# Patient Record
Sex: MALE | Race: BLACK OR AFRICAN AMERICAN | Employment: UNEMPLOYED | URBAN - METROPOLITAN AREA
[De-identification: names, ages, dates, MRNs, and addresses within clinical notes are randomized per-mention and may not be internally consistent; named-entity substitution may affect disease eponyms.]

---

## 2018-01-01 ENCOUNTER — APPOINTMENT (OUTPATIENT)
Dept: VASCULAR SURGERY | Age: 63
DRG: 004 | End: 2018-01-01
Attending: INTERNAL MEDICINE
Payer: MEDICARE

## 2018-01-01 ENCOUNTER — APPOINTMENT (OUTPATIENT)
Dept: GENERAL RADIOLOGY | Age: 63
DRG: 004 | End: 2018-01-01
Attending: INTERNAL MEDICINE
Payer: MEDICARE

## 2018-01-01 ENCOUNTER — APPOINTMENT (OUTPATIENT)
Dept: NON INVASIVE DIAGNOSTICS | Age: 63
DRG: 004 | End: 2018-01-01
Attending: INTERNAL MEDICINE
Payer: MEDICARE

## 2018-01-01 ENCOUNTER — APPOINTMENT (OUTPATIENT)
Dept: MRI IMAGING | Age: 63
DRG: 004 | End: 2018-01-01
Attending: NURSE PRACTITIONER
Payer: MEDICARE

## 2018-01-01 ENCOUNTER — APPOINTMENT (OUTPATIENT)
Dept: ULTRASOUND IMAGING | Age: 63
DRG: 004 | End: 2018-01-01
Attending: INTERNAL MEDICINE
Payer: MEDICARE

## 2018-01-01 ENCOUNTER — HOSPICE ADMISSION (OUTPATIENT)
Dept: HOSPICE | Facility: HOSPICE | Age: 63
End: 2018-01-01

## 2018-01-01 ENCOUNTER — APPOINTMENT (OUTPATIENT)
Dept: GENERAL RADIOLOGY | Age: 63
DRG: 004 | End: 2018-01-01
Attending: NURSE PRACTITIONER
Payer: MEDICARE

## 2018-01-01 ENCOUNTER — APPOINTMENT (OUTPATIENT)
Dept: GENERAL RADIOLOGY | Age: 63
DRG: 064 | End: 2018-01-01
Attending: EMERGENCY MEDICINE
Payer: MEDICARE

## 2018-01-01 ENCOUNTER — TELEPHONE (OUTPATIENT)
Dept: SURGERY | Age: 63
End: 2018-01-01

## 2018-01-01 ENCOUNTER — APPOINTMENT (OUTPATIENT)
Dept: VASCULAR SURGERY | Age: 63
DRG: 064 | End: 2018-01-01
Attending: NURSE PRACTITIONER
Payer: MEDICARE

## 2018-01-01 ENCOUNTER — PATIENT OUTREACH (OUTPATIENT)
Dept: CASE MANAGEMENT | Age: 63
End: 2018-01-01

## 2018-01-01 ENCOUNTER — APPOINTMENT (OUTPATIENT)
Dept: NON INVASIVE DIAGNOSTICS | Age: 63
DRG: 064 | End: 2018-01-01
Attending: NURSE PRACTITIONER
Payer: MEDICARE

## 2018-01-01 ENCOUNTER — APPOINTMENT (OUTPATIENT)
Dept: CT IMAGING | Age: 63
DRG: 064 | End: 2018-01-01
Attending: EMERGENCY MEDICINE
Payer: MEDICARE

## 2018-01-01 ENCOUNTER — APPOINTMENT (OUTPATIENT)
Dept: MRI IMAGING | Age: 63
DRG: 064 | End: 2018-01-01
Attending: EMERGENCY MEDICINE
Payer: MEDICARE

## 2018-01-01 ENCOUNTER — APPOINTMENT (OUTPATIENT)
Dept: CT IMAGING | Age: 63
DRG: 004 | End: 2018-01-01
Attending: INTERNAL MEDICINE
Payer: MEDICARE

## 2018-01-01 ENCOUNTER — APPOINTMENT (OUTPATIENT)
Dept: GENERAL RADIOLOGY | Age: 63
DRG: 004 | End: 2018-01-01
Attending: SURGERY
Payer: MEDICARE

## 2018-01-01 ENCOUNTER — HOSPITAL ENCOUNTER (INPATIENT)
Age: 63
LOS: 53 days | DRG: 004 | End: 2018-11-19
Attending: EMERGENCY MEDICINE | Admitting: INTERNAL MEDICINE
Payer: MEDICARE

## 2018-01-01 ENCOUNTER — APPOINTMENT (OUTPATIENT)
Dept: VASCULAR SURGERY | Age: 63
DRG: 004 | End: 2018-01-01
Attending: PODIATRIST
Payer: MEDICARE

## 2018-01-01 ENCOUNTER — APPOINTMENT (OUTPATIENT)
Dept: CARDIAC CATH/INVASIVE PROCEDURES | Age: 63
DRG: 004 | End: 2018-01-01
Attending: INTERNAL MEDICINE
Payer: MEDICARE

## 2018-01-01 ENCOUNTER — APPOINTMENT (OUTPATIENT)
Dept: GENERAL RADIOLOGY | Age: 63
DRG: 004 | End: 2018-01-01
Attending: EMERGENCY MEDICINE
Payer: MEDICARE

## 2018-01-01 ENCOUNTER — ANESTHESIA (OUTPATIENT)
Dept: SURGERY | Age: 63
DRG: 004 | End: 2018-01-01
Payer: MEDICARE

## 2018-01-01 ENCOUNTER — HOSPITAL ENCOUNTER (INPATIENT)
Age: 63
LOS: 4 days | Discharge: NURSING, REHAB OR LONG-TERM CARE | DRG: 064 | End: 2018-09-14
Attending: EMERGENCY MEDICINE | Admitting: FAMILY MEDICINE
Payer: MEDICARE

## 2018-01-01 ENCOUNTER — APPOINTMENT (OUTPATIENT)
Dept: GENERAL RADIOLOGY | Age: 63
DRG: 064 | End: 2018-01-01
Attending: HOSPITALIST
Payer: MEDICARE

## 2018-01-01 ENCOUNTER — APPOINTMENT (OUTPATIENT)
Dept: CT IMAGING | Age: 63
DRG: 004 | End: 2018-01-01
Attending: EMERGENCY MEDICINE
Payer: MEDICARE

## 2018-01-01 ENCOUNTER — ANESTHESIA EVENT (OUTPATIENT)
Dept: SURGERY | Age: 63
DRG: 004 | End: 2018-01-01
Payer: MEDICARE

## 2018-01-01 VITALS
TEMPERATURE: 98.1 F | HEIGHT: 67 IN | RESPIRATION RATE: 17 BRPM | SYSTOLIC BLOOD PRESSURE: 174 MMHG | BODY MASS INDEX: 35.78 KG/M2 | HEART RATE: 71 BPM | WEIGHT: 227.96 LBS | DIASTOLIC BLOOD PRESSURE: 87 MMHG | OXYGEN SATURATION: 93 %

## 2018-01-01 VITALS
OXYGEN SATURATION: 79 % | RESPIRATION RATE: 10 BRPM | BODY MASS INDEX: 29.05 KG/M2 | TEMPERATURE: 100.9 F | HEART RATE: 116 BPM | DIASTOLIC BLOOD PRESSURE: 77 MMHG | HEIGHT: 67 IN | WEIGHT: 185.09 LBS | SYSTOLIC BLOOD PRESSURE: 136 MMHG

## 2018-01-01 DIAGNOSIS — K80.70 CHOLELITHIASIS WITH CHOLEDOCHOLITHIASIS: ICD-10-CM

## 2018-01-01 DIAGNOSIS — Z86.73 HISTORY OF CVA (CEREBROVASCULAR ACCIDENT): ICD-10-CM

## 2018-01-01 DIAGNOSIS — R53.1 RIGHT SIDED WEAKNESS: ICD-10-CM

## 2018-01-01 DIAGNOSIS — R29.810 FACIAL WEAKNESS: ICD-10-CM

## 2018-01-01 DIAGNOSIS — N17.9 ACUTE RENAL FAILURE, UNSPECIFIED ACUTE RENAL FAILURE TYPE (HCC): Primary | ICD-10-CM

## 2018-01-01 DIAGNOSIS — I63.9 ACUTE CVA (CEREBROVASCULAR ACCIDENT) (HCC): Primary | ICD-10-CM

## 2018-01-01 DIAGNOSIS — N39.0 ACUTE UTI: ICD-10-CM

## 2018-01-01 DIAGNOSIS — I10 HYPERTENSION, UNSPECIFIED TYPE: ICD-10-CM

## 2018-01-01 DIAGNOSIS — N18.6 ESRD (END STAGE RENAL DISEASE) (HCC): ICD-10-CM

## 2018-01-01 DIAGNOSIS — R73.9 HYPERGLYCEMIA: ICD-10-CM

## 2018-01-01 DIAGNOSIS — M62.82 NON-TRAUMATIC RHABDOMYOLYSIS: ICD-10-CM

## 2018-01-01 LAB
1,3 BETA GLUCAN SER-MCNC: NORMAL PG/ML
ABO + RH BLD: NORMAL
ALBUMIN SERPL-MCNC: 1.2 G/DL (ref 3.4–5)
ALBUMIN SERPL-MCNC: 1.2 G/DL (ref 3.4–5)
ALBUMIN SERPL-MCNC: 1.3 G/DL (ref 3.4–5)
ALBUMIN SERPL-MCNC: 1.3 G/DL (ref 3.4–5)
ALBUMIN SERPL-MCNC: 1.4 G/DL (ref 3.4–5)
ALBUMIN SERPL-MCNC: 1.5 G/DL (ref 3.4–5)
ALBUMIN SERPL-MCNC: 1.6 G/DL (ref 3.4–5)
ALBUMIN SERPL-MCNC: 1.7 G/DL (ref 3.4–5)
ALBUMIN SERPL-MCNC: 1.8 G/DL (ref 3.4–5)
ALBUMIN SERPL-MCNC: 1.9 G/DL (ref 3.4–5)
ALBUMIN SERPL-MCNC: 2 G/DL (ref 3.4–5)
ALBUMIN SERPL-MCNC: 2 G/DL (ref 3.4–5)
ALBUMIN SERPL-MCNC: 2.8 G/DL (ref 3.4–5)
ALBUMIN/GLOB SERPL: 0.2 {RATIO} (ref 0.8–1.7)
ALBUMIN/GLOB SERPL: 0.3 {RATIO} (ref 0.8–1.7)
ALBUMIN/GLOB SERPL: 0.4 {RATIO} (ref 0.8–1.7)
ALBUMIN/GLOB SERPL: 0.4 {RATIO} (ref 0.8–1.7)
ALBUMIN/GLOB SERPL: 0.7 {RATIO} (ref 0.8–1.7)
ALP SERPL-CCNC: 108 U/L (ref 45–117)
ALP SERPL-CCNC: 231 U/L (ref 45–117)
ALP SERPL-CCNC: 237 U/L (ref 45–117)
ALP SERPL-CCNC: 239 U/L (ref 45–117)
ALP SERPL-CCNC: 244 U/L (ref 45–117)
ALP SERPL-CCNC: 266 U/L (ref 45–117)
ALP SERPL-CCNC: 268 U/L (ref 45–117)
ALP SERPL-CCNC: 270 U/L (ref 45–117)
ALP SERPL-CCNC: 272 U/L (ref 45–117)
ALP SERPL-CCNC: 276 U/L (ref 45–117)
ALP SERPL-CCNC: 277 U/L (ref 45–117)
ALP SERPL-CCNC: 279 U/L (ref 45–117)
ALP SERPL-CCNC: 290 U/L (ref 45–117)
ALP SERPL-CCNC: 302 U/L (ref 45–117)
ALP SERPL-CCNC: 303 U/L (ref 45–117)
ALP SERPL-CCNC: 308 U/L (ref 45–117)
ALP SERPL-CCNC: 318 U/L (ref 45–117)
ALP SERPL-CCNC: 318 U/L (ref 45–117)
ALP SERPL-CCNC: 324 U/L (ref 45–117)
ALP SERPL-CCNC: 339 U/L (ref 45–117)
ALP SERPL-CCNC: 340 U/L (ref 45–117)
ALP SERPL-CCNC: 345 U/L (ref 45–117)
ALP SERPL-CCNC: 347 U/L (ref 45–117)
ALP SERPL-CCNC: 353 U/L (ref 45–117)
ALP SERPL-CCNC: 368 U/L (ref 45–117)
ALP SERPL-CCNC: 375 U/L (ref 45–117)
ALP SERPL-CCNC: 381 U/L (ref 45–117)
ALP SERPL-CCNC: 388 U/L (ref 45–117)
ALP SERPL-CCNC: 394 U/L (ref 45–117)
ALP SERPL-CCNC: 398 U/L (ref 45–117)
ALP SERPL-CCNC: 401 U/L (ref 45–117)
ALP SERPL-CCNC: 407 U/L (ref 45–117)
ALT SERPL-CCNC: 123 U/L (ref 16–61)
ALT SERPL-CCNC: 176 U/L (ref 16–61)
ALT SERPL-CCNC: 24 U/L (ref 16–61)
ALT SERPL-CCNC: 292 U/L (ref 16–61)
ALT SERPL-CCNC: 41 U/L (ref 16–61)
ALT SERPL-CCNC: 43 U/L (ref 16–61)
ALT SERPL-CCNC: 44 U/L (ref 16–61)
ALT SERPL-CCNC: 46 U/L (ref 16–61)
ALT SERPL-CCNC: 47 U/L (ref 16–61)
ALT SERPL-CCNC: 49 U/L (ref 16–61)
ALT SERPL-CCNC: 50 U/L (ref 16–61)
ALT SERPL-CCNC: 52 U/L (ref 16–61)
ALT SERPL-CCNC: 54 U/L (ref 16–61)
ALT SERPL-CCNC: 55 U/L (ref 16–61)
ALT SERPL-CCNC: 56 U/L (ref 16–61)
ALT SERPL-CCNC: 56 U/L (ref 16–61)
ALT SERPL-CCNC: 57 U/L (ref 16–61)
ALT SERPL-CCNC: 58 U/L (ref 16–61)
ALT SERPL-CCNC: 59 U/L (ref 16–61)
ALT SERPL-CCNC: 62 U/L (ref 16–61)
ALT SERPL-CCNC: 63 U/L (ref 16–61)
ALT SERPL-CCNC: 67 U/L (ref 16–61)
ALT SERPL-CCNC: 70 U/L (ref 16–61)
ALT SERPL-CCNC: 72 U/L (ref 16–61)
ALT SERPL-CCNC: 74 U/L (ref 16–61)
ALT SERPL-CCNC: 74 U/L (ref 16–61)
ALT SERPL-CCNC: 75 U/L (ref 16–61)
ALT SERPL-CCNC: 76 U/L (ref 16–61)
ALT SERPL-CCNC: 77 U/L (ref 16–61)
ALT SERPL-CCNC: 84 U/L (ref 16–61)
ALT SERPL-CCNC: 89 U/L (ref 16–61)
ALT SERPL-CCNC: 98 U/L (ref 16–61)
AMPHET UR QL SCN: NEGATIVE
ANION GAP SERPL CALC-SCNC: 10 MMOL/L (ref 3–18)
ANION GAP SERPL CALC-SCNC: 11 MMOL/L (ref 3–18)
ANION GAP SERPL CALC-SCNC: 12 MMOL/L (ref 3–18)
ANION GAP SERPL CALC-SCNC: 13 MMOL/L (ref 3–18)
ANION GAP SERPL CALC-SCNC: 14 MMOL/L (ref 3–18)
ANION GAP SERPL CALC-SCNC: 15 MMOL/L (ref 3–18)
ANION GAP SERPL CALC-SCNC: 18 MMOL/L (ref 3–18)
ANION GAP SERPL CALC-SCNC: 19 MMOL/L (ref 3–18)
ANION GAP SERPL CALC-SCNC: 19 MMOL/L (ref 3–18)
ANION GAP SERPL CALC-SCNC: 21 MMOL/L (ref 3–18)
ANION GAP SERPL CALC-SCNC: 23 MMOL/L (ref 3–18)
ANION GAP SERPL CALC-SCNC: 25 MMOL/L (ref 3–18)
ANION GAP SERPL CALC-SCNC: 26 MMOL/L (ref 3–18)
ANION GAP SERPL CALC-SCNC: 7 MMOL/L (ref 3–18)
ANION GAP SERPL CALC-SCNC: 7 MMOL/L (ref 3–18)
ANION GAP SERPL CALC-SCNC: 8 MMOL/L (ref 3–18)
ANION GAP SERPL CALC-SCNC: 9 MMOL/L (ref 3–18)
APPEARANCE UR: ABNORMAL
APPEARANCE UR: ABNORMAL
APPEARANCE UR: CLEAR
APPEARANCE UR: CLEAR
APTT PPP: 22.4 SEC (ref 23–36.4)
APTT PPP: 33.2 SEC (ref 23–36.4)
APTT PPP: 37.1 SEC (ref 23–36.4)
APTT PPP: 43.7 SEC (ref 23–36.4)
APTT PPP: 59.3 SEC (ref 23–36.4)
APTT PPP: 60.4 SEC (ref 23–36.4)
APTT PPP: 84.3 SEC (ref 23–36.4)
APTT PPP: >180 SEC (ref 23–36.4)
ARTERIAL PATENCY WRIST A: ABNORMAL
ARTERIAL PATENCY WRIST A: NORMAL
ARTERIAL PATENCY WRIST A: YES
ASPIRIN TEST, ASPIRN: 479 ARU (ref 620–672)
AST SERPL-CCNC: 102 U/L (ref 15–37)
AST SERPL-CCNC: 104 U/L (ref 15–37)
AST SERPL-CCNC: 109 U/L (ref 15–37)
AST SERPL-CCNC: 110 U/L (ref 15–37)
AST SERPL-CCNC: 112 U/L (ref 15–37)
AST SERPL-CCNC: 114 U/L (ref 15–37)
AST SERPL-CCNC: 115 U/L (ref 15–37)
AST SERPL-CCNC: 116 U/L (ref 15–37)
AST SERPL-CCNC: 116 U/L (ref 15–37)
AST SERPL-CCNC: 119 U/L (ref 15–37)
AST SERPL-CCNC: 127 U/L (ref 15–37)
AST SERPL-CCNC: 134 U/L (ref 15–37)
AST SERPL-CCNC: 138 U/L (ref 15–37)
AST SERPL-CCNC: 148 U/L (ref 15–37)
AST SERPL-CCNC: 152 U/L (ref 15–37)
AST SERPL-CCNC: 198 U/L (ref 15–37)
AST SERPL-CCNC: 358 U/L (ref 15–37)
AST SERPL-CCNC: 52 U/L (ref 15–37)
AST SERPL-CCNC: 75 U/L (ref 15–37)
AST SERPL-CCNC: 76 U/L (ref 15–37)
AST SERPL-CCNC: 77 U/L (ref 15–37)
AST SERPL-CCNC: 80 U/L (ref 15–37)
AST SERPL-CCNC: 842 U/L (ref 15–37)
AST SERPL-CCNC: 85 U/L (ref 15–37)
AST SERPL-CCNC: 88 U/L (ref 15–37)
AST SERPL-CCNC: 91 U/L (ref 15–37)
AST SERPL-CCNC: 92 U/L (ref 15–37)
AST SERPL-CCNC: 94 U/L (ref 15–37)
AST SERPL-CCNC: 96 U/L (ref 15–37)
AST SERPL-CCNC: 97 U/L (ref 15–37)
ATRIAL RATE: 77 BPM
ATRIAL RATE: 88 BPM
ATRIAL RATE: 90 BPM
BACTERIA SPEC CULT: ABNORMAL
BACTERIA SPEC CULT: NORMAL
BACTERIA URNS QL MICRO: ABNORMAL /HPF
BACTERIA URNS QL MICRO: ABNORMAL /HPF
BACTERIA URNS QL MICRO: NEGATIVE /HPF
BACTERIA URNS QL MICRO: NEGATIVE /HPF
BARBITURATES UR QL SCN: NEGATIVE
BASE DEFICIT BLD-SCNC: 1 MMOL/L
BASE DEFICIT BLD-SCNC: 2 MMOL/L
BASE DEFICIT BLD-SCNC: 4 MMOL/L
BASE DEFICIT BLD-SCNC: 9 MMOL/L
BASE EXCESS BLD CALC-SCNC: 1 MMOL/L
BASE EXCESS BLD CALC-SCNC: 2 MMOL/L
BASE EXCESS BLD CALC-SCNC: 3 MMOL/L
BASE EXCESS BLD CALC-SCNC: 3 MMOL/L
BASE EXCESS BLD CALC-SCNC: 4 MMOL/L
BASE EXCESS BLD CALC-SCNC: 4 MMOL/L
BASE EXCESS BLD CALC-SCNC: 5 MMOL/L
BASE EXCESS BLD CALC-SCNC: 6 MMOL/L
BASE EXCESS BLD CALC-SCNC: 8 MMOL/L
BASE EXCESS BLD CALC-SCNC: 8 MMOL/L
BASE EXCESS BLDV CALC-SCNC: 8 MMOL/L
BASOPHILS # BLD: 0 K/UL (ref 0–0.1)
BASOPHILS NFR BLD: 0 % (ref 0–2)
BASOPHILS NFR BLD: 0 % (ref 0–3)
BDY SITE: ABNORMAL
BDY SITE: NORMAL
BENZODIAZ UR QL: NEGATIVE
BILIRUB DIRECT SERPL-MCNC: 0.3 MG/DL (ref 0–0.2)
BILIRUB DIRECT SERPL-MCNC: 0.3 MG/DL (ref 0–0.2)
BILIRUB DIRECT SERPL-MCNC: 0.4 MG/DL (ref 0–0.2)
BILIRUB DIRECT SERPL-MCNC: 0.5 MG/DL (ref 0–0.2)
BILIRUB DIRECT SERPL-MCNC: 0.5 MG/DL (ref 0–0.2)
BILIRUB DIRECT SERPL-MCNC: 0.6 MG/DL (ref 0–0.2)
BILIRUB DIRECT SERPL-MCNC: 0.7 MG/DL (ref 0–0.2)
BILIRUB DIRECT SERPL-MCNC: 0.8 MG/DL (ref 0–0.2)
BILIRUB DIRECT SERPL-MCNC: 1.7 MG/DL (ref 0–0.2)
BILIRUB DIRECT SERPL-MCNC: 2.1 MG/DL (ref 0–0.2)
BILIRUB DIRECT SERPL-MCNC: 3 MG/DL (ref 0–0.2)
BILIRUB DIRECT SERPL-MCNC: 3.6 MG/DL (ref 0–0.2)
BILIRUB DIRECT SERPL-MCNC: 3.6 MG/DL (ref 0–0.2)
BILIRUB SERPL-MCNC: 0.5 MG/DL (ref 0.2–1)
BILIRUB SERPL-MCNC: 0.6 MG/DL (ref 0.2–1)
BILIRUB SERPL-MCNC: 0.7 MG/DL (ref 0.2–1)
BILIRUB SERPL-MCNC: 0.8 MG/DL (ref 0.2–1)
BILIRUB SERPL-MCNC: 0.8 MG/DL (ref 0.2–1)
BILIRUB SERPL-MCNC: 1.1 MG/DL (ref 0.2–1)
BILIRUB SERPL-MCNC: 1.1 MG/DL (ref 0.2–1)
BILIRUB SERPL-MCNC: 2.3 MG/DL (ref 0.2–1)
BILIRUB SERPL-MCNC: 2.8 MG/DL (ref 0.2–1)
BILIRUB SERPL-MCNC: 3.5 MG/DL (ref 0.2–1)
BILIRUB SERPL-MCNC: 3.9 MG/DL (ref 0.2–1)
BILIRUB SERPL-MCNC: 4 MG/DL (ref 0.2–1)
BILIRUB SERPL-MCNC: 4.1 MG/DL (ref 0.2–1)
BILIRUB SERPL-MCNC: 5 MG/DL (ref 0.2–1)
BILIRUB SERPL-MCNC: 5.8 MG/DL (ref 0.2–1)
BILIRUB UR QL: ABNORMAL
BILIRUB UR QL: NEGATIVE
BLD PROD TYP BPU: NORMAL
BLOOD GROUP ANTIBODIES SERPL: NORMAL
BNP SERPL-MCNC: 2476 PG/ML (ref 0–900)
BNP SERPL-MCNC: 433 PG/ML (ref 0–900)
BODY TEMPERATURE: 100.4
BODY TEMPERATURE: 36.7
BODY TEMPERATURE: 36.7
BODY TEMPERATURE: 37.2
BODY TEMPERATURE: 37.3
BODY TEMPERATURE: 37.3
BODY TEMPERATURE: 37.5
BODY TEMPERATURE: 37.6
BODY TEMPERATURE: 37.8
BODY TEMPERATURE: 97.7
BODY TEMPERATURE: 98
BODY TEMPERATURE: 99
BODY TEMPERATURE: 99
BODY TEMPERATURE: 99.5
BPU ID: NORMAL
BUN SERPL-MCNC: 10 MG/DL (ref 7–18)
BUN SERPL-MCNC: 105 MG/DL (ref 7–18)
BUN SERPL-MCNC: 127 MG/DL (ref 7–18)
BUN SERPL-MCNC: 132 MG/DL (ref 7–18)
BUN SERPL-MCNC: 137 MG/DL (ref 7–18)
BUN SERPL-MCNC: 138 MG/DL (ref 7–18)
BUN SERPL-MCNC: 143 MG/DL (ref 7–18)
BUN SERPL-MCNC: 150 MG/DL (ref 7–18)
BUN SERPL-MCNC: 23 MG/DL (ref 7–18)
BUN SERPL-MCNC: 23 MG/DL (ref 7–18)
BUN SERPL-MCNC: 24 MG/DL (ref 7–18)
BUN SERPL-MCNC: 27 MG/DL (ref 7–18)
BUN SERPL-MCNC: 30 MG/DL (ref 7–18)
BUN SERPL-MCNC: 31 MG/DL (ref 7–18)
BUN SERPL-MCNC: 32 MG/DL (ref 7–18)
BUN SERPL-MCNC: 32 MG/DL (ref 7–18)
BUN SERPL-MCNC: 34 MG/DL (ref 7–18)
BUN SERPL-MCNC: 36 MG/DL (ref 7–18)
BUN SERPL-MCNC: 38 MG/DL (ref 7–18)
BUN SERPL-MCNC: 39 MG/DL (ref 7–18)
BUN SERPL-MCNC: 39 MG/DL (ref 7–18)
BUN SERPL-MCNC: 40 MG/DL (ref 7–18)
BUN SERPL-MCNC: 42 MG/DL (ref 7–18)
BUN SERPL-MCNC: 43 MG/DL (ref 7–18)
BUN SERPL-MCNC: 44 MG/DL (ref 7–18)
BUN SERPL-MCNC: 46 MG/DL (ref 7–18)
BUN SERPL-MCNC: 48 MG/DL (ref 7–18)
BUN SERPL-MCNC: 52 MG/DL (ref 7–18)
BUN SERPL-MCNC: 53 MG/DL (ref 7–18)
BUN SERPL-MCNC: 56 MG/DL (ref 7–18)
BUN SERPL-MCNC: 57 MG/DL (ref 7–18)
BUN SERPL-MCNC: 57 MG/DL (ref 7–18)
BUN SERPL-MCNC: 58 MG/DL (ref 7–18)
BUN SERPL-MCNC: 60 MG/DL (ref 7–18)
BUN SERPL-MCNC: 66 MG/DL (ref 7–18)
BUN SERPL-MCNC: 67 MG/DL (ref 7–18)
BUN SERPL-MCNC: 68 MG/DL (ref 7–18)
BUN SERPL-MCNC: 71 MG/DL (ref 7–18)
BUN SERPL-MCNC: 71 MG/DL (ref 7–18)
BUN SERPL-MCNC: 72 MG/DL (ref 7–18)
BUN SERPL-MCNC: 75 MG/DL (ref 7–18)
BUN SERPL-MCNC: 8 MG/DL (ref 7–18)
BUN SERPL-MCNC: 87 MG/DL (ref 7–18)
BUN SERPL-MCNC: 9 MG/DL (ref 7–18)
BUN SERPL-MCNC: 95 MG/DL (ref 7–18)
BUN SERPL-MCNC: 96 MG/DL (ref 7–18)
BUN/CREAT SERPL: 10 (ref 12–20)
BUN/CREAT SERPL: 10 (ref 12–20)
BUN/CREAT SERPL: 11 (ref 12–20)
BUN/CREAT SERPL: 12 (ref 12–20)
BUN/CREAT SERPL: 13 (ref 12–20)
BUN/CREAT SERPL: 14 (ref 12–20)
BUN/CREAT SERPL: 15 (ref 12–20)
BUN/CREAT SERPL: 17 (ref 12–20)
BUN/CREAT SERPL: 18 (ref 12–20)
BUN/CREAT SERPL: 20 (ref 12–20)
BUN/CREAT SERPL: 21 (ref 12–20)
BUN/CREAT SERPL: 7 (ref 12–20)
BUN/CREAT SERPL: 8 (ref 12–20)
BUN/CREAT SERPL: 9 (ref 12–20)
CA-I SERPL-SCNC: 0.96 MMOL/L (ref 1.12–1.32)
CA-I SERPL-SCNC: 0.98 MMOL/L (ref 1.12–1.32)
CA-I SERPL-SCNC: 0.99 MMOL/L (ref 1.12–1.32)
CA-I SERPL-SCNC: 1.01 MMOL/L (ref 1.12–1.32)
CA-I SERPL-SCNC: 1.02 MMOL/L (ref 1.12–1.32)
CA-I SERPL-SCNC: 1.14 MMOL/L (ref 1.12–1.32)
CALCIUM SERPL-MCNC: 6.6 MG/DL (ref 8.5–10.1)
CALCIUM SERPL-MCNC: 7 MG/DL (ref 8.5–10.1)
CALCIUM SERPL-MCNC: 7 MG/DL (ref 8.5–10.1)
CALCIUM SERPL-MCNC: 7.1 MG/DL (ref 8.5–10.1)
CALCIUM SERPL-MCNC: 7.1 MG/DL (ref 8.5–10.1)
CALCIUM SERPL-MCNC: 7.2 MG/DL (ref 8.5–10.1)
CALCIUM SERPL-MCNC: 7.3 MG/DL (ref 8.5–10.1)
CALCIUM SERPL-MCNC: 7.4 MG/DL (ref 8.5–10.1)
CALCIUM SERPL-MCNC: 7.5 MG/DL (ref 8.5–10.1)
CALCIUM SERPL-MCNC: 7.6 MG/DL (ref 8.5–10.1)
CALCIUM SERPL-MCNC: 7.7 MG/DL (ref 8.5–10.1)
CALCIUM SERPL-MCNC: 7.8 MG/DL (ref 8.5–10.1)
CALCIUM SERPL-MCNC: 7.9 MG/DL (ref 8.5–10.1)
CALCIUM SERPL-MCNC: 8 MG/DL (ref 8.5–10.1)
CALCIUM SERPL-MCNC: 8 MG/DL (ref 8.5–10.1)
CALCIUM SERPL-MCNC: 8.2 MG/DL (ref 8.5–10.1)
CALCIUM SERPL-MCNC: 8.3 MG/DL (ref 8.5–10.1)
CALCIUM SERPL-MCNC: 8.3 MG/DL (ref 8.5–10.1)
CALCIUM SERPL-MCNC: 8.4 MG/DL (ref 8.5–10.1)
CALCIUM SERPL-MCNC: 8.5 MG/DL (ref 8.5–10.1)
CALCIUM SERPL-MCNC: 8.5 MG/DL (ref 8.5–10.1)
CALCULATED P AXIS, ECG09: 17 DEGREES
CALCULATED P AXIS, ECG09: 19 DEGREES
CALCULATED P AXIS, ECG09: 33 DEGREES
CALCULATED R AXIS, ECG10: -52 DEGREES
CALCULATED R AXIS, ECG10: -58 DEGREES
CALCULATED R AXIS, ECG10: -58 DEGREES
CALCULATED T AXIS, ECG11: 100 DEGREES
CALCULATED T AXIS, ECG11: 5 DEGREES
CALCULATED T AXIS, ECG11: 88 DEGREES
CALLED TO:,BCALL1: NORMAL
CANNABINOIDS UR QL SCN: NEGATIVE
CHLORIDE SERPL-SCNC: 100 MMOL/L (ref 100–108)
CHLORIDE SERPL-SCNC: 101 MMOL/L (ref 100–108)
CHLORIDE SERPL-SCNC: 102 MMOL/L (ref 100–108)
CHLORIDE SERPL-SCNC: 103 MMOL/L (ref 100–108)
CHLORIDE SERPL-SCNC: 104 MMOL/L (ref 100–108)
CHLORIDE SERPL-SCNC: 105 MMOL/L (ref 100–108)
CHLORIDE SERPL-SCNC: 105 MMOL/L (ref 100–108)
CHLORIDE SERPL-SCNC: 95 MMOL/L (ref 100–108)
CHLORIDE SERPL-SCNC: 98 MMOL/L (ref 100–108)
CHLORIDE SERPL-SCNC: 98 MMOL/L (ref 100–108)
CHLORIDE SERPL-SCNC: 99 MMOL/L (ref 100–108)
CHOLEST SERPL-MCNC: 349 MG/DL
CK MB CFR SERPL CALC: 0.5 % (ref 0–4)
CK MB CFR SERPL CALC: 2.7 % (ref 0–4)
CK MB CFR SERPL CALC: 3.3 % (ref 0–4)
CK MB SERPL-MCNC: 24.7 NG/ML (ref 5–25)
CK MB SERPL-MCNC: 5.3 NG/ML (ref 5–25)
CK MB SERPL-MCNC: 8 NG/ML (ref 5–25)
CK SERPL-CCNC: 195 U/L (ref 39–308)
CK SERPL-CCNC: 1963 U/L (ref 39–308)
CK SERPL-CCNC: 220 U/L (ref 39–308)
CK SERPL-CCNC: 242 U/L (ref 39–308)
CK SERPL-CCNC: 3973 U/L (ref 39–308)
CK SERPL-CCNC: 445 U/L (ref 39–308)
CK SERPL-CCNC: 5388 U/L (ref 39–308)
CK SERPL-CCNC: 760 U/L (ref 39–308)
CO2 SERPL-SCNC: 15 MMOL/L (ref 21–32)
CO2 SERPL-SCNC: 17 MMOL/L (ref 21–32)
CO2 SERPL-SCNC: 17 MMOL/L (ref 21–32)
CO2 SERPL-SCNC: 18 MMOL/L (ref 21–32)
CO2 SERPL-SCNC: 20 MMOL/L (ref 21–32)
CO2 SERPL-SCNC: 21 MMOL/L (ref 21–32)
CO2 SERPL-SCNC: 21 MMOL/L (ref 21–32)
CO2 SERPL-SCNC: 22 MMOL/L (ref 21–32)
CO2 SERPL-SCNC: 23 MMOL/L (ref 21–32)
CO2 SERPL-SCNC: 24 MMOL/L (ref 21–32)
CO2 SERPL-SCNC: 25 MMOL/L (ref 21–32)
CO2 SERPL-SCNC: 26 MMOL/L (ref 21–32)
CO2 SERPL-SCNC: 27 MMOL/L (ref 21–32)
CO2 SERPL-SCNC: 28 MMOL/L (ref 21–32)
CO2 SERPL-SCNC: 29 MMOL/L (ref 21–32)
CO2 SERPL-SCNC: 30 MMOL/L (ref 21–32)
CO2 SERPL-SCNC: 30 MMOL/L (ref 21–32)
CO2 SERPL-SCNC: 31 MMOL/L (ref 21–32)
COCAINE UR QL SCN: NEGATIVE
COLOR UR: ABNORMAL
COLOR UR: YELLOW
CREAT SERPL-MCNC: 0.78 MG/DL (ref 0.6–1.3)
CREAT SERPL-MCNC: 1.26 MG/DL (ref 0.6–1.3)
CREAT SERPL-MCNC: 1.27 MG/DL (ref 0.6–1.3)
CREAT SERPL-MCNC: 10.1 MG/DL (ref 0.6–1.3)
CREAT SERPL-MCNC: 10.3 MG/DL (ref 0.6–1.3)
CREAT SERPL-MCNC: 11.1 MG/DL (ref 0.6–1.3)
CREAT SERPL-MCNC: 11.6 MG/DL (ref 0.6–1.3)
CREAT SERPL-MCNC: 2.53 MG/DL (ref 0.6–1.3)
CREAT SERPL-MCNC: 2.57 MG/DL (ref 0.6–1.3)
CREAT SERPL-MCNC: 3.02 MG/DL (ref 0.6–1.3)
CREAT SERPL-MCNC: 3.14 MG/DL (ref 0.6–1.3)
CREAT SERPL-MCNC: 3.29 MG/DL (ref 0.6–1.3)
CREAT SERPL-MCNC: 3.32 MG/DL (ref 0.6–1.3)
CREAT SERPL-MCNC: 3.32 MG/DL (ref 0.6–1.3)
CREAT SERPL-MCNC: 3.34 MG/DL (ref 0.6–1.3)
CREAT SERPL-MCNC: 3.52 MG/DL (ref 0.6–1.3)
CREAT SERPL-MCNC: 3.62 MG/DL (ref 0.6–1.3)
CREAT SERPL-MCNC: 3.69 MG/DL (ref 0.6–1.3)
CREAT SERPL-MCNC: 3.89 MG/DL (ref 0.6–1.3)
CREAT SERPL-MCNC: 4 MG/DL (ref 0.6–1.3)
CREAT SERPL-MCNC: 4.02 MG/DL (ref 0.6–1.3)
CREAT SERPL-MCNC: 4.08 MG/DL (ref 0.6–1.3)
CREAT SERPL-MCNC: 4.16 MG/DL (ref 0.6–1.3)
CREAT SERPL-MCNC: 4.16 MG/DL (ref 0.6–1.3)
CREAT SERPL-MCNC: 4.23 MG/DL (ref 0.6–1.3)
CREAT SERPL-MCNC: 4.68 MG/DL (ref 0.6–1.3)
CREAT SERPL-MCNC: 4.77 MG/DL (ref 0.6–1.3)
CREAT SERPL-MCNC: 4.95 MG/DL (ref 0.6–1.3)
CREAT SERPL-MCNC: 4.98 MG/DL (ref 0.6–1.3)
CREAT SERPL-MCNC: 5.01 MG/DL (ref 0.6–1.3)
CREAT SERPL-MCNC: 5.17 MG/DL (ref 0.6–1.3)
CREAT SERPL-MCNC: 5.21 MG/DL (ref 0.6–1.3)
CREAT SERPL-MCNC: 5.25 MG/DL (ref 0.6–1.3)
CREAT SERPL-MCNC: 5.28 MG/DL (ref 0.6–1.3)
CREAT SERPL-MCNC: 5.35 MG/DL (ref 0.6–1.3)
CREAT SERPL-MCNC: 5.46 MG/DL (ref 0.6–1.3)
CREAT SERPL-MCNC: 5.54 MG/DL (ref 0.6–1.3)
CREAT SERPL-MCNC: 5.56 MG/DL (ref 0.6–1.3)
CREAT SERPL-MCNC: 5.63 MG/DL (ref 0.6–1.3)
CREAT SERPL-MCNC: 5.67 MG/DL (ref 0.6–1.3)
CREAT SERPL-MCNC: 5.92 MG/DL (ref 0.6–1.3)
CREAT SERPL-MCNC: 5.96 MG/DL (ref 0.6–1.3)
CREAT SERPL-MCNC: 6.24 MG/DL (ref 0.6–1.3)
CREAT SERPL-MCNC: 6.27 MG/DL (ref 0.6–1.3)
CREAT SERPL-MCNC: 6.31 MG/DL (ref 0.6–1.3)
CREAT SERPL-MCNC: 6.32 MG/DL (ref 0.6–1.3)
CREAT SERPL-MCNC: 6.63 MG/DL (ref 0.6–1.3)
CREAT SERPL-MCNC: 7.07 MG/DL (ref 0.6–1.3)
CREAT SERPL-MCNC: 7.71 MG/DL (ref 0.6–1.3)
CREAT SERPL-MCNC: 8.29 MG/DL (ref 0.6–1.3)
CROSSMATCH RESULT,%XM: NORMAL
CRP SERPL HS-MCNC: 52.75 MG/L (ref 0–3)
D DIMER PPP FEU-MCNC: 11.08 UG/ML(FEU)
D DIMER PPP FEU-MCNC: 4.75 UG/ML(FEU)
D DIMER PPP FEU-MCNC: 6.69 UG/ML(FEU)
DATE LAST DOSE: ABNORMAL
DIAGNOSIS, 93000: NORMAL
DIFFERENTIAL METHOD BLD: ABNORMAL
ECHO AO ROOT DIAM: 3.13 CM
ECHO AV MEAN GRADIENT: 9 MMHG
ECHO AV PEAK GRADIENT: 0 MMHG
ECHO AV PEAK VELOCITY: 0 CM/S
ECHO AV REGURGITANT PHT: 559.7 CM
ECHO AV VTI: 34.36 CM
ECHO LA VOL 2C: 100.4 ML (ref 18–58)
ECHO LA VOL 4C: 87.85 ML (ref 18–58)
ECHO LA VOL BP: 102 ML (ref 18–58)
ECHO LA VOL/BSA BIPLANE: 49.71 ML/M2
ECHO LA VOLUME INDEX A2C: 48.93 ML/M2
ECHO LA VOLUME INDEX A4C: 42.82 ML/M2
ECHO LV E' LATERAL VELOCITY: 0.6 CM/S
ECHO LV E' SEPTAL VELOCITY: 0.7 CM/S
ECHO LV EDV A2C: 222.7 ML
ECHO LV EDV A4C: 192.1 ML
ECHO LV EDV BP: 215.5 ML (ref 67–155)
ECHO LV EDV INDEX A4C: 93.6 ML/M2
ECHO LV EDV INDEX BP: 105 ML/M2
ECHO LV EDV NDEX A2C: 108.5 ML/M2
ECHO LV EJECTION FRACTION A2C: 45 %
ECHO LV EJECTION FRACTION A4C: 45 %
ECHO LV EJECTION FRACTION BIPLANE: 46.1 % (ref 55–100)
ECHO LV ESV A2C: 121.9 ML
ECHO LV ESV A4C: 105.8 ML
ECHO LV ESV BP: 116.2 ML (ref 22–58)
ECHO LV ESV INDEX A2C: 59.4 ML/M2
ECHO LV ESV INDEX A4C: 51.6 ML/M2
ECHO LV ESV INDEX BP: 56.6 ML/M2
ECHO LV INTERNAL DIMENSION DIASTOLIC: 4.91 CM (ref 4.2–5.9)
ECHO LV INTERNAL DIMENSION SYSTOLIC: 3.42 CM
ECHO LV IVSD: 1.2 CM (ref 0.6–1)
ECHO LV MASS 2D: 284.6 G (ref 88–224)
ECHO LV MASS INDEX 2D: 138.7 G/M2
ECHO LV POSTERIOR WALL DIASTOLIC: 1.29 CM (ref 0.6–1)
ECHO LVOT DIAM: 1.96 CM
ECHO MV A VELOCITY: 127.51 CM/S
ECHO MV AREA PHT: 6.7 CM2
ECHO MV E DECELERATION TIME (DT): 113.6 MS
ECHO MV E VELOCITY: 0.78 CM/S
ECHO MV E/A RATIO: 0.01
ECHO MV E/E' LATERAL: 1.3
ECHO MV E/E' RATIO (AVERAGED): 1.21
ECHO MV E/E' SEPTAL: 1.11
ECHO MV PRESSURE HALF TIME (PHT): 32.9 MS
ECHO RV TAPSE: 2.6 CM (ref 1.5–2)
EOSINOPHIL # BLD: 0 K/UL (ref 0–0.4)
EOSINOPHIL # BLD: 0.2 K/UL (ref 0–0.4)
EOSINOPHIL # BLD: 0.3 K/UL (ref 0–0.4)
EOSINOPHIL # BLD: 0.4 K/UL (ref 0–0.4)
EOSINOPHIL # BLD: 0.4 K/UL (ref 0–0.4)
EOSINOPHIL # BLD: 0.5 K/UL (ref 0–0.4)
EOSINOPHIL NFR BLD: 0 % (ref 0–5)
EOSINOPHIL NFR BLD: 1 % (ref 0–5)
EOSINOPHIL NFR BLD: 2 % (ref 0–5)
EOSINOPHIL NFR BLD: 3 % (ref 0–5)
EPITH CASTS URNS QL MICRO: ABNORMAL /LPF (ref 0–5)
EPITH CASTS URNS QL MICRO: NORMAL /LPF (ref 0–5)
ERYTHROCYTE [DISTWIDTH] IN BLOOD BY AUTOMATED COUNT: 12.9 % (ref 11.6–14.5)
ERYTHROCYTE [DISTWIDTH] IN BLOOD BY AUTOMATED COUNT: 13.2 % (ref 11.6–14.5)
ERYTHROCYTE [DISTWIDTH] IN BLOOD BY AUTOMATED COUNT: 13.3 % (ref 11.6–14.5)
ERYTHROCYTE [DISTWIDTH] IN BLOOD BY AUTOMATED COUNT: 13.9 % (ref 11.6–14.5)
ERYTHROCYTE [DISTWIDTH] IN BLOOD BY AUTOMATED COUNT: 14.2 % (ref 11.6–14.5)
ERYTHROCYTE [DISTWIDTH] IN BLOOD BY AUTOMATED COUNT: 14.4 % (ref 11.6–14.5)
ERYTHROCYTE [DISTWIDTH] IN BLOOD BY AUTOMATED COUNT: 14.5 % (ref 11.6–14.5)
ERYTHROCYTE [DISTWIDTH] IN BLOOD BY AUTOMATED COUNT: 14.5 % (ref 11.6–14.5)
ERYTHROCYTE [DISTWIDTH] IN BLOOD BY AUTOMATED COUNT: 14.7 % (ref 11.6–14.5)
ERYTHROCYTE [DISTWIDTH] IN BLOOD BY AUTOMATED COUNT: 14.8 % (ref 11.6–14.5)
ERYTHROCYTE [DISTWIDTH] IN BLOOD BY AUTOMATED COUNT: 14.8 % (ref 11.6–14.5)
ERYTHROCYTE [DISTWIDTH] IN BLOOD BY AUTOMATED COUNT: 14.9 % (ref 11.6–14.5)
ERYTHROCYTE [DISTWIDTH] IN BLOOD BY AUTOMATED COUNT: 15 % (ref 11.6–14.5)
ERYTHROCYTE [DISTWIDTH] IN BLOOD BY AUTOMATED COUNT: 15.1 % (ref 11.6–14.5)
ERYTHROCYTE [DISTWIDTH] IN BLOOD BY AUTOMATED COUNT: 15.3 % (ref 11.6–14.5)
ERYTHROCYTE [DISTWIDTH] IN BLOOD BY AUTOMATED COUNT: 15.4 % (ref 11.6–14.5)
ERYTHROCYTE [DISTWIDTH] IN BLOOD BY AUTOMATED COUNT: 15.5 % (ref 11.6–14.5)
ERYTHROCYTE [DISTWIDTH] IN BLOOD BY AUTOMATED COUNT: 15.5 % (ref 11.6–14.5)
ERYTHROCYTE [DISTWIDTH] IN BLOOD BY AUTOMATED COUNT: 15.6 % (ref 11.6–14.5)
ERYTHROCYTE [DISTWIDTH] IN BLOOD BY AUTOMATED COUNT: 15.7 % (ref 11.6–14.5)
ERYTHROCYTE [DISTWIDTH] IN BLOOD BY AUTOMATED COUNT: 15.8 % (ref 11.6–14.5)
ERYTHROCYTE [DISTWIDTH] IN BLOOD BY AUTOMATED COUNT: 15.8 % (ref 11.6–14.5)
ERYTHROCYTE [DISTWIDTH] IN BLOOD BY AUTOMATED COUNT: 15.9 % (ref 11.6–14.5)
ERYTHROCYTE [DISTWIDTH] IN BLOOD BY AUTOMATED COUNT: 15.9 % (ref 11.6–14.5)
ERYTHROCYTE [DISTWIDTH] IN BLOOD BY AUTOMATED COUNT: 16 % (ref 11.6–14.5)
ERYTHROCYTE [DISTWIDTH] IN BLOOD BY AUTOMATED COUNT: 16.2 % (ref 11.6–14.5)
ERYTHROCYTE [DISTWIDTH] IN BLOOD BY AUTOMATED COUNT: 16.2 % (ref 11.6–14.5)
ERYTHROCYTE [DISTWIDTH] IN BLOOD BY AUTOMATED COUNT: 16.3 % (ref 11.6–14.5)
ERYTHROCYTE [DISTWIDTH] IN BLOOD BY AUTOMATED COUNT: 16.4 % (ref 11.6–14.5)
ERYTHROCYTE [DISTWIDTH] IN BLOOD BY AUTOMATED COUNT: 16.5 % (ref 11.6–14.5)
ERYTHROCYTE [DISTWIDTH] IN BLOOD BY AUTOMATED COUNT: 16.6 % (ref 11.6–14.5)
ERYTHROCYTE [DISTWIDTH] IN BLOOD BY AUTOMATED COUNT: 16.7 % (ref 11.6–14.5)
ERYTHROCYTE [DISTWIDTH] IN BLOOD BY AUTOMATED COUNT: 16.8 % (ref 11.6–14.5)
ERYTHROCYTE [DISTWIDTH] IN BLOOD BY AUTOMATED COUNT: 16.9 % (ref 11.6–14.5)
ERYTHROCYTE [DISTWIDTH] IN BLOOD BY AUTOMATED COUNT: 16.9 % (ref 11.6–14.5)
ERYTHROCYTE [SEDIMENTATION RATE] IN BLOOD: 2 MM/HR (ref 0–20)
EST. AVERAGE GLUCOSE BLD GHB EST-MCNC: 232 MG/DL
EST. AVERAGE GLUCOSE BLD GHB EST-MCNC: 235 MG/DL
EST. AVERAGE GLUCOSE BLD GHB EST-MCNC: 240 MG/DL
FIBRINOGEN PPP-MCNC: 1116 MG/DL (ref 210–451)
FIBRINOGEN PPP-MCNC: 1190 MG/DL (ref 210–451)
FIBRINOGEN PPP-MCNC: 646 MG/DL (ref 210–451)
FIBRINOGEN PPP-MCNC: >1200 MG/DL (ref 210–451)
GAS FLOW.O2 O2 DELIVERY SYS: ABNORMAL L/MIN
GAS FLOW.O2 O2 DELIVERY SYS: NORMAL L/MIN
GAS FLOW.O2 SETTING OXYMISER: 10 BPM
GAS FLOW.O2 SETTING OXYMISER: 12 BPM
GAS FLOW.O2 SETTING OXYMISER: 16 BPM
GAS FLOW.O2 SETTING OXYMISER: 20 BPM
GAS FLOW.O2 SETTING OXYMISER: 3 L/M
GAS FLOW.O2 SETTING OXYMISER: 50 L/M
GLOBULIN SER CALC-MCNC: 4 G/DL (ref 2–4)
GLOBULIN SER CALC-MCNC: 4.5 G/DL (ref 2–4)
GLOBULIN SER CALC-MCNC: 5 G/DL (ref 2–4)
GLOBULIN SER CALC-MCNC: 5 G/DL (ref 2–4)
GLOBULIN SER CALC-MCNC: 5.1 G/DL (ref 2–4)
GLOBULIN SER CALC-MCNC: 5.2 G/DL (ref 2–4)
GLOBULIN SER CALC-MCNC: 5.3 G/DL (ref 2–4)
GLOBULIN SER CALC-MCNC: 5.4 G/DL (ref 2–4)
GLOBULIN SER CALC-MCNC: 5.5 G/DL (ref 2–4)
GLOBULIN SER CALC-MCNC: 5.6 G/DL (ref 2–4)
GLOBULIN SER CALC-MCNC: 5.7 G/DL (ref 2–4)
GLOBULIN SER CALC-MCNC: 5.7 G/DL (ref 2–4)
GLOBULIN SER CALC-MCNC: 5.8 G/DL (ref 2–4)
GLOBULIN SER CALC-MCNC: 5.9 G/DL (ref 2–4)
GLOBULIN SER CALC-MCNC: 5.9 G/DL (ref 2–4)
GLOBULIN SER CALC-MCNC: 6.7 G/DL (ref 2–4)
GLOBULIN SER CALC-MCNC: 6.9 G/DL (ref 2–4)
GLOBULIN SER CALC-MCNC: 7 G/DL (ref 2–4)
GLUCOSE BLD STRIP.AUTO-MCNC: 102 MG/DL (ref 70–110)
GLUCOSE BLD STRIP.AUTO-MCNC: 104 MG/DL (ref 70–110)
GLUCOSE BLD STRIP.AUTO-MCNC: 109 MG/DL (ref 70–110)
GLUCOSE BLD STRIP.AUTO-MCNC: 109 MG/DL (ref 70–110)
GLUCOSE BLD STRIP.AUTO-MCNC: 112 MG/DL (ref 70–110)
GLUCOSE BLD STRIP.AUTO-MCNC: 113 MG/DL (ref 70–110)
GLUCOSE BLD STRIP.AUTO-MCNC: 116 MG/DL (ref 70–110)
GLUCOSE BLD STRIP.AUTO-MCNC: 116 MG/DL (ref 70–110)
GLUCOSE BLD STRIP.AUTO-MCNC: 118 MG/DL (ref 70–110)
GLUCOSE BLD STRIP.AUTO-MCNC: 119 MG/DL (ref 70–110)
GLUCOSE BLD STRIP.AUTO-MCNC: 120 MG/DL (ref 70–110)
GLUCOSE BLD STRIP.AUTO-MCNC: 120 MG/DL (ref 70–110)
GLUCOSE BLD STRIP.AUTO-MCNC: 121 MG/DL (ref 70–110)
GLUCOSE BLD STRIP.AUTO-MCNC: 121 MG/DL (ref 70–110)
GLUCOSE BLD STRIP.AUTO-MCNC: 122 MG/DL (ref 70–110)
GLUCOSE BLD STRIP.AUTO-MCNC: 122 MG/DL (ref 70–110)
GLUCOSE BLD STRIP.AUTO-MCNC: 123 MG/DL (ref 70–110)
GLUCOSE BLD STRIP.AUTO-MCNC: 123 MG/DL (ref 70–110)
GLUCOSE BLD STRIP.AUTO-MCNC: 124 MG/DL (ref 70–110)
GLUCOSE BLD STRIP.AUTO-MCNC: 124 MG/DL (ref 70–110)
GLUCOSE BLD STRIP.AUTO-MCNC: 127 MG/DL (ref 70–110)
GLUCOSE BLD STRIP.AUTO-MCNC: 127 MG/DL (ref 70–110)
GLUCOSE BLD STRIP.AUTO-MCNC: 128 MG/DL (ref 70–110)
GLUCOSE BLD STRIP.AUTO-MCNC: 129 MG/DL (ref 70–110)
GLUCOSE BLD STRIP.AUTO-MCNC: 130 MG/DL (ref 70–110)
GLUCOSE BLD STRIP.AUTO-MCNC: 130 MG/DL (ref 70–110)
GLUCOSE BLD STRIP.AUTO-MCNC: 132 MG/DL (ref 70–110)
GLUCOSE BLD STRIP.AUTO-MCNC: 133 MG/DL (ref 70–110)
GLUCOSE BLD STRIP.AUTO-MCNC: 134 MG/DL (ref 70–110)
GLUCOSE BLD STRIP.AUTO-MCNC: 134 MG/DL (ref 70–110)
GLUCOSE BLD STRIP.AUTO-MCNC: 135 MG/DL (ref 70–110)
GLUCOSE BLD STRIP.AUTO-MCNC: 135 MG/DL (ref 70–110)
GLUCOSE BLD STRIP.AUTO-MCNC: 136 MG/DL (ref 70–110)
GLUCOSE BLD STRIP.AUTO-MCNC: 136 MG/DL (ref 70–110)
GLUCOSE BLD STRIP.AUTO-MCNC: 137 MG/DL (ref 70–110)
GLUCOSE BLD STRIP.AUTO-MCNC: 138 MG/DL (ref 70–110)
GLUCOSE BLD STRIP.AUTO-MCNC: 138 MG/DL (ref 70–110)
GLUCOSE BLD STRIP.AUTO-MCNC: 140 MG/DL (ref 70–110)
GLUCOSE BLD STRIP.AUTO-MCNC: 142 MG/DL (ref 70–110)
GLUCOSE BLD STRIP.AUTO-MCNC: 144 MG/DL (ref 70–110)
GLUCOSE BLD STRIP.AUTO-MCNC: 144 MG/DL (ref 70–110)
GLUCOSE BLD STRIP.AUTO-MCNC: 145 MG/DL (ref 70–110)
GLUCOSE BLD STRIP.AUTO-MCNC: 146 MG/DL (ref 70–110)
GLUCOSE BLD STRIP.AUTO-MCNC: 146 MG/DL (ref 70–110)
GLUCOSE BLD STRIP.AUTO-MCNC: 147 MG/DL (ref 70–110)
GLUCOSE BLD STRIP.AUTO-MCNC: 149 MG/DL (ref 70–110)
GLUCOSE BLD STRIP.AUTO-MCNC: 150 MG/DL (ref 70–110)
GLUCOSE BLD STRIP.AUTO-MCNC: 151 MG/DL (ref 70–110)
GLUCOSE BLD STRIP.AUTO-MCNC: 151 MG/DL (ref 70–110)
GLUCOSE BLD STRIP.AUTO-MCNC: 152 MG/DL (ref 70–110)
GLUCOSE BLD STRIP.AUTO-MCNC: 152 MG/DL (ref 70–110)
GLUCOSE BLD STRIP.AUTO-MCNC: 153 MG/DL (ref 70–110)
GLUCOSE BLD STRIP.AUTO-MCNC: 154 MG/DL (ref 70–110)
GLUCOSE BLD STRIP.AUTO-MCNC: 158 MG/DL (ref 70–110)
GLUCOSE BLD STRIP.AUTO-MCNC: 159 MG/DL (ref 70–110)
GLUCOSE BLD STRIP.AUTO-MCNC: 159 MG/DL (ref 70–110)
GLUCOSE BLD STRIP.AUTO-MCNC: 160 MG/DL (ref 70–110)
GLUCOSE BLD STRIP.AUTO-MCNC: 161 MG/DL (ref 70–110)
GLUCOSE BLD STRIP.AUTO-MCNC: 162 MG/DL (ref 70–110)
GLUCOSE BLD STRIP.AUTO-MCNC: 165 MG/DL (ref 70–110)
GLUCOSE BLD STRIP.AUTO-MCNC: 166 MG/DL (ref 70–110)
GLUCOSE BLD STRIP.AUTO-MCNC: 167 MG/DL (ref 70–110)
GLUCOSE BLD STRIP.AUTO-MCNC: 168 MG/DL (ref 70–110)
GLUCOSE BLD STRIP.AUTO-MCNC: 169 MG/DL (ref 70–110)
GLUCOSE BLD STRIP.AUTO-MCNC: 169 MG/DL (ref 70–110)
GLUCOSE BLD STRIP.AUTO-MCNC: 170 MG/DL (ref 70–110)
GLUCOSE BLD STRIP.AUTO-MCNC: 170 MG/DL (ref 70–110)
GLUCOSE BLD STRIP.AUTO-MCNC: 171 MG/DL (ref 70–110)
GLUCOSE BLD STRIP.AUTO-MCNC: 172 MG/DL (ref 70–110)
GLUCOSE BLD STRIP.AUTO-MCNC: 172 MG/DL (ref 70–110)
GLUCOSE BLD STRIP.AUTO-MCNC: 173 MG/DL (ref 70–110)
GLUCOSE BLD STRIP.AUTO-MCNC: 174 MG/DL (ref 70–110)
GLUCOSE BLD STRIP.AUTO-MCNC: 175 MG/DL (ref 70–110)
GLUCOSE BLD STRIP.AUTO-MCNC: 176 MG/DL (ref 70–110)
GLUCOSE BLD STRIP.AUTO-MCNC: 176 MG/DL (ref 70–110)
GLUCOSE BLD STRIP.AUTO-MCNC: 177 MG/DL (ref 70–110)
GLUCOSE BLD STRIP.AUTO-MCNC: 178 MG/DL (ref 70–110)
GLUCOSE BLD STRIP.AUTO-MCNC: 179 MG/DL (ref 70–110)
GLUCOSE BLD STRIP.AUTO-MCNC: 179 MG/DL (ref 70–110)
GLUCOSE BLD STRIP.AUTO-MCNC: 180 MG/DL (ref 70–110)
GLUCOSE BLD STRIP.AUTO-MCNC: 181 MG/DL (ref 70–110)
GLUCOSE BLD STRIP.AUTO-MCNC: 183 MG/DL (ref 70–110)
GLUCOSE BLD STRIP.AUTO-MCNC: 184 MG/DL (ref 70–110)
GLUCOSE BLD STRIP.AUTO-MCNC: 185 MG/DL (ref 70–110)
GLUCOSE BLD STRIP.AUTO-MCNC: 185 MG/DL (ref 70–110)
GLUCOSE BLD STRIP.AUTO-MCNC: 186 MG/DL (ref 70–110)
GLUCOSE BLD STRIP.AUTO-MCNC: 188 MG/DL (ref 70–110)
GLUCOSE BLD STRIP.AUTO-MCNC: 190 MG/DL (ref 70–110)
GLUCOSE BLD STRIP.AUTO-MCNC: 192 MG/DL (ref 70–110)
GLUCOSE BLD STRIP.AUTO-MCNC: 193 MG/DL (ref 70–110)
GLUCOSE BLD STRIP.AUTO-MCNC: 195 MG/DL (ref 70–110)
GLUCOSE BLD STRIP.AUTO-MCNC: 196 MG/DL (ref 70–110)
GLUCOSE BLD STRIP.AUTO-MCNC: 196 MG/DL (ref 70–110)
GLUCOSE BLD STRIP.AUTO-MCNC: 197 MG/DL (ref 70–110)
GLUCOSE BLD STRIP.AUTO-MCNC: 198 MG/DL (ref 70–110)
GLUCOSE BLD STRIP.AUTO-MCNC: 199 MG/DL (ref 70–110)
GLUCOSE BLD STRIP.AUTO-MCNC: 201 MG/DL (ref 70–110)
GLUCOSE BLD STRIP.AUTO-MCNC: 203 MG/DL (ref 70–110)
GLUCOSE BLD STRIP.AUTO-MCNC: 203 MG/DL (ref 70–110)
GLUCOSE BLD STRIP.AUTO-MCNC: 205 MG/DL (ref 70–110)
GLUCOSE BLD STRIP.AUTO-MCNC: 206 MG/DL (ref 70–110)
GLUCOSE BLD STRIP.AUTO-MCNC: 209 MG/DL (ref 70–110)
GLUCOSE BLD STRIP.AUTO-MCNC: 212 MG/DL (ref 70–110)
GLUCOSE BLD STRIP.AUTO-MCNC: 212 MG/DL (ref 70–110)
GLUCOSE BLD STRIP.AUTO-MCNC: 213 MG/DL (ref 70–110)
GLUCOSE BLD STRIP.AUTO-MCNC: 215 MG/DL (ref 70–110)
GLUCOSE BLD STRIP.AUTO-MCNC: 219 MG/DL (ref 70–110)
GLUCOSE BLD STRIP.AUTO-MCNC: 220 MG/DL (ref 70–110)
GLUCOSE BLD STRIP.AUTO-MCNC: 222 MG/DL (ref 70–110)
GLUCOSE BLD STRIP.AUTO-MCNC: 222 MG/DL (ref 70–110)
GLUCOSE BLD STRIP.AUTO-MCNC: 223 MG/DL (ref 70–110)
GLUCOSE BLD STRIP.AUTO-MCNC: 226 MG/DL (ref 70–110)
GLUCOSE BLD STRIP.AUTO-MCNC: 228 MG/DL (ref 70–110)
GLUCOSE BLD STRIP.AUTO-MCNC: 230 MG/DL (ref 70–110)
GLUCOSE BLD STRIP.AUTO-MCNC: 231 MG/DL (ref 70–110)
GLUCOSE BLD STRIP.AUTO-MCNC: 231 MG/DL (ref 70–110)
GLUCOSE BLD STRIP.AUTO-MCNC: 234 MG/DL (ref 70–110)
GLUCOSE BLD STRIP.AUTO-MCNC: 234 MG/DL (ref 70–110)
GLUCOSE BLD STRIP.AUTO-MCNC: 237 MG/DL (ref 70–110)
GLUCOSE BLD STRIP.AUTO-MCNC: 240 MG/DL (ref 70–110)
GLUCOSE BLD STRIP.AUTO-MCNC: 241 MG/DL (ref 70–110)
GLUCOSE BLD STRIP.AUTO-MCNC: 245 MG/DL (ref 70–110)
GLUCOSE BLD STRIP.AUTO-MCNC: 247 MG/DL (ref 70–110)
GLUCOSE BLD STRIP.AUTO-MCNC: 250 MG/DL (ref 70–110)
GLUCOSE BLD STRIP.AUTO-MCNC: 254 MG/DL (ref 70–110)
GLUCOSE BLD STRIP.AUTO-MCNC: 254 MG/DL (ref 70–110)
GLUCOSE BLD STRIP.AUTO-MCNC: 257 MG/DL (ref 70–110)
GLUCOSE BLD STRIP.AUTO-MCNC: 262 MG/DL (ref 70–110)
GLUCOSE BLD STRIP.AUTO-MCNC: 266 MG/DL (ref 70–110)
GLUCOSE BLD STRIP.AUTO-MCNC: 273 MG/DL (ref 70–110)
GLUCOSE BLD STRIP.AUTO-MCNC: 283 MG/DL (ref 70–110)
GLUCOSE BLD STRIP.AUTO-MCNC: 291 MG/DL (ref 70–110)
GLUCOSE BLD STRIP.AUTO-MCNC: 293 MG/DL (ref 70–110)
GLUCOSE BLD STRIP.AUTO-MCNC: 306 MG/DL (ref 70–110)
GLUCOSE BLD STRIP.AUTO-MCNC: 314 MG/DL (ref 70–110)
GLUCOSE BLD STRIP.AUTO-MCNC: 316 MG/DL (ref 70–110)
GLUCOSE BLD STRIP.AUTO-MCNC: 360 MG/DL (ref 70–110)
GLUCOSE BLD STRIP.AUTO-MCNC: 68 MG/DL (ref 70–110)
GLUCOSE BLD STRIP.AUTO-MCNC: 72 MG/DL (ref 70–110)
GLUCOSE BLD STRIP.AUTO-MCNC: 79 MG/DL (ref 70–110)
GLUCOSE BLD STRIP.AUTO-MCNC: 84 MG/DL (ref 70–110)
GLUCOSE BLD STRIP.AUTO-MCNC: 86 MG/DL (ref 70–110)
GLUCOSE BLD STRIP.AUTO-MCNC: 88 MG/DL (ref 70–110)
GLUCOSE BLD STRIP.AUTO-MCNC: 88 MG/DL (ref 70–110)
GLUCOSE BLD STRIP.AUTO-MCNC: 89 MG/DL (ref 70–110)
GLUCOSE BLD STRIP.AUTO-MCNC: 91 MG/DL (ref 70–110)
GLUCOSE BLD STRIP.AUTO-MCNC: 99 MG/DL (ref 70–110)
GLUCOSE SERPL-MCNC: 101 MG/DL (ref 74–99)
GLUCOSE SERPL-MCNC: 101 MG/DL (ref 74–99)
GLUCOSE SERPL-MCNC: 104 MG/DL (ref 74–99)
GLUCOSE SERPL-MCNC: 105 MG/DL (ref 74–99)
GLUCOSE SERPL-MCNC: 106 MG/DL (ref 74–99)
GLUCOSE SERPL-MCNC: 109 MG/DL (ref 74–99)
GLUCOSE SERPL-MCNC: 115 MG/DL (ref 74–99)
GLUCOSE SERPL-MCNC: 116 MG/DL (ref 74–99)
GLUCOSE SERPL-MCNC: 120 MG/DL (ref 74–99)
GLUCOSE SERPL-MCNC: 125 MG/DL (ref 74–99)
GLUCOSE SERPL-MCNC: 127 MG/DL (ref 74–99)
GLUCOSE SERPL-MCNC: 128 MG/DL (ref 74–99)
GLUCOSE SERPL-MCNC: 128 MG/DL (ref 74–99)
GLUCOSE SERPL-MCNC: 130 MG/DL (ref 74–99)
GLUCOSE SERPL-MCNC: 135 MG/DL (ref 74–99)
GLUCOSE SERPL-MCNC: 136 MG/DL (ref 74–99)
GLUCOSE SERPL-MCNC: 136 MG/DL (ref 74–99)
GLUCOSE SERPL-MCNC: 138 MG/DL (ref 74–99)
GLUCOSE SERPL-MCNC: 143 MG/DL (ref 74–99)
GLUCOSE SERPL-MCNC: 144 MG/DL (ref 74–99)
GLUCOSE SERPL-MCNC: 145 MG/DL (ref 74–99)
GLUCOSE SERPL-MCNC: 149 MG/DL (ref 74–99)
GLUCOSE SERPL-MCNC: 150 MG/DL (ref 74–99)
GLUCOSE SERPL-MCNC: 152 MG/DL (ref 74–99)
GLUCOSE SERPL-MCNC: 153 MG/DL (ref 74–99)
GLUCOSE SERPL-MCNC: 154 MG/DL (ref 74–99)
GLUCOSE SERPL-MCNC: 162 MG/DL (ref 74–99)
GLUCOSE SERPL-MCNC: 162 MG/DL (ref 74–99)
GLUCOSE SERPL-MCNC: 165 MG/DL (ref 74–99)
GLUCOSE SERPL-MCNC: 167 MG/DL (ref 74–99)
GLUCOSE SERPL-MCNC: 168 MG/DL (ref 74–99)
GLUCOSE SERPL-MCNC: 170 MG/DL (ref 74–99)
GLUCOSE SERPL-MCNC: 170 MG/DL (ref 74–99)
GLUCOSE SERPL-MCNC: 176 MG/DL (ref 74–99)
GLUCOSE SERPL-MCNC: 177 MG/DL (ref 74–99)
GLUCOSE SERPL-MCNC: 182 MG/DL (ref 74–99)
GLUCOSE SERPL-MCNC: 186 MG/DL (ref 74–99)
GLUCOSE SERPL-MCNC: 190 MG/DL (ref 74–99)
GLUCOSE SERPL-MCNC: 191 MG/DL (ref 74–99)
GLUCOSE SERPL-MCNC: 226 MG/DL (ref 74–99)
GLUCOSE SERPL-MCNC: 233 MG/DL (ref 74–99)
GLUCOSE SERPL-MCNC: 325 MG/DL (ref 74–99)
GLUCOSE SERPL-MCNC: 81 MG/DL (ref 74–99)
GLUCOSE SERPL-MCNC: 84 MG/DL (ref 74–99)
GLUCOSE SERPL-MCNC: 88 MG/DL (ref 74–99)
GLUCOSE SERPL-MCNC: 91 MG/DL (ref 74–99)
GLUCOSE UR STRIP.AUTO-MCNC: 100 MG/DL
GLUCOSE UR STRIP.AUTO-MCNC: 100 MG/DL
GLUCOSE UR STRIP.AUTO-MCNC: >1000 MG/DL
GLUCOSE UR STRIP.AUTO-MCNC: >1000 MG/DL
GRAM STN SPEC: ABNORMAL
GRAM STN SPEC: NORMAL
HBA1C MFR BLD: 10 % (ref 4.2–5.6)
HBA1C MFR BLD: 9.7 % (ref 4.2–5.6)
HBA1C MFR BLD: 9.8 % (ref 4.2–5.6)
HBV SURFACE AB SER QL IA: NEGATIVE
HBV SURFACE AB SERPL IA-ACNC: <3.1 MIU/ML
HBV SURFACE AG SER QL: <0.1 INDEX
HBV SURFACE AG SER QL: <0.1 INDEX
HBV SURFACE AG SER QL: NEGATIVE
HBV SURFACE AG SER QL: NEGATIVE
HCO3 BLD-SCNC: 18.2 MMOL/L (ref 22–26)
HCO3 BLD-SCNC: 21.2 MMOL/L (ref 22–26)
HCO3 BLD-SCNC: 21.5 MMOL/L (ref 22–26)
HCO3 BLD-SCNC: 22.4 MMOL/L (ref 22–26)
HCO3 BLD-SCNC: 22.5 MMOL/L (ref 22–26)
HCO3 BLD-SCNC: 25.5 MMOL/L (ref 22–26)
HCO3 BLD-SCNC: 25.5 MMOL/L (ref 22–26)
HCO3 BLD-SCNC: 26 MMOL/L (ref 22–26)
HCO3 BLD-SCNC: 26.4 MMOL/L (ref 22–26)
HCO3 BLD-SCNC: 26.5 MMOL/L (ref 22–26)
HCO3 BLD-SCNC: 28 MMOL/L (ref 22–26)
HCO3 BLD-SCNC: 28.2 MMOL/L (ref 22–26)
HCO3 BLD-SCNC: 28.5 MMOL/L (ref 22–26)
HCO3 BLD-SCNC: 28.6 MMOL/L (ref 22–26)
HCO3 BLD-SCNC: 28.9 MMOL/L (ref 22–26)
HCO3 BLD-SCNC: 28.9 MMOL/L (ref 22–26)
HCO3 BLD-SCNC: 29.3 MMOL/L (ref 22–26)
HCO3 BLD-SCNC: 29.9 MMOL/L (ref 22–26)
HCO3 BLD-SCNC: 31.1 MMOL/L (ref 22–26)
HCO3 BLD-SCNC: 31.6 MMOL/L (ref 22–26)
HCO3 BLDV-SCNC: 31.7 MMOL/L (ref 23–28)
HCT VFR BLD AUTO: 17.4 % (ref 36–48)
HCT VFR BLD AUTO: 20.4 % (ref 36–48)
HCT VFR BLD AUTO: 20.8 % (ref 36–48)
HCT VFR BLD AUTO: 20.9 % (ref 36–48)
HCT VFR BLD AUTO: 21 % (ref 36–48)
HCT VFR BLD AUTO: 21.2 % (ref 36–48)
HCT VFR BLD AUTO: 21.2 % (ref 36–48)
HCT VFR BLD AUTO: 21.4 % (ref 36–48)
HCT VFR BLD AUTO: 21.6 % (ref 36–48)
HCT VFR BLD AUTO: 22 % (ref 36–48)
HCT VFR BLD AUTO: 22.6 % (ref 36–48)
HCT VFR BLD AUTO: 22.7 % (ref 36–48)
HCT VFR BLD AUTO: 22.8 % (ref 36–48)
HCT VFR BLD AUTO: 23 % (ref 36–48)
HCT VFR BLD AUTO: 23.2 % (ref 36–48)
HCT VFR BLD AUTO: 23.4 % (ref 36–48)
HCT VFR BLD AUTO: 23.4 % (ref 36–48)
HCT VFR BLD AUTO: 23.5 % (ref 36–48)
HCT VFR BLD AUTO: 23.5 % (ref 36–48)
HCT VFR BLD AUTO: 23.6 % (ref 36–48)
HCT VFR BLD AUTO: 23.7 % (ref 36–48)
HCT VFR BLD AUTO: 23.7 % (ref 36–48)
HCT VFR BLD AUTO: 23.8 % (ref 36–48)
HCT VFR BLD AUTO: 23.9 % (ref 36–48)
HCT VFR BLD AUTO: 24 % (ref 36–48)
HCT VFR BLD AUTO: 24 % (ref 36–48)
HCT VFR BLD AUTO: 24.1 % (ref 36–48)
HCT VFR BLD AUTO: 24.4 % (ref 36–48)
HCT VFR BLD AUTO: 24.6 % (ref 36–48)
HCT VFR BLD AUTO: 24.7 % (ref 36–48)
HCT VFR BLD AUTO: 24.7 % (ref 36–48)
HCT VFR BLD AUTO: 24.9 % (ref 36–48)
HCT VFR BLD AUTO: 24.9 % (ref 36–48)
HCT VFR BLD AUTO: 25 % (ref 36–48)
HCT VFR BLD AUTO: 25.6 % (ref 36–48)
HCT VFR BLD AUTO: 25.8 % (ref 36–48)
HCT VFR BLD AUTO: 26 % (ref 36–48)
HCT VFR BLD AUTO: 26 % (ref 36–48)
HCT VFR BLD AUTO: 26.4 % (ref 36–48)
HCT VFR BLD AUTO: 26.8 % (ref 36–48)
HCT VFR BLD AUTO: 27.5 % (ref 36–48)
HCT VFR BLD AUTO: 28 % (ref 36–48)
HCT VFR BLD AUTO: 28.2 % (ref 36–48)
HCT VFR BLD AUTO: 32.1 % (ref 36–48)
HCT VFR BLD AUTO: 33 % (ref 36–48)
HCT VFR BLD AUTO: 34.5 % (ref 36–48)
HCT VFR BLD AUTO: 39 % (ref 36–48)
HCT VFR BLD AUTO: 45 % (ref 36–48)
HCT VFR BLD AUTO: 45.2 % (ref 36–48)
HCT VFR BLD AUTO: 48.3 % (ref 36–48)
HCT VFR BLD AUTO: 48.7 % (ref 36–48)
HDLC SERPL-MCNC: 59 MG/DL (ref 40–60)
HDLC SERPL: 5.9 {RATIO} (ref 0–5)
HDSCOM,HDSCOM: NORMAL
HEMOCCULT STL QL: POSITIVE
HEP BS AB COMMENT,HBSAC: ABNORMAL
HGB BLD-MCNC: 11 G/DL (ref 13–16)
HGB BLD-MCNC: 11.3 G/DL (ref 13–16)
HGB BLD-MCNC: 12 G/DL (ref 13–16)
HGB BLD-MCNC: 13.2 G/DL (ref 13–16)
HGB BLD-MCNC: 15.2 G/DL (ref 13–16)
HGB BLD-MCNC: 15.4 G/DL (ref 13–16)
HGB BLD-MCNC: 16.3 G/DL (ref 13–16)
HGB BLD-MCNC: 17.4 G/DL (ref 13–16)
HGB BLD-MCNC: 6.1 G/DL (ref 13–16)
HGB BLD-MCNC: 6.6 G/DL (ref 13–16)
HGB BLD-MCNC: 6.7 G/DL (ref 13–16)
HGB BLD-MCNC: 6.8 G/DL (ref 13–16)
HGB BLD-MCNC: 7.1 G/DL (ref 13–16)
HGB BLD-MCNC: 7.2 G/DL (ref 13–16)
HGB BLD-MCNC: 7.2 G/DL (ref 13–16)
HGB BLD-MCNC: 7.3 G/DL (ref 13–16)
HGB BLD-MCNC: 7.4 G/DL (ref 13–16)
HGB BLD-MCNC: 7.5 G/DL (ref 13–16)
HGB BLD-MCNC: 7.6 G/DL (ref 13–16)
HGB BLD-MCNC: 7.7 G/DL (ref 13–16)
HGB BLD-MCNC: 7.8 G/DL (ref 13–16)
HGB BLD-MCNC: 7.9 G/DL (ref 13–16)
HGB BLD-MCNC: 8 G/DL (ref 13–16)
HGB BLD-MCNC: 8.2 G/DL (ref 13–16)
HGB BLD-MCNC: 8.3 G/DL (ref 13–16)
HGB BLD-MCNC: 8.5 G/DL (ref 13–16)
HGB BLD-MCNC: 8.6 G/DL (ref 13–16)
HGB BLD-MCNC: 8.7 G/DL (ref 13–16)
HGB BLD-MCNC: 9.6 G/DL (ref 13–16)
HGB UR QL STRIP: ABNORMAL
HSV1 DNA SPEC QL NAA+PROBE: NEGATIVE
HSV2 DNA SPEC QL NAA+PROBE: NEGATIVE
INR PPP: 0.9 (ref 0.8–1.2)
INR PPP: 1.1 (ref 0.8–1.2)
INR PPP: 1.1 (ref 0.8–1.2)
INR PPP: 1.2 (ref 0.8–1.2)
INR PPP: 1.2 (ref 0.8–1.2)
INR PPP: 1.3 (ref 0.8–1.2)
INR PPP: 1.4 (ref 0.8–1.2)
INR PPP: 1.4 (ref 0.8–1.2)
INR PPP: 1.6 (ref 0.8–1.2)
INR PPP: 1.7 (ref 0.8–1.2)
INR PPP: >9.2 (ref 0.8–1.2)
INSPIRATION.DURATION SETTING TIME VENT: 0.9 SEC
INSPIRATION.DURATION SETTING TIME VENT: 1 SEC
KETONES UR QL STRIP.AUTO: >80 MG/DL
KETONES UR QL STRIP.AUTO: NEGATIVE MG/DL
LACTATE BLD-SCNC: 0.7 MMOL/L (ref 0.4–2)
LACTATE SERPL-SCNC: 1 MMOL/L (ref 0.4–2)
LDLC SERPL CALC-MCNC: 252.4 MG/DL (ref 0–100)
LEFT CCA DIST DIAS: 0 CM/S
LEFT CCA DIST SYS: 68.88 CM/S
LEFT CCA MID DIAS: 9.39 CM/S
LEFT CCA MID SYS: 122.06 CM/S
LEFT CCA PROX DIAS: 8.45 CM/S
LEFT CCA PROX SYS: 156.8 CM/S
LEFT ECA DIAS: 0 CM/S
LEFT ECA SYS: 100.73 CM/S
LEFT ICA DIST DIAS: 37.43 CM/S
LEFT ICA DIST SYS: 122.63 CM/S
LEFT ICA MID DIAS: 27.05 CM/S
LEFT ICA MID SYS: 105.27 CM/S
LEFT ICA PROX DIAS: 20.83 CM/S
LEFT ICA PROX SYS: 88.82 CM/S
LEFT ICA/CCA SYS: 0.78
LEFT SUBCLAVIAN DIAS: 0 CM/S
LEFT SUBCLAVIAN SYS: 81.62 CM/S
LEFT VERTEBRAL DIAS: 13.94 CM/S
LEFT VERTEBRAL SYS: 69.68 CM/S
LEUKOCYTE ESTERASE UR QL STRIP.AUTO: ABNORMAL
LEUKOCYTE ESTERASE UR QL STRIP.AUTO: ABNORMAL
LEUKOCYTE ESTERASE UR QL STRIP.AUTO: NEGATIVE
LEUKOCYTE ESTERASE UR QL STRIP.AUTO: NEGATIVE
LIPASE SERPL-CCNC: 1020 U/L (ref 73–393)
LIPASE SERPL-CCNC: 1025 U/L (ref 73–393)
LIPASE SERPL-CCNC: 1044 U/L (ref 73–393)
LIPASE SERPL-CCNC: 1157 U/L (ref 73–393)
LIPASE SERPL-CCNC: 1181 U/L (ref 73–393)
LIPASE SERPL-CCNC: 1207 U/L (ref 73–393)
LIPASE SERPL-CCNC: 1285 U/L (ref 73–393)
LIPASE SERPL-CCNC: 1307 U/L (ref 73–393)
LIPASE SERPL-CCNC: 1320 U/L (ref 73–393)
LIPASE SERPL-CCNC: 1339 U/L (ref 73–393)
LIPASE SERPL-CCNC: 1586 U/L (ref 73–393)
LIPASE SERPL-CCNC: 1675 U/L (ref 73–393)
LIPASE SERPL-CCNC: 1723 U/L (ref 73–393)
LIPASE SERPL-CCNC: 1761 U/L (ref 73–393)
LIPASE SERPL-CCNC: 1785 U/L (ref 73–393)
LIPASE SERPL-CCNC: 1803 U/L (ref 73–393)
LIPASE SERPL-CCNC: 1937 U/L (ref 73–393)
LIPASE SERPL-CCNC: 2169 U/L (ref 73–393)
LIPASE SERPL-CCNC: 2214 U/L (ref 73–393)
LIPASE SERPL-CCNC: 3008 U/L (ref 73–393)
LIPASE SERPL-CCNC: 3191 U/L (ref 73–393)
LIPASE SERPL-CCNC: 3437 U/L (ref 73–393)
LIPASE SERPL-CCNC: 3671 U/L (ref 73–393)
LIPASE SERPL-CCNC: 3693 U/L (ref 73–393)
LIPASE SERPL-CCNC: 4857 U/L (ref 73–393)
LIPASE SERPL-CCNC: 4938 U/L (ref 73–393)
LIPASE SERPL-CCNC: 5503 U/L (ref 73–393)
LIPID PROFILE,FLP: ABNORMAL
LYMPHOCYTES # BLD: 1.5 K/UL (ref 0.9–3.6)
LYMPHOCYTES # BLD: 1.6 K/UL (ref 0.8–3.5)
LYMPHOCYTES # BLD: 1.6 K/UL (ref 0.8–3.5)
LYMPHOCYTES # BLD: 1.6 K/UL (ref 0.9–3.6)
LYMPHOCYTES # BLD: 1.6 K/UL (ref 0.9–3.6)
LYMPHOCYTES # BLD: 2 K/UL (ref 0.8–3.5)
LYMPHOCYTES NFR BLD: 10 % (ref 20–51)
LYMPHOCYTES NFR BLD: 10 % (ref 21–52)
LYMPHOCYTES NFR BLD: 7 % (ref 20–51)
LYMPHOCYTES NFR BLD: 7 % (ref 21–52)
LYMPHOCYTES NFR BLD: 8 % (ref 20–51)
LYMPHOCYTES NFR BLD: 8 % (ref 21–52)
MAGNESIUM SERPL-MCNC: 2.1 MG/DL (ref 1.6–2.6)
MAGNESIUM SERPL-MCNC: 2.3 MG/DL (ref 1.6–2.6)
MAGNESIUM SERPL-MCNC: 2.4 MG/DL (ref 1.6–2.6)
MAGNESIUM SERPL-MCNC: 2.5 MG/DL (ref 1.6–2.6)
MAGNESIUM SERPL-MCNC: 3.8 MG/DL (ref 1.6–2.6)
MCH RBC QN AUTO: 27.1 PG (ref 24–34)
MCH RBC QN AUTO: 27.4 PG (ref 24–34)
MCH RBC QN AUTO: 27.5 PG (ref 24–34)
MCH RBC QN AUTO: 27.5 PG (ref 24–34)
MCH RBC QN AUTO: 27.7 PG (ref 24–34)
MCH RBC QN AUTO: 27.9 PG (ref 24–34)
MCH RBC QN AUTO: 28.1 PG (ref 24–34)
MCH RBC QN AUTO: 28.1 PG (ref 24–34)
MCH RBC QN AUTO: 28.2 PG (ref 24–34)
MCH RBC QN AUTO: 28.3 PG (ref 24–34)
MCH RBC QN AUTO: 28.4 PG (ref 24–34)
MCH RBC QN AUTO: 28.6 PG (ref 24–34)
MCH RBC QN AUTO: 28.7 PG (ref 24–34)
MCH RBC QN AUTO: 28.8 PG (ref 24–34)
MCH RBC QN AUTO: 28.9 PG (ref 24–34)
MCH RBC QN AUTO: 28.9 PG (ref 24–34)
MCH RBC QN AUTO: 29 PG (ref 24–34)
MCH RBC QN AUTO: 29 PG (ref 24–34)
MCH RBC QN AUTO: 29.1 PG (ref 24–34)
MCH RBC QN AUTO: 29.2 PG (ref 24–34)
MCH RBC QN AUTO: 29.3 PG (ref 24–34)
MCH RBC QN AUTO: 29.4 PG (ref 24–34)
MCH RBC QN AUTO: 29.5 PG (ref 24–34)
MCH RBC QN AUTO: 29.5 PG (ref 24–34)
MCH RBC QN AUTO: 29.7 PG (ref 24–34)
MCH RBC QN AUTO: 29.7 PG (ref 24–34)
MCH RBC QN AUTO: 29.8 PG (ref 24–34)
MCH RBC QN AUTO: 29.8 PG (ref 24–34)
MCH RBC QN AUTO: 29.9 PG (ref 24–34)
MCH RBC QN AUTO: 30 PG (ref 24–34)
MCH RBC QN AUTO: 30 PG (ref 24–34)
MCH RBC QN AUTO: 30.1 PG (ref 24–34)
MCHC RBC AUTO-ENTMCNC: 29.9 G/DL (ref 31–37)
MCHC RBC AUTO-ENTMCNC: 30.2 G/DL (ref 31–37)
MCHC RBC AUTO-ENTMCNC: 30.7 G/DL (ref 31–37)
MCHC RBC AUTO-ENTMCNC: 30.8 G/DL (ref 31–37)
MCHC RBC AUTO-ENTMCNC: 30.9 G/DL (ref 31–37)
MCHC RBC AUTO-ENTMCNC: 30.9 G/DL (ref 31–37)
MCHC RBC AUTO-ENTMCNC: 31 G/DL (ref 31–37)
MCHC RBC AUTO-ENTMCNC: 31 G/DL (ref 31–37)
MCHC RBC AUTO-ENTMCNC: 31.1 G/DL (ref 31–37)
MCHC RBC AUTO-ENTMCNC: 31.2 G/DL (ref 31–37)
MCHC RBC AUTO-ENTMCNC: 31.3 G/DL (ref 31–37)
MCHC RBC AUTO-ENTMCNC: 31.4 G/DL (ref 31–37)
MCHC RBC AUTO-ENTMCNC: 31.5 G/DL (ref 31–37)
MCHC RBC AUTO-ENTMCNC: 31.6 G/DL (ref 31–37)
MCHC RBC AUTO-ENTMCNC: 31.7 G/DL (ref 31–37)
MCHC RBC AUTO-ENTMCNC: 31.8 G/DL (ref 31–37)
MCHC RBC AUTO-ENTMCNC: 31.9 G/DL (ref 31–37)
MCHC RBC AUTO-ENTMCNC: 31.9 G/DL (ref 31–37)
MCHC RBC AUTO-ENTMCNC: 32.1 G/DL (ref 31–37)
MCHC RBC AUTO-ENTMCNC: 32.1 G/DL (ref 31–37)
MCHC RBC AUTO-ENTMCNC: 32.4 G/DL (ref 31–37)
MCHC RBC AUTO-ENTMCNC: 32.5 G/DL (ref 31–37)
MCHC RBC AUTO-ENTMCNC: 32.6 G/DL (ref 31–37)
MCHC RBC AUTO-ENTMCNC: 33.2 G/DL (ref 31–37)
MCHC RBC AUTO-ENTMCNC: 33.6 G/DL (ref 31–37)
MCHC RBC AUTO-ENTMCNC: 33.7 G/DL (ref 31–37)
MCHC RBC AUTO-ENTMCNC: 33.8 G/DL (ref 31–37)
MCHC RBC AUTO-ENTMCNC: 34.1 G/DL (ref 31–37)
MCHC RBC AUTO-ENTMCNC: 34.2 G/DL (ref 31–37)
MCHC RBC AUTO-ENTMCNC: 34.3 G/DL (ref 31–37)
MCHC RBC AUTO-ENTMCNC: 34.3 G/DL (ref 31–37)
MCHC RBC AUTO-ENTMCNC: 34.4 G/DL (ref 31–37)
MCHC RBC AUTO-ENTMCNC: 34.9 G/DL (ref 31–37)
MCHC RBC AUTO-ENTMCNC: 35.1 G/DL (ref 31–37)
MCHC RBC AUTO-ENTMCNC: 35.2 G/DL (ref 31–37)
MCHC RBC AUTO-ENTMCNC: 35.6 G/DL (ref 31–37)
MCHC RBC AUTO-ENTMCNC: 35.7 G/DL (ref 31–37)
MCV RBC AUTO: 82 FL (ref 74–97)
MCV RBC AUTO: 82.2 FL (ref 74–97)
MCV RBC AUTO: 82.5 FL (ref 74–97)
MCV RBC AUTO: 83.3 FL (ref 74–97)
MCV RBC AUTO: 83.4 FL (ref 74–97)
MCV RBC AUTO: 84.3 FL (ref 74–97)
MCV RBC AUTO: 84.3 FL (ref 74–97)
MCV RBC AUTO: 85.1 FL (ref 74–97)
MCV RBC AUTO: 85.3 FL (ref 74–97)
MCV RBC AUTO: 85.6 FL (ref 74–97)
MCV RBC AUTO: 85.9 FL (ref 74–97)
MCV RBC AUTO: 86.3 FL (ref 74–97)
MCV RBC AUTO: 86.5 FL (ref 74–97)
MCV RBC AUTO: 87.2 FL (ref 74–97)
MCV RBC AUTO: 87.5 FL (ref 74–97)
MCV RBC AUTO: 87.6 FL (ref 74–97)
MCV RBC AUTO: 87.9 FL (ref 74–97)
MCV RBC AUTO: 87.9 FL (ref 74–97)
MCV RBC AUTO: 88.1 FL (ref 74–97)
MCV RBC AUTO: 88.3 FL (ref 74–97)
MCV RBC AUTO: 88.4 FL (ref 74–97)
MCV RBC AUTO: 88.5 FL (ref 74–97)
MCV RBC AUTO: 88.9 FL (ref 74–97)
MCV RBC AUTO: 88.9 FL (ref 74–97)
MCV RBC AUTO: 89 FL (ref 74–97)
MCV RBC AUTO: 89 FL (ref 74–97)
MCV RBC AUTO: 89.4 FL (ref 74–97)
MCV RBC AUTO: 89.8 FL (ref 74–97)
MCV RBC AUTO: 89.9 FL (ref 74–97)
MCV RBC AUTO: 89.9 FL (ref 74–97)
MCV RBC AUTO: 90 FL (ref 74–97)
MCV RBC AUTO: 90.3 FL (ref 74–97)
MCV RBC AUTO: 90.3 FL (ref 74–97)
MCV RBC AUTO: 90.5 FL (ref 74–97)
MCV RBC AUTO: 90.7 FL (ref 74–97)
MCV RBC AUTO: 90.8 FL (ref 74–97)
MCV RBC AUTO: 91 FL (ref 74–97)
MCV RBC AUTO: 91.1 FL (ref 74–97)
MCV RBC AUTO: 91.2 FL (ref 74–97)
MCV RBC AUTO: 91.3 FL (ref 74–97)
MCV RBC AUTO: 91.4 FL (ref 74–97)
MCV RBC AUTO: 91.5 FL (ref 74–97)
MCV RBC AUTO: 91.8 FL (ref 74–97)
MCV RBC AUTO: 91.9 FL (ref 74–97)
MCV RBC AUTO: 91.9 FL (ref 74–97)
MCV RBC AUTO: 92 FL (ref 74–97)
MCV RBC AUTO: 92.1 FL (ref 74–97)
MCV RBC AUTO: 92.2 FL (ref 74–97)
MCV RBC AUTO: 94.6 FL (ref 74–97)
METHADONE UR QL: NEGATIVE
MONOCYTES # BLD: 1 K/UL (ref 0–1)
MONOCYTES # BLD: 1.1 K/UL (ref 0.05–1.2)
MONOCYTES # BLD: 1.2 K/UL (ref 0.05–1.2)
MONOCYTES # BLD: 1.3 K/UL (ref 0.05–1.2)
MONOCYTES # BLD: 1.8 K/UL (ref 0–1)
MONOCYTES # BLD: 1.8 K/UL (ref 0–1)
MONOCYTES NFR BLD: 5 % (ref 2–9)
MONOCYTES NFR BLD: 6 % (ref 3–10)
MONOCYTES NFR BLD: 7 % (ref 3–10)
MONOCYTES NFR BLD: 7 % (ref 3–10)
MONOCYTES NFR BLD: 8 % (ref 2–9)
MONOCYTES NFR BLD: 9 % (ref 2–9)
MYELOCYTES NFR BLD MANUAL: 1 %
MYELOCYTES NFR BLD MANUAL: 2 %
NEUTS BAND NFR BLD MANUAL: 2 % (ref 0–5)
NEUTS BAND NFR BLD MANUAL: 3 % (ref 0–5)
NEUTS SEG # BLD: 12.9 K/UL (ref 1.8–8)
NEUTS SEG # BLD: 15.1 K/UL (ref 1.8–8)
NEUTS SEG # BLD: 16.3 K/UL (ref 1.8–8)
NEUTS SEG # BLD: 16.9 K/UL (ref 1.8–8)
NEUTS SEG # BLD: 17.2 K/UL (ref 1.8–8)
NEUTS SEG # BLD: 18.1 K/UL (ref 1.8–8)
NEUTS SEG NFR BLD: 77 % (ref 42–75)
NEUTS SEG NFR BLD: 80 % (ref 40–73)
NEUTS SEG NFR BLD: 80 % (ref 42–75)
NEUTS SEG NFR BLD: 84 % (ref 40–73)
NEUTS SEG NFR BLD: 84 % (ref 40–73)
NEUTS SEG NFR BLD: 85 % (ref 42–75)
NITRITE UR QL STRIP.AUTO: NEGATIVE
NITRITE UR QL STRIP.AUTO: POSITIVE
O2/TOTAL GAS SETTING VFR VENT: 0.3 %
O2/TOTAL GAS SETTING VFR VENT: 0.3 %
O2/TOTAL GAS SETTING VFR VENT: 0.32 %
O2/TOTAL GAS SETTING VFR VENT: 0.4 %
O2/TOTAL GAS SETTING VFR VENT: 0.45 %
O2/TOTAL GAS SETTING VFR VENT: 0.5 %
O2/TOTAL GAS SETTING VFR VENT: 0.5 %
O2/TOTAL GAS SETTING VFR VENT: 100 %
O2/TOTAL GAS SETTING VFR VENT: 100 %
O2/TOTAL GAS SETTING VFR VENT: 30 %
O2/TOTAL GAS SETTING VFR VENT: 45 %
O2/TOTAL GAS SETTING VFR VENT: 50 %
O2/TOTAL GAS SETTING VFR VENT: 60 %
O2/TOTAL GAS SETTING VFR VENT: 60 %
O2/TOTAL GAS SETTING VFR VENT: 70 %
O2/TOTAL GAS SETTING VFR VENT: 70 %
O2/TOTAL GAS SETTING VFR VENT: 80 %
OPIATES UR QL: NEGATIVE
P-R INTERVAL, ECG05: 168 MS
P-R INTERVAL, ECG05: 170 MS
P-R INTERVAL, ECG05: 182 MS
PCO2 BLD: 35 MMHG (ref 35–45)
PCO2 BLD: 36.3 MMHG (ref 35–45)
PCO2 BLD: 36.8 MMHG (ref 35–45)
PCO2 BLD: 37.7 MMHG (ref 35–45)
PCO2 BLD: 37.8 MMHG (ref 35–45)
PCO2 BLD: 38.1 MMHG (ref 35–45)
PCO2 BLD: 38.2 MMHG (ref 35–45)
PCO2 BLD: 38.3 MMHG (ref 35–45)
PCO2 BLD: 38.5 MMHG (ref 35–45)
PCO2 BLD: 38.5 MMHG (ref 35–45)
PCO2 BLD: 38.8 MMHG (ref 35–45)
PCO2 BLD: 39.3 MMHG (ref 35–45)
PCO2 BLD: 39.7 MMHG (ref 35–45)
PCO2 BLD: 39.9 MMHG (ref 35–45)
PCO2 BLD: 40.3 MMHG (ref 35–45)
PCO2 BLD: 41 MMHG (ref 35–45)
PCO2 BLD: 41.3 MMHG (ref 35–45)
PCO2 BLD: 41.6 MMHG (ref 35–45)
PCO2 BLD: 45.3 MMHG (ref 35–45)
PCO2 BLD: 49.7 MMHG (ref 35–45)
PCO2 BLDV: 46.2 MMHG (ref 41–51)
PCP UR QL: NEGATIVE
PEEP RESPIRATORY: 5 CMH2O
PEEP RESPIRATORY: 8 CMH2O
PH BLD: 7.27 [PH] (ref 7.35–7.45)
PH BLD: 7.3 [PH] (ref 7.35–7.45)
PH BLD: 7.32 [PH] (ref 7.35–7.45)
PH BLD: 7.36 [PH] (ref 7.35–7.45)
PH BLD: 7.38 [PH] (ref 7.35–7.45)
PH BLD: 7.42 [PH] (ref 7.35–7.45)
PH BLD: 7.43 [PH] (ref 7.35–7.45)
PH BLD: 7.45 [PH] (ref 7.35–7.45)
PH BLD: 7.46 [PH] (ref 7.35–7.45)
PH BLD: 7.46 [PH] (ref 7.35–7.45)
PH BLD: 7.47 [PH] (ref 7.35–7.45)
PH BLD: 7.47 [PH] (ref 7.35–7.45)
PH BLD: 7.48 [PH] (ref 7.35–7.45)
PH BLD: 7.48 [PH] (ref 7.35–7.45)
PH BLD: 7.49 [PH] (ref 7.35–7.45)
PH BLD: 7.49 [PH] (ref 7.35–7.45)
PH BLD: 7.5 [PH] (ref 7.35–7.45)
PH BLD: 7.5 [PH] (ref 7.35–7.45)
PH BLDV: 7.45 [PH] (ref 7.32–7.42)
PH UR STRIP: 5.5 [PH] (ref 5–8)
PH UR STRIP: 5.5 [PH] (ref 5–8)
PH UR STRIP: 6 [PH] (ref 5–8)
PH UR STRIP: 9 [PH] (ref 5–8)
PHOSPHATE SERPL-MCNC: 10.7 MG/DL (ref 2.5–4.9)
PHOSPHATE SERPL-MCNC: 3.5 MG/DL (ref 2.5–4.9)
PHOSPHATE SERPL-MCNC: 3.7 MG/DL (ref 2.5–4.9)
PHOSPHATE SERPL-MCNC: 3.7 MG/DL (ref 2.5–4.9)
PHOSPHATE SERPL-MCNC: 3.8 MG/DL (ref 2.5–4.9)
PHOSPHATE SERPL-MCNC: 4.1 MG/DL (ref 2.5–4.9)
PHOSPHATE SERPL-MCNC: 4.2 MG/DL (ref 2.5–4.9)
PHOSPHATE SERPL-MCNC: 4.3 MG/DL (ref 2.5–4.9)
PHOSPHATE SERPL-MCNC: 4.3 MG/DL (ref 2.5–4.9)
PHOSPHATE SERPL-MCNC: 4.4 MG/DL (ref 2.5–4.9)
PHOSPHATE SERPL-MCNC: 4.5 MG/DL (ref 2.5–4.9)
PHOSPHATE SERPL-MCNC: 5.2 MG/DL (ref 2.5–4.9)
PHOSPHATE SERPL-MCNC: 5.2 MG/DL (ref 2.5–4.9)
PHOSPHATE SERPL-MCNC: 5.3 MG/DL (ref 2.5–4.9)
PHOSPHATE SERPL-MCNC: 5.4 MG/DL (ref 2.5–4.9)
PHOSPHATE SERPL-MCNC: 5.7 MG/DL (ref 2.5–4.9)
PHOSPHATE SERPL-MCNC: 6.2 MG/DL (ref 2.5–4.9)
PHOSPHATE SERPL-MCNC: 6.5 MG/DL (ref 2.5–4.9)
PHOSPHATE SERPL-MCNC: 6.5 MG/DL (ref 2.5–4.9)
PHOSPHATE SERPL-MCNC: 6.7 MG/DL (ref 2.5–4.9)
PHOSPHATE SERPL-MCNC: 7.2 MG/DL (ref 2.5–4.9)
PHOSPHATE SERPL-MCNC: 7.3 MG/DL (ref 2.5–4.9)
PHOSPHATE SERPL-MCNC: 7.3 MG/DL (ref 2.5–4.9)
PHOSPHATE SERPL-MCNC: 7.5 MG/DL (ref 2.5–4.9)
PHOSPHATE SERPL-MCNC: 7.6 MG/DL (ref 2.5–4.9)
PHOSPHATE SERPL-MCNC: 7.6 MG/DL (ref 2.5–4.9)
PHOSPHATE SERPL-MCNC: 7.8 MG/DL (ref 2.5–4.9)
PHOSPHATE SERPL-MCNC: 8.5 MG/DL (ref 2.5–4.9)
PHOSPHATE SERPL-MCNC: 8.5 MG/DL (ref 2.5–4.9)
PHOSPHATE SERPL-MCNC: 8.7 MG/DL (ref 2.5–4.9)
PIP ISTAT,IPIP: 15
PIP ISTAT,IPIP: 23
PIP ISTAT,IPIP: 25
PIP ISTAT,IPIP: 26
PIP ISTAT,IPIP: 27
PIP ISTAT,IPIP: 29
PISA AR MAX VEL: 319.81 CM/S
PLATELET # BLD AUTO: 167 K/UL (ref 135–420)
PLATELET # BLD AUTO: 170 K/UL (ref 135–420)
PLATELET # BLD AUTO: 185 K/UL (ref 135–420)
PLATELET # BLD AUTO: 188 K/UL (ref 135–420)
PLATELET # BLD AUTO: 195 K/UL (ref 135–420)
PLATELET # BLD AUTO: 196 K/UL (ref 135–420)
PLATELET # BLD AUTO: 201 K/UL (ref 135–420)
PLATELET # BLD AUTO: 224 K/UL (ref 135–420)
PLATELET # BLD AUTO: 229 K/UL (ref 135–420)
PLATELET # BLD AUTO: 233 K/UL (ref 135–420)
PLATELET # BLD AUTO: 236 K/UL (ref 135–420)
PLATELET # BLD AUTO: 253 K/UL (ref 135–420)
PLATELET # BLD AUTO: 260 K/UL (ref 135–420)
PLATELET # BLD AUTO: 292 K/UL (ref 135–420)
PLATELET # BLD AUTO: 295 K/UL (ref 135–420)
PLATELET # BLD AUTO: 311 K/UL (ref 135–420)
PLATELET # BLD AUTO: 315 K/UL (ref 135–420)
PLATELET # BLD AUTO: 324 K/UL (ref 135–420)
PLATELET # BLD AUTO: 327 K/UL (ref 135–420)
PLATELET # BLD AUTO: 349 K/UL (ref 135–420)
PLATELET # BLD AUTO: 349 K/UL (ref 135–420)
PLATELET # BLD AUTO: 358 K/UL (ref 135–420)
PLATELET # BLD AUTO: 381 K/UL (ref 135–420)
PLATELET # BLD AUTO: 384 K/UL (ref 135–420)
PLATELET # BLD AUTO: 385 K/UL (ref 135–420)
PLATELET # BLD AUTO: 385 K/UL (ref 135–420)
PLATELET # BLD AUTO: 389 K/UL (ref 135–420)
PLATELET # BLD AUTO: 394 K/UL (ref 135–420)
PLATELET # BLD AUTO: 395 K/UL (ref 135–420)
PLATELET # BLD AUTO: 406 K/UL (ref 135–420)
PLATELET # BLD AUTO: 409 K/UL (ref 135–420)
PLATELET # BLD AUTO: 410 K/UL (ref 135–420)
PLATELET # BLD AUTO: 417 K/UL (ref 135–420)
PLATELET # BLD AUTO: 422 K/UL (ref 135–420)
PLATELET # BLD AUTO: 451 K/UL (ref 135–420)
PLATELET # BLD AUTO: 457 K/UL (ref 135–420)
PLATELET # BLD AUTO: 472 K/UL (ref 135–420)
PLATELET # BLD AUTO: 474 K/UL (ref 135–420)
PLATELET # BLD AUTO: 483 K/UL (ref 135–420)
PLATELET # BLD AUTO: 490 K/UL (ref 135–420)
PLATELET # BLD AUTO: 513 K/UL (ref 135–420)
PLATELET # BLD AUTO: 517 K/UL (ref 135–420)
PLATELET # BLD AUTO: 520 K/UL (ref 135–420)
PLATELET # BLD AUTO: 529 K/UL (ref 135–420)
PLATELET # BLD AUTO: 549 K/UL (ref 135–420)
PLATELET # BLD AUTO: 558 K/UL (ref 135–420)
PLATELET # BLD AUTO: 564 K/UL (ref 135–420)
PLATELET # BLD AUTO: 569 K/UL (ref 135–420)
PLATELET # BLD AUTO: 571 K/UL (ref 135–420)
PLATELET # BLD AUTO: 581 K/UL (ref 135–420)
PLATELET # BLD AUTO: 600 K/UL (ref 135–420)
PLATELET # BLD AUTO: 604 K/UL (ref 135–420)
PLATELET # BLD AUTO: 614 K/UL (ref 135–420)
PLATELET COMMENTS,PCOM: ABNORMAL
PLATELET COMMENTS,PCOM: ADEQUATE
PMV BLD AUTO: 10.1 FL (ref 9.2–11.8)
PMV BLD AUTO: 8.4 FL (ref 9.2–11.8)
PMV BLD AUTO: 8.7 FL (ref 9.2–11.8)
PMV BLD AUTO: 8.8 FL (ref 9.2–11.8)
PMV BLD AUTO: 8.9 FL (ref 9.2–11.8)
PMV BLD AUTO: 9 FL (ref 9.2–11.8)
PMV BLD AUTO: 9.1 FL (ref 9.2–11.8)
PMV BLD AUTO: 9.2 FL (ref 9.2–11.8)
PMV BLD AUTO: 9.2 FL (ref 9.2–11.8)
PMV BLD AUTO: 9.3 FL (ref 9.2–11.8)
PMV BLD AUTO: 9.4 FL (ref 9.2–11.8)
PMV BLD AUTO: 9.4 FL (ref 9.2–11.8)
PMV BLD AUTO: 9.6 FL (ref 9.2–11.8)
PO2 BLD: 106 MMHG (ref 80–100)
PO2 BLD: 106 MMHG (ref 80–100)
PO2 BLD: 131 MMHG (ref 80–100)
PO2 BLD: 142 MMHG (ref 80–100)
PO2 BLD: 148 MMHG (ref 80–100)
PO2 BLD: 176 MMHG (ref 80–100)
PO2 BLD: 329 MMHG (ref 80–100)
PO2 BLD: 62 MMHG (ref 80–100)
PO2 BLD: 65 MMHG (ref 80–100)
PO2 BLD: 70 MMHG (ref 80–100)
PO2 BLD: 70 MMHG (ref 80–100)
PO2 BLD: 74 MMHG (ref 80–100)
PO2 BLD: 77 MMHG (ref 80–100)
PO2 BLD: 78 MMHG (ref 80–100)
PO2 BLD: 79 MMHG (ref 80–100)
PO2 BLD: 82 MMHG (ref 80–100)
PO2 BLD: 83 MMHG (ref 80–100)
PO2 BLD: 84 MMHG (ref 80–100)
PO2 BLD: 86 MMHG (ref 80–100)
PO2 BLD: 88 MMHG (ref 80–100)
PO2 BLDV: 37 MMHG (ref 25–40)
POTASSIUM SERPL-SCNC: 3.4 MMOL/L (ref 3.5–5.5)
POTASSIUM SERPL-SCNC: 3.6 MMOL/L (ref 3.5–5.5)
POTASSIUM SERPL-SCNC: 3.6 MMOL/L (ref 3.5–5.5)
POTASSIUM SERPL-SCNC: 3.7 MMOL/L (ref 3.5–5.5)
POTASSIUM SERPL-SCNC: 3.7 MMOL/L (ref 3.5–5.5)
POTASSIUM SERPL-SCNC: 3.8 MMOL/L (ref 3.5–5.5)
POTASSIUM SERPL-SCNC: 3.8 MMOL/L (ref 3.5–5.5)
POTASSIUM SERPL-SCNC: 3.9 MMOL/L (ref 3.5–5.5)
POTASSIUM SERPL-SCNC: 4 MMOL/L (ref 3.5–5.5)
POTASSIUM SERPL-SCNC: 4.2 MMOL/L (ref 3.5–5.5)
POTASSIUM SERPL-SCNC: 4.2 MMOL/L (ref 3.5–5.5)
POTASSIUM SERPL-SCNC: 4.3 MMOL/L (ref 3.5–5.5)
POTASSIUM SERPL-SCNC: 4.4 MMOL/L (ref 3.5–5.5)
POTASSIUM SERPL-SCNC: 4.5 MMOL/L (ref 3.5–5.5)
POTASSIUM SERPL-SCNC: 4.6 MMOL/L (ref 3.5–5.5)
POTASSIUM SERPL-SCNC: 4.7 MMOL/L (ref 3.5–5.5)
POTASSIUM SERPL-SCNC: 4.8 MMOL/L (ref 3.5–5.5)
POTASSIUM SERPL-SCNC: 4.8 MMOL/L (ref 3.5–5.5)
POTASSIUM SERPL-SCNC: 4.9 MMOL/L (ref 3.5–5.5)
POTASSIUM SERPL-SCNC: 5 MMOL/L (ref 3.5–5.5)
POTASSIUM SERPL-SCNC: 5.1 MMOL/L (ref 3.5–5.5)
POTASSIUM SERPL-SCNC: 5.3 MMOL/L (ref 3.5–5.5)
POTASSIUM SERPL-SCNC: 5.3 MMOL/L (ref 3.5–5.5)
POTASSIUM SERPL-SCNC: 5.5 MMOL/L (ref 3.5–5.5)
POTASSIUM SERPL-SCNC: 5.6 MMOL/L (ref 3.5–5.5)
POTASSIUM SERPL-SCNC: 5.9 MMOL/L (ref 3.5–5.5)
POTASSIUM SERPL-SCNC: 6.3 MMOL/L (ref 3.5–5.5)
POTASSIUM SERPL-SCNC: 6.5 MMOL/L (ref 3.5–5.5)
PREALB SERPL-MCNC: 10.5 MG/DL (ref 20–40)
PREALB SERPL-MCNC: 17.6 MG/DL (ref 20–40)
PREALB SERPL-MCNC: 17.9 MG/DL (ref 20–40)
PREALB SERPL-MCNC: 19.4 MG/DL (ref 20–40)
PRESSURE SUPPORT SETTING VENT: 10 CMH2O
PRESSURE SUPPORT SETTING VENT: 12 CMH2O
PRESSURE SUPPORT SETTING VENT: 15 CMH2O
PROT SERPL-MCNC: 5.7 G/DL (ref 6.4–8.2)
PROT SERPL-MCNC: 6.5 G/DL (ref 6.4–8.2)
PROT SERPL-MCNC: 6.7 G/DL (ref 6.4–8.2)
PROT SERPL-MCNC: 6.8 G/DL (ref 6.4–8.2)
PROT SERPL-MCNC: 6.9 G/DL (ref 6.4–8.2)
PROT SERPL-MCNC: 7 G/DL (ref 6.4–8.2)
PROT SERPL-MCNC: 7.1 G/DL (ref 6.4–8.2)
PROT SERPL-MCNC: 7.2 G/DL (ref 6.4–8.2)
PROT SERPL-MCNC: 7.3 G/DL (ref 6.4–8.2)
PROT SERPL-MCNC: 7.3 G/DL (ref 6.4–8.2)
PROT SERPL-MCNC: 7.4 G/DL (ref 6.4–8.2)
PROT SERPL-MCNC: 7.5 G/DL (ref 6.4–8.2)
PROT SERPL-MCNC: 7.6 G/DL (ref 6.4–8.2)
PROT SERPL-MCNC: 8.1 G/DL (ref 6.4–8.2)
PROT SERPL-MCNC: 8.2 G/DL (ref 6.4–8.2)
PROT SERPL-MCNC: 8.6 G/DL (ref 6.4–8.2)
PROT UR STRIP-MCNC: 300 MG/DL
PROT UR STRIP-MCNC: 300 MG/DL
PROT UR STRIP-MCNC: >1000 MG/DL
PROT UR STRIP-MCNC: >300 MG/DL
PROTHROMBIN TIME: 12.3 SEC (ref 11.5–15.2)
PROTHROMBIN TIME: 14 SEC (ref 11.5–15.2)
PROTHROMBIN TIME: 14.3 SEC (ref 11.5–15.2)
PROTHROMBIN TIME: 15.1 SEC (ref 11.5–15.2)
PROTHROMBIN TIME: 15.3 SEC (ref 11.5–15.2)
PROTHROMBIN TIME: 15.6 SEC (ref 11.5–15.2)
PROTHROMBIN TIME: 16.6 SEC (ref 11.5–15.2)
PROTHROMBIN TIME: 17.3 SEC (ref 11.5–15.2)
PROTHROMBIN TIME: 19.3 SEC (ref 11.5–15.2)
PROTHROMBIN TIME: 19.4 SEC (ref 11.5–15.2)
PROTHROMBIN TIME: >75 SEC (ref 11.5–15.2)
Q-T INTERVAL, ECG07: 386 MS
Q-T INTERVAL, ECG07: 394 MS
Q-T INTERVAL, ECG07: 434 MS
QRS DURATION, ECG06: 86 MS
QRS DURATION, ECG06: 92 MS
QRS DURATION, ECG06: 98 MS
QTC CALCULATION (BEZET), ECG08: 472 MS
QTC CALCULATION (BEZET), ECG08: 476 MS
QTC CALCULATION (BEZET), ECG08: 491 MS
RBC # BLD AUTO: 2.11 M/UL (ref 4.7–5.5)
RBC # BLD AUTO: 2.27 M/UL (ref 4.7–5.5)
RBC # BLD AUTO: 2.36 M/UL (ref 4.7–5.5)
RBC # BLD AUTO: 2.44 M/UL (ref 4.7–5.5)
RBC # BLD AUTO: 2.45 M/UL (ref 4.7–5.5)
RBC # BLD AUTO: 2.48 M/UL (ref 4.7–5.5)
RBC # BLD AUTO: 2.49 M/UL (ref 4.7–5.5)
RBC # BLD AUTO: 2.51 M/UL (ref 4.7–5.5)
RBC # BLD AUTO: 2.51 M/UL (ref 4.7–5.5)
RBC # BLD AUTO: 2.52 M/UL (ref 4.7–5.5)
RBC # BLD AUTO: 2.53 M/UL (ref 4.7–5.5)
RBC # BLD AUTO: 2.55 M/UL (ref 4.7–5.5)
RBC # BLD AUTO: 2.56 M/UL (ref 4.7–5.5)
RBC # BLD AUTO: 2.56 M/UL (ref 4.7–5.5)
RBC # BLD AUTO: 2.57 M/UL (ref 4.7–5.5)
RBC # BLD AUTO: 2.57 M/UL (ref 4.7–5.5)
RBC # BLD AUTO: 2.58 M/UL (ref 4.7–5.5)
RBC # BLD AUTO: 2.58 M/UL (ref 4.7–5.5)
RBC # BLD AUTO: 2.59 M/UL (ref 4.7–5.5)
RBC # BLD AUTO: 2.6 M/UL (ref 4.7–5.5)
RBC # BLD AUTO: 2.6 M/UL (ref 4.7–5.5)
RBC # BLD AUTO: 2.61 M/UL (ref 4.7–5.5)
RBC # BLD AUTO: 2.62 M/UL (ref 4.7–5.5)
RBC # BLD AUTO: 2.63 M/UL (ref 4.7–5.5)
RBC # BLD AUTO: 2.65 M/UL (ref 4.7–5.5)
RBC # BLD AUTO: 2.65 M/UL (ref 4.7–5.5)
RBC # BLD AUTO: 2.67 M/UL (ref 4.7–5.5)
RBC # BLD AUTO: 2.69 M/UL (ref 4.7–5.5)
RBC # BLD AUTO: 2.69 M/UL (ref 4.7–5.5)
RBC # BLD AUTO: 2.71 M/UL (ref 4.7–5.5)
RBC # BLD AUTO: 2.75 M/UL (ref 4.7–5.5)
RBC # BLD AUTO: 2.77 M/UL (ref 4.7–5.5)
RBC # BLD AUTO: 2.77 M/UL (ref 4.7–5.5)
RBC # BLD AUTO: 2.78 M/UL (ref 4.7–5.5)
RBC # BLD AUTO: 2.8 M/UL (ref 4.7–5.5)
RBC # BLD AUTO: 2.81 M/UL (ref 4.7–5.5)
RBC # BLD AUTO: 2.82 M/UL (ref 4.7–5.5)
RBC # BLD AUTO: 2.89 M/UL (ref 4.7–5.5)
RBC # BLD AUTO: 2.9 M/UL (ref 4.7–5.5)
RBC # BLD AUTO: 2.97 M/UL (ref 4.7–5.5)
RBC # BLD AUTO: 3.05 M/UL (ref 4.7–5.5)
RBC # BLD AUTO: 3.11 M/UL (ref 4.7–5.5)
RBC # BLD AUTO: 3.21 M/UL (ref 4.7–5.5)
RBC # BLD AUTO: 3.27 M/UL (ref 4.7–5.5)
RBC # BLD AUTO: 3.85 M/UL (ref 4.7–5.5)
RBC # BLD AUTO: 3.88 M/UL (ref 4.7–5.5)
RBC # BLD AUTO: 4.57 M/UL (ref 4.7–5.5)
RBC # BLD AUTO: 5.08 M/UL (ref 4.7–5.5)
RBC # BLD AUTO: 5.16 M/UL (ref 4.7–5.5)
RBC # BLD AUTO: 5.43 M/UL (ref 4.7–5.5)
RBC # BLD AUTO: 5.78 M/UL (ref 4.7–5.5)
RBC #/AREA URNS HPF: ABNORMAL /HPF (ref 0–5)
RBC #/AREA URNS HPF: ABNORMAL /HPF (ref 0–5)
RBC #/AREA URNS HPF: NEGATIVE /HPF (ref 0–5)
RBC #/AREA URNS HPF: NORMAL /HPF (ref 0–5)
RBC MORPH BLD: ABNORMAL
REPORTED DOSE,DOSE: ABNORMAL UNITS
REPORTED DOSE/TIME,TMG: 2100
RIGHT CCA DIST DIAS: 8.92 CM/S
RIGHT CCA DIST SYS: 81.22 CM/S
RIGHT CCA MID DIAS: 0 CM/S
RIGHT CCA MID SYS: 91.55 CM/S
RIGHT CCA PROX DIAS: 0 CM/S
RIGHT CCA PROX SYS: 130.98 CM/S
RIGHT ECA DIAS: 13.7 CM/S
RIGHT ECA SYS: 97.79 CM/S
RIGHT ICA DIST DIAS: 26.78 CM/S
RIGHT ICA DIST SYS: 87.2 CM/S
RIGHT ICA MID DIAS: 19.31 CM/S
RIGHT ICA MID SYS: 79.73 CM/S
RIGHT ICA PROX DIAS: 22.42 CM/S
RIGHT ICA PROX SYS: 86.58 CM/S
RIGHT ICA/CCA SYS: 0.67
RIGHT SUBCLAVIAN DIAS: 0 CM/S
RIGHT SUBCLAVIAN SYS: 84.41 CM/S
RIGHT VERTEBRAL DIAS: 11.52 CM/S
RIGHT VERTEBRAL SYS: 54.5 CM/S
SAO2 % BLD: 100 % (ref 92–97)
SAO2 % BLD: 100 % (ref 92–97)
SAO2 % BLD: 92 % (ref 92–97)
SAO2 % BLD: 92 % (ref 92–97)
SAO2 % BLD: 93 % (ref 92–97)
SAO2 % BLD: 94 % (ref 92–97)
SAO2 % BLD: 95 % (ref 92–97)
SAO2 % BLD: 95 % (ref 92–97)
SAO2 % BLD: 96 % (ref 92–97)
SAO2 % BLD: 97 % (ref 92–97)
SAO2 % BLD: 98 % (ref 92–97)
SAO2 % BLD: 98 % (ref 92–97)
SAO2 % BLD: 99 % (ref 92–97)
SAO2 % BLDV: 71 % (ref 65–88)
SERVICE CMNT-IMP: ABNORMAL
SERVICE CMNT-IMP: NORMAL
SODIUM SERPL-SCNC: 136 MMOL/L (ref 136–145)
SODIUM SERPL-SCNC: 137 MMOL/L (ref 136–145)
SODIUM SERPL-SCNC: 138 MMOL/L (ref 136–145)
SODIUM SERPL-SCNC: 139 MMOL/L (ref 136–145)
SODIUM SERPL-SCNC: 140 MMOL/L (ref 136–145)
SODIUM SERPL-SCNC: 141 MMOL/L (ref 136–145)
SODIUM SERPL-SCNC: 142 MMOL/L (ref 136–145)
SODIUM SERPL-SCNC: 143 MMOL/L (ref 136–145)
SODIUM SERPL-SCNC: 145 MMOL/L (ref 136–145)
SP GR UR REFRACTOMETRY: 1.01 (ref 1–1.03)
SP GR UR REFRACTOMETRY: 1.02 (ref 1–1.03)
SP GR UR REFRACTOMETRY: 1.03 (ref 1–1.03)
SP GR UR REFRACTOMETRY: >1.03 (ref 1–1.03)
SPECIMEN EXP DATE BLD: NORMAL
SPECIMEN SOURCE: ABNORMAL
SPECIMEN SOURCE: NORMAL
SPECIMEN TYPE: ABNORMAL
SPECIMEN TYPE: NORMAL
STATUS OF UNIT,%ST: NORMAL
T3FREE SERPL-MCNC: 2.7 PG/ML (ref 2.18–3.98)
T4 FREE SERPL-MCNC: 0.9 NG/DL (ref 0.7–1.5)
THROMBIN TIME: 18.3 SECS (ref 13.8–18.2)
TOTAL RESP. RATE, ITRR: 13
TOTAL RESP. RATE, ITRR: 15
TOTAL RESP. RATE, ITRR: 16
TOTAL RESP. RATE, ITRR: 17
TOTAL RESP. RATE, ITRR: 18
TOTAL RESP. RATE, ITRR: 19
TOTAL RESP. RATE, ITRR: 19
TOTAL RESP. RATE, ITRR: 20
TOTAL RESP. RATE, ITRR: 20
TOTAL RESP. RATE, ITRR: 21
TOTAL RESP. RATE, ITRR: 22
TOTAL RESP. RATE, ITRR: 25
TOTAL RESP. RATE, ITRR: 26
TOTAL RESP. RATE, ITRR: 29
TOTAL RESP. RATE, ITRR: 31
TRIGL SERPL-MCNC: 184 MG/DL (ref ?–150)
TRIGL SERPL-MCNC: 188 MG/DL (ref ?–150)
TROPONIN I SERPL-MCNC: 0.09 NG/ML (ref 0–0.04)
TROPONIN I SERPL-MCNC: 0.17 NG/ML (ref 0–0.04)
TROPONIN I SERPL-MCNC: 0.18 NG/ML (ref 0–0.04)
TROPONIN I SERPL-MCNC: 0.25 NG/ML (ref 0–0.04)
TSH SERPL DL<=0.05 MIU/L-ACNC: 1.1 UIU/ML (ref 0.36–3.74)
UNIT DIVISION, %UDIV: 0
UROBILINOGEN UR QL STRIP.AUTO: 0.2 EU/DL (ref 0.2–1)
UROBILINOGEN UR QL STRIP.AUTO: 1 EU/DL (ref 0.2–1)
UROBILINOGEN UR QL STRIP.AUTO: 1 EU/DL (ref 0.2–1)
UROBILINOGEN UR QL STRIP.AUTO: >8 EU/DL (ref 0.2–1)
VANCOMYCIN SERPL-MCNC: 17.8 UG/ML (ref 5–40)
VANCOMYCIN SERPL-MCNC: 20 UG/ML (ref 5–40)
VANCOMYCIN SERPL-MCNC: 25.2 UG/ML (ref 5–40)
VANCOMYCIN SERPL-MCNC: 27.2 UG/ML (ref 5–40)
VANCOMYCIN SERPL-MCNC: 27.9 UG/ML (ref 5–40)
VANCOMYCIN SERPL-MCNC: 30.8 UG/ML (ref 5–40)
VANCOMYCIN SERPL-MCNC: 34.1 UG/ML (ref 5–40)
VANCOMYCIN SERPL-MCNC: 35.1 UG/ML (ref 5–40)
VANCOMYCIN SERPL-MCNC: 6 UG/ML (ref 5–40)
VANCOMYCIN TROUGH SERPL-MCNC: 26 UG/ML (ref 10–20)
VENTILATION MODE VENT: ABNORMAL
VENTILATION MODE VENT: NORMAL
VENTRICULAR RATE, ECG03: 77 BPM
VENTRICULAR RATE, ECG03: 88 BPM
VENTRICULAR RATE, ECG03: 90 BPM
VLDLC SERPL CALC-MCNC: 37.6 MG/DL
VOLUME CONTROL IVLC: YES
VOLUME CONTROL PLUS IVLCP: YES
VT SETTING VENT: 500 ML
VT SETTING VENT: 512 ML
VZV DNA SPEC QL NAA+PROBE: POSITIVE
WBC # BLD AUTO: 10.2 K/UL (ref 4.6–13.2)
WBC # BLD AUTO: 10.4 K/UL (ref 4.6–13.2)
WBC # BLD AUTO: 10.6 K/UL (ref 4.6–13.2)
WBC # BLD AUTO: 10.8 K/UL (ref 4.6–13.2)
WBC # BLD AUTO: 11.4 K/UL (ref 4.6–13.2)
WBC # BLD AUTO: 11.5 K/UL (ref 4.6–13.2)
WBC # BLD AUTO: 11.7 K/UL (ref 4.6–13.2)
WBC # BLD AUTO: 11.7 K/UL (ref 4.6–13.2)
WBC # BLD AUTO: 12.1 K/UL (ref 4.6–13.2)
WBC # BLD AUTO: 12.2 K/UL (ref 4.6–13.2)
WBC # BLD AUTO: 12.4 K/UL (ref 4.6–13.2)
WBC # BLD AUTO: 12.9 K/UL (ref 4.6–13.2)
WBC # BLD AUTO: 13 K/UL (ref 4.6–13.2)
WBC # BLD AUTO: 13 K/UL (ref 4.6–13.2)
WBC # BLD AUTO: 13.1 K/UL (ref 4.6–13.2)
WBC # BLD AUTO: 13.5 K/UL (ref 4.6–13.2)
WBC # BLD AUTO: 13.9 K/UL (ref 4.6–13.2)
WBC # BLD AUTO: 14 K/UL (ref 4.6–13.2)
WBC # BLD AUTO: 14.2 K/UL (ref 4.6–13.2)
WBC # BLD AUTO: 14.7 K/UL (ref 4.6–13.2)
WBC # BLD AUTO: 15.6 K/UL (ref 4.6–13.2)
WBC # BLD AUTO: 16 K/UL (ref 4.6–13.2)
WBC # BLD AUTO: 16.2 K/UL (ref 4.6–13.2)
WBC # BLD AUTO: 16.6 K/UL (ref 4.6–13.2)
WBC # BLD AUTO: 17.4 K/UL (ref 4.6–13.2)
WBC # BLD AUTO: 18.3 K/UL (ref 4.6–13.2)
WBC # BLD AUTO: 18.5 K/UL (ref 4.6–13.2)
WBC # BLD AUTO: 18.7 K/UL (ref 4.6–13.2)
WBC # BLD AUTO: 19.3 K/UL (ref 4.6–13.2)
WBC # BLD AUTO: 19.4 K/UL (ref 4.6–13.2)
WBC # BLD AUTO: 19.6 K/UL (ref 4.6–13.2)
WBC # BLD AUTO: 19.6 K/UL (ref 4.6–13.2)
WBC # BLD AUTO: 19.9 K/UL (ref 4.6–13.2)
WBC # BLD AUTO: 20.2 K/UL (ref 4.6–13.2)
WBC # BLD AUTO: 20.9 K/UL (ref 4.6–13.2)
WBC # BLD AUTO: 21.2 K/UL (ref 4.6–13.2)
WBC # BLD AUTO: 21.5 K/UL (ref 4.6–13.2)
WBC # BLD AUTO: 21.9 K/UL (ref 4.6–13.2)
WBC # BLD AUTO: 22.6 K/UL (ref 4.6–13.2)
WBC # BLD AUTO: 22.6 K/UL (ref 4.6–13.2)
WBC # BLD AUTO: 23 K/UL (ref 4.6–13.2)
WBC # BLD AUTO: 24.8 K/UL (ref 4.6–13.2)
WBC # BLD AUTO: 24.9 K/UL (ref 4.6–13.2)
WBC # BLD AUTO: 25.5 K/UL (ref 4.6–13.2)
WBC # BLD AUTO: 25.7 K/UL (ref 4.6–13.2)
WBC # BLD AUTO: 27 K/UL (ref 4.6–13.2)
WBC # BLD AUTO: 28.4 K/UL (ref 4.6–13.2)
WBC # BLD AUTO: 8 K/UL (ref 4.6–13.2)
WBC # BLD AUTO: 8.5 K/UL (ref 4.6–13.2)
WBC # BLD AUTO: 8.6 K/UL (ref 4.6–13.2)
WBC # BLD AUTO: 9.2 K/UL (ref 4.6–13.2)
WBC # BLD AUTO: 9.2 K/UL (ref 4.6–13.2)
WBC # BLD AUTO: 9.5 K/UL (ref 4.6–13.2)
WBC URNS QL MICRO: ABNORMAL /HPF (ref 0–4)
WBC URNS QL MICRO: NORMAL /HPF (ref 0–4)

## 2018-01-01 PROCEDURE — 94003 VENT MGMT INPAT SUBQ DAY: CPT

## 2018-01-01 PROCEDURE — 74011636637 HC RX REV CODE- 636/637: Performed by: HOSPITALIST

## 2018-01-01 PROCEDURE — 80069 RENAL FUNCTION PANEL: CPT | Performed by: FAMILY MEDICINE

## 2018-01-01 PROCEDURE — 74011250637 HC RX REV CODE- 250/637: Performed by: HOSPITALIST

## 2018-01-01 PROCEDURE — 74011000258 HC RX REV CODE- 258: Performed by: INTERNAL MEDICINE

## 2018-01-01 PROCEDURE — 84134 ASSAY OF PREALBUMIN: CPT | Performed by: INTERNAL MEDICINE

## 2018-01-01 PROCEDURE — 74011250636 HC RX REV CODE- 250/636: Performed by: INTERNAL MEDICINE

## 2018-01-01 PROCEDURE — 77030010545: Performed by: SURGERY

## 2018-01-01 PROCEDURE — 36592 COLLECT BLOOD FROM PICC: CPT

## 2018-01-01 PROCEDURE — 82962 GLUCOSE BLOOD TEST: CPT

## 2018-01-01 PROCEDURE — 74011000258 HC RX REV CODE- 258: Performed by: HOSPITALIST

## 2018-01-01 PROCEDURE — 36600 WITHDRAWAL OF ARTERIAL BLOOD: CPT

## 2018-01-01 PROCEDURE — 80069 RENAL FUNCTION PANEL: CPT | Performed by: INTERNAL MEDICINE

## 2018-01-01 PROCEDURE — 65610000006 HC RM INTENSIVE CARE

## 2018-01-01 PROCEDURE — 77030011943

## 2018-01-01 PROCEDURE — 74011000250 HC RX REV CODE- 250: Performed by: INTERNAL MEDICINE

## 2018-01-01 PROCEDURE — 77030018798 HC PMP KT ENTRL FED COVD -A

## 2018-01-01 PROCEDURE — 77030037878 HC DRSG MEPILEX >48IN BORD MOLN -B

## 2018-01-01 PROCEDURE — C9113 INJ PANTOPRAZOLE SODIUM, VIA: HCPCS | Performed by: INTERNAL MEDICINE

## 2018-01-01 PROCEDURE — 80202 ASSAY OF VANCOMYCIN: CPT | Performed by: FAMILY MEDICINE

## 2018-01-01 PROCEDURE — 74011250636 HC RX REV CODE- 250/636: Performed by: NURSE PRACTITIONER

## 2018-01-01 PROCEDURE — 93005 ELECTROCARDIOGRAM TRACING: CPT

## 2018-01-01 PROCEDURE — 87040 BLOOD CULTURE FOR BACTERIA: CPT | Performed by: INTERNAL MEDICINE

## 2018-01-01 PROCEDURE — 92526 ORAL FUNCTION THERAPY: CPT

## 2018-01-01 PROCEDURE — P9016 RBC LEUKOCYTES REDUCED: HCPCS | Performed by: NURSE PRACTITIONER

## 2018-01-01 PROCEDURE — 82803 BLOOD GASES ANY COMBINATION: CPT

## 2018-01-01 PROCEDURE — 74011636637 HC RX REV CODE- 636/637: Performed by: FAMILY MEDICINE

## 2018-01-01 PROCEDURE — 80048 BASIC METABOLIC PNL TOTAL CA: CPT | Performed by: INTERNAL MEDICINE

## 2018-01-01 PROCEDURE — 74011000258 HC RX REV CODE- 258: Performed by: FAMILY MEDICINE

## 2018-01-01 PROCEDURE — 94640 AIRWAY INHALATION TREATMENT: CPT

## 2018-01-01 PROCEDURE — 77030002916 HC SUT ETHLN J&J -A: Performed by: SURGERY

## 2018-01-01 PROCEDURE — 87040 BLOOD CULTURE FOR BACTERIA: CPT

## 2018-01-01 PROCEDURE — 85027 COMPLETE CBC AUTOMATED: CPT | Performed by: HOSPITALIST

## 2018-01-01 PROCEDURE — 87340 HEPATITIS B SURFACE AG IA: CPT | Performed by: INTERNAL MEDICINE

## 2018-01-01 PROCEDURE — 71250 CT THORAX DX C-: CPT

## 2018-01-01 PROCEDURE — 74011250637 HC RX REV CODE- 250/637: Performed by: NURSE PRACTITIONER

## 2018-01-01 PROCEDURE — 85027 COMPLETE CBC AUTOMATED: CPT | Performed by: FAMILY MEDICINE

## 2018-01-01 PROCEDURE — 77030020186 HC BOOT HL PROTCT SAGE -B

## 2018-01-01 PROCEDURE — 90935 HEMODIALYSIS ONE EVALUATION: CPT

## 2018-01-01 PROCEDURE — 71045 X-RAY EXAM CHEST 1 VIEW: CPT

## 2018-01-01 PROCEDURE — 77030018846 HC SOL IRR STRL H20 ICUM -A

## 2018-01-01 PROCEDURE — 74011250637 HC RX REV CODE- 250/637: Performed by: INTERNAL MEDICINE

## 2018-01-01 PROCEDURE — 74011636637 HC RX REV CODE- 636/637: Performed by: INTERNAL MEDICINE

## 2018-01-01 PROCEDURE — 36430 TRANSFUSION BLD/BLD COMPNT: CPT

## 2018-01-01 PROCEDURE — 83735 ASSAY OF MAGNESIUM: CPT | Performed by: FAMILY MEDICINE

## 2018-01-01 PROCEDURE — 83690 ASSAY OF LIPASE: CPT | Performed by: INTERNAL MEDICINE

## 2018-01-01 PROCEDURE — 77030010547 HC BG URIN W/UMETER -A

## 2018-01-01 PROCEDURE — 76010000161 HC OR TIME 1 TO 1.5 HR INTENSV-TIER 1: Performed by: SURGERY

## 2018-01-01 PROCEDURE — 36415 COLL VENOUS BLD VENIPUNCTURE: CPT | Performed by: SURGERY

## 2018-01-01 PROCEDURE — 36415 COLL VENOUS BLD VENIPUNCTURE: CPT

## 2018-01-01 PROCEDURE — 74011250636 HC RX REV CODE- 250/636: Performed by: HOSPITALIST

## 2018-01-01 PROCEDURE — 85025 COMPLETE CBC W/AUTO DIFF WBC: CPT | Performed by: INTERNAL MEDICINE

## 2018-01-01 PROCEDURE — 87086 URINE CULTURE/COLONY COUNT: CPT | Performed by: INTERNAL MEDICINE

## 2018-01-01 PROCEDURE — 80076 HEPATIC FUNCTION PANEL: CPT | Performed by: SURGERY

## 2018-01-01 PROCEDURE — 80076 HEPATIC FUNCTION PANEL: CPT | Performed by: INTERNAL MEDICINE

## 2018-01-01 PROCEDURE — 0DJ63ZZ INSPECTION OF STOMACH, PERCUTANEOUS APPROACH: ICD-10-PCS | Performed by: RADIOLOGY

## 2018-01-01 PROCEDURE — 76700 US EXAM ABDOM COMPLETE: CPT

## 2018-01-01 PROCEDURE — 36415 COLL VENOUS BLD VENIPUNCTURE: CPT | Performed by: NURSE PRACTITIONER

## 2018-01-01 PROCEDURE — 70551 MRI BRAIN STEM W/O DYE: CPT

## 2018-01-01 PROCEDURE — 80076 HEPATIC FUNCTION PANEL: CPT | Performed by: HOSPITALIST

## 2018-01-01 PROCEDURE — 74011000250 HC RX REV CODE- 250: Performed by: EMERGENCY MEDICINE

## 2018-01-01 PROCEDURE — 65270000029 HC RM PRIVATE

## 2018-01-01 PROCEDURE — 85576 BLOOD PLATELET AGGREGATION: CPT | Performed by: EMERGENCY MEDICINE

## 2018-01-01 PROCEDURE — 36558 INSERT TUNNELED CV CATH: CPT | Performed by: SURGERY

## 2018-01-01 PROCEDURE — 36415 COLL VENOUS BLD VENIPUNCTURE: CPT | Performed by: INTERNAL MEDICINE

## 2018-01-01 PROCEDURE — 84134 ASSAY OF PREALBUMIN: CPT | Performed by: FAMILY MEDICINE

## 2018-01-01 PROCEDURE — 30233K1 TRANSFUSION OF NONAUTOLOGOUS FROZEN PLASMA INTO PERIPHERAL VEIN, PERCUTANEOUS APPROACH: ICD-10-PCS | Performed by: FAMILY MEDICINE

## 2018-01-01 PROCEDURE — 83690 ASSAY OF LIPASE: CPT | Performed by: EMERGENCY MEDICINE

## 2018-01-01 PROCEDURE — 81001 URINALYSIS AUTO W/SCOPE: CPT | Performed by: EMERGENCY MEDICINE

## 2018-01-01 PROCEDURE — 86900 BLOOD TYPING SEROLOGIC ABO: CPT | Performed by: NURSE PRACTITIONER

## 2018-01-01 PROCEDURE — 87070 CULTURE OTHR SPECIMN AEROBIC: CPT | Performed by: INTERNAL MEDICINE

## 2018-01-01 PROCEDURE — P9059 PLASMA, FRZ BETWEEN 8-24HOUR: HCPCS | Performed by: INTERNAL MEDICINE

## 2018-01-01 PROCEDURE — 85730 THROMBOPLASTIN TIME PARTIAL: CPT | Performed by: NURSE PRACTITIONER

## 2018-01-01 PROCEDURE — 31720 CLEARANCE OF AIRWAYS: CPT

## 2018-01-01 PROCEDURE — 80202 ASSAY OF VANCOMYCIN: CPT | Performed by: HOSPITALIST

## 2018-01-01 PROCEDURE — 77030037870 HC GLD SHT PREVALON SAGE -B

## 2018-01-01 PROCEDURE — 83690 ASSAY OF LIPASE: CPT | Performed by: FAMILY MEDICINE

## 2018-01-01 PROCEDURE — 84132 ASSAY OF SERUM POTASSIUM: CPT | Performed by: INTERNAL MEDICINE

## 2018-01-01 PROCEDURE — 83880 ASSAY OF NATRIURETIC PEPTIDE: CPT | Performed by: FAMILY MEDICINE

## 2018-01-01 PROCEDURE — 74011250636 HC RX REV CODE- 250/636: Performed by: FAMILY MEDICINE

## 2018-01-01 PROCEDURE — 74011250636 HC RX REV CODE- 250/636

## 2018-01-01 PROCEDURE — 77010033678 HC OXYGEN DAILY

## 2018-01-01 PROCEDURE — 80053 COMPREHEN METABOLIC PANEL: CPT | Performed by: INTERNAL MEDICINE

## 2018-01-01 PROCEDURE — 83605 ASSAY OF LACTIC ACID: CPT

## 2018-01-01 PROCEDURE — 77030012699 HC VLV SUC CNTRL OCOA -A: Performed by: INTERNAL MEDICINE

## 2018-01-01 PROCEDURE — 74176 CT ABD & PELVIS W/O CONTRAST: CPT

## 2018-01-01 PROCEDURE — 51798 US URINE CAPACITY MEASURE: CPT

## 2018-01-01 PROCEDURE — 77030031139 HC SUT VCRL2 J&J -A: Performed by: SURGERY

## 2018-01-01 PROCEDURE — 31500 INSERT EMERGENCY AIRWAY: CPT

## 2018-01-01 PROCEDURE — 85027 COMPLETE CBC AUTOMATED: CPT | Performed by: SURGERY

## 2018-01-01 PROCEDURE — 85027 COMPLETE CBC AUTOMATED: CPT | Performed by: NURSE PRACTITIONER

## 2018-01-01 PROCEDURE — 02HV33Z INSERTION OF INFUSION DEVICE INTO SUPERIOR VENA CAVA, PERCUTANEOUS APPROACH: ICD-10-PCS | Performed by: SURGERY

## 2018-01-01 PROCEDURE — 77030035694 HC MSK BIPAP FLL FAC PERFMAX PHIL -B

## 2018-01-01 PROCEDURE — 87186 SC STD MICRODIL/AGAR DIL: CPT | Performed by: INTERNAL MEDICINE

## 2018-01-01 PROCEDURE — 87798 DETECT AGENT NOS DNA AMP: CPT | Performed by: INTERNAL MEDICINE

## 2018-01-01 PROCEDURE — 76040000007: Performed by: INTERNAL MEDICINE

## 2018-01-01 PROCEDURE — 83036 HEMOGLOBIN GLYCOSYLATED A1C: CPT | Performed by: FAMILY MEDICINE

## 2018-01-01 PROCEDURE — 77030002933 HC SUT MCRYL J&J -A: Performed by: SURGERY

## 2018-01-01 PROCEDURE — 85027 COMPLETE CBC AUTOMATED: CPT | Performed by: INTERNAL MEDICINE

## 2018-01-01 PROCEDURE — 81001 URINALYSIS AUTO W/SCOPE: CPT | Performed by: HOSPITALIST

## 2018-01-01 PROCEDURE — 74011250636 HC RX REV CODE- 250/636: Performed by: ANESTHESIOLOGY

## 2018-01-01 PROCEDURE — 31502 CHANGE OF WINDPIPE AIRWAY: CPT

## 2018-01-01 PROCEDURE — 5A1955Z RESPIRATORY VENTILATION, GREATER THAN 96 CONSECUTIVE HOURS: ICD-10-PCS | Performed by: INTERNAL MEDICINE

## 2018-01-01 PROCEDURE — 80076 HEPATIC FUNCTION PANEL: CPT

## 2018-01-01 PROCEDURE — 77030018712 HC DEV BLLN INFL BSC -B: Performed by: INTERNAL MEDICINE

## 2018-01-01 PROCEDURE — 80202 ASSAY OF VANCOMYCIN: CPT | Performed by: INTERNAL MEDICINE

## 2018-01-01 PROCEDURE — 85652 RBC SED RATE AUTOMATED: CPT | Performed by: NURSE PRACTITIONER

## 2018-01-01 PROCEDURE — 74011250636 HC RX REV CODE- 250/636: Performed by: EMERGENCY MEDICINE

## 2018-01-01 PROCEDURE — 80048 BASIC METABOLIC PNL TOTAL CA: CPT | Performed by: NURSE PRACTITIONER

## 2018-01-01 PROCEDURE — 36415 COLL VENOUS BLD VENIPUNCTURE: CPT | Performed by: FAMILY MEDICINE

## 2018-01-01 PROCEDURE — 95819 EEG AWAKE AND ASLEEP: CPT | Performed by: PSYCHIATRY & NEUROLOGY

## 2018-01-01 PROCEDURE — 85610 PROTHROMBIN TIME: CPT | Performed by: INTERNAL MEDICINE

## 2018-01-01 PROCEDURE — 87077 CULTURE AEROBIC IDENTIFY: CPT | Performed by: INTERNAL MEDICINE

## 2018-01-01 PROCEDURE — 85379 FIBRIN DEGRADATION QUANT: CPT

## 2018-01-01 PROCEDURE — 36591 DRAW BLOOD OFF VENOUS DEVICE: CPT

## 2018-01-01 PROCEDURE — 77030026125 HC TU FEED JEJU TTP BSC -C

## 2018-01-01 PROCEDURE — 87186 SC STD MICRODIL/AGAR DIL: CPT | Performed by: EMERGENCY MEDICINE

## 2018-01-01 PROCEDURE — 82330 ASSAY OF CALCIUM: CPT | Performed by: INTERNAL MEDICINE

## 2018-01-01 PROCEDURE — 89220 SPUTUM SPECIMEN COLLECTION: CPT

## 2018-01-01 PROCEDURE — 77030020291 HC FLEXSEAL FMS BMS -C

## 2018-01-01 PROCEDURE — 77030018719 HC DRSG PTCH ANTIMIC J&J -A

## 2018-01-01 PROCEDURE — 77030013140 HC MSK NEB VYRM -A: Performed by: INTERNAL MEDICINE

## 2018-01-01 PROCEDURE — 74011000250 HC RX REV CODE- 250: Performed by: NURSE PRACTITIONER

## 2018-01-01 PROCEDURE — 77030032490 HC SLV COMPR SCD KNE COVD -B

## 2018-01-01 PROCEDURE — 86900 BLOOD TYPING SEROLOGIC ABO: CPT | Performed by: EMERGENCY MEDICINE

## 2018-01-01 PROCEDURE — 0DJ08ZZ INSPECTION OF UPPER INTESTINAL TRACT, VIA NATURAL OR ARTIFICIAL OPENING ENDOSCOPIC: ICD-10-PCS | Performed by: INTERNAL MEDICINE

## 2018-01-01 PROCEDURE — 85025 COMPLETE CBC W/AUTO DIFF WBC: CPT | Performed by: EMERGENCY MEDICINE

## 2018-01-01 PROCEDURE — 82550 ASSAY OF CK (CPK): CPT | Performed by: EMERGENCY MEDICINE

## 2018-01-01 PROCEDURE — 87077 CULTURE AEROBIC IDENTIFY: CPT | Performed by: EMERGENCY MEDICINE

## 2018-01-01 PROCEDURE — 84100 ASSAY OF PHOSPHORUS: CPT | Performed by: INTERNAL MEDICINE

## 2018-01-01 PROCEDURE — 77030018836 HC SOL IRR NACL ICUM -A

## 2018-01-01 PROCEDURE — 97162 PT EVAL MOD COMPLEX 30 MIN: CPT

## 2018-01-01 PROCEDURE — 81001 URINALYSIS AUTO W/SCOPE: CPT | Performed by: INTERNAL MEDICINE

## 2018-01-01 PROCEDURE — C1751 CATH, INF, PER/CENT/MIDLINE: HCPCS

## 2018-01-01 PROCEDURE — 74018 RADEX ABDOMEN 1 VIEW: CPT

## 2018-01-01 PROCEDURE — 85610 PROTHROMBIN TIME: CPT | Performed by: EMERGENCY MEDICINE

## 2018-01-01 PROCEDURE — 80053 COMPREHEN METABOLIC PANEL: CPT | Performed by: EMERGENCY MEDICINE

## 2018-01-01 PROCEDURE — 74011636320 HC RX REV CODE- 636/320: Performed by: RADIOLOGY

## 2018-01-01 PROCEDURE — 77030011256 HC DRSG MEPILEX <16IN NO BORD MOLN -A

## 2018-01-01 PROCEDURE — 0BC58ZZ EXTIRPATION OF MATTER FROM RIGHT MIDDLE LOBE BRONCHUS, VIA NATURAL OR ARTIFICIAL OPENING ENDOSCOPIC: ICD-10-PCS | Performed by: INTERNAL MEDICINE

## 2018-01-01 PROCEDURE — 0BJ08ZZ INSPECTION OF TRACHEOBRONCHIAL TREE, VIA NATURAL OR ARTIFICIAL OPENING ENDOSCOPIC: ICD-10-PCS | Performed by: INTERNAL MEDICINE

## 2018-01-01 PROCEDURE — 70450 CT HEAD/BRAIN W/O DYE: CPT

## 2018-01-01 PROCEDURE — C9113 INJ PANTOPRAZOLE SODIUM, VIA: HCPCS | Performed by: NURSE PRACTITIONER

## 2018-01-01 PROCEDURE — 87040 BLOOD CULTURE FOR BACTERIA: CPT | Performed by: EMERGENCY MEDICINE

## 2018-01-01 PROCEDURE — 97535 SELF CARE MNGMENT TRAINING: CPT

## 2018-01-01 PROCEDURE — 77030005122 HC CATH GASTMY PEG BSC -B: Performed by: SURGERY

## 2018-01-01 PROCEDURE — 77030018836 HC SOL IRR NACL ICUM -A: Performed by: INTERNAL MEDICINE

## 2018-01-01 PROCEDURE — 82550 ASSAY OF CK (CPK): CPT | Performed by: FAMILY MEDICINE

## 2018-01-01 PROCEDURE — 99285 EMERGENCY DEPT VISIT HI MDM: CPT

## 2018-01-01 PROCEDURE — 86920 COMPATIBILITY TEST SPIN: CPT | Performed by: NURSE PRACTITIONER

## 2018-01-01 PROCEDURE — 0B110F4 BYPASS TRACHEA TO CUTANEOUS WITH TRACHEOSTOMY DEVICE, OPEN APPROACH: ICD-10-PCS | Performed by: SURGERY

## 2018-01-01 PROCEDURE — 70490 CT SOFT TISSUE NECK W/O DYE: CPT

## 2018-01-01 PROCEDURE — 77030034850

## 2018-01-01 PROCEDURE — 86920 COMPATIBILITY TEST SPIN: CPT | Performed by: INTERNAL MEDICINE

## 2018-01-01 PROCEDURE — 97110 THERAPEUTIC EXERCISES: CPT

## 2018-01-01 PROCEDURE — 92610 EVALUATE SWALLOWING FUNCTION: CPT

## 2018-01-01 PROCEDURE — 0BH17EZ INSERTION OF ENDOTRACHEAL AIRWAY INTO TRACHEA, VIA NATURAL OR ARTIFICIAL OPENING: ICD-10-PCS | Performed by: INTERNAL MEDICINE

## 2018-01-01 PROCEDURE — 76937 US GUIDE VASCULAR ACCESS: CPT | Performed by: SURGERY

## 2018-01-01 PROCEDURE — 87040 BLOOD CULTURE FOR BACTERIA: CPT | Performed by: HOSPITALIST

## 2018-01-01 PROCEDURE — 74011250636 HC RX REV CODE- 250/636: Performed by: SURGERY

## 2018-01-01 PROCEDURE — 36415 COLL VENOUS BLD VENIPUNCTURE: CPT | Performed by: HOSPITALIST

## 2018-01-01 PROCEDURE — P9047 ALBUMIN (HUMAN), 25%, 50ML: HCPCS | Performed by: INTERNAL MEDICINE

## 2018-01-01 PROCEDURE — 77030008771 HC TU NG SALEM SUMP -A

## 2018-01-01 PROCEDURE — 97165 OT EVAL LOW COMPLEX 30 MIN: CPT

## 2018-01-01 PROCEDURE — 93880 EXTRACRANIAL BILAT STUDY: CPT

## 2018-01-01 PROCEDURE — 85027 COMPLETE CBC AUTOMATED: CPT | Performed by: EMERGENCY MEDICINE

## 2018-01-01 PROCEDURE — 77010033711 HC HIGH FLOW OXYGEN

## 2018-01-01 PROCEDURE — 83690 ASSAY OF LIPASE: CPT | Performed by: SURGERY

## 2018-01-01 PROCEDURE — 85670 THROMBIN TIME PLASMA: CPT | Performed by: EMERGENCY MEDICINE

## 2018-01-01 PROCEDURE — 97164 PT RE-EVAL EST PLAN CARE: CPT

## 2018-01-01 PROCEDURE — 74011636637 HC RX REV CODE- 636/637: Performed by: SURGERY

## 2018-01-01 PROCEDURE — 77030002996 HC SUT SLK J&J -A: Performed by: SURGERY

## 2018-01-01 PROCEDURE — 77030008680 HC TU ET BRONCH COOK -C: Performed by: INTERNAL MEDICINE

## 2018-01-01 PROCEDURE — 92950 HEART/LUNG RESUSCITATION CPR: CPT

## 2018-01-01 PROCEDURE — 97530 THERAPEUTIC ACTIVITIES: CPT

## 2018-01-01 PROCEDURE — 80053 COMPREHEN METABOLIC PANEL: CPT | Performed by: NURSE PRACTITIONER

## 2018-01-01 PROCEDURE — 85018 HEMOGLOBIN: CPT | Performed by: HOSPITALIST

## 2018-01-01 PROCEDURE — 85610 PROTHROMBIN TIME: CPT

## 2018-01-01 PROCEDURE — 85730 THROMBOPLASTIN TIME PARTIAL: CPT | Performed by: INTERNAL MEDICINE

## 2018-01-01 PROCEDURE — 85730 THROMBOPLASTIN TIME PARTIAL: CPT | Performed by: HOSPITALIST

## 2018-01-01 PROCEDURE — 83735 ASSAY OF MAGNESIUM: CPT | Performed by: INTERNAL MEDICINE

## 2018-01-01 PROCEDURE — 93970 EXTREMITY STUDY: CPT

## 2018-01-01 PROCEDURE — 82550 ASSAY OF CK (CPK): CPT | Performed by: INTERNAL MEDICINE

## 2018-01-01 PROCEDURE — 74011250637 HC RX REV CODE- 250/637: Performed by: FAMILY MEDICINE

## 2018-01-01 PROCEDURE — 96365 THER/PROPH/DIAG IV INF INIT: CPT

## 2018-01-01 PROCEDURE — 83036 HEMOGLOBIN GLYCOSYLATED A1C: CPT | Performed by: HOSPITALIST

## 2018-01-01 PROCEDURE — 87449 NOS EACH ORGANISM AG IA: CPT | Performed by: INTERNAL MEDICINE

## 2018-01-01 PROCEDURE — 77030032490 HC SLV COMPR SCD KNE COVD -B: Performed by: SURGERY

## 2018-01-01 PROCEDURE — 80202 ASSAY OF VANCOMYCIN: CPT | Performed by: SURGERY

## 2018-01-01 PROCEDURE — 76060000033 HC ANESTHESIA 1 TO 1.5 HR: Performed by: SURGERY

## 2018-01-01 PROCEDURE — 51702 INSERT TEMP BLADDER CATH: CPT

## 2018-01-01 PROCEDURE — 85384 FIBRINOGEN ACTIVITY: CPT | Performed by: EMERGENCY MEDICINE

## 2018-01-01 PROCEDURE — P9016 RBC LEUKOCYTES REDUCED: HCPCS | Performed by: INTERNAL MEDICINE

## 2018-01-01 PROCEDURE — 93922 UPR/L XTREMITY ART 2 LEVELS: CPT

## 2018-01-01 PROCEDURE — 87040 BLOOD CULTURE FOR BACTERIA: CPT | Performed by: FAMILY MEDICINE

## 2018-01-01 PROCEDURE — 84443 ASSAY THYROID STIM HORMONE: CPT | Performed by: NURSE PRACTITIONER

## 2018-01-01 PROCEDURE — 77030018719 HC DRSG PTCH ANTIMIC J&J -A: Performed by: SURGERY

## 2018-01-01 PROCEDURE — 77030004565 HC CATH ANGI DX TMPO CARD -B

## 2018-01-01 PROCEDURE — 85610 PROTHROMBIN TIME: CPT | Performed by: HOSPITALIST

## 2018-01-01 PROCEDURE — 87106 FUNGI IDENTIFICATION YEAST: CPT | Performed by: INTERNAL MEDICINE

## 2018-01-01 PROCEDURE — 5A1D70Z PERFORMANCE OF URINARY FILTRATION, INTERMITTENT, LESS THAN 6 HOURS PER DAY: ICD-10-PCS | Performed by: FAMILY MEDICINE

## 2018-01-01 PROCEDURE — 49446 CHANGE G-TUBE TO G-J PERC: CPT

## 2018-01-01 PROCEDURE — 80048 BASIC METABOLIC PNL TOTAL CA: CPT

## 2018-01-01 PROCEDURE — 77030021352 HC CBL LD SYS DISP COVD -B

## 2018-01-01 PROCEDURE — 0B21XFZ CHANGE TRACHEOSTOMY DEVICE IN TRACHEA, EXTERNAL APPROACH: ICD-10-PCS | Performed by: INTERNAL MEDICINE

## 2018-01-01 PROCEDURE — 80053 COMPREHEN METABOLIC PANEL: CPT | Performed by: FAMILY MEDICINE

## 2018-01-01 PROCEDURE — 86900 BLOOD TYPING SEROLOGIC ABO: CPT | Performed by: INTERNAL MEDICINE

## 2018-01-01 PROCEDURE — 77030033269 HC SLV COMPR SCD KNE2 CARD -B

## 2018-01-01 PROCEDURE — 74011000258 HC RX REV CODE- 258: Performed by: EMERGENCY MEDICINE

## 2018-01-01 PROCEDURE — 74011000250 HC RX REV CODE- 250: Performed by: SURGERY

## 2018-01-01 PROCEDURE — 85384 FIBRINOGEN ACTIVITY: CPT | Performed by: INTERNAL MEDICINE

## 2018-01-01 PROCEDURE — 65660000001 HC RM ICU INTERMED STEPDOWN

## 2018-01-01 PROCEDURE — 36569 INSJ PICC 5 YR+ W/O IMAGING: CPT | Performed by: INTERNAL MEDICINE

## 2018-01-01 PROCEDURE — 85025 COMPLETE CBC W/AUTO DIFF WBC: CPT | Performed by: HOSPITALIST

## 2018-01-01 PROCEDURE — 80061 LIPID PANEL: CPT | Performed by: NURSE PRACTITIONER

## 2018-01-01 PROCEDURE — 30233N1 TRANSFUSION OF NONAUTOLOGOUS RED BLOOD CELLS INTO PERIPHERAL VEIN, PERCUTANEOUS APPROACH: ICD-10-PCS | Performed by: FAMILY MEDICINE

## 2018-01-01 PROCEDURE — 77030007289 HC DEV ROTH NET RETRV STRC -B: Performed by: INTERNAL MEDICINE

## 2018-01-01 PROCEDURE — 84481 FREE ASSAY (FT-3): CPT | Performed by: NURSE PRACTITIONER

## 2018-01-01 PROCEDURE — 80307 DRUG TEST PRSMV CHEM ANLYZR: CPT | Performed by: EMERGENCY MEDICINE

## 2018-01-01 PROCEDURE — 83880 ASSAY OF NATRIURETIC PEPTIDE: CPT | Performed by: INTERNAL MEDICINE

## 2018-01-01 PROCEDURE — 77030018836 HC SOL IRR NACL ICUM -A: Performed by: SURGERY

## 2018-01-01 PROCEDURE — 85379 FIBRIN DEGRADATION QUANT: CPT | Performed by: HOSPITALIST

## 2018-01-01 PROCEDURE — 85610 PROTHROMBIN TIME: CPT | Performed by: NURSE PRACTITIONER

## 2018-01-01 PROCEDURE — 85610 PROTHROMBIN TIME: CPT | Performed by: FAMILY MEDICINE

## 2018-01-01 PROCEDURE — 80053 COMPREHEN METABOLIC PANEL: CPT | Performed by: SURGERY

## 2018-01-01 PROCEDURE — 85379 FIBRIN DEGRADATION QUANT: CPT | Performed by: INTERNAL MEDICINE

## 2018-01-01 PROCEDURE — 94762 N-INVAS EAR/PLS OXIMTRY CONT: CPT

## 2018-01-01 PROCEDURE — 77030003028 HC SUT VCRL J&J -A: Performed by: SURGERY

## 2018-01-01 PROCEDURE — C1887 CATHETER, GUIDING: HCPCS

## 2018-01-01 PROCEDURE — 86900 BLOOD TYPING SEROLOGIC ABO: CPT | Performed by: HOSPITALIST

## 2018-01-01 PROCEDURE — P9016 RBC LEUKOCYTES REDUCED: HCPCS | Performed by: HOSPITALIST

## 2018-01-01 PROCEDURE — 76040000019: Performed by: INTERNAL MEDICINE

## 2018-01-01 PROCEDURE — 77030021392 HC DEV RETRV POLYP BSC -B: Performed by: INTERNAL MEDICINE

## 2018-01-01 PROCEDURE — 85730 THROMBOPLASTIN TIME PARTIAL: CPT

## 2018-01-01 PROCEDURE — 77030018786 HC NDL GD F/USND BARD -B

## 2018-01-01 PROCEDURE — 0DH63UZ INSERTION OF FEEDING DEVICE INTO STOMACH, PERCUTANEOUS APPROACH: ICD-10-PCS | Performed by: SURGERY

## 2018-01-01 PROCEDURE — 74177 CT ABD & PELVIS W/CONTRAST: CPT

## 2018-01-01 PROCEDURE — 85384 FIBRINOGEN ACTIVITY: CPT | Performed by: HOSPITALIST

## 2018-01-01 PROCEDURE — 86920 COMPATIBILITY TEST SPIN: CPT | Performed by: HOSPITALIST

## 2018-01-01 PROCEDURE — 95822 EEG COMA OR SLEEP ONLY: CPT

## 2018-01-01 PROCEDURE — 76705 ECHO EXAM OF ABDOMEN: CPT

## 2018-01-01 PROCEDURE — 84478 ASSAY OF TRIGLYCERIDES: CPT | Performed by: INTERNAL MEDICINE

## 2018-01-01 PROCEDURE — 80048 BASIC METABOLIC PNL TOTAL CA: CPT | Performed by: FAMILY MEDICINE

## 2018-01-01 PROCEDURE — 97112 NEUROMUSCULAR REEDUCATION: CPT

## 2018-01-01 PROCEDURE — 93306 TTE W/DOPPLER COMPLETE: CPT

## 2018-01-01 PROCEDURE — 94002 VENT MGMT INPAT INIT DAY: CPT

## 2018-01-01 PROCEDURE — 87493 C DIFF AMPLIFIED PROBE: CPT | Performed by: INTERNAL MEDICINE

## 2018-01-01 PROCEDURE — 82272 OCCULT BLD FECES 1-3 TESTS: CPT | Performed by: NURSE PRACTITIONER

## 2018-01-01 PROCEDURE — 5A1945Z RESPIRATORY VENTILATION, 24-96 CONSECUTIVE HOURS: ICD-10-PCS | Performed by: INTERNAL MEDICINE

## 2018-01-01 PROCEDURE — 70544 MR ANGIOGRAPHY HEAD W/O DYE: CPT

## 2018-01-01 PROCEDURE — 87086 URINE CULTURE/COLONY COUNT: CPT | Performed by: HOSPITALIST

## 2018-01-01 PROCEDURE — 74011636320 HC RX REV CODE- 636/320: Performed by: HOSPITALIST

## 2018-01-01 PROCEDURE — 74011000250 HC RX REV CODE- 250

## 2018-01-01 PROCEDURE — 77030008808 HC TU TRACH UNCUF SIMS -B: Performed by: SURGERY

## 2018-01-01 PROCEDURE — 96374 THER/PROPH/DIAG INJ IV PUSH: CPT

## 2018-01-01 PROCEDURE — 77001 FLUOROGUIDE FOR VEIN DEVICE: CPT | Performed by: SURGERY

## 2018-01-01 PROCEDURE — 84439 ASSAY OF FREE THYROXINE: CPT | Performed by: NURSE PRACTITIONER

## 2018-01-01 PROCEDURE — 86141 C-REACTIVE PROTEIN HS: CPT | Performed by: NURSE PRACTITIONER

## 2018-01-01 PROCEDURE — 87529 HSV DNA AMP PROBE: CPT | Performed by: INTERNAL MEDICINE

## 2018-01-01 PROCEDURE — 0JH63XZ INSERTION OF TUNNELED VASCULAR ACCESS DEVICE INTO CHEST SUBCUTANEOUS TISSUE AND FASCIA, PERCUTANEOUS APPROACH: ICD-10-PCS | Performed by: SURGERY

## 2018-01-01 PROCEDURE — 86706 HEP B SURFACE ANTIBODY: CPT | Performed by: INTERNAL MEDICINE

## 2018-01-01 PROCEDURE — 85730 THROMBOPLASTIN TIME PARTIAL: CPT | Performed by: EMERGENCY MEDICINE

## 2018-01-01 PROCEDURE — 83036 HEMOGLOBIN GLYCOSYLATED A1C: CPT | Performed by: NURSE PRACTITIONER

## 2018-01-01 PROCEDURE — 74011000258 HC RX REV CODE- 258: Performed by: NURSE PRACTITIONER

## 2018-01-01 PROCEDURE — 74011250637 HC RX REV CODE- 250/637: Performed by: EMERGENCY MEDICINE

## 2018-01-01 PROCEDURE — 87086 URINE CULTURE/COLONY COUNT: CPT | Performed by: EMERGENCY MEDICINE

## 2018-01-01 PROCEDURE — 85384 FIBRINOGEN ACTIVITY: CPT

## 2018-01-01 PROCEDURE — 76937 US GUIDE VASCULAR ACCESS: CPT | Performed by: INTERNAL MEDICINE

## 2018-01-01 PROCEDURE — C1750 CATH, HEMODIALYSIS,LONG-TERM: HCPCS | Performed by: SURGERY

## 2018-01-01 PROCEDURE — 80069 RENAL FUNCTION PANEL: CPT | Performed by: SURGERY

## 2018-01-01 DEVICE — IMPLANTABLE DEVICE
Type: IMPLANTABLE DEVICE | Site: NECK | Status: FUNCTIONAL
Brand: PORTEX

## 2018-01-01 RX ORDER — SODIUM CHLORIDE 0.9 % (FLUSH) 0.9 %
10-40 SYRINGE (ML) INJECTION EVERY 8 HOURS
Status: DISCONTINUED | OUTPATIENT
Start: 2018-01-01 | End: 2018-01-01

## 2018-01-01 RX ORDER — DOPAMINE HYDROCHLORIDE 160 MG/100ML
0-20 INJECTION, SOLUTION INTRAVENOUS
Status: DISCONTINUED | OUTPATIENT
Start: 2018-01-01 | End: 2018-01-01

## 2018-01-01 RX ORDER — BACITRACIN 500 UNIT/G
1 PACKET (EA) TOPICAL AS NEEDED
Status: DISCONTINUED | OUTPATIENT
Start: 2018-01-01 | End: 2018-01-01

## 2018-01-01 RX ORDER — ALBUTEROL SULFATE 0.83 MG/ML
2.5 SOLUTION RESPIRATORY (INHALATION)
Status: DISCONTINUED | OUTPATIENT
Start: 2018-01-01 | End: 2018-01-01

## 2018-01-01 RX ORDER — LIDOCAINE HYDROCHLORIDE 20 MG/ML
JELLY TOPICAL ONCE
Status: DISCONTINUED | OUTPATIENT
Start: 2018-01-01 | End: 2018-01-01

## 2018-01-01 RX ORDER — HYDROCODONE BITARTRATE AND ACETAMINOPHEN 5; 325 MG/1; MG/1
1 TABLET ORAL ONCE
Status: COMPLETED | OUTPATIENT
Start: 2018-01-01 | End: 2018-01-01

## 2018-01-01 RX ORDER — ONDANSETRON 2 MG/ML
INJECTION INTRAMUSCULAR; INTRAVENOUS AS NEEDED
Status: DISCONTINUED | OUTPATIENT
Start: 2018-01-01 | End: 2018-01-01 | Stop reason: HOSPADM

## 2018-01-01 RX ORDER — SODIUM CHLORIDE 9 MG/ML
150 INJECTION, SOLUTION INTRAVENOUS CONTINUOUS
Status: DISPENSED | OUTPATIENT
Start: 2018-01-01 | End: 2018-01-01

## 2018-01-01 RX ORDER — INSULIN GLARGINE 100 [IU]/ML
14 INJECTION, SOLUTION SUBCUTANEOUS DAILY
Status: DISCONTINUED | OUTPATIENT
Start: 2018-01-01 | End: 2018-01-01

## 2018-01-01 RX ORDER — INSULIN GLARGINE 100 [IU]/ML
7 INJECTION, SOLUTION SUBCUTANEOUS ONCE
Status: COMPLETED | OUTPATIENT
Start: 2018-01-01 | End: 2018-01-01

## 2018-01-01 RX ORDER — HEPARIN SODIUM 1000 [USP'U]/ML
INJECTION, SOLUTION INTRAVENOUS; SUBCUTANEOUS AS NEEDED
Status: DISCONTINUED | OUTPATIENT
Start: 2018-01-01 | End: 2018-01-01 | Stop reason: HOSPADM

## 2018-01-01 RX ORDER — PROPOFOL 10 MG/ML
INJECTION, EMULSION INTRAVENOUS AS NEEDED
Status: DISCONTINUED | OUTPATIENT
Start: 2018-01-01 | End: 2018-01-01 | Stop reason: HOSPADM

## 2018-01-01 RX ORDER — TRAMADOL HYDROCHLORIDE 50 MG/1
50 TABLET ORAL
Status: DISCONTINUED | OUTPATIENT
Start: 2018-01-01 | End: 2018-01-01

## 2018-01-01 RX ORDER — INSULIN GLARGINE 100 [IU]/ML
11 INJECTION, SOLUTION SUBCUTANEOUS DAILY
Status: DISCONTINUED | OUTPATIENT
Start: 2018-01-01 | End: 2018-01-01

## 2018-01-01 RX ORDER — INSULIN GLARGINE 100 [IU]/ML
5 INJECTION, SOLUTION SUBCUTANEOUS ONCE
Status: COMPLETED | OUTPATIENT
Start: 2018-01-01 | End: 2018-01-01

## 2018-01-01 RX ORDER — ENOXAPARIN SODIUM 100 MG/ML
40 INJECTION SUBCUTANEOUS EVERY 24 HOURS
Status: DISCONTINUED | OUTPATIENT
Start: 2018-01-01 | End: 2018-01-01 | Stop reason: HOSPADM

## 2018-01-01 RX ORDER — INSULIN GLARGINE 100 [IU]/ML
30 INJECTION, SOLUTION SUBCUTANEOUS DAILY
Status: DISCONTINUED | OUTPATIENT
Start: 2018-01-01 | End: 2018-01-01

## 2018-01-01 RX ORDER — SODIUM CHLORIDE 9 MG/ML
50 INJECTION, SOLUTION INTRAVENOUS
Status: DISCONTINUED | OUTPATIENT
Start: 2018-01-01 | End: 2018-01-01

## 2018-01-01 RX ORDER — ASPIRIN 325 MG
325 TABLET ORAL DAILY
Status: DISCONTINUED | OUTPATIENT
Start: 2018-01-01 | End: 2018-01-01 | Stop reason: HOSPADM

## 2018-01-01 RX ORDER — PHENYLEPHRINE HCL IN 0.9% NACL 30MG/250ML
PLASTIC BAG, INJECTION (ML) INTRAVENOUS
Status: COMPLETED
Start: 2018-01-01 | End: 2018-01-01

## 2018-01-01 RX ORDER — SODIUM CHLORIDE 9 MG/ML
150 INJECTION, SOLUTION INTRAVENOUS CONTINUOUS
Status: DISCONTINUED | OUTPATIENT
Start: 2018-01-01 | End: 2018-01-01

## 2018-01-01 RX ORDER — INSULIN GLARGINE 100 [IU]/ML
22 INJECTION, SOLUTION SUBCUTANEOUS DAILY
Status: DISCONTINUED | OUTPATIENT
Start: 2018-01-01 | End: 2018-01-01

## 2018-01-01 RX ORDER — DEXTROSE MONOHYDRATE AND SODIUM CHLORIDE 5; .45 G/100ML; G/100ML
75 INJECTION, SOLUTION INTRAVENOUS CONTINUOUS
Status: DISCONTINUED | OUTPATIENT
Start: 2018-01-01 | End: 2018-01-01

## 2018-01-01 RX ORDER — SODIUM POLYSTYRENE SULFONATE 15 G/60ML
15 SUSPENSION ORAL; RECTAL
Status: COMPLETED | OUTPATIENT
Start: 2018-01-01 | End: 2018-01-01

## 2018-01-01 RX ORDER — VANCOMYCIN/0.9 % SOD CHLORIDE 1.5G/250ML
1500 PLASTIC BAG, INJECTION (ML) INTRAVENOUS ONCE
Status: COMPLETED | OUTPATIENT
Start: 2018-01-01 | End: 2018-01-01

## 2018-01-01 RX ORDER — HEPARIN SODIUM 5000 [USP'U]/ML
5000 INJECTION, SOLUTION INTRAVENOUS; SUBCUTANEOUS EVERY 8 HOURS
Status: DISCONTINUED | OUTPATIENT
Start: 2018-01-01 | End: 2018-01-01

## 2018-01-01 RX ORDER — HYDROCODONE BITARTRATE AND ACETAMINOPHEN 5; 325 MG/1; MG/1
1 TABLET ORAL
Status: DISCONTINUED | OUTPATIENT
Start: 2018-01-01 | End: 2018-01-01 | Stop reason: HOSPADM

## 2018-01-01 RX ORDER — INSULIN GLARGINE 100 [IU]/ML
0.25 INJECTION, SOLUTION SUBCUTANEOUS DAILY
Status: DISCONTINUED | OUTPATIENT
Start: 2018-01-01 | End: 2018-01-01

## 2018-01-01 RX ORDER — INSULIN GLARGINE 100 [IU]/ML
26 INJECTION, SOLUTION SUBCUTANEOUS DAILY
Status: DISCONTINUED | OUTPATIENT
Start: 2018-01-01 | End: 2018-01-01

## 2018-01-01 RX ORDER — SODIUM CHLORIDE 9 MG/ML
999 INJECTION, SOLUTION INTRAVENOUS ONCE
Status: COMPLETED | OUTPATIENT
Start: 2018-01-01 | End: 2018-01-01

## 2018-01-01 RX ORDER — ALBUTEROL SULFATE 0.83 MG/ML
SOLUTION RESPIRATORY (INHALATION)
Status: COMPLETED
Start: 2018-01-01 | End: 2018-01-01

## 2018-01-01 RX ORDER — LORAZEPAM 2 MG/ML
1 INJECTION INTRAMUSCULAR
Status: DISCONTINUED | OUTPATIENT
Start: 2018-01-01 | End: 2018-01-01 | Stop reason: HOSPADM

## 2018-01-01 RX ORDER — DEXTROSE MONOHYDRATE 25 G/50ML
25-50 INJECTION, SOLUTION INTRAVENOUS AS NEEDED
Status: DISCONTINUED | OUTPATIENT
Start: 2018-01-01 | End: 2018-01-01

## 2018-01-01 RX ORDER — HEPARIN 100 UNIT/ML
100 SYRINGE INTRAVENOUS AS NEEDED
Status: DISCONTINUED | OUTPATIENT
Start: 2018-01-01 | End: 2018-01-01

## 2018-01-01 RX ORDER — INSULIN LISPRO 100 [IU]/ML
3 INJECTION, SOLUTION INTRAVENOUS; SUBCUTANEOUS
Status: DISCONTINUED | OUTPATIENT
Start: 2018-01-01 | End: 2018-01-01

## 2018-01-01 RX ORDER — INSULIN GLARGINE 100 [IU]/ML
17 INJECTION, SOLUTION SUBCUTANEOUS DAILY
Status: DISCONTINUED | OUTPATIENT
Start: 2018-01-01 | End: 2018-01-01

## 2018-01-01 RX ORDER — INSULIN GLARGINE 100 [IU]/ML
20 INJECTION, SOLUTION SUBCUTANEOUS DAILY
Status: DISCONTINUED | OUTPATIENT
Start: 2018-01-01 | End: 2018-01-01

## 2018-01-01 RX ORDER — LORAZEPAM 2 MG/ML
1 CONCENTRATE ORAL
Status: DISCONTINUED | OUTPATIENT
Start: 2018-01-01 | End: 2018-01-01 | Stop reason: HOSPADM

## 2018-01-01 RX ORDER — INSULIN LISPRO 100 [IU]/ML
INJECTION, SOLUTION INTRAVENOUS; SUBCUTANEOUS EVERY 6 HOURS
Status: DISCONTINUED | OUTPATIENT
Start: 2018-01-01 | End: 2018-01-01

## 2018-01-01 RX ORDER — INSULIN GLARGINE 100 [IU]/ML
25 INJECTION, SOLUTION SUBCUTANEOUS DAILY
Status: DISCONTINUED | OUTPATIENT
Start: 2018-01-01 | End: 2018-01-01

## 2018-01-01 RX ORDER — SODIUM CHLORIDE 9 MG/ML
INJECTION, SOLUTION INTRAVENOUS
Status: DISCONTINUED | OUTPATIENT
Start: 2018-01-01 | End: 2018-01-01 | Stop reason: HOSPADM

## 2018-01-01 RX ORDER — LIDOCAINE HYDROCHLORIDE 10 MG/ML
INJECTION INFILTRATION; PERINEURAL
Status: DISPENSED
Start: 2018-01-01 | End: 2018-01-01

## 2018-01-01 RX ORDER — CLOPIDOGREL BISULFATE 75 MG/1
300 TABLET ORAL
Status: COMPLETED | OUTPATIENT
Start: 2018-01-01 | End: 2018-01-01

## 2018-01-01 RX ORDER — MIDAZOLAM HYDROCHLORIDE 1 MG/ML
INJECTION, SOLUTION INTRAMUSCULAR; INTRAVENOUS AS NEEDED
Status: DISCONTINUED | OUTPATIENT
Start: 2018-01-01 | End: 2018-01-01 | Stop reason: HOSPADM

## 2018-01-01 RX ORDER — FENTANYL CITRATE 50 UG/ML
INJECTION, SOLUTION INTRAMUSCULAR; INTRAVENOUS AS NEEDED
Status: DISCONTINUED | OUTPATIENT
Start: 2018-01-01 | End: 2018-01-01 | Stop reason: HOSPADM

## 2018-01-01 RX ORDER — ASPIRIN 325 MG
325 TABLET ORAL DAILY
Status: DISCONTINUED | OUTPATIENT
Start: 2018-01-01 | End: 2018-01-01

## 2018-01-01 RX ORDER — HEPARIN SODIUM 5000 [USP'U]/ML
5000 INJECTION, SOLUTION INTRAVENOUS; SUBCUTANEOUS EVERY 12 HOURS
Status: DISCONTINUED | OUTPATIENT
Start: 2018-01-01 | End: 2018-01-01 | Stop reason: DRUGHIGH

## 2018-01-01 RX ORDER — MIDAZOLAM HYDROCHLORIDE 1 MG/ML
INJECTION, SOLUTION INTRAMUSCULAR; INTRAVENOUS
Status: DISPENSED
Start: 2018-01-01 | End: 2018-01-01

## 2018-01-01 RX ORDER — VANCOMYCIN 2 GRAM/500 ML IN 0.9 % SODIUM CHLORIDE INTRAVENOUS
2000 ONCE
Status: COMPLETED | OUTPATIENT
Start: 2018-01-01 | End: 2018-01-01

## 2018-01-01 RX ORDER — DOPAMINE HYDROCHLORIDE 160 MG/100ML
0-15 INJECTION, SOLUTION INTRAVENOUS
Status: DISCONTINUED | OUTPATIENT
Start: 2018-01-01 | End: 2018-01-01

## 2018-01-01 RX ORDER — LIDOCAINE HYDROCHLORIDE 20 MG/ML
JELLY TOPICAL ONCE
Status: COMPLETED | OUTPATIENT
Start: 2018-01-01 | End: 2018-01-01

## 2018-01-01 RX ORDER — DOPAMINE HYDROCHLORIDE 160 MG/100ML
INJECTION, SOLUTION INTRAVENOUS
Status: COMPLETED
Start: 2018-01-01 | End: 2018-01-01

## 2018-01-01 RX ORDER — HEPARIN SODIUM 1000 [USP'U]/ML
1000 INJECTION, SOLUTION INTRAVENOUS; SUBCUTANEOUS
Status: DISCONTINUED | OUTPATIENT
Start: 2018-01-01 | End: 2018-01-01

## 2018-01-01 RX ORDER — SODIUM BICARBONATE 650 MG/1
650 TABLET ORAL 2 TIMES DAILY
Status: DISCONTINUED | OUTPATIENT
Start: 2018-01-01 | End: 2018-01-01

## 2018-01-01 RX ORDER — PHENYLEPHRINE HCL IN 0.9% NACL 30MG/250ML
10-300 PLASTIC BAG, INJECTION (ML) INTRAVENOUS
Status: DISCONTINUED | OUTPATIENT
Start: 2018-01-01 | End: 2018-01-01

## 2018-01-01 RX ORDER — SODIUM CHLORIDE 9 MG/ML
100 INJECTION, SOLUTION INTRAVENOUS CONTINUOUS
Status: DISCONTINUED | OUTPATIENT
Start: 2018-01-01 | End: 2018-01-01

## 2018-01-01 RX ORDER — ALBUTEROL SULFATE 0.83 MG/ML
2.5 SOLUTION RESPIRATORY (INHALATION) 2 TIMES DAILY
Status: DISCONTINUED | OUTPATIENT
Start: 2018-01-01 | End: 2018-01-01

## 2018-01-01 RX ORDER — SODIUM CHLORIDE 9 MG/ML
250 INJECTION, SOLUTION INTRAVENOUS AS NEEDED
Status: DISCONTINUED | OUTPATIENT
Start: 2018-01-01 | End: 2018-01-01

## 2018-01-01 RX ORDER — ASPIRIN 325 MG
325 TABLET ORAL DAILY
Qty: 30 TAB | Refills: 0 | Status: SHIPPED | OUTPATIENT
Start: 2018-01-01

## 2018-01-01 RX ORDER — ACETYLCYSTEINE 100 MG/ML
4 SOLUTION ORAL; RESPIRATORY (INHALATION)
Status: DISCONTINUED | OUTPATIENT
Start: 2018-01-01 | End: 2018-01-01

## 2018-01-01 RX ORDER — INSULIN GLARGINE 100 [IU]/ML
18 INJECTION, SOLUTION SUBCUTANEOUS DAILY
Status: DISCONTINUED | OUTPATIENT
Start: 2018-01-01 | End: 2018-01-01

## 2018-01-01 RX ORDER — INSULIN GLARGINE 100 [IU]/ML
6 INJECTION, SOLUTION SUBCUTANEOUS ONCE
Status: COMPLETED | OUTPATIENT
Start: 2018-01-01 | End: 2018-01-01

## 2018-01-01 RX ORDER — ACETAMINOPHEN 650 MG/1
650 SUPPOSITORY RECTAL
Status: DISCONTINUED | OUTPATIENT
Start: 2018-01-01 | End: 2018-01-01 | Stop reason: HOSPADM

## 2018-01-01 RX ORDER — DEXTROSE 50 % IN WATER (D50W) INTRAVENOUS SYRINGE
Status: DISPENSED
Start: 2018-01-01 | End: 2018-01-01

## 2018-01-01 RX ORDER — INSULIN LISPRO 100 [IU]/ML
4 INJECTION, SOLUTION INTRAVENOUS; SUBCUTANEOUS
Status: DISCONTINUED | OUTPATIENT
Start: 2018-01-01 | End: 2018-01-01 | Stop reason: HOSPADM

## 2018-01-01 RX ORDER — METOPROLOL TARTRATE 50 MG/1
50 TABLET ORAL EVERY 12 HOURS
Status: DISCONTINUED | OUTPATIENT
Start: 2018-01-01 | End: 2018-01-01

## 2018-01-01 RX ORDER — INSULIN GLARGINE 100 [IU]/ML
36 INJECTION, SOLUTION SUBCUTANEOUS DAILY
Status: DISCONTINUED | OUTPATIENT
Start: 2018-01-01 | End: 2018-01-01 | Stop reason: HOSPADM

## 2018-01-01 RX ORDER — LABETALOL HYDROCHLORIDE 5 MG/ML
INJECTION, SOLUTION INTRAVENOUS AS NEEDED
Status: DISCONTINUED | OUTPATIENT
Start: 2018-01-01 | End: 2018-01-01 | Stop reason: HOSPADM

## 2018-01-01 RX ORDER — LOSARTAN POTASSIUM 50 MG/1
50 TABLET ORAL DAILY
Qty: 30 TAB | Refills: 0 | Status: SHIPPED | OUTPATIENT
Start: 2018-01-01

## 2018-01-01 RX ORDER — ALBUMIN HUMAN 250 G/1000ML
25 SOLUTION INTRAVENOUS EVERY 6 HOURS
Status: COMPLETED | OUTPATIENT
Start: 2018-01-01 | End: 2018-01-01

## 2018-01-01 RX ORDER — METOPROLOL TARTRATE 5 MG/5ML
INJECTION INTRAVENOUS
Status: DISPENSED
Start: 2018-01-01 | End: 2018-01-01

## 2018-01-01 RX ORDER — AMOXICILLIN 250 MG
2 CAPSULE ORAL
Status: DISCONTINUED | OUTPATIENT
Start: 2018-01-01 | End: 2018-01-01 | Stop reason: HOSPADM

## 2018-01-01 RX ORDER — INSULIN LISPRO 100 [IU]/ML
INJECTION, SOLUTION INTRAVENOUS; SUBCUTANEOUS
Status: DISCONTINUED | OUTPATIENT
Start: 2018-01-01 | End: 2018-01-01

## 2018-01-01 RX ORDER — FOLIC ACID 1 MG/1
1 TABLET ORAL DAILY
Status: DISCONTINUED | OUTPATIENT
Start: 2018-01-01 | End: 2018-01-01 | Stop reason: HOSPADM

## 2018-01-01 RX ORDER — SODIUM CHLORIDE 0.9 % (FLUSH) 0.9 %
5-10 SYRINGE (ML) INJECTION AS NEEDED
Status: DISCONTINUED | OUTPATIENT
Start: 2018-01-01 | End: 2018-01-01

## 2018-01-01 RX ORDER — DEXTROSE MONOHYDRATE 25 G/50ML
25-50 INJECTION, SOLUTION INTRAVENOUS AS NEEDED
Status: DISCONTINUED | OUTPATIENT
Start: 2018-01-01 | End: 2018-01-01 | Stop reason: HOSPADM

## 2018-01-01 RX ORDER — ASPIRIN 325 MG
325 TABLET ORAL
Status: COMPLETED | OUTPATIENT
Start: 2018-01-01 | End: 2018-01-01

## 2018-01-01 RX ORDER — SODIUM CHLORIDE 9 MG/ML
1000 INJECTION, SOLUTION INTRAVENOUS ONCE
Status: COMPLETED | OUTPATIENT
Start: 2018-01-01 | End: 2018-01-01

## 2018-01-01 RX ORDER — POTASSIUM CHLORIDE 7.45 MG/ML
10 INJECTION INTRAVENOUS ONCE
Status: COMPLETED | OUTPATIENT
Start: 2018-01-01 | End: 2018-01-01

## 2018-01-01 RX ORDER — ASPIRIN 325 MG/1
100 TABLET, FILM COATED ORAL DAILY
Status: DISCONTINUED | OUTPATIENT
Start: 2018-01-01 | End: 2018-01-01 | Stop reason: HOSPADM

## 2018-01-01 RX ORDER — MAGNESIUM SULFATE HEPTAHYDRATE 40 MG/ML
INJECTION, SOLUTION INTRAVENOUS
Status: COMPLETED
Start: 2018-01-01 | End: 2018-01-01

## 2018-01-01 RX ORDER — ACETAMINOPHEN 325 MG/1
650 TABLET ORAL
Status: DISCONTINUED | OUTPATIENT
Start: 2018-01-01 | End: 2018-01-01

## 2018-01-01 RX ORDER — MAGNESIUM SULFATE 100 %
4 CRYSTALS MISCELLANEOUS AS NEEDED
Status: DISCONTINUED | OUTPATIENT
Start: 2018-01-01 | End: 2018-01-01 | Stop reason: HOSPADM

## 2018-01-01 RX ORDER — ALBUTEROL SULFATE 0.83 MG/ML
2.5 SOLUTION RESPIRATORY (INHALATION)
Status: DISCONTINUED | OUTPATIENT
Start: 2018-01-01 | End: 2018-01-01 | Stop reason: HOSPADM

## 2018-01-01 RX ORDER — ACETAMINOPHEN 325 MG/1
650 TABLET ORAL
Status: DISCONTINUED | OUTPATIENT
Start: 2018-01-01 | End: 2018-01-01 | Stop reason: HOSPADM

## 2018-01-01 RX ORDER — SCOLOPAMINE TRANSDERMAL SYSTEM 1 MG/1
1 PATCH, EXTENDED RELEASE TRANSDERMAL
Status: DISCONTINUED | OUTPATIENT
Start: 2018-01-01 | End: 2018-01-01 | Stop reason: HOSPADM

## 2018-01-01 RX ORDER — SODIUM CHLORIDE 0.9 % (FLUSH) 0.9 %
10-30 SYRINGE (ML) INJECTION AS NEEDED
Status: DISCONTINUED | OUTPATIENT
Start: 2018-01-01 | End: 2018-01-01

## 2018-01-01 RX ORDER — MIDAZOLAM HYDROCHLORIDE 1 MG/ML
2 INJECTION, SOLUTION INTRAMUSCULAR; INTRAVENOUS ONCE
Status: COMPLETED | OUTPATIENT
Start: 2018-01-01 | End: 2018-01-01

## 2018-01-01 RX ORDER — ATORVASTATIN CALCIUM 40 MG/1
80 TABLET, FILM COATED ORAL
Status: DISCONTINUED | OUTPATIENT
Start: 2018-01-01 | End: 2018-01-01 | Stop reason: HOSPADM

## 2018-01-01 RX ORDER — INSULIN GLARGINE 100 [IU]/ML
15 INJECTION, SOLUTION SUBCUTANEOUS DAILY
Status: DISCONTINUED | OUTPATIENT
Start: 2018-01-01 | End: 2018-01-01

## 2018-01-01 RX ORDER — VANCOMYCIN 2 GRAM/500 ML IN 0.9 % SODIUM CHLORIDE INTRAVENOUS
2000 ONCE
Status: DISCONTINUED | OUTPATIENT
Start: 2018-01-01 | End: 2018-01-01

## 2018-01-01 RX ORDER — PHENYLEPHRINE HCL IN 0.9% NACL 30MG/250ML
PLASTIC BAG, INJECTION (ML) INTRAVENOUS
Status: DISPENSED
Start: 2018-01-01 | End: 2018-01-01

## 2018-01-01 RX ORDER — LIDOCAINE HYDROCHLORIDE 10 MG/ML
INJECTION INFILTRATION; PERINEURAL AS NEEDED
Status: DISCONTINUED | OUTPATIENT
Start: 2018-01-01 | End: 2018-01-01 | Stop reason: HOSPADM

## 2018-01-01 RX ORDER — CIPROFLOXACIN 2 MG/ML
200 INJECTION, SOLUTION INTRAVENOUS EVERY 24 HOURS
Status: DISCONTINUED | OUTPATIENT
Start: 2018-01-01 | End: 2018-01-01

## 2018-01-01 RX ORDER — MIDAZOLAM HYDROCHLORIDE 1 MG/ML
1-10 INJECTION, SOLUTION INTRAMUSCULAR; INTRAVENOUS
Status: DISCONTINUED | OUTPATIENT
Start: 2018-01-01 | End: 2018-01-01

## 2018-01-01 RX ORDER — SODIUM CHLORIDE 0.9 % (FLUSH) 0.9 %
10 SYRINGE (ML) INJECTION AS NEEDED
Status: DISCONTINUED | OUTPATIENT
Start: 2018-01-01 | End: 2018-01-01

## 2018-01-01 RX ORDER — SODIUM CHLORIDE 0.9 % (FLUSH) 0.9 %
20 SYRINGE (ML) INJECTION EVERY 24 HOURS
Status: DISCONTINUED | OUTPATIENT
Start: 2018-01-01 | End: 2018-01-01

## 2018-01-01 RX ORDER — LIDOCAINE HYDROCHLORIDE 10 MG/ML
1-20 INJECTION INFILTRATION; PERINEURAL AS NEEDED
Status: CANCELLED | OUTPATIENT
Start: 2018-01-01

## 2018-01-01 RX ORDER — SODIUM CHLORIDE 0.9 % (FLUSH) 0.9 %
10 SYRINGE (ML) INJECTION EVERY 24 HOURS
Status: DISCONTINUED | OUTPATIENT
Start: 2018-01-01 | End: 2018-01-01

## 2018-01-01 RX ORDER — ONDANSETRON 2 MG/ML
2 INJECTION INTRAMUSCULAR; INTRAVENOUS
Status: DISCONTINUED | OUTPATIENT
Start: 2018-01-01 | End: 2018-01-01 | Stop reason: HOSPADM

## 2018-01-01 RX ORDER — INSULIN LISPRO 100 [IU]/ML
INJECTION, SOLUTION INTRAVENOUS; SUBCUTANEOUS
Status: DISCONTINUED | OUTPATIENT
Start: 2018-01-01 | End: 2018-01-01 | Stop reason: HOSPADM

## 2018-01-01 RX ORDER — SODIUM CHLORIDE 9 MG/ML
75 INJECTION, SOLUTION INTRAVENOUS CONTINUOUS
Status: DISCONTINUED | OUTPATIENT
Start: 2018-01-01 | End: 2018-01-01 | Stop reason: HOSPADM

## 2018-01-01 RX ORDER — ACETYLCYSTEINE 100 MG/ML
4 SOLUTION ORAL; RESPIRATORY (INHALATION) ONCE
Status: COMPLETED | OUTPATIENT
Start: 2018-01-01 | End: 2018-01-01

## 2018-01-01 RX ORDER — DOPAMINE HYDROCHLORIDE 160 MG/100ML
0-10 INJECTION, SOLUTION INTRAVENOUS
Status: DISCONTINUED | OUTPATIENT
Start: 2018-01-01 | End: 2018-01-01

## 2018-01-01 RX ORDER — AMOXICILLIN 250 MG
1 CAPSULE ORAL
Status: DISCONTINUED | OUTPATIENT
Start: 2018-01-01 | End: 2018-01-01

## 2018-01-01 RX ORDER — LIDOCAINE HYDROCHLORIDE 20 MG/ML
2.5 INJECTION, SOLUTION EPIDURAL; INFILTRATION; INTRACAUDAL; PERINEURAL ONCE
Status: COMPLETED | OUTPATIENT
Start: 2018-01-01 | End: 2018-01-01

## 2018-01-01 RX ORDER — MORPHINE SULFATE 2 MG/ML
2 INJECTION, SOLUTION INTRAMUSCULAR; INTRAVENOUS
Status: DISCONTINUED | OUTPATIENT
Start: 2018-01-01 | End: 2018-01-01 | Stop reason: HOSPADM

## 2018-01-01 RX ORDER — SODIUM CHLORIDE 9 MG/ML
999 INJECTION, SOLUTION INTRAVENOUS ONCE
Status: DISPENSED | OUTPATIENT
Start: 2018-01-01 | End: 2018-01-01

## 2018-01-01 RX ORDER — MIDAZOLAM HYDROCHLORIDE 1 MG/ML
INJECTION, SOLUTION INTRAMUSCULAR; INTRAVENOUS
Status: COMPLETED
Start: 2018-01-01 | End: 2018-01-01

## 2018-01-01 RX ORDER — INSULIN GLARGINE 100 [IU]/ML
23 INJECTION, SOLUTION SUBCUTANEOUS DAILY
Status: DISCONTINUED | OUTPATIENT
Start: 2018-01-01 | End: 2018-01-01

## 2018-01-01 RX ORDER — MIDAZOLAM HYDROCHLORIDE 1 MG/ML
4 INJECTION, SOLUTION INTRAMUSCULAR; INTRAVENOUS ONCE
Status: ACTIVE | OUTPATIENT
Start: 2018-01-01 | End: 2018-01-01

## 2018-01-01 RX ORDER — MAGNESIUM SULFATE 100 %
4 CRYSTALS MISCELLANEOUS AS NEEDED
Status: DISCONTINUED | OUTPATIENT
Start: 2018-01-01 | End: 2018-01-01

## 2018-01-01 RX ORDER — AMLODIPINE BESYLATE 5 MG/1
5 TABLET ORAL DAILY
Status: DISCONTINUED | OUTPATIENT
Start: 2018-01-01 | End: 2018-01-01

## 2018-01-01 RX ORDER — INSULIN GLARGINE 100 [IU]/ML
4 INJECTION, SOLUTION SUBCUTANEOUS ONCE
Status: COMPLETED | OUTPATIENT
Start: 2018-01-01 | End: 2018-01-01

## 2018-01-01 RX ORDER — INSULIN GLARGINE 100 [IU]/ML
INJECTION, SOLUTION SUBCUTANEOUS
Qty: 1 VIAL | Refills: 0 | Status: SHIPPED | OUTPATIENT
Start: 2018-01-01

## 2018-01-01 RX ORDER — INSULIN GLARGINE 100 [IU]/ML
27 INJECTION, SOLUTION SUBCUTANEOUS DAILY
Status: DISCONTINUED | OUTPATIENT
Start: 2018-01-01 | End: 2018-01-01

## 2018-01-01 RX ORDER — MORPHINE SULFATE 4 MG/ML
10 INJECTION INTRAVENOUS
Status: DISCONTINUED | OUTPATIENT
Start: 2018-01-01 | End: 2018-01-01 | Stop reason: HOSPADM

## 2018-01-01 RX ORDER — ONDANSETRON 2 MG/ML
4 INJECTION INTRAMUSCULAR; INTRAVENOUS
Status: DISCONTINUED | OUTPATIENT
Start: 2018-01-01 | End: 2018-01-01 | Stop reason: HOSPADM

## 2018-01-01 RX ORDER — ACETAMINOPHEN 325 MG/1
650 TABLET ORAL
Status: DISCONTINUED | OUTPATIENT
Start: 2018-01-01 | End: 2018-01-01 | Stop reason: SDUPTHER

## 2018-01-01 RX ORDER — ACETYLCYSTEINE 100 MG/ML
4 SOLUTION ORAL; RESPIRATORY (INHALATION) 2 TIMES DAILY
Status: DISCONTINUED | OUTPATIENT
Start: 2018-01-01 | End: 2018-01-01

## 2018-01-01 RX ORDER — LABETALOL HCL 20 MG/4 ML
20 SYRINGE (ML) INTRAVENOUS
Status: DISCONTINUED | OUTPATIENT
Start: 2018-01-01 | End: 2018-01-01

## 2018-01-01 RX ORDER — INSULIN GLARGINE 100 [IU]/ML
6 INJECTION, SOLUTION SUBCUTANEOUS DAILY
Status: DISCONTINUED | OUTPATIENT
Start: 2018-01-01 | End: 2018-01-01

## 2018-01-01 RX ORDER — LIDOCAINE HYDROCHLORIDE 20 MG/ML
INJECTION, SOLUTION EPIDURAL; INFILTRATION; INTRACAUDAL; PERINEURAL AS NEEDED
Status: DISCONTINUED | OUTPATIENT
Start: 2018-01-01 | End: 2018-01-01 | Stop reason: HOSPADM

## 2018-01-01 RX ORDER — MIDAZOLAM HYDROCHLORIDE 1 MG/ML
4 INJECTION, SOLUTION INTRAMUSCULAR; INTRAVENOUS ONCE
Status: COMPLETED | OUTPATIENT
Start: 2018-01-01 | End: 2018-01-01

## 2018-01-01 RX ORDER — INSULIN LISPRO 100 [IU]/ML
INJECTION, SOLUTION INTRAVENOUS; SUBCUTANEOUS
Qty: 1 VIAL | Refills: 0 | Status: SHIPPED | OUTPATIENT
Start: 2018-01-01

## 2018-01-01 RX ORDER — FLUCONAZOLE 2 MG/ML
200 INJECTION, SOLUTION INTRAVENOUS EVERY 24 HOURS
Status: DISCONTINUED | OUTPATIENT
Start: 2018-01-01 | End: 2018-01-01

## 2018-01-01 RX ORDER — LABETALOL HCL 20 MG/4 ML
20 SYRINGE (ML) INTRAVENOUS
Status: DISCONTINUED | OUTPATIENT
Start: 2018-01-01 | End: 2018-01-01 | Stop reason: HOSPADM

## 2018-01-01 RX ORDER — INSULIN GLARGINE 100 [IU]/ML
10 INJECTION, SOLUTION SUBCUTANEOUS
Status: DISCONTINUED | OUTPATIENT
Start: 2018-01-01 | End: 2018-01-01

## 2018-01-01 RX ORDER — LANOLIN ALCOHOL/MO/W.PET/CERES
3 CREAM (GRAM) TOPICAL
Status: DISCONTINUED | OUTPATIENT
Start: 2018-01-01 | End: 2018-01-01

## 2018-01-01 RX ORDER — MAGNESIUM SULFATE HEPTAHYDRATE 40 MG/ML
2 INJECTION, SOLUTION INTRAVENOUS ONCE
Status: COMPLETED | OUTPATIENT
Start: 2018-01-01 | End: 2018-01-01

## 2018-01-01 RX ORDER — ATORVASTATIN CALCIUM 80 MG/1
80 TABLET, FILM COATED ORAL
Qty: 30 TAB | Refills: 0 | Status: SHIPPED | OUTPATIENT
Start: 2018-01-01

## 2018-01-01 RX ORDER — HYDRALAZINE HYDROCHLORIDE 20 MG/ML
20 INJECTION INTRAMUSCULAR; INTRAVENOUS
Status: DISCONTINUED | OUTPATIENT
Start: 2018-01-01 | End: 2018-01-01

## 2018-01-01 RX ORDER — SODIUM CHLORIDE 9 MG/ML
2000 INJECTION, SOLUTION INTRAVENOUS ONCE
Status: COMPLETED | OUTPATIENT
Start: 2018-01-01 | End: 2018-01-01

## 2018-01-01 RX ORDER — SODIUM CHLORIDE 9 MG/ML
60 INJECTION, SOLUTION INTRAVENOUS CONTINUOUS
Status: DISCONTINUED | OUTPATIENT
Start: 2018-01-01 | End: 2018-01-01

## 2018-01-01 RX ORDER — CIPROFLOXACIN 2 MG/ML
200 INJECTION, SOLUTION INTRAVENOUS EVERY 12 HOURS
Status: DISCONTINUED | OUTPATIENT
Start: 2018-01-01 | End: 2018-01-01 | Stop reason: DRUGHIGH

## 2018-01-01 RX ORDER — MIDAZOLAM HYDROCHLORIDE 1 MG/ML
INJECTION, SOLUTION INTRAMUSCULAR; INTRAVENOUS AS NEEDED
Status: DISCONTINUED | OUTPATIENT
Start: 2018-01-01 | End: 2018-01-01

## 2018-01-01 RX ORDER — PROMETHAZINE HYDROCHLORIDE 25 MG/ML
12.5 INJECTION, SOLUTION INTRAMUSCULAR; INTRAVENOUS
Status: COMPLETED | OUTPATIENT
Start: 2018-01-01 | End: 2018-01-01

## 2018-01-01 RX ADMIN — ONDANSETRON 4 MG: 2 INJECTION INTRAMUSCULAR; INTRAVENOUS at 09:33

## 2018-01-01 RX ADMIN — Medication 10 ML: at 21:53

## 2018-01-01 RX ADMIN — SODIUM CHLORIDE 40 MG: 9 INJECTION INTRAMUSCULAR; INTRAVENOUS; SUBCUTANEOUS at 08:27

## 2018-01-01 RX ADMIN — HEPARIN SODIUM 5000 UNITS: 5000 INJECTION INTRAVENOUS; SUBCUTANEOUS at 13:07

## 2018-01-01 RX ADMIN — ALBUTEROL SULFATE 2.5 MG: 2.5 SOLUTION RESPIRATORY (INHALATION) at 10:53

## 2018-01-01 RX ADMIN — MULTIPLE VITAMIN LIQUID 30 ML: LIQUID at 09:17

## 2018-01-01 RX ADMIN — INSULIN GLARGINE 20 UNITS: 100 INJECTION, SOLUTION SUBCUTANEOUS at 09:04

## 2018-01-01 RX ADMIN — Medication 10 ML: at 05:04

## 2018-01-01 RX ADMIN — HYPROMELLOSE 2910 2 DROP: 5 SOLUTION OPHTHALMIC at 09:50

## 2018-01-01 RX ADMIN — Medication 10 ML: at 16:42

## 2018-01-01 RX ADMIN — ENOXAPARIN SODIUM 40 MG: 100 INJECTION SUBCUTANEOUS at 12:21

## 2018-01-01 RX ADMIN — FLUCONAZOLE 200 MG: 2 INJECTION, SOLUTION INTRAVENOUS at 15:34

## 2018-01-01 RX ADMIN — Medication 20 ML: at 17:20

## 2018-01-01 RX ADMIN — Medication 20 ML: at 18:26

## 2018-01-01 RX ADMIN — DEXTROSE MONOHYDRATE AND SODIUM CHLORIDE 75 ML/HR: 5; .45 INJECTION, SOLUTION INTRAVENOUS at 10:18

## 2018-01-01 RX ADMIN — AMLODIPINE BESYLATE 5 MG: 5 TABLET ORAL at 09:24

## 2018-01-01 RX ADMIN — Medication 10 ML: at 06:10

## 2018-01-01 RX ADMIN — Medication 20 ML: at 15:25

## 2018-01-01 RX ADMIN — INSULIN LISPRO 4 UNITS: 100 INJECTION, SOLUTION INTRAVENOUS; SUBCUTANEOUS at 08:45

## 2018-01-01 RX ADMIN — INSULIN LISPRO 3 UNITS: 100 INJECTION, SOLUTION INTRAVENOUS; SUBCUTANEOUS at 23:54

## 2018-01-01 RX ADMIN — SODIUM CHLORIDE 40 MG: 9 INJECTION INTRAMUSCULAR; INTRAVENOUS; SUBCUTANEOUS at 09:51

## 2018-01-01 RX ADMIN — INSULIN LISPRO 6 UNITS: 100 INJECTION, SOLUTION INTRAVENOUS; SUBCUTANEOUS at 12:13

## 2018-01-01 RX ADMIN — Medication 10 ML: at 17:20

## 2018-01-01 RX ADMIN — HYPROMELLOSE 2910 2 DROP: 5 SOLUTION OPHTHALMIC at 09:00

## 2018-01-01 RX ADMIN — ACETAMINOPHEN 325 MG: 650 SOLUTION ORAL at 11:16

## 2018-01-01 RX ADMIN — INSULIN LISPRO 3 UNITS: 100 INJECTION, SOLUTION INTRAVENOUS; SUBCUTANEOUS at 13:47

## 2018-01-01 RX ADMIN — Medication 10 ML: at 05:08

## 2018-01-01 RX ADMIN — ACETAMINOPHEN 650 MG: 325 TABLET, FILM COATED ORAL at 22:48

## 2018-01-01 RX ADMIN — MIDAZOLAM HYDROCHLORIDE 1 MG: 1 INJECTION, SOLUTION INTRAMUSCULAR; INTRAVENOUS at 08:33

## 2018-01-01 RX ADMIN — ALBUTEROL SULFATE 2.5 MG: 2.5 SOLUTION RESPIRATORY (INHALATION) at 07:11

## 2018-01-01 RX ADMIN — HYPROMELLOSE 2910 2 DROP: 5 SOLUTION OPHTHALMIC at 12:24

## 2018-01-01 RX ADMIN — Medication 10 ML: at 15:56

## 2018-01-01 RX ADMIN — SODIUM CHLORIDE 40 MG: 9 INJECTION INTRAMUSCULAR; INTRAVENOUS; SUBCUTANEOUS at 10:27

## 2018-01-01 RX ADMIN — ALBUTEROL SULFATE 2.5 MG: 2.5 SOLUTION RESPIRATORY (INHALATION) at 13:22

## 2018-01-01 RX ADMIN — SODIUM CHLORIDE 40 MG: 9 INJECTION INTRAMUSCULAR; INTRAVENOUS; SUBCUTANEOUS at 08:45

## 2018-01-01 RX ADMIN — HYPROMELLOSE 2910 2 DROP: 5 SOLUTION OPHTHALMIC at 10:16

## 2018-01-01 RX ADMIN — HYPROMELLOSE 2910 2 DROP: 5 SOLUTION OPHTHALMIC at 10:07

## 2018-01-01 RX ADMIN — MEROPENEM 500 MG: 500 INJECTION, POWDER, FOR SOLUTION INTRAVENOUS at 00:39

## 2018-01-01 RX ADMIN — HYPROMELLOSE 2910 2 DROP: 5 SOLUTION OPHTHALMIC at 18:09

## 2018-01-01 RX ADMIN — ALBUTEROL SULFATE 2.5 MG: 2.5 SOLUTION RESPIRATORY (INHALATION) at 21:10

## 2018-01-01 RX ADMIN — MAGNESIUM SULFATE IN WATER 2 G: 40 INJECTION, SOLUTION INTRAVENOUS at 11:00

## 2018-01-01 RX ADMIN — ACETAMINOPHEN 325 MG: 650 SOLUTION ORAL at 09:52

## 2018-01-01 RX ADMIN — INSULIN LISPRO 3 UNITS: 100 INJECTION, SOLUTION INTRAVENOUS; SUBCUTANEOUS at 23:42

## 2018-01-01 RX ADMIN — PROPOFOL 30 MG: 10 INJECTION, EMULSION INTRAVENOUS at 08:49

## 2018-01-01 RX ADMIN — ACETAMINOPHEN 325 MG: 650 SOLUTION ORAL at 09:28

## 2018-01-01 RX ADMIN — ACETAMINOPHEN 325 MG: 650 SOLUTION ORAL at 20:46

## 2018-01-01 RX ADMIN — INSULIN GLARGINE 23 UNITS: 100 INJECTION, SOLUTION SUBCUTANEOUS at 08:58

## 2018-01-01 RX ADMIN — SODIUM CHLORIDE 40 MG: 9 INJECTION INTRAMUSCULAR; INTRAVENOUS; SUBCUTANEOUS at 08:36

## 2018-01-01 RX ADMIN — ALBUTEROL SULFATE 2.5 MG: 2.5 SOLUTION RESPIRATORY (INHALATION) at 19:50

## 2018-01-01 RX ADMIN — ASPIRIN 325 MG ORAL TABLET 325 MG: 325 PILL ORAL at 09:51

## 2018-01-01 RX ADMIN — HEPARIN SODIUM 5000 UNITS: 5000 INJECTION INTRAVENOUS; SUBCUTANEOUS at 13:43

## 2018-01-01 RX ADMIN — MULTIPLE VITAMIN LIQUID 30 ML: LIQUID at 08:07

## 2018-01-01 RX ADMIN — FLUCONAZOLE 200 MG: 2 INJECTION, SOLUTION INTRAVENOUS at 14:31

## 2018-01-01 RX ADMIN — INSULIN LISPRO 6 UNITS: 100 INJECTION, SOLUTION INTRAVENOUS; SUBCUTANEOUS at 05:20

## 2018-01-01 RX ADMIN — HYPROMELLOSE 2910 2 DROP: 5 SOLUTION OPHTHALMIC at 10:27

## 2018-01-01 RX ADMIN — HYDRALAZINE HYDROCHLORIDE 20 MG: 20 INJECTION INTRAMUSCULAR; INTRAVENOUS at 06:55

## 2018-01-01 RX ADMIN — HYPROMELLOSE 2910 2 DROP: 5 SOLUTION OPHTHALMIC at 11:23

## 2018-01-01 RX ADMIN — CEFEPIME HYDROCHLORIDE 0.5 G: 1 INJECTION, POWDER, FOR SOLUTION INTRAMUSCULAR; INTRAVENOUS at 00:12

## 2018-01-01 RX ADMIN — INSULIN LISPRO 2 UNITS: 100 INJECTION, SOLUTION INTRAVENOUS; SUBCUTANEOUS at 00:30

## 2018-01-01 RX ADMIN — MIDAZOLAM HYDROCHLORIDE 4 MG: 1 INJECTION, SOLUTION INTRAMUSCULAR; INTRAVENOUS at 11:44

## 2018-01-01 RX ADMIN — SODIUM CHLORIDE 40 MG: 9 INJECTION INTRAMUSCULAR; INTRAVENOUS; SUBCUTANEOUS at 09:33

## 2018-01-01 RX ADMIN — HEPARIN SODIUM 5000 UNITS: 5000 INJECTION INTRAVENOUS; SUBCUTANEOUS at 05:37

## 2018-01-01 RX ADMIN — HYDRALAZINE HYDROCHLORIDE 20 MG: 20 INJECTION INTRAMUSCULAR; INTRAVENOUS at 02:09

## 2018-01-01 RX ADMIN — HYDRALAZINE HYDROCHLORIDE 20 MG: 20 INJECTION INTRAMUSCULAR; INTRAVENOUS at 02:30

## 2018-01-01 RX ADMIN — ALBUMIN (HUMAN) 25 G: 0.25 INJECTION, SOLUTION INTRAVENOUS at 01:39

## 2018-01-01 RX ADMIN — INSULIN LISPRO 3 UNITS: 100 INJECTION, SOLUTION INTRAVENOUS; SUBCUTANEOUS at 17:33

## 2018-01-01 RX ADMIN — Medication 10 ML: at 14:17

## 2018-01-01 RX ADMIN — ALBUTEROL SULFATE 2.5 MG: 2.5 SOLUTION RESPIRATORY (INHALATION) at 08:59

## 2018-01-01 RX ADMIN — ALBUTEROL SULFATE 2.5 MG: 2.5 SOLUTION RESPIRATORY (INHALATION) at 19:34

## 2018-01-01 RX ADMIN — CALCIUM GLUCONATE 4 G: 94 INJECTION, SOLUTION INTRAVENOUS at 20:40

## 2018-01-01 RX ADMIN — INSULIN LISPRO 3 UNITS: 100 INJECTION, SOLUTION INTRAVENOUS; SUBCUTANEOUS at 17:30

## 2018-01-01 RX ADMIN — ALBUTEROL SULFATE 2.5 MG: 2.5 SOLUTION RESPIRATORY (INHALATION) at 19:44

## 2018-01-01 RX ADMIN — SODIUM CHLORIDE 1000 MG: 900 INJECTION, SOLUTION INTRAVENOUS at 18:02

## 2018-01-01 RX ADMIN — INSULIN LISPRO 3 UNITS: 100 INJECTION, SOLUTION INTRAVENOUS; SUBCUTANEOUS at 18:00

## 2018-01-01 RX ADMIN — SODIUM CHLORIDE 40 MG: 9 INJECTION INTRAMUSCULAR; INTRAVENOUS; SUBCUTANEOUS at 10:28

## 2018-01-01 RX ADMIN — ALBUTEROL SULFATE 2.5 MG: 2.5 SOLUTION RESPIRATORY (INHALATION) at 20:13

## 2018-01-01 RX ADMIN — FENTANYL CITRATE 25 MCG: 50 INJECTION, SOLUTION INTRAMUSCULAR; INTRAVENOUS at 08:34

## 2018-01-01 RX ADMIN — ALBUTEROL SULFATE 2.5 MG: 2.5 SOLUTION RESPIRATORY (INHALATION) at 13:36

## 2018-01-01 RX ADMIN — ALBUTEROL SULFATE 2.5 MG: 2.5 SOLUTION RESPIRATORY (INHALATION) at 20:00

## 2018-01-01 RX ADMIN — INSULIN LISPRO 6 UNITS: 100 INJECTION, SOLUTION INTRAVENOUS; SUBCUTANEOUS at 23:17

## 2018-01-01 RX ADMIN — PIPERACILLIN SODIUM,TAZOBACTAM SODIUM 3.38 G: 3; .375 INJECTION, POWDER, FOR SOLUTION INTRAVENOUS at 09:38

## 2018-01-01 RX ADMIN — ALBUTEROL SULFATE 2.5 MG: 2.5 SOLUTION RESPIRATORY (INHALATION) at 13:32

## 2018-01-01 RX ADMIN — Medication 10 ML: at 21:42

## 2018-01-01 RX ADMIN — HYDRALAZINE HYDROCHLORIDE 20 MG: 20 INJECTION INTRAMUSCULAR; INTRAVENOUS at 21:19

## 2018-01-01 RX ADMIN — Medication 10 ML: at 22:49

## 2018-01-01 RX ADMIN — SODIUM CHLORIDE 75 ML/HR: 900 INJECTION, SOLUTION INTRAVENOUS at 06:50

## 2018-01-01 RX ADMIN — HYDRALAZINE HYDROCHLORIDE 20 MG: 20 INJECTION INTRAMUSCULAR; INTRAVENOUS at 18:58

## 2018-01-01 RX ADMIN — INSULIN LISPRO 6 UNITS: 100 INJECTION, SOLUTION INTRAVENOUS; SUBCUTANEOUS at 17:53

## 2018-01-01 RX ADMIN — INSULIN LISPRO 3 UNITS: 100 INJECTION, SOLUTION INTRAVENOUS; SUBCUTANEOUS at 18:29

## 2018-01-01 RX ADMIN — INSULIN GLARGINE 15 UNITS: 100 INJECTION, SOLUTION SUBCUTANEOUS at 17:19

## 2018-01-01 RX ADMIN — INSULIN LISPRO 15 UNITS: 100 INJECTION, SOLUTION INTRAVENOUS; SUBCUTANEOUS at 17:30

## 2018-01-01 RX ADMIN — PIPERACILLIN SODIUM,TAZOBACTAM SODIUM 4.5 G: 4; .5 INJECTION, POWDER, FOR SOLUTION INTRAVENOUS at 11:48

## 2018-01-01 RX ADMIN — ALBUTEROL SULFATE 2.5 MG: 2.5 SOLUTION RESPIRATORY (INHALATION) at 20:15

## 2018-01-01 RX ADMIN — FOLIC ACID: 5 INJECTION, SOLUTION INTRAMUSCULAR; INTRAVENOUS; SUBCUTANEOUS at 16:50

## 2018-01-01 RX ADMIN — AMLODIPINE BESYLATE 5 MG: 5 TABLET ORAL at 08:52

## 2018-01-01 RX ADMIN — MULTIPLE VITAMIN LIQUID 30 ML: LIQUID at 08:58

## 2018-01-01 RX ADMIN — ALBUTEROL SULFATE 2.5 MG: 2.5 SOLUTION RESPIRATORY (INHALATION) at 12:03

## 2018-01-01 RX ADMIN — INSULIN GLARGINE 30 UNITS: 100 INJECTION, SOLUTION SUBCUTANEOUS at 08:15

## 2018-01-01 RX ADMIN — Medication 10 ML: at 17:24

## 2018-01-01 RX ADMIN — ENOXAPARIN SODIUM 40 MG: 100 INJECTION SUBCUTANEOUS at 12:20

## 2018-01-01 RX ADMIN — SODIUM CHLORIDE 150 ML/HR: 900 INJECTION, SOLUTION INTRAVENOUS at 01:13

## 2018-01-01 RX ADMIN — MIDAZOLAM HYDROCHLORIDE 2 MG: 1 INJECTION, SOLUTION INTRAMUSCULAR; INTRAVENOUS at 16:07

## 2018-01-01 RX ADMIN — HEPARIN SODIUM 5000 UNITS: 5000 INJECTION, SOLUTION INTRAVENOUS; SUBCUTANEOUS at 06:22

## 2018-01-01 RX ADMIN — ALBUTEROL SULFATE 2.5 MG: 2.5 SOLUTION RESPIRATORY (INHALATION) at 01:30

## 2018-01-01 RX ADMIN — FOLIC ACID 1 MG: 1 TABLET ORAL at 09:38

## 2018-01-01 RX ADMIN — HYDRALAZINE HYDROCHLORIDE 20 MG: 20 INJECTION INTRAMUSCULAR; INTRAVENOUS at 10:06

## 2018-01-01 RX ADMIN — SODIUM CHLORIDE 40 MG: 9 INJECTION INTRAMUSCULAR; INTRAVENOUS; SUBCUTANEOUS at 22:30

## 2018-01-01 RX ADMIN — INSULIN GLARGINE 6 UNITS: 100 INJECTION, SOLUTION SUBCUTANEOUS at 08:52

## 2018-01-01 RX ADMIN — Medication 10 ML: at 17:52

## 2018-01-01 RX ADMIN — ALBUTEROL SULFATE 2.5 MG: 2.5 SOLUTION RESPIRATORY (INHALATION) at 02:02

## 2018-01-01 RX ADMIN — ALBUTEROL SULFATE 2.5 MG: 2.5 SOLUTION RESPIRATORY (INHALATION) at 07:37

## 2018-01-01 RX ADMIN — Medication 10 ML: at 18:54

## 2018-01-01 RX ADMIN — INSULIN LISPRO 3 UNITS: 100 INJECTION, SOLUTION INTRAVENOUS; SUBCUTANEOUS at 12:22

## 2018-01-01 RX ADMIN — INSULIN LISPRO 3 UNITS: 100 INJECTION, SOLUTION INTRAVENOUS; SUBCUTANEOUS at 01:43

## 2018-01-01 RX ADMIN — ALBUTEROL SULFATE 2.5 MG: 2.5 SOLUTION RESPIRATORY (INHALATION) at 20:50

## 2018-01-01 RX ADMIN — ALBUTEROL SULFATE 2.5 MG: 2.5 SOLUTION RESPIRATORY (INHALATION) at 08:41

## 2018-01-01 RX ADMIN — PIPERACILLIN SODIUM,TAZOBACTAM SODIUM 4.5 G: 4; .5 INJECTION, POWDER, FOR SOLUTION INTRAVENOUS at 09:18

## 2018-01-01 RX ADMIN — CEFEPIME HYDROCHLORIDE 0.5 G: 1 INJECTION, POWDER, FOR SOLUTION INTRAMUSCULAR; INTRAVENOUS at 23:45

## 2018-01-01 RX ADMIN — ACETYLCYSTEINE 400 MG: 100 SOLUTION ORAL; RESPIRATORY (INHALATION) at 08:35

## 2018-01-01 RX ADMIN — INSULIN LISPRO 3 UNITS: 100 INJECTION, SOLUTION INTRAVENOUS; SUBCUTANEOUS at 12:27

## 2018-01-01 RX ADMIN — DOPAMINE HYDROCHLORIDE 5 MCG/KG/MIN: 160 INJECTION, SOLUTION INTRAVENOUS at 10:37

## 2018-01-01 RX ADMIN — SODIUM CHLORIDE 1000 MG: 900 INJECTION, SOLUTION INTRAVENOUS at 00:22

## 2018-01-01 RX ADMIN — Medication 30 ML: at 04:30

## 2018-01-01 RX ADMIN — HEPARIN SODIUM 5000 UNITS: 5000 INJECTION INTRAVENOUS; SUBCUTANEOUS at 12:26

## 2018-01-01 RX ADMIN — HEPARIN SODIUM 5000 UNITS: 5000 INJECTION INTRAVENOUS; SUBCUTANEOUS at 05:21

## 2018-01-01 RX ADMIN — Medication 10 ML: at 14:00

## 2018-01-01 RX ADMIN — HYPROMELLOSE 2910 2 DROP: 5 SOLUTION OPHTHALMIC at 18:30

## 2018-01-01 RX ADMIN — INSULIN LISPRO 3 UNITS: 100 INJECTION, SOLUTION INTRAVENOUS; SUBCUTANEOUS at 12:52

## 2018-01-01 RX ADMIN — ALBUTEROL SULFATE 2.5 MG: 2.5 SOLUTION RESPIRATORY (INHALATION) at 19:26

## 2018-01-01 RX ADMIN — HEPARIN SODIUM 5000 UNITS: 5000 INJECTION INTRAVENOUS; SUBCUTANEOUS at 21:30

## 2018-01-01 RX ADMIN — INSULIN GLARGINE 6 UNITS: 100 INJECTION, SOLUTION SUBCUTANEOUS at 08:42

## 2018-01-01 RX ADMIN — SODIUM CHLORIDE 40 MG: 9 INJECTION INTRAMUSCULAR; INTRAVENOUS; SUBCUTANEOUS at 09:03

## 2018-01-01 RX ADMIN — ASPIRIN 325 MG ORAL TABLET 325 MG: 325 PILL ORAL at 09:39

## 2018-01-01 RX ADMIN — MULTIPLE VITAMIN LIQUID 30 ML: LIQUID at 08:17

## 2018-01-01 RX ADMIN — CEFEPIME HYDROCHLORIDE 0.5 G: 1 INJECTION, POWDER, FOR SOLUTION INTRAMUSCULAR; INTRAVENOUS at 22:05

## 2018-01-01 RX ADMIN — HYPROMELLOSE 2910 2 DROP: 5 SOLUTION OPHTHALMIC at 19:20

## 2018-01-01 RX ADMIN — ALBUTEROL SULFATE 2.5 MG: 2.5 SOLUTION RESPIRATORY (INHALATION) at 02:50

## 2018-01-01 RX ADMIN — HEPARIN SODIUM 5000 UNITS: 5000 INJECTION INTRAVENOUS; SUBCUTANEOUS at 22:40

## 2018-01-01 RX ADMIN — PROMETHAZINE HYDROCHLORIDE 12.5 MG: 25 INJECTION INTRAMUSCULAR; INTRAVENOUS at 18:29

## 2018-01-01 RX ADMIN — CALCIUM GLUCONATE 2 G: 94 INJECTION, SOLUTION INTRAVENOUS at 11:26

## 2018-01-01 RX ADMIN — HYPROMELLOSE 2910 2 DROP: 5 SOLUTION OPHTHALMIC at 10:46

## 2018-01-01 RX ADMIN — SODIUM CHLORIDE 40 MG: 9 INJECTION INTRAMUSCULAR; INTRAVENOUS; SUBCUTANEOUS at 09:20

## 2018-01-01 RX ADMIN — INSULIN LISPRO 6 UNITS: 100 INJECTION, SOLUTION INTRAVENOUS; SUBCUTANEOUS at 00:20

## 2018-01-01 RX ADMIN — HEPARIN SODIUM 5000 UNITS: 5000 INJECTION INTRAVENOUS; SUBCUTANEOUS at 06:36

## 2018-01-01 RX ADMIN — ALBUTEROL SULFATE 2.5 MG: 2.5 SOLUTION RESPIRATORY (INHALATION) at 19:56

## 2018-01-01 RX ADMIN — DESMOPRESSIN ACETATE 29 MCG: 4 INJECTION INTRAVENOUS at 10:36

## 2018-01-01 RX ADMIN — MAGNESIUM SULFATE HEPTAHYDRATE 2 G: 40 INJECTION, SOLUTION INTRAVENOUS at 11:00

## 2018-01-01 RX ADMIN — MEROPENEM 500 MG: 500 INJECTION, POWDER, FOR SOLUTION INTRAVENOUS at 21:46

## 2018-01-01 RX ADMIN — Medication 10 ML: at 21:43

## 2018-01-01 RX ADMIN — AMLODIPINE BESYLATE 5 MG: 5 TABLET ORAL at 08:47

## 2018-01-01 RX ADMIN — ASPIRIN 325 MG ORAL TABLET 325 MG: 325 PILL ORAL at 08:44

## 2018-01-01 RX ADMIN — Medication 100 MCG/MIN: at 11:00

## 2018-01-01 RX ADMIN — HEPARIN SODIUM 5000 UNITS: 5000 INJECTION INTRAVENOUS; SUBCUTANEOUS at 21:53

## 2018-01-01 RX ADMIN — HEPARIN SODIUM 5000 UNITS: 5000 INJECTION INTRAVENOUS; SUBCUTANEOUS at 14:00

## 2018-01-01 RX ADMIN — INSULIN LISPRO 3 UNITS: 100 INJECTION, SOLUTION INTRAVENOUS; SUBCUTANEOUS at 12:41

## 2018-01-01 RX ADMIN — HEPARIN SODIUM 5000 UNITS: 5000 INJECTION INTRAVENOUS; SUBCUTANEOUS at 05:16

## 2018-01-01 RX ADMIN — HEPARIN SODIUM 5000 UNITS: 5000 INJECTION INTRAVENOUS; SUBCUTANEOUS at 22:03

## 2018-01-01 RX ADMIN — ALBUTEROL SULFATE 2.5 MG: 2.5 SOLUTION RESPIRATORY (INHALATION) at 09:03

## 2018-01-01 RX ADMIN — HYPROMELLOSE 2910 2 DROP: 5 SOLUTION OPHTHALMIC at 08:59

## 2018-01-01 RX ADMIN — HYDRALAZINE HYDROCHLORIDE 20 MG: 20 INJECTION INTRAMUSCULAR; INTRAVENOUS at 17:00

## 2018-01-01 RX ADMIN — INSULIN LISPRO 6 UNITS: 100 INJECTION, SOLUTION INTRAVENOUS; SUBCUTANEOUS at 00:02

## 2018-01-01 RX ADMIN — Medication 10 ML: at 16:33

## 2018-01-01 RX ADMIN — FOLIC ACID 1 MG: 1 TABLET ORAL at 09:39

## 2018-01-01 RX ADMIN — Medication 10 ML: at 17:50

## 2018-01-01 RX ADMIN — FLUCONAZOLE 200 MG: 2 INJECTION, SOLUTION INTRAVENOUS at 16:30

## 2018-01-01 RX ADMIN — HYPROMELLOSE 2910 2 DROP: 5 SOLUTION OPHTHALMIC at 08:28

## 2018-01-01 RX ADMIN — ALBUTEROL SULFATE 2.5 MG: 2.5 SOLUTION RESPIRATORY (INHALATION) at 09:32

## 2018-01-01 RX ADMIN — DEXTROSE MONOHYDRATE 12.5 G: 25 INJECTION, SOLUTION INTRAVENOUS at 12:06

## 2018-01-01 RX ADMIN — INSULIN GLARGINE 11 UNITS: 100 INJECTION, SOLUTION SUBCUTANEOUS at 08:11

## 2018-01-01 RX ADMIN — INSULIN LISPRO 3 UNITS: 100 INJECTION, SOLUTION INTRAVENOUS; SUBCUTANEOUS at 17:26

## 2018-01-01 RX ADMIN — PIPERACILLIN SODIUM,TAZOBACTAM SODIUM 4.5 G: 4; .5 INJECTION, POWDER, FOR SOLUTION INTRAVENOUS at 22:06

## 2018-01-01 RX ADMIN — HYPROMELLOSE 2910 2 DROP: 5 SOLUTION OPHTHALMIC at 09:18

## 2018-01-01 RX ADMIN — INSULIN LISPRO 9 UNITS: 100 INJECTION, SOLUTION INTRAVENOUS; SUBCUTANEOUS at 12:24

## 2018-01-01 RX ADMIN — INSULIN LISPRO 9 UNITS: 100 INJECTION, SOLUTION INTRAVENOUS; SUBCUTANEOUS at 13:06

## 2018-01-01 RX ADMIN — SODIUM CHLORIDE 40 MG: 9 INJECTION INTRAMUSCULAR; INTRAVENOUS; SUBCUTANEOUS at 08:18

## 2018-01-01 RX ADMIN — ACETYLCYSTEINE 400 MG: 100 SOLUTION ORAL; RESPIRATORY (INHALATION) at 20:09

## 2018-01-01 RX ADMIN — MULTIPLE VITAMIN LIQUID 30 ML: LIQUID at 08:00

## 2018-01-01 RX ADMIN — SODIUM CHLORIDE 40 MG: 9 INJECTION INTRAMUSCULAR; INTRAVENOUS; SUBCUTANEOUS at 09:12

## 2018-01-01 RX ADMIN — ASPIRIN 325 MG ORAL TABLET 325 MG: 325 PILL ORAL at 09:33

## 2018-01-01 RX ADMIN — ACETYLCYSTEINE 400 MG: 100 SOLUTION ORAL; RESPIRATORY (INHALATION) at 20:21

## 2018-01-01 RX ADMIN — ERYTHROPOIETIN 40000 UNITS: 40000 INJECTION, SOLUTION INTRAVENOUS; SUBCUTANEOUS at 16:21

## 2018-01-01 RX ADMIN — INSULIN GLARGINE 6 UNITS: 100 INJECTION, SOLUTION SUBCUTANEOUS at 09:38

## 2018-01-01 RX ADMIN — PIPERACILLIN SODIUM,TAZOBACTAM SODIUM 4.5 G: 4; .5 INJECTION, POWDER, FOR SOLUTION INTRAVENOUS at 21:27

## 2018-01-01 RX ADMIN — ALBUTEROL SULFATE 2.5 MG: 2.5 SOLUTION RESPIRATORY (INHALATION) at 13:51

## 2018-01-01 RX ADMIN — POTASSIUM CHLORIDE 10 MEQ: 7.46 INJECTION, SOLUTION INTRAVENOUS at 08:53

## 2018-01-01 RX ADMIN — ALBUTEROL SULFATE 2.5 MG: 2.5 SOLUTION RESPIRATORY (INHALATION) at 20:12

## 2018-01-01 RX ADMIN — Medication 10 ML: at 15:25

## 2018-01-01 RX ADMIN — ALBUTEROL SULFATE 2.5 MG: 2.5 SOLUTION RESPIRATORY (INHALATION) at 20:45

## 2018-01-01 RX ADMIN — SENNOSIDES AND DOCUSATE SODIUM 2 TABLET: 8.6; 5 TABLET ORAL at 22:19

## 2018-01-01 RX ADMIN — ALBUTEROL SULFATE 2.5 MG: 2.5 SOLUTION RESPIRATORY (INHALATION) at 07:12

## 2018-01-01 RX ADMIN — ALBUTEROL SULFATE 2.5 MG: 2.5 SOLUTION RESPIRATORY (INHALATION) at 21:15

## 2018-01-01 RX ADMIN — ALBUTEROL SULFATE 2.5 MG: 2.5 SOLUTION RESPIRATORY (INHALATION) at 07:32

## 2018-01-01 RX ADMIN — Medication 10 ML: at 23:27

## 2018-01-01 RX ADMIN — MULTIPLE VITAMIN LIQUID 30 ML: LIQUID at 08:32

## 2018-01-01 RX ADMIN — MIDAZOLAM 2 MG: 1 INJECTION INTRAMUSCULAR; INTRAVENOUS at 15:00

## 2018-01-01 RX ADMIN — Medication 250 MCG/MIN: at 13:43

## 2018-01-01 RX ADMIN — HYDRALAZINE HYDROCHLORIDE 20 MG: 20 INJECTION INTRAMUSCULAR; INTRAVENOUS at 17:59

## 2018-01-01 RX ADMIN — INSULIN LISPRO 3 UNITS: 100 INJECTION, SOLUTION INTRAVENOUS; SUBCUTANEOUS at 01:46

## 2018-01-01 RX ADMIN — INSULIN LISPRO 6 UNITS: 100 INJECTION, SOLUTION INTRAVENOUS; SUBCUTANEOUS at 17:50

## 2018-01-01 RX ADMIN — Medication 10 ML: at 20:00

## 2018-01-01 RX ADMIN — SODIUM CHLORIDE 750 MG: 900 INJECTION, SOLUTION INTRAVENOUS at 21:53

## 2018-01-01 RX ADMIN — Medication 30 ML: at 21:16

## 2018-01-01 RX ADMIN — INSULIN LISPRO 3 UNITS: 100 INJECTION, SOLUTION INTRAVENOUS; SUBCUTANEOUS at 19:08

## 2018-01-01 RX ADMIN — ALBUTEROL SULFATE 2.5 MG: 2.5 SOLUTION RESPIRATORY (INHALATION) at 19:58

## 2018-01-01 RX ADMIN — ALBUTEROL SULFATE 2.5 MG: 2.5 SOLUTION RESPIRATORY (INHALATION) at 14:00

## 2018-01-01 RX ADMIN — HYDRALAZINE HYDROCHLORIDE 20 MG: 20 INJECTION INTRAMUSCULAR; INTRAVENOUS at 23:45

## 2018-01-01 RX ADMIN — SODIUM CHLORIDE 40 MG: 9 INJECTION INTRAMUSCULAR; INTRAVENOUS; SUBCUTANEOUS at 10:39

## 2018-01-01 RX ADMIN — INSULIN LISPRO 6 UNITS: 100 INJECTION, SOLUTION INTRAVENOUS; SUBCUTANEOUS at 09:33

## 2018-01-01 RX ADMIN — CEFEPIME HYDROCHLORIDE 1 G: 1 INJECTION, POWDER, FOR SOLUTION INTRAMUSCULAR; INTRAVENOUS at 15:25

## 2018-01-01 RX ADMIN — ALBUTEROL SULFATE 2.5 MG: 2.5 SOLUTION RESPIRATORY (INHALATION) at 13:17

## 2018-01-01 RX ADMIN — VANCOMYCIN HYDROCHLORIDE 2000 MG: 10 INJECTION, POWDER, LYOPHILIZED, FOR SOLUTION INTRAVENOUS at 21:00

## 2018-01-01 RX ADMIN — INSULIN LISPRO 3 UNITS: 100 INJECTION, SOLUTION INTRAVENOUS; SUBCUTANEOUS at 00:48

## 2018-01-01 RX ADMIN — SODIUM CHLORIDE 40 MG: 9 INJECTION INTRAMUSCULAR; INTRAVENOUS; SUBCUTANEOUS at 08:48

## 2018-01-01 RX ADMIN — CALCIUM GLUCONATE 4 G: 94 INJECTION, SOLUTION INTRAVENOUS at 10:15

## 2018-01-01 RX ADMIN — DEXTROSE MONOHYDRATE 25 G: 25 INJECTION, SOLUTION INTRAVENOUS at 19:06

## 2018-01-01 RX ADMIN — INSULIN GLARGINE 17 UNITS: 100 INJECTION, SOLUTION SUBCUTANEOUS at 08:59

## 2018-01-01 RX ADMIN — INSULIN GLARGINE 20 UNITS: 100 INJECTION, SOLUTION SUBCUTANEOUS at 09:03

## 2018-01-01 RX ADMIN — ALBUTEROL SULFATE 2.5 MG: 2.5 SOLUTION RESPIRATORY (INHALATION) at 09:55

## 2018-01-01 RX ADMIN — ALBUTEROL SULFATE 2.5 MG: 2.5 SOLUTION RESPIRATORY (INHALATION) at 08:32

## 2018-01-01 RX ADMIN — Medication 150 MCG/MIN: at 21:15

## 2018-01-01 RX ADMIN — ALBUTEROL SULFATE 2.5 MG: 2.5 SOLUTION RESPIRATORY (INHALATION) at 07:47

## 2018-01-01 RX ADMIN — ATORVASTATIN CALCIUM 80 MG: 40 TABLET, FILM COATED ORAL at 17:00

## 2018-01-01 RX ADMIN — HYPROMELLOSE 2910 2 DROP: 5 SOLUTION OPHTHALMIC at 11:06

## 2018-01-01 RX ADMIN — HEPARIN SODIUM 5000 UNITS: 5000 INJECTION INTRAVENOUS; SUBCUTANEOUS at 05:54

## 2018-01-01 RX ADMIN — INSULIN LISPRO 3 UNITS: 100 INJECTION, SOLUTION INTRAVENOUS; SUBCUTANEOUS at 17:41

## 2018-01-01 RX ADMIN — PIPERACILLIN SODIUM,TAZOBACTAM SODIUM 4.5 G: 4; .5 INJECTION, POWDER, FOR SOLUTION INTRAVENOUS at 09:07

## 2018-01-01 RX ADMIN — HEPARIN SODIUM 5000 UNITS: 5000 INJECTION INTRAVENOUS; SUBCUTANEOUS at 05:13

## 2018-01-01 RX ADMIN — ACETAMINOPHEN 325 MG: 650 SOLUTION ORAL at 02:10

## 2018-01-01 RX ADMIN — HYDRALAZINE HYDROCHLORIDE 20 MG: 20 INJECTION INTRAMUSCULAR; INTRAVENOUS at 17:32

## 2018-01-01 RX ADMIN — SODIUM CHLORIDE 750 MG: 900 INJECTION, SOLUTION INTRAVENOUS at 21:46

## 2018-01-01 RX ADMIN — ALBUTEROL SULFATE 2.5 MG: 2.5 SOLUTION RESPIRATORY (INHALATION) at 14:23

## 2018-01-01 RX ADMIN — SODIUM CHLORIDE 2000 ML: 900 INJECTION, SOLUTION INTRAVENOUS at 12:58

## 2018-01-01 RX ADMIN — Medication 100 MG: at 08:45

## 2018-01-01 RX ADMIN — Medication 20 ML: at 17:24

## 2018-01-01 RX ADMIN — PIPERACILLIN SODIUM,TAZOBACTAM SODIUM 4.5 G: 4; .5 INJECTION, POWDER, FOR SOLUTION INTRAVENOUS at 21:38

## 2018-01-01 RX ADMIN — Medication 200 MCG/MIN: at 16:31

## 2018-01-01 RX ADMIN — IOPAMIDOL 40 ML: 612 INJECTION, SOLUTION INTRAVENOUS at 14:24

## 2018-01-01 RX ADMIN — HEPARIN SODIUM 5000 UNITS: 5000 INJECTION INTRAVENOUS; SUBCUTANEOUS at 13:37

## 2018-01-01 RX ADMIN — ACYCLOVIR SODIUM 330.5 MG: 500 INJECTION, POWDER, LYOPHILIZED, FOR SOLUTION INTRAVENOUS at 13:47

## 2018-01-01 RX ADMIN — ALBUTEROL SULFATE 2.5 MG: 2.5 SOLUTION RESPIRATORY (INHALATION) at 13:19

## 2018-01-01 RX ADMIN — Medication 100 MG: at 09:39

## 2018-01-01 RX ADMIN — SODIUM CHLORIDE 60 ML/HR: 900 INJECTION, SOLUTION INTRAVENOUS at 11:02

## 2018-01-01 RX ADMIN — Medication 150 MCG/MIN: at 19:03

## 2018-01-01 RX ADMIN — INSULIN GLARGINE 25 UNITS: 100 INJECTION, SOLUTION SUBCUTANEOUS at 11:47

## 2018-01-01 RX ADMIN — ATORVASTATIN CALCIUM 80 MG: 40 TABLET, FILM COATED ORAL at 23:18

## 2018-01-01 RX ADMIN — PIPERACILLIN SODIUM,TAZOBACTAM SODIUM 4.5 G: 4; .5 INJECTION, POWDER, FOR SOLUTION INTRAVENOUS at 09:00

## 2018-01-01 RX ADMIN — INSULIN LISPRO 3 UNITS: 100 INJECTION, SOLUTION INTRAVENOUS; SUBCUTANEOUS at 17:05

## 2018-01-01 RX ADMIN — INSULIN LISPRO 6 UNITS: 100 INJECTION, SOLUTION INTRAVENOUS; SUBCUTANEOUS at 12:51

## 2018-01-01 RX ADMIN — SODIUM CHLORIDE 40 MG: 9 INJECTION INTRAMUSCULAR; INTRAVENOUS; SUBCUTANEOUS at 11:47

## 2018-01-01 RX ADMIN — ACETAMINOPHEN 325 MG: 650 SOLUTION ORAL at 23:56

## 2018-01-01 RX ADMIN — INSULIN GLARGINE 6 UNITS: 100 INJECTION, SOLUTION SUBCUTANEOUS at 12:53

## 2018-01-01 RX ADMIN — ALBUTEROL SULFATE 2.5 MG: 2.5 SOLUTION RESPIRATORY (INHALATION) at 09:43

## 2018-01-01 RX ADMIN — ALBUTEROL SULFATE 2.5 MG: 2.5 SOLUTION RESPIRATORY (INHALATION) at 20:34

## 2018-01-01 RX ADMIN — ALBUTEROL SULFATE 2.5 MG: 2.5 SOLUTION RESPIRATORY (INHALATION) at 13:25

## 2018-01-01 RX ADMIN — ASPIRIN 325 MG ORAL TABLET 325 MG: 325 PILL ORAL at 15:07

## 2018-01-01 RX ADMIN — INSULIN LISPRO 3 UNITS: 100 INJECTION, SOLUTION INTRAVENOUS; SUBCUTANEOUS at 18:35

## 2018-01-01 RX ADMIN — SODIUM CHLORIDE 40 MG: 9 INJECTION INTRAMUSCULAR; INTRAVENOUS; SUBCUTANEOUS at 09:17

## 2018-01-01 RX ADMIN — HEPARIN SODIUM 5000 UNITS: 5000 INJECTION INTRAVENOUS; SUBCUTANEOUS at 22:32

## 2018-01-01 RX ADMIN — SODIUM CHLORIDE 40 MG: 9 INJECTION INTRAMUSCULAR; INTRAVENOUS; SUBCUTANEOUS at 21:26

## 2018-01-01 RX ADMIN — SODIUM CHLORIDE 40 MG: 9 INJECTION INTRAMUSCULAR; INTRAVENOUS; SUBCUTANEOUS at 08:12

## 2018-01-01 RX ADMIN — HEPARIN SODIUM 5000 UNITS: 5000 INJECTION INTRAVENOUS; SUBCUTANEOUS at 05:40

## 2018-01-01 RX ADMIN — INSULIN GLARGINE 6 UNITS: 100 INJECTION, SOLUTION SUBCUTANEOUS at 09:21

## 2018-01-01 RX ADMIN — INSULIN LISPRO 2 UNITS: 100 INJECTION, SOLUTION INTRAVENOUS; SUBCUTANEOUS at 05:53

## 2018-01-01 RX ADMIN — ALBUTEROL SULFATE 2.5 MG: 2.5 SOLUTION RESPIRATORY (INHALATION) at 20:40

## 2018-01-01 RX ADMIN — SODIUM BICARBONATE 650 MG TABLET 650 MG: at 10:49

## 2018-01-01 RX ADMIN — HYDRALAZINE HYDROCHLORIDE 20 MG: 20 INJECTION INTRAMUSCULAR; INTRAVENOUS at 19:38

## 2018-01-01 RX ADMIN — SODIUM CHLORIDE 40 MG: 9 INJECTION INTRAMUSCULAR; INTRAVENOUS; SUBCUTANEOUS at 10:05

## 2018-01-01 RX ADMIN — INSULIN LISPRO 3 UNITS: 100 INJECTION, SOLUTION INTRAVENOUS; SUBCUTANEOUS at 12:21

## 2018-01-01 RX ADMIN — INSULIN LISPRO 3 UNITS: 100 INJECTION, SOLUTION INTRAVENOUS; SUBCUTANEOUS at 23:32

## 2018-01-01 RX ADMIN — AMLODIPINE BESYLATE 5 MG: 5 TABLET ORAL at 08:18

## 2018-01-01 RX ADMIN — MULTIPLE VITAMIN LIQUID 30 ML: LIQUID at 11:23

## 2018-01-01 RX ADMIN — HYPROMELLOSE 2910 2 DROP: 5 SOLUTION OPHTHALMIC at 18:46

## 2018-01-01 RX ADMIN — CLOPIDOGREL BISULFATE 300 MG: 75 TABLET ORAL at 15:07

## 2018-01-01 RX ADMIN — ALBUTEROL SULFATE 2.5 MG: 2.5 SOLUTION RESPIRATORY (INHALATION) at 20:02

## 2018-01-01 RX ADMIN — SODIUM CHLORIDE 40 MG: 9 INJECTION INTRAMUSCULAR; INTRAVENOUS; SUBCUTANEOUS at 10:26

## 2018-01-01 RX ADMIN — ALBUTEROL SULFATE 2.5 MG: 2.5 SOLUTION RESPIRATORY (INHALATION) at 19:55

## 2018-01-01 RX ADMIN — Medication 10 ML: at 16:00

## 2018-01-01 RX ADMIN — HYPROMELLOSE 2910 2 DROP: 5 SOLUTION OPHTHALMIC at 17:19

## 2018-01-01 RX ADMIN — SODIUM CHLORIDE 40 MG: 9 INJECTION INTRAMUSCULAR; INTRAVENOUS; SUBCUTANEOUS at 21:35

## 2018-01-01 RX ADMIN — Medication 10 ML: at 05:56

## 2018-01-01 RX ADMIN — HYPROMELLOSE 2910 2 DROP: 5 SOLUTION OPHTHALMIC at 09:09

## 2018-01-01 RX ADMIN — HYPROMELLOSE 2910 2 DROP: 5 SOLUTION OPHTHALMIC at 17:39

## 2018-01-01 RX ADMIN — AMLODIPINE BESYLATE 5 MG: 5 TABLET ORAL at 08:27

## 2018-01-01 RX ADMIN — MULTIPLE VITAMIN LIQUID 30 ML: LIQUID at 08:22

## 2018-01-01 RX ADMIN — HEPARIN SODIUM 5000 UNITS: 5000 INJECTION INTRAVENOUS; SUBCUTANEOUS at 12:37

## 2018-01-01 RX ADMIN — ALBUTEROL SULFATE 2.5 MG: 2.5 SOLUTION RESPIRATORY (INHALATION) at 07:54

## 2018-01-01 RX ADMIN — ATORVASTATIN CALCIUM 80 MG: 40 TABLET, FILM COATED ORAL at 21:44

## 2018-01-01 RX ADMIN — INSULIN LISPRO 3 UNITS: 100 INJECTION, SOLUTION INTRAVENOUS; SUBCUTANEOUS at 18:14

## 2018-01-01 RX ADMIN — INSULIN GLARGINE 36 UNITS: 100 INJECTION, SOLUTION SUBCUTANEOUS at 08:45

## 2018-01-01 RX ADMIN — HEPARIN SODIUM 5000 UNITS: 5000 INJECTION INTRAVENOUS; SUBCUTANEOUS at 12:23

## 2018-01-01 RX ADMIN — HEPARIN SODIUM 1000 UNITS: 1000 INJECTION, SOLUTION INTRAVENOUS; SUBCUTANEOUS at 13:32

## 2018-01-01 RX ADMIN — HEPARIN SODIUM 5000 UNITS: 5000 INJECTION INTRAVENOUS; SUBCUTANEOUS at 21:36

## 2018-01-01 RX ADMIN — FLUCONAZOLE 200 MG: 2 INJECTION, SOLUTION INTRAVENOUS at 14:42

## 2018-01-01 RX ADMIN — HEPARIN SODIUM 5000 UNITS: 5000 INJECTION INTRAVENOUS; SUBCUTANEOUS at 05:04

## 2018-01-01 RX ADMIN — CEFEPIME HYDROCHLORIDE 0.5 G: 1 INJECTION, POWDER, FOR SOLUTION INTRAMUSCULAR; INTRAVENOUS at 01:29

## 2018-01-01 RX ADMIN — Medication 10 ML: at 13:10

## 2018-01-01 RX ADMIN — ALBUTEROL SULFATE 2.5 MG: 2.5 SOLUTION RESPIRATORY (INHALATION) at 02:09

## 2018-01-01 RX ADMIN — SODIUM CHLORIDE 40 MG: 9 INJECTION INTRAMUSCULAR; INTRAVENOUS; SUBCUTANEOUS at 21:58

## 2018-01-01 RX ADMIN — ALBUTEROL SULFATE 2.5 MG: 2.5 SOLUTION RESPIRATORY (INHALATION) at 13:15

## 2018-01-01 RX ADMIN — ALBUTEROL SULFATE 2.5 MG: 2.5 SOLUTION RESPIRATORY (INHALATION) at 07:46

## 2018-01-01 RX ADMIN — FENTANYL CITRATE 25 MCG: 50 INJECTION, SOLUTION INTRAMUSCULAR; INTRAVENOUS at 09:09

## 2018-01-01 RX ADMIN — Medication 30 ML: at 05:56

## 2018-01-01 RX ADMIN — ACETYLCYSTEINE 400 MG: 100 SOLUTION ORAL; RESPIRATORY (INHALATION) at 21:06

## 2018-01-01 RX ADMIN — HYPROMELLOSE 2910 2 DROP: 5 SOLUTION OPHTHALMIC at 09:22

## 2018-01-01 RX ADMIN — Medication 10 ML: at 18:26

## 2018-01-01 RX ADMIN — SODIUM CHLORIDE 40 MG: 9 INJECTION INTRAMUSCULAR; INTRAVENOUS; SUBCUTANEOUS at 09:45

## 2018-01-01 RX ADMIN — ACETYLCYSTEINE 400 MG: 100 SOLUTION ORAL; RESPIRATORY (INHALATION) at 14:00

## 2018-01-01 RX ADMIN — INSULIN GLARGINE 6 UNITS: 100 INJECTION, SOLUTION SUBCUTANEOUS at 09:28

## 2018-01-01 RX ADMIN — INSULIN LISPRO 3 UNITS: 100 INJECTION, SOLUTION INTRAVENOUS; SUBCUTANEOUS at 06:03

## 2018-01-01 RX ADMIN — Medication 10 ML: at 05:44

## 2018-01-01 RX ADMIN — ACETAMINOPHEN 325 MG: 650 SOLUTION ORAL at 15:49

## 2018-01-01 RX ADMIN — INSULIN LISPRO 3 UNITS: 100 INJECTION, SOLUTION INTRAVENOUS; SUBCUTANEOUS at 13:37

## 2018-01-01 RX ADMIN — ALBUTEROL SULFATE 2.5 MG: 2.5 SOLUTION RESPIRATORY (INHALATION) at 08:05

## 2018-01-01 RX ADMIN — HEPARIN SODIUM 5000 UNITS: 5000 INJECTION, SOLUTION INTRAVENOUS; SUBCUTANEOUS at 00:27

## 2018-01-01 RX ADMIN — INSULIN LISPRO 3 UNITS: 100 INJECTION, SOLUTION INTRAVENOUS; SUBCUTANEOUS at 01:49

## 2018-01-01 RX ADMIN — HEPARIN SODIUM 5000 UNITS: 5000 INJECTION INTRAVENOUS; SUBCUTANEOUS at 05:03

## 2018-01-01 RX ADMIN — ALBUTEROL SULFATE 2.5 MG: 2.5 SOLUTION RESPIRATORY (INHALATION) at 07:56

## 2018-01-01 RX ADMIN — Medication 10 ML: at 22:12

## 2018-01-01 RX ADMIN — ERYTHROPOIETIN 40000 UNITS: 40000 INJECTION, SOLUTION INTRAVENOUS; SUBCUTANEOUS at 12:27

## 2018-01-01 RX ADMIN — PIPERACILLIN SODIUM,TAZOBACTAM SODIUM 3.38 G: 3; .375 INJECTION, POWDER, FOR SOLUTION INTRAVENOUS at 12:23

## 2018-01-01 RX ADMIN — INSULIN LISPRO 3 UNITS: 100 INJECTION, SOLUTION INTRAVENOUS; SUBCUTANEOUS at 00:06

## 2018-01-01 RX ADMIN — Medication 20 ML: at 16:00

## 2018-01-01 RX ADMIN — AMLODIPINE BESYLATE 5 MG: 5 TABLET ORAL at 09:20

## 2018-01-01 RX ADMIN — MULTIPLE VITAMIN LIQUID 30 ML: LIQUID at 08:46

## 2018-01-01 RX ADMIN — HYPROMELLOSE 2910 2 DROP: 5 SOLUTION OPHTHALMIC at 11:10

## 2018-01-01 RX ADMIN — MEROPENEM 500 MG: 500 INJECTION, POWDER, FOR SOLUTION INTRAVENOUS at 20:47

## 2018-01-01 RX ADMIN — ALBUTEROL SULFATE 2.5 MG: 2.5 SOLUTION RESPIRATORY (INHALATION) at 14:39

## 2018-01-01 RX ADMIN — ACETAMINOPHEN 325 MG: 650 SOLUTION ORAL at 08:19

## 2018-01-01 RX ADMIN — INSULIN LISPRO 0.03 UNITS: 100 INJECTION, SOLUTION INTRAVENOUS; SUBCUTANEOUS at 19:26

## 2018-01-01 RX ADMIN — FLUCONAZOLE 200 MG: 2 INJECTION, SOLUTION INTRAVENOUS at 20:13

## 2018-01-01 RX ADMIN — HYDRALAZINE HYDROCHLORIDE 20 MG: 20 INJECTION INTRAMUSCULAR; INTRAVENOUS at 05:03

## 2018-01-01 RX ADMIN — MIDAZOLAM HYDROCHLORIDE 1 MG: 1 INJECTION, SOLUTION INTRAMUSCULAR; INTRAVENOUS at 09:20

## 2018-01-01 RX ADMIN — INSULIN GLARGINE 36 UNITS: 100 INJECTION, SOLUTION SUBCUTANEOUS at 09:42

## 2018-01-01 RX ADMIN — SODIUM CHLORIDE 75 ML/HR: 900 INJECTION, SOLUTION INTRAVENOUS at 14:36

## 2018-01-01 RX ADMIN — PIPERACILLIN SODIUM,TAZOBACTAM SODIUM 3.38 G: 3; .375 INJECTION, POWDER, FOR SOLUTION INTRAVENOUS at 09:36

## 2018-01-01 RX ADMIN — ALBUTEROL SULFATE 2.5 MG: 2.5 SOLUTION RESPIRATORY (INHALATION) at 14:32

## 2018-01-01 RX ADMIN — CIPROFLOXACIN 200 MG: 2 INJECTION, SOLUTION INTRAVENOUS at 01:32

## 2018-01-01 RX ADMIN — ALBUTEROL SULFATE 2.5 MG: 2.5 SOLUTION RESPIRATORY (INHALATION) at 19:52

## 2018-01-01 RX ADMIN — Medication 10 ML: at 05:50

## 2018-01-01 RX ADMIN — PIPERACILLIN SODIUM,TAZOBACTAM SODIUM 2.25 G: 2; .25 INJECTION, POWDER, FOR SOLUTION INTRAVENOUS at 01:49

## 2018-01-01 RX ADMIN — ALBUTEROL SULFATE 2.5 MG: 2.5 SOLUTION RESPIRATORY (INHALATION) at 01:44

## 2018-01-01 RX ADMIN — HYDRALAZINE HYDROCHLORIDE 20 MG: 20 INJECTION INTRAMUSCULAR; INTRAVENOUS at 23:18

## 2018-01-01 RX ADMIN — INSULIN GLARGINE 25 UNITS: 100 INJECTION, SOLUTION SUBCUTANEOUS at 09:38

## 2018-01-01 RX ADMIN — ACETAMINOPHEN 325 MG: 650 SOLUTION ORAL at 08:30

## 2018-01-01 RX ADMIN — INSULIN LISPRO 4 UNITS: 100 INJECTION, SOLUTION INTRAVENOUS; SUBCUTANEOUS at 09:41

## 2018-01-01 RX ADMIN — SODIUM CHLORIDE 40 MG: 9 INJECTION INTRAMUSCULAR; INTRAVENOUS; SUBCUTANEOUS at 11:23

## 2018-01-01 RX ADMIN — MEROPENEM 500 MG: 500 INJECTION, POWDER, FOR SOLUTION INTRAVENOUS at 09:32

## 2018-01-01 RX ADMIN — ALBUTEROL SULFATE 2.5 MG: 2.5 SOLUTION RESPIRATORY (INHALATION) at 21:01

## 2018-01-01 RX ADMIN — HYPROMELLOSE 2910 2 DROP: 5 SOLUTION OPHTHALMIC at 18:04

## 2018-01-01 RX ADMIN — HEPARIN SODIUM 5000 UNITS: 5000 INJECTION INTRAVENOUS; SUBCUTANEOUS at 12:22

## 2018-01-01 RX ADMIN — INSULIN LISPRO 3 UNITS: 100 INJECTION, SOLUTION INTRAVENOUS; SUBCUTANEOUS at 05:45

## 2018-01-01 RX ADMIN — HYPROMELLOSE 2910 2 DROP: 5 SOLUTION OPHTHALMIC at 18:54

## 2018-01-01 RX ADMIN — INSULIN GLARGINE 5 UNITS: 100 INJECTION, SOLUTION SUBCUTANEOUS at 11:25

## 2018-01-01 RX ADMIN — HEPARIN SODIUM 5000 UNITS: 5000 INJECTION INTRAVENOUS; SUBCUTANEOUS at 06:02

## 2018-01-01 RX ADMIN — Medication 10 ML: at 21:00

## 2018-01-01 RX ADMIN — INSULIN LISPRO 3 UNITS: 100 INJECTION, SOLUTION INTRAVENOUS; SUBCUTANEOUS at 01:39

## 2018-01-01 RX ADMIN — HEPARIN SODIUM 5000 UNITS: 5000 INJECTION INTRAVENOUS; SUBCUTANEOUS at 12:17

## 2018-01-01 RX ADMIN — MEROPENEM 500 MG: 500 INJECTION, POWDER, FOR SOLUTION INTRAVENOUS at 09:05

## 2018-01-01 RX ADMIN — CALCIUM GLUCONATE 4 G: 94 INJECTION, SOLUTION INTRAVENOUS at 11:09

## 2018-01-01 RX ADMIN — INSULIN LISPRO 9 UNITS: 100 INJECTION, SOLUTION INTRAVENOUS; SUBCUTANEOUS at 12:26

## 2018-01-01 RX ADMIN — HYDRALAZINE HYDROCHLORIDE 20 MG: 20 INJECTION INTRAMUSCULAR; INTRAVENOUS at 05:46

## 2018-01-01 RX ADMIN — HYPROMELLOSE 2910 2 DROP: 5 SOLUTION OPHTHALMIC at 17:06

## 2018-01-01 RX ADMIN — SODIUM CHLORIDE 1000 MG: 900 INJECTION, SOLUTION INTRAVENOUS at 21:31

## 2018-01-01 RX ADMIN — ALBUTEROL SULFATE 2.5 MG: 2.5 SOLUTION RESPIRATORY (INHALATION) at 07:27

## 2018-01-01 RX ADMIN — ALBUTEROL SULFATE 2.5 MG: 2.5 SOLUTION RESPIRATORY (INHALATION) at 02:00

## 2018-01-01 RX ADMIN — HYPROMELLOSE 2910 2 DROP: 5 SOLUTION OPHTHALMIC at 18:18

## 2018-01-01 RX ADMIN — Medication 10 ML: at 21:16

## 2018-01-01 RX ADMIN — Medication 20 ML: at 17:51

## 2018-01-01 RX ADMIN — INSULIN GLARGINE 5 UNITS: 100 INJECTION, SOLUTION SUBCUTANEOUS at 14:31

## 2018-01-01 RX ADMIN — Medication 130 MCG/MIN: at 23:52

## 2018-01-01 RX ADMIN — SODIUM CHLORIDE 40 MG: 9 INJECTION INTRAMUSCULAR; INTRAVENOUS; SUBCUTANEOUS at 08:28

## 2018-01-01 RX ADMIN — MEROPENEM 500 MG: 500 INJECTION, POWDER, FOR SOLUTION INTRAVENOUS at 03:58

## 2018-01-01 RX ADMIN — SODIUM CHLORIDE: 9 INJECTION, SOLUTION INTRAVENOUS at 08:31

## 2018-01-01 RX ADMIN — ALBUTEROL SULFATE 2.5 MG: 2.5 SOLUTION RESPIRATORY (INHALATION) at 08:06

## 2018-01-01 RX ADMIN — INSULIN LISPRO 3 UNITS: 100 INJECTION, SOLUTION INTRAVENOUS; SUBCUTANEOUS at 17:09

## 2018-01-01 RX ADMIN — INSULIN LISPRO 3 UNITS: 100 INJECTION, SOLUTION INTRAVENOUS; SUBCUTANEOUS at 21:53

## 2018-01-01 RX ADMIN — SODIUM CHLORIDE 40 MG: 9 INJECTION INTRAMUSCULAR; INTRAVENOUS; SUBCUTANEOUS at 08:30

## 2018-01-01 RX ADMIN — Medication 10 ML: at 22:36

## 2018-01-01 RX ADMIN — INSULIN GLARGINE 20 UNITS: 100 INJECTION, SOLUTION SUBCUTANEOUS at 09:45

## 2018-01-01 RX ADMIN — INSULIN LISPRO 3 UNITS: 100 INJECTION, SOLUTION INTRAVENOUS; SUBCUTANEOUS at 09:37

## 2018-01-01 RX ADMIN — INSULIN LISPRO 4 UNITS: 100 INJECTION, SOLUTION INTRAVENOUS; SUBCUTANEOUS at 12:20

## 2018-01-01 RX ADMIN — INSULIN LISPRO 3 UNITS: 100 INJECTION, SOLUTION INTRAVENOUS; SUBCUTANEOUS at 06:31

## 2018-01-01 RX ADMIN — ALBUTEROL SULFATE 2.5 MG: 2.5 SOLUTION RESPIRATORY (INHALATION) at 08:07

## 2018-01-01 RX ADMIN — ALBUTEROL SULFATE 2.5 MG: 2.5 SOLUTION RESPIRATORY (INHALATION) at 01:24

## 2018-01-01 RX ADMIN — SODIUM CHLORIDE 40 MG: 9 INJECTION INTRAMUSCULAR; INTRAVENOUS; SUBCUTANEOUS at 08:42

## 2018-01-01 RX ADMIN — AMLODIPINE BESYLATE 5 MG: 5 TABLET ORAL at 11:23

## 2018-01-01 RX ADMIN — Medication 20 ML: at 16:33

## 2018-01-01 RX ADMIN — SODIUM CHLORIDE 40 MG: 9 INJECTION INTRAMUSCULAR; INTRAVENOUS; SUBCUTANEOUS at 21:19

## 2018-01-01 RX ADMIN — HEPARIN SODIUM 5000 UNITS: 5000 INJECTION INTRAVENOUS; SUBCUTANEOUS at 07:28

## 2018-01-01 RX ADMIN — SODIUM CHLORIDE 40 MG: 9 INJECTION INTRAMUSCULAR; INTRAVENOUS; SUBCUTANEOUS at 08:56

## 2018-01-01 RX ADMIN — HEPARIN SODIUM 5000 UNITS: 5000 INJECTION INTRAVENOUS; SUBCUTANEOUS at 12:58

## 2018-01-01 RX ADMIN — LIDOCAINE HYDROCHLORIDE 20 MG: 20 INJECTION, SOLUTION EPIDURAL; INFILTRATION; INTRACAUDAL; PERINEURAL at 08:49

## 2018-01-01 RX ADMIN — ALBUTEROL SULFATE 2.5 MG: 2.5 SOLUTION RESPIRATORY (INHALATION) at 13:39

## 2018-01-01 RX ADMIN — MEROPENEM 500 MG: 500 INJECTION, POWDER, FOR SOLUTION INTRAVENOUS at 12:14

## 2018-01-01 RX ADMIN — METOPROLOL TARTRATE 50 MG: 50 TABLET ORAL at 09:23

## 2018-01-01 RX ADMIN — ACETYLCYSTEINE 400 MG: 100 INHALANT RESPIRATORY (INHALATION) at 12:03

## 2018-01-01 RX ADMIN — INSULIN LISPRO 2 UNITS: 100 INJECTION, SOLUTION INTRAVENOUS; SUBCUTANEOUS at 05:23

## 2018-01-01 RX ADMIN — SODIUM CHLORIDE 150 ML/HR: 900 INJECTION, SOLUTION INTRAVENOUS at 03:48

## 2018-01-01 RX ADMIN — INSULIN LISPRO 3 UNITS: 100 INJECTION, SOLUTION INTRAVENOUS; SUBCUTANEOUS at 05:55

## 2018-01-01 RX ADMIN — HEPARIN SODIUM 5000 UNITS: 5000 INJECTION INTRAVENOUS; SUBCUTANEOUS at 20:49

## 2018-01-01 RX ADMIN — MORPHINE SULFATE 2 MG: 2 INJECTION, SOLUTION INTRAMUSCULAR; INTRAVENOUS at 12:50

## 2018-01-01 RX ADMIN — HEPARIN SODIUM 5000 UNITS: 5000 INJECTION INTRAVENOUS; SUBCUTANEOUS at 21:42

## 2018-01-01 RX ADMIN — INSULIN GLARGINE 4 UNITS: 100 INJECTION, SOLUTION SUBCUTANEOUS at 11:00

## 2018-01-01 RX ADMIN — HYPROMELLOSE 2910 2 DROP: 5 SOLUTION OPHTHALMIC at 09:03

## 2018-01-01 RX ADMIN — ONDANSETRON 4 MG: 2 INJECTION INTRAMUSCULAR; INTRAVENOUS at 21:15

## 2018-01-01 RX ADMIN — SODIUM CHLORIDE 40 MG: 9 INJECTION INTRAMUSCULAR; INTRAVENOUS; SUBCUTANEOUS at 09:11

## 2018-01-01 RX ADMIN — SODIUM CHLORIDE 40 MG: 9 INJECTION INTRAMUSCULAR; INTRAVENOUS; SUBCUTANEOUS at 08:07

## 2018-01-01 RX ADMIN — Medication 10 ML: at 23:18

## 2018-01-01 RX ADMIN — HYPROMELLOSE 2910 2 DROP: 5 SOLUTION OPHTHALMIC at 10:29

## 2018-01-01 RX ADMIN — INSULIN LISPRO 6 UNITS: 100 INJECTION, SOLUTION INTRAVENOUS; SUBCUTANEOUS at 00:49

## 2018-01-01 RX ADMIN — INSULIN LISPRO 3 UNITS: 100 INJECTION, SOLUTION INTRAVENOUS; SUBCUTANEOUS at 13:33

## 2018-01-01 RX ADMIN — INSULIN GLARGINE 20 UNITS: 100 INJECTION, SOLUTION SUBCUTANEOUS at 08:36

## 2018-01-01 RX ADMIN — Medication 10 ML: at 18:31

## 2018-01-01 RX ADMIN — HYDRALAZINE HYDROCHLORIDE 20 MG: 20 INJECTION INTRAMUSCULAR; INTRAVENOUS at 21:09

## 2018-01-01 RX ADMIN — ALBUTEROL SULFATE 2.5 MG: 2.5 SOLUTION RESPIRATORY (INHALATION) at 20:33

## 2018-01-01 RX ADMIN — SODIUM CHLORIDE 750 MG: 900 INJECTION, SOLUTION INTRAVENOUS at 21:58

## 2018-01-01 RX ADMIN — HYPROMELLOSE 2910 2 DROP: 5 SOLUTION OPHTHALMIC at 08:20

## 2018-01-01 RX ADMIN — SODIUM CHLORIDE 750 MG: 900 INJECTION, SOLUTION INTRAVENOUS at 02:23

## 2018-01-01 RX ADMIN — ALBUMIN (HUMAN) 25 G: 0.25 INJECTION, SOLUTION INTRAVENOUS at 18:44

## 2018-01-01 RX ADMIN — INSULIN GLARGINE 18 UNITS: 100 INJECTION, SOLUTION SUBCUTANEOUS at 10:47

## 2018-01-01 RX ADMIN — INSULIN LISPRO 4 UNITS: 100 INJECTION, SOLUTION INTRAVENOUS; SUBCUTANEOUS at 06:13

## 2018-01-01 RX ADMIN — HYPROMELLOSE 2910 2 DROP: 5 SOLUTION OPHTHALMIC at 11:02

## 2018-01-01 RX ADMIN — INSULIN LISPRO 3 UNITS: 100 INJECTION, SOLUTION INTRAVENOUS; SUBCUTANEOUS at 18:17

## 2018-01-01 RX ADMIN — MEROPENEM 500 MG: 500 INJECTION, POWDER, FOR SOLUTION INTRAVENOUS at 22:15

## 2018-01-01 RX ADMIN — Medication 10 ML: at 05:16

## 2018-01-01 RX ADMIN — SODIUM CHLORIDE 150 ML/HR: 900 INJECTION, SOLUTION INTRAVENOUS at 17:00

## 2018-01-01 RX ADMIN — INSULIN LISPRO 3 UNITS: 100 INJECTION, SOLUTION INTRAVENOUS; SUBCUTANEOUS at 12:17

## 2018-01-01 RX ADMIN — INSULIN GLARGINE 10 UNITS: 100 INJECTION, SOLUTION SUBCUTANEOUS at 06:19

## 2018-01-01 RX ADMIN — INSULIN LISPRO 3 UNITS: 100 INJECTION, SOLUTION INTRAVENOUS; SUBCUTANEOUS at 06:00

## 2018-01-01 RX ADMIN — ENOXAPARIN SODIUM 40 MG: 100 INJECTION SUBCUTANEOUS at 12:11

## 2018-01-01 RX ADMIN — SENNOSIDES AND DOCUSATE SODIUM 2 TABLET: 8.6; 5 TABLET ORAL at 21:44

## 2018-01-01 RX ADMIN — INSULIN GLARGINE 27 UNITS: 100 INJECTION, SOLUTION SUBCUTANEOUS at 09:00

## 2018-01-01 RX ADMIN — INSULIN LISPRO 3 UNITS: 100 INJECTION, SOLUTION INTRAVENOUS; SUBCUTANEOUS at 18:53

## 2018-01-01 RX ADMIN — INSULIN LISPRO 2 UNITS: 100 INJECTION, SOLUTION INTRAVENOUS; SUBCUTANEOUS at 06:19

## 2018-01-01 RX ADMIN — Medication 10 ML: at 05:31

## 2018-01-01 RX ADMIN — ALBUTEROL SULFATE 2.5 MG: 2.5 SOLUTION RESPIRATORY (INHALATION) at 07:38

## 2018-01-01 RX ADMIN — INSULIN LISPRO 9 UNITS: 100 INJECTION, SOLUTION INTRAVENOUS; SUBCUTANEOUS at 12:16

## 2018-01-01 RX ADMIN — INSULIN LISPRO 6 UNITS: 100 INJECTION, SOLUTION INTRAVENOUS; SUBCUTANEOUS at 00:05

## 2018-01-01 RX ADMIN — ALBUTEROL SULFATE 2.5 MG: 2.5 SOLUTION RESPIRATORY (INHALATION) at 08:22

## 2018-01-01 RX ADMIN — DOPAMINE HYDROCHLORIDE IN DEXTROSE 5 MCG/KG/MIN: 1.6 INJECTION, SOLUTION INTRAVENOUS at 11:32

## 2018-01-01 RX ADMIN — SODIUM CHLORIDE 1000 MG: 900 INJECTION, SOLUTION INTRAVENOUS at 18:34

## 2018-01-01 RX ADMIN — Medication 10 ML: at 21:46

## 2018-01-01 RX ADMIN — HEPARIN SODIUM 5000 UNITS: 5000 INJECTION INTRAVENOUS; SUBCUTANEOUS at 22:24

## 2018-01-01 RX ADMIN — SODIUM CHLORIDE 40 MG: 9 INJECTION INTRAMUSCULAR; INTRAVENOUS; SUBCUTANEOUS at 09:08

## 2018-01-01 RX ADMIN — ALBUTEROL SULFATE 2.5 MG: 2.5 SOLUTION RESPIRATORY (INHALATION) at 08:35

## 2018-01-01 RX ADMIN — INSULIN GLARGINE 23 UNITS: 100 INJECTION, SOLUTION SUBCUTANEOUS at 10:29

## 2018-01-01 RX ADMIN — HYPROMELLOSE 2910 2 DROP: 5 SOLUTION OPHTHALMIC at 20:00

## 2018-01-01 RX ADMIN — Medication 10 ML: at 13:52

## 2018-01-01 RX ADMIN — INSULIN LISPRO 6 UNITS: 100 INJECTION, SOLUTION INTRAVENOUS; SUBCUTANEOUS at 11:46

## 2018-01-01 RX ADMIN — Medication 10 ML: at 22:25

## 2018-01-01 RX ADMIN — INSULIN LISPRO 6 UNITS: 100 INJECTION, SOLUTION INTRAVENOUS; SUBCUTANEOUS at 06:17

## 2018-01-01 RX ADMIN — ALBUTEROL SULFATE 2.5 MG: 2.5 SOLUTION RESPIRATORY (INHALATION) at 01:36

## 2018-01-01 RX ADMIN — MULTIPLE VITAMIN LIQUID 30 ML: LIQUID at 08:28

## 2018-01-01 RX ADMIN — INSULIN GLARGINE 25 UNITS: 100 INJECTION, SOLUTION SUBCUTANEOUS at 08:31

## 2018-01-01 RX ADMIN — HEPARIN SODIUM 5000 UNITS: 5000 INJECTION INTRAVENOUS; SUBCUTANEOUS at 06:09

## 2018-01-01 RX ADMIN — PIPERACILLIN SODIUM,TAZOBACTAM SODIUM 4.5 G: 4; .5 INJECTION, POWDER, FOR SOLUTION INTRAVENOUS at 22:31

## 2018-01-01 RX ADMIN — Medication 10 ML: at 14:25

## 2018-01-01 RX ADMIN — HEPARIN SODIUM 5000 UNITS: 5000 INJECTION INTRAVENOUS; SUBCUTANEOUS at 21:37

## 2018-01-01 RX ADMIN — Medication 10 ML: at 13:37

## 2018-01-01 RX ADMIN — INSULIN LISPRO 3 UNITS: 100 INJECTION, SOLUTION INTRAVENOUS; SUBCUTANEOUS at 07:02

## 2018-01-01 RX ADMIN — AMLODIPINE BESYLATE 5 MG: 5 TABLET ORAL at 08:29

## 2018-01-01 RX ADMIN — INSULIN LISPRO 6 UNITS: 100 INJECTION, SOLUTION INTRAVENOUS; SUBCUTANEOUS at 09:22

## 2018-01-01 RX ADMIN — INSULIN GLARGINE 10 UNITS: 100 INJECTION, SOLUTION SUBCUTANEOUS at 00:28

## 2018-01-01 RX ADMIN — HEPARIN SODIUM 5000 UNITS: 5000 INJECTION INTRAVENOUS; SUBCUTANEOUS at 22:12

## 2018-01-01 RX ADMIN — ALBUTEROL SULFATE 2.5 MG: 2.5 SOLUTION RESPIRATORY (INHALATION) at 01:34

## 2018-01-01 RX ADMIN — Medication 10 ML: at 06:04

## 2018-01-01 RX ADMIN — SODIUM CHLORIDE 40 MG: 9 INJECTION INTRAMUSCULAR; INTRAVENOUS; SUBCUTANEOUS at 10:30

## 2018-01-01 RX ADMIN — Medication 10 ML: at 16:20

## 2018-01-01 RX ADMIN — INSULIN GLARGINE 6 UNITS: 100 INJECTION, SOLUTION SUBCUTANEOUS at 21:01

## 2018-01-01 RX ADMIN — SODIUM CHLORIDE 1000 ML: 900 INJECTION, SOLUTION INTRAVENOUS at 01:23

## 2018-01-01 RX ADMIN — INSULIN LISPRO 3 UNITS: 100 INJECTION, SOLUTION INTRAVENOUS; SUBCUTANEOUS at 05:42

## 2018-01-01 RX ADMIN — MIDAZOLAM HYDROCHLORIDE 2 MG: 1 INJECTION, SOLUTION INTRAMUSCULAR; INTRAVENOUS at 17:00

## 2018-01-01 RX ADMIN — INSULIN GLARGINE 30 UNITS: 100 INJECTION, SOLUTION SUBCUTANEOUS at 08:28

## 2018-01-01 RX ADMIN — DOPAMINE HYDROCHLORIDE IN DEXTROSE 4 MCG/KG/MIN: 1.6 INJECTION, SOLUTION INTRAVENOUS at 04:15

## 2018-01-01 RX ADMIN — HYPROMELLOSE 2910 2 DROP: 5 SOLUTION OPHTHALMIC at 08:57

## 2018-01-01 RX ADMIN — INSULIN GLARGINE 20 UNITS: 100 INJECTION, SOLUTION SUBCUTANEOUS at 09:31

## 2018-01-01 RX ADMIN — MULTIPLE VITAMIN LIQUID 30 ML: LIQUID at 08:14

## 2018-01-01 RX ADMIN — SODIUM CHLORIDE 1000 ML: 900 INJECTION, SOLUTION INTRAVENOUS at 12:09

## 2018-01-01 RX ADMIN — HYPROMELLOSE 2910 2 DROP: 5 SOLUTION OPHTHALMIC at 08:30

## 2018-01-01 RX ADMIN — INSULIN GLARGINE 25 UNITS: 100 INJECTION, SOLUTION SUBCUTANEOUS at 09:00

## 2018-01-01 RX ADMIN — MEROPENEM 500 MG: 500 INJECTION, POWDER, FOR SOLUTION INTRAVENOUS at 22:32

## 2018-01-01 RX ADMIN — HEPARIN SODIUM 5000 UNITS: 5000 INJECTION INTRAVENOUS; SUBCUTANEOUS at 12:28

## 2018-01-01 RX ADMIN — SODIUM CHLORIDE 40 MG: 9 INJECTION INTRAMUSCULAR; INTRAVENOUS; SUBCUTANEOUS at 08:41

## 2018-01-01 RX ADMIN — ALBUTEROL SULFATE 2.5 MG: 2.5 SOLUTION RESPIRATORY (INHALATION) at 19:33

## 2018-01-01 RX ADMIN — INSULIN LISPRO 6 UNITS: 100 INJECTION, SOLUTION INTRAVENOUS; SUBCUTANEOUS at 12:36

## 2018-01-01 RX ADMIN — ALBUTEROL SULFATE 2.5 MG: 2.5 SOLUTION RESPIRATORY (INHALATION) at 19:45

## 2018-01-01 RX ADMIN — ACETYLCYSTEINE 400 MG: 100 SOLUTION ORAL; RESPIRATORY (INHALATION) at 08:58

## 2018-01-01 RX ADMIN — ALBUTEROL SULFATE 2.5 MG: 2.5 SOLUTION RESPIRATORY (INHALATION) at 01:28

## 2018-01-01 RX ADMIN — INSULIN LISPRO 6 UNITS: 100 INJECTION, SOLUTION INTRAVENOUS; SUBCUTANEOUS at 06:55

## 2018-01-01 RX ADMIN — CEFEPIME HYDROCHLORIDE 0.5 G: 1 INJECTION, POWDER, FOR SOLUTION INTRAMUSCULAR; INTRAVENOUS at 00:00

## 2018-01-01 RX ADMIN — INSULIN GLARGINE 30 UNITS: 100 INJECTION, SOLUTION SUBCUTANEOUS at 09:00

## 2018-01-01 RX ADMIN — HYPROMELLOSE 2910 2 DROP: 5 SOLUTION OPHTHALMIC at 17:36

## 2018-01-01 RX ADMIN — ALBUTEROL SULFATE 2.5 MG: 2.5 SOLUTION RESPIRATORY (INHALATION) at 20:19

## 2018-01-01 RX ADMIN — ACETYLCYSTEINE 400 MG: 100 SOLUTION ORAL; RESPIRATORY (INHALATION) at 13:22

## 2018-01-01 RX ADMIN — HEPARIN SODIUM 5000 UNITS: 5000 INJECTION INTRAVENOUS; SUBCUTANEOUS at 05:55

## 2018-01-01 RX ADMIN — ACETAMINOPHEN 325 MG: 650 SOLUTION ORAL at 15:40

## 2018-01-01 RX ADMIN — ASPIRIN 325 MG ORAL TABLET 325 MG: 325 PILL ORAL at 09:38

## 2018-01-01 RX ADMIN — ALBUTEROL SULFATE 2.5 MG: 2.5 SOLUTION RESPIRATORY (INHALATION) at 19:27

## 2018-01-01 RX ADMIN — ACYCLOVIR SODIUM 330.5 MG: 500 INJECTION, POWDER, LYOPHILIZED, FOR SOLUTION INTRAVENOUS at 12:35

## 2018-01-01 RX ADMIN — INSULIN LISPRO 3 UNITS: 100 INJECTION, SOLUTION INTRAVENOUS; SUBCUTANEOUS at 18:43

## 2018-01-01 RX ADMIN — ACETAMINOPHEN 325 MG: 650 SOLUTION ORAL at 04:56

## 2018-01-01 RX ADMIN — ACYCLOVIR SODIUM 330.5 MG: 500 INJECTION, POWDER, LYOPHILIZED, FOR SOLUTION INTRAVENOUS at 13:30

## 2018-01-01 RX ADMIN — INSULIN GLARGINE 30 UNITS: 100 INJECTION, SOLUTION SUBCUTANEOUS at 08:46

## 2018-01-01 RX ADMIN — MEROPENEM 500 MG: 500 INJECTION, POWDER, FOR SOLUTION INTRAVENOUS at 10:40

## 2018-01-01 RX ADMIN — INSULIN GLARGINE 15 UNITS: 100 INJECTION, SOLUTION SUBCUTANEOUS at 08:23

## 2018-01-01 RX ADMIN — Medication 10 ML: at 01:33

## 2018-01-01 RX ADMIN — HYPROMELLOSE 2910 2 DROP: 5 SOLUTION OPHTHALMIC at 17:34

## 2018-01-01 RX ADMIN — CEFEPIME HYDROCHLORIDE 0.5 G: 1 INJECTION, POWDER, FOR SOLUTION INTRAMUSCULAR; INTRAVENOUS at 00:30

## 2018-01-01 RX ADMIN — HYPROMELLOSE 2910 2 DROP: 5 SOLUTION OPHTHALMIC at 17:51

## 2018-01-01 RX ADMIN — MORPHINE SULFATE 2 MG: 2 INJECTION, SOLUTION INTRAMUSCULAR; INTRAVENOUS at 16:05

## 2018-01-01 RX ADMIN — Medication 100 UNITS: at 12:58

## 2018-01-01 RX ADMIN — Medication 20 ML: at 18:31

## 2018-01-01 RX ADMIN — INSULIN GLARGINE 18 UNITS: 100 INJECTION, SOLUTION SUBCUTANEOUS at 10:26

## 2018-01-01 RX ADMIN — MEROPENEM 500 MG: 500 INJECTION, POWDER, FOR SOLUTION INTRAVENOUS at 21:21

## 2018-01-01 RX ADMIN — INSULIN GLARGINE 30 UNITS: 100 INJECTION, SOLUTION SUBCUTANEOUS at 08:17

## 2018-01-01 RX ADMIN — HEPARIN SODIUM 5000 UNITS: 5000 INJECTION INTRAVENOUS; SUBCUTANEOUS at 04:56

## 2018-01-01 RX ADMIN — HEPARIN SODIUM 5000 UNITS: 5000 INJECTION INTRAVENOUS; SUBCUTANEOUS at 20:46

## 2018-01-01 RX ADMIN — MEROPENEM 500 MG: 500 INJECTION, POWDER, FOR SOLUTION INTRAVENOUS at 21:57

## 2018-01-01 RX ADMIN — INSULIN GLARGINE 5 UNITS: 100 INJECTION, SOLUTION SUBCUTANEOUS at 16:05

## 2018-01-01 RX ADMIN — ALBUTEROL SULFATE 2.5 MG: 2.5 SOLUTION RESPIRATORY (INHALATION) at 08:45

## 2018-01-01 RX ADMIN — PIPERACILLIN SODIUM,TAZOBACTAM SODIUM 4.5 G: 4; .5 INJECTION, POWDER, FOR SOLUTION INTRAVENOUS at 09:52

## 2018-01-01 RX ADMIN — Medication 10 ML: at 22:00

## 2018-01-01 RX ADMIN — HEPARIN SODIUM 5000 UNITS: 5000 INJECTION INTRAVENOUS; SUBCUTANEOUS at 05:30

## 2018-01-01 RX ADMIN — Medication 10 ML: at 16:43

## 2018-01-01 RX ADMIN — SODIUM CHLORIDE 40 MG: 9 INJECTION INTRAMUSCULAR; INTRAVENOUS; SUBCUTANEOUS at 21:56

## 2018-01-01 RX ADMIN — HYPROMELLOSE 2910 2 DROP: 5 SOLUTION OPHTHALMIC at 17:22

## 2018-01-01 RX ADMIN — INSULIN LISPRO 3 UNITS: 100 INJECTION, SOLUTION INTRAVENOUS; SUBCUTANEOUS at 11:42

## 2018-01-01 RX ADMIN — FLUCONAZOLE 200 MG: 2 INJECTION, SOLUTION INTRAVENOUS at 16:14

## 2018-01-01 RX ADMIN — HYPROMELLOSE 2910 2 DROP: 5 SOLUTION OPHTHALMIC at 12:54

## 2018-01-01 RX ADMIN — Medication 10 ML: at 21:18

## 2018-01-01 RX ADMIN — INSULIN LISPRO 6 UNITS: 100 INJECTION, SOLUTION INTRAVENOUS; SUBCUTANEOUS at 18:40

## 2018-01-01 RX ADMIN — HEPARIN SODIUM 5000 UNITS: 5000 INJECTION INTRAVENOUS; SUBCUTANEOUS at 05:06

## 2018-01-01 RX ADMIN — INSULIN LISPRO 9 UNITS: 100 INJECTION, SOLUTION INTRAVENOUS; SUBCUTANEOUS at 12:20

## 2018-01-01 RX ADMIN — SODIUM CHLORIDE 40 MG: 9 INJECTION INTRAMUSCULAR; INTRAVENOUS; SUBCUTANEOUS at 08:32

## 2018-01-01 RX ADMIN — PIPERACILLIN SODIUM,TAZOBACTAM SODIUM 3.38 G: 3; .375 INJECTION, POWDER, FOR SOLUTION INTRAVENOUS at 01:01

## 2018-01-01 RX ADMIN — INSULIN LISPRO 6 UNITS: 100 INJECTION, SOLUTION INTRAVENOUS; SUBCUTANEOUS at 00:24

## 2018-01-01 RX ADMIN — ALBUTEROL SULFATE 2.5 MG: 2.5 SOLUTION RESPIRATORY (INHALATION) at 20:05

## 2018-01-01 RX ADMIN — INSULIN LISPRO 6 UNITS: 100 INJECTION, SOLUTION INTRAVENOUS; SUBCUTANEOUS at 00:59

## 2018-01-01 RX ADMIN — HYPROMELLOSE 2910 2 DROP: 5 SOLUTION OPHTHALMIC at 08:00

## 2018-01-01 RX ADMIN — ALBUTEROL SULFATE 2.5 MG: 2.5 SOLUTION RESPIRATORY (INHALATION) at 08:36

## 2018-01-01 RX ADMIN — DOXYCYCLINE 100 MG: 100 INJECTION, POWDER, LYOPHILIZED, FOR SOLUTION INTRAVENOUS at 01:54

## 2018-01-01 RX ADMIN — ALBUTEROL SULFATE 2.5 MG: 2.5 SOLUTION RESPIRATORY (INHALATION) at 15:11

## 2018-01-01 RX ADMIN — ACYCLOVIR SODIUM 330.5 MG: 500 INJECTION, POWDER, LYOPHILIZED, FOR SOLUTION INTRAVENOUS at 13:08

## 2018-01-01 RX ADMIN — HEPARIN SODIUM 5000 UNITS: 5000 INJECTION INTRAVENOUS; SUBCUTANEOUS at 22:49

## 2018-01-01 RX ADMIN — SODIUM CHLORIDE 40 MG: 9 INJECTION INTRAMUSCULAR; INTRAVENOUS; SUBCUTANEOUS at 08:46

## 2018-01-01 RX ADMIN — ALBUTEROL SULFATE 2.5 MG: 2.5 SOLUTION RESPIRATORY (INHALATION) at 21:06

## 2018-01-01 RX ADMIN — IOPAMIDOL 91 ML: 612 INJECTION, SOLUTION INTRAVENOUS at 15:32

## 2018-01-01 RX ADMIN — MEROPENEM 500 MG: 500 INJECTION, POWDER, FOR SOLUTION INTRAVENOUS at 10:28

## 2018-01-01 RX ADMIN — ALBUTEROL SULFATE 2.5 MG: 2.5 SOLUTION RESPIRATORY (INHALATION) at 09:15

## 2018-01-01 RX ADMIN — INSULIN GLARGINE 30 UNITS: 100 INJECTION, SOLUTION SUBCUTANEOUS at 09:25

## 2018-01-01 RX ADMIN — SODIUM CHLORIDE 40 MG: 9 INJECTION INTRAMUSCULAR; INTRAVENOUS; SUBCUTANEOUS at 09:23

## 2018-01-01 RX ADMIN — INSULIN LISPRO 3 UNITS: 100 INJECTION, SOLUTION INTRAVENOUS; SUBCUTANEOUS at 12:16

## 2018-01-01 RX ADMIN — ALBUTEROL SULFATE 2.5 MG: 2.5 SOLUTION RESPIRATORY (INHALATION) at 20:59

## 2018-01-01 RX ADMIN — VANCOMYCIN HYDROCHLORIDE 2000 MG: 10 INJECTION, POWDER, LYOPHILIZED, FOR SOLUTION INTRAVENOUS at 12:47

## 2018-01-01 RX ADMIN — PIPERACILLIN SODIUM,TAZOBACTAM SODIUM 4.5 G: 4; .5 INJECTION, POWDER, FOR SOLUTION INTRAVENOUS at 22:30

## 2018-01-01 RX ADMIN — ALBUTEROL SULFATE 2.5 MG: 2.5 SOLUTION RESPIRATORY (INHALATION) at 13:07

## 2018-01-01 RX ADMIN — MULTIPLE VITAMIN LIQUID 30 ML: LIQUID at 10:27

## 2018-01-01 RX ADMIN — ALBUTEROL SULFATE 2.5 MG: 2.5 SOLUTION RESPIRATORY (INHALATION) at 20:22

## 2018-01-01 RX ADMIN — INSULIN LISPRO 6 UNITS: 100 INJECTION, SOLUTION INTRAVENOUS; SUBCUTANEOUS at 23:46

## 2018-01-01 RX ADMIN — FOLIC ACID 1 MG: 1 TABLET ORAL at 08:44

## 2018-01-01 RX ADMIN — HEPARIN SODIUM 5000 UNITS: 5000 INJECTION INTRAVENOUS; SUBCUTANEOUS at 18:34

## 2018-01-01 RX ADMIN — ALBUTEROL SULFATE 2.5 MG: 2.5 SOLUTION RESPIRATORY (INHALATION) at 08:55

## 2018-01-01 RX ADMIN — HYPROMELLOSE 2910 2 DROP: 5 SOLUTION OPHTHALMIC at 10:30

## 2018-01-01 RX ADMIN — HYPROMELLOSE 2910 2 DROP: 5 SOLUTION OPHTHALMIC at 17:52

## 2018-01-01 RX ADMIN — INSULIN GLARGINE 15 UNITS: 100 INJECTION, SOLUTION SUBCUTANEOUS at 08:05

## 2018-01-01 RX ADMIN — INSULIN LISPRO 3 UNITS: 100 INJECTION, SOLUTION INTRAVENOUS; SUBCUTANEOUS at 00:53

## 2018-01-01 RX ADMIN — HYPROMELLOSE 2910 2 DROP: 5 SOLUTION OPHTHALMIC at 19:04

## 2018-01-01 RX ADMIN — ACETYLCYSTEINE 400 MG: 100 SOLUTION ORAL; RESPIRATORY (INHALATION) at 08:32

## 2018-01-01 RX ADMIN — INSULIN LISPRO 3 UNITS: 100 INJECTION, SOLUTION INTRAVENOUS; SUBCUTANEOUS at 12:45

## 2018-01-01 RX ADMIN — PIPERACILLIN SODIUM,TAZOBACTAM SODIUM 4.5 G: 4; .5 INJECTION, POWDER, FOR SOLUTION INTRAVENOUS at 22:46

## 2018-01-01 RX ADMIN — CEFEPIME HYDROCHLORIDE 0.5 G: 1 INJECTION, POWDER, FOR SOLUTION INTRAMUSCULAR; INTRAVENOUS at 21:55

## 2018-01-01 RX ADMIN — INSULIN GLARGINE 30 UNITS: 100 INJECTION, SOLUTION SUBCUTANEOUS at 08:20

## 2018-01-01 RX ADMIN — LABETALOL 20 MG/4 ML (5 MG/ML) INTRAVENOUS SYRINGE 20 MG: at 02:36

## 2018-01-01 RX ADMIN — HEPARIN SODIUM 5000 UNITS: 5000 INJECTION INTRAVENOUS; SUBCUTANEOUS at 22:02

## 2018-01-01 RX ADMIN — HYPROMELLOSE 2910 2 DROP: 5 SOLUTION OPHTHALMIC at 17:30

## 2018-01-01 RX ADMIN — DOPAMINE HYDROCHLORIDE IN DEXTROSE 5 MCG/KG/MIN: 1.6 INJECTION, SOLUTION INTRAVENOUS at 22:08

## 2018-01-01 RX ADMIN — HEPARIN SODIUM 5000 UNITS: 5000 INJECTION INTRAVENOUS; SUBCUTANEOUS at 17:26

## 2018-01-01 RX ADMIN — Medication 10 ML: at 06:17

## 2018-01-01 RX ADMIN — ACETYLCYSTEINE 400 MG: 100 SOLUTION ORAL; RESPIRATORY (INHALATION) at 20:40

## 2018-01-01 RX ADMIN — Medication 20 ML: at 16:21

## 2018-01-01 RX ADMIN — ERYTHROPOIETIN 40000 UNITS: 40000 INJECTION, SOLUTION INTRAVENOUS; SUBCUTANEOUS at 14:06

## 2018-01-01 RX ADMIN — MULTIPLE VITAMIN LIQUID 30 ML: LIQUID at 09:20

## 2018-01-01 RX ADMIN — DOPAMINE HYDROCHLORIDE IN DEXTROSE 2.5 MCG/KG/MIN: 1.6 INJECTION, SOLUTION INTRAVENOUS at 04:48

## 2018-01-01 RX ADMIN — ALBUTEROL SULFATE 2.5 MG: 2.5 SOLUTION RESPIRATORY (INHALATION) at 13:57

## 2018-01-01 RX ADMIN — ALBUTEROL SULFATE 2.5 MG: 2.5 SOLUTION RESPIRATORY (INHALATION) at 20:20

## 2018-01-01 RX ADMIN — INSULIN LISPRO 3 UNITS: 100 INJECTION, SOLUTION INTRAVENOUS; SUBCUTANEOUS at 11:20

## 2018-01-01 RX ADMIN — MULTIPLE VITAMIN LIQUID 30 ML: LIQUID at 09:23

## 2018-01-01 RX ADMIN — ACETAMINOPHEN 325 MG: 650 SOLUTION ORAL at 18:01

## 2018-01-01 RX ADMIN — HEPARIN SODIUM 5000 UNITS: 5000 INJECTION INTRAVENOUS; SUBCUTANEOUS at 21:09

## 2018-01-01 RX ADMIN — HYPROMELLOSE 2910 2 DROP: 5 SOLUTION OPHTHALMIC at 17:18

## 2018-01-01 RX ADMIN — ALBUTEROL SULFATE 2.5 MG: 2.5 SOLUTION RESPIRATORY (INHALATION) at 07:36

## 2018-01-01 RX ADMIN — ALBUTEROL SULFATE 2.5 MG: 2.5 SOLUTION RESPIRATORY (INHALATION) at 07:52

## 2018-01-01 RX ADMIN — ALBUTEROL SULFATE 2.5 MG: 2.5 SOLUTION RESPIRATORY (INHALATION) at 08:39

## 2018-01-01 RX ADMIN — HEPARIN SODIUM 5000 UNITS: 5000 INJECTION INTRAVENOUS; SUBCUTANEOUS at 14:24

## 2018-01-01 RX ADMIN — INSULIN LISPRO 6 UNITS: 100 INJECTION, SOLUTION INTRAVENOUS; SUBCUTANEOUS at 17:46

## 2018-01-01 RX ADMIN — INSULIN LISPRO 3 UNITS: 100 INJECTION, SOLUTION INTRAVENOUS; SUBCUTANEOUS at 19:07

## 2018-01-01 RX ADMIN — HEPARIN SODIUM 5000 UNITS: 5000 INJECTION INTRAVENOUS; SUBCUTANEOUS at 13:10

## 2018-01-01 RX ADMIN — Medication 10 ML: at 07:30

## 2018-01-01 RX ADMIN — HEPARIN SODIUM 5000 UNITS: 5000 INJECTION INTRAVENOUS; SUBCUTANEOUS at 04:36

## 2018-01-01 RX ADMIN — ALBUTEROL SULFATE 2.5 MG: 2.5 SOLUTION RESPIRATORY (INHALATION) at 07:22

## 2018-01-01 RX ADMIN — Medication 10 ML: at 05:00

## 2018-01-01 RX ADMIN — ALBUTEROL SULFATE 2.5 MG: 2.5 SOLUTION RESPIRATORY (INHALATION) at 13:44

## 2018-01-01 RX ADMIN — Medication 10 ML: at 22:03

## 2018-01-01 RX ADMIN — CALCIUM GLUCONATE 2 G: 94 INJECTION, SOLUTION INTRAVENOUS at 15:25

## 2018-01-01 RX ADMIN — HEPARIN SODIUM 5000 UNITS: 5000 INJECTION INTRAVENOUS; SUBCUTANEOUS at 12:41

## 2018-01-01 RX ADMIN — INSULIN LISPRO 3 UNITS: 100 INJECTION, SOLUTION INTRAVENOUS; SUBCUTANEOUS at 18:19

## 2018-01-01 RX ADMIN — ACYCLOVIR SODIUM 330.5 MG: 500 INJECTION, POWDER, LYOPHILIZED, FOR SOLUTION INTRAVENOUS at 14:20

## 2018-01-01 RX ADMIN — INSULIN GLARGINE 22 UNITS: 100 INJECTION, SOLUTION SUBCUTANEOUS at 09:17

## 2018-01-01 RX ADMIN — SODIUM CHLORIDE 40 MG: 9 INJECTION INTRAMUSCULAR; INTRAVENOUS; SUBCUTANEOUS at 09:19

## 2018-01-01 RX ADMIN — HYDRALAZINE HYDROCHLORIDE 20 MG: 20 INJECTION INTRAMUSCULAR; INTRAVENOUS at 06:04

## 2018-01-01 RX ADMIN — HEPARIN SODIUM 5000 UNITS: 5000 INJECTION INTRAVENOUS; SUBCUTANEOUS at 06:00

## 2018-01-01 RX ADMIN — MEROPENEM 500 MG: 500 INJECTION, POWDER, FOR SOLUTION INTRAVENOUS at 21:56

## 2018-01-01 RX ADMIN — INSULIN LISPRO 12 UNITS: 100 INJECTION, SOLUTION INTRAVENOUS; SUBCUTANEOUS at 12:21

## 2018-01-01 RX ADMIN — HEPARIN SODIUM 5000 UNITS: 5000 INJECTION INTRAVENOUS; SUBCUTANEOUS at 21:00

## 2018-01-01 RX ADMIN — INSULIN LISPRO 6 UNITS: 100 INJECTION, SOLUTION INTRAVENOUS; SUBCUTANEOUS at 18:03

## 2018-01-01 RX ADMIN — SODIUM CHLORIDE 40 MG: 9 INJECTION INTRAMUSCULAR; INTRAVENOUS; SUBCUTANEOUS at 08:23

## 2018-01-01 RX ADMIN — INSULIN LISPRO 4 UNITS: 100 INJECTION, SOLUTION INTRAVENOUS; SUBCUTANEOUS at 17:50

## 2018-01-01 RX ADMIN — ACYCLOVIR SODIUM 330.5 MG: 500 INJECTION, POWDER, LYOPHILIZED, FOR SOLUTION INTRAVENOUS at 15:19

## 2018-01-01 RX ADMIN — INSULIN LISPRO 6 UNITS: 100 INJECTION, SOLUTION INTRAVENOUS; SUBCUTANEOUS at 13:15

## 2018-01-01 RX ADMIN — INSULIN LISPRO 6 UNITS: 100 INJECTION, SOLUTION INTRAVENOUS; SUBCUTANEOUS at 13:07

## 2018-01-01 RX ADMIN — SODIUM CHLORIDE 40 MG: 9 INJECTION INTRAMUSCULAR; INTRAVENOUS; SUBCUTANEOUS at 09:00

## 2018-01-01 RX ADMIN — HEPARIN SODIUM 5000 UNITS: 5000 INJECTION INTRAVENOUS; SUBCUTANEOUS at 05:43

## 2018-01-01 RX ADMIN — MEROPENEM 500 MG: 500 INJECTION, POWDER, FOR SOLUTION INTRAVENOUS at 09:45

## 2018-01-01 RX ADMIN — HYPROMELLOSE 2910 2 DROP: 5 SOLUTION OPHTHALMIC at 08:21

## 2018-01-01 RX ADMIN — ALBUTEROL SULFATE: 2.5 SOLUTION RESPIRATORY (INHALATION) at 12:00

## 2018-01-01 RX ADMIN — INSULIN LISPRO 3 UNITS: 100 INJECTION, SOLUTION INTRAVENOUS; SUBCUTANEOUS at 17:28

## 2018-01-01 RX ADMIN — HYPROMELLOSE 2910 2 DROP: 5 SOLUTION OPHTHALMIC at 12:11

## 2018-01-01 RX ADMIN — LIDOCAINE HYDROCHLORIDE: 20 JELLY TOPICAL at 01:08

## 2018-01-01 RX ADMIN — INSULIN LISPRO 9 UNITS: 100 INJECTION, SOLUTION INTRAVENOUS; SUBCUTANEOUS at 21:44

## 2018-01-01 RX ADMIN — Medication 10 ML: at 06:41

## 2018-01-01 RX ADMIN — INSULIN GLARGINE 18 UNITS: 100 INJECTION, SOLUTION SUBCUTANEOUS at 09:08

## 2018-01-01 RX ADMIN — HEPARIN SODIUM 5000 UNITS: 5000 INJECTION INTRAVENOUS; SUBCUTANEOUS at 06:44

## 2018-01-01 RX ADMIN — Medication 10 ML: at 17:21

## 2018-01-01 RX ADMIN — HEPARIN SODIUM 5000 UNITS: 5000 INJECTION INTRAVENOUS; SUBCUTANEOUS at 13:55

## 2018-01-01 RX ADMIN — VANCOMYCIN HYDROCHLORIDE 1500 MG: 10 INJECTION, POWDER, LYOPHILIZED, FOR SOLUTION INTRAVENOUS at 02:00

## 2018-01-01 RX ADMIN — ALBUMIN (HUMAN) 25 G: 0.25 INJECTION, SOLUTION INTRAVENOUS at 05:44

## 2018-01-01 RX ADMIN — ACETAMINOPHEN 650 MG: 325 TABLET, FILM COATED ORAL at 21:55

## 2018-01-01 RX ADMIN — INSULIN LISPRO 6 UNITS: 100 INJECTION, SOLUTION INTRAVENOUS; SUBCUTANEOUS at 11:59

## 2018-01-01 RX ADMIN — ONDANSETRON 4 MG: 2 INJECTION INTRAMUSCULAR; INTRAVENOUS at 00:19

## 2018-01-01 RX ADMIN — ALBUTEROL SULFATE 2.5 MG: 2.5 SOLUTION RESPIRATORY (INHALATION) at 01:05

## 2018-01-01 RX ADMIN — INSULIN GLARGINE 23 UNITS: 100 INJECTION, SOLUTION SUBCUTANEOUS at 10:28

## 2018-01-01 RX ADMIN — HYPROMELLOSE 2910 2 DROP: 5 SOLUTION OPHTHALMIC at 18:00

## 2018-01-01 RX ADMIN — ACYCLOVIR SODIUM 330.5 MG: 500 INJECTION, POWDER, LYOPHILIZED, FOR SOLUTION INTRAVENOUS at 14:24

## 2018-01-01 RX ADMIN — Medication 20 ML: at 17:50

## 2018-01-01 RX ADMIN — INSULIN GLARGINE 27 UNITS: 100 INJECTION, SOLUTION SUBCUTANEOUS at 08:29

## 2018-01-01 RX ADMIN — HYPROMELLOSE 2910 2 DROP: 5 SOLUTION OPHTHALMIC at 17:45

## 2018-01-01 RX ADMIN — INSULIN LISPRO 3 UNITS: 100 INJECTION, SOLUTION INTRAVENOUS; SUBCUTANEOUS at 06:30

## 2018-01-01 RX ADMIN — VANCOMYCIN HYDROCHLORIDE 2000 MG: 10 INJECTION, POWDER, LYOPHILIZED, FOR SOLUTION INTRAVENOUS at 13:00

## 2018-01-01 RX ADMIN — Medication 20 ML: at 15:53

## 2018-01-01 RX ADMIN — ALBUTEROL SULFATE 2.5 MG: 2.5 SOLUTION RESPIRATORY (INHALATION) at 19:48

## 2018-01-01 RX ADMIN — Medication 20 ML: at 15:59

## 2018-01-01 RX ADMIN — HYPROMELLOSE 2910 2 DROP: 5 SOLUTION OPHTHALMIC at 21:30

## 2018-01-01 RX ADMIN — INSULIN LISPRO 3 UNITS: 100 INJECTION, SOLUTION INTRAVENOUS; SUBCUTANEOUS at 06:43

## 2018-01-01 RX ADMIN — INSULIN GLARGINE 18 UNITS: 100 INJECTION, SOLUTION SUBCUTANEOUS at 09:16

## 2018-01-01 RX ADMIN — MEROPENEM 500 MG: 500 INJECTION, POWDER, FOR SOLUTION INTRAVENOUS at 16:04

## 2018-01-01 RX ADMIN — Medication 10 ML: at 04:36

## 2018-01-01 RX ADMIN — CIPROFLOXACIN 200 MG: 2 INJECTION, SOLUTION INTRAVENOUS at 23:30

## 2018-01-01 RX ADMIN — SODIUM CHLORIDE: 450 INJECTION, SOLUTION INTRAVENOUS at 23:43

## 2018-01-01 RX ADMIN — Medication 80 MCG/MIN: at 02:20

## 2018-01-01 RX ADMIN — INSULIN GLARGINE 27 UNITS: 100 INJECTION, SOLUTION SUBCUTANEOUS at 08:33

## 2018-01-01 RX ADMIN — ALBUTEROL SULFATE 2.5 MG: 2.5 SOLUTION RESPIRATORY (INHALATION) at 07:08

## 2018-01-01 RX ADMIN — ACETYLCYSTEINE 400 MG: 100 SOLUTION ORAL; RESPIRATORY (INHALATION) at 20:00

## 2018-01-01 RX ADMIN — HEPARIN SODIUM 5000 UNITS: 5000 INJECTION INTRAVENOUS; SUBCUTANEOUS at 20:16

## 2018-01-01 RX ADMIN — INSULIN GLARGINE 6 UNITS: 100 INJECTION, SOLUTION SUBCUTANEOUS at 08:40

## 2018-01-01 RX ADMIN — PIPERACILLIN SODIUM,TAZOBACTAM SODIUM 4.5 G: 4; .5 INJECTION, POWDER, FOR SOLUTION INTRAVENOUS at 10:56

## 2018-01-01 RX ADMIN — MULTIPLE VITAMIN LIQUID 30 ML: LIQUID at 08:27

## 2018-01-01 RX ADMIN — MULTIPLE VITAMIN LIQUID 30 ML: LIQUID at 09:08

## 2018-01-01 RX ADMIN — ALBUTEROL SULFATE 2.5 MG: 2.5 SOLUTION RESPIRATORY (INHALATION) at 07:41

## 2018-01-01 RX ADMIN — METOPROLOL TARTRATE 50 MG: 50 TABLET ORAL at 22:46

## 2018-01-01 RX ADMIN — ALBUTEROL SULFATE 2.5 MG: 2.5 SOLUTION RESPIRATORY (INHALATION) at 08:34

## 2018-01-01 RX ADMIN — ACYCLOVIR SODIUM 330.5 MG: 500 INJECTION, POWDER, LYOPHILIZED, FOR SOLUTION INTRAVENOUS at 15:51

## 2018-01-01 RX ADMIN — INSULIN LISPRO 3 UNITS: 100 INJECTION, SOLUTION INTRAVENOUS; SUBCUTANEOUS at 06:18

## 2018-01-01 RX ADMIN — PIPERACILLIN SODIUM,TAZOBACTAM SODIUM 4.5 G: 4; .5 INJECTION, POWDER, FOR SOLUTION INTRAVENOUS at 21:31

## 2018-01-01 RX ADMIN — INSULIN LISPRO 3 UNITS: 100 INJECTION, SOLUTION INTRAVENOUS; SUBCUTANEOUS at 17:37

## 2018-01-01 RX ADMIN — INSULIN LISPRO 6 UNITS: 100 INJECTION, SOLUTION INTRAVENOUS; SUBCUTANEOUS at 00:30

## 2018-01-01 RX ADMIN — INSULIN LISPRO 4 UNITS: 100 INJECTION, SOLUTION INTRAVENOUS; SUBCUTANEOUS at 17:05

## 2018-01-01 RX ADMIN — ALBUTEROL SULFATE 2.5 MG: 2.5 SOLUTION RESPIRATORY (INHALATION) at 01:17

## 2018-01-01 RX ADMIN — HEPARIN SODIUM 5000 UNITS: 5000 INJECTION INTRAVENOUS; SUBCUTANEOUS at 22:14

## 2018-01-01 RX ADMIN — FLUCONAZOLE 200 MG: 2 INJECTION, SOLUTION INTRAVENOUS at 14:52

## 2018-01-01 RX ADMIN — ONDANSETRON 4 MG: 2 INJECTION INTRAMUSCULAR; INTRAVENOUS at 05:39

## 2018-01-01 RX ADMIN — HEPARIN SODIUM 5000 UNITS: 5000 INJECTION INTRAVENOUS; SUBCUTANEOUS at 12:51

## 2018-01-01 RX ADMIN — FLUCONAZOLE 200 MG: 2 INJECTION, SOLUTION INTRAVENOUS at 16:04

## 2018-01-01 RX ADMIN — MULTIPLE VITAMIN LIQUID 30 ML: LIQUID at 08:19

## 2018-01-01 RX ADMIN — IOHEXOL 50 ML: 240 INJECTION, SOLUTION INTRATHECAL; INTRAVASCULAR; INTRAVENOUS; ORAL at 12:55

## 2018-01-01 RX ADMIN — ERYTHROPOIETIN 40000 UNITS: 40000 INJECTION, SOLUTION INTRAVENOUS; SUBCUTANEOUS at 16:20

## 2018-01-01 RX ADMIN — PIPERACILLIN SODIUM,TAZOBACTAM SODIUM 3.38 G: 3; .375 INJECTION, POWDER, FOR SOLUTION INTRAVENOUS at 17:11

## 2018-01-01 RX ADMIN — SODIUM CHLORIDE 750 MG: 900 INJECTION, SOLUTION INTRAVENOUS at 21:35

## 2018-01-01 RX ADMIN — HEPARIN SODIUM 5000 UNITS: 5000 INJECTION INTRAVENOUS; SUBCUTANEOUS at 05:08

## 2018-01-01 RX ADMIN — ALBUTEROL SULFATE 2.5 MG: 2.5 SOLUTION RESPIRATORY (INHALATION) at 13:02

## 2018-01-01 RX ADMIN — PIPERACILLIN SODIUM,TAZOBACTAM SODIUM 4.5 G: 4; .5 INJECTION, POWDER, FOR SOLUTION INTRAVENOUS at 09:31

## 2018-01-01 RX ADMIN — HYPROMELLOSE 2910 2 DROP: 5 SOLUTION OPHTHALMIC at 18:20

## 2018-01-01 RX ADMIN — ACYCLOVIR SODIUM 330.5 MG: 500 INJECTION, POWDER, LYOPHILIZED, FOR SOLUTION INTRAVENOUS at 16:20

## 2018-01-01 RX ADMIN — ALBUTEROL SULFATE 2.5 MG: 2.5 SOLUTION RESPIRATORY (INHALATION) at 20:24

## 2018-01-01 RX ADMIN — HYPROMELLOSE 2910 2 DROP: 5 SOLUTION OPHTHALMIC at 21:45

## 2018-01-01 RX ADMIN — INSULIN LISPRO 6 UNITS: 100 INJECTION, SOLUTION INTRAVENOUS; SUBCUTANEOUS at 00:06

## 2018-01-01 RX ADMIN — ALBUTEROL SULFATE 2.5 MG: 2.5 SOLUTION RESPIRATORY (INHALATION) at 20:09

## 2018-01-01 RX ADMIN — FLUCONAZOLE 200 MG: 2 INJECTION, SOLUTION INTRAVENOUS at 16:05

## 2018-01-01 RX ADMIN — CEFEPIME HYDROCHLORIDE 0.5 G: 1 INJECTION, POWDER, FOR SOLUTION INTRAMUSCULAR; INTRAVENOUS at 23:42

## 2018-01-01 RX ADMIN — INSULIN LISPRO 4 UNITS: 100 INJECTION, SOLUTION INTRAVENOUS; SUBCUTANEOUS at 23:51

## 2018-01-01 RX ADMIN — Medication 100 MG: at 09:38

## 2018-01-01 RX ADMIN — MEROPENEM 500 MG: 500 INJECTION, POWDER, FOR SOLUTION INTRAVENOUS at 09:17

## 2018-01-01 RX ADMIN — ALBUTEROL SULFATE 2.5 MG: 2.5 SOLUTION RESPIRATORY (INHALATION) at 08:02

## 2018-01-01 RX ADMIN — MEROPENEM 500 MG: 500 INJECTION, POWDER, FOR SOLUTION INTRAVENOUS at 03:05

## 2018-01-01 RX ADMIN — CEFEPIME HYDROCHLORIDE 0.5 G: 1 INJECTION, POWDER, FOR SOLUTION INTRAMUSCULAR; INTRAVENOUS at 23:25

## 2018-01-01 RX ADMIN — ALBUTEROL SULFATE 2.5 MG: 2.5 SOLUTION RESPIRATORY (INHALATION) at 13:16

## 2018-01-01 RX ADMIN — HYPROMELLOSE 2910 2 DROP: 5 SOLUTION OPHTHALMIC at 11:47

## 2018-01-01 RX ADMIN — INSULIN LISPRO 9 UNITS: 100 INJECTION, SOLUTION INTRAVENOUS; SUBCUTANEOUS at 23:19

## 2018-01-01 RX ADMIN — INSULIN LISPRO 9 UNITS: 100 INJECTION, SOLUTION INTRAVENOUS; SUBCUTANEOUS at 22:19

## 2018-01-01 RX ADMIN — SODIUM CHLORIDE 40 MG: 9 INJECTION INTRAMUSCULAR; INTRAVENOUS; SUBCUTANEOUS at 08:19

## 2018-01-01 RX ADMIN — SODIUM CHLORIDE 150 ML/HR: 900 INJECTION, SOLUTION INTRAVENOUS at 07:35

## 2018-01-01 RX ADMIN — SODIUM CHLORIDE 40 MG: 9 INJECTION INTRAMUSCULAR; INTRAVENOUS; SUBCUTANEOUS at 08:15

## 2018-01-01 RX ADMIN — INSULIN GLARGINE 25 UNITS: 100 INJECTION, SOLUTION SUBCUTANEOUS at 12:21

## 2018-01-01 RX ADMIN — ALBUTEROL SULFATE 2.5 MG: 2.5 SOLUTION RESPIRATORY (INHALATION) at 20:39

## 2018-01-01 RX ADMIN — Medication 10 ML: at 14:10

## 2018-01-01 RX ADMIN — SODIUM CHLORIDE 40 MG: 9 INJECTION INTRAMUSCULAR; INTRAVENOUS; SUBCUTANEOUS at 08:00

## 2018-01-01 RX ADMIN — HYDROCODONE BITARTRATE AND ACETAMINOPHEN 1 TABLET: 5; 325 TABLET ORAL at 23:25

## 2018-01-01 RX ADMIN — CEFEPIME HYDROCHLORIDE 1 G: 1 INJECTION, POWDER, FOR SOLUTION INTRAMUSCULAR; INTRAVENOUS at 16:15

## 2018-01-01 RX ADMIN — FENTANYL CITRATE 25 MCG: 50 INJECTION, SOLUTION INTRAMUSCULAR; INTRAVENOUS at 08:49

## 2018-01-01 RX ADMIN — ACETAMINOPHEN 325 MG: 650 SOLUTION ORAL at 01:22

## 2018-01-01 RX ADMIN — MEROPENEM 500 MG: 500 INJECTION, POWDER, FOR SOLUTION INTRAVENOUS at 15:50

## 2018-01-01 RX ADMIN — SODIUM CHLORIDE 40 MG: 9 INJECTION INTRAMUSCULAR; INTRAVENOUS; SUBCUTANEOUS at 08:59

## 2018-01-01 RX ADMIN — INSULIN GLARGINE 25 UNITS: 100 INJECTION, SOLUTION SUBCUTANEOUS at 09:30

## 2018-01-01 RX ADMIN — LIDOCAINE HYDROCHLORIDE 40 MG: 20 INJECTION, SOLUTION EPIDURAL; INFILTRATION; INTRACAUDAL; PERINEURAL at 11:47

## 2018-01-01 RX ADMIN — SODIUM CHLORIDE 1000 MG: 900 INJECTION, SOLUTION INTRAVENOUS at 22:47

## 2018-01-01 RX ADMIN — MEROPENEM 500 MG: 500 INJECTION, POWDER, FOR SOLUTION INTRAVENOUS at 02:23

## 2018-01-01 RX ADMIN — FLUCONAZOLE 200 MG: 2 INJECTION, SOLUTION INTRAVENOUS at 17:06

## 2018-01-01 RX ADMIN — ACETAMINOPHEN 650 MG: 325 TABLET, FILM COATED ORAL at 23:19

## 2018-01-01 RX ADMIN — SODIUM CHLORIDE: 450 INJECTION, SOLUTION INTRAVENOUS at 16:24

## 2018-01-01 RX ADMIN — INSULIN LISPRO 3 UNITS: 100 INJECTION, SOLUTION INTRAVENOUS; SUBCUTANEOUS at 06:24

## 2018-01-01 RX ADMIN — ALBUTEROL SULFATE 2.5 MG: 2.5 SOLUTION RESPIRATORY (INHALATION) at 20:54

## 2018-01-01 RX ADMIN — LABETALOL 20 MG/4 ML (5 MG/ML) INTRAVENOUS SYRINGE 20 MG: at 22:48

## 2018-01-01 RX ADMIN — Medication 10 ML: at 05:14

## 2018-01-01 RX ADMIN — LABETALOL 20 MG/4 ML (5 MG/ML) INTRAVENOUS SYRINGE 20 MG: at 06:44

## 2018-01-01 RX ADMIN — CEFEPIME HYDROCHLORIDE 0.5 G: 1 INJECTION, POWDER, FOR SOLUTION INTRAMUSCULAR; INTRAVENOUS at 00:32

## 2018-01-01 RX ADMIN — HYPROMELLOSE 2910 2 DROP: 5 SOLUTION OPHTHALMIC at 09:07

## 2018-01-01 RX ADMIN — HYPROMELLOSE 2910 2 DROP: 5 SOLUTION OPHTHALMIC at 08:14

## 2018-01-01 RX ADMIN — PIPERACILLIN SODIUM,TAZOBACTAM SODIUM 4.5 G: 4; .5 INJECTION, POWDER, FOR SOLUTION INTRAVENOUS at 09:37

## 2018-01-01 RX ADMIN — ACETAMINOPHEN 325 MG: 650 SOLUTION ORAL at 12:18

## 2018-01-01 RX ADMIN — HYPROMELLOSE 2910 2 DROP: 5 SOLUTION OPHTHALMIC at 19:08

## 2018-01-01 RX ADMIN — INSULIN GLARGINE 17 UNITS: 100 INJECTION, SOLUTION SUBCUTANEOUS at 08:00

## 2018-01-01 RX ADMIN — CEFEPIME HYDROCHLORIDE 0.5 G: 1 INJECTION, POWDER, FOR SOLUTION INTRAMUSCULAR; INTRAVENOUS at 23:00

## 2018-01-01 RX ADMIN — SENNOSIDES AND DOCUSATE SODIUM 2 TABLET: 8.6; 5 TABLET ORAL at 23:18

## 2018-01-01 RX ADMIN — LABETALOL 20 MG/4 ML (5 MG/ML) INTRAVENOUS SYRINGE 20 MG: at 17:57

## 2018-01-01 RX ADMIN — Medication 10 ML: at 21:35

## 2018-01-01 RX ADMIN — INSULIN LISPRO 2 UNITS: 100 INJECTION, SOLUTION INTRAVENOUS; SUBCUTANEOUS at 18:36

## 2018-01-01 RX ADMIN — AMLODIPINE BESYLATE 5 MG: 5 TABLET ORAL at 08:15

## 2018-01-01 RX ADMIN — HYPROMELLOSE 2910 2 DROP: 5 SOLUTION OPHTHALMIC at 18:03

## 2018-01-01 RX ADMIN — SODIUM CHLORIDE 1000 ML: 900 INJECTION, SOLUTION INTRAVENOUS at 15:08

## 2018-01-01 RX ADMIN — SODIUM CHLORIDE 40 MG: 9 INJECTION INTRAMUSCULAR; INTRAVENOUS; SUBCUTANEOUS at 09:37

## 2018-01-01 RX ADMIN — SODIUM CHLORIDE 40 MG: 9 INJECTION INTRAMUSCULAR; INTRAVENOUS; SUBCUTANEOUS at 21:45

## 2018-01-01 RX ADMIN — SODIUM CHLORIDE 40 MG: 9 INJECTION INTRAMUSCULAR; INTRAVENOUS; SUBCUTANEOUS at 08:58

## 2018-01-01 RX ADMIN — INSULIN GLARGINE 4 UNITS: 100 INJECTION, SOLUTION SUBCUTANEOUS at 10:25

## 2018-01-01 RX ADMIN — HEPARIN SODIUM 5000 UNITS: 5000 INJECTION INTRAVENOUS; SUBCUTANEOUS at 21:46

## 2018-01-01 RX ADMIN — INSULIN GLARGINE 14 UNITS: 100 INJECTION, SOLUTION SUBCUTANEOUS at 08:46

## 2018-01-01 RX ADMIN — INSULIN LISPRO 3 UNITS: 100 INJECTION, SOLUTION INTRAVENOUS; SUBCUTANEOUS at 07:03

## 2018-01-01 RX ADMIN — ALBUTEROL SULFATE 2.5 MG: 2.5 SOLUTION RESPIRATORY (INHALATION) at 20:46

## 2018-01-01 RX ADMIN — INSULIN LISPRO 4 UNITS: 100 INJECTION, SOLUTION INTRAVENOUS; SUBCUTANEOUS at 13:05

## 2018-01-01 RX ADMIN — ACETYLCYSTEINE 400 MG: 100 SOLUTION ORAL; RESPIRATORY (INHALATION) at 08:45

## 2018-01-01 RX ADMIN — HEPARIN SODIUM 5000 UNITS: 5000 INJECTION INTRAVENOUS; SUBCUTANEOUS at 04:53

## 2018-01-01 RX ADMIN — HYPROMELLOSE 2910 2 DROP: 5 SOLUTION OPHTHALMIC at 09:01

## 2018-01-01 RX ADMIN — SODIUM CHLORIDE 1000 MG: 900 INJECTION, SOLUTION INTRAVENOUS at 06:23

## 2018-01-01 RX ADMIN — ALBUTEROL SULFATE 2.5 MG: 2.5 SOLUTION RESPIRATORY (INHALATION) at 09:08

## 2018-01-01 RX ADMIN — ALBUTEROL SULFATE 2.5 MG: 2.5 SOLUTION RESPIRATORY (INHALATION) at 08:16

## 2018-01-01 RX ADMIN — ALBUTEROL SULFATE 2.5 MG: 2.5 SOLUTION RESPIRATORY (INHALATION) at 02:32

## 2018-01-01 RX ADMIN — Medication 10 ML: at 06:29

## 2018-01-01 RX ADMIN — ALBUTEROL SULFATE 2.5 MG: 2.5 SOLUTION RESPIRATORY (INHALATION) at 07:35

## 2018-01-01 RX ADMIN — ACETYLCYSTEINE 400 MG: 100 SOLUTION ORAL; RESPIRATORY (INHALATION) at 07:36

## 2018-01-01 RX ADMIN — FENTANYL CITRATE 25 MCG: 50 INJECTION, SOLUTION INTRAMUSCULAR; INTRAVENOUS at 09:19

## 2018-01-01 RX ADMIN — ALBUTEROL SULFATE 2.5 MG: 2.5 SOLUTION RESPIRATORY (INHALATION) at 01:15

## 2018-01-01 RX ADMIN — INSULIN LISPRO 3 UNITS: 100 INJECTION, SOLUTION INTRAVENOUS; SUBCUTANEOUS at 05:43

## 2018-01-01 RX ADMIN — MULTIPLE VITAMIN LIQUID 30 ML: LIQUID at 10:30

## 2018-01-01 RX ADMIN — INSULIN LISPRO 3 UNITS: 100 INJECTION, SOLUTION INTRAVENOUS; SUBCUTANEOUS at 18:08

## 2018-01-01 RX ADMIN — Medication 20 ML: at 18:55

## 2018-01-01 RX ADMIN — ALBUTEROL SULFATE 2.5 MG: 2.5 SOLUTION RESPIRATORY (INHALATION) at 01:14

## 2018-01-01 RX ADMIN — PHENYLEPHRINE HYDROCHLORIDE 25 MCG/MIN: 10 INJECTION INTRAVENOUS at 13:55

## 2018-01-01 RX ADMIN — ACETAMINOPHEN 325 MG: 650 SOLUTION ORAL at 21:30

## 2018-01-01 RX ADMIN — Medication 10 ML: at 15:59

## 2018-01-01 RX ADMIN — PIPERACILLIN SODIUM,TAZOBACTAM SODIUM 3.38 G: 3; .375 INJECTION, POWDER, FOR SOLUTION INTRAVENOUS at 16:39

## 2018-01-01 RX ADMIN — HEPARIN SODIUM 5000 UNITS: 5000 INJECTION INTRAVENOUS; SUBCUTANEOUS at 13:47

## 2018-01-01 RX ADMIN — INSULIN LISPRO 3 UNITS: 100 INJECTION, SOLUTION INTRAVENOUS; SUBCUTANEOUS at 00:46

## 2018-01-01 RX ADMIN — ACETAMINOPHEN 325 MG: 650 SOLUTION ORAL at 05:02

## 2018-01-01 RX ADMIN — INSULIN LISPRO 3 UNITS: 100 INJECTION, SOLUTION INTRAVENOUS; SUBCUTANEOUS at 05:12

## 2018-01-01 RX ADMIN — Medication 10 ML: at 15:53

## 2018-01-01 RX ADMIN — Medication 20 ML: at 16:43

## 2018-01-01 RX ADMIN — HYPROMELLOSE 2910 2 DROP: 5 SOLUTION OPHTHALMIC at 08:32

## 2018-01-01 RX ADMIN — ALBUTEROL SULFATE 2.5 MG: 2.5 SOLUTION RESPIRATORY (INHALATION) at 19:16

## 2018-01-01 RX ADMIN — ALBUTEROL SULFATE 2.5 MG: 2.5 SOLUTION RESPIRATORY (INHALATION) at 20:30

## 2018-01-01 RX ADMIN — HEPARIN SODIUM 5000 UNITS: 5000 INJECTION INTRAVENOUS; SUBCUTANEOUS at 12:18

## 2018-01-01 RX ADMIN — AMLODIPINE BESYLATE 5 MG: 5 TABLET ORAL at 13:55

## 2018-01-01 RX ADMIN — SODIUM CHLORIDE 40 MG: 9 INJECTION INTRAMUSCULAR; INTRAVENOUS; SUBCUTANEOUS at 20:36

## 2018-01-01 RX ADMIN — ALBUTEROL SULFATE 2.5 MG: 2.5 SOLUTION RESPIRATORY (INHALATION) at 20:43

## 2018-01-01 RX ADMIN — SODIUM CHLORIDE 500 ML/HR: 900 INJECTION, SOLUTION INTRAVENOUS at 21:49

## 2018-01-01 RX ADMIN — ALBUTEROL SULFATE 2.5 MG: 2.5 SOLUTION RESPIRATORY (INHALATION) at 07:17

## 2018-01-01 RX ADMIN — INSULIN LISPRO 6 UNITS: 100 INJECTION, SOLUTION INTRAVENOUS; SUBCUTANEOUS at 13:09

## 2018-01-01 RX ADMIN — Medication 10 ML: at 05:36

## 2018-01-01 RX ADMIN — HEPARIN SODIUM 5000 UNITS: 5000 INJECTION, SOLUTION INTRAVENOUS; SUBCUTANEOUS at 13:44

## 2018-01-01 RX ADMIN — ALBUTEROL SULFATE 2.5 MG: 2.5 SOLUTION RESPIRATORY (INHALATION) at 01:53

## 2018-01-01 RX ADMIN — ACETAMINOPHEN 325 MG: 650 SOLUTION ORAL at 09:39

## 2018-01-01 RX ADMIN — HYPROMELLOSE 2910 2 DROP: 5 SOLUTION OPHTHALMIC at 18:11

## 2018-01-01 RX ADMIN — INSULIN GLARGINE 7 UNITS: 100 INJECTION, SOLUTION SUBCUTANEOUS at 12:21

## 2018-01-01 RX ADMIN — INSULIN LISPRO 6 UNITS: 100 INJECTION, SOLUTION INTRAVENOUS; SUBCUTANEOUS at 00:47

## 2018-01-01 RX ADMIN — HEPARIN SODIUM 5000 UNITS: 5000 INJECTION INTRAVENOUS; SUBCUTANEOUS at 06:17

## 2018-01-01 RX ADMIN — INSULIN LISPRO 3 UNITS: 100 INJECTION, SOLUTION INTRAVENOUS; SUBCUTANEOUS at 06:28

## 2018-01-01 RX ADMIN — FLUCONAZOLE 200 MG: 2 INJECTION, SOLUTION INTRAVENOUS at 14:55

## 2018-01-01 RX ADMIN — ALBUTEROL SULFATE 2.5 MG: 2.5 SOLUTION RESPIRATORY (INHALATION) at 01:31

## 2018-01-01 RX ADMIN — HEPARIN SODIUM 5000 UNITS: 5000 INJECTION INTRAVENOUS; SUBCUTANEOUS at 13:42

## 2018-01-01 RX ADMIN — DOPAMINE HYDROCHLORIDE IN DEXTROSE 6 MCG/KG/MIN: 1.6 INJECTION, SOLUTION INTRAVENOUS at 22:15

## 2018-01-01 RX ADMIN — Medication 20 ML: at 16:42

## 2018-01-01 RX ADMIN — INSULIN LISPRO 3 UNITS: 100 INJECTION, SOLUTION INTRAVENOUS; SUBCUTANEOUS at 19:02

## 2018-01-01 RX ADMIN — HEPARIN SODIUM 5000 UNITS: 5000 INJECTION INTRAVENOUS; SUBCUTANEOUS at 12:13

## 2018-01-01 RX ADMIN — SODIUM POLYSTYRENE SULFONATE 15 G: 15 SUSPENSION ORAL; RECTAL at 05:09

## 2018-01-01 RX ADMIN — ACYCLOVIR SODIUM 330.5 MG: 500 INJECTION, POWDER, LYOPHILIZED, FOR SOLUTION INTRAVENOUS at 13:10

## 2018-01-01 RX ADMIN — HYPROMELLOSE 2910 2 DROP: 5 SOLUTION OPHTHALMIC at 17:42

## 2018-01-01 RX ADMIN — ATORVASTATIN CALCIUM 80 MG: 40 TABLET, FILM COATED ORAL at 21:54

## 2018-01-01 RX ADMIN — HEPARIN SODIUM 5000 UNITS: 5000 INJECTION INTRAVENOUS; SUBCUTANEOUS at 14:09

## 2018-01-01 RX ADMIN — ALBUTEROL SULFATE 2.5 MG: 2.5 SOLUTION RESPIRATORY (INHALATION) at 08:48

## 2018-01-01 RX ADMIN — ALBUTEROL SULFATE 2.5 MG: 2.5 SOLUTION RESPIRATORY (INHALATION) at 08:54

## 2018-01-01 RX ADMIN — HEPARIN SODIUM 5000 UNITS: 5000 INJECTION INTRAVENOUS; SUBCUTANEOUS at 14:15

## 2018-01-01 RX ADMIN — PIPERACILLIN SODIUM,TAZOBACTAM SODIUM 3.38 G: 3; .375 INJECTION, POWDER, FOR SOLUTION INTRAVENOUS at 01:53

## 2018-01-01 RX ADMIN — HYPROMELLOSE 2910 2 DROP: 5 SOLUTION OPHTHALMIC at 21:43

## 2018-09-10 PROBLEM — E86.0 DEHYDRATION: Status: ACTIVE | Noted: 2018-01-01

## 2018-09-10 PROBLEM — I63.9 STROKE (HCC): Status: ACTIVE | Noted: 2018-01-01

## 2018-09-10 PROBLEM — I63.9 STROKE (CEREBRUM) (HCC): Status: ACTIVE | Noted: 2018-01-01

## 2018-09-10 PROBLEM — M62.82 RHABDOMYOLYSIS: Status: ACTIVE | Noted: 2018-01-01

## 2018-09-10 PROBLEM — I10 HYPERTENSION: Chronic | Status: ACTIVE | Noted: 2018-01-01

## 2018-09-10 PROBLEM — E11.9 DIABETES MELLITUS TYPE 2, CONTROLLED (HCC): Chronic | Status: ACTIVE | Noted: 2018-01-01

## 2018-09-10 PROBLEM — Z91.14 NON COMPLIANCE W MEDICATION REGIMEN: Status: ACTIVE | Noted: 2018-01-01

## 2018-09-10 NOTE — PROGRESS NOTES
Problem: Falls - Risk of 
Goal: *Absence of Falls Document Redgie Curet Fall Risk and appropriate interventions in the flowsheet. Outcome: Progressing Towards Goal 
Fall Risk Interventions: 
  
 
  
 
  
 
Elimination Interventions: Urinal in reach, Patient to call for help with toileting needs, Call light in reach Problem: Pressure Injury - Risk of 
Goal: *Prevention of pressure injury Document Mitul Scale and appropriate interventions in the flowsheet. Pressure Injury Interventions: 
Sensory Interventions: Assess changes in LOC, Minimize linen layers, Pressure redistribution bed/mattress (bed type) Moisture Interventions: Absorbent underpads Activity Interventions: Pressure redistribution bed/mattress(bed type), Increase time out of bed Mobility Interventions: HOB 30 degrees or less, Pressure redistribution bed/mattress (bed type), Turn and reposition approx. every two hours(pillow and wedges) Nutrition Interventions: Document food/fluid/supplement intake

## 2018-09-10 NOTE — PROGRESS NOTES
[phone] [id number]- Patient arrived to unit from ED accompanied by RN and ED Tech. Patient alert and oriented to person, situation, and time, disoriented to place (forgets name of hospital). Right facial droop noted along with RUE and RLE flaccid. Decreased sensation to right side. Patient turned in bed, noted to have three small open area (stage 2) to buttocks, patient reports sitting in same chair since Saturday. Patient left in bed with call bell in reach, encouraged to call for assistance, voiced understanding. 1640- Per Dr Catalino Lam, put patient on sliding scale and check sugar ACHS. Informed patient has passed dysphagia screen, orders received for Diabetic Orders received for NS at 150 ml/hr. RBV. 1758- PRN Labetolol given for BP above goal. WIll continue to monitor. 1930-Bedside and Verbal shift change report given to Alfredo Mobley RN  (oncoming nurse) by Ritu Eddy (offgoing nurse). Report included the following information SBAR, Kardex, Intake/Output, MAR and Recent Results.

## 2018-09-10 NOTE — H&P
GENERAL GENERIC H&P/CONSULT Franklin Cat is a 57 y.o. male with No significant past medical history presents to the ED for complaints of intermittent right and left sided weakness onset about 2 days ago noting his LLE just gave out as he was trying to get up from his couch and then noted the next day he could not move his RUE. He denies any trauma. In ED Code S was called and patient was found to have HTN along with Rhabdo and RUE and RLE weakness. No sob, cp, fever, chills, n/v, or any other acute complaints at this time. He will be admitted for stroke workup along with rhabdo. No past medical history on file. No past surgical history on file. Prior to Admission medications Not on File No Known Allergies Social History Substance Use Topics  Smoking status: Not on file  Smokeless tobacco: Not on file  Alcohol use Not on file No family history on file. Review of Systems Constitutional: Negative for unexpected weight change. HENT: Negative. Eyes: Negative. Respiratory: Negative. Cardiovascular: Negative. Gastrointestinal: Negative. Genitourinary: Negative. Musculoskeletal: Negative. Neurological: Positive for weakness. Psychiatric/Behavioral: Negative.    
 
 
Objective: 
 
  
  
Patient Vitals for the past 8 hrs: 
 BP Temp Pulse Resp SpO2 Height Weight  
09/10/18 1400 (!) 197/109 - 85 20 92 % - -  
09/10/18 1350 (!) 192/147 - 86 19 96 % - -  
09/10/18 1345 (!) 217/119 - 87 20 91 % - -  
09/10/18 1340 (!) 222/138 - 86 21 92 % - -  
09/10/18 1335 (!) 220/126 - 86 19 92 % - -  
09/10/18 1330 (!) 214/106 - 85 22 92 % - -  
09/10/18 1325 (!) 222/163 - 86 21 92 % - -  
09/10/18 1320 (!) 201/95 - 86 20 94 % - -  
09/10/18 1315 (!) 196/170 - 84 20 93 % - -  
09/10/18 1310 (!) 190/111 - 85 21 92 % - -  
09/10/18 1305 (!) 194/99 - 87 21 92 % - -  
09/10/18 1300 (!) 210/102 - 88 20 95 % - -  
09/10/18 1245 (!) 197/102 - 90 19 94 % - -  
 09/10/18 1240 (!) 191/144 - 90 19 95 % - -  
09/10/18 1235 (!) 200/96 - 93 17 92 % - -  
09/10/18 1230 (!) 193/100 - 90 18 93 % - -  
09/10/18 1225 - - - - - 5' 7\" (1.702 m) 98.4 kg (217 lb)  
09/10/18 1224 - 98.5 °F (36.9 °C) - - - - -  
09/10/18 1215 (!) 192/104 - 89 20 95 % - -  
09/10/18 1211 (!) 192/106 - - - - - -  
09/10/18 1210 (!) 190/102 - 90 20 94 % - -  
09/10/18 1205 (!) 192/106 - 92 21 96 % - -  
09/10/18 1204 - - 92 17 96 % - -  
09/10/18 1200 (!) 187/98 - 90 20 95 % - -  
09/10/18 1148 - - 92 25 94 % - - Physical Exam  
Constitutional: No distress. HENT:  
Head: Normocephalic. Eyes: Pupils are equal, round, and reactive to light. Neck: Normal range of motion. No JVD present. Cardiovascular: Normal rate and regular rhythm. No murmur heard. Pulmonary/Chest: Effort normal and breath sounds normal.  
Abdominal: Soft. Bowel sounds are normal.  
Musculoskeletal:  
0/5 RU and RL ext. Neurological: He is alert. Left sided facial droop noted Skin: He is not diaphoretic. Labs:   
Recent Results (from the past 24 hour(s)) CBC W/O DIFF Collection Time: 09/10/18 12:31 PM  
Result Value Ref Range WBC 12.9 4.6 - 13.2 K/uL  
 RBC 5.78 (H) 4.70 - 5.50 M/uL  
 HGB 17.4 (H) 13.0 - 16.0 g/dL HCT 48.7 (H) 36.0 - 48.0 % MCV 84.3 74.0 - 97.0 FL  
 MCH 30.1 24.0 - 34.0 PG  
 MCHC 35.7 31.0 - 37.0 g/dL  
 RDW 13.2 11.6 - 14.5 % PLATELET 755 179 - 893 K/uL MPV 9.6 9.2 - 31.5 FL  
METABOLIC PANEL, COMPREHENSIVE Collection Time: 09/10/18 12:31 PM  
Result Value Ref Range Sodium 139 136 - 145 mmol/L Potassium 3.6 3.5 - 5.5 mmol/L Chloride 101 100 - 108 mmol/L  
 CO2 28 21 - 32 mmol/L Anion gap 10 3.0 - 18 mmol/L Glucose 325 (H) 74 - 99 mg/dL BUN 9 7.0 - 18 MG/DL Creatinine 1.26 0.6 - 1.3 MG/DL  
 BUN/Creatinine ratio 7 (L) 12 - 20 GFR est AA >60 >60 ml/min/1.73m2 GFR est non-AA 58 (L) >60 ml/min/1.73m2  Calcium 8.5 8.5 - 10.1 MG/DL  
 Bilirubin, total 0.8 0.2 - 1.0 MG/DL  
 ALT (SGPT) 24 16 - 61 U/L  
 AST (SGOT) 88 (H) 15 - 37 U/L Alk. phosphatase 108 45 - 117 U/L Protein, total 6.8 6.4 - 8.2 g/dL Albumin 2.8 (L) 3.4 - 5.0 g/dL Globulin 4.0 2.0 - 4.0 g/dL A-G Ratio 0.7 (L) 0.8 - 1.7 PROTHROMBIN TIME + INR Collection Time: 09/10/18 12:31 PM  
Result Value Ref Range Prothrombin time 12.3 11.5 - 15.2 sec INR 0.9 0.8 - 1.2 THROMBIN TIME Collection Time: 09/10/18 12:31 PM  
Result Value Ref Range Thrombin time 18.3 (H) 13.8 - 18.2 SECS FIBRINOGEN Collection Time: 09/10/18 12:31 PM  
Result Value Ref Range Fibrinogen 646 (H) 210 - 451 mg/dL TYPE & SCREEN Collection Time: 09/10/18 12:31 PM  
Result Value Ref Range Crossmatch Expiration 09/13/2018 ABO/Rh(D) A POSITIVE Antibody screen NEG   
ASPIRIN TEST Collection Time: 09/10/18 12:31 PM  
Result Value Ref Range Aspirin test 479 (L) 620 - 672 ARU  
CARDIAC PANEL,(CK, CKMB & TROPONIN) Collection Time: 09/10/18 12:31 PM  
Result Value Ref Range CK 5388 (H) 39 - 308 U/L  
 CK - MB 24.7 (H) <3.6 ng/ml CK-MB Index 0.5 0.0 - 4.0 % Troponin-I, Qt. 0.09 (H) 0.0 - 0.045 NG/ML  
EKG, 12 LEAD, INITIAL Collection Time: 09/10/18 12:31 PM  
Result Value Ref Range Ventricular Rate 88 BPM  
 Atrial Rate 88 BPM  
 P-R Interval 168 ms QRS Duration 98 ms Q-T Interval 394 ms QTC Calculation (Bezet) 476 ms Calculated P Axis 17 degrees Calculated R Axis -58 degrees Calculated T Axis 100 degrees Diagnosis Normal sinus rhythm Left anterior fascicular block Voltage criteria for left ventricular hypertrophy Cannot rule out Septal infarct , age undetermined T wave abnormality, consider lateral ischemia Abnormal ECG No previous ECGs available GLUCOSE, POC Collection Time: 09/10/18 12:42 PM  
Result Value Ref Range Glucose (POC) 306 (H) 70 - 110 mg/dL URINALYSIS W/ RFLX MICROSCOPIC  
 Collection Time: 09/10/18 12:50 PM  
Result Value Ref Range Color YELLOW Appearance CLEAR Specific gravity >1.030 (H) 1.005 - 1.030  
 pH (UA) 5.5 5.0 - 8.0 Protein >1000 (A) NEG mg/dL Glucose >1000 (A) NEG mg/dL Ketone NEGATIVE  NEG mg/dL Bilirubin NEGATIVE  NEG Blood LARGE (A) NEG Urobilinogen 1.0 0.2 - 1.0 EU/dL Nitrites NEGATIVE  NEG Leukocyte Esterase NEGATIVE  NEG    
DRUG SCREEN, URINE Collection Time: 09/10/18 12:50 PM  
Result Value Ref Range BENZODIAZEPINES NEGATIVE  NEG    
 BARBITURATES NEGATIVE  NEG    
 THC (TH-CANNABINOL) NEGATIVE  NEG    
 OPIATES NEGATIVE  NEG    
 PCP(PHENCYCLIDINE) NEGATIVE  NEG    
 COCAINE NEGATIVE  NEG    
 AMPHETAMINES NEGATIVE  NEG METHADONE NEGATIVE  NEG HDSCOM (NOTE) Assessment: 
Principal Problem: 
  Stroke ([facility] [address] (9/10/2018) Active Problems: 
  Stroke (cerebrum) ([facility] [address] (9/10/2018) Rhabdomyolysis (9/10/2018) Plan: 
 
Stroke? 
-CT head noted 
-Neuro checks 
-could be HTN as etiology? Could have been uncontrolled 
-permissive HTN 
-asa, statin, plavix 
-Neuro consult 
-MRI/MRA head -ECHO 
-Carotid PVL Rhabdo -IVF 
-CK 5k 
-monitor 
-unclear etiology no trauma HTN urgency 
-monitor 
-allow for permissive HTN though however could be contributing factor DVT prophaylxis 
-scd/teds 
-heaprin sq Signed: 
Nadia Aponte, NP 9/10/2018

## 2018-09-10 NOTE — Clinical Note
Status[de-identified] Inpatient [101] Type of Bed: Remote Telemetry [29] Inpatient Hospitalization Certified Necessary for the Following Reasons: 3. Patient receiving treatment that can only be provided in an inpatient setting (further clarification in H&P documentation) Admitting Diagnosis: [facility] [[id number]] Admitting Physician: Myles Farias Attending Physician: Myles Farias Estimated Length of Stay: 2 Midnights Discharge Plan[de-identified] Home with Office Follow-up

## 2018-09-10 NOTE — ED PROVIDER NOTES
EMERGENCY DEPARTMENT HISTORY AND PHYSICAL EXAM 
 
12:05 PM 
 
 
Date: 9/10/2018 Patient Name: Queen Homer History of Presenting Illness Chief Complaint Patient presents with  Extremity Weakness History Provided By: Patient Chief Complaint: Weakness Duration: 2 Days Timing:  Intermittent Location: LLE, RUE Quality: N/A as complaint is not pain Severity: Severe Modifying Factors: none reported Associated Symptoms: denies any other associated signs or symptoms Additional History (Context): Queen Homer is a 57 y.o. male with No significant past medical history who presents with intermittent, severe R and L sided weakness starting 2 days ago. Pt states he was trying to get himself up off the couch and could not; while trying, his LLE \"just gave out\". Pt states he could not move his RUE the following day. Pt denies any extremity pain. No hx prior. Denies any other complaints at this time. PCP: None Past History Past Medical History: No past medical history on file. Past Surgical History: No past surgical history on file. Family History: No family history on file. Social History: 
Social History Substance Use Topics  Smoking status: Not on file  Smokeless tobacco: Not on file  Alcohol use Not on file Allergies: 
No Known Allergies Review of Systems Review of Systems Constitutional: Negative for activity change, fatigue and fever. HENT: Negative for congestion and rhinorrhea. Eyes: Negative for visual disturbance. Respiratory: Negative for shortness of breath. Cardiovascular: Negative for chest pain and palpitations. Gastrointestinal: Negative for abdominal pain, diarrhea, nausea and vomiting. Genitourinary: Negative for dysuria and hematuria. Musculoskeletal: Negative for arthralgias, back pain and myalgias. Skin: Negative for rash. Neurological: Positive for weakness.  Negative for dizziness, light-headedness and numbness. Psychiatric/Behavioral: Negative for agitation. All other systems reviewed and are negative. Physical Exam  
 
Visit Vitals  BP (!) 197/109  Pulse 85  Temp 98.5 °F (36.9 °C)  Resp 20  
 Ht 5' 7\" (1.702 m)  Wt 98.4 kg (217 lb)  SpO2 92%  BMI 33.99 kg/m2 Physical Exam  
Constitutional: He appears well-developed and well-nourished. HENT:  
Head: Normocephalic and atraumatic. Eyes: Conjunctivae are normal. Pupils are equal, round, and reactive to light. Neck: Normal range of motion. Neck supple. Cardiovascular: Normal rate and regular rhythm. Pulmonary/Chest: Effort normal and breath sounds normal.  
Abdominal: Soft. Bowel sounds are normal.  
Musculoskeletal: Normal range of motion. Lymphadenopathy:  
  He has no cervical adenopathy. Neurological: He is alert. No sensory deficit. RUE RLE 0/5 strength L facial droop appreciated Tongue deviation slightly to R Decreased strength in L cheek Skin: Skin is warm. Psychiatric: He has a normal mood and affect. Diagnostic Study Results Labs - Recent Results (from the past 12 hour(s)) CBC W/O DIFF Collection Time: 09/10/18 12:31 PM  
Result Value Ref Range WBC 12.9 4.6 - 13.2 K/uL  
 RBC 5.78 (H) 4.70 - 5.50 M/uL  
 HGB 17.4 (H) 13.0 - 16.0 g/dL HCT 48.7 (H) 36.0 - 48.0 % MCV 84.3 74.0 - 97.0 FL  
 MCH 30.1 24.0 - 34.0 PG  
 MCHC 35.7 31.0 - 37.0 g/dL  
 RDW 13.2 11.6 - 14.5 % PLATELET 834 333 - 415 K/uL MPV 9.6 9.2 - 69.2 FL  
METABOLIC PANEL, COMPREHENSIVE Collection Time: 09/10/18 12:31 PM  
Result Value Ref Range Sodium 139 136 - 145 mmol/L Potassium 3.6 3.5 - 5.5 mmol/L Chloride 101 100 - 108 mmol/L  
 CO2 28 21 - 32 mmol/L Anion gap 10 3.0 - 18 mmol/L Glucose 325 (H) 74 - 99 mg/dL BUN 9 7.0 - 18 MG/DL Creatinine 1.26 0.6 - 1.3 MG/DL  
 BUN/Creatinine ratio 7 (L) 12 - 20 GFR est AA >60 >60 ml/min/1.73m2 GFR est non-AA 58 (L) >60 ml/min/1.73m2 Calcium 8.5 8.5 - 10.1 MG/DL Bilirubin, total 0.8 0.2 - 1.0 MG/DL  
 ALT (SGPT) 24 16 - 61 U/L  
 AST (SGOT) 88 (H) 15 - 37 U/L Alk. phosphatase 108 45 - 117 U/L Protein, total 6.8 6.4 - 8.2 g/dL Albumin 2.8 (L) 3.4 - 5.0 g/dL Globulin 4.0 2.0 - 4.0 g/dL A-G Ratio 0.7 (L) 0.8 - 1.7 PROTHROMBIN TIME + INR Collection Time: 09/10/18 12:31 PM  
Result Value Ref Range Prothrombin time 12.3 11.5 - 15.2 sec INR 0.9 0.8 - 1.2 THROMBIN TIME Collection Time: 09/10/18 12:31 PM  
Result Value Ref Range Thrombin time 18.3 (H) 13.8 - 18.2 SECS FIBRINOGEN Collection Time: 09/10/18 12:31 PM  
Result Value Ref Range Fibrinogen 646 (H) 210 - 451 mg/dL TYPE & SCREEN Collection Time: 09/10/18 12:31 PM  
Result Value Ref Range Crossmatch Expiration 09/13/2018 ABO/Rh(D) A POSITIVE Antibody screen NEG   
CARDIAC PANEL,(CK, CKMB & TROPONIN) Collection Time: 09/10/18 12:31 PM  
Result Value Ref Range CK 5388 (H) 39 - 308 U/L  
 CK - MB 24.7 (H) <3.6 ng/ml CK-MB Index 0.5 0.0 - 4.0 % Troponin-I, Qt. 0.09 (H) 0.0 - 0.045 NG/ML  
EKG, 12 LEAD, INITIAL Collection Time: 09/10/18 12:31 PM  
Result Value Ref Range Ventricular Rate 88 BPM  
 Atrial Rate 88 BPM  
 P-R Interval 168 ms QRS Duration 98 ms Q-T Interval 394 ms QTC Calculation (Bezet) 476 ms Calculated P Axis 17 degrees Calculated R Axis -58 degrees Calculated T Axis 100 degrees Diagnosis Normal sinus rhythm Left anterior fascicular block Voltage criteria for left ventricular hypertrophy Cannot rule out Septal infarct , age undetermined T wave abnormality, consider lateral ischemia Abnormal ECG No previous ECGs available GLUCOSE, POC Collection Time: 09/10/18 12:42 PM  
Result Value Ref Range Glucose (POC) 306 (H) 70 - 110 mg/dL URINALYSIS W/ RFLX MICROSCOPIC  Collection Time: 09/10/18 12:50 PM  
 Result Value Ref Range Color YELLOW Appearance CLEAR Specific gravity >1.030 (H) 1.005 - 1.030  
 pH (UA) 5.5 5.0 - 8.0 Protein >1000 (A) NEG mg/dL Glucose >1000 (A) NEG mg/dL Ketone NEGATIVE  NEG mg/dL Bilirubin NEGATIVE  NEG Blood LARGE (A) NEG Urobilinogen 1.0 0.2 - 1.0 EU/dL Nitrites NEGATIVE  NEG Leukocyte Esterase NEGATIVE  NEG    
DRUG SCREEN, URINE Collection Time: 09/10/18 12:50 PM  
Result Value Ref Range BENZODIAZEPINES NEGATIVE  NEG    
 BARBITURATES NEGATIVE  NEG    
 THC (TH-CANNABINOL) NEGATIVE  NEG    
 OPIATES NEGATIVE  NEG    
 PCP(PHENCYCLIDINE) NEGATIVE  NEG    
 COCAINE NEGATIVE  NEG    
 AMPHETAMINES NEGATIVE  NEG METHADONE NEGATIVE  NEG HDSCOM (NOTE) Radiologic Studies -  
CT HEAD WO CONT Final Result XR CHEST PORT Final Result MRI BRAIN WO CONT    (Results Pending) MRA BRAIN WO CONT    (Results Pending) Ct Head Wo Cont Result Date: 9/10/2018 EXAM: CT head INDICATION: Right arm weakness since Saturday COMPARISON: None TECHNIQUE: Axial CT imaging of the head was performed without intravenous contrast.  Sagittal and coronal reconstructions were created from the axial data. One or more dose reduction techniques were used on this CT: automated exposure control, adjustment of the mAs and/or kVp according to patient's size, and iterative reconstruction techniques. The specific techniques utilized on this CT exam have been documented in the patient's electronic medical record. _______________ FINDINGS: BRAIN AND POSTERIOR FOSSA: The sulci, folia, ventricles and basal cisterns are within normal limits for the patient?s age. No intracranial hemorrhage, mass effect, or midline shift.   Focal low density is present anterior limb of the right internal capsule extending into the anterior aspect of the right corona radiata, essentially matching CSF density within the right lateral ventricle, area measuring up to about 2.6 cm. Small rounded low densities are present in the ventral left thalamus, and dorsal right thalamus, also essentially matching CSF density within the lateral ventricles. Moderate areas of slightly diminished patchy attenuation is present in the bilateral frontal and parietal periventricular white matter. Gray-white differentiation is preserved. Streak artifact is present in the posterior fossa, as is typical from the dens temporal bones. Questionable focal low density in the central to left central medulla. EXTRA-AXIAL SPACES AND MENINGES: No extra-axial fluid collections. CALVARIUM: Intact. SINUSES: Rounded mucosal opacity is present in the sphenoid sinus to the left of midline. OTHER: Mild calcific atherosclerosis is present at the carotid siphons bilaterally. _______________ IMPRESSION: 1. Chronic focal infarct right deep white matter as described. 2. Small bilateral frontal low densities in the thalami, lacunar infarcts are also probably chronic. 3. Streak artifact across the brainstem, questionable medullary infarct, if present, difficult to date due to streak artifact. 4. No cortical infarct appreciated. No hemorrhage or mass effect. 5. Sphenoid sinus mucous retention cyst. 
 
Xr Chest [facility] Result Date: 9/10/2018 Chest, single view Indication: Suspected stroke. Comparison: None Findings:  Portable upright AP view of the chest was obtained. The lungs are moderately underexpanded. There is no focal pneumonic consolidation, pneumothorax, or pleural effusion. Enlargement of the cardiac silhouette is noted, likely in part related to AP technique and low lung volumes. Tortuosity of the thoracic aorta noted. No acute osseous abnormality. Impression: Underexpanded lungs without superimposed acute radiographic cardiopulmonary abnormality. Medical Decision Making I am the first provider for this patient. I reviewed the vital signs, available nursing notes, past medical history, past surgical history, family history and social history. Vital Signs-Reviewed the patient's vital signs. Pulse Oximetry Analysis -  96% on room air (Interpretation) wnl 
 
Cardiac Monitor: 
Rate: 92 EKG: Interpreted by the EP. Time Interpreted: 2151 Rate: 88 Rhythm: Normal Sinus Rhythm Interpretation: LAD, nonspecific STT abnormalities, no obvious sign of acute infarct Records Reviewed: Nursing Notes (Time of Review: 12:05 PM) ED Course: Progress Notes, Reevaluation, and Consults: 
 
12:27 Consult:  Discussed care with Dr. Fletcher Morse (Teleneurology). Standard discussion; including history of patients chief complaint, available diagnostic results, and treatment course. Will evaluate the pt.  
 
12:40 Discussed with Dr. Fletcher Morse, recommends MRI MRA without, ASA, plavix if CT negative. Keep /110.  
13:56 Labs reviewed with elevated Hgb, no UTI although hematuria present. Pt's electrolytes with no abnormality, pt's CPK is 5300, concerning for rhabdomyolysis, also pt's troponin is 0.09: this could be secondary to pt's rhabdo. Will start hydration in the ED. 14:01 CT with old R deep white matter infarct other infarcts related to HTN, MRI is pending and ASA and plavix will be given. 14:21 Consult:  Discussed care with Dr. Jesús Moralez (Hospitalist). Standard discussion; including history of patients chief complaint, available diagnostic results, and treatment course. Accepts for admission. Provider Notes (Medical Decision Making): Pt with L facial and RUE and LE weakness concern for acute CVA hypertensive emergency with uncontrolled BP. Discussed with teleneurology, if not hemorrhagic to leave BP untreated due to concern for ischemic stroke. Code S not called as sx were greater than 24hrs ago, possibly 72hrs.   
 
 
Critical Care Time: 
 The services I provided to this patient were to treat and/or prevent clinically significant deterioration that could result in the failure of one or more body systems and/or organ systems due to uncontrolled BP and concern for stroke. Services included the following: 
-reviewing nursing notes and old charts 
-vital sign assessments 
-direct patient care 
-medication orders and management 
-interpreting and reviewing diagnostic studies/labs 
-re-evaluations 
-documentation time Aggregate critical care time was 31 minutes, which includes only time during which I was engaged in work directly related to the patient's care as described above, whether I was at bedside or elsewhere in the Emergency Department. It did not include time spent performing other reported procedures or the services of residents, students, nurses, or advance practice providers. Víctor Leong MD - 12:47 For Hospitalized Patients: 
 
1. Hospitalization Decision Time: The decision to hospitalize the patient was made by Dr. Huitron Oh at [phone] on 9/10/2018 2. Aspirin: Aspirin was given Diagnosis Clinical Impression: 1. Acute CVA (cerebrovascular accident) ([address] 2. Right sided weakness 3. Facial weakness 4. Hypertension, unspecified type 5. Non-traumatic rhabdomyolysis 6. Hyperglycemia Disposition: Admit Patient's Medications No medications on file  
 
_______________________________ Attestations: 
Scribe Attestation Beulah Gonsalves acting as a scribe for and in the presence of Víctor Leong MD     
September 10, 2018 at 2:27 PM 
    
Provider Attestation:     
I personally performed the services described in the documentation, reviewed the documentation, as recorded by the scribe in my presence, and it accurately and completely records my words and actions. September 10, 2018 at 2:27 PM - Víctor Leong MD   
_______________________________

## 2018-09-10 NOTE — ED NOTES
TRANSFER - ED to INPATIENT REPORT: 
 
SBAR report made available to receiving floor on this patient being transferred to [facility] ([id number])  for routine progression of care Admitting diagnosis Stroke (cerebrum) ([facility] [address] Stroke [facility]) Information from the following report(s) SBAR was made available to receiving floor. Lines:  
Peripheral IV 09/10/18 Right Antecubital (Active) Site Assessment Clean, dry, & intact 9/10/2018 12:36 PM  
Phlebitis Assessment 0 9/10/2018 12:36 PM  
Infiltration Assessment 0 9/10/2018 12:36 PM  
Dressing Status Clean, dry, & intact 9/10/2018 12:36 PM  
  
 
Medication list unable to confirm Opportunity for questions and clarification was provided. Patient is oriented to time, place, person and situation Last [facility] [id number] Patient is  continent and non-ambulatory Valuables transported with patient Patient transported with: 
 Monitor Registered Nurse Tech

## 2018-09-11 PROBLEM — J18.9 PNEUMONIA: Status: ACTIVE | Noted: 2018-01-01

## 2018-09-11 NOTE — ROUTINE PROCESS
Bedside and Verbal shift change report given to Patric Walker RN (oncoming nurse) by Joel Wagner RN, BSN (offgoing nurse). Report given with SBAR, Kardex, Intake/Output, MAR and Recent Results.

## 2018-09-11 NOTE — ROUTINE PROCESS
Glycemic Control Plan of Care Recommendation(s): continue DM education while hospitalized. Outpatient education pending discharge disposition. Patient will need meter and supplies Assessment: admitted 9/10/18 for CVA with right side flaccid. Alert and oriented, lives with his elderly mother and states they eat most meals out at [facility].  Per patient, he has had DM for \"many years\". Stopped taking medication 3-4 years ago? And unsure of what he was taking for his DM - \"it was some pill\". Does not have a PCP, does not check his blood glucose levels at home - says he does recall prior DM education many years ago at [facility].  Patient frustrated with all the interruptions this morning while trying to eat his breakfast - PT and lab also in to see patient. Provided printed materials, reviewed A1C and will follow up 9/12 for further DM education. Most recent blood glucose values:  
   
 
Current A1C:  9.8 for average blood glucose of 235 mg/dl Current hospital diabetes medications:  lantus 25 units daily,  Lispro, very insulin resistant, four times daily Total daily dose insulin requirement previous day:  15 units lispro Home diabetes medications:  none Goals:  Blood glucose will be within target range of  mg/dL by 9/18/18 Education:  _X__  Refer to Diabetes Education Record - will continue to follow  
           ___  Education not indicated at this time Scott Lopez MPH RN 
Pager [phone]

## 2018-09-11 NOTE — PROGRESS NOTES
Progress Note Patient: Jessica Alegria               Sex: male          DOA: 9/10/2018 YOB: 1955      Age:  57 y.o.        LOS:  LOS: 1 day Subjective / Interval Hx Dyan Khan is a 57 y.o. male  who presents with right side weakness onset 2 days PTA in ER. He had CT head was negative for acute findings. Tele neurology saw patient and recommended admission for stroke and MRI brain ordered. Patient was also noted to have Rhabdomyolysis and started on aggressive IVF. 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 No evidence of associated hemorrhage or mass effect. Patient also had fever Tmax 101.2 last night. CXR wet read done shows right lung opacity Objective:  
  
Visit Vitals  /69 (BP 1 Location: Left arm, BP Patient Position: At rest)  Pulse 80  Temp 97.6 °F (36.4 °C)  Resp 18  Ht 5' 7\" (1.702 m)  Wt 98.4 kg (217 lb)  SpO2 96%  BMI 33.99 kg/m2 Physical Exam  
Constitutional: He is oriented to person, place, and time. He appears well-developed and well-nourished. HENT:  
Head: Normocephalic and atraumatic. Mouth/Throat: No oropharyngeal exudate. Eyes: Conjunctivae and EOM are normal. Pupils are equal, round, and reactive to light. No scleral icterus. Neck: Neck supple. Cardiovascular: Normal rate, regular rhythm and normal heart sounds. No murmur heard. Pulmonary/Chest: Effort normal and breath sounds normal. No respiratory distress. He has no wheezes. He has no rales. Abdominal: Soft. Bowel sounds are normal.  
Musculoskeletal: He exhibits no edema. Neurological: He is alert and oriented to person, place, and time. Right side hemiparesis Skin: Skin is warm. No rash noted. No erythema. No pallor. Psychiatric: He has a normal mood and affect. Intake and Output: 
Current Shift:    
Last three shifts:  09/09 1901 - 09/11 0700 In: 6557 [I.V.:1795] Out: 625 [Urine:625] Recent Results (from the past 48 hour(s)) CBC W/O DIFF Collection Time: 09/10/18 12:31 PM  
Result Value Ref Range WBC 12.9 4.6 - 13.2 K/uL  
 RBC 5.78 (H) 4.70 - 5.50 M/uL  
 HGB 17.4 (H) 13.0 - 16.0 g/dL HCT 48.7 (H) 36.0 - 48.0 % MCV 84.3 74.0 - 97.0 FL  
 MCH 30.1 24.0 - 34.0 PG  
 MCHC 35.7 31.0 - 37.0 g/dL  
 RDW 13.2 11.6 - 14.5 % PLATELET 820 114 - 325 K/uL MPV 9.6 9.2 - 21.6 FL  
METABOLIC PANEL, COMPREHENSIVE Collection Time: 09/10/18 12:31 PM  
Result Value Ref Range Sodium 139 136 - 145 mmol/L Potassium 3.6 3.5 - 5.5 mmol/L Chloride 101 100 - 108 mmol/L  
 CO2 28 21 - 32 mmol/L Anion gap 10 3.0 - 18 mmol/L Glucose 325 (H) 74 - 99 mg/dL BUN 9 7.0 - 18 MG/DL Creatinine 1.26 0.6 - 1.3 MG/DL  
 BUN/Creatinine ratio 7 (L) 12 - 20 GFR est AA >60 >60 ml/min/1.73m2 GFR est non-AA 58 (L) >60 ml/min/1.73m2 Calcium 8.5 8.5 - 10.1 MG/DL Bilirubin, total 0.8 0.2 - 1.0 MG/DL  
 ALT (SGPT) 24 16 - 61 U/L  
 AST (SGOT) 88 (H) 15 - 37 U/L Alk. phosphatase 108 45 - 117 U/L Protein, total 6.8 6.4 - 8.2 g/dL Albumin 2.8 (L) 3.4 - 5.0 g/dL Globulin 4.0 2.0 - 4.0 g/dL A-G Ratio 0.7 (L) 0.8 - 1.7 PROTHROMBIN TIME + INR Collection Time: 09/10/18 12:31 PM  
Result Value Ref Range Prothrombin time 12.3 11.5 - 15.2 sec INR 0.9 0.8 - 1.2 THROMBIN TIME Collection Time: 09/10/18 12:31 PM  
Result Value Ref Range Thrombin time 18.3 (H) 13.8 - 18.2 SECS FIBRINOGEN Collection Time: 09/10/18 12:31 PM  
Result Value Ref Range Fibrinogen 646 (H) 210 - 451 mg/dL TYPE & SCREEN Collection Time: 09/10/18 12:31 PM  
Result Value Ref Range Crossmatch Expiration 09/13/2018 ABO/Rh(D) A POSITIVE Antibody screen NEG   
ASPIRIN TEST Collection Time: 09/10/18 12:31 PM  
Result Value Ref Range Aspirin test 479 (L) 620 - 672 ARU  
CARDIAC PANEL,(CK, CKMB & TROPONIN) Collection Time: 09/10/18 12:31 PM  
Result Value Ref Range CK 5388 (H) 39 - 308 U/L  
 CK - MB 24.7 (H) <3.6 ng/ml CK-MB Index 0.5 0.0 - 4.0 % Troponin-I, Qt. 0.09 (H) 0.0 - 0.045 NG/ML  
LIPID PANEL Collection Time: 09/10/18 12:31 PM  
Result Value Ref Range LIPID PROFILE Cholesterol, total 349 (H) <200 MG/DL Triglyceride 188 (H) <150 MG/DL  
 HDL Cholesterol 59 40 - 60 MG/DL  
 LDL, calculated 252.4 (H) 0 - 100 MG/DL VLDL, calculated 37.6 MG/DL  
 CHOL/HDL Ratio 5.9 (H) 0 - 5.0 HEMOGLOBIN A1C WITH EAG Collection Time: 09/10/18 12:31 PM  
Result Value Ref Range Hemoglobin A1c 9.8 (H) 4.2 - 5.6 % Est. average glucose 235 mg/dL SED RATE (ESR) Collection Time: 09/10/18 12:31 PM  
Result Value Ref Range Sed rate, automated 2 0 - 20 mm/hr CRP, HIGH SENSITIVITY Collection Time: 09/10/18 12:31 PM  
Result Value Ref Range C-Reactive Protein, Cardiac 52.75 (H) 0.00 - 3.00 mg/L  
TSH 3RD GENERATION Collection Time: 09/10/18 12:31 PM  
Result Value Ref Range TSH 1.10 0.36 - 3.74 uIU/mL T3, FREE Collection Time: 09/10/18 12:31 PM  
Result Value Ref Range Triiodothyronine (T3), free 2.7 2.18 - 3.98 PG/ML  
T4, FREE Collection Time: 09/10/18 12:31 PM  
Result Value Ref Range T4, Free 0.9 0.7 - 1.5 NG/DL  
EKG, 12 LEAD, INITIAL Collection Time: 09/10/18 12:31 PM  
Result Value Ref Range Ventricular Rate 88 BPM  
 Atrial Rate 88 BPM  
 P-R Interval 168 ms QRS Duration 98 ms Q-T Interval 394 ms QTC Calculation (Bezet) 476 ms Calculated P Axis 17 degrees Calculated R Axis -58 degrees Calculated T Axis 100 degrees Diagnosis Normal sinus rhythm Left anterior fascicular block Voltage criteria for left ventricular hypertrophy T wave abnormality, consider lateral ischemia Nonspecific ST and T wave abnormality Abnormal ECG No previous ECGs available Baseline artifact Confirmed by Wilma Guzmán ([id number]) on 9/10/2018 4:07:57 PM 
  
GLUCOSE, POC Collection Time: 09/10/18 12:42 PM  
Result Value Ref Range Glucose (POC) 306 (H) 70 - 110 mg/dL URINALYSIS W/ RFLX MICROSCOPIC Collection Time: 09/10/18 12:50 PM  
Result Value Ref Range Color YELLOW Appearance CLEAR Specific gravity >1.030 (H) 1.005 - 1.030  
 pH (UA) 5.5 5.0 - 8.0 Protein >1000 (A) NEG mg/dL Glucose >1000 (A) NEG mg/dL Ketone NEGATIVE  NEG mg/dL Bilirubin NEGATIVE  NEG Blood LARGE (A) NEG Urobilinogen 1.0 0.2 - 1.0 EU/dL Nitrites NEGATIVE  NEG Leukocyte Esterase NEGATIVE  NEG    
DRUG SCREEN, URINE Collection Time: 09/10/18 12:50 PM  
Result Value Ref Range BENZODIAZEPINES NEGATIVE  NEG    
 BARBITURATES NEGATIVE  NEG    
 THC (TH-CANNABINOL) NEGATIVE  NEG    
 OPIATES NEGATIVE  NEG    
 PCP(PHENCYCLIDINE) NEGATIVE  NEG    
 COCAINE NEGATIVE  NEG    
 AMPHETAMINES NEGATIVE  NEG METHADONE NEGATIVE  NEG HDSCOM (NOTE) URINE MICROSCOPIC ONLY Collection Time: 09/10/18 12:50 PM  
Result Value Ref Range WBC 0 to 2 0 - 4 /hpf  
 RBC 3 to 6 0 - 5 /hpf Epithelial cells FEW 0 - 5 /lpf Bacteria NEGATIVE  NEG /hpf  
EKG, 12 LEAD, SUBSEQUENT Collection Time: 09/10/18  3:07 PM  
Result Value Ref Range Ventricular Rate 90 BPM  
 Atrial Rate 90 BPM  
 P-R Interval 170 ms QRS Duration 92 ms Q-T Interval 386 ms QTC Calculation (Bezet) 472 ms Calculated P Axis 33 degrees Calculated R Axis -58 degrees Calculated T Axis 88 degrees Diagnosis Normal sinus rhythm Left anterior fascicular block Left ventricular hypertrophy Prolonged QT Abnormal ECG When compared with ECG of 10-SEP-2018 12:31, 
Minimal criteria for Septal infarct are no longer present Confirmed by Wilma Guzmán ([id number]) on 9/10/2018 4:12:29 PM 
  
GLUCOSE, POC Collection Time: 09/10/18  3:14 PM  
Result Value Ref Range Glucose (POC) 293 (H) 70 - 110 mg/dL GLUCOSE, POC Collection Time: 09/10/18  5:14 PM  
Result Value Ref Range Glucose (POC) 250 (H) 70 - 110 mg/dL GLUCOSE, POC Collection Time: 09/10/18  9:40 PM  
Result Value Ref Range Glucose (POC) 291 (H) 70 - 110 mg/dL CULTURE, BLOOD Collection Time: 09/11/18 12:25 AM  
Result Value Ref Range Special Requests: NO SPECIAL REQUESTS Culture result: NO GROWTH AFTER 3 HOURS METABOLIC PANEL, BASIC Collection Time: 09/11/18 12:30 AM  
Result Value Ref Range Sodium 141 136 - 145 mmol/L Potassium 3.6 3.5 - 5.5 mmol/L Chloride 104 100 - 108 mmol/L  
 CO2 29 21 - 32 mmol/L Anion gap 8 3.0 - 18 mmol/L Glucose 233 (H) 74 - 99 mg/dL BUN 10 7.0 - 18 MG/DL Creatinine 1.27 0.6 - 1.3 MG/DL  
 BUN/Creatinine ratio 8 (L) 12 - 20 GFR est AA >60 >60 ml/min/1.73m2 GFR est non-AA 57 (L) >60 ml/min/1.73m2 Calcium 7.9 (L) 8.5 - 10.1 MG/DL URINALYSIS W/MICROSCOPIC Collection Time: 09/11/18 12:30 AM  
Result Value Ref Range Color DARK YELLOW Appearance CLEAR Specific gravity 1.026 1.005 - 1.030    
 pH (UA) 5.5 5.0 - 8.0 Protein 300 (A) NEG mg/dL Glucose >1000 (A) NEG mg/dL Ketone NEGATIVE  NEG mg/dL Bilirubin NEGATIVE  NEG Blood LARGE (A) NEG Urobilinogen 1.0 0.2 - 1.0 EU/dL Nitrites NEGATIVE  NEG Leukocyte Esterase NEGATIVE  NEG    
 WBC 11 to 20 0 - 4 /hpf  
 RBC NEGATIVE  0 - 5 /hpf Epithelial cells FEW 0 - 5 /lpf Bacteria NEGATIVE  NEG /hpf CULTURE, BLOOD Collection Time: 09/11/18 12:30 AM  
Result Value Ref Range Special Requests: NO SPECIAL REQUESTS Culture result: NO GROWTH AFTER 3 HOURS    
GLUCOSE, POC Collection Time: 09/11/18  8:36 AM  
Result Value Ref Range Glucose (POC) 231 (H) 70 - 110 mg/dL Lab/Data Reviewed: All lab results for the last 24 hours reviewed. XRays were reviewed in past 24 hours Medications Reviewed Assessment/Plan Principal Problem: 
  [facility] ([facility] (9/10/2018) Active Problems: 
  Rhabdomyolysis (9/10/2018) Dehydration (9/10/2018) Hypertension (9/10/2018) Diabetes mellitus type 2, controlled ([facility] (9/10/2018) Non compliance w medication regimen (9/10/2018) Pneumonia (9/11/2018) Plan Stroke - MRI brain shows acute infarct left medullary pyramid  
- continue aspirin , Lipitor - permissible HTN per stroke protocol  
- ECHO pending - MRA brain shows Mild multifocal irregularity and narrowing involving both the anterior and 
posterior circulation without definite high-grade stenosis. Moderate 
irregularity and narrowing suggested in the proximal to mid basilar artery. 
- NPO pending speech eval 
- PT/OT /SPEECH eval  
- Neurology consulted f/u with recommendations Rhabdomyolysis -  Aggressive IVF 
- Trend CK HTN  
- Permissible HTN with parameters DM  
- Uncontrolled - A1c 9.8 
- SSI Polycythemia  
- recheck CBC  
- No labs done today  
- recheck cbc AM  
- continue IVF Pneumonia - possible 2/2 aspiration  
- NPO  
- Speech eval  
- tylenol as needed for fever 
- follow blood cx ordered - Zosyn DVT prophylaxis - Lovenox Full code Lamin Patel MD 
September 11, 2018

## 2018-09-11 NOTE — PROGRESS NOTES
Problem: Falls - Risk of 
Goal: *Absence of Falls Document Clide Failing Fall Risk and appropriate interventions in the flowsheet. Outcome: Progressing Towards Goal 
Fall Risk Interventions: 
  
 
  
 
Medication Interventions: Evaluate medications/consider consulting pharmacy Elimination Interventions: Call light in reach, Patient to call for help with toileting needs, Toileting schedule/hourly rounds, Urinal in reach Problem: Pressure Injury - Risk of 
Goal: *Prevention of pressure injury Document Mitul Scale and appropriate interventions in the flowsheet. Outcome: Progressing Towards Goal 
Pressure Injury Interventions: 
Sensory Interventions: Pressure redistribution bed/mattress (bed type), Assess changes in LOC Moisture Interventions: Absorbent underpads, Check for incontinence Q2 hours and as needed, Maintain skin hydration (lotion/cream) Activity Interventions: Pressure redistribution bed/mattress(bed type) Mobility Interventions: Pressure redistribution bed/mattress (bed type), HOB 30 degrees or less, PT/OT evaluation Nutrition Interventions: Document food/fluid/supplement intake Friction and Shear Interventions: Apply protective barrier, creams and emollients, Foam dressings/transparent film/skin sealants, HOB 30 degrees or less, Transfer aides:transfer board/Lala lift/ceiling lift

## 2018-09-11 NOTE — PROGRESS NOTES
Patient received in bed awake. Patient A&Ox4, denies pain and discomfort. Has neuro checks in place q4hrs; see [facility] for results. No distress noted. Frequently use items within reach. Bed locked in low position and call bell w/in reach. 2282- Patient awake. Dr. Haro Prom to bedside said that Patient to have swallowing re evaluation. Breakfast tray removed (didnot eat). This nurse called SLP spoke with Meghann Pillai said will address. 1010- Dr. Haro Prom asked why am CBC was not drawn called Lab spoke with Soraida (Phlebotomist) said \"no CBC ordered. \" Made aware noted in Computer CBC w/o diff said that it did not show ojn her end. This nurse asked Diane Sherman when she arrive to floor will show her the CBC order; voiced understanding. 200- Dr. Haro Prom was called to inform of CBC; awaiting call back. 1058- Dr. Haro Prom return call made aware of CBC as written above T.O. received for CBC w/o diff daily in am, include now and okay for patient to go for test without nurse and without telemetry (RBV).

## 2018-09-11 NOTE — PROGRESS NOTES
Problem: Falls - Risk of 
Goal: *Absence of Falls Document Fernando Goode Fall Risk and appropriate interventions in the flowsheet. Outcome: Progressing Towards Goal 
Fall Risk Interventions: 
  
 
  
 
Medication Interventions: Evaluate medications/consider consulting pharmacy Elimination Interventions: Call light in reach, Patient to call for help with toileting needs, Toileting schedule/hourly rounds, Urinal in reach Problem: Pressure Injury - Risk of 
Goal: *Prevention of pressure injury Document Mitul Scale and appropriate interventions in the flowsheet. Outcome: Progressing Towards Goal 
Pressure Injury Interventions: 
Sensory Interventions: Pressure redistribution bed/mattress (bed type), Assess changes in LOC Moisture Interventions: Absorbent underpads, Check for incontinence Q2 hours and as needed, Maintain skin hydration (lotion/cream) Activity Interventions: Pressure redistribution bed/mattress(bed type) Mobility Interventions: Pressure redistribution bed/mattress (bed type), HOB 30 degrees or less, PT/OT evaluation Nutrition Interventions: Document food/fluid/supplement intake Friction and Shear Interventions: Apply protective barrier, creams and emollients, Foam dressings/transparent film/skin sealants, HOB 30 degrees or less, Transfer aides:transfer board/Lala lift/ceiling lift

## 2018-09-11 NOTE — CONSULTS
Neurology Consultation Patient: Elvis Alvarado MRN: [id number]  SSN: [id number] YOB: 1955  Age: 57 y.o. Sex: male Subjective:  
  
56 y/o male who came to the ER yesterday complaining that he could not stand up from a sofa due to left leg weakness on 9/9/18, then had weakness of the right arm on 9/10/18. BP was up to 222/138 mmHg in the ER. CT of the head showed a chronic infarction in the right cerebral white matter, small bilateral hypodensities in the thalami (probably chronic lacunes), and streak artifact across the medulla, so a brain stem stroke could not be judged. MRI of the brain was then done, and showed an acute left medullary infarction with no hemorrhage or mass effect. Chronic right central pontine and bilateral basal ganglia infarctions were noted, with hemosiderin consistent with an old hemorrhage noted in the right basal ganglia. A punctate focus of hemosiderin consistent with a chronic micro-hemorrhage was noted in the left temporal lobe, as well. Changes of moderate to marked (by my review) chronic microvascular disease were noted otherwise. MRA of the COW: multi focal irregularity throughout anterior and posterior circulation without high grade stenosis. Moderate narrowing proximal to mid basilar artery. Carotid PVLs done: report pending. Echo pending. EKG; sinus rhythm. Pertinent labs:  Hgb 17.4, ESR 2, UA c/w infection. Creat 1.3, ALT 88, chol 349, trigs 188, HDL 59, . CK 5388 - 3973. . HgbA1C 9.8%. Troponin 0.09. UDS negative. TFTs normal.  Urine and blood cultures pending. CRP 52.75. Current meds:  aspirin 325 mg every day, Lipitor, Lovenox for DVT prophylaxis, folate, thiamine, insulin, Zosyn, Carla Colace. The patient was found to have open wounds on the sacrum and buttocks. He spiked a temp overnight last night.   Patient also had episodes of 2nd degree heart block on telemetry monitoring. He also was found to have rhabdomyolysis and pneumonia. He admitted to a history of diabetes but had stopped all efforts at controlling the disease some years ago. I obtained the patient's history from a scanned H&P in the [employer] system from a cataract extraction done in 7/18. It contained the PMHx noted below. The patient complains of right sided weakness at present. PAST MEDICAL HISTORY: 
Diabetes HTN Hyperlipidemia Sarcoidosis Gout GERD 
S/p cataract resection No past medical history on file. No family history on file. Social History Substance Use Topics  Smoking status: Not on file  Smokeless tobacco: Not on file  Alcohol use Not on file Patient denies smoking and alcohol use Current Facility-Administered Medications Medication Dose Route Frequency Provider Last Rate Last Dose  labetalol (NORMODYNE;TRANDATE) 20 mg/4 mL (5 mg/mL) injection 20 mg  20 mg IntraVENous Q4H PRN Anthony Solorzano MD      
 enoxaparin (LOVENOX) injection 40 mg  40 mg SubCUTAneous Q24H Anthony Solorzano MD   40 mg at 09/11/18 1221  
 insulin glargine (LANTUS) injection 25 Units  0.25 Units/kg SubCUTAneous DAILY Zoila Darling MD   25 Units at 09/11/18 1221  piperacillin-tazobactam (ZOSYN) 3.375 g in 0.9% sodium chloride (MBP/ADV) 100 mL MBP  #EXTENDED 4-HOUR INFUSION##  3.375 g IntraVENous Q8H Anthony Solorzano MD      
 ondansetron (ZOFRAN) injection 2 mg  2 mg IntraVENous Q6H PRN Melissa Stinson NP      
 aspirin (ASPIRIN) tablet 325 mg  325 mg Oral DAILY Melissa Stinson NP   325 mg at 09/11/18 0933  
 atorvastatin (LIPITOR) tablet 80 mg  80 mg Oral QHS Melissa Stinson NP   80 mg at 09/10/18 1700  acetaminophen (TYLENOL) tablet 650 mg  650 mg Oral Q4H PRN Melissa Stinson NP   650 mg at 09/10/18 2248  acetaminophen (TYLENOL) suppository 650 mg  650 mg Rectal Q4H PRN Melissa Stinson NP      
  senna-docusate (PERICOLACE) 8.6-50 mg per tablet 2 Tab  2 Tab Oral QHS Leanor Sick, NP   2 Tab at 09/10/18 2219  
 albuterol (PROVENTIL VENTOLIN) nebulizer solution 2.5 mg  2.5 mg Nebulization Q4H PRN Chan Sick, NP      
 folic acid (FOLVITE) 1 mg, thiamine (B-1) 100 mg in 0.9% sodium chloride 50 mL ivpb   IntraVENous ACD Leanor Sick, NP      
 0.9% sodium chloride infusion  150 mL/hr IntraVENous CONTINUOUS Marc Blandon MD   Stopped at 09/11/18 1502  
 insulin lispro (HUMALOG) injection   SubCUTAneous AC&HS Marc Blandon MD   3 Units at 09/11/18 1222  
 glucose chewable tablet 16 g  4 Tab Oral PRN Anthony Luong MD      
 glucagon (GLUCAGEN) injection 1 mg  1 mg IntraMUSCular PRN Anthony Luong MD      
 dextrose (D50) infusion 12.5-25 g  25-50 mL IntraVENous PRN Marc Blandon MD      
  
 
No Known Allergies Review of Systems: 
Pertinent items are noted in the History of Present Illness. Objective:  
 
Vitals:  
 09/11/18 0926 09/11/18 1248 09/11/18 1400 09/11/18 1509 BP: 182/69 171/67 182/76 (!) 179/98 Pulse: 80  86 Resp: 18  18 Temp: 97.6 °F (36.4 °C)  98 °F (36.7 °C) SpO2: 96%  96% Weight:    98.4 kg (217 lb) Height:    5' 7\" (1.702 m) Physical Exam: 
 
 
General appearance was that of an obese male in no acute distress with normal mental status. Speech mildly dysarthric. HEENT:  Normocephalic, atraumatic. Neck:  Supple. Extremities:  No cyanosis, clubbing. Pedal edema. 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 Cranial nerves II-XII were intact. Subtle right nasolabial fold flattening but smile symmetric. Shrug down on the right. Motor exam:  No movement right arm or leg to command. Moved left arm/leg normally. Tone normal.  No tremor  but fairly marked asterixis left hand. Reflexes:  0/4 throughout. Plantar reflexes non reactive. Cerebellar:  Finger to nose testing with left hand without obvious ataxia. Sensory:  Perception of light touch was intact all 4 extremities. Gait: Not tested. Assessment:  
 
Hospital Problems  Never Reviewed Codes Class Noted POA Pneumonia ICD-10-CM: J18.9 ICD-9-CM: 772  9/11/2018 No  
   
 * (Principal)Stroke (HCC) ICD-10-CM: I63.9 ICD-9-CM: 434.91  9/10/2018 Unknown Rhabdomyolysis ICD-10-CM: U98.93 ICD-9-CM: 728.88  9/10/2018 Unknown Dehydration ICD-10-CM: E86.0 ICD-9-CM: 276.51  9/10/2018 Yes Hypertension (Chronic) ICD-10-CM: I10 
ICD-9-CM: 401.9  9/10/2018 Yes Diabetes [facility] 2, [facility] (Chronic) ICD-10-CM: E11.9 ICD-9-CM: 250.00  9/10/2018 Yes Non compliance w medication regimen ICD-10-CM: Z91.14 
ICD-9-CM: V15.81  9/10/2018 Yes Plan: 1. Acute medullary stroke, causing marked right sided weakness, dysarthria, likely some dysphagia. Patient alsy may have ataxia when he is gotten up to walk. Probable cause may be the basilar stenosis or small vessel disease due to HTN, diabetes, hyperlipidemia, all completely uncontrolled. Agree with aspirin, etc.  Speech tx, PT/OT following. I asked the patient why he stopped taking care of himself; he said that he just got sick of it. Question he may have some depression that caused lack of self care. Allow BP to run high in acute stroke period per stroke protocol. Patient undergoing diabetes education. Lipitor started. Agree with use of aspirin for stroke prevention long term. Awaiting echo and carotid PVL results. Continue telemetry monitoring. 2.  Asterixis - due to metabolic disturbances. 3.  H/o sarcoidosis per old H&P. Will leave any further evaluation to primary team. 
 
I appreciate the consultation. Signed By: Karina French MD   
 September 11, 2018

## 2018-09-11 NOTE — ROUTINE PROCESS
Diabetes Patient/Family Education Record Factors That  May Influence Patients Ability  to Learn or  Comply with Recommendations 
 []   Language barrier    []   Cultural needs   [x]   Motivation  
 []   Cognitive limitation    [x]   Physical - s/p CVA   [x]   Education  
 []   Physiological factors   []   Hearing/vision/speaking impairment   []   Spiritism beliefs []   Financial factors   []  Other:   []  No factors identified at this time. Person Instructed: 
 [x]   Patient   []   Family   []  Other Preference for Learning: 
 [x]   Verbal   [x]   Written   []  Demonstration Level of Comprehension & Competence:   
[]  Good                                      [x] Fair                                     []  Poor                             [x]  Needs Reinforcement  
[]  Teachback completed Education Component:  See DM note  
[]  Medication management, including how to administer insulin (if appropriate) and potential medication interactions [x]  Nutritional management   
[]  Exercise  
[]  Signs, symptoms, and treatment of hyperglycemia and hypoglycemia  
[] Prevention, recognition and treatment of hyperglycemia and hypoglycemia [x]  Importance of blood glucose monitoring and how to obtain a blood glucose meter   
[]  Instruction on use of the blood glucose meter [x]  Discuss the importance of HbA1C monitoring   
[]  Sick day guidelines  
[]  Proper use and disposal of lancets, needles, syringes or insulin pens (if appropriate) [x]  Potential long-term complications (retinopathy, kidney disease, neuropathy, foot care)  
[] Information about whom to contact in case of emergency or for more information   
[x]  Goal:  Patient/family will demonstrate understanding of Diabetes Self Management Skills by: (date) __9/18/18_____ Plan for post-discharge education or self-management support:  Disposition to be determined [x] Outpatient class schedule provided            [] Patient Declined 
  [] Scheduled for outpatient classes (date) _______ Sandra So RN 
pgr [phone]

## 2018-09-11 NOTE — PROGRESS NOTES
1930: Assumed pt care. Received pt resting in bed eating dinner, pt is alert and oriented x 4. Denies any pain at this time. Dual NIH done with KAUSHAL Tinsley. Pt has no signs of distress. Bed on lowest position, wheels locked, call bell within reach. Dual skin assessment done with KAUSHAL Tinsley. Pt has open wounds on the sacrum and right buttock and blister on right buttock. 2230: received a call from [phone] [facility], she notified this nurse that pt had an episode of second degree heart block. Rechecked vital signs, pt has fever 101.2 F, and elevated /95. 
 
2248: tylenol PO given for fever and labetalol 20 mg IV given for high blood pressure, called Donia Aspen tele tech to ask how pt is doing on the tele monitor she stated pt is back to sinus rhythm. 2320: rechecked pt's vital signs /72, temp. [address]: rechecked temp. 99.6 F. 
 
2335: paged Dr. Christelle Oakley. 2340: received a call back from Dr. Christelle Oakley, notified him that pt had an episode of second degree heart block, now hw is back to sinus rhythm, also pt had a fever temp. 101.2 tylenol was given now temp. Is down to 99.6. Dr. Christelle Oakley ordered blood cultures x 2, urinalysis, urine culture, and portable chest x-ray. Verbal read back provided. 9-11-18 
 
0630: bathed patient. 3572: Bedside and Verbal shift change report given to Alex Cordon (oncoming nurse) by Dat Beltran 
 (offgoing nurse). Report included the following information SBAR, Kardex, Intake/Output, MAR and Recent Results.

## 2018-09-11 NOTE — PROGRESS NOTES
Reason for Admission:   cva 
                
RRAT Score:       4 Plan for utilizing home health:    no   
                 
Likelihood of Readmission:  mod Transition of Care Plan:     Spoke with pt. Lives with 81y/o mother. He states he is independent with adls and amb. No dme. No pcp, referral to . Disabled, gets disability. Agrees to snf, will need insurance auth. snf list given, copy to chart. First choice [facility] choice consulate [facility]. plan snf, pending auth. Patient has designated ____________mother____________ to participate in his/her discharge plan and to receive any needed information. Name: Cassie Terrell Address: 
Phone number: [phone]

## 2018-09-11 NOTE — PROGRESS NOTES
Problem: Mobility Impaired (Adult and Pediatric) Goal: *Acute Goals and Plan of Care (Insert Text) Physical Therapy Goals Initiated 9/11/2018 and to be accomplished within 7 days. 1.  Patient will complete all bed mobility with moderate assistance in order to prepare for EOB/OOB activity. 2.  Patient will tolerate sitting EOB x 10 minutes with modified independence in order to prepare for OOB activity. 3.  Patient will perform sit <> stand with moderate assistance  in order to prepare for OOB/gait activity. 4.  Patient will perform bed to chair transfers with moderate assistance  in order to promote mobility and encourage seated activity to progress towards their prior level of function. Outcome: Progressing Towards Goal 
PHYSICAL THERAPY: Initial Assessment INPATIENT: Medicare: Hospital Day: 2 Patient: Lisbet Pastrana (31 y.o. male)    Date: 9/11/2018 Primary Diagnosis: Stroke (cerebrum) ([facility] [address] Stroke [facility])  
 ,    
Precautions: Fall, Skin PLOF: Independent ASSESSMENT : 
Patient supine in bed, agreeable to participation with PT. Patient reports that he lives in a 1 story house with 6 ORIANA. R LE and UE appear flaccid. Sensation if R extremities intact to light touch. MAX x 1 for supine <> sit. Demo's fair seated balance with L UE for support. L LE strength 4/5. Sit <> stand not attempted d/t safety concerns. Patient returned to bed and left supine with all need within reach. No c/o pain. Encouraged to attend to R extremities for safety and to prevent injury. Recommend d/c to rehab facility to maximize safety with mobility. Patient presents with deficits in: 
Bed Mobility, Transfers, Gait, Strength, Balance and Stairs Patient will benefit from skilled intervention to address the above impairments. Patients rehabilitation potential is considered to be Fair Factors which may influence rehabilitation potential include:  
[]         None noted []         Mental ability/status [x]         Medical condition 
[]         Home/family situation and support systems 
[]         Safety awareness 
[]         Pain tolerance/management 
[]         Other: PLAN : 
Recommendations and Planned Interventions: 
[x]           Bed Mobility Training             [x]    Neuromuscular Re-Education 
[x]           Transfer Training                   []    Orthotic/Prosthetic Training 
[x]           Gait Training                          []    Modalities [x]           Therapeutic Exercises          []    Edema Management/Control 
[x]           Therapeutic Activities            [x]    Patient and Family Training/Education 
[]           Other (comment): EDUCATION:  
Education:  Patient was educated on the following topics: purpose of PT, PT POC, safety with mobility, strategy for bed mobility. Verbalizes understanding. Barriers to Learning/Limitations: None Compensate with: visual, verbal, tactile, kinesthetic cues/model Recommendations for the next treatment session: Transfers Frequency/Duration: Patient will be followed by physical therapy 1-2 times per day/4-7 days per week to address goals. Discharge Recommendations: Rehab Further Equipment Recommendations for Discharge: TBD Factors which may impact discharge planning: N/A  
 
SUBJECTIVE:  
Patient stated It's been like this since Saturday.  OBJECTIVE DATA SUMMARY:  
No past medical history on file. No past surgical history on file.  
 
 
Eval Complexity: History: MEDIUM  Complexity : 1-2 comorbidities / personal factors will impact the outcome/ POC Exam:MEDIUM Complexity : 3 Standardized tests and measures addressing body structure, function, activity limitation and / or participation in recreation  Presentation: MEDIUM Complexity : Evolving with changing characteristics  Clinical Decision Making:Medium Complexity [facility] Sitting Balance Scale 2+/5 Overall Complexity:MEDIUM 
 
 G CODES:Mobility [id number] Current  CK= 40-59%   Goal  CK= 40-59%. The severity rating is based on the Other [address] Sitting Balance Scale 2+/5 [address] Sitting Balance Scale 2+/5 
0: Pt performs 25% or less of sitting activity (Max assist) CN, 100% impaired. 1: Pt supports self with upper extremities but requires therapist assistance. Pt performs 25-50% of effort (Mod assist) CM, 80% to <100% impaired. 1+: Pt supports self with upper extremities but requires therapist assistance. Pt performs >50% effort. (Min assist). CL, 60% to <80% impaired. 2: Pt supports self independently with both upper extremities. CL, 60% to <80% impaired. 2+: Pt support self independently with 1 upper extremity. CK, 40% to <60% impaired. 3: Pt sits without upper extremity support for up to 30 seconds. CK, 40% to <60% impaired. 3+: Pt sits without upper extremity support for 30 seconds or greater. CJ, 20% to <40% impaired. 4: Pt moves and returns trunkal midpoint 1-2 inches in one plane. CJ, 20% to <40% impaired. 4+: Pt moves and returns trunkal midpoint 1-2 inches in multiple planes. CI, 1% to <20% impaired. 5: Pt moves and returns trunkal midpoint in all planes greater than 2 inches. CH, 0% impaired. Prior Level of Function/Home Situation:  
Home Situation Home Environment: Private residence # Steps to Enter: 6 Rails to Enter: No 
One/Two Story Residence: Two story, live on 1st floor # of Interior Steps: 13 Living Alone: No 
Support Systems: Parent, Family member(s) Patient Expects to be Discharged to[de-identified] Private residence Current DME Used/Available at Home: None Critical Behavior: 
Neurologic State: Alert Orientation Level: Oriented X4 Cognition: Follows commands Safety/Judgement: Fall prevention Psychosocial 
Patient Behaviors: Calm; Cooperative Purposeful Interaction: Yes 
  
Manual Muscle Testing (LE) 
       R     L Hip Flexion:   Unable 4/5 Knee EXT:   Unable 4/5 
 Knee FLEX:   Unable 4/5 Ankle DF: Unable 4/5 
_________________________________________________ Tone : flaccid R UE and LE extremities, normal on L Sensation: intact to light touch on R Range Of Motion: [facility] Functional Mobility: 
 
 
Functional Status Indep (I) Mod I Super-vision Min A Mod A Max A Total A Assist x2 Verbal cues Additional time Not tested Comments Rolling []  []  [] []    []    []  []  [] [] [] [] Supine to sit []  []  [] []  []  [x]  []  [x] [] [] [] Sit to supine []  []  [] []  []  [x]  []  [] [] [] [] Sit to stand []  []  [] []  []  []  []  [] [] [] [x] Stand to sit []  []  [] []  []  []  []  [] [] [] [x] Bed to chair transfers []  []  [] []  []  []  []  [] [] [] [] Balance Good August Loth Poor Unable Not tested Comments Sitting static []  [x]  []  []  [] Sitting dynamic []  [x]  []  []  []   
Standing static []  []  []  [x]  [] Standing dynamic []  []  []  []  []   
 
 
Pain: None Vital Signs Temp: 97.6 °F (36.4 °C) Pulse (Heart Rate): 80    
BP: 182/69 Resp Rate: 18    
O2 Sat (%): 96 % Activity Tolerance:  
Fair Please refer to the flowsheet for vital signs taken during this treatment. After treatment:  
[]         Patient left in no apparent distress sitting up in chair 
[x]         Patient left in no apparent distress in bed 
[x]         Call bell left within reach 
[]         Nursing notified 
[]         Caregiver present 
[]         Bed alarm activated COMMUNICATION/EDUCATION:  
[x]         Fall prevention education was provided and the patient/caregiver indicated understanding. [x]         Patient/family have participated as able in goal setting and plan of care. [x]         Patient/family agree to work toward stated goals and plan of care. []         Patient understands intent and goals of therapy, but is neutral about his/her participation. []         Patient is unable to participate in goal setting and plan of care. Thank you for this referral. 
Haley Bernal Time Calculation: 20 mins

## 2018-09-11 NOTE — DIABETES MGMT
NUTRITIONAL ASSESSMENT GLYCEMIC CONTROL/ PLAN OF CARE Jaime Hall           57 y.o.           9/10/2018 1. Acute CVA (cerebrovascular accident) ([address] 2. Right sided weakness 3. Facial weakness 4. Hypertension, unspecified type 5. Non-traumatic rhabdomyolysis 6. Hyperglycemia INTERVENTIONS/PLAN:  
1. Adjust diet to 1800 calories (consistent CHO, mechanical soft, chopped meats, honey thick liquids) 2. Suggest 25 units Lantus/day. 3.  On-going diabetes, 2 gram Na and lipid education. 4.  Monitor glycemic control, labs, weights and po intake. ASSESSMENT:  
Nutritional Status:  Pt is 147% ideal wt; pt appears well nourished at this time. Po intake is adequate per meal time observation. Nutrition Diagnoses:  
Difficulty swallowing due to dysphagia in setting of CVA as evidenced by texture alterations including mechanical soft diet, chopped meats, honey thick liquids. Altered nutrition related labs due to diabetes/hyperlipidemia as evidenced by A1C of 9.8% and LDL of 252.4 mg/dL, triglyceride of 188 mg/dL. Obesity due to excess energy intake as evidenced by BMI of 34.0 kg/m2. Increased nutrient needs due to wound healing as evidenced by pt with three small open areas (stage 2) to buttocks per nsg. Diabetes Management: DIabetes education started today by glycemic control RN. Pt with overall elevated BG readings and suggest adding basal insulin. Recent blood glucose:    
9/11/18:  231, 314 (received first dose of basal insulin at 1221) 9/10/18:  293, 306, 250, 291 Within target range (non-ICU: <140; ICU<180): [] Yes   [x]  No 
Current Insulin regimen:  
Corrective lispro normal insulin sensitivity ACHS Home medication/insulin regimen: none HbA1c:  9.8% - ave BG has been ~ 234 mg/dL over past 3 months. Adequate glycemic control PTA:  [] Yes  [x] No 
  
 
SUBJECTIVE/OBJECTIVE: Information obtained from: chart review, pt 
 Pt admitted with CVA. Nursing noted pt with three small open areas (stage 2) to buttocks. Pt had reported (9/10/18) sitting in the same chair since Saturday. Pt reports his appetite is good, his weight was stable PTA, no food allergies and he denies problems with chewing or swallowing PTA. Diet: CHO consistent mechanical soft, chopped meats, honey thick liquids Patient Vitals for the past 100 hrs: 
 % Diet Eaten 09/11/18 1303 95 % 09/11/18 1030 95 % Medications: [x]                Reviewed Pertinent:  Lipitor 80 mg/d, Wireless Safety IVF:  NS at 150 ml/hr Most Recent POC Glucose:  
Recent Labs  
   09/11/18 
 0030  09/10/18 
 1231 GLU  233*  325* Labs:  
Lab Results Component Value Date/Time Hemoglobin A1c 9.7 (H) 09/11/2018 10:20 AM  
 
Lab Results Component Value Date/Time Hemoglobin A1c 9.7 (H) 09/11/2018 10:20 AM  
 Hemoglobin A1c 9.8 (H) 09/10/2018 12:31 PM  
 Hemoglobin A1c 6.2 (H) 06/18/2010 10:43 AM  
 
Lab Results Component Value Date/Time Sodium 141 09/11/2018 12:30 AM  
 Potassium 3.6 09/11/2018 12:30 AM  
 Chloride 104 09/11/2018 12:30 AM  
 CO2 29 09/11/2018 12:30 AM  
 Anion gap 8 09/11/2018 12:30 AM  
 Glucose 233 (H) 09/11/2018 12:30 AM  
 BUN 10 09/11/2018 12:30 AM  
 Creatinine 1.27 09/11/2018 12:30 AM  
 Calcium 7.9 (L) 09/11/2018 12:30 AM  
 Albumin 2.8 (L) 09/10/2018 12:31 PM  
 
Lab Results Component Value Date/Time Cholesterol, total 349 (H) 09/10/2018 12:31 PM  
 HDL Cholesterol 59 09/10/2018 12:31 PM  
 LDL, calculated 252.4 (H) 09/10/2018 12:31 PM  
 VLDL, calculated 37.6 09/10/2018 12:31 PM  
 Triglyceride 188 (H) 09/10/2018 12:31 PM  
 CHOL/HDL Ratio 5.9 (H) 09/10/2018 12:31 PM  
 
Anthropometrics: IBW : 67.1 kg (148 lb),  , BMI (calculated): 34 Wt Readings from Last 1 Encounters:  
09/10/18 98.4 kg (217 lb) Ht Readings from Last 1 Encounters:  
09/10/18 5' 7\" (1.702 m) Estimated Nutrition Needs:  2078 Kcals/day, Protein (g): 81 g Fluid (ml): 2100 ml Based on:   [x]          Actual BW    []          ABW   []            Adjusted BW   
    
Nutrition Interventions: On-going nutrition/diabetes education Goal:  
Blood glucose will be within target range of  mg/dL by 9/13/18. Pt will consume > 75% meals with adequate intake to promote wound healing bu 9/16/18. Pt will verbalize understanding of healthy meal planning for diabetes, HTN and hyperlipidemia by 9/21/18. Nutrition Monitoring and Evaluation   
 
[x]     Monitor po intake on meal rounds 
[x]     Continue inpatient monitoring and intervention 
[]     Other: 
 
 
Nutrition Risk:  []   High     []  Moderate    [x]  Minimal/Uncompromised Aidan Colbert RD, CDE Office:  [phone] Long [place] Pager:  [phone]

## 2018-09-11 NOTE — WOUND CARE
Wound/Ostomy Nurse Progress Note Patient: Jocelyn Castillo :1955 MRN: [id number] Situation: Consult for right buttock wound. Background: Patient was admitted to [address] [place] [address] for right sided weakness. MRI showed acute infarct. A pressure injury was noted on the right buttocks. Assessment: Patient was sitting up in bed awake but drowsy, when wound care arrived for consult. He is able to answer questions appropriately and is cooperative with wound assessment. His nurse, Isa, was in the room to assist. The patient was turned on his right side. A stage two pressure injury was noted his right buttocks along the gluteal cleft. There are two open areas and a fluid filled blister. The open wounds are pink and shallow. A foam dressing was applied to the wounds. A head-to-toe skin assessment was done. Not other areas of concern were noted. Recommendation: The patient should be assisted to turn every two hours and pressure injury prevention protocols should be followed. Monitor skin q shift for further breakdown.

## 2018-09-11 NOTE — PROGRESS NOTES
conducted an initial consultation and Spiritual Assessment for [facility]	Yony Schafer, who is a 57 y.o.,male. Patients Primary Language is: [place]. According to the patients EMR Synagogue Affiliation is: Other. The reason the Patient came to the hospital is:  
Patient Active Problem List  
 Diagnosis Date Noted  Pneumonia 09/11/2018  Stroke (cerebrum) ([facility] [address] 09/10/2018  Stroke ([facility] [address] 09/10/2018  Rhabdomyolysis 09/10/2018  Dehydration 09/10/2018  Hypertension 09/10/2018  Diabetes mellitus type 2, controlled ([facility] [address] 09/10/2018  Non compliance w medication regimen 09/10/2018 The  provided the following Interventions: 
Initiated a relationship of care and support with patient in room 2409 today. Listened empathically as patient talked about his being here and how was feeling now. Patient is on stroke protocol . Provided information about Spiritual Care Services. Offered prayer and assurance of continued prayers on patients behalf. The following outcomes were achieved: 
Patient shared limited information about his medical narrative. Gayathri Means Patient processed feeling about current hospitalization. Patient expressed gratitude for pastoral care visit. Assessment: 
Patient does not have any Congregation/cultural needs that will affect patients preferences in health care. There are no further spiritual or Congregation issues which require Spiritual Care Services interventions at this time. Plan: 
Chaplains will continue to follow and will provide pastoral care on an as needed/requested basis Gayathri Burris 3 Board Certified [address] [place] [address] Spiritual Care  
[phone]

## 2018-09-11 NOTE — PROGRESS NOTES
Problem: Self Care Deficits Care Plan (Adult) Goal: *Acute Goals and Plan of Care (Insert Text) Occupational Therapy Goals Initiated 9/11/2018 within 7 day(s). 1.  Patient will perform grooming with minimal assistance/contact guard assist sitting EOB involving RUE for gross assistance. 2.  Patient will perform upper body dressing with minimal assistance/contact guard assist involving RUE for gross assistance . 6. Patient will participate in upper extremity therapeutic exercise/activities with minimal assistance/contact guard assist for 15 minutes. 7.  Patient will utilize energy conservation techniques during functional activities with verbal, visual and tactile cues. Occupational Therapy EVALUATION Patient: Andriy Paz (54 y.o. male) Date: 9/11/2018 Primary Diagnosis: Stroke (cerebrum) ([facility] [address] Stroke [facility]) Precautions:   Fall, Skin PLOF: Pt reports to be independent with ADLs and transfers at home PTA. ASSESSMENT : 
Based on the objective data described below, the patient presents with no tone, AROM and coordination at RUE, impaired sitting balance and overall decreased participation with ADLs and transfers following above medical diagnosis. Pt participated with SROM UE therapeutic exercises and bed mobility requiring max assist. Pt was educated on paying more attention to RUE and to practice on attending to [place] when resting to prevent neglect/loss of function at [place]. Pt verbalized understanding. Patient will benefit from skilled intervention to address the above impairments. Patients rehabilitation potential is considered to be Good Factors which may influence rehabilitation potential include:  
[]             None noted []             Mental ability/status [x]             Medical condition []             Home/family situation and support systems []             Safety awareness []             Pain tolerance/management 
[]             Other: Recommendations for nursing: 
Written on communication board:  
Verbally communicated to: PLAN : 
Recommendations and Planned Interventions: 
[x]               Self Care Training                  [x]        Therapeutic Activities [x]               Functional Mobility Training    []        Cognitive Retraining 
[x]               Therapeutic Exercises           []        Endurance Activities [x]               Balance Training                   [x]        Neuromuscular Re-Education []               Visual/Perceptual Training     [x]   Home Safety Training 
[x]               Patient Education                 [x]        Family Training/Education []               Other (comment): Frequency/Duration: Patient will be followed by occupational therapy 1-2 times per day/4-7 days per week to address goals. Discharge Recommendations: Dominik Sweeney Further Equipment Recommendations for Discharge: TBD SUBJECTIVE:  
Patient stated I was doing well before I came to hospital. OBJECTIVE DATA SUMMARY:  
No past medical history on file. No past surgical history on file. Barriers to Learning/Limitations: None Compensate with: visual, verbal, tactile, kinesthetic cues/model GCODES:  Self Care  Current  CN= 100%  Goal  CK= 40-59%. The severity rating is based on the Other participation with ADLs and transfers. Eval Complexity: History: LOW Complexity : Brief history review ; Examination: HIGH Complexity : 5 or more performance deficits relating to physical, cognitive , or psychosocial skils that result in activity limitations and / or participation restrictions; Decision Making:HIGH Complexity : Patient presents with comorbidities that affect occupational performance. Signifigant modification of tasks or assistance (eg, physical or verbal) with assessment (s) is necessary to enable patient to complete evaluation Prior Level of Function/Home Situation:  
Home Situation Home Environment: Private residence # Steps to Enter: 5 Rails to Enter: No 
One/Two Story Residence: Two story, live on 1st floor # of Interior Steps: 13 Living Alone: No 
Support Systems: Family member(s) Patient Expects to be Discharged to[de-identified] Private residence Current DME Used/Available at Home: None Tub or Shower Type: Shower [x]  Right hand dominant   []  Left hand dominant Cognitive/Behavioral Status: 
Neurologic State: Alert Orientation Level: Oriented X4 Cognition: Appropriate for age attention/concentration; Follows commands Safety/Judgement: Fall prevention Skin: intact Edema: none Vision/Perceptual:   
Tracking: Requires cues, head turns, or add eye shifts to track (right side of body) Coordination: 
Coordination: Generally decreased, functional (for LUE) Fine Motor Skills-Upper: Left Intact; Right Impaired Gross Motor Skills-Upper: Left Intact; Right Impaired Balance: 
Sitting: Impaired Sitting - Static: Fair (occasional) (-) Sitting - Dynamic: Poor (constant support) Standing:  (unable to assess) Strength: 
Strength: Generally decreased, functional (for LUE) Tone & Sensation: 
Tone: Abnormal (decreased tone at RUE) Sensation: Intact Range of Motion: 
AROM: Generally decreased, functional (LUE) PROM: Within functional limits (for RUE) Functional Mobility and Transfers for ADLs: 
Bed Mobility: 
Supine to Sit: Maximum assistance Sit to Supine: Maximum assistance Transfers: 
Sit to Stand:  (unable to assess) ADL Assessment: 
Feeding: Setup Oral Facial Hygiene/Grooming: Minimum assistance Upper Body Dressing: Maximum assistance Lower Body Dressing: Total assistance Toileting: Total assistance ADL Intervention: 
Cognitive Retraining Safety/Judgement: Fall prevention Therapeutic Exercise: SROM exercises 2x10 in all planes. Minimum rest breaks required. Occasional verbal cues provided to maintain midline in sitting and maintain safe  at hands while doing exercises. Pain: 
Pre treatment pain level:   
Post treatment pain level:  
Pain Scale 1: Numeric (0 - 10) Pain Intensity 1: 0 Activity Tolerance:  
Fair Please refer to the flowsheet for vital signs taken during this treatment. After treatment:  
[] Patient left in no apparent distress sitting up in chair 
[x] Patient left in no apparent distress in bed 
[x] Call bell left within reach 
[] Nursing notified 
[] Caregiver present 
[] Bed alarm activated COMMUNICATION/EDUCATION:  
[x] Home safety education was provided and the patient/caregiver indicated understanding. [x] Patient/family have participated as able in goal setting and plan of care. [x] Patient/family agree to work toward stated goals and plan of care. [] Patient understands intent and goals of therapy, but is neutral about his/her participation. [] Patient is unable to participate in goal setting and plan of care. Thank you for this referral. 
Amber Robbins OTR/L Time Calculation: 20 mins

## 2018-09-11 NOTE — PROGRESS NOTES
Problem: Dysphagia (Adult) Goal: *Acute Goals and Plan of Care (Insert Text) Recommendations: 
Diet: mech-soft (chopped)/honey Meds: one at a time Aspiration Precautions Oral Care TID Other: MBS next am 
 
Goals:  Patient will: 1. Tolerate PO trials with 0 s/s overt distress in 4/5 trials 2. Utilize compensatory swallow strategies/maneuvers (decrease bite/sip, size/rate, alt. liq/sol) with min cues in 4/5 trials 3. Perform oral-motor/laryngeal exercises to increase oropharyngeal swallow function with min cues 4. Complete an objective swallow study (i.e., MBSS) to assess swallow integrity, r/o aspiration, and determine of safest LRD, min A Outcome: Progressing Towards Goal 
 
Speech LAnguage Pathology bedside swallow  
evaluation & TREATMENT Patient: Darwin Pablo (47 y.o. male) Date: 9/11/2018 Primary Diagnosis: Stroke (cerebrum) ([facility] [address] Stroke [facility]) Precautions: aspiration  Fall, Skin PLOF: lives with family ASSESSMENT : 
Based on the objective data described below, the patient presents with mild-mod OP dysphagia in the setting of stroke. Pt alert and Ox3; Oral-motor exam revealed pt edentulous; however, all other structures grossly intact for mastication and deglutition. Accepted thin liquids with immediate teary eyes. Decreased eye tearing with nectar; resolved with honey-thick liquids. Labored oral bolus prep and transit with cracker; With increased time, 100% oral bolus clearance appreciated. At this time, pt safest for mech-soft (chopped meats)/honey-thick liquid diet. May benefit from [facility] next am to assess swallow integrity and rule-out aspiration. D/w RNGhazala. Skilled therapy initiated with cues to decrease sip/bite size to decrease aspiration risk; fair response.  Educated pt on aspiration precautions and importance of compensatory swallow techniques to decrease aspiration risk (decrease rate of intake & sip/bite size, upright @HOB for all po intake and ~30 minutes after po); verbalized comprehension. Patient will benefit from skilled intervention to address the above impairments. Patients rehabilitation potential is considered to be Fair Factors which may influence rehabilitation potential include:  
[x]            None noted []            Mental ability/status []            Medical condition []            Home/family situation and support systems 
[]            Safety awareness 
[]            Pain tolerance/management []            Other: PLAN : 
Recommendations and Planned Interventions: 
mech-soft (chopped meats)/honey-thick liquid diet Frequency/Duration: Patient will be followed by speech-language pathology 1-2 times per day/4-7 days per week to address goals. Discharge Recommendations: Dominik Sweeney SUBJECTIVE:  
Patient stated Jean Carlos Gutierrezad. OBJECTIVE:  
No past medical history on file. No past surgical history on file. Prior Level of Function/Home Situation: lives with family Home Situation Home Environment: Private residence # Steps to Enter: 5 Rails to Enter: No 
One/Two Story Residence: Two story, live on 1st floor # of Interior Steps: 13 Living Alone: No 
Support Systems: Family member(s) Patient Expects to be Discharged to[de-identified] Private residence Current DME Used/Available at Home: None Tub or Shower Type: Shower Diet prior to admission: regular/thin Current Diet:  mech-soft (chopped meats)/honey-thick liquid diet Cognitive and Communication Status: 
Neurologic State: Alert Orientation Level: Oriented X4 Cognition: Appropriate for age attention/concentration, Follows commands Perception: Cues to attend to right side of body Perseveration: No perseveration noted Safety/Judgement: Fall prevention Oral Assessment: 
Oral Assessment Labial: No impairment Dentition: Edentulous Oral Hygiene: fair Lingual: Decreased rate;Decreased strength Velum: No impairment Mandible: No impairment P.O. Trials: Patient Position: HOB 60 Vocal quality prior to P.O.: Low volume Consistency Presented: Thin liquid; Nectar thick liquid;Honey thick liquid;Pudding; Solid How Presented: Self-fed/presented;SLP-fed/presented;Straw;Successive swallows Bolus Acceptance: No impairment Bolus Formation/Control: Impaired Type of Impairment: Delayed;Mastication Propulsion: Delayed (# of seconds); Discoordination Oral Residue: Less than 10% of bolus; Lingual 
Initiation of Swallow: Delayed (# of seconds) Laryngeal Elevation: Decreased Aspiration Signs/Symptoms: Change vocal quality; Watery eyes;Weak cough;Delayed cough/throat clear Pharyngeal Phase Characteristics: Altered vocal quality; Audible swallow; Poor endurance; Suspected pharyngeal residue Effective Modifications: Small sips and bites Cues for Modifications: Minimal-moderate Oral Phase Severity: Mild Pharyngeal Phase Severity : Moderate GCODESwallowing:  Swallow Current Status CL= 60-79%  Swallow Goal Status CJ= 20-39% The severity rating is based on the following outcomes: GÓMEZ Noms Swallow Level 3 Clinical Judgement PAIN: 
Start of Eval/Tx: 0 End of Eval/Tx: 0 After treatment:  
[]            Patient left in no apparent distress sitting up in chair 
[x]            Patient left in no apparent distress in bed 
[x]            Call bell left within reach [x]            Nursing notified []            Family present 
[]            Caregiver present 
[]            Bed alarm activated COMMUNICATION/EDUCATION:  
[x]            Aspiration precautions; swallow safety; compensatory techniques. [x]            Patient/family have participated as able in goal setting and plan of care. [x]            Patient/family agree to work toward stated goals and plan of care. []            Patient understands intent and goals of therapy; neutral about participation.  
[]            Patient unable to participate in goal setting/plan of care; educ ongoing with interdisciplinary staff 
[]         Posted safety precautions in patient's room. Thank you for this referral. 
BLAIRE Jenkins Time Calculation: 25 mins Evaluation Time: 15 minutes Treatment Time: 10 minutes

## 2018-09-12 NOTE — PROGRESS NOTES
No accepting snf bed yet . Will need auth when have one. Has been posted out to facilities of his choice. called [facility], they will call me back.

## 2018-09-12 NOTE — PROGRESS NOTES
Problem: Impaired Skin Integrity/Pressure Injury Treatment Goal: *Prevention of pressure injury Document Mitul Scale and appropriate interventions in the flowsheet. Outcome: Progressing Towards Goal 
Pressure Injury Interventions: 
Sensory Interventions: Assess changes in LOC, Minimize linen layers, Pressure redistribution bed/mattress (bed type) Moisture Interventions: Absorbent underpads, Apply protective barrier, creams and emollients, Maintain skin hydration (lotion/cream), Offer toileting Q_hr Activity Interventions: Pressure redistribution bed/mattress(bed type), PT/OT evaluation Mobility Interventions: Pressure redistribution bed/mattress (bed type) Nutrition Interventions: Document food/fluid/supplement intake Friction and Shear Interventions: Apply protective barrier, creams and emollients, Minimize layers

## 2018-09-12 NOTE — PROGRESS NOTES
Problem: Falls - Risk of 
Goal: *Absence of Falls Document Geoffrey Lopez Fall Risk and appropriate interventions in the flowsheet. Outcome: Progressing Towards Goal 
Fall Risk Interventions: 
  
 
  
 
Medication Interventions: Evaluate medications/consider consulting pharmacy Elimination Interventions: Call light in reach, Patient to call for help with toileting needs, Toileting schedule/hourly rounds, Urinal in reach Problem: Pressure Injury - Risk of 
Goal: *Prevention of pressure injury Document Mitul Scale and appropriate interventions in the flowsheet. Outcome: Progressing Towards Goal 
Pressure Injury Interventions: 
Sensory Interventions: Assess changes in LOC, Minimize linen layers, Pressure redistribution bed/mattress (bed type) Moisture Interventions: Absorbent underpads, Apply protective barrier, creams and emollients, Maintain skin hydration (lotion/cream), Offer toileting Q_hr Activity Interventions: Pressure redistribution bed/mattress(bed type), PT/OT evaluation Mobility Interventions: Pressure redistribution bed/mattress (bed type) Nutrition Interventions: Document food/fluid/supplement intake Friction and Shear Interventions: Apply protective barrier, creams and emollients, Minimize layers Problem: Impaired Skin Integrity/Pressure Injury Treatment Goal: *Prevention of pressure injury Document Mitul Scale and appropriate interventions in the flowsheet. Outcome: Progressing Towards Goal 
Pressure Injury Interventions: 
Sensory Interventions: Assess changes in LOC, Minimize linen layers, Pressure redistribution bed/mattress (bed type) Moisture Interventions: Absorbent underpads, Apply protective barrier, creams and emollients, Maintain skin hydration (lotion/cream), Offer toileting Q_hr Activity Interventions: Pressure redistribution bed/mattress(bed type), PT/OT evaluation Mobility Interventions: Pressure redistribution bed/mattress (bed type) Nutrition Interventions: Document food/fluid/supplement intake Friction and Shear Interventions: Apply protective barrier, creams and emollients, Minimize layers

## 2018-09-12 NOTE — ROUTINE PROCESS
Bedside and Verbal shift change report given to Guanako Minaya, RN (oncoming nurse) by Barbee Barthel RN, BSN (offgoing nurse). Report given with SBAR, Kardex, Intake/Output, MAR and Recent Results.

## 2018-09-12 NOTE — PROGRESS NOTES
Progress Note Patient: Jf Tan               Sex: male          DOA: 9/10/2018 YOB: 1955      Age:  57 y.o.        LOS:  LOS: 2 days Subjective / Interval Hx [place] is a 57 y.o. male  who presents with right side weakness onset 2 days PTA in ER. He had CT head was negative for acute findings. Tele neurology saw patient and recommended admission for stroke and MRI brain ordered. Patient was also noted to have Rhabdomyolysis and started on aggressive IVF. 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 No evidence of associated hemorrhage or mass effect. Patient also had fever Tmax 101.2 last night. CXR wet read done shows right lung opacity 9/12: no new complaints. Afebrile. Zosyn for pneumonia. Speech eval recommend mechanical soft , honey thick liquid diet . PT /OT following recommending rehab ECHO done shows EF 55% Moderate (grade 2) left ventricular diastolic dysfunction. Objective:  
  
Visit Vitals  /79 (BP 1 Location: Left arm, BP Patient Position: At rest)  Pulse 85  Temp 98.3 °F (36.8 °C)  Resp 14  
 Ht 5' 7\" (1.702 m)  Wt 98.4 kg (217 lb)  SpO2 96%  BMI 33.99 kg/m2 Physical Exam  
Constitutional: He is oriented to person, place, and time. He appears well-developed and well-nourished. HENT:  
Head: Normocephalic and atraumatic. Mouth/Throat: No oropharyngeal exudate. Eyes: Conjunctivae and EOM are normal. Pupils are equal, round, and reactive to light. No scleral icterus. Neck: Neck supple. Cardiovascular: Normal rate, regular rhythm and normal heart sounds. No murmur heard. Pulmonary/Chest: Effort normal and breath sounds normal. No respiratory distress. He has no wheezes. He has no rales. Abdominal: Soft. Bowel sounds are normal.  
Musculoskeletal: He exhibits no edema. Neurological: He is alert and oriented to person, place, and time. Right side hemiparesis Skin: Skin is warm. No rash noted. No erythema. No pallor. Psychiatric: He has a normal mood and affect. Intake and Output: 
Current Shift:  09/12 0701 - 09/12 1900 In: 480 [P.O.:480] Out: - Last three shifts:  09/10 1901 - 09/12 0700 In: 7162 [P.O.:520; I.V.:3000] Out: 1615 [CXRDF:1121] Recent Results (from the past 48 hour(s)) CBC W/O DIFF Collection Time: 09/10/18 12:31 PM  
Result Value Ref Range WBC 12.9 4.6 - 13.2 K/uL  
 RBC 5.78 (H) 4.70 - 5.50 M/uL  
 HGB 17.4 (H) 13.0 - 16.0 g/dL HCT 48.7 (H) 36.0 - 48.0 % MCV 84.3 74.0 - 97.0 FL  
 MCH 30.1 24.0 - 34.0 PG  
 MCHC 35.7 31.0 - 37.0 g/dL  
 RDW 13.2 11.6 - 14.5 % PLATELET 394 251 - 270 K/uL MPV 9.6 9.2 - 26.5 FL  
METABOLIC PANEL, COMPREHENSIVE Collection Time: 09/10/18 12:31 PM  
Result Value Ref Range Sodium 139 136 - 145 mmol/L Potassium 3.6 3.5 - 5.5 mmol/L Chloride 101 100 - 108 mmol/L  
 CO2 28 21 - 32 mmol/L Anion gap 10 3.0 - 18 mmol/L Glucose 325 (H) 74 - 99 mg/dL BUN 9 7.0 - 18 MG/DL Creatinine 1.26 0.6 - 1.3 MG/DL  
 BUN/Creatinine ratio 7 (L) 12 - 20 GFR est AA >60 >60 ml/min/1.73m2 GFR est non-AA 58 (L) >60 ml/min/1.73m2 Calcium 8.5 8.5 - 10.1 MG/DL Bilirubin, total 0.8 0.2 - 1.0 MG/DL  
 ALT (SGPT) 24 16 - 61 U/L  
 AST (SGOT) 88 (H) 15 - 37 U/L Alk. phosphatase 108 45 - 117 U/L Protein, total 6.8 6.4 - 8.2 g/dL Albumin 2.8 (L) 3.4 - 5.0 g/dL Globulin 4.0 2.0 - 4.0 g/dL A-G Ratio 0.7 (L) 0.8 - 1.7 PROTHROMBIN TIME + INR Collection Time: 09/10/18 12:31 PM  
Result Value Ref Range Prothrombin time 12.3 11.5 - 15.2 sec INR 0.9 0.8 - 1.2 THROMBIN TIME Collection Time: 09/10/18 12:31 PM  
Result Value Ref Range Thrombin time 18.3 (H) 13.8 - 18.2 SECS FIBRINOGEN Collection Time: 09/10/18 12:31 PM  
Result Value Ref Range Fibrinogen 646 (H) 210 - 451 mg/dL TYPE & SCREEN Collection Time: 09/10/18 12:31 PM  
Result Value Ref Range Crossmatch Expiration 09/13/2018 ABO/Rh(D) A POSITIVE Antibody screen NEG   
ASPIRIN TEST Collection Time: 09/10/18 12:31 PM  
Result Value Ref Range Aspirin test 479 (L) 620 - 672 ARU  
CARDIAC PANEL,(CK, CKMB & TROPONIN) Collection Time: 09/10/18 12:31 PM  
Result Value Ref Range CK 5388 (H) 39 - 308 U/L  
 CK - MB 24.7 (H) <3.6 ng/ml CK-MB Index 0.5 0.0 - 4.0 % Troponin-I, Qt. 0.09 (H) 0.0 - 0.045 NG/ML  
LIPID PANEL Collection Time: 09/10/18 12:31 PM  
Result Value Ref Range LIPID PROFILE Cholesterol, total 349 (H) <200 MG/DL Triglyceride 188 (H) <150 MG/DL  
 HDL Cholesterol 59 40 - 60 MG/DL  
 LDL, calculated 252.4 (H) 0 - 100 MG/DL VLDL, calculated 37.6 MG/DL  
 CHOL/HDL Ratio 5.9 (H) 0 - 5.0 HEMOGLOBIN A1C WITH EAG Collection Time: 09/10/18 12:31 PM  
Result Value Ref Range Hemoglobin A1c 9.8 (H) 4.2 - 5.6 % Est. average glucose 235 mg/dL SED RATE (ESR) Collection Time: 09/10/18 12:31 PM  
Result Value Ref Range Sed rate, automated 2 0 - 20 mm/hr CRP, HIGH SENSITIVITY Collection Time: 09/10/18 12:31 PM  
Result Value Ref Range C-Reactive Protein, Cardiac 52.75 (H) 0.00 - 3.00 mg/L  
TSH 3RD GENERATION Collection Time: 09/10/18 12:31 PM  
Result Value Ref Range TSH 1.10 0.36 - 3.74 uIU/mL T3, FREE Collection Time: 09/10/18 12:31 PM  
Result Value Ref Range Triiodothyronine (T3), free 2.7 2.18 - 3.98 PG/ML  
T4, FREE Collection Time: 09/10/18 12:31 PM  
Result Value Ref Range T4, Free 0.9 0.7 - 1.5 NG/DL  
EKG, 12 LEAD, INITIAL Collection Time: 09/10/18 12:31 PM  
Result Value Ref Range Ventricular Rate 88 BPM  
 Atrial Rate 88 BPM  
 P-R Interval 168 ms QRS Duration 98 ms Q-T Interval 394 ms QTC Calculation (Bezet) 476 ms Calculated P Axis 17 degrees Calculated R Axis -58 degrees Calculated T Axis 100 degrees Diagnosis Normal sinus rhythm Left anterior fascicular block Voltage criteria for left ventricular hypertrophy T wave abnormality, consider lateral ischemia Nonspecific ST and T wave abnormality Abnormal ECG No previous ECGs available Baseline artifact Confirmed by Christopher Kraft ([id number]) on 9/10/2018 4:07:57 PM 
  
GLUCOSE, POC Collection Time: 09/10/18 12:42 PM  
Result Value Ref Range Glucose (POC) 306 (H) 70 - 110 mg/dL URINALYSIS W/ RFLX MICROSCOPIC Collection Time: 09/10/18 12:50 PM  
Result Value Ref Range Color YELLOW Appearance CLEAR Specific gravity >1.030 (H) 1.005 - 1.030  
 pH (UA) 5.5 5.0 - 8.0 Protein >1000 (A) NEG mg/dL Glucose >1000 (A) NEG mg/dL Ketone NEGATIVE  NEG mg/dL Bilirubin NEGATIVE  NEG Blood LARGE (A) NEG Urobilinogen 1.0 0.2 - 1.0 EU/dL Nitrites NEGATIVE  NEG Leukocyte Esterase NEGATIVE  NEG    
DRUG SCREEN, URINE Collection Time: 09/10/18 12:50 PM  
Result Value Ref Range BENZODIAZEPINES NEGATIVE  NEG    
 BARBITURATES NEGATIVE  NEG    
 THC (TH-CANNABINOL) NEGATIVE  NEG    
 OPIATES NEGATIVE  NEG    
 PCP(PHENCYCLIDINE) NEGATIVE  NEG    
 COCAINE NEGATIVE  NEG    
 AMPHETAMINES NEGATIVE  NEG METHADONE NEGATIVE  NEG HDSCOM (NOTE) URINE MICROSCOPIC ONLY Collection Time: 09/10/18 12:50 PM  
Result Value Ref Range WBC 0 to 2 0 - 4 /hpf  
 RBC 3 to 6 0 - 5 /hpf Epithelial cells FEW 0 - 5 /lpf Bacteria NEGATIVE  NEG /hpf  
EKG, 12 LEAD, SUBSEQUENT Collection Time: 09/10/18  3:07 PM  
Result Value Ref Range Ventricular Rate 90 BPM  
 Atrial Rate 90 BPM  
 P-R Interval 170 ms QRS Duration 92 ms Q-T Interval 386 ms QTC Calculation (Bezet) 472 ms Calculated P Axis 33 degrees Calculated R Axis -58 degrees Calculated T Axis 88 degrees Diagnosis Normal sinus rhythm Left anterior fascicular block Left ventricular hypertrophy Prolonged QT Abnormal ECG When compared with ECG of 10-SEP-2018 12:31, 
Minimal criteria for Septal infarct are no longer present Confirmed by Bear James ([id number]) on 9/10/2018 4:12:29 PM 
  
GLUCOSE, POC Collection Time: 09/10/18  3:14 PM  
Result Value Ref Range Glucose (POC) 293 (H) 70 - 110 mg/dL GLUCOSE, POC Collection Time: 09/10/18  5:14 PM  
Result Value Ref Range Glucose (POC) 250 (H) 70 - 110 mg/dL GLUCOSE, POC Collection Time: 09/10/18  9:40 PM  
Result Value Ref Range Glucose (POC) 291 (H) 70 - 110 mg/dL CULTURE, BLOOD Collection Time: 09/11/18 12:25 AM  
Result Value Ref Range Special Requests: NO SPECIAL REQUESTS Culture result: NO GROWTH 1 DAY METABOLIC PANEL, BASIC Collection Time: 09/11/18 12:30 AM  
Result Value Ref Range Sodium 141 136 - 145 mmol/L Potassium 3.6 3.5 - 5.5 mmol/L Chloride 104 100 - 108 mmol/L  
 CO2 29 21 - 32 mmol/L Anion gap 8 3.0 - 18 mmol/L Glucose 233 (H) 74 - 99 mg/dL BUN 10 7.0 - 18 MG/DL Creatinine 1.27 0.6 - 1.3 MG/DL  
 BUN/Creatinine ratio 8 (L) 12 - 20 GFR est AA >60 >60 ml/min/1.73m2 GFR est non-AA 57 (L) >60 ml/min/1.73m2 Calcium 7.9 (L) 8.5 - 10.1 MG/DL URINALYSIS W/MICROSCOPIC Collection Time: 09/11/18 12:30 AM  
Result Value Ref Range Color DARK YELLOW Appearance CLEAR Specific gravity 1.026 1.005 - 1.030    
 pH (UA) 5.5 5.0 - 8.0 Protein 300 (A) NEG mg/dL Glucose >1000 (A) NEG mg/dL Ketone NEGATIVE  NEG mg/dL Bilirubin NEGATIVE  NEG Blood LARGE (A) NEG Urobilinogen 1.0 0.2 - 1.0 EU/dL Nitrites NEGATIVE  NEG Leukocyte Esterase NEGATIVE  NEG    
 WBC 11 to 20 0 - 4 /hpf  
 RBC NEGATIVE  0 - 5 /hpf Epithelial cells FEW 0 - 5 /lpf Bacteria NEGATIVE  NEG /hpf CULTURE, BLOOD Collection Time: 09/11/18 12:30 AM  
Result Value Ref Range Special Requests: NO SPECIAL REQUESTS Culture result: NO GROWTH 1 DAY    
GLUCOSE, POC Collection Time: 09/11/18  8:36 AM  
Result Value Ref Range Glucose (POC) 231 (H) 70 - 110 mg/dL CK Collection Time: 09/11/18 10:20 AM  
Result Value Ref Range CK 3973 (H) 39 - 308 U/L  
NT-PRO BNP Collection Time: 09/11/18 10:20 AM  
Result Value Ref Range NT pro- 0 - 900 PG/ML  
HEMOGLOBIN A1C WITH EAG Collection Time: 09/11/18 10:20 AM  
Result Value Ref Range Hemoglobin A1c 9.7 (H) 4.2 - 5.6 % Est. average glucose 232 mg/dL CBC W/O DIFF Collection Time: 09/11/18 10:20 AM  
Result Value Ref Range WBC 13.9 (H) 4.6 - 13.2 K/uL  
 RBC 5.43 4.70 - 5.50 M/uL  
 HGB 16.3 (H) 13.0 - 16.0 g/dL HCT 48.3 (H) 36.0 - 48.0 % MCV 89.0 74.0 - 97.0 FL  
 MCH 30.0 24.0 - 34.0 PG  
 MCHC 33.7 31.0 - 37.0 g/dL  
 RDW 13.9 11.6 - 14.5 % PLATELET 392 223 - 854 K/uL MPV 10.1 9.2 - 11.8 FL  
GLUCOSE, POC Collection Time: 09/11/18 11:23 AM  
Result Value Ref Range Glucose (POC) 314 (H) 70 - 110 mg/dL DUPLEX CAROTID BILATERAL Collection Time: 09/11/18  2:00 PM  
Result Value Ref Range Left CCA dist sys 68.88 cm/s Left CCA dist rodriguez 0.00 cm/s LEFT COMMON CAROTID ARTERY MID S 122.06 cm/s LEFT COMMON CAROTID ARTERY MID D 9.39 cm/s Left CCA prox sys 156.80 cm/s Left CCA prox rodriguez 8.45 cm/s Left ECA sys 100.73 cm/s LEFT EXTERNAL CAROTID ARTERY D 0.00 cm/s Left ICA dist sys 122.63 cm/s Left ICA dist rodriguez 37.43 cm/s Left ICA mid sys 105.27 cm/s Left ICA mid rodriguez 27.05 cm/s Left ICA prox sys 88.82 cm/s Left ICA prox rodriguez 20.83 cm/s Left subclavian sys 81.62 cm/s LEFT SUBCLAVIAN ARTERY D 0.00 cm/s Left vertebral sys 69.68 cm/s LEFT VERTEBRAL ARTERY D 13.94 cm/s Left ICA/CCA sys 0.78 Right cca dist sys 81.22 cm/s Right CCA dist rodriguez 8.92 cm/s RIGHT COMMON CAROTID ARTERY MID S 91.55 cm/s RIGHT COMMON CAROTID ARTERY MID D 0.00 cm/s Right CCA prox sys 130.98 cm/s Right CCA prox rodriguez 0.00 cm/s Right eca sys 97.79 cm/s RIGHT EXTERNAL CAROTID ARTERY D 13.70 cm/s Right ICA dist sys 87.20 cm/s Right ICA dist rodriguez 26.78 cm/s Right ICA mid sys 79.73 cm/s Right ICA mid rodriguez 19.31 cm/s Right ICA prox sys 86.58 cm/s Right ICA prox rodriguez 22.42 cm/s Right subclavian sys 84.41 cm/s RIGHT SUBCLAVIAN ARTERY D 0.00 cm/s Right vertebral sys 54.50 cm/s RIGHT VERTEBRAL ARTERY D 11.52 cm/s Right ICA/CCA sys 0.67 GLUCOSE, POC Collection Time: 09/11/18  5:29 PM  
Result Value Ref Range Glucose (POC) 222 (H) 70 - 110 mg/dL GLUCOSE, POC Collection Time: 09/11/18  9:22 PM  
Result Value Ref Range Glucose (POC) 254 (H) 70 - 110 mg/dL CK Collection Time: 09/12/18  3:00 AM  
Result Value Ref Range CK 1963 (H) 39 - 308 U/L  
CBC W/O DIFF Collection Time: 09/12/18  3:00 AM  
Result Value Ref Range WBC 12.1 4.6 - 13.2 K/uL  
 RBC 5.08 4.70 - 5.50 M/uL  
 HGB 15.2 13.0 - 16.0 g/dL HCT 45.2 36.0 - 48.0 % MCV 89.0 74.0 - 97.0 FL  
 MCH 29.9 24.0 - 34.0 PG  
 MCHC 33.6 31.0 - 37.0 g/dL  
 RDW 13.3 11.6 - 14.5 % PLATELET 184 522 - 103 K/uL MPV 10.1 9.2 - 11.8 FL  
GLUCOSE, POC Collection Time: 09/12/18  7:47 AM  
Result Value Ref Range Glucose (POC) 179 (H) 70 - 110 mg/dL Lab/Data Reviewed: All lab results for the last 24 hours reviewed. XRays were reviewed in past 24 hours Medications Reviewed Assessment/Plan Principal Problem: 
  Stroke ([facility] [address] (9/10/2018) Active Problems: 
  Rhabdomyolysis (9/10/2018) Dehydration (9/10/2018) Hypertension (9/10/2018) Diabetes mellitus type 2, controlled ([facility] [address] (9/10/2018) Non compliance w medication regimen (9/10/2018) Pneumonia (9/11/2018) Plan Stroke - MRI brain shows acute infarct left medullary pyramid  
- continue aspirin , Lipitor - permissible HTN per stroke protocol - ECHO EF55% Moderate (grade 2) left ventricular diastolic dysfunction. 
- MRA brain shows Mild multifocal irregularity and narrowing involving both the anterior and 
posterior circulation without definite high-grade stenosis. Moderate 
irregularity and narrowing suggested in the proximal to mid basilar artery. 
- NPO pending speech eval 
- PT/OT /SPEECH eval  
- Neurology consulted f/u with recommendations Rhabdomyolysis - CK trending down 1963 
- CK 5388 on adm -  Aggressive IVF 
-  Continue to monitor CK HTN  
- Permissible HTN with parameters DM  
- Uncontrolled - A1c 9.8 
- SSI  
- Lantus Polycythemia  
- resolved  
- likely 2/2 hypovolemia  
- No labs done today  
- recheck cbc AM  
- continue IVF Pneumonia - possible 2/2 aspiration  
- NPO  
- Speech eval noted mech soft honey liquid diet - tylenol as needed for fever 
- follow blood cx ordered - Zosyn DVT prophylaxis - Lovenox Full code Disposition :  working on placement - rehab Constantin Arriaga MD 
September 12, 2018

## 2018-09-12 NOTE — DIABETES MGMT
NUTRITIONAL AND GLYCEMIC CONTROL FOLLOW UP/ PLAN OF CARE Yahaira Zhong           57 y.o.           9/10/2018 1. Acute CVA (cerebrovascular accident) ([address] 2. Right sided weakness 3. Facial weakness 4. Hypertension, unspecified type 5. Non-traumatic rhabdomyolysis 6. Hyperglycemia INTERVENTIONS/PLAN:  
1. Suggest 30 units Lantus/day and adding 3 units scheduled meal time lispro TID. 2.  On-going diabetes, 2 gram Na and lipid education. 3.  Monitor glycemic control, labs, weights and po intake. ASSESSMENT:  
Nutritional Status:  Pt is 147% ideal wt; pt appears well nourished at this time. Po intake is adequate per meal time observation and I/O's. Pt drowsy at this time therefore will attempt education in the AM 
 
Nutrition Diagnoses:  
Difficulty swallowing due to dysphagia in setting of CVA as evidenced by texture alterations including mechanical soft diet, chopped meats, honey thick liquids. Altered nutrition related labs due to diabetes/hyperlipidemia as evidenced by A1C of 9.8% and LDL of 252.4 mg/dL, triglyceride of 188 mg/dL. Obesity due to excess energy intake as evidenced by BMI of 34.0 kg/m2. Increased nutrient needs due to wound healing as evidenced by pt with three small open areas (stage 2) to buttocks per nsg. Diabetes Management: Pt has been receiving on-going diabetes education by glycemic control RN. Pt with overall elevated BG readings;  suggest increasing basal insulin and adding scheduled meal time lispro. Recent blood glucose:    
9/12/18:  179, 316 
9/11/18:  231, 314, 222, 254 -received 49 units insulin (25 units Lantus and 24 units corrective lispro) 9/10/18:  293, 306, 250, 291 Within target range (non-ICU: <140; ICU<180): [] Yes   [x]  No 
Current Insulin regimen:  
Corrective lispro normal insulin sensitivity ACHS Home medication/insulin regimen: none HbA1c:  9.8% - ave BG has been ~ 234 mg/dL over past 3 months. Adequate glycemic control PTA:  [] Yes  [x] No 
  
 
SUBJECTIVE/OBJECTIVE: Information obtained from: chart review, pt Pt admitted with CVA. Nursing noted pt with three small open areas (stage 2) to buttocks. Pt had reported (9/10/18) sitting in the same chair since Saturday. 9/11/18:  Pt reports his appetite is good, his weight was stable PTA, no food allergies and he denies problems with chewing or swallowing PTA. 
9/12/18:  Pt asleep at time of visit. Spoke briefly with pt, asking him to review written materials on low Na, heart healthy, diabetic diet. Diet: CHO consistent mechanical soft, 1800 calories, chopped meats, honey thick liquids Patient Vitals for the past 100 hrs: 
 % Diet Eaten 09/12/18 1433 100 % 09/12/18 0934 100 % 09/11/18 1303 95 % 09/11/18 1030 95 % Medications: [x]                Reviewed Pertinent:  Lipitor 80 mg/d, Folvite, Thiamine Most Recent POC Glucose:  
Recent Labs  
   09/11/18 
 0030  09/10/18 
 1231 GLU  233*  325* Labs:  
Lab Results Component Value Date/Time Hemoglobin A1c 9.7 (H) 09/11/2018 10:20 AM  
 
Lab Results Component Value Date/Time Hemoglobin A1c 9.7 (H) 09/11/2018 10:20 AM  
 Hemoglobin A1c 9.8 (H) 09/10/2018 12:31 PM  
 Hemoglobin A1c 6.2 (H) 06/18/2010 10:43 AM  
 
Lab Results Component Value Date/Time Sodium 141 09/11/2018 12:30 AM  
 Potassium 3.6 09/11/2018 12:30 AM  
 Chloride 104 09/11/2018 12:30 AM  
 CO2 29 09/11/2018 12:30 AM  
 Anion gap 8 09/11/2018 12:30 AM  
 Glucose 233 (H) 09/11/2018 12:30 AM  
 BUN 10 09/11/2018 12:30 AM  
 Creatinine 1.27 09/11/2018 12:30 AM  
 Calcium 7.9 (L) 09/11/2018 12:30 AM  
 Albumin 2.8 (L) 09/10/2018 12:31 PM  
 
Lab Results Component Value Date/Time  Cholesterol, total 349 (H) 09/10/2018 12:31 PM  
 HDL Cholesterol 59 09/10/2018 12:31 PM  
 LDL, calculated 252.4 (H) 09/10/2018 12:31 PM  
 VLDL, calculated 37.6 09/10/2018 12:31 PM  
 Triglyceride 188 (H) 09/10/2018 12:31 PM  
 CHOL/HDL Ratio 5.9 (H) 09/10/2018 12:31 PM  
 
Anthropometrics: IBW : 67.1 kg (148 lb),  , BMI (calculated): 34 Wt Readings from Last 1 Encounters:  
09/11/18 98.4 kg (217 lb) Ht Readings from Last 1 Encounters:  
09/11/18 5' 7\" (1.702 m) Estimated Nutrition Needs:  2078 Kcals/day, Protein (g): 81 g Fluid (ml): 2100 ml Based on:   [x]          Actual BW    []          ABW   []            Adjusted BW   
    
Nutrition Interventions: On-going nutrition/diabetes education Goal:  
Blood glucose will be within target range of  mg/dL by 9/13/18. Pt will consume > 75% meals with adequate intake to promote wound healing bu 9/16/18. Met 9/12/18 Pt will verbalize understanding of healthy meal planning for diabetes, HTN and hyperlipidemia by 9/21/18. Nutrition Monitoring and Evaluation   
 
[x]     Monitor po intake on meal rounds 
[x]     Continue inpatient monitoring and intervention 
[]     Other: 
 
 
Nutrition Risk:  []   High     []  Moderate    [x]  Minimal/Uncompromised Rohan Abraham RD, CDE Office:  [phone] [place] Pager:  [phone]

## 2018-09-12 NOTE — ROUTINE PROCESS
Glycemic Control Plan of Care Follow up today for continued diabetes education. Patient waiting for SNF placement - likely on Monday. Reviewed DM diet - patient states he knows what he is supposed to eat but has not been following his prescribed diet. Reviewed long term complications of DM, meal planning and plate method. Discussed importance of medication compliance, blood glucose monitoring, following up with a PCP and adhering to DM meal plan - Lantus increased and mealtime lispro added today. will continue to follow Lv Ramos MPH RN 
Pager [phone]

## 2018-09-12 NOTE — PROGRESS NOTES
Problem: Self Care Deficits Care Plan (Adult) Goal: *Acute Goals and Plan of Care (Insert Text) Occupational Therapy Goals Initiated 9/11/2018 within 7 day(s). 1.  Patient will perform grooming with minimal assistance/contact guard assist sitting EOB involving RUE for gross assistance. 2.  Patient will perform upper body dressing with minimal assistance/contact guard assist involving RUE for gross assistance . 6. Patient will participate in upper extremity therapeutic exercise/activities with minimal assistance/contact guard assist for 15 minutes. 7.  Patient will utilize energy conservation techniques during functional activities with verbal, visual and tactile cues. Occupational Therapy TREATMENT Patient: Angel Pond (42 y.o. male) Date: 9/12/2018 Diagnosis: Stroke (cerebrum) ([facility] [address] Stroke [facility]) Stroke ([facility] [address] Precautions: Fall, Skin Chart, occupational therapy assessment, plan of care, and goals were reviewed. PLOF: Pt states that he was independent with ADLs and transfers PTA. ASSESSMENT: 
Pt presented supine in bed with HOB slightly raised agreeable to skilled OT services this date. PROM/SROM RUE: digit flexion/extension, wrist flexion/extension, radial/ulnar deviation, elbow flexion/extension, shoulder flexion/extension, and shoulder add-/abduction. Educated pt on performing SROM to facilitate joint/muscle movement. RUE presented with slight edema, elevated with pillow. Pt was left comfortable in bed and call bell within reach. EDUCATION Educated pt on importance of performing SROM to prevent muscle neglect and improve function. Progression toward goals: 
[]          Improving appropriately and progressing toward goals [x]          Improving slowly and progressing toward goals 
[]          Not making progress toward goals and plan of care will be adjusted PLAN: 
Patient continues to benefit from skilled intervention to address the above impairments. Continue treatment per established plan of care. Discharge Recommendations:  Dominik Sweeney Further Equipment Recommendations for Discharge:  TBD at next level of care SUBJECTIVE:  
Patient stated I'm thirtsty.  OBJECTIVE DATA SUMMARY: 
  
G CODES:  Self Care  Current  CN= 100%. The severity rating is based on the Other functional assessment Cognitive/Behavioral Status: 
Neurologic State: Alert Orientation Level: Oriented X4 Cognition: Follows commands Safety/Judgement: Fall prevention Functional Mobility and Transfers for ADLs: 
    
 
Neuro Re-Education: 
  
PROM/SROM RUE: digit flexion/extension, wrist flexion/extension, radial/ulnar deviation, elbow flexion/extension, shoulder flexion/extension, and shoulder add-/abduction. Educated pt on performing SROM to facilitate joint/muscle movement. Pain:8/10 B feet Pre Treatment: 
Post Treatment: 
Pain Scale 1: Numeric (0 - 10) Pain Intensity 1: 0 Activity Tolerance:   
Fair- 
Please refer to the flowsheet for vital signs taken during this treatment. After treatment:  
[]  Patient left in no apparent distress sitting up in chair 
[x]  Patient left in no apparent distress in bed 
[x]  Call bell left within reach 
[]  Nursing notified 
[]  Caregiver present 
[]  Bed alarm activated LONG Cordero Time Calculation: 20 mins

## 2018-09-12 NOTE — PROGRESS NOTES
[facility] can accept pt,this wk pend auth if ready. if pt not ready for dc and can wait till next wk will go to [facility], his first choice. We will need to get auth either way.

## 2018-09-12 NOTE — PROGRESS NOTES
1915: Assumed pt care. Received pt resting in bed. Pt is alert and oriented x 4. Denies any pain at this time. Dual NIH done with KAUSHAL Harmon. No signs of distress. Bed on lowest position, wheels locked, call bell within reach. 9-12-18 
 
0530: bathed patient. [id number]: Bedside and Verbal shift change report given to Moira Valladares (oncoming nurse) by Alan Dalton 
 (offgoing nurse). Report included the following information SBAR, Kardex, Intake/Output, MAR and Recent Results.

## 2018-09-12 NOTE — PROGRESS NOTES
Progress Note Patient: Jaime Hall MRN: [id number]  SSN: [id number] YOB: 1955  Age: 57 y.o. Sex: male Admit Date: 9/10/2018 LOS: 2 days Subjective:  
 
Carotid PVLs:  <50% narrowing both ICAs. No comment re: VA flow. Echo: EF 55%. Mild LVH. No source of emboli noted. Tele: presumably no afib noted. Labs: CK 1963. CRP 52.75. ESR 2. Vitals: SBP 170s - 180s. The patient reports that he has not recovered any movement on the right side, but reports that he is swallowing okay. Objective:  
 
Vitals:  
 09/12/18 0153 09/12/18 0547 09/12/18 0957 09/12/18 1432 BP: (!) 175/91 171/82 160/79 162/81 Pulse: 80 64 85 75 Resp: 18 16 14 15 Temp: 98.8 °F (37.1 °C) 98.6 °F (37 °C) 98.3 °F (36.8 °C) 98.1 °F (36.7 °C) SpO2: 96% 95% 96% 97% Weight:      
Height:      
  
 
Physical Exam:  
Alert, in NAD but appears depressed. Speech dysarthric but understandable. No obvious facial droop; patient refused to smile. EOMI. Left ptosis noted. Hearing intact. Right arm and leg 0/5. Light touch intact right arm and leg. Lab/Data Review: 
Recent Results (from the past 48 hour(s)) GLUCOSE, POC Collection Time: 09/10/18  5:14 PM  
Result Value Ref Range Glucose (POC) 250 (H) 70 - 110 mg/dL GLUCOSE, POC Collection Time: 09/10/18  9:40 PM  
Result Value Ref Range Glucose (POC) 291 (H) 70 - 110 mg/dL CULTURE, BLOOD Collection Time: 09/11/18 12:25 AM  
Result Value Ref Range Special Requests: NO SPECIAL REQUESTS Culture result: NO GROWTH 1 DAY METABOLIC PANEL, BASIC Collection Time: 09/11/18 12:30 AM  
Result Value Ref Range Sodium 141 136 - 145 mmol/L Potassium 3.6 3.5 - 5.5 mmol/L Chloride 104 100 - 108 mmol/L  
 CO2 29 21 - 32 mmol/L Anion gap 8 3.0 - 18 mmol/L Glucose 233 (H) 74 - 99 mg/dL BUN 10 7.0 - 18 MG/DL  Creatinine 1.27 0.6 - 1.3 MG/DL  
 BUN/Creatinine ratio 8 (L) 12 - 20    
 GFR est AA >60 >60 ml/min/1.73m2 GFR est non-AA 57 (L) >60 ml/min/1.73m2 Calcium 7.9 (L) 8.5 - 10.1 MG/DL URINALYSIS W/MICROSCOPIC Collection Time: 09/11/18 12:30 AM  
Result Value Ref Range Color DARK YELLOW Appearance CLEAR Specific gravity 1.026 1.005 - 1.030    
 pH (UA) 5.5 5.0 - 8.0 Protein 300 (A) NEG mg/dL Glucose >1000 (A) NEG mg/dL Ketone NEGATIVE  NEG mg/dL Bilirubin NEGATIVE  NEG Blood LARGE (A) NEG Urobilinogen 1.0 0.2 - 1.0 EU/dL Nitrites NEGATIVE  NEG Leukocyte Esterase NEGATIVE  NEG    
 WBC 11 to 20 0 - 4 /hpf  
 RBC NEGATIVE  0 - 5 /hpf Epithelial cells FEW 0 - 5 /lpf Bacteria NEGATIVE  NEG /hpf CULTURE, URINE Collection Time: 09/11/18 12:30 AM  
Result Value Ref Range Special Requests: NO SPECIAL REQUESTS Culture result:     
  70660 COLONIES/mL MULTIPLE MICROORGANISMS PRESENT, POSSIBLE CONTAMINATION. PLEASE REPEAT CULTURE IF CLINICALLY INDICATED. CULTURE, BLOOD Collection Time: 09/11/18 12:30 AM  
Result Value Ref Range Special Requests: NO SPECIAL REQUESTS Culture result: NO GROWTH 1 DAY    
GLUCOSE, POC Collection Time: 09/11/18  8:36 AM  
Result Value Ref Range Glucose (POC) 231 (H) 70 - 110 mg/dL CK Collection Time: 09/11/18 10:20 AM  
Result Value Ref Range CK 3973 (H) 39 - 308 U/L  
NT-PRO BNP Collection Time: 09/11/18 10:20 AM  
Result Value Ref Range NT pro- 0 - 900 PG/ML  
HEMOGLOBIN A1C WITH EAG Collection Time: 09/11/18 10:20 AM  
Result Value Ref Range Hemoglobin A1c 9.7 (H) 4.2 - 5.6 % Est. average glucose 232 mg/dL CBC W/O DIFF Collection Time: 09/11/18 10:20 AM  
Result Value Ref Range WBC 13.9 (H) 4.6 - 13.2 K/uL  
 RBC 5.43 4.70 - 5.50 M/uL  
 HGB 16.3 (H) 13.0 - 16.0 g/dL HCT 48.3 (H) 36.0 - 48.0 % MCV 89.0 74.0 - 97.0 FL  
 MCH 30.0 24.0 - 34.0 PG  
 MCHC 33.7 31.0 - 37.0 g/dL  
 RDW 13.9 11.6 - 14.5 % PLATELET 818 968 - 586 K/uL MPV 10.1 9.2 - 11.8 FL  
GLUCOSE, POC Collection Time: 09/11/18 11:23 AM  
Result Value Ref Range Glucose (POC) 314 (H) 70 - 110 mg/dL DUPLEX CAROTID BILATERAL Collection Time: 09/11/18  2:00 PM  
Result Value Ref Range Left CCA dist sys 68.88 cm/s Left CCA dist rodriguez 0.00 cm/s LEFT COMMON CAROTID ARTERY MID S 122.06 cm/s LEFT COMMON CAROTID ARTERY MID D 9.39 cm/s Left CCA prox sys 156.80 cm/s Left CCA prox rodriguez 8.45 cm/s Left ECA sys 100.73 cm/s LEFT EXTERNAL CAROTID ARTERY D 0.00 cm/s Left ICA dist sys 122.63 cm/s Left ICA dist rodriguez 37.43 cm/s Left ICA mid sys 105.27 cm/s Left ICA mid rodriguez 27.05 cm/s Left ICA prox sys 88.82 cm/s Left ICA prox rodriguez 20.83 cm/s Left subclavian sys 81.62 cm/s LEFT SUBCLAVIAN ARTERY D 0.00 cm/s Left vertebral sys 69.68 cm/s LEFT VERTEBRAL ARTERY D 13.94 cm/s Left ICA/CCA sys 0.78 Right cca dist sys 81.22 cm/s Right CCA dist rodriguez 8.92 cm/s RIGHT COMMON CAROTID ARTERY MID S 91.55 cm/s RIGHT COMMON CAROTID ARTERY MID D 0.00 cm/s Right CCA prox sys 130.98 cm/s Right CCA prox rodriguez 0.00 cm/s Right eca sys 97.79 cm/s RIGHT EXTERNAL CAROTID ARTERY D 13.70 cm/s Right ICA dist sys 87.20 cm/s Right ICA dist rodriguez 26.78 cm/s Right ICA mid sys 79.73 cm/s Right ICA mid rodriguez 19.31 cm/s Right ICA prox sys 86.58 cm/s Right ICA prox rodriguez 22.42 cm/s Right subclavian sys 84.41 cm/s RIGHT SUBCLAVIAN ARTERY D 0.00 cm/s Right vertebral sys 54.50 cm/s RIGHT VERTEBRAL ARTERY D 11.52 cm/s Right ICA/CCA sys 0.67 GLUCOSE, POC Collection Time: 09/11/18  5:29 PM  
Result Value Ref Range Glucose (POC) 222 (H) 70 - 110 mg/dL GLUCOSE, POC Collection Time: 09/11/18  9:22 PM  
Result Value Ref Range Glucose (POC) 254 (H) 70 - 110 mg/dL CK Collection Time: 09/12/18  3:00 AM  
Result Value Ref Range  CK 1963 (H) 39 - 308 U/L  
CBC W/O DIFF  
 Collection Time: 09/12/18  3:00 AM  
Result Value Ref Range WBC 12.1 4.6 - 13.2 K/uL  
 RBC 5.08 4.70 - 5.50 M/uL  
 HGB 15.2 13.0 - 16.0 g/dL HCT 45.2 36.0 - 48.0 % MCV 89.0 74.0 - 97.0 FL  
 MCH 29.9 24.0 - 34.0 PG  
 MCHC 33.6 31.0 - 37.0 g/dL  
 RDW 13.3 11.6 - 14.5 % PLATELET 532 351 - 356 K/uL MPV 10.1 9.2 - 11.8 FL  
GLUCOSE, POC Collection Time: 09/12/18  7:47 AM  
Result Value Ref Range Glucose (POC) 179 (H) 70 - 110 mg/dL GLUCOSE, POC Collection Time: 09/12/18 11:25 AM  
Result Value Ref Range Glucose (POC) 316 (H) 70 - 110 mg/dL Assessment:  
 
Principal Problem: 
  Stroke ([facility] (9/10/2018) Active Problems: 
  Rhabdomyolysis (9/10/2018) Dehydration (9/10/2018) Hypertension (9/10/2018) Diabetes mellitus type 2, controlled ([facility] (9/10/2018) Non compliance w medication regimen (9/10/2018) Pneumonia (9/11/2018) Plan: 1. Left medullary infarction, causing right hemiparesis, dysarthria, dysphagia. On aspirin and Lipitor. PT/OT following. 2.  Pneumonia. On Zosyn. Rehab. No new recs. Will sign off. Please call if any questions. Signed By: Jose Davison MD   
 September 12, 2018

## 2018-09-12 NOTE — PROGRESS NOTES
Problem: Falls - Risk of 
Goal: *Absence of Falls Document Tiera Frausto Fall Risk and appropriate interventions in the flowsheet. Outcome: Progressing Towards Goal 
Fall Risk Interventions: 
  
 
  
 
Medication Interventions: Evaluate medications/consider consulting pharmacy Elimination Interventions: Patient to call for help with toileting needs Problem: Pressure Injury - Risk of 
Goal: *Prevention of pressure injury Document Mitul Scale and appropriate interventions in the flowsheet. Outcome: Progressing Towards Goal 
Pressure Injury Interventions: 
Sensory Interventions: Assess changes in LOC, Pressure redistribution bed/mattress (bed type) Moisture Interventions: Absorbent underpads, Maintain skin hydration (lotion/cream) Activity Interventions: Pressure redistribution bed/mattress(bed type), PT/OT evaluation Mobility Interventions: HOB 30 degrees or less, Pressure redistribution bed/mattress (bed type), PT/OT evaluation Nutrition Interventions: Document food/fluid/supplement intake Friction and Shear Interventions: Foam dressings/transparent film/skin sealants, HOB 30 degrees or less, Lift sheet, Minimize layers Problem: Impaired Skin Integrity/Pressure Injury Treatment Goal: *Prevention of pressure injury Document Mitul Scale and appropriate interventions in the flowsheet. Outcome: Progressing Towards Goal 
Pressure Injury Interventions: 
Sensory Interventions: Assess changes in LOC, Pressure redistribution bed/mattress (bed type) Moisture Interventions: Absorbent underpads, Maintain skin hydration (lotion/cream) Activity Interventions: Pressure redistribution bed/mattress(bed type), PT/OT evaluation Mobility Interventions: HOB 30 degrees or less, Pressure redistribution bed/mattress (bed type), PT/OT evaluation Nutrition Interventions: Document food/fluid/supplement intake Friction and Shear Interventions: Foam dressings/transparent film/skin sealants, HOB 30 degrees or less, Lift sheet, Minimize layers

## 2018-09-12 NOTE — PROGRESS NOTES
1453: Attempted PT session, patient refusing to participate stating his L LE is aching. L LE dangling off edge of bed, assisted L LE back into bed and positioned for comfort. 1527: Second attempt, patient continuing to refuse. Assisted LEs into a comfortable position. Patient reports that he would like to be see in the mornings for treatment. Will f/u with patient. Mami Running PT, DPT Office extension: [phone] Pager #: [phone]

## 2018-09-13 NOTE — PROGRESS NOTES
Problem: Mobility Impaired (Adult and Pediatric) Goal: *Acute Goals and Plan of Care (Insert Text) Physical Therapy Goals Initiated 9/11/2018 and to be accomplished within 7 days. 1.  Patient will complete all bed mobility with moderate assistance in order to prepare for EOB/OOB activity. 2.  Patient will tolerate sitting EOB x 10 minutes with modified independence in order to prepare for OOB activity. 3.  Patient will perform sit <> stand with moderate assistance  in order to prepare for OOB/gait activity. 4.  Patient will perform bed to chair transfers with moderate assistance  in order to promote mobility and encourage seated activity to progress towards their prior level of function. Outcome: Progressing Towards Goal 
 
PHYSICAL THERAPY: Daily TREATMENT Note INPATIENT: Medicare: Hospital Day: 4 Patient: Marquise Kearns (63 y.o. male)    Date: 9/13/2018 Primary Diagnosis: Stroke (cerebrum) ([facility] Stroke [facility])  
 ,  ,  
Precautions: Fall, Skin Chart, physical therapy assessment, plan of care and goals were reviewed. PLOF: Independent ASSESSMENT: 
Patient supine in bed, agreeable to participation with PT. MAX x 1 for supine <> sit. Patient able to sit EOB with fair seated balance with verbal cuing for hand placement and to bring trunk forward to prevent posterior LOB. Instructed to attend to L UE with mobility. Attempt to stand x 2 with manual assistance for proper positioning with R LE. Patient unable to clear buttocks. Returned to bed and left supine with all needs within reach. Patient continues to have report of L LE pain, reports it's improved since yesterday. Progression toward goals: 
      Improving slowly and progressing toward goals PLAN: 
Patient continues to benefit from skilled intervention to address the above impairments. Continue treatment per established plan of care.  
 
EDUCATION:  
Education:  Patient was educated on the following topics: strategy for transfers, and safety with mobility. Verbalizes understanding. Barriers to Learning/Limitations: None Compensate with: visual, verbal, tactile, kinesthetic cues/model Discharge Recommendations:  Rehab Further Equipment Recommendations for Discharge:  TBD Factors which may impact discharge planning: N/A  
 
SUBJECTIVE:  
Patient stated Yall wore me out.  OBJECTIVE DATA SUMMARY:  
Critical Behavior: 
Neurologic State: Alert Orientation Level: Oriented X4 Cognition: Follows commands Safety/Judgement: Fall prevention G CODE:Mobility [id number] Current  CK= 40-59%   Goal  CK= 40-59%. The severity rating is based on the Other [facility] Sitting Balance Scale 2+/5 [facility] Sitting Balance Scale 2+/5 
0: Pt performs 25% or less of sitting activity (Max assist) CN, 100% impaired. 1: Pt supports self with upper extremities but requires therapist assistance. Pt performs 25-50% of effort (Mod assist) CM, 80% to <100% impaired. 1+: Pt supports self with upper extremities but requires therapist assistance. Pt performs >50% effort. (Min assist). CL, 60% to <80% impaired. 2: Pt supports self independently with both upper extremities. CL, 60% to <80% impaired. 2+: Pt support self independently with 1 upper extremity. CK, 40% to <60% impaired. 3: Pt sits without upper extremity support for up to 30 seconds. CK, 40% to <60% impaired. 3+: Pt sits without upper extremity support for 30 seconds or greater. CJ, 20% to <40% impaired. 4: Pt moves and returns trunkal midpoint 1-2 inches in one plane. CJ, 20% to <40% impaired. 4+: Pt moves and returns trunkal midpoint 1-2 inches in multiple planes. CI, 1% to <20% impaired. 5: Pt moves and returns trunkal midpoint in all planes greater than 2 inches. CH, 0% impaired. Functional Mobility: 
 
 
Functional Status Indep (I) Mod I Super-vision Min A Mod A Max A Total A Assist x2 Verbal cues Additional time Not tested Comments Rolling []  []  [] []    []    []  []  [] [] [] [] Supine to sit []  []  [] []  []  [x]  []  [] [] [] [] Sit to supine []  []  [] []  []  [x]  []  [] [] [] [] Sit to stand []  []  [] []  []  [x]  []  [x] [] [] [x] Unable to clear buttocks Stand to sit []  []  [] []  []  []  []  [] [] [] [x] Unable Bed to chair transfers []  []  [] []  []  []  []  [] [] [] [] Balance Good Mary Brown Poor Unable Not tested Comments Sitting static []  [x]  []  []  [] Sitting dynamic []  [x]  []  []  []   
Standing static []  []  []  [x]  [] Standing dynamic []  []  []  [x]  [] Vital Signs Temp: 98.3 °F (36.8 °C) Pulse (Heart Rate): 77 BP: 168/84 Resp Rate: 16    
O2 Sat (%): 99 % Pain: L LE pan Activity Tolerance:  
Fair After treatment:  
Patient left in no apparent distress in bed Call bell left within reach [place] Time Calculation: 24 mins

## 2018-09-13 NOTE — PROGRESS NOTES
pts nephew here wants to give contact numbers for us to call. Pt gave permission for us to contact them and speak with them nephew is Alba Cowan [phone] niece yLnda Lockett . pts mother staying with another family member, she will be  At 0 [phone] she should be called first per nephew.

## 2018-09-13 NOTE — PROGRESS NOTES
Problem: Self Care Deficits Care Plan (Adult) Goal: *Acute Goals and Plan of Care (Insert Text) Occupational Therapy Goals Initiated 9/11/2018 within 7 day(s). 1.  Patient will perform grooming with minimal assistance/contact guard assist sitting EOB involving RUE for gross assistance. 2.  Patient will perform upper body dressing with minimal assistance/contact guard assist involving RUE for gross assistance . 6. Patient will participate in upper extremity therapeutic exercise/activities with minimal assistance/contact guard assist for 15 minutes. 7.  Patient will utilize energy conservation techniques during functional activities with verbal, visual and tactile cues. Occupational Therapy TREATMENT Patient: Kiko De La Rosa (16 y.o. male) Date: 9/13/2018 Diagnosis: Stroke (cerebrum) ([facility] Stroke [facility]) Stroke ([facility] Precautions: Fall, Skin Chart, occupational therapy assessment, plan of care, and goals were reviewed. PLOF: Pt states he was independent with ADLs and transfers PTA. ASSESSMENT: 
Pt presented supine in bed with HOB elevated agreeable to skilled OT services this date. Pt participated in grooming tasks during treatment session. Pt performed oral hygiene task and educated on using RUE as gross stabilizer to incorporate RUE into daily tasks for NMRE. Pt return demonstrated proper understanding of technique. Pt was then provided wash cloth to wash face. Pt educated on using RUE for gross motor assistance. Pt will need reinforcement of technique. Pt was left comfortable in bed and call bell within reach. EDUCATION Pt educated on using RUE during self care tasks for gross motor assistance or as gross stabilizer. Progression toward goals: 
[]          Improving appropriately and progressing toward goals [x]          Improving slowly and progressing toward goals 
[]          Not making progress toward goals and plan of care will be adjusted PLAN: 
 Patient continues to benefit from skilled intervention to address the above impairments. Continue treatment per established plan of care. Discharge Recommendations:  Dominik Sweeney Further Equipment Recommendations for Discharge:  TBD at next level of care SUBJECTIVE:  
Patient stated My pain level is a lot lower today.  OBJECTIVE DATA SUMMARY: 
  
G CODES:  Self Care  Current  CN= 100%. The severity rating is based on the Other functional assessment Cognitive/Behavioral Status: 
Neurologic State: Alert Orientation Level: Oriented X4 Cognition: Follows commands Safety/Judgement: Fall prevention Functional Mobility and Transfers for ADLs: 
   
ADL Intervention: 
  
 
Grooming Washing Face: Maximum assistance;Training to use affected extremity as a gross motor assistance Brushing Teeth: Minimum assistance;Training to use affected extremity as a gross stabilizer Neuro Re-Education: 
  
  
Education on utilizing RUE as gross stabilizer Pain:pt stated no pain level Pre Treatment: 
Post Treatment: 
  
 
Activity Tolerance:   
Fair- 
Please refer to the flowsheet for vital signs taken during this treatment. After treatment:  
[]  Patient left in no apparent distress sitting up in chair 
[x]  Patient left in no apparent distress in bed 
[x]  Call bell left within reach 
[]  Nursing notified 
[]  Caregiver present 
[]  Bed alarm activated [place] Time Calculation: 23 mins

## 2018-09-13 NOTE — PROGRESS NOTES
Problem: Falls - Risk of 
Goal: *Absence of Falls Document Ara Bernard Fall Risk and appropriate interventions in the flowsheet. Outcome: Progressing Towards Goal 
Fall Risk Interventions: 
  
 
  
 
Medication Interventions: Evaluate medications/consider consulting pharmacy Elimination Interventions: Call light in reach History of Falls Interventions: Door open when patient unattended Problem: Pressure Injury - Risk of 
Goal: *Prevention of pressure injury Document Mitul Scale and appropriate interventions in the flowsheet. Outcome: Progressing Towards Goal 
Pressure Injury Interventions: 
Sensory Interventions: Assess changes in LOC Moisture Interventions: Absorbent underpads Activity Interventions: Pressure redistribution bed/mattress(bed type) Mobility Interventions: Pressure redistribution bed/mattress (bed type) Nutrition Interventions: Document food/fluid/supplement intake Friction and Shear Interventions: Apply protective barrier, creams and emollients Problem: Impaired Skin Integrity/Pressure Injury Treatment Goal: *Prevention of pressure injury Document Mitul Scale and appropriate interventions in the flowsheet. Outcome: Progressing Towards Goal 
Pressure Injury Interventions: 
Sensory Interventions: Assess changes in LOC Moisture Interventions: Absorbent underpads Activity Interventions: Pressure redistribution bed/mattress(bed type) Mobility Interventions: Pressure redistribution bed/mattress (bed type) Nutrition Interventions: Document food/fluid/supplement intake Friction and Shear Interventions: Apply protective barrier, creams and emollients

## 2018-09-13 NOTE — DIABETES MGMT
Diabetes Patient/Family Education RecordFactors That  May Influence Patients Ability  to Learn or  Comply with Recommendations []   Language barrier    []   Cultural needs   [x]   Motivation  
 []   Cognitive limitation    [x]   Physical - right side is flacid   []   Education  
 []   Physiological factors   []   Hearing/vision/speaking impairment   []   Zoroastrian beliefs []   Financial factors   []  Other:   []  No factors identified at this time. Person Instructed: [x]   Patient   []   Family   []  Other Preference for Learning: 
 [x]   Verbal   [x]   Written   []  Demonstration Level of Comprehension & Competence:   
[x]  Good                                      [x] Fair                                     []  Poor                             [x]  Needs Reinforcement  
[x]  Teachback completed Education Component:  
[x]  Medication management, including how to administer insulin (if appropriate) and potential medication interactions [x]  Nutritional management - Diet history reveals pt eats one large meal/day, usually at either the [facility], the [employer]" or [employer] cafeteria. He usually does not eat breakfast.  He sometimes has 1/2 sub in the evening if he is hungry. He usually drinks orange juice (\"large glasses\" with meals), regular soda or sweetened ice tea  throughout the day. \"I won't drink those diet sodas\". Reviewed appropriate foods when dining out, portion sizes and suitable beverages that are unsweetened. []  Exercise  
[]  Signs, symptoms, and treatment of hyperglycemia and hypoglycemia  
[] Prevention, recognition and treatment of hyperglycemia and hypoglycemia  
[]  Importance of blood glucose monitoring and how to obtain a blood glucose meter   
[]  Instruction on use of the blood glucose meter  
[]  Discuss the importance of HbA1C monitoring   
[]  Sick day guidelines []  Proper use and disposal of lancets, needles, syringes or insulin pens (if appropriate) []  Potential long-term complications (retinopathy, kidney disease, neuropathy, foot care) [x] Information about whom to contact in case of emergency or for more information   
[x]  Goal:  Patient/family will demonstrate understanding of Diabetes Self Management Skills by: (date) 9/20/18_______ Plan for post-discharge education or self-management support: 
  [x] Outpatient class schedule provided            [] Patient Declined 
  [] Scheduled for outpatient classes (date) _______ Georgina Bell RD, CDE Office:  [phone] [place] Pager:  [phone]

## 2018-09-13 NOTE — DIABETES MGMT
NUTRITION AND GLYCEMIC CONTROL FOLLOW UP/ PLAN OF CARE Kiko De La Rosa           57 y.o.           9/10/2018 1. Acute CVA (cerebrovascular accident) ([address] 2. Right sided weakness 3. Facial weakness 4. Hypertension, unspecified type 5. Non-traumatic rhabdomyolysis 6. Hyperglycemia INTERVENTIONS/PLAN:  
1.  1800 calories (consistent CHO, mechanical soft, chopped meats, honey thick liquids). Will request honey thick water to be sent instead of honey thick fruit juices and sweetened ice tea. 2.  Suggest increasing Lantus to  36 units Lantus/day and increasing scheduled meal time lispro to 4 units TID before meals. 3.  On-going diabetes, 2 gram Na and lipid education. See Diabetes Education Records for details. 4.  Monitor glycemic control, labs, weights and po intake. ASSESSMENT:  
Nutritional Status:  Pt is 147% ideal wt; pt appears well nourished at this time. Po intake is adequate per meal time observation. Pt with flaccid R UE, is using left hand to eat and appears to be managing reasonably well. Nutrition Diagnoses:  
Difficulty swallowing due to dysphagia in setting of CVA as evidenced by texture alterations including mechanical soft diet, chopped meats, honey thick liquids. Altered nutrition related labs due to diabetes/hyperlipidemia as evidenced by A1C of 9.8% and LDL of 252.4 mg/dL, triglyceride of 188 mg/dL. Obesity due to excess energy intake as evidenced by BMI of 34.0 kg/m2. Increased nutrient needs due to wound healing as evidenced by pt with three small open areas (stage 2) to buttocks per nsg. Undesirable food and beverage choices due to lack of motivation as evidenced by diet history revealing pt eats one large meal/day and consumes 5 or > servings of sweetened beverages/day. Diabetes Management: DIabetes education started  by glycemic control RN. Diet history reveals pt eats one large meal/day, usually at either the [employer], [employer]" or [employer]. He usually does not eat breakfast.  He sometimes has 1/2 sub in the evening if he is hungry. He usually drinks orange juice (\"large glasses\" with meals), regular soda or sweetened ice tea  throughout the day. \"I won't drink those diet sodas\". Pt with overall elevated BG readings and suggest small increases in basal insulin and scheduled meal time lispro doses. BG elevations seen before lunch, dinner and HS may be related to needing more scheduled meal time lispro as well as the extra CHO in honey thick beverages. Recent blood glucose:    
9/13/18:  122, 241 
9/12/18:  179, 316, 360, 283 - received TDD insulin =  77 units (32 units Lantus, 9 units scheduled meal time lispro, 36 units corrective lispro) Within target range (non-ICU: <140; ICU<180): [] Yes   [x]  No 
Current Insulin regimen:  
Lantus 32 units/day Scheduled meal time lispro 3 units TID before meals Corrective lispro, very insulin resistant dosing, ACHS Home medication/insulin regimen: none HbA1c:  9.8% - ave BG has been ~ 234 mg/dL over past 3 months. Adequate glycemic control PTA:  [] Yes  [x] No 
  
 
SUBJECTIVE/OBJECTIVE: Information obtained from: chart review, pt Pt admitted with CVA. Nursing noted pt with three small open areas (stage 2) to buttocks. Pt had reported (9/10/18) sitting in the same chair since Saturday. 9/11/18:  Pt reports his appetite is good, his weight was stable PTA, no food allergies and he denies problems with chewing or swallowing PTA. 
9/13/18:  Pt stating diet history. He reports his appetite is still good. Diet:  1800 calorie CHO consistent mechanical soft, chopped meats, honey thick liquids Patient Vitals for the past 100 hrs: 
 % Diet Eaten 09/13/18 0926 100 % 09/12/18 1433 100 % 09/12/18 0934 100 % 09/11/18 1303 95 % 09/11/18 1030 95 % Medications: [x]                Reviewed Pertinent:  Lipitor 80 mg/d, [employer] 360 IVF:  NS at 75 ml/hr Most Recent POC Glucose:  
Recent Labs  
   09/11/18 
 0030 GLU  233* Labs:  
Lab Results Component Value Date/Time Hemoglobin A1c 9.7 (H) 09/11/2018 10:20 AM  
 
Lab Results Component Value Date/Time Hemoglobin A1c 9.7 (H) 09/11/2018 10:20 AM  
 Hemoglobin A1c 9.8 (H) 09/10/2018 12:31 PM  
 Hemoglobin A1c 6.2 (H) 06/18/2010 10:43 AM  
 
Lab Results Component Value Date/Time Sodium 141 09/11/2018 12:30 AM  
 Potassium 3.6 09/11/2018 12:30 AM  
 Chloride 104 09/11/2018 12:30 AM  
 CO2 29 09/11/2018 12:30 AM  
 Anion gap 8 09/11/2018 12:30 AM  
 Glucose 233 (H) 09/11/2018 12:30 AM  
 BUN 10 09/11/2018 12:30 AM  
 Creatinine 1.27 09/11/2018 12:30 AM  
 Calcium 7.9 (L) 09/11/2018 12:30 AM  
 Albumin 2.8 (L) 09/10/2018 12:31 PM  
 
Lab Results Component Value Date/Time Cholesterol, total 349 (H) 09/10/2018 12:31 PM  
 HDL Cholesterol 59 09/10/2018 12:31 PM  
 LDL, calculated 252.4 (H) 09/10/2018 12:31 PM  
 VLDL, calculated 37.6 09/10/2018 12:31 PM  
 Triglyceride 188 (H) 09/10/2018 12:31 PM  
 CHOL/HDL Ratio 5.9 (H) 09/10/2018 12:31 PM  
 
Anthropometrics: IBW : 67.1 kg (148 lb),  , BMI (calculated): 35.7 Wt Readings from Last 1 Encounters:  
09/13/18 103.4 kg (227 lb 15.3 oz) Ht Readings from Last 1 Encounters:  
09/11/18 5' 7\" (1.702 m) Estimated Nutrition Needs:  2078 Kcals/day, Protein (g): 81 g Fluid (ml): 2100 ml Based on:   [x]          Actual BW    []          ABW   []            Adjusted BW   
    
Nutrition Interventions: On-going nutrition/diabetes education Goal:  
Blood glucose will be within target range of  mg/dL by 9/13/18. Not met - on-going 9/13/18 Pt will consume > 75% meals with adequate intake to promote wound healing by 9/16/18. Met 9/13/18 Pt will verbalize understanding of healthy meal planning for diabetes, HTN and hyperlipidemia by 9/21/18. On-going 9/13/18. Nutrition Monitoring and Evaluation [x]     Monitor po intake on meal rounds 
[x]     Continue inpatient monitoring and intervention 
[]     Other: 
 
 
Nutrition Risk:  []   High     []  Moderate    [x]  Minimal/Uncompromised Alejandro Lee RD, CDE Office:  [phone] [place] Pager:  [phone]

## 2018-09-13 NOTE — PROGRESS NOTES
[facility] in bed, alert and oriented x 4. Not in any form of distress. Denies pain. Frequent use items and call bell within reach. Verbalized understanding to call for assistance. Bed locked in lowest position. Dual NIHSS completed. 0730 - Bedside and Verbal shift change report given to Michael chery RN (oncoming nurse) by Ronnell Rodriguez RN (offgoing nurse). Report included the following information SBAR, Kardex, Intake/Output, MAR and Recent Results. Dual NIHSS completed.

## 2018-09-13 NOTE — PROGRESS NOTES
Progress Note Patient: Cheryl Acosta               Sex: male          DOA: 9/10/2018 YOB: 1955      Age:  57 y.o.        LOS:  LOS: 3 days Subjective / Interval Hx Joanne Anna is a 57 y.o. male  who presents with right side weakness onset 2 days PTA in ER. He had CT head was negative for acute findings. Tele neurology saw patient and recommended admission for stroke and MRI brain ordered. Patient was also noted to have Rhabdomyolysis and started on aggressive IVF. 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 No evidence of associated hemorrhage or mass effect. Patient also had fever Tmax 101.2 last night. CXR wet read done shows right lung opacity 9/12: no new complaints. Afebrile. Zosyn for pneumonia. Speech eval recommend mechanical soft , honey thick liquid diet . PT /OT following recommending rehab ECHO done shows EF 55% Moderate (grade 2) left ventricular diastolic dysfunction. 9/13: No new complaint Objective:  
  
Visit Vitals  /81 (BP 1 Location: Left arm, BP Patient Position: At rest)  Pulse 85  Temp 98.4 °F (36.9 °C)  Resp 15  Ht 5' 7\" (1.702 m)  Wt 103.4 kg (227 lb 15.3 oz)  SpO2 98%  BMI 35.7 kg/m2 Physical Exam  
Constitutional: He is oriented to person, place, and time. He appears well-developed and well-nourished. HENT:  
Head: Normocephalic and atraumatic. Mouth/Throat: No oropharyngeal exudate. Eyes: Conjunctivae and EOM are normal. Pupils are equal, round, and reactive to light. No scleral icterus. Neck: Neck supple. Cardiovascular: Normal rate, regular rhythm and normal heart sounds. No murmur heard. Pulmonary/Chest: Effort normal and breath sounds normal. No respiratory distress. He has no wheezes. He has no rales. Abdominal: Soft. Bowel sounds are normal.  
Musculoskeletal: He exhibits no edema. Neurological: He is alert and oriented to person, place, and time. Right side hemiparesis Skin: Skin is warm. No rash noted. No erythema. No pallor. Psychiatric: He has a normal mood and affect. Intake and Output: 
Current Shift:  09/13 0701 - 09/13 1900 In: 360 [P.O.:360] Out: 200 [Urine:200] Last three shifts:  09/11 1901 - 09/13 0700 In: 840 [P.O.:840] Out: 2200 [Urine:2200] Recent Results (from the past 48 hour(s)) GLUCOSE, POC Collection Time: 09/11/18 11:23 AM  
Result Value Ref Range Glucose (POC) 314 (H) 70 - 110 mg/dL DUPLEX CAROTID BILATERAL Collection Time: 09/11/18  2:00 PM  
Result Value Ref Range Left CCA dist sys 68.88 cm/s Left CCA dist rodriguez 0.00 cm/s LEFT COMMON CAROTID ARTERY MID S 122.06 cm/s LEFT COMMON CAROTID ARTERY MID D 9.39 cm/s Left CCA prox sys 156.80 cm/s Left CCA prox rodriguez 8.45 cm/s Left ECA sys 100.73 cm/s LEFT EXTERNAL CAROTID ARTERY D 0.00 cm/s Left ICA dist sys 122.63 cm/s Left ICA dist rodriguez 37.43 cm/s Left ICA mid sys 105.27 cm/s Left ICA mid rodriguez 27.05 cm/s Left ICA prox sys 88.82 cm/s Left ICA prox rodriguez 20.83 cm/s Left subclavian sys 81.62 cm/s LEFT SUBCLAVIAN ARTERY D 0.00 cm/s Left vertebral sys 69.68 cm/s LEFT VERTEBRAL ARTERY D 13.94 cm/s Left ICA/CCA sys 0.78 Right cca dist sys 81.22 cm/s Right CCA dist rodriguez 8.92 cm/s RIGHT COMMON CAROTID ARTERY MID S 91.55 cm/s RIGHT COMMON CAROTID ARTERY MID D 0.00 cm/s Right CCA prox sys 130.98 cm/s Right CCA prox rodriguez 0.00 cm/s Right eca sys 97.79 cm/s RIGHT EXTERNAL CAROTID ARTERY D 13.70 cm/s Right ICA dist sys 87.20 cm/s Right ICA dist rodriguez 26.78 cm/s Right ICA mid sys 79.73 cm/s Right ICA mid rodriguez 19.31 cm/s Right ICA prox sys 86.58 cm/s Right ICA prox rodriguez 22.42 cm/s Right subclavian sys 84.41 cm/s RIGHT SUBCLAVIAN ARTERY D 0.00 cm/s Right vertebral sys 54.50 cm/s RIGHT VERTEBRAL ARTERY D 11.52 cm/s Right ICA/CCA sys 0.67 GLUCOSE, POC Collection Time: 09/11/18  5:29 PM  
Result Value Ref Range Glucose (POC) 222 (H) 70 - 110 mg/dL GLUCOSE, POC Collection Time: 09/11/18  9:22 PM  
Result Value Ref Range Glucose (POC) 254 (H) 70 - 110 mg/dL CK Collection Time: 09/12/18  3:00 AM  
Result Value Ref Range CK 1963 (H) 39 - 308 U/L  
CBC W/O DIFF Collection Time: 09/12/18  3:00 AM  
Result Value Ref Range WBC 12.1 4.6 - 13.2 K/uL  
 RBC 5.08 4.70 - 5.50 M/uL  
 HGB 15.2 13.0 - 16.0 g/dL HCT 45.2 36.0 - 48.0 % MCV 89.0 74.0 - 97.0 FL  
 MCH 29.9 24.0 - 34.0 PG  
 MCHC 33.6 31.0 - 37.0 g/dL  
 RDW 13.3 11.6 - 14.5 % PLATELET 729 953 - 427 K/uL MPV 10.1 9.2 - 11.8 FL  
GLUCOSE, POC Collection Time: 09/12/18  7:47 AM  
Result Value Ref Range Glucose (POC) 179 (H) 70 - 110 mg/dL GLUCOSE, POC Collection Time: 09/12/18 11:25 AM  
Result Value Ref Range Glucose (POC) 316 (H) 70 - 110 mg/dL GLUCOSE, POC Collection Time: 09/12/18  4:42 PM  
Result Value Ref Range Glucose (POC) 360 (H) 70 - 110 mg/dL GLUCOSE, POC Collection Time: 09/12/18 10:36 PM  
Result Value Ref Range Glucose (POC) 283 (H) 70 - 110 mg/dL CK Collection Time: 09/13/18  5:14 AM  
Result Value Ref Range  (H) 39 - 308 U/L  
CBC W/O DIFF Collection Time: 09/13/18  5:14 AM  
Result Value Ref Range WBC 10.2 4.6 - 13.2 K/uL  
 RBC 5.16 4.70 - 5.50 M/uL  
 HGB 15.4 13.0 - 16.0 g/dL HCT 45.0 36.0 - 48.0 % MCV 87.2 74.0 - 97.0 FL  
 MCH 29.8 24.0 - 34.0 PG  
 MCHC 34.2 31.0 - 37.0 g/dL  
 RDW 12.9 11.6 - 14.5 % PLATELET 864 477 - 191 K/uL MPV 10.1 9.2 - 11.8 FL  
GLUCOSE, POC Collection Time: 09/13/18  7:35 AM  
Result Value Ref Range Glucose (POC) 122 (H) 70 - 110 mg/dL Lab/Data Reviewed: All lab results for the last 24 hours reviewed. XRays were reviewed in past 24 hours Medications Reviewed Assessment/Plan Principal Problem: 
  Stroke ([address] (9/10/2018) Active Problems: 
  Rhabdomyolysis (9/10/2018) Dehydration (9/10/2018) Hypertension (9/10/2018) Diabetes mellitus type 2, controlled ([address] (9/10/2018) Non compliance w medication regimen (9/10/2018) Pneumonia (9/11/2018) Plan Stroke - MRI brain shows acute infarct left medullary pyramid  
- continue aspirin , Lipitor - permissible HTN per stroke protocol  
- ECHO EF55% Moderate (grade 2) left ventricular diastolic dysfunction. 
- MRA brain shows Mild multifocal irregularity and narrowing involving both the anterior and 
posterior circulation without definite high-grade stenosis. Moderate 
irregularity and narrowing suggested in the proximal to mid basilar artery. 
- NPO pending speech eval 
- PT/OT /SPEECH eval  
- Neurology consulted f/u with recommendations Rhabdomyolysis - Improving  
-   
- CK trending down 1963 
- CK 5388 on adm 
-   IVF 
-  Continue to monitor CK HTN  
- Permissible HTN with parameters DM  
- Uncontrolled - A1c 9.8 
- SSI  
- Lantus Polycythemia  
- resolved  
- likely 2/2 hypovolemia  
- No labs done today  
- recheck cbc AM  
- continue IVF Pneumonia - possible 2/2 aspiration  
- NPO  
- Speech eval noted mech soft honey liquid diet - tylenol as needed for fever 
- follow blood cx ordered - Zosyn DVT prophylaxis - Lovenox Full code Disposition :  working on placement - rehab Ct Aldana MD 
September 13, 2018

## 2018-09-13 NOTE — PROGRESS NOTES
Transportation at Discharge: 9/14/2018 Expected DC Transport Company/Representative: Wilfredo Portillo / [employer] Phone number: [phone] Reference number:  None Estimated pick-up time: 9/14/18 11:00A Destination: [employer] Method of Transport: Mamta Brar / Moisés Seo Insurance Info: [facility] H[facility] Authorization:  No auth required Made DC transport arrangements at the request of Outcomes Manager Sol Agarwal. Patt Berry, Care- ext [phone] Cancelled transport per Sol Agarwal

## 2018-09-13 NOTE — ROUTINE PROCESS
Bedside and Verbal shift change report given to Ludmila RN (oncoming nurse) by Jose Angel Streeter RN 
 (offgoing nurse). Report included the following information SBAR, Kardex, MAR and Recent Results.

## 2018-09-13 NOTE — PROGRESS NOTES
Received call from [facility] spoke with harley in admit. He did not know that they will not have pharmacy working, so they will not be able to accept pt until Monday. Called [facility], pts first choice, they still will have bed on Monday available for pt. Brysonalli Johan will start auth on sun for Monday for pt to go to [facility].

## 2018-09-14 PROBLEM — E11.9 DM (DIABETES MELLITUS) (HCC): Status: ACTIVE | Noted: 2018-01-01

## 2018-09-14 NOTE — PROGRESS NOTES
Received awake,alert,in no acute distress. Incontinent of small amt of brown,loose stool. Carla care done & creamy moisture barrier applied to excoriated perianal area. Prevalon boot maintained bilaterally. 9/14/18 0630 Uneventful shift. Heels check done,skin in tact.

## 2018-09-14 NOTE — PROGRESS NOTES
Problem: Pressure Injury - Risk of 
Goal: *Prevention of pressure injury Document Mitul Scale and appropriate interventions in the flowsheet. Outcome: Progressing Towards Goal 
Pressure Injury Interventions: 
Sensory Interventions: Assess changes in LOC Moisture Interventions: Apply protective barrier, creams and emollients, Moisture barrier Activity Interventions: Pressure redistribution bed/mattress(bed type) Mobility Interventions: Pressure redistribution bed/mattress (bed type) Nutrition Interventions: Document food/fluid/supplement intake Friction and Shear Interventions: Apply protective barrier, creams and emollients, Foam dressings/transparent film/skin sealants, HOB 30 degrees or less

## 2018-09-14 NOTE — PROGRESS NOTES
Received call from kari at [facility], they can accept pt today. Paged dr Klever Herrera. Will start insurance auth.

## 2018-09-14 NOTE — DISCHARGE INSTRUCTIONS
DISCHARGE SUMMARY from Nurse    PATIENT INSTRUCTIONS:    After general anesthesia or intravenous sedation, for 24 hours or while taking prescription Narcotics:  · Limit your activities  · Do not drive and operate hazardous machinery  · Do not make important personal or business decisions  · Do  not drink alcoholic beverages  · If you have not urinated within 8 hours after discharge, please contact your surgeon on call. Report the following to your surgeon:  · Excessive pain, swelling, redness or odor of or around the surgical area  · Temperature over 100.5  · Nausea and vomiting lasting longer than 4 hours or if unable to take medications  · Any signs of decreased circulation or nerve impairment to extremity: change in color, persistent  numbness, tingling, coldness or increase pain  · Any questions    What to do at Home:  Recommended activity: Activity as tolerated,     If you experience any of the following symptoms increase weakness, facial weakness  please follow up with PCP    *  Please give a list of your current medications to your Primary Care Provider. *  Please update this list whenever your medications are discontinued, doses are      changed, or new medications (including over-the-counter products) are added. *  Please carry medication information at all times in case of emergency situations. These are general instructions for a healthy lifestyle:    No smoking/ No tobacco products/ Avoid exposure to second hand smoke  Surgeon General's Warning:  Quitting smoking now greatly reduces serious risk to your health.     Obesity, smoking, and sedentary lifestyle greatly increases your risk for illness    A healthy diet, regular physical exercise & weight monitoring are important for maintaining a healthy lifestyle    You may be retaining fluid if you have a history of heart failure or if you experience any of the following symptoms:  Weight gain of 3 pounds or more overnight or 5 pounds in a week, increased swelling in our hands or feet or shortness of breath while lying flat in bed. Please call your doctor as soon as you notice any of these symptoms; do not wait until your next office visit. Recognize signs and symptoms of STROKE:    F-face looks uneven    A-arms unable to move or move unevenly    S-speech slurred or non-existent    T-time-call 911 as soon as signs and symptoms begin-DO NOT go       Back to bed or wait to see if you get better-TIME IS BRAIN. Warning Signs of HEART ATTACK     Call 911 if you have these symptoms:   Chest discomfort. Most heart attacks involve discomfort in the center of the chest that lasts more than a few minutes, or that goes away and comes back. It can feel like uncomfortable pressure, squeezing, fullness, or pain.  Discomfort in other areas of the upper body. Symptoms can include pain or discomfort in one or both arms, the back, neck, jaw, or stomach.  Shortness of breath with or without chest discomfort.  Other signs may include breaking out in a cold sweat, nausea, or lightheadedness. Don't wait more than five minutes to call 911 - MINUTES MATTER! Fast action can save your life. Calling 911 is almost always the fastest way to get lifesaving treatment. Emergency Medical Services staff can begin treatment when they arrive -- up to an hour sooner than if someone gets to the hospital by car. The discharge information has been reviewed with the patient. The patient verbalized understanding. Discharge medications reviewed with the patient and appropriate educational materials and side effects teaching were provided.     Patient armband removed and shredded'  ___________________________________________________________________________________________________________________________________

## 2018-09-14 NOTE — DISCHARGE SUMMARY
DISCHARGE SUMMARY PATIENT ID: Jocelyn Castillo MRN: [id number] YOB: 1955   AGE: 57 y.o. DATE OF ADMISSION: 9/10/2018 11:46 AM   
DATE OF DISCHARGE: 9/14/2018 PRIMARY CARE PROVIDER: Rosy Briggs MD  
 
Procedure Performed: 
none DISCHARGING PHYSICIAN: Kiesha Saha MD   
Call hospitalist office [phone]. Discharge Diagnosis:  
Patient Active Problem List  
 Diagnosis Date Noted  DM (diabetes mellitus) ([facility] 09/14/2018  Pneumonia 09/11/2018  Stroke (cerebrum) ([facility] 09/10/2018  Stroke ([facility] 09/10/2018  Rhabdomyolysis 09/10/2018  Dehydration 09/10/2018  Hypertension 09/10/2018  Diabetes mellitus type 2, controlled ([facility] 09/10/2018  Non compliance w medication regimen 09/10/2018 CONSULTATIONS: IP CONSULT TO TELE-NEUROLOGY 
IP CONSULT TO NEUROLOGY 
IP CONSULT TO NEUROLOGY 
IP CONSULT TO HOSPITALIST History of Present Ilness: 
Stephany Branham a 57 y. o. male with No significant past medical history presents to the ED for complaints of intermittent right and left sided weakness onset about 2 days ago noting his LLE just gave out as he was trying to get up from his couch and then noted the next day he could not move his RUE. He denies any trauma. In ED Code S was called and patient was found to have HTN along with Rhabdo and RUE and RLE weakness. No sob, cp, fever, chills, n/v, or any other acute complaints at this time. He will be admitted for stroke workup along with rhabdo. Hospital Course:  
Jocelyn Castillo is a 57 y.o. male  who presents with right side weakness onset 2 days PTA in ER. He had CT head was negative for acute findings. Tele neurology saw patient and recommended admission for stroke and MRI brain ordered. Patient was also noted to have Rhabdomyolysis and started on aggressive IVF. 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 No evidence of associated hemorrhage or mass effect. Patient also had fever Tmax 101.2 last night. CXR wet read done shows right lung opacity He was treated with Zosyn for pneumonia , blood cx shows no growth after 3 days. Speech eval recommend mechanical soft , honey thick liquid diet . PT /OT following recommending rehab ECHO done shows EF 55% Moderate (grade 2) left ventricular diastolic dysfunction. Neurology signed off  And recommended patient to continue aspirin, Lipitor . PT/OT following and recommend rehab. His CK has trended down . Will discharge on Losartan 50 mg every day for BP management and will titrate on discharge with PCP. At the time of discharge patient was stable. Physical Exam on Discharge: 
Visit Vitals  /87 (BP 1 Location: Left arm, BP Patient Position: At rest)  Pulse 71  Temp 98 °F (36.7 °C)  Resp 16  
 Ht 5' 7\" (1.702 m)  Wt 103.4 kg (227 lb 15.3 oz)  SpO2 92%  BMI 35.7 kg/m2 General:  Alert, cooperative, no distress, appears stated age. Lungs:   Clear to auscultation bilaterally. Chest wall:  No tenderness or deformity. Heart:  Regular rate and rhythm, S1, S2 normal, no murmur, click, rub or gallop. Abdomen:   Soft, non-tender. Bowel sounds normal. No masses,  No organomegaly. Extremities: Extremities normal, atraumatic, no cyanosis or edema. Pulses: 2+ and symmetric all extremities. Skin: Skin color, texture, turgor normal. No rashes or lesions Neurologic: CNII-XII intact. Pertinent Results:   
Recent Labs  
   09/14/18 
 0830 BUN  8  
NA  140 CO2  30 Recent Labs  
   09/13/18 
 9929 WBC  10.2 RBC  5.16  
HCT  45.0 MCV  87.2 MCH  29.8 MCHC  34.2  
RDW  12.9 All Micro Results Procedure Component Value Units Date/Time CULTURE, BLOOD [[id number]] Collected:  09/11/18 0025 Order Status:  Completed Specimen:  Blood from Blood Updated:  09/14/18 0720 Special Requests: NO SPECIAL REQUESTS Culture result: NO GROWTH 3 DAYS     
 CULTURE, BLOOD [[id number]] Collected:  09/11/18 0030 Order Status:  Completed Specimen:  Blood from Blood Updated:  09/14/18 0720 Special Requests: NO SPECIAL REQUESTS Culture result: NO GROWTH 3 DAYS     
 CULTURE, URINE [[id number]] Collected:  09/11/18 0030 Order Status:  Completed Specimen:  Urine from Clean catch Updated:  09/12/18 1121 Special Requests: NO SPECIAL REQUESTS Culture result:      
  10920 COLONIES/mL MULTIPLE MICROORGANISMS PRESENT, POSSIBLE CONTAMINATION. PLEASE REPEAT CULTURE IF CLINICALLY INDICATED. CT Results  (Last 48 hours) None EXAM: MRI BRAIN W/O CONTRAST 
  
INDICATION: Right arm weakness 
  
COMPARISON: No prior study 
  
TECHNIQUE: Multiplanar multi sequence MR imaging of the brain was performed 
utilizing axial T2, FLAIR, diffusion, T2 gradient echo, coronal T2, and 
multiplanar T1 pre contrast imaging . Additional dedicated susceptibility 
weighted imaging was performed including MIP and filtered phase reconstruction 
images. No gadolinium was administered due to specific clinician request.  
Imaging performed on wide bore Discovery [id number] GEM suite 3T magnet at [address] 
________________________ 
  
FINDINGS:  
  
Motion artifact is present which limits evaluation. 
  
There is a small discrete area of abnormal restricted diffusion involving the 
left ventral medulla along the medullary pyramid, diffusion images 11 and 12. Corresponding T2/FLAIR hyperintensity is seen. No adjacent mass effect is 
present. No convincing associated hemorrhage is seen. No additional areas of 
acute infarct are present. 
  
Linear discrete chronic infarct is present in the right central mid to upper 
lizabeth. Chronic infarct is present anteriorly in the left thalamus extending into 
the genu of the left internal capsule.  Small posterior right thalamic lacunar 
infarct is also seen. There is a more elongated area of encephalomalacia 
involving the anterior lateral right basal ganglia involving putamen extending 
into the caudate nucleus. This area is associated with peripheral susceptibility 
artifact representing hemosiderin staining from prior hemorrhage. 
  
The ventricles and sulci are mildly enlarged consistent with diffuse volume 
loss. 
  
Mild amount of T2/FLAIR hyperintense white matter lesions are seen within the 
periventricular and subcortical white matter , which are nonspecific, but likely 
represent chronic small vessel changes. 
  
There is no evidence of mass effect, midline shift, or herniation. Additional 
discrete focus of susceptibility artifact is present in the left temporal lobe 
along the subinsular region without mass effect or edema. The major intracranial 
vascular flow voids are grossly normal.  
  
The bilateral lenses are absent, likely due to prior cataract surgery. T1 
precontrast hyperintensity and T2 hypointensity is present left sphenoid sinus 
which is nonspecific and may represent proteinaceous/inspissated mucus. No 
air-fluid levels are present. Mastoid air cells are unremarkable. 
  
________________________ 
  
IMPRESSION IMPRESSION: 
  
Motion degraded study. 
  
1. 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 No evidence of 
associated hemorrhage or mass effect. 
  
2. Chronic right central lizabeth and bilateral basal ganglia infarcts. Hemosiderin 
staining from prior chronic parenchymal hemorrhage involving superolateral right 
basal ganglia. 
  
3. Additional punctate focus susceptibility artifact the left temporal 
subinsular region,  nonspecific but likely representing hemosiderin staining 
from chronic microhemorrhage, often hypertensive in etiology.  
  
4. Mild nonspecific white matter disease likely representing chronic small 
vessel changes.  
 
 
 
Study Result   
  EXAM: MRA HEAD 
  
 INDICATION: Right arm weakness 
  
TECHNIQUE:  MR angiography of the head was performed utilizing 3-D time of 
flight axial acquisitions with multiplanar reconstructions. 
  
_______________ 
  
FINDINGS:   
  
Motion artifact is present which limits evaluation. 
  
There is no evidence of aneurysm. 
  
Mild irregularity and narrowing is seen in the bilateral carotid siphons without 
high grade stenosis. Mild irregularity is present along the carotid terminus 
bilaterally.  
  
Mild multifocal narrowing is seen involving the anterior cerebral arteries 
bilaterally. No definite high-grade MARCY stenosis is seen.  
  
Mild multifocal irregularity is suggested in the bilateral proximal MCA 
including the bifurcations and distal branches.   
  
The vertebral arteries are relatively equal in size. PICA origins are 
unremarkable. No high-grade vertebral artery stenosis is seen. Moderate 
narrowing is present in the proximal limited basilar artery. Additional slight 
irregularity is seen distally. No significant PCA stenosis is identified given 
the motion artifact.    
  
_______________ 
  
IMPRESSION IMPRESSION: 
  
Motion degraded study. 
  
Mild multifocal irregularity and narrowing involving both the anterior and 
posterior circulation without definite high-grade stenosis. Moderate 
irregularity and narrowing suggested in the proximal to mid basilar artery. EXAM: CT head 
  INDICATION: Right arm weakness since Saturday 
  
COMPARISON: None 
  
TECHNIQUE: Axial CT imaging of the head was performed without intravenous 
contrast.  Sagittal and coronal reconstructions were created from the axial 
data. One or more dose reduction techniques were used on this CT: automated 
exposure control, adjustment of the mAs and/or kVp according to patient's size, 
and iterative reconstruction techniques.  The specific techniques utilized on 
this CT exam have been documented in the patient's electronic medical record. 
  
 _______________ 
  
FINDINGS: 
  
BRAIN AND POSTERIOR FOSSA: The sulci, folia, ventricles and basal cisterns are 
within normal limits for the patient?s age. No intracranial hemorrhage, mass 
effect, or midline shift. Focal low density is present anterior limb of the 
right internal capsule extending into the anterior aspect of the right corona 
radiata, essentially matching CSF density within the right lateral ventricle, 
area measuring up to about 2.6 cm. Small rounded low densities are present in 
the ventral left thalamus, and dorsal right thalamus, also essentially matching CSF density within the lateral ventricles. Moderate areas of slightly diminished 
patchy attenuation is present in the bilateral frontal and parietal 
periventricular white matter. Gray-white differentiation is preserved. Streak 
artifact is present in the posterior fossa, as is typical from the dens temporal 
bones. Questionable focal low density in the central to left central medulla. 
  
EXTRA-AXIAL SPACES AND MENINGES: No extra-axial fluid collections. 
  
CALVARIUM: Intact. 
  
SINUSES: Rounded mucosal opacity is present in the sphenoid sinus to the left of 
midline. 
  
OTHER: Mild calcific atherosclerosis is present at the carotid siphons 
bilaterally. 
  
_______________ 
  
IMPRESSION IMPRESSION: 
  
  
1. Chronic focal infarct right deep white matter as described. 2. Small bilateral frontal low densities in the thalami, lacunar infarcts are 
also probably chronic. 3. Streak artifact across the brainstem, questionable medullary infarct, if 
present, difficult to date due to streak artifact. 4. No cortical infarct appreciated. No hemorrhage or mass effect. 5. Sphenoid sinus mucous retention cyst. 
 
 
 
 
Interpretation Summary     
  · Estimated left ventricular ejection fraction is 51 - 55%. Visually measured ejection fraction. Left ventricular mild concentric hypertrophy. Moderate (grade 2) left ventricular diastolic dysfunction. · Mild aortic valve sclerosis. · Mitral valve non-specific thickening. Trace mitral valve regurgitation. · Pulmonary arterial systolic pressure is 23 mmHg. There is no evidence of pulmonary hypertension. · Mildly elevated central venous pressure (5-10 mmHg); IVC diameter is less than 21 mm and collapses less than 50% with respiration. 
    
   
  Myocardial Findings     
  Left Ventricle Normal cavity size and systolic function (ejection fraction normal). Mild concentric hypertrophy observed. The estimated ejection fraction is 51 - 55%. Visually measured ejection fraction. There is moderate (grade 2) left ventricular diastolic dysfunction.    
  Left Atrium Normal cavity size.    
  Right Ventricle Normal cavity size and global systolic function.    
  Right Atrium Normal size.    
  Aortic Valve Aortic valve not well visualized. No stenosis and no regurgitation. Mild aortic valve sclerosis.    
  Mitral Valve No stenosis. Mitral valve non-specific thickening. Trace regurgitation.    
  Tricuspid Valve Normal valve structure and no stenosis. Trace tricuspid valve regurgitation. Pulmonary arterial systolic pressure is 23 mmHg. There is no evidence of pulmonary hypertension.    
  Pulmonic Valve Not well visualized, normal Doppler findings    
  Aorta Normal aortic root.    
  IVC/Hepatic Veins Normal structure. Mildly elevated central venous pressure (5-10 mmHg); IVC diameter is less than 21 mm and collapses less than 50% with respiration.    
  Pericardium No evidence of pericardial effusion.    
   
  TTE procedure Findings     
  TTE Procedure Information Image quality: adequate. The view(s) performed were parasternal, apical and subcostal. Color flow Doppler was performed and pulse wave and/or continuous wave Doppler was performed. Saline contrast was given to evaluate for intracardiac shunt. No shunt seen. DISCHARGE MEDICATIONS:  
Current Discharge Medication List  
  
START taking these medications Details  
losartan (COZAAR) 50 mg tablet Take 1 Tab by mouth daily. Qty: 30 Tab, Refills: 0  
  
aspirin (ASPIRIN) 325 mg tablet Take 1 Tab by mouth daily. Qty: 30 Tab, Refills: 0  
  
atorvastatin (LIPITOR) 80 mg tablet Take 1 Tab by mouth nightly. Qty: 30 Tab, Refills: 0  
  
insulin glargine (LANTUS) 100 unit/mL injection 10 units qhs  Indications: type 2 diabetes mellitus Qty: 1 Vial, Refills: 0  
  
insulin lispro (HUMALOG) 100 unit/mL injection 4 units with meals Qty: 1 Vial, Refills: 0 Condition at discharge:  Afrebrile Ambulating with assistance Eating, Drinking, Voiding Stable FOLLOW UP APPOINTMENTS:   
Follow-up Information Follow up With Details Comments Contact Info Hakeem Manzo MD On 9/28/2018 11am [id number] [place] [id number] [phone] [address] [place] [address] [id number] [phone] Disposition:  Rehab Total discharge preparation time was > 30  minutes.

## 2018-09-14 NOTE — PROGRESS NOTES
Care Management Interventions PCP Verified by CM: Yes 
Palliative Care Criteria Met (RRAT>21 & CHF Dx)?: No 
Mode of Transport at Discharge: Other (see comment) Transition of Care Consult (CM Consult): Discharge Planning Discharge Durable Medical Equipment: No 
Physical Therapy Consult: Yes Occupational Therapy Consult: Yes Speech Therapy Consult: Yes Current Support Network: Relative's Home Confirm Follow Up Transport: Other (see comment) Plan discussed with Pt/Family/Caregiver: Yes Discharge Location Discharge Placement: Skilled nursing facility [facility])

## 2018-09-14 NOTE — PROGRESS NOTES
Progress Note Patient: Darwin Pablo               Sex: male          DOA: 9/10/2018 YOB: 1955      Age:  57 y.o.        LOS:  LOS: 4 days Subjective / Interval Leslie Thomas is a 57 y.o. male  who presents with right side weakness onset 2 days PTA in ER. He had CT head was negative for acute findings. Tele neurology saw patient and recommended admission for stroke and MRI brain ordered. Patient was also noted to have Rhabdomyolysis and started on aggressive IVF. 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 No evidence of associated hemorrhage or mass effect. Patient also had fever Tmax 101.2 last night. CXR wet read done shows right lung opacity 9/12: no new complaints. Afebrile. Zosyn for pneumonia. Speech eval recommend mechanical soft , honey thick liquid diet . PT /OT following recommending rehab ECHO done shows EF 55% Moderate (grade 2) left ventricular diastolic dysfunction. 9/13: No new complaint 9/14: Patient awaiting placement . Per  earliest possibility is Monday 9/17. Neurology signed off . Patient to continue aspirin, Lipitor . PT/OT following Objective:  
  
Visit Vitals  BP (!) 170/95 (BP 1 Location: Left arm, BP Patient Position: At rest)  Pulse 65  Temp 97.9 °F (36.6 °C)  Resp 17  Ht 5' 7\" (1.702 m)  Wt 103.4 kg (227 lb 15.3 oz)  SpO2 94%  BMI 35.7 kg/m2 Physical Exam  
Constitutional: He is oriented to person, place, and time. He appears well-developed and well-nourished. HENT:  
Head: Normocephalic and atraumatic. Mouth/Throat: No oropharyngeal exudate. Eyes: Conjunctivae and EOM are normal. Pupils are equal, round, and reactive to light. No scleral icterus. Neck: Neck supple. Cardiovascular: Normal rate, regular rhythm and normal heart sounds. No murmur heard. Pulmonary/Chest: Effort normal and breath sounds normal. No respiratory distress. He has no wheezes. He has no rales. Abdominal: Soft. Bowel sounds are normal.  
Musculoskeletal: He exhibits no edema. Neurological: He is alert and oriented to person, place, and time. Right side hemiparesis Skin: Skin is warm. No rash noted. No erythema. No pallor. Psychiatric: He has a normal mood and affect. Intake and Output: 
Current Shift:  09/14 0701 - 09/14 1900 In: 120 [P.O.:120] Out: - Last three shifts:  09/12 1901 - 09/14 0700 In: 2391.3 [P.O.:1200; I.V.:1191.3] Out: 2375 [Urine:2375] Recent Results (from the past 48 hour(s)) GLUCOSE, POC Collection Time: 09/12/18 11:25 AM  
Result Value Ref Range Glucose (POC) 316 (H) 70 - 110 mg/dL GLUCOSE, POC Collection Time: 09/12/18  4:42 PM  
Result Value Ref Range Glucose (POC) 360 (H) 70 - 110 mg/dL GLUCOSE, POC Collection Time: 09/12/18 10:36 PM  
Result Value Ref Range Glucose (POC) 283 (H) 70 - 110 mg/dL CK Collection Time: 09/13/18  5:14 AM  
Result Value Ref Range  (H) 39 - 308 U/L  
CBC W/O DIFF Collection Time: 09/13/18  5:14 AM  
Result Value Ref Range WBC 10.2 4.6 - 13.2 K/uL  
 RBC 5.16 4.70 - 5.50 M/uL  
 HGB 15.4 13.0 - 16.0 g/dL HCT 45.0 36.0 - 48.0 % MCV 87.2 74.0 - 97.0 FL  
 MCH 29.8 24.0 - 34.0 PG  
 MCHC 34.2 31.0 - 37.0 g/dL  
 RDW 12.9 11.6 - 14.5 % PLATELET 190 201 - 352 K/uL MPV 10.1 9.2 - 11.8 FL  
GLUCOSE, POC Collection Time: 09/13/18  7:35 AM  
Result Value Ref Range Glucose (POC) 122 (H) 70 - 110 mg/dL GLUCOSE, POC Collection Time: 09/13/18 11:18 AM  
Result Value Ref Range Glucose (POC) 241 (H) 70 - 110 mg/dL GLUCOSE, POC Collection Time: 09/13/18  4:32 PM  
Result Value Ref Range Glucose (POC) 245 (H) 70 - 110 mg/dL GLUCOSE, POC Collection Time: 09/13/18  9:30 PM  
Result Value Ref Range Glucose (POC) 196 (H) 70 - 110 mg/dL GLUCOSE, POC  
 Collection Time: 09/14/18  7:58 AM  
Result Value Ref Range Glucose (POC) 133 (H) 70 - 110 mg/dL METABOLIC PANEL, BASIC Collection Time: 09/14/18  8:30 AM  
Result Value Ref Range Sodium 140 136 - 145 mmol/L Potassium 3.9 3.5 - 5.5 mmol/L Chloride 103 100 - 108 mmol/L  
 CO2 30 21 - 32 mmol/L Anion gap 7 3.0 - 18 mmol/L Glucose 138 (H) 74 - 99 mg/dL BUN 8 7.0 - 18 MG/DL Creatinine 0.78 0.6 - 1.3 MG/DL  
 BUN/Creatinine ratio 10 (L) 12 - 20 GFR est AA >60 >60 ml/min/1.73m2 GFR est non-AA >60 >60 ml/min/1.73m2 Calcium 8.0 (L) 8.5 - 10.1 MG/DL  
CK Collection Time: 09/14/18  8:30 AM  
Result Value Ref Range  (H) 39 - 308 U/L Lab/Data Reviewed: All lab results for the last 24 hours reviewed. XRays were reviewed in past 24 hours Medications Reviewed Assessment/Plan Principal Problem: 
  Stroke ([facility] (9/10/2018) Active Problems: 
  Rhabdomyolysis (9/10/2018) Dehydration (9/10/2018) Hypertension (9/10/2018) Diabetes mellitus type 2, controlled ([facility] (9/10/2018) Non compliance w medication regimen (9/10/2018) Pneumonia (9/11/2018) Plan Stroke - MRI brain shows acute infarct left medullary pyramid  
- continue aspirin , Lipitor - permissible HTN per stroke protocol  
- ECHO EF55% Moderate (grade 2) left ventricular diastolic dysfunction. 
- MRA brain shows Mild multifocal irregularity and narrowing involving both the anterior and 
posterior circulation without definite high-grade stenosis. Moderate 
irregularity and narrowing suggested in the proximal to mid basilar artery. 
- NPO pending speech eval 
- PT/OT /SPEECH eval  
- Neurology consulted f/u with recommendations Rhabdomyolysis - resolved -  
- CK trending down 1963 
- CK 5388 on adm HTN  
- Permissible HTN with parameters DM  
- Uncontrolled - A1c 9.8 
- SSI  
- Lantus Polycythemia  
- resolved - likely 2/2 hypovolemia  
- No labs done today  
- recheck cbc AM  
- continue IVF Pneumonia - possible 2/2 aspiration - Speech eval noted mech soft honey liquid diet - tylenol as needed for fever 
- follow blood cx no growth 3 days - Zosyn d/c 'd  
 
DVT prophylaxis - Lovenox Full code Disposition : [facility] 9/17 per  Roger Louise MD 
September 14, 2018

## 2018-09-14 NOTE — PROGRESS NOTES
Assumed care of patient, Patient in bed resting. No c/o pain at this time. Sbar report received from [facility].  
 
[id number]: Hospital to [address] 57 y.o.   male [address]   Room: 2409/01    [facility] 2E GEN SURG  Unit Phone# :  [phone] [facility] [address] Dept: [phone] Loc: [phone] SITUATION Admitted:  9/10/2018         Attending Provider:  Zoila Darling* Consultations:  IP CONSULT TO TELE-NEUROLOGY 
IP CONSULT TO NEUROLOGY 
IP CONSULT TO NEUROLOGY 
IP CONSULT TO HOSPITALIST 
 
PCP:  Rosamaria Arora MD   [phone] Treatment Team: Attending Provider: Zoila Darling MD; Consulting Provider: Arianna Song MD; Consulting Provider: Zoila Darling MD; Utilization Review: Robe Connelly RN Admitting Dx:  Stroke (cerebrum) ([facility] [address] Stroke [facility]) Principal Problem: Stroke [facility]) * No surgery found * of  
  
BY: * Surgery not found *             ON: * No surgery found * Code Status: Full Code Advance Directives:  
Advance Care Planning 9/10/2018 Patient's Healthcare Decision Maker is: Legal Next of Kin Primary Decision Maker Name Mateo Stephens Primary Decision Maker Phone Number [phone] Primary Decision Maker Relationship to Patient Parent Confirm Advance Directive None Patient Would Like to Complete Advance Directive No  
 (Send w/patient) Not Received Isolation:  There are currently no Active Isolations       MDRO: No current active infections Pain Medications given:  None Special Equipment needed: no  Type of equipment: 
  
  BACKGROUND Allergies: 
No Known Allergies No past medical history on file. No past surgical history on file. No prescriptions prior to admission. Hard scripts included in transfer packet yes Vaccinations: There is no immunization history on file for this patient. Readmission Risks:    Known Risks: None The Charlson CoMorbitiy Index tool is an evidenced based tool that has more automatic generated information. The tool looks at many different items such as the age of the patient, how many times they were admitted in the last calendar year, current length of stay in the hospital and their diagnosis. All of these items are pulled automatically from information documented in the chart from various places and will generate a score that predicts whether a patient is at low (less than 13), medium (13-20) or high (21 or greater) risk of being readmitted. ASSESSMENT Temp: 98 °F (36.7 °C) (09/14/18 1400) Pulse (Heart Rate): 71 (09/14/18 1400) Resp Rate: 16 (09/14/18 1400)           BP: 172/87 (09/14/18 1400) O2 Sat (%): 92 % (09/14/18 1400) Weight: 103.4 kg (227 lb 15.3 oz)    Height: 5' 7\" (170.2 cm) (09/11/18 1509) If above not within 1 hour of discharge: 
 
BP:_____  P:____  R:____ T:_____ O2 Sat: ___%  O2: ______ Active Orders Diet DIET DIABETIC CONSISTENT CARB Mechanical Soft; 2 HONEY Orientation: oriented to time, place, person and situation Active Behaviors: Agitation and None Active Lines/Drains:  (Peg Tube / Mcbride / CL or S/L?): no 
 
Urinary Status: Voiding     Last BM: Last Bowel Movement Date: 09/13/18 Skin Integrity: Wound (add Wound LDA) Wound Buttocks Right;Proximal-DRESSING STATUS: Clean, dry, and intact Wound Buttocks Medial-DRESSING STATUS: Clean, dry, and intact Wound Buttocks Right;Distal-DRESSING STATUS: Clean, dry, and intact Wound Buttocks Right;Proximal-DRESSING TYPE: Foam (mepilex) Wound Buttocks Medial-DRESSING TYPE: Foam (mepilex) Wound Buttocks Right;Distal-DRESSING TYPE: Foam (mepilex) Mobility: Very limited Weight Bearing Status: NWB (Non Weight Bearing) Lab Results Component Value Date/Time Glucose 138 (H) 09/14/2018 08:30 AM  
 Hemoglobin A1c 9.7 (H) 09/11/2018 10:20 AM  
 INR 0.9 09/10/2018 12:31 PM  
 HGB 15.4 09/13/2018 05:14 AM  
 HGB 15.2 09/12/2018 03:00 AM  
  
  RECOMMENDATION See After Visit Summary (AVS) for: · Discharge instructions · After [facility] · Special equipment needed (entered pre-discharge by Care Management) · Medication Recon · iliation · Follow up Appointment(s) Report given/sent by: Chance Mccoy RN Verbal report given to: John Jiménez   FAXED to:  Given to transport Estimated discharge time:  9/14/2018 at  1700.

## 2018-09-14 NOTE — PROGRESS NOTES
Problem: Impaired Skin Integrity/Pressure Injury Treatment Goal: *Prevention of pressure injury Document Mitul Scale and appropriate interventions in the flowsheet. Outcome: Progressing Towards Goal 
Pressure Injury Interventions: 
Sensory Interventions: Assess changes in LOC Moisture Interventions: Absorbent underpads Activity Interventions: Pressure redistribution bed/mattress(bed type) Mobility Interventions: Pressure redistribution bed/mattress (bed type) Nutrition Interventions: Document food/fluid/supplement intake Friction and Shear Interventions: Apply protective barrier, creams and emollients, Foam dressings/transparent film/skin sealants

## 2018-09-14 NOTE — PROGRESS NOTES
Problem: Falls - Risk of 
Goal: *Absence of Falls Document Jignesh Escobedo Fall Risk and appropriate interventions in the flowsheet. Outcome: Progressing Towards Goal 
Fall Risk Interventions: 
  
 
  
 
Medication Interventions: Patient to call before getting OOB Elimination Interventions: Call light in reach, Urinal in reach History of Falls Interventions: Door open when patient unattended Problem: Pressure Injury - Risk of 
Goal: *Prevention of pressure injury Document Mitul Scale and appropriate interventions in the flowsheet. Outcome: Progressing Towards Goal 
Pressure Injury Interventions: 
Sensory Interventions: Assess changes in LOC Moisture Interventions: Apply protective barrier, creams and emollients, Moisture barrier Activity Interventions: Pressure redistribution bed/mattress(bed type) Mobility Interventions: Pressure redistribution bed/mattress (bed type) Nutrition Interventions: Document food/fluid/supplement intake Friction and Shear Interventions: Apply protective barrier, creams and emollients, Foam dressings/transparent film/skin sealants, HOB 30 degrees or less Problem: Impaired Skin Integrity/Pressure Injury Treatment Goal: *Prevention of pressure injury Document Mitul Scale and appropriate interventions in the flowsheet. Outcome: Progressing Towards Goal 
Pressure Injury Interventions: 
Sensory Interventions: Assess changes in LOC Moisture Interventions: Apply protective barrier, creams and emollients, Moisture barrier Activity Interventions: Pressure redistribution bed/mattress(bed type) Mobility Interventions: Pressure redistribution bed/mattress (bed type) Nutrition Interventions: Document food/fluid/supplement intake Friction and Shear Interventions: Apply protective barrier, creams and emollients, Foam dressings/transparent film/skin sealants, HOB 30 degrees or less

## 2018-09-14 NOTE — PROGRESS NOTES
Problem: Self Care Deficits Care Plan (Adult) Goal: *Acute Goals and Plan of Care (Insert Text) Occupational Therapy Goals Initiated 9/11/2018 within 7 day(s). 1.  Patient will perform grooming with minimal assistance/contact guard assist sitting EOB involving RUE for gross assistance. 2.  Patient will perform upper body dressing with minimal assistance/contact guard assist involving RUE for gross assistance . 6. Patient will participate in upper extremity therapeutic exercise/activities with minimal assistance/contact guard assist for 15 minutes. 7.  Patient will utilize energy conservation techniques during functional activities with verbal, visual and tactile cues. Occupational Therapy TREATMENT Patient: Andriy Paz (23 y.o. male) Date: 9/14/2018 Diagnosis: Stroke (cerebrum) ([facility] [address] Stroke [facility]) Stroke ([facility] [address] Precautions: Fall, Skin Chart, occupational therapy assessment, plan of care, and goals were reviewed. PLOF: Pt states he was independent with ADLs and transfers PTA. ASSESSMENT: 
Pt presented in chair positioning in bed. Pt agreeable to skilled OT services this date. Educated pt on tiffany-technique for UB dressing. Pt verbally returned proper sequencing of task. Pt performed x2 trials of technique, requiring Mod A with flaccid RUE and verbal cuing. Pt will need reinforcement of technique. Pt was left comfortable in bed and call bell within reach. EDUCATION Educate pt on UB dressing technique Progression toward goals: 
[]          Improving appropriately and progressing toward goals [x]          Improving slowly and progressing toward goals 
[]          Not making progress toward goals and plan of care will be adjusted PLAN: 
Patient continues to benefit from skilled intervention to address the above impairments. Continue treatment per established plan of care. Discharge Recommendations:  Dominik Sweeney Further Equipment Recommendations for Discharge:  TBD at next level of care SUBJECTIVE:  
Patient stated I wish I could move this arm.  OBJECTIVE DATA SUMMARY: 
  
G CODES:  Self Care  Current  CN= 100%. The severity rating is based on the Other functional assessment Cognitive/Behavioral Status: 
Neurologic State: Alert Orientation Level: Oriented X4 Cognition: Follows commands Safety/Judgement: Fall prevention Functional Mobility and Transfers for ADLs: 
   
ADL Intervention: 
 
Upper Body Dressing Assistance Shirt simulation with hospital gown: Moderate assistance; Compensatory technique training Cues: Physical assistance;Verbal cues provided Cognitive Retraining Safety/Judgement: Fall prevention Pain:pt stated no pain level Pre Treatment: 
Post Treatment: 
Pain Scale 1: Numeric (0 - 10) Pain Intensity 1: 0 Activity Tolerance:   
Fair- 
Please refer to the flowsheet for vital signs taken during this treatment. After treatment:  
[]  Patient left in no apparent distress sitting up in chair 
[x]  Patient left in no apparent distress in bed 
[x]  Call bell left within reach 
[]  Nursing notified 
[]  Caregiver present 
[]  Bed alarm activated [place] Time Calculation: 17 mins

## 2018-09-14 NOTE — PROGRESS NOTES
Called by Camelia Crenshaw at [facility]. Pt approved for SNF at [facility] [employer] reference # [id number]. Approved from 9/14/18 thru 9/16/18. OK to fax updates on 9/17/18. RUG Level :   Therapy minutes per week SNF should send clinical updates to Maria Isabel Rasmussen fax [phone]; phone # [phone] Called Jennifer at [facility] and gave SNF auth info above and aware pt admitting today. Spoke to Sheree Rodriguez on [place] who will contact MD for DC summary. Has Jennifer's contact number [phone] to advise of pick-up time once known. Nurse will need to call report to number on envelope. Unit will need to contact [facility] with pick-up time as has been placed on \"will call\". Pick-up time 1730. Advised unit. Unit will contact [facility] at [facility] with time. Jonatan Padgett RN  Care-manager Outcomes Manager 
[phone] Malnutrition/moderate

## 2018-09-14 NOTE — ROUTINE PROCESS
Bedside and Verbal shift change report given to [address] [place] [address] (oncoming nurse) by anam reynolds rn (offgoing nurse). Report given with SBAR, Kardex, Intake/Output and Recent Results.

## 2018-09-14 NOTE — PROGRESS NOTES
Assumed care of pt from Ludmila RN pt awake in bed A&O x 4 , no distress noted and denies pain. Frequently used items and call bell within reach. Patient verbalized understanding to use call bell for any needs or assistance. Bed locked in lowest position. Dual NIH performed. 1320 Bedside and Verbal shift change report given to DENISE Salas (oncoming nurse) by Marbin Clifton RN (offgoing nurse). Report included the following information SBAR, Kardex, Intake/Output, MAR and Recent Results. Dual NIH performed. 1422 Pt complained of leg pain. Orders received for Norco 5 mg. q4h PRN for pain.  RBV

## 2018-09-14 NOTE — PROGRESS NOTES
Problem: Falls - Risk of 
Goal: *Absence of Falls Document Kingsley Cali Fall Risk and appropriate interventions in the flowsheet. Outcome: Progressing Towards Goal 
Fall Risk Interventions: 
  
 
  
 
Medication Interventions: Patient to call before getting OOB Elimination Interventions: Call light in reach History of Falls Interventions: Door open when patient unattended Problem: Pressure Injury - Risk of 
Goal: *Prevention of pressure injury Document Mitul Scale and appropriate interventions in the flowsheet. Outcome: Progressing Towards Goal 
Pressure Injury Interventions: 
Sensory Interventions: Assess changes in LOC Moisture Interventions: Absorbent underpads Activity Interventions: Pressure redistribution bed/mattress(bed type) Mobility Interventions: Pressure redistribution bed/mattress (bed type) Nutrition Interventions: Document food/fluid/supplement intake Friction and Shear Interventions: Apply protective barrier, creams and emollients, Foam dressings/transparent film/skin sealants

## 2018-09-14 NOTE — PROGRESS NOTES
Pt will go to [facility] on Monday pend insurance authaMliha Wagner will be started on Sunday.

## 2018-09-14 NOTE — PROGRESS NOTES
Problem: Falls - Risk of 
Goal: *Absence of Falls Document Easter Getting Fall Risk and appropriate interventions in the flowsheet. Outcome: Progressing Towards Goal 
Fall Risk Interventions: 
  
 
  
 
Medication Interventions: Patient to call before getting OOB Elimination Interventions: Call light in reach, Urinal in reach History of Falls Interventions: Door open when patient unattended

## 2018-09-14 NOTE — PROGRESS NOTES
auth request made in availity for snf at [facility]. Info fax'd by kooaba. Paged dr Sindy Figueroa. notified pt of possible dc to snf today if auth obtained. Transport set with [facility]. Placed on will call. Pcs , envelope in nurses station. uai placed in envelope. Notified patient. and his mother and niece. Notified dr Sindy Figueroa. Nurse needs to call [facility] when ready, call pts mother  to let her know he is going and call kari at [facility] to let her know he is coming. Dr Sindy Figueroa will do dc and dc summary when auth obtained. Dc summary and any scripts need to be placed in envelope.

## 2018-09-14 NOTE — PROGRESS NOTES
Problem: Dysphagia (Adult) Goal: *Acute Goals and Plan of Care (Insert Text) Recommendations: 
Diet: mech-soft (chopped)/honey Meds: one at a time Aspiration Precautions Oral Care TID Goals:  Patient will: 1. Tolerate PO trials with 0 s/s overt distress in 4/5 trials 2. Utilize compensatory swallow strategies/maneuvers (decrease bite/sip, size/rate, alt. liq/sol) with min cues in 4/5 trials 3. Perform oral-motor/laryngeal exercises to increase oropharyngeal swallow function with min cues 4. Complete an objective swallow study (i.e., MBSS) to assess swallow integrity, r/o aspiration, and determine of safest LRD, min A - pt overtly aspirating thin Outcome: Not Progressing Towards Goal 
Speech language pathology dysphagia treatment Patient: Dariana Saucedo (1 y.o. male) Date: 9/14/2018 Diagnosis: Stroke (cerebrum) ([facility] [address] Stroke [facility]) Stroke ([facility] [address] Precautions: aspiration Fall, Skin ASSESSMENT: 
Follow up this am with solid/thin liquid therapeutic snack. Pt with immediate and delayed weak poorly productive coughs with thin liquids. Coughing resolves with honey-thick liquids + straw. At this time, pt continues safest for honey-thick liquids. D/w RN, Rafael Garcia and informed him that there was regular/thin water at b/s. SLP further ensured diet is correctly documented on white board. SLP will continue to follow as indicated. Progression toward goals: 
[]         Improving appropriately and progressing toward goals [x]         Improving slowly and progressing toward goals 
[]         Not making progress toward goals and plan of care will be adjusted PLAN: 
Recommendations and Planned Interventions: 
Patient continues to benefit from skilled intervention to address the above impairments. Continue treatment per established plan of care. Discharge Recommendations:  Dominik Sweeney SUBJECTIVE:  
Patient stated the fan is blowing on me. OBJECTIVE:  
 Cognitive and Communication Status: 
Neurologic State: Alert Orientation Level: Oriented X4 Cognition: Follows commands Perception: Appears intact Perseveration: No perseveration noted Safety/Judgement: Fall prevention Dysphagia Treatment: 
 see above PAIN: 
Start of Tx: 0 End of Tx: 0 After treatment:  
[]              Patient left in no apparent distress sitting up in chair 
[x]              Patient left in no apparent distress in bed 
[x]              Call bell left within reach [x]              Nursing notified 
[]              Family present 
[]              Caregiver present 
[]              Bed alarm activated COMMUNICATION/EDUCATION:  
[x] Aspiration precautions; swallow safety; compensatory techniques []        Patient unable to participate in education; education ongoing with staff 
[]  Posted safety precautions in patient's room. [] Oral-motor/laryngeal strengthening exercises BLAIRE Last Time Calculation: 13 mins

## 2018-09-14 NOTE — PROGRESS NOTES
Problem: Impaired Skin Integrity/Pressure Injury Treatment Goal: *Prevention of pressure injury Document Mitul Scale and appropriate interventions in the flowsheet. Outcome: Progressing Towards Goal 
Pressure Injury Interventions: 
Sensory Interventions: Assess changes in LOC Moisture Interventions: Apply protective barrier, creams and emollients, Moisture barrier Activity Interventions: Pressure redistribution bed/mattress(bed type) Mobility Interventions: Pressure redistribution bed/mattress (bed type) Nutrition Interventions: Document food/fluid/supplement intake Friction and Shear Interventions: Apply protective barrier, creams and emollients, Foam dressings/transparent film/skin sealants, HOB 30 degrees or less

## 2018-09-19 NOTE — PROGRESS NOTES
Community Care Team Documentation for Patient in [facility]     Patient discharged from [facility] 9/10/2018 - 9/14/2018 to [facility], on 9/14/2018. Hospital Discharge diagnosis:  CVA/PNA. SNF Attending Provider:      Anticipated discharge date from SNF:  KIMBERLI      PCP : Zackary Buckley MD    Nurse Navigator: KIMBERLI    [facility] team rounds completed, updates provided by facility. 2 large sacral wounds  PT/OT/SP. Dispo: Return to home with family. Lives with mom who has dementia. No dc date. Low Risk            6       Total Score        6 Charlson Comorbidity Score (Age + Comorbid Conditions)        Criteria that do not apply:    Has Seen PCP in Last 6 Months (Yes=3, No=0)    . Living with Significant Other. Assisted Living. LTAC. SNF. or   Rehab    Patient Length of Stay (>5 days = 3)    IP Visits Last 12 Months (1-3=4, 4=9, >4=11)    Pt.  Coverage (Medicare=5 , Medicaid, or Self-Pay=4)      Active Ambulatory Problems     Diagnosis Date Noted    Stroke (cerebrum) ([address] 09/10/2018    Stroke ([address] 09/10/2018    Rhabdomyolysis 09/10/2018    Dehydration 09/10/2018    Hypertension 09/10/2018    Diabetes mellitus type 2, controlled ([address] 09/10/2018    Non compliance w medication regimen 09/10/2018    Pneumonia 09/11/2018    DM (diabetes mellitus) ([address] 09/14/2018     Resolved Ambulatory Problems     Diagnosis Date Noted    No Resolved Ambulatory Problems     No Additional Past Medical History

## 2018-09-26 NOTE — Clinical Note
TRANSFER - OUT REPORT:  
 
Verbal report given to: KAUSHAL Gibson. Report consisted of patient's Situation, Background, Assessment and  
Recommendations(SBAR). Opportunity for questions and clarification was provided. Patient transported with a Cardiac Cath Tech / Patient Care Tech, Monitor and Oxygen. Patient transported to: 2700.

## 2018-09-27 PROBLEM — N17.9 ACUTE-ON-CHRONIC KIDNEY INJURY (HCC): Status: ACTIVE | Noted: 2018-01-01

## 2018-09-27 PROBLEM — N17.9 ACUTE KIDNEY INJURY (HCC): Status: ACTIVE | Noted: 2018-01-01

## 2018-09-27 PROBLEM — R77.8 ELEVATED TROPONIN: Status: ACTIVE | Noted: 2018-01-01

## 2018-09-27 PROBLEM — R74.01 TRANSAMINITIS: Status: ACTIVE | Noted: 2018-01-01

## 2018-09-27 PROBLEM — Z86.73 HISTORY OF STROKE: Status: ACTIVE | Noted: 2018-01-01

## 2018-09-27 PROBLEM — N18.9 ACUTE-ON-CHRONIC KIDNEY INJURY (HCC): Status: ACTIVE | Noted: 2018-01-01

## 2018-09-27 PROBLEM — N30.00 ACUTE CYSTITIS: Status: ACTIVE | Noted: 2018-01-01

## 2018-09-27 NOTE — PROGRESS NOTES
Pharmacy Dosing Services: Vancomycin Indication: Sepsis Day of therapy: 0 Other Antimicrobials (Include dose, start day & day of therapy): 
Zosyn 2.25g IV Q12H starting  Loading dose (date given): 2500mg on  Current Maintenance dose: New start Goal Vancomycin Level: 15-20 
(Trough 15-20 for most infections, 20 for meningitis/osteomyelitis, pre-HD level ~25) Vancomycin Level (if drawn): New start Significant Cultures:  
 - blood - pending  - urine - pending Renal function stable? (unstable defined as SCr increase of 0.5 mg/dL or > 50% increase from baseline, whichever is greater) (Y/N): N baseline SCr ~0.8, currently 10.10 CAPD, Hemodialysis or Renal Replacement Therapy (Y/N): N Recent Labs  
   18 
 2306 CREA  10.10* BUN  132* WBC  20.2* Temp (24hrs), Av.8 °F (36.6 °C), Min:97.7 °F (36.5 °C), Max:97.8 °F (36.6 °C) Creatinine Clearance (Creatinine Clearance (ml/min)): 8.2 mL/min Regimen assessment: New start, JULIANNA - will dose per levels Maintenance dose: Dosing per levels Next scheduled level:  @ 0400 Pharmacy will follow daily and adjust medications as appropriate for renal function and/or serum levels. Thank you, Rasheed Thomas, PHARMD

## 2018-09-27 NOTE — ED PROVIDER NOTES
HPI Comments: Orion Figueroa is a 57 y.o. Male who was admitted recently for cva was sent over from NH because of abnl labs, elevated bun/cr. Pt unable to give any information or know why he here. Chronic weakness on right side from cva. No other information given from [facility] The history is provided by the patient, the EMS personnel and medical records. The history is limited by the condition of the patient. Past Medical History:  
Diagnosis Date  CHF (congestive heart failure) ([facility]  Diabetes ([facility]  Stroke ([facility] History reviewed. No pertinent surgical history. History reviewed. No pertinent family history. Social History Social History  Marital status:  Spouse name: N/A  
 Number of children: N/A  
 Years of education: N/A Occupational History  Not on file. Social History Main Topics  Smoking status: Never Smoker  Smokeless tobacco: Never Used  Alcohol use No  
 Drug use: No  
 Sexual activity: Not on file Other Topics Concern  Not on file Social History Narrative  No narrative on file ALLERGIES: Review of patient's allergies indicates no known allergies. Review of Systems Unable to perform ROS: Mental status change Vitals:  
 09/27/18 0215 09/27/18 0230 09/27/18 0245 09/27/18 0300 BP: 163/72 164/75 162/73 162/74 Pulse: 80 81 81 80 Resp: 18 19 18 20 Temp:      
SpO2: 92% 92% 93% 91% Weight:      
      
 
Physical Exam  
Constitutional:  Non-toxic appearance. He does not appear ill. He appears distressed. HENT:  
Head: Normocephalic and atraumatic. Right Ear: External ear normal.  
Left Ear: External ear normal.  
Nose: Nose normal.  
Mouth/Throat: Oropharynx is clear and moist. No oropharyngeal exudate. Eyes: Conjunctivae are normal.  
Neck: Normal range of motion. Cardiovascular: Normal rate, regular rhythm, normal heart sounds and intact distal pulses. Pulmonary/Chest: Effort normal and breath sounds normal. No respiratory distress. Abdominal: Soft. There is no tenderness. Musculoskeletal: Normal range of motion. He exhibits edema. Left picc lue with no swelling, erythema. Neurological: He is alert. Right sided weakness chronic. Unable to move right side Skin: Skin is warm and dry. He is not diaphoretic. Psychiatric: His behavior is normal.  
Nursing note and vitals reviewed. [facility] 
 
 
ED Course Procedures Vitals: 
Patient Vitals for the past 12 hrs: 
 Temp Pulse Resp BP SpO2  
09/27/18 0300 - 80 20 162/74 91 %  
09/27/18 0245 - 81 18 162/73 93 % 09/27/18 0230 - 81 19 164/75 92 %  
09/27/18 0215 - 80 18 163/72 92 %  
09/27/18 0200 - 80 18 159/73 91 %  
09/27/18 0115 - 81 20 173/77 92 %  
09/27/18 0100 - 79 17 (!) 190/91 93 % 09/26/18 2315 - 81 18 177/87 95 % 09/26/18 2300 - 81 18 192/70 94 % 09/26/18 2252 97.8 °F (36.6 °C) 82 11 170/86 96 %  
09/26/18 2252 - - - - (!) 87 %  
09/26/18 2245 - - - 170/86 - Medications ordered:  
Medications  
0.9% sodium chloride infusion (150 mL/hr IntraVENous Continued On Admission 9/27/18 0354) piperacillin-tazobactam (ZOSYN) 2.25 g in 0.9% sodium chloride (MBP/ADV) 50 mL MBP (0 g IntraVENous IV Completed 9/27/18 0219) vancomycin (VANCOCIN) 1500 mg in  ml infusion (1,500 mg IntraVENous Continued On Admission 9/27/18 0354) Followed by  
vancomycin (VANCOCIN) 1,000 mg in 0.9% sodium chloride (MBP/ADV) 250 mL adv (not administered) VANCOMYCIN INFORMATION NOTE (not administered) aspirin (ASPIRIN) tablet 325 mg (not administered)  
insulin glargine (LANTUS) injection 10 Units (not administered)  
senna-docusate (PERICOLACE) 8.6-50 mg per tablet 1 Tab (not administered)  
melatonin tablet 3 mg (not administered)  
traMADol (ULTRAM) tablet 50 mg (not administered)  
ondansetron (ZOFRAN) injection 4 mg (not administered) heparin (porcine) injection 5,000 Units (not administered)  
insulin lispro (HUMALOG) injection (not administered) glucose chewable tablet 16 g (not administered) glucagon (GLUCAGEN) injection 1 mg (not administered) dextrose (D50) infusion 12.5-25 g (not administered)  
sodium bicarbonate tablet 650 mg (not administered)  
sodium chloride 0.9 % bolus infusion 1,000 mL (0 mL IntraVENous IV Completed 9/27/18 0310) lidocaine (URO-JET) 2 % jelly ( Urethral Given 9/27/18 0108) Lab findings: 
Recent Results (from the past 12 hour(s)) CBC WITH AUTOMATED DIFF Collection Time: 09/26/18 11:06 PM  
Result Value Ref Range WBC 20.2 (H) 4.6 - 13.2 K/uL  
 RBC 4.57 (L) 4.70 - 5.50 M/uL  
 HGB 13.2 13.0 - 16.0 g/dL HCT 39.0 36.0 - 48.0 % MCV 85.3 74.0 - 97.0 FL  
 MCH 28.9 24.0 - 34.0 PG  
 MCHC 33.8 31.0 - 37.0 g/dL  
 RDW 14.4 11.6 - 14.5 % PLATELET 628 681 - 360 K/uL MPV 9.4 9.2 - 11.8 FL  
 NEUTROPHILS 85 (H) 42 - 75 % LYMPHOCYTES 8 (L) 20 - 51 % MONOCYTES 5 2 - 9 % EOSINOPHILS 2 0 - 5 % BASOPHILS 0 0 - 3 %  
 ABS. NEUTROPHILS 17.2 (H) 1.8 - 8.0 K/UL  
 ABS. LYMPHOCYTES 1.6 0.8 - 3.5 K/UL  
 ABS. MONOCYTES 1.0 0 - 1.0 K/UL  
 ABS. EOSINOPHILS 0.4 0.0 - 0.4 K/UL  
 ABS. BASOPHILS 0.0 0.0 - 0.1 K/UL  
 RBC COMMENTS NORMOCYTIC, NORMOCHROMIC    
 DF MANUAL METABOLIC PANEL, COMPREHENSIVE Collection Time: 09/26/18 11:06 PM  
Result Value Ref Range Sodium 138 136 - 145 mmol/L Potassium 4.8 3.5 - 5.5 mmol/L Chloride 95 (L) 100 - 108 mmol/L  
 CO2 20 (L) 21 - 32 mmol/L Anion gap 23 (H) 3.0 - 18 mmol/L Glucose 162 (H) 74 - 99 mg/dL  (H) 7.0 - 18 MG/DL Creatinine 10.10 (H) 0.6 - 1.3 MG/DL  
 BUN/Creatinine ratio 13 12 - 20 GFR est AA 6 (L) >60 ml/min/1.73m2 GFR est non-AA 5 (L) >60 ml/min/1.73m2 Calcium 8.4 (L) 8.5 - 10.1 MG/DL  Bilirubin, total 4.0 (H) 0.2 - 1.0 MG/DL  
 ALT (SGPT) 62 (H) 16 - 61 U/L  
 AST (SGOT) 110 (H) 15 - 37 U/L  
 Alk. phosphatase 401 (H) 45 - 117 U/L Protein, total 8.6 (H) 6.4 - 8.2 g/dL Albumin 1.6 (L) 3.4 - 5.0 g/dL Globulin 7.0 (H) 2.0 - 4.0 g/dL A-G Ratio 0.2 (L) 0.8 - 1.7 LIPASE Collection Time: 09/26/18 11:06 PM  
Result Value Ref Range Lipase 2214 (H) 73 - 393 U/L  
PROTHROMBIN TIME + INR Collection Time: 09/26/18 11:06 PM  
Result Value Ref Range Prothrombin time 19.3 (H) 11.5 - 15.2 sec INR 1.6 (H) 0.8 - 1.2 PTT Collection Time: 09/26/18 11:06 PM  
Result Value Ref Range aPTT 43.7 (H) 23.0 - 36.4 SEC CARDIAC PANEL,(CK, CKMB & TROPONIN) Collection Time: 09/27/18 12:05 AM  
Result Value Ref Range  39 - 308 U/L  
 CK - MB 5.3 (H) <3.6 ng/ml CK-MB Index 2.7 0.0 - 4.0 % Troponin-I, Qt. 0.18 (H) 0.0 - 0.045 NG/ML  
POC LACTIC ACID Collection Time: 09/27/18 12:11 AM  
Result Value Ref Range Lactic Acid (POC) 0.7 0.4 - 2.0 mmol/L  
URINALYSIS W/ RFLX MICROSCOPIC Collection Time: 09/27/18 12:29 AM  
Result Value Ref Range Color RED Appearance CLOUDY Specific gravity 1.010 1.003 - 1.030    
 pH (UA) 9.0 (H) 5.0 - 8.0 Protein >300 (A) NEG mg/dL Glucose 100 (A) NEG mg/dL Ketone >80 (A) NEG mg/dL Bilirubin LARGE (A) NEG Blood LARGE (A) NEG Urobilinogen >8.0 (H) 0.2 - 1.0 EU/dL Nitrites POSITIVE (A) NEG Leukocyte Esterase LARGE (A) NEG URINE MICROSCOPIC ONLY Collection Time: 09/27/18 12:29 AM  
Result Value Ref Range WBC TOO NUMEROUS TO COUNT 0 - 4 /hpf  
 RBC TOO NUMEROUS TO COUNT 0 - 5 /hpf Epithelial cells 3+ 0 - 5 /lpf Bacteria 4+ (A) NEG /hpf  
EKG, 12 LEAD, INITIAL Collection Time: 09/27/18  1:05 AM  
Result Value Ref Range Ventricular Rate 77 BPM  
 Atrial Rate 77 BPM  
 P-R Interval 182 ms QRS Duration 86 ms  
 Q-T Interval 434 ms QTC Calculation (Bezet) 491 ms Calculated P Axis 19 degrees Calculated R Axis -52 degrees Calculated T Axis 5 degrees Diagnosis Sinus rhythm with fusion complexes Left anterior fascicular block Voltage criteria for left ventricular hypertrophy Prolonged QT Abnormal ECG When compared with ECG of 10-SEP-2018 15:07, 
fusion complexes are now present Non-specific change in ST segment in Lateral leads T wave inversion no longer evident in Lateral leads EKG interpretation by ED Physician: 
Sinus with lafb. Ns st changes Rate 77, pr 182,, qtc 491 
tw inversions no longer present Cardiac monitor: nl rate, reg rhythm, no ectopy Pulse ox: 91% ra X-Ray, CT or other radiology findings or impressions: 
XR CHEST PORT    (Results Pending) CT ABD PELV WO CONT    (Results Pending) [address]    (Results Pending) Findings: - Bilateral perinephric stranding, nonspecific. Renal parenchyma is poorly evaluated in absence of IV contrast. Mild dilatation of the proximal ureters bilaterally, no hydronephrosis, no obstructing stones. - Distended gallbladder with cholelithiasis, biliary sludge, and choledocholithiasis. No pericholecystic fluid or fat stranding. - No bowel obstruction. Mild diverticulosis. Normal appendix. - Small hiatal hernia with circumferential thickening of the distal esophagus/proximal stomach with prominent gastrohepatic nodes, correlate for history of gastritis, consider endoscopic evaluation. - Right lower lung linear atelectasis/scarring and ill defined opacities which may represent atelectasis vs developing infiltrate. Progress notes, Consult notes or additional Procedure notes:  
Covered with broad spectrum abx. Nl lactate. No sig abd ttp. arf likely due to prerenal dehydration. Making urine after fluids D/w dr Radha Acosta who will admit D/w dr Doe Delaney on call for gi who will consult D/w dr bernal on call for nephrology who will consult ED Critical Care Note System at risk for life threatening failure: cardiac, gi, renal, pulm Associated problems: infection, hypertension, renal failure, biliary obstruction Critical Care services provided: abx management, fluid resuscitation, consult, documentation, bedside management Excluded procedures (time not included in critical care): ecg interp Total Critical Care Time (in minutes) 68 Reevaluation of patient:  
stable Disposition: 
Diagnosis: 1. Acute renal failure, unspecified acute renal failure type ([facility] 2. Acute UTI 3. Cholelithiasis with choledocholithiasis 4. History of CVA (cerebrovascular accident) Disposition: admit Follow-up Information None Current Discharge Medication List  
  
CONTINUE these medications which have NOT CHANGED Details  
aspirin (ASPIRIN) 325 mg tablet Take 1 Tab by mouth daily. Qty: 30 Tab, Refills: 0  
  
atorvastatin (LIPITOR) 80 mg tablet Take 1 Tab by mouth nightly. Qty: 30 Tab, Refills: 0  
  
insulin glargine (LANTUS) 100 unit/mL injection 10 units qhs  Indications: type 2 diabetes mellitus Qty: 1 Vial, Refills: 0  
  
insulin lispro (HUMALOG) 100 unit/mL injection 4 units with meals Qty: 1 Vial, Refills: 0  
  
losartan (COZAAR) 50 mg tablet Take 1 Tab by mouth daily. Qty: 30 Tab, Refills: 0

## 2018-09-27 NOTE — PROGRESS NOTES
Problem: Discharge Planning Goal: *Discharge to safe environment Outcome: Progressing Towards Goal 
Plan is to discharge to a safe environment

## 2018-09-27 NOTE — PROGRESS NOTES
0400: Pt received. Alert and oriented to self and place, poor historian. Admission docs unable to be completed d/t pt being a poor historian. Pt was admitted with a em placed from the ED, with 30cc of bloody urine. Pt also had loose black tarry stool. No complaints of pain at this time. 0500: pt has hx of CHF. According to Dr. Genevieve Garner note, pt is to have NS @ 100cc/hr. Order is for 150cc/hr. Paged for clarification. Pt also a large amount of loose black tarry stool, asked if he wants any samples for any test. 
 
0510: On call hospitalist stated to keep continuous fluid at 150cc/hr for now. Also stated that stool sample will not be needed. If pt has another loose black tarry stool again, notify MD. 
 
0530: Pt's is NPO. Humalog ACHS changed to Q6, per protocol 7357: Bedside and Verbal shift change report given to Benjy Maxwell RN (oncoming nurse) by Gretchen Garcia RN (offgoing nurse). Report included the following information SBAR, Kardex, Intake/Output, MAR and Recent Results.

## 2018-09-27 NOTE — PROGRESS NOTES
Patient received in bed awake. Patient alert and oriented x2 to self and place, denies  pain and discomfort. Patient resting quietly. Frequent use items within reach. Bed locked in low position. Call bell within reach and patient verbalized understanding of use for assistance and needs. Dual skin assessment completed with Anahi EASLEY. See skin assessment screen. Wound inner right buttocks, wound consult ordered. Unable to complete patient's admission documents due to patient being a poor historian. 1410:  Stephanie Bowie NP aware of patient having some blood oozing from around the em and that the patient has only had 25cc to 30cc of urine output from the em. Per Stephanie Bowie NP said to remove the em and do a bladder scan on the [facility]44:  Called Dr. Rosales Seek to inform him that the patient had some bloody emesis on his bedding. Patient's family members stated that the blood came from the patient's mouth. Dr. Rosales Seek informed that there was also blood coming from the patient's em. Dr. Rosales Seek ordered H&H. Informed Dr. Connie Reynolds that the em will be removed. Also informed Dr. Connie Reynolds that the patient's BP was 201/117 and I will be administering 20 mg of hydralazine.

## 2018-09-27 NOTE — ED NOTES
TRANSFER - ED to INPATIENT REPORT: 
 
SBAR report made available to receiving floor on this patient being transferred to [facility] (2100)  for routine progression of care Admitting diagnosis Acute kidney injury ([address] Information from the following report(s) SBAR and MAR was made available to receiving floor. Lines:  
Peripheral IV 09/27/18 Left Wrist (Active) Site Assessment Clean, dry, & intact 9/27/2018 12:12 AM  
Phlebitis Assessment 0 9/27/2018 12:12 AM  
Infiltration Assessment 0 9/27/2018 12:12 AM  
Dressing Status Clean, dry, & intact 9/27/2018 12:12 AM  
   
Peripheral IV 09/27/18 Right Other(comment) (Active) Site Assessment Clean, dry, & intact 9/27/2018  1:53 AM  
Phlebitis Assessment 0 9/27/2018  1:53 AM  
Infiltration Assessment 0 9/27/2018  1:53 AM  
Dressing Status Clean, dry, & intact 9/27/2018  1:53 AM  
Dressing Type Transparent 9/27/2018  1:53 AM  
Hub Color/Line Status Yellow 9/27/2018  1:53 AM  
  
 
Medication list unable to confirm Opportunity for questions and clarification was provided. Patient is oriented to time, place, person and situation. Patient needs to complete second dose of vancomycin Patient is  A/Ox4 and non-ambulatory Valuables transported with patient Patient transported with: 
 Monitor Registered Nurse

## 2018-09-27 NOTE — CONSULTS
[address] MR#: [id number] : 1955 ACCOUNT #: [de-identified] DATE OF SERVICE: 2018 ATTENDING PHYSICIAN:  Dr. Emanuel Bolton. CONSULTING PHYSICIAN:  Dr. Scott Sims. REASON FOR CONSULTATION:  Elevated liver enzymes. HISTORY OF PRESENT ILLNESS:  The patient is a 75-year-old -American male who was recently admitted to [facility] from 09/10 to  with a new onset CVA. Has history of diabetes mellitus and hypertension, was transferred to the nursing home, but brought back today because of abnormal labs and was found to be in acute renal failure with a creatinine of 10 and BUN of 137. Patient is a poor historian, but he is eating lunch at this time. Denies abdominal pain, nausea, vomiting, constipation, diarrhea, blood in the stool. Denies any history of fever or chills. Workup so far reveals that the patient has pyelonephritis and is currently being treated with antibiotics. Nephrology is following the patient because of acute renal failure. We are consulted because of elevated liver enzymes. The patient saw Dr. Urban Elias in the past, in , for evaluation of elevated liver enzymes with hepatitis C antibody being positive; however, hepatitis C PCR was negative, and other chronic hepatitis workup was negative. He did have an ultrasound-guided liver biopsy in , which showed severe steatosis. He has not come back to the office since then. He had a colonoscopy in  by Dr. Minerva Clark, which was normal. 
 
REVIEW OF SYSTEMS:  As above, all other systems are negative. PAST MEDICAL HISTORY:  Significant for congestive heart failure, diabetes mellitus, hypertension, CVA. ALLERGIES:  THERE ARE NO KNOWN DRUG ALLERGIES. PAST SURGICAL HISTORY:  Liver biopsy in , colonoscopy in .  
 
MEDICATIONS:  At the nursing home include aspirin 325 mg p.o. every day, Lipitor 80 mg p.o. every day, insulin injection, and losartan 50 mg p.o. every day. SOCIAL HISTORY:  He is single, lives with his mother. Denies smoking or drinking alcohol. PHYSICAL EXAMINATION: 
GENERAL:  The patient is obese, appears to be in no acute distress at this time. VITAL SIGNS:  Temperature is 97.4, pulse is 75, blood pressure is 165/81. HEENT:  Reveals, no pallor, no icterus. No lymphadenopathy. LUNGS:  Clear to auscultation bilaterally. HEART:  Revealed S1, S2 heard normally. Regular rate and rhythm. ABDOMEN:  Obese, soft, nontender, nondistended. Bowel sounds are present. No hepatosplenomegaly. EXTREMITIES:  Revealed mild pitting edema bilateral lower extremities. He has SCDs in bilateral lower extremities. NEUROLOGIC:  He seems alert, oriented at this time with no focal deficits at this time. LABORATORY DATA:  WBC is 20.2, hemoglobin is 13.2, MCV is 85.3, platelets are 560. UA was significant for protein more than 300, glucose of 100, blood, large amounts of bilirubin, large amount of nitrites positive, leukocyte esterase large amount, WBC too numerous to count, RBC too numerous to count, bacteria is 4+. Sodium 141, potassium 5.3, chloride is 98, bicarbonate is 17, BUN is 137, creatinine 10.3, calcium is 7.2, total bilirubin 3.9, direct is 3.6, ALT is 63, AST is 115, alkaline phosphatase is 407, lipase is 2214, creatinine kinase is 220. Troponin 0.17. CT of the abdomen, without contrast, shows perinephric and upper periureteral stranding bilaterally with no obstructing stone or mass. This could be secondary to pyelonephritis, mildly distended gallbladder containing stones and sludge possibly extending into the cystic duct. Small hiatal hernia with thickening of the distal esophageal wall, nearby multiple paraesophageal gastrohepatic and retrocrural lymph nodes.   This could be secondary to esophagitis or gastroesophageal reflux disease infiltrating lesion is less likely. Upper GI endoscopy will be helpful if clinically indicated. Emphysematous changes in lung bases with signs of chronic bronchitis and atelectasis and scarring, nonspecific scattered abdominal lymph nodes, which may be reactive. IMPRESSION: 
1. Elevated liver enzymes in the setting of gallstones and sludge, possibly a stone up to the cystic duct. No evidence of CBD dilation. No evidence of acute cholangitis at this time, suspect this is secondary to acute cholecystitis. Patient did have a history of mildly elevated LFTs in the past secondary to fatty liver disease, based on liver biopsy in 2014, has HCV antibody positive, but PCR is negative in the past.  
2. Elevated Lipase : Could se secondary to renal failure. Cannot r/o biliary pancreatitis completely but doubt it. 3.  Acute kidney injury, possibly acute tubular necrosis, Nephrology is following. 4.  Urosepsis with possible pyelonephritis, currently on antibiotics. 5.  History of congestive heart failure. 6.  History of cerebrovascular accident. 7.  History of diabetes mellitus. RECOMMENDATIONS: 
1. Continue current antibiotics. 2.  Not a candidate for MRCP MRI at this time. We could potentially check MRCP, but we will hold off at this time. 3.  Monitor LFTs. 4.  Consult surgery for cholelithiasis and possible cholecystitis. 5.  We will check ultrasound of the liver. 6.  We will continue to follow the patient along with you. Any further recommendations will be added to the patient's chart. MD FRANCIS Rowell / [place] 
D: 09/27/2018 14:13    
T: 09/27/2018 15:55 JOB #: [id number]

## 2018-09-27 NOTE — WOUND CARE
Wound/Ostomy Nurse Progress Note Patient: Ravinder Neftali :1955 MRN: [id number] Situation: Wound consult for right buttocks Background: Patient was seen by wound care on his last admission on 9/10/18. He was found to have a pressure injury to his buttocks. He was discharged on 9/10/18 to a SNF. He was admitted to [facility] again today, 18. Assessment: Patient was lying quietly in bed in a darkened room when wound care arrived. He was awake and able to answer questions with a \"yes\" or \"no\" shake of the head. He was accommodating to being turned but cannot turn himself. Patient was turned so that the buttock wound could be inspected. An absorbent pad had been placed between his legs. The pad was filled with loose, black, tarry stool. There was fresh blood on his gown and sheets. The patient was cleaned and the wound was inspected. The injury is unstageable and measures 8.4 x 3.0 cm. The base is covered with yellow slough. A Mepilex bordered foam was applied. A head-to-toe skin assessment was done by the wound care team. Another DTI was noted on his right lateral ankle. Bordered foam was placed and the foot was off-loaded with a Prevalon boot. The source of the fresh bleeding appeared to be coming from the patient's penis. A large amount of drying blood was found on the groing and abdomen. The patient was once again cleaned and given a fresh gown. Recommendation: Keep buttocks clean and free of stool. Use barrier ointment on the wound if bordered foam frequently becomes soiled. Turn patient every 2 hours per protocol. Monitor area every shift for further breakdown. Keep both feet off-loaded with Prevalon boots.

## 2018-09-27 NOTE — PROGRESS NOTES
Received a call by the patient mother, Gustavo Schmitz, she is requesting the patient be transferred to [facility]. The patients nurse , Micheal Mercado called and stated the patient says he does not want to transfer. I explained to the patients mother that in order to transfer she would need the patient consent, at this time I understood the patient did not want to transfer. I when on to explained that if the patient did consent, the family would need to find an admitting physician and that physician would need to make a request for a bed at [facility]. She accepted this explanation. It is unclear if the family will attempt to persuade the patient in this direction. Case management following.   Daniel Rapp RN

## 2018-09-27 NOTE — ED TRIAGE NOTES
Pt arrived by medical transport from [facility] for abnormal lab values. Nurse stated morning labs had elevated BUN and creatinine

## 2018-09-27 NOTE — H&P
Medicine History and Physical 
Patient: Jing Seat   Age:  57 y.o. CODE Status:  Full Assessment/Plan: In summary, 62M PMHx HTN, HLD, IDDM, CVA, HFpEF presented to ED from SNF with abnormal labs. Admit with JULIANNA, UTI, and abnormal LFT. Aneesh Daily Admitted 9/10/18 - 9/14/18 for acute CVA and rhabdo Principal Problem: 
  Acute-on-chronic kidney injury ([facility] [address] (9/27/2018): B/L Cr 0.8. Currently, BUN/Cr 132/10.10. 
- Hold ARB 
- Renal dose medications - Avoid nephrotoxic agents - Follow BMP 
- Indwelling em for accurate I/O despite UTI 
- Renal ultrasound - Check Phos 
- Na Bicarb 650mg BID 
- Have requested ED call Nephrology for consult Active Problems: 
  Acute cystitis (9/27/2018):  AF. WBC 20.2, left shift, no bandemia. LA 0.7. U/A pyuria, LE, nitrite. - Urine cx pending - Blood cx pending - In ED, given Vanco and Zosyn and Doxy - ABX: Vanco and Zosyn pending further cx data Gallstone pancreatitis / ? Choledocholithiasis / Transaminitis (9/27/2018):  AST//62, , Tbili 4.0. Lipase 2214. DDx: gallstone pancreatitis, choledocholithiasis. - CT abd/pelvis with bilat perinephric stranding, distended gallbladder, stones per ED report; formal report pending - ABX: as above - Check PT/INR 
- Check CK 
- Hold Lipitor 
- Have requested ED call for GI consult 
- NPO Elevated troponin (9/27/2018): Trop 0.18. 
- Trend trop Essential hypertension (9/10/2018): On losartan 80mg Qday. - Hold losartan given JULIANNA Diabetes mellitus type 2, controlled ([facility] (9/10/2018): - HgbA1c 9.7% on 9/11/18 
- Home regimen: lantus 10U QHS and Lispro 4U TID QAC 
- Glucose check QAC/HS 
- Continue home insulin: as above - Correctional scale insulin lispro, low dose - Hypoglycemia protocol Mixed HLD:  Lipitor 80mg QHS. - Hold lipitor given LFTs History of stroke (9/27/2018): Admission 9/10/18 - 9/14/18 for CVA. On ASA 325mg Qday, Lipitor 80mg QHS. - Cont ASA - Hold Lipitor and ARB as above Chronic diastolic CHF: ECHO 4/20/28 with LVEF 01%, grade 2 diastolic dysfunction, mild AS, trace TR. Fluids: IVF NS 150cc/hr E-lytes: BMP with no elyte abnormalities Nutrition: NPO; has been cleared by SLP for honey thick liquid diet once able to take PO Prophylaxis: DVT: Hep subq TID  GI: None Bowel: Senna-s BID prn constipation DISPO: 
Patient to be admitted  at this time for reasons addressed above, continued hospitalization for ongoing assessment and treatment indicated Anticipated Date of Discharge: TBD Anticipated Disposition (home, SNF) : TBD Chief Complaint:  
Chief Complaint Patient presents with  Abnormal Lab Results HPI:  
 
Scott Dong is a 57y.o. year old male who presents from [facility] with abnormal labs, elevated BUN/Cr. Found to have JULIANNA, UTI, and markedly elevated LFT on presentation. Patient poor historian States \"I had heart failure. \"  Does answer ROS questions as below, but not sure reliable. Does reliably and consistently answer yes/no Review of Systems: 
History obtained from chart review and the patient General ROS: negative Respiratory ROS: no cough, shortness of breath, or wheezing Cardiovascular ROS: no chest pain or dyspnea on exertion Gastrointestinal ROS: no abdominal pain, change in bowel habits, or black or bloody stools Genito-Urinary ROS: no dysuria, trouble voiding, or hematuria Musculoskeletal ROS: negative Dermatological ROS: negative Past Medical History: 
Past Medical History:  
Diagnosis Date  CHF (congestive heart failure) ([facility] [address]  Diabetes ([facility]  Stroke ([facility] Past Surgical History: 
History reviewed. No pertinent surgical history. Family History: 
History reviewed. No pertinent family history. Social History: 
Social History Social History  Marital status:    Spouse name: N/A  
 Number of children: N/A  
 Years of education: N/A  
 
 Social History Main Topics  Smoking status: Never Smoker  Smokeless tobacco: Never Used  Alcohol use No  
 Drug use: No  
 Sexual activity: Not Asked Other Topics Concern  None Social History Narrative  None Home Medications: 
Prior to Admission medications Medication Sig Start Date End Date Taking? Authorizing Provider  
aspirin (ASPIRIN) 325 mg tablet Take 1 Tab by mouth daily. 9/15/18   Yuki Haney MD  
atorvastatin (LIPITOR) 80 mg tablet Take 1 Tab by mouth nightly. 9/14/18   Yuki Haney MD  
insulin glargine (LANTUS) 100 unit/mL injection 10 units qhs  Indications: type 2 diabetes mellitus 9/15/18   Yuki Haney MD  
insulin lispro (HUMALOG) 100 unit/mL injection 4 units with meals 9/14/18   Yuki Haney MD  
losartan (COZAAR) 50 mg tablet Take 1 Tab by mouth daily. 9/14/18   Yuki Haeny MD  
 
 
Allergies: 
No Known Allergies Physical Exam:  
 
Visit Vitals  /74  Pulse 80  Temp 97.8 °F (36.6 °C)  Resp 20  Wt 96.6 kg (213 lb)  SpO2 91%  BMI 33.36 kg/m2 Physical Exam: 
General appearance: alert, fatigued, cooperative, no distress, appears stated age Head: Normocephalic, without obvious abnormality, atraumatic Eyes: positive findings: sclera icterus Lungs: clear to auscultation bilaterally Heart: systolic murmur at left lower sternal border, S1 and S2 present, regular Abdomen: hypoactive bowel sounds, firmness RUQ with hepatomegaly and dullness to percussion, diffuse tenderness to palpation, no rebound/guarding : Indwelling em catheter in place Extremities: extremities normal, atraumatic, no cyanosis or edema Pulses: 2+ and symmetric Skin: Skin color, texture, turgor normal. No rashes or lesions; difficult to appreciate jaundice given skin tone Neuro: unable to move RUE/RLE and with slurred speech following recent CVA, no asterixis noted on left Intake and Output: 
Current Shift:    
Last three shifts:    
 
Lab/Data Reviewed: All lab results for the last 24 hours reviewed. Chest X-Ray is obtained; CXR reviewed independently -Unable to access EKG: tracing reviewed  independently -NSR, 80/muin, LAD 2/2 LAFB, good R-wave progression / transition, no ST-T wave changes of ischemia. Maria Del Carmen Brown DO September 27, 2018

## 2018-09-27 NOTE — PROGRESS NOTES
Early morning admit for JULIANNA, UTI, and abnormal LFT. Nephrology consulted for elevated Cr. Marginal urine output. GI consulted for LUQ abdominal pain. Leukocytosis. Urine culture pending. Blood cultures NGTD. Will await further recommendations. Will add protonix-dark stool noted per RN, em with bloody urine (will attempt to flush/remove em). Only produced 30 cc urine -bladder scan; flush em to ensure no clot. Will await nephrology recommendations. Wound care for sacral wound

## 2018-09-27 NOTE — PROGRESS NOTES
Reason for Readmission:   Acute Kidney Injury RRAT Score and Risk Level:    19 Level of Readmission:  Level 1 Care Conference scheduled:    
    
Resources/supports as identified by patient/family:   [facility] Challenges facing patient (as identified by patient/family and CM): Finances/Medication cost?    na  
Transportation     Family vs bls Support system or lack thereof? Jamie Reeves , mother Living arrangements? Lives with mother, currently in rehab at [facility] Self-care/ADLs/Cognition? AAO x#  
    
Current Advanced Directive/Advance Care Plan:  none Plan for utilizing home health: Following rehab Likelihood of additional readmission:   Mod/yellow Transition of Care Plan:    Based on readmission, the patient's previous Plan of Care 
 has been evaluated and/or modified. The current Transition of Care Plan is:  Leidy nguyen to continue rehab Interviewed patient, he agrees to share his discharge information with his mother, Jamie Reeves, [phone]. Demographic information verified. He has been in Rehab at [facility] but lives permanently with his mother. Prior to rehab he was independent.  has arranged for him to see  Dr Brooklyn Yun for his primary care needs. He has [employer] and will need authorization to return to [facility] if he elects to return to SNF to complete his rehab. Disabled, gets disability. SNF list given, copy to chart. [facility] signed by patient. He states he would like to return to [facility] for rehab. Care Management Interventions PCP Verified by CM: Yes 
Palliative Care Criteria Met (RRAT>21 & CHF Dx)?: No 
Mode of Transport at Discharge: S Transition of Care Consult (CM Consult): SNF Partner SNF: Yes MyChart Signup: No 
Discharge Durable Medical Equipment: No 
Health Maintenance Reviewed:  Yes 
 Physical Therapy Consult: No 
Occupational Therapy Consult: No 
Speech Therapy Consult: No 
Current Support Network: Relative's Home Plan discussed with Pt/Family/Caregiver: Yes Freedom of Choice Offered: Yes [employer] Information Provided?: No 
Discharge Location Discharge Placement: Skilled nursing facility

## 2018-09-27 NOTE — CONSULTS
Consult Note Assessment:  
· Oliguric JULIANNA. Etio- volume depletion/sepsis in a setting of acute pyelonephritis/acute cholecystitis. May have progressed to ischemic atn. ARB contributed to julianna. May have preexisting diabetic kidney disease given presence of significant proteinuria on dipstick. · HAGMA due to julianna. · HTN. · Acute pyelonephritis. · Abnormal lft's/acute cholecystitis? · Recent cva. Recommendations: · Agree with ivf, will use isotonic combination of NCl and Nabicarb. · Agree with abx, monitor bc. · Add metoprolol, continue to hold arb. · Quantify proteinuria. · Avoid NSAID's, IV dye. · Avoid Gadolinium due to its association with nephrogenic systemic fibrosis in a patients with severe ARF and ESRD. · Avoid fleets enemas due to concern for acute phosphate nephropathy. · Please dose all medications for approximate creatinine clearance <15. · Will monitor closely, may need acute dialysis. Thank you. Consult requested by: Swati Winston MD 
 
ADMIT DATE: 9/26/2018  CONSULT DATE: September 27, 2018 Late entry, patient was seen at 9:15 am.  
 
 
 
Admission diagnosis: Acute-on-chronic kidney injury ([facility] [address] Reason for Nephrology Consultation: JULIANNA>  
 
HPI: Jocelynn Whitehead is a 57 y.o. male [address] who was admitted to [facility] 9/10-9/14 with new cva. She was also diagnosed with dm and htn at the time. Patient was d/heron to snf. She was brought back due to abnormal labs- severe julianna. Patient can't provide significant details but states she was feeling fair without specific c/o, although she appears to be bedridden and with slow speech. In the ED she was found to be hypertensive but o/w stable. Patient was admitted, started on ivf. She is being treated with abx for acute pyelonephritis. There is also concern for acute cholecystitis given jaundice and abnormal lft's.  Renal function was normal at the time of recent admission. Scr is up to 10 today. UO is marginal.  
  
Past Medical History:  
Diagnosis Date  CHF (congestive heart failure) ([facility] [address]  Diabetes ([facility] [address]  Stroke ([facility] History reviewed. No pertinent surgical history. Social History Social History  Marital status:  Spouse name: N/A  
 Number of children: N/A  
 Years of education: N/A Occupational History  Not on file. Social History Main Topics  Smoking status: Never Smoker  Smokeless tobacco: Never Used  Alcohol use No  
 Drug use: No  
 Sexual activity: Not on file Other Topics Concern  Not on file Social History Narrative  No narrative on file History reviewed. No pertinent family history. No Known Allergies Home Medications:  
 
Prescriptions Prior to Admission Medication Sig  
 aspirin (ASPIRIN) 325 mg tablet Take 1 Tab by mouth daily.  atorvastatin (LIPITOR) 80 mg tablet Take 1 Tab by mouth nightly.  insulin glargine (LANTUS) 100 unit/mL injection 10 units qhs  Indications: type 2 diabetes mellitus  insulin lispro (HUMALOG) 100 unit/mL injection 4 units with meals  losartan (COZAAR) 50 mg tablet Take 1 Tab by mouth daily. Current Inpatient Medications:  
 
Current Facility-Administered Medications Medication Dose Route Frequency  0.9% sodium chloride infusion  100 mL/hr IntraVENous CONTINUOUS  
 VANCOMYCIN INFORMATION NOTE   Other Rx Dosing/Monitoring  aspirin (ASPIRIN) tablet 325 mg  325 mg Oral DAILY  insulin glargine (LANTUS) injection 10 Units  10 Units SubCUTAneous QHS  senna-docusate (PERICOLACE) 8.6-50 mg per tablet 1 Tab  1 Tab Oral BID PRN  
 melatonin tablet 3 mg  3 mg Oral QHS PRN  
 traMADol (ULTRAM) tablet 50 mg  50 mg Oral Q6H PRN  
 ondansetron (ZOFRAN) injection 4 mg  4 mg IntraVENous Q4H PRN  
 heparin (porcine) injection 5,000 Units  5,000 Units SubCUTAneous Q8H  
  glucose chewable tablet 16 g  4 Tab Oral PRN  
 glucagon (GLUCAGEN) injection 1 mg  1 mg IntraMUSCular PRN  
 dextrose (D50) infusion 12.5-25 g  25-50 mL IntraVENous PRN  
 sodium bicarbonate tablet 650 mg  650 mg Oral BID  insulin lispro (HUMALOG) injection   SubCUTAneous Q6H  
 [START ON 9/28/2018] VANCOMYCIN INFORMATION NOTE   Other ONCE  hydrALAZINE (APRESOLINE) 20 mg/mL injection 20 mg  20 mg IntraVENous Q6H PRN  piperacillin-tazobactam (ZOSYN) 4.5 g in 0.9% sodium chloride (MBP/ADV) 100 mL MBP  4.5 g IntraVENous Q12H Review of Systems: No fever or chills. No sore throat. No cough or hemoptysis. No shortness of breath or chest pain. No orthopnea or paroxysmal nocturnal dyspnea. Fair  appetite. No nausea, vomiting, abdominal pain, melena or hematochezia. No constipation or diarrhea. No dysuria, no gross hematuria of voiding difficulties. No ankle swelling, no joint paints. No muscle aches. No skin changes. No dizziness or lightheadedness. No headaches. Physical Assessment:  
 
Vitals:  
 09/27/18 0300 09/27/18 0415 09/27/18 3817 09/27/18 0945 BP: 162/74 169/82 172/83 165/81 Pulse: 80 77 75 75 Resp: 20 20 18 14 Temp:  97.8 °F (36.6 °C) 97.7 °F (36.5 °C) 97.4 °F (36.3 °C) SpO2: 91% 92% 94% 93% Weight:      
 
Last 3 Recorded Weights in this Encounter  
 09/26/18 2244 Weight: 96.6 kg (213 lb) Admission weight: Weight: 96.6 kg (213 lb) (09/26/18 2244) Intake/Output Summary (Last 24 hours) at 09/27/18 1232 Last data filed at 09/27/18 5914 Gross per 24 hour Intake                0 ml Output               30 ml Net              -30 ml Patient is in no apparent distress. HEENT: Head is normocephalic and atraumatic. Pupils are round, equal, reactive to light. Sclerae are slightly icteric. Oral mucosa is dry. Neck: no cervical lymphadenopathy or thyromegaly. Lungs: good air entry, clear to auscultation bilaterally.  Trachea at the midline. Cardiovascular system: S1, S2, regular rate and rhythm. No murmurs, gallops or rubs. No jvd. Carotid upstroke 2 + bilaterally. Abdomen: soft, minimally tender in the ruq, no g/r. Slightly distended. Positive bowel sounds. No hepatosplenomegaly. No abdominal bruits. Mcbride in place. Extremities: no clubbing, cyanosis or edema. Strong dorsalis pedis pulses. Brisk capillary refill on the toes bilaterally. Integumentary: skin is grossly intact. Neurologic: Alert, oriented time two. No gross motor or sensory deficits. Data Review: 
 
Labs: Results:  
   
Chemistry Recent Labs  
   09/27/18 
 1123  09/26/18 
 2306 GLU  149*  162* NA  141  138  
K  5.3  4.8  
CL  98*  95* CO2  17*  20* BUN  137*  132* CREA  10.30*  10.10* CA  7.2*  8.4* AGAP  26*  23* BUCR  13  13 AP  407*  401* TP  6.9  8.6* ALB  1.5*  1.6*  
GLOB  5.4*  7.0* AGRAT  0.3*  0.2* PHOS  10.7*   --   
  
  
CBC w/Diff Recent Labs  
   09/26/18 
 2306 WBC  20.2*  
RBC  4.57* HGB  13.2 HCT  39.0 PLT  409 GRANS  85* LYMPH  8*  
EOS  2 Iron/Ferritin No results for input(s): IRON in the last 72 hours. No lab exists for component: TIBCCALC  
PTH/VIT D No results for input(s): PTH in the last 72 hours. No lab exists for component: VITD Gabbi Lara M.D [facility] Office [phone] Pager [phone] September 27, 2018

## 2018-09-27 NOTE — PROGRESS NOTES
Problem: Falls - Risk of 
Goal: *Absence of Falls Document Orvel Buffy Fall Risk and appropriate interventions in the flowsheet. Outcome: Progressing Towards Goal 
Fall Risk Interventions: 
Mobility Interventions: Patient to call before getting OOB

## 2018-09-28 PROBLEM — R79.89 ELEVATED LFTS: Status: ACTIVE | Noted: 2018-01-01

## 2018-09-28 NOTE — DIABETES MGMT
NUTRITIONAL ASSESSMENT GLYCEMIC CONTROL/ PLAN OF CARE Orion Figueroa           57 y.o.           9/26/2018 1. Acute renal failure, unspecified acute renal failure type ([address] 2. Acute UTI 3. Cholelithiasis with choledocholithiasis 4. History of CVA (cerebrovascular accident) INTERVENTIONS/PLAN:  
1. Suggest discontinuing Lantus for now but continue corrective lispro (normal insulin sensitivity ACHS) 2.  Will add consistent CHO,  2000 calorie, mechanical soft, chopped meats,  honey thick liquids to current renal, 1.5 gram Na diet. SLP assessment/recommendations pending. 3.  Monitor po intake, labs, wound status and weights. ASSESSMENT:  
Nutritional Status:  Pt is 143% ideal wt;  Pt appears well nourished and was eating well at prior admission a couple of weeks ago, now with poor po intake, lethargy and coughing with thin liquids per RN. Recommend adjusting texture of diet to at least mechanical soft, honey thick liquids. SLP assessment pending. Noted pt to have dialysis today. Pt with sacral wound. Documented weights indicate pt with 14 # weight loss in past 2 weeks (6% weight loss). Nutrition Diagnoses: Altered nutrition related labs due to diabetes/JULIANNA/hyperphosphatemia as evidenced by last A1C of 9.7% and GFR of 6 and phos of 10.7 mg/dL. History of difficulty swallowing due to dysphagia in setting of prior CVA as evidenced by pt being on mechanical soft, chopped meats and honey thick liquid texture at NH and at prior admission at [facility] (9/13/18). Increased nutrient needs due to wound healing as evidenced by pt with large sacral wound. Unintentional weight loss due to decreased energy intake in past 2 weeks as evidenced by 14 lb weight loss (6% weight loss). Obesity due to excess energy intake as evidenced by BMI (calculated): 33.4 kg/m2. Diabetes Management: Pt received diabetes education for home at previous admission 9/10/18. Pt with lower BG readings this morning with RN administering D5 to prevent further lowering. Recent blood glucose:   
9/28/18: 72 (received 25 g D5), 162 
9/27/18:  151, 124, 120 - received 10 units  Lantus and 2 units corrective lispro Within target range (non-ICU: <140; ICU<180): [x] Yes   []  No 
 
Current Insulin regimen:  
Lantus 10 units/day Corrective lispro, normal insulin sensitivity ACHS Home medication/insulin regimen: new from recent admission (pt was not taking any DM medications before 9/10/18): Lantus 10 units/day Lispro 4 units TID before meals HbA1c:  9.7% - ave BG had been ~ 231 mg/dL over prior 3 months Adequate glycemic control PTA:  [] Yes  [x] No 
  
 
SUBJECTIVE/OBJECTIVE: Information obtained from: chart review, RN;  Chart review indicates pt was receiving a consistent CHO, mechanical soft diet with honey thick liquids Anni Balder) Pt admitted with JULIANNA (ischemic atn in a setting of acute pyelonephritis/acute cholecystitis with sepsis), recent CVA with right side weakness and dysphagia:  PMHx includes T2DM, HTN. 9-28-18:  Unable to obtain information from pt (although pt known from recent admission 9/10/18). Pt currently not answering questions but sometimes nods his head. RN.CNA report pt having difficulty taking po (coughing, pocketing) Diet: renal, mechanical soft, 1.5 gram Na - no po taken at lunch today. Patient Vitals for the past 100 hrs: 
 % Diet Eaten  
09/27/18 1344 5 % Medications: [x]                Reviewed Most Recent POC Glucose:  
Recent Labs  
   09/28/18 
 0440  09/27/18 
 0629  09/26/18 
 2306 GLU  84  149*  162* Labs:  
Lab Results Component Value Date/Time Hemoglobin A1c 9.7 (H) 09/11/2018 10:20 AM  
 
Lab Results Component Value Date/Time  Sodium 142 09/28/2018 04:40 AM  
 Potassium 5.1 09/28/2018 04:40 AM  
 Chloride 98 (L) 09/28/2018 04:40 AM  
 CO2 18 (L) 09/28/2018 04:40 AM  
 Anion gap 26 (H) 09/28/2018 04:40 AM  
 Glucose 84 09/28/2018 04:40 AM  
  (H) 09/28/2018 04:40 AM  
 Creatinine 11.10 (H) 09/28/2018 04:40 AM  
 Calcium 7.8 (L) 09/28/2018 04:40 AM  
 Phosphorus 10.7 (H) 09/27/2018 06:29 AM  
 Albumin 1.3 (L) 09/28/2018 04:40 AM  
 
 
Anthropometrics: IBW : 67.6 kg (149 lb), % IBW (Calculated): 142.95 %, BMI (calculated): 33.4 Wt Readings from Last 1 Encounters:  
09/26/18 96.6 kg (213 lb) Ht Readings from Last 1 Encounters:  
09/26/18 5' 7\" (1.702 m) Last Weight Metrics: 
Weight Loss Metrics 9/26/2018 9/13/2018 Today's Wt 213 lb 227 lb 15.3 oz  
BMI 33.36 kg/m2 35.7 kg/m2 Estimated Nutrition Needs:  2057 Kcals/day, Protein (g): 68 g Fluid (ml): 1500 ml Based on:   [x]          Actual BW    []          ABW   []            Adjusted BW   
    
Nutrition Interventions: 
Honey thick liquids Consistent CHO diet (renal 1.5 gram Na) Goal:  
Blood glucose will be within target range of  mg/dL by 10/1/18. Pt will consume > 75% meals by 10/2/18. Nutrition Monitoring and Evaluation   
 
[x]     Monitor po intake on meal rounds 
[x]     Continue inpatient monitoring and intervention 
[]     Other: 
 
 
Nutrition Risk:  [x]   High     []  Moderate    []  Minimal/Uncompromised Mana Rolon RD, CDE Office:  [phone] [place] Pager:  [phone]

## 2018-09-28 NOTE — PROGRESS NOTES
Patient is in rehab at [facility] and is requesting a return to this facility to complete his rehab. Transferred to ICU. Dialysis catheter placed.   Susana Ambrocio RN

## 2018-09-28 NOTE — PROGRESS NOTES
1430-Dialysis nurse attempted to bed contacted 1452-Dialysis nurse Jocelyn Tavarez contacted and informed of pt status

## 2018-09-28 NOTE — PROGRESS NOTES
Problem: Dysphagia (Adult) Goal: *Acute Goals and Plan of Care (Insert Text) Recommendations: 
Diet: NPO Meds: IV / NG Aspiration Precautions Oral Care TID Goals:  Patient will: 1. Tolerate PO trials with 0 s/s overt distress in 4/5 trials 2. Utilize compensatory swallow strategies/maneuvers (decrease bite/sip, size/rate, alt. liq/sol) with min cues in 4/5 trials 3. Complete an objective swallow study (i.e., MBSS) to assess swallow integrity, r/o aspiration, and determine of safest LRD, min A Outcome: Progressing Towards Goal 
Speech LAnguage Pathology bedside swallow evaluation Patient: Noel Walker (89 y.o. male) Date: 9/28/2018 Primary Diagnosis: Acute kidney injury ([facility] [address] Precautions: Aspiration PLOF: SNF 
 
ASSESSMENT : 
Based on the objective data described below, the patient presents with severe oropharyngeal dysphagia in the setting of CHF and increased SOB. PMHx of CVA. Pt alert but nonverbal at present; able to indicate yes/no by nodding/shaking head. Pt indicated honey thickened liquid / mech soft diet at baseline. Pt transferred to ICU immediately prior to evaluation due to increased SOB; baseline O2 saturation of 90. Oral-motor exam unable to be completed due to decreased pt arousal, however, limited ROM of all oral structures was observed. Pt accepted trial of HT liquid with subsequent weak cough, wet vocal quality, and decrease in O2 saturation. Further exhibited min A-P transit and swallow timing/reflex; anterior leakage of ~30% of bolus observed. At this time, pt is not safe for oral intake. Possible alt nutrition source d/w RN, Ben Multani. Patient will benefit from skilled intervention to address the above impairments. Patients rehabilitation potential is considered to be Fair Factors which may influence rehabilitation potential include:  
[]            None noted []            Mental ability/status [x]            Medical condition []            Home/family situation and support systems 
[]            Safety awareness 
[]            Pain tolerance/management []            Other: PLAN : 
Recommendations and Planned Interventions: NPO Frequency/Duration: Patient will be followed by speech-language pathology 1-2 times per day/4-7 days per week to address goals. Discharge Recommendations: Dominik Sweeney SUBJECTIVE:  
Patient nodded an affirmative to HT / mech soft at baseline. OBJECTIVE:  
 
Past Medical History:  
Diagnosis Date  CHF (congestive heart failure) ([facility] [address]  Diabetes ([facility] [address]  Stroke ([facility] History reviewed. No pertinent surgical history. Prior Level of Function/Home Situation: SNF Diet prior to admission: Honey thickened liquids / mech soft solids Current Diet:  NPO Cognitive and Communication Status: 
Neurologic State: Alert Orientation Level: Unable to verbalize Cognition: Unable to assess (comment) (Pt nonverbal due to SOB) Safety/Judgement: Decreased awareness of environment Oral Assessment: 
Oral Assessment Labial: Decreased seal 
Dentition: Other (comment) (unable to assess) Oral Hygiene: fair Lingual: Decreased rate; Incoordinated;Decreased strength Velum: No impairment Mandible: No impairment P.O. Trials: 
Patient Position: [id number] Vocal quality prior to P.O.: Wet Consistency Presented: Honey thick liquid How Presented: SLP-fed/presented Bolus Acceptance: No impairment Propulsion: Absent Oral Residue: Greater than 50% of bolus Initiation of Swallow: Absent Laryngeal Elevation: Absent Aspiration Signs/Symptoms: Change vocal quality; Decrease in O2 saturations;Weak cough; Watery eyes Pharyngeal Phase Characteristics: Altered vocal quality; Suspected pharyngeal residue Effective Modifications: None Cues for Modifications: None Oral Phase Severity: Severe Pharyngeal Phase Severity : Severe GCODESwallowing:  Swallow Current Status CN= 100%  Swallow Goal Status CK= 40-59% The severity rating is based on the following outcomes: GÓMEZ Noms Swallow Level 1 Clinical Judgement PAIN: 
Start of Eval: 6 RN team present End of Eval: 6  RN team present After treatment:  
[]            Patient left in no apparent distress sitting up in chair 
[x]            Patient left in no apparent distress in bed 
[x]            Call bell left within reach [x]            Nursing notified []            Family present 
[]            Caregiver present 
[]            Bed alarm activated COMMUNICATION/EDUCATION:  
[x]            Aspiration precautions; swallow safety; compensatory techniques. [x]            Patient/family have participated as able in goal setting and plan of care. [x]            Patient/family agree to work toward stated goals and plan of care. []            Patient understands intent and goals of therapy; neutral about participation. []            Patient unable to participate in goal setting/plan of care; educ ongoing with interdisciplinary staff 
[]         Posted safety precautions in patient's room. Thank you for this referral. 
Jarocho Garcia M.S. CF-SLP Time Calculation: 15 mins

## 2018-09-28 NOTE — PROGRESS NOTES
1945: Bedside verbal shift report received from Michael chery [facility]. Pt is awake in bed, no signs of distress, instructed to press call light if assistance is needed, call light within reach. Family at bedside. During report, pt's family stated that patient had a mechanical soft diet and was on nectar thickened liquid. -pt also had his em removed at 1844 by Michael chery [facility]. Pt is due to void by 0044. Pt states that he does not have an urge to urinate right now, denies pain. 2120: Pt states that he is feeling nauseous. 4mg zofran PRN administered. 0000: pt had another loose black tarry stool (this is the 3rd time since admission). From lab results, pt's occult blood (stool) is positive. Abdomen is tender, hard, and distended upon assessment. Pt denies pain. Pt has scheduled heparin, will ask on call hospitalist if he wants this held. Also, em was removed by Michael chery RN around 1800. Pt is due to void anytime soon. However pt states that he does not have an urge to urinate. Bladder scanned the patient and pt retaining 20ml. Will notify on call hospitalist.  
 
Pt hasnt been eating well throughout the day, didn't eat dinner at all. Tried to feed pt but pt does not have an appetite. Pt's POC glucose is 86 which is within range. Will ask if pt still needs lantus for this evening. 0020: On call hospitalist states ok to give heparin as pt's H&H looks stable for now. He was unable to access chest xray to see if PICC is ok to use. No order to use his picc line. lantus is also ok to be given. On call hospitalist put in orders to bladder scan pt q3-4 hrs. 
 
0400: reassessed pt. Pt is short of breath. Increased oxygen from 2L to 3L. Pt's right hand is also becoming edematous. [place] crackles when reassessing his lungs. Bladder scanned pt again, shows 25mL. Pt's right arm is elevated on a pillow. Stopped continuous fluid for now until on call hospitalist is reached. 3048: on call hospitalist stated to decrease pt's continuous fluid to 75mL/hr. Report new H&H labs this morning. 0510: went to pt's room to do incontinence care. Pt was found coughing up blood with blood clots. Called to notify MD.  
 
0253: On call hospitalist ordered to DC heparin from pt's MAR 
 
530: Pt is nauseous, zofran 4mg PRN administered. 3629: Paged on call hospitalist to report pt's H&H this morning. HGB: 11.3 and HCT: 33, Cardiac monitor is also . Asked if he would like it renewed, he states it's fine to let it  since he has been running sinus rhythm since admission. No new orders received. 8185: Bedside and Verbal shift change report given to KAUSHAL Loco (oncoming nurse) by Sahra Blils RN (offgoing nurse). Report included the following information SBAR, Kardex, Intake/Output, MAR and Recent Results.

## 2018-09-28 NOTE — PROGRESS NOTES
Patient awake in bed alert to self. Denies pain. Pt seemed lethargic and having difficulty breathing. Pt was bladder scanned and only showed 55 ml. Pt also had bloody discharge coming from penis. Frequently used items within reach. Bed locked in lowest position. Call bell within reach and patient verbalized understanding to use call bell for any needs or assistance. 1632 NP informed on pt condition. 1150 Pt having hard time keeping down fluids and pills and started. choking. Pt BG was dropping. D 50 given. 1309 Shift report given to Roland Marino RN.

## 2018-09-28 NOTE — PROGRESS NOTES
Problem: Falls - Risk of 
Goal: *Absence of Falls Document Bijuhayde Ahumada Fall Risk and appropriate interventions in the flowsheet. Outcome: Progressing Towards Goal 
Fall Risk Interventions: 
Mobility Interventions: Communicate number of staff needed for ambulation/transfer Mentation Interventions: Adequate sleep, hydration, pain control, Door open when patient unattended Medication Interventions: Patient to call before getting OOB Elimination Interventions: Call light in reach Problem: Pressure Injury - Risk of 
Goal: *Prevention of pressure injury Document Mitul Scale and appropriate interventions in the flowsheet. Outcome: Progressing Towards Goal 
Pressure Injury Interventions: 
Sensory Interventions: Assess changes in LOC Moisture Interventions: Absorbent underpads Activity Interventions: Pressure redistribution bed/mattress(bed type) Mobility Interventions: Float heels, Pressure redistribution bed/mattress (bed type) Nutrition Interventions: Document food/fluid/supplement intake

## 2018-09-28 NOTE — PROGRESS NOTES
PROGRESS NOTE PATIENT:  Kaila Mack MRN: [id number] [facility]/[address], [address] 
         9/28/2018, 3:29 PM 
   
I 
Mr. Héctor Sneed is a 57 y.o. male who is being seen for  Elevated liver enzymes. SUBJECTIVE: 
Pt has no complaints. Transferred to ICU today. Nurse at bedside reports that he has been having loose stools with traces of blood in it. Failed swallow eval today. Kept NPO. OBJECTIVE: 
Patient Vitals for the past 24 hrs: 
 Temp Pulse Resp BP SpO2  
09/28/18 1427 97.5 °F (36.4 °C) 96 19 134/64 (!) 88 %  
09/28/18 1156 - 88 - (!) 175/93 -  
09/28/18 1000 97.7 °F (36.5 °C) 83 16 (!) 181/92 96 %  
09/28/18 0516 97.7 °F (36.5 °C) 84 21 165/75 91 %  
09/28/18 0430 - - - - 92 %  
09/28/18 0222 97.5 °F (36.4 °C) 82 18 162/77 90 % 09/27/18 2241 97.6 °F (36.4 °C) 86 19 169/84 92 %  
09/27/18 1945 97.7 °F (36.5 °C) 82 16 164/89 93 % 09/27/18 1805 - - - (!) 201/117 -  
09/27/18 1800 97.5 °F (36.4 °C) 80 16 (!) 186/104 94 % 09/27/18 1612 97.6 °F (36.4 °C) 75 15 180/85 92 % Intake/Output Summary (Last 24 hours) at 09/28/18 1529 Last data filed at 09/27/18 1945 Gross per 24 hour Intake                0 ml Output               30 ml Net              -30 ml Gen: Mild distress HEENT: Mild icterus Abd  : Soft, non tender, BS +, No masses felt. Labs: Results:  
Chemistry Recent Labs  
   09/28/18 
 0440  09/27/18 
 2365  09/26/18 
 2306 GLU  84  149*  162* NA  142  141  138  
K  5.1  5.3  4.8  
CL  98*  98*  95* CO2  18*  17*  20* BUN  143*  137*  132* CREA  11.10*  10.30*  10.10* CA  7.8*  7.2*  8.4* AGAP  26*  26*  23* BUCR  13  13  13 AP  381*  407*  401* TP  8.2  6.9  8.6* ALB  1.3*  1.5*  1.6*  
GLOB  6.9*  5.4*  7.0* AGRAT  0.2*  0.3*  0.2* Estimated Creatinine Clearance: 7.4 mL/min (based on Cr of 11.1). CBC w/Diff Recent Labs  
   09/28/18 
 0440  09/27/18 
 1955  09/26/18 
 2306 WBC  20.9*   --   20.2*  
 RBC  3.88*   --   4.57* HGB  11.3*  12.0*  13.2 HCT  33.0*  34.5*  39.0 PLT  406   --   409 GRANS   --    --   85* LYMPH   --    --   8*  
EOS   --    --   2 Cardiac Enzymes Recent Labs  
   09/27/18 
 0629  09/27/18 
 0005 CPK  220  195 CKND1   --   2.7 Coagulation Recent Labs  
   09/28/18 
 0440  09/26/18 
 2306 PTP  16.6*  19.3* INR  1.4*  1.6* APTT   --   43.7* Hepatitis Panel Lab Results Component Value Date/Time Hepatitis B surface Ag <0.10 09/28/2018 04:40 AM  
  
Amylase Lipase Liver Enzymes Recent Labs  
   09/28/18 0440 09/27/18 
 0629 09/26/18 
 2306  
TP  8.2  6.9  8.6* ALB  1.3*  1.5*  1.6* TBILI  5.0*  3.9*  4.0* AP  381*  407*  401* SGOT  102*  115*  110* ALT  54  63*  62* Thyroid Studies No results for input(s): T4, T3U, TSH, TSHEXT in the last 72 hours. No lab exists for component: T3RU Pathology pathology No Known Allergies Current Facility-Administered Medications Medication Dose Route Frequency  metoprolol tartrate (LOPRESSOR) tablet 50 mg  50 mg Oral Q12H  
 0.9% sodium chloride infusion  50 mL/hr IntraVENous DIALYSIS PRN  
 heparin (porcine) 1,000 unit/mL injection 1,000 Units  1,000 Units InterCATHeter DIALYSIS PRN  
 VANCOMYCIN INFORMATION NOTE   Other Rx Dosing/Monitoring  aspirin (ASPIRIN) tablet 325 mg  325 mg Oral DAILY  senna-docusate (PERICOLACE) 8.6-50 mg per tablet 1 Tab  1 Tab Oral BID PRN  
 melatonin tablet 3 mg  3 mg Oral QHS PRN  
 traMADol (ULTRAM) tablet 50 mg  50 mg Oral Q6H PRN  
 ondansetron (ZOFRAN) injection 4 mg  4 mg IntraVENous Q4H PRN  
 glucose chewable tablet 16 g  4 Tab Oral PRN  
 glucagon (GLUCAGEN) injection 1 mg  1 mg IntraMUSCular PRN  
 dextrose (D50) infusion 12.5-25 g  25-50 mL IntraVENous PRN  
 hydrALAZINE (APRESOLINE) 20 mg/mL injection 20 mg  20 mg IntraVENous Q6H PRN  piperacillin-tazobactam (ZOSYN) 4.5 g in 0.9% sodium chloride 100 mL MBP EXTENDED 4 HOUR INFUSION   4.5 g IntraVENous Q12H  pantoprazole (PROTONIX) 40 mg in sodium chloride 0.9% 10 mL injection  40 mg IntraVENous DAILY  insulin lispro (HUMALOG) injection   SubCUTAneous AC&HS  
 
 
ASSESSMENT: 
1. Elevated liver enzymes in the setting of gallstones and sludge, possibly a stone up to the cystic duct. No evidence of CBD dilation. No evidence of acute cholangitis at this time, suspect this is secondary to acute cholecystitis. Patient did have a history of mildly elevated LFTs in the past secondary to fatty liver disease, based on liver biopsy in 2014, has HCV antibody positive, but PCR is negative in the past. U/S showed distended Gb with thickened wall, no intra or extrahepatic biliary dilation seen. Hb is 5 today, unusual with cholecystitis alone but it may be worse due to sepsis . Surgery consulted. Not a candidate for surgery at this time. 2.  Acute kidney injury, possibly acute tubular necrosis. Going to be dialized today 3. Urosepsis with possible pyelonephritis, currently on antibiotics 4. Elevated lipase. Cannot rule out pancreatitis entirely but suspect this is secondary to acute renal failure. 5.Oropharyngeal dysphagia . Failed swallow eval today. NPO 6. Recent CVA 7. Diarrhea with some blood in stool. R/O C. Diff. RECOMMENDATIONS:  
1. Cont antibiotics 2. Monitor LFT's, lipase 3. Will consider cholecystostomy tube placement if his LFT's get worse 4. Cont NPO for now . Consider NGT with feeding in 1-2 days Kevin Vallecillo MD

## 2018-09-28 NOTE — PROGRESS NOTES
Problem: Falls - Risk of 
Goal: *Absence of Falls Document Argenis Primes Fall Risk and appropriate interventions in the flowsheet. Outcome: Progressing Towards Goal 
Fall Risk Interventions: 
Mobility Interventions: Communicate number of staff needed for ambulation/transfer Mentation Interventions: Adequate sleep, hydration, pain control Medication Interventions: Evaluate medications/consider consulting pharmacy Elimination Interventions: Call light in reach Problem: Pressure Injury - Risk of 
Goal: *Prevention of pressure injury Document Mitul Scale and appropriate interventions in the flowsheet. Outcome: Progressing Towards Goal 
Pressure Injury Interventions: 
Sensory Interventions: Assess changes in LOC Moisture Interventions: Absorbent underpads Activity Interventions: Pressure redistribution bed/mattress(bed type) Mobility Interventions: Pressure redistribution bed/mattress (bed type) Nutrition Interventions: Document food/fluid/supplement intake

## 2018-09-28 NOTE — PROGRESS NOTES
[facility] Inpatient Daily Progress Note Patient: Kaila Mack MRN: [id number]  CSN: [id number] YOB: 1955  Age: 57 y.o. Sex: male DOA: 9/26/2018 LOS:  LOS: 1 day Chief Complaint:   
 
Interval History:   
Mary Ellen Lara is a 58 yo male w/ PMHX significant for HTN, HLD, IDDM, CVA, HFpEF, presented to ED from SNF with abnormal labs. Admit with JULIANNA, UTI, and abn LFT. Was recently admitted 9/10-9/14 for acute CVA and rhabdo. Neurology consulted for elevated BUN/Cr/marginal urine output. GI consulted for abd pain and elevated LFTs, lipase. General surgery consulted per GI recommendation for cholelithiasis and possible cholecystitis. Leukocytosis. Afebrile. Blood cultures NGTD. Urine culture still pending. UTI. On vanc, zosyn. 9/28/18: Leukocytosis persists. Afebrile. SOB and increased WOB-increasing O2 requirements. Nephrology to place dialysis orders and access. Fluids d/c'd for concerns w/ fluid volume overload. CXR, abg. CXR showed increased opacities and pulmonary edema-dialysis-in fluid volume overload (h/o CHF-last ECHO 9/11/18 EF51-55%). Will transfer to stepdown when bed available. Will await other specialty recommendations. Per RN, bloody penile discharge (most likely 2/2 traumatic em insertion-monitor). Occult blood stool test sent-positive, started on protonix yesterday. Subjective:  
  
SOB, WOB increased Objective:  
  
Visit Vitals  BP (!) 181/92 (BP 1 Location: Right arm, BP Patient Position: At rest)  Pulse 83  Temp 97.7 °F (36.5 °C)  Resp 16  Wt 96.6 kg (213 lb)  SpO2 96%  BMI 33.36 kg/m2 Physical Exam: 
General appearance: alert, not verbalizing at time of examination (able to shake head yes/no to questions) Lungs: crackles b/l Heart: regular rate and rhythm, S1, S2  
 Abdomen: soft, tender LUQ, non distended. Normoactive bowel sounds. Extremities: edema upper/lower ext Skin: Skin color, texture, turgor normal.  
Neurologic: not able to move R side (post CVA) w/ right sided residual, not verbalizing much at time of examination 2/2 SOB, increased WOB. Intake and Output: 
Current Shift:    
Last three shifts:  09/26 1901 - 09/28 0700 In: 200 [P.O.:200] Out: 90 [Urine:90] Recent Results (from the past 24 hour(s)) GLUCOSE, POC Collection Time: 09/27/18 11:38 AM  
Result Value Ref Range Glucose (POC) 124 (H) 70 - 110 mg/dL OCCULT BLOOD, STOOL Collection Time: 09/27/18  1:40 PM  
Result Value Ref Range Occult blood, stool POSITIVE (A) NEG    
GLUCOSE, POC Collection Time: 09/27/18  4:37 PM  
Result Value Ref Range Glucose (POC) 120 (H) 70 - 110 mg/dL HGB & HCT Collection Time: 09/27/18  7:55 PM  
Result Value Ref Range HGB 12.0 (L) 13.0 - 16.0 g/dL HCT 34.5 (L) 36.0 - 48.0 % GLUCOSE, POC Collection Time: 09/27/18  9:54 PM  
Result Value Ref Range Glucose (POC) 86 70 - 110 mg/dL PROTHROMBIN TIME + INR Collection Time: 09/28/18  4:40 AM  
Result Value Ref Range Prothrombin time 16.6 (H) 11.5 - 15.2 sec INR 1.4 (H) 0.8 - 1.2 Dayna Surya Collection Time: 09/28/18  4:40 AM  
Result Value Ref Range Vancomycin, random 6.0 5.0 - 40.0 UG/ML  
CBC W/O DIFF Collection Time: 09/28/18  4:40 AM  
Result Value Ref Range WBC 20.9 (H) 4.6 - 13.2 K/uL  
 RBC 3.88 (L) 4.70 - 5.50 M/uL  
 HGB 11.3 (L) 13.0 - 16.0 g/dL HCT 33.0 (L) 36.0 - 48.0 % MCV 85.1 74.0 - 97.0 FL  
 MCH 29.1 24.0 - 34.0 PG  
 MCHC 34.2 31.0 - 37.0 g/dL  
 RDW 14.8 (H) 11.6 - 14.5 % PLATELET 044 615 - 331 K/uL MPV 8.7 (L) 9.2 - 27.8 FL  
METABOLIC PANEL, COMPREHENSIVE Collection Time: 09/28/18  4:40 AM  
Result Value Ref Range Sodium 142 136 - 145 mmol/L Potassium 5.1 3.5 - 5.5 mmol/L  Chloride 98 (L) 100 - 108 mmol/L  
 CO2 18 (L) 21 - 32 mmol/L Anion gap 26 (H) 3.0 - 18 mmol/L Glucose 84 74 - 99 mg/dL  (H) 7.0 - 18 MG/DL Creatinine 11.10 (H) 0.6 - 1.3 MG/DL  
 BUN/Creatinine ratio 13 12 - 20 GFR est AA 6 (L) >60 ml/min/1.73m2 GFR est non-AA 5 (L) >60 ml/min/1.73m2 Calcium 7.8 (L) 8.5 - 10.1 MG/DL Bilirubin, total 5.0 (H) 0.2 - 1.0 MG/DL  
 ALT (SGPT) 54 16 - 61 U/L  
 AST (SGOT) 102 (H) 15 - 37 U/L Alk. phosphatase 381 (H) 45 - 117 U/L Protein, total 8.2 6.4 - 8.2 g/dL Albumin 1.3 (L) 3.4 - 5.0 g/dL Globulin 6.9 (H) 2.0 - 4.0 g/dL A-G Ratio 0.2 (L) 0.8 - 1.7 LIPASE Collection Time: 09/28/18  4:40 AM  
Result Value Ref Range Lipase 3008 (H) 73 - 393 U/L  
GLUCOSE, POC Collection Time: 09/28/18  8:08 AM  
Result Value Ref Range Glucose (POC) 84 70 - 110 mg/dL Lab Results Component Value Date/Time Glucose 84 09/28/2018 04:40 AM  
 Glucose 149 (H) 09/27/2018 06:29 AM  
 Glucose 162 (H) 09/26/2018 11:06 PM  
 Glucose 138 (H) 09/14/2018 08:30 AM  
 Glucose 233 (H) 09/11/2018 12:30 AM  
 
EXAM: ULTRASOUND COMPLETE ABDOMEN  
  
INDICATION: Hepatitis 
  
COMPARISON: No prior ultrasound, CT abdomen pelvis 9/27/2018 
  
TECHNIQUE: Multiple sonographic images of the abdomen were obtained with 
attention to the right upper quadrant. Grayscale, color flow Doppler imaging, 
and velocity spectral waveform analysis of the portal vein was performed (duplex 
imaging). 
  
___________________ 
  
FINDINGS: 
  
Hepatomegaly is present with estimated sagittal dimension of the right hepatic 
lobe measuring 20.6 cm. Increased hepatic echotexture is seen diffusely. There 
is a focal left hepatic lobe heterogeneously hypoechoic lesion measuring 2.1 x 
1.6 x 1.3 cm in size. This finding correlates with a small nonspecific 
hypodensity on recent CT. No definite additional hepatic lesions are 
identified. There are suggestion of punctate shadowing echogenic foci peripherally in the right hepatic lobe suggesting calcifications, perhaps from 
granulomatous disease. 
  
Main portal vein diameter is 13 mm. Color flow Doppler and velocity spectral 
waveform analysis of the portal vein shows normal hepatopedal blood flow without 
evidence of portal vein thrombosis. There is no evidence of intrahepatic or 
extrahepatic biliary dilatation. The common bile duct measures approximately #4 
mm in diameter. The gallbladder is distended and contains considerable 
heterogeneously echogenic material, some of which does show posterior acoustic 
shadowing, presumably representing combination of biliary sludge and 
cholelithiasis. No abnormal color Doppler blood flow is identified within the 
gallbladder. The gallbladder wall is mildly thickened approximately 4 mm. There 
is suggestion of mild pericholecystic free fluid is well. A sonographic Liu's sign is not able to be adequately assessed according to the sonographer 
with patient unable to remove or take a breath. 
   
The head of the pancreas is unremarkable. Survey images of the kidneys 
demonstrate no evidence of hydronephrosis. The right kidney measures 
approximately 12.5 cm in length. The left kidney measures approximately 11.4 cm 
in length. Survey images of the spleen are unremarkable. The spleen measures 
approximately 10.0 cm in length. Survey images of the upper abdominal aorta and 
IVC are unremarkable. 
  
___________________ 
  
IMPRESSION IMPRESSION: 
  
1. Distended gallbladder containing prominent amount of echogenic material 
presumably combination of biliary sludge and cholelithiasis. Mild abnormal 
gallbladder wall thickening and pericholecystic free fluid is present. Unable to 
adequately assess for potential sonographic Liu's sign. These findings are 
indeterminate and could represent acute or chronic cholecystitis in the 
appropriate setting.  No abnormal extrahepatic or intrahepatic biliary dilatation 
is seen. Clinical and laboratory correlation is recommended. If there is clinical suspicion for acute cholecystitis, consider nuclear 
medicine hepatobiliary scan; given gallbladder distention amount of material, 
potential collateral visualization on the hepatobiliary scan likely will be 
delayed. 
  
2. Hepatomegaly. Increased hepatic echotexture, nonspecific and potentially 
correlating with history of hepatocellular dysfunction and hepatitis. 
  
3. Nonspecific, indeterminate left hepatic lobe lesion, not a simple cyst. This 
lesion is also indeterminate on recent noncontrast CT. Consider follow-up 
dedicated liver CT or MRI depending on patient inability to remain still for 
imaging. 
  
4. Borderline enlarged main portal vein may represent portal venous 
hypertension. Normal hepatopetal directional flow present. No splenomegaly.  
   
 
  
 
Assessment/Plan:  
 
Patient Active Problem List  
Diagnosis Code  Stroke (cerebrum) (HCC) I63.9  Stroke ([facility] I63.9  Rhabdomyolysis M62.82  
 Dehydration E86.0  
 Essential hypertension I10  
 Diabetes mellitus type 2, controlled ([facility] E11.9  Non compliance w medication regimen Z91.14  
 Pneumonia J18.9  DM (diabetes mellitus) ([facility] E11.9  History of stroke Z80.78  
 Acute-on-chronic kidney injury ([facility] N17.9, N18.9  Acute cystitis N30.00  Elevated troponin R74.8  Transaminitis R74.0  Acute kidney injury ([facility] N17.9 A/P: 
 
Acute on chronic kidney injury -b/l Cr 0.8 (BUN/Cr 132/10.10 on admission) -nephrology consulted 
-em placed (minimal urinal output), bladder scan no retention  
-dialysis access and orders today 
-avoid nephrotoxic agents 
-renal dose meds 
-hold ARB 
-trend BMP 
-Renal US completed 
-fluids d/c'd currently 
-await nephrology recommendations Acute cystitis 
-afebrile 
-leukocytosis persists 
-U/A pyuria, LE, nitrite 
-urine cx still pending 
-blood culture NGTD -on vanc, zosyn (will await urine culture results for further abx) Elevated liver enzymes (setting of gallstones and sludge, possibly a stone up to cystic duct per GI notes, no evidence of CBD or acute cholangitis, suspect 2/2 acute cholecystitis) -General surgery consulted 
-GI consulted 
-per GI notes not candidate for MRCP MRI at this time  
-monitor LFTs 
-CT abd/pelvis completed -IVAB 
-CK ok currently (monitor)  
-hold lipitor Cholecystitis/cholelithiasis 
-General surgery consulted -GI following  
-await further recommendations Elevated troponin 
-2/2 ischemic demand?  
-monitor Essential HTN 
-hold losartan currently DM II 
-A1c 9.7 9/11/18 
-accuchecks 
-SSI 
-lantus 10 U  
-BS ok Mixed HLD 
-hold lipitor Hx of stroke 
-monitor neuro status 
-asa 
-hold statin Chronic diastolic CHF, mild AS, trace TR 
-will d/c fluids currently as fluid volume overload  
-last EF showed 51-55%, mild AS, trace TR  
 
DVT px: 
-heparin subq GI-none Bowel-senna-s BID prn constipation HUMPHREY Whitehead, NP-C [facility] ERRAX:[phone] Office:  [phone]

## 2018-09-28 NOTE — PROGRESS NOTES
RENAL PROGRESS NOTE Chey Mckeon Assessment/Plan: · JULIANNA (ischemic atn in a setting of acute pyelonephritis/acute cholecystitis with sepsis). UO is minimal, scr is up to 11. Initiation of acute dialysis is indicated. Patient is not able to make decisions, I called his sister who referred me to the niece Ms Charleen Ramos who is a decision making person. Risks and benefits of acute dialysis were discussed and she consented. Will ask vascular for temporary dialysis catheter. First 4 hour dialysis later today. Agree with stopping ivf given concern for volume overload. · HAGMA due to julianna. Dialysis will be addressing. · Severe hyperphosphatemia. Dialysis will be addressing. · HTN. Will add metoprolol po. · Acute pyelonephritis/sepsis. On abx. · Abnormal lft's/acute cholecystitis? On abx. GI on the case. · Recent cva with debilitation/maulutrition. Subjective:Patient complaints off: When asked how he is doing patient made a \"so so\" gesture. He denies cp/sob but overall when asked questions he simply doesn't answer. Knows his name and age but o/w couldn't participate in conversation. Patient Active Problem List  
Diagnosis Code  Stroke (cerebrum) ([facility]) I63.9  Stroke ([facility] I63.9  Rhabdomyolysis M62.82  
 Dehydration E86.0  
 Essential hypertension I10  
 Diabetes mellitus type 2, controlled ([address] E11.9  Non compliance w medication regimen Z91.14  
 Pneumonia J18.9  DM (diabetes mellitus) ([facility] [facility] E11.9  History of stroke Z80.78  
 Acute-on-chronic kidney injury ([address] N17.9, N18.9  Acute cystitis N30.00  Elevated troponin R74.8  Transaminitis R74.0  Acute kidney injury ([address] N17.9 Current Facility-Administered Medications Medication Dose Route Frequency Provider Last Rate Last Dose  VANCOMYCIN INFORMATION NOTE   Other Rx Dosing/Monitoring Urvashi Bills MD      
 aspirin (ASPIRIN) tablet 325 mg  325 mg Oral DAILY Kalyan Nettles, DO   325 mg at 09/28/18 2303  insulin glargine (LANTUS) injection 10 Units  10 Units SubCUTAneous QHS Velinda Formosa, DO   10 Units at 09/28/18 0028  senna-docusate (PERICOLACE) 8.6-50 mg per tablet 1 Tab  1 Tab Oral BID PRN Velinda Formosa, DO      
 melatonin tablet 3 mg  3 mg Oral QHS PRN Velinda Formosa, DO      
 traMADol (ULTRAM) tablet 50 mg  50 mg Oral Q6H PRN Velinda Formosa, DO      
 ondansetron (ZOFRAN) injection 4 mg  4 mg IntraVENous Q4H PRN Velinda Formosa, DO   4 mg at 09/28/18 0539  
 glucose chewable tablet 16 g  4 Tab Oral PRN Velinda Formosa, DO      
 glucagon (GLUCAGEN) injection 1 mg  1 mg IntraMUSCular PRN Velinda Formosa, DO      
 dextrose (D50) infusion 12.5-25 g  25-50 mL IntraVENous PRN Velinda Formosa, DO      
 hydrALAZINE (APRESOLINE) 20 mg/mL injection 20 mg  20 mg IntraVENous Q6H PRN Jamison Tillman NP   20 mg at 09/27/18 1858  piperacillin-tazobactam (ZOSYN) 4.5 g in 0.9% sodium chloride 100 mL MBP EXTENDED 4 HOUR INFUSION   4.5 g IntraVENous Q12H Isela Nelson MD 25 mL/hr at 09/28/18 0952 4.5 g at 09/28/18 6964  sodium bicarbonate (8.4%) 75 mEq in 0.45% sodium chloride 1,000 mL infusion   IntraVENous CONTINUOUS Velinda Formosa, DO 75 mL/hr at 09/28/18 0536    
 pantoprazole (PROTONIX) 40 mg in sodium chloride 0.9% 10 mL injection  40 mg IntraVENous DAILY Jamison Tillman NP   40 mg at 09/28/18 0778  insulin lispro (HUMALOG) injection   SubCUTAneous AC&HS Isela Nelson MD   Stopped at 09/27/18 2200 Objective Vitals:  
 09/28/18 0222 09/28/18 0430 09/28/18 0516 09/28/18 1000 BP: 162/77  165/75 (!) 181/92 Pulse: 82  84 83 Resp: 18  21 16 Temp: 97.5 °F (36.4 °C)  97.7 °F (36.5 °C) 97.7 °F (36.5 °C) SpO2: 90% 92% 91% 96% Weight: Intake/Output Summary (Last 24 hours) at 09/28/18 1002 Last data filed at 09/27/18 1945 Gross per 24 hour Intake              200 ml Output               30 ml Net              170 ml Admission weight: Weight: 96.6 kg (213 lb) (09/26/18 2244) Last Weight Metrics: 
Weight Loss Metrics 9/26/2018 9/13/2018 Today's Wt 213 lb 227 lb 15.3 oz  
BMI 33.36 kg/m2 35.7 kg/m2 Physical Assessment:  
 
General: NAD, alert. Neck: No jvd. LUNGS: CTA b.  
CVS EXM: S1, S2  RRR. Abdomen: soft, grimaces on deep palpation of ruq, no g/r. Pos bs. Lower Extremities:  trace edema. Lab CBC w/Diff Recent Labs  
   09/28/18 0440 09/27/18 1955 09/26/18 2306 WBC  20.9*   --   20.2*  
RBC  3.88*   --   4.57* HGB  11.3*  12.0*  13.2 HCT  33.0*  34.5*  39.0 PLT  406   --   409 GRANS   --    --   85* LYMPH   --    --   8*  
EOS   --    --   2 Chemistry Recent Labs  
   09/28/18 0440 09/27/18 5685 09/26/18 2306 GLU  84  149*  162* NA  142  141  138  
K  5.1  5.3  4.8  
CL  98*  98*  95* CO2  18*  17*  20* BUN  143*  137*  132* CREA  11.10*  10.30*  10.10* CA  7.8*  7.2*  8.4* AGAP  26*  26*  23* BUCR  13  13  13 AP  381*  407*  401* TP  8.2  6.9  8.6* ALB  1.3*  1.5*  1.6*  
GLOB  6.9*  5.4*  7.0* AGRAT  0.2*  0.3*  0.2* PHOS   --   10.7*   -- No results found for: IRON, FE, TIBC, IBCT, PSAT, FERR Lab Results Component Value Date/Time Calcium 7.8 (L) 09/28/2018 04:40 AM  
 Phosphorus 10.7 (H) 09/27/2018 06:29 AM  
  
 
Huong Cruz M.D. [facility] Phone [phone] Pager [phone]

## 2018-09-28 NOTE — PROGRESS NOTES
. 
Acute HEmodialysis flow sheet Patient information Barbara Vogt MRN: [id number]      YOB:[id number]  [place]# OZIG#[id number] Hospital: [place] [address] DX: fluid overload  
       [x]1st Time Acute  []Stat[x] Routine []Urgent []Chronic Unit  
       []Acute Room []Bedside  []ICU/CCU []ER Isolation Precautions: [x]Dialysis[] Airborne []Contact []Droplet []Reverse Special Considerations:    [] Blood Consent Verified  [x]N/A Allergies: 
[x] NKA  [] Reviewed No Known Allergies Code Status []Full Code [] DNR  []Other Diet: npo Diabetic: []Yes []No 
  
 Hemodialysis Orders Physician: Herminio Jaeger Dialyzer Revaclear 300 Duration 4 BFR: 300 DFR:600 Dialysate: K 2 CA 2.5 Na 140 Bicarb 35 Dry Weight: UF Goal: 1000 ml Heparin:  []Bolus   Units    []Hourly  Units      [x]None BP Tx:  []NS  200ml/Bolus    []Other     [x]N/A  
    Labs: Pre:  Post: [x]N/A Additional Orders: [x]N/A [x]Signed Treatment Consent   [x] Verified   [] Obtained Primary Nurse Report: First initial/ Last Name/Title Pre Dialysis: Antoinette Cancel    Time: 5808 Access CATHETER ACCESS:  [] N/A  [x] RIGHT  [] LEFT  [x] IJ  [] SUBCL [] FEM [] First use X-ray  [] Tunnel     [x] Non-Tunneled [x] No S/S infection  [] Redness [x] Drainage  [] Cultured [] Swelling 
                       [] Pain  
                       [x] Medical Aseptic [] Prep Dressing Changed  
                       [] Clotted [] Patent []      Flows: [x] Good [] Poor [] Reversed If Access Problem Dr. Jennifer Schaeffer: [] Yes [x] No    Date:    [] N/A  
     GRAFT/FISTULA ACCESS:  [x] N/A  [] RIGHT  [] LEFT  [] UE  [] LE   
                       [] AVG  [] AVF [] BUTTONHOLE  [] +BRUIT/THRILL 
     [] MEDICAL ASEPTIC PREP [] No S/S infection  [] Redness [] Drainage  [] Cultured [] Swelling [] Pain Needle Gauge                              Length: If Access Problem Dr. Mary Sinha: [] Yes [] No    Date:     [] N/A General Assessment LUNGS:  SaO2% 94 [] Clear [x] Coarse [] Crackles [] Wheezing  
            [] Diminished Location: [] RLL [] LLL [] RUL [] JOSSIE   
     COUGH:  [] Productive [] Dry [x] N/A  RESPIRATIONS: [] Easy [x] Labored THERAPY: [] RA   [x] NC  10   L/min    Mask: [] NRB [] Venti  O2%    
             [] Ventilator [] Intubated [] Trach [] BiPap [] CPap CARDIAC: [x] Regular [] Irregular [] Pericardial Rub [] JVD [] Monitored Rhythm: EDEMA: [] None [x] Generalized [] Facial [] Pedal [] UE [] LE 
           [] Pitting [] 1 [] 2 [] 3 [] 4    [] Right [] Left [] Bilateral  
    SKIN:    [x] Warm [] Hot [] Cold  [] Dry [] Pale [] Diaphoretic  
           [] Flushed [] Jaundiced [] Cyanotic [] Rash [] Weeping LOC:    [] Alert  Oriented to: [] Person [] Place [] Time  
          [] Confused [x] Lethargic [] Medically sedated [] Non-responsive GI/ABDOMEN: [] Flat [x] Distended [x] Soft [] Firm [] Obese [] Diarrhea 
 [] Bowel Sounds     []Nausea [] Vomiting PAIN: [x] 0 [] 1 [] 2 [] 3 [] 4 [] 5 [] 6 [] 7 [] 8 [] 9 [] 10 Scale 1-10 Action/Follow Up MOBILITY: [] Amb [] Amb/Assist [x] Bed  [] Wheelchair CUrrent LABS HBsAg ONLY: Date Drawn 9/28/18 [x] Negative [] Positive  [] Unknown. HBsAb: Date Drawn  [] Susceptible <10 [] Immune ?10 [] Unknown Date of Current Labs:  
    
Lab Results Component Value Date/Time  Sodium 140 09/28/2018 04:10 PM  
 Potassium 5.6 (H) 09/28/2018 04:10 PM  
 Chloride 99 (L) 09/28/2018 04:10 PM  
 CO2 15 (L) 09/28/2018 04:10 PM  
  (H) 09/28/2018 04:10 PM  
 Creatinine 11.60 (H) 09/28/2018 04:10 PM  
 Calcium 7.5 (L) 09/28/2018 04:10 PM  
 Phosphorus 10.7 (H) 09/27/2018 06:29 AM  
 HCT 33.0 (L) 09/28/2018 04:40 AM  
 HGB 11.3 (L) 09/28/2018 04:40 AM  
 PLATELET 649 77/81/3435 04:40 AM  
 INR 1.4 (H) 09/28/2018 04:40 AM  
  
Education Person Educated: [x] Patient [] Family [] Other Knowledge base: [x] None [] Minimal [] Substantial   
     Barriers to learning  [x] None Preferred method of learning: [] Written [x] Oral [] Visual [] Hands on Topic: [] Access Care [] S&S of infection [] Fluid Management [] K+ 
               [x] Procedural    [] Albumin [] Medications [] Tx Options [] Transplant 
                [] Diet [] Other Teaching Tools: [x] Explain [] Demonstration [] Handout [] Teachback Ro/hemodiaylsis machine safety checks- before each treatment Machine Serial # 11 
 [] # 1 [id number] [] # 2 [id number] [] # 3 [id number] [] # 4 [id number] [] # 5 [id number] [] # [id number] [] [phone] Alarm Test: [x] Pass Time 8258       RO Serial # 11 
[] [id number] (Large dual station RO) [] # 1  [id number]  (Portable # 1) [] # 2  [id number]  (Portable # 2) [] # 3  [id number]  (Portable # 3) [] # 4  [id number]  (Portable # 4) [] # 5  [id number]  (Portable # 5) Lot #s: Dialyzer [id number] exp. Tubing [id number] exp. Saline -jt exp. [x] RO/Machine Log Complete    [x] Extracorporeal circuit Tested for integrity Dialysate: pH 7.4 Temp. 36  Conductivity: Meter 14 HD Machine 14  
chlorine testing- before each treatment and every 4 hours Total Chlorine: [x] Less than 0.1 ppm Time: 1745 2nd Check Time: 1945 (If greater than 0.1 ppm from Primary then every 30 minutes from Secondary) treatment Iniation-with dialysis precautions [x] All Connections Secured   [x] Saline Line Double Clamped    
[x] Venous Parameters Set    [x] Arterial Parameters Set    [x] Prime Given 250 ml            [x] Air Foam Detector Engaged Zohaib Nurse Signature/Title_Philip P Long__ Pre-Treatment Time 1745 B/P 154/66 HR 81 Temp. 97.3 Resp Rate 26 Pre Wt UF Calculations: Wt to lose:1000 ml(+) Oral:  ml(+) IV Meds/Fluids/Blood prods  ml(+) Prime/Rinse 500 ml 
(=)Total UF Goal 1500 mL Scale Type:[] Bed scale [] Sling Scale [] Wheel Chair Scale [x]  Not Ordered [x] Unable to obtain pt on stretcher Tx Initiation Note: right IJ catheter accessed and treatment started 1830: patient O2 saturation down to 78% patient put onto high flow device [x] Time Out/Safety Check  Time: 8333 [facility] Signature/Title_Saleem Zuñiga INTRADIALYTIC MONITORING 
(SEE ATTACHED FLOWSHEET) POST TREATMENT Time 2045 B/P 151/91 HR  92 Temp 97.6 Resp. Rate  25 Post wt Time Medication Dose Volume Route Initials [facility] Signatures Title Initials Time 52 [facility] RN [address] [facility] Signature/Title:_Saleem Zuñiga_ Dialyzer cleared: [] Good [x] Fair [] Poor    Blood Processed 41 Liters Net UF Removed 523 mL Post Tx Access: AVF/AVG: Bleeding Stop                   Art. min Shree min []+bruit/thrill Catheter: Locking Solution 1.4     Art. 1.4 ml Shree  ml Post Assessment:  Skin: []Warm []Dry []Diaphoretic []Flushed [] Pale []Cyanotic Lungs: []Clear [x]Coarse []Crackles []Wheezing Cardiac: [x]Regular []Irregular  []Monitored rhyth Edema: []None [x]General []Facial []Pedal  []UE []LE []RIGHT []LEFT    []Bilateral  
   Pain: [x]0 []1[]2 []3 []4 []5 []6 []7 []8 []9 []10  
POST Tx Note: treatment ended early due to patient change in mentation, also developed large hemotoma catheter insersion site MD notified and okayed stopping of treatment Primary Nurse Report: First initial/Last name/Title Post Dialysis:_C McVay_         Time:_2100_ Abbreviations: AVG-arterial venous graft, AVF-arterial venous fistula, IJ-Internal Jugular, Subcl-Subclavian, Fem-Femoral, Tx-treatment, AP/HR-apical heart rate, DFR-dialysate flow rate, BFR-blood flow rate, AP-arterial pressure, -venous pressure, UF-ultrafiltrate, TMP-transmembrane pressure, Shree-Venous, Art-Arterial, RO-Reverse Osmosis

## 2018-09-28 NOTE — PROGRESS NOTES
Problem: Falls - Risk of 
Goal: *Absence of Falls Document Judy Barclay Fall Risk and appropriate interventions in the flowsheet. Outcome: Progressing Towards Goal 
Fall Risk Interventions: 
Mobility Interventions: Communicate number of staff needed for ambulation/transfer Mentation Interventions: Adequate sleep, hydration, pain control Medication Interventions: Patient to call before getting OOB Elimination Interventions: Call light in reach Problem: Pressure Injury - Risk of 
Goal: *Prevention of pressure injury Document Mitul Scale and appropriate interventions in the flowsheet. Outcome: Progressing Towards Goal 
Pressure Injury Interventions: 
Sensory Interventions: Assess changes in LOC Moisture Interventions: Absorbent underpads Activity Interventions: Pressure redistribution bed/mattress(bed type) Mobility Interventions: Pressure redistribution bed/mattress (bed type) Nutrition Interventions: Document food/fluid/supplement intake

## 2018-09-28 NOTE — ROUTINE PROCESS
Bedside and Verbal shift change report given to Anahi EASLEY (oncoming nurse) by Keri Mendes RN 
 (offgoing nurse). Report included the following information SBAR, Kardex, MAR and Recent Results.

## 2018-09-28 NOTE — PROGRESS NOTES
conducted an initial consultation and Spiritual Assessment for [facility]	Yony Schafer, who is a 57 y.o.,male. Patients Primary Language is: [place]. According to the patients EMR Lutheran Affiliation is: [facility].  
 
The reason the Patient came to the hospital is:  
Patient Active Problem List  
 Diagnosis Date Noted  Elevated LFTs 09/28/2018  History of stroke 09/27/2018  Acute-on-chronic kidney injury ([address] 09/27/2018  Acute cystitis 09/27/2018  Elevated troponin 09/27/2018  Transaminitis 09/27/2018  Acute kidney injury ([address] 09/27/2018  DM (diabetes mellitus) ([address] 09/14/2018  Pneumonia 09/11/2018  Stroke (cerebrum) ([facility] 09/10/2018  Stroke ([facility] 09/10/2018  Rhabdomyolysis 09/10/2018  Dehydration 09/10/2018  Essential hypertension 09/10/2018  Diabetes mellitus type 2, controlled ([facility] 09/10/2018  Non compliance w medication regimen 09/10/2018 The  provided the following Interventions: 
 attempted multiple times to Initiate a relationship of care and support with patient in room 2115. Found patient once again not able to communicate today due to his condition and his resting peacefully. No family was ever seen at the many attempted visits. Provided information about Spiritual Care Services. Offered prayer and assurance of continued prayers on patients behalf. Assessment: 
Patient does not have any Baptism/cultural needs that will affect patients preferences in health care. There are no further spiritual or Baptism issues which require Spiritual Care Services interventions at this time. Plan: 
Chaplains will continue to follow and will provide pastoral care on an as needed/requested basis Mily Burris 3 Board Certified [employer] Spiritual Care  
[phone]

## 2018-09-28 NOTE — PROGRESS NOTES
Speech Pathology Could not progress with ST evaluation because patient :  
 
[ ] was unresponsive. [X] deferred due: Procedure in progress (dialysis catheter placement) [ ] was off the unit. [ ] on hold. [] NPO for procedure Speech Therapist will re-attempt eval same day.   
 
Brittany Arredondo M.S. CF-SLP

## 2018-09-28 NOTE — PROGRESS NOTES
1415 - TRANSFER - IN REPORT: 
 
Verbal report received from Claudy(name) on [facility]	[place]  being received from 2 W(unit) for change in patient condition(SOB) Report consisted of patients Situation, Background, Assessment and  
Recommendations(SBAR). Information from the following report(s) SBAR, Kardex, Procedure Summary, Intake/Output, MAR, Recent Results and Cardiac Rhythm NSR/Sinus Tach was reviewed with the receiving nurse. Opportunity for questions and clarification was provided. Assessment completed upon patients arrival to unit and care assumed. 1500 - Resting quietly in bed on 6L NC and humidifier, VSS, some shortness of breath noticed, alert and oriented to self and situation, interactive with visitor at bedside 1700 - Zohaib Bone Section present at bedside preparing to dialyze pt, VSS on 10L NC, O2 sat 87-90, will continue to monitor [address] - Dr. Kathy Stanley paged regarding pt IJ oozing, dressing changed, new orders received, pt placed on Optiflow, O2 sat in 90s 1910 - Bedside and Verbal shift change report given to Lisa EASLEY (oncoming nurse) by Karrin Hashimoto (offgoing nurse). Report included the following information SBAR, Kardex, Intake/Output, MAR, Recent Results and Cardiac Rhythm NSR.

## 2018-09-28 NOTE — PROGRESS NOTES
Called to bedside for patient with desaturation event. Respiratory rate 26-28, respirations shallow. Breath sounds bilaterally rales/rhonchi. Patient is currently receiving dialysis. Patient was on [place] HFNC @ 10lpm. Flow increased to 15LPM but SPO2 did not rise above 87%. Patient changed to OPTIFLOW HFNC. Initial settings 40LPM, 100%. Tolerated well. SPO2 94%. RN aware.

## 2018-09-28 NOTE — PROCEDURES
[facility], [place] Risa Gomez MD 
 
Date of Procedure: 9/28/2018 Hospital: [facility] Surgeon(s): Risa Gomez MD 
Assistant(s): NonePre-operative Diagnosis: ESRD Post-operative Diagnosis: ESRD Procedure(s) Performed:  Non Tunneled dialysis catheter placement with ultrasound guidance Anesthesia:  Local 5 mL lidocaine 1% without epi Findings:  Catheter flushed and frida well at conclusion of the procedure. Complications: None Estimated Blood Loss:  Minimal 
Tubes and Drains:  None Specimens: * Cannot find log * Procedure in Detail: After informed consent was obtained the patient was identified and the procedure verified. The right chest and neck was prepped with chlorhexidine and draped in a sterile manner. Local anesthesia was used with lidocaine 1% to infiltrate soft tissue. The right jugular vein was punctured under ultrasound guidance with an 18g needle. An 0.035in wire was then placed into the superior vena cava. Adequate placement of wire in IJ was observed using ultrasound. The puncture site at the neck was enlarged with a small incision. The 12 Vietnamese dilator was inserted over the wire to dilate tract. The 15 Vietnamese dialysis catheter was advanced until 1 cm outside of skin. The catheter aspirated blood easily, was flushed with saline, and each port locked with 2mL of 1000 units/mL Heparin. The catheter was sutured to the skin with4-0 and nylon. Biopatch was applied to the catheter exit site. The surgical sites were covered with gauze and Op-Site. The patient tolerated the procedure well without complications.   
 
 
Risa Gomez MD

## 2018-09-29 PROBLEM — I46.9 CARDIAC ARREST WITH VENTRICULAR FIBRILLATION (HCC): Status: RESOLVED | Noted: 2018-01-01 | Resolved: 2018-01-01

## 2018-09-29 PROBLEM — I49.01 CARDIAC ARREST WITH VENTRICULAR FIBRILLATION (HCC): Status: RESOLVED | Noted: 2018-01-01 | Resolved: 2018-01-01

## 2018-09-29 PROBLEM — I49.01 CARDIAC ARREST WITH VENTRICULAR FIBRILLATION (HCC): Status: ACTIVE | Noted: 2018-01-01

## 2018-09-29 PROBLEM — I46.9 CARDIAC ARREST WITH VENTRICULAR FIBRILLATION (HCC): Status: ACTIVE | Noted: 2018-01-01

## 2018-09-29 PROBLEM — K85.90 ACUTE PANCREATITIS: Status: ACTIVE | Noted: 2018-01-01

## 2018-09-29 PROBLEM — R79.1 ABNORMAL BLOOD COAGULATION PROFILE: Status: ACTIVE | Noted: 2018-01-01

## 2018-09-29 NOTE — PROGRESS NOTES
Problem: Ventilator Management Goal: *Adequate oxygenation and ventilation VENTILATOR Care plan 
 
Problem: Ventilator Management Goal: *Adequate oxygenation/ ventilation/ and extubation Patient: 
   
 
Chey Mckeon     57 y.o.   male     9/29/2018  1:48 PM 
Patient Active Problem List  
Diagnosis Code  Stroke (cerebrum) ([facility]) I63.9  Stroke ([facility] I63.9  Rhabdomyolysis M62.82  
 Dehydration E86.0  
 Essential hypertension I10  
 Diabetes mellitus type 2, controlled ([facility] E11.9  Non compliance w medication regimen Z91.14  
 Pneumonia J18.9  DM (diabetes mellitus) ([facility] E11.9  History of stroke Z80.78  
 Acute-on-chronic kidney injury ([facility] N17.9, N18.9  Acute cystitis N30.00  Elevated troponin R74.8  Transaminitis R74.0  Acute kidney injury ([facility] N17.9  Elevated LFTs R79.89  Cardiac arrest with ventricular fibrillation ([facility]) I46.9, I49.01  
 Abnormal blood coagulation profile R79.1 Acute kidney injury ([facility] Reason patient intubated:  Acute respiratory failure secondary to code blue. Ventilator day: 1 Ventilator settings: AC VC+, Rate=20, Hsz2=931%, Peep=5;  Fio2 weaned & peep changed to 8 ETT Size/Placement:  7.5 ETT 24 lip ABG: 
Date:9/29/2018 Lab Results Component Value Date/Time PHI 7.300 (L) 09/29/2018 11:40 AM  
 PCO2I 45.3 (H) 09/29/2018 11:40 AM  
 PO2I 329 (H) 09/29/2018 11:40 AM  
 HCO3I 22.4 09/29/2018 11:40 AM  
 FIO2I 100 09/29/2018 11:40 AM  
 
 
Chest X-ray: 
Date:9/29/2018 [place] [facility] [place] on 09/26/18 XR CHEST PORT Narrative EXAM: Portable semierect chest, one view INDICATION: Dialysis catheter placement COMPARISON: Earlier the same day 
 
_______________ FINDINGS: 
 
Interval right jugular catheter placement with its tip projecting at the 
cavoatrial junction. No pneumothorax. Cardiac silhouette is mildly prominent. Thoracic aorta is tortuous. Pulmonary vascular congestion remains, similar to the prior examination. No 
pleural effusion or pneumothorax. Mild atelectatic streaks are again identified 
in the right midlung. 
 
_______________ Impression IMPRESSION: 
 
Right jugular line tip projects at the cavoatrial junction. No pneumothorax. Persistent CHF/fluid overload and right perihilar atelectasis. Lab Test: 
Date:9/29/2018 WBC:  
Lab Results Component Value Date/Time WBC 28.4 (H) 09/29/2018 11:10 AM  
HGB: Lab Results Component Value Date/Time HGB 9.6 (L) 09/29/2018 11:10 AM  
 PLTS: Lab Results Component Value Date/Time PLATELET 376 76/96/5604 11:10 AM  
 
 
SaO2%/flow:  
SpO2 Readings from Last 1 Encounters:  
09/29/18 93% Vital Signs:   Patient Vitals for the past 8 hrs: 
 Temp Pulse Resp BP SpO2  
09/29/18 1330 (!) 35.9 °F (2.2 °C) 77 21 96/53 93 % 09/29/18 1318 - 77 21 - 92 %  
09/29/18 1300 (!) 35.8 °F (2.1 °C) 76 17 97/51 92 %  
09/29/18 1230 (!) 36 °F (2.2 °C) 78 20 97/52 95 % 09/29/18 1200 (!) 36.5 °F (2.5 °C) 89 23 (!) 77/51 (!) 87 %  
09/29/18 1130 (!) 36.6 °F (2.6 °C) (!) 105 21 93/55 99 % 09/29/18 1114 - 93 20 - 96 %  
09/29/18 1106 - (!) 18 18 - 95 % 09/29/18 1100 - (!) 102 19 132/58 -  
09/29/18 1056 - 93 - - -  
09/29/18 1030 98 °F (36.7 °C) (!) 48 10 (!) 76/42 (!) 89 % 09/29/18 1015 - 90 16 105/57 (!) 89 % 09/29/18 1000 - (!) 103 21 106/59 (!) 85 %  
09/29/18 0945 - (!) 103 28 122/57 (!) 87 %  
09/29/18 0930 - 100 (!) 31 119/63 92 %  
09/29/18 0915 - 100 (!) 32 114/60 91 %  
09/29/18 0903 - - - - 90 % 09/29/18 0900 - 98 29 116/55 92 %  
09/29/18 0845 - 93 26 113/54 90 % 09/29/18 0840 98 °F (36.7 °C) 94 27 112/54 91 %  
09/29/18 0830 98 °F (36.7 °C) 94 (!) 31 112/54 92 %  
09/29/18 0700 - 92 27 106/53 97 % 09/29/18 0630 - 91 27 104/55 97 % 09/29/18 0600 - 94 28 105/55 94 % Wean Screen Pass (Yes or No): n 
Wean Screen Reason for Failure:  High Fio2 requirements & cardiac instability Duration of Weaning Trial: 
Additional Comments: PLAN OF CARE:  Monitor pt on vent. Wean/titrate pt as needed to maintain pt comfort & sats. Provide suction as needed & nebulizers. GOAL:  Respiratory stability & extubation. OUTCOME:  Pt remains on mechanical ventilation.

## 2018-09-29 NOTE — H&P (VIEW-ONLY)
General Surgery Consult Justen Logan  Admit date: 2018 MRN: [id number]     : 1955     Age: 57 y.o. Attending Physician: Sharath Villalta MD, FACS Subjective:  
 
Nilson Calabrese is a 57 y.o. male who is presenting with a complicated medical history. He is currently in the ICU but he was recently admitted with a new onset CVA and was transferred to the nursing home, but brought back today because of abnormal labs and was found to be in acute renal failure with a creatinine of 10 and BUN of 137. Denies abdominal pain, nausea, vomiting, constipation, diarrhea, blood in the stool. Denies any history of fever or chills. Workup so far reveals that the patient has pyelonephritis and is currently being treated with antibiotics. Nephrology is following the patient because of acute renal failure. I spoke to Dr. Ger Lowry from GI and they are following along. I was consulted to see if there is a need for cholecystectomy. Patient Active Problem List  
 Diagnosis Date Noted  Elevated LFTs 2018  History of stroke 2018  Acute-on-chronic kidney injury ([address] 2018  Acute cystitis 2018  Elevated troponin 2018  Transaminitis 2018  Acute kidney injury ([address] 2018  DM (diabetes mellitus) ([address] 2018  Pneumonia 2018  Stroke (cerebrum) ([address] 09/10/2018  Stroke ([address] 09/10/2018  Rhabdomyolysis 09/10/2018  Dehydration 09/10/2018  Essential hypertension 09/10/2018  Diabetes mellitus type 2, controlled ([address] 09/10/2018  Non compliance w medication regimen 09/10/2018 Past Medical History:  
Diagnosis Date  CHF (congestive heart failure) ([address]  Diabetes ([address]  Stroke ([address] History reviewed. No pertinent surgical history. Social History Substance Use Topics  Smoking status: Never Smoker  Smokeless tobacco: Never Used  Alcohol use No  
  
History Smoking Status  Never Smoker Smokeless Tobacco  
 Never Used History reviewed. No pertinent family history. Current Facility-Administered Medications Medication Dose Route Frequency  metoprolol tartrate (LOPRESSOR) tablet 50 mg  50 mg Oral Q12H  
 0.9% sodium chloride infusion  50 mL/hr IntraVENous DIALYSIS PRN  
 heparin (porcine) 1,000 unit/mL injection 1,000 Units  1,000 Units InterCATHeter DIALYSIS PRN  
 VANCOMYCIN INFORMATION NOTE   Other ONCE  
 insulin lispro (HUMALOG) injection   SubCUTAneous Q6H  
 VANCOMYCIN INFORMATION NOTE   Other Rx Dosing/Monitoring  aspirin (ASPIRIN) tablet 325 mg  325 mg Oral DAILY  senna-docusate (PERICOLACE) 8.6-50 mg per tablet 1 Tab  1 Tab Oral BID PRN  
 melatonin tablet 3 mg  3 mg Oral QHS PRN  
 traMADol (ULTRAM) tablet 50 mg  50 mg Oral Q6H PRN  
 ondansetron (ZOFRAN) injection 4 mg  4 mg IntraVENous Q4H PRN  
 glucose chewable tablet 16 g  4 Tab Oral PRN  
 glucagon (GLUCAGEN) injection 1 mg  1 mg IntraMUSCular PRN  
 dextrose (D50) infusion 12.5-25 g  25-50 mL IntraVENous PRN  
 hydrALAZINE (APRESOLINE) 20 mg/mL injection 20 mg  20 mg IntraVENous Q6H PRN  piperacillin-tazobactam (ZOSYN) 4.5 g in 0.9% sodium chloride 100 mL MBP EXTENDED 4 HOUR INFUSION   4.5 g IntraVENous Q12H  pantoprazole (PROTONIX) 40 mg in sodium chloride 0.9% 10 mL injection  40 mg IntraVENous DAILY No Known Allergies Review of Systems: 
Pertinent items are noted in the History of Present Illness. Objective:  
 
Visit Vitals  /55  Pulse 94  Temp 98 °F (36.7 °C)  Resp 28  Ht 5' 7\" (1.702 m)  Wt 96.6 kg (213 lb)  SpO2 94%  BMI 33.36 kg/m2 Physical Exam:   
 
General:  alert, oriented times 3 and cooperative Eyes:  Jaundice Throat & Neck: no erythema or exudates noted and neck supple and symmetrical; no palpable masses Abdomen:   rounded, soft, diffuse abdominal tenderness with no gaurdaing, distended, no masses or organomegaly. No abdominal wall hernias. Imaging and Lab Review: CBC:  
Lab Results Component Value Date/Time WBC 21.5 (H) 09/28/2018 07:30 PM  
 RBC 3.85 (L) 09/28/2018 07:30 PM  
 HGB 11.0 (L) 09/28/2018 07:30 PM  
 HCT 32.1 (L) 09/28/2018 07:30 PM  
 PLATELET 610 78/12/9210 07:30 PM  
 
BMP:  
Lab Results Component Value Date/Time Glucose 116 (H) 09/28/2018 04:10 PM  
 Sodium 140 09/28/2018 04:10 PM  
 Potassium 5.6 (H) 09/28/2018 04:10 PM  
 Chloride 99 (L) 09/28/2018 04:10 PM  
 CO2 15 (L) 09/28/2018 04:10 PM  
  (H) 09/28/2018 04:10 PM  
 Creatinine 11.60 (H) 09/28/2018 04:10 PM  
 Calcium 7.5 (L) 09/28/2018 04:10 PM  
 
CMP: 
Lab Results Component Value Date/Time Glucose 116 (H) 09/28/2018 04:10 PM  
 Sodium 140 09/28/2018 04:10 PM  
 Potassium 5.6 (H) 09/28/2018 04:10 PM  
 Chloride 99 (L) 09/28/2018 04:10 PM  
 CO2 15 (L) 09/28/2018 04:10 PM  
  (H) 09/28/2018 04:10 PM  
 Creatinine 11.60 (H) 09/28/2018 04:10 PM  
 Calcium 7.5 (L) 09/28/2018 04:10 PM  
 Anion gap 26 (H) 09/28/2018 04:10 PM  
 BUN/Creatinine ratio 13 09/28/2018 04:10 PM  
 Alk. phosphatase 394 (H) 09/28/2018 04:10 PM  
 Protein, total 8.1 09/28/2018 04:10 PM  
 Albumin 1.4 (L) 09/28/2018 04:10 PM  
 Globulin 6.7 (H) 09/28/2018 04:10 PM  
 A-G Ratio 0.2 (L) 09/28/2018 04:10 PM  
 
 
Recent Results (from the past 24 hour(s)) GLUCOSE, POC Collection Time: 09/28/18  8:08 AM  
Result Value Ref Range Glucose (POC) 84 70 - 110 mg/dL POC G3 Collection Time: 09/28/18 10:19 AM  
Result Value Ref Range Device: NASAL CANNULA Flow rate (POC) 3 L/M  
 FIO2 (POC) 0.32 % pH (POC) 7.271 (L) 7.35 - 7.45    
 pCO2 (POC) 39.3 35.0 - 45.0 MMHG  
 pO2 (POC) 79 (L) 80 - 100 MMHG  
 HCO3 (POC) 18.2 (L) 22 - 26 MMOL/L  
 sO2 (POC) 94 92 - 97 % Base deficit (POC) 9 mmol/L Allens test (POC) YES Total resp. rate 18  Site RIGHT RADIAL    
 Patient temp. 97.7 Specimen type (POC) ARTERIAL Performed by Norma PHELAN, POC Collection Time: 09/28/18 11:45 AM  
Result Value Ref Range Glucose (POC) 72 70 - 110 mg/dL GLUCOSE, POC Collection Time: 09/28/18 12:10 PM  
Result Value Ref Range Glucose (POC) 169 (H) 70 - 110 mg/dL GLUCOSE, POC Collection Time: 09/28/18  3:58 PM  
Result Value Ref Range Glucose (POC) 118 (H) 70 - 110 mg/dL METABOLIC PANEL, COMPREHENSIVE Collection Time: 09/28/18  4:10 PM  
Result Value Ref Range Sodium 140 136 - 145 mmol/L Potassium 5.6 (H) 3.5 - 5.5 mmol/L Chloride 99 (L) 100 - 108 mmol/L  
 CO2 15 (L) 21 - 32 mmol/L Anion gap 26 (H) 3.0 - 18 mmol/L Glucose 116 (H) 74 - 99 mg/dL  (H) 7.0 - 18 MG/DL Creatinine 11.60 (H) 0.6 - 1.3 MG/DL  
 BUN/Creatinine ratio 13 12 - 20 GFR est AA 5 (L) >60 ml/min/1.73m2 GFR est non-AA 4 (L) >60 ml/min/1.73m2 Calcium 7.5 (L) 8.5 - 10.1 MG/DL Bilirubin, total 5.8 (H) 0.2 - 1.0 MG/DL  
 ALT (SGPT) 55 16 - 61 U/L  
 AST (SGOT) 116 (H) 15 - 37 U/L Alk. phosphatase 394 (H) 45 - 117 U/L Protein, total 8.1 6.4 - 8.2 g/dL Albumin 1.4 (L) 3.4 - 5.0 g/dL Globulin 6.7 (H) 2.0 - 4.0 g/dL A-G Ratio 0.2 (L) 0.8 - 1.7 PROTHROMBIN TIME + INR Collection Time: 09/28/18  7:30 PM  
Result Value Ref Range Prothrombin time 15.6 (H) 11.5 - 15.2 sec INR 1.3 (H) 0.8 - 1.2    
CBC W/O DIFF Collection Time: 09/28/18  7:30 PM  
Result Value Ref Range WBC 21.5 (H) 4.6 - 13.2 K/uL  
 RBC 3.85 (L) 4.70 - 5.50 M/uL  
 HGB 11.0 (L) 13.0 - 16.0 g/dL HCT 32.1 (L) 36.0 - 48.0 % MCV 83.4 74.0 - 97.0 FL  
 MCH 28.6 24.0 - 34.0 PG  
 MCHC 34.3 31.0 - 37.0 g/dL  
 RDW 14.8 (H) 11.6 - 14.5 % PLATELET 112 625 - 638 K/uL MPV 8.4 (L) 9.2 - 11.8 FL  
 
 
images and reports reviewed Assessment:  
Iliana Ford is a 57 y.o. male is presenting with abdominal pain and a picture of liver and renal failure. I do not believe the patient has acute cholecystitis. It should not cause the bilirubin to be that much elevated. Also the patient is very high risk for surgery, so I suggest to get a HIDA scan and if positive for cholecystitis, to proceed with IR cholecystostomy. If the patient cannot tolerate the HIDA scan and the suspicion for cholecystitis still exist, than consider doing IR cholecystostomy tube. Plan: No need for cholecystectomy. He is also very high risk for surgery. Consider the IR cholecystostomy tube if still high suspicion Management of his renal and hepatic failure. Will follow. Please call me if you have any questions (cell phone: [phone]) Signed By: Mega Ward MD   
 September 29, 2018

## 2018-09-29 NOTE — ROUTINE PROCESS
[id number] [facility] [address] Delvin Grace stated okay to get cta and duplex studies tomorrow and to turn as little as possible, if patient gets into distress to turn with left side down and right side up. Dr Delvin Grace states okay to use ogt . 1930 Bedside shift change report given to Amanuel (oncoming nurse) by Cristina Moss RN (offgoing nurse). Report included the following information SBAR, Kardex, Recent Results and Cardiac Rhythm nsr. Niece at bedside, patient on enteric isolation. Dual check on ayden and dopamine drips. 2000 assessment completed. Oral and mery-care completed. dialysis cath dressing saturated changed. 2125 dr Eriberto Cruz notified of blood pressure dropped sbp 86 when decreasing ayden , orders to given normal saline 2200 dialysis dressing saturated  Changed. 0000 reassessment completed. Oral and mery-care completed. dialysis cath dressing saturated , changed. Naldo Castellanos 2440 spoke with Reddy Durant   With [employer] and asked to call at time of death , not medically suitable for donation. 0300 dialysis line saturated  Dressing changed. 0400 REASSESSMENT COMPLETED. Oral and mery-care completed. 0530 incontinent bloody stool cleansed and bed bath provided. cvl satures and dripping onto bed. Cleansed and bed linens changed after cvl dressing changed per protocol. 0730 Bedside shift change report given to Jesus falcon (oncoming nurse) by miky falcon (offgoing nurse). Report included the following information SBAR, Kardex and Cardiac Rhythm nsr. Side rails up x 3, call light within reach. Hob elevated 30 degrees. Dual drip check off completed.

## 2018-09-29 NOTE — PROGRESS NOTES
Patient in bed on back with head elevated - BBS equal and diminished - ETt secure at 25 at the lips and moved to the right side of the mouth - patient suctioned and LEONCIO tube cleared - MVB at [facility] - vent alarms on and functional - ABG obtained and results given to Benjy Irwin

## 2018-09-29 NOTE — PROGRESS NOTES
Patient had a change in mentation MD notified and treatment stopped as per Dr Monetta Gottron also patient develop a large hemotoma at insertion site, site redressed

## 2018-09-29 NOTE — PROGRESS NOTES
Recd pt on HFNC:  Ffa3=252%, 45 lpm flow - pt sats varying from 88-92% - pt has begun dialysis - 2nd attempt Called to bedside -ABG requested MD requested to place pt on bipap due to desat & increased WOB -  
Pt placed on bipap briefly per MD verbal order & then rec'd verbal order that pt will be intubated Results for Analisa Marie (MRN [id number]) as of 9/29/2018 13:46 Ref. Range 9/29/2018 10:26  
pH (POC) Latest Ref Range: 7.35 - 7.45   7.317 (L)  
pCO2 (POC) Latest Ref Range: 35.0 - 45.0 MMHG 49.7 (H)  
pO2 (POC) Latest Ref Range: 80 - 100 MMHG 70 (L) HCO3 (POC) Latest Ref Range: 22 - 26 MMOL/L 25.5  
sO2 (POC) Latest Ref Range: 92 - 97 % 92 Base deficit (POC) Latest Units: mmol/L 1  
FIO2 (POC) Latest Units: % 100 Patient temp. Latest Units:   98.0 Specimen type (POC) Latest Units:   ARTERIAL Flow rate (POC) Latest Units: L/M 50 Site Latest Units:   RIGHT RADIAL Device: Latest Units:   High Flow Nasal C... Total resp. rate Latest Units:   16 Allens test (POC) Latest Units:   N/A Code Blue -- Pt inubated & ROSC achieved in approx 5 min ETT 24 lip 7.5 ETT- Verbal order per MD to place on AC VC+, Rate=20, Mc=761, Kds5=569%, Peep=5 Verbal order for sputum sample Verbal order for albuterol & confirmed Q6 requested Post intubation ABG: Verbal order to change peep=8, wean Fio2, Results for Analisa Marie (MRN [id number]) as of 9/29/2018 13:46 Ref. Range 9/29/2018 11:40  
pH (POC) Latest Ref Range: 7.35 - 7.45   7.300 (L)  
pCO2 (POC) Latest Ref Range: 35.0 - 45.0 MMHG 45.3 (H)  
pO2 (POC) Latest Ref Range: 80 - 100 MMHG 329 (H) HCO3 (POC) Latest Ref Range: 22 - 26 MMOL/L 22.4  
sO2 (POC) Latest Ref Range: 92 - 97 % 100 (H) Base deficit (POC) Latest Units: mmol/L 4  
FIO2 (POC) Latest Units: % 100 Patient temp. Latest Units:   36.7 Specimen type (POC) Latest Units:   ARTERIAL  
 
 
1745--Transport pt to CT - no complications

## 2018-09-29 NOTE — PROCEDURES
[facility] SpecialistsPulmonary, Critical Care, and Sleep Medicine Name: Shannan Azevedo MRN: [id number] : 1955 Hospital: [address] [place] [address] Date: 2018 Intubation Note Procedure: emergent intubation Indication: respiratory insufficiency Anesthesia: NONE Paralysis: NONE Description of Procedure: After assessing the airway, the patient underwent preoxygenation with 100% FiO2 for 5 min. The Sellick maneuver was performed throughout the entire sequence. GlideScope was used to visualize the epiglottis and vocal cords. After identification of the vocal cords, a 7.5 ET tube was placed into the trachea under direct visualization. The tube was seen passing through the vocal chords without difficulty. The ETT tube was secured at 24 cm (at the lower jaw). CO2 colorimetry was employed immediately to verify positioning of the ET tube within the airway with appropriate color change, indicating detection of CO2. Water vapor was seen within the ET tube, and auscultation of the abdomen revealed no bubbling sounds. Auscultation and inspection of the chest after intubation showed symmetric chest excursion and symmetric air entry bilaterally. Chest X-ray is pending. The patient has been placed on a mechanical ventilator. There were no complications. Dyan Cullen MD 
Board Certified by the [facility] 11:29 AM

## 2018-09-29 NOTE — PROGRESS NOTES
Problem: Pressure Injury - Risk of 
Goal: *Prevention of pressure injury Document Mitul Scale and appropriate interventions in the flowsheet. Outcome: Progressing Towards Goal 
Pressure Injury Interventions: 
Sensory Interventions: Assess changes in LOC Moisture Interventions: Check for incontinence Q2 hours and as needed Activity Interventions: Pressure redistribution bed/mattress(bed type) Mobility Interventions: Pressure redistribution bed/mattress (bed type) Nutrition Interventions: Document food/fluid/supplement intake Friction and Shear Interventions: Apply protective barrier, creams and emollients

## 2018-09-29 NOTE — ROUTINE PROCESS
. 
Acute HEmodialysis flow sheet Patient information Barbara Vogt MRN: [id number]      UFE:[id number]  [place] UFIS#[id number] Hospital: [place] [address] DX: resp fail []1st Time Acute  []Stat[x] Routine []Urgent []Chronic Unit  
       []Acute Room []Bedside  []ICU/CCU []ER Isolation Precautions: [x]Dialysis[] Airborne []Contact []Droplet []Reverse Special Considerations:    [] Blood Consent Verified  [x]N/A Allergies: 
[] NKA  [] Reviewed No Known Allergies Code Status [x]Full Code [] DNR  []Other Diet: npo Diabetic: []Yes []No 
  
 Hemodialysis Orders Physician: Zeeshan Crabtree Dialyzer Revaclear 300 Duration 4 BFR: 300 DFR:600 Dialysate: K 2 CA 2.5 Na 140 Bicarb 35 Dry Weight: UF Goal: 1000 ml Heparin:  []Bolus   Units    []Hourly  Units      [x]None BP Tx:  []NS  200ml/Bolus    []Other     []N/A  
    Labs: Pre:  Post: [x]N/A Additional Orders: [x]N/A [x]Signed Treatment Consent   [x] Verified   [] Obtained Primary Nurse Report: First initial/ Last Name/Title Pre Dialysis:W Tracie Artis**    Time: 8300 Access CATHETER ACCESS:  [] N/A  [x] RIGHT  [] LEFT  [] IJ  [] SUBCL [] FEM [] First use X-ray  [] Tunnel     [x] Non-Tunneled  
                       [] No S/S infection  [] Redness [x] Drainage  [] Cultured [] Swelling 
                       [] Pain  
                       [] Medical Aseptic [x] Prep Dressing Changed  
                       [] Clotted [x] Patent []      Flows: [] Good [] Poor [] Reversed If Access Problem Dr. Jacy Chew: [] Yes [] No    Date:    [x] N/A  
     GRAFT/FISTULA ACCESS:  [x] N/A  [] RIGHT  [] LEFT  [] UE  [] LE   
                       [] AVG  [] AVF [] BUTTONHOLE  [] +BRUIT/THRILL 
     [] MEDICAL ASEPTIC PREP [] No S/S infection  [] Redness [] Drainage  [] Cultured [] Swelling [] Pain Needle Gauge                              Length: If Access Problem Dr. Mary Sinha: [] Yes [] No    Date:     [] N/A General Assessment LUNGS:  SaO2% 91 [] Clear [x] Coarse [] Crackles [] Wheezing  
            [] Diminished Location: [] RLL [] LLL [] RUL [] JOSSIE   
     COUGH:  [] Productive [] Dry [x] N/A  RESPIRATIONS: [] Easy [x] Labored THERAPY: [] RA   [x] NC 10   L/min    Mask: [] NRB [] Venti  O2%    
             [] Ventilator [] Intubated [] Trach [] BiPap [] CPap CARDIAC: [x] Regular [] Irregular [] Pericardial Rub [] JVD [x] Monitored Rhythm: EDEMA: [] None [x] Generalized [] Facial [] Pedal [] UE [] LE 
           [] Pitting [] 1 [] 2 [] 3 [] 4    [] Right [] Left [] Bilateral  
    SKIN:    [x] Warm [] Hot [] Cold  [] Dry [] Pale [] Diaphoretic  
           [] Flushed [] Jaundiced [] Cyanotic [] Rash [] Weeping LOC:    [] Alert  Oriented to: [] Person [] Place [] Time  
          [] Confused [x] Lethargic [] Medically sedated [] Non-responsive GI/ABDOMEN: [] Flat [x] Distended [] Soft [] Firm [] Obese [] Diarrhea 
 [] Bowel Sounds     []Nausea [] Vomiting PAIN: [x] 0 [] 1 [] 2 [] 3 [] 4 [] 5 [] 6 [] 7 [] 8 [] 9 [] 10 Scale 1-10 Action/Follow Up MOBILITY: [] Amb [] Amb/Assist [x] Bed  [] Wheelchair CUrrent LABS HBsAg ONLY: Date Drawn 9/28/18 [x] Negative [] Positive  [] Unknown. HBsAb: Date Drawn  [] Susceptible <10 [] Immune ? 10 [x] Unknown Date of Current Labs:  
    
Lab Results Component Value Date/Time  Sodium 140 09/28/2018 04:10 PM  
 Potassium 5.6 (H) 09/28/2018 04:10 PM  
 Chloride 99 (L) 09/28/2018 04:10 PM  
 CO2 15 (L) 09/28/2018 04:10 PM  
  (H) 09/28/2018 04:10 PM  
 Creatinine 11.60 (H) 09/28/2018 04:10 PM  
 Calcium 7.5 (L) 09/28/2018 04:10 PM  
 Phosphorus 10.7 (H) 09/27/2018 06:29 AM  
 HCT 32.1 (L) 09/28/2018 07:30 PM  
 HGB 11.0 (L) 09/28/2018 07:30 PM  
 PLATELET 927 75/11/5535 07:30 PM  
 INR 1.3 (H) 09/28/2018 07:30 PM  
  
Education Person Educated: [x] Patient [] Family [] Other Knowledge base: [x] None [] Minimal [] Substantial   
     Barriers to learning patient non verbal  [] None Preferred method of learning: [] Written [] Oral [] Visual [] Hands on Topic: [] Access Care [] S&S of infection [] Fluid Management [] K+ 
               [] Procedural    [] Albumin [] Medications [] Tx Options [] Transplant 
                [] Diet [] Other Teaching Tools: [] Explain [] Demonstration [] Handout [] Teachback Ro/hemodiaylsis machine safety checks- before each treatment Machine Serial #11 [] # 1 [id number] [] # 2 [id number] [] # 3 [id number] [] # 4 [id number] [] # 5 [id number] [] # [phone] [] # [phone] Alarm Test: [x] Pass Time        RO Serial # 11  
[] [id number] (Large dual station RO) [] # 1  [id number]  (Portable # 1) [] # 2  [id number]  (Portable # 2) [] # 3  [id number]  (Portable # 3) [] # 4  [id number]  (Portable # 4) [] # 5  [id number]  (Portable # 5) Lot #s: Dialyzer [id number] exp. Tubing 22a68-2 exp. Saline -jt exp. [x] RO/Machine Log Complete    [x] Extracorporeal circuit Tested for integrity Dialysate: pH 7.4 Temp. 36  Conductivity: Meter 14 HD Machine 14  
chlorine testing- before each treatment and every 4 hours Total Chlorine: [x] Less than 0.1 ppm Time: 830 2nd Check Time: 2941 (If greater than 0.1 ppm from Primary then every 30 minutes from Secondary) treatment Iniation-with dialysis precautions [x] All Connections Secured   [x] Saline Line Double Clamped    
[x] Venous Parameters Set    [x] Arterial Parameters Set    [x] Prime Given 250 ml            [x] Air Foam Detector Engaged Zohaib Nurse Signature/Title_Philip P Long__ Pre-Treatment Time 830 B/P 115/51 HR 94 Temp. 98 Resp Rate 30 Pre Wt UF Calculations: Wt to lose:1000 ml(+) Oral:  ml(+) IV Meds/Fluids/Blood prods  ml(+) Prime/Rinse 500 ml 
(=)Total UF Goal 1500 mL Scale Type:[] Bed scale [] Sling Scale [] Wheel Chair Scale [x]  Not Ordered [] Unable to obtain pt on stretcher Tx Initiation Note: right IJ catheter accessed and treatment started 1030:00  Patient was in respiratory distress, discontinued treatment so patient could be intubated. 1045 Code blue called for patient during attempt to intubate. [x] Time Out/Safety Check  Time: 658 [facility] Signature/Title_Philip P Long INTRADIALYTIC MONITORING 
(SEE ATTACHED FLOWSHEET) POST TREATMENT Time 1045 B/P 76/42 HR 48 Temp 98 Resp. Rate 25 Post wt Time Medication Dose Volume Route Initials [facility] Signatures Title Initials Time 52 [facility] RN PL  1045 [facility] Signature/Title:_Philip P Long_ Dialyzer cleared: [] Good [x] Fair [] Poor    Blood Processed  Liters Net UF Removed 750 mL Post Tx Access: AVF/AVG: Bleeding Stop                   Art. min Shree min []+bruit/thrill Catheter: Locking Solution heparin     Art. 1.4 ml Shree 1.4 ml Post Assessment:  Skin: [x]Warm []Dry []Diaphoretic []Flushed [] Pale []Cyanotic Lungs: []Clear [x]Coarse []Crackles []Wheezing Cardiac: [x]Regular []Irregular  []Monitored rhyth Edema: []None [x]General []Facial []Pedal  []UE []LE []RIGHT []LEFT    []Bilateral  
   Pain: [x]0 []1[]2 []3 []4 []5 []6 []7 []8 []9 []10  
POST Tx Note: Discontinued treatment so patient could be intubated, after discontinued code blue was called during intubation of patient Primary Nurse Report: First initial/Last name/Title Post Dialysis:_SATNAM Loomis_         Time:_1030_ Abbreviations: AVG-arterial venous graft, AVF-arterial venous fistula, IJ-Internal Jugular, 
 Subcl-Subclavian, Fem-Femoral, Tx-treatment, AP/HR-apical heart rate, DFR-dialysate flow rate, BFR-blood flow rate, AP-arterial pressure, -venous pressure, UF-ultrafiltrate, TMP-transmembrane pressure, Shree-Venous, Art-Arterial, RO-Reverse Osmosis

## 2018-09-29 NOTE — PROGRESS NOTES
Nursing assessment complete. Patient alert. Delayed command following 
2005 right neck hematoma continues to grow. Site is soft and  And nontender. 2007 vascular surgery Dr Mary Amador notified. No new orders. Continue to monitor site. Lab results called to Dr Georgette Anguiano 2130 central line dressing saturated. Dressing changed. 0100 central line dressing saturated. Left PIV pulled out. Bed saturated. Dressings applied. 0413 central line dressing and left wrist piv site dressings saturated. Patient cleaned and dressings reapplied. 4270 patient cvl and left wrist site continue to saturate dressings. Dr Becca Lima notified. Orders received. Bedside shift change report given to Rogers (oncoming nurse) by Deneen Booth RN 
 (offgoing nurse). Report included the following information SBAR, MAR and Recent Results.

## 2018-09-29 NOTE — CONSULTS
General Surgery Consult Gabe Mendoza  Admit date: 2018 MRN: [id number]     : 1955     Age: 57 y.o. Attending Physician: Angelita Perales MD, FACS Subjective:  
 
Pablo Arvizu is a 57 y.o. male who is presenting with a complicated medical history. He is currently in the ICU but he was recently admitted with a new onset CVA and was transferred to the nursing home, but brought back today because of abnormal labs and was found to be in acute renal failure with a creatinine of 10 and BUN of 137. Denies abdominal pain, nausea, vomiting, constipation, diarrhea, blood in the stool. Denies any history of fever or chills. Workup so far reveals that the patient has pyelonephritis and is currently being treated with antibiotics. Nephrology is following the patient because of acute renal failure. I spoke to Dr. Joseluis Canela from GI and they are following along. I was consulted to see if there is a need for cholecystectomy. Patient Active Problem List  
 Diagnosis Date Noted  Elevated LFTs 2018  History of stroke 2018  Acute-on-chronic kidney injury ([address] 2018  Acute cystitis 2018  Elevated troponin 2018  Transaminitis 2018  Acute kidney injury ([address] 2018  DM (diabetes mellitus) ([address] 2018  Pneumonia 2018  Stroke (cerebrum) ([address] 09/10/2018  Stroke ([address] 09/10/2018  Rhabdomyolysis 09/10/2018  Dehydration 09/10/2018  Essential hypertension 09/10/2018  Diabetes mellitus type 2, controlled ([address] 09/10/2018  Non compliance w medication regimen 09/10/2018 Past Medical History:  
Diagnosis Date  CHF (congestive heart failure) ([address]  Diabetes ([address]  Stroke ([address] History reviewed. No pertinent surgical history. Social History Substance Use Topics  Smoking status: Never Smoker  Smokeless tobacco: Never Used  Alcohol use No  
  
History Smoking Status  Never Smoker Smokeless Tobacco  
 Never Used History reviewed. No pertinent family history. Current Facility-Administered Medications Medication Dose Route Frequency  metoprolol tartrate (LOPRESSOR) tablet 50 mg  50 mg Oral Q12H  
 0.9% sodium chloride infusion  50 mL/hr IntraVENous DIALYSIS PRN  
 heparin (porcine) 1,000 unit/mL injection 1,000 Units  1,000 Units InterCATHeter DIALYSIS PRN  
 VANCOMYCIN INFORMATION NOTE   Other ONCE  
 insulin lispro (HUMALOG) injection   SubCUTAneous Q6H  
 VANCOMYCIN INFORMATION NOTE   Other Rx Dosing/Monitoring  aspirin (ASPIRIN) tablet 325 mg  325 mg Oral DAILY  senna-docusate (PERICOLACE) 8.6-50 mg per tablet 1 Tab  1 Tab Oral BID PRN  
 melatonin tablet 3 mg  3 mg Oral QHS PRN  
 traMADol (ULTRAM) tablet 50 mg  50 mg Oral Q6H PRN  
 ondansetron (ZOFRAN) injection 4 mg  4 mg IntraVENous Q4H PRN  
 glucose chewable tablet 16 g  4 Tab Oral PRN  
 glucagon (GLUCAGEN) injection 1 mg  1 mg IntraMUSCular PRN  
 dextrose (D50) infusion 12.5-25 g  25-50 mL IntraVENous PRN  
 hydrALAZINE (APRESOLINE) 20 mg/mL injection 20 mg  20 mg IntraVENous Q6H PRN  piperacillin-tazobactam (ZOSYN) 4.5 g in 0.9% sodium chloride 100 mL MBP EXTENDED 4 HOUR INFUSION   4.5 g IntraVENous Q12H  pantoprazole (PROTONIX) 40 mg in sodium chloride 0.9% 10 mL injection  40 mg IntraVENous DAILY No Known Allergies Review of Systems: 
Pertinent items are noted in the History of Present Illness. Objective:  
 
Visit Vitals  /55  Pulse 94  Temp 98 °F (36.7 °C)  Resp 28  Ht 5' 7\" (1.702 m)  Wt 96.6 kg (213 lb)  SpO2 94%  BMI 33.36 kg/m2 Physical Exam:   
 
General:  alert, oriented times 3 and cooperative Eyes:  Jaundice Throat & Neck: no erythema or exudates noted and neck supple and symmetrical; no palpable masses Abdomen:   rounded, soft, diffuse abdominal tenderness with no gaurdaing, distended, no masses or organomegaly. No abdominal wall hernias. Imaging and Lab Review: CBC:  
Lab Results Component Value Date/Time WBC 21.5 (H) 09/28/2018 07:30 PM  
 RBC 3.85 (L) 09/28/2018 07:30 PM  
 HGB 11.0 (L) 09/28/2018 07:30 PM  
 HCT 32.1 (L) 09/28/2018 07:30 PM  
 PLATELET 784 32/06/2312 07:30 PM  
 
BMP:  
Lab Results Component Value Date/Time Glucose 116 (H) 09/28/2018 04:10 PM  
 Sodium 140 09/28/2018 04:10 PM  
 Potassium 5.6 (H) 09/28/2018 04:10 PM  
 Chloride 99 (L) 09/28/2018 04:10 PM  
 CO2 15 (L) 09/28/2018 04:10 PM  
  (H) 09/28/2018 04:10 PM  
 Creatinine 11.60 (H) 09/28/2018 04:10 PM  
 Calcium 7.5 (L) 09/28/2018 04:10 PM  
 
CMP: 
Lab Results Component Value Date/Time Glucose 116 (H) 09/28/2018 04:10 PM  
 Sodium 140 09/28/2018 04:10 PM  
 Potassium 5.6 (H) 09/28/2018 04:10 PM  
 Chloride 99 (L) 09/28/2018 04:10 PM  
 CO2 15 (L) 09/28/2018 04:10 PM  
  (H) 09/28/2018 04:10 PM  
 Creatinine 11.60 (H) 09/28/2018 04:10 PM  
 Calcium 7.5 (L) 09/28/2018 04:10 PM  
 Anion gap 26 (H) 09/28/2018 04:10 PM  
 BUN/Creatinine ratio 13 09/28/2018 04:10 PM  
 Alk. phosphatase 394 (H) 09/28/2018 04:10 PM  
 Protein, total 8.1 09/28/2018 04:10 PM  
 Albumin 1.4 (L) 09/28/2018 04:10 PM  
 Globulin 6.7 (H) 09/28/2018 04:10 PM  
 A-G Ratio 0.2 (L) 09/28/2018 04:10 PM  
 
 
Recent Results (from the past 24 hour(s)) GLUCOSE, POC Collection Time: 09/28/18  8:08 AM  
Result Value Ref Range Glucose (POC) 84 70 - 110 mg/dL POC G3 Collection Time: 09/28/18 10:19 AM  
Result Value Ref Range Device: NASAL CANNULA Flow rate (POC) 3 L/M  
 FIO2 (POC) 0.32 % pH (POC) 7.271 (L) 7.35 - 7.45    
 pCO2 (POC) 39.3 35.0 - 45.0 MMHG  
 pO2 (POC) 79 (L) 80 - 100 MMHG  
 HCO3 (POC) 18.2 (L) 22 - 26 MMOL/L  
 sO2 (POC) 94 92 - 97 % Base deficit (POC) 9 mmol/L Allens test (POC) YES Total resp. rate 18  Site RIGHT RADIAL    
 Patient temp. 97.7 Specimen type (POC) ARTERIAL Performed by Kavitha Van GLUCOSE, POC Collection Time: 09/28/18 11:45 AM  
Result Value Ref Range Glucose (POC) 72 70 - 110 mg/dL GLUCOSE, POC Collection Time: 09/28/18 12:10 PM  
Result Value Ref Range Glucose (POC) 169 (H) 70 - 110 mg/dL GLUCOSE, POC Collection Time: 09/28/18  3:58 PM  
Result Value Ref Range Glucose (POC) 118 (H) 70 - 110 mg/dL METABOLIC PANEL, COMPREHENSIVE Collection Time: 09/28/18  4:10 PM  
Result Value Ref Range Sodium 140 136 - 145 mmol/L Potassium 5.6 (H) 3.5 - 5.5 mmol/L Chloride 99 (L) 100 - 108 mmol/L  
 CO2 15 (L) 21 - 32 mmol/L Anion gap 26 (H) 3.0 - 18 mmol/L Glucose 116 (H) 74 - 99 mg/dL  (H) 7.0 - 18 MG/DL Creatinine 11.60 (H) 0.6 - 1.3 MG/DL  
 BUN/Creatinine ratio 13 12 - 20 GFR est AA 5 (L) >60 ml/min/1.73m2 GFR est non-AA 4 (L) >60 ml/min/1.73m2 Calcium 7.5 (L) 8.5 - 10.1 MG/DL Bilirubin, total 5.8 (H) 0.2 - 1.0 MG/DL  
 ALT (SGPT) 55 16 - 61 U/L  
 AST (SGOT) 116 (H) 15 - 37 U/L Alk. phosphatase 394 (H) 45 - 117 U/L Protein, total 8.1 6.4 - 8.2 g/dL Albumin 1.4 (L) 3.4 - 5.0 g/dL Globulin 6.7 (H) 2.0 - 4.0 g/dL A-G Ratio 0.2 (L) 0.8 - 1.7 PROTHROMBIN TIME + INR Collection Time: 09/28/18  7:30 PM  
Result Value Ref Range Prothrombin time 15.6 (H) 11.5 - 15.2 sec INR 1.3 (H) 0.8 - 1.2    
CBC W/O DIFF Collection Time: 09/28/18  7:30 PM  
Result Value Ref Range WBC 21.5 (H) 4.6 - 13.2 K/uL  
 RBC 3.85 (L) 4.70 - 5.50 M/uL  
 HGB 11.0 (L) 13.0 - 16.0 g/dL HCT 32.1 (L) 36.0 - 48.0 % MCV 83.4 74.0 - 97.0 FL  
 MCH 28.6 24.0 - 34.0 PG  
 MCHC 34.3 31.0 - 37.0 g/dL  
 RDW 14.8 (H) 11.6 - 14.5 % PLATELET 944 255 - 399 K/uL MPV 8.4 (L) 9.2 - 11.8 FL  
 
 
images and reports reviewed Assessment:  
Annette Pham is a 57 y.o. male is presenting with abdominal pain and a picture of liver and renal failure. I do not believe the patient has acute cholecystitis. It should not cause the bilirubin to be that much elevated. Also the patient is very high risk for surgery, so I suggest to get a HIDA scan and if positive for cholecystitis, to proceed with IR cholecystostomy. If the patient cannot tolerate the HIDA scan and the suspicion for cholecystitis still exist, than consider doing IR cholecystostomy tube. Plan: No need for cholecystectomy. He is also very high risk for surgery. Consider the IR cholecystostomy tube if still high suspicion Management of his renal and hepatic failure. Will follow. Please call me if you have any questions (cell phone: [phone]) Signed By: Reece Stanley MD   
 September 29, 2018

## 2018-09-29 NOTE — PROGRESS NOTES
Gastrointestinal Progress Note Patient Name: Orion Figueroa Today's Date: 9/29/2018 Admit Date: 9/26/2018 Subjective:  
 
Orion Figueroa is a 57 y.o. Male with multiple medical problems including recent CVA. He has was brought back to hospital due to acute renal failure. He was noted to have abnormal liver enzymes on admission. He has history of abnormal liver enzymes in the past.  He had negative serologies for chronic liver disease. Liver biopsy in the past noted Steatohepatitis. He also had elevated lipase on admission. He had ct scan with no pancreatitis but noted distended gall bladder and fatty liver. He had ultrasound that noted gall stones with distended gall bladder and wall thickening. Normal CBD. Surgery consulted and recommend not undergoing cholecystectomy at this time. No new problems overnight. No nausea or vomiting. No reports of abdominal pain. He has been having diarrhea and C. Diff ordered. Last colonoscopy was 2011 that was negative. No other complaints. Current Facility-Administered Medications Medication Dose Route Frequency  DOPamine (INTROPIN) 400 mg in dextrose 5% 250 mL infusion  0-10 mcg/kg/min IntraVENous TITRATE  PHENYLephrine (FOUZIA-SYNEPHRINE) 30 mg/250 mL (120 mcg/mL) infusion  magnesium sulfate 2 g/50 ml 2 gram/50 mL (4 %) IVPB premix pgbk  magnesium sulfate 2 g/50 ml IVPB (premix or compounded)  2 g IntraVENous ONCE  
 metoprolol tartrate (LOPRESSOR) tablet 50 mg  50 mg Oral Q12H  
 0.9% sodium chloride infusion  50 mL/hr IntraVENous DIALYSIS PRN  
 heparin (porcine) 1,000 unit/mL injection 1,000 Units  1,000 Units InterCATHeter DIALYSIS PRN  
 insulin lispro (HUMALOG) injection   SubCUTAneous Q6H  
 VANCOMYCIN INFORMATION NOTE   Other Rx Dosing/Monitoring  aspirin (ASPIRIN) tablet 325 mg  325 mg Oral DAILY  senna-docusate (PERICOLACE) 8.6-50 mg per tablet 1 Tab  1 Tab Oral BID PRN  
  melatonin tablet 3 mg  3 mg Oral QHS PRN  
 traMADol (ULTRAM) tablet 50 mg  50 mg Oral Q6H PRN  
 ondansetron (ZOFRAN) injection 4 mg  4 mg IntraVENous Q4H PRN  
 glucose chewable tablet 16 g  4 Tab Oral PRN  
 glucagon (GLUCAGEN) injection 1 mg  1 mg IntraMUSCular PRN  
 dextrose (D50) infusion 12.5-25 g  25-50 mL IntraVENous PRN  
 hydrALAZINE (APRESOLINE) 20 mg/mL injection 20 mg  20 mg IntraVENous Q6H PRN  piperacillin-tazobactam (ZOSYN) 4.5 g in 0.9% sodium chloride 100 mL MBP EXTENDED 4 HOUR INFUSION   4.5 g IntraVENous Q12H  pantoprazole (PROTONIX) 40 mg in sodium chloride 0.9% 10 mL injection  40 mg IntraVENous DAILY Objective:  
 
Physical Exam: 
 
Visit Vitals  /63  Pulse 93  Temp 98 °F (36.7 °C) (Oral)  Resp 20  
 Ht 5' 7\" (1.702 m)  Wt 96.6 kg (213 lb)  SpO2 96%  BMI 33.36 kg/m2 General appearance: alert, cooperative, but not responsive. Abdomen: soft, non-tender. Bowel sounds normal. No masses,  no organomegaly, no rebound or guarding, no ascites. Data Review: 
 
Labs: Results:  
   
Chemistry Recent Labs  
   09/29/18 
 0500  09/28/18 
 1610  09/28/18 
 0440  09/27/18 
 5464 GLU  101*  116*  84  149* NA  145  140  142  141  
K  4.9  5.6*  5.1  5.3 CL  103  99*  98*  98* CO2  17*  15*  18*  17* BUN  95*  150*  143*  137* CREA  8.29*  11.60*  11.10*  10.30* CA  7.1*  7.5*  7.8*  7.2* AGAP  25*  26*  26*  26* BUCR  11*  13  13  13 AP   --   394*  381*  407* TP   --   8.1  8.2  6.9 ALB  1.3*  1.4*  1.3*  1.5*  
GLOB   --   6.7*  6.9*  5.4* AGRAT   --   0.2*  0.2*  0.3* CBC w/Diff Recent Labs  
   09/28/18 
 1930 09/28/18 0440 09/27/18 1955 09/26/18 
 2306 WBC  21.5*  20.9*   --   20.2*  
RBC  3.85*  3.88*   --   4.57* HGB  11.0*  11.3*  12.0*  13.2 HCT  32.1*  33.0*  34.5*  39.0 PLT  385  406   --   409 GRANS   --    --    --   85* LYMPH   --    --    --   8*  
EOS   --    --    --   2  
  
 Coagulation Recent Labs  
   09/29/18 
 1005  09/29/18 
 0829 PTP  17.3*  >75.0* INR  1.4*  >9.2* APTT  60.4*  >180.0* Liver Enzymes Recent Labs  
   09/29/18 
 0500  09/28/18 
 1610 TP   --   8.1 ALB  1.3*  1.4* AP   --   394* SGOT   --   116* ALT   --   55 Assessment: 1. Abnormal liver enzymes: He has abnormal liver enzymes in mixed pattern. He had work up for abnormal liver enzymes in the past that noted steatohepatitis on liver biopsy and other serologies negative. CT and Ultrasound noted cholelthiasis, thickened gall bladder wall and stones. This is most likely cholecystitis in setting of underlying steatohepatitis. No LFTS today. Will continue to follow LFTS. If rising will consider cholecystostomy tube placement 2. Abnormal lipase: elevated lipase but normal appearing pancreas on CT scan. Most likely false elevation due to renal failure. 3. Diarrhea: Reports of diarrhea. C. Diff ordered. 4. Nutrition:  Failed swallow evaluation,  Would consider NG tube placement and NG tube feeds when clinically stable. Recommendation: 1. Follow LFTS. 2. C. Diff ordered, will await those results. 3. Continue supportive care. 4. Will consider cholecystostomy tube placement if LFTs begin to rise. Tatiana Eddy MD 
September 29, 2018

## 2018-09-29 NOTE — PROGRESS NOTES
responded to a [employer] for [employer]	Yony Schafer, who is a 57 y.o.,male. Patients Primary Language is: [place]. According to the patients EMR Bahai Affiliation is: Kasi Kinsey.  
 
The reason the Patient came to the hospital is:  
Patient Active Problem List  
 Diagnosis Date Noted  Cardiac arrest with ventricular fibrillation ([facility] [address] 09/29/2018  Abnormal blood coagulation profile 09/29/2018  Elevated LFTs 09/28/2018  History of stroke 09/27/2018  Acute-on-chronic kidney injury ([address] 09/27/2018  Acute cystitis 09/27/2018  Elevated troponin 09/27/2018  Transaminitis 09/27/2018  Acute kidney injury ([address] 09/27/2018  DM (diabetes mellitus) ([address] 09/14/2018  Pneumonia 09/11/2018  Stroke (cerebrum) ([address] 09/10/2018  Stroke ([address] 09/10/2018  Rhabdomyolysis 09/10/2018  Dehydration 09/10/2018  Essential hypertension 09/10/2018  Diabetes mellitus type 2, controlled ([address] 09/10/2018  Non compliance w medication regimen 09/10/2018 The  provided the following Interventions to the Family: No family present The following outcomes were achieved: N/A Assessment: N/A Plan: 
Chaplains will continue to follow and will provide pastoral care on an as needed/requested basis. Linn Jonas M.Div. , [id number] [address] [facility]: [phone]/[facility]: [phone]

## 2018-09-29 NOTE — PROGRESS NOTES
RENAL PROGRESS NOTE Sandee Lopez Assessment/Plan: · JULIANNA (ischemic atn in a setting of acute pyelonephritis/acute cholecystitis with sepsis). Anuric at this time. Started on dialysis yesterday, 2 nd treatment today with plans to dialyze for 4 hours, pull 2 liters if bp tolerates. Volume overloaded at this time. No heparin with dialysis. Will reassess for need for dialysis tomorrow. · Hypokalemia. Dialysis against low K bath. · HAGMA due to julianna. Dialysis will be addressing. · Severe hyperphosphatemia. Dialysis will be addressing. · HTN. BP is rather low today, hold metoprolol. · Acute pyelonephritis/sepsis. On abx. · Abnormal lft's/acute cholecystitis? On abx. GI on the case. · Bleeding from the site of rt ij temporary dialysis catheter. Will give ddavp. · Recent cva with debilitation/maulutrition. Subjective: 
Seen on dialysis. Patient complaints off: Eyes are open but doesn't respond to questions or follows commands. On high flow O2. Dialyzed yesterday for 2.5 hours only due to discomfort at the site of rt ij dialysis catheter. Anuric. Patient Active Problem List  
Diagnosis Code  Stroke (cerebrum) ([facility]) I63.9  Stroke ([facility] I63.9  Rhabdomyolysis M62.82  
 Dehydration E86.0  
 Essential hypertension I10  
 Diabetes mellitus type 2, controlled ([address] E11.9  Non compliance w medication regimen Z91.14  
 Pneumonia J18.9  DM (diabetes mellitus) ([facility] E11.9  History of stroke Z80.78  
 Acute-on-chronic kidney injury ([address] N17.9, N18.9  Acute cystitis N30.00  Elevated troponin R74.8  Transaminitis R74.0  Acute kidney injury ([address] N17.9  Elevated LFTs R79.89 Current Facility-Administered Medications Medication Dose Route Frequency Provider Last Rate Last Dose  metoprolol tartrate (LOPRESSOR) tablet 50 mg  50 mg Oral Q12H Rosemary Rondon MD   Stopped at 09/28/18 1156  
 0.9% sodium chloride infusion  50 mL/hr IntraVENous DIALYSIS PRN Rosemary Rondon MD      
 heparin (porcine) 1,000 unit/mL injection 1,000 Units  1,000 Units InterCATHeter DIALYSIS PRN Rosemary Rondon MD   1,000 Units at 09/28/18 1332  VANCOMYCIN INFORMATION NOTE   Other ONCE Kvng Perez MD      
 insulin lispro (HUMALOG) injection   SubCUTAneous Q6H Genie Herrera MD      
 VANCOMYCIN INFORMATION NOTE   Other Rx Dosing/Monitoring Vee Gallegos MD      
 aspirin (ASPIRIN) tablet 325 mg  325 mg Oral DAILY [facility] Health[facility]ain, DO   325 mg at 09/28/18 6765  senna-docusate (PERICOLACE) 8.6-50 mg per tablet 1 Tab  1 Tab Oral BID PRN Tova Vargasvers, DO      
 melatonin tablet 3 mg  3 mg Oral QHS PRN Tova Vargasvers, DO      
 traMADol (ULTRAM) tablet 50 mg  50 mg Oral Q6H PRN Tova Vargasvers, DO      
 ondansetron [facility]) injection 4 mg  4 mg IntraVENous Q4H PRN Tova Seymour, DO   4 mg at 09/28/18 0539  
 glucose chewable tablet 16 g  4 Tab Oral PRN Tova Vargasvers, DO      
 glucagon (GLUCAGEN) injection 1 mg  1 mg IntraMUSCular PRN Tova Vargasvers, DO      
 dextrose (D50) infusion 12.5-25 g  25-50 mL IntraVENous PRN [place] McQuain, DO   12.5 g at 09/28/18 1206  hydrALAZINE (APRESOLINE) 20 mg/mL injection 20 mg  20 mg IntraVENous Q6H PRN Chi Pyo, NP   20 mg at 09/28/18 1006  piperacillin-tazobactam (ZOSYN) 4.5 g in 0.9% sodium chloride 100 mL MBP EXTENDED 4 HOUR INFUSION   4.5 g IntraVENous Q12H Kvng Perez MD 25 mL/hr at 09/28/18 2131 4.5 g at 09/28/18 2131  pantoprazole (PROTONIX) 40 mg in sodium chloride 0.9% 10 mL injection  40 mg IntraVENous DAILY Chi Pyo, NP   40 mg at 09/28/18 9928 Objective Vitals:  
 09/29/18 0840 09/29/18 0845 09/29/18 0900 09/29/18 1654 BP: 112/54 113/54 116/55 Pulse: 94 93 98 Resp: 27 26 29 Temp: 98 °F (36.7 °C) TempSrc: Oral     
SpO2: 91% 90% 92% 90% Weight:      
Height:      
 
 
 
Intake/Output Summary (Last 24 hours) at 09/29/18 0915 Last data filed at 09/28/18 2131 Gross per 24 hour Intake              500 ml Output              523 ml Net              -23 ml Admission weight: Weight: 96.6 kg (213 lb) (09/26/18 2244) Last Weight Metrics: 
Weight Loss Metrics 9/26/2018 9/13/2018 Today's Wt 213 lb 227 lb 15.3 oz  
BMI 33.36 kg/m2 35.7 kg/m2 Physical Assessment:  
 
General: no resp distress. Neck: No jvd. Rt ij temporary dialysis catheter in place, dressings are partially soaked with blood. LUNGS: diminished air entry at the bases. No crackles. CVS EXM: S1, S2  RRR. Abdomen: soft, grimaces on deep palpation of ruq, no g/r. Pos bs. Lower Extremities:  trace edema. Lab CBC w/Diff Recent Labs  
   09/28/18 
 1930  09/28/18 
 0440  09/27/18 1955 09/26/18 
 2306 WBC  21.5*  20.9*   --   20.2*  
RBC  3.85*  3.88*   --   4.57* HGB  11.0*  11.3*  12.0*  13.2 HCT  32.1*  33.0*  34.5*  39.0 PLT  385  406   --   409 GRANS   --    --    --   85* LYMPH   --    --    --   8*  
EOS   --    --    --   2 Chemistry Recent Labs  
   09/28/18 
 1610  09/28/18 
 0440  09/27/18 
 8575 GLU  116*  84  149* NA  140  142  141  
K  5.6*  5.1  5.3 CL  99*  98*  98* CO2  15*  18*  17* BUN  150*  143*  137* CREA  11.60*  11.10*  10.30* CA  7.5*  7.8*  7.2* AGAP  26*  26*  26* BUCR  13  13  13 AP  394*  381*  407* TP  8.1  8.2  6.9 ALB  1.4*  1.3*  1.5*  
GLOB  6.7*  6.9*  5.4* AGRAT  0.2*  0.2*  0.3* PHOS   --    --   10.7* No results found for: IRON, FE, TIBC, IBCT, PSAT, FERR Lab Results Component Value Date/Time Calcium 7.5 (L) 09/28/2018 04:10 PM  
 Phosphorus 10.7 (H) 09/27/2018 06:29 AM  
  
 
Serina Vaughn M.D. [facility] Phone [phone] Pager [phone]

## 2018-09-29 NOTE — CONSULTS
[facility] Pulmonary, Critical Care, and Sleep Medicine Name: Reynaldo Aguilar MRN: [id number] : 1955 Hospital: [address] [place] [address] Date of Service: 2018 Critical Care Consult      Consult requesting physician: Dr. Dustin Coombs Reason for Consult: hypoxia HISTORY OF PRESENT ILLNESS: 
 
This 57 y.o.  male is seen in consultation at the request of Dr. Dustin Coombs for recommendations on further evaluation and management of acute hypoxia. Review of the chart, discussion with Dr. Prince Blair, and bedside RN Gianna Bucio) reveals that the patient recently was hospitalized (9/10/2018 - 2018) for stroke earlier this month with neurologic residua (R hemiparesis). Patient discharged from [facility] 9/10/2018 - 2018 to [facility]) on 2018, only to return on 2018 with acute renal failure/abnormal lab values. The patient is seen while on hemodialysis in the ICU. He is on OptiFlow, and his SpO2 91%. He is obtunded and unable to provide clinical history. ABG showed pH 7.3. BiPAP was tried in hopes that NIPPV would improve the patient's oxygenation status. Notably, breath sounds were severely diminished despite BiPAP. During the course of examining the patient, the patient was noted to become comatose and bradycardic. Patient was noted to be sitting in a pool of melanotic stool. Blood pressure fell and SpO2 became undetectable. Dopamine gtt started without significant improvement. Order was given for phenylephrine gtt, but the patient rapidly deteriorated, and CPR had to be initiated prior to starting phenylephrine. CPR initiated at [phone] for VT/VF on monitor. 1 amp epinephrine administered (ordered for hypotension and bradycardia prior to initiation of CPR); chest compression started; 2 grams magnesium sulfate administered. Patient was intubated. ROSC @ 1056.  
  
The patient is critically ill and cannot provide additional history due to Unconsciousness. Review of Systems: 
Review of systems not obtained due to patient factors. No Known Allergies PAST MEDICAL/SOCIAL/FAMILY HISTORIES Past Medical History:  
Diagnosis Date  CHF (congestive heart failure) ([facility]  Diabetes ([facility]  Stroke ([facility] History reviewed. No pertinent surgical history. Social History Substance Use Topics  Smoking status: Never Smoker  Smokeless tobacco: Never Used  Alcohol use No  
  
History reviewed. No pertinent family history. Prior to Admission medications Medication Sig Start Date End Date Taking? Authorizing Provider  
aspirin (ASPIRIN) 325 mg tablet Take 1 Tab by mouth daily. 9/15/18   Rik Sage MD  
atorvastatin (LIPITOR) 80 mg tablet Take 1 Tab by mouth nightly. 9/14/18   Rik Sage MD  
insulin glargine (LANTUS) 100 unit/mL injection 10 units qhs  Indications: type 2 diabetes mellitus 9/15/18   Rik Sage MD  
insulin lispro (HUMALOG) 100 unit/mL injection 4 units with meals 9/14/18   Rik Sage MD  
losartan (COZAAR) 50 mg tablet Take 1 Tab by mouth daily. 9/14/18   Rik Sage MD  
 
Current Facility-Administered Medications Medication Dose Route Frequency  desmopressin (DDAVP) 29 mcg in 0.9% sodium chloride 50 mL IVPB  0.3 mcg/kg IntraVENous ONCE  
 metoprolol tartrate (LOPRESSOR) tablet 50 mg  50 mg Oral Q12H  VANCOMYCIN INFORMATION NOTE   Other ONCE  
 insulin lispro (HUMALOG) injection   SubCUTAneous Q6H  
 VANCOMYCIN INFORMATION NOTE   Other Rx Dosing/Monitoring  aspirin (ASPIRIN) tablet 325 mg  325 mg Oral DAILY  piperacillin-tazobactam (ZOSYN) 4.5 g in 0.9% sodium chloride 100 mL MBP EXTENDED 4 HOUR INFUSION   4.5 g IntraVENous Q12H  pantoprazole (PROTONIX) 40 mg in sodium chloride 0.9% 10 mL injection  40 mg IntraVENous DAILY OBJECTIVE:  
 
Current Facility-Administered Medications Medication Dose Route Frequency  desmopressin (DDAVP) 29 mcg in 0.9% sodium chloride 50 mL IVPB  0.3 mcg/kg IntraVENous ONCE  
 metoprolol tartrate (LOPRESSOR) tablet 50 mg  50 mg Oral Q12H  
 0.9% sodium chloride infusion  50 mL/hr IntraVENous DIALYSIS PRN  
 heparin (porcine) 1,000 unit/mL injection 1,000 Units  1,000 Units InterCATHeter DIALYSIS PRN  
 VANCOMYCIN INFORMATION NOTE   Other ONCE  
 insulin lispro (HUMALOG) injection   SubCUTAneous Q6H  
 VANCOMYCIN INFORMATION NOTE   Other Rx Dosing/Monitoring  aspirin (ASPIRIN) tablet 325 mg  325 mg Oral DAILY  senna-docusate (PERICOLACE) 8.6-50 mg per tablet 1 Tab  1 Tab Oral BID PRN  
 melatonin tablet 3 mg  3 mg Oral QHS PRN  
 traMADol (ULTRAM) tablet 50 mg  50 mg Oral Q6H PRN  
 ondansetron (ZOFRAN) injection 4 mg  4 mg IntraVENous Q4H PRN  
 glucose chewable tablet 16 g  4 Tab Oral PRN  
 glucagon (GLUCAGEN) injection 1 mg  1 mg IntraMUSCular PRN  
 dextrose (D50) infusion 12.5-25 g  25-50 mL IntraVENous PRN  
 hydrALAZINE (APRESOLINE) 20 mg/mL injection 20 mg  20 mg IntraVENous Q6H PRN  piperacillin-tazobactam (ZOSYN) 4.5 g in 0.9% sodium chloride 100 mL MBP EXTENDED 4 HOUR INFUSION   4.5 g IntraVENous Q12H  pantoprazole (PROTONIX) 40 mg in sodium chloride 0.9% 10 mL injection  40 mg IntraVENous DAILY Intake/Output:  
Last shift:        
 
Last 3 shifts: 09/27 1901 - 09/29 0700 In: 500 [I.V.:500] Out: 553 [Urine:30] Intake/Output Summary (Last 24 hours) at 09/29/18 1021 Last data filed at 09/28/18 2131 Gross per 24 hour Intake              500 ml Output              523 ml Net              -23 ml Last 3 Recorded Weights in this Encounter  
 09/26/18 2244 Weight: 96.6 kg (213 lb) Physical Exam: 
Vital Signs:   
Visit Vitals  /63  Pulse 100  Temp 98 °F (36.7 °C) (Oral)  Resp (!) 31  
 Ht 5' 7\" (1.702 m)  Wt 96.6 kg (213 lb)  SpO2 92%  BMI 33.36 kg/m2 O2 Device: Hi flow nasal cannula O2 Flow Rate (L/min): 45 l/min Temp (24hrs), Av.7 °F (36.5 °C), Min:97.5 °F (36.4 °C), Max:98 °F (36.7 °C) General: obtunded Head: atraumatic, normocephalic Eye: PERRLA, EOM intact, no scleral icterus, no pallor, no cyanosis Nose: Nares are patent. No exudate. Throat: No oral thrush. No exudate. Mucous membranes are moist. 
Neck: R IJ temporary HD catheter in situ. R neck hematoma. no thyromegaly, no JVD, no lymphadenopathy. Trachea midline Lung: Symmetric in development and expansion. Diminshed air entry in the R chest. L sided crackles with distant sounds. Heart: Bradycardic. Regular S1, S2 without murmur, rub or gallop. Abdomen: soft, nontender, nondistended. Normoactive bowel sounds. No rebound. No guarding. Extremities: no pedal edema, no cyanosis, no clubbing, 2+ peripheral pulses in DP Lymphatic: no cervical/axillary/inguinal lymphadenopathy Neurologic: Facial symmetry. Spontaneous respirations are intact. Cough and gag intact. Babinski negative. DTR [email or web address], [email or web address], [email or web address], [email or web address]. No cerebellar signs. Gait was not assessed. Skin: No rash or lesion. Data:  
 
Recent Results (from the past 24 hour(s)) GLUCOSE, POC Collection Time: 18 11:45 AM  
Result Value Ref Range Glucose (POC) 72 70 - 110 mg/dL GLUCOSE, POC Collection Time: 18 12:10 PM  
Result Value Ref Range Glucose (POC) 169 (H) 70 - 110 mg/dL GLUCOSE, POC Collection Time: 18  3:58 PM  
Result Value Ref Range Glucose (POC) 118 (H) 70 - 110 mg/dL METABOLIC PANEL, COMPREHENSIVE Collection Time: 18  4:10 PM  
Result Value Ref Range Sodium 140 136 - 145 mmol/L Potassium 5.6 (H) 3.5 - 5.5 mmol/L Chloride 99 (L) 100 - 108 mmol/L  
 CO2 15 (L) 21 - 32 mmol/L Anion gap 26 (H) 3.0 - 18 mmol/L Glucose 116 (H) 74 - 99 mg/dL  (H) 7.0 - 18 MG/DL  Creatinine 11.60 (H) 0.6 - 1.3 MG/DL  
 BUN/Creatinine ratio 13 12 - 20    
 GFR est AA 5 (L) >60 ml/min/1.73m2 GFR est non-AA 4 (L) >60 ml/min/1.73m2 Calcium 7.5 (L) 8.5 - 10.1 MG/DL Bilirubin, total 5.8 (H) 0.2 - 1.0 MG/DL  
 ALT (SGPT) 55 16 - 61 U/L  
 AST (SGOT) 116 (H) 15 - 37 U/L Alk. phosphatase 394 (H) 45 - 117 U/L Protein, total 8.1 6.4 - 8.2 g/dL Albumin 1.4 (L) 3.4 - 5.0 g/dL Globulin 6.7 (H) 2.0 - 4.0 g/dL A-G Ratio 0.2 (L) 0.8 - 1.7 PROTHROMBIN TIME + INR Collection Time: 09/28/18  7:30 PM  
Result Value Ref Range Prothrombin time 15.6 (H) 11.5 - 15.2 sec INR 1.3 (H) 0.8 - 1.2    
CBC W/O DIFF Collection Time: 09/28/18  7:30 PM  
Result Value Ref Range WBC 21.5 (H) 4.6 - 13.2 K/uL  
 RBC 3.85 (L) 4.70 - 5.50 M/uL  
 HGB 11.0 (L) 13.0 - 16.0 g/dL HCT 32.1 (L) 36.0 - 48.0 % MCV 83.4 74.0 - 97.0 FL  
 MCH 28.6 24.0 - 34.0 PG  
 MCHC 34.3 31.0 - 37.0 g/dL  
 RDW 14.8 (H) 11.6 - 14.5 % PLATELET 871 359 - 606 K/uL MPV 8.4 (L) 9.2 - 11.8 FL  
RENAL FUNCTION PANEL Collection Time: 09/29/18  5:00 AM  
Result Value Ref Range Sodium 145 136 - 145 mmol/L Potassium 4.9 3.5 - 5.5 mmol/L Chloride 103 100 - 108 mmol/L  
 CO2 17 (L) 21 - 32 mmol/L Anion gap 25 (H) 3.0 - 18 mmol/L Glucose 101 (H) 74 - 99 mg/dL BUN 95 (H) 7.0 - 18 MG/DL Creatinine 8.29 (H) 0.6 - 1.3 MG/DL  
 BUN/Creatinine ratio 11 (L) 12 - 20 GFR est AA 8 (L) >60 ml/min/1.73m2 GFR est non-AA 7 (L) >60 ml/min/1.73m2 Calcium 7.1 (L) 8.5 - 10.1 MG/DL Phosphorus 8.7 (H) 2.5 - 4.9 MG/DL Albumin 1.3 (L) 3.4 - 5.0 g/dL LIPASE Collection Time: 09/29/18  5:00 AM  
Result Value Ref Range Lipase 3437 (H) 73 - 393 U/L  
GLUCOSE, POC Collection Time: 09/29/18  7:25 AM  
Result Value Ref Range Glucose (POC) 112 (H) 70 - 110 mg/dL PTT Collection Time: 09/29/18  8:29 AM  
Result Value Ref Range aPTT >180.0 (HH) 23.0 - 36.4 SEC PROTHROMBIN TIME + INR Collection Time: 09/29/18  8:29 AM  
Result Value Ref Range Prothrombin time >75.0 (H) 11.5 - 15.2 sec INR >9.2 (HH) 0.8 - 1.2 FIBRINOGEN Collection Time: 09/29/18  8:29 AM  
Result Value Ref Range Fibrinogen >1200 (H) 210 - 451 mg/dL D DIMER Collection Time: 09/29/18  8:29 AM  
Result Value Ref Range D DIMER 11.08 (H) <0.46 ug/ml(FEU) Lab Results Component Value Date/Time Color RED 09/27/2018 12:29 AM  
 Appearance CLOUDY 09/27/2018 12:29 AM  
 Specific gravity 1.010 09/27/2018 12:29 AM  
 pH (UA) 9.0 (H) 09/27/2018 12:29 AM  
 Protein >300 (A) 09/27/2018 12:29 AM  
 Glucose 100 (A) 09/27/2018 12:29 AM  
 Ketone >80 (A) 09/27/2018 12:29 AM  
 Bilirubin LARGE (A) 09/27/2018 12:29 AM  
 Urobilinogen >8.0 (H) 09/27/2018 12:29 AM  
 Nitrites POSITIVE (A) 09/27/2018 12:29 AM  
 Leukocyte Esterase LARGE (A) 09/27/2018 12:29 AM  
 Epithelial cells 3+ 09/27/2018 12:29 AM  
 Bacteria 4+ (A) 09/27/2018 12:29 AM  
 WBC TOO NUMEROUS TO COUNT 09/27/2018 12:29 AM  
 RBC TOO NUMEROUS TO COUNT 09/27/2018 12:29 AM  
 
Telemetry: sinus bradycardia Imaging: 
[x] I have personally reviewed the patients radiographs 
[] Radiographs reviewed with radiologist 
[] No CXR study available for review today 
[] No change from prior study, tubes and lines are in adequate position 
[] Improved   []Worsening Xr Chest Keysha Gale Result Date: 9/28/2018 IMPRESSION: Right jugular line tip projects at the cavoatrial junction. No pneumothorax. Persistent CHF/fluid overload and right perihilar atelectasis. ASSESSMENT:  
Principal Problem: 
  Cardiac arrest with ventricular fibrillation ([facility] (9/29/2018) Overview: ROSC before defibrillation could be attempted. Active Problems: 
  Acute-on-chronic kidney injury ([facility] (9/27/2018) Abnormal blood coagulation profile (9/29/2018) Diabetes mellitus type 2, controlled ([facility] (9/10/2018) Elevated LFTs (9/28/2018) History of stroke (9/27/2018) Overview: With residual R hemiparesis Essential hypertension (9/10/2018) Acute cystitis (9/27/2018) Elevated troponin (9/27/2018) Transaminitis (9/27/2018) Acute kidney injury ([facility] (9/27/2018) CODE STATUS: FULL CODE PLAN/RECOMMENDATIONS:  
· Admit to ICU · Repeat coagulation studies, CBC, BMP/renal profile · Continue Zosyn/vancomycin · HD was interrrupted for CPR. Will obtain CT head/neck/chest (including CTA of the chest to rule out PE). Will request HD after CT imaging. · Ventilator bundle. ABG 30 min after intubation and then daily. CXR daily. Vent settings will be adjusted based on ABG results. No sedation for now to allow for frequent neurologic assessment. · Place OGT · Nutrition:  Nepro 
· PT/OT eval and treat · GI Prophylaxis with Protonix · DVT Prophylaxis with SCDs · Further recommendations will be based on the patient's response to recommended treatment and results of the investigation ordered. The patient is: [x] acutely ill Risk of deterioration: [] moderate [x] critically ill  [x] high My assessment, plan of care, findings, medications, side effects, etc were discussed with: 
[x] Nurse [] PT/OT [x] Respiratory therapy []    
[] Family [x] Patient: answered all questions to satisfaction  
[] Pharmacist [] [x] Case & management strategies discussed today on multidisciplinary rounds 
 
[x] Total critical care time spent: 150 minutes, reviewing the case, medical record, data, notes, EMR, patient examination, documentation, coordinating care with nurse/consultants, exclusive of procedures (with complex decision making performed and > 50% time spent in face to face evaluation). Adrianne Dozier MD 
Board Certified by the [facility], Kristin Nur 9/29/2018

## 2018-09-29 NOTE — PROGRESS NOTES
Internal Medicine Progress Note Patient's Name: Fina Shaver Admit Date: 9/26/2018 Length of Stay: 2 Assessment/Plan Active Hospital Problems Diagnosis Date Noted  Cardiac arrest with ventricular fibrillation ([facility] [address] 09/29/2018 ROSC before defibrillation could be attempted.  Abnormal blood coagulation profile 09/29/2018  Elevated LFTs 09/28/2018  History of stroke 09/27/2018 With residual R hemiparesis  Acute-on-chronic kidney injury ([facility] [address] 09/27/2018  Acute cystitis 09/27/2018  Elevated troponin 09/27/2018  Transaminitis 09/27/2018  Acute kidney injury ([facility] [address] 09/27/2018  Essential hypertension 09/10/2018  Diabetes mellitus type 2, controlled ([facility] 09/10/2018  
 
- Vent mgmt per ICU 
- CDiff ordered per GI 
- Consider cholecystostomy tube if LFTs rise 
- HD mgmt per nephro, unable to UF much thus far due to complications - Cont broad spec IVAB 
- Trend CBC 
- F/u cult - Stop ASA due to hematuria - May need urology if doesn't stop 
- Cont acceptable home medications for chronic conditions  
- DVT protocol I have personally reviewed all pertinent labs and films that have officially resulted over the last 24 hours. I have personally checked for all pending labs that are awaiting final results. Subjective Pt s/e @ bedside Required emergent intubation due to resp compromise during dialysis On dopamine/phenyephrine Hematuria Unable to assess ROS Objective Visit Vitals  /60  Pulse 90  Temp (!) 36.2 °F (2.3 °C)  Resp 20  
 Ht 5' 7\" (1.702 m)  Wt 96.6 kg (213 lb)  SpO2 94%  BMI 33.36 kg/m2 Physical Exam: 
General Appearance: Intubated and sedated HENT: normocephalic/atraumatic, + ETT Neck: No JVD, hematoma R side where TDC is 
Lungs: B/L crackles, vent-assisted BS 
CV: RRR, no m/r/g Abdomen: soft, non-tender, normal bowel sounds Extremities: no cyanosis, no peripheral edema Neuro: Sedated, unresponsive Skin: Normal color, intact Intake and Output: 
Current Shift:  09/29 0701 - 09/29 1900 In: -  
Out: 300 Last three shifts:  09/27 1901 - 09/29 0700 In: 500 [I.V.:500] Out: 553 [Urine:30] Lab/Data Reviewed: 
CMP:  
Lab Results Component Value Date/Time  09/29/2018 11:10 AM  
 K 4.4 09/29/2018 11:10 AM  
  09/29/2018 11:10 AM  
 CO2 20 (L) 09/29/2018 11:10 AM  
 AGAP 21 (H) 09/29/2018 11:10 AM  
 GLU 88 09/29/2018 11:10 AM  
 BUN 56 (H) 09/29/2018 11:10 AM  
 CREA 5.28 (H) 09/29/2018 11:10 AM  
 GFRAA 13 (L) 09/29/2018 11:10 AM  
 GFRNA 11 (L) 09/29/2018 11:10 AM  
 CA 7.9 (L) 09/29/2018 11:10 AM  
 PHOS 8.7 (H) 09/29/2018 05:00 AM  
 ALB 1.3 (L) 09/29/2018 05:00 AM  
 
CBC:  
Lab Results Component Value Date/Time WBC 28.4 (H) 09/29/2018 11:10 AM  
 HGB 9.6 (L) 09/29/2018 11:10 AM  
 HCT 28.0 (L) 09/29/2018 11:10 AM  
  09/29/2018 11:10 AM  
 
 
Imaging Reviewed: 
[facility] Result Date: 9/29/2018 EXAM: One view chest x-ray CLINICAL INDICATION/HISTORY: Endotracheal tube placement COMPARISON: 9/28/2018 TECHNIQUE: Single AP view of the chest was obtained. _______________ FINDINGS: HEART, VESSELS, MEDIASTINUM: Heart is enlarged, but stable. There is a mild degree of vascular congestion. Persistent airspace opacities in the right mid and left lower lung. There is atherosclerosis of the thoracic aorta. LUNGS, PLEURAL SPACES: The lungs are clear. No effusion or pneumothorax. BONY THORAX, SOFT TISSUES: Endotracheal tube tip is 2.9 cm above the billy. Right internal jugular catheter in stable position. MEDICAL SUPPORT DEVICES: None. _______________ IMPRESSION: 1. Endotracheal tube tip in satisfactory position. 2. Persistent findings of fluid overload with either edema, atelectasis, or infiltrates at the right mid and left lower lung. Medications Reviewed: 
Current Facility-Administered Medications Medication Dose Route Frequency  DOPamine (INTROPIN) 400 mg in dextrose 5% 250 mL infusion  0-10 mcg/kg/min IntraVENous TITRATE  PHENYLephrine (PF)(FOUZIA-SYNEPHRINE) 30 mg in 0.9% sodium chloride 250 mL infusion   mcg/min IntraVENous TITRATE  albuterol (PROVENTIL VENTOLIN) nebulizer solution 2.5 mg  2.5 mg Nebulization Q6H RT  
 metoprolol tartrate (LOPRESSOR) tablet 50 mg  50 mg Oral Q12H  
 0.9% sodium chloride infusion  50 mL/hr IntraVENous DIALYSIS PRN  
 heparin (porcine) 1,000 unit/mL injection 1,000 Units  1,000 Units InterCATHeter DIALYSIS PRN  
 insulin lispro (HUMALOG) injection   SubCUTAneous Q6H  
 VANCOMYCIN INFORMATION NOTE   Other Rx Dosing/Monitoring  aspirin (ASPIRIN) tablet 325 mg  325 mg Oral DAILY  senna-docusate (PERICOLACE) 8.6-50 mg per tablet 1 Tab  1 Tab Oral BID PRN  
 melatonin tablet 3 mg  3 mg Oral QHS PRN  
 traMADol (ULTRAM) tablet 50 mg  50 mg Oral Q6H PRN  
 ondansetron (ZOFRAN) injection 4 mg  4 mg IntraVENous Q4H PRN  
 glucose chewable tablet 16 g  4 Tab Oral PRN  
 glucagon (GLUCAGEN) injection 1 mg  1 mg IntraMUSCular PRN  
 dextrose (D50) infusion 12.5-25 g  25-50 mL IntraVENous PRN  
 hydrALAZINE (APRESOLINE) 20 mg/mL injection 20 mg  20 mg IntraVENous Q6H PRN  piperacillin-tazobactam (ZOSYN) 4.5 g in 0.9% sodium chloride 100 mL MBP EXTENDED 4 HOUR INFUSION   4.5 g IntraVENous Q12H  pantoprazole (PROTONIX) 40 mg in sodium chloride 0.9% 10 mL injection  40 mg IntraVENous DAILY Pablito Gandhi DO Internal Medicine, Hospitalist 
Pager: [phone] [facility]

## 2018-09-29 NOTE — PROGRESS NOTES
Problem: Falls - Risk of 
Goal: *Absence of Falls Document Aaron Rob Fall Risk and appropriate interventions in the flowsheet. Outcome: Progressing Towards Goal 
Fall Risk Interventions: 
Mobility Interventions: Bed/chair exit alarm Mentation Interventions: Evaluate medications/consider consulting pharmacy Medication Interventions: Evaluate medications/consider consulting pharmacy, Patient to call before getting OOB Elimination Interventions: Patient to call for help with toileting needs

## 2018-09-29 NOTE — PROGRESS NOTES
Pharmacy Dosing Services: Vancomycin Indication: Sepsis Day of therapy: 2 Other Antimicrobials (Include dose, start day & day of therapy): 
Zosyn 4.5 gm IV E.I. q12h (started ) Loading dose (date given): 2500mg on  Current Maintenance dose: Dosing per level with intermittent HD Goal Vancomycin Level: 15-20 
(Trough 15-20 for most infections, 20 for meningitis/osteomyelitis, pre-HD level ~25) Vancomycin Level (if drawn):  
 - : 27.2 Significant Cultures:  
 - blood - pending  - urine - pending Renal function stable? (unstable defined as SCr increase of 0.5 mg/dL or > 50% increase from baseline, whichever is greater) (Y/N): N baseline SCr ~0.8, currently 10.10 CAPD, Hemodialysis or Renal Replacement Therapy (Y/N): Y Recent Labs  
   18 1110  18 
 0500  18 
 1930  18 
 1610  18 
 0440 CREA  5.28*  8.29*   --   11.60*  11.10* BUN  56*  95*   --   150*  143* WBC  28.4*   --   21.5*   --   20.9* Temp (24hrs), Av °F (19.4 °C), Min:35.8 °F (2.1 °C), Max:98 °F (36.7 °C) Creatinine Clearance (Creatinine Clearance (ml/min)): 15.7 mL/min Regimen assessment: random level 27.2 OK for now pending next HD Maintenance dose: Dosing per levels with intermittent HD Next scheduled level: Random on  at 0400 Pharmacy will follow daily and adjust medications as appropriate for renal function and/or serum levels.  
 
Thank you, 
Roe Garcia, PHD

## 2018-09-29 NOTE — PROGRESS NOTES
Physical Exam  
Skin:  
 
  
  Primary Nurse Yakelin Acosta RN and Hernesto eSna RN performed a dual skin assessment on this patient .

## 2018-09-29 NOTE — ROUTINE PROCESS
Bedside and Verbal shift change report given to [address] [place] [address] (oncoming nurse) by Giuseppe Garcia RN (offgoing nurse). Report included the following information SBAR, Kardex, Intake/Output, MAR, Recent Results, Med Rec Status and Cardiac Rhythm SR/ST.

## 2018-09-30 PROBLEM — R65.21 SEPTIC SHOCK (HCC): Status: ACTIVE | Noted: 2018-01-01

## 2018-09-30 PROBLEM — A41.9 SEPTIC SHOCK (HCC): Status: ACTIVE | Noted: 2018-01-01

## 2018-09-30 PROBLEM — I67.89 ISCHEMIC ENCEPHALOPATHY: Status: ACTIVE | Noted: 2018-01-01

## 2018-09-30 NOTE — PROGRESS NOTES
0800-Received pt resting in bed with eyes closed, vss on optiflow, pt nods and follows simple commands at times, will continue to monitor 
0900-Dialysis nurse at bedside to start dialysis 1000-Pt needing increasing amounts of oxygen, pt becoming less responsive to stimulation, physician aware 1030-Pt to be placed on BIPAP per physician and blood gas to be drawn 1051-Code blue (see code blue navigator) 1056-ROSC and pt intubated, pressors started, pt unresponsive, pupils 2mm and fixed, no withdrawal to stimuli 
1400-Continue to titrate pressors for MAP goal, family updated via phone on pt status 1600-Family at bedside, pt vss on pressors on ventilator 1737-Pt taken to CT via bed with RT and 2 RNs, tolerated well 1824-Pt returned from CT via bed, tolerated well

## 2018-09-30 NOTE — PROGRESS NOTES
RENAL PROGRESS NOTE Reynaldo Aguilar Assessment/Plan: · Dialysis dependant JULIANNA (ischemic atn in a setting of acute pyelonephritis/acute cholecystitis with sepsis and cardiac arrest 9/29). Next dialysis tomorrow. Minimize intake, volume overloaded at this time. · Hyperkalemia. Improved with dialysis. · HAGMA due to julianna. Improved with dialysis. · Severe hyperphosphatemia. Improved. · S/p cardiopulm arrest 9/29. Remains on pressors. · Acute pyelonephritis/sepsis. On abx. · Abnormal lft's/acute cholecystitis? On abx. GI on the case. · Oozing from the site of rt ij temporary dialysis catheter and other iv sites. Received ddavp with some improvement. · Rectal bleeding 9/29. GI on the case. · Acute blood loss anemia. · Resp failure. · Recent cva with debilitation/maulutrition. Subjective: 
Yesterday 2.5 hours into dialysis patient had cardiopulm arrest, was resuscitated, intubated. Currently on pressors. Patient Active Problem List  
Diagnosis Code  Stroke (cerebrum) ([facility]) I63.9  Stroke ([facility] [address] I63.9  Rhabdomyolysis M62.82  
 Dehydration E86.0  
 Essential hypertension I10  
 Diabetes mellitus type 2, controlled ([facility] [address] E11.9  Non compliance w medication regimen Z91.14  
 Pneumonia J18.9  DM (diabetes mellitus) ([facility] [address] E11.9  History of stroke Z80.78  
 Acute-on-chronic kidney injury ([facility] [address] N17.9, N18.9  Acute cystitis N30.00  Elevated troponin R74.8  Transaminitis R74.0  Acute kidney injury ([facility] [address] N17.9  Elevated LFTs R79.89  Cardiac arrest with ventricular fibrillation ([facility]) I46.9, I49.01  
 Abnormal blood coagulation profile R79.1  Acute pancreatitis K85.90 Current Facility-Administered Medications Medication Dose Route Frequency Provider Last Rate Last Dose  insulin lispro (HUMALOG) injection   SubCUTAneous Q6H Saeed Mcdonald DO      
 albuterol (PROVENTIL VENTOLIN) nebulizer solution 2.5 mg  2.5 mg Nebulization Q6H RT Gaby Clay MD   2.5 mg at 09/30/18 2728  DOPamine (INTROPIN) 400 mg in dextrose 5% 250 mL infusion  0-15 mcg/kg/min IntraVENous TITRATE Gaby Clay MD 18.1 mL/hr at 09/30/18 0546 5 mcg/kg/min at 09/30/18 0546  PHENYLephrine (PF)(FOUZIA-SYNEPHRINE) 30 mg in 0.9% sodium chloride 250 mL infusion   mcg/min IntraVENous TITRATE Gaby Clay MD 25 mL/hr at 09/30/18 0329 50 mcg/min at 09/30/18 0329  
 0.9% sodium chloride infusion  999 mL/hr IntraVENous ONCE Gaby Clay MD   Stopped at 09/29/18 2300  
 metoprolol tartrate (LOPRESSOR) tablet 50 mg  50 mg Oral Q12H Shaji Shah MD   Stopped at 09/28/18 1156  
 0.9% sodium chloride infusion  50 mL/hr IntraVENous DIALYSIS PRN Shaji Shah MD      
 heparin (porcine) 1,000 unit/mL injection 1,000 Units  1,000 Units InterCATHeter DIALYSIS PRN Shaji Shah MD   1,000 Units at 09/28/18 1332  VANCOMYCIN INFORMATION NOTE   Other Rx Dosing/Monitoring William Hawthorne MD      
 senna-docusate (PERICOLACE) 8.6-50 mg per tablet 1 Tab  1 Tab Oral BID PRN Kellee Killbuck, DO      
 melatonin tablet 3 mg  3 mg Oral QHS PRN Kellee Killbuck, DO      
 traMADol (ULTRAM) tablet 50 mg  50 mg Oral Q6H PRN Kellee Killbuck, DO      
 ondansetron [facility]) injection 4 mg  4 mg IntraVENous Q4H PRN Kellee Killbuck, DO   4 mg at 09/28/18 0539  
 glucose chewable tablet 16 g  4 Tab Oral PRN Kellee Killbuck, DO      
 glucagon (GLUCAGEN) injection 1 mg  1 mg IntraMUSCular PRN Kellee Killbuck, DO      
 dextrose (D50) infusion 12.5-25 g  25-50 mL IntraVENous PRN Glee Holes McQuain, DO   12.5 g at 09/28/18 1206  hydrALAZINE (APRESOLINE) 20 mg/mL injection 20 mg  20 mg IntraVENous Q6H PRN Ailyn Paul NP   20 mg at 09/28/18 1006  piperacillin-tazobactam (ZOSYN) 4.5 g in 0.9% sodium chloride 100 mL MBP EXTENDED 4 HOUR INFUSION   4.5 g IntraVENous Q12H Lina Winston MD   Stopped at 09/30/18 0200  pantoprazole (PROTONIX) 40 mg in sodium chloride 0.9% 10 mL injection  40 mg IntraVENous DAILY Vasu Welch NP   40 mg at 09/30/18 5903 Objective Vitals:  
 09/30/18 0730 09/30/18 0800 09/30/18 0816 09/30/18 0830 BP: 154/61 149/54  (!) 161/141 Pulse: 87 88 88 89 Resp: 21 20 20 20 Temp:      
TempSrc:      
SpO2: 100% 100% 99% 99% Weight:      
Height:      
 
 
 
Intake/Output Summary (Last 24 hours) at 09/30/18 5586 Last data filed at 09/30/18 0800 Gross per 24 hour Intake          2576.46 ml Output              300 ml Net          2276.46 ml Admission weight: Weight: 96.6 kg (213 lb) (09/26/18 2244) Last Weight Metrics: 
Weight Loss Metrics 9/30/2018 9/26/2018 9/13/2018 Today's Wt 207 lb 10.8 oz - 227 lb 15.3 oz  
BMI - 32.53 kg/m2 35.7 kg/m2 Physical Assessment:  
 
General: intubated, eyes are open, doesn't follow commands. Neck: No jvd. Rt ij temporary dialysis catheter in place, dressings are clean. LUNGS: diminished air entry at the bases. No crackles. CVS EXM: S1, S2  RRR. Abdomen: soft, pos bs. Lower Extremities:  1+ edema. Lab CBC w/Diff Recent Labs  
   09/30/18 
 0418  09/29/18 1110  09/28/18 
 1930 WBC  22.6*  28.4*  21.5*  
RBC  2.78*  3.27*  3.85* HGB  7.8*  9.6*  11.0*  
HCT  22.8*  28.0*  32.1*  
PLT  315  385  385 GRANS  80*   --    --   
LYMPH  7*   --    --   
EOS  0   --    --   
  
 
Chemistry Recent Labs  
   09/30/18 
 0400  09/29/18 1110  09/29/18 
 0500   09/28/18 
 1610  09/28/18 
 0440 GLU  186*  88  101*   --   116*  84 NA  141  142  145   --   140  142  
K  4.4  4.4  4.9   --   5.6*  5.1 CL  102  101  103   --   99*  98* CO2  21  20*  17*   --   15*  18* BUN  71*  56*  95*   --   150*  143* CREA  6.27*  5.28*  8.29*   --   11.60*  11.10* CA  6.6*  7.9*  7.1*   --   7.5*  7.8* AGAP  18  21*  25*   --   26*  26* BUCR  11*  11*  11*   --   13  13 AP   --    --    --    --   394*  381* TP   --    --    --    --   8.1  8.2 ALB  1.2*   --   1.3*   --   1.4*  1.3*  
GLOB   --    --    --    --   6.7*  6.9* AGRAT   --    --    --    --   0.2*  0.2* PHOS  7.6*  8.5*  8.7*   < >   --    --   
 < > = values in this interval not displayed. No results found for: IRON, FE, TIBC, IBCT, PSAT, FERR Lab Results Component Value Date/Time Calcium 6.6 (L) 09/30/2018 04:00 AM  
 Phosphorus 7.6 (H) 09/30/2018 04:00 AM  
  
 
Emma Marquez M.D. [facility] Phone [phone] Pager [phone]

## 2018-09-30 NOTE — PROGRESS NOTES
Gastrointestinal Progress Note Patient Name: Reynaldo Aguilar Today's Date: 9/30/2018 Admit Date: 9/26/2018 Subjective:  
 
Reynaldo Aguilar is a 57 y.o. Male with multiple medical problems including recent CVA. He has was brought back to hospital due to acute renal failure. He was noted to have abnormal liver enzymes on admission. He has history of abnormal liver enzymes in the past.  He had negative serologies for chronic liver disease. Liver biopsy in the past noted Steatohepatitis. He also had elevated lipase on admission. He had ct scan with no pancreatitis but noted distended gall bladder and fatty liver. He had ultrasound that noted gall stones with distended gall bladder and wall thickening. Normal CBD. Surgery consulted and recommend not undergoing cholecystectomy at this time. He had respiratory distress and was intubated yesterday. No nausea or vomiting. No reports of abdominal pain. He has been having diarrhea and C. Diff and is negative. Last colonoscopy was 2011 that was negative. No other complaints. Current Facility-Administered Medications Medication Dose Route Frequency  insulin lispro (HUMALOG) injection   SubCUTAneous Q6H  
 albuterol (PROVENTIL VENTOLIN) nebulizer solution 2.5 mg  2.5 mg Nebulization Q6H RT  
 DOPamine (INTROPIN) 400 mg in dextrose 5% 250 mL infusion  0-15 mcg/kg/min IntraVENous TITRATE  PHENYLephrine (PF)(FOUZIA-SYNEPHRINE) 30 mg in 0.9% sodium chloride 250 mL infusion   mcg/min IntraVENous TITRATE  
 0.9% sodium chloride infusion  999 mL/hr IntraVENous ONCE  
 metoprolol tartrate (LOPRESSOR) tablet 50 mg  50 mg Oral Q12H  
 0.9% sodium chloride infusion  50 mL/hr IntraVENous DIALYSIS PRN  
 heparin (porcine) 1,000 unit/mL injection 1,000 Units  1,000 Units InterCATHeter DIALYSIS PRN  
 VANCOMYCIN INFORMATION NOTE   Other Rx Dosing/Monitoring  senna-docusate (PERICOLACE) 8.6-50 mg per tablet 1 Tab  1 Tab Oral BID PRN  
 melatonin tablet 3 mg  3 mg Oral QHS PRN  
 traMADol (ULTRAM) tablet 50 mg  50 mg Oral Q6H PRN  
 ondansetron (ZOFRAN) injection 4 mg  4 mg IntraVENous Q4H PRN  
 glucose chewable tablet 16 g  4 Tab Oral PRN  
 glucagon (GLUCAGEN) injection 1 mg  1 mg IntraMUSCular PRN  
 dextrose (D50) infusion 12.5-25 g  25-50 mL IntraVENous PRN  
 hydrALAZINE (APRESOLINE) 20 mg/mL injection 20 mg  20 mg IntraVENous Q6H PRN  piperacillin-tazobactam (ZOSYN) 4.5 g in 0.9% sodium chloride 100 mL MBP EXTENDED 4 HOUR INFUSION   4.5 g IntraVENous Q12H  pantoprazole (PROTONIX) 40 mg in sodium chloride 0.9% 10 mL injection  40 mg IntraVENous DAILY Objective:  
 
Physical Exam: 
 
Visit Vitals  BP (!) 161/141  Pulse 89  Temp 98.6 °F (37 °C)  Resp 20  
 Ht 5' 7\" (1.702 m)  Wt 94.2 kg (207 lb 10.8 oz)  SpO2 99%  BMI 32.53 kg/m2 General appearance: alert, cooperative, but not responsive. Abdomen: soft, non-tender. Bowel sounds normal. No masses,  no organomegaly, no rebound or guarding, no ascites. Data Review: 
 
Labs: Results:  
   
Chemistry Recent Labs  
   09/30/18 
 0400  09/29/18 1110  09/29/18 
 0500  09/28/18 
 1610  09/28/18 
 0440 GLU  186*  88  101*  116*  84 NA  141  142  145  140  142  
K  4.4  4.4  4.9  5.6*  5.1 CL  102  101  103  99*  98* CO2  21  20*  17*  15*  18* BUN  71*  56*  95*  150*  143* CREA  6.27*  5.28*  8.29*  11.60*  11.10* CA  6.6*  7.9*  7.1*  7.5*  7.8* AGAP  18  21*  25*  26*  26* BUCR  11*  11*  11*  13  13 AP   --    --    --   394*  381* TP   --    --    --   8.1  8.2 ALB  1.2*   --   1.3*  1.4*  1.3*  
GLOB   --    --    --   6.7*  6.9* AGRAT   --    --    --   0.2*  0.2* CBC w/Diff Recent Labs  
   09/30/18 
 0418  09/29/18 1110  09/28/18 
 1930 WBC  22.6*  28.4*  21.5*  
RBC  2.78*  3.27*  3.85* HGB  7.8*  9.6*  11.0*  
HCT  22.8*  28.0*  32.1*  
PLT  315  385  385 GRANS  80*   --    --   
 LYMPH  7*   --    --   
EOS  0   --    --   
  
Coagulation Recent Labs  
   09/29/18 1110  09/29/18 
 1005  09/29/18 
 8473 PTP   --   17.3*  >75.0* INR   --   1.4*  >9.2* APTT  59.3*  60.4*  >180.0* Liver Enzymes Recent Labs  
   09/30/18 
 0400   09/28/18 
 1610 TP   --    --   8.1 ALB  1.2*   < >  1.4* AP   --    --   394* SGOT   --    --   116* ALT   --    --   55  
 < > = values in this interval not displayed. Assessment: 1. Abnormal liver enzymes: He has abnormal liver enzymes in mixed pattern. He had work up for abnormal liver enzymes in the past that noted steatohepatitis on liver biopsy and other serologies negative. CT and Ultrasound noted cholelthiasis, thickened gall bladder wall and stones. This is most likely cholecystitis in setting of underlying steatohepatitis. LFTS yesterday afternoon appear stable. Will continue to follow LFTS. If rising will consider cholecystostomy tube placement. With hypotension may see a rise due to ischemic hepatopathy over next few days. 2. Abnormal lipase: elevated lipase but normal appearing pancreas on CT scan. Most likely false elevation due to renal failure. 3. Diarrhea: OG tube in place, would consider tube fees when deemed appropriate. Recommendation: 1. Follow LFTS. 2. Consider Tube feeds when appropriate. 3. Continue supportive care. 4. Will consider cholecystostomy tube placement if LFTs begin to rise. Noel De Leon MD 
September 30, 2018

## 2018-09-30 NOTE — PROGRESS NOTES
Internal Medicine Progress Note Patient's Name: Yina Terrazas Admit Date: 9/26/2018 Length of Stay: 3 Assessment/Plan Active Hospital Problems Diagnosis Date Noted  Cardiac arrest with ventricular fibrillation ([facility] 09/29/2018 ROSC before defibrillation could be attempted.  Abnormal blood coagulation profile 09/29/2018  Acute pancreatitis 09/29/2018 Shock, leukocytosis, elevated lipase but radiographically no ductal dilatation in pancreas. GB stones without radiographic stigmata of acute cholecystitis  Elevated LFTs 09/28/2018  History of stroke 09/27/2018 With residual R hemiparesis  Acute-on-chronic kidney injury ([facility] 09/27/2018  Acute cystitis 09/27/2018  Elevated troponin 09/27/2018  Transaminitis 09/27/2018  [facility] 09/27/2018  Essential hypertension 09/10/2018  Diabetes mellitus type 2, controlled ([facility] 09/10/2018  
 
- Vent mgmt per ICU 
- CDiff negative - Consider cholecystostomy tube if LFTs rise (LFTs pending this AM) 
- HD mgmt per nephro - Cont broad spec IVAB 
- Trend CBC 
- F/u cult - Appreciate consulting services - Cont acceptable home medications for chronic conditions  
- DVT protocol I have personally reviewed all pertinent labs and films that have officially resulted over the last 24 hours. I have personally checked for all pending labs that are awaiting final results. Subjective Pt s/e @ bedside Remains intubated Remains on dopamine/phenyephrine Unable to assess ROS Objective Visit Vitals  /50  Pulse 92  Temp 98.6 °F (37 °C)  Resp 17  Ht 5' 7\" (1.702 m)  Wt 94.2 kg (207 lb 10.8 oz)  SpO2 100%  BMI 32.53 kg/m2 Physical Exam: 
General Appearance: Intubated, not following commands HENT: normocephalic/atraumatic, + ETT Neck: No JVD, hematoma R side where TDC is 
Lungs: B/L crackles, vent-assisted BS 
CV: RRR, no m/r/g 
 Abdomen: soft, non-tender, normal bowel sounds Extremities: no cyanosis, no peripheral edema Neuro: Unresponsive, not sedated Skin: Normal color, intact Intake and Output: 
Current Shift:  09/30 0701 - 09/30 1900 In: 254.5 [I.V.:254.5] Out: - Last three shifts:  09/28 1901 - 09/30 0700 In: 3033.4 [I.V.:3003.4] Out: 823 Lab/Data Reviewed: 
CMP:  
Lab Results Component Value Date/Time  09/30/2018 04:00 AM  
 K 4.4 09/30/2018 04:00 AM  
  09/30/2018 04:00 AM  
 CO2 21 09/30/2018 04:00 AM  
 AGAP 18 09/30/2018 04:00 AM  
  (H) 09/30/2018 04:00 AM  
 BUN 71 (H) 09/30/2018 04:00 AM  
 CREA 6.27 (H) 09/30/2018 04:00 AM  
 GFRAA 11 (L) 09/30/2018 04:00 AM  
 GFRNA 9 (L) 09/30/2018 04:00 AM  
 CA 6.6 (L) 09/30/2018 04:00 AM  
 PHOS 7.6 (H) 09/30/2018 04:00 AM  
 ALB 1.2 (L) 09/30/2018 04:00 AM  
 
CBC:  
Lab Results Component Value Date/Time WBC 22.6 (H) 09/30/2018 04:18 AM  
 HGB 7.8 (L) 09/30/2018 04:18 AM  
 HCT 22.8 (L) 09/30/2018 04:18 AM  
  09/30/2018 04:18 AM  
 
 
Imaging Reviewed: 
Ct Head Wo Cont Result Date: 9/30/2018 EXAM: CT HEAD WO CONT CLINICAL INDICATION/HISTORY: Encephalopathy. -Additional: None COMPARISON: September 10, 2018 TECHNIQUE: Axial CT imaging of the head was performed without intravenous contrast. Sagittal and coronal reconstructions are provided. One or more dose reduction techniques were used on this CT: automated exposure control, adjustment of the mAs and/or kVp according to patient size, and iterative reconstruction techniques. The specific techniques used on this CT exam have been documented in the patient's electronic medical record. _______________ FINDINGS: BRAIN AND POSTERIOR FOSSA: The sulci, folia, ventricles and basal cisterns are within normal limits for the patient's age. There is no intracranial hemorrhage, mass effect, or midline shift.  There are scattered and confluent foci of decreased attenuation in the periventricular white matter which are nonspecific but most likely sequelae of chronic microvascular disease. Unchanged chronic infarct in the anterior limb of the right internal capsule and right corona radiata in addition to the bilateral thalami in keeping with sequela of prior infarcts. EXTRA-AXIAL SPACES AND MENINGES: There are no abnormal extra-axial fluid collections. CALVARIUM: Intact. SINUSES: High density debris opacifies the left sphenoid sinus along with partial opacification and layering air-fluid levels and debris within the dependent nasal pharynx. Remaining paranasal sinuses are clear. Relatively hypoplastic right mastoids, however. OTHER: Intracranial vascular calcifications. _______________ IMPRESSION: 1. No acute intracranial abnormalities. 2.  Multifocal chronic infarcts and presumed chronic microvascular changes without significant interval change. 3.  High density material opacifying the left maxillary sinus and additional layering debris in the nasopharynx compatible with sinusitis, potentially fungal etiology or other proteinaceous debris. Initial preliminary report was provided to the ED by the on-call radiology resident. Ct Neck Soft Tissue Wo Cont Result Date: 9/30/2018 EXAM: CT NECK SOFT TISSUE WO CONT CLINICAL INDICATION/HISTORY: Neck mass, pulsatile  >Additional: None COMPARISON: Correlation with chest radiograph from the same day. >Reference exam: None. TECHNIQUE: Following intravenous contrast, thin section volume acquisition scans were obtained. 2.5 mm axial reformation's were performed with bone and soft tissue windows. Coronal and sagittal reformations. Dose reduction techniques used: automated exposure control, adjustment of the mAs and/or kVp according to patient size, and iterative reconstruction techniques.  _______________ FINDINGS:  Support apparatus: Endotracheal tube, orogastric tube, and additional thin esophageal probe are all partially visualized. Right anterior approach central venous catheter extends to the thoracic inlet, incompletely assessed on the field-of-view. Additional chest radiograph and concurrent CT of the chest demonstrate this to extend to the SVC. Visualized intracranial structures: Partially visualized right deep white matter infarct. Please see separately dictated head CT. Orbits, paranasal sinuses and skull base: Left sphenoid sinus opacification with high-density debris and layering air-fluid levels in the dependent nasopharynx. Nasopharynx: Layering air-fluid level and debris. Oral cavity: Support apparatus as above. Otherwise, no significant abnormality. Oropharynx/base of tongue: Normal. Epiglottis and supraglottic region: Normal. Glottis: Normal. Thyroid and inferior neck: Normal. Neck Vasculature: Normal. Lung apices: Please see separately dictated CT of the chest. Adenopathy: No adenopathy pathologic by size criteria or appearance. Submandibular and parotid glands: Asymmetric prominence of the right submandibular gland compared to the left, nonspecific. Cervical spine: Unremarkable except for degenerative change. Soft tissues: There is moderate soft tissue swelling about the right side of the neck corresponding to the insertion site of the right IJ catheter with associated skin thickening and fascial thickening. No organized fluid collection. _______________ IMPRESSION: 1. Study limited given the lack of intravenous contrast. Support lines and tubes as above including right IJ central venous catheter, endotracheal tube, and orogastric tube within additional esophageal probe in place. There is moderate soft tissue swelling and subcutaneous stranding with skin and fascial thickening corresponding to the entry site of the right IJ central venous catheter but without organized fluid collection/hematoma. 2.  Sphenoid sinus disease.  3.  Slight asymmetric prominence of the right submandibular gland, nonspecific and perhaps normal variant. Initial preliminary report was provided to the ED by the on-call radiology resident. Ct Chest Wo Cont Result Date: 9/30/2018 EXAM: CT CHEST WO CONT CLINICAL INDICATION/HISTORY: Dyspnea, on exertion. -Additional: None COMPARISON: Chest x-ray dated September 29, 2018   >Reference Exam: None. TECHNIQUE: Helical CT imaging from the thoracic inlet through the diaphragm without intravenous contrast. Multiplanar reformats were generated. One or more dose reduction techniques were used on this CT: automated exposure control, adjustment of the mAs and/or kVp according to patient size, and iterative reconstruction techniques. The specific techniques used on this CT exam have been documented in the patient's electronic medical record. _______________ FINDINGS: Support apparatus: Endotracheal tube terminates in satisfactory position above the billy. Orogastric tube terminates below the diaphragm in the stomach, inferior to the field-of-view. Additional esophageal probe is also noted. Right IJ central venous catheter terminates in the SVC. LUNGS AND PLEURA: There is extensive consolidation and volume loss of the entire right lower lobe with patchy and streaky opacities along the dependent aspects of the right upper and middle lobes. A few scattered air bronchograms are noted. Additional volume loss and opacity in the dependent portion of the left lung base with scattered streaky opacities and mild groundglass throughout the remaining left upper and lower lobes. Mild bronchiolectasis of the left lung base. Several scattered calcified granulomata are noted predominantly on the right. Trace pleural effusions. AIRWAYS: Endotracheal tube in place. Minimal amount of debris noted in the trachea adjacent to the tip of the ET tube. MEDIASTINUM: Slight shift of the mediastinal structures to the right related to the right-sided volume loss as above. There is diffuse esophageal wall thickening, poorly defined. Scattered atheromatous calcifications including multivessel coronary artery disease. There are extensive calcified me silent hilar lymph nodes. UPPER ABDOMEN: Unremarkable. OSSEOUS: Multilevel spondylosis. No acute findings. MISCELLANEOUS: None. _______________ IMPRESSION: 1. Consolidative right sided airspace disease and to a lesser extent in the left lung base with associated volume loss representing combination of atelectasis and pneumonia. 2.  Trace bilateral pleural effusions. 3.  Diffuse esophageal wall thickening, poorly defined and nonspecific may represent chronic esophagitis although underlying malignancy is not entirely excluded. Consider GI consultation and follow-up esophagram versus direct visualization when clinically appropriate. 4.  Chronic granulomatous disease. 5.  Support lines and tubes as above. Initial preliminary report was provided to the ED by the on-call radiology resident. Medications Reviewed: 
Current Facility-Administered Medications Medication Dose Route Frequency  insulin lispro (HUMALOG) injection   SubCUTAneous Q6H  
 PHENYLephrine (FOUZIA-SYNEPHRINE) 90 mg in 0.9% sodium chloride 250 mL infusion   mcg/min IntraVENous TITRATE  [START ON 10/1/2018] vancomycin (VANCOCIN) 1,000 mg in 0.9% sodium chloride (MBP/ADV) 250 mL adv  1,000 mg IntraVENous Q MON, WED & FRI  insulin glargine (LANTUS) injection 6 Units  6 Units SubCUTAneous DAILY  albuterol (PROVENTIL VENTOLIN) nebulizer solution 2.5 mg  2.5 mg Nebulization Q6H RT  
 DOPamine (INTROPIN) 400 mg in dextrose 5% 250 mL infusion  0-15 mcg/kg/min IntraVENous TITRATE  metoprolol tartrate (LOPRESSOR) tablet 50 mg  50 mg Oral Q12H  
 0.9% sodium chloride infusion  50 mL/hr IntraVENous DIALYSIS PRN  
 heparin (porcine) 1,000 unit/mL injection 1,000 Units  1,000 Units InterCATHeter DIALYSIS PRN  
  VANCOMYCIN INFORMATION NOTE   Other Rx Dosing/Monitoring  senna-docusate (PERICOLACE) 8.6-50 mg per tablet 1 Tab  1 Tab Oral BID PRN  
 melatonin tablet 3 mg  3 mg Oral QHS PRN  
 traMADol (ULTRAM) tablet 50 mg  50 mg Oral Q6H PRN  
 ondansetron (ZOFRAN) injection 4 mg  4 mg IntraVENous Q4H PRN  
 glucose chewable tablet 16 g  4 Tab Oral PRN  
 glucagon (GLUCAGEN) injection 1 mg  1 mg IntraMUSCular PRN  
 dextrose (D50) infusion 12.5-25 g  25-50 mL IntraVENous PRN  
 hydrALAZINE (APRESOLINE) 20 mg/mL injection 20 mg  20 mg IntraVENous Q6H PRN  piperacillin-tazobactam (ZOSYN) 4.5 g in 0.9% sodium chloride 100 mL MBP EXTENDED 4 HOUR INFUSION   4.5 g IntraVENous Q12H  pantoprazole (PROTONIX) 40 mg in sodium chloride 0.9% 10 mL injection  40 mg IntraVENous DAILY Dimple Kayser, DO Internal Medicine, Hospitalist 
Pager: [phone] [facility]

## 2018-09-30 NOTE — PROGRESS NOTES
Problem: Diabetes Self-Management Goal: *Incorporating nutritional management into lifestyle Describe effect of type, amount and timing of food on blood glucose; list 3 methods for planning meals. Outcome: Not Progressing Towards Goal 
noninteracive Goal: *Incorporating physical activity into lifestyle State effect of exercise on blood glucose levels. Outcome: Not Progressing Towards Goal 
noninteractive Goal: *Developing strategies to promote health/change behavior Define the ABC's of diabetes; identify appropriate screenings, schedule and personal plan for screenings. Outcome: Not Progressing Towards Goal 
noninteractive Goal: *Developing strategies to address psychosocial issues Describe feelings about living with diabetes; identify support needed and support network Outcome: Not Progressing Towards Goal 
noninteractive Problem: Patient Education: Go to Patient Education Activity Goal: Patient/Family Education Outcome: Not Progressing Towards Goal 
noninteractive Problem: Falls - Risk of 
Goal: *Absence of Falls Document Hawa Ahumada Fall Risk and appropriate interventions in the flowsheet. Outcome: Progressing Towards Goal 
Fall Risk Interventions: 
Mobility Interventions: Bed/chair exit alarm Mentation Interventions: Adequate sleep, hydration, pain control, Bed/chair exit alarm, Update white board, More frequent rounding, Evaluate medications/consider consulting pharmacy, Family/sitter at bedside Medication Interventions: Bed/chair exit alarm Elimination Interventions: Bed/chair exit alarm, Call light in reach, Toileting schedule/hourly rounds Problem: Patient Education: Go to Patient Education Activity Goal: Patient/Family Education Outcome: Not Progressing Towards Goal 
not interactive Problem: General Medical Care Plan Goal: *Vital signs within specified parameters Outcome: Progressing Towards Goal 
With pressors Goal: *Optimize nutritional status Outcome: Not Progressing Towards Goal 
Will need to address with doctor , pass to 7 am shift Goal: *Progressive mobility and function (eg: ADL's) Outcome: Not Progressing Towards Goal 
noniteractive Problem: Pressure Injury - Risk of 
Goal: *Prevention of pressure injury Document Mitul Scale and appropriate interventions in the flowsheet. Outcome: Progressing Towards Goal 
Pressure Injury Interventions: 
Sensory Interventions: Assess changes in LOC, Assess need for specialty bed, Use 30-degree side-lying position, Turn and reposition approx. every two hours (pillows and wedges if needed), Minimize linen layers, Keep linens dry and wrinkle-free, Float heels Moisture Interventions: Absorbent underpads, Apply protective barrier, creams and emollients, Assess need for specialty bed, Check for incontinence Q2 hours and as needed, Minimize layers, Moisture barrier Activity Interventions: Pressure redistribution bed/mattress(bed type) Mobility Interventions: HOB 30 degrees or less, Float heels, Pressure redistribution bed/mattress (bed type), Turn and reposition approx. every two hours(pillow and wedges), Suspension boots Nutrition Interventions: Discuss nutritional consult with provider Friction and Shear Interventions: Apply protective barrier, creams and emollients, Feet elevated on foot rest, HOB 30 degrees or less, Foam dressings/transparent film/skin sealants, Transfer aides:transfer board/Lala lift/ceiling lift, Lift sheet (taps turning sytem) Problem: Pneumonia: Day 2 Goal: *Hemodynamically stable Outcome: Progressing Towards Goal 
On pressors Problem: Patient Education: Go to Patient Education Activity Goal: Patient/Family Education Outcome: Not Progressing Towards Goal 
noninteractive

## 2018-09-30 NOTE — ROUTINE PROCESS
0730:  Assumed care of pt at this time. Dual skin assessment and gtt performed with off-going RN, Deshawn Santos. Assessment completed. Right neck udall catheter saturated with serosanguinous drainage. Dressing changed using sterile technique.

## 2018-09-30 NOTE — PROGRESS NOTES
Patient seen and examined. He was intubated and in critical condition. Abdomen is soft and no RUQ tenderness. No indication for any surgical intervention (Cholecystectomy). I do not believe that even the patient has acute cholecystitis, but as stated before, IR cholecystostomy tube is the answer if there is still high suspicion. I will sign off for now, but please call me tima any questions or concerns. Thank you.

## 2018-09-30 NOTE — PROGRESS NOTES
[facility] Pulmonary, Critical Care, and Sleep Medicine Critical Care Progress Note Name: Jolanta Gonzalez : 1955 MRN: [id number] Date: 2018 [x]I have reviewed the flowsheet and previous days notes. Events, vitals, medications and notes from last 24 hours reviewed. Care plan discussed on multidisciplinary rounds. My assessment, plan of care, findings, medications, side effects etc were discussed with: 
[x] Nurse [] PT/OT   
[] Respiratory therapy [x] Dr. Sindi Chaudhry  
[] Family [] Patient: answered all questions to satisfaction  
[] Pharmacist [] ASSESSMENT/PLAN:  
Principal Problem: 
  Cardiac arrest with ventricular fibrillation ([facility] [address] (2018) Overview: ROSC before defibrillation could be attempted. Active Problems: 
  Pneumonia of both lower lobes (2018) Septic shock ([address] (2018) Overview: Probably secondary to pneumonia (suspicious for aspiration). Less likely,  
    secondary to acute pancreatitis. Diabetes mellitus type 2, controlled ([address] (9/10/2018) Acute-on-chronic kidney injury ([address] (2018) Abnormal blood coagulation profile (2018) Ischemic encephalopathy (2018) Acute pancreatitis (2018) Overview: Shock, leukocytosis, elevated lipase but radiographically no ductal  
    dilatation in pancreas. GB stones without radiographic stigmata of acute  
    cholecystitis Elevated LFTs (2018) History of stroke (2018) Overview: With residual R hemiparesis Essential hypertension (9/10/2018) Acute cystitis (2018) Elevated troponin (2018) Transaminitis (2018) Acute kidney injury ([address] (2018) · Start tube feed (Nepro). Start at 15 mL/hr. Advance as tolerated to 45 mL/hr. · Continue Zosyn/vancomycin · Wean phenylephrine/dopamine as tolerated · ABG now. Vent settings will be adjusted based on ABG results. · Anticipating HD tomorrow · Inhaled therapy: albuterol · Renal dosing of medications. NUTRITION: start Nepro. Check prealbumin q week. Consult Clinical Dietician. ICU electrolyte replacement protocol PROPHYLAXIS: 
DVT prophylaxis: start heparin GI prophylaxis: Protonix HOB 30-degree elevation Chlorhexidine mouth washes CODE STATUS: FULL CODE Further recommendations will be based on the patient's response to recommended treatment and results of the investigation ordered. DISPOSITION: 
 
The patient is: [x] acutely ill Risk of deterioration: [] moderate [x] critically ill  [x] high  
 
[x]See my orders for details [x] The patient is unable to give any meaningful history or review of systems because the patient is: 
[x] Intubated [] Sedated  
[x] Unresponsive [] [x]The patient is critically ill on     
[x] Mechanical ventilation [x] Vasoactive agents  
[] BiPAP [] Inotropes SUBJECTIVE 
- This 57 y.o.  male is seen in consultation at the request of Dr. Pedro Rooney for recommendations on further evaluation and management of acute hypoxia. Review of the chart, discussion with Dr. Jane Og, and bedside RN Roselia Enriquez) reveals that the patient recently was hospitalized (9/10/2018 - 9/14/2018) for stroke earlier this month with neurologic residua (R hemiparesis). Patient discharged from [facility] 9/10/2018 - 9/14/2018 to [facility] ([facility] on 9/14/2018, only to return on 9/26/2018 with acute renal failure/abnormal lab values. 
  
The patient is seen while on hemodialysis in the ICU. He is on OptiFlow, and his SpO2 91%. He is obtunded and unable to provide clinical history. ABG showed pH 7.3. BiPAP was tried in hopes that NIPPV would improve the patient's oxygenation status. Notably, breath sounds were severely diminished despite BiPAP.  During the course of examining the patient, the patient was noted to become comatose and bradycardic. Patient was noted to be sitting in a pool of melanotic stool. Blood pressure fell and SpO2 became undetectable. Dopamine gtt started without significant improvement. Order was given for phenylephrine gtt, but the patient rapidly deteriorated, and CPR had to be initiated prior to starting phenylephrine. CPR initiated at [phone] for VT/VF on monitor. 1 amp epinephrine administered (ordered for hypotension and bradycardia prior to initiation of CPR); chest compression started; 2 grams magnesium sulfate administered. Patient was intubated. ROSC @ 1056. - Overnight events: still on phenylephrine and dopamine gtts. Remains intubated. Not on sedation. Some spontaneous blinking and eye movements noted. Not following commands. [] Nutrition:  
[] BM today. ROS: Review of systems not obtained due to patient factors. Past Medical History:  
Diagnosis Date  CHF (congestive heart failure) ([facility] [address]  Diabetes ([facility] [address]  Stroke ([facility] No Known Allergies Current Facility-Administered Medications:  
  insulin lispro (HUMALOG) injection, , SubCUTAneous, Q6H, Tram Diez DO 
  PHENYLephrine (FOUZIA-SYNEPHRINE) 90 mg in 0.9% sodium chloride 250 mL infusion,  mcg/min, IntraVENous, TITRATE, Poli SPRINGER MD 
  [START ON 10/1/2018] vancomycin (VANCOCIN) 1,000 mg in 0.9% sodium chloride (MBP/ADV) 250 mL adv, 1,000 mg, IntraVENous, Q MON, WED & FRI, Tram Diez DO 
  albuterol (PROVENTIL VENTOLIN) nebulizer solution 2.5 mg, 2.5 mg, Nebulization, Q6H RT, Laly Tejada MD, 2.5 mg at 09/30/18 3853   DOPamine (INTROPIN) 400 mg in dextrose 5% 250 mL infusion, 0-15 mcg/kg/min, IntraVENous, TITRATE, Laly Tejada MD, Last Rate: 18.1 mL/hr at 09/30/18 0546, 5 mcg/kg/min at 09/30/18 0546 
  metoprolol tartrate (LOPRESSOR) tablet 50 mg, 50 mg, Oral, Q12H, Cris Holden MD, Stopped at 09/28/18 1156   0.9% sodium chloride infusion, 50 mL/hr, IntraVENous, DIALYSIS PRN, Jossy SPRINGER MD 
  heparin (porcine) 1,000 unit/mL injection 1,000 Units, 1,000 Units, InterCATHeter, DIALYSIS PRN, Sourav Morrissey MD, 1,000 Units at 09/28/18 1332   VANCOMYCIN INFORMATION NOTE, , Other, Rx Dosing/Monitoring, Tom Milton MD 
  senna-docusate (PERICOLACE) 8.6-50 mg per tablet 1 Tab, 1 Tab, Oral, BID PRN, Marliss Broom, DO 
  melatonin tablet 3 mg, 3 mg, Oral, QHS PRN, Marliss Broom, DO 
  traMADol (ULTRAM) tablet 50 mg, 50 mg, Oral, Q6H PRN, Marliss Broom, DO 
  ondansetron (ZOFRAN) injection 4 mg, 4 mg, IntraVENous, Q4H PRN, Andreia Nettles, DO, 4 mg at 09/28/18 0539 
  glucose chewable tablet 16 g, 4 Tab, Oral, PRN, Marliss Broom, DO 
  glucagon (GLUCAGEN) injection 1 mg, 1 mg, IntraMUSCular, PRN, Andreia Jeter McQuain, DO 
  dextrose (D50) infusion 12.5-25 g, 25-50 mL, IntraVENous, PRN, Andreia Nettles, DO, 12.5 g at 09/28/18 1206   hydrALAZINE (APRESOLINE) 20 mg/mL injection 20 mg, 20 mg, IntraVENous, Q6H PRN, Mk Garcia NP, 20 mg at 09/28/18 1006   piperacillin-tazobactam (ZOSYN) 4.5 g in 0.9% sodium chloride 100 mL MBP EXTENDED 4 HOUR INFUSION , 4.5 g, IntraVENous, Q12H, Andreea Tobin MD, Last Rate: 25 mL/hr at 09/30/18 0931, 4.5 g at 09/30/18 0931 
  pantoprazole (PROTONIX) 40 mg in sodium chloride 0.9% 10 mL injection, 40 mg, IntraVENous, DAILY, Mk Garcia NP, 40 mg at 09/30/18 0163 OBJECTIVE Vital Signs:   
Patient Vitals for the past 24 hrs: 
 Temp Pulse Resp BP SpO2  
09/30/18 0830 - 89 20 (!) 161/141 99 % 09/30/18 0816 - 88 20 - 99 % 09/30/18 0800 - 88 20 149/54 100 % 09/30/18 0730 - 87 21 154/61 100 % 09/30/18 0700 98.6 °F (37 °C) 86 20 142/57 100 % 09/30/18 0630 98.6 °F (37 °C) 85 20 130/54 99 % 09/30/18 0600 98.6 °F (37 °C) 85 20 122/49 99 % 09/30/18 0546 - 85 - 93/41 -  
09/30/18 0530 98.4 °F (36.9 °C) 87 20 113/51 100 % 09/30/18 0500 98.9 °F (37.2 °C) 89 20 160/60 100 % 09/30/18 0453 - 91 20 - 99 % 09/30/18 0430 - 92 21 115/50 98 % 09/30/18 0400 99 °F (37.2 °C) 91 21 123/53 98 % 09/30/18 0330 99 °F (37.2 °C) 93 20 137/58 98 % 09/30/18 0329 - 95 - 137/58 -  
09/30/18 0300 99 °F (37.2 °C) 94 22 160/57 99 % 09/30/18 0230 98.8 °F (37.1 °C) 91 20 114/50 98 % 09/30/18 0219 - 91 - 145/58 -  
09/30/18 0200 98.8 °F (37.1 °C) 93 20 148/59 98 % 09/30/18 0130 - 93 20 111/53 97 % 09/30/18 0115 - 94 - 141/58 -  
09/30/18 0100 98.8 °F (37.1 °C) 93 23 157/52 98 % 09/30/18 0048 - 93 20 - 98 % 09/30/18 0045 - 93 20 105/58 97 % 09/30/18 0030 - 92 21 153/65 97 % 09/30/18 0015 98.6 °F (37 °C) 94 20 138/59 98 % 09/30/18 0001 98.6 °F (37 °C) 95 20 161/85 98 % 09/30/18 0000 - 97 21 - 98 %  
09/29/18 2359 - 97 - 161/85 -  
09/29/18 2352 - 94 - 157/59 -  
09/29/18 2338 - 94 - 158/58 -  
09/29/18 2330 - 93 20 158/58 98 %  
09/29/18 2315 - 98 20 163/63 98 %  
09/29/18 2300 - 92 20 153/67 99 % 09/29/18 2252 - 89 - 100/42 -  
09/29/18 2245 - 88 20 100/42 98 %  
09/29/18 2230 - 88 20 101/43 98 %  
09/29/18 2215 - 90 20 107/44 98 %  
09/29/18 2208 - 91 - 124/46 -  
09/29/18 2200 - 87 20 125/46 98 %  
09/29/18 2145 - 88 20 95/43 97 % 09/29/18 2130 - 90 20 (!) 83/41 97 % 09/29/18 2116 - 92 - (!) 86/37 -  
09/29/18 2115 98.6 °F (37 °C) 92 20 (!) 86/37 96 %  
09/29/18 2100 98.4 °F (36.9 °C) 100 23 114/47 98 %  
09/29/18 2052 - 90 - 139/47 -  
09/29/18 2046 - 94 - 161/65 -  
09/29/18 2045 98.4 °F (36.9 °C) 92 23 161/65 97 % 09/29/18 2035 - 93 - 154/62 -  
09/29/18 2030 98.5 °F (36.9 °C) 89 20 154/62 98 %  
09/29/18 2015 - 90 20 144/66 98 %  
09/29/18 2000 98.1 °F (36.7 °C) 92 20 132/65 96 %  
09/29/18 1945 - 92 20 118/59 96 %  
09/29/18 1932 - 90 20 - 99 % 09/29/18 1930 98.2 °F (36.8 °C) 91 20 114/62 96 %  
09/29/18 1909 - 89 - 117/56 -  
09/29/18 1900 (!) 36.5 °F (2.5 °C) 90 20 117/56 97 % 09/29/18 1830 - 88 18 128/61 98 % 18 1824 - - - 138/58 -  
18 1730 (!) 36.5 °F (2.5 °C) 88 20 128/56 97 % 18 1715 (!) 36.5 °F (2.5 °C) 89 20 125/54 97 % 18 1700 (!) 36.4 °F (2.4 °C) 88 20 - 97 % 18 1600 (!) 36.2 °F (2.3 °C) 90 20 117/60 94 % 18 1510 - 90 20 - 92 %  
18 1500 (!) 35.9 °F (2.2 °C) 91 20 109/56 91 %  
18 1400 (!) 35.9 °F (2.2 °C) 81 20 108/54 92 %  
18 1330 (!) 35.9 °F (2.2 °C) 77 21 96/53 93 % 18 1318 - 77 21 - 92 %  
18 1300 (!) 35.8 °F (2.1 °C) 76 17 97/51 92 %  
18 1230 (!) 36 °F (2.2 °C) 78 20 97/52 95 % 18 1200 (!) 36.5 °F (2.5 °C) 89 23 (!) 77/51 (!) 87 %  
18 1130 (!) 36.6 °F (2.6 °C) (!) 105 21 93/55 99 % 18 1114 - 93 20 - 96 %  
18 1106 - (!) 18 18 - 95 % O2 Device: Endotracheal tube, Ventilator O2 Flow Rate (L/min): 45 l/min Temp (24hrs), Av.5 °F (23.6 °C), Min:35.8 °F (2.1 °C), Max:99 °F (37.2 °C) PHYSICAL EXAM:  
General: intubated. Not on sedation. Does not follow commands. Head: atraumatic, normocephalic Eye: PERRLA, EOM intact, no scleral icterus, no pallor, no cyanosis Nose: Nares are patent. No exudate. Throat: ETT in situ. No oral thrush. No exudate. Mucous membranes are moist. 
Neck: R IJ temporary HD catheter in situ. R neck hematoma. no thyromegaly, no JVD, no lymphadenopathy. Trachea midline Lung: Symmetric in development and expansion. Diminshed air entry in the R chest. L sided crackles with distant sounds. Heart: Bradycardic. Regular S1, S2 without murmur, rub or gallop. Abdomen: soft, nontender, nondistended. Normoactive bowel sounds. No rebound. No guarding. Extremities: no pedal edema, no cyanosis, no clubbing, 2+ peripheral pulses in DP Lymphatic: no cervical/axillary/inguinal lymphadenopathy Neurologic: Withdraws to pain x 4 extremities. Demonstrating spontaneous eye movement, blinking and R LE movement.  Starting to show some spontaneous movement of eyes and head/face. Doll's eyes intact. Corneal intact. Spontaneous respirations intact. No lateralizing signs. Skin: No rash or lesion. DATA:  
 
Intake/Output:  
Last shift:      09/30 0701 - 09/30 1900 In: 86.2 [I.V.:86.2] Out: - Last 3 shifts: 09/28 1901 - 09/30 0700 In: 3033.4 [I.V.:3003.4] Out: 823 Intake/Output Summary (Last 24 hours) at 09/30/18 1102 Last data filed at 09/30/18 0900 Gross per 24 hour Intake          2607.19 ml Output                0 ml Net          2607.19 ml Last 3 Recorded Weights in this Encounter  
 09/26/18 2244 09/30/18 0630 Weight: 96.6 kg (213 lb) 94.2 kg (207 lb 10.8 oz) Lines: All central lines examined by me. No signs of erythema, induration, discharge. Hemodialysis Catheter: 
Hemodialysis Access 09/28/18 (Active) Central Line Being Utilized Yes 9/30/2018  8:00 AM  
Criteria for Appropriate Use Dialysis/apheresis 9/30/2018  8:00 AM  
Date Accessed  09/29/18 9/30/2018  8:00 AM  
Site Assessment Bleeding;Swelling 9/30/2018  8:00 AM  
Date of Last Dressing Change 09/30/18 9/30/2018  8:00 AM  
Dressing Status New drainage 9/30/2018  8:00 AM  
Dressing Type Disk with Chlorhexadine gluconate (CHG); Transparent 9/30/2018  8:00 AM  
Proximal Hub Color/Line Status Capped 9/30/2018  8:00 AM  
Distal Hub Color/Line Status Capped 9/30/2018  8:00 AM  
 
Drain(s): 
Orogastric Tube 09/29/18 (Active) Site Assessment Clean, dry, & intact 9/30/2018  8:00 AM  
Dressing Status Clean, dry, & intact 9/30/2018  8:00 AM  
G Port Status Clamped 9/30/2018  8:00 AM  
External Insertion Jl (cms) 59 cms 9/30/2018  8:00 AM  
Action Taken Placement verified (comment) 9/30/2018  8:00 AM  
Water Flush Volume (mL) 30 mL 9/29/2018  8:00 PM  
 
Airway: Airway - Continuous Aspiration of Subglottic Secretions (LEONCIO) Tube 09/29/18 Oral (Active) Insertion Depth (cm) 25 cm 9/30/2018  8:16 AM  
Line Jl Lips 9/30/2018  8:16 AM  
 Side Secured Centered;Left 9/30/2018  8:16 AM  
Cuff Pressure 30 cmH20 9/29/2018  7:32 PM  
Site Assessment Clean, dry, & intact 9/30/2018  8:16 AM  
Suction on Yes 9/30/2018  8:16 AM  
Amt Secretions Aspirated (mL) 3 mL 9/30/2018  8:16 AM  
 
  
Vent Date (intubated 9/29/2018) Ventilator Settings: 
Ventilator Mode: Assist control, VC+ Respiratory Rate Back-Up Rate: 20 Insp Time (sec): 0.9 sec Ventilator Volumes Vt Set (ml): 500 ml Vt Exhaled (Machine Breath) (ml): 550 ml Ve Observed (l/min): 11 l/min Ventilator Pressures PIP Observed (cm H2O): 32 cm H2O Plateau Pressure (cm H2O): 22 cm H2O 
MAP (cm H2O): 15 PEEP/VENT (cm H2O): 8 cm H20 Mode Rate Tidal Volume Pressure FiO2 PEEP Assist control, VC+   500 ml    70 % 8 cm H20 Peak airway pressure: 32 cm H2O Plateau pressure:    
Tidal volume:   
Minute ventilation: 11 l/min SPO2   
 
ARDSNet Guidelines: Lung protective ventilation (VT 6 cc/kg IBW) and Pplat <= 30 Telemetry: [x] Sinus [] A-flutter [] Paced [] A-fib [] Multiple PVCs Imaging: 
[x] I have personally reviewed the patients radiographs 
[] Radiographs reviewed with radiologist 
[] No CXR study available for review today 
[] No change from prior, tubes and lines are in adequate position 
[x] Improved   [] Worsening Ct Head Wo Cont Result Date: 9/30/2018 IMPRESSION: 1. No acute intracranial abnormalities. 2.  Multifocal chronic infarcts and presumed chronic microvascular changes without significant interval change. 3.  High density material opacifying the left maxillary sinus and additional layering debris in the nasopharynx compatible with sinusitis, potentially fungal etiology or other proteinaceous debris. Initial preliminary report was provided to the ED by the on-call radiology resident. Ct Neck Soft Tissue Wo Cont Result Date: 9/30/2018 IMPRESSION: 1.   Study limited given the lack of intravenous contrast. Support lines and tubes as above including right IJ central venous catheter, endotracheal tube, and orogastric tube within additional esophageal probe in place. There is moderate soft tissue swelling and subcutaneous stranding with skin and fascial thickening corresponding to the entry site of the right IJ central venous catheter but without organized fluid collection/hematoma. 2.  Sphenoid sinus disease. 3.  Slight asymmetric prominence of the right submandibular gland, nonspecific and perhaps normal variant. Initial preliminary report was provided to the ED by the on-call radiology resident. Ct Chest  Cont Result Date: 9/30/2018 IMPRESSION: 1. Consolidative right sided airspace disease and to a lesser extent in the left lung base with associated volume loss representing combination of atelectasis and pneumonia. 2.  Trace bilateral pleural effusions. 3.  Diffuse esophageal wall thickening, poorly defined and nonspecific may represent chronic esophagitis although underlying malignancy is not entirely excluded. Consider GI consultation and follow-up esophagram versus direct visualization when clinically appropriate. 4.  Chronic granulomatous disease. 5.  Support lines and tubes as above. Initial preliminary report was provided to the ED by the on-call radiology resident. Xr Chest [facility] Result Date: 9/29/2018 IMPRESSION: 1. Endotracheal tube tip in satisfactory position. 2. Persistent findings of fluid overload with either edema, atelectasis, or infiltrates at the right mid and left lower lung. Labs: 
Recent Results (from the past 24 hour(s)) Di Rainey Collection Time: 09/29/18 11:10 AM  
Result Value Ref Range Vancomycin, random 27.2 5.0 - 40.0 UG/ML  
CBC W/O DIFF Collection Time: 09/29/18 11:10 AM  
Result Value Ref Range WBC 28.4 (H) 4.6 - 13.2 K/uL  
 RBC 3.27 (L) 4.70 - 5.50 M/uL HGB 9.6 (L) 13.0 - 16.0 g/dL HCT 28.0 (L) 36.0 - 48.0 % MCV 85.6 74.0 - 97.0 FL  
 MCH 29.4 24.0 - 34.0 PG  
 MCHC 34.3 31.0 - 37.0 g/dL  
 RDW 14.9 (H) 11.6 - 14.5 % PLATELET 771 228 - 214 K/uL MPV 9.0 (L) 9.2 - 90.4 FL  
METABOLIC PANEL, BASIC Collection Time: 09/29/18 11:10 AM  
Result Value Ref Range Sodium 142 136 - 145 mmol/L Potassium 4.4 3.5 - 5.5 mmol/L Chloride 101 100 - 108 mmol/L  
 CO2 20 (L) 21 - 32 mmol/L Anion gap 21 (H) 3.0 - 18 mmol/L Glucose 88 74 - 99 mg/dL BUN 56 (H) 7.0 - 18 MG/DL Creatinine 5.28 (H) 0.6 - 1.3 MG/DL  
 BUN/Creatinine ratio 11 (L) 12 - 20 GFR est AA 13 (L) >60 ml/min/1.73m2 GFR est non-AA 11 (L) >60 ml/min/1.73m2 Calcium 7.9 (L) 8.5 - 10.1 MG/DL  
PTT Collection Time: 09/29/18 11:10 AM  
Result Value Ref Range aPTT 59.3 (H) 23.0 - 36.4 SEC CARDIAC PANEL,(CK, CKMB & TROPONIN) Collection Time: 09/29/18 11:10 AM  
Result Value Ref Range  39 - 308 U/L  
 CK - MB 8.0 (H) <3.6 ng/ml CK-MB Index 3.3 0.0 - 4.0 % Troponin-I, Qt. 0.25 (H) 0.0 - 0.045 NG/ML  
TYPE & SCREEN Collection Time: 09/29/18 11:10 AM  
Result Value Ref Range Crossmatch Expiration 10/02/2018 ABO/Rh(D) A POSITIVE Antibody screen NEG MAGNESIUM Collection Time: 09/29/18 11:10 AM  
Result Value Ref Range Magnesium 3.8 (H) 1.6 - 2.6 mg/dL PHOSPHORUS Collection Time: 09/29/18 11:10 AM  
Result Value Ref Range Phosphorus 8.5 (H) 2.5 - 4.9 MG/DL  
POC G3 Collection Time: 09/29/18 11:40 AM  
Result Value Ref Range Device: VENT    
 FIO2 (POC) 100 % pH (POC) 7.300 (L) 7.35 - 7.45    
 pCO2 (POC) 45.3 (H) 35.0 - 45.0 MMHG  
 pO2 (POC) 329 (H) 80 - 100 MMHG  
 HCO3 (POC) 22.4 22 - 26 MMOL/L  
 sO2 (POC) 100 (H) 92 - 97 % Base deficit (POC) 4 mmol/L Mode ASSIST CONTROL Tidal volume 500 ml Set Rate 20 bpm  
 PEEP/CPAP (POC) 5 cmH2O  
 PIP (POC) 15 Allens test (POC) YES Inspiratory Time 0.9 sec Total resp. rate 22  Site LEFT BRACHIAL    
 Patient temp. 36.7 Specimen type (POC) ARTERIAL Performed by [facility] Volume control plus YES    
GLUCOSE, POC Collection Time: 09/29/18 11:46 AM  
Result Value Ref Range Glucose (POC) 91 70 - 110 mg/dL CULTURE, RESPIRATORY/SPUTUM/BRONCH W GRAM STAIN Collection Time: 09/29/18 12:10 PM  
Result Value Ref Range Special Requests: NO SPECIAL REQUESTS    
 GRAM STAIN >25 WBC/lpf GRAM STAIN <10 EPI/lpf GRAM STAIN MUCUS PRESENT    
 GRAM STAIN MODERATE YEAST Culture result: MANY YEAST (A)    
C. DIFFICILE/EPI PCR Collection Time: 09/29/18  2:00 PM  
Result Value Ref Range Special Requests: NO SPECIAL REQUESTS Culture result: Toxigenic C. difficile NEGATIVE                         C. difficile target DNA sequences are not detected. GLUCOSE, POC Collection Time: 09/29/18  5:16 PM  
Result Value Ref Range Glucose (POC) 168 (H) 70 - 110 mg/dL POC G3 Collection Time: 09/29/18  7:28 PM  
Result Value Ref Range Device: VENT    
 FIO2 (POC) 80 % pH (POC) 7.380 7.35 - 7.45    
 pCO2 (POC) 36.3 35.0 - 45.0 MMHG  
 pO2 (POC) 88 80 - 100 MMHG  
 HCO3 (POC) 21.5 (L) 22 - 26 MMOL/L  
 sO2 (POC) 97 92 - 97 % Base deficit (POC) 4 mmol/L Mode ASSIST CONTROL Tidal volume 500 ml Set Rate 20 bpm  
 PEEP/CPAP (POC) 8 cmH2O  
 PIP (POC) 27 Allens test (POC) YES Inspiratory Time 0.9 sec Total resp. rate 20 Site RIGHT RADIAL Patient temp. 36.7 Specimen type (POC) ARTERIAL Performed by Elias Almanza Volume control plus YES    
GLUCOSE, POC Collection Time: 09/29/18 11:41 PM  
Result Value Ref Range Glucose (POC) 203 (H) 70 - 110 mg/dL RENAL FUNCTION PANEL Collection Time: 09/30/18  4:00 AM  
Result Value Ref Range Sodium 141 136 - 145 mmol/L Potassium 4.4 3.5 - 5.5 mmol/L Chloride 102 100 - 108 mmol/L  
 CO2 21 21 - 32 mmol/L Anion gap 18 3.0 - 18 mmol/L Glucose 186 (H) 74 - 99 mg/dL BUN 71 (H) 7.0 - 18 MG/DL Creatinine 6.27 (H) 0.6 - 1.3 MG/DL  
 BUN/Creatinine ratio 11 (L) 12 - 20 GFR est AA 11 (L) >60 ml/min/1.73m2 GFR est non-AA 9 (L) >60 ml/min/1.73m2 Calcium 6.6 (L) 8.5 - 10.1 MG/DL Phosphorus 7.6 (H) 2.5 - 4.9 MG/DL Albumin 1.2 (L) 3.4 - 5.0 g/dL [place] Collection Time: 09/30/18  4:00 AM  
Result Value Ref Range Vancomycin, random 34.1 5.0 - 40.0 UG/ML  
CBC WITH AUTOMATED DIFF Collection Time: 09/30/18  4:18 AM  
Result Value Ref Range WBC 22.6 (H) 4.6 - 13.2 K/uL  
 RBC 2.78 (L) 4.70 - 5.50 M/uL HGB 7.8 (L) 13.0 - 16.0 g/dL HCT 22.8 (L) 36.0 - 48.0 % MCV 82.0 74.0 - 97.0 FL  
 MCH 28.1 24.0 - 34.0 PG  
 MCHC 34.2 31.0 - 37.0 g/dL  
 RDW 14.5 11.6 - 14.5 % PLATELET 428 630 - 812 K/uL MPV 9.1 (L) 9.2 - 11.8 FL  
 NEUTROPHILS 80 (H) 42 - 75 % BAND NEUTROPHILS 3 0 - 5 % LYMPHOCYTES 7 (L) 20 - 51 % MONOCYTES 8 2 - 9 % EOSINOPHILS 0 0 - 5 % BASOPHILS 0 0 - 3 % MYELOCYTES 2 (H) 0 %  
 ABS. NEUTROPHILS 18.1 (H) 1.8 - 8.0 K/UL  
 ABS. LYMPHOCYTES 1.6 0.8 - 3.5 K/UL  
 ABS. MONOCYTES 1.8 (H) 0 - 1.0 K/UL  
 ABS. EOSINOPHILS 0.0 0.0 - 0.4 K/UL  
 ABS. BASOPHILS 0.0 0.0 - 0.1 K/UL PLATELET COMMENTS ADEQUATE PLATELETS    
 RBC COMMENTS NORMOCYTIC, NORMOCHROMIC    
 DF MANUAL HEMOGLOBIN A1C WITH EAG Collection Time: 09/30/18  4:18 AM  
Result Value Ref Range Hemoglobin A1c 10.0 (H) 4.2 - 5.6 % Est. average glucose 240 mg/dL GLUCOSE, POC Collection Time: 09/30/18  6:08 AM  
Result Value Ref Range Glucose (POC) 206 (H) 70 - 110 mg/dL Lab Results Component Value Date/Time  Color RED 09/27/2018 12:29 AM  
 Appearance CLOUDY 09/27/2018 12:29 AM  
 Specific gravity 1.010 09/27/2018 12:29 AM  
 pH (UA) 9.0 (H) 09/27/2018 12:29 AM  
 Protein >300 (A) 09/27/2018 12:29 AM  
 Glucose 100 (A) 09/27/2018 12:29 AM  
 Ketone >80 (A) 09/27/2018 12:29 AM  
 Bilirubin LARGE (A) 09/27/2018 12:29 AM  
 Urobilinogen >8.0 (H) 09/27/2018 12:29 AM  
 Nitrites POSITIVE (A) 09/27/2018 12:29 AM  
 Leukocyte Esterase LARGE (A) 09/27/2018 12:29 AM  
 Epithelial cells 3+ 09/27/2018 12:29 AM  
 Bacteria 4+ (A) 09/27/2018 12:29 AM  
 WBC TOO NUMEROUS TO COUNT 09/27/2018 12:29 AM  
 RBC TOO NUMEROUS TO COUNT 09/27/2018 12:29 AM  
 
Cultures: All Micro Results Procedure Component Value Units Date/Time CULTURE, BLOOD [[id number]] Collected:  09/27/18 0005 Order Status:  Completed Specimen:  Blood from Blood Updated:  09/30/18 1034 Special Requests: NO SPECIAL REQUESTS Culture result: NO GROWTH 3 DAYS     
 CULTURE, RESPIRATORY/SPUTUM/BRONCH [place] STAIN [[id number]]  (Abnormal) Collected:  09/29/18 1210 Order Status:  Completed Specimen:  Sputum from Endotracheal aspirate Updated:  09/30/18 1021 Special Requests: NO SPECIAL REQUESTS     
  GRAM STAIN >25 WBC/lpf     
   <10 EPI/lpf MUCUS PRESENT     
   MODERATE YEAST Culture result: MANY YEAST (A)     
 C. DIFFICILE/EPI PCR [[id number]] Collected:  09/29/18 1400 Order Status:  Completed Specimen:  Stool Updated:  09/29/18 2248 Special Requests: NO SPECIAL REQUESTS Culture result: Toxigenic C. difficile NEGATIVE                         C. difficile target DNA sequences are not detected. CULTURE, BLOOD [[id number]] Collected:  09/27/18 0136 Order Status:  Completed Specimen:  Blood from Blood Updated:  09/29/18 2037 Special Requests: NO SPECIAL REQUESTS     
  GRAM STAIN PEDIATRIC BOTTLE  
        
  GRAM POSITIVE COCCI IN PAIRS IN CLUSTERS  
        
  SMEAR CALLED TO AND CORRECTLY REPEATED BY: Emelia Kemp RN IN 2700 ON 9/29/18 AT 2033 WF Culture result:      
  CULTURE IN PROGRESS,FURTHER UPDATES TO FOLLOW Sent to [facility] for ID/Susceptibility if indicated CULTURE, URINE [[id number]] Collected:  09/27/18 0029 Order Status:  Completed Specimen:  Urine from Cath Urine Updated:  09/29/18 0944 Special Requests: NO SPECIAL REQUESTS Culture result: NO GROWTH 2 DAYS During this entire length of time I was immediately available to the patient. The reason for providing this level of medical care for this critically ill patient was due a critical illness that impaired one or more vital organ systems such that there was a high probability of imminent or life threatening deterioration in the patients condition. This care involved high complexity decision making to assess, manipulate, and support vital system functions, to treat this degreee vital organ system failure and to prevent further life threatening deterioration of the patients condition 
 
[] Total critical care time spent on reviewing the case/medical record/data/notes/EMR/patient examination/documentation/coordinating care with nurse/consultants, exclusive of procedures - minutes with complex decision making performed and > 50% time spent in face to face evaluation. Boris Erickson MD  
Board Certified by the [facility] 9/30/2018

## 2018-10-01 NOTE — PROGRESS NOTES
[facility] 9/30/18 09/30 1901 - 10/01 0700 In: 418.2 [I.V.:368.2] Out: - Intake/Output Summary (Last 24 hours) at 10/01/18 0441 Last data filed at 10/01/18 0400 Gross per 24 hour Intake           1133.9 ml Output                0 ml Net           1133.9 ml Last 3 Recorded Weights in this Encounter  
 09/26/18 2244 09/30/18 0630 Weight: 96.6 kg (213 lb) 94.2 kg (207 lb 10.8 oz) Overnight Shift Events: 
1930:  
· Bedside and verbal shift change report received from Jasiel Gates, off-going RN. Provider's instructions/meds/plan for current shift reviewed by this writer; ongoing education rendered at this time. Rate verify on IV fluids/gtts. · Dual RN bedside neuro exam completed at this time. · Pt lying in bed, call bell within reach, bed in lowest position, side rails up x 3 & bed alarm engaged for pt safety; will continue to monitor pt. 
 
2000:  
· Tube feeding started at this time - Nepro @ 10cc/hr. Will continue to monitor pt. 2247: Dopamin titrated down to 2.5mcg/kg/hr. Will continue to monitor. 0000:  
· No changes in assessment; see flowsheets/eMAR.   
 
0543:  
· Dopamine off at this time. See eMAR/flowsheets. 56:  
· Trialysis cath dressing changed for 3rd time this shift 2/2 saturation with blood leaking from cath insertion site. 0740:  
· Bedside verbal shift report given to Jasiel Gates RN (oncoming nurse) by Estephania Carbone RN(offgoing nurse). Report included the following information: SBAR, Kardex, Procedure Summary, Intake/Output, MAR and Recent Results. Normal Sinus Rhythm. Dual bedside neuro completed at this time. Date 09/30/18 0700 - 10/01/18 7498 10/01/18 0700 - 10/02/18 0300 Shift 8417-9749 2333-5651 24 Hour Total 5356-0881 6200-8625 24 Hour Total  
I 
N 
T 
A 
K 
E 
 I.V. 
(mL/kg/hr) 591.1 
(0.5) 392.1 983.2 I.V. 10  10 DOPamine Volume (ml) 217.2 134.1 351.3 Phenylephrine Volume 163.9 58 221.9 Volume (piperacillin-tazobactam (ZOSYN) 4.5 g in 0.9% sodium chloride 100 mL MBP EXTENDED 4 HOUR INFUSION ) 100 100 200 Volume (albumin human 25% (BUMINATE) solution 25 g) 100 100 200 NG/GT  50 50 Water Flush Volume (mL) (Orogastric Tube 09/29/18)  20 20 Intake (ml) (Orogastric Tube 09/29/18)  30 30 Shift Total 
(mL/kg) 591.1 
(6.3) 442.1 (4.7) 1033.2 
(11) O 
U T 
P 
U Manuela Speedy Shift Total 
(mL/kg) .1 442. 1 1033.2 Weight (kg) 94.2 94.2 94.2 94.2 94.2 94.2

## 2018-10-01 NOTE — PROGRESS NOTES
Problem: Falls - Risk of 
Goal: *Absence of Falls Document Ashly Syed Fall Risk and appropriate interventions in the flowsheet. Outcome: Not Progressing Towards Goal 
Fall Risk Interventions: 
Mobility Interventions: Bed/chair exit alarm, Communicate number of staff needed for ambulation/transfer Mentation Interventions: Adequate sleep, hydration, pain control, Bed/chair exit alarm, Door open when patient unattended, Evaluate medications/consider consulting pharmacy, More frequent rounding, Reorient patient, Update white board Medication Interventions: Evaluate medications/consider consulting pharmacy, Bed/chair exit alarm Elimination Interventions: Call light in reach, Bed/chair exit alarm Problem: Pressure Injury - Risk of 
Goal: *Prevention of pressure injury Document Mitul Scale and appropriate interventions in the flowsheet. Outcome: Not Progressing Towards Goal 
Pressure Injury Interventions: 
Sensory Interventions: Assess changes in LOC, Assess need for specialty bed, Float heels, Keep linens dry and wrinkle-free, Minimize linen layers, Monitor skin under medical devices, Pad between skin to skin Moisture Interventions: Absorbent underpads, Apply protective barrier, creams and emollients, Minimize layers, Maintain skin hydration (lotion/cream), Check for incontinence Q2 hours and as needed Activity Interventions: Assess need for specialty bed Mobility Interventions: Assess need for specialty bed, Turn and reposition approx. every two hours(pillow and wedges) Nutrition Interventions: Document food/fluid/supplement intake Friction and Shear Interventions: Apply protective barrier, creams and emollients, HOB 30 degrees or less, Minimize layers

## 2018-10-01 NOTE — PROGRESS NOTES
Problem: Ventilator Management Goal: *Adequate oxygenation and ventilation VENTILATOR Care plan 
 
Problem: Ventilator Management Goal: *Adequate oxygenation/ ventilation/ and extubation Patient: 
   
 
Jayden Brown     57 y.o.   male     10/1/2018  5:32 PM 
Patient Active Problem List  
Diagnosis Code  Stroke (cerebrum) ([facility]) I63.9  [facility] I63.9  Rhabdomyolysis M62.82  
 Dehydration E86.0  
 Essential hypertension I10  
 Diabetes mellitus type 2, controlled ([facility] E11.9  Non compliance w medication regimen Z91.14  
 Pneumonia of both lower lobes J18.9  DM (diabetes mellitus) ([facility] E11.9  History of stroke Z80.78  
 Acute-on-chronic kidney injury ([facility] N17.9, N18.9  Acute cystitis N30.00  Elevated troponin R74.8  Transaminitis R74.0  Acute kidney injury [facility] N17.9  Elevated LFTs R94.5  Cardiac arrest with ventricular fibrillation ([facility]) I46.9, I49.01  
 Abnormal blood coagulation profile R79.1  Acute pancreatitis K85.90  Septic shock ([facility]) A41.9, R65.21  
 Ischemic encephalopathy I67.89 Acute kidney injury ([facility] Reason patient intubated:  Acute respiratory failure secondary to code blue. 
  
Ventilator day: 3 
  
Ventilator settings: AC VC+, Rate=16, Bp=439, Fio2=50%, Peep=5;   
  
ETT Size/Placement:  7.5 ETT 24 lip    
  
 
 
 
ABG: 
Date:10/1/2018 Lab Results Component Value Date/Time PHI 7.355 10/01/2018 04:43 AM  
 PCO2I 38.3 10/01/2018 04:43 AM  
 PO2I 131 (H) 10/01/2018 04:43 AM  
 HCO3I 21.2 (L) 10/01/2018 04:43 AM  
 FIO2I 60 10/01/2018 04:43 AM  
 
 
Chest X-ray: 
Date:10/1/2018 [facility] encounter on 09/26/18 XR CHEST PORT Narrative EXAM: XR CHEST PORT 
 
CLINICAL INDICATION/HISTORY: ETT placement >Additional: None COMPARISON: 9/29/2018 >Reference exam: None. TECHNIQUE: Portable chest. 
 
_______________ FINDINGS: 
 
SUPPORT LINES AND TUBES: Right IJ approach central venous catheter terminates in 
the SVC. Nasogastric tube courses inferiorly off the field-of-view with the 
side-port well distal to the GE junction. A nasogastric tube terminates below 
the clavicles, exact distance relative to the billy is difficult but estimated 
proximal 3 5 cm. Additional esophageal probe is noted. HEART AND MEDIASTINUM: Prominent heart size is exaggerated due to portable 
technique and rotation to the left but likely within normal limits and 
unchanged. Bilateral pulmonary vascular congestion with diminished clarity is 
noted. LUNGS AND PLEURAL SPACES: Bilateral patchy perihilar airspace opacities with 
more confluent opacity in the right lung base. Possible trace pleural effusions. No pneumothorax. BONY THORAX AND SOFT TISSUES: No acute osseous abnormality. _______________ Impression IMPRESSION: 
 
Bilateral right greater than left airspace disease, slightly progressive since 
prior radiographs. Support lines and tubes remain in place. Lab Test: 
Date:10/1/2018 WBC:  
Lab Results Component Value Date/Time WBC 19.9 (H) 10/01/2018 10:27 AM  
HGB: Lab Results Component Value Date/Time HGB 6.1 (L) 10/01/2018 10:27 AM  
 PLTS: Lab Results Component Value Date/Time PLATELET 331 41/48/0500 10:27 AM  
 
 
SaO2%/flow:  
SpO2 Readings from Last 1 Encounters:  
10/01/18 96% Vital Signs:   Patient Vitals for the past 8 hrs: 
 Temp Pulse Resp BP SpO2  
10/01/18 1700 100.2 °F (37.9 °C) 83 17 154/62 96 % 10/01/18 1648 - 84 19 - 97 % 10/01/18 1630 100.4 °F (38 °C) 84 21 154/60 95 % 10/01/18 1600 100.4 °F (38 °C) 86 25 (!) 186/162 97 % 10/01/18 1530 100 °F (37.8 °C) 78 17 176/81 98 % 10/01/18 1525 - - - 177/80 -  
10/01/18 1500 100.2 °F (37.9 °C) 76 18 175/71 99 % 10/01/18 1440 99.6 °F (37.6 °C) 76 18 173/73 -  
10/01/18 1430 100 °F (37.8 °C) 73 17 173/71 100 % 10/01/18 1426 100.2 °F (37.9 °C) 74 17 164/69 -  
10/01/18 1423 - 73 17 - 100 % 10/01/18 1411 99.6 °F (37.6 °C) 70 18 161/66 -  
10/01/18 1408 99.2 °F (37.3 °C) 72 17 158/67 -  
10/01/18 1400 (!) 37.6 °F (3.1 °C) 72 17 158/67 100 % 10/01/18 1345 99.3 °F (37.4 °C) 72 18 148/60 -  
10/01/18 1330 (!) 37.6 °F (3.1 °C) 73 19 134/58 99 % 10/01/18 1315 99 °F (37.2 °C) 70 20 132/53 -  
10/01/18 1300 (!) 37.7 °F (3.2 °C) 73 22 136/55 93 % 10/01/18 1230 (!) 37.8 °F (3.2 °C) 71 21 134/56 99 % 10/01/18 1200 100.4 °F (38 °C) 68 20 131/56 100 % 10/01/18 1145 (!) 100.6 °F (38.1 °C) 67 21 134/56 100 % 10/01/18 1130 100.4 °F (38 °C) 65 20 127/55 100 % 10/01/18 1100 (!) 100.8 °F (38.2 °C) 66 16 119/51 99 % 10/01/18 1030 (!) 100.6 °F (38.1 °C) 66 18 111/68 99 % 10/01/18 1000 (!) 100.6 °F (38.1 °C) 69 20 122/54 98 % Wean Screen Pass (Yes or No): n 
Wean Screen Reason for Failure:  Cardiac instability. Duration of Weaning Trial: 
Additional Comments: 
   
  
PLAN OF CARE:  Monitor pt on vent. Wean/titrate pt as needed to maintain pt comfort & sats. Provide suction as needed & nebulizers. 
   
  
GOAL:  Respiratory stability & extubation. 
  
  
OUTCOME:  Pt remains on mechanical ventilation. Completed round of dialysis today without incident.

## 2018-10-01 NOTE — PROGRESS NOTES
RENAL PROGRESS NOTE Cait Solorzano Assessment/Plan: · Dialysis dependant JULIANNA (ischemic atn in a setting of acute pyelonephritis/acute cholecystitis with sepsis and cardiac arrest 9/29). Dialysis later today, pull 2 liters if bp tolerates. Minimize intake, volume overloaded at this time. · HAGMA due to julianna. Improved with dialysis. · Severe hyperphosphatemia. Improved. · S/p cardiopulm arrest 9/29. Remains on pressors. · Acute pyelonephritis/sepsis. On abx. · Abnormal lft's/acute cholecystitis? On abx. GI on the case. · Oozing from the site of rt ij temporary dialysis catheter and other iv sites. Received ddavp. No heparin with dialysis. · Rectal bleeding 9/29. GI on the case. · Acute blood loss anemia. May need transfusions. · Resp failure. · Recent cva with debilitation/maulutrition. Subjective: Intubated, on neosynephrin. Tolerates tf. Is having melena. Patient Active Problem List  
Diagnosis Code  Stroke (cerebrum) ([facility]) I63.9  Stroke ([facility] I63.9  Rhabdomyolysis M62.82  
 Dehydration E86.0  
 Essential hypertension I10  
 Diabetes mellitus type 2, controlled ([facility] E11.9  Non compliance w medication regimen Z91.14  
 Pneumonia of both lower lobes J18.9  DM (diabetes mellitus) ([facility] E11.9  History of stroke Z80.78  
 Acute-on-chronic kidney injury ([facility] N17.9, N18.9  Acute cystitis N30.00  Elevated troponin R74.8  Transaminitis R74.0  Acute kidney injury ([facility] N17.9  Elevated LFTs R94.5  Cardiac arrest with ventricular fibrillation ([facility]) I46.9, I49.01  
 Abnormal blood coagulation profile R79.1  Acute pancreatitis K85.90  Septic shock ([facility]) A41.9, R65.21  
 Ischemic encephalopathy I67.89 Current Facility-Administered Medications Medication Dose Route Frequency Provider Last Rate Last Dose  insulin lispro (HUMALOG) injection   SubCUTAneous Q6H Ethelle Saucer, DO   Stopped at 10/01/18 4124  PHENYLephrine (FOUZIA-SYNEPHRINE) 90 mg in 0.9% sodium chloride 250 mL infusion   mcg/min IntraVENous TITRATE Sana SPRINGER MD 5.8 mL/hr at 09/30/18 1931 35 mcg/min at 09/30/18 1931  
 vancomycin (VANCOCIN) 1,000 mg in 0.9% sodium chloride (MBP/ADV) 250 mL adv  1,000 mg IntraVENous Q MON, WED & FRI Ethelle Saucer, DO      
 insulin glargine (LANTUS) injection 6 Units  6 Units SubCUTAneous DAILY Ethelle Saucer, DO   6 Units at 09/30/18 1253  albuterol (PROVENTIL VENTOLIN) nebulizer solution 2.5 mg  2.5 mg Nebulization Q6H RT Donna Collins MD   2.5 mg at 10/01/18 0802  DOPamine (INTROPIN) 400 mg in dextrose 5% 250 mL infusion  0-15 mcg/kg/min IntraVENous TITRATE Donna Collins MD   Stopped at 10/01/18 7229  metoprolol tartrate (LOPRESSOR) tablet 50 mg  50 mg Oral Q12H Maxim Natalie SPRINGER MD   50 mg at 09/30/18 2246  
 0.9% sodium chloride infusion  50 mL/hr IntraVENous DIALYSIS PRN Yancy Rivas MD      
 heparin (porcine) 1,000 unit/mL injection 1,000 Units  1,000 Units InterCATHeter DIALYSIS PRN Yancy Rivas MD   1,000 Units at 09/28/18 1332  VANCOMYCIN INFORMATION NOTE   Other Rx Dosing/Monitoring Adolph Louise MD      
 senna-docusate (PERICOLACE) 8.6-50 mg per tablet 1 Tab  1 Tab Oral BID PRN Gabrielle Putnam, DO      
 melatonin tablet 3 mg  3 mg Oral QHS PRN Gabrielle Putnam, DO      
 traMADol (ULTRAM) tablet 50 mg  50 mg Oral Q6H PRN Gabrielle Putnam, DO      
 ondansetron [facility]) injection 4 mg  4 mg IntraVENous Q4H PRN Gabrielle Putnam, DO   4 mg at 09/28/18 0539  
 glucose chewable tablet 16 g  4 Tab Oral PRN Lares Putnam, DO      
 glucagon (GLUCAGEN) injection 1 mg  1 mg IntraMUSCular PRN Kaila Nettles, DO      
 dextrose (D50) infusion 12.5-25 g  25-50 mL IntraVENous PRN Kaila Mustard McQuain, DO   12.5 g at 09/28/18 1206  hydrALAZINE (APRESOLINE) 20 mg/mL injection 20 mg  20 mg IntraVENous Q6H PRN Vasu Welch NP   20 mg at 09/28/18 1006  piperacillin-tazobactam (ZOSYN) 4.5 g in 0.9% sodium chloride 100 mL MBP EXTENDED 4 HOUR INFUSION   4.5 g IntraVENous Q12H Lina Winston MD 25 mL/hr at 09/30/18 2246 4.5 g at 09/30/18 2246  pantoprazole (PROTONIX) 40 mg in sodium chloride 0.9% 10 mL injection  40 mg IntraVENous DAILY Vasu Welch NP   40 mg at 09/30/18 8212 Objective Vitals:  
 10/01/18 0630 10/01/18 0700 10/01/18 0800 10/01/18 0803 BP: 129/53 136/57 132/52 Pulse: 73 73 75 74 Resp: 18 19 17 20 Temp: 99.9 °F (37.7 °C) 100 °F (37.8 °C) (!) 37.9 °F (3.3 °C) TempSrc:      
SpO2: 99% 100% 99% 99% Weight:      
Height:      
 
 
 
Intake/Output Summary (Last 24 hours) at 10/01/18 6648 Last data filed at 10/01/18 0800 Gross per 24 hour Intake          1246.88 ml Output                0 ml Net          1246.88 ml Admission weight: Weight: 96.6 kg (213 lb) (09/26/18 2244) Last Weight Metrics: 
Weight Loss Metrics 9/30/2018 9/26/2018 9/13/2018 Today's Wt 207 lb 10.8 oz - 227 lb 15.3 oz  
BMI - 32.53 kg/m2 35.7 kg/m2 Physical Assessment:  
 
General: intubated, unresponsive. Neck: No jvd. Rt ij temporary dialysis catheter in place, dressing is partially soaked with blood. LUNGS: diminished air entry at the bases. No crackles. CVS EXM: S1, S2  RRR. Abdomen: soft, pos bs. Lower Extremities:  1+ edema. Lab CBC w/Diff Recent Labs  
   09/30/18 
 0418  09/29/18 1110  09/28/18 
 1930 WBC  22.6*  28.4*  21.5*  
RBC  2.78*  3.27*  3.85* HGB  7.8*  9.6*  11.0*  
HCT  22.8*  28.0*  32.1*  
PLT  315  385  385 GRANS  80*   --    --   
LYMPH  7*   --    --   
EOS  0   --    --   
  
 
Chemistry Recent Labs 10/01/18 
 0423  09/30/18 
 0400  09/29/18 1110  09/29/18 
 0500   09/28/18 
 1610 GLU  143*  186*  88  101*   --   116* NA  142  141  142  145   --   140  
K  4.9  4.4  4.4  4.9   --   5.6*  
CL  103  102  101  103   --   99* CO2  20*  21  20*  17*   --   15* BUN  87*  71*  56*  95*   --   150* CREA  7.71*  6.27*  5.28*  8.29*   --   11.60* CA  7.0*  6.6*  7.9*  7.1*   --   7.5* AGAP  19*  18  21*  25*   --   26* BUCR  11*  11*  11*  11*   --   13  
AP   --   353*   --    --    --   394* TP   --   5.7*   --    --    --   8.1 ALB  1.8*  1.2*  1.2*   --   1.3*   --   1.4*  
GLOB   --   4.5*   --    --    --   6.7* AGRAT   --   0.3*   --    --    --   0.2* PHOS  8.5*  7.6*  8.5*  8.7*   < >   --   
 < > = values in this interval not displayed. No results found for: IRON, FE, TIBC, IBCT, PSAT, FERR Lab Results Component Value Date/Time Calcium 7.0 (L) 10/01/2018 04:23 AM  
 Phosphorus 8.5 (H) 10/01/2018 04:23 AM  
  
 
Wily Ramirez M.D. [facility] Phone [phone] Pager [phone]

## 2018-10-01 NOTE — PROGRESS NOTES
0720 Bedside verbal report received from off going nurse, [address] RN. Dual neuro/ skin assesment completed at this time. Patient on vent, resting quietly in bed, no s/s of acute distress. 0800 Physical assessment completed at this time. 1015 Dialysis nurse present in the room. 1925 Bedside verbal shift change report given to [address], RN (oncoming nurse) by Jenaro Faustin RN (offgoing nurse). Report included the following information SBAR, Kardex, Intake/Output and MAR.

## 2018-10-01 NOTE — PROGRESS NOTES
Speech Therapy Note: Pt was being seen by SLP for dysphagia. Required intubation. Will d/c ST services accordingly. Available for re-evaluation upon extubation. Thank you for this consult. Krystyna Bolton M.S., [facility] Office: [phone] Pager: [phone]

## 2018-10-01 NOTE — PROGRESS NOTES
Patient suffer cardiac arrest 9.29.18. Now intubated on pressors. Started on dialysis, he will need facility and chair time. Patient was receiving rehab at [facility] following a stroke he had suffered previously. His discharge plan is now dependent upon his successful extubation. Case management following.  Leni Valadez RN

## 2018-10-01 NOTE — DIABETES MGMT
NUTRITIONAL ASSESSMENT GLYCEMIC CONTROL/ PLAN OF CARE Ignacio Rosen           57 y.o.           9/26/2018 1. Acute renal failure, unspecified acute renal failure type ([address] 2. Acute UTI 3. Cholelithiasis with choledocholithiasis 4. History of CVA (cerebrovascular accident) DM INTERVENTIONS/PLAN:  
 Recommend full strength Nepro TF at  20 ml/hour. Advance as tolerated to goal rate of 40 ml/hour. Tube feeding will provide 1728 calories,  76 g protein/d with 680 milliliters free water/d from TF. ASSESSMENT:  
Nutritional Status: 
Pt is overweight related to excess caloric intake as evidenced by 135% ideal weight and BMI= 32.5kg/m2. Pt meets criteria for obesity. Altered nutrition-related lab values RT DX. Diabetes Mellitus  related to lack of adherence to nutrition and medication recommendations as evidenced by A1c of 10% Altered nutrition-related lab values RT DX. Acute renal failure aeb Cr 7.1 BUN 71. Pt w/hypoalbuminemia as evidenced by albumin=2.0mg/dl and prealbumin 10.5. Unstageable area on sacrum noted. Diabetes Management:  BG well controlled on current insulin Recent blood glucose:    
 
Lab Results Component Value Date/Time  (H) 10/01/2018 04:23 AM  
 GLUCPOC 169 (H) 09/30/2018 06:36 PM  
 
Within target range (non-ICU: <140; ICU<180): [x] Yes   []  No 
 
Current Insulin regimen: 6 units Lantus/24 hrs plus corrective lispro Home medication/insulin regimen:  
Key Antihyperglycemic Medications   
    
  
 insulin glargine (LANTUS) 100 unit/mL injection 10 units qhs  Indications: type 2 diabetes mellitus  
 insulin lispro (HUMALOG) 100 unit/mL injection 4 units with meals HbA1c: 10% equivalent  to ave Blood Glucose of 240mg/dl for 2-3 months prior to admission Adequate glycemic control PTA:  [] Yes  [x] No 
  
SUBJECTIVE/OBJECTIVE: Information obtained from: ICU rounds Diet:  Npo plus Nepro TF at 10 ml/hr Patient Vitals for the past 100 hrs: 
 % Diet Eaten  
09/27/18 1344 5 % Medications: [x]                Reviewed Labs:  
Lab Results Component Value Date/Time Hemoglobin A1c 10.0 (H) 09/30/2018 04:18 AM  
 
Lab Results Component Value Date/Time Sodium 142 10/01/2018 04:23 AM  
 Potassium 4.9 10/01/2018 04:23 AM  
 Chloride 103 10/01/2018 04:23 AM  
 CO2 20 (L) 10/01/2018 04:23 AM  
 Anion gap 19 (H) 10/01/2018 04:23 AM  
 Glucose 143 (H) 10/01/2018 04:23 AM  
 BUN 87 (H) 10/01/2018 04:23 AM  
 Creatinine 7.71 (H) 10/01/2018 04:23 AM  
 Calcium 7.0 (L) 10/01/2018 04:23 AM  
 Magnesium 3.8 (H) 09/29/2018 11:10 AM  
 Phosphorus 8.5 (H) 10/01/2018 04:23 AM  
 Albumin 2.0 (L) 10/01/2018 11:45 AM  
 
 
Anthropometrics: IBW : 69.9 kg (154 lb), % IBW (Calculated): 134.85 %, BMI (calculated): 32.5 Wt Readings from Last 1 Encounters:  
10/01/18 94.2 kg (207 lb 10.8 oz) Ht Readings from Last 1 Encounters:  
10/01/18 5' 7\" (1.702 m) Estimated Nutrition Needs:  1880 Kcals/day, Protein (g): 85 g Fluid (ml): 1800 ml Based on:   [x]          Actual BW    []          ABW   []            Adjusted BW   
Nutrition Diagnoses: as stated above Nutrition Interventions: TF 
Goal:  
Blood glucose will be within target range of  mg/dL by 10/4. Intake will meet >75% of energy and protein requirements by 10/6. Gradual weight loss post discharge 1 lb per week by 10/11. Nutrition Monitoring and Evaluation []     Monitor po intake on meal rounds 
[x]     Continue inpatient monitoring and intervention 
[]     Other: 
 
 
Nutrition Risk:  [x]   High     []  Moderate    []  Minimal/Uncompromised Bessie Villalta RD 
pgr [phone]

## 2018-10-01 NOTE — PROGRESS NOTES
Internal Medicine Progress Note Patient's Name: Tulio Seo Admit Date: 9/26/2018 Length of Stay: 4 Assessment/Plan Active Hospital Problems Diagnosis Date Noted  Septic shock ([facility] 09/30/2018 Probably secondary to pneumonia (suspicious for aspiration). Less likely, secondary to acute pancreatitis.  Ischemic encephalopathy 09/30/2018  Cardiac arrest with ventricular fibrillation ([address] 09/29/2018 ROSC before defibrillation could be attempted.  Abnormal blood coagulation profile 09/29/2018  Acute pancreatitis 09/29/2018 Shock, leukocytosis, elevated lipase but radiographically no ductal dilatation in pancreas. GB stones without radiographic stigmata of acute cholecystitis  Elevated LFTs 09/28/2018  History of stroke 09/27/2018 With residual R hemiparesis  Acute-on-chronic kidney injury ([facility] 09/27/2018  Acute cystitis 09/27/2018  Elevated troponin 09/27/2018  Transaminitis 09/27/2018  Acute kidney injury ([facility] 09/27/2018  Pneumonia of both lower lobes 09/11/2018  Essential hypertension 09/10/2018  Diabetes mellitus type 2, controlled ([facility] 09/10/2018  
 
- Vent mgmt per ICU 
- Consider cholecystostomy tube if LFTs cont to rise 
- Hgb trending down w/ concern GIB 
- F/u GI 
- Cont protonix IV 
- Transfuse 2 units 
- HD mgmt per nephro - Cont broad spec IVAB 
- Trend CBC 
- F/u cult - Appreciate consulting services - Cont acceptable home medications for chronic conditions  
- DVT protocol I have personally reviewed all pertinent labs and films that have officially resulted over the last 24 hours. I have personally checked for all pending labs that are awaiting final results. Subjective Pt s/e @ bedside Remains intubated No interaction at this time but withdraws to pain On dialysis Unable to assess ROS Objective Visit Vitals  /56  Pulse 68  Temp 100.4 °F (38 °C)  Resp 20  Ht 5' 7\" (1.702 m)  Wt 94.2 kg (207 lb 10.8 oz)  SpO2 100%  BMI 32.53 kg/m2 Physical Exam: 
General Appearance: Intubated, not following commands HENT: normocephalic/atraumatic, + ETT Neck: No JVD, hematoma R side where TDC is 
Lungs: B/L crackles, vent-assisted BS 
CV: RRR, no m/r/g Abdomen: soft, non-tender, normal bowel sounds Extremities: no cyanosis, no peripheral edema Neuro: Unresponsive to commands, withdraws to pain Skin: Normal color, intact Intake and Output: 
Current Shift:  10/01 0701 - 10/01 1900 In: 72 Out: - Last three shifts:  09/29 1901 - 10/01 0700 In: 2855.8 [I.V.:2585.8] Out: -  
 
Lab/Data Reviewed: 
CMP:  
Lab Results Component Value Date/Time  10/01/2018 04:23 AM  
 K 4.9 10/01/2018 04:23 AM  
  10/01/2018 04:23 AM  
 CO2 20 (L) 10/01/2018 04:23 AM  
 AGAP 19 (H) 10/01/2018 04:23 AM  
  (H) 10/01/2018 04:23 AM  
 BUN 87 (H) 10/01/2018 04:23 AM  
 CREA 7.71 (H) 10/01/2018 04:23 AM  
 GFRAA 9 (L) 10/01/2018 04:23 AM  
 GFRNA 7 (L) 10/01/2018 04:23 AM  
 CA 7.0 (L) 10/01/2018 04:23 AM  
 PHOS 8.5 (H) 10/01/2018 04:23 AM  
 ALB 1.8 (L) 10/01/2018 04:23 AM  
 
CBC:  
Lab Results Component Value Date/Time WBC 19.9 (H) 10/01/2018 10:27 AM  
 HGB 6.1 (L) 10/01/2018 10:27 AM  
 HCT 17.4 (LL) 10/01/2018 10:27 AM  
  10/01/2018 10:27 AM  
 
 
Imaging Reviewed: 
Feliciano Zamora Chest Miladys Mings Result Date: 10/1/2018 EXAM: Chest portable INDICATION: Respiratory failure. Intubated COMPARISON: Single view chest 9/30/2018 _______________ FINDINGS: AP portable chest film was performed. Endotracheal tube, NG tube and right jugular central line all continue to be present in good position. Opacification of the inferior right hemithorax has increased from yesterday's exam. This could represent a combination of effusion and/or atelectasis/infiltrate. Right perihilar component appears improved.  Left lung is clear. No pneumothorax. Heart is at the upper limits of normal. Pulmonary vascularity appears normal. _______________ IMPRESSION: 1. Support lines and tubes in good position. 2. Worsening opacification inferior right hemithorax which probably represents accommodation of atelectasis and/or effusion. Improved right perihilar infiltrate. Medications Reviewed: 
Current Facility-Administered Medications Medication Dose Route Frequency  acetaminophen (TYLENOL) solution 325 mg  325 mg Per NG tube Q4H PRN  pantoprazole (PROTONIX) 40 mg in sodium chloride 0.9% 10 mL injection  40 mg IntraVENous Q12H  
 0.9% sodium chloride infusion 250 mL  250 mL IntraVENous PRN  
 [START ON 10/3/2018] VANCOMYCIN INFORMATION NOTE   Other ONCE  
 insulin lispro (HUMALOG) injection   SubCUTAneous Q6H  
 PHENYLephrine (FOUZIA-SYNEPHRINE) 90 mg in 0.9% sodium chloride 250 mL infusion   mcg/min IntraVENous TITRATE  vancomycin (VANCOCIN) 1,000 mg in 0.9% sodium chloride (MBP/ADV) 250 mL adv  1,000 mg IntraVENous Q MON, WED & FRI  insulin glargine (LANTUS) injection 6 Units  6 Units SubCUTAneous DAILY  albuterol (PROVENTIL VENTOLIN) nebulizer solution 2.5 mg  2.5 mg Nebulization Q6H RT  
 DOPamine (INTROPIN) 400 mg in dextrose 5% 250 mL infusion  0-15 mcg/kg/min IntraVENous TITRATE  
 0.9% sodium chloride infusion  50 mL/hr IntraVENous DIALYSIS PRN  
 heparin (porcine) 1,000 unit/mL injection 1,000 Units  1,000 Units InterCATHeter DIALYSIS PRN  
 VANCOMYCIN INFORMATION NOTE   Other Rx Dosing/Monitoring  senna-docusate (PERICOLACE) 8.6-50 mg per tablet 1 Tab  1 Tab Oral BID PRN  
 melatonin tablet 3 mg  3 mg Oral QHS PRN  
 traMADol (ULTRAM) tablet 50 mg  50 mg Oral Q6H PRN  
 ondansetron (ZOFRAN) injection 4 mg  4 mg IntraVENous Q4H PRN  
 glucose chewable tablet 16 g  4 Tab Oral PRN  
 glucagon (GLUCAGEN) injection 1 mg  1 mg IntraMUSCular PRN  
  dextrose (D50) infusion 12.5-25 g  25-50 mL IntraVENous PRN  
 hydrALAZINE (APRESOLINE) 20 mg/mL injection 20 mg  20 mg IntraVENous Q6H PRN  piperacillin-tazobactam (ZOSYN) 4.5 g in 0.9% sodium chloride 100 mL MBP EXTENDED 4 HOUR INFUSION   4.5 g IntraVENous Q12H Carline Giles DO Internal Medicine, Hospitalist 
Pager: [phone] [facility]

## 2018-10-01 NOTE — PROGRESS NOTES
Problem: Ventilator Management Goal: *Adequate oxygenation and ventilation VENTILATOR Care plan 
 
Problem: Ventilator Management Goal: *Adequate oxygenation/ ventilation/ and extubation Patient: 
   
 
Orion Figueroa     57 y.o.   male     10/1/2018  5:30 PM 
Patient Active Problem List  
Diagnosis Code  Stroke (cerebrum) ([facility]) I63.9  Stroke ([facility] I63.9  Rhabdomyolysis M62.82  
 Dehydration E86.0  
 Essential hypertension I10  
 Diabetes mellitus type 2, controlled ([facility] E11.9  Non compliance w medication regimen Z91.14  
 Pneumonia of both lower lobes J18.9  DM (diabetes mellitus) ([facility] E11.9  History of stroke Z80.78  
 Acute-on-chronic kidney injury ([facility] N17.9, N18.9  Acute cystitis N30.00  Elevated troponin R74.8  Transaminitis R74.0  Acute kidney injury ([facility] N17.9  Elevated LFTs R94.5  Cardiac arrest with ventricular fibrillation ([facility]) I46.9, I49.01  
 Abnormal blood coagulation profile R79.1  Acute pancreatitis K85.90  Septic shock ([facility]) A41.9, R65.21  
 Ischemic encephalopathy I67.89 Acute kidney injury ([facility] Reason patient intubated: 
 
Ventilator day: 
 
Ventilator settings: ETT Size/Placement: 
  
 
ABG: 
Date:10/1/2018 Lab Results Component Value Date/Time PHI 7.355 10/01/2018 04:43 AM  
 PCO2I 38.3 10/01/2018 04:43 AM  
 PO2I 131 (H) 10/01/2018 04:43 AM  
 HCO3I 21.2 (L) 10/01/2018 04:43 AM  
 FIO2I 60 10/01/2018 04:43 AM  
 
 
Chest X-ray: 
Date:10/1/2018 Results from [facility] Encounter encounter on 09/26/18 XR CHEST PORT Narrative EXAM: XR CHEST PORT 
 
CLINICAL INDICATION/HISTORY: ETT placement >Additional: None COMPARISON: 9/29/2018 >Reference exam: None. TECHNIQUE: Portable chest. 
 
_______________ FINDINGS: 
 
SUPPORT LINES AND TUBES: Right IJ approach central venous catheter terminates in 
the SVC.  Nasogastric tube courses inferiorly off the field-of-view with the 
 side-port well distal to the GE junction. A nasogastric tube terminates below 
the clavicles, exact distance relative to the billy is difficult but estimated 
proximal 3 5 cm. Additional esophageal probe is noted. HEART AND MEDIASTINUM: Prominent heart size is exaggerated due to portable 
technique and rotation to the left but likely within normal limits and 
unchanged. Bilateral pulmonary vascular congestion with diminished clarity is 
noted. LUNGS AND PLEURAL SPACES: Bilateral patchy perihilar airspace opacities with 
more confluent opacity in the right lung base. Possible trace pleural effusions. No pneumothorax. BONY THORAX AND SOFT TISSUES: No acute osseous abnormality. _______________ Impression IMPRESSION: 
 
Bilateral right greater than left airspace disease, slightly progressive since 
prior radiographs. Support lines and tubes remain in place. Lab Test: 
Date:10/1/2018 WBC:  
Lab Results Component Value Date/Time WBC 19.9 (H) 10/01/2018 10:27 AM  
HGB: Lab Results Component Value Date/Time HGB 6.1 (L) 10/01/2018 10:27 AM  
 PLTS: Lab Results Component Value Date/Time PLATELET 469 62/27/3561 10:27 AM  
 
 
SaO2%/flow:  
SpO2 Readings from Last 1 Encounters:  
10/01/18 96% Vital Signs:   Patient Vitals for the past 8 hrs: 
 Temp Pulse Resp BP SpO2  
10/01/18 1700 100.2 °F (37.9 °C) 83 17 154/62 96 % 10/01/18 1648 - 84 19 - 97 % 10/01/18 1630 100.4 °F (38 °C) 84 21 154/60 95 % 10/01/18 1600 100.4 °F (38 °C) 86 25 (!) 186/162 97 % 10/01/18 1530 100 °F (37.8 °C) 78 17 176/81 98 % 10/01/18 1525 - - - 177/80 -  
10/01/18 1500 100.2 °F (37.9 °C) 76 18 175/71 99 % 10/01/18 1440 99.6 °F (37.6 °C) 76 18 173/73 -  
10/01/18 1430 100 °F (37.8 °C) 73 17 173/71 100 % 10/01/18 1426 100.2 °F (37.9 °C) 74 17 164/69 -  
10/01/18 1423 - 73 17 - 100 % 10/01/18 1411 99.6 °F (37.6 °C) 70 18 161/66 -  
10/01/18 1408 99.2 °F (37.3 °C) 72 17 158/67 -  
 10/01/18 1400 (!) 37.6 °F (3.1 °C) 72 17 158/67 100 % 10/01/18 1345 99.3 °F (37.4 °C) 72 18 148/60 -  
10/01/18 1330 (!) 37.6 °F (3.1 °C) 73 19 134/58 99 % 10/01/18 1315 99 °F (37.2 °C) 70 20 132/53 -  
10/01/18 1300 (!) 37.7 °F (3.2 °C) 73 22 136/55 93 % 10/01/18 1230 (!) 37.8 °F (3.2 °C) 71 21 134/56 99 % 10/01/18 1200 100.4 °F (38 °C) 68 20 131/56 100 % 10/01/18 1145 (!) 100.6 °F (38.1 °C) 67 21 134/56 100 % 10/01/18 1130 100.4 °F (38 °C) 65 20 127/55 100 % 10/01/18 1100 (!) 100.8 °F (38.2 °C) 66 16 119/51 99 % 10/01/18 1030 (!) 100.6 °F (38.1 °C) 66 18 111/68 99 % 10/01/18 1000 (!) 100.6 °F (38.1 °C) 69 20 122/54 98 % Wean Screen Pass (Yes or No): Wean Screen Reason for Failure: 
Duration of Weaning Trial: 
Additional Comments: PLAN OF CARE: 
  
 
GOAL: 
 
 
OUTCOME:

## 2018-10-01 NOTE — PROGRESS NOTES
Neurology Consult Note Admit Date: 9/26/2018 Length of Stay: 4 Primary Care: Umer Grace MD  
Principle Problem: Cardiac arrest with ventricular fibrillation ([facility] [address] He was seen and examed independently by me and I reviewed his current history, PMI, SH,  FH,  ROS, lab and imaging as following today. I discussed with and agreed with Ms Caroline Jimenez NP  the following A/P. Tee Mccann MD 
 
 
Assessment:   
Encephalopathy Plan:   
Encephalopathy secondary to metabolic or infectious process. Reverse metabolic abnormalities. Nephrology following. Currently receiving dialysis. JULIANNA in setting of acute pyelonephritis/acute cholecystitis with sepsis. ABX per primary team.   2 units of RBCs for low H&H of 6.1/17. 4. GI following. Active melena. Increased ALT, AST, and alk phos. Possible cholecystitis. Considering cholecystostomy tube placement if LFTs rise. R/O Acute Stroke Previous posterior circulation strokes. Needs to rule out further ischemic stroke or hemorrhagic conversion. Will get MRI. If this is negative will get EEG tomorrow. History:  Mary Ellen Lara is a 56yo AAM with PMH of HTN, HLD, IDDM, and CVA with right hemiparesis who was sent from the NH due to abnormal labs ie elevated BUN/Creat. From notes he had cardiac arrest after dialysis. Initial CT at that time showed no acute abnormalities. Previous MRI on 9/10/18 showed acute infarct involving the left medullary pyramid. Chronic right central lizabeth and bilateral basal ganglia infarcts. Hemosiderin staining from prior chronic parenchymal hemorrhage involving superolateral right basal ganglia. Results reviewed:  
 
CT Results (most recent): IMPRESSION IMPRESSION: 
  
1. No acute intracranial abnormalities. 2.  Multifocal chronic infarcts and presumed chronic microvascular changes 
without significant interval change.  
3.  High density material opacifying the left maxillary sinus and additional 
layering debris in the nasopharynx compatible with sinusitis, potentially fungal 
etiology or other proteinaceous debris. MRI Results (most recent): 
 
Results from Hospital Encounter encounter on 09/10/18 MRA BRAIN WO CONT Narrative EXAM: MRA HEAD INDICATION: Right arm weakness TECHNIQUE:  MR angiography of the head was performed utilizing 3-D time of 
flight axial acquisitions with multiplanar reconstructions. _______________ FINDINGS:   
 
Motion artifact is present which limits evaluation. There is no evidence of aneurysm. Mild irregularity and narrowing is seen in the bilateral carotid siphons without 
high grade stenosis. Mild irregularity is present along the carotid terminus 
bilaterally. Mild multifocal narrowing is seen involving the anterior cerebral arteries 
bilaterally. No definite high-grade MARCY stenosis is seen. Mild multifocal irregularity is suggested in the bilateral proximal MCA 
including the bifurcations and distal branches. The vertebral arteries are relatively equal in size. PICA origins are 
unremarkable. No high-grade vertebral artery stenosis is seen. Moderate 
narrowing is present in the proximal limited basilar artery. Additional slight 
irregularity is seen distally. No significant PCA stenosis is identified given 
the motion artifact.    
 
_______________ Impression IMPRESSION: 
 
Motion degraded study. Mild multifocal irregularity and narrowing involving both the anterior and 
posterior circulation without definite high-grade stenosis. Moderate 
irregularity and narrowing suggested in the proximal to mid basilar artery. MRI brain FINDINGS:  
  
Motion artifact is present which limits evaluation. 
  
There is a small discrete area of abnormal restricted diffusion involving the 
left ventral medulla along the medullary pyramid, diffusion images 11 and 12. Corresponding T2/FLAIR hyperintensity is seen. No adjacent mass effect is 
present. No convincing associated hemorrhage is seen. No additional areas of 
acute infarct are present. 
  
Linear discrete chronic infarct is present in the right central mid to upper 
lizabeth. Chronic infarct is present anteriorly in the left thalamus extending into 
the genu of the left internal capsule. Small posterior right thalamic lacunar 
infarct is also seen. There is a more elongated area of encephalomalacia 
involving the anterior lateral right basal ganglia involving putamen extending 
into the caudate nucleus. This area is associated with peripheral susceptibility 
artifact representing hemosiderin staining from prior hemorrhage. 
  
The ventricles and sulci are mildly enlarged consistent with diffuse volume 
loss. 
  
Mild amount of T2/FLAIR hyperintense white matter lesions are seen within the 
periventricular and subcortical white matter , which are nonspecific, but likely 
represent chronic small vessel changes. 
  
There is no evidence of mass effect, midline shift, or herniation. Additional 
discrete focus of susceptibility artifact is present in the left temporal lobe 
along the subinsular region without mass effect or edema. The major intracranial 
vascular flow voids are grossly normal.  
  
The bilateral lenses are absent, likely due to prior cataract surgery. T1 
precontrast hyperintensity and T2 hypointensity is present left sphenoid sinus 
which is nonspecific and may represent proteinaceous/inspissated mucus. No 
air-fluid levels are present. Mastoid air cells are unremarkable. 
  
________________________ 
  
IMPRESSION IMPRESSION: 
  
Motion degraded study. 
  
1. 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 No evidence of 
associated hemorrhage or mass effect. 
  
2. Chronic right central lizabeth and bilateral basal ganglia infarcts. Hemosiderin staining from prior chronic parenchymal hemorrhage involving superolateral right 
basal ganglia. 
  
3. Additional punctate focus susceptibility artifact the left temporal 
subinsular region,  nonspecific but likely representing hemosiderin staining 
from chronic microhemorrhage, often hypertensive in etiology.  
  
4. Mild nonspecific white matter disease likely representing chronic small 
vessel changes. 
  
 
Latest Hemoglobin A1C: 
Lab Results Component Value Date/Time Hemoglobin A1c 10.0 (H) 09/30/2018 04:18 AM  
 
 
Latest Cardiology Procedure: 
Results for orders placed or performed during the hospital encounter of 09/26/18 EKG, 12 LEAD, INITIAL Result Value Ref Range Ventricular Rate 77 BPM  
 Atrial Rate 77 BPM  
 P-R Interval 182 ms QRS Duration 86 ms  
 Q-T Interval 434 ms QTC Calculation (Bezet) 491 ms Calculated P Axis 19 degrees Calculated R Axis -52 degrees Calculated T Axis 5 degrees Diagnosis Sinus rhythm with fusion complexes Left anterior fascicular block Voltage criteria for left ventricular hypertrophy Prolonged QT Abnormal ECG When compared with ECG of 10-SEP-2018 15:07, 
fusion complexes are now present Non-specific change in ST segment in Lateral leads T wave inversion no longer evident in Lateral leads Confirmed by [facility] ([id number]) on 9/27/2018 4:04:49 PM 
  
 
 
Important Labs: 
No results found for: FOL, RBCF Lab Results Component Value Date/Time Cholesterol, total 349 (H) 09/10/2018 12:31 PM  
 HDL Cholesterol 59 09/10/2018 12:31 PM  
 LDL, calculated 252.4 (H) 09/10/2018 12:31 PM  
 VLDL, calculated 37.6 09/10/2018 12:31 PM  
 Triglyceride 188 (H) 09/10/2018 12:31 PM  
 CHOL/HDL Ratio 5.9 (H) 09/10/2018 12:31 PM  
 
Lab Results Component Value Date/Time  TSH 1.10 09/10/2018 12:31 PM  
 Triiodothyronine (T3), free 2.7 09/10/2018 12:31 PM  
 T4, Free 0.9 09/10/2018 12:31 PM  
 
 No results found for: DS35, PHEN, PHENO, PHENT, DILF, DS39, PHENY, PTN, VALF2, VALAC, VALP, VALPR, DS6, CRBAM, CRBAMP, CARB2, XCRBAM 
Discussed with: nurse Allergies: No Known Allergies Review of Systems: Unable to obtain Y  N  
Constitutional: [] [] recent weight change 
[] [] fever 
[] [] sleep difficulties ENT/Mouth:  [] [] hearing loss 
[] [] swallowing problems 
[] [] slurred speech Cardiovascular:  [] [] chest pain  
[] [] palpitations Respiratory: [] [] cough with swallow 
[] [] shortness of breath 
[] [] sleep apnea 
[] [] intubated Gastrointestinal: [] [] abdominal pain 
[] [] nausea Genitourinary: [] [] frequent urination 
[] [] incontinence Musculoskeletal:   [] [] joint pain 
[] [] muscle pain Integument:   [] [] rash/itching Neurological:  [] [] dizziness/vertigo 
[] [] sedation 
[] [] confusion 
[] [] agitation/combativeness 
[] [] loss of consciousness 
[] [] numbness/tingling sensation 
[] [] tremors 
[] [] weakness in limbs 
[] [] difficulty with balance 
[] [] frequent or recurring headaches 
[] [] memory loss  
[] [] comatose 
[] [] seizures Psychiatric:   [] [] depression 
[] [] hallucinations Endocrine: [] [] excessive thirst or urination  
[] [] heat or cold intolerance Hematologic/Lymphatic: [] [] bleeding tendency 
[] [] enlarged lymph nodes PMH:  
Past Medical History:  
Diagnosis Date  CHF (congestive heart failure) ([facility]  Diabetes ([facility]  Stroke ([facility] Problem List: Principal Problem: 
  Cardiac arrest with ventricular fibrillation ([facility] (9/29/2018) Overview: ROSC before defibrillation could be attempted. Active Problems: 
  Essential hypertension (9/10/2018) Diabetes mellitus type 2, controlled ([facility] (9/10/2018) Pneumonia of both lower lobes (9/11/2018) History of stroke (9/27/2018) Overview: With residual R hemiparesis Acute-on-chronic kidney injury ([facility] (9/27/2018) Acute cystitis (9/27/2018) Elevated troponin (9/27/2018) Transaminitis (9/27/2018) Acute kidney injury ([facility] [address] (9/27/2018) Elevated LFTs (9/28/2018) Abnormal blood coagulation profile (9/29/2018) Acute pancreatitis (9/29/2018) Overview: Shock, leukocytosis, elevated lipase but radiographically no ductal  
    dilatation in pancreas. GB stones without radiographic stigmata of acute  
    cholecystitis Septic shock ([facility] [address] (9/30/2018) Overview: Probably secondary to pneumonia (suspicious for aspiration). Less likely,  
    secondary to acute pancreatitis. Ischemic encephalopathy (9/30/2018) FH: History reviewed. No pertinent family history. SH: Social History Social History  Marital status:  Spouse name: N/A  
 Number of children: N/A  
 Years of education: N/A Social History Main Topics  Smoking status: Never Smoker  Smokeless tobacco: Never Used  Alcohol use No  
 Drug use: No  
 Sexual activity: Not Asked Other Topics Concern  None Social History Narrative  None Vital Signs:  
Visit Vitals  /71  Pulse 76  Temp (P) 100.2 °F (37.9 °C)  Resp 18  Ht 5' 7\" (1.702 m)  Wt 94.2 kg (207 lb 10.8 oz)  SpO2 99%  BMI 32.53 kg/m2 Neurological examination:  
? Appearance: Intubated. Appears ill. ? Cardiovascular: Heart is regular rate and rhythm. Generalized edema to BUE. No carotid bruits heard on left, has IJ on right with HD running. ? Mental status examination: Eyes partially open. No response to verbal or noxious stimuli. Does not follow commands. ? Cranial Nerves:  
 
I: smell Not tested II: visual fields No blink to visual threat II: pupils OD 2 mm and OS 1.5 MM, impaired III,VII: ptosis none III,IV,VI: extraocular muscles  Minimal oculocephalic V: facial light touch sensation V,VII: corneal reflex  trace VII: facial muscle function VIII: hearing IX: soft palate elevation IX,X: gag reflex XI: sternocleidomastoid strength XII: tongue strength ? Motor exam: Station, gait:  deferred. Muscle tone, bulk normal.  Trace withdrawal to noxious stimulation x 4. Spacticity present BLE. ? Sensory: Unable to test 
? Coordination: Unable to test 
? Reflexes:  2+ in biceps, triceps, and brachioradialis to RUE and 1+ LUE. 1+ patellar.  0 ankle reflexes. Plantar reflexes are flexor on left and extensor on right. Medications:   
 
[x] REVIEWED Current Facility-Administered Medications Medication  acetaminophen (TYLENOL) solution 325 mg  
 pantoprazole (PROTONIX) 40 mg in sodium chloride 0.9% 10 mL injection  0.9% sodium chloride infusion 250 mL  [START ON 10/3/2018] VANCOMYCIN INFORMATION NOTE  heparin (porcine) pf 100 Units  insulin lispro (HUMALOG) injection  PHENYLephrine (FOUZIA-SYNEPHRINE) 90 mg in 0.9% sodium chloride 250 mL infusion  vancomycin (VANCOCIN) 1,000 mg in 0.9% sodium chloride (MBP/ADV) 250 mL adv  insulin glargine (LANTUS) injection 6 Units  albuterol (PROVENTIL VENTOLIN) nebulizer solution 2.5 mg  
 DOPamine (INTROPIN) 400 mg in dextrose 5% 250 mL infusion  
 0.9% sodium chloride infusion  heparin (porcine) 1,000 unit/mL injection 1,000 Units  VANCOMYCIN INFORMATION NOTE  senna-docusate (PERICOLACE) 8.6-50 mg per tablet 1 Tab  melatonin tablet 3 mg  traMADol (ULTRAM) tablet 50 mg  
 ondansetron (ZOFRAN) injection 4 mg  
 glucose chewable tablet 16 g  
 glucagon (GLUCAGEN) injection 1 mg  dextrose (D50) infusion 12.5-25 g  hydrALAZINE (APRESOLINE) 20 mg/mL injection 20 mg  
 piperacillin-tazobactam (ZOSYN) 4.5 g in 0.9% sodium chloride 100 mL MBP EXTENDED 4 HOUR INFUSION Data:   
 
[x] REVIEWED Recent Results (from the past 24 hour(s)) GLUCOSE, POC Collection Time: 09/30/18  6:36 PM  
Result Value Ref Range Glucose (POC) 169 (H) 70 - 110 mg/dL RENAL FUNCTION PANEL Collection Time: 10/01/18  4:23 AM  
Result Value Ref Range Sodium 142 136 - 145 mmol/L Potassium 4.9 3.5 - 5.5 mmol/L Chloride 103 100 - 108 mmol/L  
 CO2 20 (L) 21 - 32 mmol/L Anion gap 19 (H) 3.0 - 18 mmol/L Glucose 143 (H) 74 - 99 mg/dL BUN 87 (H) 7.0 - 18 MG/DL Creatinine 7.71 (H) 0.6 - 1.3 MG/DL  
 BUN/Creatinine ratio 11 (L) 12 - 20 GFR est AA 9 (L) >60 ml/min/1.73m2 GFR est non-AA 7 (L) >60 ml/min/1.73m2 Calcium 7.0 (L) 8.5 - 10.1 MG/DL Phosphorus 8.5 (H) 2.5 - 4.9 MG/DL Albumin 1.8 (L) 3.4 - 5.0 g/dL PREALBUMIN Collection Time: 10/01/18  4:23 AM  
Result Value Ref Range Prealbumin 10.5 (L) 20 - 40 MG/DL  
POC G3 Collection Time: 10/01/18  4:43 AM  
Result Value Ref Range Device: VENT    
 FIO2 (POC) 60 % pH (POC) 7.355 7.35 - 7.45    
 pCO2 (POC) 38.3 35.0 - 45.0 MMHG  
 pO2 (POC) 131 (H) 80 - 100 MMHG  
 HCO3 (POC) 21.2 (L) 22 - 26 MMOL/L  
 sO2 (POC) 99 (H) 92 - 97 % Base deficit (POC) 4 mmol/L Mode ASSIST CONTROL Tidal volume 500 ml Set Rate 16 bpm  
 PEEP/CPAP (POC) 8 cmH2O  
 PIP (POC) 29 Allens test (POC) YES Inspiratory Time 0.9 sec Total resp. rate 19 Site RIGHT RADIAL Patient temp. 37.8 Specimen type (POC) ARTERIAL Performed by Romayne Spain Volume control plus YES    
CBC WITH AUTOMATED DIFF Collection Time: 10/01/18 10:27 AM  
Result Value Ref Range WBC 19.9 (H) 4.6 - 13.2 K/uL  
 RBC 2.11 (L) 4.70 - 5.50 M/uL HGB 6.1 (L) 13.0 - 16.0 g/dL HCT 17.4 (LL) 36.0 - 48.0 % MCV 82.5 74.0 - 97.0 FL  
 MCH 28.9 24.0 - 34.0 PG  
 MCHC 35.1 31.0 - 37.0 g/dL  
 RDW 14.9 (H) 11.6 - 14.5 % PLATELET 619 932 - 609 K/uL MPV 8.9 (L) 9.2 - 11.8 FL  
 NEUTROPHILS 84 (H) 40 - 73 % LYMPHOCYTES 7 (L) 21 - 52 % MONOCYTES 7 3 - 10 % EOSINOPHILS 2 0 - 5 % BASOPHILS 0 0 - 2 %  
 ABS. NEUTROPHILS 16.9 (H) 1.8 - 8.0 K/UL  
 ABS. LYMPHOCYTES 1.5 0.9 - 3.6 K/UL ABS. MONOCYTES 1.3 (H) 0.05 - 1.2 K/UL  
 ABS. EOSINOPHILS 0.3 0.0 - 0.4 K/UL  
 ABS. BASOPHILS 0.0 0.0 - 0.1 K/UL  
 DF AUTOMATED    
D DIMER Collection Time: 10/01/18 11:45 AM  
Result Value Ref Range D DIMER 6.69 (H) <0.46 ug/ml(FEU) FIBRINOGEN Collection Time: 10/01/18 11:45 AM  
Result Value Ref Range Fibrinogen 1116 (H) 210 - 451 mg/dL PROTHROMBIN TIME + INR Collection Time: 10/01/18 11:45 AM  
Result Value Ref Range Prothrombin time 19.4 (H) 11.5 - 15.2 sec INR 1.7 (H) 0.8 - 1.2 PTT Collection Time: 10/01/18 11:45 AM  
Result Value Ref Range aPTT 84.3 (H) 23.0 - 36.4 SEC  
HEPATIC FUNCTION PANEL Collection Time: 10/01/18 11:45 AM  
Result Value Ref Range Protein, total 7.0 6.4 - 8.2 g/dL Albumin 2.0 (L) 3.4 - 5.0 g/dL Globulin 5.0 (H) 2.0 - 4.0 g/dL A-G Ratio 0.4 (L) 0.8 - 1.7 Bilirubin, total 3.5 (H) 0.2 - 1.0 MG/DL Bilirubin, direct 3.0 (H) 0.0 - 0.2 MG/DL Alk. phosphatase 318 (H) 45 - 117 U/L  
 AST (SGOT) 358 (H) 15 - 37 U/L  
 ALT (SGPT) 176 (H) 16 - 61 U/L [place] NP

## 2018-10-01 NOTE — PROGRESS NOTES
Recd pt on vent:  AC VC+, Rate=16, Fh=815, Fio2=50%, Peep=8 
--Vent check complete - sunitha tube patent - bloody secretions from both present, although blood appears darker than yesterday 
--administer neb 
 
0850--Called to bedside - concern for cuff leak - pt had just been suctioned orally by RN - removed 2 cc from sunitha tube but detected no cuff leak, noise was gone when I entered room - pt also getting good vent volumes Pt successfully able to complete dialysis today - confirmed per dialysis tech should be removing approx 2 l of fluid.   Pt able to maintain BP & HR.

## 2018-10-01 NOTE — PROGRESS NOTES
ACUTE HEMODIALYSIS FLOW SHEET 
 
HEMODIALYSIS ORDERS: Physician: Sindi Chaudhry Dialyzer: revaclear   Duration: 4 hr  BFR: 300   DFR: 600 Dialysate:  Temp 37.0 K+   2.0    Ca+  2.5 Na 140 Bicarb 35 Weight:  94.2 kg    Patient Chart []     Unable to Obtain []   Dry weight/UF Goal: 2000 ml Access right CVC Needle Gauge NA Heparin []  Bolus      Units    [] Hourly       Units    [x]None Catheter locking solution Heparin 100u/ml Pre BP:   121/52    Pulse:     66     Temperature:   100.6  Respirations: 18  Tx: NS NA    ml/Bolus NA Labs: Pre YES Additional Orders(medications, blood products, hypotension management):Labs drawn from access [x] [facility] Consent Verified CATHETER ACCESS: []N/A   [x]Right   []Left   []IJ     []Fem [] First use X-ray verified     [x]Tunnel                [] Non Tunneled []No S/S infection  []Redness  [x]Drainage []Cultured [x]Swelling []Pain  
[x]Medical Aseptic Prep Utilized   [x]Dressing Changed  [x] Biopatch  Date: 10/01/2018 []Clotted   [x]Patent   Flows: [x]Good  []Poor  []Reversed If access problem,  notified: []Yes    [x]N/A   
 
GRAFT/FISTULA ACCESS:  [x]N/A     []Right     []Left     []UE     []LE []AVG   []AVF        []Buttonhole    []Medical Aseptic Prep Utilized []No S/S infection  []Redness  []Drainage []Cultured []Swelling []Pain Bruit:   [] Strong    [] Weak       Thrill :   [] Strong    [] Weak Needle Gauge: NA   Length: NA If access problem,  notified: []Yes     [x]N/A Please describe access if present and not used:NA GENERAL ASSESSMENT  
LUNGS:  Rate 21 SaO2%   100     [] N/A    [] Clear  [x] Coarse  [] Crackles  [] Wheezing 
      [] Diminished     Location : []RLL   []LLL    [x]RUL  [x]JOSSIE Cough: []Productive  []Dry  [x]N/A   Respirations:  [x]Easy  []Labored Therapy:  []RA  []NC  l/min    Mask: []NRB []Venti       O2% [x]Ventilator  [x]Intubated  [] Trach  [] BiPaP CARDIAC: [x]Regular      [] Irregular   [] Pericardial Rub  [] JVD [x]  Monitored  [x] Bedside  [] Remotely monitored [] N/A  Rhythm: NSR  
EDEMA: [] None  [x]Generalized  [] Pitting [] 1    [] 2    [] 3    [] 4 [] Facial  [] Pedal  [x]  UE  [x] LE  
SKIN:   [x] Warm  [] Hot     [] Cold   [x] Dry     [] Pale   [] Diaphoretic    
             [] Flushed  [] Jaundiced  [] Cyanotic  [] Rash  [] Weeping LOC:    [] Alert      []Oriented:    [] Person     [] Place  []Time 
             [] Confused  [] Lethargic  [] Medicated  [x] Non-responsive GI / ABDOMEN:  [] Flat    [x] Distended    [x] Soft    [] Firm   []  Obese 
                           [] Diarrhea  [x] Bowel Sounds  [] Nausea  [] Vomiting  / URINE ASSESSMENT:[] Voiding   [] Oliguria  [x] Anuria   []  Mcbride [] Incontinent    []  Incontinent Brief      []  Bathroom Privileges PAIN: [x] 0 []1  []2   []3   []4   []5   []6   []7   []8   []9   []10 NO NON-VERBAL INDICATORS NOTED Scale 0-10  Action/Follow Up: NA MOBILITY:  [] Amb    [] Amb/Assist    [x] Bed    [] Wheelchair  [] Stretcher All Vitals and Treatment Details on Attached Flowsheet Hospital:  [facility] Room # 5125 [] 1st Time Acute  [] Stat  [] Routine  [] Urgent    
[] Acute Room  []  Bedside  [x] ICU/CCU  [] ER Isolation Precautions:  [x] Dialysis   [] Airborne   [] Contact    [] Reverse Special Considerations:         [] Blood Consent Verified [x]N/A ALLERGIES:   [x] NKA Code Status:  [x] Full Code HBsAg ONLY: Date Drawn 09/28/18         [x]Negative []Positive []Unknown HBsAb: Date     [] Susceptible   [] [id number] []Not Drawn  [] Drawn Current Labs:    Date of Labs: 10/01/2018         Today [x] DIET: [] Renal    [] Other     [x] NPO     []  Diabetic PRIMARY NURSE REPORT: First initial/Last name/Title Pre Dialysis: KAUSHAL Bateman    Time: 1000 EDUCATION:   
[] Patient [] Other         Knowledge Basis: [x]None []Minimal [] Substantial  
Barriers to learning  [x]N/A  
[] Access Care     [] S&S of infection     [] Fluid Management     []K+     []Procedural   
[]Albumin     [] Medications     [] Tx Options     [] Transplant     [] Diet     [] Other Teaching Tools:  [] Explain  [] Demo  [] Handouts [] Video Patient response:   [] Verbalized understanding  [] Teach back  [] Return demonstration [] Requires follow up Inappropriate due to Patient status is critical, unresponsive [x] Time Out/Safety Check  []Extracorporeal Circuit Tested for integrity RO/HEMODIALYSIS MACHINE SAFETY CHECKS  Before each treatment:    
Machine Number:                   [address] [facility] [place]  
                       
                                                   
                                                 [facility] [x] Portable Machine #11/RO serial # [id number] Alarm Test:  Pass time 1101 [x] RO/Machine Log Complete Temp    37.0 Dialysate: pH  7.4 Conductivity: Meter   13.8     HD Machine [x][x][x][x][x][x][x][x][x][x][x][x][x][x][x][x][x][x][x][x][x][x][x][x][x][x][x][x][x][x][x][x][x][x][x][x][x][x][x][x][x][x][x][x][x][x][x][x][x][x][x][x][x][x][x][x][x][x][x][x][x][x]

## 2018-10-01 NOTE — ROUTINE PROCESS
Bedside and Verbal shift change report given to Zan Lorenz RN (oncoming nurse) by Opal Montesinos RN 
 (offgoing nurse). Report included the following information SBAR, Kardex, Intake/Output, MAR and Cardiac Rhythm sr.

## 2018-10-01 NOTE — PROGRESS NOTES
PROGRESS NOTE PATIENT:  Rony Ballard MRN: [id number] [facility], 2701/01 
         10/1/2018, 3:29 PM 
   
I 
Mr. Herb Duarte is a 57 y.o. male who is being seen for  Elevated liver enzymes, Anemia, melena SUBJECTIVE: 
Pt intubated. Nurse at bedside reports that he has been having loose black stools for 2 days now. Bleeding from IV  Sites as well. OBJECTIVE: 
Patient Vitals for the past 24 hrs: 
 Temp Pulse Resp BP SpO2  
10/01/18 1830 (!) 38 °F (3.3 °C) 86 17 145/57 95 % 10/01/18 1800 (!) 37.9 °F (3.3 °C) 84 15 160/68 96 % 10/01/18 1730 (!) 37.9 °F (3.3 °C) 84 18 155/62 95 % 10/01/18 1700 100.2 °F (37.9 °C) 83 17 154/62 96 % 10/01/18 1648 - 84 19 - 97 % 10/01/18 1630 100.4 °F (38 °C) 84 21 154/60 95 % 10/01/18 1600 100.4 °F (38 °C) 86 25 (!) 186/162 97 % 10/01/18 1530 100 °F (37.8 °C) 78 17 176/81 98 % 10/01/18 1525 - - - 177/80 -  
10/01/18 1500 100.2 °F (37.9 °C) 76 18 175/71 99 % 10/01/18 1440 99.6 °F (37.6 °C) 76 18 173/73 -  
10/01/18 1430 100 °F (37.8 °C) 73 17 173/71 100 % 10/01/18 1426 100.2 °F (37.9 °C) 74 17 164/69 -  
10/01/18 1423 - 73 17 - 100 % 10/01/18 1411 99.6 °F (37.6 °C) 70 18 161/66 -  
10/01/18 1408 99.2 °F (37.3 °C) 72 17 158/67 -  
10/01/18 1400 (!) 37.6 °F (3.1 °C) 72 17 158/67 100 % 10/01/18 1345 99.3 °F (37.4 °C) 72 18 148/60 -  
10/01/18 1330 (!) 37.6 °F (3.1 °C) 73 19 134/58 99 % 10/01/18 1315 99 °F (37.2 °C) 70 20 132/53 -  
10/01/18 1300 (!) 37.7 °F (3.2 °C) 73 22 136/55 93 % 10/01/18 1230 (!) 37.8 °F (3.2 °C) 71 21 134/56 99 % 10/01/18 1200 100.4 °F (38 °C) 68 20 131/56 100 % 10/01/18 1145 (!) 100.6 °F (38.1 °C) 67 21 134/56 100 % 10/01/18 1130 100.4 °F (38 °C) 65 20 127/55 100 % 10/01/18 1100 (!) 100.8 °F (38.2 °C) 66 16 119/51 99 % 10/01/18 1030 (!) 100.6 °F (38.1 °C) 66 18 111/68 99 % 10/01/18 1000 (!) 100.6 °F (38.1 °C) 69 20 122/54 98 % 10/01/18 0930 99.9 °F (37.7 °C) 77 19 138/52 98 % 10/01/18 0900 100 °F (37.8 °C) 77 20 140/55 99 % 10/01/18 0830 100.4 °F (38 °C) 77 18 138/52 99 % 10/01/18 0803 - 74 20 - 99 % 10/01/18 0800 100.2 °F (37.9 °C) 75 17 132/52 99 % 10/01/18 0700 100 °F (37.8 °C) 73 19 136/57 100 % 10/01/18 0630 99.9 °F (37.7 °C) 73 18 129/53 99 % 10/01/18 0600 100 °F (37.8 °C) 75 19 123/53 98 % 10/01/18 0530 100 °F (37.8 °C) 75 20 138/50 98 % 10/01/18 0500 100.2 °F (37.9 °C) 76 13 123/51 97 % 10/01/18 0445 - 73 17 - 100 % 10/01/18 0430 99.9 °F (37.7 °C) 74 17 128/52 100 % 10/01/18 0400 100.2 °F (37.9 °C) 74 13 127/51 100 % 10/01/18 0330 100 °F (37.8 °C) 74 18 130/55 100 % 10/01/18 0300 100 °F (37.8 °C) 73 17 125/49 100 % 10/01/18 0230 100 °F (37.8 °C) 72 16 132/52 100 % 10/01/18 0200 100 °F (37.8 °C) 72 17 125/48 100 % 10/01/18 0130 99.9 °F (37.7 °C) 72 14 117/50 99 % 10/01/18 0100 99.9 °F (37.7 °C) 72 18 123/51 99 % 10/01/18 0030 99.5 °F (37.5 °C) 72 17 124/53 98 % 10/01/18 0001 99.7 °F (37.6 °C) 73 17 119/53 98 % 09/30/18 2351 - 74 22 - 100 % 09/30/18 2330 99.7 °F (37.6 °C) 78 19 130/51 98 % 09/30/18 2300 99 °F (37.2 °C) 88 17 136/51 98 % 09/30/18 2230 99.7 °F (37.6 °C) 89 16 138/53 98 % 09/30/18 2200 99.5 °F (37.5 °C) 89 17 135/53 98 % 09/30/18 2130 99.5 °F (37.5 °C) 90 17 130/53 98 % 09/30/18 2100 99.5 °F (37.5 °C) 91 12 133/56 97 % 09/30/18 2031 - 93 18 - 100 % 09/30/18 2030 - 91 17 131/55 97 % 09/30/18 2000 99.3 °F (37.4 °C) 92 16 130/56 96 % 09/30/18 1930 - 92 17 134/53 93 % Intake/Output Summary (Last 24 hours) at 10/01/18 1908 Last data filed at 10/01/18 1600 Gross per 24 hour Intake          1466.17 ml Output             2000 ml Net          -533.83 ml Gen: On the vent. OGT present , on 2 pressors HEENT: Mild icterus Abd  : Soft, Distended,  BS hypoactive, No masses felt. Anasarca present Labs: Results:  
Chemistry Recent Labs 10/01/18 
 1145  10/01/18 
 0423  09/30/18 
 0400  09/29/18 [facility] GLU   --   143*  186*  88  
NA   --   142  141  142 K   --   4.9  4.4  4.4  
CL   --   103  102  101 CO2   --   20*  21  20* BUN   --   87*  71*  56* CREA   --   7.71*  6.27*  5.28* CA   --   7.0*  6.6*  7.9* AGAP   --   19*  18  21* BUCR   --   11*  11*  11* AP  318*   --   353*   --   
TP  7.0   --   5.7*   --   
ALB  2.0*  1.8*  1.2*  1.2*   --   
GLOB  5.0*   --   4.5*   --   
AGRAT  0.4*   --   0.3*   --   
 Estimated Creatinine Clearance: 10.6 mL/min (based on Cr of 7.71). CBC w/Diff Recent Labs 10/01/18 
 1027  09/30/18 
 0418  09/29/18 [address] [facility] WBC  19.9*  22.6*  28.4*  
RBC  2.11*  2.78*  3.27* HGB  6.1*  7.8*  9.6* HCT  17.4*  22.8*  28.0*  
PLT  224  315  385 GRANS  84*  80*   --   
LYMPH  7*  7*   --   
EOS  2  0   --   
  
Cardiac Enzymes Recent Labs  
   09/29/18 [address] [facility] CPK  242 CKND1  3.3 Coagulation Recent Labs 10/01/18 
 1145  09/29/18 1110  09/29/18 
 1005 PTP  19.4*   --   17.3* INR  1.7*   --   1.4* APTT  84.3*  59.3*  60.4* Hepatitis Panel Lab Results Component Value Date/Time Hepatitis B surface Ag <0.10 09/28/2018 04:40 AM  
  
Amylase Lipase Liver Enzymes Recent Labs 10/01/18 
 1145  10/01/18 
 0423  09/30/18 
 0400 TP  7.0   --   5.7* ALB  2.0*  1.8*  1.2*  1.2* TBILI  3.5*   --   4.1* AP  318*   --   353* SGOT  358*   --   842* ALT  176*   --   292* Thyroid Studies No results for input(s): T4, T3U, TSH, TSHEXT, TSHEXT in the last 72 hours. No lab exists for component: T3RU Pathology pathology No Known Allergies Current Facility-Administered Medications Medication Dose Route Frequency  acetaminophen (TYLENOL) solution 325 mg  325 mg Per NG tube Q4H PRN  pantoprazole (PROTONIX) 40 mg in sodium chloride 0.9% 10 mL injection  40 mg IntraVENous Q12H  0.9% sodium chloride infusion 250 mL  250 mL IntraVENous PRN  
 [START ON 10/3/2018] VANCOMYCIN INFORMATION NOTE   Other ONCE  
 heparin (porcine) pf 100 Units  100 Units InterCATHeter PRN  
 0.9% sodium chloride infusion 250 mL  250 mL IntraVENous PRN  
 insulin lispro (HUMALOG) injection   SubCUTAneous Q6H  
 PHENYLephrine (FOUZIA-SYNEPHRINE) 90 mg in 0.9% sodium chloride 250 mL infusion   mcg/min IntraVENous TITRATE  vancomycin (VANCOCIN) 1,000 mg in 0.9% sodium chloride (MBP/ADV) 250 mL adv  1,000 mg IntraVENous Q MON, WED & FRI  insulin glargine (LANTUS) injection 6 Units  6 Units SubCUTAneous DAILY  albuterol (PROVENTIL VENTOLIN) nebulizer solution 2.5 mg  2.5 mg Nebulization Q6H RT  
 DOPamine (INTROPIN) 400 mg in dextrose 5% 250 mL infusion  0-15 mcg/kg/min IntraVENous TITRATE  
 0.9% sodium chloride infusion  50 mL/hr IntraVENous DIALYSIS PRN  
 heparin (porcine) 1,000 unit/mL injection 1,000 Units  1,000 Units InterCATHeter DIALYSIS PRN  
 VANCOMYCIN INFORMATION NOTE   Other Rx Dosing/Monitoring  senna-docusate (PERICOLACE) 8.6-50 mg per tablet 1 Tab  1 Tab Oral BID PRN  
 melatonin tablet 3 mg  3 mg Oral QHS PRN  
 traMADol (ULTRAM) tablet 50 mg  50 mg Oral Q6H PRN  
 ondansetron (ZOFRAN) injection 4 mg  4 mg IntraVENous Q4H PRN  
 glucose chewable tablet 16 g  4 Tab Oral PRN  
 glucagon (GLUCAGEN) injection 1 mg  1 mg IntraMUSCular PRN  
 dextrose (D50) infusion 12.5-25 g  25-50 mL IntraVENous PRN  
 hydrALAZINE (APRESOLINE) 20 mg/mL injection 20 mg  20 mg IntraVENous Q6H PRN  piperacillin-tazobactam (ZOSYN) 4.5 g in 0.9% sodium chloride 100 mL MBP EXTENDED 4 HOUR INFUSION   4.5 g IntraVENous Q12H  
 
 
ASSESSMENT: 
1. Elevated liver enzymes in the setting of gallstones and sludge, possibly a stone up to the cystic duct. No evidence of CBD dilation.   No evidence of acute cholangitis at this time, suspect this is secondary to acute cholecystitis. Patient did have a history of mildly elevated LFTs in the past secondary to fatty liver disease, based on liver biopsy in 2014, has HCV antibody positive, but PCR is negative in the past. U/S showed distended Gb with thickened wall, no intra or extrahepatic biliary dilation seen. Surgery consulted. Not a candidate for surgery at this time. LFT's coming down slowly 2. Acute kidney injury, possibly acute tubular necrosis. Going to be dialized today 3. Urosepsis with possible pyelonephritis, currently on antibiotics 4. Elevated lipase. Cannot rule out pancreatitis entirely but suspect this is secondary to acute renal failure. 5.Oropharyngeal dysphagia . Failed swallow eval 
6. Recent CVA 7. Melena : Will have to r/o upper Gi source of bleeding such as from peptic ulcer disease, erosive gastroduodenitis, AVM's malignancy. 8. Anemia Acute on chronic. Hb 6.1 today RECOMMENDATIONS:  
1. Cont antibiotics 2. Monitor LFT's   
3. Will consider cholecystostomy tube placement if his LFT's get worse 4. Transfuse as needed to keep hb more than 8 gm/dl 5. DIC work up in progress 6. Will consider EGD tomorrow if medically stable Crispin Irene MD

## 2018-10-01 NOTE — PROGRESS NOTES
Patient in bed on back with head elevated - BBS equal and sl coarse - ETt secure at 25 at the lips and moved to the right side of th [facility] - patient suctioned and LEONCIO tube cleared - MVb at [facility] - vent alarms on and functional 
 
0445 - ABG obtained, Fio2 decreased to 50%

## 2018-10-01 NOTE — PROGRESS NOTES
PCCM Progress Note I have reviewed the flowsheet and previous days notes. Events, vitals, medications and notes from last 24 hours reviewed. Care plan discussed with staff and on multidisciplinary rounds. Subjective: 
10/1/2018 Pt is sedated on vent. No response to voice. RN reports pt is having small melena Impression and Plan Patient Active Problem List  
Diagnosis Code  Stroke (cerebrum) ([facility]) I63.9  Stroke ([facility] I63.9  Rhabdomyolysis M62.82  
 Dehydration E86.0  
 Essential hypertension I10  
 Diabetes mellitus type 2, controlled ([facility] E11.9  Non compliance w medication regimen Z91.14  
 Pneumonia of both lower lobes J18.9  DM (diabetes mellitus) ([facility] E11.9  History of stroke Z80.78  
 Acute-on-chronic kidney injury ([facility] N17.9, N18.9  Acute cystitis N30.00  Elevated troponin R74.8  Transaminitis R74.0  Acute kidney injury ([facility] N17.9  Elevated LFTs R94.5  Cardiac arrest with ventricular fibrillation ([facility]) I46.9, I49.01  
 Abnormal blood coagulation profile R79.1  Acute pancreatitis K85.90  Septic shock ([facility]) A41.9, R65.21  
 Ischemic encephalopathy I67.89 VDRF/Acute resp failure with hypoxia - ABG looks ok on current vent settings. Cont with it. Pt is not yet ready for weaning due to his mental status S/p V Fib arrest - Will repeat patients Echo Pulm edema - volume removal with HD today RLL Atel and Pneumonia - Pt is on Zosyn and Vanco. Repeat CBC now JULIANNA on CKD - Pt is now HD dependent. Defer to Nephrology DM - Cont with lantus and ISS Abn LFT - Pt has been evaluated by GI. He has past hx of abn LFT's and has undergone work up for it. D/w Dr Malorie Wells. She recs following up on the LFT's 
Elevated Lipase - Pt's CT abd does not show pancreatitis, defer to GI Hx of CVA and now Encephalopathy - Consult neuro Hypotension - Pt is on Bryan.  Titrate to keep MAP >65 
 GI bleed - Will check DIC profile, increase protonix to BID and f/u with GI 
DVT proph - SCD 
 
OTHER: 
Glycemic Control. Glucose stabilizer per ICU protocol when on insulin drip. Maintain blood glucose 140-180. Replace electrolytes per ICU electrolyte replacement protocol Ventilator bundle & Sedation protocol followed. Daily morning sedation holiday, assessment for readiness for SBT & weaning from ventilator; and then re-titrate if required. Aim to keep peak plateau pressure 53-20GO H2O in ARDS patient. Yolo tube to suction at 20-30 cm H2O, Maintain Yolo tube with 5-10ml air every 4 hours. Chlorhexidine mouth washes and routine oral care every 4 hours. Stress ulcer and DVT prophylaxis. HOB >=30 degree elevation all the time. HOB >=30 degree elevation all the time. Aggressive pulmonary toileting. Incentive spirometry when appropriate. Aspiration precautions. Sepsis bundle and protocol followed. Deescalate antibiotic when appropriate. Vasopressor when appropriate with MAP goal >65 mmHg. PT/OT eval and treat. OOB/IS when appropriate. Quality Care: Stress ulcer prophylaxis, DVT prophylaxis, HOB elevated, Infection control all reviewed and addressed. Events and notes from last 24 hours reviewed. Care plan discussed with nursing. CC TIME: >50 min Medication Reviewed: 
 
No Known Allergies Past Medical History:  
Diagnosis Date  CHF (congestive heart failure) ([facility]  Diabetes ([facility]  Stroke ([facility] History reviewed. No pertinent surgical history. Social History Substance Use Topics  Smoking status: Never Smoker  Smokeless tobacco: Never Used  Alcohol use No  
  
History reviewed. No pertinent family history. Prior to Admission medications Medication Sig Start Date End Date Taking? Authorizing Provider  
aspirin (ASPIRIN) 325 mg tablet Take 1 Tab by mouth daily.  9/15/18   Delano Mcqueen MD  
 atorvastatin (LIPITOR) 80 mg tablet Take 1 Tab by mouth nightly. 9/14/18   Roma Tamez MD  
insulin glargine (LANTUS) 100 unit/mL injection 10 units qhs  Indications: type 2 diabetes mellitus 9/15/18   Roma Tamez MD  
insulin lispro (HUMALOG) 100 unit/mL injection 4 units with meals 9/14/18   Roma Tamez MD  
losartan (COZAAR) 50 mg tablet Take 1 Tab by mouth daily. 9/14/18   Roma Tamez MD  
 
Current Facility-Administered Medications Medication Dose Route Frequency  insulin lispro (HUMALOG) injection   SubCUTAneous Q6H  
 PHENYLephrine (FOUZIA-SYNEPHRINE) 90 mg in 0.9% sodium chloride 250 mL infusion   mcg/min IntraVENous TITRATE  vancomycin (VANCOCIN) 1,000 mg in 0.9% sodium chloride (MBP/ADV) 250 mL adv  1,000 mg IntraVENous Q MON, WED & FRI  insulin glargine (LANTUS) injection 6 Units  6 Units SubCUTAneous DAILY  albuterol (PROVENTIL VENTOLIN) nebulizer solution 2.5 mg  2.5 mg Nebulization Q6H RT  
 DOPamine (INTROPIN) 400 mg in dextrose 5% 250 mL infusion  0-15 mcg/kg/min IntraVENous TITRATE  metoprolol tartrate (LOPRESSOR) tablet 50 mg  50 mg Oral Q12H  VANCOMYCIN INFORMATION NOTE   Other Rx Dosing/Monitoring  piperacillin-tazobactam (ZOSYN) 4.5 g in 0.9% sodium chloride 100 mL MBP EXTENDED 4 HOUR INFUSION   4.5 g IntraVENous Q12H  pantoprazole (PROTONIX) 40 mg in sodium chloride 0.9% 10 mL injection  40 mg IntraVENous DAILY Hemodialysis Catheter: 
Hemodialysis Access 09/28/18 (Active) Central Line Being Utilized Yes 10/1/2018  8:00 AM  
Criteria for Appropriate Use Dialysis/apheresis 10/1/2018  8:00 AM  
Date Accessed  09/29/18 10/1/2018  4:00 AM  
Site Assessment Bleeding;Drainage (comment); Swelling 10/1/2018  8:00 AM  
Date of Last Dressing Change 10/01/18 10/1/2018  5:50 AM  
Dressing Status New drainage 10/1/2018  8:00 AM  
Dressing Type Disk with Chlorhexadine gluconate (CHG) 10/1/2018  8:00 AM  
 Proximal Hub Color/Line Status Capped 10/1/2018  8:00 AM  
Distal Hub Color/Line Status Capped 10/1/2018  8:00 AM  
 
Drain(s): 
Orogastric Tube 18 (Active) Site Assessment Clean, dry, & intact 10/1/2018  8:00 AM  
Dressing Status Clean, dry, & intact 10/1/2018  8:00 AM  
G Port Status Infusing 10/1/2018  8:00 AM  
External Insertion Jl (cms) 58 cms 10/1/2018  8:00 AM  
Action Taken Placement verified (comment) 10/1/2018  8:00 AM  
Drainage Description Brown 10/1/2018  8:00 AM  
Gastric Residual (mL) 28 ml 10/1/2018  8:00 AM  
Tube Feeding/Formula Options Renal (Nepro) 10/1/2018  8:00 AM  
Tube Feeding/Verify Rate (mL/hr) 20 10/1/2018  9:00 AM  
Water Flush Volume (mL) 20 mL 10/1/2018  8:00 AM  
Intake (ml) 10 ml 10/1/2018  8:00 AM  
 
Airway: Airway - Continuous Aspiration of Subglottic Secretions (LEONCIO) Tube 18 Oral (Active) Insertion Depth (cm) 25 cm 10/1/2018  8:03 AM  
Line Jl Lips 10/1/2018  8:03 AM  
Side Secured Right 10/1/2018  8:03 AM  
Cuff Pressure 30 cmH20 2018  8:31 PM  
Site Assessment Clean, dry, & intact 10/1/2018  8:03 AM  
Suction on Yes 10/1/2018  4:45 AM  
Amt Secretions Aspirated (mL) 3 mL 2018  8:16 AM  
 
 
Objective: 
Vital Signs:   
Visit Vitals  /52  Pulse 74  Temp (!) 37.9 °F (3.3 °C)  Resp 20  
 Ht 5' 7\" (1.702 m)  Wt 94.2 kg (207 lb 10.8 oz)  SpO2 99%  BMI 32.53 kg/m2 O2 Device: Endotracheal tube, Ventilator O2 Flow Rate (L/min): 45 l/min Temp (24hrs), Av.8 °F (36.6 °C), Min:37.9 °F (3.3 °C), Max:100.2 °F (37.9 °C) Intake/Output:  
Last shift:      10/01 0701 - 10/01 1900 In: 27 Out: - Last 3 shifts: 1901 - 10/01 0700 In: 2855.8 [I.V.:2585.8] Out: - Intake/Output Summary (Last 24 hours) at 10/01/18 3833 Last data filed at 10/01/18 0800 Gross per 24 hour Intake          1246.88 ml Output                0 ml Net          1246.88 ml Last 3 Recorded Weights in this Encounter 09/26/18 2244 09/30/18 0630 Weight: 96.6 kg (213 lb) 94.2 kg (207 lb 10.8 oz) Ventilator Settings: 
Mode Rate Tidal Volume Pressure FiO2 PEEP Assist control, VC+   500 ml    50 % 8 cm H20 Peak airway pressure: 29 cm H2O Plateau pressure:   
Tidal volume:  
Minute ventilation: 10 l/min SPO2  
 
ARDS network Guidelines: Lung protective strategy and Plateau pressure goals less than or equal to 30 Physical Exam:  
 
General/Neurology: Sedated, no response to voice Head:   Normocephalic, without obvious abnormality Eye:   No pallor Oral:   Mucus membranes moist 
Neck:   Supple Lung:   B/l basal rales present Heart:   Regular rate & rhythm. S1 S2 present. Abdomen/: Soft, non tender, BS +nt Extremities:  1-2+ pedal edema Skin:   Ch stases changes present Data: 
   
Recent Results (from the past 24 hour(s)) POC G3 Collection Time: 09/30/18 11:05 AM  
Result Value Ref Range Device: VENT    
 FIO2 (POC) 70 % pH (POC) 7.418 7.35 - 7.45    
 pCO2 (POC) 35.0 35.0 - 45.0 MMHG  
 pO2 (POC) 142 (H) 80 - 100 MMHG  
 HCO3 (POC) 22.5 22 - 26 MMOL/L  
 sO2 (POC) 99 (H) 92 - 97 % Base deficit (POC) 2 mmol/L Mode ASSIST CONTROL Tidal volume 500 ml Set Rate 20 bpm  
 PEEP/CPAP (POC) 8.0 cmH2O Allens test (POC) N/A Inspiratory Time 0.90 sec Total resp. rate 20 Site RIGHT RADIAL Patient temp. 37.3 Specimen type (POC) ARTERIAL Performed by Charli Banks, POC Collection Time: 09/30/18 12:40 PM  
Result Value Ref Range Glucose (POC) 175 (H) 70 - 110 mg/dL GLUCOSE, POC Collection Time: 09/30/18  6:36 PM  
Result Value Ref Range Glucose (POC) 169 (H) 70 - 110 mg/dL RENAL FUNCTION PANEL Collection Time: 10/01/18  4:23 AM  
Result Value Ref Range Sodium 142 136 - 145 mmol/L Potassium 4.9 3.5 - 5.5 mmol/L Chloride 103 100 - 108 mmol/L  
 CO2 20 (L) 21 - 32 mmol/L  Anion gap 19 (H) 3.0 - 18 mmol/L  
 Glucose 143 (H) 74 - 99 mg/dL BUN 87 (H) 7.0 - 18 MG/DL Creatinine 7.71 (H) 0.6 - 1.3 MG/DL  
 BUN/Creatinine ratio 11 (L) 12 - 20 GFR est AA 9 (L) >60 ml/min/1.73m2 GFR est non-AA 7 (L) >60 ml/min/1.73m2 Calcium 7.0 (L) 8.5 - 10.1 MG/DL Phosphorus 8.5 (H) 2.5 - 4.9 MG/DL Albumin 1.8 (L) 3.4 - 5.0 g/dL PREALBUMIN Collection Time: 10/01/18  4:23 AM  
Result Value Ref Range Prealbumin 10.5 (L) 20 - 40 MG/DL  
POC G3 Collection Time: 10/01/18  4:43 AM  
Result Value Ref Range Device: VENT    
 FIO2 (POC) 60 % pH (POC) 7.355 7.35 - 7.45    
 pCO2 (POC) 38.3 35.0 - 45.0 MMHG  
 pO2 (POC) 131 (H) 80 - 100 MMHG  
 HCO3 (POC) 21.2 (L) 22 - 26 MMOL/L  
 sO2 (POC) 99 (H) 92 - 97 % Base deficit (POC) 4 mmol/L Mode ASSIST CONTROL Tidal volume 500 ml Set Rate 16 bpm  
 PEEP/CPAP (POC) 8 cmH2O  
 PIP (POC) 29 Allens test (POC) YES Inspiratory Time 0.9 sec Total resp. rate 19 Site RIGHT RADIAL Patient temp. 37.8 Specimen type (POC) ARTERIAL Performed by Torrey Flaherty Volume control plus YES Chemistry Recent Labs 10/01/18 
 0423  09/30/18 
 0400  09/29/18 1110  09/29/18 
 0500   09/28/18 
 1610 GLU  143*  186*  88  101*   --   116* NA  142  141  142  145   --   140  
K  4.9  4.4  4.4  4.9   --   5.6*  
CL  103  102  101  103   --   99* CO2  20*  21  20*  17*   --   15* BUN  87*  71*  56*  95*   --   150* CREA  7.71*  6.27*  5.28*  8.29*   --   11.60* CA  7.0*  6.6*  7.9*  7.1*   --   7.5* MG   --    --   3.8*   --    --    --   
PHOS  8.5*  7.6*  8.5*  8.7*   < >   --   
AGAP  19*  18  21*  25*   --   26* BUCR  11*  11*  11*  11*   --   13  
AP   --   353*   --    --    --   394* TP   --   5.7*   --    --    --   8.1 ALB  1.8*  1.2*  1.2*   --   1.3*   --   1.4*  
GLOB   --   4.5*   --    --    --   6.7* AGRAT   --   0.3*   --    --    --   0.2*  
 < > = values in this interval not displayed. CBC w/Diff Recent Labs  
   09/30/18 
 0418  09/29/18 1110  09/28/18 
 1930 WBC  22.6*  28.4*  21.5*  
RBC  2.78*  3.27*  3.85* HGB  7.8*  9.6*  11.0*  
HCT  22.8*  28.0*  32.1*  
PLT  315  385  385 GRANS  80*   --    --   
LYMPH  7*   --    --   
EOS  0   --    --   
 
 
ABG  Recent Labs 10/01/18 
 0443  09/30/18 
 1105  09/29/18 
 1928 PHI  7.355  7.418  7.380 PCO2I  38.3  35.0  36.3 PO2I  131*  142*  88  
HCO3I  21.2*  22.5  21.5*  
FIO2I  60  70  80 Micro Recent Labs  
   09/29/18 
 1400  09/29/18 [address] CULT  Toxigenic C. difficile NEGATIVE                         C. difficile target DNA sequences are not detected. MANY YEAST* Recent Labs  
   09/29/18 
 1400  09/29/18 [address] CULT  Toxigenic C. difficile NEGATIVE                         C. difficile target DNA sequences are not detected. MANY YEAST* XR (Most Recent). CXR reviewed by me and compared with previous CXR Results from [facility] Encounter encounter on 09/26/18 XR CHEST PORT Narrative EXAM: XR CHEST PORT 
 
CLINICAL INDICATION/HISTORY: ETT placement >Additional: None COMPARISON: 9/29/2018 >Reference exam: None. TECHNIQUE: Portable chest. 
 
_______________ FINDINGS: 
 
SUPPORT LINES AND TUBES: Right IJ approach central venous catheter terminates in 
the SVC. Nasogastric tube courses inferiorly off the field-of-view with the 
side-port well distal to the GE junction. A nasogastric tube terminates below 
the clavicles, exact distance relative to the billy is difficult but estimated 
proximal 3 5 cm. Additional esophageal probe is noted. HEART AND MEDIASTINUM: Prominent heart size is exaggerated due to portable 
technique and rotation to the left but likely within normal limits and 
unchanged. Bilateral pulmonary vascular congestion with diminished clarity is 
noted. LUNGS AND PLEURAL SPACES: Bilateral patchy perihilar airspace opacities with more confluent opacity in the right lung base. Possible trace pleural effusions. No pneumothorax. BONY THORAX AND SOFT TISSUES: No acute osseous abnormality. _______________ Impression IMPRESSION: 
 
Bilateral right greater than left airspace disease, slightly progressive since 
prior radiographs. Support lines and tubes remain in place. High complexity decision making was performed during the evaluation of this patient at high risk for decompensation with multiple organ involvement Above mentioned total time spent on reviewing the case/medical record/data/notes/EMR/patient examination/documentation/coordinating care with nurse/consultants, exclusive of procedures with complex decision making performed and > 50% time spent in face to face evaluation. Ed Delay, MD 
10/1/2018

## 2018-10-02 NOTE — PROCEDURES
EGD Procedure Note Patient: Gabe Mendoza MRN: [id number]  SSN: [id number] YOB: 1955  Age: 57 y.o. Sex: male Date of Procedure: 10/2/2018 Procedures: EGD : 
 
HISTORY UPDATE: History and physical has been reviewed. The patient has been examined. There have been no significant clinical changes since the completion of the originally dated History and Physical. 
 
INDICATION:  Melena. Anemia PROCEDURE PERFORMED: EGD 
 
ENDOSCOPIST: Susan Lara MD 
 
ASSISTANT:  Endoscopy Technician-1: Darcie Rehman Endoscopy RN-1: Ponce Fortune RN Endoscopy RN-2: Raymundo Doherty RN 
 
CLASSIFICATION OF PREOPERATIVE RISK: ASA 4 - Patient with severe systemic disease that is a constant threat to life ANESTHESIA:  None ENDOSCOPE: [id number] EXTENT OF EXAM: Second portion of the duodenum DESCRIPTION OF PROCEDURE:   The procedure was discussed with the patient including purpose, risks, benefits and alternatives including but not limited to IV conscious sedation, bleeding, perforation and aspiration and the consent form was signed and witnessed. A safety timeout was performed. Procedure done with no sedation. Pt intubated and unresponsive. The patients vital signs were monitored at all times including heart rate and rhythm, oxygen saturation, and blood pressure. The patient was then placed into the left lateral decubitus position. The Olympus adult diagnostic endoscope was then passed under direct visualization to the second portion of the duodenum. The endoscope was then slowly withdrawn while closely visualizing the mucosa. In the stomach a retroflexion was performed and gastric fundus and cardia visualized. The scope was then removed. The patient was then transferred to the recovery room.  
 
FINDINGS:  
 Esophagus:Diffuse clot seen with possible remnants of food extending from 25 cm to GE junction at 40 cm. Scope passed along side the NGT tube. Extensive clot seen along the entire length described. Not clear if there is mass or perforation under the clot. Attempted to remove it with chaney net but it was too extensive to be removed at this time and unable to grab anything as it falls apart easily consistent with a clot. Stomach: The gastric mucosa showed  no ulceration, mass, stricture. Mild gastritis seen. Retroflexion revealed a normal cardia and fundus Duodenum: The duodenum mucosa showed  no ulceration, mass,  stricture and no evidence of villous atrophy. Duodenitis seen in the second portion with edema of the mucosa. EBL: * 20 ml 
 
 
SPECIMENS: None IMPRESSION: 1. Diffuse clot seen with possible remnants of food extending from 25 cm to GE junction at 40 cm. Scope passed along side the NGT tube. Extensive clot seen along the entire length described. Not clear if there is mass or perforation under the clot. Attempted to remove it with chaney net but it was too extensive to be removed at this time and unable to grab anything as it falls apart easily consistent with a clot. 2. gastroduodenitis PLAN 1: Very grave prognosis . 2. Cont PPI's 3. Discussed with Dr. Andra Washington and family Angella Atwood MD 
10/2/2018

## 2018-10-02 NOTE — PROGRESS NOTES
Internal Medicine Progress Note Patient's Name: Ignaico Rosen Admit Date: 9/26/2018 Length of Stay: 5 Assessment/Plan Active Hospital Problems Diagnosis Date Noted  Septic shock ([address] 09/30/2018 Probably secondary to pneumonia (suspicious for aspiration). Less likely, secondary to acute pancreatitis.  Ischemic encephalopathy 09/30/2018  Cardiac arrest with ventricular fibrillation ([address] 09/29/2018 ROSC before defibrillation could be attempted.  Abnormal blood coagulation profile 09/29/2018  Acute pancreatitis 09/29/2018 Shock, leukocytosis, elevated lipase but radiographically no ductal dilatation in pancreas. GB stones without radiographic stigmata of acute cholecystitis  Elevated LFTs 09/28/2018  History of stroke 09/27/2018 With residual R hemiparesis  Acute-on-chronic kidney injury ([facility] 09/27/2018  Acute cystitis 09/27/2018  Elevated troponin 09/27/2018  Transaminitis 09/27/2018  Acute kidney injury ([facility] [address] 09/27/2018  Pneumonia of both lower lobes 09/11/2018  Essential hypertension 09/10/2018  Diabetes mellitus type 2, controlled ([facility] [address] 09/10/2018  
 
- Vent mgmt per ICU 
- Consider cholecystostomy tube if LFTs cont to rise 
- Hgb stable - F/u GI 
- Cont protonix IV 
- HD mgmt per nephro - Cont broad spec IVAB 
- Trend CBC 
- F/u cult - Appreciate consulting services - Cont acceptable home medications for chronic conditions  
- DVT protocol I have personally reviewed all pertinent labs and films that have officially resulted over the last 24 hours. I have personally checked for all pending labs that are awaiting final results. Subjective Pt s/e @ bedside Remains intubated Neuro exam similar to yest 
On dialysis Unable to assess ROS Objective Visit Vitals  /56  Pulse 87  Temp 99.5 °F (37.5 °C)  Resp 23  
 Ht 5' 7\" (1.702 m)  Wt 93.9 kg (207 lb)  SpO2 93%  BMI 32.42 kg/m2 Physical Exam: 
General Appearance: Intubated, not following commands HENT: normocephalic/atraumatic, + ETT Neck: No JVD, hematoma R side where TDC is 
Lungs: B/L crackles, vent-assisted BS 
CV: RRR, no m/r/g Abdomen: soft, non-tender, normal bowel sounds Extremities: no cyanosis, no peripheral edema Neuro: Unresponsive to commands, withdraws to pain Skin: Normal color, intact Intake and Output: 
Current Shift:  10/02 0701 - 10/02 1900 In: 200 [I.V.:200] Out: - Last three shifts:  09/30 1901 - 10/02 0700 In: 2555.2 [I.V.:999.2] Out: 2000 Lab/Data Reviewed: 
CMP:  
Lab Results Component Value Date/Time  10/02/2018 04:45 AM  
 K 3.7 10/02/2018 04:45 AM  
  10/02/2018 04:45 AM  
 CO2 25 10/02/2018 04:45 AM  
 AGAP 15 10/02/2018 04:45 AM  
  (H) 10/02/2018 04:45 AM  
 BUN 43 (H) 10/02/2018 04:45 AM  
 CREA 4.77 (H) 10/02/2018 04:45 AM  
 GFRAA 15 (L) 10/02/2018 04:45 AM  
 GFRNA 12 (L) 10/02/2018 04:45 AM  
 CA 7.3 (L) 10/02/2018 04:45 AM  
 PHOS 5.3 (H) 10/02/2018 04:45 AM  
 ALB 2.0 (L) 10/02/2018 04:45 AM  
 
CBC:  
Lab Results Component Value Date/Time WBC 19.6 (H) 10/02/2018 04:45 AM  
 HGB 7.4 (L) 10/02/2018 04:45 AM  
 HCT 20.8 (L) 10/02/2018 04:45 AM  
  10/02/2018 04:45 AM  
 
 
Imaging Reviewed: 
[facility] Result Date: 10/2/2018 Chest, single view Indication: Endotracheal tube placement, respiratory failure. Comparison: Several prior chest radiograph, most recently 10/1/2018 at 4:57 AM Findings:  Portable upright AP view of the chest was obtained. There is an endotracheal tube present approximately 3.2 cm above the billy. Nasogastric tube is present below the diaphragm, the tip projecting over the proximal stomach body. Right IJ approach venous catheter in stable position. Similar degree of pulmonary inflation.  The patient is rotated on the provided projection with associated foreshortening of the left hemithorax. Improved aeration of the right lung base noted in the interval from prior with streaky opacity present in the peripheral lower portions of the right lung. No pneumothorax or pleural effusion. Stable cardiac size and mediastinal contours. No acute osseous abnormality. Impression: 1. Endotracheal tube, visualized nasogastric tube, and right IJ approach central venous catheter in stable position. 2. Improved aeration of the right lung base, with minimal residual streaky atelectasis or infiltrate. Medications Reviewed: 
Current Facility-Administered Medications Medication Dose Route Frequency  acetaminophen (TYLENOL) solution 325 mg  325 mg Per NG tube Q4H PRN  pantoprazole (PROTONIX) 40 mg in sodium chloride 0.9% 10 mL injection  40 mg IntraVENous Q12H  
 0.9% sodium chloride infusion 250 mL  250 mL IntraVENous PRN  
 [START ON 10/3/2018] VANCOMYCIN INFORMATION NOTE   Other ONCE  
 heparin (porcine) pf 100 Units  100 Units InterCATHeter PRN  
 0.9% sodium chloride infusion 250 mL  250 mL IntraVENous PRN  
 insulin lispro (HUMALOG) injection   SubCUTAneous Q6H  
 vancomycin (VANCOCIN) 1,000 mg in 0.9% sodium chloride (MBP/ADV) 250 mL adv  1,000 mg IntraVENous Q MON, WED & FRI  insulin glargine (LANTUS) injection 6 Units  6 Units SubCUTAneous DAILY  albuterol (PROVENTIL VENTOLIN) nebulizer solution 2.5 mg  2.5 mg Nebulization Q6H RT  
 heparin (porcine) 1,000 unit/mL injection 1,000 Units  1,000 Units InterCATHeter DIALYSIS PRN  
 VANCOMYCIN INFORMATION NOTE   Other Rx Dosing/Monitoring  senna-docusate (PERICOLACE) 8.6-50 mg per tablet 1 Tab  1 Tab Oral BID PRN  
 ondansetron (ZOFRAN) injection 4 mg  4 mg IntraVENous Q4H PRN  
 glucose chewable tablet 16 g  4 Tab Oral PRN  
 glucagon (GLUCAGEN) injection 1 mg  1 mg IntraMUSCular PRN  
 dextrose (D50) infusion 12.5-25 g  25-50 mL IntraVENous PRN  
  hydrALAZINE (APRESOLINE) 20 mg/mL injection 20 mg  20 mg IntraVENous Q6H PRN  piperacillin-tazobactam (ZOSYN) 4.5 g in 0.9% sodium chloride 100 mL MBP EXTENDED 4 HOUR INFUSION   4.5 g IntraVENous Q12H Pablito Gandhi DO Internal Medicine, Hospitalist 
Pager: [phone] [facility]

## 2018-10-02 NOTE — PROGRESS NOTES
[facility] 10[facility]01[facility]18 
 
 
10/01 1901 - 10/02 0700 In: 681 [I.V.:350] Out: - Intake/Output Summary (Last 24 hours) at 10/02/18 0157 Last data filed at 10/01/18 2247 Gross per 24 hour Intake          2011.78 ml Output             2000 ml Net            11.78 ml Last 3 Recorded Weights in this Encounter  
 09/26/18 2244 09/30/18 0630 10/01/18 1638 Weight: 96.6 kg (213 lb) 94.2 kg (207 lb 10.8 oz) 94.2 kg (207 lb 10.8 oz) Overnight Shift Events: 
1930:  
· Bedside and verbal shift change report received from Damon Obregon, off-going RN. Provider's instructions/meds/plan for current shift reviewed by this writer; ongoing education rendered at this time. Rate verify on IV fluids/gtts. · Dual RN bedside neuro exam completed at this time. · Pt lying in bed, call bell within reach, bed in lowest position, side rails up x 3 & bed alarm engaged for pt safety; will continue to monitor pt. 2230:  
· Dressing to trialysis cath changed at this time 2/2 blood leaking from dressing; pt tolerated procedure well. Will continue to monitor pt.  
 
2345:  
· Pts sats noted to be in the low 90's. Pt suctioned via ET tube by this RN & highest I could get his sats to come up to was 93%. June RT in room to attempt to get pts sats up. RT suctioned and used BVM on pt to try at get sats above 93%. Large blood clot expelled through ET tube in bagging process - clot retrieved by RT. Pts sats 100%. No acute distress noted at this time; will continue to monitor pt.  
  
0000:  
· TF off at this time as ordered for possible AM EGD.   
 
0316:  
· During AM bath this writer noted blood in ET tube - pt suctioned and 3 half dollar sized blood clots were brought up and out of the ETT by this writer. Sats 100%, pt tolerated suctioning well. 0730: Bedside verbal shift report given to Venkatesh Hudson RN (oncoming nurse) by Camilo Mc RN(offgoing nurse).  Report included the following information: SBAR, Kardex, Procedure Summary, Intake/Output, MAR and Recent Results. Normal Sinus Rhythm. Dual bedside neuro exam completed at this time. Date 10/01/18 0700 - 10/02/18 5809 10/02/18 0700 - 10/03/18 9242 Shift [id number] 1900-0659 24 Hour Total 0700-1859 1900-0659 24 Hour Total  
I 
N 
T 
A 
K 
E 
 I.V. 
(mL/kg/hr) 253.9 
(0.2) 350 603.9 DOPamine Volume (ml) 0  0 Phenylephrine Volume 53.9 0 53.9 Volume (dextrose (D50) infusion 12.5-25 g) 0  0 Volume (piperacillin-tazobactam (ZOSYN) 4.5 g in 0.9% sodium chloride 100 mL MBP EXTENDED 4 HOUR INFUSION ) 100 100 200 Volume (vancomycin (VANCOCIN) 1,000 mg in 0.9% sodium chloride (MBP/ADV) 250 mL adv)  250 250 Volume (albumin human 25% (BUMINATE) solution 25 g) 100  100 Blood 620 331 951 Volume (TRANSFUSE PACKED RBC'S) 310  310 Volume (TRANSFUSE PACKED RBC'S) 310  310 Volume (TRANSFUSE FFP)  331 331 NG/  295 Water Flush Volume (mL) (Orogastric Tube 09/29/18) 40  40 Medication Volume (Orogastric Tube 09/29/18) 35  35 Intake (ml) (Orogastric Tube 09/29/18) 220  220 Shift Total 
(mL/kg) 1168.9 
(12.4) 681 
(7.2) 1849.9 
(19.6) O 
U T 
P 
U Jason Lozoya Stool Stool Occurrence(s) 2 x  2 x Dialysis 2000 2000 NET Fluid Removed (mL) 2000 2000 Shift Total 
(mL/kg) 2000 
(21.2)  2000 
(21.2) NET -831.1 681 -150.1 Weight (kg) 94.2 94.2 94.2 94.2 94.2 94.2

## 2018-10-02 NOTE — PROGRESS NOTES
Remains intubated on ventilaltor, DIC work up. Gastroenterology consulted. Patient was in rehab prior to [address] at [facility]. Case management following.   Indra Weaver RN

## 2018-10-02 NOTE — PROGRESS NOTES
1940 Bedside shift change report given to miky rn (oncoming nurse) by Oh BiBi (offgoing nurse). Report included the following information SBAR, Kardex, Recent Results and Cardiac Rhythm nsr. Side rails up x 3, call light within reach. Hob elevated 30 degrees. Bed alarm set on for patient safety. 2000 assessment completed. oral and incontinent care provided. patient had large loose melanous stool. 2020 called lab concerning collection of cbc ordered every 12 hours , on the way. 2030 abd distended and blood sputum from ett, dr almanzar in aware. 2130  on floor asked if obtaining cbc said I just got here going into another room first. 
2315 lab called asking  To come collect cbc  , on way. 12 david with lab here speiment collected for cbc. 
0000 reassessment completed. Oral and mery-care completed. incontinet of small amount melanous stool. 56 Dr Abhishek Johnson NOTIFIED of pupils different sizes , no other neuro changes noted. 0215  To ct scan . 0235 back from ct scan . Oxygen sat down to 88 % ON RIGHT SIDE DOWN. resp rate in 30's. 0245 CHANGED POSITION TO RIGHT SIDE UP AND LEFT SIDE DOWN.oxygen sat improved and resp rate 20, 
0325 RADIOLOGY CALLING wet read of ct scan head, report given to DR Neida Quiroga. No new orders given. 0400 reassessment completed. Oral; and mery-care completed. 0600 resting in bed, resp unlabored at current time. 0730 Bedside shift change report given to frank/harley johnson rn (oncoming nurse) by miky rn (offgoing nurse). Report included the following information SBAR, Kardex, Recent Results and Cardiac Rhythm nsr. Hob elevated 30 degrees. Call light within reach. Side rails up x 3. Breathing with vent.

## 2018-10-02 NOTE — PROGRESS NOTES
RENAL PROGRESS NOTE Gabe Keep Assessment/Plan: · Dialysis dependant JULIANNA (ischemic atn in a setting of acute pyelonephritis/acute cholecystitis with sepsis and cardiac arrest 9/29). Dialyzed yesterday, 2 liters pulled. Next dialysis tomorrow. Minimize intake, volume overloaded at this time. · HAGMA due to julianna. Corrected with dialysis. · Severe hyperphosphatemia. Improved. · S/p cardiopulm arrest 9/29. Off pressors. · Acute pyelonephritis/sepsis. On abx. · Abnormal lft's/acute cholecystitis? On abx. GI on the case. · Oozing from the site of rt ij temporary dialysis catheter and other iv sites. Received ddavp. No heparin with dialysis. · Melena. GI on the case. EGD is later today. · Acute blood loss anemia. May need transfusions. · Resp failure. · Recent cva with debilitation/maulutrition. Subjective: Intubated, unresponsive. Off pressors. TF is on hold. Patient Active Problem List  
Diagnosis Code  Stroke (cerebrum) ([facility]) I63.9  Stroke ([facility] I63.9  Rhabdomyolysis M62.82  
 Dehydration E86.0  
 Essential hypertension I10  
 Diabetes mellitus type 2, controlled ([facility] [address] E11.9  Non compliance w medication regimen Z91.14  
 Pneumonia of both lower lobes J18.9  DM (diabetes mellitus) ([facility] [address] E11.9  History of stroke Z80.78  
 Acute-on-chronic kidney injury ([facility] [address] N17.9, N18.9  Acute cystitis N30.00  Elevated troponin R74.8  Transaminitis R74.0  Acute kidney injury ([facility] [address] N17.9  Elevated LFTs R94.5  Cardiac arrest with ventricular fibrillation ([facility]) I46.9, I49.01  
 Abnormal blood coagulation profile R79.1  Acute pancreatitis K85.90  Septic shock ([facility]) A41.9, R65.21  
 Ischemic encephalopathy I67.89 Current Facility-Administered Medications Medication Dose Route Frequency Provider Last Rate Last Dose  potassium chloride 10 mEq in 100 ml IVPB  10 mEq IntraVENous ONCE Stephen Meyer  mL/hr at 10/02/18 0853 10 mEq at 10/02/18 3102  acetaminophen (TYLENOL) solution 325 mg  325 mg Per NG tube Q4H PRN Stephen Meyer MD   325 mg at 10/02/18 0210  pantoprazole (PROTONIX) 40 mg in sodium chloride 0.9% 10 mL injection  40 mg IntraVENous Q12H Stephen Meyer MD   40 mg at 10/02/18 0845  
 0.9% sodium chloride infusion 250 mL  250 mL IntraVENous PRN Lex Ahmadi DO      
 [START ON 10/3/2018] VANCOMYCIN INFORMATION NOTE   Other ONCE Lex Ahmadi DO      
 heparin (porcine) pf 100 Units  100 Units InterCATHeter PRN Jessica Montana MD      
 0.9% sodium chloride infusion 250 mL  250 mL IntraVENous PRN Stephen Meyer MD      
 insulin lispro (HUMALOG) injection   SubCUTAneous Q6H Lex Ahmadi DO   Stopped at 10/01/18 4105  PHENYLephrine (FOUZIA-SYNEPHRINE) 90 mg in 0.9% sodium chloride 250 mL infusion   mcg/min IntraVENous TITRATE Facundo SPRINGER MD   Stopped at 10/01/18 1628  
 vancomycin (VANCOCIN) 1,000 mg in 0.9% sodium chloride (MBP/ADV) 250 mL adv  1,000 mg IntraVENous Q MON, WED & Casey Jump,  mL/hr at 10/01/18 2247 1,000 mg at 10/01/18 2247  insulin glargine (LANTUS) injection 6 Units  6 Units SubCUTAneous DAILY Lex Ahmadi DO   6 Units at 10/02/18 5432  albuterol (PROVENTIL VENTOLIN) nebulizer solution 2.5 mg  2.5 mg Nebulization Q6H RT Adrianne Dozier MD   2.5 mg at 10/02/18 2054  DOPamine (INTROPIN) 400 mg in dextrose 5% 250 mL infusion  0-15 mcg/kg/min IntraVENous TITRATE Adrianne Dozier MD   Stopped at 10/01/18 0543  
 0.9% sodium chloride infusion  50 mL/hr IntraVENous DIALYSIS PRN Jessica Montana MD      
 heparin (porcine) 1,000 unit/mL injection 1,000 Units  1,000 Units InterCATHeter DIALYSIS PRXENIA Montana MD   1,000 Units at 09/28/18 0832  VANCOMYCIN INFORMATION NOTE   Other Rx Dosing/Monitoring Sandie Alves MD      
 senna-docusate (PERICOLACE) 8.6-50 mg per tablet 1 Tab  1 Tab Oral BID PRN Charlean Mayuri, DO      
 melatonin tablet 3 mg  3 mg Oral QHS PRN Charlean Mayuri, DO      
 traMADol (ULTRAM) tablet 50 mg  50 mg Oral Q6H PRN Charlean Mayuri, DO      
 ondansetron [facility]) injection 4 mg  4 mg IntraVENous Q4H PRN Charlean Mayuri, DO   4 mg at 09/28/18 0539  
 glucose chewable tablet 16 g  4 Tab Oral PRN Charlean Mayuri, DO      
 glucagon (GLUCAGEN) injection 1 mg  1 mg IntraMUSCular PRN Charlean Mayuri, DO      
 dextrose (D50) infusion 12.5-25 g  25-50 mL IntraVENous PRN Jarocho Nettles, DO   12.5 g at 09/28/18 1206  hydrALAZINE (APRESOLINE) 20 mg/mL injection 20 mg  20 mg IntraVENous Q6H PRN Mackenzie Vivas NP   20 mg at 09/28/18 1006  piperacillin-tazobactam (ZOSYN) 4.5 g in 0.9% sodium chloride 100 mL MBP EXTENDED 4 HOUR INFUSION   4.5 g IntraVENous Q12H Jorge MD Liz 25 mL/hr at 10/02/18 0907 4.5 g at 10/02/18 7312 Objective Vitals:  
 10/02/18 0500 10/02/18 0530 10/02/18 0600 10/02/18 1183 BP: 148/69 145/68 141/66 Pulse: 81 82 81 78 Resp: 16 16 16 16 Temp:   99.5 °F (37.5 °C) TempSrc:      
SpO2: 98% 98% 98% 98% Weight:      
Height:      
 
 
 
Intake/Output Summary (Last 24 hours) at 10/02/18 0717 Last data filed at 10/02/18 3795 Gross per 24 hour Intake          1852.46 ml Output             2000 ml Net          -147.54 ml Admission weight: Weight: 96.6 kg (213 lb) (09/26/18 2244) Last Weight Metrics: 
Weight Loss Metrics 10/1/2018 9/26/2018 9/13/2018 Today's Wt 207 lb 10.8 oz - 227 lb 15.3 oz  
BMI - 32.53 kg/m2 35.7 kg/m2 Physical Assessment:  
 
General: intubated, unresponsive. Neck: No jvd. Rt ij temporary dialysis catheter in place, dressing is partially soaked with blood. LUNGS: diminished air entry at the bases. No crackles. CVS EXM: S1, S2  RRR. Abdomen: soft, pos bs. Lower Extremities:  1+ edema. Lab CBC w/Diff Recent Labs 10/02/18 
 0445  10/01/18 
 2205  10/01/18 
 1027 WBC  19.6*  19.6*  19.9*  
RBC  2.53*  2.51*  2.11* HGB  7.4*  7.3*  6.1*  
HCT  20.8*  20.9*  17.4*  
PLT  185  201  224 GRANS  84*  77*  84* LYMPH  8*  10*  7*  
EOS  2  1  2 Chemistry Recent Labs 10/02/18 
 0445  10/01/18 
 1145  10/01/18 
 0423  09/30/18 
 0400 GLU  167*   --   143*  186* NA  141   --   142  141  
K  3.7   --   4.9  4.4  
CL  101   --   103  102 CO2  25   --   20*  21 BUN  43*   --   87*  71* CREA  4.77*   --   7.71*  6.27* CA  7.3*   --   7.0*  6.6* AGAP  15   --   19*  18  
BUCR  9*   --   11*  11* AP   --   318*   --   353* TP   --   7.0   --   5.7* ALB  2.0*  2.0*  1.8*  1.2*  1.2*  
GLOB   --   5.0*   --   4.5* AGRAT   --   0.4*   --   0.3* PHOS  5.3*   --   8.5*  7.6* No results found for: IRON, FE, TIBC, IBCT, PSAT, FERR Lab Results Component Value Date/Time Calcium 7.3 (L) 10/02/2018 04:45 AM  
 Phosphorus 5.3 (H) 10/02/2018 04:45 AM  
  
 
Florin Chung M.D. [facility] Phone [phone] Pager [phone]

## 2018-10-02 NOTE — PROGRESS NOTES
I saw and examed him independently and he was unresponsive and intubated. No seizures noted. Did not follow commands and had no withdrawal.  
Anoxic brain injury plus metabolic encephalopathy. Will have a MRI to exclude acute process. If MRI is unremarkable, Will consider EEG tomorrow. Kirsten Escalante MD 
 
 
Getting EGD currently. Discussed case with Dr. Bonnie Chaudhry. Not improving neurologically. Will get MRI.

## 2018-10-02 NOTE — PERIOP NOTES
TRANSFER - OUT REPORT: 
 
Verbal report given to Marylou on [facility]  being transferred to [facility] for routine progression of care Report consisted of patients Situation, Background, Assessment and  
Recommendations(SBAR). Information from the following report(s) Procedure Summary and Recent Results was reviewed with the receiving nurse. Lines:    
 
Opportunity for questions and clarification was provided.

## 2018-10-02 NOTE — ROUTINE PROCESS
0730 Pt received after bedside shift report from [employer], [facility]. Pt intubated. VSS stable. No apparent distress noted. Eyes open. Bed locked and in low position. 3732 Carla care and repositioned 1330 MRI screening form complete. MRI technician informed. States, MRI will be done later today after 8pm 
 
Bedside shift change report given to Cher Ring RN (oncoming nurse) by Xavier Gamez RN. (offgoing nurse). Report included the following information SBAR, MAR, KARDEX AND RECENT RESULTS

## 2018-10-02 NOTE — PERIOP NOTES
TRANSFER - IN REPORT: 
 
Verbal report received from [facility] on [employer]	[place]  being received [id number] for ordered procedure Report consisted of patients Situation, Background, Assessment and  
Recommendations(SBAR). Information from the following report(s) Procedure Summary was reviewed with the receiving nurse. Opportunity for questions and clarification was provided. Patient's daughter called for consent to perform EGD. Assessment completed upon patients arrival to unit and care assumed.

## 2018-10-02 NOTE — PROGRESS NOTES
[facility] have reviewed the flowsheet and previous days notes. Events, vitals, medications and notes from last 24 hours reviewed. Care plan discussed with staff and on multidisciplinary rounds. Subjective: 
Pt remains intubated and unresponsive on MV. Vital signs are stable. Mild fever Impression and Plan Patient Active Problem List  
Diagnosis Code  Stroke (cerebrum) ([facility]) I63.9  Stroke ([facility] I63.9  Rhabdomyolysis M62.82  
 Dehydration E86.0  
 Essential hypertension I10  
 Diabetes mellitus type 2, controlled ([facility] E11.9  Non compliance w medication regimen Z91.14  
 Pneumonia of both lower lobes J18.9  DM [facility]diabetes mellitus) [facility] E11.9  History of stroke Z80.78  
 Acute-on-chronic kidney injury ([facility] N17.9, N18.9  Acute cystitis N30.00  Elevated troponin R74.8  Transaminitis R74.0  Acute kidney injury ([facility] N17.9  Elevated LFTs R94.5  Cardiac arrest with ventricular fibrillation ([facility]) I46.9, I49.01  
 Abnormal blood coagulation profile R79.1  Acute pancreatitis K85.90  Septic shock ([facility]) A41.9, R65.21  
 Ischemic encephalopathy I67.89 Assessment: 
Acute resp failure with hypoxia - Intubated following cardiac arrest.  
- On full support MV with AC. Will place SIMV with PS and see how the patient tolerates. - ABG with adequate oxygenation and ventilation. S/p V Fib arrest- Will repeat patients Echo Pulm edema - CXR improving, volume removed with HD 
RLL Atelectasis vs. Pneumonia - Zosyn and Vanco 
JULIANNA on CKD - On HD, minimal urine output DM - Cont with lantus and ISS Abn LFT - Known history with w/u in GI Elevated Lipase - Pt's CT abd does not show pancreatitis but there was not contrast 
Suspected Pyelonephritis- seen on CT abdomen, on Zosyn - Abdominal exam not concerning but remains febrile, may need repeat CT with contrast.  
Severe encephalopathy- suspect anoxic brain injury - MRI pending Melenic Stools- likely 2/2 coagulopathy, GI planning for endoscopy Coagulopathy- Increased INR and PTT 
- s/p FFP yesterday, will recheck coags. DVT proph - SCD 
 
OTHER: 
Glycemic Control. Glucose stabilizer per ICU protocol when on insulin drip. Maintain blood glucose 140-180. Replace electrolytes per ICU electrolyte replacement protocol Ventilator bundle & Sedation protocol followed. Daily morning sedation holiday, assessment for readiness for SBT & weaning from ventilator; and then re-titrate if required. Aim to keep peak plateau pressure 20-51BV H2O in ARDS patient. Anastasiya tube to suction at 20-30 cm H2O, Maintain Lipscomb tube with 5-10ml air every 4 hours. Chlorhexidine mouth washes and routine oral care every 4 hours. Stress ulcer and DVT prophylaxis. HOB >=30 degree elevation all the time. HOB >=30 degree elevation all the time. Aggressive pulmonary toileting. Incentive spirometry when appropriate. Aspiration precautions. Sepsis bundle and protocol followed. Deescalate antibiotic when appropriate. Vasopressor when appropriate with MAP goal >65 mmHg. Central Line bundle followed, remove when not needed. Large bore IV line or CVP when appropriate. Em bundle followed, remove em catheter when not critically ill. PT/OT eval and treat. OOB/IS when appropriate. Quality Care: Stress ulcer prophylaxis, DVT prophylaxis, HOB elevated, Infection control all reviewed and addressed. Events and notes from last 24 hours reviewed. Care plan discussed with nursing. D/w patient and family above medical problems and answered all questions to their satisfaction. CC TIME: >50 min Medication Reviewed: 
 
No Known Allergies Past Medical History:  
Diagnosis Date  CHF (congestive heart failure) ([facility]  [facility]  Stroke ([facility] History reviewed. No pertinent surgical history. Social History Substance Use Topics  Smoking status: Never Smoker  Smokeless tobacco: Never Used  Alcohol use No  
  
History reviewed. No pertinent family history. Prior to Admission medications Medication Sig Start Date End Date Taking? Authorizing Provider  
aspirin (ASPIRIN) 325 mg tablet Take 1 Tab by mouth daily. 9/15/18   Kamala Hawk MD  
atorvastatin (LIPITOR) 80 mg tablet Take 1 Tab by mouth nightly. 9/14/18   Kamala Hawk MD  
insulin glargine (LANTUS) 100 unit/mL injection 10 units qhs  Indications: type 2 diabetes mellitus 9/15/18   aKmala Hawk MD  
insulin lispro (HUMALOG) 100 unit/mL injection 4 units with meals 9/14/18   Kamala Hawk MD  
losartan (COZAAR) 50 mg tablet Take 1 Tab by mouth daily. 9/14/18   Kamala Hawk MD  
 
Current Facility-Administered Medications Medication Dose Route Frequency  pantoprazole (PROTONIX) 40 mg in sodium chloride 0.9% 10 mL injection  40 mg IntraVENous Q12H  
 [START ON 10/3/2018] VANCOMYCIN INFORMATION NOTE   Other ONCE  
 insulin lispro (HUMALOG) injection   SubCUTAneous Q6H  
 PHENYLephrine (FOUZIA-SYNEPHRINE) 90 mg in 0.9% sodium chloride 250 mL infusion   mcg/min IntraVENous TITRATE  vancomycin (VANCOCIN) 1,000 mg in 0.9% sodium chloride (MBP/ADV) 250 mL adv  1,000 mg IntraVENous Q MON, WED & FRI  insulin glargine (LANTUS) injection 6 Units  6 Units SubCUTAneous DAILY  albuterol (PROVENTIL VENTOLIN) nebulizer solution 2.5 mg  2.5 mg Nebulization Q6H RT  
 DOPamine (INTROPIN) 400 mg in dextrose 5% 250 mL infusion  0-15 mcg/kg/min IntraVENous TITRATE  VANCOMYCIN INFORMATION NOTE   Other Rx Dosing/Monitoring  piperacillin-tazobactam (ZOSYN) 4.5 g in 0.9% sodium chloride 100 mL MBP EXTENDED 4 HOUR INFUSION   4.5 g IntraVENous Q12H Lines: All central lines examined by me. No signs of erythema, induration, discharge. Central Venous Catheter: HD cath in R IJ Hemodialysis Catheter: Hemodialysis Access 09/28/18 (Active) Central Line Being Utilized Yes 10/2/2018  4:00 AM  
Criteria for Appropriate Use Dialysis/apheresis 10/2/2018  4:00 AM  
Date Accessed  10/01/18 10/2/2018  4:00 AM  
Site Assessment Clean, dry, & intact 10/2/2018  4:00 AM  
Date of Last Dressing Change 10/01/18 10/2/2018  4:00 AM  
Dressing Status Clean, dry, & intact 10/2/2018  4:00 AM  
Dressing Type Disk with Chlorhexadine gluconate (CHG); Transparent 10/2/2018  4:00 AM  
Proximal Hub Color/Line Status Capped 10/2/2018  4:00 AM  
Distal Hub Color/Line Status Capped 10/2/2018  4:00 AM  
 
Drain(s): 
Orogastric Tube 09/29/18 (Active) Site Assessment Clean, dry, & intact 10/2/2018  4:00 AM  
Dressing Status Clean, dry, & intact 10/2/2018  4:00 AM  
G Port Status Clamped 10/2/2018  4:00 AM  
External Insertion Jl (cms) 58 cms 10/2/2018  4:00 AM  
Action Taken Placement verified (comment) 10/2/2018  4:00 AM  
Drainage Description Brown 10/2/2018  4:00 AM  
Gastric Residual (mL) 30 ml 10/2/2018 12:00 AM  
Tube Feeding/Formula Options Renal (Nepro) 10/1/2018  4:00 PM  
Tube Feeding/Verify Rate (mL/hr) 0 10/2/2018  4:00 AM  
Water Flush Volume (mL) 20 mL 10/1/2018  4:00 PM  
Intake (ml) 0 ml 10/2/2018  4:00 AM  
Medication Volume 35 ml 10/1/2018  9:27 AM  
Output (ml) 0 ml 10/1/2018  8:00 PM  
 
Airway: Airway - Continuous Aspiration of Subglottic Secretions (LEONCIO) Tube 09/29/18 Oral (Active) Insertion Depth (cm) 25 cm 10/2/2018 11:13 AM  
Line Jl Lips 10/2/2018 11:13 AM  
Side Secured Left 10/2/2018 11:13 AM  
Cuff Pressure 30 cmH20 9/30/2018  8:31 PM  
Site Assessment Clean, dry, & intact 10/2/2018 11:13 AM  
Suction on Yes 10/2/2018 11:13 AM  
Amt Secretions Aspirated (mL) 1 mL 10/2/2018 11:13 AM  
 
 
Objective: 
Vital Signs:   
Visit Vitals  /56  Pulse 84  Temp 99.5 °F (37.5 °C)  Resp 16  
 Ht 5' 7\" (1.702 m)  Wt 93.9 kg (207 lb)  SpO2 97%  BMI 32.42 kg/m2 O2 Device: Endotracheal tube, Ventilator O2 Flow Rate (L/min): 45 l/min Temp (24hrs), Av.2 °F (34 °C), Min:37.6 °F (3.1 °C), Max:100.6 °F (38.1 °C) Intake/Output:  
Last shift:      10/02 0701 - 10/02 1900 In: 200 [I.V.:200] Out: - Last 3 shifts: 1901 - 10/02 07 In: 2555.2 [I.V.:999.2] Out: 2000 Intake/Output Summary (Last 24 hours) at 10/02/18 1210 Last data filed at 10/02/18 8938 Gross per 24 hour Intake          1740.06 ml Output             2000 ml Net          -259.94 ml Last 3 Recorded Weights in this Encounter 18 0630 10/01/18 1638 10/02/18 0892 Weight: 94.2 kg (207 lb 10.8 oz) 94.2 kg (207 lb 10.8 oz) 93.9 kg (207 lb) Ventilator Settings: 
Mode Rate Tidal Volume Pressure FiO2 PEEP Assist control, VC+   500 ml    50 % 8 cm H20 Peak airway pressure: 30 cm H2O Plateau pressure:   
Tidal volume:  
Minute ventilation: 8.58 l/min SPO2 Physical Exam:  
 
General/Neurology: Intubated, unresponsive on sedation, on w/d to pain Head:   Normocephalic, without obvious abnormality Eye:   PERRL and reactive Oral:   Mucus membranes moist 
Neck:   Supple, R IJ in place Lung:   Spontaneous breathing, Coarse sound on R side. No wheezing Heart:   Regular rate & rhythm. S1 S2 present. Abdomen/: Soft, non tender, BS +nt Extremities:  No pedal edema Skin:   Dry, intact Data: 
   
Recent Results (from the past 24 hour(s)) FFP, ALLOCATE Collection Time: 10/01/18  4:30 PM  
Result Value Ref Range CALLED TO: JAMIE GALLO 2700 AT 17:18 ON 10/01/2018 BY FERNANDO Unit number [id number] Blood component type FP 24h, Thaw Unit division 00 Status of unit TRANSFUSED   
GLUCOSE, POC Collection Time: 10/01/18  4:59 PM  
Result Value Ref Range Glucose (POC) 134 (H) 70 - 110 mg/dL CBC WITH AUTOMATED DIFF Collection Time: 10/01/18 10:05 PM  
Result Value Ref Range WBC 19.6 (H) 4.6 - 13.2 K/uL RBC 2.51 (L) 4.70 - 5.50 M/uL HGB 7.3 (L) 13.0 - 16.0 g/dL HCT 20.9 (L) 36.0 - 48.0 % MCV 83.3 74.0 - 97.0 FL  
 MCH 29.1 24.0 - 34.0 PG  
 MCHC 34.9 31.0 - 37.0 g/dL  
 RDW 14.5 11.6 - 14.5 % PLATELET 092 638 - 893 K/uL MPV 9.3 9.2 - 11.8 FL  
 NEUTROPHILS 77 (H) 42 - 75 % BAND NEUTROPHILS 2 0 - 5 % LYMPHOCYTES 10 (L) 20 - 51 % MONOCYTES 9 2 - 9 % EOSINOPHILS 1 0 - 5 % BASOPHILS 0 0 - 3 % MYELOCYTES 1 (H) 0 %  
 ABS. NEUTROPHILS 15.1 (H) 1.8 - 8.0 K/UL  
 ABS. LYMPHOCYTES 2.0 0.8 - 3.5 K/UL  
 ABS. MONOCYTES 1.8 (H) 0 - 1.0 K/UL  
 ABS. EOSINOPHILS 0.2 0.0 - 0.4 K/UL  
 ABS. BASOPHILS 0.0 0.0 - 0.1 K/UL PLATELET COMMENTS ADEQUATE    
 RBC COMMENTS NORMOCYTIC, NORMOCHROMIC    
 DF MANUAL RENAL FUNCTION PANEL Collection Time: 10/02/18  4:45 AM  
Result Value Ref Range Sodium 141 136 - 145 mmol/L Potassium 3.7 3.5 - 5.5 mmol/L Chloride 101 100 - 108 mmol/L  
 CO2 25 21 - 32 mmol/L Anion gap 15 3.0 - 18 mmol/L Glucose 167 (H) 74 - 99 mg/dL BUN 43 (H) 7.0 - 18 MG/DL Creatinine 4.77 (H) 0.6 - 1.3 MG/DL  
 BUN/Creatinine ratio 9 (L) 12 - 20 GFR est AA 15 (L) >60 ml/min/1.73m2 GFR est non-AA 12 (L) >60 ml/min/1.73m2 Calcium 7.3 (L) 8.5 - 10.1 MG/DL Phosphorus 5.3 (H) 2.5 - 4.9 MG/DL Albumin 2.0 (L) 3.4 - 5.0 g/dL CBC WITH AUTOMATED DIFF Collection Time: 10/02/18  4:45 AM  
Result Value Ref Range WBC 19.6 (H) 4.6 - 13.2 K/uL  
 RBC 2.53 (L) 4.70 - 5.50 M/uL HGB 7.4 (L) 13.0 - 16.0 g/dL HCT 20.8 (L) 36.0 - 48.0 % MCV 82.2 74.0 - 97.0 FL  
 MCH 29.2 24.0 - 34.0 PG  
 MCHC 35.6 31.0 - 37.0 g/dL  
 RDW 14.2 11.6 - 14.5 % PLATELET 324 468 - 325 K/uL MPV 8.9 (L) 9.2 - 11.8 FL  
 NEUTROPHILS 84 (H) 40 - 73 % LYMPHOCYTES 8 (L) 21 - 52 % MONOCYTES 6 3 - 10 % EOSINOPHILS 2 0 - 5 % BASOPHILS 0 0 - 2 %  
 ABS. NEUTROPHILS 16.3 (H) 1.8 - 8.0 K/UL  
 ABS. LYMPHOCYTES 1.6 0.9 - 3.6 K/UL ABS. MONOCYTES 1.2 0.05 - 1.2 K/UL  
 ABS. EOSINOPHILS 0.4 0.0 - 0.4 K/UL  
 ABS. BASOPHILS 0.0 0.0 - 0.1 K/UL  
 DF AUTOMATED    
LIPASE Collection Time: 10/02/18  4:45 AM  
Result Value Ref Range Lipase 4938 (H) 73 - 393 U/L  
NT-PRO BNP Collection Time: 10/02/18  4:45 AM  
Result Value Ref Range NT pro-BNP 2476 (H) 0 - 900 PG/ML  
POC G3 Collection Time: 10/02/18  4:50 AM  
Result Value Ref Range Device: VENT    
 FIO2 (POC) 60 % pH (POC) 7.459 (H) 7.35 - 7.45    
 pCO2 (POC) 39.7 35.0 - 45.0 MMHG  
 pO2 (POC) 176 (H) 80 - 100 MMHG  
 HCO3 (POC) 28.0 (H) 22 - 26 MMOL/L  
 sO2 (POC) 100 (H) 92 - 97 % Base excess (POC) 4 mmol/L Mode ASSIST CONTROL Tidal volume 500 ml Set Rate 16 bpm  
 PEEP/CPAP (POC) 8 cmH2O Allens test (POC) N/A Total resp. rate 16 Site LEFT RADIAL Patient temp. 37.6 Specimen type (POC) ARTERIAL Performed by Joane Crigler Volume control plus YES Chemistry Recent Labs 10/02/18 
 0445  10/01/18 
 1145  10/01/18 
 0423  09/30/18 
 0400 GLU  167*   --   143*  186* NA  141   --   142  141  
K  3.7   --   4.9  4.4  
CL  101   --   103  102 CO2  25   --   20*  21 BUN  43*   --   87*  71* CREA  4.77*   --   7.71*  6.27* CA  7.3*   --   7.0*  6.6*  
PHOS  5.3*   --   8.5*  7.6* AGAP  15   --   19*  18  
BUCR  9*   --   11*  11* AP   --   318*   --   353* TP   --   7.0   --   5.7* ALB  2.0*  2.0*  1.8*  1.2*  1.2*  
GLOB   --   5.0*   --   4.5* AGRAT   --   0.4*   --   0.3* CBC w/Diff Recent Labs 10/02/18 
 0445  10/01/18 
 2205  10/01/18 
 1027 WBC  19.6*  19.6*  19.9*  
RBC  2.53*  2.51*  2.11* HGB  7.4*  7.3*  6.1*  
HCT  20.8*  20.9*  17.4*  
PLT  185  201  224 GRANS  84*  77*  84* LYMPH  8*  10*  7*  
EOS  2  1  2 ABG  Recent Labs 10/02/18 
 0450  10/01/18 
 0443  09/30/18 
 1105 PHI  7.459*  7.355  7.418 PCO2I  39.7  38.3  35.0 PO2I  176*  131*  142* HCO3I  28.0*  21.2*  22.5 FIO2I  60  60  70 Micro Recent Labs  
   09/29/18 
 1400 CULT  Toxigenic C. difficile NEGATIVE                         C. difficile target DNA sequences are not detected. Recent Labs  
   09/29/18 
 1400 CULT  Toxigenic C. difficile NEGATIVE                         C. difficile target DNA sequences are not detected. CT (Most Recent) Results from [facility] Encounter encounter on 09/26/18 CT CHEST WO CONT Narrative EXAM: CT CHEST WO CONT 
 
CLINICAL INDICATION/HISTORY: Dyspnea, on exertion. 
-Additional: None COMPARISON: Chest x-ray dated September 29, 2018 >Reference Exam: None. TECHNIQUE: Helical CT imaging from the thoracic inlet through the diaphragm 
without intravenous contrast. Multiplanar reformats were generated. One or more 
dose reduction techniques were used on this CT: automated exposure control, 
adjustment of the mAs and/or kVp according to patient size, and iterative 
reconstruction techniques. The specific techniques used on this CT exam have 
been documented in the patient's electronic medical record. 
 
_______________ FINDINGS: 
 
Support apparatus: Endotracheal tube terminates in satisfactory position above 
the billy. Orogastric tube terminates below the diaphragm in the stomach, 
inferior to the field-of-view. Additional esophageal probe is also noted. Right IJ central venous catheter terminates in the SVC. LUNGS AND PLEURA: There is extensive consolidation and volume loss of the entire 
right lower lobe with patchy and streaky opacities along the dependent aspects 
of the right upper and middle lobes. A few scattered air bronchograms are noted. Additional volume loss and opacity in the dependent portion of the left lung 
base with scattered streaky opacities and mild groundglass throughout the 
remaining left upper and lower lobes. Mild bronchiolectasis of the left lung base. Several scattered calcified granulomata are noted predominantly on the 
right. Trace pleural effusions. AIRWAYS: Endotracheal tube in place. Minimal amount of debris noted in the 
trachea adjacent to the tip of the ET tube. MEDIASTINUM: Slight shift of the mediastinal structures to the right related to 
the right-sided volume loss as above. There is diffuse esophageal wall 
thickening, poorly defined. Scattered atheromatous calcifications including multivessel coronary artery 
disease. There are extensive calcified me silent hilar lymph nodes. UPPER ABDOMEN: Unremarkable. OSSEOUS: Multilevel spondylosis. No acute findings. MISCELLANEOUS: None. 
 
_______________ Impression IMPRESSION: 
 
1. Consolidative right sided airspace disease and to a lesser extent in the 
left lung base with associated volume loss representing combination of 
atelectasis and pneumonia. 2.  Trace bilateral pleural effusions. 3.  Diffuse esophageal wall thickening, poorly defined and nonspecific may 
represent chronic esophagitis although underlying malignancy is not entirely 
excluded. Consider GI consultation and follow-up esophagram versus direct 
visualization when clinically appropriate. 4.  Chronic granulomatous disease. 5.  Support lines and tubes as above. Initial preliminary report was provided to the ED by the on-call radiology 
resident. XR (Most Recent). CXR reviewed by me and compared with previous CXR Results from [facility] [place] [place] Encounter encounter on 09/26/18 XR CHEST PORT Narrative EXAM: XR CHEST PORT 
 
CLINICAL INDICATION/HISTORY: ETT placement >Additional: None COMPARISON: 9/29/2018 >Reference exam: None. TECHNIQUE: Portable chest. 
 
_______________ FINDINGS: 
 
SUPPORT LINES AND TUBES: Right IJ approach central venous catheter terminates in 
the SVC.  Nasogastric tube courses inferiorly off the field-of-view with the 
 side-port well distal to the GE junction. A nasogastric tube terminates below 
the clavicles, exact distance relative to the billy is difficult but estimated 
proximal 3 5 cm. Additional esophageal probe is noted. HEART AND MEDIASTINUM: Prominent heart size is exaggerated due to portable 
technique and rotation to the left but likely within normal limits and 
unchanged. Bilateral pulmonary vascular congestion with diminished clarity is 
noted. LUNGS AND PLEURAL SPACES: Bilateral patchy perihilar airspace opacities with 
more confluent opacity in the right lung base. Possible trace pleural effusions. No pneumothorax. BONY THORAX AND SOFT TISSUES: No acute osseous abnormality. _______________ Impression IMPRESSION: 
 
Bilateral right greater than left airspace disease, slightly progressive since 
prior radiographs. Support lines and tubes remain in place. High complexity decision making was performed during the evaluation of this patient at high risk for decompensation with multiple organ involvement Above mentioned total time spent on reviewing the case/medical record/data/notes/EMR/patient examination/documentation/coordinating care with nurse/consultants, exclusive of procedures with complex decision making performed and > 50% time spent in face to face evaluation. Amish Rios MD 
Critical Care Medicine

## 2018-10-02 NOTE — PERIOP NOTES
TRANSFER - IN REPORT: 
 
Verbal report received from [facility] on [employer]	[place]  being received [id number] for ordered procedure Report consisted of patients Situation, Background, Assessment and  
Recommendations(SBAR). Information from the following report(s) Procedure Summary and MAR was reviewed with the receiving nurse. Opportunity for questions and clarification was provided. Assessment completed upon patients arrival to unit and care assumed.

## 2018-10-02 NOTE — PROGRESS NOTES
Problem: Falls - Risk of 
Goal: *Absence of Falls Document Tayler Kwong Fall Risk and appropriate interventions in the flowsheet. Outcome: Progressing Towards Goal 
Fall Risk Interventions: 
Mobility Interventions: Bed/chair exit alarm Mentation Interventions: Bed/chair exit alarm Medication Interventions: Bed/chair exit alarm Elimination Interventions: Call light in reach Problem: Pressure Injury - Risk of 
Goal: *Prevention of pressure injury Document Mitul Scale and appropriate interventions in the flowsheet. Outcome: Progressing Towards Goal 
Pressure Injury Interventions: 
Sensory Interventions: Assess changes in LOC, Turn and reposition approx. every two hours (pillows and wedges if needed) Moisture Interventions: Absorbent underpads, Maintain skin hydration (lotion/cream) Activity Interventions: Pressure redistribution bed/mattress(bed type) Mobility Interventions: Pressure redistribution bed/mattress (bed type), Turn and reposition approx. every two hours(pillow and wedges) Nutrition Interventions: Document food/fluid/supplement intake Friction and Shear Interventions: Foam dressings/transparent film/skin sealants, HOB 30 degrees or less

## 2018-10-02 NOTE — PROGRESS NOTES
Breath sounds noted to be markedly diminished on the right. SPO2 90% on 60% FIO2. Patient suctioned for moderated amount of blood. Tidal volumes on ventilator dropped and peak pressure limiting. Patient removed from ventilator and received BMV. Initially unable to ventilate even with BMV. Pressure increased and patient coughed a very large blood clot. Patient stabilized and returned to ventilator. RN aware. Breath sounds improved and SPO2 98% on 60%.

## 2018-10-02 NOTE — PROGRESS NOTES
VENTILATOR Care plan 
 
Problem: Ventilator Management Goal: *Adequate oxygenation/ ventilation/ and extubation Patient: 
   
 
Aguilar Talamantes     57 y.o.   male     10/2/2018  9:16 AM 
Patient Active Problem List  
Diagnosis Code  Stroke (cerebrum) ([facility]) I63.9  Stroke ([facility] I63.9  Rhabdomyolysis M62.82  
 Dehydration E86.0  
 Essential hypertension I10  
 Diabetes mellitus type 2, controlled ([facility] E11.9  Non compliance w medication regimen Z91.14  
 Pneumonia of both lower lobes J18.9  DM (diabetes mellitus) ([facility] E11.9  History of stroke Z80.78  
 Acute-on-chronic kidney injury ([facility] N17.9, N18.9  Acute cystitis N30.00  Elevated troponin R74.8  Transaminitis R74.0  Acute kidney injury ([facility] N17.9  Elevated LFTs R94.5  Cardiac arrest with ventricular fibrillation ([facility]) I46.9, I49.01  
 Abnormal blood coagulation profile R79.1  Acute pancreatitis K85.90  Septic shock ([facility]) A41.9, R65.21  
 Ischemic encephalopathy I67.89 Acute kidney injury ([facility] Elevated liver enzymes, Anemia, melena Reason patient intubated:  Acute respiratory failure secondary to code blue. 
   
Ventilator day: 4 
   
Ventilator settings: AC VC+, Rate=16, Gw=369, Fio2=50%, Peep=5;   
   
ETT Size/Placement:  7.5 ETT 25 lip ABG: 
Date:10/2/2018 Lab Results Component Value Date/Time PHI 7.459 (H) 10/02/2018 04:50 AM  
 PCO2I 39.7 10/02/2018 04:50 AM  
 PO2I 176 (H) 10/02/2018 04:50 AM  
 HCO3I 28.0 (H) 10/02/2018 04:50 AM  
 FIO2I 60 10/02/2018 04:50 AM  
 
 
Chest X-ray: 
Date:10/2/2018 Results from [facility] Encounter encounter on 09/26/18 XR CHEST PORT Narrative EXAM: XR CHEST PORT 
 
CLINICAL INDICATION/HISTORY: ETT placement >Additional: None COMPARISON: 9/29/2018 >Reference exam: None. TECHNIQUE: Portable chest. 
 
_______________ FINDINGS: 
 
SUPPORT LINES AND TUBES: Right IJ approach central venous catheter terminates in 
 the SVC. Nasogastric tube courses inferiorly off the field-of-view with the 
side-port well distal to the GE junction. A nasogastric tube terminates below 
the clavicles, exact distance relative to the billy is difficult but estimated 
proximal 3 5 cm. Additional esophageal probe is noted. HEART AND MEDIASTINUM: Prominent heart size is exaggerated due to portable 
technique and rotation to the left but likely within normal limits and 
unchanged. Bilateral pulmonary vascular congestion with diminished clarity is 
noted. LUNGS AND PLEURAL SPACES: Bilateral patchy perihilar airspace opacities with 
more confluent opacity in the right lung base. Possible trace pleural effusions. No pneumothorax. BONY THORAX AND SOFT TISSUES: No acute osseous abnormality. _______________ Impression IMPRESSION: 
 
Bilateral right greater than left airspace disease, slightly progressive since 
prior radiographs. Support lines and tubes remain in place. Lab Test: 
Date:10/2/2018 WBC:  
Lab Results Component Value Date/Time WBC 19.6 (H) 10/02/2018 04:45 AM  
HGB: Lab Results Component Value Date/Time HGB 7.4 (L) 10/02/2018 04:45 AM  
 PLTS: Lab Results Component Value Date/Time PLATELET 334 67/09/5994 04:45 AM  
 
 
SaO2%/flow:  
SpO2 Readings from Last 1 Encounters:  
10/02/18 98% Vital Signs:   Patient Vitals for the past 8 hrs: 
 Temp Pulse Resp BP SpO2  
10/02/18 0822 - 78 16 - 98 % 10/02/18 0600 99.5 °F (37.5 °C) 81 16 141/66 98 % 10/02/18 0530 - 82 16 145/68 98 % 10/02/18 0500 - 81 16 148/69 98 % 10/02/18 0440 - 81 17 - 100 % 10/02/18 0430 - 82 17 134/58 99 % 10/02/18 0400 99.9 °F (37.7 °C) 83 12 113/58 100 % 10/02/18 0300 - 84 16 138/64 97 % 10/02/18 0230 - 84 15 122/55 97 % 10/02/18 0200 (!) 100.6 °F (38.1 °C) 85 16 133/53 97 % 10/02/18 0130 (!) 100.6 °F (38.1 °C) 86 16 134/55 97 % Wean Screen Pass (Yes or No): no 
Wean Screen Reason for Failure: Duration of Weaning Trial: 
Additional Comments: PLAN OF CARE: Monitor patient closely GOAL: Adequate oxygenation and ventilation. OUTCOME: Patient remains on vent.

## 2018-10-03 NOTE — PROGRESS NOTES
Internal Medicine Progress Note Patient's Name: Jocelynn Whitehead Admit Date: 9/26/2018 Length of Stay: 6 Assessment/Plan Active Hospital Problems Diagnosis Date Noted  Septic shock ([address] 09/30/2018 Probably secondary to pneumonia (suspicious for aspiration). Less likely, secondary to acute pancreatitis.  Ischemic encephalopathy 09/30/2018  Cardiac arrest with ventricular fibrillation ([address] 09/29/2018 ROSC before defibrillation could be attempted.  Abnormal blood coagulation profile 09/29/2018  Acute pancreatitis 09/29/2018 Shock, leukocytosis, elevated lipase but radiographically no ductal dilatation in pancreas. GB stones without radiographic stigmata of acute cholecystitis  Elevated LFTs 09/28/2018  History of stroke 09/27/2018 With residual R hemiparesis  Acute-on-chronic kidney injury ([address] 09/27/2018  Acute cystitis 09/27/2018  Elevated troponin 09/27/2018  Transaminitis 09/27/2018  Acute kidney injury ([facility] 09/27/2018  Pneumonia of both lower lobes 09/11/2018  Essential hypertension 09/10/2018  Diabetes mellitus type 2, controlled ([facility] 09/10/2018 Pneumonia  POA/not POA 
 
- Vent mgmt per ICU 
- EGD results noted, showing extensive clot w/ remnants of food extending about 15cm, unclear if mass or perforation under clot - F/u GI 
- Cont protonix IV 
- HD mgmt per nephro - Cont broad spec IVAB 
- Trend CBC 
- F/u cult - MRI w/ evidence of severe anoxic brain injury - Appreciate neuro assistance - Appreciate consulting services - At this point, unlikely that pt will have any meaningful neuro recovery, prognosis is very grim 
- DVT protocol I have personally reviewed all pertinent labs and films that have officially resulted over the last 24 hours. I have personally checked for all pending labs that are awaiting final results. Subjective Pt s/e @ bedside Remains intubated Neuro exam worsening Unable to assess ROS Objective Visit Vitals  /55  Pulse 88  Temp (!) 37.6 °F (3.1 °C)  Resp 21  
 Ht 5' 7\" (1.702 m)  Wt 93.9 kg (207 lb 0.2 oz)  SpO2 95%  BMI 32.42 kg/m2 Physical Exam: 
General Appearance: Intubated, not following commands HENT: normocephalic/atraumatic, + ETT Neck: No JVD, hematoma R side where TDC is 
Lungs: B/L crackles, vent-assisted BS 
CV: RRR, no m/r/g Abdomen: soft, non-tender, normal bowel sounds Extremities: no cyanosis, no peripheral edema Neuro: Unresponsive to commands, no withdrawal to pain today, negative corneal, no pupillary reaction to light on left side Skin: Normal color, intact Intake and Output: 
Current Shift:    
Last three shifts:  10/01 1901 - 10/03 0700 In: 7696 [I.V.:750] Out: 60 Lab/Data Reviewed: 
CMP:  
Lab Results Component Value Date/Time  10/03/2018 04:00 AM  
 K 4.2 10/03/2018 04:00 AM  
  10/03/2018 04:00 AM  
 CO2 23 10/03/2018 04:00 AM  
 AGAP 19 (H) 10/03/2018 04:00 AM  
  (H) 10/03/2018 04:00 AM  
 BUN 57 (H) 10/03/2018 04:00 AM  
 CREA 6.24 (H) 10/03/2018 04:00 AM  
 GFRAA 11 (L) 10/03/2018 04:00 AM  
 GFRNA 9 (L) 10/03/2018 04:00 AM  
 CA 7.5 (L) 10/03/2018 04:00 AM  
 PHOS 6.5 (H) 10/03/2018 04:00 AM  
 ALB 1.9 (L) 10/03/2018 04:00 AM  
 
CBC:  
Lab Results Component Value Date/Time WBC 24.9 (H) 10/03/2018 11:42 AM  
 HGB 7.3 (L) 10/03/2018 11:42 AM  
 HCT 21.2 (L) 10/03/2018 11:42 AM  
  10/03/2018 11:42 AM  
 
 
Imaging Reviewed: 
Mri Brain Wo Cont Result Date: 10/3/2018 EXAM: MRI BRAIN W/O CONTRAST INDICATION: Unresponsive, recent cardiac arrest, sepsis COMPARISON: MR brain 9/10/2018 TECHNIQUE: Multiplanar multi sequence MR imaging of the brain was performed utilizing axial T2, FLAIR, diffusion, T2 gradient echo, and multiplanar T1 pre contrast imaging.   No gadolinium was administered due to specific clinician request. _______________________ FINDINGS: VENTRICLES/EXTRA-AXIAL SPACES: The ventricles are stable in their size and configuration. BRAIN PARENCHYMA: There are multiple areas of relatively confluent and symmetric cortical diffusion hyperintensity involving the bilateral cerebral hemispheres. This abnormal cortical signal is most prominent in the parieto-occipital regions, but is also seen involving the bilateral posterior temporal regions, in the bilateral frontal lobes particularly towards the vertex but also seen more laterally extending towards the perisylvian region. Mild right insular cortical involvement is suggested along its mid to anterior aspect. Corresponding ADC map does show cortical hypointensity as well. These areas all have T2/FLAIR corresponding hyperintensity. All these cortical findings are new since 09/10/2018. Slightly linear abnormal diffusion signal seen involving the left lateral aspect of the mid lizabeth, diffusion images 14 and 15, with relative ADC map hypointensity. Corresponding T2/FLAIR hyperintensity also seen. Similar focus of restricted diffusion present in the lateral aspect of the left middle cerebellar peduncle on image 12. Abnormal diffusion signal and FLAIR hyperintensity again seen anteriorly along the left medullary pyramid, axial image 9. Similar small discrete punctate foci of restricted diffusion also seen in the left frontal corona radiata on image 25 and in the right periatrial white matter on image 21. These foci also show T2/FLAIR hyperintensity. Elongated chronic infarct is present involving the anterior superior aspect right basal ganglia extending from the putamen into the lateral aspect of the caudate and adjacent white matter. Corresponding central fluid signal surrounding FLAIR hyperintensity is present.  There is associated susceptibility artifact along the periphery of this lesion indicating hemosiderin staining from chronic hemorrhage This infarct is unchanged. Chronic right thalamic and anterior left thalamic infarcts are again seen. Small linear chronic right central pontine infarct is also present. Additional stable mild to moderate amount of T2/FLAIR hyperintense white matter lesions are seen within the periventricular and subcortical white matter , which are nonspecific, but likely represent chronic small vessel changes. No evidence of intracranial mass effect, midline shift, or herniation. Small foci of susceptibility artifact are seen involving the medial and superior right basal ganglia, left subinsular region. These are unchanged although better seen today with less motion artifact. VASCULATURE:  The major intracranial vascular flow voids are grossly normal. ORBITS: The bilateral lenses are absent, likely due to prior cataract surgery. PARANASAL SINUSES/MASTOIDS: Mild mucoperiosteal thickening is scattered in the paranasal sinuses. No air-fluid levels are present. Sinus disease greatest in the sphenoid sinuses, similar to prior study. Mild fluid signal is present in the bilateral mastoid air cells, nonspecific but likely inflammatory. Small amount layering fluid signal is seen posteriorly in the nasopharynx, likely representing mucous secretions given intubation and presence of gastroenteric tube. OSSEOUS STRUCTURES: Unremarkable OTHER: None.  ________________________ IMPRESSION: 1. Confluent and relatively symmetric areas of abnormal diffusion signal involving bilateral cerebral hemispheres, greater posteriorly, very suspicious for anoxic injury. 2. More discrete small foci of restricted diffusion involving the lizabeth, medulla, right periatrial white matter, and left corona radiata, likely representing more discrete small acute infarcts. No evidence of associated hemorrhage or mass effect.  3. Several chronic infarcts are again seen including right basal ganglia infarct with hemosiderin staining from prior hemorrhage. The other chronic infarcts include bilateral thalamic and right central lizabeth. 4. Stable, mild to moderate nonspecific white matter disease likely representing chronic small vessel changes. 5. Stable foci susceptibility artifact right basal ganglia and left subinsular region. These are nonspecific but likely representing hemosiderin staining from chronic microhemorrhage, often hypertensive in etiology. 6. No evidence of midline shift or herniation. 7. Layering mucus secretions and posterior nasopharynx most likely related to intubation. Ct Head Wo Cont Result Date: 10/3/2018 EXAM: CT head CLINICAL HISTORY/INDICATION: Neuro deficit, acute single, stable or partly resolved; pupils different sizes -Additional:  None COMPARISON: 09/29/18 TECHNIQUE: Axial CT imaging of the head was performed without intravenous contrast.  One or more dose reduction techniques were used on this CT: automated exposure control, adjustment of the mAs and/or kVp according to patient size, and iterative reconstruction techniques. The specific techniques used on this CT exam have been documented in the patient's electronic medical record. _______________ FINDINGS: Brain And Posterior Fossa: The sulci, folia, ventricles and basal cisterns are within normal limits for the patient's age. There is no intracranial hemorrhage, mass effect, or midline shift. There is an area of diminished attenuation involving both gray and white matter within the right posterior temporal/occipital lobe. The appearance is consistent with a subacute infarction. Additional similar-appearing areas are evident involving both posterior parietal lobes. Chronic appearing infarction is evident involving the right basal ganglia and anterior right corona radiata. Extra-Axial Spaces And Meninges: There are no abnormal extra-axial fluid collections. Calvarium: Intact.  Sinuses: Partial opacification of the left sphenoid sinus is present with inspissated mucus present Other: Status post bilateral cataract surgery _______________ IMPRESSION: There are subacute infarctions evident involving the right posterior temporal/occipital lobe, right posterior parietal lobe and left posterior parietal lobe. No hemorrhage or significant mass effect is evident Chronic infarction involving the right basal ganglia and anterior right corona radiata Left sphenoid sinusitis again evident Preliminary report provided by on-call radiology resident. Xr Chest Harle [place] Result Date: 10/3/2018 Chest, single view Indication: Endotracheal tube placement Comparison: Several prior chest radiographs, most recently 10/2/2018 Findings:  Portable upright AP view of the chest was obtained. There is an endotracheal tube in place with tip projecting approximately 4.4 cm above the billy. A nasogastric tube is noted below the diaphragm, the tip collimated from view. Right IJ approach central venous catheter in stable position. Interval increase in right lower lobe opacification with shifting of mediastinal structures to the right. Adjacent right lower and mid lung opacity demonstrated. Streaky opacity at the periphery of the lingula similar to prior. No pneumothorax. Cardiac size and mediastinal contours are stable. No acute osseous abnormality. Impression: 1. Endotracheal tube, visualized nasogastric tube, and right IJ central venous catheter in position as above. 2. Short interval in right basilar opacity with associated volume loss and subtle right-sided shifting mediastinal structures most in keeping with right lower lobar atelectasis, potentially secondary to mucous plugging. Medications Reviewed: 
Current Facility-Administered Medications Medication Dose Route Frequency  [START ON 10/5/2018] vancomycin (VANCOCIN) 750 mg in 0.9% sodium chloride (MBP/ADV) 250 mL ADV  750 mg IntraVENous Q MON, WED & FRI  
  acetaminophen (TYLENOL) solution 325 mg  325 mg Per NG tube Q4H PRN  pantoprazole (PROTONIX) 40 mg in sodium chloride 0.9% 10 mL injection  40 mg IntraVENous Q12H  
 0.9% sodium chloride infusion 250 mL  250 mL IntraVENous PRN  
 heparin (porcine) pf 100 Units  100 Units InterCATHeter PRN  
 0.9% sodium chloride infusion 250 mL  250 mL IntraVENous PRN  
 insulin lispro (HUMALOG) injection   SubCUTAneous Q6H  
 insulin glargine (LANTUS) injection 6 Units  6 Units SubCUTAneous DAILY  albuterol (PROVENTIL VENTOLIN) nebulizer solution 2.5 mg  2.5 mg Nebulization Q6H RT  
 heparin (porcine) 1,000 unit/mL injection 1,000 Units  1,000 Units InterCATHeter DIALYSIS PRN  
 VANCOMYCIN INFORMATION NOTE   Other Rx Dosing/Monitoring  senna-docusate (PERICOLACE) 8.6-50 mg per tablet 1 Tab  1 Tab Oral BID PRN  
 ondansetron (ZOFRAN) injection 4 mg  4 mg IntraVENous Q4H PRN  
 glucose chewable tablet 16 g  4 Tab Oral PRN  
 glucagon (GLUCAGEN) injection 1 mg  1 mg IntraMUSCular PRN  
 dextrose (D50) infusion 12.5-25 g  25-50 mL IntraVENous PRN  
 hydrALAZINE (APRESOLINE) 20 mg/mL injection 20 mg  20 mg IntraVENous Q6H PRN  piperacillin-tazobactam (ZOSYN) 4.5 g in 0.9% sodium chloride 100 mL MBP EXTENDED 4 HOUR INFUSION   4.5 g IntraVENous Q12H Pablito Gandhi DO Internal Medicine, Hospitalist 
Pager: [phone] [facility]

## 2018-10-03 NOTE — PROGRESS NOTES
2030,received pt on ventilator support resting with eyes closed head elevated,pt sxn for large amount of bloody secretions ETT moved to center of mouth,vent settings and alarms set sunitha tube cleared BVM at bedside will continue to monitor though the shift. 0230,transported pt to CT via transport vent w/o problem.

## 2018-10-03 NOTE — PROGRESS NOTES
Physical Exam  
Skin:  
 
  
 2000 Primary Nurse Lexus Klein RN and KAUSHAL bueno performed a dual skin assessment on this patient .

## 2018-10-03 NOTE — CDMP QUERY
Please clarify if this patient is being treated/managed for: 
 
=>Pneumonia  POA/not POA 
=>Aspiration Pneumonia POA/not POA 
=>Other Explanation of clinical findings =>Unable to Determine (no explanation of clinical findings) The medical record reflects the following: 
 
Risk: Recent stroke, hx of pneumonia 9/11 Clinical Indicators: Adm CXR- CXR- Hyperinflation may be due to underlying emphysema. Coarse bronchovascular markings could be due to mild vascular congestion and/or bronchitis. WBC 20.2/ 85 % neutrophils, lactic acid 0.7, 96 % sats on 2 L 
9/28 IM progress notes-  IM- SOB and increased WOB-increasing O2 requirements. CXR showed increased opacities and pulmonary edema-dialysis-in fluid volume overload (h/o CHF-last ECHO 9/11/18 EF51-55%). Will transfer to stepdown. Chronic diastolic CHF, mild AS, trace TR -will d/c fluids currently as fluid volume overload 9/29- Cardiopulmonary arrest and intubation 9/30- PCM docs Septic shock ([facility] (9/30/2018) Overview: Probably secondary to pneumonia (suspicious for aspiration).   
10/1- RLL Atel and Pneumonia - Pt is on Zosyn and Vanco. 
 
Treatment: Vanco and Zosyn and Doxy,

## 2018-10-03 NOTE — PROGRESS NOTES
Neurology Progress Note Admit Date: 9/26/2018 Length of Stay: 6 Primary Care: Bianka Garcia MD  
Principle Problem: Cardiac arrest with ventricular fibrillation ([facility] [address] I saw and examed him independently and reviewed his history and brain MRI imaging today. MRI showed diffuse cortical signal changes, consistent with anoxic injury in the setting of cardiac arrest.  
D/w his mother and niece at bedsides about his neurological condition and poor prognosis given his unresponsiveness and brain MRI findings after his cardiac arrest. His mother and niece expressed understanding of his neurostatus and will like to continue aggressive care and get everything done. His Daughter will come in tomorrow and would expect family meeting with ICU team about his care plan. 35 minutes were spent with his care including discussion of his neurostatus with his family. Joanne Miller MD. Assessment:   
Encephalopathy / Anoxic Brain Injury Plan:   
Encephalopathy secondary to metabolic or infectious process. Anoxic Brain Injury Reverse metabolic abnormalities. Nephrology following. Currently receiving dialysis. JULIANNA in setting of acute pyelonephritis/acute cholecystitis with sepsis. ABX per primary team.   2 units of RBCs for low H&H of 6.1/17. 4. GI following. Active melena. Per notes: EGD yesterday showed an extensive clot in the esophagus extending from 25 cm to 40 cm along the side of the esophagus, unclear if there is a perforation or mass under it. Unable to clear out the clot, attempted to do it with chaney net. Increased ALT, AST, and alk phos but trending down. MRI shows  confluent and relatively symmetric areas of abnormal diffusion signal 
involving bilateral cerebral hemispheres, greater posteriorly, very suspicious for anoxic injury.   More discrete small foci of restricted diffusion involving the lizabeth, medulla, right periatrial white matter, and left corona radiata, likely representing more discrete small acute infarcts. No evidence of associated hemorrhage or mass Effect. Poor prognosis. History:  Kristen Foster is a 54yo AAM with PMH of HTN, HLD, IDDM, and CVA with right hemiparesis who was sent from the NH due to abnormal labs ie elevated BUN/Creat. From notes he had cardiac arrest after dialysis. Initial CT at that time showed no acute abnormalities. Previous MRI on 9/10/18 showed acute infarct involving the left medullary pyramid. Chronic right central lizabeth and bilateral basal ganglia infarcts. Hemosiderin staining from prior chronic parenchymal hemorrhage involving superolateral right basal ganglia. Results reviewed:  
 
CT Results (most recent): IMPRESSION IMPRESSION: 
  
1. No acute intracranial abnormalities. 2.  Multifocal chronic infarcts and presumed chronic microvascular changes 
without significant interval change. 3.  High density material opacifying the left maxillary sinus and additional 
layering debris in the nasopharynx compatible with sinusitis, potentially fungal 
etiology or other proteinaceous debris. MRI Results (most recent): 
 
Results from Hospital Encounter encounter on 09/26/18 MRI BRAIN WO CONT Narrative EXAM: MRI BRAIN W/O CONTRAST INDICATION: Unresponsive, recent cardiac arrest, sepsis COMPARISON: MR brain 9/10/2018 TECHNIQUE: Multiplanar multi sequence MR imaging of the brain was performed 
utilizing axial T2, FLAIR, diffusion, T2 gradient echo, and multiplanar T1 pre 
contrast imaging. No gadolinium was administered due to specific clinician 
request. 
 
_______________________ FINDINGS:  
 
VENTRICLES/EXTRA-AXIAL SPACES: The ventricles are stable in their size and 
configuration. BRAIN PARENCHYMA:  
 
There are multiple areas of relatively confluent and symmetric cortical 
diffusion hyperintensity involving the bilateral cerebral hemispheres. This abnormal cortical signal is most prominent in the parieto-occipital regions, but 
is also seen involving the bilateral posterior temporal regions, in the 
bilateral frontal lobes particularly towards the vertex but also seen more 
laterally extending towards the perisylvian region. Mild right insular cortical 
involvement is suggested along its mid to anterior aspect. Corresponding ADC map 
does show cortical hypointensity as well. These areas all have T2/FLAIR 
corresponding hyperintensity. All these cortical findings are new since 09/10/2018. Slightly linear abnormal diffusion signal seen involving the left lateral aspect 
of the mid lizabeth, diffusion images 14 and 15, with relative ADC map 
hypointensity. Corresponding T2/FLAIR hyperintensity also seen. Similar focus of 
restricted diffusion present in the lateral aspect of the left middle cerebellar 
peduncle on image 12. Abnormal diffusion signal and FLAIR hyperintensity again 
seen anteriorly along the left medullary pyramid, axial image 9. Similar small 
discrete punctate foci of restricted diffusion also seen in the left frontal 
corona radiata on image 25 and in the right periatrial white matter on image 21. These foci also show T2/FLAIR hyperintensity. Elongated chronic infarct is present involving the anterior superior aspect 
right basal ganglia extending from the putamen into the lateral aspect of the 
caudate and adjacent white matter. Corresponding central fluid signal 
surrounding FLAIR hyperintensity is present. There is associated susceptibility 
artifact along the periphery of this lesion indicating hemosiderin staining from 
chronic hemorrhage This infarct is unchanged. Chronic right thalamic and anterior left thalamic infarcts are again seen. Small 
linear chronic right central pontine infarct is also present. Additional stable 
mild to moderate amount of T2/FLAIR hyperintense white matter lesions are seen within the periventricular and subcortical white matter , which are nonspecific, 
but likely represent chronic small vessel changes. No evidence of intracranial mass effect, midline shift, or herniation. Small foci of susceptibility artifact are seen involving the medial and superior 
right basal ganglia, left subinsular region. These are unchanged although better 
seen today with less motion artifact. VASCULATURE:  The major intracranial vascular flow voids are grossly normal. 
 
ORBITS: The bilateral lenses are absent, likely due to prior cataract surgery. PARANASAL SINUSES/MASTOIDS: Mild mucoperiosteal thickening is scattered in the 
paranasal sinuses. No air-fluid levels are present. Sinus disease greatest in 
the sphenoid sinuses, similar to prior study. Mild fluid signal is present in 
the bilateral mastoid air cells, nonspecific but likely inflammatory. Small 
amount layering fluid signal is seen posteriorly in the nasopharynx, likely 
representing mucous secretions given intubation and presence of gastroenteric 
tube. OSSEOUS STRUCTURES: Unremarkable OTHER: None.   
 
________________________ Impression IMPRESSION: 
 
1. Confluent and relatively symmetric areas of abnormal diffusion signal 
involving bilateral cerebral hemispheres, greater posteriorly, very suspicious 
for anoxic injury. 2. More discrete small foci of restricted diffusion involving the lizabeth, medulla, 
right periatrial white matter, and left corona radiata, likely representing more 
discrete small acute infarcts. No evidence of associated hemorrhage or mass 
effect. 3. Several chronic infarcts are again seen including right basal ganglia infarct 
with hemosiderin staining from prior hemorrhage. The other chronic infarcts 
include bilateral thalamic and right central lizabeth. 4. Stable, mild to moderate nonspecific white matter disease likely representing 
chronic small vessel changes. 5. Stable foci susceptibility artifact right basal ganglia and left subinsular 
region. These are nonspecific but likely representing hemosiderin staining from 
chronic microhemorrhage, often hypertensive in etiology. 6. No evidence of midline shift or herniation. 7. Layering mucus secretions and posterior nasopharynx most likely related to 
intubation. MRI brain FINDINGS:  
  
Motion artifact is present which limits evaluation. 
  
There is a small discrete area of abnormal restricted diffusion involving the 
left ventral medulla along the medullary pyramid, diffusion images 11 and 12. Corresponding T2/FLAIR hyperintensity is seen. No adjacent mass effect is 
present. No convincing associated hemorrhage is seen. No additional areas of 
acute infarct are present. 
  
Linear discrete chronic infarct is present in the right central mid to upper 
lizabeth. Chronic infarct is present anteriorly in the left thalamus extending into 
the genu of the left internal capsule. Small posterior right thalamic lacunar 
infarct is also seen. There is a more elongated area of encephalomalacia 
involving the anterior lateral right basal ganglia involving putamen extending 
into the caudate nucleus. This area is associated with peripheral susceptibility 
artifact representing hemosiderin staining from prior hemorrhage. 
  
The ventricles and sulci are mildly enlarged consistent with diffuse volume 
loss. 
  
Mild amount of T2/FLAIR hyperintense white matter lesions are seen within the 
periventricular and subcortical white matter , which are nonspecific, but likely 
represent chronic small vessel changes. 
  
There is no evidence of mass effect, midline shift, or herniation. Additional 
discrete focus of susceptibility artifact is present in the left temporal lobe 
along the subinsular region without mass effect or edema.  The major intracranial 
vascular flow voids are grossly normal.  
  
 The bilateral lenses are absent, likely due to prior cataract surgery. T1 
precontrast hyperintensity and T2 hypointensity is present left sphenoid sinus 
which is nonspecific and may represent proteinaceous/inspissated mucus. No 
air-fluid levels are present. Mastoid air cells are unremarkable. 
  
________________________ 
  
IMPRESSION IMPRESSION: 
  
Motion degraded study. 
  
1. 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 No evidence of 
associated hemorrhage or mass effect. 
  
2. Chronic right central lizabeth and bilateral basal ganglia infarcts. Hemosiderin 
staining from prior chronic parenchymal hemorrhage involving superolateral right 
basal ganglia. 
  
3. Additional punctate focus susceptibility artifact the left temporal 
subinsular region,  nonspecific but likely representing hemosiderin staining 
from chronic microhemorrhage, often hypertensive in etiology.  
  
4. Mild nonspecific white matter disease likely representing chronic small 
vessel changes. 
  
 
Latest Hemoglobin A1C: 
Lab Results Component Value Date/Time Hemoglobin A1c 10.0 (H) 09/30/2018 04:18 AM  
 
 
Latest Cardiology Procedure: 
Results for orders placed or performed during the hospital encounter of 09/26/18 EKG, 12 LEAD, INITIAL Result Value Ref Range Ventricular Rate 77 BPM  
 Atrial Rate 77 BPM  
 P-R Interval 182 ms QRS Duration 86 ms  
 Q-T Interval 434 ms QTC Calculation (Bezet) 491 ms Calculated P Axis 19 degrees Calculated R Axis -52 degrees Calculated T Axis 5 degrees Diagnosis Sinus rhythm with fusion complexes Left anterior fascicular block Voltage criteria for left ventricular hypertrophy Prolonged QT Abnormal ECG When compared with ECG of 10-SEP-2018 15:07, 
fusion complexes are now present Non-specific change in ST segment in Lateral leads T wave inversion no longer evident in Lateral leads Confirmed by Alban Alatorre ([id number]) on 9/27/2018 4:04:49 PM 
  
 
 
 Important Labs: 
No results found for: FOL, RBCF Lab Results Component Value Date/Time Cholesterol, total 349 (H) 09/10/2018 12:31 PM  
 HDL Cholesterol 59 09/10/2018 12:31 PM  
 LDL, calculated 252.4 (H) 09/10/2018 12:31 PM  
 VLDL, calculated 37.6 09/10/2018 12:31 PM  
 Triglyceride 188 (H) 09/10/2018 12:31 PM  
 CHOL/HDL Ratio 5.9 (H) 09/10/2018 12:31 PM  
 
Lab Results Component Value Date/Time TSH 1.10 09/10/2018 12:31 PM  
 Triiodothyronine (T3), free 2.7 09/10/2018 12:31 PM  
 T4, Free 0.9 09/10/2018 12:31 PM  
 
No results found for: DS35, PHEN, PHENO, PHENT, DILF, DS39, PHENY, PTN, VALF2, VALAC, VALP, VALPR, DS6, CRBAM, CRBAMP, CARB2, XCRBAM 
Discussed with: nurse Allergies: No Known Allergies Review of Systems: Unable to obtain Y  N  
Constitutional: [] [] recent weight change 
[] [] fever 
[] [] sleep difficulties ENT/Mouth:  [] [] hearing loss 
[] [] swallowing problems 
[] [] slurred speech Cardiovascular:  [] [] chest pain  
[] [] palpitations Respiratory: [] [] cough with swallow 
[] [] shortness of breath 
[] [] sleep apnea 
[] [] intubated Gastrointestinal: [] [] abdominal pain 
[] [] nausea Genitourinary: [] [] frequent urination 
[] [] incontinence Musculoskeletal:   [] [] joint pain 
[] [] muscle pain Integument:   [] [] rash/itching Neurological:  [] [] dizziness/vertigo 
[] [] sedation 
[] [] confusion 
[] [] agitation/combativeness 
[] [] loss of consciousness 
[] [] numbness/tingling sensation 
[] [] tremors 
[] [] weakness in limbs 
[] [] difficulty with balance 
[] [] frequent or recurring headaches 
[] [] memory loss  
[] [] comatose 
[] [] seizures Psychiatric:   [] [] depression 
[] [] hallucinations Endocrine: [] [] excessive thirst or urination  
[] [] heat or cold intolerance Hematologic/Lymphatic: [] [] bleeding tendency 
[] [] enlarged lymph nodes PMH:  
Past Medical History:  
Diagnosis Date  [facility]  Diabetes ([facility] [address]  Stroke ([facility] Problem List: Principal Problem: 
  Cardiac arrest with ventricular fibrillation ([facility] (9/29/2018) Overview: ROSC before defibrillation could be attempted. Active Problems: 
  Essential hypertension (9/10/2018) Diabetes mellitus type 2, controlled ([facility] [address] (9/10/2018) Pneumonia of both lower lobes (9/11/2018) History of stroke (9/27/2018) Overview: With residual R hemiparesis Acute-on-chronic kidney injury ([facility] [address] (9/27/2018) Acute cystitis (9/27/2018) Elevated troponin (9/27/2018) Transaminitis (9/27/2018) Acute kidney injury ([facility] [address] (9/27/2018) Elevated LFTs (9/28/2018) Abnormal blood coagulation profile (9/29/2018) Acute pancreatitis (9/29/2018) Overview: Shock, leukocytosis, elevated lipase but radiographically no ductal  
    dilatation in pancreas. GB stones without radiographic stigmata of acute  
    cholecystitis Septic shock ([facility] (9/30/2018) Overview: Probably secondary to pneumonia (suspicious for aspiration). Less likely,  
    secondary to acute pancreatitis. Ischemic encephalopathy (9/30/2018) FH: History reviewed. No pertinent family history. SH: Social History Social History  Marital status:  Spouse name: N/A  
 Number of children: N/A  
 Years of education: N/A Social History Main Topics  Smoking status: Never Smoker  Smokeless tobacco: Never Used  Alcohol use No  
 Drug use: No  
 Sexual activity: Not Asked Other Topics Concern  None Social History Narrative Vital Signs:  
Visit Vitals  /57  Pulse 93  Temp (!) 38 °F (3.3 °C)  Resp (!) 33  
 Ht 5' 7\" (1.702 m)  Wt 93.9 kg (207 lb 0.2 oz)  SpO2 96%  BMI 32.42 kg/m2 Neurological examination:  
? Appearance: Intubated. Appears ill. ? Cardiovascular: Heart is regular rate and rhythm.   Generalized edema to BUE.  No carotid bruits heard on left, has IJ on right. ? Mental status examination: Eyes partially open. No response to verbal or noxious stimuli. Does not follow commands. ? Cranial Nerves:  
 
I: smell Not tested II: visual fields No blink to visual threat II: pupils OD 2 mm and OS 1.5 MM, impaired III,VII: ptosis none III,IV,VI: extraocular muscles  Minimal oculocephalic V: facial light touch sensation V,VII: corneal reflex  trace VII: facial muscle function VIII: hearing IX: soft palate elevation IX,X: gag reflex XI: sternocleidomastoid strength XII: tongue strength ? Motor exam: Station, gait:  deferred. Muscle tone, bulk normal.  Trace withdrawal to noxious stimulation x 4. Spacticity present BLE. Medications:   
 
[x] REVIEWED Current Facility-Administered Medications Medication  [START ON 10/5/2018] vancomycin (VANCOCIN) 750 mg in 0.9% sodium chloride (MBP/ADV) 250 mL ADV  lidocaine (XYLOCAINE) 10 mg/mL (1 %) injection  midazolam (VERSED) 1 mg/mL injection  midazolam (VERSED) injection  acetaminophen (TYLENOL) solution 325 mg  
 pantoprazole (PROTONIX) 40 mg in sodium chloride 0.9% 10 mL injection  0.9% sodium chloride infusion 250 mL  heparin (porcine) pf 100 Units  0.9% sodium chloride infusion 250 mL  insulin lispro (HUMALOG) injection  insulin glargine (LANTUS) injection 6 Units  albuterol (PROVENTIL VENTOLIN) nebulizer solution 2.5 mg  
 heparin (porcine) 1,000 unit/mL injection 1,000 Units  VANCOMYCIN INFORMATION NOTE  senna-docusate (PERICOLACE) 8.6-50 mg per tablet 1 Tab  ondansetron (ZOFRAN) injection 4 mg  
 glucose chewable tablet 16 g  
 glucagon (GLUCAGEN) injection 1 mg  dextrose (D50) infusion 12.5-25 g  hydrALAZINE (APRESOLINE) 20 mg/mL injection 20 mg  
 piperacillin-tazobactam (ZOSYN) 4.5 g in 0.9% sodium chloride 100 mL MBP EXTENDED 4 HOUR INFUSION Data:   
 
 [x] REVIEWED Recent Results (from the past 24 hour(s)) POTASSIUM Collection Time: 10/02/18  5:28 PM  
Result Value Ref Range Potassium 4.4 3.5 - 5.5 mmol/L  
GLUCOSE, POC Collection Time: 10/02/18  6:11 PM  
Result Value Ref Range Glucose (POC) 174 (H) 70 - 110 mg/dL CBC W/O DIFF Collection Time: 10/02/18 11:40 PM  
Result Value Ref Range WBC 23.0 (H) 4.6 - 13.2 K/uL  
 RBC 2.49 (L) 4.70 - 5.50 M/uL HGB 7.4 (L) 13.0 - 16.0 g/dL HCT 21.0 (L) 36.0 - 48.0 % MCV 84.3 74.0 - 97.0 FL  
 MCH 29.7 24.0 - 34.0 PG  
 MCHC 35.2 31.0 - 37.0 g/dL  
 RDW 14.7 (H) 11.6 - 14.5 % PLATELET 131 121 - 370 K/uL MPV 8.8 (L) 9.2 - 11.8 FL  
GLUCOSE, POC Collection Time: 10/03/18 12:26 AM  
Result Value Ref Range Glucose (POC) 186 (H) 70 - 110 mg/dL RENAL FUNCTION PANEL Collection Time: 10/03/18  4:00 AM  
Result Value Ref Range Sodium 142 136 - 145 mmol/L Potassium 4.2 3.5 - 5.5 mmol/L Chloride 100 100 - 108 mmol/L  
 CO2 23 21 - 32 mmol/L Anion gap 19 (H) 3.0 - 18 mmol/L Glucose 130 (H) 74 - 99 mg/dL BUN 57 (H) 7.0 - 18 MG/DL Creatinine 6.24 (H) 0.6 - 1.3 MG/DL  
 BUN/Creatinine ratio 9 (L) 12 - 20 GFR est AA 11 (L) >60 ml/min/1.73m2 GFR est non-AA 9 (L) >60 ml/min/1.73m2 Calcium 7.5 (L) 8.5 - 10.1 MG/DL Phosphorus 6.5 (H) 2.5 - 4.9 MG/DL Albumin 1.9 (L) 3.4 - 5.0 g/dL Bryson Palacios Collection Time: 10/03/18  4:00 AM  
Result Value Ref Range Vancomycin, random 35.1 5.0 - 40.0 UG/ML  
POC G3 Collection Time: 10/03/18  5:40 AM  
Result Value Ref Range Device: VENT    
 FIO2 (POC) 70 % pH (POC) 7.431 7.35 - 7.45    
 pCO2 (POC) 38.5 35.0 - 45.0 MMHG  
 pO2 (POC) 84 80 - 100 MMHG  
 HCO3 (POC) 25.5 22 - 26 MMOL/L  
 sO2 (POC) 96 92 - 97 % Base excess (POC) 1 mmol/L Mode SIMV Tidal volume 500 ml Set Rate 10 bpm  
 PEEP/CPAP (POC) 5.0 cmH2O Pressure support 15 cmH2O  Allens test (POC) N/A    
 Inspiratory Time 0.90 sec Total resp. rate 29 Site RIGHT RADIAL Patient temp. 37.5 Specimen type (POC) ARTERIAL Performed by Odette Echols GLUCOSE, POC Collection Time: 10/03/18  6:06 AM  
Result Value Ref Range Glucose (POC) 149 (H) 70 - 110 mg/dL CBC W/O DIFF Collection Time: 10/03/18 11:42 AM  
Result Value Ref Range WBC 24.9 (H) 4.6 - 13.2 K/uL  
 RBC 2.45 (L) 4.70 - 5.50 M/uL HGB 7.3 (L) 13.0 - 16.0 g/dL HCT 21.2 (L) 36.0 - 48.0 % MCV 86.5 74.0 - 97.0 FL  
 MCH 29.8 24.0 - 34.0 PG  
 MCHC 34.4 31.0 - 37.0 g/dL  
 RDW 15.0 (H) 11.6 - 14.5 % PLATELET 370 496 - 002 K/uL MPV 8.9 (L) 9.2 - 11.8 FL  
GLUCOSE, POC Collection Time: 10/03/18  1:07 PM  
Result Value Ref Range Glucose (POC) 152 (H) 70 - 110 mg/dL Yeimi Andres NP

## 2018-10-03 NOTE — PROGRESS NOTES
Physical Exam  
Skin:  
 
  
 2000 Primary Nurse Tequila Hernandez RN and Mayito Mcginnis RN performed a dual skin assessment on this patient .

## 2018-10-03 NOTE — PROGRESS NOTES
Problem: Diabetes Self-Management Goal: *Incorporating nutritional management into lifestyle Describe effect of type, amount and timing of food on blood glucose; list 3 methods for planning meals. Outcome: Not Progressing Towards Goal 
Pt unresponsive Goal: *Incorporating physical activity into lifestyle State effect of exercise on blood glucose levels. Outcome: Not Progressing Towards Goal 
Pt unresponsive Goal: *Developing strategies to promote health/change behavior Define the ABC's of diabetes; identify appropriate screenings, schedule and personal plan for screenings. Outcome: Not Progressing Towards Goal 
unresponsive Goal: *Developing strategies to address psychosocial issues Describe feelings about living with diabetes; identify support needed and support network Outcome: Not Progressing Towards Goal 
unresponsive Problem: Falls - Risk of 
Goal: *Absence of Falls Document Kingsley Masters Fall Risk and appropriate interventions in the flowsheet. Outcome: Progressing Towards Goal 
Fall Risk Interventions: 
Mobility Interventions: Bed/chair exit alarm Mentation Interventions: Adequate sleep, hydration, pain control, Bed/chair exit alarm, Door open when patient unattended, More frequent rounding, Reorient patient, Room close to nurse's station, Toileting rounds Medication Interventions: Bed/chair exit alarm, Evaluate medications/consider consulting pharmacy Elimination Interventions: Bed/chair exit alarm, Call light in reach, Toileting schedule/hourly rounds Problem: Pressure Injury - Risk of 
Goal: *Prevention of pressure injury Document Mitul Scale and appropriate interventions in the flowsheet. Outcome: Progressing Towards Goal 
Pressure Injury Interventions: 
Sensory Interventions: Assess changes in LOC, Assess need for specialty bed, Float heels, Use 30-degree side-lying position, Turn and reposition approx.  every two hours (pillows and wedges if needed), Minimize linen layers, Keep linens dry and wrinkle-free Moisture Interventions: Absorbent underpads, Apply protective barrier, creams and emollients, Moisture barrier, Minimize layers, Check for incontinence Q2 hours and as needed Activity Interventions: Pressure redistribution bed/mattress(bed type) Mobility Interventions: Pressure redistribution bed/mattress (bed type), HOB 30 degrees or less, Float heels, Turn and reposition approx. every two hours(pillow and wedges), Assess need for specialty bed Nutrition Interventions: Document food/fluid/supplement intake Friction and Shear Interventions: Apply protective barrier, creams and emollients, Lift sheet, Foam dressings/transparent film/skin sealants, HOB 30 degrees or less, Transferring/repositioning devices, Minimize layers (taps turning system) Problem: Patient Education: Go to Patient Education Activity Goal: Patient/Family Education Outcome: Not Progressing Towards Goal 
unresponsive Problem: Patient Education: Go to Patient Education Activity Goal: Patient/Family Education Outcome: Not Progressing Towards Goal 
unresponsive

## 2018-10-03 NOTE — PROGRESS NOTES
RENAL PROGRESS NOTE [facility] Assessment/Plan: · Dialysis dependant JULIANNA (ischemic atn in a setting of acute pyelonephritis/acute cholecystitis with sepsis and cardiac arrest 9/29). Dialysis today, pull 2-3 liters if bp tolerates. Continue to minimize intake, volume overloaded at this time. · Severe hyperphosphatemia. Improved. · S/p cardiopulm arrest 9/29. Off pressors. · Acute pyelonephritis/sepsis. On abx. · Abnormal lft's/acute cholecystitis? On abx. GI on the case. · Oozing from the site of rt ij temporary dialysis catheter and other iv sites. Received ddavp. No heparin with dialysis. · UGI bleed, EGD results noted- esophageal clot. · Acute blood loss anemia. May need transfusions. · Resp failure. Bronch with rt bronch clot, aspiration? · Anoxic brain injury superimposed on recent cva. MRI results noted. Subjective: Intubated, unresponsive. Off pressors. TF is on hold. Just had bronchoscopy. Patient Active Problem List  
Diagnosis Code  Stroke (cerebrum) ([facility]) I63.9  Stroke ([facility] I63.9  Rhabdomyolysis M62.82  
 Dehydration E86.0  
 Essential hypertension I10  
 Diabetes mellitus type 2, controlled ([facility] E11.9  Non compliance w medication regimen Z91.14  
 Pneumonia of both lower lobes J18.9  DM (diabetes mellitus) ([facility] E11.9  History of stroke Z80.78  
 Acute-on-chronic kidney injury ([facility] N17.9, N18.9  Acute cystitis N30.00  Elevated troponin R74.8  Transaminitis R74.0  Acute kidney injury ([facility] N17.9  Elevated LFTs R94.5  Cardiac arrest with ventricular fibrillation ([facility]) I46.9, I49.01  
 Abnormal blood coagulation profile R79.1  Acute pancreatitis K85.90  Septic shock ([facility]) A41.9, R65.21  
 Ischemic encephalopathy I67.89 Current Facility-Administered Medications Medication Dose Route Frequency Provider Last Rate Last Dose  [START ON 10/5/2018] vancomycin (VANCOCIN) 750 mg in 0.9% sodium chloride (MBP/ADV) 250 mL ADV  750 mg IntraVENous Q MON, WED & FRI Marlan Phy, DO      
 lidocaine (XYLOCAINE) 10 mg/mL (1 %) injection  midazolam (VERSED) 1 mg/mL injection  midazolam (VERSED) injection    PRN Collette Sicks, MD   2 mg at 10/03/18 1541  acetaminophen (TYLENOL) solution 325 mg  325 mg Per NG tube Q4H PRN Dennys Koenig MD   325 mg at 10/02/18 1801  pantoprazole (PROTONIX) 40 mg in sodium chloride 0.9% 10 mL injection  40 mg IntraVENous Q12H Dennys Koenig MD   40 mg at 10/03/18 5355  
 0.9% sodium chloride infusion 250 mL  250 mL IntraVENous PRN Marace Phy, DO      
 heparin (porcine) pf 100 Units  100 Units InterCATHeter PRN Cesar Crawford MD      
 0.9% sodium chloride infusion 250 mL  250 mL IntraVENous PRN Dennys Koenig MD      
 insulin lispro (HUMALOG) injection   SubCUTAneous Q6H Marlan Phy, DO   Stopped at 10/03/18 0600  
 insulin glargine (LANTUS) injection 6 Units  6 Units SubCUTAneous DAILY Marlan Phy, DO   6 Units at 10/03/18 7394  albuterol (PROVENTIL VENTOLIN) nebulizer solution 2.5 mg  2.5 mg Nebulization Q6H RT Nova Guzman MD   2.5 mg at 10/03/18 1511  
 heparin (porcine) 1,000 unit/mL injection 1,000 Units  1,000 Units InterCATHeter DIALYSIS PRN Cesar Crawford MD   1,000 Units at 09/28/18 1332  VANCOMYCIN INFORMATION NOTE   Other Rx Dosing/Monitoring Adan Broussard MD      
 senna-docusate (PERICOLACE) 8.6-50 mg per tablet 1 Tab  1 Tab Oral BID PRN Paula Omar, DO      
 ondansetron [facility]) injection 4 mg  4 mg IntraVENous Q4H PRN Paulatashi Nelson, DO   4 mg at 09/28/18 0539  
 glucose chewable tablet 16 g  4 Tab Oral PRN Paulatashi Nelson, DO      
 glucagon (GLUCAGEN) injection 1 mg  1 mg IntraMUSCular PRN Paula Omar, DO      
  dextrose (D50) infusion 12.5-25 g  25-50 mL IntraVENous PRN Kalyan Lynda Yoon DO   12.5 g at 09/28/18 1206  hydrALAZINE (APRESOLINE) 20 mg/mL injection 20 mg  20 mg IntraVENous Q6H PRN Jamison Tillman NP   20 mg at 10/02/18 1759  piperacillin-tazobactam (ZOSYN) 4.5 g in 0.9% sodium chloride 100 mL MBP EXTENDED 4 HOUR INFUSION   4.5 g IntraVENous Q12H Isela Nelson MD 25 mL/hr at 10/03/18 0937 4.5 g at 10/03/18 1886 Objective Vitals:  
 10/03/18 1200 10/03/18 1500 10/03/18 1511 10/03/18 1530 BP: 119/55 135/62 Pulse: 88 89 89 99 Resp: 21 25 25 29 Temp: (!) 37.6 °F (3.1 °C) (!) 37.8 °F (3.2 °C)  (!) 38 °F (3.3 °C) TempSrc:      
SpO2: 95% 97% 97% 100% Weight:      
Height:      
 
 
 
Intake/Output Summary (Last 24 hours) at 10/03/18 1558 Last data filed at 10/03/18 0800 Gross per 24 hour Intake              325 ml Output               60 ml Net              265 ml Admission weight: Weight: 96.6 kg (213 lb) (09/26/18 2244) Last Weight Metrics: 
Weight Loss Metrics 10/3/2018 9/26/2018 9/13/2018 Today's Wt 207 lb 0.2 oz - 227 lb 15.3 oz  
BMI - 32.42 kg/m2 35.7 kg/m2 Physical Assessment:  
 
General: intubated, unresponsive. Eyes are open. Neck: No jvd. Rt ij temporary dialysis catheter in place, dressing is clear. LUNGS: diminished air entry at the bases. No crackles. CVS EXM: S1, S2  RRR. Abdomen: soft, pos bs. Lower Extremities:  1+ edema. Lab CBC w/Diff Recent Labs 10/03/18 
 1142  10/02/18 
 2340  10/02/18 
 0445  10/01/18 
 2205  10/01/18 
 1027 WBC  24.9*  23.0*  19.6*  19.6*  19.9*  
RBC  2.45*  2.49*  2.53*  2.51*  2.11* HGB  7.3*  7.4*  7.4*  7.3*  6.1*  
HCT  21.2*  21.0*  20.8*  20.9*  17.4*  
PLT  170  167  185  201  224 GRANS   --    --   84*  77*  84* LYMPH   --    --   8*  10*  7* EOS   --    --   2  1  2 Chemistry Recent Labs 10/03/18 
 0400  10/02/18 
 1728  10/02/18 
 0445  10/01/18 0911 34 76 33  10/01/18 
 0423 GLU  130*   --   167*   --   143* NA  142   --   141   --   142  
K  4.2  4.4  3.7   --   4.9 CL  100   --   101   --   103 CO2  23   --   25   --   20* BUN  57*   --   43*   --   87* CREA  6.24*   --   4.77*   --   7.71* CA  7.5*   --   7.3*   --   7.0* AGAP  19*   --   15   --   19* BUCR  9*   --   9*   --   11* AP   --    --    --   318*   --   
TP   --    --    --   7.0   --   
ALB  1.9*   --   2.0*  2.0*  1.8*  
GLOB   --    --    --   5.0*   --   
AGRAT   --    --    --   0.4*   --   
PHOS  6.5*   --   5.3*   --   8.5* No results found for: IRON, FE, TIBC, IBCT, PSAT, FERR Lab Results Component Value Date/Time Calcium 7.5 (L) 10/03/2018 04:00 AM  
 Phosphorus 6.5 (H) 10/03/2018 04:00 AM  
  
 
Eveline Essex, M.D. [facility] Phone [phone] Pager [phone]

## 2018-10-03 NOTE — DIALYSIS
ACUTE HEMODIALYSIS FLOW SHEET 
 
HEMODIALYSIS ORDERS: Physician: KATHLEEN Hatfield MD 
  
Dialyzer: revaclear   Duration: 4 hr  BFR: 300   DFR: 600 Dialysate:  Temp 35.5 K+   2    Ca+  2.5 Na 140 Bicarb 35 Weight:  93.9 kg    Patient Chart [x]     Unable to Obtain []   Dry weight/UF Goal: 2000 ml Access Right neck catheter Needle Gauge NA Heparin []  Bolus      Units    [] Hourly       Units    [x]None Catheter locking solution Heparin Pre BP:   137/66    Pulse:     87     Temperature:   100.2  Respirations: 31  Tx: NS     250  ml/Bolus  Other        [] N/A Labs: Pre        Post:        [] N/A Additional Orders(medications, blood products, hypotension management):       [x] N/A  
 
[] [facility] Consent Verified CATHETER ACCESS: []N/A   [x]Right   []Left   [x]IJ     []Fem [] First use X-ray verified     []Tunnel                [] Non Tunneled [x]No S/S infection  []Redness  []Drainage []Cultured []Swelling []Pain []Medical Aseptic Prep Utilized   []Dressing Changed  [x] Biopatch  Date: 10/3/2018 []Clotted   []Patent   Flows: [x]Good  []Poor  []Reversed If access problem,  notified: []Yes    [x]N/A  Date:        
 
GRAFT/FISTULA ACCESS:  [x]N/A     []Right     []Left     []UE     []LE []AVG   []AVF        []Buttonhole    []Medical Aseptic Prep Utilized []No S/S infection  []Redness  []Drainage []Cultured []Swelling []Pain Bruit:   [] Strong    [] Weak       Thrill :   [] Strong    [] Weak Needle Gauge: NA   Length: NA If access problem,  notified: []Yes     [x]N/A  Date:       
Please describe access if present and not used:  
 
 
GENERAL ASSESSMENT:   
LUNGS:  Rate 31 SaO2%   97     [] N/A    [x] Clear  [] Coarse  [] Crackles  [] Wheezing 
      [] Diminished     Location : []RLL   []LLL    []RUL  []JOSSIE Cough: []Productive  []Dry  [x]N/A   Respirations:  [x]Easy  []Labored Therapy:  []RA  []NC  l/min    Mask: []NRB []Venti       O2% []Ventilator  [x]Intubated  [] Trach  [] BiPaP CARDIAC: [x]Regular      [] Irregular   [] Pericardial Rub  [] JVD []  Monitored  [] Bedside  [] Remotely monitored [] N/A  Rhythm: EDEMA: [] None  [x]Generalized  [] Pitting [] 1    [] 2    [] 3    [] 4 [] Facial  [] Pedal  []  UE  [] LE  
SKIN:   [x] Warm  [] Hot     [] Cold   [x] Dry     [] Pale   [] Diaphoretic    
             [] Flushed  [] Jaundiced  [] Cyanotic  [] Rash  [] Weeping LOC:    [] Alert      []Oriented:    [] Person     [] Place  []Time 
             [] Confused  [] Lethargic  [] Medicated  [x] Non-responsive GI / ABDOMEN:  [] Flat    [] Distended    [x] Soft    [] Firm   []  Obese 
                           [] Diarrhea  [] Bowel Sounds  [] Nausea  [] Vomiting  / URINE ASSESSMENT:[] Voiding   [] Oliguria  [] Anuria   []  Mcbride [] Incontinent    []  Incontinent Brief      []  Bathroom Privileges PAIN: [] 0 []1  []2   []3   []4   []5   []6   []7   []8   []9   []10 Scale 0-10  Action/Follow Up: Patient is intubated and not responsive. MOBILITY:  [] Amb    [] Amb/Assist    [x] Bed    [] Wheelchair  [] Stretcher All Vitals and Treatment Details on Attached Flowsheet Hospital: [facility] Room # 9465 [] 1st Time Acute  [] Stat  [] Routine  [] Urgent    
[] Acute Room  []  Bedside  [] ICU/CCU  [] ER Isolation Precautions:  [] Dialysis   [] Airborne   [] Contact    [] Reverse Special Considerations:         [] Blood Consent Verified []N/A ALLERGIES:   [x] NKA Code Status:  [x] Full Code  [] DNR  [] Other HBsAg ONLY: Date Drawn 9/28/2018         [x]Negative []Positive []Unknown HBsAb: Date 9/28/2018    [x] Susceptible   [] [id number] []Not Drawn  [] Drawn Current Labs:    Date of Labs: 10/3/2018          Today [x] Cut and paste current labs here. Results for Paco Barillas (MRN [id number]) as of 10/3/2018 18:21 Ref. Range 10/3/2018 11:42 WBC Latest Ref Range: 4.6 - 13.2 K/uL 24.9 (H) RBC Latest Ref Range: 4.70 - 5.50 M/uL 2.45 (L) HGB Latest Ref Range: 13.0 - 16.0 g/dL 7.3 (L) HCT Latest Ref Range: 36.0 - 48.0 % 21.2 (L) MCV Latest Ref Range: 74.0 - 97.0 FL 86.5 MCH Latest Ref Range: 24.0 - 34.0 PG 29.8 MCHC Latest Ref Range: 31.0 - 37.0 g/dL 34.4 RDW Latest Ref Range: 11.6 - 14.5 % 15.0 (H) PLATELET Latest Ref Range: 135 - 420 K/uL 170 MPV Latest Ref Range: 9.2 - 11.8 FL 8.9 (L) Results for Nyla De Guzman (MRN [id number]) as of 10/3/2018 18:21 Ref. Range 10/3/2018 04:00 Sodium Latest Ref Range: 136 - 145 mmol/L 142 Potassium Latest Ref Range: 3.5 - 5.5 mmol/L 4.2 Chloride Latest Ref Range: 100 - 108 mmol/L 100 CO2 Latest Ref Range: 21 - 32 mmol/L 23 Anion gap Latest Ref Range: 3.0 - 18 mmol/L 19 (H) Glucose Latest Ref Range: 74 - 99 mg/dL 130 (H) BUN Latest Ref Range: 7.0 - 18 MG/DL 57 (H) Creatinine Latest Ref Range: 0.6 - 1.3 MG/DL 6.24 (H) BUN/Creatinine ratio Latest Ref Range: 12 - 20   9 (L) Calcium Latest Ref Range: 8.5 - 10.1 MG/DL 7.5 (L) Phosphorus Latest Ref Range: 2.5 - 4.9 MG/DL 6.5 (H) GFR est non-AA Latest Ref Range: >60 ml/min/1.73m2 9 (L) GFR est AA Latest Ref Range: >60 ml/min/1.73m2 11 (L) Albumin Latest Ref Range: 3.4 - 5.0 g/dL 1.9 (L) DIET:  [] Renal    [] Other     [x] NPO     []  Diabetic PRIMARY NURSE REPORT: First initial/Last name/Title Pre Dialysis: Cherelle Ochoa RN    Time: 8694 EDUCATION:   
[x] Patient [] Other         Knowledge Basis: []None []Minimal [] Substantial  
Barriers to learning  []N/A  
[] Access Care     [] S&S of infection     [] Fluid Management     []K+     [x]Procedural   
[]Albumin     [] Medications     [] Tx Options     [] Transplant     [] Diet     [] Other Teaching Tools:  [x] Explain  [] Demo  [] Handouts [] Video Patient response:   [] Verbalized understanding  [] Teach back  [] Return demonstration [] Requires follow up  Patient is intubated and not responsive. Inappropriate due to         
 
[x] Time Out/Safety Check  [x]Extracorporeal Circuit Tested for integrity RO/HEMODIALYSIS MACHINE SAFETY CHECKS  Before each treatment:    
Machine Number:                   [facility]  
                                [] Unit Machine #  with centralized RO 
                                [] Portable Machine #1/RO serial # [id number] [] Portable Machine #2/RO serial # [id number] [] Portable Machine #4/RO serial # [id number] [facility] [x] Portable Machine #11/RO serial # [id number] [] Portable Machine #12/RO serial # [id number] [] Portable Machine #13/RO serial #  [id number] Alarm Test:  Pass time 9865         Other:        
[x] RO/Machine Log Complete Temp    35.5 Dialysate: pH  7.4 Conductivity: Meter   14.0     HD Machine [x][x][x][x][x][x][x][x][x][x][x][x][x][x][x][x][x][x][x][x][x][x][x][x][x][x][x][x][x][x][x][x][x][x][x][x][x][x][x][x][x][x][x][x][x][x][x][x][x][x][x][x][x][x][x][x][x][x][x][x][x][x][x]

## 2018-10-03 NOTE — PROCEDURES
[facility] Pulmonary, Critical Care, and Sleep Medicine Bronchoscopy Report Procedure: Diagnostic and therapeutic bronchoscopy. Indication: Abnormal chest imaging Consent/Treatment: Emergency consent performed. Anesthesia:  
2 mg Versed Procedure Details:  
-- The bronchoscope was introduced through an endotracheal tube. -- ETT contained bloody secretions -- Large blood clot was seen obstructing the bronchus intermedius, appeared fairly fresh and gelatinous. -- Smaller scope could only be used due to ETT size and clot was unable to be extracted after multiple attempts. -- There was no active bleeding seen. L endobronchial anatomy had some scattered bloody secretions but appeared normal. RUL appeared normal, the R sided endobronchial anatomy could not be visualized past the clot. Specimens:  
None Complications: none Vital signs remained stable throughout the procedure. Patient was woken up in endoscopy suite and then transferred to recovery area in a stable condition. Family members were updated by me. Estimated Blood Loss: Minimal 
 
PLAN: 
- Will consider repeat bronchoscopy tomorrow, but ETT will need to be exchanged first 
- Will recheck coags, reverse if necessary 
- Continue vent support - Very poor prognosis, will discuss goals of care with the family if possible Steve Parmar MD 
Critical Care Medicine

## 2018-10-03 NOTE — PROGRESS NOTES
VENTILATOR Care plan 
 
Problem: Ventilator Management Goal: *Adequate oxygenation/ ventilation/ and extubation Patient: 
   
 
Tulio Seo     57 y.o.   male     10/3/2018  8:06 AM 
Patient Active Problem List  
Diagnosis Code  Stroke (cerebrum) ([facility]) I63.9  Stroke ([facility] I63.9  Rhabdomyolysis M62.82  
 Dehydration E86.0  
 Essential hypertension I10  
 Diabetes mellitus type 2, controlled ([facility] E11.9  Non compliance w medication regimen Z91.14  
 Pneumonia of both lower lobes J18.9  DM (diabetes mellitus) ([facility] E11.9  History of stroke Z80.78  
 Acute-on-chronic kidney injury ([facility] N17.9, N18.9  Acute cystitis N30.00  Elevated troponin R74.8  Transaminitis R74.0  Acute kidney injury ([facility] N17.9  Elevated LFTs R94.5  Cardiac arrest with ventricular fibrillation ([facility]) I46.9, I49.01  
 Abnormal blood coagulation profile R79.1  Acute pancreatitis K85.90  Septic shock ([facility]) A41.9, R65.21  
 Ischemic encephalopathy I67.89 Acute kidney injury ([facility] Elevated liver enzymes, Anemia, melena Reason patient intubated:  Acute respiratory failure secondary to code blue. 
   
Ventilator day: 5 
   
Ventilator settings: SIMV 10 / 500 / PS 15 / +5 / 70% 
   
ETT Size/Placement:  7.5 ETT 25 lip ABG: 
Date:10/3/2018 Lab Results Component Value Date/Time PHI 7.431 10/03/2018 05:40 AM  
 PCO2I 38.5 10/03/2018 05:40 AM  
 PO2I 84 10/03/2018 05:40 AM  
 HCO3I 25.5 10/03/2018 05:40 AM  
 FIO2I 70 10/03/2018 05:40 AM  
 
 
Chest X-ray: 
Date:10/3/2018 Results from [facility] Encounter encounter on 09/26/18 XR CHEST PORT Narrative EXAM: XR CHEST PORT 
 
CLINICAL INDICATION/HISTORY: ETT placement >Additional: None COMPARISON: 9/29/2018 >Reference exam: None. TECHNIQUE: Portable chest. 
 
_______________ FINDINGS: 
 
SUPPORT LINES AND TUBES: Right IJ approach central venous catheter terminates in 
 the SVC. Nasogastric tube courses inferiorly off the field-of-view with the 
side-port well distal to the GE junction. A nasogastric tube terminates below 
the clavicles, exact distance relative to the billy is difficult but estimated 
proximal 3 5 cm. Additional esophageal probe is noted. HEART AND MEDIASTINUM: Prominent heart size is exaggerated due to portable 
technique and rotation to the left but likely within normal limits and 
unchanged. Bilateral pulmonary vascular congestion with diminished clarity is 
noted. LUNGS AND PLEURAL SPACES: Bilateral patchy perihilar airspace opacities with 
more confluent opacity in the right lung base. Possible trace pleural effusions. No pneumothorax. BONY THORAX AND SOFT TISSUES: No acute osseous abnormality. _______________ Impression IMPRESSION: 
 
Bilateral right greater than left airspace disease, slightly progressive since 
prior radiographs. Support lines and tubes remain in place. Lab Test: 
Date:10/3/2018 WBC:  
Lab Results Component Value Date/Time WBC 23.0 (H) 10/02/2018 11:40 PM  
HGB: Lab Results Component Value Date/Time HGB 7.4 (L) 10/02/2018 11:40 PM  
 PLTS: Lab Results Component Value Date/Time PLATELET 553 37/64/2047 11:40 PM  
 
 
SaO2%/flow:  
SpO2 Readings from Last 1 Encounters:  
10/03/18 98% Vital Signs:   Patient Vitals for the past 8 hrs: 
 Temp Pulse Resp BP SpO2  
10/03/18 0730 - 89 (!) 32 155/69 98 % 10/03/18 0714 - 87 23 - 98 % 10/03/18 0700 - 88 27 156/71 98 % 10/03/18 0630 99.5 °F (37.5 °C) 87 25 150/67 99 % 10/03/18 0600 - 88 23 146/62 96 % 10/03/18 0530 - 87 30 149/67 96 % 10/03/18 0503 - 87 26 - 96 % 10/03/18 0500 99.3 °F (37.4 °C) 87 30 149/69 96 % 10/03/18 0430 - 85 26 142/67 96 % 10/03/18 0400 99 °F (37.2 °C) 86 28 139/64 95 % 10/03/18 0330 99.8 °F (37.7 °C) 86 23 131/59 93 % 10/03/18 0300 - 87 28 108/51 91 % 10/03/18 0230 - 88 27 129/57 93 % 10/03/18 0200 - 89 30 135/57 97 % 10/03/18 0130 - 87 (!) 31 132/57 98 % 10/03/18 0100 - 86 27 128/61 98 % 10/03/18 0049 - 86 30 - 98 % 10/03/18 0030 - 85 24 111/45 97 % Wean Screen Pass (Yes or No): No 
Wean Screen Reason for Failure: 
Duration of Weaning Trial: 
Additional Comments: PLAN OF CARE: Wean patient as tolerated. GOAL: Adequate oxygenation and ventilation.  
 
OUTCOME:

## 2018-10-03 NOTE — PROGRESS NOTES
attempted to conduct a follow-up consultation and spiritual assessment for Willie Christensen, who is a 57 y.o. male. However, patient was on ventilator support in the ICU, with no family seen at bedside. The  provided the following interventions: 
Offered silent prayer on patient's behalf. Reviewed chart. Plan: 
Chaplains will continue to follow and will provide pastoral care on an as-needed/requested basis. [facility] Certified  [address] [place] [address]  
[phone]

## 2018-10-03 NOTE — PROGRESS NOTES
1930 Bedside shift change report given to miky rn (oncoming nurse) by Flavia Yousif rn (offgoing nurse). Report included the following information SBAR, Kardex and Recent Results. Hob elevated 30 degrees. Side rails up x 3, call light within reach. Hemodialysis in progress. Daughter and niece at bedside. 2000 assessment completed. maddie rt present in at beside and has just finshed suctioning the patient for  Bloody sputum. mery care and oral care provided. 2200 dialysis completed and nurse dressing trialysis line, 
0000 reassessment completed. oral and mery-care provided. small smear melanous stool noted rectum. 0100 pt resting. 
0200 resting and breathing easily with vent. 0400 reassessment  . Oral and mery-care provided. . 
0600 large clot suctioned from ett, john rt changed suction line. Cleansed  And bed bath given. 0800 Bedside shift change report given to david falcon (oncoming nurse) by miky rn (offgoing nurse). Report included the following information SBAR, Kardex and Recent Results. Hob elevated 30 degrees. Call light within reach. Side rails up x 3. Dual skin check off completed.

## 2018-10-03 NOTE — PROGRESS NOTES
PROGRESS NOTE PATIENT:  Fina Shaver MRN: [id number] [facility]/[address], [address]01 
         10/3/2018, 3:29 PM 
   
I 
Mr. Leana Ramos is a 57 y.o. male who is being seen for  Elevated liver enzymes, Anemia, melena SUBJECTIVE: 
Pt intubated. Nurse  reports that he had  3 melanotic stools yesterday. Off sedation but has no neurological activity. OBJECTIVE: 
Patient Vitals for the past 24 hrs: 
 Temp Pulse Resp BP SpO2  
10/03/18 1600 (!) 38 °F (3.3 °C) 93 (!) 33 127/57 96 % 10/03/18 1530 (!) 38 °F (3.3 °C) 99 29 - 100 % 10/03/18 1511 - 89 25 - 97 % 10/03/18 1500 (!) 37.8 °F (3.2 °C) 89 25 135/62 97 % 10/03/18 1400 (!) 37.7 °F (3.2 °C) 87 22 130/59 96 % 10/03/18 1300 (!) 37.7 °F (3.2 °C) 87 30 132/64 96 % 10/03/18 1200 (!) 37.6 °F (3.1 °C) 88 21 119/55 95 % 10/03/18 1137 - 88 25 - 100 % 10/03/18 1100 (!) 37.8 °F (3.2 °C) 90 30 149/67 94 % 10/03/18 1000 (!) 37.7 °F (3.2 °C) 90 30 135/63 92 % 10/03/18 0800 99.9 °F (37.7 °C) 89 28 156/69 98 % 10/03/18 0730 - 89 (!) 32 155/69 98 % 10/03/18 0714 - 87 23 - 98 % 10/03/18 0700 - 88 27 156/71 98 % 10/03/18 0630 99.5 °F (37.5 °C) 87 25 150/67 99 % 10/03/18 0600 - 88 23 146/62 96 % 10/03/18 0530 - 87 30 149/67 96 % 10/03/18 0503 - 87 26 - 96 % 10/03/18 0500 99.3 °F (37.4 °C) 87 30 149/69 96 % 10/03/18 0430 - 85 26 142/67 96 % 10/03/18 0400 99 °F (37.2 °C) 86 28 139/64 95 % 10/03/18 0330 99.8 °F (37.7 °C) 86 23 131/59 93 % 10/03/18 0300 - 87 28 108/51 91 % 10/03/18 0230 - 88 27 129/57 93 % 10/03/18 0200 - 89 30 135/57 97 % 10/03/18 0130 - 87 (!) 31 132/57 98 % 10/03/18 0100 - 86 27 128/61 98 % 10/03/18 0049 - 86 30 - 98 % 10/03/18 0030 - 85 24 111/45 97 % 10/03/18 0002 - 88 25 - 97 % 10/03/18 0001 99.5 °F (37.5 °C) 88 22 139/65 97 % 10/03/18 0000 - 89 23 - 97 % 10/02/18 2330 - 89 21 136/61 97 % 10/02/18 2300 99.7 °F (37.6 °C) 88 25 132/59 97 % 10/02/18 2230 99.7 °F (37.6 °C) 90 (!) 31 140/55 96 % 10/02/18 2200 99.9 °F (37.7 °C) 89 26 137/54 97 % 10/02/18 2130 - 88 25 127/53 98 % 10/02/18 2100 99.9 °F (37.7 °C) 88 25 117/51 97 % 10/02/18 2046 - 88 25 - 97 % 10/02/18 2030 100 °F (37.8 °C) 88 23 107/44 97 % 10/02/18 2000 100.2 °F (37.9 °C) 92 22 138/69 97 % 10/02/18 1930 - 94 22 138/71 96 % 10/02/18 1900 (!) 37.9 °F (3.3 °C) 95 27 142/69 96 % 10/02/18 1830 (!) 38 °F (3.3 °C) 96 27 145/69 95 % 10/02/18 1800 (!) 38 °F (3.3 °C) 96 27 145/66 96 % 10/02/18 1730 (!) 38 °F (3.3 °C) 90 23 181/87 97 % 10/02/18 1700 (!) 37.9 °F (3.3 °C) 90 26 169/79 96 % 10/02/18 1630 (!) 37.9 °F (3.3 °C) 90 28 171/76 95 % Intake/Output Summary (Last 24 hours) at 10/03/18 1611 Last data filed at 10/03/18 1200 Gross per 24 hour Intake              425 ml Output               60 ml Net              365 ml Gen: On the vent. OGT present off of pressors HEENT: Mild icterus Abd  : Soft, Distended,  BS hypoactive, No masses felt. Anasarca present Labs: Results:  
Chemistry Recent Labs 10/03/18 
 0400  10/02/18 
 1728  10/02/18 
 0445  10/01/18 
 1145  10/01/18 
 0423 GLU  130*   --   167*   --   143* NA  142   --   141   --   142  
K  4.2  4.4  3.7   --   4.9 CL  100   --   101   --   103 CO2  23   --   25   --   20* BUN  57*   --   43*   --   87* CREA  6.24*   --   4.77*   --   7.71* CA  7.5*   --   7.3*   --   7.0* AGAP  19*   --   15   --   19* BUCR  9*   --   9*   --   11* AP   --    --    --   318*   --   
TP   --    --    --   7.0   --   
ALB  1.9*   --   2.0*  2.0*  1.8*  
GLOB   --    --    --   5.0*   --   
AGRAT   --    --    --   0.4*   --   
 Estimated Creatinine Clearance: 13.1 mL/min (based on Cr of 6.24). CBC w/Diff Recent Labs 10/03/18 
 1142  10/02/18 
 2340  10/02/18 
 0445  10/01/18 
 2205  10/01/18 
 1027 WBC  24.9*  23.0*  19.6*  19.6*  19.9*  
 RBC  2.45*  2.49*  2.53*  2.51*  2.11* HGB  7.3*  7.4*  7.4*  7.3*  6.1*  
HCT  21.2*  21.0*  20.8*  20.9*  17.4*  
PLT  170  167  185  201  224 GRANS   --    --   84*  77*  84* LYMPH   --    --   8*  10*  7* EOS   --    --   2  1  2 Cardiac Enzymes No results for input(s): CPK, CKND1, REJI in the last 72 hours. No lab exists for component: Samuel Divers Coagulation Recent Labs 10/02/18 1230  10/01/18 
 1145 PTP  15.3*  19.4* INR  1.2  1.7* APTT  37.1*  84.3* Hepatitis Panel Lab Results Component Value Date/Time Hepatitis B surface Ag <0.10 09/28/2018 04:40 AM  
  
Amylase Lipase Liver Enzymes Recent Labs 10/03/18 
 0400  10/02/18 
 0445  10/01/18 
 1145 TP   --    --   7.0 ALB  1.9*  2.0*  2.0*  
TBILI   --    --   3.5* AP   --    --   318* SGOT   --    --   358* ALT   --    --   176* Thyroid Studies No results for input(s): T4, T3U, TSH, TSHEXT, TSHEXT in the last 72 hours. No lab exists for component: T3RU Pathology pathology No Known Allergies Current Facility-Administered Medications Medication Dose Route Frequency  [START ON 10/5/2018] vancomycin (VANCOCIN) 750 mg in 0.9% sodium chloride (MBP/ADV) 250 mL ADV  750 mg IntraVENous Q MON, WED & FRI  lidocaine (XYLOCAINE) 10 mg/mL (1 %) injection  midazolam (VERSED) 1 mg/mL injection  midazolam (VERSED) injection    PRN  
 acetaminophen (TYLENOL) solution 325 mg  325 mg Per NG tube Q4H PRN  pantoprazole (PROTONIX) 40 mg in sodium chloride 0.9% 10 mL injection  40 mg IntraVENous Q12H  
 0.9% sodium chloride infusion 250 mL  250 mL IntraVENous PRN  
 heparin (porcine) pf 100 Units  100 Units InterCATHeter PRN  
 0.9% sodium chloride infusion 250 mL  250 mL IntraVENous PRN  
 insulin lispro (HUMALOG) injection   SubCUTAneous Q6H  
 insulin glargine (LANTUS) injection 6 Units  6 Units SubCUTAneous DAILY  albuterol (PROVENTIL VENTOLIN) nebulizer solution 2.5 mg  2.5 mg Nebulization Q6H RT  
 heparin (porcine) 1,000 unit/mL injection 1,000 Units  1,000 Units InterCATHeter DIALYSIS PRN  
 VANCOMYCIN INFORMATION NOTE   Other Rx Dosing/Monitoring  senna-docusate (PERICOLACE) 8.6-50 mg per tablet 1 Tab  1 Tab Oral BID PRN  
 ondansetron (ZOFRAN) injection 4 mg  4 mg IntraVENous Q4H PRN  
 glucose chewable tablet 16 g  4 Tab Oral PRN  
 glucagon (GLUCAGEN) injection 1 mg  1 mg IntraMUSCular PRN  
 dextrose (D50) infusion 12.5-25 g  25-50 mL IntraVENous PRN  
 hydrALAZINE (APRESOLINE) 20 mg/mL injection 20 mg  20 mg IntraVENous Q6H PRN  piperacillin-tazobactam (ZOSYN) 4.5 g in 0.9% sodium chloride 100 mL MBP EXTENDED 4 HOUR INFUSION   4.5 g IntraVENous Q12H  
 
 
ASSESSMENT: 
1. Anemia with acute drop in hb to 6. EGD yesterday showed an extensive clot in the esophagus extending from 25 cm to 40 cm along the side of the esophagus, unclear if there is a perforation or mass under it. Unable to clear out the clot, attempted to do it with chaney net. 2. Elevated liver enzymes in the setting of gallstones and sludge, possibly a stone up to the cystic duct. No evidence of CBD dilation. No evidence of acute cholangitis at this time, suspect this is secondary to acute cholecystitis. Patient did have a history of mildly elevated LFTs in the past secondary to fatty liver disease, based on liver biopsy in 2014, has HCV antibody positive, but PCR is negative in the past. U/S showed distended Gb with thickened wall, no intra or extrahepatic biliary dilation seen. Surgery consulted. Not a candidate for surgery at this time. LFT's coming down slowly. Responding to antibiotic treatment 3. S/P CP arrest. Intubated 4. Acute kidney injury, on HD now 5. Urosepsis with possible pyelonephritis, currently on antibiotics 6. Elevated lipase.  Cannot rule out pancreatitis entirely but suspect this is secondary to acute renal failure. Pancreas looked normal CT 7. Oropharyngeal dysphagia . Failed swallow eval 
8. Anoxic encephalopathy. Neurology following 9. Right lung collapse. S/P bronch today noted a clot in the right lung. 10. DIC 11. Very poor prognosis and unlikely to have a meaningful recovery Jagjit Zapata RECOMMENDATIONS:  
1. Cont antibiotics 2. Monitor LFT's   
3.consider cholecystostomy tube placement if his LFT's get worse 4. Transfuse as needed to keep hb more than 8 gm/dl 5. Supportive care . Fredricka Closs Liston Haymaker, MD

## 2018-10-03 NOTE — PROGRESS NOTES
0800 Assumed care of pt from [facility]. Obtunded, on ventilator. Preparing for transport to [facility]. 
7846  Transport to [facility], done. 0900  Returned to unit, uneventful. 1200  Dr. Dianna Cevallos at bedside, plan for bronchoscopy 1800  Bronchoscopy done today, shyann well, versed 2 mgm given per Endo team.   Dialysis in progress, pt remains minimally responsive, eyes open to noxious stimulus but no response to commands. Shyann dialysis and other procedures well, VSS. 
1930  Bedside and Verbal shift change report given to Xavier Coombs (oncoming nurse) by Johnna Foster RN (offgoing nurse). Report included the following information SBAR, Kardex, Procedure Summary, Intake/Output, MAR, Accordion, Cardiac Rhythm SR and Quality Measures.

## 2018-10-03 NOTE — PROGRESS NOTES
Problem: Falls - Risk of 
Goal: *Absence of Falls Document Tayler Kwong Fall Risk and appropriate interventions in the flowsheet. Outcome: Progressing Towards Goal 
Fall Risk Interventions: 
Mobility Interventions: Bed/chair exit alarm Mentation Interventions: Adequate sleep, hydration, pain control, Bed/chair exit alarm, Door open when patient unattended, More frequent rounding, Reorient patient, Room close to nurse's station, Toileting rounds Medication Interventions: Bed/chair exit alarm, Evaluate medications/consider consulting pharmacy Elimination Interventions: Bed/chair exit alarm, Call light in reach, Toileting schedule/hourly rounds Problem: Pressure Injury - Risk of 
Goal: *Prevention of pressure injury Document Mitul Scale and appropriate interventions in the flowsheet. Outcome: Progressing Towards Goal 
Pressure Injury Interventions: 
Sensory Interventions: Assess changes in LOC, Assess need for specialty bed, Float heels, Use 30-degree side-lying position, Turn and reposition approx. every two hours (pillows and wedges if needed), Minimize linen layers, Keep linens dry and wrinkle-free Moisture Interventions: Absorbent underpads, Apply protective barrier, creams and emollients, Moisture barrier, Minimize layers, Check for incontinence Q2 hours and as needed Activity Interventions: Pressure redistribution bed/mattress(bed type) Mobility Interventions: Pressure redistribution bed/mattress (bed type), HOB 30 degrees or less, Float heels, Turn and reposition approx. every two hours(pillow and wedges), Assess need for specialty bed Nutrition Interventions: Document food/fluid/supplement intake Friction and Shear Interventions: Apply protective barrier, creams and emollients, Lift sheet, Foam dressings/transparent film/skin sealants, HOB 30 degrees or less, Transferring/repositioning devices, Minimize layers (taps turning system)

## 2018-10-03 NOTE — PROGRESS NOTES
[facility] Progress Note I have reviewed the flowsheet and previous days notes. Events, vitals, medications and notes from last 24 hours reviewed. Care plan discussed with staff and on multidisciplinary rounds. Subjective: 
Remains hemodynamically stable, TF stopped due to vomiting. Some desaturations noted with turning. Remains MV and minimally responsive off all sedation. Impression and Plan Patient Active Problem List  
Diagnosis Code  Stroke (cerebrum) ([facility]) I63.9  Stroke ([facility] I63.9  Rhabdomyolysis M62.82  
 Dehydration E86.0  
 Essential hypertension I10  
 Diabetes mellitus type 2, controlled ([facility] E11.9  Non compliance w medication regimen Z91.14  
 Pneumonia of both lower lobes J18.9  DM (diabetes mellitus) ([facility] E11.9  History of stroke Z80.78  
 Acute-on-chronic kidney injury ([facility] N17.9, N18.9  Acute cystitis N30.00  Elevated troponin R74.8  Transaminitis R74.0  Acute kidney injury ([facility] N17.9  Elevated LFTs R94.5  Cardiac arrest with ventricular fibrillation ([facility]) I46.9, I49.01  
 Abnormal blood coagulation profile R79.1  Acute pancreatitis K85.90  Septic shock ([facility]) A41.9, R65.21  
 Ischemic encephalopathy I67.89 Assessment: 
Acute resp failure with hypoxia - Intubated following cardiac arrest.  
- CXR with suspected R sided atelectasis, will plan for bronchoscopy this afternoon 
- Continue SIMV with PS as tolerated - ABG in AM  
S/p V Fib arrest-  
- 2D echo- LV- Normal cavity size. Mild concentric hypertrophy observed. There is mildly decreased systolic function. The estimated ejection fraction is 46 - 50%. Global hypokinesis observed. There is mild (grade 1) left ventricular diastolic dysfunction E/E' ratio is 12. Marlee Marin Pulm edema - improving with HD 
RLL Atelectasis vs. Pneumonia - Zosyn and Vanco 
- Will send significant secretions for Cx if present on bronch today JULIANNA on CKD - On HD, minimal urine output DM - Cont with lantus and ISS Abn LFT - Known history with w/u in GI Elevated Lipase - Pt's CT abd does not show pancreatitis but there was not contrast 
Suspected Pyelonephritis- seen on CT abdomen, on Zosyn - Abdominal exam not concerning but remains febrile, may need repeat CT with contrast.  
Severe encephalopathy- suspect anoxic brain injury - MRI pending Upper GI Bleed - Endoscopy done 10/2- Showed large clot descending down large portion of the esophagus. Unable to be removed - PPI BID Coagulopathy- Increased INR and PTT 
- s/p FFP, repeat labs improved DVT proph - SCD 
 
OTHER: 
Glycemic Control. Glucose stabilizer per ICU protocol when on insulin drip. Maintain blood glucose 140-180. Replace electrolytes per ICU electrolyte replacement protocol Ventilator bundle & Sedation protocol followed. Daily morning sedation holiday, assessment for readiness for SBT & weaning from ventilator; and then re-titrate if required. Aim to keep peak plateau pressure [id number] H2O in ARDS patient. Riverside tube to suction at 20-30 cm H2O, Maintain Riverside tube with 5-10ml air every 4 hours. Chlorhexidine mouth washes and routine oral care every 4 hours. Stress ulcer and DVT prophylaxis. HOB >=30 degree elevation all the time. HOB >=30 degree elevation all the time. Aggressive pulmonary toileting. Incentive spirometry when appropriate. Aspiration precautions. Sepsis bundle and protocol followed. Deescalate antibiotic when appropriate. Vasopressor when appropriate with MAP goal >65 mmHg. Central Line bundle followed, remove when not needed. Large bore IV line or CVP when appropriate. Quality Care: Stress ulcer prophylaxis, DVT prophylaxis, HOB elevated, Infection control all reviewed and addressed. Events and notes from last 24 hours reviewed. Care plan discussed with nursing.   
D/w patient and family above medical problems and answered all questions to their satisfaction. CC TIME: >40 min Medication Reviewed: 
 
No Known Allergies Past Medical History:  
Diagnosis Date  CHF (congestive heart failure) ([facility]  Diabetes ([facility]  Stroke ([facility] History reviewed. No pertinent surgical history. Social History Substance Use Topics  Smoking status: Never Smoker  Smokeless tobacco: Never Used  Alcohol use No  
  
History reviewed. No pertinent family history. Prior to Admission medications Medication Sig Start Date End Date Taking? Authorizing Provider  
aspirin (ASPIRIN) 325 mg tablet Take 1 Tab by mouth daily. 9/15/18   Mark Wick MD  
atorvastatin (LIPITOR) 80 mg tablet Take 1 Tab by mouth nightly. 9/14/18   Mark Wick MD  
insulin glargine (LANTUS) 100 unit/mL injection 10 units qhs  Indications: type 2 diabetes mellitus 9/15/18   Mark Wick MD  
insulin lispro (HUMALOG) 100 unit/mL injection 4 units with meals 9/14/18   Mark Wick MD  
losartan (COZAAR) 50 mg tablet Take 1 Tab by mouth daily. 9/14/18   Mark Wick MD  
 
Current Facility-Administered Medications Medication Dose Route Frequency  [START ON 10/5/2018] vancomycin (VANCOCIN) 750 mg in 0.9% sodium chloride (MBP/ADV) 250 mL ADV  750 mg IntraVENous Q MON, WED & FRI  pantoprazole (PROTONIX) 40 mg in sodium chloride 0.9% 10 mL injection  40 mg IntraVENous Q12H  
 insulin lispro (HUMALOG) injection   SubCUTAneous Q6H  
 insulin glargine (LANTUS) injection 6 Units  6 Units SubCUTAneous DAILY  albuterol (PROVENTIL VENTOLIN) nebulizer solution 2.5 mg  2.5 mg Nebulization Q6H RT  
 VANCOMYCIN INFORMATION NOTE   Other Rx Dosing/Monitoring  piperacillin-tazobactam (ZOSYN) 4.5 g in 0.9% sodium chloride 100 mL MBP EXTENDED 4 HOUR INFUSION   4.5 g IntraVENous Q12H Lines: All central lines examined by me. No signs of erythema, induration, discharge. Central Venous Catheter: HD cath in R IJ Hemodialysis Catheter: 
Hemodialysis Access 09/28/18 (Active) Central Line Being Utilized Yes 10/2/2018  4:00 AM  
Criteria for Appropriate Use Dialysis/apheresis 10/2/2018  4:00 AM  
Date Accessed  10/01/18 10/2/2018  4:00 AM  
Site Assessment Clean, dry, & intact 10/2/2018  4:00 AM  
Date of Last Dressing Change 10/01/18 10/2/2018  4:00 AM  
Dressing Status Clean, dry, & intact 10/2/2018  4:00 AM  
Dressing Type Disk with Chlorhexadine gluconate (CHG); Transparent 10/2/2018  4:00 AM  
Proximal Hub Color/Line Status Capped 10/2/2018  4:00 AM  
Distal Hub Color/Line Status Capped 10/2/2018  4:00 AM  
 
Drain(s): 
Orogastric Tube 09/29/18 (Active) Site Assessment Clean, dry, & intact 10/2/2018  4:00 AM  
Dressing Status Clean, dry, & intact 10/2/2018  4:00 AM  
G Port Status Clamped 10/2/2018  4:00 AM  
External Insertion Jl (cms) 58 cms 10/2/2018  4:00 AM  
Action Taken Placement verified (comment) 10/2/2018  4:00 AM  
Drainage Description Brown 10/2/2018  4:00 AM  
Gastric Residual (mL) 30 ml 10/2/2018 12:00 AM  
Tube Feeding/Formula Options Renal (Nepro) 10/1/2018  4:00 PM  
Tube Feeding/Verify Rate (mL/hr) 0 10/2/2018  4:00 AM  
Water Flush Volume (mL) 20 mL 10/1/2018  4:00 PM  
Intake (ml) 0 ml 10/2/2018  4:00 AM  
Medication Volume 35 ml 10/1/2018  9:27 AM  
Output (ml) 0 ml 10/1/2018  8:00 PM  
 
Airway: Airway - Continuous Aspiration of Subglottic Secretions (LEONCIO) Tube 09/29/18 Oral (Active) Insertion Depth (cm) 25 cm 10/2/2018 11:13 AM  
Line Jl Lips 10/2/2018 11:13 AM  
Side Secured Left 10/2/2018 11:13 AM  
Cuff Pressure 30 cmH20 9/30/2018  8:31 PM  
Site Assessment Clean, dry, & intact 10/2/2018 11:13 AM  
Suction on Yes 10/2/2018 11:13 AM  
Amt Secretions Aspirated (mL) 1 mL 10/2/2018 11:13 AM  
 
 
Objective: 
Vital Signs:   
Visit Vitals  /69 (BP 1 Location: Right arm, BP Patient Position: At rest)  Pulse 88  Temp 99.9 °F (37.7 °C)  Resp 25  
 Ht 5' 7\" (1.702 m)  Wt 93.9 kg (207 lb 0.2 oz)  SpO2 100%  BMI 32.42 kg/m2 O2 Device: Ventilator, Endotracheal tube O2 Flow Rate (L/min): 45 l/min Temp (24hrs), Av.1 °F (21.2 °C), Min:37.5 °F (3.1 °C), Max:100.2 °F (37.9 °C) Intake/Output:  
Last shift:        
 
Last 3 shifts: 10/01 190 - 10/03 07 In: 7436 [I.V.:750] Out: 60 Intake/Output Summary (Last 24 hours) at 10/03/18 1258 Last data filed at 10/03/18 0800 Gross per 24 hour Intake              325 ml Output               60 ml Net              265 ml Last 3 Recorded Weights in this Encounter 10/01/18 1638 10/02/18 0958 10/03/18 0400 Weight: 94.2 kg (207 lb 10.8 oz) 93.9 kg (207 lb) 93.9 kg (207 lb 0.2 oz) Ventilator Settings: 
Mode Rate Tidal Volume Pressure FiO2 PEEP 
SIMV, VC+, Pressure support   500 ml  15 cm H2O 50 % 5 cm H20 Peak airway pressure: 30 cm H2O Plateau pressure:   
Tidal volume:  
Minute ventilation: 12 l/min SPO2 Physical Exam:  
 
General/Neurology: Intubated, unresponsive on sedation, no w/d to pain Head:   Normocephalic, without obvious abnormality Eye:   PERRL and reactive Oral:   Mucus membranes moist 
Neck:   Supple, R IJ in place Lung:   Spontaneous breathing, Coarse sound on R side. No wheezing Heart:   Regular rate & rhythm. S1 S2 present. Abdomen/: Soft, non tender, BS +nt Extremities:  No pedal edema Skin:   Dry, intact Data: 
   
Recent Results (from the past 24 hour(s)) GLUCOSE, POC Collection Time: 10/02/18  1:43 PM  
Result Value Ref Range Glucose (POC) 172 (H) 70 - 110 mg/dL POTASSIUM Collection Time: 10/02/18  5:28 PM  
Result Value Ref Range Potassium 4.4 3.5 - 5.5 mmol/L  
GLUCOSE, POC Collection Time: 10/02/18  6:11 PM  
Result Value Ref Range Glucose (POC) 174 (H) 70 - 110 mg/dL CBC W/O DIFF Collection Time: 10/02/18 11:40 PM  
Result Value Ref Range WBC 23.0 (H) 4.6 - 13.2 K/uL  
 RBC 2.49 (L) 4.70 - 5.50 M/uL HGB 7.4 (L) 13.0 - 16.0 g/dL HCT 21.0 (L) 36.0 - 48.0 % MCV 84.3 74.0 - 97.0 FL  
 MCH 29.7 24.0 - 34.0 PG  
 MCHC 35.2 31.0 - 37.0 g/dL  
 RDW 14.7 (H) 11.6 - 14.5 % PLATELET 221 239 - 196 K/uL MPV 8.8 (L) 9.2 - 11.8 FL  
GLUCOSE, POC Collection Time: 10/03/18 12:26 AM  
Result Value Ref Range Glucose (POC) 186 (H) 70 - 110 mg/dL RENAL FUNCTION PANEL Collection Time: 10/03/18  4:00 AM  
Result Value Ref Range Sodium 142 136 - 145 mmol/L Potassium 4.2 3.5 - 5.5 mmol/L Chloride 100 100 - 108 mmol/L  
 CO2 23 21 - 32 mmol/L Anion gap 19 (H) 3.0 - 18 mmol/L Glucose 130 (H) 74 - 99 mg/dL BUN 57 (H) 7.0 - 18 MG/DL Creatinine 6.24 (H) 0.6 - 1.3 MG/DL  
 BUN/Creatinine ratio 9 (L) 12 - 20 GFR est AA 11 (L) >60 ml/min/1.73m2 GFR est non-AA 9 (L) >60 ml/min/1.73m2 Calcium 7.5 (L) 8.5 - 10.1 MG/DL Phosphorus 6.5 (H) 2.5 - 4.9 MG/DL Albumin 1.9 (L) 3.4 - 5.0 g/dL [place] Sauer Collection Time: 10/03/18  4:00 AM  
Result Value Ref Range Vancomycin, random 35.1 5.0 - 40.0 UG/ML  
POC G3 Collection Time: 10/03/18  5:40 AM  
Result Value Ref Range Device: VENT    
 FIO2 (POC) 70 % pH (POC) 7.431 7.35 - 7.45    
 pCO2 (POC) 38.5 35.0 - 45.0 MMHG  
 pO2 (POC) 84 80 - 100 MMHG  
 HCO3 (POC) 25.5 22 - 26 MMOL/L  
 sO2 (POC) 96 92 - 97 % Base excess (POC) 1 mmol/L Mode SIMV Tidal volume 500 ml Set Rate 10 bpm  
 PEEP/CPAP (POC) 5.0 cmH2O Pressure support 15 cmH2O Allens test (POC) N/A Inspiratory Time 0.90 sec Total resp. rate 29 Site RIGHT RADIAL Patient temp. 37.5 Specimen type (POC) ARTERIAL Performed by Silvino Mojica GLUCOSE, POC Collection Time: 10/03/18  6:06 AM  
Result Value Ref Range Glucose (POC) 149 (H) 70 - 110 mg/dL CBC W/O DIFF Collection Time: 10/03/18 11:42 AM  
Result Value Ref Range WBC 24.9 (H) 4.6 - 13.2 K/uL  
 RBC 2.45 (L) 4.70 - 5.50 M/uL HGB 7.3 (L) 13.0 - 16.0 g/dL HCT 21.2 (L) 36.0 - 48.0 % MCV 86.5 74.0 - 97.0 FL  
 MCH 29.8 24.0 - 34.0 PG  
 MCHC 34.4 31.0 - 37.0 g/dL  
 RDW 15.0 (H) 11.6 - 14.5 % PLATELET 615 193 - 123 K/uL MPV 8.9 (L) 9.2 - 11.8 FL Chemistry Recent Labs 10/03/18 
 0400  10/02/18 
 1728  10/02/18 
 0445  10/01/18 
 1145  10/01/18 
 0423 GLU  130*   --   167*   --   143* NA  142   --   141   --   142  
K  4.2  4.4  3.7   --   4.9 CL  100   --   101   --   103 CO2  23   --   25   --   20* BUN  57*   --   43*   --   87* CREA  6.24*   --   4.77*   --   7.71* CA  7.5*   --   7.3*   --   7.0*  
PHOS  6.5*   --   5.3*   --   8.5* AGAP  19*   --   15   --   19* BUCR  9*   --   9*   --   11* AP   --    --    --   318*   --   
TP   --    --    --   7.0   --   
ALB  1.9*   --   2.0*  2.0*  1.8*  
GLOB   --    --    --   5.0*   --   
AGRAT   --    --    --   0.4*   --   
 
 
CBC w/Diff Recent Labs 10/03/18 
 1142  10/02/18 
 2340  10/02/18 
 0445  10/01/18 
 2205  10/01/18 
 1027 WBC  24.9*  23.0*  19.6*  19.6*  19.9*  
RBC  2.45*  2.49*  2.53*  2.51*  2.11* HGB  7.3*  7.4*  7.4*  7.3*  6.1*  
HCT  21.2*  21.0*  20.8*  20.9*  17.4*  
PLT  170  167  185  201  224 GRANS   --    --   84*  77*  84* LYMPH   --    --   8*  10*  7* EOS   --    --   2  1  2 ABG Recent Labs 10/03/18 
 0540  10/02/18 
 0450  10/01/18 
 2327 PHI  7.431  7.459*  7.355 PCO2I  38.5  39.7  38.3 PO2I  84  176*  131* HCO3I  25.5  28.0*  21.2*  
FIO2I  70  60  60 Micro No results for input(s): SDES, CULT in the last 72 hours. No results for input(s): CULT in the last 72 hours. CT (Most Recent) [place] [facility] [place]18 CT HEAD WO CONT Narrative EXAM: CT head CLINICAL HISTORY/INDICATION: Neuro deficit, acute single, stable or partly 
resolved; pupils different sizes -Additional:  None COMPARISON: 09/29/18 TECHNIQUE: Axial CT imaging of the head was performed without intravenous 
contrast.  One or more dose reduction techniques were used on this CT: automated 
exposure control, adjustment of the mAs and/or kVp according to patient size, 
and iterative reconstruction techniques. The specific techniques used on this CT exam have been documented in the patient's electronic medical record. 
 
_______________ FINDINGS: 
 
Brain And Posterior Fossa: The sulci, folia, ventricles and basal cisterns are 
within normal limits for the patient's age. There is no intracranial hemorrhage, 
mass effect, or midline shift. There is an area of diminished attenuation 
involving both gray and white matter within the right posterior 
temporal/occipital lobe. The appearance is consistent with a subacute 
infarction. Additional similar-appearing areas are evident involving both 
posterior parietal lobes. Chronic appearing infarction is evident involving the 
right basal ganglia and anterior right corona radiata. Extra-Axial Spaces And Meninges: There are no abnormal extra-axial fluid 
collections. Calvarium: Intact. Sinuses: Partial opacification of the left sphenoid sinus is present with 
inspissated mucus present Other: Status post bilateral cataract surgery 
 
_______________ Impression IMPRESSION: 
 
There are subacute infarctions evident involving the right posterior 
temporal/occipital lobe, right posterior parietal lobe and left posterior 
parietal lobe. No hemorrhage or significant mass effect is evident Chronic infarction involving the right basal ganglia and anterior right corona 
radiata Left sphenoid sinusitis again evident Preliminary report provided by on-call radiology resident. XR (Most Recent). CXR reviewed by me and compared with previous CXR [place] [facility] [place] XR CHEST PORT 
 Narrative Chest, single view Indication: Endotracheal tube placement Comparison: Several prior chest radiographs, most recently 10/2/2018 Findings:  Portable upright AP view of the chest was obtained. There is an 
endotracheal tube in place with tip projecting approximately 4.4 cm above the 
billy. A nasogastric tube is noted below the diaphragm, the tip collimated from 
view. Right IJ approach central venous catheter in stable position. Interval increase in right lower lobe opacification with shifting of mediastinal 
structures to the right. Adjacent right lower and mid lung opacity demonstrated. Streaky opacity at the periphery of the lingula similar to prior. No 
pneumothorax. Cardiac size and mediastinal contours are stable. No acute osseous 
abnormality. Impression Impression: 1. Endotracheal tube, visualized nasogastric tube, and right IJ central venous 
catheter in position as above. 2. Short interval in right basilar opacity with associated volume loss and 
subtle right-sided shifting mediastinal structures most in keeping with right 
lower lobar atelectasis, potentially secondary to mucous plugging. High complexity decision making was performed during the evaluation of this patient at high risk for decompensation with multiple organ involvement Above mentioned total time spent on reviewing the case/medical record/data/notes/EMR/patient examination/documentation/coordinating care with nurse/consultants, exclusive of procedures with complex decision making performed and > 50% time spent in face to face evaluation. Brandy Henry MD 
Critical Care Medicine

## 2018-10-04 NOTE — PROGRESS NOTES
S/p bronchoscopy = Large blood clot was seen obstructing the bronchus intermedius. Patient was in rehab at [facility] prior to admission. He will be unable to return to [facility] if he is dependent upon the ventilator. He will need to be trached and pegged. If he is vent dependent he may need a facility that has dialysis within the facility, as transporting a vent dependent patient becomes a liaibilty for the LTAC. It is unclear if local LTACs have this capacity. Will explore LTACs with dialysis capability.  Cherl Fleischer, RN

## 2018-10-04 NOTE — PROGRESS NOTES
[facility] Progress Note I have reviewed the flowsheet and previous days notes. Events, vitals, medications and notes from last 24 hours reviewed. Care plan discussed with staff and on multidisciplinary rounds. Subjective: 
VSS, only withdraw to pain noted. MRI shows severe anoxic injury. Bronch yesterday revealed large clot obstructing bronchus intermedius. Continues to have fevers. Impression and Plan Patient Active Problem List  
Diagnosis Code  Stroke (cerebrum) ([facility]) I63.9  Stroke ([facility] I63.9  Rhabdomyolysis M62.82  
 Dehydration E86.0  
 Essential hypertension I10  
 Diabetes mellitus type 2, controlled ([facility] E11.9  Non compliance w medication regimen Z91.14  
 Pneumonia of both lower lobes J18.9  DM (diabetes mellitus) ([facility] E11.9  History of stroke Z80.78  
 Acute-on-chronic kidney injury ([facility] N17.9, N18.9  Acute cystitis N30.00  Elevated troponin R74.8  Transaminitis R74.0  Acute kidney injury ([facility] N17.9  Elevated LFTs R94.5  Cardiac arrest with ventricular fibrillation ([facility]) I46.9, I49.01  
 Abnormal blood coagulation profile R79.1  Acute pancreatitis K85.90  Septic shock ([facility]) A41.9, R65.21  
 Ischemic encephalopathy I67.89 Assessment: 
Acute resp failure with hypoxia - Intubated following cardiac arrest.  
- CXR with suspected R sided atelectasis, will plan for bronchoscopy this afternoon 
- Continue SIMV with PS as tolerated - ABG in AM  
S/p V Fib arrest-  
- 2D echo- LV- Normal cavity size. Mild concentric hypertrophy observed. There is mildly decreased systolic function. The estimated ejection fraction is 46 - 50%. Global hypokinesis observed. There is mild (grade 1) left ventricular diastolic dysfunction E/E' ratio is 12. Tho [place] Endobronchial clot- likely aspirated from previous GI bleed - Obstructing bronchus intermedius and causing significant atelectasis and hypoxia - Will discuss repeat bronch and endotracheal tube change with the family today JULIANNA on CKD - On HD, minimal urine output DM - Cont with lantus and ISS Abn LFT - Known history with w/u in GI Elevated Lipase - Pt's CT abd does not show pancreatitis but there was not contrast 
Suspected Pyelonephritis/Cholecystitis- seen on CT abdomen, on Zosyn - Abdominal exam not concerning but remains febrile - WBCs trending up despite Vancomycin and Zosyn - Repeat LFTs and Blood cultures - Will order CT abd/pelvis with IV contrast- discussed to have HD afterwards with nephrology Severe encephalopathy- severe anoxic injury confirmed on MRI 
- only withdrawal to pain 
- very poor prognosis, Neurology following Upper GI Bleed - Endoscopy done 10/2- Showed large clot descending down large portion of the esophagus. Unable to be removed - PPI BID Coagulopathy- Increased INR and PTT 
- s/p FFP, repeat labs improved DVT proph - SCD 
 
OTHER: 
Glycemic Control. Glucose stabilizer per ICU protocol when on insulin drip. Maintain blood glucose 140-180. Ventilator bundle & Sedation protocol followed. Daily morning sedation holiday, assessment for readiness for SBT & weaning from ventilator; and then re-titrate if required. Aim to keep peak plateau pressure 55-53VT H2O in ARDS patient. [place] tube to suction at 20-30 cm H2O, Maintain [place] tube with 5-10ml air every 4 hours. Chlorhexidine mouth washes and routine oral care every 4 hours. Stress ulcer and DVT prophylaxis. HOB >=30 degree elevation all the time. HOB >=30 degree elevation all the time. Aggressive pulmonary toileting. Incentive spirometry when appropriate. Aspiration precautions. Vasopressor when appropriate with MAP goal >65 mmHg. Central Line bundle followed, remove when not needed. Large bore IV line or CVP when appropriate. Quality Care: Stress ulcer prophylaxis, DVT prophylaxis, HOB elevated, Infection control all reviewed and addressed. Events and notes from last 24 hours reviewed. Care plan discussed with nursing. D/w patient and family above medical problems and answered all questions to their satisfaction. CC TIME: >65 min Medication Reviewed: 
 
No Known Allergies Past Medical History:  
Diagnosis Date  CHF (congestive heart failure) ([facility]  Diabetes ([facility]  Stroke ([facility] History reviewed. No pertinent surgical history. Social History Substance Use Topics  Smoking status: Never Smoker  Smokeless tobacco: Never Used  Alcohol use No  
  
History reviewed. No pertinent family history. Prior to Admission medications Medication Sig Start Date End Date Taking? Authorizing Provider  
aspirin (ASPIRIN) 325 mg tablet Take 1 Tab by mouth daily. 9/15/18   Lucita Gibson MD  
atorvastatin (LIPITOR) 80 mg tablet Take 1 Tab by mouth nightly. 9/14/18   Lucita Gibson MD  
insulin glargine (LANTUS) 100 unit/mL injection 10 units qhs  Indications: type 2 diabetes mellitus 9/15/18   Lucita Gibson MD  
insulin lispro (HUMALOG) 100 unit/mL injection 4 units with meals 9/14/18   Lucita Gibson MD  
losartan (COZAAR) 50 mg tablet Take 1 Tab by mouth daily. 9/14/18   Lucita Gibson MD  
 
Current Facility-Administered Medications Medication Dose Route Frequency  [START ON 10/5/2018] vancomycin (VANCOCIN) 750 mg in 0.9% sodium chloride (MBP/ADV) 250 mL ADV  750 mg IntraVENous Q MON, WED & FRI  pantoprazole (PROTONIX) 40 mg in sodium chloride 0.9% 10 mL injection  40 mg IntraVENous Q12H  
 insulin lispro (HUMALOG) injection   SubCUTAneous Q6H  
 insulin glargine (LANTUS) injection 6 Units  6 Units SubCUTAneous DAILY  albuterol (PROVENTIL VENTOLIN) nebulizer solution 2.5 mg  2.5 mg Nebulization Q6H RT  
 VANCOMYCIN INFORMATION NOTE   Other Rx Dosing/Monitoring  piperacillin-tazobactam (ZOSYN) 4.5 g in 0.9% sodium chloride 100 mL MBP EXTENDED 4 HOUR INFUSION   4.5 g IntraVENous Q12H Lines: All central lines examined by me. No signs of erythema, induration, discharge. Central Venous Catheter: HD cath in R IJ Hemodialysis Catheter: 
Hemodialysis Access 09/28/18 (Active) Central Line Being Utilized Yes 10/2/2018  4:00 AM  
Criteria for Appropriate Use Dialysis/apheresis 10/2/2018  4:00 AM  
Date Accessed  10/01/18 10/2/2018  4:00 AM  
Site Assessment Clean, dry, & intact 10/2/2018  4:00 AM  
Date of Last Dressing Change 10/01/18 10/2/2018  4:00 AM  
Dressing Status Clean, dry, & intact 10/2/2018  4:00 AM  
Dressing Type Disk with Chlorhexadine gluconate (CHG); Transparent 10/2/2018  4:00 AM  
Proximal Hub Color/Line Status Capped 10/2/2018  4:00 AM  
Distal Hub Color/Line Status Capped 10/2/2018  4:00 AM  
 
Drain(s): 
Orogastric Tube 09/29/18 (Active) Site Assessment Clean, dry, & intact 10/2/2018  4:00 AM  
Dressing Status Clean, dry, & intact 10/2/2018  4:00 AM  
G Port Status Clamped 10/2/2018  4:00 AM  
External Insertion Jl (cms) 58 cms 10/2/2018  4:00 AM  
Action Taken Placement verified (comment) 10/2/2018  4:00 AM  
Drainage Description Brown 10/2/2018  4:00 AM  
Gastric Residual (mL) 30 ml 10/2/2018 12:00 AM  
Tube Feeding/Formula Options Renal (Nepro) 10/1/2018  4:00 PM  
Tube Feeding/Verify Rate (mL/hr) 0 10/2/2018  4:00 AM  
Water Flush Volume (mL) 20 mL 10/1/2018  4:00 PM  
Intake (ml) 0 ml 10/2/2018  4:00 AM  
Medication Volume 35 ml 10/1/2018  9:27 AM  
Output (ml) 0 ml 10/1/2018  8:00 PM  
 
Airway: Airway - Continuous Aspiration of Subglottic Secretions (LEONCIO) Tube 09/29/18 Oral (Active) Insertion Depth (cm) 25 cm 10/2/2018 11:13 AM  
Line Jl Lips 10/2/2018 11:13 AM  
Side Secured Left 10/2/2018 11:13 AM  
Cuff Pressure 30 cmH20 9/30/2018  8:31 PM  
Site Assessment Clean, dry, & intact 10/2/2018 11:13 AM  
Suction on Yes 10/2/2018 11:13 AM  
Amt Secretions Aspirated (mL) 1 mL 10/2/2018 11:13 AM  
 
 
 Objective: 
Vital Signs:   
Visit Vitals  /57  Pulse 90  Temp 99.2 °F (37.3 °C)  Resp 25  
 Ht 5' 7\" (1.702 m)  Wt 92.7 kg (204 lb 5.9 oz)  SpO2 98%  BMI 32.01 kg/m2 O2 Device: Endotracheal tube, Ventilator O2 Flow Rate (L/min): 45 l/min Temp (24hrs), Av.6 °F (25.3 °C), Min:37 °F (2.8 °C), Max:100.4 °F (38 °C) Intake/Output:  
Last shift:      10/04 0701 - 10/04 1900 In: 100 [I.V.:100] Out: 0 Last 3 shifts: 10/02 1901 - 10/04 0700 In: 620 [I.V.:495] Out: 2086 Intake/Output Summary (Last 24 hours) at 10/04/18 1102 Last data filed at 10/04/18 0900 Gross per 24 hour Intake              295 ml Output             2026 ml Net            -1731 ml Last 3 Recorded Weights in this Encounter 10/02/18 5690 10/03/18 0400 10/04/18 5453 Weight: 93.9 kg (207 lb) 93.9 kg (207 lb 0.2 oz) 92.7 kg (204 lb 5.9 oz) Ventilator Settings: 
Mode Rate Tidal Volume Pressure FiO2 PEEP 
SIMV, VC+, Pressure support   500 ml  15 cm H2O 50 % 5 cm H20 Peak airway pressure: 20 cm H2O Plateau pressure:   
Tidal volume:  
Minute ventilation: 10.4 l/min SPO2 Physical Exam:  
 
General/Neurology: Intubated, unresponsive on sedation, no w/d to pain Head:   Normocephalic, without obvious abnormality Eye:   PERRL and reactive Oral:   Mucus membranes moist 
Neck:   Supple, R IJ in place Lung:   Spontaneous breathing, Coarse sound on R side. No wheezing Heart:   Regular rate & rhythm. S1 S2 present. Abdomen/: Soft, non tender, BS +nt Extremities:  No pedal edema Skin:   Dry, intact Data: 
   
Recent Results (from the past 24 hour(s)) CBC W/O DIFF Collection Time: 10/03/18 11:42 AM  
Result Value Ref Range WBC 24.9 (H) 4.6 - 13.2 K/uL  
 RBC 2.45 (L) 4.70 - 5.50 M/uL HGB 7.3 (L) 13.0 - 16.0 g/dL HCT 21.2 (L) 36.0 - 48.0 % MCV 86.5 74.0 - 97.0 FL  
 MCH 29.8 24.0 - 34.0 PG  
 MCHC 34.4 31.0 - 37.0 g/dL  
 RDW 15.0 (H) 11.6 - 14.5 % PLATELET 872 421 - 835 K/uL MPV 8.9 (L) 9.2 - 11.8 FL  
GLUCOSE, POC Collection Time: 10/03/18  1:07 PM  
Result Value Ref Range Glucose (POC) 152 (H) 70 - 110 mg/dL GLUCOSE, POC Collection Time: 10/03/18  5:20 PM  
Result Value Ref Range Glucose (POC) 159 (H) 70 - 110 mg/dL GLUCOSE, POC Collection Time: 10/04/18 12:08 AM  
Result Value Ref Range Glucose (POC) 113 (H) 70 - 110 mg/dL RENAL FUNCTION PANEL Collection Time: 10/04/18  1:00 AM  
Result Value Ref Range Sodium 142 136 - 145 mmol/L Potassium 3.8 3.5 - 5.5 mmol/L Chloride 102 100 - 108 mmol/L  
 CO2 26 21 - 32 mmol/L Anion gap 14 3.0 - 18 mmol/L Glucose 101 (H) 74 - 99 mg/dL BUN 27 (H) 7.0 - 18 MG/DL Creatinine 3.62 (H) 0.6 - 1.3 MG/DL  
 BUN/Creatinine ratio 7 (L) 12 - 20 GFR est AA 21 (L) >60 ml/min/1.73m2 GFR est non-AA 17 (L) >60 ml/min/1.73m2 Calcium 7.7 (L) 8.5 - 10.1 MG/DL Phosphorus 4.1 2.5 - 4.9 MG/DL Albumin 1.9 (L) 3.4 - 5.0 g/dL LIPASE Collection Time: 10/04/18  1:00 AM  
Result Value Ref Range Lipase 5503 (H) 73 - 393 U/L  
CBC W/O DIFF Collection Time: 10/04/18  1:00 AM  
Result Value Ref Range WBC 27.0 (H) 4.6 - 13.2 K/uL  
 RBC 2.63 (L) 4.70 - 5.50 M/uL HGB 7.7 (L) 13.0 - 16.0 g/dL HCT 22.6 (L) 36.0 - 48.0 % MCV 85.9 74.0 - 97.0 FL  
 MCH 29.3 24.0 - 34.0 PG  
 MCHC 34.1 31.0 - 37.0 g/dL  
 RDW 14.9 (H) 11.6 - 14.5 % PLATELET 386 546 - 290 K/uL MPV 9.2 9.2 - 11.8 FL  
PTT Collection Time: 10/04/18  1:00 AM  
Result Value Ref Range aPTT 33.2 23.0 - 36.4 SEC PROTHROMBIN TIME + INR Collection Time: 10/04/18  1:00 AM  
Result Value Ref Range Prothrombin time 15.1 11.5 - 15.2 sec INR 1.2 0.8 - 1.2 HEPATIC FUNCTION PANEL Collection Time: 10/04/18  1:00 AM  
Result Value Ref Range Protein, total 6.9 6.4 - 8.2 g/dL Albumin 1.9 (L) 3.4 - 5.0 g/dL Globulin 5.0 (H) 2.0 - 4.0 g/dL A-G Ratio 0.4 (L) 0.8 - 1.7 Bilirubin, total 2.8 (H) 0.2 - 1.0 MG/DL Bilirubin, direct 2.1 (H) 0.0 - 0.2 MG/DL Alk. phosphatase 345 (H) 45 - 117 U/L  
 AST (SGOT) 198 (H) 15 - 37 U/L  
 ALT (SGPT) 123 (H) 16 - 61 U/L  
GLUCOSE, POC Collection Time: 10/04/18  6:29 AM  
Result Value Ref Range Glucose (POC) 118 (H) 70 - 110 mg/dL POC G3 Collection Time: 10/04/18  7:58 AM  
Result Value Ref Range Device: VENT    
 FIO2 (POC) 0.50 % pH (POC) 7.449 7.35 - 7.45    
 pCO2 (POC) 37.7 35.0 - 45.0 MMHG  
 pO2 (POC) 62 (L) 80 - 100 MMHG  
 HCO3 (POC) 26.0 22 - 26 MMOL/L  
 sO2 (POC) 92 92 - 97 % Base excess (POC) 2 mmol/L Mode SIMV Tidal volume 500 ml Set Rate 10 bpm  
 PEEP/CPAP (POC) 5 cmH2O Pressure support 15 cmH2O Allens test (POC) YES Inspiratory Time 0.9 sec Total resp. rate 26 Site RIGHT RADIAL Patient temp. 99.0 Specimen type (POC) ARTERIAL Performed by Conrad Luong Volume control plus YES    
CULTURE, BLOOD Collection Time: 10/04/18  9:52 AM  
Result Value Ref Range Special Requests: PERIPHERAL Culture result: PENDING   
LACTIC ACID Collection Time: 10/04/18  9:52 AM  
Result Value Ref Range Lactic acid 1.0 0.4 - 2.0 MMOL/L Chemistry Recent Labs 10/04/18 
 0100  10/03/18 
 0400  10/02/18 
 1728  10/02/18 
 0445   10/01/18 
 1145 GLU  101*  130*   --   167*   --    --   
NA  142  142   --   141   --    --   
K  3.8  4.2  4.4  3.7   < >   --   
CL  102  100   --   101   --    --   
CO2  26  23   --   25   --    --   
BUN  27*  57*   --   43*   --    --   
CREA  3.62*  6.24*   --   4.77*   --    --   
CA  7.7*  7.5*   --   7.3*   --    --   
PHOS  4.1  6.5*   --   5.3*   --    --   
AGAP  14  19*   --   15   --    --   
BUCR  7*  9*   --   9*   --    --   
AP  345*   --    --    --    --   318* TP  6.9   --    --    --    --   7.0 ALB  1.9*  1.9*  1.9*   --   2.0*   --   2.0*  
GLOB  5.0*   --    --    --    --   5.0*  
 AGRAT  0.4*   --    --    --    --   0.4*  
 < > = values in this interval not displayed. CBC w/Diff Recent Labs 10/04/18 
 0100  10/03/18 
 1142  10/02/18 
 2340  10/02/18 
 0445  10/01/18 
 2205 WBC  27.0*  24.9*  23.0*  19.6*  19.6*  
RBC  2.63*  2.45*  2.49*  2.53*  2.51* HGB  7.7*  7.3*  7.4*  7.4*  7.3* HCT  22.6*  21.2*  21.0*  20.8*  20.9*  
PLT  188  170  167  185  201 GRANS   --    --    --   84*  77* LYMPH   --    --    --   8*  10* EOS   --    --    --   2  1 ABG Recent Labs 10/04/18 
 5841  10/03/18 
 0540  10/02/18 
 6029 PHI  7.449  7.431  7.459* PCO2I  37.7  38.5  39.7 PO2I  62*  84  176* HCO3I  26.0  25.5  28.0*  
FIO2I  0.50  70  60 Micro Recent Labs 10/04/18 
 2017 CULT  PENDING Recent Labs 10/04/18 
 1397 CULT  PENDING  
 
 
CT (Most Recent) [facility] Encounter encounter on 09/26/18 CT HEAD WO CONT Narrative EXAM: CT head CLINICAL HISTORY/INDICATION: Neuro deficit, acute single, stable or partly 
resolved; pupils different sizes 
-Additional:  None COMPARISON: 09/29/18 TECHNIQUE: Axial CT imaging of the head was performed without intravenous 
contrast.  One or more dose reduction techniques were used on this CT: automated 
exposure control, adjustment of the mAs and/or kVp according to patient size, 
and iterative reconstruction techniques. The specific techniques used on this CT exam have been documented in the patient's electronic medical record. 
 
_______________ FINDINGS: 
 
Brain And Posterior Fossa: The sulci, folia, ventricles and basal cisterns are 
within normal limits for the patient's age. There is no intracranial hemorrhage, 
mass effect, or midline shift. There is an area of diminished attenuation 
involving both gray and white matter within the right posterior 
temporal/occipital lobe. The appearance is consistent with a subacute infarction. Additional similar-appearing areas are evident involving both 
posterior parietal lobes. Chronic appearing infarction is evident involving the 
right basal ganglia and anterior right corona radiata. Extra-Axial Spaces And Meninges: There are no abnormal extra-axial fluid 
collections. Calvarium: Intact. Sinuses: Partial opacification of the left sphenoid sinus is present with 
inspissated mucus present Other: Status post bilateral cataract surgery 
 
_______________ Impression IMPRESSION: 
 
There are subacute infarctions evident involving the right posterior 
temporal/occipital lobe, right posterior parietal lobe and left posterior 
parietal lobe. No hemorrhage or significant mass effect is evident Chronic infarction involving the right basal ganglia and anterior right corona 
radiata Left sphenoid sinusitis again evident Preliminary report provided by on-call radiology resident. XR (Most Recent). CXR reviewed by me and compared with previous CXR Results from [facility] Encounter encounter on 09/26/18 XR CHEST PORT Narrative Chest AP portable INDICATION: Mechanical ventilation, endotracheal tube. COMPARISON: X-ray 10/03/2018. FINDINGS: Endotracheal tube tip is approximately 2.8 cm from the billy. There 
appears a esophageal probe which is slightly retracted with tip now near 
thoracic inlet at the level of the clavicles. NG tube extends into the stomach 
out of field-of-view. Stable right IJ central catheter with tip at the superior 
vena cava. Stable left arm midline with tip in the subclavian vein. Stable cardiomediastinal silhouette. Cardiac silhouette is obscured by right 
basal opacity but likely enlarged.  There is prominence of the central pulmonary 
vessels and interstitial markings similar to prior exam. Confluent opacity in 
the right lower lung zone obscuring the diaphragm, right heart border and 
 costophrenic angle. Left costophrenic angle is sharp. No pneumothorax. Impression IMPRESSION: 
 
1. Right IJ catheter, left midline, endotracheal tube, esophageal probe and NG 
tube as above, appearing in adequate position. 2. Cardiomegaly and diffuse interstitial thickening without appreciable change. Right lower lung zone consolidation is stable to minimally improved given slight 
differences in projection. Small right pleural effusion difficult to exclude. High complexity decision making was performed during the evaluation of this patient at high risk for decompensation with multiple organ involvement Above mentioned total time spent on reviewing the case/medical record/data/notes/EMR/patient examination/documentation/coordinating care with nurse/consultants, exclusive of procedures with complex decision making performed and > 50% time spent in face to face evaluation. Zhane Rolle MD 
Critical Care Medicine I had a discussion with the patient's daughter, mother, and niece regarding the poor prognosis and the steps forward if they want to pursue aggressive care. The want to pursue everything at this time. Will plan for bronchoscopy tomorrow. Zhane Rolle MD 
Critical Care Medicine

## 2018-10-04 NOTE — PROGRESS NOTES
Problem: Patient Education: Go to Patient Education Activity Goal: Patient/Family Education Outcome: Not Progressing Towards Goal 
Patient unresponisve Problem: Falls - Risk of 
Goal: *Absence of Falls Document Sean Nina Fall Risk and appropriate interventions in the flowsheet. Outcome: Progressing Towards Goal 
Fall Risk Interventions: 
Mobility Interventions: Bed/chair exit alarm, Communicate number of staff needed for ambulation/transfer, Strengthening exercises (ROM-active/passive) Mentation Interventions: Adequate sleep, hydration, pain control, Bed/chair exit alarm, Door open when patient unattended, Toileting rounds, Update white board, Reorient patient, More frequent rounding, Family/sitter at bedside Medication Interventions: Evaluate medications/consider consulting pharmacy, Bed/chair exit alarm, Patient to call before getting OOB, Teach patient to arise slowly Elimination Interventions: Toileting schedule/hourly rounds, Call light in reach, Bed/chair exit alarm, Toilet paper/wipes in reach, Patient to call for help with toileting needs Problem: Patient Education: Go to Patient Education Activity Goal: Patient/Family Education Outcome: Not Progressing Towards Goal 
unresponsive Problem: General Medical Care Plan Goal: *Fluid volume balance Outcome: Progressing Towards Goal 
finshed dialysis Goal: *Optimize nutritional status Outcome: Not Progressing Towards Goal 
Need to address in am  
 
Problem: Pressure Injury - Risk of 
Goal: *Prevention of pressure injury Document Mitul Scale and appropriate interventions in the flowsheet. Outcome: Progressing Towards Goal 
Pressure Injury Interventions: 
Sensory Interventions: Assess changes in LOC, Use 30-degree side-lying position, Turn and reposition approx.  every two hours (pillows and wedges if needed), Pressure redistribution bed/mattress (bed type), Pad between skin to skin, Minimize linen layers, Keep linens dry and wrinkle-free, Float heels, Assess need for specialty bed Moisture Interventions: Absorbent underpads, Apply protective barrier, creams and emollients, Check for incontinence Q2 hours and as needed, Contain wound drainage, Maintain skin hydration (lotion/cream), Minimize layers, Moisture barrier Activity Interventions: Pressure redistribution bed/mattress(bed type) Mobility Interventions: HOB 30 degrees or less, Pressure redistribution bed/mattress (bed type), Turn and reposition approx. every two hours(pillow and wedges), Float heels Nutrition Interventions: Discuss nutritional consult with provider Friction and Shear Interventions: Apply protective barrier, creams and emollients, Foam dressings/transparent film/skin sealants, HOB 30 degrees or less, Lift team/patient mobility team, Minimize layers, Transferring/repositioning devices

## 2018-10-04 NOTE — CONSULTS
[address] [place] [address] MR#: [id number] : 1955 ACCOUNT #: [de-identified] DATE OF SERVICE: 10/04/2018 REQUESTING CONSULTATION:  Dr. Nuha Shi. PATIENT LOCATION:  [place] [address]. REASON FOR CONSULTATION:  Sepsis. IMPRESSION: 
1. Persistent leukocytosis. 2.  Systemic inflammatory response syndrome. 3.  Possible sepsis. 4.  Patient has multiple organ failure with multiple infectious and noninfectious concerns as outlined below. The situation is extremely complicated. His white count is 27,000 today despite receiving vancomycin and Zosyn since 2018. C. diff was negative 2018. Sputum was only growing Candida albicans 5. Suspected pyelonephritis present on admission - CT demonstrated bilateral perinephric stranding. Urinalysis showed too numerous to count white cells and 4+ bacteria, but urine culture was no growth. This likely has been treated at this point. 6.  Cholelithiasis, choledocholithiasis, suspected acute cholecystitis with possible cystic stone present on admission. He was noted to have abnormal LFTs on presentation with bilirubin of 4, alkaline phosphatase of 400, was seen by both gastroenterology and general surgery, Dr. Justa Zhou. I understand there is no plan for surgery at this point. If LFTs worsen a cholecystostomy tube may be considered. His bilirubin and alkaline phosphatase appear to be declining. 7.  Possible pancreatitis. Lipase was 2200 present on admission and now is 5500. His pancreas apparently was unremarkable on admission CT scan. Interpretation of the lipase is complicated by acute kidney injury. 8.  Bilateral infiltrates. Possible pulmonary edema, possible infiltrates. Right lower lobe consolidation is improved on chest x-ray 10/4 and sputum again only grew Candida albicans.  
9.  Methicillin-resistant Staphylococcus epidermidis bacteremia  of 2 blood cultures. The patient has a left upper extremity midline, unclear when placed. Repeat blood cultures are in progress today. His right internal jugular dialysis catheter was placed after the patient was started on vancomycin. 10.  Acute renal failure present on admission, now on hemodialysis. Baseline creatinine 0.9, , creatinine 10.1 in the emergency room on admission. His right internal jugular tunneled dialysis catheter was placed 09/28. 11.  Bleeding with melena noted earlier this admission. Esophagogastroduodenoscopy 10/02/2018 showed a clot in the esophagus and bronchoscopy the following day showed a right endobronchial clot. This may well be multifactorial.  I doubt it is due to Zosyn alone, but will change from it today. 12.  Suspected anoxic brain injury on MRI 10/03, following ventricular fibrillation arrest on 09/29. 13.  Respiratory failure, status post intubation 09/29. 15.  Cerebrovascular accident with right hemiparesis 9/10. 
15.  Comorbid conditions include hypertension, hyperlipidemia, congestive heart failure, history of biopsy positive steatohepatitis. RECOMMENDATIONS: 
1. Discontinue Zosyn, continue vancomycin. 2.  Will empirically begin meropenem to continue biliary and pulmonary coverage and also start antifungal pending more information. 3.  Check Fungitell. 4.  Monitor blood cultures from today. If again grew coag negative staph would remove left upper extremity midline and culture tip, could need to remove other lines as well. 5.  Management of cholelithiasis, choledocholithiasis and possible pancreatitis as per GI. Condition may not allow MRCP evaluation now, but if lipase continues to climb wonder if a repeat CT would be of any benefit. 6.  Management of acute renal failure as per nephrology and bleeding as per others, and anoxic brain injury as per Neurology. Thank you for this consultation.   [facility] will follow with you. HISTORY OF PRESENT ILLNESS:  The patient is a 20-year-old black gentleman with history of hypertension, hyperlipidemia, diabetes mellitus, steatohepatitis and CHF, who was hospitalized here I believe from 09/10 through 09/14 for CVA resulting in right-sided hemiparesis. The patient was brought back to the emergency room from [facility] on 09/26 for abnormal labs. The patient was noted to have a white count of 20,000. , creatinine 10 and abnormal LFTs with an , ALT 62, alk phos 401 and bilirubin of 4. His urinalysis showed too numerous to count white cells, red cells and 4+ bacteria, but interestingly his urine culture was no growth. He had a CT of the abdomen and pelvis performed which noted bilateral perinephric stranding concerning for pyelonephritis as well as choledocholithiasis and cholelithiasis. Ultrasound was performed and the patient was seen by gastroenterology who suspected acute cholecystitis and noted elevated lipase, doubted clinically significant pancreatitis. Patient was seen by nephrology and started on hemodialysis via right IJ tunneled dialysis catheter inserted on 09/28. Patient had problems with persistent leukocytosis and was transferred to the ICU on the 09/28 for bleeding. On 09/29, he suffered a cardiac arrest, was intubated and was noted to have melanotic stools. The patient was seen by Dr. Adam Schaffer of general surgery who recommended against cholecystectomy, but said a cholecystostomy could be considered if needed. The patient was further evaluated on 10/02 by EGD which demonstrated a clot in the esophagus and GI was unclear if there was a tear or mass beneath it. He also was noted to have decreased breath sounds on the right side and bronchoscopy performed yesterday demonstrated a clot in the right bronchus.   He has been seen by neurology for decreased mental status and they note metabolic encephalopathy and also MRI imaging has suggested anoxic brain injury. A family meeting is planned. The patient's white count today was noted to have risen again to 27,000. Infectious disease consultation was requested. The patient is intubated in the intensive care unit and no additional history could be obtained from him. PAST MEDICAL HISTORY:  Medical illnesses:  Hypertension, hyperlipidemia, diabetes mellitus type 2, recent stroke, steatohepatitis, history of CHF. ALLERGIES:  NO KNOWN DRUG ALLERGIES. MEDICATIONS:  Albuterol, insulin, Protonix, Zosyn, vancomycin, dopamine. FAMILY HISTORY:  Noncontributory. SOCIAL HISTORY:  Unable to elicit. REVIEW OF SYSTEMS:  Unable to elicit. PHYSICAL EXAMINATION:   
GENERAL:  The patient is an ill-appearing black male who appears stated age, lying in the ICU on his left side, unresponsive to voice. VITAL SIGNS:  Temperature T-max 100.4, temperature 99.9, blood pressure 143/60, pulse 91, respiratory rate 27, O2 sat is 97%. HEENT:  Sclerae are anicteric. The patient is orotracheally intubated. I cannot well visualize his oropharynx. NECK:  Without lymphadenopathy. He has a right IJ tunneled dialysis catheter in place. There appears to be some mild swelling around the site, but there is no active bleeding now. CHEST:  Coarse breath sounds throughout without rhonchi or wheeze. CARDIOVASCULAR:  Regular rate and rhythm without rub, murmur or gallop. ABDOMEN:  Soft and nontender. I do not appreciate CVA tenderness. There may be some right upper quadrant fullness. EXTREMITIES:  The patient has 1-2+ edema. He has a left upper extremity line, which is labeled midline. LABORATORY DATA:  MRI of the head 10/03 concerning for anoxic injury with posterior pharynx fluid likely post-intubation.   CT 09/29 of chest showed esophageal thickening, right-sided consolidation, atelectasis plus/minus pneumonia. CT 09/27 of abdomen and pelvis, possible pyelonephritis with perinephric stranding, gallbladder distention and stones perhaps extending into the cystic duct. Ultrasound 09/27 question possible acute chronic cholecystitis. The hemoglobin A1c is 10. Lipase is 5503 up from 2214 on admission. Sodium 142, potassium 3.8, chloride 102, bicarbonate 26, glucose 101, BUN 27, creatinine 3.6, , ALT 98, alkaline phosphatase is 239, bilirubin is 2.3. White count is 27,000 with 84 polys, 8 lymphs, 6 monos, 2 eos. The hematocrit is 23, platelet count 835. A 09/27 urine culture no growth. Blood culture 1 of 2 grew MRSE. A 09/27 sputum Candida albicans. C. diff was negative. A 10/04 chest x-ray, right IJ left midline, endotracheal tube and esophageal probe are okay. Cardiomegaly with interstitial thickening. Right lower lobe consolidation is somewhat improved. Urinalysis too numerous to count white cells, red cells, 4+ bacteria on 09/27. Millie Massey / Gwyn Glasscock 
D: 10/04/2018 14:48 T: 10/04/2018 19:33 
JOB #: [id number]

## 2018-10-04 NOTE — PROGRESS NOTES
Gastrointestinal Progress Note Patient Name: Jayden Brown Today's Date: 10/4/2018 Admit Date: 9/26/2018 Assessment: 1. Melena; inactive; EGD 10/2 with large clot in the esophagus which was not able to be cleared; Fluid in esophagus noted on CT scan likely representative of ongoing clot. Hb stable. Unclear whether there is significant underlying pathology (ulcer, mass). CT scan today does not suggest marilu perforation although limited to abd and lower chest.  
2. Elevated liver enzymes; acute on chronic; slowly improving. Possibly due to acute cholecystitis with cholelithiasis/stones on ultrasound. Nondilated CBD on imaging argues against biliary obstruction. 3. S/p vfib cardiac arrest 
4. Anoxic brain injury with diffuse cortical signal changes on MRI brain 5. JULIANNA, on hemodialysis 6. Suspected pyelonephritis 7. Elevated lipase without pancreatitis on CT; suspect due to renal failure 8. Endobronchial clot with respiratory failure 9. DIC Recommendation: 1. Continue twice daily protonix 2. Monitor for recurrent GI bleeding and transfuse as needed 3. No contra-indication to trickle feeding from GI perspective 4. Monitor liver enzymes 5. Will consider repeat EGD if active bleeding or further drop in H/H. Subjective:  
 
Jayden Brown is a 57 y.o. Male followed for elevated liver enzymes and melena. Pending dialysis this afternoon. No bowel movements per nursing. OGT is clamped. Off sedation but not responsive. Current Facility-Administered Medications Medication Dose Route Frequency  fluconazole (DIFLUCAN) 200mg/100 mL IVPB (premix)  200 mg IntraVENous Q24H  
 meropenem (MERREM) 500 mg in 0.9% sodium chloride (MBP/ADV) 50 mL MBP  500 mg IntraVENous Q12H  
 [START ON 10/5/2018] vancomycin (VANCOCIN) 750 mg in 0.9% sodium chloride (MBP/ADV) 250 mL ADV  750 mg IntraVENous Q MON, WED & FRI  midazolam (VERSED) injection    PRN  
  acetaminophen (TYLENOL) solution 325 mg  325 mg Per NG tube Q4H PRN  pantoprazole (PROTONIX) 40 mg in sodium chloride 0.9% 10 mL injection  40 mg IntraVENous Q12H  
 0.9% sodium chloride infusion 250 mL  250 mL IntraVENous PRN  
 heparin (porcine) pf 100 Units  100 Units InterCATHeter PRN  
 0.9% sodium chloride infusion 250 mL  250 mL IntraVENous PRN  
 insulin lispro (HUMALOG) injection   SubCUTAneous Q6H  
 insulin glargine (LANTUS) injection 6 Units  6 Units SubCUTAneous DAILY  albuterol (PROVENTIL VENTOLIN) nebulizer solution 2.5 mg  2.5 mg Nebulization Q6H RT  
 heparin (porcine) 1,000 unit/mL injection 1,000 Units  1,000 Units InterCATHeter DIALYSIS PRN  
 VANCOMYCIN INFORMATION NOTE   Other Rx Dosing/Monitoring  senna-docusate (PERICOLACE) 8.6-50 mg per tablet 1 Tab  1 Tab Oral BID PRN  
 ondansetron (ZOFRAN) injection 4 mg  4 mg IntraVENous Q4H PRN  
 glucose chewable tablet 16 g  4 Tab Oral PRN  
 glucagon (GLUCAGEN) injection 1 mg  1 mg IntraMUSCular PRN  
 dextrose (D50) infusion 12.5-25 g  25-50 mL IntraVENous PRN  
 hydrALAZINE (APRESOLINE) 20 mg/mL injection 20 mg  20 mg IntraVENous Q6H PRN Objective:  
 
Physical Exam: 
 
Visit Vitals  /63  Pulse 91  Temp (!) 38 °F (3.3 °C)  Resp 21  
 Ht 5' 7\" (1.702 m)  Wt 92.7 kg (204 lb 5.9 oz)  SpO2 100%  BMI 32.01 kg/m2 Gen: Intubated on ventilator, unresponsive to voice or touch CV: Regular, no murmur Pulm: Coarse breath sounds, no wheeze Abd: Soft, nondistended Data Review: 
 
Labs: Results:  
   
Chemistry Recent Labs 10/04/18 
 1120  10/04/18 
 0100  10/03/18 
 0400  10/02/18 
 1728  10/02/18 
 0445 GLU   --   101*  130*   --   167* NA   --   142  142   --   141 K   --   3.8  4.2  4.4  3.7 CL   --   102  100   --   101 CO2   --   26  23   --   25 BUN   --   27*  57*   --   43* CREA   --   3.62*  6.24*   --   4.77* CA   --   7.7*  7.5*   --   7.3*  
 AGAP   --   14  19*   --   15  
BUCR   --   7*  9*   --   9* AP  279*  345*   --    --    --   
TP  7.5  6.9   --    --    --   
ALB  1.7*  1.9*  1.9*  1.9*   --   2.0*  
GLOB  5.8*  5.0*   --    --    --   
AGRAT  0.3*  0.4*   --    --    --   
  
CBC w/Diff Recent Labs 10/04/18 
 1401  10/04/18 
 0100  10/03/18 
 1142   10/02/18 
 0445  10/01/18 
 2205 WBC  25.7*  27.0*  24.9*   < >  19.6*  19.6*  
RBC  2.48*  2.63*  2.45*   < >  2.53*  2.51* HGB  7.2*  7.7*  7.3*   < >  7.4*  7.3* HCT  21.4*  22.6*  21.2*   < >  20.8*  20.9*  
PLT  196  188  170   < >  185  201 GRANS   --    --    --    --   84*  77* LYMPH   --    --    --    --   8*  10* EOS   --    --    --    --   2  1  
 < > = values in this interval not displayed. Coagulation Recent Labs 10/04/18 
 0100  10/02/18 [phone] PTP  15.1  15.3* INR  1.2  1.2 APTT  33.2  37.1* Liver Enzymes Recent Labs 10/04/18 
 1120 TP  7.5 ALB  1.7* AP  279* SGOT  148* ALT  98* Palomo Allen MD 
October 4, 2018

## 2018-10-04 NOTE — PROGRESS NOTES
Problem: Falls - Risk of 
Goal: *Absence of Falls Document Jasmin Sun Fall Risk and appropriate interventions in the flowsheet. Outcome: Progressing Towards Goal 
Fall Risk Interventions: 
Mobility Interventions: Bed/chair exit alarm, Communicate number of staff needed for ambulation/transfer, Strengthening exercises (ROM-active/passive) Mentation Interventions: Adequate sleep, hydration, pain control, Bed/chair exit alarm, Door open when patient unattended, Toileting rounds, Update white board, Reorient patient, More frequent rounding, Family/sitter at bedside Medication Interventions: Evaluate medications/consider consulting pharmacy, Bed/chair exit alarm, Patient to call before getting OOB, Teach patient to arise slowly Elimination Interventions: Toileting schedule/hourly rounds, Call light in reach, Bed/chair exit alarm, Toilet paper/wipes in reach, Patient to call for help with toileting needs Problem: Pressure Injury - Risk of 
Goal: *Prevention of pressure injury Document Mitul Scale and appropriate interventions in the flowsheet. Outcome: Progressing Towards Goal 
Pressure Injury Interventions: 
Sensory Interventions: Assess changes in LOC, Use 30-degree side-lying position, Turn and reposition approx. every two hours (pillows and wedges if needed), Pressure redistribution bed/mattress (bed type), Pad between skin to skin, Minimize linen layers, Keep linens dry and wrinkle-free, Float heels, Assess need for specialty bed Moisture Interventions: Absorbent underpads, Apply protective barrier, creams and emollients, Check for incontinence Q2 hours and as needed, Contain wound drainage, Maintain skin hydration (lotion/cream), Minimize layers, Moisture barrier Activity Interventions: Pressure redistribution bed/mattress(bed type) Mobility Interventions: HOB 30 degrees or less, Pressure redistribution bed/mattress (bed type), Turn and reposition approx.  every two hours(pillow and wedges), Float heels Nutrition Interventions: Discuss nutritional consult with provider Friction and Shear Interventions: Apply protective barrier, creams and emollients, Foam dressings/transparent film/skin sealants, HOB 30 degrees or less, Lift team/patient mobility team, Minimize layers, Transferring/repositioning devices

## 2018-10-04 NOTE — PROGRESS NOTES
2000,received pt on ventilator support head elevated dialysis in process,vent settings and alarms set,pt sxn for moderate amount of bloody secretions,ETT moved to center of mouth sunitha tube cleared BVM at bedside will continue to monitor though the shift.

## 2018-10-04 NOTE — PROGRESS NOTES
Neurology Progress Note Admit Date: 9/26/2018 Length of Stay: 7 Primary Care: Vladislav Perry MD  
Principle Problem: Cardiac arrest with ventricular fibrillation ([facility] [address] I saw him independently and reviewed his brain MRI imaging with his daughter and his mother today. He remained unresponsive when if off sedatives. I discussed his extensive anoxic barin injury demonstrated in the brain MRI would predict poor prognosis. He would have a impaired cerebral function and possibly in vegetated state requiring PEG and Trach. They would like to pursue this pathway and declined palliative care. Will request EEG in AM to eval cerebral function. I agreed with following A/P. 30 minutes spent with his care to include family meeting. Carol Melendez MD 
 
 
Assessment:   
Encephalopathy / Anoxic Brain Injury Plan:   
Encephalopathy secondary to metabolic or infectious process. Anoxic Brain Injury Reverse metabolic abnormalities. Nephrology following. Currently receiving dialysis. JULIANNA in setting of acute pyelonephritis/acute cholecystitis with sepsis. ABX per primary team.   2 units of RBCs for low H&H of 6.1/17. 4. GI following. Active melena. Per notes: EGD 10/2 showed an extensive clot in the esophagus extending from 25 cm to 40 cm along the side of the esophagus, unclear if there is a perforation or mass under it. Unable to clear out the clot, attempted to do it with chaney net. Increased ALT, AST, and alk phos but trending down. MRI shows  confluent and relatively symmetric areas of abnormal diffusion signal 
involving bilateral cerebral hemispheres, greater posteriorly, very suspicious for anoxic injury. More discrete small foci of restricted diffusion involving the lizabeth, medulla, right periatrial white matter, and left corona radiata, likely representing more discrete small acute infarcts. No evidence of associated hemorrhage or mass effect. Poor prognosis. Was discussed yesterday with family (mother and niece). They stated understanding but would like to continue aggressive treatment. Plan for family meeting today with ICU team. 
 
History:  Phi Pena is a 54yo AAM with PMH of HTN, HLD, IDDM, and CVA with right hemiparesis who was sent from the NH due to abnormal labs ie elevated BUN/Creat. From notes he had cardiac arrest after dialysis. Initial CT at that time showed no acute abnormalities. Previous MRI on 9/10/18 showed acute infarct involving the left medullary pyramid. Chronic right central lizabeth and bilateral basal ganglia infarcts. Hemosiderin staining from prior chronic parenchymal hemorrhage involving superolateral right basal ganglia. Results reviewed:  
 
CT Results (most recent): IMPRESSION IMPRESSION: 
  
1. No acute intracranial abnormalities. 2.  Multifocal chronic infarcts and presumed chronic microvascular changes 
without significant interval change. 3.  High density material opacifying the left maxillary sinus and additional 
layering debris in the nasopharynx compatible with sinusitis, potentially fungal 
etiology or other proteinaceous debris. MRI Results (most recent): 
 
Results from Hospital Encounter encounter on 09/26/18 MRI BRAIN WO CONT Narrative EXAM: MRI BRAIN W/O CONTRAST INDICATION: Unresponsive, recent cardiac arrest, sepsis COMPARISON: MR brain 9/10/2018 TECHNIQUE: Multiplanar multi sequence MR imaging of the brain was performed 
utilizing axial T2, FLAIR, diffusion, T2 gradient echo, and multiplanar T1 pre 
contrast imaging. No gadolinium was administered due to specific clinician 
request. 
 
_______________________ FINDINGS:  
 
VENTRICLES/EXTRA-AXIAL SPACES: The ventricles are stable in their size and 
configuration.  
 
BRAIN PARENCHYMA:  
 
There are multiple areas of relatively confluent and symmetric cortical 
 diffusion hyperintensity involving the bilateral cerebral hemispheres. This 
abnormal cortical signal is most prominent in the parieto-occipital regions, but 
is also seen involving the bilateral posterior temporal regions, in the 
bilateral frontal lobes particularly towards the vertex but also seen more 
laterally extending towards the perisylvian region. Mild right insular cortical 
involvement is suggested along its mid to anterior aspect. Corresponding ADC map 
does show cortical hypointensity as well. These areas all have T2/FLAIR 
corresponding hyperintensity. All these cortical findings are new since 09/10/2018. Slightly linear abnormal diffusion signal seen involving the left lateral aspect 
of the mid lizabeth, diffusion images 14 and 15, with relative ADC map 
hypointensity. Corresponding T2/FLAIR hyperintensity also seen. Similar focus of 
restricted diffusion present in the lateral aspect of the left middle cerebellar 
peduncle on image 12. Abnormal diffusion signal and FLAIR hyperintensity again 
seen anteriorly along the left medullary pyramid, axial image 9. Similar small 
discrete punctate foci of restricted diffusion also seen in the left frontal 
corona radiata on image 25 and in the right periatrial white matter on image 21. These foci also show T2/FLAIR hyperintensity. Elongated chronic infarct is present involving the anterior superior aspect 
right basal ganglia extending from the putamen into the lateral aspect of the 
caudate and adjacent white matter. Corresponding central fluid signal 
surrounding FLAIR hyperintensity is present. There is associated susceptibility 
artifact along the periphery of this lesion indicating hemosiderin staining from 
chronic hemorrhage This infarct is unchanged. Chronic right thalamic and anterior left thalamic infarcts are again seen. Small 
linear chronic right central pontine infarct is also present. Additional stable mild to moderate amount of T2/FLAIR hyperintense white matter lesions are seen 
within the periventricular and subcortical white matter , which are nonspecific, 
but likely represent chronic small vessel changes. No evidence of intracranial mass effect, midline shift, or herniation. Small foci of susceptibility artifact are seen involving the medial and superior 
right basal ganglia, left subinsular region. These are unchanged although better 
seen today with less motion artifact. VASCULATURE:  The major intracranial vascular flow voids are grossly normal. 
 
ORBITS: The bilateral lenses are absent, likely due to prior cataract surgery. PARANASAL SINUSES/MASTOIDS: Mild mucoperiosteal thickening is scattered in the 
paranasal sinuses. No air-fluid levels are present. Sinus disease greatest in 
the sphenoid sinuses, similar to prior study. Mild fluid signal is present in 
the bilateral mastoid air cells, nonspecific but likely inflammatory. Small 
amount layering fluid signal is seen posteriorly in the nasopharynx, likely 
representing mucous secretions given intubation and presence of gastroenteric 
tube. OSSEOUS STRUCTURES: Unremarkable OTHER: None.   
 
________________________ Impression IMPRESSION: 
 
1. Confluent and relatively symmetric areas of abnormal diffusion signal 
involving bilateral cerebral hemispheres, greater posteriorly, very suspicious 
for anoxic injury. 2. More discrete small foci of restricted diffusion involving the lizabeth, medulla, 
right periatrial white matter, and left corona radiata, likely representing more 
discrete small acute infarcts. No evidence of associated hemorrhage or mass 
effect. 3. Several chronic infarcts are again seen including right basal ganglia infarct 
with hemosiderin staining from prior hemorrhage. The other chronic infarcts 
include bilateral thalamic and right central lizabeth. 4. Stable, mild to moderate nonspecific white matter disease likely representing 
chronic small vessel changes. 5. Stable foci susceptibility artifact right basal ganglia and left subinsular 
region. These are nonspecific but likely representing hemosiderin staining from 
chronic microhemorrhage, often hypertensive in etiology. 6. No evidence of midline shift or herniation. 7. Layering mucus secretions and posterior nasopharynx most likely related to 
intubation. MRI brain FINDINGS:  
  
Motion artifact is present which limits evaluation. 
  
There is a small discrete area of abnormal restricted diffusion involving the 
left ventral medulla along the medullary pyramid, diffusion images 11 and 12. Corresponding T2/FLAIR hyperintensity is seen. No adjacent mass effect is 
present. No convincing associated hemorrhage is seen. No additional areas of 
acute infarct are present. 
  
Linear discrete chronic infarct is present in the right central mid to upper 
lizabeth. Chronic infarct is present anteriorly in the left thalamus extending into 
the genu of the left internal capsule. Small posterior right thalamic lacunar 
infarct is also seen. There is a more elongated area of encephalomalacia 
involving the anterior lateral right basal ganglia involving putamen extending 
into the caudate nucleus. This area is associated with peripheral susceptibility 
artifact representing hemosiderin staining from prior hemorrhage. 
  
The ventricles and sulci are mildly enlarged consistent with diffuse volume 
loss. 
  
Mild amount of T2/FLAIR hyperintense white matter lesions are seen within the 
periventricular and subcortical white matter , which are nonspecific, but likely 
represent chronic small vessel changes. 
  
There is no evidence of mass effect, midline shift, or herniation. Additional 
discrete focus of susceptibility artifact is present in the left temporal lobe along the subinsular region without mass effect or edema. The major intracranial 
vascular flow voids are grossly normal.  
  
The bilateral lenses are absent, likely due to prior cataract surgery. T1 
precontrast hyperintensity and T2 hypointensity is present left sphenoid sinus 
which is nonspecific and may represent proteinaceous/inspissated mucus. No 
air-fluid levels are present. Mastoid air cells are unremarkable. 
  
________________________ 
  
IMPRESSION IMPRESSION: 
  
Motion degraded study. 
  
1. 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 No evidence of 
associated hemorrhage or mass effect. 
  
2. Chronic right central lizabeth and bilateral basal ganglia infarcts. Hemosiderin 
staining from prior chronic parenchymal hemorrhage involving superolateral right 
basal ganglia. 
  
3. Additional punctate focus susceptibility artifact the left temporal 
subinsular region,  nonspecific but likely representing hemosiderin staining 
from chronic microhemorrhage, often hypertensive in etiology.  
  
4. Mild nonspecific white matter disease likely representing chronic small 
vessel changes. 
  
 
Latest Hemoglobin A1C: 
Lab Results Component Value Date/Time Hemoglobin A1c 10.0 (H) 09/30/2018 04:18 AM  
 
 
Latest Cardiology Procedure: 
Results for orders placed or performed during the hospital encounter of 09/26/18 EKG, 12 LEAD, INITIAL Result Value Ref Range Ventricular Rate 77 BPM  
 Atrial Rate 77 BPM  
 P-R Interval 182 ms QRS Duration 86 ms  
 Q-T Interval 434 ms QTC Calculation (Bezet) 491 ms Calculated P Axis 19 degrees Calculated R Axis -52 degrees Calculated T Axis 5 degrees Diagnosis Sinus rhythm with fusion complexes Left anterior fascicular block Voltage criteria for left ventricular hypertrophy Prolonged QT Abnormal ECG When compared with ECG of 10-SEP-2018 15:07, 
fusion complexes are now present Non-specific change in ST segment in Lateral leads T wave inversion no longer evident in Lateral leads Confirmed by Rachelle Weiss ([id number]) on 9/27/2018 4:04:49 PM 
  
 
 
Important Labs: 
No results found for: FOL, RBCF Lab Results Component Value Date/Time Cholesterol, total 349 (H) 09/10/2018 12:31 PM  
 HDL Cholesterol 59 09/10/2018 12:31 PM  
 LDL, calculated 252.4 (H) 09/10/2018 12:31 PM  
 VLDL, calculated 37.6 09/10/2018 12:31 PM  
 Triglyceride 188 (H) 09/10/2018 12:31 PM  
 CHOL/HDL Ratio 5.9 (H) 09/10/2018 12:31 PM  
 
Lab Results Component Value Date/Time TSH 1.10 09/10/2018 12:31 PM  
 Triiodothyronine (T3), free 2.7 09/10/2018 12:31 PM  
 T4, Free 0.9 09/10/2018 12:31 PM  
 
No results found for: DS35, PHEN, PHENO, PHENT, DILF, DS39, PHENY, PTN, VALF2, VALAC, VALP, VALPR, DS6, CRBAM, CRBAMP, CARB2, XCRBAM 
Discussed with: nurse Allergies: No Known Allergies Review of Systems: Unable to obtain Y  N  
Constitutional: [] [] recent weight change 
[] [] fever 
[] [] sleep difficulties ENT/Mouth:  [] [] hearing loss 
[] [] swallowing problems 
[] [] slurred speech Cardiovascular:  [] [] chest pain  
[] [] palpitations Respiratory: [] [] cough with swallow 
[] [] shortness of breath 
[] [] sleep apnea 
[] [] intubated Gastrointestinal: [] [] abdominal pain 
[] [] nausea Genitourinary: [] [] frequent urination 
[] [] incontinence Musculoskeletal:   [] [] joint pain 
[] [] muscle pain Integument:   [] [] rash/itching Neurological:  [] [] dizziness/vertigo 
[] [] sedation 
[] [] confusion 
[] [] agitation/combativeness 
[] [] loss of consciousness 
[] [] numbness/tingling sensation 
[] [] tremors 
[] [] weakness in limbs 
[] [] difficulty with balance 
[] [] frequent or recurring headaches 
[] [] memory loss  
[] [] comatose 
[] [] seizures Psychiatric:   [] [] depression 
[] [] hallucinations Endocrine: [] [] excessive thirst or urination  
[] [] heat or cold intolerance Hematologic/Lymphatic: [] [] bleeding tendency [] [] enlarged lymph nodes PMH:  
Past Medical History:  
Diagnosis Date  CHF (congestive heart failure) ([facility] [address]  Diabetes ([address]  Stroke ([facility] [address] Problem List: Principal Problem: 
  Cardiac arrest with ventricular fibrillation ([address] (9/29/2018) Overview: ROSC before defibrillation could be attempted. Active Problems: 
  Essential hypertension (9/10/2018) Diabetes mellitus type 2, controlled ([address] (9/10/2018) Pneumonia of both lower lobes (9/11/2018) History of stroke (9/27/2018) Overview: With residual R hemiparesis Acute-on-chronic kidney injury ([address] (9/27/2018) Acute cystitis (9/27/2018) Elevated troponin (9/27/2018) Transaminitis (9/27/2018) Acute kidney injury ([address] (9/27/2018) Elevated LFTs (9/28/2018) Abnormal blood coagulation profile (9/29/2018) Acute pancreatitis (9/29/2018) Overview: Shock, leukocytosis, elevated lipase but radiographically no ductal  
    dilatation in pancreas. GB stones without radiographic stigmata of acute  
    cholecystitis Septic shock ([address] (9/30/2018) Overview: Probably secondary to pneumonia (suspicious for aspiration). Less likely,  
    secondary to acute pancreatitis. Ischemic encephalopathy (9/30/2018) FH: History reviewed. No pertinent family history. SH: Social History Social History  Marital status:  Spouse name: N/A  
 Number of children: N/A  
 Years of education: N/A Social History Main Topics  Smoking status: Never Smoker  Smokeless tobacco: Never Used  Alcohol use No  
 Drug use: No  
 Sexual activity: Not Asked Other Topics Concern  None Social History Narrative Vital Signs:  
Visit Vitals  /57 (BP 1 Location: Right arm, BP Patient Position: At rest)  Pulse 91  Temp 99.9 °F (37.7 °C)  Resp 25  
 Ht 5' 7\" (1.702 m)  Wt 92.7 kg (204 lb 5.9 oz)  SpO2 98%  BMI 32.01 kg/m2 Neurological examination:  
? Appearance: Intubated. Appears ill. ? Cardiovascular: Heart is regular rate and rhythm. Generalized edema to BUE. No carotid bruits heard on left, has IJ on right. ? Mental status examination: Eyes partially open. No response to verbal or noxious stimuli. Does not follow commands. ? Cranial Nerves:  
 
I: smell Not tested II: visual fields No blink to visual threat II: pupils OD 2 mm and OS 1.5 MM, impaired III,VII: ptosis none III,IV,VI: extraocular muscles  Minimal oculocephalic V: facial light touch sensation V,VII: corneal reflex  intact VII: facial muscle function VIII: hearing IX: soft palate elevation IX,X: gag reflex XI: sternocleidomastoid strength XII: tongue strength ? Motor exam: Station, gait:  deferred. Muscle tone, bulk normal.  Trace withdrawal to noxious stimulation x 4 LE > UE. Spacticity present BLE. Medications:   
 
[x] REVIEWED Current Facility-Administered Medications Medication  iohexol (OMNIPAQUE) 240 mg iodine/mL solution 50 mL  [START ON 10/5/2018] vancomycin (VANCOCIN) 750 mg in 0.9% sodium chloride (MBP/ADV) 250 mL ADV  midazolam (VERSED) injection  acetaminophen (TYLENOL) solution 325 mg  
 pantoprazole (PROTONIX) 40 mg in sodium chloride 0.9% 10 mL injection  0.9% sodium chloride infusion 250 mL  heparin (porcine) pf 100 Units  0.9% sodium chloride infusion 250 mL  insulin lispro (HUMALOG) injection  insulin glargine (LANTUS) injection 6 Units  albuterol (PROVENTIL VENTOLIN) nebulizer solution 2.5 mg  
 heparin (porcine) 1,000 unit/mL injection 1,000 Units  VANCOMYCIN INFORMATION NOTE  senna-docusate (PERICOLACE) 8.6-50 mg per tablet 1 Tab  ondansetron (ZOFRAN) injection 4 mg  
 glucose chewable tablet 16 g  
 glucagon (GLUCAGEN) injection 1 mg  dextrose (D50) infusion 12.5-25 g  hydrALAZINE (APRESOLINE) 20 mg/mL injection 20 mg  
 piperacillin-tazobactam (ZOSYN) 4.5 g in 0.9% sodium chloride 100 mL MBP EXTENDED 4 HOUR INFUSION Data:   
 
[x] REVIEWED Recent Results (from the past 24 hour(s)) GLUCOSE, POC Collection Time: 10/03/18  1:07 PM  
Result Value Ref Range Glucose (POC) 152 (H) 70 - 110 mg/dL GLUCOSE, POC Collection Time: 10/03/18  5:20 PM  
Result Value Ref Range Glucose (POC) 159 (H) 70 - 110 mg/dL GLUCOSE, POC Collection Time: 10/04/18 12:08 AM  
Result Value Ref Range Glucose (POC) 113 (H) 70 - 110 mg/dL RENAL FUNCTION PANEL Collection Time: 10/04/18  1:00 AM  
Result Value Ref Range Sodium 142 136 - 145 mmol/L Potassium 3.8 3.5 - 5.5 mmol/L Chloride 102 100 - 108 mmol/L  
 CO2 26 21 - 32 mmol/L Anion gap 14 3.0 - 18 mmol/L Glucose 101 (H) 74 - 99 mg/dL BUN 27 (H) 7.0 - 18 MG/DL Creatinine 3.62 (H) 0.6 - 1.3 MG/DL  
 BUN/Creatinine ratio 7 (L) 12 - 20 GFR est AA 21 (L) >60 ml/min/1.73m2 GFR est non-AA 17 (L) >60 ml/min/1.73m2 Calcium 7.7 (L) 8.5 - 10.1 MG/DL Phosphorus 4.1 2.5 - 4.9 MG/DL Albumin 1.9 (L) 3.4 - 5.0 g/dL LIPASE Collection Time: 10/04/18  1:00 AM  
Result Value Ref Range Lipase 5503 (H) 73 - 393 U/L  
CBC W/O DIFF Collection Time: 10/04/18  1:00 AM  
Result Value Ref Range WBC 27.0 (H) 4.6 - 13.2 K/uL  
 RBC 2.63 (L) 4.70 - 5.50 M/uL HGB 7.7 (L) 13.0 - 16.0 g/dL HCT 22.6 (L) 36.0 - 48.0 % MCV 85.9 74.0 - 97.0 FL  
 MCH 29.3 24.0 - 34.0 PG  
 MCHC 34.1 31.0 - 37.0 g/dL  
 RDW 14.9 (H) 11.6 - 14.5 % PLATELET 470 104 - 391 K/uL MPV 9.2 9.2 - 11.8 FL  
PTT Collection Time: 10/04/18  1:00 AM  
Result Value Ref Range aPTT 33.2 23.0 - 36.4 SEC PROTHROMBIN TIME + INR Collection Time: 10/04/18  1:00 AM  
Result Value Ref Range Prothrombin time 15.1 11.5 - 15.2 sec INR 1.2 0.8 - 1.2 HEPATIC FUNCTION PANEL  Collection Time: 10/04/18  1:00 AM  
 Result Value Ref Range Protein, total 6.9 6.4 - 8.2 g/dL Albumin 1.9 (L) 3.4 - 5.0 g/dL Globulin 5.0 (H) 2.0 - 4.0 g/dL A-G Ratio 0.4 (L) 0.8 - 1.7 Bilirubin, total 2.8 (H) 0.2 - 1.0 MG/DL Bilirubin, direct 2.1 (H) 0.0 - 0.2 MG/DL Alk. phosphatase 345 (H) 45 - 117 U/L  
 AST (SGOT) 198 (H) 15 - 37 U/L  
 ALT (SGPT) 123 (H) 16 - 61 U/L  
GLUCOSE, POC Collection Time: 10/04/18  6:29 AM  
Result Value Ref Range Glucose (POC) 118 (H) 70 - 110 mg/dL POC G3 Collection Time: 10/04/18  7:58 AM  
Result Value Ref Range Device: VENT    
 FIO2 (POC) 0.50 % pH (POC) 7.449 7.35 - 7.45    
 pCO2 (POC) 37.7 35.0 - 45.0 MMHG  
 pO2 (POC) 62 (L) 80 - 100 MMHG  
 HCO3 (POC) 26.0 22 - 26 MMOL/L  
 sO2 (POC) 92 92 - 97 % Base excess (POC) 2 mmol/L Mode SIMV Tidal volume 500 ml Set Rate 10 bpm  
 PEEP/CPAP (POC) 5 cmH2O Pressure support 15 cmH2O Allens test (POC) YES Inspiratory Time 0.9 sec Total resp. rate 26 Site RIGHT RADIAL Patient temp. 99.0 Specimen type (POC) ARTERIAL Performed by Navin Monday Volume control plus YES    
CULTURE, BLOOD Collection Time: 10/04/18  9:52 AM  
Result Value Ref Range Special Requests: PERIPHERAL Culture result: PENDING   
LACTIC ACID Collection Time: 10/04/18  9:52 AM  
Result Value Ref Range Lactic acid 1.0 0.4 - 2.0 MMOL/L  
CULTURE, BLOOD Collection Time: 10/04/18 11:20 AM  
Result Value Ref Range Special Requests: PERIPHERAL Culture result: PENDING   
GLUCOSE, POC Collection Time: 10/04/18 12:05 PM  
Result Value Ref Range Glucose (POC) 128 (H) 70 - 110 mg/dL Masoud Casey NP

## 2018-10-04 NOTE — PROGRESS NOTES
Internal Medicine Progress Note Patient's Name: Adrian Cowan Admit Date: 9/26/2018 Length of Stay: 7 Assessment/Plan Active Hospital Problems Diagnosis Date Noted  Septic shock ([address] 09/30/2018 Probably secondary to pneumonia (suspicious for aspiration). Less likely, secondary to acute pancreatitis.  Ischemic encephalopathy 09/30/2018  Cardiac arrest with ventricular fibrillation ([address] 09/29/2018 ROSC before defibrillation could be attempted.  Abnormal blood coagulation profile 09/29/2018  Acute pancreatitis 09/29/2018 Shock, leukocytosis, elevated lipase but radiographically no ductal dilatation in pancreas. GB stones without radiographic stigmata of acute cholecystitis  Elevated LFTs 09/28/2018  History of stroke 09/27/2018 With residual R hemiparesis  Acute-on-chronic kidney injury ([address] 09/27/2018  Acute cystitis 09/27/2018  Elevated troponin 09/27/2018  Transaminitis 09/27/2018  Acute kidney injury ([facility] 09/27/2018  Pneumonia of both lower lobes 09/11/2018  Essential hypertension 09/10/2018  Diabetes mellitus type 2, controlled ([facility] 09/10/2018 Pneumonia not POA 
 
- Vent mgmt per ICU 
- EGD results noted yest 
- F/u GI 
- Cont protonix IV 
- HD mgmt per nephro - Cont broad spec IVAB 
- Trend CBC 
- F/u cult - MRI w/ evidence of severe anoxic brain injury - Appreciate neuro assistance - Appreciate consulting services - Pt will have any meaningful neuro recovery, prognosis is very grim 
- DVT protocol I have personally reviewed all pertinent labs and films that have officially resulted over the last 24 hours. I have personally checked for all pending labs that are awaiting final results. Subjective Pt s/e @ bedside Remains intubated Neuro exam worsening Unable to assess ROS Objective Visit Vitals  /57  Pulse 92  Temp 99.2 °F (37.3 °C)  Resp 20  
 Ht 5' 7\" (1.702 m)  Wt 92.7 kg (204 lb 5.9 oz)  SpO2 96%  BMI 32.01 kg/m2 Physical Exam: 
General Appearance: Intubated, not following commands HENT: normocephalic/atraumatic, + ETT Neck: No JVD, hematoma R side where TDC is 
Lungs: B/L crackles, vent-assisted BS 
CV: RRR, no m/r/g Abdomen: soft, non-tender, normal bowel sounds Extremities: no cyanosis, no peripheral edema Neuro: Unresponsive to commands, no withdrawal to pain, negative corneal, no pupillary reaction to light on left side Skin: Normal color, intact Intake and Output: 
Current Shift:  10/04 0701 - 10/04 1900 In: 100 [I.V.:100] Out: 0 Last three shifts:  10/02 1901 - 10/04 0700 In: 620 [I.V.:495] Out: 2086 Lab/Data Reviewed: 
CMP:  
Lab Results Component Value Date/Time  10/04/2018 01:00 AM  
 K 3.8 10/04/2018 01:00 AM  
  10/04/2018 01:00 AM  
 CO2 26 10/04/2018 01:00 AM  
 AGAP 14 10/04/2018 01:00 AM  
  (H) 10/04/2018 01:00 AM  
 BUN 27 (H) 10/04/2018 01:00 AM  
 CREA 3.62 (H) 10/04/2018 01:00 AM  
 GFRAA 21 (L) 10/04/2018 01:00 AM  
 GFRNA 17 (L) 10/04/2018 01:00 AM  
 CA 7.7 (L) 10/04/2018 01:00 AM  
 PHOS 4.1 10/04/2018 01:00 AM  
 ALB 1.9 (L) 10/04/2018 01:00 AM  
 ALB 1.9 (L) 10/04/2018 01:00 AM  
 TP 6.9 10/04/2018 01:00 AM  
 GLOB 5.0 (H) 10/04/2018 01:00 AM  
 AGRAT 0.4 (L) 10/04/2018 01:00 AM  
 SGOT 198 (H) 10/04/2018 01:00 AM  
  (H) 10/04/2018 01:00 AM  
 
CBC:  
Lab Results Component Value Date/Time WBC 27.0 (H) 10/04/2018 01:00 AM  
 HGB 7.7 (L) 10/04/2018 01:00 AM  
 HCT 22.6 (L) 10/04/2018 01:00 AM  
  10/04/2018 01:00 AM  
 
 
Imaging Reviewed: 
[facility] Chest [facility] Result Date: 10/4/2018 Chest AP portable INDICATION: Mechanical ventilation, endotracheal tube. COMPARISON: X-ray 10/03/2018. FINDINGS: Endotracheal tube tip is approximately 2.8 cm from the billy.  There appears a esophageal probe which is slightly retracted with tip now near thoracic inlet at the level of the clavicles. NG tube extends into the stomach out of field-of-view. Stable right IJ central catheter with tip at the superior vena cava. Stable left arm midline with tip in the subclavian vein. Stable cardiomediastinal silhouette. Cardiac silhouette is obscured by right basal opacity but likely enlarged. There is prominence of the central pulmonary vessels and interstitial markings similar to prior exam. Confluent opacity in the right lower lung zone obscuring the diaphragm, right heart border and costophrenic angle. Left costophrenic angle is sharp. No pneumothorax. IMPRESSION: 1. Right IJ catheter, left midline, endotracheal tube, esophageal probe and NG tube as above, appearing in adequate position. 2. Cardiomegaly and diffuse interstitial thickening without appreciable change. Right lower lung zone consolidation is stable to minimally improved given slight differences in projection. Small right pleural effusion difficult to exclude. Medications Reviewed: 
Current Facility-Administered Medications Medication Dose Route Frequency  iohexol (OMNIPAQUE) 240 mg iodine/mL solution 50 mL  50 mL Oral RAD ONCE  
 [START ON 10/5/2018] vancomycin (VANCOCIN) 750 mg in 0.9% sodium chloride (MBP/ADV) 250 mL ADV  750 mg IntraVENous Q MON, WED & FRI  midazolam (VERSED) injection    PRN  
 acetaminophen (TYLENOL) solution 325 mg  325 mg Per NG tube Q4H PRN  pantoprazole (PROTONIX) 40 mg in sodium chloride 0.9% 10 mL injection  40 mg IntraVENous Q12H  
 0.9% sodium chloride infusion 250 mL  250 mL IntraVENous PRN  
 heparin (porcine) pf 100 Units  100 Units InterCATHeter PRN  
 0.9% sodium chloride infusion 250 mL  250 mL IntraVENous PRN  
 insulin lispro (HUMALOG) injection   SubCUTAneous Q6H  
 insulin glargine (LANTUS) injection 6 Units  6 Units SubCUTAneous DAILY  albuterol (PROVENTIL VENTOLIN) nebulizer solution 2.5 mg  2.5 mg Nebulization Q6H RT  
  heparin (porcine) 1,000 unit/mL injection 1,000 Units  1,000 Units InterCATHeter DIALYSIS PRN  
 VANCOMYCIN INFORMATION NOTE   Other Rx Dosing/Monitoring  senna-docusate (PERICOLACE) 8.6-50 mg per tablet 1 Tab  1 Tab Oral BID PRN  
 ondansetron (ZOFRAN) injection 4 mg  4 mg IntraVENous Q4H PRN  
 glucose chewable tablet 16 g  4 Tab Oral PRN  
 glucagon (GLUCAGEN) injection 1 mg  1 mg IntraMUSCular PRN  
 dextrose (D50) infusion 12.5-25 g  25-50 mL IntraVENous PRN  
 hydrALAZINE (APRESOLINE) 20 mg/mL injection 20 mg  20 mg IntraVENous Q6H PRN  piperacillin-tazobactam (ZOSYN) 4.5 g in 0.9% sodium chloride 100 mL MBP EXTENDED 4 HOUR INFUSION   4.5 g IntraVENous Q12H Gregory Elise DO Internal Medicine, Hospitalist 
Pager: [phone] [facility]

## 2018-10-04 NOTE — PROGRESS NOTES
In Patient Progress note Admit Date: 9/26/2018 Impression: 1. Dialysis dependant JULIANNA d/t ischemic atn in a setting of acute pyelonephritis/acute cholecystitis with sepsis and cardiac arrest 9/29. Tolerated HD yesterday , UF ~ 2 lit , volume status looks ok Noted plans for a CT abdomen with IV contrast today Plan to do 3 hrs of HD today after the CT to minimize risk of URSULA 
2. S/p cardiopulm arrest 9/29. Off pressors. 3.  Acute pyelonephritis/sepsis. Noted CT abdomen today , On abx. 4.  Abnormal lft's/acute cholecystitis? On abx. GI on the case. 5.  Oozing from the site of rt ij temporary dialysis catheter and other iv sites. Received ddavp. No heparin with dialysis. 6.  UGI bleed, EGD results noted- esophageal clot. 7.  Acute blood loss anemia. Stable H&H  
8. Anoxic brain injury superimposed on recent cva. MRI results noted. Please call with questions, 
 
Fely Randle MD FASN Cell [phone] Pager: [phone] Subjective: Tolerated HD well Current Facility-Administered Medications:  
  [START ON 10/5/2018] vancomycin (VANCOCIN) 750 mg in 0.9% sodium chloride (MBP/ADV) 250 mL ADV, 750 mg, IntraVENous, Q MON, WED & FRI, Melquiades Day DO 
  midazolam (VERSED) injection, , , PRN, Collette Sicks, MD, 2 mg at 10/03/18 1541   acetaminophen (TYLENOL) solution 325 mg, 325 mg, Per NG tube, Q4H PRN, Dennys Koenig MD, 325 mg at 10/02/18 1801   pantoprazole (PROTONIX) 40 mg in sodium chloride 0.9% 10 mL injection, 40 mg, IntraVENous, Q12H, Dennys Koenig MD, 40 mg at 10/04/18 0841 
  0.9% sodium chloride infusion 250 mL, 250 mL, IntraVENous, PRN, Melquiades Day DO 
  heparin (porcine) pf 100 Units, 100 Units, InterCATHeter, PRN, Cesar Crawford MD 
  0.9% sodium chloride infusion 250 mL, 250 mL, IntraVENous, PRN, Dennys Koenig MD 
  insulin lispro (HUMALOG) injection, , SubCUTAneous, Q6H, Milton Razo Sriram Massey DO, Stopped at 10/03/18 0600 
  insulin glargine (LANTUS) injection 6 Units, 6 Units, SubCUTAneous, DAILY, Enriqueta Casillas DO, 6 Units at 10/04/18 0840 
  albuterol (PROVENTIL VENTOLIN) nebulizer solution 2.5 mg, 2.5 mg, Nebulization, Q6H RT, Rosalie Gamez MD, 2.5 mg at 10/04/18 1315   heparin (porcine) 1,000 unit/mL injection 1,000 Units, 1,000 Units, InterCATHeter, DIALYSIS PRN, Mian SPRINGER MD, 1,000 Units at 09/28/18 1332   VANCOMYCIN INFORMATION NOTE, , Other, Rx Dosing/Monitoring, Radhika Shultz MD 
  senna-docusate (PERICOLACE) 8.6-50 mg per tablet 1 Tab, 1 Tab, Oral, BID PRN, Lorrine Alonzo, DO 
  ondansetron (ZOFRAN) injection 4 mg, 4 mg, IntraVENous, Q4H PRN, Zan Nettles DO, 4 mg at 09/28/18 0539 
  glucose chewable tablet 16 g, 4 Tab, Oral, PRN, Lorrine Alonzo, DO 
  glucagon (GLUCAGEN) injection 1 mg, 1 mg, IntraMUSCular, PRN, Zan Nettles, DO 
  dextrose (D50) infusion 12.5-25 g, 25-50 mL, IntraVENous, PRN, Zan Nettles DO, 12.5 g at 09/28/18 1206   hydrALAZINE (APRESOLINE) 20 mg/mL injection 20 mg, 20 mg, IntraVENous, Q6H PRN, Cristian Christina NP, 20 mg at 10/02/18 1759   piperacillin-tazobactam (ZOSYN) 4.5 g in 0.9% sodium chloride 100 mL MBP EXTENDED 4 HOUR INFUSION , 4.5 g, IntraVENous, Q12H, Beatrice Santiago MD, Last Rate: 25 mL/hr at 10/04/18 0900, 4.5 g at 10/04/18 0900 Objective:  
 
Visit Vitals  /57 (BP 1 Location: Right arm, BP Patient Position: At rest)  Pulse 91  Temp 99.9 °F (37.7 °C)  Resp 25  
 Ht 5' 7\" (1.702 m)  Wt 92.7 kg (204 lb 5.9 oz)  SpO2 97%  BMI 32.01 kg/m2 Intake/Output Summary (Last 24 hours) at 10/04/18 1340 Last data filed at 10/04/18 1200 Gross per 24 hour Intake              295 ml Output             2026 ml Net            -1731 ml Physical Exam: Intubated sedated RS AEBE clear CVS s1 s2 wnl no JVD 
GI soft BS + Ext no edema Data Review: Recent Labs 10/04/18 
 0100 WBC  27.0*  
RBC  2.63* HCT  22.6* MCV  85.9 MCH  29.3 MCHC  34.1  
RDW  14.9* Recent Labs 10/04/18 
 1120  10/04/18 
 0100  10/03/18 
 0400  10/02/18 
 1728  10/02/18 
 4913 BUN   --   27*  57*   --   43* CREA   --   3.62*  6.24*   --   4.77* CA   --   7.7*  7.5*   --   7.3* ALB  1.7*  1.9*  1.9*  1.9*   --   2.0*  
K   --   3.8  4.2  4.4  3.7 NA   --   142  142   --   141 CL   --   102  100   --   101 CO2   --   26  23   --   25 PHOS   --   4.1  6.5*   --   5.3*  
GLU   --   101*  130*   --   167* Ignacio Perry MD

## 2018-10-04 NOTE — PROGRESS NOTES
VENTILATOR Care plan 
 
Problem: Ventilator Management Goal: *Adequate oxygenation/ ventilation/ and extubation Patient: 
   
 
Gabe Mendoza     57 y.o.   male     10/4/2018  9:58 AM 
Patient Active Problem List  
Diagnosis Code  Stroke (cerebrum) ([facility]) I63.9  Stroke ([facility] I63.9  Rhabdomyolysis M62.82  
 Dehydration E86.0  
 Essential hypertension I10  
 Diabetes mellitus type 2, controlled ([facility] E11.9  Non compliance w medication regimen Z91.14  
 Pneumonia of both lower lobes J18.9  DM (diabetes mellitus) ([facility] E11.9  History of stroke Z80.78  
 Acute-on-chronic kidney injury ([facility] N17.9, N18.9  Acute cystitis N30.00  Elevated troponin R74.8  Transaminitis R74.0  Acute kidney injury ([facility] N17.9  Elevated LFTs R94.5  Cardiac arrest with ventricular fibrillation ([facility]) I46.9, I49.01  
 Abnormal blood coagulation profile R79.1  Acute pancreatitis K85.90  Septic shock ([facility]) A41.9, R65.21  
 Ischemic encephalopathy I67.89 Pneumonia of both lower lobes due to infectious organism ([facility] [J18.1] Reason patient intubated:  Acute respiratory failure secondary to code blue. 
   
Ventilator day: 6 
   
Ventilator settings: SIMV 10 / 500 / PS 15 / +5 / 50% 
   
ETT Size/Placement:  7.5 ETT 25 lip ABG: 
Date:10/4/2018 Lab Results Component Value Date/Time PHI 7.449 10/04/2018 07:58 AM  
 PCO2I 37.7 10/04/2018 07:58 AM  
 PO2I 62 (L) 10/04/2018 07:58 AM  
 HCO3I 26.0 10/04/2018 07:58 AM  
 FIO2I 0.50 10/04/2018 07:58 AM  
 
 
Chest X-ray: 
Date:10/4/2018 Results from [facility] Encounter encounter on 09/26/18 XR CHEST PORT Narrative Chest AP portable INDICATION: Mechanical ventilation, endotracheal tube. COMPARISON: X-ray 10/03/2018. FINDINGS: Endotracheal tube tip is approximately 2.8 cm from the billy. There 
appears a esophageal probe which is slightly retracted with tip now near thoracic inlet at the level of the clavicles. NG tube extends into the stomach 
out of field-of-view. Stable right IJ central catheter with tip at the superior 
vena cava. Stable left arm midline with tip in the subclavian vein. Stable cardiomediastinal silhouette. Cardiac silhouette is obscured by right 
basal opacity but likely enlarged. There is prominence of the central pulmonary 
vessels and interstitial markings similar to prior exam. Confluent opacity in 
the right lower lung zone obscuring the diaphragm, right heart border and 
costophrenic angle. Left costophrenic angle is sharp. No pneumothorax. Impression IMPRESSION: 
 
1. Right IJ catheter, left midline, endotracheal tube, esophageal probe and NG 
tube as above, appearing in adequate position. 2. Cardiomegaly and diffuse interstitial thickening without appreciable change. Right lower lung zone consolidation is stable to minimally improved given slight 
differences in projection. Small right pleural effusion difficult to exclude. Lab Test: 
Date:10/4/2018 WBC:  
Lab Results Component Value Date/Time WBC 27.0 (H) 10/04/2018 01:00 AM  
HGB: Lab Results Component Value Date/Time HGB 7.7 (L) 10/04/2018 01:00 AM  
 PLTS: Lab Results Component Value Date/Time PLATELET 620 35/85/0475 01:00 AM  
 
 
SaO2%/flow:  
SpO2 Readings from Last 1 Encounters:  
10/04/18 98% Vital Signs:   Patient Vitals for the past 8 hrs: 
 Temp Pulse Resp BP SpO2  
10/04/18 0900 99.2 °F (37.3 °C) 90 25 138/57 98 % 10/04/18 0830 99 °F (37.2 °C) 87 24 142/57 98 % 10/04/18 0800 99.2 °F (37.3 °C) 87 20 150/67 97 % 10/04/18 0759 - 86 22 - 98 % 10/04/18 0730 99 °F (37.2 °C) 87 22 154/67 98 % 10/04/18 0700 99 °F (37.2 °C) 86 23 145/63 98 % 10/04/18 0630 - 86 22 145/62 98 % 10/04/18 0600 - 86 24 147/58 97 % 10/04/18 0530 - 86 23 162/73 -  
10/04/18 0500 - 87 19 138/63 98 % 10/04/18 0444 - 88 22 - 98 % 10/04/18 0430 99.3 °F (37.4 °C) 87 21 138/61 98 % 10/04/18 0400 99.3 °F (37.4 °C) 88 24 140/58 98 % 10/04/18 0330 - 88 23 138/60 98 % 10/04/18 0300 99.3 °F (37.4 °C) 88 25 140/61 98 % 10/04/18 0230 - 90 25 134/59 98 % 10/04/18 0215 - 90 26 150/66 98 % 10/04/18 0200 99.5 °F (37.5 °C) 89 25 146/67 98 % Wean Screen Pass (Yes or No): No 
Wean Screen Reason for Failure: Mental status PLAN OF CARE: Maintain vent support. GOAL: Adequate oxygenation and ventilation.

## 2018-10-04 NOTE — ROUTINE PROCESS
. 
Acute HEmodialysis flow sheet Patient information Barbara Vogt MRN: [id number]      ULB:[id number]  [place]# EBOM#[id number] Hospital: [place] [address] DX:   
       []1st Time Acute  []Stat[x] Routine []Urgent []Chronic Unit  
       []Acute Room []Bedside  []ICU/CCU []ER Isolation Precautions: [x]Dialysis[] Airborne []Contact []Droplet []Reverse Special Considerations:    [] Blood Consent Verified  [x]N/A Allergies: 
[] NKA  [] Reviewed No Known Allergies Code Status [x]Full Code [] DNR  []Other Diet: tube feed Diabetic: []Yes []No 
  
 Hemodialysis Orders Physician: Erika Miramontes Dialyzer Revaclear 300 Duration 3 BFR: 300 DFR:600 Dialysate: K 2 CA 32.5 Na 140 Bicarb 35 Dry Weight: UF Goal: 1000 ml Heparin:  []Bolus   Units    []Hourly  Units      [x]None BP Tx:  []NS  200ml/Bolus    []Other     [x]N/A  
    Labs: Pre:  Post: [x]N/A Additional Orders: [x]N/A [x]Signed Treatment Consent   [x] Verified   [] Obtained Primary Nurse Report: First initial/ Last Name/Title Pre Dialysis: kyle Saini    Time: 8474 Access CATHETER ACCESS: [] N/A  [x] RIGHT  [] LEFT  [x] IJ  [] SUBCL [] FEM [] First use X-ray  [] Tunnel     [x] Non-Tunneled [x] No S/S infection  [] Redness [] Drainage  [] Cultured [] Swelling 
                       [] Pain  
                       [x] Medical Aseptic [] Prep Dressing Changed  
                       [] Clotted [x] Patent []      Flows: [x] Good [] Poor [] Reversed If Access Problem Dr. Kai Cruz: [] Yes [] No    Date:    [x] N/A  
     GRAFT/FISTULA ACCESS:  [x] N/A  [] RIGHT  [] LEFT  [] UE  [] LE   
                       [] AVG  [] AVF [] BUTTONHOLE  [] +BRUIT/THRILL 
     [] MEDICAL ASEPTIC PREP [] No S/S infection  [] Redness [] Drainage  [] Cultured [] Swelling [] Pain Needle Gauge                              Length: If Access Problem Dr. Denise Henriquez: [] Yes [] No    Date:     [] N/A General Assessment LUNGS:  SaO2% 99 [] Clear [x] Coarse [] Crackles [] Wheezing  
            [] Diminished Location: [] RLL [] LLL [] RUL [] JOSSIE   
     COUGH:  [] Productive [] Dry [x] N/A  RESPIRATIONS: [x] Easy [] Labored THERAPY: [] RA   [] NC     L/min    Mask: [] NRB [] Venti  O2%    
             [] Ventilator [x] Intubated [] Trach [] BiPap [] CPap CARDIAC: [x] Regular [] Irregular [] Pericardial Rub [] JVD [] Monitored Rhythm: EDEMA: [] None [x] Generalized [] Facial [] Pedal [] UE [] LE 
           [] Pitting [] 1 [] 2 [] 3 [] 4    [] Right [] Left [] Bilateral  
    SKIN:    [x] Warm [] Hot [] Cold  [] Dry [] Pale [] Diaphoretic  
           [] Flushed [] Jaundiced [] Cyanotic [] Rash [] Weeping LOC:    [] Alert  Oriented to: [] Person [] Place [] Time  
          [] Confused [] Lethargic [x] Medically sedated [x] Non-responsive GI/ABDOMEN: [] Flat [x] Distended [] Soft [] Firm [] Obese [] Diarrhea 
 [] Bowel Sounds     []Nausea [] Vomiting PAIN: [x] 0 [] 1 [] 2 [] 3 [] 4 [] 5 [] 6 [] 7 [] 8 [] 9 [] 10 Scale 1-10 Action/Follow Up MOBILITY: [] Amb [] Amb/Assist [x] Bed  [] Wheelchair CUrrent LABS HBsAg ONLY: Date Drawn 9/28/18 [x] Negative [] Positive  [] Unknown. HBsAb: Date Drawn 9/28/18 [x] Susceptible <10 [] Immune ?10 [] Unknown Date of Current Labs:  
    
Lab Results Component Value Date/Time  Sodium 142 10/04/2018 01:00 AM  
 Potassium 3.8 10/04/2018 01:00 AM  
 Chloride 102 10/04/2018 01:00 AM  
 CO2 26 10/04/2018 01:00 AM  
 BUN 27 (H) 10/04/2018 01:00 AM  
 Creatinine 3.62 (H) 10/04/2018 01:00 AM  
 Calcium 7.7 (L) 10/04/2018 01:00 AM  
 Magnesium 3.8 (H) 09/29/2018 11:10 AM  
 Phosphorus 4.1 10/04/2018 01:00 AM  
 HCT 21.4 (L) 10/04/2018 02:01 PM  
 HGB 7.2 (L) 10/04/2018 02:01 PM  
 PLATELET 424 18/44/2162 02:01 PM  
 INR 1.2 10/04/2018 01:00 AM  
  
Education Person Educated: [x] Patient [] Family [] Other Knowledge base: [x] None [] Minimal [] Substantial   
     Barriers to learning non responsive  [] None Preferred method of learning: [] Written [x] Oral [] Visual [] Hands on Topic: [x] Access Care [] S&S of infection [] Fluid Management [] K+ 
               [] Procedural    [] Albumin [] Medications [] Tx Options [] Transplant 
                [] Diet [] Other Teaching Tools: [] Explain [] Demonstration [] Handout [] Teachback Ro/hemodiaylsis machine safety checks- before each treatment Machine Serial # 12 
 [] # 1 [id number] [] # 2 [id number] [] # 3 [id number] [] # 4 [id number] [] # 5 [id number] [] # [phone] [] # [phone] Alarm Test: [x] Pass Time        RO Serial # 12[] [id number] (Large dual station RO) [] # 1  [id number]  (Portable # 1) [] # 2  [id number]  (Portable # 2) [] # 3  [id number]  (Portable # 3) [] # 4  [id number]  (Portable # 4) [] # 5  [id number]  (Portable # 5) Lot #s: Dialyzer [id number] exp. Tubing [id number] exp. Saline -jt exp. [x] RO/Machine Log Complete    [x] Extracorporeal circuit Tested for integrity Dialysate: pH 7.4 Temp. 36  Conductivity: Meter 14 HD Machine 14  
chlorine testing- before each treatment and every 4 hours Total Chlorine: [x] Less than 0.1 ppm Time: 1750 2nd Check Time: 1950 (If greater than 0.1 ppm from Primary then every 30 minutes from Secondary) treatment Iniation-with dialysis precautions [x] All Connections Secured   [x] Saline Line Double Clamped    
[x] Venous Parameters Set    [x] Arterial Parameters Set    [x] Prime Given 250 ml            [x] Air Foam Detector Engaged Zohaib Nurse Signature/Title_Saleem SHAFFER Long__ Pre-Treatment Time 1745 B/P 153/70 HR 90 Temp. 37.8 Resp Rate 28 Pre Wt  
UF Calculations: Wt to lose:1000 ml(+) Oral:  ml(+) IV Meds/Fluids/Blood prods  ml(+) Prime/Rinse 500 ml 
(=)Total UF Goal 1500 mL Scale Type:[x] Bed scale [] Sling Scale [] Wheel Chair Scale [x]  Not Ordered [] Unable to obtain pt on stretcher Tx Initiation Note: right IJ catheter accessed and treatment started patient non verbal intubated no signs of pain  
[x] Time Out/Safety Check  Time: 1750 [facility] Signature/Title_Saleem P Yogesh INTRADIALYTIC MONITORING 
(SEE ATTACHED FLOWSHEET) POST TREATMENT Time 2105 B/P 154/67 HR 96 Temp 37.6 Resp. Rate 28 Post wt Time Medication Dose Volume Route Initials [facility] Signatures Title Initials Time 52 [facility] RN [address] [facility] Signature/Title:_Saleem Zuñiga_ Dialyzer cleared: [] Good [] Fair [] Poor    Blood Processed 21 Liters Net UF Removed 1000 mL Post Tx Access: AVF/AVG: Bleeding Stop                   Art. min Shree min []+bruit/thrill Catheter: Locking Solution      Art. 1.4 ml Shree 1.4 ml Post Assessment:  Skin: []Warm [x]Dry []Diaphoretic []Flushed [] Pale []Cyanotic Lungs: []Clear []Coarse []Crackles []Wheezing Cardiac: [x]Regular []Irregular  [x]Monitored rhythm Edema: []None [x]General []Facial []Pedal  []UE []LE []RIGHT []LEFT    []Bilateral  
   Pain: [x]0 []1[]2 []3 []4 []5 []6 []7 []8 []9 []10  
POST Tx Note: removed 1 liter tx 3 hours to remove contrast dye Primary Nurse Report: First initial/Last name/Title Post Dialysis:_GEE Machado_         Time:_2110_ Abbreviations: AVG-arterial venous graft, AVF-arterial venous fistula, IJ-Internal Jugular, Subcl-Subclavian, Fem-Femoral, Tx-treatment, AP/HR-apical heart rate, DFR-dialysate flow rate, BFR-blood flow rate, AP-arterial pressure, -venous pressure, UF-ultrafiltrate, TMP-transmembrane pressure, Shree-Venous, Art-Arterial, RO-Reverse Osmosis

## 2018-10-04 NOTE — CONSULTS
Prelim ID Consult (see Dictation [id number]) Patient: Orion Figueroa CSN: [id number] YOB: 1955  Age: 57 y.o. Sex: male Current abx Prior abx Meropenem/flucon 10/4-0 vanco 9/27-7 Zosyn 9/27-7 Assessment ->Rec:  
 
Persistent leukocytosis SIRS poss sepsis- MOF multiple ID and non-infectious concerns outlined below, complicated, wbc 47N today despite vancomycin/zosyn since 9/27 
-C diff neg 9/29, sput C albicans  ->stop zosyn, continue vancomycin  
->will empirically start meropenem to continue biliary pul coverage and start antifungal pending more information  
->fungitell  
->monitor bctx's -- if positive CoNS again, remove LUE midline, culture tip, could need to remove other lines Suspected Pyelo POA - CT B perineph stranding, UA tntc wbc, uctx ng ->likely treated at this point   
cholelithiaisis choledocholithiais suspected acute cholecystitis  poss cystic stone POA- abnl lft bili 4 alk phos 400 seen by GI and GS Dr. Lakeshia Giles, no plan for OR, for poss cholecystostomy if LFT worsen, bili, alk phos declining  ->per GI Poss pancreatitis - lipase 2200 POA now 5500, panc ok on adm CT, interpretation complicated by JULIANNA ->per GI - condition may not allow MRCP now  - if continue to climb ? Repeat CT  
B infiltrates - poss edema infiltrate - RLL consolid better 10/4 cxr  ->monitor MRSE bacteremia 9/27 1 of 2. Has LUE midline unclear when placed, repeat bctx's IP today  ->monitor repeat bctx's and low threshold to remove lines ARF POA now on HD - baseline cr 0.9 132/10.1 in ER 
-[facility] 9/28 ->per renal   
Bleeding -melena earlier, EGD 10/2 clot in esophagus bronch 10/3 R endobronch clot ->per others Suspected anoxic brain injury on MRI 10/3 SP VF arrest 9/29 ->per neurology understand family meeting planned CVA R hemiparesis 9/10   
resp failure sp intubation 9/29 Comorbid: HTN HLD CHF h/o steatohepatitis MICROBIOLOGY:  
9/27 bctx 1 of 2 MRSE uctx ng 
9/29 sput C albicans C diff neg 10/4 bctx x 2 IP 
 
LINES AND CATHETERS:  
RIMMA hanna [facility] 9/28 Thanks -- [facility] will follow with you JCSchwab, MD 
[facility] AT THE [facility] October 4, 2018 
[phone]

## 2018-10-04 NOTE — DIABETES MGMT
NUTRITIONAL RE-ASSESSMENT GLYCEMIC CONTROL/ PLAN OF CARE Jason Cluster           57 y.o.           9/26/2018 1. Acute renal failure, unspecified acute renal failure type ([address] 2. Acute UTI 3. Cholelithiasis with choledocholithiasis 4. History of CVA (cerebrovascular accident) DM INTERVENTIONS/PLAN:  
 Recommend full strength Nepro TF at  20 ml/hour. Advance as tolerated to goal rate of 40 ml/hour. Tube feeding will provide 1728 calories,  76 g protein/d with 680 milliliters free water/d from TF. ASSESSMENT:  
Nutritional Status: 
Pt is overweight related to excess caloric intake as evidenced by 135% ideal weight and BMI= 32.5kg/m2. Pt meets criteria for obesity. Altered nutrition-related lab values RT DX. Diabetes Mellitus  related to lack of adherence to nutrition and medication recommendations as evidenced by A1c of 10% Altered nutrition-related lab values RT DX. Acute renal failure aeb requiring dialysis. Pt w/hypoalbuminemia as evidenced by albumin=1.7 mg/dl and prealbumin 10.5. Unstageable area on sacrum noted. Altered nutrition related lab values r/t pancreatitis AEB lipase 5503. Diabetes Management:  BG well controlled on current insulin Recent blood glucose:    
 
Lab Results Component Value Date/Time  (H) 10/04/2018 01:00 AM  
 GLUCPOC 128 (H) 10/04/2018 12:05 PM  
 GLUCPOC 118 (H) 10/04/2018 06:29 AM  
 GLUCPOC 113 (H) 10/04/2018 12:08 AM  
 
Within target range (non-ICU: <140; ICU<180): [x] Yes   []  No 
 
Current Insulin regimen: 6 units Lantus/24 hrs plus corrective lispro Home medication/insulin regimen:  
Key Antihyperglycemic Medications   
    
  
 insulin glargine (LANTUS) 100 unit/mL injection 10 units qhs  Indications: type 2 diabetes mellitus  
 insulin lispro (HUMALOG) 100 unit/mL injection 4 units with meals HbA1c: 10% equivalent  to ave Blood Glucose of 240mg/dl for 2-3 months prior to admission Adequate glycemic control PTA:  [] Yes  [x] No 
  
SUBJECTIVE/OBJECTIVE: Information obtained from: ICU rounds Diet:  Npo to resume  Nepro TF at 20 ml/hr after CT Patient Vitals for the past 100 hrs: 
 % Diet Eaten 10/03/18 1200 0 % 10/03/18 0800 0 % Medications: [x]                Reviewed Labs:  
Lab Results Component Value Date/Time Hemoglobin A1c 10.0 (H) 09/30/2018 04:18 AM  
 
Lab Results Component Value Date/Time Sodium 142 10/04/2018 01:00 AM  
 Potassium 3.8 10/04/2018 01:00 AM  
 Chloride 102 10/04/2018 01:00 AM  
 CO2 26 10/04/2018 01:00 AM  
 Anion gap 14 10/04/2018 01:00 AM  
 Glucose 101 (H) 10/04/2018 01:00 AM  
 BUN 27 (H) 10/04/2018 01:00 AM  
 Creatinine 3.62 (H) 10/04/2018 01:00 AM  
 Calcium 7.7 (L) 10/04/2018 01:00 AM  
 Magnesium 3.8 (H) 09/29/2018 11:10 AM  
 Phosphorus 4.1 10/04/2018 01:00 AM  
 Albumin 1.7 (L) 10/04/2018 11:20 AM  
 
 
Anthropometrics: IBW : 69.9 kg (154 lb), % IBW (Calculated): 134.85 %, BMI (calculated): 32 Wt Readings from Last 1 Encounters:  
10/04/18 92.7 kg (204 lb 5.9 oz) Ht Readings from Last 1 Encounters:  
10/02/18 5' 7\" (1.702 m) Estimated Nutrition Needs:  1880 Kcals/day, Protein (g): 85 g Fluid (ml): 1800 ml Based on:   [x]          Actual BW    []          ABW   []            Adjusted BW   
Nutrition Diagnoses: as stated above Nutrition Interventions: TF 
Goal:  
Blood glucose will be within target range of  mg/dL by 10/7-met. Intake will meet >75% of energy and protein requirements by 10/9. Gradual weight loss post discharge 1 lb per week by 10/14. Nutrition Monitoring and Evaluation []     Monitor po intake on meal rounds 
[x]     Continue inpatient monitoring and intervention 
[]     Other: 
 
 
Nutrition Risk:  [x]   High     []  Moderate    []  Minimal/Uncompromised Samanstacy Alatorre RD 
pgr [phone]

## 2018-10-05 NOTE — PROGRESS NOTES
Pharmacy Dosing Services: Vancomycin Indication: Sepsis Day of therapy: 8 Other Antimicrobials (Include dose, start day & day of therapy): 
Zosyn 4.5 gm IV E.I. Q12h - D/C'd 
Meropenem 500mg IV Q12 (Started 10/4) Loading dose (date given): 2500mg on  Current Maintenance dose: Dosing per level with intermittent HD Goal Vancomycin Level: 15-20 
(Trough 15-20 for most infections, 20 for meningitis/osteomyelitis, pre-HD level ~25) Vancomycin Level (if drawn):  
 - : 27.2 
10/1: 35.1 
10/5: 17.8 Significant Cultures:  
 - blood - Staph epidermidis  - urine - NG - final 
 Sputum - Candida tropicalis 10/4 - Blood - pending Renal function stable? (unstable defined as SCr increase of 0.5 mg/dL or > 50% increase from baseline, whichever is greater) (Y/N): Y 
 
CAPD, Hemodialysis or Renal Replacement Therapy (Y/N): Y (intermittent) Recent Labs 10/05/18 
 0215  10/04/18 
 1401  10/04/18 
 0100   10/03/18 
 0400 CREA  3.14*   --   3.62*   --   6.24* BUN  24*   --   27*   --   57* WBC  25.5*  25.7*  27.0*   < >   --   
 < > = values in this interval not displayed. Temp (24hrs), Av.8 °F (28.2 °C), Min:37.6 °F (3.1 °C), Max:100.7 °F (38.2 °C) Creatinine Clearance (Creatinine Clearance (ml/min)): 25.8 mL/min Regimen assessment: - HD scheduled today - Follow daily when next HD is (doses entered as MWF and may change) Maintenance dose: 750mg IV following HD (MWF) Next scheduled level: Random on 10/8 at 0400 Pharmacy will follow daily and adjust medications as appropriate for renal function and/or serum levels. Thank you, Lindsey Soares Ghassan

## 2018-10-05 NOTE — PROGRESS NOTES
Patient for a repeat bronchoscopy. Patient remain unresponsive on the ventilator.  Leni Valadez RN

## 2018-10-05 NOTE — PROGRESS NOTES
Neurology Progress Note Admit Date: 9/26/2018 Length of Stay: 8 Primary Care: Justino Almazan MD  
Principle Problem: Cardiac arrest with ventricular fibrillation ([facility] [address] I evaluate and examed him personally and reviewed interval history and EEG today. Off sedation, but received 1 dose of Versed for bronchoscopy today. Remains unresponsive and intubated. EEG did not reveal seizures but a suppressed slow background which is predict poor prognosis. Patient's family will like to pursue PEG and Trach and long term care facility. I agreed with the following A/P. Will sign off and please call for questions. Minnie Szymanski MD  
 
Assessment:   
Encephalopathy / Anoxic Brain Injury Plan:   
Encephalopathy secondary to metabolic or infectious process. Anoxic Brain Injury Reverse metabolic abnormalities. Nephrology following. JULIANNA in setting of acute pyelonephritis/acute cholecystitis with sepsis. ABX per primary team. GI following. Active melena. Per notes: EGD 10/2 showed an extensive clot in the esophagus extending from 25 cm to 40 cm along the side of the esophagus, unclear if there is a perforation or mass under it. Unable to clear out the clot, attempted to do it with chaney net. Increased ALT, AST, and alk phos but trending down. MRI shows  confluent and relatively symmetric areas of abnormal diffusion signal 
involving bilateral cerebral hemispheres, greater posteriorly, very suspicious for anoxic injury. More discrete small foci of restricted diffusion involving the lizabeth, medulla, right periatrial white matter, and left corona radiata, likely representing more discrete small acute infarcts. No evidence of associated hemorrhage or mass effect. Poor prognosis. Was discussed yesterday with family (mother and daugther) by Dr. Lorin Shone. Reviewed imaging, hypoxic brain injury, and persistent vegetative state. They would like to pursue pathway of trach and PEG. EEG today. Interim History:  Remains in vegetative state requiring full support. History:  Jeremy Luther is a 56yo AAM with PMH of HTN, HLD, IDDM, and CVA with right hemiparesis who was sent from the NH due to abnormal labs ie elevated BUN/Creat. From notes he had cardiac arrest after dialysis. Initial CT at that time showed no acute abnormalities. Results reviewed:  
 
CT Results (most recent): IMPRESSION IMPRESSION: 
  
1. No acute intracranial abnormalities. 2.  Multifocal chronic infarcts and presumed chronic microvascular changes 
without significant interval change. 3.  High density material opacifying the left maxillary sinus and additional 
layering debris in the nasopharynx compatible with sinusitis, potentially fungal 
etiology or other proteinaceous debris. MRI Results (most recent): 
 
Results from Hospital Encounter encounter on 09/26/18 MRI BRAIN WO CONT Narrative EXAM: MRI BRAIN W/O CONTRAST INDICATION: Unresponsive, recent cardiac arrest, sepsis COMPARISON: MR brain 9/10/2018 TECHNIQUE: Multiplanar multi sequence MR imaging of the brain was performed 
utilizing axial T2, FLAIR, diffusion, T2 gradient echo, and multiplanar T1 pre 
contrast imaging. No gadolinium was administered due to specific clinician 
request. 
 
_______________________ FINDINGS:  
 
VENTRICLES/EXTRA-AXIAL SPACES: The ventricles are stable in their size and 
configuration. BRAIN PARENCHYMA:  
 
There are multiple areas of relatively confluent and symmetric cortical 
diffusion hyperintensity involving the bilateral cerebral hemispheres. This 
abnormal cortical signal is most prominent in the parieto-occipital regions, but 
is also seen involving the bilateral posterior temporal regions, in the 
bilateral frontal lobes particularly towards the vertex but also seen more 
laterally extending towards the perisylvian region.  Mild right insular cortical 
 involvement is suggested along its mid to anterior aspect. Corresponding ADC map 
does show cortical hypointensity as well. These areas all have T2/FLAIR 
corresponding hyperintensity. All these cortical findings are new since 09/10/2018. Slightly linear abnormal diffusion signal seen involving the left lateral aspect 
of the mid lizabeth, diffusion images 14 and 15, with relative ADC map 
hypointensity. Corresponding T2/FLAIR hyperintensity also seen. Similar focus of 
restricted diffusion present in the lateral aspect of the left middle cerebellar 
peduncle on image 12. Abnormal diffusion signal and FLAIR hyperintensity again 
seen anteriorly along the left medullary pyramid, axial image 9. Similar small 
discrete punctate foci of restricted diffusion also seen in the left frontal 
corona radiata on image 25 and in the right periatrial white matter on image 21. These foci also show T2/FLAIR hyperintensity. Elongated chronic infarct is present involving the anterior superior aspect 
right basal ganglia extending from the putamen into the lateral aspect of the 
caudate and adjacent white matter. Corresponding central fluid signal 
surrounding FLAIR hyperintensity is present. There is associated susceptibility 
artifact along the periphery of this lesion indicating hemosiderin staining from 
chronic hemorrhage This infarct is unchanged. Chronic right thalamic and anterior left thalamic infarcts are again seen. Small 
linear chronic right central pontine infarct is also present. Additional stable 
mild to moderate amount of T2/FLAIR hyperintense white matter lesions are seen 
within the periventricular and subcortical white matter , which are nonspecific, 
but likely represent chronic small vessel changes. No evidence of intracranial mass effect, midline shift, or herniation. Small foci of susceptibility artifact are seen involving the medial and superior right basal ganglia, left subinsular region. These are unchanged although better 
seen today with less motion artifact. VASCULATURE:  The major intracranial vascular flow voids are grossly normal. 
 
ORBITS: The bilateral lenses are absent, likely due to prior cataract surgery. PARANASAL SINUSES/MASTOIDS: Mild mucoperiosteal thickening is scattered in the 
paranasal sinuses. No air-fluid levels are present. Sinus disease greatest in 
the sphenoid sinuses, similar to prior study. Mild fluid signal is present in 
the bilateral mastoid air cells, nonspecific but likely inflammatory. Small 
amount layering fluid signal is seen posteriorly in the nasopharynx, likely 
representing mucous secretions given intubation and presence of gastroenteric 
tube. OSSEOUS STRUCTURES: Unremarkable OTHER: None.   
 
________________________ Impression IMPRESSION: 
 
1. Confluent and relatively symmetric areas of abnormal diffusion signal 
involving bilateral cerebral hemispheres, greater posteriorly, very suspicious 
for anoxic injury. 2. More discrete small foci of restricted diffusion involving the lizabeth, medulla, 
right periatrial white matter, and left corona radiata, likely representing more 
discrete small acute infarcts. No evidence of associated hemorrhage or mass 
effect. 3. Several chronic infarcts are again seen including right basal ganglia infarct 
with hemosiderin staining from prior hemorrhage. The other chronic infarcts 
include bilateral thalamic and right central lizabeth. 4. Stable, mild to moderate nonspecific white matter disease likely representing 
chronic small vessel changes. 5. Stable foci susceptibility artifact right basal ganglia and left subinsular 
region. These are nonspecific but likely representing hemosiderin staining from 
chronic microhemorrhage, often hypertensive in etiology. 6. No evidence of midline shift or herniation. 7. Layering mucus secretions and posterior nasopharynx most likely related to 
intubation. MRI brain FINDINGS:  
  
Motion artifact is present which limits evaluation. 
  
There is a small discrete area of abnormal restricted diffusion involving the 
left ventral medulla along the medullary pyramid, diffusion images 11 and 12. Corresponding T2/FLAIR hyperintensity is seen. No adjacent mass effect is 
present. No convincing associated hemorrhage is seen. No additional areas of 
acute infarct are present. 
  
Linear discrete chronic infarct is present in the right central mid to upper 
lizabeth. Chronic infarct is present anteriorly in the left thalamus extending into 
the genu of the left internal capsule. Small posterior right thalamic lacunar 
infarct is also seen. There is a more elongated area of encephalomalacia 
involving the anterior lateral right basal ganglia involving putamen extending 
into the caudate nucleus. This area is associated with peripheral susceptibility 
artifact representing hemosiderin staining from prior hemorrhage. 
  
The ventricles and sulci are mildly enlarged consistent with diffuse volume 
loss. 
  
Mild amount of T2/FLAIR hyperintense white matter lesions are seen within the 
periventricular and subcortical white matter , which are nonspecific, but likely 
represent chronic small vessel changes. 
  
There is no evidence of mass effect, midline shift, or herniation. Additional 
discrete focus of susceptibility artifact is present in the left temporal lobe 
along the subinsular region without mass effect or edema. The major intracranial 
vascular flow voids are grossly normal.  
  
The bilateral lenses are absent, likely due to prior cataract surgery. T1 
precontrast hyperintensity and T2 hypointensity is present left sphenoid sinus 
which is nonspecific and may represent proteinaceous/inspissated mucus. No 
air-fluid levels are present.  Mastoid air cells are unremarkable. 
  
 ________________________ 
  
IMPRESSION IMPRESSION: 
  
Motion degraded study. 
  
1. 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 No evidence of 
associated hemorrhage or mass effect. 
  
2. Chronic right central lizabeth and bilateral basal ganglia infarcts. Hemosiderin 
staining from prior chronic parenchymal hemorrhage involving superolateral right 
basal ganglia. 
  
3. Additional punctate focus susceptibility artifact the left temporal 
subinsular region,  nonspecific but likely representing hemosiderin staining 
from chronic microhemorrhage, often hypertensive in etiology.  
  
4. Mild nonspecific white matter disease likely representing chronic small 
vessel changes. 
  
 
Latest Hemoglobin A1C: 
Lab Results Component Value Date/Time Hemoglobin A1c 10.0 (H) 09/30/2018 04:18 AM  
 
 
Latest Cardiology Procedure: 
Results for orders placed or performed during the hospital encounter of 09/26/18 EKG, 12 LEAD, INITIAL Result Value Ref Range Ventricular Rate 77 BPM  
 Atrial Rate 77 BPM  
 P-R Interval 182 ms QRS Duration 86 ms  
 Q-T Interval 434 ms QTC Calculation (Bezet) 491 ms Calculated P Axis 19 degrees Calculated R Axis -52 degrees Calculated T Axis 5 degrees Diagnosis Sinus rhythm with fusion complexes Left anterior fascicular block Voltage criteria for left ventricular hypertrophy Prolonged QT Abnormal ECG When compared with ECG of 10-SEP-2018 15:07, 
fusion complexes are now present Non-specific change in ST segment in Lateral leads T wave inversion no longer evident in Lateral leads Confirmed by Alondra Stanley ([id number]) on 9/27/2018 4:04:49 PM 
  
 
 
Important Labs: 
No results found for: FOL, RBCF Lab Results Component Value Date/Time  Cholesterol, total 349 (H) 09/10/2018 12:31 PM  
 HDL Cholesterol 59 09/10/2018 12:31 PM  
 LDL, calculated 252.4 (H) 09/10/2018 12:31 PM  
 VLDL, calculated 37.6 09/10/2018 12:31 PM  
 Triglyceride 188 (H) 09/10/2018 12:31 PM  
 CHOL/HDL Ratio 5.9 (H) 09/10/2018 12:31 PM  
 
Lab Results Component Value Date/Time TSH 1.10 09/10/2018 12:31 PM  
 Triiodothyronine (T3), free 2.7 09/10/2018 12:31 PM  
 T4, Free 0.9 09/10/2018 12:31 PM  
 
No results found for: DS35, PHEN, PHENO, PHENT, DILF, DS39, PHENY, PTN, VALF2, VALAC, VALP, VALPR, DS6, CRBAM, CRBAMP, CARB2, XCRBAM 
Discussed with: nurse Allergies: No Known Allergies Review of Systems: Unable to obtain Y  N  
Constitutional: [] [] recent weight change 
[] [] fever 
[] [] sleep difficulties ENT/Mouth:  [] [] hearing loss 
[] [] swallowing problems 
[] [] slurred speech Cardiovascular:  [] [] chest pain  
[] [] palpitations Respiratory: [] [] cough with swallow 
[] [] shortness of breath 
[] [] sleep apnea 
[] [] intubated Gastrointestinal: [] [] abdominal pain 
[] [] nausea Genitourinary: [] [] frequent urination 
[] [] incontinence Musculoskeletal:   [] [] joint pain 
[] [] muscle pain Integument:   [] [] rash/itching Neurological:  [] [] dizziness/vertigo 
[] [] sedation 
[] [] confusion 
[] [] agitation/combativeness 
[] [] loss of consciousness 
[] [] numbness/tingling sensation 
[] [] tremors 
[] [] weakness in limbs 
[] [] difficulty with balance 
[] [] frequent or recurring headaches 
[] [] memory loss  
[] [] comatose 
[] [] seizures Psychiatric:   [] [] depression 
[] [] hallucinations Endocrine: [] [] excessive thirst or urination  
[] [] heat or cold intolerance Hematologic/Lymphatic: [] [] bleeding tendency 
[] [] enlarged lymph nodes PMH:  
Past Medical History:  
Diagnosis Date  CHF (congestive heart failure) ([facility]  Diabetes ([facility]  Stroke ([facility] Problem List: Principal Problem: 
  Cardiac arrest with ventricular fibrillation ([address] (9/29/2018) Overview: ROSC before defibrillation could be attempted. Active Problems: 
  Essential hypertension (9/10/2018) Diabetes mellitus type 2, controlled ([facility] [address] (9/10/2018) Pneumonia of both lower lobes (9/11/2018) History of stroke (9/27/2018) Overview: With residual R hemiparesis Acute-on-chronic kidney injury ([address] (9/27/2018) Acute cystitis (9/27/2018) Elevated troponin (9/27/2018) Transaminitis (9/27/2018) Acute kidney injury ([facility] [address] (9/27/2018) Elevated LFTs (9/28/2018) Abnormal blood coagulation profile (9/29/2018) Acute pancreatitis (9/29/2018) Overview: Shock, leukocytosis, elevated lipase but radiographically no ductal  
    dilatation in pancreas. GB stones without radiographic stigmata of acute  
    cholecystitis Septic shock ([facility] [address] (9/30/2018) Overview: Probably secondary to pneumonia (suspicious for aspiration). Less likely,  
    secondary to acute pancreatitis. Ischemic encephalopathy (9/30/2018) FH: History reviewed. No pertinent family history. SH: Social History Social History  Marital status:  Spouse name: N/A  
 Number of children: N/A  
 Years of education: N/A Social History Main Topics  Smoking status: Never Smoker  Smokeless tobacco: Never Used  Alcohol use No  
 Drug use: No  
 Sexual activity: Not Asked Other Topics Concern  None Social History Narrative Vital Signs:  
Visit Vitals  /55  Pulse 97  Temp (!) 100.7 °F (38.2 °C)  Resp 22  
 Ht 5' 7\" (1.702 m)  Wt 92.7 kg (204 lb 5.9 oz)  SpO2 97%  BMI 32.01 kg/m2 Neurological examination:  
? Appearance: Intubated. Appears ill. ? Cardiovascular: Heart is regular rate and rhythm. Generalized edema to BUE. No carotid bruits heard on left, has IJ on right. ? Mental status examination: Eyes partially open. No response to verbal or noxious stimuli. Does not follow commands. ? Cranial Nerves:  
 
I: smell Not tested II: visual fields No blink to visual threat II: pupils OD 2 mm and OS 1.5 MM, impaired III,VII: ptosis none III,IV,VI: extraocular muscles  Minimal oculocephalic V: facial light touch sensation V,VII: corneal reflex  intact VII: facial muscle function VIII: hearing IX: soft palate elevation IX,X: gag reflex XI: sternocleidomastoid strength XII: tongue strength ? Motor exam: Station, gait:  deferred. Muscle tone, bulk normal.  Trace withdrawal to noxious stimulation x 4 LE > UE. Spacticity present BLE. Medications:   
 
[x] REVIEWED Current Facility-Administered Medications Medication  influenza vaccine 2018-19 (6 mos+)(PF) (FLUARIX QUAD/FLULAVAL QUAD) injection 0.5 mL  [START ON 10/8/2018] VANCOMYCIN INFORMATION NOTE  midazolam (VERSED) 1 mg/mL injection  fluconazole (DIFLUCAN) 200mg/100 mL IVPB (premix)  meropenem (MERREM) 500 mg in 0.9% sodium chloride (MBP/ADV) 50 mL MBP  vancomycin (VANCOCIN) 750 mg in 0.9% sodium chloride (MBP/ADV) 250 mL ADV  midazolam (VERSED) injection  acetaminophen (TYLENOL) solution 325 mg  
 pantoprazole (PROTONIX) 40 mg in sodium chloride 0.9% 10 mL injection  0.9% sodium chloride infusion 250 mL  heparin (porcine) pf 100 Units  0.9% sodium chloride infusion 250 mL  insulin lispro (HUMALOG) injection  insulin glargine (LANTUS) injection 6 Units  albuterol (PROVENTIL VENTOLIN) nebulizer solution 2.5 mg  
 heparin (porcine) 1,000 unit/mL injection 1,000 Units  VANCOMYCIN INFORMATION NOTE  senna-docusate (PERICOLACE) 8.6-50 mg per tablet 1 Tab  ondansetron (ZOFRAN) injection 4 mg  
 glucose chewable tablet 16 g  
 glucagon (GLUCAGEN) injection 1 mg  dextrose (D50) infusion 12.5-25 g  hydrALAZINE (APRESOLINE) 20 mg/mL injection 20 mg  
 
Data:   
 
[x] REVIEWED Recent Results (from the past 24 hour(s)) GLUCOSE, POC Collection Time: 10/04/18 12:05 PM  
Result Value Ref Range Glucose (POC) 128 (H) 70 - 110 mg/dL CBC W/O DIFF Collection Time: 10/04/18  2:01 PM  
Result Value Ref Range WBC 25.7 (H) 4.6 - 13.2 K/uL  
 RBC 2.48 (L) 4.70 - 5.50 M/uL HGB 7.2 (L) 13.0 - 16.0 g/dL HCT 21.4 (L) 36.0 - 48.0 % MCV 86.3 74.0 - 97.0 FL  
 MCH 29.0 24.0 - 34.0 PG  
 MCHC 33.6 31.0 - 37.0 g/dL  
 RDW 15.1 (H) 11.6 - 14.5 % PLATELET 729 789 - 134 K/uL MPV 9.2 9.2 - 11.8 FL  
GLUCOSE, POC Collection Time: 10/04/18  6:10 PM  
Result Value Ref Range Glucose (POC) 118 (H) 70 - 110 mg/dL GLUCOSE, POC Collection Time: 10/04/18 11:49 PM  
Result Value Ref Range Glucose (POC) 140 (H) 70 - 110 mg/dL RENAL FUNCTION PANEL Collection Time: 10/05/18  2:15 AM  
Result Value Ref Range Sodium 142 136 - 145 mmol/L Potassium 3.4 (L) 3.5 - 5.5 mmol/L Chloride 102 100 - 108 mmol/L  
 CO2 25 21 - 32 mmol/L Anion gap 15 3.0 - 18 mmol/L Glucose 150 (H) 74 - 99 mg/dL BUN 24 (H) 7.0 - 18 MG/DL Creatinine 3.14 (H) 0.6 - 1.3 MG/DL  
 BUN/Creatinine ratio 8 (L) 12 - 20 GFR est AA 24 (L) >60 ml/min/1.73m2 GFR est non-AA 20 (L) >60 ml/min/1.73m2 Calcium 7.5 (L) 8.5 - 10.1 MG/DL Phosphorus 3.7 2.5 - 4.9 MG/DL Albumin 1.8 (L) 3.4 - 5.0 g/dL Di Brigid Collection Time: 10/05/18  2:15 AM  
Result Value Ref Range Vancomycin, random 17.8 5.0 - 40.0 UG/ML  
CBC W/O DIFF Collection Time: 10/05/18  2:15 AM  
Result Value Ref Range WBC 25.5 (H) 4.6 - 13.2 K/uL  
 RBC 2.51 (L) 4.70 - 5.50 M/uL HGB 7.4 (L) 13.0 - 16.0 g/dL HCT 22.0 (L) 36.0 - 48.0 % MCV 87.6 74.0 - 97.0 FL  
 MCH 29.5 24.0 - 34.0 PG  
 MCHC 33.6 31.0 - 37.0 g/dL  
 RDW 15.1 (H) 11.6 - 14.5 % PLATELET 982 807 - 229 K/uL MPV 9.0 (L) 9.2 - 11.8 FL  
POC G3 Collection Time: 10/05/18  4:52 AM  
Result Value Ref Range Device: VENT    
 FIO2 (POC) 0.5 % pH (POC) 7.465 (H) 7.35 - 7.45    
 pCO2 (POC) 39.9 35.0 - 45.0 MMHG  
 pO2 (POC) 106 (H) 80 - 100 MMHG HCO3 (POC) 28.6 (H) 22 - 26 MMOL/L  
 sO2 (POC) 98 (H) 92 - 97 % Base excess (POC) 5 mmol/L Mode SIMV Tidal volume 500 ml Set Rate 10 bpm  
 PEEP/CPAP (POC) 5 cmH2O Pressure support 15 cmH2O Allens test (POC) YES Inspiratory Time 0.9 sec Total resp. rate 18 Site RIGHT RADIAL Patient temp. 37.6 Specimen type (POC) ARTERIAL Performed by Nani Valdes Volume control plus YES    
GLUCOSE, POC Collection Time: 10/05/18  5:17 AM  
Result Value Ref Range Glucose (POC) 173 (H) 70 - 110 mg/dL Sandrita Meals, NP

## 2018-10-05 NOTE — PROGRESS NOTES
RENAL PROGRESS NOTE Adrian Cowan Assessment/Plan: · Dialysis dependant JULIANNA (ischemic atn in a setting of acute pyelonephritis/acute cholecystitis with sepsis and cardiac arrest 9/29). Dialyzed yesterday after abd ct with contrast. Next dialysis tomorrow and continue 3/week. Continue to minimize intake, volume overload is improving. · Hyperphosphatemia. Corrected with dialysis. · S/p cardiopulm arrest 9/29. Off pressors. · Acute pyelonephritis/sepsis. On abx. Febrile. · Abnormal lft's/acute cholecystitis? On abx. GI on the case. · UGI bleed, EGD results noted- esophageal clot. · Acute blood loss anemia. H/H is stabilizing. · Asp pneumonia. · Resp failure. R bronch clot removed today with bronchoscopy today. · Anoxic brain injury superimposed on recent cva. MRI results noted. Subjective: Intubated, unresponsive. Off pressors. TF is on hold after bronch. Patient Active Problem List  
Diagnosis Code  Stroke (cerebrum) ([facility]) I63.9  Stroke ([facility] I63.9  Rhabdomyolysis M62.82  
 Dehydration E86.0  
 Essential hypertension I10  
 Diabetes mellitus type 2, controlled ([facility] E11.9  Non compliance w medication regimen Z91.14  
 Pneumonia of both lower lobes J18.9  DM (diabetes mellitus) ([facility] E11.9  History of stroke Z80.78  
 Acute-on-chronic kidney injury ([facility] N17.9, N18.9  Acute cystitis N30.00  Elevated troponin R74.8  Transaminitis R74.0  Acute kidney injury ([facility] N17.9  Elevated LFTs R94.5  Cardiac arrest with ventricular fibrillation ([facility]) I46.9, I49.01  
 Abnormal blood coagulation profile R79.1  Acute pancreatitis K85.90  Septic shock ([facility]) A41.9, R65.21  
 Ischemic encephalopathy I67.89 Current Facility-Administered Medications Medication Dose Route Frequency Provider Last Rate Last Dose  influenza vaccine 2018-19 (6 mos+)(PF) (FLUARIX QUAD/FLULAVAL QUAD) injection 0.5 mL  0.5 mL IntraMUSCular PRIOR TO DISCHARGE Quinton Seip, MD      
 [START ON 10/8/2018] VANCOMYCIN INFORMATION NOTE   Other PRN Luis Silveira DO      
 midazolam (VERSED) injection 4 mg  4 mg IntraVENous ONCE Moon Bella MD      
 insulin glargine (LANTUS) injection 5 Units  5 Units SubCUTAneous ONCE Luis Silveira DO      
 [START ON 10/6/2018] insulin glargine (LANTUS) injection 11 Units  11 Units SubCUTAneous DAILY Luis Silveira DO      
 fluconazole (DIFLUCAN) 200mg/100 mL IVPB (premix)  200 mg IntraVENous Q24H GRACE Lima  mL/hr at 10/04/18 1604 200 mg at 10/04/18 1604  meropenem (MERREM) 500 mg in 0.9% sodium chloride (MBP/ADV) 50 mL MBP  500 mg IntraVENous Q12H GRACE Lima  mL/hr at 10/05/18 0305 500 mg at 10/05/18 0305  vancomycin (VANCOCIN) 750 mg in 0.9% sodium chloride (MBP/ADV) 250 mL ADV  750 mg IntraVENous Q MON, WED & FRI Luis Silveira DO      
 midazolam (VERSED) injection    PRN Moon Bella MD   4 mg at 10/05/18 1140  acetaminophen (TYLENOL) solution 325 mg  325 mg Per NG tube Q4H PRN Quinton Seip, MD   325 mg at 10/05/18 5812  pantoprazole (PROTONIX) 40 mg in sodium chloride 0.9% 10 mL injection  40 mg IntraVENous Q12H Quinton Seip, MD   40 mg at 10/05/18 0842  
 0.9% sodium chloride infusion 250 mL  250 mL IntraVENous PRN Luis Silveira DO      
 heparin (porcine) pf 100 Units  100 Units InterCATHeter PRN Elida Shoemaker MD      
 0.9% sodium chloride infusion 250 mL  250 mL IntraVENous PRN Quinton Seip, MD      
 insulin lispro (HUMALOG) injection   SubCUTAneous Q6H Luis Silveira DO   9 Units at 10/05/18 1306  albuterol (PROVENTIL VENTOLIN) nebulizer solution 2.5 mg  2.5 mg Nebulization Q6H RT Nav Mckenzie MD   2.5 mg at 10/05/18 1677  heparin (porcine) 1,000 unit/mL injection 1,000 Units  1,000 Units InterCATHeter DIALYSIS PRN Elisha Dhaliwal MD   1,000 Units at 09/28/18 1332  VANCOMYCIN INFORMATION NOTE   Other Rx Dosing/Monitoring Leah Aguilar MD      
 senna-docusate (PERICOLACE) 8.6-50 mg per tablet 1 Tab  1 Tab Oral BID PRN Rosellen Rushing, DO      
 ondansetron [facility]) injection 4 mg  4 mg IntraVENous Q4H PRN Rosellen Rushing, DO   4 mg at 09/28/18 0539  
 glucose chewable tablet 16 g  4 Tab Oral PRN Rosellen Rushing, DO      
 glucagon (GLUCAGEN) injection 1 mg  1 mg IntraMUSCular PRN Rosellen Rushing, DO      
 dextrose (D50) infusion 12.5-25 g  25-50 mL IntraVENous PRN Sweeney Rushing McQuain, DO   12.5 g at 09/28/18 1206  hydrALAZINE (APRESOLINE) 20 mg/mL injection 20 mg  20 mg IntraVENous Q6H PRN See Clifton, NP   20 mg at 10/02/18 1759 Objective Vitals:  
 10/05/18 1230 10/05/18 1300 10/05/18 1336 10/05/18 1400 BP: 123/47 107/50  103/46 Pulse: (!) 102 99  (!) 101 Resp: 29 25  23 Temp:  (!) 100.9 °F (38.3 °C)  (!) 101.1 °F (38.4 °C) TempSrc:      
SpO2: 100% 96% 97% 97% Weight:      
Height:      
 
 
 
Intake/Output Summary (Last 24 hours) at 10/05/18 1418 Last data filed at 10/05/18 1200 Gross per 24 hour Intake              420 ml Output                0 ml Net              420 ml Admission weight: Weight: 96.6 kg (213 lb) (09/26/18 2244) Last Weight Metrics: 
Weight Loss Metrics 10/4/2018 9/26/2018 9/13/2018 Today's Wt 204 lb 5.9 oz - 227 lb 15.3 oz  
BMI - 32.01 kg/m2 35.7 kg/m2 Physical Assessment:  
 
General: intubated, unresponsive. Eyes are open. Neck: No jvd. Rt ij temporary dialysis catheter in place, dressing is clear. LUNGS: diminished air entry at the bases. No crackles. CVS EXM: S1, S2  RRR. Abdomen: soft, pos bs. Lower Extremities:  1+ edema. Lab CBC w/Diff Recent Labs 10/05/18 
 0215  10/04/18 
 1401  10/04/18 
 0100 WBC  25.5*  25.7*  27.0*  
RBC  2.51*  2.48*  2.63* HGB  7.4*  7.2*  7.7* HCT  22.0*  21.4*  22.6*  
PLT  195  196  188 Chemistry Recent Labs 10/05/18 
 0215  10/04/18 
 1120  10/04/18 
 0100  10/03/18 
 0400 GLU  150*   --   101*  130* NA  142   --   142  142  
K  3.4*   --   3.8  4.2 CL  102   --   102  100 CO2  25   --   26  23 BUN  24*   --   27*  57* CREA  3.14*   --   3.62*  6.24* CA  7.5*   --   7.7*  7.5* AGAP  15   --   14  19* BUCR  8*   --   7*  9* AP   --   279*  345*   --   
TP   --   7.5  6.9   --   
ALB  1.8*  1.7*  1.9*  1.9*  1.9*  
GLOB   --   5.8*  5.0*   --   
AGRAT   --   0.3*  0.4*   --   
PHOS  3.7   --   4.1  6.5* No results found for: IRON, FE, TIBC, IBCT, PSAT, FERR Lab Results Component Value Date/Time Calcium 7.5 (L) 10/05/2018 02:15 AM  
 Phosphorus 3.7 10/05/2018 02:15 AM  
  
 
Michael Jo M.D. [facility] Phone [phone] Pager [phone]

## 2018-10-05 NOTE — PERIOP NOTES
ET tube exchange performed prior to bronchoscopy by Dr. Alice Pablo from 7.5 to 8.0, Glidescope assisted with placement, visualization of tube placement verified via bronchoscope, 22cm placement at the tongue, secured with ET tube rivas, anchorfast, via respiratory therapist.  Patient's saturations and vitals remained stable.

## 2018-10-05 NOTE — PROGRESS NOTES
Gastrointestinal Progress Note Patient Name: Charlie Bhatia Today's Date: 10/5/2018 Admit Date: 9/26/2018 Assessment: 1. Melena; inactive; EGD 10/2 with large clot in the esophagus which was not able to be cleared; Fluid in esophagus noted on CT scan likely representative of ongoing clot. Hb stable. Unclear whether there is significant underlying pathology (ulcer, mass). CT scan did not suggest marilu perforation although limited to abd and lower chest.  
2. Elevated liver enzymes; acute on chronic; slowly improving. Possibly due to acute cholecystitis with cholelithiasis/stones on ultrasound. Nondilated CBD on imaging argues against biliary obstruction. 3. S/p vfib cardiac arrest 
4. Anoxic brain injury with diffuse cortical signal changes on MRI brain 5. JULIANNA, on hemodialysis 6. Suspected pyelonephritis 7. Elevated lipase without pancreatitis on CT; suspect due to renal failure 8. Endobronchial clot with respiratory failure; s/p removal of clot today by bronchoscopy 9. DIC 
  
Recommendation: 1. Continue twice daily protonix 2. Monitor for recurrent GI bleeding and transfuse as needed 3. No contra-indication to trickle feeding from GI perspective 4. Recheck liver enzymes tomorrow. 5. Plan for EGD early next week to re-assess the esophagus, evaluate for underlying pathology; consider earlier repeat EGD if active bleeding or further drop in H/H Subjective:  
 
Charlie Bhatia is a 57 y.o. Male followed for elevated liver enzymes and melena. No bowel movements overnight. No melena. Tolerating trickle feeding. Had bronchoscopy today with removal of clot in the bronchi. Current Facility-Administered Medications Medication Dose Route Frequency  influenza vaccine 2018-19 (6 mos+)(PF) (FLUARIX QUAD/FLULAVAL QUAD) injection 0.5 mL  0.5 mL IntraMUSCular PRIOR TO DISCHARGE  
 [START ON 10/8/2018] VANCOMYCIN INFORMATION NOTE   Other PRN  
  midazolam (VERSED) injection 4 mg  4 mg IntraVENous ONCE  
 fluconazole (DIFLUCAN) 200mg/100 mL IVPB (premix)  200 mg IntraVENous Q24H  
 meropenem (MERREM) 500 mg in 0.9% sodium chloride (MBP/ADV) 50 mL MBP  500 mg IntraVENous Q12H  
 vancomycin (VANCOCIN) 750 mg in 0.9% sodium chloride (MBP/ADV) 250 mL ADV  750 mg IntraVENous Q MON, WED & FRI  midazolam (VERSED) injection    PRN  
 acetaminophen (TYLENOL) solution 325 mg  325 mg Per NG tube Q4H PRN  pantoprazole (PROTONIX) 40 mg in sodium chloride 0.9% 10 mL injection  40 mg IntraVENous Q12H  
 0.9% sodium chloride infusion 250 mL  250 mL IntraVENous PRN  
 heparin (porcine) pf 100 Units  100 Units InterCATHeter PRN  
 0.9% sodium chloride infusion 250 mL  250 mL IntraVENous PRN  
 insulin lispro (HUMALOG) injection   SubCUTAneous Q6H  
 insulin glargine (LANTUS) injection 6 Units  6 Units SubCUTAneous DAILY  albuterol (PROVENTIL VENTOLIN) nebulizer solution 2.5 mg  2.5 mg Nebulization Q6H RT  
 heparin (porcine) 1,000 unit/mL injection 1,000 Units  1,000 Units InterCATHeter DIALYSIS PRN  
 VANCOMYCIN INFORMATION NOTE   Other Rx Dosing/Monitoring  senna-docusate (PERICOLACE) 8.6-50 mg per tablet 1 Tab  1 Tab Oral BID PRN  
 ondansetron (ZOFRAN) injection 4 mg  4 mg IntraVENous Q4H PRN  
 glucose chewable tablet 16 g  4 Tab Oral PRN  
 glucagon (GLUCAGEN) injection 1 mg  1 mg IntraMUSCular PRN  
 dextrose (D50) infusion 12.5-25 g  25-50 mL IntraVENous PRN  
 hydrALAZINE (APRESOLINE) 20 mg/mL injection 20 mg  20 mg IntraVENous Q6H PRN Objective:  
 
Physical Exam: 
 
Visit Vitals  /47  Pulse (!) 102  Temp (!) 38.1 °F (3.4 °C)  Resp 29  
 Ht 5' 7\" (1.702 m)  Wt 92.7 kg (204 lb 5.9 oz)  SpO2 100%  BMI 32.01 kg/m2 Gen: Intubated on ventilator, unresponsive to voice or touch CV: Regular, no murmur Pulm: Coarse breath sounds, no wheeze Abd: Soft, nondistended Data Review: 
 
Labs: Results: Chemistry Recent Labs 10/05/18 
 0215  10/04/18 
 1120  10/04/18 
 0100  10/03/18 
 0400 GLU  150*   --   101*  130* NA  142   --   142  142  
K  3.4*   --   3.8  4.2 CL  102   --   102  100 CO2  25   --   26  23 BUN  24*   --   27*  57* CREA  3.14*   --   3.62*  6.24* CA  7.5*   --   7.7*  7.5* AGAP  15   --   14  19* BUCR  8*   --   7*  9* AP   --   279*  345*   --   
TP   --   7.5  6.9   --   
ALB  1.8*  1.7*  1.9*  1.9*  1.9*  
GLOB   --   5.8*  5.0*   --   
AGRAT   --   0.3*  0.4*   --   
  
CBC w/Diff Recent Labs 10/05/18 
 0215  10/04/18 
 1401  10/04/18 
 0100 WBC  25.5*  25.7*  27.0*  
RBC  2.51*  2.48*  2.63* HGB  7.4*  7.2*  7.7* HCT  22.0*  21.4*  22.6*  
PLT  195  196  188 Coagulation Recent Labs 10/04/18 
 0100 PTP  15.1 INR  1.2 APTT  33.2 Liver Enzymes Recent Labs 10/05/18 
 0215  10/04/18 
 1120 TP   --   7.5 ALB  1.8*  1.7* AP   --   279* SGOT   --   148* ALT   --   98* Enoc Lyons MD 
October 5, 2018

## 2018-10-05 NOTE — PROGRESS NOTES
Infectious Disease Follow-up Admit Date: 9/26/2018 NB  We are available but will plan to see again Monday, 10/8, unless called. -Dr Arnol Morrison is covering and can be reached at [phone]. ->Please call her if question or concern for ID before Monday 
-will ask her to remotely check cultures tomorrow . Current abx Prior abx Meropenem/flucon 10/4-1 vanco 9/27-8 Zosyn 9/27-7 Assessment ->Rec:  
 
Persistent leukocytosis SIRS poss sepsis- MOF multiple ID and non-infectious concerns outlined below, complicated, wbc 70H today despite vancomycin/zosyn since 9/27 
-C diff neg 9/29, sput C albicans, repeat CT 10/4 no new findings  ->monitor on continued vanco, newly started meropenem fluconazole - wbc sl lower today 
->await fungitell   
->monitor 10/4 bctx's -- if positive CoNS again, remove LUE midline, culture tip, could need to remove other lines Suspected Pyelo POA - CT B perineph stranding, UA tntc wbc, uctx ng ->likely treated at this point   
cholelithiaisis choledocholithiais suspected acute cholecystitis  poss cystic stone POA- abnl lft bili 4 alk phos 400 seen by GI and GS Dr. Mariusz Dawson, no plan for OR, for poss cholecystostomy if LFT worsen, bili, alk phos declining  ->per GI Poss pancreatitis - lipase 2200 POA now 5500, interpretation complicated by JULIANNA 
-NEW pancreas characterized as nl on CT 10/4 ->defer GI  
B infiltrates - poss edema infiltrate - RLL consolid better 10/4 cxr and suspect endobronchial clot contributed   ->monitor MRSE bacteremia 9/27 1 of 2. Has LUE midline unclear when placed, repeat bctx's IP today  ->monitor repeat bctx's and low threshold to remove lines ARF POA now on HD - baseline cr 0.9 132/10.1 in ER 
-[facility] 9/28 ->per renal   
Bleeding -melena earlier, EGD 10/2 clot in esophagus bronch 10/3 R endobronch clot NEW removed 10/5 repeat bronch  ->per others Suspected anoxic brain injury on MRI 10/3 SP VF arrest 9/29 ->per neurology understand family meeting planned CVA R hemiparesis 9/10   
resp failure sp intubation 9/29 Comorbid: HTN HLD CHF h/o steatohepatitis MICROBIOLOGY:  
9/27 bctx 1 of 2 MRSE 
 uctx ng 
9/29 sput C albicans C diff neg 10/4 bctx x 2 IP 
 fungitell ip LINES AND CATHETERS:  
LUE [facility] 9/28 Active Hospital Problems Diagnosis Date Noted  Septic shock ([facility] 09/30/2018 Probably secondary to pneumonia (suspicious for aspiration). Less likely, secondary to acute pancreatitis.  Ischemic encephalopathy 09/30/2018  Cardiac arrest with ventricular fibrillation ([address] 09/29/2018 ROSC before defibrillation could be attempted.  Abnormal blood coagulation profile 09/29/2018  Acute pancreatitis 09/29/2018 Shock, leukocytosis, elevated lipase but radiographically no ductal dilatation in pancreas. GB stones without radiographic stigmata of acute cholecystitis  Elevated LFTs 09/28/2018  History of stroke 09/27/2018 With residual R hemiparesis  Acute-on-chronic kidney injury ([address] 09/27/2018  Acute cystitis 09/27/2018  Elevated troponin 09/27/2018  Transaminitis 09/27/2018  Acute kidney injury ([address] 09/27/2018  Pneumonia of both lower lobes 09/11/2018  Essential hypertension 09/10/2018  Diabetes mellitus type 2, controlled ([facility] 09/10/2018 Subjective: Interval notes reviewed. Pt remains intubated unresponsive starting eeg, d/w nurse had bronch with clot removal earlier today low grade t elevation after, GI following and repeat CTAP noted Current Facility-Administered Medications Medication Dose Route Frequency  influenza vaccine 2018-19 (6 mos+)(PF) (FLUARIX QUAD/FLULAVAL QUAD) injection 0.5 mL  0.5 mL IntraMUSCular PRIOR TO DISCHARGE  
 [START ON 10/8/2018] VANCOMYCIN INFORMATION NOTE   Other PRN  
 midazolam (VERSED) injection 4 mg  4 mg IntraVENous ONCE  
  [START ON 10/6/2018] insulin glargine (LANTUS) injection 11 Units  11 Units SubCUTAneous DAILY  fluconazole (DIFLUCAN) 200mg/100 mL IVPB (premix)  200 mg IntraVENous Q24H  
 meropenem (MERREM) 500 mg in 0.9% sodium chloride (MBP/ADV) 50 mL MBP  500 mg IntraVENous Q12H  
 vancomycin (VANCOCIN) 750 mg in 0.9% sodium chloride (MBP/ADV) 250 mL ADV  750 mg IntraVENous Q MON, WED & FRI  midazolam (VERSED) injection    PRN  
 acetaminophen (TYLENOL) solution 325 mg  325 mg Per NG tube Q4H PRN  pantoprazole (PROTONIX) 40 mg in sodium chloride 0.9% 10 mL injection  40 mg IntraVENous Q12H  
 0.9% sodium chloride infusion 250 mL  250 mL IntraVENous PRN  
 heparin (porcine) pf 100 Units  100 Units InterCATHeter PRN  
 0.9% sodium chloride infusion 250 mL  250 mL IntraVENous PRN  
 insulin lispro (HUMALOG) injection   SubCUTAneous Q6H  
 albuterol (PROVENTIL VENTOLIN) nebulizer solution 2.5 mg  2.5 mg Nebulization Q6H RT  
 heparin (porcine) 1,000 unit/mL injection 1,000 Units  1,000 Units InterCATHeter DIALYSIS PRN  
 VANCOMYCIN INFORMATION NOTE   Other Rx Dosing/Monitoring  senna-docusate (PERICOLACE) 8.6-50 mg per tablet 1 Tab  1 Tab Oral BID PRN  
 ondansetron (ZOFRAN) injection 4 mg  4 mg IntraVENous Q4H PRN  
 glucose chewable tablet 16 g  4 Tab Oral PRN  
 glucagon (GLUCAGEN) injection 1 mg  1 mg IntraMUSCular PRN  
 dextrose (D50) infusion 12.5-25 g  25-50 mL IntraVENous PRN  
 hydrALAZINE (APRESOLINE) 20 mg/mL injection 20 mg  20 mg IntraVENous Q6H PRN Objective:  
 
Visit Vitals  /50  Pulse (!) 102  Temp (!) 101.3 °F (38.5 °C)  Resp 21  
 Ht 5' 7\" (1.702 m)  Wt 92.7 kg (204 lb 5.9 oz)  SpO2 96%  BMI 32.01 kg/m2 Temp (24hrs), Av.7 °F (27.1 °C), Min:37.6 °F (3.1 °C), Max:101.3 °F (38.5 °C) GEN: unresponsive BM on vent in ICU, nurse, family member in attendance HEENT: anicteric YESSI OGT 
NECK: supple RIJ TDC suspect hematoma no cellulitis CHEST: coarse bs B no wheeze scattered rhonchi CVS: tachycardic no murmur appreciated ABD: soft ? RUQ fullness (+) BS no localized tenderness EXT: 1+ edema LUE line in place SKIN: good turgor no rash Labs: Results:  
Chemistry Recent Labs 10/05/18 
 0215  10/04/18 
 1120  10/04/18 
 0100  10/03/18 
 0400 GLU  150*   --   101*  130* NA  142   --   142  142  
K  3.4*   --   3.8  4.2 CL  102   --   102  100 CO2  25   --   26  23 BUN  24*   --   27*  57* CREA  3.14*   --   3.62*  6.24* CA  7.5*   --   7.7*  7.5* AGAP  15   --   14  19* BUCR  8*   --   7*  9* AP   --   279*  345*   --   
TP   --   7.5  6.9   --   
ALB  1.8*  1.7*  1.9*  1.9*  1.9*  
GLOB   --   5.8*  5.0*   --   
AGRAT   --   0.3*  0.4*   --   
  
CBC w/Diff Recent Labs 10/05/18 
 1426  10/05/18 
 0215  10/04/18 
 1401 WBC  24.8*  25.5*  25.7*  
RBC  2.57*  2.51*  2.48* HGB  7.4*  7.4*  7.2* HCT  22.7*  22.0*  21.4*  
PLT  236  195  196  
  
 
10/4 CTAP IMPRESSION: 
  
  
1. Dense subtotal or total consolidation right lower lobe compatible with 
pneumonia similar to prior study. Small bilateral pleural effusions similar in 
size. 
  
2. Distended gallbladder with gallstones and gallbladder sludge without 
significant change in appearance and also described in detail on ultrasound of 
9/27/2018. 
  
3. Indeterminate small lesion left hepatic lobe without change. Hepatomegaly 
again evident. 
  
4. No intraperitoneal fluid. Possible small left upper pole renal cyst. 
Cardiomegaly. Nasogastric tube tip in body of stomach. cxr 10/5 Impression: 
  
1. Endotracheal tube, visualized nasogastric tube, and right IJ central venous 
catheter in stable position. 2. Overall improved aeration of the right lower lobe, likely improved 
atelectasis with mild residual atelectasis/airspace disease. 3. Mild interstitial edema overall similar to prior. Microbiology Results Recent Labs 10/04/18 
 1120  10/04/18 0412 CULT  NO GROWTH AFTER 21 HOURS  NO GROWTH AFTER 23 HOURS GRACE Jimenez MD 
October 5, 2018 [facility] [phone]

## 2018-10-05 NOTE — PROGRESS NOTES
VENTILATOR Care plan 
 
Problem: Ventilator Management Goal: *Adequate oxygenation/ ventilation/ and extubation Patient: 
   
 
Iliana Ford     57 y.o.   male     10/5/2018  1:14 PM 
Patient Active Problem List  
Diagnosis Code  Stroke (cerebrum) ([facility]) I63.9  Stroke ([facility] I63.9  Rhabdomyolysis M62.82  
 Dehydration E86.0  
 Essential hypertension I10  
 Diabetes mellitus type 2, controlled ([facility] E11.9  Non compliance w medication regimen Z91.14  
 Pneumonia of both lower lobes J18.9  DM (diabetes mellitus) ([facility] E11.9  History of stroke Z80.78  
 Acute-on-chronic kidney injury ([facility] N17.9, N18.9  Acute cystitis N30.00  Elevated troponin R74.8  Transaminitis R74.0  Acute kidney injury ([facility] N17.9  Elevated LFTs R94.5  Cardiac arrest with ventricular fibrillation ([facility]) I46.9, I49.01  
 Abnormal blood coagulation profile R79.1  Acute pancreatitis K85.90  Septic shock ([facility]) A41.9, R65.21  
 Ischemic encephalopathy I67.89 Pneumonia of both lower lobes due to infectious organism ([facility] [J18.1] Reason patient intubated:  Acute respiratory failure secondary to code blue. 
   
Ventilator day: 5 
   
Ventilator settings: SIMV 10 / 500 / PS 15 / +5 / 50% 
   
ETT Size/Placement:  7.5 ETT 25 lip. At 1130 patient bronched and ETT switched to a size 8 ETT and is now 24 at the lip ABG: 
Date:10/5/2018 Lab Results Component Value Date/Time PHI 7.465 (H) 10/05/2018 04:52 AM  
 PCO2I 39.9 10/05/2018 04:52 AM  
 PO2I 106 (H) 10/05/2018 04:52 AM  
 HCO3I 28.6 (H) 10/05/2018 04:52 AM  
 FIO2I 0.5 10/05/2018 04:52 AM  
 
 
Chest X-ray: 
Date:10/5/2018 [place] [facility] [place] XR CHEST PORT Narrative EXAM: Chest portable INDICATION: Respiratory failure. Intubated COMPARISON: 10/4/2018 
 
_______________ FINDINGS: 
 
AP portable chest film was performed. Endotracheal tube, NG tube, right jugular central line and left midline catheter projecting over the axillary region are 
unchanged and in good position. Chest is stable with opacification of the inferior third of the right hemithorax 
with silhouetting of the right heart border and increased interstitial markings 
throughout the remainder of the lungs. No pneumothorax. Heart is probably 
enlarged. Central pulmonary vascularity slightly engorged but may be minimally 
improved. _______________ Impression IMPRESSION: 
 
 
1. Support lines and tubes in good position. 2. Stable chest with right middle and lower lobe consolidation with possible 
right effusion and increased interstitial markings throughout the remainder of 
the lungs. Lab Test: 
Date:10/5/2018 WBC:  
Lab Results Component Value Date/Time WBC 25.5 (H) 10/05/2018 02:15 AM  
HGB: Lab Results Component Value Date/Time HGB 7.4 (L) 10/05/2018 02:15 AM  
 PLTS: Lab Results Component Value Date/Time PLATELET 450 36/81/4689 02:15 AM  
 
 
SaO2%/flow:  
SpO2 Readings from Last 1 Encounters:  
10/05/18 100% Vital Signs:   Patient Vitals for the past 8 hrs: 
 Temp Pulse Resp BP SpO2  
10/05/18 1230 - (!) 102 29 123/47 100 % 10/05/18 1226 - 100 28 - 100 % 10/05/18 1200 - (!) 107 (!) 34 - 100 % 10/05/18 1100 (!) 38.1 °F (3.4 °C) 98 24 127/52 97 % 10/05/18 1000 (!) 100.7 °F (38.2 °C) 97 22 136/55 97 % 10/05/18 0900 (!) 100.7 °F (38.2 °C) 98 25 124/51 97 % 10/05/18 0833 - 99 16 - 97 % 10/05/18 0800 (!) 100.6 °F (38.1 °C) 99 25 128/54 97 % 10/05/18 0700 (!) 100.6 °F (38.1 °C) 98 26 124/52 98 % 10/05/18 0600 100.2 °F (37.9 °C) 95 21 131/58 98 % Wean Screen Pass (Yes or No): No 
 
 
Additional Comments:  Bronch patient after ICU rounds by Dr. Jacqueline Beck. PLAN OF CARE: Continue vent support. GOAL: Adequate oxygenation and ventilation.

## 2018-10-05 NOTE — PROGRESS NOTES
1515-Assumed care of pt - rec'd on vent:  SIMV, VC+, Rate=10, Fio2=40%, Peep=5 
--Vent check complete

## 2018-10-05 NOTE — ROUTINE PROCESS
TRANSFER - OUT REPORT: 
 
Verbal report given to Cristine Ramirez RN on 1823 [facility]  Bedside report for room 9811 0114 for routine post - op Report consisted of patients Situation, Background, Assessment and  
Recommendations(SBAR). Information from the following report(s) SBAR, Procedure Summary, MAR and Recent Results was reviewed with the receiving nurse. Lines:  
Peripheral IV 10/04/18 Right Forearm (Active) Site Assessment Clean, dry, & intact 10/5/2018  8:00 AM  
Phlebitis Assessment 0 10/5/2018  8:00 AM  
Infiltration Assessment 0 10/5/2018  8:00 AM  
Dressing Status Clean, dry, & intact 10/5/2018  8:00 AM  
Dressing Type Transparent 10/5/2018  8:00 AM  
Hub Color/Line Status Capped;Blue 10/5/2018  8:00 AM  
Action Taken Open ports on tubing capped 10/5/2018  8:00 AM  
Alcohol Cap Used Yes 10/5/2018  8:00 AM  
   
Peripheral IV 10/05/18 Left Forearm (Active) Site Assessment Clean, dry, & intact 10/5/2018  8:00 AM  
Phlebitis Assessment 0 10/5/2018  8:00 AM  
Infiltration Assessment 0 10/5/2018  8:00 AM  
Dressing Status Clean, dry, & intact 10/5/2018  8:00 AM  
Dressing Type Transparent 10/5/2018  8:00 AM  
Hub Color/Line Status Pink; Infusing 10/5/2018  8:00 AM  
Action Taken Open ports on tubing capped 10/5/2018  8:00 AM  
Alcohol Cap Used Yes 10/5/2018  8:00 AM  
  
 
Opportunity for questions and clarification was provided.

## 2018-10-05 NOTE — PROGRESS NOTES
1930-  Dialysis in progress. 2100-  Dialysis completed. Dialysis RN stated 1 liter taken off. Nursing assessment completed. Pt keeps eyes open with deviation to left, weakly moves left leg/withdraws to painful stimuli with right leg.   
2200-  Mouth care given. 0200-  No changes in nursing assessment. 0350-  Mouth care given. 4142-  Pt given complete bed bath with chg.  Linens, gown, and bed pad changed. Mepilex changed to sacrum and wound over right buttocks. Mouth care given. Bedside and Verbal shift change report given to Damir Sánchez (oncoming nurse) by Charles Oliver RN 
 (offgoing nurse). Report included the following information Kardex, Intake/Output, MAR and Recent Results.

## 2018-10-05 NOTE — PROCEDURES
[facility] Pulmonary, Critical Care, and Sleep Medicine Name: Rony Ballard MRN: [id number] : 1955 Hospital: [facility] Date: 10/5/2018 Intubation Note Procedure: emergent intubation Indication: respiratory failure Anesthesia- Rapid sequence:  4mg Versed ETT exchange was attempt for therapeutic bronchoscopy. A tube exchanger was placed through the ETT and then the ETT was removed. An 8.5cm ETT was placed over the tube exchanger but was unable to pass and the patient became hypoxic. The patient was then reintubated. 4.0 videolaryngoscope was used to visualize the epiglottis and vocal chords. After positive identification of the vocal cords, an 8.0 ET tube was placed into the trachea with direct visualization. The tube was seen passing through the vocal chords without difficulty. CO2 colorimetry was employed immediately to verify tube in airway with /without appropriate color change indicating detection/lack of CO2. Placement above the billy was confirmed with a bronchoscope. Chest X-ray has been ordered and is pending. The patient has been placed on a mechanical ventilator. There were no complications. Margaretmary Goodell, MD 
Critical Care Medicine

## 2018-10-05 NOTE — PROGRESS NOTES
[facility] Progress Note I have reviewed the flowsheet and previous days notes. Events, vitals, medications and notes from last 24 hours reviewed. Care plan discussed with staff and on multidisciplinary rounds. Subjective: VSS overnight. Continues to have low grade fever. ETT exchanged attempted but it required re-intubation. Bronchoscopy done with removal of a large R sided clot. Impression and Plan Patient Active Problem List  
Diagnosis Code  Stroke (cerebrum) ([facility]) I63.9  Stroke ([email or web address] I63.9  Rhabdomyolysis M62.82  
 Dehydration E86.0  
 Essential hypertension I10  
 Diabetes mellitus type 2, controlled ([email or web address] E11.9  Non compliance w medication regimen Z91.14  
 Pneumonia of both lower lobes J18.9  DM (diabetes mellitus) ([facility] E11.9  History of stroke Z80.78  
 Acute-on-chronic kidney injury ([facility] N17.9, N18.9  Acute cystitis N30.00  Elevated troponin R74.8  Transaminitis R74.0  Acute kidney injury ([email or web address] N17.9  Elevated LFTs R94.5  Cardiac arrest with ventricular fibrillation ([facility]) I46.9, I49.01  
 Abnormal blood coagulation profile R79.1  Acute pancreatitis K85.90  Septic shock ([facility]) A41.9, R65.21  
 Ischemic encephalopathy I67.89 Assessment: 
Acute resp failure with hypoxia - Intubated following cardiac arrest.  
- Continue vent support - If continued aggressive care, will need trach early next week. - Daily CXR and ABG S/p V Fib arrest-  
- 2D echo- LV- Normal cavity size. Mild concentric hypertrophy observed. There is mildly decreased systolic function. The estimated ejection fraction is 46 - 50%. Global hypokinesis observed. There is mild (grade 1) left ventricular diastolic dysfunction E/E' ratio is 12. Minor [place]de Endobronchial clot- likely aspirated from previous GI bleed - s/p removal with bronchoscopy on 10/5 
- small residual clot in RLL, but expect improvement in oxygenation JULIANNA on CKD - On HD, minimal urine output DM - Cont with lantus and ISS Abn LFT - Known history with w/u in GI Elevated Lipase - No evidence of pancreatitis on CT Suspected Pyelonephritis/Cholecystitis-  
- seen on initial CT abdomen but not on 10/4 scan - Repeat CT scan without evidence of infection, but distended gallbladder with stones Persistent fevers/Leukocytosis - ID consulted, expanded coverage with fluconazole and merrem. Severe encephalopathy/Anoxic brain injury 
- confirmed on MRI 
- only withdrawal to pain 
- very poor prognosis, Neurology following Upper GI Bleed - Endoscopy done 10/2- Showed large clot descending down large portion of the esophagus. Unable to be removed - PPI BID 
- GI following Coagulopathy- Increased INR and PTT 
- s/p FFP, repeat labs improved DVT proph - SCD 
 
OTHER: 
Glycemic Control. Glucose stabilizer per ICU protocol when on insulin drip. Maintain blood glucose 140-180. Ventilator bundle & Sedation protocol followed. Daily morning sedation holiday, assessment for readiness for SBT & weaning from ventilator; and then re-titrate if required. Aim to keep peak plateau pressure 56-63QZ H2O in ARDS patient. Todd tube to suction at 20-30 cm H2O, Maintain Anastasiya tube with 5-10ml air every 4 hours. Chlorhexidine mouth washes and routine oral care every 4 hours. Stress ulcer and DVT prophylaxis. HOB >=30 degree elevation all the time. HOB >=30 degree elevation all the time. Aggressive pulmonary toileting. Incentive spirometry when appropriate. Aspiration precautions. Vasopressor when appropriate with MAP goal >65 mmHg. Central Line bundle followed, remove when not needed. Large bore IV line or CVP when appropriate. Quality Care: Stress ulcer prophylaxis, DVT prophylaxis, HOB elevated, Infection control all reviewed and addressed. Events and notes from last 24 hours reviewed. Care plan discussed with nursing. D/w patient and family above medical problems and answered all questions to their satisfaction. CC TIME: >60min Medication Reviewed: 
 
No Known Allergies Past Medical History:  
Diagnosis Date  CHF (congestive heart failure) ([facility]  Diabetes ([facility]  Stroke ([facility] History reviewed. No pertinent surgical history. Social History Substance Use Topics  Smoking status: Never Smoker  Smokeless tobacco: Never Used  Alcohol use No  
  
History reviewed. No pertinent family history. Prior to Admission medications Medication Sig Start Date End Date Taking? Authorizing Provider  
aspirin (ASPIRIN) 325 mg tablet Take 1 Tab by mouth daily. 9/15/18   Delano Mcqueen MD  
atorvastatin (LIPITOR) 80 mg tablet Take 1 Tab by mouth nightly. 9/14/18   Delano Mcqueen MD  
insulin glargine (LANTUS) 100 unit/mL injection 10 units qhs  Indications: type 2 diabetes mellitus 9/15/18   Delano Mcqueen MD  
insulin lispro (HUMALOG) 100 unit/mL injection 4 units with meals 9/14/18   Delano Mcqueen MD  
losartan (COZAAR) 50 mg tablet Take 1 Tab by mouth daily. 9/14/18   Delano Mcqueen MD  
 
Current Facility-Administered Medications Medication Dose Route Frequency  influenza vaccine 2018-19 (6 mos+)(PF) (FLUARIX QUAD/FLULAVAL QUAD) injection 0.5 mL  0.5 mL IntraMUSCular PRIOR TO DISCHARGE  midazolam (VERSED) injection 4 mg  4 mg IntraVENous ONCE  
 fluconazole (DIFLUCAN) 200mg/100 mL IVPB (premix)  200 mg IntraVENous Q24H  
 meropenem (MERREM) 500 mg in 0.9% sodium chloride (MBP/ADV) 50 mL MBP  500 mg IntraVENous Q12H  
 vancomycin (VANCOCIN) 750 mg in 0.9% sodium chloride (MBP/ADV) 250 mL ADV  750 mg IntraVENous Q MON, WED & FRI  pantoprazole (PROTONIX) 40 mg in sodium chloride 0.9% 10 mL injection  40 mg IntraVENous Q12H  
 insulin lispro (HUMALOG) injection   SubCUTAneous Q6H  
  insulin glargine (LANTUS) injection 6 Units  6 Units SubCUTAneous DAILY  albuterol (PROVENTIL VENTOLIN) nebulizer solution 2.5 mg  2.5 mg Nebulization Q6H RT  
 VANCOMYCIN INFORMATION NOTE   Other Rx Dosing/Monitoring Lines: All central lines examined by me. No signs of erythema, induration, discharge. Central Venous Catheter: HD cath in R IJ Hemodialysis Catheter: 
Hemodialysis Access 09/28/18 (Active) Central Line Being Utilized Yes 10/2/2018  4:00 AM  
Criteria for Appropriate Use Dialysis/apheresis 10/2/2018  4:00 AM  
Date Accessed  10/01/18 10/2/2018  4:00 AM  
Site Assessment Clean, dry, & intact 10/2/2018  4:00 AM  
Date of Last Dressing Change 10/01/18 10/2/2018  4:00 AM  
Dressing Status Clean, dry, & intact 10/2/2018  4:00 AM  
Dressing Type Disk with Chlorhexadine gluconate (CHG); Transparent 10/2/2018  4:00 AM  
Proximal Hub Color/Line Status Capped 10/2/2018  4:00 AM  
Distal Hub Color/Line Status Capped 10/2/2018  4:00 AM  
 
Drain(s): 
Orogastric Tube 09/29/18 (Active) Site Assessment Clean, dry, & intact 10/2/2018  4:00 AM  
Dressing Status Clean, dry, & intact 10/2/2018  4:00 AM  
G Port Status Clamped 10/2/2018  4:00 AM  
External Insertion Jl (cms) 58 cms 10/2/2018  4:00 AM  
Action Taken Placement verified (comment) 10/2/2018  4:00 AM  
Drainage Description Brown 10/2/2018  4:00 AM  
Gastric Residual (mL) 30 ml 10/2/2018 12:00 AM  
Tube Feeding/Formula Options Renal (Nepro) 10/1/2018  4:00 PM  
Tube Feeding/Verify Rate (mL/hr) 0 10/2/2018  4:00 AM  
Water Flush Volume (mL) 20 mL 10/1/2018  4:00 PM  
Intake (ml) 0 ml 10/2/2018  4:00 AM  
Medication Volume 35 ml 10/1/2018  9:27 AM  
Output (ml) 0 ml 10/1/2018  8:00 PM  
 
Airway: Airway - Continuous Aspiration of Subglottic Secretions (LEONCIO) Tube 09/29/18 Oral (Active) Insertion Depth (cm) 25 cm 10/2/2018 11:13 AM  
Line Jl Lips 10/2/2018 11:13 AM  
Side Secured Left 10/2/2018 11:13 AM  
 Cuff Pressure 30 cmH20 2018  8:31 PM  
Site Assessment Clean, dry, & intact 10/2/2018 11:13 AM  
Suction on Yes 10/2/2018 11:13 AM  
Amt Secretions Aspirated (mL) 1 mL 10/2/2018 11:13 AM  
 
 
Objective: 
Vital Signs:   
Visit Vitals  /47  Pulse (!) 102  Temp (!) 38.1 °F (3.4 °C)  Resp 29  
 Ht 5' 7\" (1.702 m)  Wt 92.7 kg (204 lb 5.9 oz)  SpO2 100%  BMI 32.01 kg/m2 O2 Device: Ventilator, Endotracheal tube O2 Flow Rate (L/min): 45 l/min Temp (24hrs), Av °F (26.7 °C), Min:37.6 °F (3.1 °C), Max:100.7 °F (38.2 °C) Intake/Output:  
Last shift:      10/05 0701 - 10/05 1900 In: 36 Out: - Last 3 shifts: 10/03 1901 - 10/05 0700 In: 675 [I.V.:345] Out: 1976 Intake/Output Summary (Last 24 hours) at 10/05/18 1304 Last data filed at 10/05/18 0800 Gross per 24 hour Intake              420 ml Output                0 ml Net              420 ml Last 3 Recorded Weights in this Encounter 10/02/18 1839 10/03/18 0400 10/04/18 7266 Weight: 93.9 kg (207 lb) 93.9 kg (207 lb 0.2 oz) 92.7 kg (204 lb 5.9 oz) Ventilator Settings: 
Mode Rate Tidal Volume Pressure FiO2 PEEP 
SIMV, VC+, Pressure support   500 ml  15 cm H2O 50 % 5 cm H20 Peak airway pressure: 27 cm H2O Plateau pressure:   
Tidal volume:  
Minute ventilation: 11.5 l/min SPO2 Physical Exam:  
 
General/Neurology: Intubated, unresponsive on sedation, no w/d to pain Head:   Normocephalic, without obvious abnormality Eye:   PERRL and reactive Oral:   Mucus membranes moist 
Neck:   Supple, R IJ in place Lung:   Spontaneous breathing, Coarse sound on R side. No wheezing Heart:   Regular rate & rhythm. S1 S2 present. Abdomen/: Soft, non tender, BS +nt Extremities:  No pedal edema Skin:   Dry, intact Data: 
   
Recent Results (from the past 24 hour(s)) CBC W/O DIFF Collection Time: 10/04/18  2:01 PM  
Result Value Ref Range WBC 25.7 (H) 4.6 - 13.2 K/uL RBC 2.48 (L) 4.70 - 5.50 M/uL HGB 7.2 (L) 13.0 - 16.0 g/dL HCT 21.4 (L) 36.0 - 48.0 % MCV 86.3 74.0 - 97.0 FL  
 MCH 29.0 24.0 - 34.0 PG  
 MCHC 33.6 31.0 - 37.0 g/dL  
 RDW 15.1 (H) 11.6 - 14.5 % PLATELET 715 991 - 547 K/uL MPV 9.2 9.2 - 11.8 FL  
GLUCOSE, POC Collection Time: 10/04/18  6:10 PM  
Result Value Ref Range Glucose (POC) 118 (H) 70 - 110 mg/dL GLUCOSE, POC Collection Time: 10/04/18 11:49 PM  
Result Value Ref Range Glucose (POC) 140 (H) 70 - 110 mg/dL RENAL FUNCTION PANEL Collection Time: 10/05/18  2:15 AM  
Result Value Ref Range Sodium 142 136 - 145 mmol/L Potassium 3.4 (L) 3.5 - 5.5 mmol/L Chloride 102 100 - 108 mmol/L  
 CO2 25 21 - 32 mmol/L Anion gap 15 3.0 - 18 mmol/L Glucose 150 (H) 74 - 99 mg/dL BUN 24 (H) 7.0 - 18 MG/DL Creatinine 3.14 (H) 0.6 - 1.3 MG/DL  
 BUN/Creatinine ratio 8 (L) 12 - 20 GFR est AA 24 (L) >60 ml/min/1.73m2 GFR est non-AA 20 (L) >60 ml/min/1.73m2 Calcium 7.5 (L) 8.5 - 10.1 MG/DL Phosphorus 3.7 2.5 - 4.9 MG/DL Albumin 1.8 (L) 3.4 - 5.0 g/dL Isidra Heaps Collection Time: 10/05/18  2:15 AM  
Result Value Ref Range Vancomycin, random 17.8 5.0 - 40.0 UG/ML  
CBC W/O DIFF Collection Time: 10/05/18  2:15 AM  
Result Value Ref Range WBC 25.5 (H) 4.6 - 13.2 K/uL  
 RBC 2.51 (L) 4.70 - 5.50 M/uL HGB 7.4 (L) 13.0 - 16.0 g/dL HCT 22.0 (L) 36.0 - 48.0 % MCV 87.6 74.0 - 97.0 FL  
 MCH 29.5 24.0 - 34.0 PG  
 MCHC 33.6 31.0 - 37.0 g/dL  
 RDW 15.1 (H) 11.6 - 14.5 % PLATELET 137 213 - 875 K/uL MPV 9.0 (L) 9.2 - 11.8 FL  
POC G3 Collection Time: 10/05/18  4:52 AM  
Result Value Ref Range Device: VENT    
 FIO2 (POC) 0.5 % pH (POC) 7.465 (H) 7.35 - 7.45    
 pCO2 (POC) 39.9 35.0 - 45.0 MMHG  
 pO2 (POC) 106 (H) 80 - 100 MMHG  
 HCO3 (POC) 28.6 (H) 22 - 26 MMOL/L  
 sO2 (POC) 98 (H) 92 - 97 % Base excess (POC) 5 mmol/L Mode SIMV Tidal volume 500 ml Set Rate 10 bpm  
 PEEP/CPAP (POC) 5 cmH2O Pressure support 15 cmH2O Allens test (POC) YES Inspiratory Time 0.9 sec Total resp. rate 18 Site RIGHT RADIAL Patient temp. 37.6 Specimen type (POC) ARTERIAL Performed by lAejandro Hercules Volume control plus YES    
GLUCOSE, POC Collection Time: 10/05/18  5:17 AM  
Result Value Ref Range Glucose (POC) 173 (H) 70 - 110 mg/dL Chemistry Recent Labs 10/05/18 
 0215  10/04/18 
 1120  10/04/18 
 0100  10/03/18 
 0400 GLU  150*   --   101*  130* NA  142   --   142  142  
K  3.4*   --   3.8  4.2 CL  102   --   102  100 CO2  25   --   26  23 BUN  24*   --   27*  57* CREA  3.14*   --   3.62*  6.24* CA  7.5*   --   7.7*  7.5* PHOS  3.7   --   4.1  6.5* AGAP  15   --   14  19* BUCR  8*   --   7*  9* AP   --   279*  345*   --   
TP   --   7.5  6.9   --   
ALB  1.8*  1.7*  1.9*  1.9*  1.9*  
GLOB   --   5.8*  5.0*   --   
AGRAT   --   0.3*  0.4*   --   
 
 
CBC w/Diff Recent Labs 10/05/18 
 0215  10/04/18 
 1401  10/04/18 
 0100 WBC  25.5*  25.7*  27.0*  
RBC  2.51*  2.48*  2.63* HGB  7.4*  7.2*  7.7* HCT  22.0*  21.4*  22.6*  
PLT  195  196  188 ABG Recent Labs 10/05/18 
 9827  10/04/18 
 2377  10/03/18 
 0540 PHI  7.465*  7.449  7.431 PCO2I  39.9  37.7  38.5 PO2I  106*  62*  84  
HCO3I  28.6*  26.0  25.5 FIO2I  0.5  0.50  70 Micro Recent Labs 10/04/18 
 1120  10/04/18 
 4007 CULT  NO GROWTH AFTER 21 HOURS  NO GROWTH AFTER 23 HOURS Recent Labs 10/04/18 
 1120  10/04/18 
 8229 CULT  NO GROWTH AFTER 21 HOURS  NO GROWTH AFTER 23 HOURS  
 
 
CT (Most Recent) Results from [facility] Encounter encounter on 09/26/18 CT ABD PELV W CONT Narrative EXAM: CT of the abdomen and pelvis INDICATION: Pneumonia. Acute pyelonephritis. Sepsis. Abnormal liver function 
tests. Dialysis patient. COMPARISON: None. TECHNIQUE: Axial CT imaging of the abdomen and pelvis was performed with 
intravenous contrast. Multiplanar reformats were generated. One or more dose 
reduction techniques were used on this CT: automated exposure control, 
adjustment of the mAs and/or kVp according to patient size, and iterative 
reconstruction techniques. The specific techniques used on this CT exam have 
been documented in the patient's electronic medical record. 
 
_______________ FINDINGS: 
 
LOWER CHEST: Dense consolidation right lower lobe compatible with pneumonia. Small bilateral pleural effusions. Cardiomegaly. Fluid-filled esophagus with 
nasogastric tube in place. LIVER, BILIARY: Indeterminate low-attenuation 7 mm focus anterior left hepatic 
lobe. Hepatomegaly. No biliary dilation. Distended gallbladder with gallstones 
and indeterminate material in the gallbladder which may represent sludge. PANCREAS: Normal. 
 
SPLEEN: Normal. 
 
ADRENALS: Normal. 
 
KIDNEYS/URETERS: No hydronephrosis or renal calculus. Small low attenuation 
cortical focus medial upper pole left kidney not well delineated measuring about 1.1 cm. This may represent renal cyst. 
 
LYMPH NODES: No enlarged lymph nodes. GASTROINTESTINAL TRACT: No bowel dilation or wall thickening. Normal appendix. PELVIC ORGANS: No intraperitoneal fluid. Incidental vas deferens calcifications. VASCULATURE: Mild atherosclerosis. BONES: No acute or aggressive osseous abnormalities identified. OTHER: None. 
 
_______________ Impression IMPRESSION: 
 
 
1. Dense subtotal or total consolidation right lower lobe compatible with 
pneumonia similar to prior study. Small bilateral pleural effusions similar in 
size. 2. Distended gallbladder with gallstones and gallbladder sludge without 
significant change in appearance and also described in detail on ultrasound of 
9/27/2018. 3. Indeterminate small lesion left hepatic lobe without change. Hepatomegaly again evident. 4. No intraperitoneal fluid. Possible small left upper pole renal cyst. 
Cardiomegaly. Nasogastric tube tip in body of stomach. XR (Most Recent). CXR reviewed by me and compared with previous CXR Results [facility] encounter on 09/26/18 XR CHEST PORT Narrative EXAM: Chest portable INDICATION: Respiratory failure. Intubated COMPARISON: 10/4/2018 
 
_______________ FINDINGS: 
 
AP portable chest film was performed. Endotracheal tube, NG tube, right jugular 
central line and left midline catheter projecting over the axillary region are 
unchanged and in good position. Chest is stable with opacification of the inferior third of the right hemithorax 
with silhouetting of the right heart border and increased interstitial markings 
throughout the remainder of the lungs. No pneumothorax. Heart is probably 
enlarged. Central pulmonary vascularity slightly engorged but may be minimally 
improved. _______________ Impression IMPRESSION: 
 
 
1. Support lines and tubes in good position. 2. Stable chest with right middle and lower lobe consolidation with possible 
right effusion and increased interstitial markings throughout the remainder of 
the lungs. High complexity decision making was performed during the evaluation of this patient at high risk for decompensation with multiple organ involvement Above mentioned total time spent on reviewing the case/medical record/data/notes/EMR/patient examination/documentation/coordinating care with nurse/consultants, exclusive of procedures with complex decision making performed and > 50% time spent in face to face evaluation. Roger Segovia MD 
Critical Care Medicine

## 2018-10-05 NOTE — PROGRESS NOTES
Problem: Falls - Risk of 
Goal: *Absence of Falls Document [place] Rob Fall Risk and appropriate interventions in the flowsheet. Outcome: Progressing Towards Goal 
Fall Risk Interventions: 
Mobility Interventions: Bed/chair exit alarm Mentation Interventions: Adequate sleep, hydration, pain control Medication Interventions: Evaluate medications/consider consulting pharmacy Elimination Interventions: Bed/chair exit alarm Problem: Pressure Injury - Risk of 
Goal: *Prevention of pressure injury Document Mitul Scale and appropriate interventions in the flowsheet. Outcome: Progressing Towards Goal 
Pressure Injury Interventions: 
Sensory Interventions: Assess changes in LOC Moisture Interventions: Absorbent underpads Activity Interventions: Pressure redistribution bed/mattress(bed type) Mobility Interventions: Pressure redistribution bed/mattress (bed type) Nutrition Interventions: Document food/fluid/supplement intake Friction and Shear Interventions: Apply protective barrier, creams and emollients

## 2018-10-05 NOTE — PROCEDURES
[facility], [facility], and Sleep Medicine Bronchoscopy Report Procedure: Therapeutic bronchoscopy. Indication: Blood clot Consent/Treatment: Informed consent was obtained from the  family after risks, benefits and alternatives were explained. Timeout verified the correct patient and correct procedure. Anesthesia:  
Patient on ventilator and receiving  Versed 4mg Procedure Details:  
-- The bronchoscope was introduced through an endotracheal tube. -- Large mobile clot was seen in the bronchus intermedius and was removed via a net tool. -- Small residual clot was seen in the a subsegment of the RLL but was unable to be removed. -- The right-sided endobronchial anatomy was completely inspected and was found to be normal. 
-- The left-sided endobronchial anatomy was completely inspected and was found to be normal.  
 
Specimens:  
None Complications: none Vital signs remained stable throughout the procedure. Patient was woken up in endoscopy suite and then transferred to recovery area in a stable condition. Family members were updated by me. Estimated Blood Loss: 50cc CXR post procedure is pending PLAN: 
· Continue vent support Ana Han MD 
October 5, 2018 12:58 PM'

## 2018-10-05 NOTE — PROGRESS NOTES
Internal Medicine Progress Note Patient's Name: Justen Logan Admit Date: 9/26/2018 Length of Stay: 8 Assessment/Plan Active Hospital Problems Diagnosis Date Noted  Septic shock ([facility] 09/30/2018 Probably secondary to pneumonia (suspicious for aspiration). Less likely, secondary to acute pancreatitis.  Ischemic encephalopathy 09/30/2018  Cardiac arrest with ventricular fibrillation ([address] 09/29/2018 ROSC before defibrillation could be attempted.  Abnormal blood coagulation profile 09/29/2018  Acute pancreatitis 09/29/2018 Shock, leukocytosis, elevated lipase but radiographically no ductal dilatation in pancreas. GB stones without radiographic stigmata of acute cholecystitis  Elevated LFTs 09/28/2018  History of stroke 09/27/2018 With residual R hemiparesis  Acute-on-chronic kidney injury ([facility] 09/27/2018  Acute cystitis 09/27/2018  Elevated troponin 09/27/2018  Transaminitis 09/27/2018  Acute kidney injury ([facility] 09/27/2018  Pneumonia of both lower lobes 09/11/2018  Essential hypertension 09/10/2018  Diabetes mellitus type 2, controlled ([facility] 09/10/2018 Pneumonia not POA 
 
- Vent mgmt per ICU 
- Cont protonix IV 
- HD mgmt per nephro - Cont broad spec IVAB as per ID 
- Trend CBC 
- F/u cult - MRI w/ evidence of severe anoxic brain injury - Appreciate neuro assistance - Appreciate consulting services - Pt will have any meaningful neuro recovery, prognosis is very grim - Family wants to cont to do everything, including trach/PEG 
- DVT protocol I have personally reviewed all pertinent labs and films that have officially resulted over the last 24 hours. I have personally checked for all pending labs that are awaiting final results. Subjective Pt s/e @ bedside Remains intubated No change in exam 
Febrile 100.7 Unable to assess ROS Objective Visit Vitals  /55  Pulse 97  Temp (!) 100.7 °F (38.2 °C)  Resp 22  
 Ht 5' 7\" (1.702 m)  Wt 92.7 kg (204 lb 5.9 oz)  SpO2 97%  BMI 32.01 kg/m2 Physical Exam: 
General Appearance: Intubated, not following commands HENT: normocephalic/atraumatic, + ETT Neck: No JVD, hematoma R side where TDC is 
Lungs: B/L crackles, vent-assisted BS 
CV: RRR, no m/r/g Abdomen: soft, non-tender, normal bowel sounds Extremities: no cyanosis, no peripheral edema Neuro: Unresponsive to commands, no withdrawal to pain, negative corneal, no pupillary reaction to light on left side Skin: Normal color, intact Intake and Output: 
Current Shift:  10/05 0701 - 10/05 1900 In: 36 Out: - Last three shifts:  10/03 1901 - 10/05 0700 In: 675 [I.V.:345] Out: 1976 Lab/Data Reviewed: 
CMP:  
Lab Results Component Value Date/Time  10/05/2018 02:15 AM  
 K 3.4 (L) 10/05/2018 02:15 AM  
  10/05/2018 02:15 AM  
 CO2 25 10/05/2018 02:15 AM  
 AGAP 15 10/05/2018 02:15 AM  
  (H) 10/05/2018 02:15 AM  
 BUN 24 (H) 10/05/2018 02:15 AM  
 CREA 3.14 (H) 10/05/2018 02:15 AM  
 GFRAA 24 (L) 10/05/2018 02:15 AM  
 GFRNA 20 (L) 10/05/2018 02:15 AM  
 CA 7.5 (L) 10/05/2018 02:15 AM  
 PHOS 3.7 10/05/2018 02:15 AM  
 ALB 1.8 (L) 10/05/2018 02:15 AM  
 
CBC:  
Lab Results Component Value Date/Time WBC 25.5 (H) 10/05/2018 02:15 AM  
 HGB 7.4 (L) 10/05/2018 02:15 AM  
 HCT 22.0 (L) 10/05/2018 02:15 AM  
  10/05/2018 02:15 AM  
 
 
Imaging Reviewed: 
Ct Abd Pelv W Cont Result Date: 10/4/2018 EXAM: CT of the abdomen and pelvis INDICATION: Pneumonia. Acute pyelonephritis. Sepsis. Abnormal liver function tests. Dialysis patient. COMPARISON: None. TECHNIQUE: Axial CT imaging of the abdomen and pelvis was performed with intravenous contrast. Multiplanar reformats were generated.  One or more dose reduction techniques were used on this CT: automated exposure control, adjustment of the mAs and/or kVp according to patient size, and iterative reconstruction techniques. The specific techniques used on this CT exam have been documented in the patient's electronic medical record. _______________ FINDINGS: LOWER CHEST: Dense consolidation right lower lobe compatible with pneumonia. Small bilateral pleural effusions. Cardiomegaly. Fluid-filled esophagus with nasogastric tube in place. LIVER, BILIARY: Indeterminate low-attenuation 7 mm focus anterior left hepatic lobe. Hepatomegaly. No biliary dilation. Distended gallbladder with gallstones and indeterminate material in the gallbladder which may represent sludge. PANCREAS: Normal. SPLEEN: Normal. ADRENALS: Normal. KIDNEYS/URETERS: No hydronephrosis or renal calculus. Small low attenuation cortical focus medial upper pole left kidney not well delineated measuring about 1.1 cm. This may represent renal cyst. LYMPH NODES: No enlarged lymph nodes. GASTROINTESTINAL TRACT: No bowel dilation or wall thickening. Normal appendix. PELVIC ORGANS: No intraperitoneal fluid. Incidental vas deferens calcifications. VASCULATURE: Mild atherosclerosis. BONES: No acute or aggressive osseous abnormalities identified. OTHER: None. _______________ IMPRESSION: 1. Dense subtotal or total consolidation right lower lobe compatible with pneumonia similar to prior study. Small bilateral pleural effusions similar in size. 2. Distended gallbladder with gallstones and gallbladder sludge without significant change in appearance and also described in detail on ultrasound of 9/27/2018. 3. Indeterminate small lesion left hepatic lobe without change. Hepatomegaly again evident. 4. No intraperitoneal fluid. Possible small left upper pole renal cyst. Cardiomegaly. Nasogastric tube tip in body of stomach. Xr Chest [facility] Result Date: 10/5/2018 EXAM: Chest portable INDICATION: Respiratory failure.  Intubated COMPARISON: 10/4/2018 _______________ FINDINGS: AP portable chest film was performed. Endotracheal tube, NG tube, right jugular central line and left midline catheter projecting over the axillary region are unchanged and in good position. Chest is stable with opacification of the inferior third of the right hemithorax with silhouetting of the right heart border and increased interstitial markings throughout the remainder of the lungs. No pneumothorax. Heart is probably enlarged. Central pulmonary vascularity slightly engorged but may be minimally improved. _______________ IMPRESSION: 1. Support lines and tubes in good position. 2. Stable chest with right middle and lower lobe consolidation with possible right effusion and increased interstitial markings throughout the remainder of the lungs. Medications Reviewed: 
Current Facility-Administered Medications Medication Dose Route Frequency  influenza vaccine 2018-19 (6 mos+)(PF) (FLUARIX QUAD/FLULAVAL QUAD) injection 0.5 mL  0.5 mL IntraMUSCular PRIOR TO DISCHARGE  
 [START ON 10/8/2018] VANCOMYCIN INFORMATION NOTE   Other PRN  
 midazolam (VERSED) injection 4 mg  4 mg IntraVENous ONCE  
 fluconazole (DIFLUCAN) 200mg/100 mL IVPB (premix)  200 mg IntraVENous Q24H  
 meropenem (MERREM) 500 mg in 0.9% sodium chloride (MBP/ADV) 50 mL MBP  500 mg IntraVENous Q12H  
 vancomycin (VANCOCIN) 750 mg in 0.9% sodium chloride (MBP/ADV) 250 mL ADV  750 mg IntraVENous Q MON, WED & FRI  midazolam (VERSED) injection    PRN  
 acetaminophen (TYLENOL) solution 325 mg  325 mg Per NG tube Q4H PRN  pantoprazole (PROTONIX) 40 mg in sodium chloride 0.9% 10 mL injection  40 mg IntraVENous Q12H  
 0.9% sodium chloride infusion 250 mL  250 mL IntraVENous PRN  
 heparin (porcine) pf 100 Units  100 Units InterCATHeter PRN  
 0.9% sodium chloride infusion 250 mL  250 mL IntraVENous PRN  
 insulin lispro (HUMALOG) injection   SubCUTAneous Q6H  
 insulin glargine (LANTUS) injection 6 Units  6 Units SubCUTAneous DAILY  albuterol (PROVENTIL VENTOLIN) nebulizer solution 2.5 mg  2.5 mg Nebulization Q6H RT  
 heparin (porcine) 1,000 unit/mL injection 1,000 Units  1,000 Units InterCATHeter DIALYSIS PRN  
 VANCOMYCIN INFORMATION NOTE   Other Rx Dosing/Monitoring  senna-docusate (PERICOLACE) 8.6-50 mg per tablet 1 Tab  1 Tab Oral BID PRN  
 ondansetron (ZOFRAN) injection 4 mg  4 mg IntraVENous Q4H PRN  
 glucose chewable tablet 16 g  4 Tab Oral PRN  
 glucagon (GLUCAGEN) injection 1 mg  1 mg IntraMUSCular PRN  
 dextrose (D50) infusion 12.5-25 g  25-50 mL IntraVENous PRN  
 hydrALAZINE (APRESOLINE) 20 mg/mL injection 20 mg  20 mg IntraVENous Q6H PRN Amy Agarwal DO Internal Medicine, Hospitalist 
Pager: [phone] [facility]

## 2018-10-05 NOTE — PROGRESS NOTES
0730 - Received pt at bedside from [facility], dual skin check completed, VSS on ventilator, resting comfortably in bed, no apparent signs of distress 0930 - Resting in bed, repositioned for comfort, VSS on vent, no signs of distress, rounded with treatment team, progression of care discussed with Dr. Laurita Gowers and Dr. Sarah Zamora, recommendation made for bronch today, new orders received 
 
0952 - PRN Tylenol administered for low grade fever 1130 - VSS on vent, no signs of distress, will continue to monitor 26 - Dr. Laurita Gowers at bedside to perform bronchoscopy, follow up X-Ray to be ordered to confirm ETT replacement 1309 - Gastroenterology in to assess pt, no new orders received 1330 - Family present at bedside, VSS on vent, no signs of distress 1425 - Nephrology in to see pt, new orders received 1549 - PRN Tylenol administered for persistent low grade fever, ice packs applied to underarms 1730 - VSS on ventilator, no apparent signs of distress, visitors present at bedside 1930 - Bedside and Verbal shift change report given to [facility] (oncoming nurse) by Jesus Tolbert (offgoing nurse). Report included the following information SBAR, Kardex, Intake/Output, MAR, Recent Results and Cardiac Rhythm NSR.

## 2018-10-06 NOTE — PROGRESS NOTES
Recd pt on vent: 
 rec'd on vent:  SIMV, VC+, Ud=170, Rate=10, PSV=15, Fio2=40%, Peep=5 
 
1320--Verbal order to return pt to full support on prev full support settings--ac vc+, eg=981, rate=16, Fio2=45%, Peep=5

## 2018-10-06 NOTE — PROGRESS NOTES
Problem: Ventilator Management Goal: *Adequate oxygenation and ventilation VENTILATOR Care plan 
 
Problem: Ventilator Management Goal: *Adequate oxygenation/ ventilation/ and extubation Patient: 
   
 
Kaila Mack     57 y.o.   male     10/6/2018  2:11 PM 
Patient Active Problem List  
Diagnosis Code  Stroke (cerebrum) ([facility]) I63.9  Stroke ([facility] I63.9  Rhabdomyolysis M62.82  
 Dehydration E86.0  
 Essential hypertension I10  
 Diabetes mellitus type 2, controlled ([facility] E11.9  Non compliance w medication regimen Z91.14  
 Pneumonia of both lower lobes J18.9  DM (diabetes mellitus) ([facility] E11.9  History of stroke Z80.78  
 Acute-on-chronic kidney injury ([facility] N17.9, N18.9  Acute cystitis N30.00  Elevated troponin R74.8  Transaminitis R74.0  Acute kidney injury ([facility] N17.9  Elevated LFTs R94.5  Cardiac arrest with ventricular fibrillation ([facility]) I46.9, I49.01  
 Abnormal blood coagulation profile R79.1  Acute pancreatitis K85.90  Septic shock ([facility]) A41.9, R65.21  
 Ischemic encephalopathy I67.89 Pneumonia of both lower lobes due to infectious organism ([facility] [J18.1] Reason patient intubated: Respiratory failure secondary to cardiac arrest. 
 
Ventilator day: 8 Ventilator settings: SIMV, Rate=10, Yc=536, PSV=15, Fio2=40%, Peep=5 
AC VC+, rate=16, Ql=567, Fio2=45%, Peep=5 ETT Size/Placement: 8.0 ETT, 24 lip ABG: 
Date:10/6/2018 Lab Results Component Value Date/Time PHI 7.454 (H) 10/06/2018 05:00 AM  
 PCO2I 40.3 10/06/2018 05:00 AM  
 PO2I 65 (L) 10/06/2018 05:00 AM  
 HCO3I 28.2 (H) 10/06/2018 05:00 AM  
 FIO2I 0.4 10/06/2018 05:00 AM  
 
 
Chest X-ray: 
Date:10/6/2018 [place] [facility] [place] 09/26/18 XR CHEST PORT Narrative Chest, single view Indication: Endotracheal tube placement, shortness of breath. Comparison: Several prior exams, most recently 10/5/2018 Findings:  Portable upright AP view of the chest was obtained. There is an 
endotracheal tube which projects approximately 5.8 cm above the billy. A 
nasogastric tube is present below the diaphragm, tip projecting over the stomach 
body. Right IJ approach central venous catheter in stable position. Improved expansion of the lungs from prior days exam, with parallel improved 
aeration of the right lower lobe. Mild residual linear opacity noted at the 
right midlung with faint radiodensity also noted at the medial aspect of the 
right lower lobe. Mild interstitial opacities are similar to prior. No 
pneumothorax or discrete pleural effusion. Stable cardiac size and mediastinal 
contours. Impression Impression: 1. Endotracheal tube, visualized nasogastric tube, and right IJ central venous 
catheter in stable position. 2. Overall improved aeration of the right lower lobe, likely improved 
atelectasis with mild residual atelectasis/airspace disease. 3. Mild interstitial edema overall similar to prior. Lab Test: 
Date:10/6/2018 WBC:  
Lab Results Component Value Date/Time WBC 22.6 (H) 10/06/2018 04:30 AM  
HGB: Lab Results Component Value Date/Time HGB 6.6 (L) 10/06/2018 04:30 AM  
 PLTS: Lab Results Component Value Date/Time PLATELET 844 84/89/7485 04:30 AM  
 
 
SaO2%/flow:  
SpO2 Readings from Last 1 Encounters:  
10/06/18 97% Vital Signs:   Patient Vitals for the past 8 hrs: 
 Temp Pulse Resp BP SpO2  
10/06/18 1330 99 °F (37.2 °C) 91 17 129/62 97 % 10/06/18 1316 - - - - 96 % 10/06/18 1300 99 °F (37.2 °C) 92 18 130/57 96 % 10/06/18 1230 99 °F (37.2 °C) 92 21 137/63 96 % 10/06/18 1216 - 92 20 - 96 % 10/06/18 1200 100 °F (37.8 °C) 91 19 128/56 97 % 10/06/18 1130 100.2 °F (37.9 °C) 90 19 103/40 95 % 10/06/18 1100 100.2 °F (37.9 °C) 91 21 120/53 94 % 10/06/18 1030 100.2 °F (37.9 °C) 91 22 118/50 94 % 10/06/18 1000 100.2 °F (37.9 °C) 91 22 113/46 92 % 10/06/18 0930 (!) 100.6 °F (38.1 °C) 90 25 113/48 92 % 10/06/18 0900 100.4 °F (38 °C) 90 17 134/59 96 % 10/06/18 0848 - 90 17 - 96 % 10/06/18 0830 100.2 °F (37.9 °C) 90 19 135/60 97 % 10/06/18 0800 100 °F (37.8 °C) 91 20 133/58 96 % 10/06/18 0741 99.5 °F (37.5 °C) - - - -  
10/06/18 0700 99.5 °F (37.5 °C) 87 19 121/58 96 % Wean Screen Pass (Yes or No): N Wean Screen Reason for Failure: Neuro status. Duration of Weaning Trial: 
Additional Comments: PLAN OF CARE:  Monitor pt on vent. Wean/titrate settings as tolerated to maintain pt comfort & sats. Pt scheduled for dialysis today. Concern continues to be neuro status requiring continued vent support. GOAL:  Extubation. OUTCOME:  Maintain mechanical ventilation.

## 2018-10-06 NOTE — ROUTINE PROCESS
0700:  Assumed care of pt at this time from [address], [place]. Assessment as charted. ~330mL tube feed aspirated from OGT. TF held. Eyes open spont, but not purposefully. Pupils are 3mm and briskly reactive bilaterally. No movement to any extremity with deep noxious stimulus. 0751: Midline cath dressing dated 9/30/18. Dressing changed using sterile technique. 8964:  PRN acetaminophen via OGT given for temp >100.5.  
 
1145:  Dr. Jim Mccoy at bedside. Orders received to restart TF trickle feeds. Notified of hemoglobin, orders received for 1 unit PRBC.   
 
1933:  Bedside and Verbal shift change report given to Mirta Lara. (oncoming nurse) by Ken Patel RN 
 (offgoing nurse). Report included the following information Procedure Summary, Intake/Output, Recent Results and Quality Measures.

## 2018-10-06 NOTE — PROGRESS NOTES
RENAL PROGRESS NOTE Cari Morgan Assessment/Plan: · JULIANNA due to ischemic ATN- anuric- HD dependent- dialysis today for solute management. - no signs of renal recovery. · S/p cardiopulm arrest 9/29. Off pressors. · Acute pyelonephritis/sepsis. · Acute blood loss anemia. · Asp pneumonia. · Resp failure. · Anoxic brain injury superimposed on recent CVA. Subjective: 
C/O On vent support Patient Active Problem List  
Diagnosis Code  Stroke (cerebrum) ([facility] I63.9  Stroke ([facility] I63.9  Rhabdomyolysis M62.82  
 Dehydration E86.0  
 Essential hypertension I10  
 Diabetes mellitus type 2, controlled ([facility] E11.9  Non compliance w medication regimen Z91.14  
 Pneumonia of both lower lobes J18.9  DM (diabetes mellitus) ([facility] E11.9  History of stroke Z80.78  
 Acute-on-chronic kidney injury ([facility] N17.9, N18.9  Acute cystitis N30.00  Elevated troponin R74.8  Transaminitis R74.0  Acute kidney injury ([facility] N17.9  Elevated LFTs R94.5  Cardiac arrest with ventricular fibrillation ([facility]) I46.9, I49.01  
 Abnormal blood coagulation profile R79.1  Acute pancreatitis K85.90  Septic shock ([facility]) A41.9, R65.21  
 Ischemic encephalopathy I67.89 Current Facility-Administered Medications Medication Dose Route Frequency  [START ON 10/7/2018] insulin glargine (LANTUS) injection 18 Units  18 Units SubCUTAneous DAILY  insulin glargine (LANTUS) injection 6 Units  6 Units SubCUTAneous ONCE  
 influenza vaccine 2018-19 (6 mos+)(PF) (FLUARIX QUAD/FLULAVAL QUAD) injection 0.5 mL  0.5 mL IntraMUSCular PRIOR TO DISCHARGE  
 [START ON 10/8/2018] VANCOMYCIN INFORMATION NOTE   Other PRN  
 fluconazole (DIFLUCAN) 200mg/100 mL IVPB (premix)  200 mg IntraVENous Q24H  meropenem (MERREM) 500 mg in 0.9% sodium chloride (MBP/ADV) 50 mL MBP  500 mg IntraVENous Q12H  
 vancomycin (VANCOCIN) 750 mg in 0.9% sodium chloride (MBP/ADV) 250 mL ADV  750 mg IntraVENous Q MON, WED & FRI  midazolam (VERSED) injection    PRN  
 acetaminophen (TYLENOL) solution 325 mg  325 mg Per NG tube Q4H PRN  pantoprazole (PROTONIX) 40 mg in sodium chloride 0.9% 10 mL injection  40 mg IntraVENous Q12H  
 0.9% sodium chloride infusion 250 mL  250 mL IntraVENous PRN  
 heparin (porcine) pf 100 Units  100 Units InterCATHeter PRN  
 0.9% sodium chloride infusion 250 mL  250 mL IntraVENous PRN  
 insulin lispro (HUMALOG) injection   SubCUTAneous Q6H  
 albuterol (PROVENTIL VENTOLIN) nebulizer solution 2.5 mg  2.5 mg Nebulization Q6H RT  
 heparin (porcine) 1,000 unit/mL injection 1,000 Units  1,000 Units InterCATHeter DIALYSIS PRN  
 VANCOMYCIN INFORMATION NOTE   Other Rx Dosing/Monitoring  senna-docusate (PERICOLACE) 8.6-50 mg per tablet 1 Tab  1 Tab Oral BID PRN  
 ondansetron (ZOFRAN) injection 4 mg  4 mg IntraVENous Q4H PRN  
 glucose chewable tablet 16 g  4 Tab Oral PRN  
 glucagon (GLUCAGEN) injection 1 mg  1 mg IntraMUSCular PRN  
 dextrose (D50) infusion 12.5-25 g  25-50 mL IntraVENous PRN  
 hydrALAZINE (APRESOLINE) 20 mg/mL injection 20 mg  20 mg IntraVENous Q6H PRN Objective Vitals:  
 10/06/18 0600 10/06/18 0700 10/06/18 0741 10/06/18 0800 BP: 111/53 121/58  133/58 Pulse: 90 87  91 Resp: 17 19  20 Temp: 99.3 °F (37.4 °C) 99.5 °F (37.5 °C) 99.5 °F (37.5 °C) 100 °F (37.8 °C) TempSrc:      
SpO2: 95% 96%  96% Weight:      
Height:      
 
 
 
Intake/Output Summary (Last 24 hours) at 10/06/18 8243 Last data filed at 10/06/18 5175 Gross per 24 hour Intake              980 ml Output                0 ml Net              980 ml Admit weight: ? ??? Wt Readings from Last 3 Encounters:  
10/04/18 92.7 kg (204 lb 5.9 oz) 09/13/18 103.4 kg (227 lb 15.3 oz) 09/10/18 98.4 kg (217 lb) Last recorded weight: Weight: 92.7 kg (204 lb 5.9 oz) Physical Assessment:  
 
General: on vent support, Neck: rt ij udall LUNGS: decreased bibasilar air entry CVS EXM: S1, S2  RRR, no murmurs/gallops/rubs. Abdomen: soft, non tender. Lower Extremities:  no edema. Lab CBC w/Diff Recent Labs 10/06/18 
 0430  10/05/18 
 1426  10/05/18 
 0215 WBC  22.6*  24.8*  25.5*  
RBC  2.27*  2.57*  2.51* HGB  6.6*  7.4*  7.4* HCT  20.4*  22.7*  22.0*  
PLT  260  236  195 Chemistry Recent Labs 10/06/18 
 0430  10/05/18 
 0215  10/04/18 
 1120  10/04/18 
 0100 GLU  176*  150*   --   101* NA  143  142   --   142  
K  3.8  3.4*   --   3.8 CL  104  102   --   102 CO2  24  25   --   26 BUN  42*  24*   --   27* CREA  5.56*  3.14*   --   3.62* CA  7.3*  7.5*   --   7.7* AGAP  15  15   --   14  
BUCR  8*  8*   --   7* AP   --    --   279*  345* TP   --    --   7.5  6.9 ALB  1.6*  1.8*  1.7*  1.9*  1.9*  
GLOB   --    --   5.8*  5.0* AGRAT   --    --   0.3*  0.4* PHOS  5.2*  3.7   --   4.1 Recent Labs 10/06/18 
 0430  10/05/18 
 0215  10/04/18 
 1120  10/04/18 
 0100 BUN  42*  24*   --   27* CREA  5.56*  3.14*   --   3.62* CA  7.3*  7.5*   --   7.7* ALB  1.6*  1.8*  1.7*  1.9*  1.9*  
K  3.8  3.4*   --   3.8 NA  143  142   --   142 CL  104  102   --   102 CO2  24  25   --   26 PHOS  5.2*  3.7   --   4.1 GLU  176*  150*   --   101* Gene Oropeza MD

## 2018-10-06 NOTE — PROGRESS NOTES
Internal Medicine Progress Note Patient's Name: Eamon Dave Admit Date: 9/26/2018 Length of Stay: 9 Assessment/Plan Active Hospital Problems Diagnosis Date Noted  Septic shock ([facility] [address] 09/30/2018 Probably secondary to pneumonia (suspicious for aspiration). Less likely, secondary to acute pancreatitis.  Ischemic encephalopathy 09/30/2018  Cardiac arrest with ventricular fibrillation ([address] 09/29/2018 ROSC before defibrillation could be attempted.  Abnormal blood coagulation profile 09/29/2018  Acute pancreatitis 09/29/2018 Shock, leukocytosis, elevated lipase but radiographically no ductal dilatation in pancreas. GB stones without radiographic stigmata of acute cholecystitis  Elevated LFTs 09/28/2018  History of stroke 09/27/2018 With residual R hemiparesis  Acute-on-chronic kidney injury ([address] 09/27/2018  Acute cystitis 09/27/2018  Elevated troponin 09/27/2018  Transaminitis 09/27/2018  Acute kidney injury ([facility] [address] 09/27/2018  Pneumonia of both lower lobes 09/11/2018  Essential hypertension 09/10/2018  Diabetes mellitus type 2, controlled ([facility] [address] 09/10/2018 Pneumonia not POA 
 
- Vent mgmt per ICU 
- Cont protonix IV 
- HD mgmt per nephro - Cont broad spec IVAB as per ID 
- Transfuse 1 unit PRBC - Trend CBC 
- Cult NGTD 
- MRI w/ evidence of severe anoxic brain injury - Appreciate neuro assistance - Appreciate consulting services - Pt unlikely to have any meaningful neuro recovery, prognosis is very grim - Family wants to cont to do everything, including trach/PEG 
- DVT protocol I have personally reviewed all pertinent labs and films that have officially resulted over the last 24 hours. I have personally checked for all pending labs that are awaiting final results. Subjective Pt s/e @ bedside Remains intubated No change in neuro exam 
Febrile 100.6 this morning Unable to assess ROS Objective Visit Vitals  /56  Pulse 92  Temp 100 °F (37.8 °C)  Resp 20  
 Ht 5' 7\" (1.702 m)  Wt 92.7 kg (204 lb 5.9 oz)  SpO2 96%  BMI 32.01 kg/m2 Physical Exam: 
General Appearance: Intubated, not following commands HENT: normocephalic/atraumatic, + ETT Neck: No JVD, hematoma R side where [facility] is improving Lungs: Coarse B/L w/ vent-assisted BS 
CV: RRR, no m/r/g Abdomen: soft, non-tender, normal bowel sounds Extremities: no cyanosis, no peripheral edema Neuro: Unresponsive to commands, no withdrawal to pain, negative corneal, no pupillary reaction to light Skin: Normal color, intact Intake and Output: 
Current Shift:  10/06 0701 - 10/06 1900 In: 80 [I.V.:50] Out: - Last three shifts:  10/04 1901 - 10/06 0700 In: 1400 [I.V.:310] Out: 0 Lab/Data Reviewed: 
CMP:  
Lab Results Component Value Date/Time  10/06/2018 04:30 AM  
 K 3.8 10/06/2018 04:30 AM  
  10/06/2018 04:30 AM  
 CO2 24 10/06/2018 04:30 AM  
 AGAP 15 10/06/2018 04:30 AM  
  (H) 10/06/2018 04:30 AM  
 BUN 42 (H) 10/06/2018 04:30 AM  
 CREA 5.56 (H) 10/06/2018 04:30 AM  
 GFRAA 13 (L) 10/06/2018 04:30 AM  
 GFRNA 10 (L) 10/06/2018 04:30 AM  
 CA 7.3 (L) 10/06/2018 04:30 AM  
 PHOS 5.2 (H) 10/06/2018 04:30 AM  
 ALB 1.6 (L) 10/06/2018 04:30 AM  
 
CBC:  
Lab Results Component Value Date/Time WBC 22.6 (H) 10/06/2018 04:30 AM  
 HGB 6.6 (L) 10/06/2018 04:30 AM  
 HCT 20.4 (L) 10/06/2018 04:30 AM  
  10/06/2018 04:30 AM  
 
 
Imaging Reviewed: 
Jael Manrique Chest [facility] Result Date: 10/5/2018 Chest, single view Indication: Endotracheal tube placement, shortness of breath. Comparison: Several prior exams, most recently 10/5/2018 Findings:  Portable upright AP view of the chest was obtained. There is an endotracheal tube which projects approximately 5.8 cm above the billy.  A nasogastric tube is present below the diaphragm, tip projecting over the stomach body. Right IJ approach central venous catheter in stable position. Improved expansion of the lungs from prior days exam, with parallel improved aeration of the right lower lobe. Mild residual linear opacity noted at the right midlung with faint radiodensity also noted at the medial aspect of the right lower lobe. Mild interstitial opacities are similar to prior. No pneumothorax or discrete pleural effusion. Stable cardiac size and mediastinal contours. Impression: 1. Endotracheal tube, visualized nasogastric tube, and right IJ central venous catheter in stable position. 2. Overall improved aeration of the right lower lobe, likely improved atelectasis with mild residual atelectasis/airspace disease. 3. Mild interstitial edema overall similar to prior. Medications Reviewed: 
Current Facility-Administered Medications Medication Dose Route Frequency  [START ON 10/7/2018] insulin glargine (LANTUS) injection 18 Units  18 Units SubCUTAneous DAILY  vancomycin (VANCOCIN) 750 mg in 0.9% sodium chloride (MBP/ADV) 250 mL ADV  750 mg IntraVENous Q TU, TH & SAT  [START ON 10/9/2018] VANCOMYCIN INFORMATION NOTE   Other ONCE  
 0.9% sodium chloride infusion 250 mL  250 mL IntraVENous PRN  
 influenza vaccine 2018-19 (6 mos+)(PF) (FLUARIX QUAD/FLULAVAL QUAD) injection 0.5 mL  0.5 mL IntraMUSCular PRIOR TO DISCHARGE  fluconazole (DIFLUCAN) 200mg/100 mL IVPB (premix)  200 mg IntraVENous Q24H  
 meropenem (MERREM) 500 mg in 0.9% sodium chloride (MBP/ADV) 50 mL MBP  500 mg IntraVENous Q12H  
 midazolam (VERSED) injection    PRN  
 acetaminophen (TYLENOL) solution 325 mg  325 mg Per NG tube Q4H PRN  pantoprazole (PROTONIX) 40 mg in sodium chloride 0.9% 10 mL injection  40 mg IntraVENous Q12H  
 0.9% sodium chloride infusion 250 mL  250 mL IntraVENous PRN  
 heparin (porcine) pf 100 Units  100 Units InterCATHeter PRN  
 0.9% sodium chloride infusion 250 mL  250 mL IntraVENous PRN  
  insulin lispro (HUMALOG) injection   SubCUTAneous Q6H  
 albuterol (PROVENTIL VENTOLIN) nebulizer solution 2.5 mg  2.5 mg Nebulization Q6H RT  
 heparin (porcine) 1,000 unit/mL injection 1,000 Units  1,000 Units InterCATHeter DIALYSIS PRN  
 VANCOMYCIN INFORMATION NOTE   Other Rx Dosing/Monitoring  senna-docusate (PERICOLACE) 8.6-50 mg per tablet 1 Tab  1 Tab Oral BID PRN  
 ondansetron (ZOFRAN) injection 4 mg  4 mg IntraVENous Q4H PRN  
 glucose chewable tablet 16 g  4 Tab Oral PRN  
 glucagon (GLUCAGEN) injection 1 mg  1 mg IntraMUSCular PRN  
 dextrose (D50) infusion 12.5-25 g  25-50 mL IntraVENous PRN  
 hydrALAZINE (APRESOLINE) 20 mg/mL injection 20 mg  20 mg IntraVENous Q6H PRN Sarah Gómez DO Internal Medicine, Hospitalist 
Pager: [phone] [facility]

## 2018-10-06 NOTE — PROGRESS NOTES
2045-  Nursing assessment completed. Ventilator settings verified. Pt with eyes open, does not track or focus. Responds to pain by w/d only. Has cough and gag. 2210-  Mouth care given. 0000-  No changes in nursing assessment. 0120-  Mouth care given. 0400-  Pt given complete bed bath with bath wipes. Linens, gown, and bed pad changed. Pt remains unresponsive, keep eyes open, and coughs weakly when suctioned. Bedside and Verbal shift change report given to [id number] Matthew Kearney (oncoming nurse) by Chago Martini RN 
 (offgoing nurse). Report included the following information Kardex, Intake/Output, MAR and Recent Results.

## 2018-10-06 NOTE — PROGRESS NOTES
Remote note. Asked to check over weekend. Chart reviewed. Notes and labs reviewed. Pt remains in ICU with persistent fever and leucocytosis. Recent Blcx 10/4 x2 NTD Pt on Bsabx with Vanco and Meropenem and Fluconazole If continues to spike, repeat Blcx x2, dc TDC and send tip for cx Overall very poor prognosis Please call me at [phone] if you have any question or concern. Mary Davidson MD 
Infectious Disease

## 2018-10-06 NOTE — PROCEDURES
[facility] - [facility] 
EEG Dillan Henry MR#: [id number] : 1955 ACCOUNT #: [de-identified] DATE OF SERVICE: 10/05/2018 This is a 57-year-old gentleman who has history of cerebrovascular accident with right-sided paralysis, hypertension, diabetes and chronic renal insufficiency. He developed cardiac arrest in the dialysis center and received resuscitation. Afterwards, he has not been responsive. He has been off IV sedation for the last few days. However, he did receive 4 mg of Versed for bronchoscopy during the noontime today. The EEG was performed 5 hours later. EEG INTERPRETATION:  This is a referential and bipolar EEG recorded with a 10-20 system. The patient is unresponsive and intubated. The cerebral activity is severe suppressed and appears to be 4-5 Hz intermittently in bilateral hemisphere with not much asymmetry. The amplitude of cerebral activity is so low that it becomes flat intermittently lasting for 1 or 2 seconds. No epileptiform discharges or focal slow activities in the study. No electrographic seizures recorded. Patient is not responding to the noxious stimulation physically and EEG does not change to the stimulation as well. Photic stimulation does not elicit driving responses. The stage II sleep is not recorded. EEG DIAGNOSES:  This is an abnormal EEG. The study demonstrates generalized, severe cortical and cerebral dysfunction. In the setting of anoxic brain injury, this finding is consistent with poor prognosis. No seizure activities in the study. MD Reina Doran / MANUELA 
D: 10/05/2018 18:32    
T: 10/06/2018 07:03 
JOB #: [id number]

## 2018-10-06 NOTE — PROGRESS NOTES
is resting comfortably at this time. ET tube secure, emergency equipment is available at the bedside. Large amount of bloody secretions drained from LEONCIO. Vital signs stable. Will continue to monitor.

## 2018-10-06 NOTE — DIALYSIS
ACUTE HEMODIALYSIS FLOW SHEET 
 
HEMODIALYSIS ORDERS: Physician: KATHLEEN Urias MD 
  
Dialyzer: revaclear   Duration: 4 hr  BFR: 300   DFR: 600 Dialysate:  Temp 36 K+   3    Ca+  2.5 Na 140 Bicarb 35 Weight:  92.7 kg    Patient Chart [x]     Unable to Obtain []   Dry weight/UF Goal: 2500 ml Access Right neck catheter Needle Gauge NA Heparin []  Bolus      Units    [] Hourly       Units    []None Catheter locking solution Heparin Pre BP:   150/70    Pulse:     90     Temperature:   100.2  Respirations: 16  Tx: NS   250 ml    ml/Bolus  Other        [] N/A Labs: Pre        Post:        [] N/A Additional Orders(medications, blood products, hypotension management):       [x] N/A  
 
[] [facility] Consent Verified CATHETER ACCESS: []N/A   [x]Right   []Left   [x]IJ     []Fem [] First use X-ray verified     []Tunnel                [] Non Tunneled [x]No S/S infection  []Redness  []Drainage []Cultured []Swelling []Pain []Medical Aseptic Prep Utilized   []Dressing Changed  [] Biopatch  Date:      
[]Clotted   []Patent   Flows: [x]Good  []Poor  []Reversed If access problem,  notified: []Yes    [x]N/A  Date:        
 
GRAFT/FISTULA ACCESS:  [x]N/A     []Right     []Left     []UE     []LE []AVG   []AVF        []Buttonhole    []Medical Aseptic Prep Utilized []No S/S infection  []Redness  []Drainage []Cultured []Swelling []Pain Bruit:   [] Strong    [] Weak       Thrill :   [] Strong    [] Weak Needle Gauge: NA   Length: NA If access problem,  notified: []Yes     [x]N/A  Date:       
Please describe access if present and not used:  
 
 
GENERAL ASSESSMENT:   
LUNGS:  Rate 16 SaO2%   97     [] N/A    [x] Clear  [] Coarse  [] Crackles  [] Wheezing 
      [] Diminished     Location : []RLL   []LLL    []RUL  []JOSSIE Cough: []Productive  []Dry  [x]N/A   Respirations:  [x]Easy  []Labored Therapy:  []RA  []NC  l/min    Mask: []NRB []Venti       O2% []Ventilator  [x]Intubated  [] Trach  [] BiPaP CARDIAC: [x]Regular      [] Irregular   [] Pericardial Rub  [] JVD []  Monitored  [] Bedside  [] Remotely monitored [] N/A  Rhythm: EDEMA: [] None  [x]Generalized  [] Pitting [] 1    [] 2    [x] 3    [] 4 [] Facial  [] Pedal  [x]  UE  [] LE  
SKIN:   [] Warm  [] Hot     [] Cold   [] Dry     [] Pale   [] Diaphoretic    
             [] Flushed  [] Jaundiced  [] Cyanotic  [] Rash  [] Weeping LOC:    [] Alert      []Oriented:    [] Person     [] Place  []Time 
             [] Confused  [] Lethargic  [] Medicated  [x] Non-responsive GI / ABDOMEN:  [] Flat    [] Distended    [] Soft    [] Firm   [x]  Obese 
                           [] Diarrhea  [] Bowel Sounds  [] Nausea  [] Vomiting  / URINE ASSESSMENT:[] Voiding   [] Oliguria  [] Anuria   []  Mcbride [] Incontinent    []  Incontinent Brief      []  Bathroom Privileges PAIN: [] 0 []1  []2   []3   []4   []5   []6   []7   []8   []9   []10 Scale 0-10  Action/Follow Up: Patient is not responsive. MOBILITY:  [] Amb    [] Amb/Assist    [x] Bed    [] Wheelchair  [] Stretcher All Vitals and Treatment Details on Attached Flowsheet Hospital: [facility] Room # 2961 [] 1st Time Acute  [] Stat  [x] Routine  [] Urgent    
[] Acute Room  []  Bedside  [x] ICU/CCU  [] ER Isolation Precautions:  [x] Dialysis   [] Airborne   [] Contact    [] Reverse Special Considerations:         [] Blood Consent Verified []N/A ALLERGIES:   [x] NKA Code Status:  [x] Full Code  [] DNR  [] Other HBsAg ONLY: Date Drawn 09/28/2018         [x]Negative []Positive []Unknown HBsAb: Date 09/28/218    [x] Susceptible   [] Yourja94 []Not Drawn  [] Drawn Current Labs:    Date of Labs: 10/6/2018          Today [x] Cut and paste current labs here.     Results for Rodri Cortes (MRN [id number]) as of 10/6/2018 17:50 
 Ref. Range 10/6/2018 13:44 CALLED TO: Unknown R JILLIAN, 2ICU. .. WBC Latest Ref Range: 4.6 - 13.2 K/uL 21.9 (H) RBC Latest Ref Range: 4.70 - 5.50 M/uL 2.36 (L) HGB Latest Ref Range: 13.0 - 16.0 g/dL 6.8 (L) HCT Latest Ref Range: 36.0 - 48.0 % 21.2 (L) MCV Latest Ref Range: 74.0 - 97.0 FL 89.8 MCH Latest Ref Range: 24.0 - 34.0 PG 28.8 MCHC Latest Ref Range: 31.0 - 37.0 g/dL 32.1 RDW Latest Ref Range: 11.6 - 14.5 % 16.9 (H) PLATELET Latest Ref Range: 135 - 420 K/uL 292 MPV Latest Ref Range: 9.2 - 11.8 FL 9.4 Crossmatch Expiration Unknown 10/09/2018 Unit number Unknown [id number] Unit division Unknown 00 Status of unit Unknown ISSUED Crossmatch result Unknown Compatible TYPE + CROSSMATCH Unknown Rpt Results for Leonel Urban (MRN [id number]) as of 10/6/2018 17:50 Ref. Range 10/6/2018 04:30 WBC Latest Ref Range: 4.6 - 13.2 K/uL 22.6 (H) RBC Latest Ref Range: 4.70 - 5.50 M/uL 2.27 (L) HGB Latest Ref Range: 13.0 - 16.0 g/dL 6.6 (L) HCT Latest Ref Range: 36.0 - 48.0 % 20.4 (L) MCV Latest Ref Range: 74.0 - 97.0 FL 89.9 MCH Latest Ref Range: 24.0 - 34.0 PG 29.1 MCHC Latest Ref Range: 31.0 - 37.0 g/dL 32.4 RDW Latest Ref Range: 11.6 - 14.5 % 16.5 (H) PLATELET Latest Ref Range: 135 - 420 K/uL 260 MPV Latest Ref Range: 9.2 - 11.8 FL 8.9 (L) Sodium Latest Ref Range: 136 - 145 mmol/L 143 Potassium Latest Ref Range: 3.5 - 5.5 mmol/L 3.8 Chloride Latest Ref Range: 100 - 108 mmol/L 104 CO2 Latest Ref Range: 21 - 32 mmol/L 24 Anion gap Latest Ref Range: 3.0 - 18 mmol/L 15 Glucose Latest Ref Range: 74 - 99 mg/dL 176 (H) BUN Latest Ref Range: 7.0 - 18 MG/DL 42 (H) Creatinine Latest Ref Range: 0.6 - 1.3 MG/DL 5.56 (H) BUN/Creatinine ratio Latest Ref Range: 12 - 20   8 (L) Calcium Latest Ref Range: 8.5 - 10.1 MG/DL 7.3 (L) Phosphorus Latest Ref Range: 2.5 - 4.9 MG/DL 5.2 (H) GFR est non-AA Latest Ref Range: >60 ml/min/1.73m2 10 (L) GFR est AA Latest Ref Range: >60 ml/min/1.73m2 13 (L) Albumin Latest Ref Range: 3.4 - 5.0 g/dL 1.6 (L) Lipase Latest Ref Range: 73 - 393 U/L 4857 (H) DIET:  [] Renal    [] Other     [x] NPO     []  Diabetic PRIMARY NURSE REPORT: First initial/Last name/Title Pre Dialysis: ANDREW Stanley RN    Time: 1600 EDUCATION:   
[x] Patient [] Other         Knowledge Basis: []None []Minimal [] Substantial  
Barriers to learning  []N/A  
[] Access Care     [] S&S of infection     [] Fluid Management     []K+     []Procedural   
[]Albumin     [] Medications     [] Tx Options     [] Transplant     [] Diet     [] Other Teaching Tools:  [] Explain  [] Demo  [] Handouts [] Video Patient response:   [] Verbalized understanding  [] Teach back  [] Return demonstration [] Requires follow up Inappropriate due to    Patient is not responsive and intubated. [x] Time Out/Safety Check  [x]Extracorporeal Circuit Tested for integrity RO/HEMODIALYSIS MACHINE SAFETY CHECKS  Before each treatment:    
Machine Number:                   [address] [facility]  
                                [] Unit Machine # with centralized RO 
                                [] Portable Machine #1/RO serial # [id number] [] Portable Machine #2/RO serial # [id number] [] Portable Machine #4/RO serial # [id number] [facility] [x] Portable Machine #11/RO serial # [id number] [] Portable Machine #12/RO serial # [id number] [] Portable Machine #13/RO serial #  [id number] Alarm Test:  Pass time 3063         Other:        
[x] RO/Machine Log Complete    Temp    36            
 Dialysate: pH  7.4 Conductivity: Meter   14     HD Machine [x][x][x][x][x][x][x][x][x][x][x][x][x][x][x][x][x][x][x][x][x][x][x][x][x][x][x][x][x][x][x][x][x][x][x][x][x][x][x][x][x][x][x][x][x][x][x][x][x][x][x][x][x][x][x][x][x][x][x][x][x][x][x]

## 2018-10-06 NOTE — PROGRESS NOTES
[facility] Progress Note I have reviewed the flowsheet and previous days notes. Events, vitals, medications and notes from last 24 hours reviewed. Care plan discussed with staff and on multidisciplinary rounds. Subjective: 
10/6/2018 Pt is on vent. No response to voice. Not on any sedation Impression and Plan Patient Active Problem List  
Diagnosis Code  Stroke (cerebrum) ([facility]) I63.9  Stroke ([facility] I63.9  Rhabdomyolysis M62.82  
 Dehydration E86.0  
 Essential hypertension I10  
 Diabetes mellitus type 2, controlled ([facility] E11.9  Non compliance w medication regimen Z91.14  
 Pneumonia of both lower lobes J18.9  DM (diabetes mellitus) ([facility] E11.9  History of stroke Z80.78  
 Acute-on-chronic kidney injury ([facility] N17.9, N18.9  Acute cystitis N30.00  Elevated troponin R74.8  Transaminitis R74.0  Acute kidney injury ([facility] N17.9  Elevated LFTs R94.5  Cardiac arrest with ventricular fibrillation ([facility]) I46.9, I49.01  
 Abnormal blood coagulation profile R79.1  Acute pancreatitis K85.90  Septic shock ([facility]) A41.9, R65.21  
 Ischemic encephalopathy I67.89 VDRF/Acute resp failure with hypoxia - Today is Day # 8 on Vent. Pt's ETT was changed yesterday and he had a Bronch done yesterday. Change vent mode to [facility] mode. Pt is not yet ready for weaning due to his mental status. I d/w patients daughter, mom and niece at bedside. Daughter says she is inclined to go for Trach and PEG. Plan for [facility] next week. Encephalopathy/Anoxic injury - Pt had a EEG done yesterday. It shows demonstrates generalized, severe cortical and cerebral dysfunction. Pt's MRI is also abnormal. Pt is not on any sedation. Prognosis is guarded. Defer to Neurology S/p V Fib arrest - Echo shows EF 46-50%, LV global hypokinesis. Consult Cardiology [facility] and Pneumonia - Pt is s/p Bronch yesterday.  Pt is on Diflucan, Merrem, Vanco. Defer to ID 
 JULIANNA on CKD - Pt is now HD dependent. Defer to Nephrology Anemia - Pt is getting 1unit PRBC yesterday, monitor CBC Melena - Pt had an EGD done which showed a large clot in the esophagus. On Protonix BID. Defer mgt to GI 
DM - Cont with lantus and ISS Abn LFT - Coming down. CT abd shows Distended gallbladder with gallstones and gallbladder sludge but no biliary dilatation. F/u with GI Elevated Lipase - Pt's CT abd does not show pancreatitis,its coming down, defer to GI Nutrition - Pt is being started on Trickle feeds DVT proph - SCD 
  
OTHER: 
Glycemic Control. Glucose stabilizer per ICU protocol when on insulin drip. Maintain blood glucose 140-180. Replace electrolytes per ICU electrolyte replacement protocol Ventilator bundle & Sedation protocol followed. Daily morning sedation holiday, assessment for readiness for SBT & weaning from ventilator; and then re-titrate if required. Aim to keep peak plateau pressure 85-02TT H2O in ARDS patient. [place] tube to suction at 20-30 cm H2O, Maintain [place] tube with 5-10ml air every 4 hours. Chlorhexidine mouth washes and routine oral care every 4 hours. Stress ulcer and DVT prophylaxis. HOB >=30 degree elevation all the time. HOB >=30 degree elevation all the time. Aggressive pulmonary toileting. Incentive spirometry when appropriate. Aspiration precautions. Sepsis bundle and protocol followed. Deescalate antibiotic when appropriate. PT/OT eval and treat. OOB/IS when appropriate. Quality Care: Stress ulcer prophylaxis, DVT prophylaxis, HOB elevated, Infection control all reviewed and addressed. Events and notes from last 24 hours reviewed. Care plan discussed with nursing. D/w patient's family above medical problems and answered all questions to their satisfaction. CC TIME: >40 min Medication Reviewed: 
 
No Known Allergies Past Medical History:  
Diagnosis Date  CHF (congestive heart failure) ([address]  Diabetes ([facility]  Stroke ([facility] History reviewed. No pertinent surgical history. Social History Substance Use Topics  Smoking status: Never Smoker  Smokeless tobacco: Never Used  Alcohol use No  
  
History reviewed. No pertinent family history. Prior to Admission medications Medication Sig Start Date End Date Taking? Authorizing Provider  
aspirin (ASPIRIN) 325 mg tablet Take 1 Tab by mouth daily. 9/15/18   Lara Rea MD  
atorvastatin (LIPITOR) 80 mg tablet Take 1 Tab by mouth nightly. 9/14/18   Lara Rea MD  
insulin glargine (LANTUS) 100 unit/mL injection 10 units qhs  Indications: type 2 diabetes mellitus 9/15/18   Lara Rea MD  
insulin lispro (HUMALOG) 100 unit/mL injection 4 units with meals 9/14/18   Lara Rea MD  
losartan (COZAAR) 50 mg tablet Take 1 Tab by mouth daily. 9/14/18   Lara Rea MD  
 
Current Facility-Administered Medications Medication Dose Route Frequency  [START ON 10/7/2018] insulin glargine (LANTUS) injection 18 Units  18 Units SubCUTAneous DAILY  vancomycin (VANCOCIN) 750 mg in 0.9% sodium chloride (MBP/ADV) 250 mL ADV  750 mg IntraVENous Q TU, TH & SAT  [START ON 10/9/2018] VANCOMYCIN INFORMATION NOTE   Other ONCE  
 influenza vaccine 2018-19 (6 mos+)(PF) (FLUARIX QUAD/FLULAVAL QUAD) injection 0.5 mL  0.5 mL IntraMUSCular PRIOR TO DISCHARGE  fluconazole (DIFLUCAN) 200mg/100 mL IVPB (premix)  200 mg IntraVENous Q24H  
 meropenem (MERREM) 500 mg in 0.9% sodium chloride (MBP/ADV) 50 mL MBP  500 mg IntraVENous Q12H  pantoprazole (PROTONIX) 40 mg in sodium chloride 0.9% 10 mL injection  40 mg IntraVENous Q12H  
 insulin lispro (HUMALOG) injection   SubCUTAneous Q6H  
 albuterol (PROVENTIL VENTOLIN) nebulizer solution 2.5 mg  2.5 mg Nebulization Q6H RT  
 VANCOMYCIN INFORMATION NOTE   Other Rx Dosing/Monitoring Peripheral Intravenous Line: 
Peripheral IV 10/04/18 Right Forearm (Active) Site Assessment Clean, dry, & intact 10/6/2018 10:00 AM  
Phlebitis Assessment 0 10/6/2018 10:00 AM  
Infiltration Assessment 0 10/6/2018 10:00 AM  
Dressing Status Clean, dry, & intact 10/6/2018  7:42 AM  
Dressing Type Tape;Transparent 10/6/2018  7:42 AM  
Hub Color/Line Status Blue;Capped 10/6/2018  7:42 AM  
Action Taken Open ports on tubing capped 10/6/2018  7:42 AM  
Alcohol Cap Used Yes 10/6/2018  7:42 AM  
   
Peripheral IV 10/05/18 Left Forearm (Active) Site Assessment Clean, dry, & intact 10/6/2018 10:00 AM  
Phlebitis Assessment 0 10/6/2018 10:00 AM  
Infiltration Assessment 0 10/6/2018 10:00 AM  
Dressing Status Clean, dry, & intact 10/6/2018  7:42 AM  
Dressing Type Tape;Transparent 10/6/2018  7:42 AM  
Hub Color/Line Status Pink; Infusing 10/6/2018  7:42 AM  
Action Taken Open ports on tubing capped 10/6/2018  7:42 AM  
Alcohol Cap Used Yes 10/6/2018  7:42 AM  
 
Hemodialysis Catheter: 
Hemodialysis Access 09/28/18 (Active) Central Line Being Utilized Yes 10/6/2018  7:42 AM  
Criteria for Appropriate Use Dialysis/apheresis 10/6/2018  7:42 AM  
Date Accessed  10/04/18 10/6/2018  7:42 AM  
Site Assessment Clean, dry, & intact 10/6/2018 10:00 AM  
Date of Last Dressing Change 10/05/18 10/6/2018  7:42 AM  
Dressing Status Clean, dry, & intact 10/6/2018  7:42 AM  
Dressing Type Tape;Transparent 10/6/2018  7:42 AM  
Proximal Hub Color/Line Status Capped 10/6/2018  7:42 AM  
Distal Hub Color/Line Status Capped 10/6/2018  7:42 AM  
 
Drain(s): 
Orogastric Tube 09/29/18 (Active) Site Assessment Clean, dry, & intact 10/6/2018  7:42 AM  
Dressing Status Clean, dry, & intact 10/6/2018  7:42 AM  
G Port Status Infusing 10/6/2018  7:42 AM  
External Insertion Jl (cms) 60 cms 10/6/2018  7:42 AM  
Action Taken Placement verified (comment) 10/6/2018  7:42 AM  
Drainage Description Tan 10/5/2018  8:00 PM  
Gastric Residual (mL) 330 ml 10/6/2018  7:42 AM  
 Tube Feeding/Formula Options Osmolite 1.2 10/6/2018  7:42 AM  
Tube Feeding/Verify Rate (mL/hr) 60 10/6/2018  7:42 AM  
Water Flush Volume (mL) 30 mL 10/6/2018  9:00 AM  
Intake (ml) 0 ml 10/6/2018  7:42 AM  
Medication Volume 0 ml 10/5/2018 12:00 PM  
Output (ml) 0 ml 10/5/2018 12:00 PM  
 
Airway: Airway - Endotracheal Tube 10/05/18 Oral (Active) Insertion Depth (cm) 24 cm 10/6/2018  8:48 AM  
Line Jl Lips 10/6/2018  8:48 AM  
Side Secured Centered 10/6/2018  8:48 AM  
Cuff Pressure 30 cmH20 10/6/2018  8:48 AM  
Site Assessment Clean, dry, & intact 10/6/2018  8:48 AM  
 
 
Objective: 
Vital Signs:   
Visit Vitals  /53  Pulse 91  Temp 100.2 °F (37.9 °C)  Resp 21  
 Ht 5' 7\" (1.702 m)  Wt 92.7 kg (204 lb 5.9 oz)  SpO2 94%  BMI 32.01 kg/m2 O2 Device: Endotracheal tube, Ventilator O2 Flow Rate (L/min): 45 l/min Temp (24hrs), Av.2 °F (37.9 °C), Min:99.1 °F (37.3 °C), Max:101.3 °F (38.5 °C) Intake/Output:  
Last shift:      10/06 0701 - 10/06 1900 In: 61 [I.V.:30] Out: - Last 3 shifts: 10/04 1901 - 10/06 0700 In: 1400 [I.V.:310] Out: 0 Intake/Output Summary (Last 24 hours) at 10/06/18 1217 Last data filed at 10/06/18 1000 Gross per 24 hour Intake             1050 ml Output                0 ml Net             1050 ml Last 3 Recorded Weights in this Encounter 10/02/18 4035 10/03/18 0400 10/04/18 8989 Weight: 93.9 kg (207 lb) 93.9 kg (207 lb 0.2 oz) 92.7 kg (204 lb 5.9 oz) Ventilator Settings: 
Mode Rate Tidal Volume Pressure FiO2 PEEP 
SIMV, Pressure support   500 ml  15 cm H2O 40 % 5 cm H20 Peak airway pressure: 24 cm H2O Plateau pressure:   
Tidal volume:  
Minute ventilation: 8.9 l/min SPO2  
 
ARDS network Guidelines: Lung protective strategy and Plateau pressure goals less than or equal to 30 Physical Exam:  
 
General/Neurology: On vent. , no response to voice Head:   Normocephalic, without obvious abnormality Eye:   Pallor present Oral:   Mucus membranes moist 
Neck:   Supple Lung:   B/l air entry is decreased Heart:   Regular rate & rhythm. S1 S2 present. Abdomen/: Soft, non tender, BS +nt Extremities:  1+ pedal edema Skin:   Dry, intact Data: 
   
Recent Results (from the past 24 hour(s)) GLUCOSE, POC Collection Time: 10/05/18  1:04 PM  
Result Value Ref Range Glucose (POC) 273 (H) 70 - 110 mg/dL CBC W/O DIFF Collection Time: 10/05/18  2:26 PM  
Result Value Ref Range WBC 24.8 (H) 4.6 - 13.2 K/uL  
 RBC 2.57 (L) 4.70 - 5.50 M/uL HGB 7.4 (L) 13.0 - 16.0 g/dL HCT 22.7 (L) 36.0 - 48.0 % MCV 88.3 74.0 - 97.0 FL  
 MCH 28.8 24.0 - 34.0 PG  
 MCHC 32.6 31.0 - 37.0 g/dL  
 RDW 16.3 (H) 11.6 - 14.5 % PLATELET 417 991 - 027 K/uL MPV 9.1 (L) 9.2 - 11.8 FL  
GLUCOSE, POC Collection Time: 10/05/18  4:38 PM  
Result Value Ref Range Glucose (POC) 175 (H) 70 - 110 mg/dL GLUCOSE, POC Collection Time: 10/05/18 11:45 PM  
Result Value Ref Range Glucose (POC) 234 (H) 70 - 110 mg/dL RENAL FUNCTION PANEL Collection Time: 10/06/18  4:30 AM  
Result Value Ref Range Sodium 143 136 - 145 mmol/L Potassium 3.8 3.5 - 5.5 mmol/L Chloride 104 100 - 108 mmol/L  
 CO2 24 21 - 32 mmol/L Anion gap 15 3.0 - 18 mmol/L Glucose 176 (H) 74 - 99 mg/dL BUN 42 (H) 7.0 - 18 MG/DL Creatinine 5.56 (H) 0.6 - 1.3 MG/DL  
 BUN/Creatinine ratio 8 (L) 12 - 20 GFR est AA 13 (L) >60 ml/min/1.73m2 GFR est non-AA 10 (L) >60 ml/min/1.73m2 Calcium 7.3 (L) 8.5 - 10.1 MG/DL Phosphorus 5.2 (H) 2.5 - 4.9 MG/DL Albumin 1.6 (L) 3.4 - 5.0 g/dL CBC W/O DIFF Collection Time: 10/06/18  4:30 AM  
Result Value Ref Range WBC 22.6 (H) 4.6 - 13.2 K/uL  
 RBC 2.27 (L) 4.70 - 5.50 M/uL HGB 6.6 (L) 13.0 - 16.0 g/dL HCT 20.4 (L) 36.0 - 48.0 % MCV 89.9 74.0 - 97.0 FL  
 MCH 29.1 24.0 - 34.0 PG  
 MCHC 32.4 31.0 - 37.0 g/dL  
 RDW 16.5 (H) 11.6 - 14.5 % PLATELET 071 349 - 895 K/uL MPV 8.9 (L) 9.2 - 11.8 FL  
LIPASE Collection Time: 10/06/18  4:30 AM  
Result Value Ref Range Lipase 4857 (H) 73 - 393 U/L  
POC G3 Collection Time: 10/06/18  5:00 AM  
Result Value Ref Range Device: VENT    
 FIO2 (POC) 0.4 % pH (POC) 7.454 (H) 7.35 - 7.45    
 pCO2 (POC) 40.3 35.0 - 45.0 MMHG  
 pO2 (POC) 65 (L) 80 - 100 MMHG  
 HCO3 (POC) 28.2 (H) 22 - 26 MMOL/L  
 sO2 (POC) 93 92 - 97 % Base excess (POC) 4 mmol/L Mode SIMV Tidal volume 500 ml Set Rate 10 bpm  
 PEEP/CPAP (POC) 5 cmH2O Pressure support 15 cmH2O Allens test (POC) YES Inspiratory Time 0.9 sec Total resp. rate 18 Site RIGHT RADIAL Patient temp. 37.3 Specimen type (POC) ARTERIAL Performed by Georgetta Habermann Volume control plus YES    
GLUCOSE, POC Collection Time: 10/06/18  5:19 AM  
Result Value Ref Range Glucose (POC) 209 (H) 70 - 110 mg/dL GLUCOSE, POC Collection Time: 10/06/18 11:18 AM  
Result Value Ref Range Glucose (POC) 190 (H) 70 - 110 mg/dL Chemistry Recent Labs 10/06/18 
 0430  10/05/18 
 0215  10/04/18 
 1120  10/04/18 
 0100 GLU  176*  150*   --   101* NA  143  142   --   142  
K  3.8  3.4*   --   3.8 CL  104  102   --   102 CO2  24  25   --   26 BUN  42*  24*   --   27* CREA  5.56*  3.14*   --   3.62* CA  7.3*  7.5*   --   7.7* PHOS  5.2*  3.7   --   4.1 AGAP  15  15   --   14  
BUCR  8*  8*   --   7* AP   --    --   279*  345* TP   --    --   7.5  6.9 ALB  1.6*  1.8*  1.7*  1.9*  1.9*  
GLOB   --    --   5.8*  5.0* AGRAT   --    --   0.3*  0.4* CBC w/Diff Recent Labs 10/06/18 
 0430  10/05/18 
 1426  10/05/18 
 0215 WBC  22.6*  24.8*  25.5*  
RBC  2.27*  2.57*  2.51* HGB  6.6*  7.4*  7.4* HCT  20.4*  22.7*  22.0*  
PLT  260  236  195 ABG  Recent Labs 10/06/18 
 0500  10/05/18 
 4842  10/04/18 
 2833 PHI  7.454*  7.465*  7.449 PCO2I  40.3  39.9  37.7 PO2I  65*  106*  62* HCO3I  28.2*  28.6*  26.0  
FIO2I  0.4  0.5  0.50 Micro Recent Labs 10/04/18 
 1120  10/04/18 
 3606 CULT  NO GROWTH 2 DAYS  NO GROWTH 2 DAYS Recent Labs 10/04/18 
 1120  10/04/18 
 6988 CULT  NO GROWTH 2 DAYS  NO GROWTH 2 DAYS  
 
 
CT (Most Recent) Results from [facility] [place] Encounter encounter on 09/26/18 CT ABD PELV W CONT Narrative EXAM: CT of the abdomen and pelvis INDICATION: Pneumonia. Acute pyelonephritis. Sepsis. Abnormal liver function 
tests. Dialysis patient. COMPARISON: None. TECHNIQUE: Axial CT imaging of the abdomen and pelvis was performed with 
intravenous contrast. Multiplanar reformats were generated. One or more dose 
reduction techniques were used on this CT: automated exposure control, 
adjustment of the mAs and/or kVp according to patient size, and iterative 
reconstruction techniques. The specific techniques used on this CT exam have 
been documented in the patient's electronic medical record. 
 
_______________ FINDINGS: 
 
LOWER CHEST: Dense consolidation right lower lobe compatible with pneumonia. Small bilateral pleural effusions. Cardiomegaly. Fluid-filled esophagus with 
nasogastric tube in place. LIVER, BILIARY: Indeterminate low-attenuation 7 mm focus anterior left hepatic 
lobe. Hepatomegaly. No biliary dilation. Distended gallbladder with gallstones 
and indeterminate material in the gallbladder which may represent sludge. PANCREAS: Normal. 
 
SPLEEN: Normal. 
 
ADRENALS: Normal. 
 
KIDNEYS/URETERS: No hydronephrosis or renal calculus. Small low attenuation 
cortical focus medial upper pole left kidney not well delineated measuring about 1.1 cm. This may represent renal cyst. 
 
LYMPH NODES: No enlarged lymph nodes. GASTROINTESTINAL TRACT: No bowel dilation or wall thickening. Normal appendix. PELVIC ORGANS: No intraperitoneal fluid. Incidental vas deferens calcifications. VASCULATURE: Mild atherosclerosis. BONES: No acute or aggressive osseous abnormalities identified. OTHER: None. 
 
_______________ Impression IMPRESSION: 
 
 
1. Dense subtotal or total consolidation right lower lobe compatible with 
pneumonia similar to prior study. Small bilateral pleural effusions similar in 
size. 2. Distended gallbladder with gallstones and gallbladder sludge without 
significant change in appearance and also described in detail on ultrasound of 
9/27/2018. 3. Indeterminate small lesion left hepatic lobe without change. Hepatomegaly 
again evident. 4. No intraperitoneal fluid. Possible small left upper pole renal cyst. 
Cardiomegaly. Nasogastric tube tip in body of stomach. XR (Most Recent). CXR reviewed by me and compared with previous CXR Results from [facility] [place] Encounter encounter on 09/26/18 XR CHEST PORT Narrative Chest, single view Indication: Endotracheal tube placement, shortness of breath. Comparison: Several prior exams, most recently 10/5/2018 Findings:  Portable upright AP view of the chest was obtained. There is an 
endotracheal tube which projects approximately 5.8 cm above the billy. A 
nasogastric tube is present below the diaphragm, tip projecting over the stomach 
body. Right IJ approach central venous catheter in stable position. Improved expansion of the lungs from prior days exam, with parallel improved 
aeration of the right lower lobe. Mild residual linear opacity noted at the 
right midlung with faint radiodensity also noted at the medial aspect of the 
right lower lobe. Mild interstitial opacities are similar to prior. No 
pneumothorax or discrete pleural effusion. Stable cardiac size and mediastinal 
contours. Impression Impression: 1. Endotracheal tube, visualized nasogastric tube, and right IJ central venous 
catheter in stable position. 2. Overall improved aeration of the right lower lobe, likely improved atelectasis with mild residual atelectasis/airspace disease. 3. Mild interstitial edema overall similar to prior. High complexity decision making was performed during the evaluation of this patient at high risk for decompensation with multiple organ involvement Above mentioned total time spent on reviewing the case/medical record/data/notes/EMR/patient examination/documentation/coordinating care with nurse/consultants, exclusive of procedures with complex decision making performed and > 50% time spent in face to face evaluation. Daina Hurt MD 
10/6/2018

## 2018-10-06 NOTE — PROGRESS NOTES
Problem: Falls - Risk of 
Goal: *Absence of Falls Document Gordo Dye Fall Risk and appropriate interventions in the flowsheet. Outcome: Progressing Towards Goal 
Fall Risk Interventions: 
Mobility Interventions: Bed/chair exit alarm Mentation Interventions: Bed/chair exit alarm Medication Interventions: Evaluate medications/consider consulting pharmacy Elimination Interventions: Toileting schedule/hourly rounds Problem: Pressure Injury - Risk of 
Goal: *Prevention of pressure injury Document Mitul Scale and appropriate interventions in the flowsheet. Outcome: Progressing Towards Goal 
Pressure Injury Interventions: 
Sensory Interventions: Assess changes in LOC, Assess need for specialty bed, Avoid rigorous massage over bony prominences Moisture Interventions: Absorbent underpads, Check for incontinence Q2 hours and as needed, Assess need for specialty bed, Apply protective barrier, creams and emollients Activity Interventions: Assess need for specialty bed, PT/OT evaluation Mobility Interventions: Assess need for specialty bed, HOB 30 degrees or less, Pressure redistribution bed/mattress (bed type), Suspension boots, Turn and reposition approx. every two hours(pillow and wedges) Nutrition Interventions: Document food/fluid/supplement intake Friction and Shear Interventions: Apply protective barrier, creams and emollients, HOB 30 degrees or less

## 2018-10-07 NOTE — PROGRESS NOTES
Internal Medicine Progress Note Patient's Name: Cari Morgan Admit Date: 9/26/2018 Length of Stay: 10 
 
 
Assessment/Plan Active Hospital Problems Diagnosis Date Noted  Septic shock ([address] 09/30/2018 Probably secondary to pneumonia (suspicious for aspiration). Less likely, secondary to acute pancreatitis.  Ischemic encephalopathy 09/30/2018  Cardiac arrest with ventricular fibrillation ([address] 09/29/2018 ROSC before defibrillation could be attempted.  Abnormal blood coagulation profile 09/29/2018  Acute pancreatitis 09/29/2018 Shock, leukocytosis, elevated lipase but radiographically no ductal dilatation in pancreas. GB stones without radiographic stigmata of acute cholecystitis  Elevated LFTs 09/28/2018  History of stroke 09/27/2018 With residual R hemiparesis  Acute-on-chronic kidney injury ([address] 09/27/2018  Acute cystitis 09/27/2018  Elevated troponin 09/27/2018  Transaminitis 09/27/2018  Acute kidney injury ([facility] 09/27/2018  Pneumonia of both lower lobes 09/11/2018  Essential hypertension 09/10/2018  Diabetes mellitus type 2, controlled ([facility] 09/10/2018 Pneumonia not POA 
 
- Vent mgmt per ICU 
- EGD showed large clot in esophagus last week - Cont protonix IV BID 
- Hgb stable s/p 1 unit PRBC - Trend CBC 
- LFTs improving - F/u GI 
- MRI w/ evidence of severe anoxic brain injury - Minimal improvement on neuro exam 
- Appreciate neuro assistance 
- Cont broad spec IVAB per ID 
- Nephro managing HD 
- Pt unlikely to have any meaningful neuro recovery, prognosis is very grim - Family wants to cont to do everything, including trach/PEG 
- DVT/GI protocol I have personally reviewed all pertinent labs and films that have officially resulted over the last 24 hours. I have personally checked for all pending labs that are awaiting final results. Subjective Pt s/e @ bedside Remains intubated Slight improvement in neuro exam w/ corneal and pupil reaction Otherwise, no changes Unable to assess ROS Objective Visit Vitals  /61  Pulse 92  Temp 100 °F (37.8 °C)  Resp 16  
 Ht 5' 7\" (1.702 m)  Wt 92.7 kg (204 lb 5.9 oz)  SpO2 96%  BMI 32.01 kg/m2 Physical Exam: 
General Appearance: Intubated, not following commands HENT: normocephalic/atraumatic, + ETT Neck: No JVD, hematoma R side where [facility] is improving Lungs: Coarse B/L w/ vent-assisted BS 
CV: RRR, no m/r/g Abdomen: soft, non-tender, normal bowel sounds Extremities: no cyanosis, no peripheral edema Neuro: Unresponsive to commands, no withdrawal to pain, + corneal reflex and pupillary reaction today Skin: Normal color, intact Intake and Output: 
Current Shift:  10/07 0701 - 10/07 1900 In: 90 [I.V.:60] Out: - Last three shifts:  10/05 1901 - 10/07 0700 In: 1711.7 [I.V.:730] Out: 2500 Lab/Data Reviewed: 
CMP:  
Lab Results Component Value Date/Time  10/07/2018 01:40 AM  
 K 3.7 10/07/2018 01:40 AM  
  10/07/2018 01:40 AM  
 CO2 27 10/07/2018 01:40 AM  
 AGAP 11 10/07/2018 01:40 AM  
  (H) 10/07/2018 01:40 AM  
 BUN 23 (H) 10/07/2018 01:40 AM  
 CREA 3.32 (H) 10/07/2018 01:40 AM  
 GFRAA 23 (L) 10/07/2018 01:40 AM  
 GFRNA 19 (L) 10/07/2018 01:40 AM  
 CA 7.5 (L) 10/07/2018 01:40 AM  
 PHOS 3.5 10/07/2018 01:40 AM  
 ALB 1.7 (L) 10/07/2018 01:40 AM  
 
CBC:  
Lab Results Component Value Date/Time WBC 21.2 (H) 10/07/2018 01:40 AM  
 HGB 8.0 (L) 10/07/2018 01:40 AM  
 HCT 24.1 (L) 10/07/2018 01:40 AM  
  10/07/2018 01:40 AM  
 
 
Imaging Reviewed: 
[facility] Chest [facility] Result Date: 10/7/2018 Chest, single view Indication: Respiratory failure, pneumonia. Comparison: Several prior exams, most recently 10/5/2018 Findings:  Portable upright AP view of the chest was obtained.  The patient is rotated on the provided projection with foreshortening of the right hemithorax. There is an endotracheal tube present with tip projecting approximately 2.8 cm above the billy. Nasogastric tube noted below the diaphragm, the tip collimated from view. Right IJ approach central venous catheter in stable position. Patchy airspace disease throughout the right midlung and medial right lower lung similar to prior. Mild interstitial opacities noted bilaterally, unchanged. No pneumothorax or discrete pleural effusion. Cardiac size and mediastinal contours are stable. No acute osseous abnormality is present. Impression: 1. Endotracheal tube, right IJ central venous catheter, and visualized nasogastric tube as above. 2. Overall unchanged appearance to right mid and lower lung opacity in keeping with underlying airspace disease. 3. Mild interstitial edema, similar to immediate prior, but persistently improved from 10/4/2018 Medications Reviewed: 
Current Facility-Administered Medications Medication Dose Route Frequency  insulin glargine (LANTUS) injection 18 Units  18 Units SubCUTAneous DAILY  vancomycin (VANCOCIN) 750 mg in 0.9% sodium chloride (MBP/ADV) 250 mL ADV  750 mg IntraVENous Q TU, TH & SAT  [START ON 10/9/2018] VANCOMYCIN INFORMATION NOTE   Other ONCE  
 0.9% sodium chloride infusion 250 mL  250 mL IntraVENous PRN  
 influenza vaccine 2018-19 (6 mos+)(PF) (FLUARIX QUAD/FLULAVAL QUAD) injection 0.5 mL  0.5 mL IntraMUSCular PRIOR TO DISCHARGE  fluconazole (DIFLUCAN) 200mg/100 mL IVPB (premix)  200 mg IntraVENous Q24H  
 meropenem (MERREM) 500 mg in 0.9% sodium chloride (MBP/ADV) 50 mL MBP  500 mg IntraVENous Q12H  
 midazolam (VERSED) injection    PRN  
 acetaminophen (TYLENOL) solution 325 mg  325 mg Per NG tube Q4H PRN  pantoprazole (PROTONIX) 40 mg in sodium chloride 0.9% 10 mL injection  40 mg IntraVENous Q12H  
 0.9% sodium chloride infusion 250 mL  250 mL IntraVENous PRN  
  heparin (porcine) pf 100 Units  100 Units InterCATHeter PRN  
 0.9% sodium chloride infusion 250 mL  250 mL IntraVENous PRN  
 insulin lispro (HUMALOG) injection   SubCUTAneous Q6H  
 albuterol (PROVENTIL VENTOLIN) nebulizer solution 2.5 mg  2.5 mg Nebulization Q6H RT  
 heparin (porcine) 1,000 unit/mL injection 1,000 Units  1,000 Units InterCATHeter DIALYSIS PRN  
 VANCOMYCIN INFORMATION NOTE   Other Rx Dosing/Monitoring  senna-docusate (PERICOLACE) 8.6-50 mg per tablet 1 Tab  1 Tab Oral BID PRN  
 ondansetron (ZOFRAN) injection 4 mg  4 mg IntraVENous Q4H PRN  
 glucose chewable tablet 16 g  4 Tab Oral PRN  
 glucagon (GLUCAGEN) injection 1 mg  1 mg IntraMUSCular PRN  
 dextrose (D50) infusion 12.5-25 g  25-50 mL IntraVENous PRN  
 hydrALAZINE (APRESOLINE) 20 mg/mL injection 20 mg  20 mg IntraVENous Q6H PRN Rossana Cr DO Internal Medicine, Hospitalist 
Pager: [phone] [facility]

## 2018-10-07 NOTE — PROGRESS NOTES
2100: Dialysis completed. 2.5 liters pulled per dialysis RN. Patient tolerated well. 
2300: Patient cleaned of incontinence of stool. Large loose stool noted. Patient tolerated well Uneventful shift. 
0720: Bedside and Verbal shift change report given to Ernesto Sherman (oncoming nurse) by Tootie Leal RN 
 (offgoing nurse). Report included the following information Kardex and Recent Results.

## 2018-10-07 NOTE — PROGRESS NOTES
PROGRESS NOTE PATIENT:  Lilibeth Montana MRN: [id number] [facility], [birth year]01[birth year] 
         10/7/2018, 8:59 AM 
   
 
 
SUBJECTIVE: 
Comatose on the vent. No signs of GI bleeding. Tolerating tube feeding so far. OBJECTIVE: 
Patient Vitals for the past 24 hrs: 
 Temp Pulse Resp BP SpO2  
10/07/18 0700 100 °F (37.8 °C) 92 16 109/53 98 % 10/07/18 0600 (!) 37.8 °F (3.2 °C) 94 16 124/57 98 % 10/07/18 0500 (!) 37.7 °F (3.2 °C) 94 17 145/60 98 % 10/07/18 0400 98.4 °F (36.9 °C) 95 18 146/63 98 % 10/07/18 0327 - 94 16 - 100 % 10/07/18 0300 (!) 37.6 °F (3.1 °C) 93 16 143/64 99 % 10/07/18 0200 (!) 37.5 °F (3.1 °C) 91 16 125/55 98 % 10/07/18 0100 (!) 37.4 °F (3 °C) 91 17 134/59 99 % 10/07/18 0022 - 92 18 - 98 % 10/07/18 0000 99.3 °F (37.4 °C) 92 17 - 98 % 10/06/18 2300 - 91 18 99/50 98 % 10/06/18 2200 - 91 18 118/64 98 % 10/06/18 2130 98.4 °F (36.9 °C) - - - -  
10/06/18 2115 98.4 °F (36.9 °C) - - - -  
10/06/18 2100 98.6 °F (37 °C) 90 21 112/72 99 % 10/06/18 2045 98.6 °F (37 °C) 92 18 115/69 93 % 10/06/18 2030 98.8 °F (37.1 °C) 90 18 124/74 100 % 10/06/18 2027 - 90 21 - 100 % 10/06/18 2015 98.8 °F (37.1 °C) 89 16 118/70 98 % 10/06/18 2000 98.8 °F (37.1 °C) 89 23 123/73 98 % 10/06/18 1945 99 °F (37.2 °C) 90 17 126/70 98 % 10/06/18 1930 99 °F (37.2 °C) 89 16 122/73 98 % 10/06/18 1915 99 °F (37.2 °C) 90 17 127/69 98 % 10/06/18 1900 99.1 °F (37.3 °C) 89 17 123/69 98 % 10/06/18 1845 99.1 °F (37.3 °C) 89 16 129/74 98 % 10/06/18 1830 99.3 °F (37.4 °C) 89 18 141/73 98 % 10/06/18 1815 99.5 °F (37.5 °C) 89 15 141/72 99 % 10/06/18 1800 99.7 °F (37.6 °C) 90 17 142/71 97 % 10/06/18 1745 99.9 °F (37.7 °C) 90 18 140/69 97 % 10/06/18 1739 99.9 °F (37.7 °C) - - - -  
10/06/18 1730 100.4 °F (38 °C) 88 16 144/67 98 % 10/06/18 1715 100.2 °F (37.9 °C) 90 16 150/70 97 % 10/06/18 1700 100 °F (37.8 °C) 91 17 151/67 97 % 10/06/18 1645 (!) 37.9 °F (3.3 °C) 90 18 152/70 98 % 10/06/18 1630 100 °F (37.8 °C) 90 16 147/70 97 % 10/06/18 1600 100 °F (37.8 °C) 91 16 147/66 97 % 10/06/18 1541 100 °F (37.8 °C) 92 17 147/69 98 % 10/06/18 1530 99 °F (37.2 °C) 91 16 147/69 97 % 10/06/18 1500 99 °F (37.2 °C) 92 16 149/72 97 % 10/06/18 1430 99 °F (37.2 °C) 93 17 150/73 97 % 10/06/18 1400 99 °F (37.2 °C) 92 16 128/64 97 % 10/06/18 1330 99 °F (37.2 °C) 91 17 129/62 97 % 10/06/18 1316 - - - - 96 % 10/06/18 1300 99 °F (37.2 °C) 92 18 130/57 96 % 10/06/18 1230 99 °F (37.2 °C) 92 21 137/63 96 % 10/06/18 1216 - 92 20 - 96 % 10/06/18 1200 100 °F (37.8 °C) 91 19 128/56 97 % 10/06/18 1130 100.2 °F (37.9 °C) 90 19 103/40 95 % 10/06/18 1100 100.2 °F (37.9 °C) 91 21 120/53 94 % 10/06/18 1030 100.2 °F (37.9 °C) 91 22 118/50 94 % 10/06/18 1000 100.2 °F (37.9 °C) 91 22 113/46 92 % 10/06/18 0930 (!) 100.6 °F (38.1 °C) 90 25 113/48 92 % 10/06/18 0900 100.4 °F (38 °C) 90 17 134/59 96 % Intake/Output Summary (Last 24 hours) at 10/07/18 5568 Last data filed at 10/07/18 7172 Gross per 24 hour Intake            876.7 ml Output             2500 ml Net          -1623.3 ml  
 
 
 
Lungs: Clear B/L  
CVS exam: Regular rate and rhythm Abd  : Soft, non tender, BS decreased, No masses felt. Labs: Results:  
Chemistry Recent Labs 10/07/18 
 0140  10/06/18 
 0430  10/05/18 
 0215  10/04/18 
 1120 GLU  145*  176*  150*   --   
NA  142  143  142   --   
K  3.7  3.8  3.4*   --   
CL  104  104  102   --   
CO2  27  24  25   --   
BUN  23*  42*  24*   --   
CREA  3.32*  5.56*  3.14*   --   
CA  7.5*  7.3*  7.5*   --   
AGAP  11  15  15   --   
BUCR  7*  8*  8*   --   
AP   --    --    --   279* TP   --    --    --   7.5 ALB  1.7*  1.6*  1.8*  1.7*  
GLOB   --    --    --   5.8* AGRAT   --    --    --   0.3* Estimated Creatinine Clearance: 24.4 mL/min (based on Cr of 3.32). CBC w/Diff Recent Labs 10/07/18 
 0140  10/06/18 
 1344  10/06/18 
 0430 WBC  21.2*  21.9*  22.6*  
RBC  2.71*  2.36*  2.27* HGB  8.0*  6.8*  6.6* HCT  24.1*  21.2*  20.4* PLT  311  292  260 Cardiac Enzymes No results for input(s): CPK, CKND1, REJI in the last 72 hours. No lab exists for component: Joy Barillas Coagulation No results for input(s): PTP, INR, APTT in the last 72 hours. No lab exists for component: INREXT Hepatitis Panel Lab Results Component Value Date/Time Hepatitis B surface Ag <0.10 09/28/2018 04:40 AM  
  
Amylase Lipase Liver Enzymes Recent Labs 10/07/18 
 0140  10/06/18 
 0430  10/05/18 
 0215  10/04/18 
 1120 TP   --    --    --   7.5 ALB  1.7*  1.6*  1.8*  1.7* TBILI   --    --    --   2.3* AP   --    --    --   279* SGOT   --    --    --   148* ALT   --    --    --   98* Thyroid Studies No results for input(s): T4, T3U, TSH, TSHEXT in the last 72 hours. No lab exists for component: T3RU Pathology pathology No Known Allergies Current Facility-Administered Medications Medication Dose Route Frequency  insulin glargine (LANTUS) injection 18 Units  18 Units SubCUTAneous DAILY  vancomycin (VANCOCIN) 750 mg in 0.9% sodium chloride (MBP/ADV) 250 mL ADV  750 mg IntraVENous Q TU, TH & SAT  [START ON 10/9/2018] VANCOMYCIN INFORMATION NOTE   Other ONCE  
 0.9% sodium chloride infusion 250 mL  250 mL IntraVENous PRN  
 influenza vaccine 2018-19 (6 mos+)(PF) (FLUARIX QUAD/FLULAVAL QUAD) injection 0.5 mL  0.5 mL IntraMUSCular PRIOR TO DISCHARGE  fluconazole (DIFLUCAN) 200mg/100 mL IVPB (premix)  200 mg IntraVENous Q24H  
 meropenem (MERREM) 500 mg in 0.9% sodium chloride (MBP/ADV) 50 mL MBP  500 mg IntraVENous Q12H  
 midazolam (VERSED) injection    PRN  
 acetaminophen (TYLENOL) solution 325 mg  325 mg Per NG tube Q4H PRN  pantoprazole (PROTONIX) 40 mg in sodium chloride 0.9% 10 mL injection  40 mg IntraVENous Q12H  0.9% sodium chloride infusion 250 mL  250 mL IntraVENous PRN  
 heparin (porcine) pf 100 Units  100 Units InterCATHeter PRN  
 0.9% sodium chloride infusion 250 mL  250 mL IntraVENous PRN  
 insulin lispro (HUMALOG) injection   SubCUTAneous Q6H  
 albuterol (PROVENTIL VENTOLIN) nebulizer solution 2.5 mg  2.5 mg Nebulization Q6H RT  
 heparin (porcine) 1,000 unit/mL injection 1,000 Units  1,000 Units InterCATHeter DIALYSIS PRN  
 VANCOMYCIN INFORMATION NOTE   Other Rx Dosing/Monitoring  senna-docusate (PERICOLACE) 8.6-50 mg per tablet 1 Tab  1 Tab Oral BID PRN  
 ondansetron (ZOFRAN) injection 4 mg  4 mg IntraVENous Q4H PRN  
 glucose chewable tablet 16 g  4 Tab Oral PRN  
 glucagon (GLUCAGEN) injection 1 mg  1 mg IntraMUSCular PRN  
 dextrose (D50) infusion 12.5-25 g  25-50 mL IntraVENous PRN  
 hydrALAZINE (APRESOLINE) 20 mg/mL injection 20 mg  20 mg IntraVENous Q6H PRN  
 
 
ASSESSMENT: 
No obvious signs of significant GI bleeding. Hg drifting down, likely multifactorial.  
Elevated lipase, with a CT (without IV contrast) on 10/4 showing a normal pancreas Elevated LFTs, multifactorial, possibility of CBD stone/sludge (passed or retained) not excluded but would not pursue right now. LFTs have been coming down. Will continue to monitor. Poor prognosis. Agree with transfusion. Advance tube feeding as tolerated. Monitor for signs of GI bleeding. Monitor Hg. Monitor LFTs. Leilani Peters MD.  
 
 
 
 
 
Status quo. Advance tube Jayshree Patterson MD

## 2018-10-07 NOTE — PROGRESS NOTES
Problem: Falls - Risk of 
Goal: *Absence of Falls Document Cornelious Booty Fall Risk and appropriate interventions in the flowsheet. Outcome: Progressing Towards Goal 
Fall Risk Interventions: 
Mobility Interventions: Bed/chair exit alarm Mentation Interventions: Room close to nurse's station Medication Interventions: Evaluate medications/consider consulting pharmacy Elimination Interventions: Toileting schedule/hourly rounds Problem: Pressure Injury - Risk of 
Goal: *Prevention of pressure injury Document Mitul Scale and appropriate interventions in the flowsheet. Outcome: Progressing Towards Goal 
Pressure Injury Interventions: 
Sensory Interventions: Assess changes in LOC, Assess need for specialty bed, Avoid rigorous massage over bony prominences, Chair cushion, Discuss PT/OT consult with provider, Float heels, Keep linens dry and wrinkle-free, Minimize linen layers, Pressure redistribution bed/mattress (bed type) Moisture Interventions: Absorbent underpads, Apply protective barrier, creams and emollients, Assess need for specialty bed, Check for incontinence Q2 hours and as needed Activity Interventions: Assess need for specialty bed, PT/OT evaluation Mobility Interventions: Assess need for specialty bed, Float heels, HOB 30 degrees or less, Pressure redistribution bed/mattress (bed type), PT/OT evaluation Nutrition Interventions: Document food/fluid/supplement intake, Discuss nutritional consult with provider Friction and Shear Interventions: Apply protective barrier, creams and emollients, Foam dressings/transparent film/skin sealants, HOB 30 degrees or less, Minimize layers

## 2018-10-07 NOTE — CONSULTS
[facility] - Consult Note Consultation request by Leora Sharif DO for advice/opinion related to evaluating Pneumonia of both lower lobes due to infectious organism (H* Date of  Admission: 9/26/2018 10:40 PM  
Primary Care Physician:  Moira Cuello MD 
 
 Assessment:  
 
Patient Active Problem List  
Diagnosis Code  Stroke (cerebrum) ([facility]) I63.9  Stroke ([facility] I63.9  Rhabdomyolysis M62.82  
 Dehydration E86.0  
 Essential hypertension I10  
 Diabetes mellitus type 2, controlled ([facility] E11.9  Non compliance w medication regimen Z91.14  
 Pneumonia of both lower lobes J18.9  DM (diabetes mellitus) ([facility] E11.9  History of stroke Z80.78  
 Acute-on-chronic kidney injury ([facility] N17.9, N18.9  Acute cystitis N30.00  Elevated troponin R74.8  Transaminitis R74.0  Acute kidney injury ([facility] N17.9  Elevated LFTs R94.5  Cardiac arrest with ventricular fibrillation ([facility]) I46.9, I49.01  
 Abnormal blood coagulation profile R79.1  Acute pancreatitis K85.90  Septic shock ([facility]) A41.9, R65.21  
 Ischemic encephalopathy I67.89 Plan: With significant anoxic brain injury, and stable hemodynamics, I would not pursue aggressive cardiac evaluation at this point. I would continue supportive measures. If he regains meaningful neurologic function, further recommendations from cardiac standpoint can be made. For the time being, I would continue supportive care. History of Present Illness: This is a 57 y.o. male admitted for Pneumonia of both lower lobes due to infectious organism (H*. Patient complains of: Asked to participate in the patient with history of CVA and rhabdomyolysis. Apparently, on 9/29/18, he had an episode of ventricular fibrillation. Apparently ROSC his electrocardiogram was reestablished before defibrillation could proceed.   On 9/20/11/18 shows essentially normal sinus rhythm with left anterior fascicular block. His echocardiogram on 10/2/18 shows ejection fraction of 46-50%. In comparison, his echocardiogram on 9/11/18 showed ejection fraction of 51-55%. MRI reveals evidence of severe anoxic brain injury from his CVA. On review of the recent ICU telemetry, there is no evidence of recurrent ventricular fibrillation. He remains hemodynamically and rhythmically stable. Cardiac risk factors: dyslipidemia, diabetes mellitus, hypertension Review of Symptoms:  Except as stated above include: 
Constitutional:  negative Respiratory:  negative Cardiovascular:  negative Gastrointestinal: negative Genitourinary:  negative Musculoskeletal:  Negative Neurological:  Negative Dermatological:  Negative Endocrinological: Negative Psychological:  Negative A comprehensive review of systems was negative except for that written in the HPI. Past Medical History:  
 
Past Medical History:  
Diagnosis Date  CHF (congestive heart failure) ([facility]  Diabetes ([facility]  Stroke ([facility] Social History:  
 
Social History Social History  Marital status:  Spouse name: N/A  
 Number of children: N/A  
 Years of education: N/A Social History Main Topics  Smoking status: Never Smoker  Smokeless tobacco: Never Used  Alcohol use No  
 Drug use: No  
 Sexual activity: Not Asked Other Topics Concern  None Social History Narrative Family History:  
History reviewed. No pertinent family history. Medications:  
No Known Allergies Current Facility-Administered Medications Medication Dose Route Frequency  [START ON 10/8/2018] insulin glargine (LANTUS) injection 20 Units  20 Units SubCUTAneous DAILY  vancomycin (VANCOCIN) 750 mg in 0.9% sodium chloride (MBP/ADV) 250 mL ADV  750 mg IntraVENous Q TU, TH & SAT  [START ON 10/9/2018] VANCOMYCIN INFORMATION NOTE   Other ONCE  
  0.9% sodium chloride infusion 250 mL  250 mL IntraVENous PRN  
 influenza vaccine 2018-19 (6 mos+)(PF) (FLUARIX QUAD/FLULAVAL QUAD) injection 0.5 mL  0.5 mL IntraMUSCular PRIOR TO DISCHARGE  fluconazole (DIFLUCAN) 200mg/100 mL IVPB (premix)  200 mg IntraVENous Q24H  
 meropenem (MERREM) 500 mg in 0.9% sodium chloride (MBP/ADV) 50 mL MBP  500 mg IntraVENous Q12H  
 midazolam (VERSED) injection    PRN  
 acetaminophen (TYLENOL) solution 325 mg  325 mg Per NG tube Q4H PRN  pantoprazole (PROTONIX) 40 mg in sodium chloride 0.9% 10 mL injection  40 mg IntraVENous Q12H  
 0.9% sodium chloride infusion 250 mL  250 mL IntraVENous PRN  
 heparin (porcine) pf 100 Units  100 Units InterCATHeter PRN  
 0.9% sodium chloride infusion 250 mL  250 mL IntraVENous PRN  
 insulin lispro (HUMALOG) injection   SubCUTAneous Q6H  
 albuterol (PROVENTIL VENTOLIN) nebulizer solution 2.5 mg  2.5 mg Nebulization Q6H RT  
 heparin (porcine) 1,000 unit/mL injection 1,000 Units  1,000 Units InterCATHeter DIALYSIS PRN  
 VANCOMYCIN INFORMATION NOTE   Other Rx Dosing/Monitoring  senna-docusate (PERICOLACE) 8.6-50 mg per tablet 1 Tab  1 Tab Oral BID PRN  
 ondansetron (ZOFRAN) injection 4 mg  4 mg IntraVENous Q4H PRN  
 glucose chewable tablet 16 g  4 Tab Oral PRN  
 glucagon (GLUCAGEN) injection 1 mg  1 mg IntraMUSCular PRN  
 dextrose (D50) infusion 12.5-25 g  25-50 mL IntraVENous PRN  
 hydrALAZINE (APRESOLINE) 20 mg/mL injection 20 mg  20 mg IntraVENous Q6H PRN Physical Exam:  
 
Visit Vitals  /60  Pulse 91  Temp 99 °F (37.2 °C)  Resp 16  
 Ht 5' 7\" (1.702 m)  Wt 92.7 kg (204 lb 5.9 oz)  SpO2 97%  BMI 32.01 kg/m2 BP Readings from Last 3 Encounters:  
10/07/18 108/60  
09/14/18 174/87 Pulse Readings from Last 3 Encounters:  
10/07/18 91  
09/14/18 71 Wt Readings from Last 3 Encounters:  
10/04/18 92.7 kg (204 lb 5.9 oz) 09/13/18 103.4 kg (227 lb 15.3 oz) 09/10/18 98.4 kg (217 lb) General:  appears older than stated age Neck:  no JVD Lungs:  clear to auscultation bilaterally Heart:  regular rate and rhythm Abdomen:  no guarding or rigidity Extremities:  no edema Skin: Warm and dry. no hyperpigmentation, vitiligo, or suspicious lesions Neuro: Intubated and sedated Psych: Not done Data Review:  
 
Recent Labs 10/07/18 
 1409  10/07/18 
 0140  10/06/18 
 1344 WBC  19.3*  21.2*  21.9* HGB  7.7*  8.0*  6.8* HCT  24.0*  24.1*  21.2*  
PLT  349  311  292 Recent Labs 10/07/18 
 0140  10/06/18 
 0430  10/05/18 
 0215 NA  142  143  142  
K  3.7  3.8  3.4*  
CL  104  104  102 CO2  27  24  25 GLU  145*  176*  150* BUN  23*  42*  24* CREA  3.32*  5.56*  3.14* CA  7.5*  7.3*  7.5* PHOS  3.5  5.2*  3.7 ALB  1.7*  1.6*  1.8* Results for orders placed or performed during the hospital encounter of 09/26/18 EKG, 12 LEAD, INITIAL Result Value Ref Range Ventricular Rate 77 BPM  
 Atrial Rate 77 BPM  
 P-R Interval 182 ms QRS Duration 86 ms  
 Q-T Interval 434 ms QTC Calculation (Bezet) 491 ms Calculated P Axis 19 degrees Calculated R Axis -52 degrees Calculated T Axis 5 degrees Diagnosis Sinus rhythm with fusion complexes Left anterior fascicular block Voltage criteria for left ventricular hypertrophy Prolonged QT Abnormal ECG When compared with ECG of 10-SEP-2018 15:07, 
fusion complexes are now present Non-specific change in ST segment in Lateral leads T wave inversion no longer evident in Lateral leads Confirmed by Ambar You ([id number]) on 9/27/2018 4:04:49 PM 
  
 
 
All Cardiac Markers in the last 24 hours:  No results found for: CPK, CK, CKMMB, CKMB, RCK3, CKMBT, CKNDX, CKND1, REJI, TROPT, TROIQ, RHONDA, TROPT, TNIPOC, BNP, BNPP Last Lipid:   
Lab Results Component Value Date/Time  Cholesterol, total 349 (H) 09/10/2018 12:31 PM  
 HDL Cholesterol 59 09/10/2018 12:31 PM  
 LDL, calculated 252.4 (H) 09/10/2018 12:31 PM  
 Triglyceride 188 (H) 09/10/2018 12:31 PM  
 CHOL/HDL Ratio 5.9 (H) 09/10/2018 12:31 PM  
 
 
Signed By: Yogesh Gipson MD   
 October 7, 2018

## 2018-10-07 NOTE — ROUTINE PROCESS
0715:  Assumed care of patient at this time from [address], Rafiq Guido. Assessment as charted. Dual skin assessment complete. 0815:  Pt had large liquid stool. Right buttock wound cleaned with dermal wound , covered with EPC and mepilex. 1000:  Dr. Jefferson Osler at bedside. Made aware of tube feed intolerance at higher rate of feed and that rate had been decreased to trickle of 10 mL/hr. Was in agreement, no no further orders received. 1355:  Report given to Skylar Razo RN. Unable to provide education today as patient is unresponsive and no family present.

## 2018-10-07 NOTE — PROGRESS NOTES
PROGRESS NOTE PATIENT:  Iliana Ford MRN: [id number] [facility]/[address], [birth year]01/01 
         10/7/2018, 10:42 AM 
 
 
SUBJECTIVE: 
Remains comatose. No signs of GI bleeding. Currently on tube feeding at 10cc/hour. Had a BM earlier. OBJECTIVE: 
Patient Vitals for the past 24 hrs: 
 Temp Pulse Resp BP SpO2  
10/07/18 1000 - 92 16 119/61 96 % 10/07/18 0900 - 93 16 124/63 95 % 10/07/18 0859 - 93 17 - 94 % 10/07/18 0800 - 90 16 107/60 95 % 10/07/18 0700 100 °F (37.8 °C) 92 16 109/53 98 % 10/07/18 0600 (!) 37.8 °F (3.2 °C) 94 16 124/57 98 % 10/07/18 0500 (!) 37.7 °F (3.2 °C) 94 17 145/60 98 % 10/07/18 0400 98.4 °F (36.9 °C) 95 18 146/63 98 % 10/07/18 0327 - 94 16 - 100 % 10/07/18 0300 (!) 37.6 °F (3.1 °C) 93 16 143/64 99 % 10/07/18 0200 (!) 37.5 °F (3.1 °C) 91 16 125/55 98 % 10/07/18 0100 (!) 37.4 °F (3 °C) 91 17 134/59 99 % 10/07/18 0022 - 92 18 - 98 % 10/07/18 0000 99.3 °F (37.4 °C) 92 17 - 98 % 10/06/18 2300 - 91 18 99/50 98 % 10/06/18 2200 - 91 18 118/64 98 % 10/06/18 2130 98.4 °F (36.9 °C) - - - -  
10/06/18 2115 98.4 °F (36.9 °C) - - - -  
10/06/18 2100 98.6 °F (37 °C) 90 21 112/72 99 % 10/06/18 2045 98.6 °F (37 °C) 92 18 115/69 93 % 10/06/18 2030 98.8 °F (37.1 °C) 90 18 124/74 100 % 10/06/18 2027 - 90 21 - 100 % 10/06/18 2015 98.8 °F (37.1 °C) 89 16 118/70 98 % 10/06/18 2000 98.8 °F (37.1 °C) 89 23 123/73 98 % 10/06/18 1945 99 °F (37.2 °C) 90 17 126/70 98 % 10/06/18 1930 99 °F (37.2 °C) 89 16 122/73 98 % 10/06/18 1915 99 °F (37.2 °C) 90 17 127/69 98 % 10/06/18 1900 99.1 °F (37.3 °C) 89 17 123/69 98 % 10/06/18 1845 99.1 °F (37.3 °C) 89 16 129/74 98 % 10/06/18 1830 99.3 °F (37.4 °C) 89 18 141/73 98 % 10/06/18 1815 99.5 °F (37.5 °C) 89 15 141/72 99 % 10/06/18 1800 99.7 °F (37.6 °C) 90 17 142/71 97 % 10/06/18 1745 99.9 °F (37.7 °C) 90 18 140/69 97 % 10/06/18 1739 99.9 °F (37.7 °C) - - - -  
 10/06/18 1730 100.4 °F (38 °C) 88 16 144/67 98 % 10/06/18 1715 100.2 °F (37.9 °C) 90 16 150/70 97 % 10/06/18 1700 100 °F (37.8 °C) 91 17 151/67 97 % 10/06/18 1645 (!) 37.9 °F (3.3 °C) 90 18 152/70 98 % 10/06/18 1630 100 °F (37.8 °C) 90 16 147/70 97 % 10/06/18 1600 100 °F (37.8 °C) 91 16 147/66 97 % 10/06/18 1541 100 °F (37.8 °C) 92 17 147/69 98 % 10/06/18 1530 99 °F (37.2 °C) 91 16 147/69 97 % 10/06/18 1500 99 °F (37.2 °C) 92 16 149/72 97 % 10/06/18 1430 99 °F (37.2 °C) 93 17 150/73 97 % 10/06/18 1400 99 °F (37.2 °C) 92 16 128/64 97 % 10/06/18 1330 99 °F (37.2 °C) 91 17 129/62 97 % 10/06/18 1316 - - - - 96 % 10/06/18 1300 99 °F (37.2 °C) 92 18 130/57 96 % 10/06/18 1230 99 °F (37.2 °C) 92 21 137/63 96 % 10/06/18 1216 - 92 20 - 96 % 10/06/18 1200 100 °F (37.8 °C) 91 19 128/56 97 % 10/06/18 1130 100.2 °F (37.9 °C) 90 19 103/40 95 % 10/06/18 1100 100.2 °F (37.9 °C) 91 21 120/53 94 % Intake/Output Summary (Last 24 hours) at 10/07/18 1042 Last data filed at 10/07/18 1000 Gross per 24 hour Intake            911.7 ml Output             2500 ml Net          -1588.3 ml  
 
 
Lungs: Clear B/L  
CVS exam: Regular rate and rhythm Abd  : Soft, non tender, BS +, No masses felt. Labs: Results:  
Chemistry Recent Labs 10/07/18 
 0140  10/06/18 
 0430  10/05/18 
 0215  10/04/18 
 1120 GLU  145*  176*  150*   --   
NA  142  143  142   --   
K  3.7  3.8  3.4*   --   
CL  104  104  102   --   
CO2  27  24  25   --   
BUN  23*  42*  24*   --   
CREA  3.32*  5.56*  3.14*   --   
CA  7.5*  7.3*  7.5*   --   
AGAP  11  15  15   --   
BUCR  7*  8*  8*   --   
AP   --    --    --   279* TP   --    --    --   7.5 ALB  1.7*  1.6*  1.8*  1.7*  
GLOB   --    --    --   5.8* AGRAT   --    --    --   0.3* Estimated Creatinine Clearance: 24.4 mL/min (based on Cr of 3.32). CBC w/Diff Recent Labs 10/07/18 
 0140  10/06/18 
 1344  10/06/18 
 0434 WBC  21.2*  21.9*  22.6*  
RBC  2.71*  2.36*  2.27* HGB  8.0*  6.8*  6.6* HCT  24.1*  21.2*  20.4* PLT  311  292  260 No Known Allergies Current Facility-Administered Medications Medication Dose Route Frequency  insulin glargine (LANTUS) injection 18 Units  18 Units SubCUTAneous DAILY  vancomycin (VANCOCIN) 750 mg in 0.9% sodium chloride (MBP/ADV) 250 mL ADV  750 mg IntraVENous Q TU, TH & SAT  [START ON 10/9/2018] VANCOMYCIN INFORMATION NOTE   Other ONCE  
 0.9% sodium chloride infusion 250 mL  250 mL IntraVENous PRN  
 influenza vaccine 2018-19 (6 mos+)(PF) (FLUARIX QUAD/FLULAVAL QUAD) injection 0.5 mL  0.5 mL IntraMUSCular PRIOR TO DISCHARGE  fluconazole (DIFLUCAN) 200mg/100 mL IVPB (premix)  200 mg IntraVENous Q24H  
 meropenem (MERREM) 500 mg in 0.9% sodium chloride (MBP/ADV) 50 mL MBP  500 mg IntraVENous Q12H  
 midazolam (VERSED) injection    PRN  
 acetaminophen (TYLENOL) solution 325 mg  325 mg Per NG tube Q4H PRN  pantoprazole (PROTONIX) 40 mg in sodium chloride 0.9% 10 mL injection  40 mg IntraVENous Q12H  
 0.9% sodium chloride infusion 250 mL  250 mL IntraVENous PRN  
 heparin (porcine) pf 100 Units  100 Units InterCATHeter PRN  
 0.9% sodium chloride infusion 250 mL  250 mL IntraVENous PRN  
 insulin lispro (HUMALOG) injection   SubCUTAneous Q6H  
 albuterol (PROVENTIL VENTOLIN) nebulizer solution 2.5 mg  2.5 mg Nebulization Q6H RT  
 heparin (porcine) 1,000 unit/mL injection 1,000 Units  1,000 Units InterCATHeter DIALYSIS PRN  
 VANCOMYCIN INFORMATION NOTE   Other Rx Dosing/Monitoring  senna-docusate (PERICOLACE) 8.6-50 mg per tablet 1 Tab  1 Tab Oral BID PRN  
 ondansetron (ZOFRAN) injection 4 mg  4 mg IntraVENous Q4H PRN  
 glucose chewable tablet 16 g  4 Tab Oral PRN  
 glucagon (GLUCAGEN) injection 1 mg  1 mg IntraMUSCular PRN  
 dextrose (D50) infusion 12.5-25 g  25-50 mL IntraVENous PRN  
  hydrALAZINE (APRESOLINE) 20 mg/mL injection 20 mg  20 mg IntraVENous Q6H PRN  
 
 
ASSESSMENT: 
 
No obvious signs of significant GI bleeding. Hg drifting down, likely multifactorial, up after transfusion. Elevated lipase, with a CT (without IV contrast) on 10/4 showing a normal pancreas Elevated LFTs, multifactorial, possibility of CBD stone/sludge (passed or retained) not excluded but would not pursue right now. LFTs have been coming down. Will continue to monitor. Poor prognosis.  
  
Advance tube feeding as tolerated. Monitor for signs of GI bleeding. Monitor Hg.  
Monitor LFTs. 
Kala Kimbrough MD.

## 2018-10-07 NOTE — PROGRESS NOTES
1355- Report received from Olivia [address] [place] [address] and assumed care of patient. Patient not following commands; withdraws RLE to pain only. VS stable on ventilator. No signs of distress. Will continue to monitor. 1600- Mouth care provided at this time. Bedside and Verbal shift change report given to Jean Claude Flynn [address] [place] [address] (oncoming nurse) by  
Yeimy Johnson RN (offgoing nurse).  Report included the following information SBAR, Kardex, Intake/Output, MAR, Recent Results and Cardiac Rhythm SR.

## 2018-10-07 NOTE — PROGRESS NOTES
Recd pt on vent:  AC VC+, rate=16, Ir=134, Fio2=45%, Peep=5 
-vent check complete, adj ETT, removed 1 cc from sunitha ABG collected overnight venous admix:   
PH=7.456, CO2=45k, PO2=36, HCO3=31.7 
 
1018-ABG repeated as overnight was venous mix:  
PH=7.499, CO2=37.8, PO2=86, HCO3=29.3, SO2=97% 1200-Vent check complete - adj ETT - remove 4 cc from sunitha tube

## 2018-10-07 NOTE — PROGRESS NOTES
[facility] Progress Note I have reviewed the flowsheet and previous days notes. Events, vitals, medications and notes from last 24 hours reviewed. Care plan discussed with staff and on multidisciplinary rounds. Subjective: 
10/7/2018 Pt is on vent. No response to voice. Not on any sedation Impression and Plan Patient Active Problem List  
Diagnosis Code  Stroke (cerebrum) ([facility]) I63.9  Stroke ([facility] I63.9  Rhabdomyolysis M62.82  
 Dehydration E86.0  
 Essential hypertension I10  
 Diabetes mellitus type 2, controlled ([facility] E11.9  Non compliance w medication regimen Z91.14  
 Pneumonia of both lower lobes J18.9  DM (diabetes mellitus) ([facility] E11.9  History of stroke Z80.78  
 Acute-on-chronic kidney injury ([facility] N17.9, N18.9  Acute cystitis N30.00  Elevated troponin R74.8  Transaminitis R74.0  Acute kidney injury ([facility] N17.9  Elevated LFTs R94.5  Cardiac arrest with ventricular fibrillation ([facility]) I46.9, I49.01  
 Abnormal blood coagulation profile R79.1  Acute pancreatitis K85.90  Septic shock ([facility]) A41.9, R65.21  
 Ischemic encephalopathy I67.89 VDRF/Acute resp failure with hypoxia - Today is Day # 9 on Vent. Pt is not yet ready for weaning due to his mental status. I d/w patients daughter, mom and niece at bedside yesterday. Daughter says she is inclined to go for Trach and PEG. Plan for [facility] next week. Encephalopathy/Anoxic injury - Pt had a EEG done which demonstrates generalized, severe cortical and cerebral dysfunction. Pt's MRI is also abnormal. Pt is not on any sedation. Prognosis is guarded. Defer to Neurology S/p V Fib arrest - Echo shows EF 46-50%, LV global hypokinesis. Cardiology consulted yesterday [facility] and Pneumonia - Pt is s/p Bronch. Pt is on Diflucan, Merrem, Vanco. Defer to ID JULIANNA on CKD - Pt is now HD dependent. Defer to Nephrology Anemia - Pt received 1unit PRBC yesterday, Hb is better Melena - Pt had an EGD done which showed a large clot in the esophagus. On Protonix BID. Defer mgt to GI 
DM - Cont with lantus and ISS Abn LFT - Coming down. CT abd shows Distended gallbladder with gallstones and gallbladder sludge but no biliary dilatation. F/u with GI Elevated Lipase - Pt's CT abd does not show pancreatitis,its coming down, defer to GI Nutrition - Pt is on Trickle feeds DVT proph - SCD 
  
OTHER: 
Glycemic Control. Glucose stabilizer per ICU protocol when on insulin drip. Maintain blood glucose 140-180. Replace electrolytes per ICU electrolyte replacement protocol Ventilator bundle & Sedation protocol followed. Daily morning sedation holiday, assessment for readiness for SBT & weaning from ventilator; and then re-titrate if required. Aim to keep peak plateau pressure 14-12KA H2O in ARDS patient. Anastasiya tube to suction at 20-30 cm H2O, Maintain [place] tube with 5-10ml air every 4 hours. Chlorhexidine mouth washes and routine oral care every 4 hours. Stress ulcer and DVT prophylaxis. HOB >=30 degree elevation all the time. HOB >=30 degree elevation all the time. Aggressive pulmonary toileting. Incentive spirometry when appropriate. Aspiration precautions. Sepsis bundle and protocol followed. Deescalate antibiotic when appropriate. PT/OT eval and treat. OOB/IS when appropriate. Quality Care: Stress ulcer prophylaxis, DVT prophylaxis, HOB elevated, Infection control all reviewed and addressed. Events and notes from last 24 hours reviewed. Care plan discussed with nursing. CC TIME: >40 min Medication Reviewed: 
 
No Known Allergies Past Medical History:  
Diagnosis Date  CHF (congestive heart failure) ([facility]  Diabetes ([facility]  Stroke ([facility] History reviewed. No pertinent surgical history. Social History Substance Use Topics  Smoking status: Never Smoker  Smokeless tobacco: Never Used  Alcohol use No  
  
History reviewed. No pertinent family history. Prior to Admission medications Medication Sig Start Date End Date Taking? Authorizing Provider  
aspirin (ASPIRIN) 325 mg tablet Take 1 Tab by mouth daily. 9/15/18   Andres Gonzalez MD  
atorvastatin (LIPITOR) 80 mg tablet Take 1 Tab by mouth nightly. 9/14/18   Andres Gonzalez MD  
insulin glargine (LANTUS) 100 unit/mL injection 10 units qhs  Indications: type 2 diabetes mellitus 9/15/18   Andres Gonzalez MD  
insulin lispro (HUMALOG) 100 unit/mL injection 4 units with meals 9/14/18   Andres Gonzalez MD  
losartan (COZAAR) 50 mg tablet Take 1 Tab by mouth daily. 9/14/18   Andres Gonzalez MD  
 
Current Facility-Administered Medications Medication Dose Route Frequency  insulin glargine (LANTUS) injection 18 Units  18 Units SubCUTAneous DAILY  vancomycin (VANCOCIN) 750 mg in 0.9% sodium chloride (MBP/ADV) 250 mL ADV  750 mg IntraVENous Q TU, TH & SAT  [START ON 10/9/2018] VANCOMYCIN INFORMATION NOTE   Other ONCE  
 influenza vaccine 2018-19 (6 mos+)(PF) (FLUARIX QUAD/FLULAVAL QUAD) injection 0.5 mL  0.5 mL IntraMUSCular PRIOR TO DISCHARGE  fluconazole (DIFLUCAN) 200mg/100 mL IVPB (premix)  200 mg IntraVENous Q24H  
 meropenem (MERREM) 500 mg in 0.9% sodium chloride (MBP/ADV) 50 mL MBP  500 mg IntraVENous Q12H  pantoprazole (PROTONIX) 40 mg in sodium chloride 0.9% 10 mL injection  40 mg IntraVENous Q12H  
 insulin lispro (HUMALOG) injection   SubCUTAneous Q6H  
 albuterol (PROVENTIL VENTOLIN) nebulizer solution 2.5 mg  2.5 mg Nebulization Q6H RT  
 VANCOMYCIN INFORMATION NOTE   Other Rx Dosing/Monitoring Peripheral Intravenous Line: 
Peripheral IV 10/04/18 Right Forearm (Active) Site Assessment Clean, dry, & intact 10/6/2018 10:00 AM  
Phlebitis Assessment 0 10/6/2018 10:00 AM  
Infiltration Assessment 0 10/6/2018 10:00 AM  
Dressing Status Clean, dry, & intact 10/6/2018  7:42 AM  
Dressing Type Tape;Transparent 10/6/2018  7:42 AM  
 Hub Color/Line Status Blue;Capped 10/6/2018  7:42 AM  
Action Taken Open ports on tubing capped 10/6/2018  7:42 AM  
Alcohol Cap Used Yes 10/6/2018  7:42 AM  
   
Peripheral IV 10/05/18 Left Forearm (Active) Site Assessment Clean, dry, & intact 10/6/2018 10:00 AM  
Phlebitis Assessment 0 10/6/2018 10:00 AM  
Infiltration Assessment 0 10/6/2018 10:00 AM  
Dressing Status Clean, dry, & intact 10/6/2018  7:42 AM  
Dressing Type Tape;Transparent 10/6/2018  7:42 AM  
Hub Color/Line Status Pink; Infusing 10/6/2018  7:42 AM  
Action Taken Open ports on tubing capped 10/6/2018  7:42 AM  
Alcohol Cap Used Yes 10/6/2018  7:42 AM  
 
Hemodialysis Catheter: 
Hemodialysis Access 09/28/18 (Active) Central Line Being Utilized Yes 10/6/2018  7:42 AM  
Criteria for Appropriate Use Dialysis/apheresis 10/6/2018  7:42 AM  
Date Accessed  10/04/18 10/6/2018  7:42 AM  
Site Assessment Clean, dry, & intact 10/6/2018 10:00 AM  
Date of Last Dressing Change 10/05/18 10/6/2018  7:42 AM  
Dressing Status Clean, dry, & intact 10/6/2018  7:42 AM  
Dressing Type Tape;Transparent 10/6/2018  7:42 AM  
Proximal Hub Color/Line Status Capped 10/6/2018  7:42 AM  
Distal Hub Color/Line Status Capped 10/6/2018  7:42 AM  
 
Drain(s): 
Orogastric Tube 09/29/18 (Active) Site Assessment Clean, dry, & intact 10/6/2018  7:42 AM  
Dressing Status Clean, dry, & intact 10/6/2018  7:42 AM  
G Port Status Infusing 10/6/2018  7:42 AM  
External Insertion Jl (cms) 60 cms 10/6/2018  7:42 AM  
Action Taken Placement verified (comment) 10/6/2018  7:42 AM  
Drainage Description Tan 10/5/2018  8:00 PM  
Gastric Residual (mL) 330 ml 10/6/2018  7:42 AM  
Tube Feeding/Formula Options Osmolite 1.2 10/6/2018  7:42 AM  
Tube Feeding/Verify Rate (mL/hr) 60 10/6/2018  7:42 AM  
Water Flush Volume (mL) 30 mL 10/6/2018  9:00 AM  
Intake (ml) 0 ml 10/6/2018  7:42 AM  
Medication Volume 0 ml 10/5/2018 12:00 PM  
Output (ml) 0 ml 10/5/2018 12:00 PM  
 
Airway: Airway - Endotracheal Tube 10/05/18 Oral (Active) Insertion Depth (cm) 24 cm 10/6/2018  8:48 AM  
Line Jl Lips 10/6/2018  8:48 AM  
Side Secured Centered 10/6/2018  8:48 AM  
Cuff Pressure 30 cmH20 10/6/2018  8:48 AM  
Site Assessment Clean, dry, & intact 10/6/2018  8:48 AM  
 
 
Objective: 
Vital Signs:   
Visit Vitals  /61  Pulse 92  Temp 100 °F (37.8 °C)  Resp 16  
 Ht 5' 7\" (1.702 m)  Wt 92.7 kg (204 lb 5.9 oz)  SpO2 96%  BMI 32.01 kg/m2 O2 Device: Ventilator O2 Flow Rate (L/min): 45 l/min Temp (24hrs), Av.3 °F (32.4 °C), Min:37.4 °F (3 °C), Max:100.4 °F (38 °C) Intake/Output:  
Last shift:      10/07 0701 - 10/07 1900 In: 90 [I.V.:60] Out: - Last 3 shifts: 10/05 1901 - 10/07 0700 In: 1711.7 [I.V.:730] Out: 2500 Intake/Output Summary (Last 24 hours) at 10/07/18 1111 Last data filed at 10/07/18 1000 Gross per 24 hour Intake            901.7 ml Output             2500 ml Net          -1598.3 ml Last 3 Recorded Weights in this Encounter 10/02/18 1948 10/03/18 0400 10/04/18 9616 Weight: 93.9 kg (207 lb) 93.9 kg (207 lb 0.2 oz) 92.7 kg (204 lb 5.9 oz) Ventilator Settings: 
Mode Rate Tidal Volume Pressure FiO2 PEEP Assist control, VC+   500 ml  15 cm H2O 45 % 5 cm H20 Peak airway pressure: 28 cm H2O Plateau pressure:   
Tidal volume:  
Minute ventilation: 8.78 l/min SPO2  
 
ARDS network Guidelines: Lung protective strategy and Plateau pressure goals less than or equal to 30 Physical Exam:  
 
General/Neurology: On vent. , no response to voice Head:   Normocephalic, without obvious abnormality Eye:   Pallor present Oral:   Mucus membranes moist 
Neck:   Supple Lung:   B/l air entry is decreased Heart:   Regular rate & rhythm. S1 S2 present. Abdomen/: Soft, non tender, BS +nt Extremities:  1+ pedal edema Skin:   Dry, intact Data: 
   
Recent Results (from the past 24 hour(s)) GLUCOSE, POC  
 Collection Time: 10/06/18 11:18 AM  
Result Value Ref Range Glucose (POC) 190 (H) 70 - 110 mg/dL TYPE + CROSSMATCH Collection Time: 10/06/18  1:44 PM  
Result Value Ref Range Crossmatch Expiration 10/09/2018 ABO/Rh(D) A POSITIVE Antibody screen NEG   
 CALLED Mauri Goncalves, 2ICU2, AT 1532 ON 10/6/18 BY [id number] [employer] Unit number [id number] Blood component type RC LR Unit division 00 Status of unit TRANSFUSED Crossmatch result Compatible CBC W/O DIFF Collection Time: 10/06/18  1:44 PM  
Result Value Ref Range WBC 21.9 (H) 4.6 - 13.2 K/uL  
 RBC 2.36 (L) 4.70 - 5.50 M/uL HGB 6.8 (L) 13.0 - 16.0 g/dL HCT 21.2 (L) 36.0 - 48.0 % MCV 89.8 74.0 - 97.0 FL  
 MCH 28.8 24.0 - 34.0 PG  
 MCHC 32.1 31.0 - 37.0 g/dL  
 RDW 16.9 (H) 11.6 - 14.5 % PLATELET 482 005 - 314 K/uL MPV 9.4 9.2 - 11.8 FL  
GLUCOSE, POC Collection Time: 10/06/18  6:12 PM  
Result Value Ref Range Glucose (POC) 144 (H) 70 - 110 mg/dL RENAL FUNCTION PANEL Collection Time: 10/07/18  1:40 AM  
Result Value Ref Range Sodium 142 136 - 145 mmol/L Potassium 3.7 3.5 - 5.5 mmol/L Chloride 104 100 - 108 mmol/L  
 CO2 27 21 - 32 mmol/L Anion gap 11 3.0 - 18 mmol/L Glucose 145 (H) 74 - 99 mg/dL BUN 23 (H) 7.0 - 18 MG/DL Creatinine 3.32 (H) 0.6 - 1.3 MG/DL  
 BUN/Creatinine ratio 7 (L) 12 - 20 GFR est AA 23 (L) >60 ml/min/1.73m2 GFR est non-AA 19 (L) >60 ml/min/1.73m2 Calcium 7.5 (L) 8.5 - 10.1 MG/DL Phosphorus 3.5 2.5 - 4.9 MG/DL Albumin 1.7 (L) 3.4 - 5.0 g/dL CBC W/O DIFF Collection Time: 10/07/18  1:40 AM  
Result Value Ref Range WBC 21.2 (H) 4.6 - 13.2 K/uL  
 RBC 2.71 (L) 4.70 - 5.50 M/uL HGB 8.0 (L) 13.0 - 16.0 g/dL HCT 24.1 (L) 36.0 - 48.0 % MCV 88.9 74.0 - 97.0 FL  
 MCH 29.5 24.0 - 34.0 PG  
 MCHC 33.2 31.0 - 37.0 g/dL  
 RDW 16.4 (H) 11.6 - 14.5 % PLATELET 458 901 - 539 K/uL MPV 8.9 (L) 9.2 - 11.8 FL  
LIPASE Collection Time: 10/07/18  1:40 AM  
Result Value Ref Range Lipase 3693 (H) 73 - 393 U/L  
POC VENOUS BLOOD GAS Collection Time: 10/07/18  2:42 AM  
Result Value Ref Range Device: VENT    
 FIO2 (POC) 45 %  
 pH, venous (POC) 7.447 (H) 7.32 - 7.42    
 pCO2, venous (POC) 46.2 41 - 51 MMHG  
 pO2, venous (POC) 37 25 - 40 mmHg HCO3, venous (POC) 31.7 (H) 23.0 - 28.0 MMOL/L  
 sO2, venous (POC) 71 65 - 88 % Base excess, venous (POC) 8 mmol/L Mode ASSIST CONTROL Tidal volume 500 ml Set Rate 16 bpm  
 PEEP/CPAP (POC) 5 cmH2O  
 PIP (POC) 23 Allens test (POC) YES Inspiratory Time 0.9 sec Total resp. rate 16 Site RIGHT RADIAL Patient temp. 37.6 Specimen type (POC) VENOUS BLOOD Performed by Haywood Carrel Volume control plus YES    
GLUCOSE, POC Collection Time: 10/07/18  5:52 AM  
Result Value Ref Range Glucose (POC) 167 (H) 70 - 110 mg/dL POC G3 Collection Time: 10/07/18 10:18 AM  
Result Value Ref Range Device: VENT    
 FIO2 (POC) 0.45 % pH (POC) 7.499 (H) 7.35 - 7.45    
 pCO2 (POC) 37.8 35.0 - 45.0 MMHG  
 pO2 (POC) 86 80 - 100 MMHG  
 HCO3 (POC) 29.3 (H) 22 - 26 MMOL/L  
 sO2 (POC) 97 92 - 97 % Base excess (POC) 6 mmol/L Mode ASSIST CONTROL Tidal volume 500 ml Set Rate 16 bpm  
 PEEP/CPAP (POC) 5 cmH2O  
 PIP (POC) 26 Allens test (POC) N/A Inspiratory Time 0.9 sec Total resp. rate 16 Site LEFT BRACHIAL Patient temp. 37.5 Specimen type (POC) ARTERIAL Performed by Sandie Zarco Volume control plus YES Chemistry Recent Labs 10/07/18 
 0140  10/06/18 
 0430  10/05/18 
 0215  10/04/18 
 1120 GLU  145*  176*  150*   --   
NA  142  143  142   --   
K  3.7  3.8  3.4*   --   
CL  104  104  102   --   
CO2  27  24  25   --   
BUN  23*  42*  24*   --   
CREA  3.32*  5.56*  3.14*   --   
CA  7.5*  7.3*  7.5*   --   
PHOS  3.5  5.2*  3.7   --   
AGAP  11  15  15   --   
 BUCR  7*  8*  8*   --   
AP   --    --    --   279* TP   --    --    --   7.5 ALB  1.7*  1.6*  1.8*  1.7*  
GLOB   --    --    --   5.8* AGRAT   --    --    --   0.3* CBC w/Diff Recent Labs 10/07/18 
 0140  10/06/18 
 1344  10/06/18 
 0430 WBC  21.2*  21.9*  22.6*  
RBC  2.71*  2.36*  2.27* HGB  8.0*  6.8*  6.6* HCT  24.1*  21.2*  20.4* PLT  311  292  260 ABG Recent Labs 10/07/18 
 1018  10/07/18 
 0242  10/06/18 
 0500  10/05/18 
 8259 PHI  7.499*   --   7.454*  7.465* PCO2I  37.8   --   40.3  39.9 PO2I  86   --   65*  106* HCO3I  29.3*   --   28.2*  28.6*  
FIO2I  0.45  45  0.4  0.5 Micro Recent Labs 10/04/18 
 1120 CULT  NO GROWTH 2 DAYS Recent Labs 10/04/18 
 1120 CULT  NO GROWTH 2 DAYS  
 
 
CT (Most Recent) [place] [facility]18 CT ABD PELV W CONT Narrative EXAM: CT of the abdomen and pelvis INDICATION: Pneumonia. Acute pyelonephritis. Sepsis. Abnormal liver function 
tests. Dialysis patient. COMPARISON: None. TECHNIQUE: Axial CT imaging of the abdomen and pelvis was performed with 
intravenous contrast. Multiplanar reformats were generated. One or more dose 
reduction techniques were used on this CT: automated exposure control, 
adjustment of the mAs and/or kVp according to patient size, and iterative 
reconstruction techniques. The specific techniques used on this CT exam have 
been documented in the patient's electronic medical record. 
 
_______________ FINDINGS: 
 
LOWER CHEST: Dense consolidation right lower lobe compatible with pneumonia. Small bilateral pleural effusions. Cardiomegaly. Fluid-filled esophagus with 
nasogastric tube in place. LIVER, BILIARY: Indeterminate low-attenuation 7 mm focus anterior left hepatic 
lobe. Hepatomegaly. No biliary dilation. Distended gallbladder with gallstones 
and indeterminate material in the gallbladder which may represent sludge. PANCREAS: Normal. 
 
SPLEEN: Normal. 
 
ADRENALS: Normal. 
 
KIDNEYS/URETERS: No hydronephrosis or renal calculus. Small low attenuation 
cortical focus medial upper pole left kidney not well delineated measuring about 1.1 cm. This may represent renal cyst. 
 
LYMPH NODES: No enlarged lymph nodes. GASTROINTESTINAL TRACT: No bowel dilation or wall thickening. Normal appendix. PELVIC ORGANS: No intraperitoneal fluid. Incidental vas deferens calcifications. VASCULATURE: Mild atherosclerosis. BONES: No acute or aggressive osseous abnormalities identified. OTHER: None. 
 
_______________ Impression IMPRESSION: 
 
 
1. Dense subtotal or total consolidation right lower lobe compatible with 
pneumonia similar to prior study. Small bilateral pleural effusions similar in 
size. 2. Distended gallbladder with gallstones and gallbladder sludge without 
significant change in appearance and also described in detail on ultrasound of 
9/27/2018. 3. Indeterminate small lesion left hepatic lobe without change. Hepatomegaly 
again evident. 4. No intraperitoneal fluid. Possible small left upper pole renal cyst. 
Cardiomegaly. Nasogastric tube tip in body of stomach. XR (Most Recent). CXR reviewed by me and compared with previous CXR Results from [facility] [place] Encounter encounter on 09/26/18 XR CHEST PORT Narrative Chest, single view Indication: Respiratory failure, pneumonia. Comparison: Several prior exams, most recently 10/5/2018 Findings:  Portable upright AP view of the chest was obtained. The patient is 
rotated on the provided projection with foreshortening of the right hemithorax. There is an endotracheal tube present with tip projecting approximately 2.8 cm 
above the billy. Nasogastric tube noted below the diaphragm, the tip collimated 
from view. Right IJ approach central venous catheter in stable position. Patchy airspace disease throughout the right midlung and medial right lower lung 
similar to prior. Mild interstitial opacities noted bilaterally, unchanged. No 
pneumothorax or discrete pleural effusion. Cardiac size and mediastinal contours 
are stable. No acute osseous abnormality is present. Impression Impression: 1. Endotracheal tube, right IJ central venous catheter, and visualized 
nasogastric tube as above. 2. Overall unchanged appearance to right mid and lower lung opacity in keeping 
with underlying airspace disease. 3. Mild interstitial edema, similar to immediate prior, but persistently 
improved from 10/4/2018 High complexity decision making was performed during the evaluation of this patient at high risk for decompensation with multiple organ involvement Above mentioned total time spent on reviewing the case/medical record/data/notes/EMR/patient examination/documentation/coordinating care with nurse/consultants, exclusive of procedures with complex decision making performed and > 50% time spent in face to face evaluation. Hai Schmidt MD 
10/7/2018

## 2018-10-07 NOTE — PROGRESS NOTES
Patient in bed on right side with head elevated - BBS equal and ess clear - ETt secure at 24 and moved to the center of the mouth - patient suctioned - MVb at [address] - vent alarms on and functional 
 
0243 - ABG obtained - mixed venous sample - results are WNL for venous - no changes to vent settings at this time

## 2018-10-07 NOTE — PROGRESS NOTES
Problem: Ventilator Management Goal: *Adequate oxygenation and ventilation VENTILATOR Care plan 
 
Problem: Ventilator Management Goal: *Adequate oxygenation/ ventilation/ and extubation Patient: 
   
 
Jolanta Carlos     57 y.o.   male     10/7/2018  12:10 PM 
Patient Active Problem List  
Diagnosis Code  Stroke (cerebrum) ([facility]) I63.9  Stroke ([facility] I63.9  Rhabdomyolysis M62.82  
 Dehydration E86.0  
 Essential hypertension I10  
 Diabetes mellitus type 2, controlled ([facility] E11.9  Non compliance w medication regimen Z91.14  
 Pneumonia of both lower lobes J18.9  DM (diabetes mellitus) ([facility] E11.9  History of stroke Z80.78  
 Acute-on-chronic kidney injury ([facility] N17.9, N18.9  Acute cystitis N30.00  Elevated troponin R74.8  Transaminitis R74.0  Acute kidney injury ([facility] N17.9  Elevated LFTs R94.5  Cardiac arrest with ventricular fibrillation ([facility]) I46.9, I49.01  
 Abnormal blood coagulation profile R79.1  Acute pancreatitis K85.90  Septic shock ([facility]) A41.9, R65.21  
 Ischemic encephalopathy I67.89 Pneumonia of both lower lobes due to infectious organism ([facility] [J18.1] Reason patient intubated:  Respiratory failure secondary to cardiac arrest. 
 
Ventilator day: 9 Ventilator settings: AC VC+, rate=16, Cs=505, Fio2=45%, Peep=5 ETT Size/Placement: 8.0 ETT, 24 lip ABG: 
Date:10/7/2018 Lab Results Component Value Date/Time PHI 7.499 (H) 10/07/2018 10:18 AM  
 PCO2I 37.8 10/07/2018 10:18 AM  
 PO2I 86 10/07/2018 10:18 AM  
 HCO3I 29.3 (H) 10/07/2018 10:18 AM  
 FIO2I 0.45 10/07/2018 10:18 AM  
 
 
Chest X-ray: 
Date:10/7/2018 Results from [facility] [place] Encounter encounter on 09/26/18 XR CHEST PORT Narrative Chest, single view Indication: Respiratory failure, pneumonia. Comparison: Several prior exams, most recently 10/5/2018 Findings:  Portable upright AP view of the chest was obtained. The patient is rotated on the provided projection with foreshortening of the right hemithorax. There is an endotracheal tube present with tip projecting approximately 2.8 cm 
above the billy. Nasogastric tube noted below the diaphragm, the tip collimated 
from view. Right IJ approach central venous catheter in stable position. Patchy airspace disease throughout the right midlung and medial right lower lung 
similar to prior. Mild interstitial opacities noted bilaterally, unchanged. No 
pneumothorax or discrete pleural effusion. Cardiac size and mediastinal contours 
are stable. No acute osseous abnormality is present. Impression Impression: 1. Endotracheal tube, right IJ central venous catheter, and visualized 
nasogastric tube as above. 2. Overall unchanged appearance to right mid and lower lung opacity in keeping 
with underlying airspace disease. 3. Mild interstitial edema, similar to immediate prior, but persistently 
improved from 10/4/2018 Lab Test: 
Date:10/7/2018 WBC:  
Lab Results Component Value Date/Time WBC 21.2 (H) 10/07/2018 01:40 AM  
HGB: Lab Results Component Value Date/Time HGB 8.0 (L) 10/07/2018 01:40 AM  
 PLTS: Lab Results Component Value Date/Time PLATELET 692 58/74/2334 01:40 AM  
 
 
SaO2%/flow:  
SpO2 Readings from Last 1 Encounters:  
10/07/18 96% Vital Signs:   Patient Vitals for the past 8 hrs: 
 Temp Pulse Resp BP SpO2  
10/07/18 1200 99.7 °F (37.6 °C) 94 16 116/60 96 % 10/07/18 1150 - 93 17 - 95 % 10/07/18 1130 99.7 °F (37.6 °C) 94 16 113/56 96 % 10/07/18 1100 - 91 16 115/59 95 % 10/07/18 1000 - 92 16 119/61 96 % 10/07/18 0900 - 93 16 124/63 95 % 10/07/18 0859 - 93 17 - 94 % 10/07/18 0800 - 90 16 107/60 95 % 10/07/18 0700 100 °F (37.8 °C) 92 16 109/53 98 % 10/07/18 0600 (!) 37.8 °F (3.2 °C) 94 16 124/57 98 % 10/07/18 0500 (!) 37.7 °F (3.2 °C) 94 17 145/60 98 % Wean Screen Pass (Yes or No): n 
 Wean Screen Reason for Failure: Cardiac instability, neuro status- airway protection. Duration of Weaning Trial: 
Additional Comments: PLAN OF CARE: Monitor pt on vent. Wean/titrate as tolerated to maintain sats & comfort. GOAL: Extubation.  
 
 
OUTCOME:

## 2018-10-08 PROBLEM — K85.90 PANCREATITIS: Status: ACTIVE | Noted: 2018-01-01

## 2018-10-08 NOTE — PROGRESS NOTES
[facility]  -  Progress Note Patient: Willie Christensen MRN: [id number]  SSN: [id number] YOB: 1955  Age: 57 y.o. Sex: male Admit Date: 9/26/2018 I saw, evaluated, interviewed and examined the patient personally. I agree with the findings and plan of care as documented below with PAKACIE note Multiorgan failure, Cardiopulmonary arrest, Sepsis ECHO with EF 45-50% Concern with anoxic encephalopathy. Dw. ( ICU team) and we agree that little to offer from cardiac standpoint at this time. No further cardiac work up planned at this time unless clinical status changes. Call us back if needed. Call us back if meaningful neuro recover achieved. Will be available as needed. Thank you. Narinder Cordova MD 
 
 
 
 
Assessment:  
 
-Presented from SNF due to acute renal failure with Cr 10.1, recent admission 9/10-9/14/18 for acute CVA and rhabdo 
-Septic shock likely associated with aspiration pneumonia vs less likely acute pancreatitis 
-VF cardiac arrest 9/29/18, achieved ROSC prior to defibrillation attempt 
-Echo 10/2/18: EF 86-97%, grade 1 diastolic dysfunction, moderately dilated LA, mildly dilated RA, mild mitral regurgitation 
-Ischemic encephalopathy 
-Diffuse clot with possible food remnants extending from 25 cm to GE junction at 40 cm per EGD 10/2/18, too extensive to be removed at the time per GI team 
-Hx CVA with residual right-sided hemiparesis -HTN 
-DM Plan:  
 
-Continue supportive care per ICU/primary teams, appreciate assistance 
-Would not pursue aggressive cardiac evaluation at this time as previously stated. -Will sign off and be available as needed if additional questions arise. Subjective: Intubated Objective:  
  
Patient Vitals for the past 8 hrs: 
 Temp Pulse Resp BP SpO2  
10/08/18 1000 (!) 37.8 °F (3.2 °C) 90 16 130/62 99 % 10/08/18 0900 (!) 37.8 °F (3.2 °C) 90 16 126/61 97 % 10/08/18 0800 (!) 37.8 °F (3.2 °C) 92 16 132/67 96 % 10/08/18 0723 - 91 16 - 98 % 10/08/18 0700 99.9 °F (37.7 °C) 89 16 128/66 98 % 10/08/18 0600 99.5 °F (37.5 °C) 86 16 132/72 100 % 10/08/18 0500 99 °F (37.2 °C) 86 16 132/71 99 % 10/08/18 0400 99 °F (37.2 °C) 87 16 131/68 98 % 10/08/18 0352 - 87 16 - 99 % 10/08/18 0300 99.5 °F (37.5 °C) 83 12 - 99 % Patient Vitals for the past 96 hrs: 
 Weight 10/08/18 0400 198 lb 3.1 oz (89.9 kg) Intake/Output Summary (Last 24 hours) at 10/08/18 1045 Last data filed at 10/08/18 0700 Gross per 24 hour Intake              345 ml Output                0 ml Net              345 ml Physical Exam: 
General:  Intubated, no apparent distress, appears stated age Neck:  No JVD Lungs:  clear to auscultation to anterior lung fields bilaterally, on ventilator Heart:  Regular rate and rhythm Abdomen:  abdomen is soft without significant tenderness, masses, organomegaly or guarding Extremities:  Atraumatic, edema primarily to upper extremities Data Review:  
 
Labs: Results:  
   
Chemistry Recent Labs 10/08/18 
 0445  10/07/18 
 0140  10/06/18 
 0430 GLU  130*  145*  176* NA  141  142  143  
K  4.4  3.7  3.8 CL  105  104  104 CO2  26  27  24 BUN  44*  23*  42* CREA  5.46*  3.32*  5.56* CA  7.6*  7.5*  7.3*  
PHOS   --   3.5  5.2* AGAP  10  11  15 BUCR  8*  7*  8* AP  231*   --    --   
TP  6.8   --    --   
ALB  1.6*  1.7*  1.6*  
GLOB  5.2*   --    --   
AGRAT  0.3*   --    --   
  
CBC w/Diff Recent Labs 10/08/18 
 0445  10/07/18 
 1409  10/07/18 
 0140 WBC  16.0*  19.3*  21.2*  
RBC  2.57*  2.67*  2.71* HGB  7.4*  7.7*  8.0*  
HCT  23.2*  24.0*  24.1*  
PLT  395  349  311 GRANS  80*   --    --   
LYMPH  10*   --    --   
EOS  3   --    --   
  
Lipid Panel Lab Results Component Value Date/Time  Cholesterol, total 349 (H) 09/10/2018 12:31 PM  
 HDL Cholesterol 59 09/10/2018 12:31 PM  
 LDL, calculated 252.4 (H) 09/10/2018 12:31 PM  
 VLDL, calculated 37.6 09/10/2018 12:31 PM  
 Triglyceride 188 (H) 09/10/2018 12:31 PM  
 CHOL/HDL Ratio 5.9 (H) 09/10/2018 12:31 PM  
  
Liver Enzymes Recent Labs 10/08/18 
 0445  
TP  6.8 ALB  1.6* AP  231* SGOT  127* Thyroid Studies Lab Results Component Value Date/Time  TSH 1.10 09/10/2018 12:31 PM

## 2018-10-08 NOTE — PROGRESS NOTES
0800 
Assessed patient Patient has weak cough and gag Was not able to have any withdrawal at this time Increased tube feed by 10 Patient temperature has been gradually increasing Ice packs applied 1630 Patient dialysis started 
1900. Bedside shift change report given to Matteo Galindo (oncoming nurse) by Andrei Degroot (offgoing nurse). Report included the following information SBAR, Kardex and MAR.

## 2018-10-08 NOTE — PROGRESS NOTES
Infectious Disease Follow-up Admit Date: 9/26/2018 Current abx Prior abx Meropenem/flucon 10/4-4 vanco 9/27-11 Zosyn 9/27-7 Assessment ->Rec:  
 
Persistent leukocytosis SIRS poss sepsis- MOF multiple ID and non-infectious concerns outlined below, complicated, wbc 74K today despite vancomycin/zosyn since 9/27 
-C diff neg 9/29, sput C albicans, repeat CT 10/4 no new findings  ->wbc slightly lower, cont to have fever low garde now 
-> cont Vanc/ meropenem/fluconazole for now 
->d/c nursing d/c midline as unknown when it was placed , cx tip  
->if cont to spike fever after removal of midline then likely will need rt IJ uldall removed. Suspected Pyelo POA - CT B perineph stranding, UA tntc wbc, uctx ng ->likely treated at this point   
cholelithiaisis choledocholithiais suspected acute cholecystitis  poss cystic stone POA- abnl lft bili 4 alk phos 400 seen by GI and GS Dr. Mary Jo Tavera, no plan for OR, for poss cholecystostomy if LFT worsen, bili, alk phos declining  ->per GI Poss pancreatitis - lipase 2200 POA now 5500, interpretation complicated by JULIANNA 
-NEW pancreas characterized as nl on CT 10/4 ->defer GI  
B infiltrates - poss edema infiltrate - RLL consolid better 10/4 cxr and suspect endobronchial clot contributed   ->monitor MRSE bacteremia 9/27 1 of 2. Has LUE midline unclear when placed, repeat bctx's IP today  ->monitor repeat bctx's ntd ARF POA now on HD - baseline cr 0.9 132/10.1 in ER 
-JOHN mccray 9/28 ->per renal , dialysis dependant Bleeding -melena earlier, EGD 10/2 clot in esophagus bronch 10/3 R endobronch clot NEW removed 10/5 repeat bronch  ->per others Suspected anoxic brain injury on MRI 10/3 SP VF arrest 9/29 -> overall poor prognosis however family wants all aggressive measures including trach/peg    
CVA R hemiparesis 9/10   
resp failure sp intubation 9/29 Comorbid: HTN HLD CHF h/o steatohepatitis MICROBIOLOGY:  
9/27 bctx 1 of 2 MRSE uctx ng 
9/29 sput C albicans C diff neg 10/4 bctx x 2 NTD 
 fungitell indeterminate due to contamination LINES AND CATHETERS:  
LUE midline JOHN mccray 9/28 Active Hospital Problems Diagnosis Date Noted  Septic shock ([facility] [address] 09/30/2018 Probably secondary to pneumonia (suspicious for aspiration). Less likely, secondary to acute pancreatitis.  Ischemic encephalopathy 09/30/2018  Cardiac arrest with ventricular fibrillation ([facility] 09/29/2018 ROSC before defibrillation could be attempted.  Abnormal blood coagulation profile 09/29/2018  Acute pancreatitis 09/29/2018 Shock, leukocytosis, elevated lipase but radiographically no ductal dilatation in pancreas. GB stones without radiographic stigmata of acute cholecystitis  Elevated LFTs 09/28/2018  History of stroke 09/27/2018 With residual R hemiparesis  Acute-on-chronic kidney injury ([facility] 09/27/2018  Acute cystitis 09/27/2018  Elevated troponin 09/27/2018  Transaminitis 09/27/2018  Acute kidney injury ([facility] 09/27/2018  Pneumonia of both lower lobes 09/11/2018  Essential hypertension 09/10/2018  Diabetes mellitus type 2, controlled ([facility] 09/10/2018 Subjective: Interval notes reviewed. Pt remains unresponsive, on vent, per nursing family wants all aggressive measures including trach and peg likely to be done next week, /dw nurse will d/c midline and cx tip. Current Facility-Administered Medications Medication Dose Route Frequency  insulin glargine (LANTUS) injection 20 Units  20 Units SubCUTAneous DAILY  vancomycin (VANCOCIN) 750 mg in 0.9% sodium chloride (MBP/ADV) 250 mL ADV  750 mg IntraVENous Q TU, TH & SAT  [START ON 10/9/2018] VANCOMYCIN INFORMATION NOTE   Other ONCE  
 0.9% sodium chloride infusion 250 mL  250 mL IntraVENous PRN  
 influenza vaccine 2018-19 (6 mos+)(PF) (FLUARIX QUAD/FLULAVAL QUAD) injection 0.5 mL  0.5 mL IntraMUSCular PRIOR TO DISCHARGE  
  fluconazole (DIFLUCAN) 200mg/100 mL IVPB (premix)  200 mg IntraVENous Q24H  
 meropenem (MERREM) 500 mg in 0.9% sodium chloride (MBP/ADV) 50 mL MBP  500 mg IntraVENous Q12H  
 midazolam (VERSED) injection    PRN  
 acetaminophen (TYLENOL) solution 325 mg  325 mg Per NG tube Q4H PRN  pantoprazole (PROTONIX) 40 mg in sodium chloride 0.9% 10 mL injection  40 mg IntraVENous Q12H  
 0.9% sodium chloride infusion 250 mL  250 mL IntraVENous PRN  
 heparin (porcine) pf 100 Units  100 Units InterCATHeter PRN  
 0.9% sodium chloride infusion 250 mL  250 mL IntraVENous PRN  
 insulin lispro (HUMALOG) injection   SubCUTAneous Q6H  
 albuterol (PROVENTIL VENTOLIN) nebulizer solution 2.5 mg  2.5 mg Nebulization Q6H RT  
 heparin (porcine) 1,000 unit/mL injection 1,000 Units  1,000 Units InterCATHeter DIALYSIS PRN  
 VANCOMYCIN INFORMATION NOTE   Other Rx Dosing/Monitoring  senna-docusate (PERICOLACE) 8.6-50 mg per tablet 1 Tab  1 Tab Oral BID PRN  
 ondansetron (ZOFRAN) injection 4 mg  4 mg IntraVENous Q4H PRN  
 glucose chewable tablet 16 g  4 Tab Oral PRN  
 glucagon (GLUCAGEN) injection 1 mg  1 mg IntraMUSCular PRN  
 dextrose (D50) infusion 12.5-25 g  25-50 mL IntraVENous PRN  
 hydrALAZINE (APRESOLINE) 20 mg/mL injection 20 mg  20 mg IntraVENous Q6H PRN Objective:  
 
Visit Vitals  /61  Pulse 93  Temp (!) 38 °F (3.3 °C)  Resp 16  
 Ht 5' 7\" (1.702 m)  Wt 89.9 kg (198 lb 3.1 oz)  SpO2 97%  BMI 31.04 kg/m2 Temp (24hrs), Av.6 °F (33.7 °C), Min:37.7 °F (3.2 °C), Max:100.5 °F (38.1 °C) GEN: unresponsive BM on vent in ICU HEENT: anicteric YESSI OGT, pupils non reactive NECK: supple RIJ TDC suspect hematoma no cellulitis CHEST: coarse bs B no wheeze scattered rhonchi CVS: tachycardic no murmur appreciated ABD: soft ? RUQ fullness (+) BS no localized tenderness EXT: anasarca with 2+ pitting edema b/l UE rt >lt SKIN: good turgor no rash CNS: not on sedation but unresponsive, not following any commands Labs: Results:  
Chemistry Recent Labs 10/08/18 
 0445  10/07/18 
 0140  10/06/18 
 0430 GLU  130*  145*  176* NA  141  142  143  
K  4.4  3.7  3.8 CL  105  104  104 CO2  26  27  24 BUN  44*  23*  42* CREA  5.46*  3.32*  5.56* CA  7.6*  7.5*  7.3* AGAP  10  11  15 BUCR  8*  7*  8* AP  231*   --    --   
TP  6.8   --    --   
ALB  1.6*  1.7*  1.6*  
GLOB  5.2*   --    --   
AGRAT  0.3*   --    --   
  
CBC w/Diff Recent Labs 10/08/18 
 0445  10/07/18 
 1409  10/07/18 
 0140 WBC  16.0*  19.3*  21.2*  
RBC  2.57*  2.67*  2.71* HGB  7.4*  7.7*  8.0*  
HCT  23.2*  24.0*  24.1*  
PLT  395  349  311 GRANS  80*   --    --   
LYMPH  10*   --    --   
EOS  3   --    --   
  
 
10/4 CTAP IMPRESSION: 
1. Dense subtotal or total consolidation right lower lobe compatible with 
pneumonia similar to prior study. Small bilateral pleural effusions similar in 
size. 2. Distended gallbladder with gallstones and gallbladder sludge without 
significant change in appearance and also described in detail on ultrasound of 
9/27/2018. 3. Indeterminate small lesion left hepatic lobe without change. Hepatomegaly 
again evident. 4. No intraperitoneal fluid. Possible small left upper pole renal cyst. 
Cardiomegaly. Nasogastric tube tip in body of stomach. cxr 10/5 Impression: 1. Endotracheal tube, visualized nasogastric tube, and right IJ central venous 
catheter in stable position. 2. Overall improved aeration of the right lower lobe, likely improved 
atelectasis with mild residual atelectasis/airspace disease. 3. Mild interstitial edema overall similar to prior. Microbiology Results No results for input(s): SDES, CULT in the last 72 hours. Len Deleon MD 
October 8, 2018 [facility] [phone]

## 2018-10-08 NOTE — PROGRESS NOTES
[facility] ICU Progress Note Name: Kaila Mack : 1955 MRN: [id number] Date: 10/8/2018 11:05 AM 
  
[x]I have reviewed the flowsheet and previous days notes. Events overnight reviewed and discussed with nursing staff. Vital signs and records reviewed. HPI:  
 
58 y/o male w/ PMHx of HTN, ARF, CHF, DM & CVA admitted on 18 for acute on chronic JULIANNA, UTI and abnormal LFTs. Remains intubated post VF cardiac arrest 18. MRI 10/3/18 consistent with anoxic brain injury. Family aware of grim prognosis but wishes to pursue Trach and Peg. Subjective: 
 
10/8/18 No new events overnight. Pt remains intubated. Not sedated. No response to voice. Tmax 100.5 [x]The patient is unable to give any meaningful history or review of systems because the patient is: 
[x]Intubated []Sedated  
[]Unresponsive [x]The patient is critically ill on     
[x]Mechanical ventilation []Pressors []BiPAP [] ROS:A comprehensive review of systems was negative except for that written in the HPI. Medication Review: · Pressors - none · Sedation - none · Antibiotics - vanc/diflucan/merrem · Pain - prn tylenol · GI/ DVT - protonix/ scd's · Others - none Safety Bundles: VAP Bundle/ CAUTI/ Severe Sepsis Protocol/ Electrolyte Replacement Protocol Vital Signs:   
Visit Vitals  /62  Pulse 90  Temp (!) 37.8 °F (3.2 °C)  Resp 16  
 Ht 5' 7\" (1.702 m)  Wt 89.9 kg (198 lb 3.1 oz)  SpO2 99%  BMI 31.04 kg/m2 O2 Device: Endotracheal tube, Ventilator O2 Flow Rate (L/min): 45 l/min Temp (24hrs), Av.2 °F (32.3 °C), Min:37.7 °F (3.2 °C), Max:100.5 °F (38.1 °C) Intake/Output:  
Last shift:        
Last 3 shifts: 10/06 1901 - 10/08 0700 In: 745 [I.V.:555] Out: 2500 Intake/Output Summary (Last 24 hours) at 10/08/18 1105 Last data filed at 10/08/18 0700 Gross per 24 hour Intake              335 ml Output                0 ml Net              335 ml Ventilator Settings: 
Ventilator Mode: Assist control, VC+ Respiratory Rate Back-Up Rate: 16 Insp Time (sec): 0.9 sec I:E Ratio: 1;1 
Ventilator Volumes Vt Set (ml): 500 ml Vt Exhaled (Machine Breath) (ml): 552 ml Vt Spont (ml): 540 ml Ve Observed (l/min): 8.89 l/min Ventilator Pressures Pressure Support (cm H2O): 15 cm H2O 
PIP Observed (cm H2O): 26 cm H2O Plateau Pressure (cm H2O): 20 cm H2O 
MAP (cm H2O): 10 PEEP/VENT (cm H2O): 5 cm H20 Auto PEEP Observed (cm H2O): 0 cm H2O Physical Exam: 
 
General: Intubated; unresponsive HEENT:  Anicteric sclerae; pink palpebral conjunctivae; mucosa moist 
Resp:  Symmetrical chest expansion, no accessory muscle use; good airway entry; Bilateral coarse breath sounds CV:  S1, S2 present; regular rate and rhythm GI:  Abdomen soft, non-tender; (+) active bowel sounds; palpable liver Extremities:  +2 pulses on all extremities; +2 generalized edema Skin:  Warm; no rashes/ lesions noted Neurologic:  Unresponsive; (-) corneals Devices:  OGT, HD catheter, ETT 
 
DATA:  
 
Current Facility-Administered Medications Medication Dose Route Frequency  insulin glargine (LANTUS) injection 20 Units  20 Units SubCUTAneous DAILY  vancomycin (VANCOCIN) 750 mg in 0.9% sodium chloride (MBP/ADV) 250 mL ADV  750 mg IntraVENous Q TU, TH & SAT  [START ON 10/9/2018] VANCOMYCIN INFORMATION NOTE   Other ONCE  
 0.9% sodium chloride infusion 250 mL  250 mL IntraVENous PRN  
 influenza vaccine 2018-19 (6 mos+)(PF) (FLUARIX QUAD/FLULAVAL QUAD) injection 0.5 mL  0.5 mL IntraMUSCular PRIOR TO DISCHARGE  fluconazole (DIFLUCAN) 200mg/100 mL IVPB (premix)  200 mg IntraVENous Q24H  
 meropenem (MERREM) 500 mg in 0.9% sodium chloride (MBP/ADV) 50 mL MBP  500 mg IntraVENous Q12H  
 midazolam (VERSED) injection    PRN  
  acetaminophen (TYLENOL) solution 325 mg  325 mg Per NG tube Q4H PRN  pantoprazole (PROTONIX) 40 mg in sodium chloride 0.9% 10 mL injection  40 mg IntraVENous Q12H  
 0.9% sodium chloride infusion 250 mL  250 mL IntraVENous PRN  
 heparin (porcine) pf 100 Units  100 Units InterCATHeter PRN  
 0.9% sodium chloride infusion 250 mL  250 mL IntraVENous PRN  
 insulin lispro (HUMALOG) injection   SubCUTAneous Q6H  
 albuterol (PROVENTIL VENTOLIN) nebulizer solution 2.5 mg  2.5 mg Nebulization Q6H RT  
 heparin (porcine) 1,000 unit/mL injection 1,000 Units  1,000 Units InterCATHeter DIALYSIS PRN  
 VANCOMYCIN INFORMATION NOTE   Other Rx Dosing/Monitoring  senna-docusate (PERICOLACE) 8.6-50 mg per tablet 1 Tab  1 Tab Oral BID PRN  
 ondansetron (ZOFRAN) injection 4 mg  4 mg IntraVENous Q4H PRN  
 glucose chewable tablet 16 g  4 Tab Oral PRN  
 glucagon (GLUCAGEN) injection 1 mg  1 mg IntraMUSCular PRN  
 dextrose (D50) infusion 12.5-25 g  25-50 mL IntraVENous PRN  
 hydrALAZINE (APRESOLINE) 20 mg/mL injection 20 mg  20 mg IntraVENous Q6H PRN Labs: Results:  
   
Chemistry Recent Labs 10/08/18 
 0445  10/07/18 
 0140  10/06/18 
 0430 GLU  130*  145*  176* NA  141  142  143  
K  4.4  3.7  3.8 CL  105  104  104 CO2  26  27  24 BUN  44*  23*  42* CREA  5.46*  3.32*  5.56* CA  7.6*  7.5*  7.3* AGAP  10  11  15 BUCR  8*  7*  8* AP  231*   --    --   
TP  6.8   --    --   
ALB  1.6*  1.7*  1.6*  
GLOB  5.2*   --    --   
AGRAT  0.3*   --    --   
  
CBC w/Diff Recent Labs 10/08/18 
 0445  10/07/18 
 1409  10/07/18 
 0140 WBC  16.0*  19.3*  21.2*  
RBC  2.57*  2.67*  2.71* HGB  7.4*  7.7*  8.0*  
HCT  23.2*  24.0*  24.1*  
PLT  395  349  311 GRANS  80*   --    --   
LYMPH  10*   --    --   
EOS  3   --    --   
  
Coagulation No results for input(s): PTP, INR, APTT in the last 72 hours. No lab exists for component: INREXT Liver Enzymes Recent Labs 10/08/18 0445  
TP  6.8 ALB  1.6* AP  231* SGOT  127* ABG Lab Results Component Value Date/Time PHI 7.481 (H) 10/08/2018 03:52 AM  
 PCO2I 38.8 10/08/2018 03:52 AM  
 PO2I 74 (L) 10/08/2018 03:52 AM  
 HCO3I 28.9 (H) 10/08/2018 03:52 AM  
 FIO2I 45 10/08/2018 03:52 AM  
  
Microbiology No results for input(s): CULT in the last 72 hours. Telemetry: [x]Sinus []A-flutter []Paced []A-fib []Multiple PVCs Imaging: CXR Results  (Last 48 hours) 10/07/18 0520  XR CHEST PORT Final result Impression:  Impression: 1. Endotracheal tube, right IJ central venous catheter, and visualized  
nasogastric tube as above. 2. Overall unchanged appearance to right mid and lower lung opacity in keeping  
with underlying airspace disease. 3. Mild interstitial edema, similar to immediate prior, but persistently  
improved from 10/4/2018 Narrative:  Chest, single view Indication: Respiratory failure, pneumonia. Comparison: Several prior exams, most recently 10/5/2018 Findings:  Portable upright AP view of the chest was obtained. The patient is  
rotated on the provided projection with foreshortening of the right hemithorax. There is an endotracheal tube present with tip projecting approximately 2.8 cm  
above the billy. Nasogastric tube noted below the diaphragm, the tip collimated  
from view. Right IJ approach central venous catheter in stable position. Patchy airspace disease throughout the right midlung and medial right lower lung  
similar to prior. Mild interstitial opacities noted bilaterally, unchanged. No  
pneumothorax or discrete pleural effusion. Cardiac size and mediastinal contours  
are stable. No acute osseous abnormality is present. CT Results  (Last 48 hours) None IMPRESSION:  
· Acute encepalopathy- MRI 10/3 consistent with anoxic brain injury; EEG 10/5 consistent with poor prognosis · Acute respiratory failure with hypoxia- Pt not a candidate for extubation d/t mental staus; family to schedule trach & peg · S/p V Fib arrest- 10/2/18 EF 46-50%; LV global hypokinesis; cardiology following- recommends to continue supportive measures · RLL Atel and PNA- s/p bronch 10/3/18; diflucan and vanc; ID following · JULIANNA on CKD- HD dependent. Nephrology following · Anemia- stable. s/p total 3 units PRBCs & 1 unit platelets · Melena- EGD 10/2/18 showed large clot in esophagus- on protonix BID- GI following · Abnormal LFT- continues to improve; CT abd shows distended gallbladder with gallstones and gallbladder sludge but no biliary dilatation. · Elevated lipase- continues to improve; CT abd does not show pancreatitis- GI following Other Hx 
· HTN 
· ARF 
· CHF · DM 
· CVA PLAN:  
· Resp - Remains on the vent. HOB > 30. Strict aspiration precautions. Pulmonary hygiene. Daily ABG and CXR while intubated. · ID - Low grade temp and leukocytosis. Continue to trend temp and WBC curve. Blood Cx NGTD. Continue Abx. ID following- ordered Cx catheter tip. · CVS - HD stable. Continue to monitor closely. Cardiology following. · Heme/onc - Daily CBCs. Monitor H/H & platelets. · Metabolic - Monitor electrolytes and replace as needed. · Renal - HD scheduled today. Trend renal indices. Nephrology following. · Endocrine - Glycemic control. BS goal < 180/  
· Neuro/ Pain/ Sedation - Monitor neuro status. Avoid sedating medications. Neuro following. · GI - Trickle feeds as tolerated. Continue to trend LFTs and lipase. · Prophylaxis - DVT, GI- SCDs & protonix · Discussed in interdisciplinary rounds · Code status: FULL The patient is: [] acutely ill Risk of deterioration: [] moderate [x] critically ill  [x] high  
 
[x]See my orders for details My assessment/plan was discussed with: 
[x]nursing []PT/OT [x]respiratory therapy [x]Dr. Roxanne Smith  
[]family [] Nolia Reasons, NP

## 2018-10-08 NOTE — ROUTINE PROCESS
. 
Acute HEmodialysis flow sheet Patient information Barbara Vogt MRN: [id number]      AWI:[id number]  [place]# TOXZ#[id number] Hospital: [place] [address] DX:   
       []1st Time Acute  []Stat[x] Routine []Urgent []Chronic Unit  
       [x]Acute Room []Bedside  []ICU/CCU []ER Isolation Precautions: [x]Dialysis[] Airborne []Contact []Droplet []Reverse Special Considerations:    [] Blood Consent Verified  [x]N/A Allergies: 
[x] NKA  [] Reviewed No Known Allergies Code Status []Full Code [] DNR  []Other Diet: tube feed Diabetic: []Yes []No 
  
 Hemodialysis Orders Physician: Ramin Wasserman Dialyzer Revaclear 300 Duration 4 BFR: 300 DFR:600 Dialysate: K 3 CA 2.5 Na 140 Bicarb 35 Dry Weight: UF Goal: 2500 ml Heparin:  []Bolus   Units    []Hourly  Units      [x]None BP Tx:  []NS  200ml/Bolus    []Other     [x]N/A  
    Labs: Pre:  Post: [x]N/A Additional Orders: [x]N/A [x]Signed Treatment Consent   [x] Verified   [] Obtained Primary Nurse Report: First initial/ Last Name/Title Pre Dialysis: [place]    Time: 8862 Access CATHETER ACCESS:  [] N/A  [x] RIGHT  [] LEFT  [x] IJ  [] SUBCL [] FEM [] First use X-ray  [] Tunnel     [] Non-Tunneled [x] No S/S infection  [] Redness [] Drainage  [] Cultured [] Swelling 
                       [] Pain  
                       [] Medical Aseptic [] Prep Dressing Changed  
                       [] Clotted [x] Patent []      Flows: [x] Good [] Poor [] Reversed If Access Problem Dr. Thierno Cardoza: [] Yes [] No    Date:    [x] N/A  
     GRAFT/FISTULA ACCESS:  [x] N/A  [] RIGHT  [] LEFT  [] UE  [] LE   
                       [] AVG  [] AVF [] BUTTONHOLE  [] +BRUIT/THRILL 
     [] MEDICAL ASEPTIC PREP [] No S/S infection  [] Redness [] Drainage  [] Cultured [] Swelling [] Pain Needle Gauge                              Length: If Access Problem Dr. Sandra Calero: [] Yes [] No    Date:     [] N/A General Assessment LUNGS:  SaO2% 100 [] Clear [] Coarse [] Crackles [] Wheezing  
            [] Diminished Location: [] RLL [] LLL [] RUL [] JOSSIE   
     COUGH:  [] Productive [] Dry [x] N/A  RESPIRATIONS: [x] Easy [] Labored THERAPY: [] RA   [] NC     L/min    Mask: [] NRB [] Venti  O2%    
             [] Ventilator [x] Intubated [] Trach [] BiPap [] CPap CARDIAC: [x] Regular [] Irregular [] Pericardial Rub [] JVD [x] Monitored Rhythm: EDEMA: [] None [x] Generalized [] Facial [] Pedal [] UE [] LE 
           [] Pitting [] 1 [] 2 [] 3 [] 4    [] Right [] Left [] Bilateral  
    SKIN:    [x] Warm [] Hot [] Cold  [] Dry [] Pale [] Diaphoretic  
           [] Flushed [] Jaundiced [] Cyanotic [] Rash [] Weeping LOC:    [] Alert  Oriented to: [] Person [] Place [] Time  
          [] Confused [] Lethargic [x] Medically sedated [x] Non-responsive GI/ABDOMEN: [] Flat [x] Distended [x] Soft [] Firm [] Obese [] Diarrhea 
 [] Bowel Sounds     []Nausea [] Vomiting PAIN: [x] 0 [] 1 [] 2 [] 3 [] 4 [] 5 [] 6 [] 7 [] 8 [] 9 [] 10 Scale 1-10 Action/Follow Up MOBILITY: [] Amb [] Amb/Assist [x] Bed  [] Wheelchair CUrrent LABS HBsAg ONLY: Date Drawn 9/28/18 [x] Negative [] Positive  [] Unknown. HBsAb: Date Drawn 9/28/18 [x] Susceptible <10 [] Immune ?10 [] Unknown Date of Current Labs:  
    
Lab Results Component Value Date/Time  Sodium 141 10/08/2018 04:45 AM  
 Potassium 4.4 10/08/2018 04:45 AM  
 Chloride 105 10/08/2018 04:45 AM  
 CO2 26 10/08/2018 04:45 AM  
 BUN 44 (H) 10/08/2018 04:45 AM  
 Creatinine 5.46 (H) 10/08/2018 04:45 AM  
 Calcium 7.6 (L) 10/08/2018 04:45 AM  
 Magnesium 3.8 (H) 09/29/2018 11:10 AM  
 Phosphorus 3.5 10/07/2018 01:40 AM  
 HCT 24.6 (L) 10/08/2018 03:41 PM  
 HGB 7.8 (L) 10/08/2018 03:41 PM  
 PLATELET 978 18/30/7104 03:41 PM  
 INR 1.2 10/04/2018 01:00 AM  
  
Education Person Educated: [x] Patient [] Family [] Other Knowledge base: [x] None [] Minimal [] Substantial   
     Barriers to learning yes  [] None Preferred method of learning: [] Written [] Oral [] Visual [] Hands on Topic: [] Access Care [] S&S of infection [] Fluid Management [] K+ 
               [] Procedural    [] Albumin [] Medications [] Tx Options [] Transplant 
                [] Diet [] Other Teaching Tools: [] Explain [] Demonstration [] Handout [] Teachback Ro/hemodiaylsis machine safety checks- before each treatment Machine Serial  13 
 [] # 1 [id number] [] # 2 [id number] [] # 3 [id number] [] # 4 [id number] [] # 5 [id number] [] # [phone] [] # [phone] Alarm Test: [x] Pass Time 9007       RO Serial # 13  
[] [id number] (Large dual station RO) [] # 1  [id number]  (Portable # 1) [] # 2  [id number]  (Portable # 2) [] # 3  [id number]  (Portable # 3) [] # 4  [id number]  (Portable # 4) [] # 5  [id number]  (Portable # 5) Lot #s: Dialyzer [id number] exp. Tubing [id number] exp. Saline -jt exp. [x] RO/Machine Log Complete    [x] Extracorporeal circuit Tested for integrity Dialysate: pH 7.4 Temp. 36  Conductivity: Meter 14 HD Machine 14  
chlorine testing- before each treatment and every 4 hours Total Chlorine: [x] Less than 0.1 ppm Time: 1625 2nd Check Time: 7686 (If greater than 0.1 ppm from Primary then every 30 minutes from Secondary) treatment Iniation-with dialysis precautions [x] All Connections Secured   [x] Saline Line Double Clamped    
[x] Venous Parameters Set    [x] Arterial Parameters Set    [x] Prime Given 250 ml            [x] Air Foam Detector Engaged Zohaib Nurse Signature/Title_Philip P Long__ Pre-Treatment Time 1615 B/P 144/82 HR 92 Temp. 99.9 Resp Rate 30 Pre Wt  
UF Calculations: Wt to lose:2000 ml(+) Oral:  ml(+) IV Meds/Fluids/Blood prods  ml(+) Prime/Rinse 500 ml 
(=)Total UF Goal 2500 mL Scale Type:[] Bed scale [] Sling Scale [] Wheel Chair Scale [x]  Not Ordered [] Unable to obtain pt on stretcher Tx Initiation Note: right IJ catheter accessed and treatment started without complication  
[x] Time Out/Safety Check  Time: 9587 [facility] Signature/Title_Saleem P Yogesh INTRADIALYTIC MONITORING 
(SEE ATTACHED FLOWSHEET) POST TREATMENT Time 2037 B/P 133/60 HR 93 Temp 36.8c Resp. Rate 16 Post wt Time Medication Dose Volume Route Initials [facility] Signatures Title Initials Time 52 [facility] RN [address] 2040 [facility] Signature/Title:_Saleem Zuñiga_ Dialyzer cleared: [] Good [] Fair [] Poor    Blood Processed 64 Liters Net UF Removed 2500 mL Post Tx Access: AVF/AVG: Bleeding Stop                   Art. min Shree min []+bruit/thrill Catheter: Locking Solution heparin     Art. 1.4 ml Shree 1.4 ml Post Assessment:  Skin: [x]Warm []Dry []Diaphoretic []Flushed [] Pale []Cyanotic Lungs: [x]Clear []Coarse []Crackles []Wheezing Cardiac: [x]Regular []Irregular  []Monitored rhythm Edema: []None [x]General []Facial []Pedal  []UE []LE []RIGHT []LEFT    []Bilateral  
   Pain: [x]0 []1[]2 []3 []4 []5 []6 []7 []8 []9 []10  
POST Tx Note: removed 2 liters catheter locked as per order patient tolerated treatment well Primary Nurse Report: First initial/Last name/Title Post Dialysis:MAKAYLA Rojas_         Time:_2040_ Abbreviations: AVG-arterial venous graft, AVF-arterial venous fistula, IJ-Internal Jugular, Subcl-Subclavian, Fem-Femoral, Tx-treatment, AP/HR-apical heart rate, DFR-dialysate flow rate, BFR-blood flow rate, AP-arterial pressure, -venous pressure, UF-ultrafiltrate, TMP-transmembrane pressure, Shree-Venous, Art-Arterial, RO-Reverse Osmosis

## 2018-10-08 NOTE — PROGRESS NOTES
Problem: Falls - Risk of 
Goal: *Absence of Falls Document Shanell Montelongo Fall Risk and appropriate interventions in the flowsheet. Fall Risk Interventions: 
Mobility Interventions: Bed/chair exit alarm, Communicate number of staff needed for ambulation/transfer Mentation Interventions: Door open when patient unattended Medication Interventions: Evaluate medications/consider consulting pharmacy, Bed/chair exit alarm Elimination Interventions: Bed/chair exit alarm, Call light in reach Problem: Pressure Injury - Risk of 
Goal: *Prevention of pressure injury Document Mitul Scale and appropriate interventions in the flowsheet. Outcome: Progressing Towards Goal 
Pressure Injury Interventions: 
Sensory Interventions: Assess changes in LOC, Assess need for specialty bed, Avoid rigorous massage over bony prominences, Check visual cues for pain, Float heels, Keep linens dry and wrinkle-free, Minimize linen layers, Monitor skin under medical devices, Pad between skin to skin, Pressure redistribution bed/mattress (bed type), Turn and reposition approx. every two hours (pillows and wedges if needed) Moisture Interventions: Absorbent underpads, Apply protective barrier, creams and emollients, Assess need for specialty bed, Check for incontinence Q2 hours and as needed, Maintain skin hydration (lotion/cream), Minimize layers, Moisture barrier Activity Interventions: Assess need for specialty bed, Pressure redistribution bed/mattress(bed type) Mobility Interventions: Assess need for specialty bed, Float heels, Pressure redistribution bed/mattress (bed type), Turn and reposition approx. every two hours(pillow and wedges) Nutrition Interventions: Document food/fluid/supplement intake Friction and Shear Interventions: Apply protective barrier, creams and emollients, Feet elevated on foot rest, Foam dressings/transparent film/skin sealants, Lift sheet

## 2018-10-08 NOTE — PROGRESS NOTES
Patient in bed on back with head elevated - BBS equal and coarse - ETt secure at 24 at the lips and moved to the right side of the mouth - MVB at [facility] - vent alarms on and functional 
 
0354 - ABG obtained, no changes in vent settings at this time

## 2018-10-08 NOTE — PROGRESS NOTES
Internal Medicine Progress Note Patient's Name: Rony Ballard Admit Date: 9/26/2018 Length of Stay: 11 Assessment/Plan Active Hospital Problems Diagnosis Date Noted  Pancreatitis 10/08/2018  Septic shock ([address] 09/30/2018 Probably secondary to pneumonia (suspicious for aspiration). Less likely, secondary to acute pancreatitis.  Ischemic encephalopathy 09/30/2018  Cardiac arrest with ventricular fibrillation ([address] 09/29/2018 ROSC before defibrillation could be attempted.  Abnormal blood coagulation profile 09/29/2018  Acute pancreatitis 09/29/2018 Shock, leukocytosis, elevated lipase but radiographically no ductal dilatation in pancreas. GB stones without radiographic stigmata of acute cholecystitis  Elevated LFTs 09/28/2018  History of stroke 09/27/2018 With residual R hemiparesis  Acute-on-chronic kidney injury ([address] 09/27/2018  Acute cystitis 09/27/2018  Elevated troponin 09/27/2018  Transaminitis 09/27/2018  Acute kidney injury ([address] 09/27/2018  Pneumonia of both lower lobes 09/11/2018  Essential hypertension 09/10/2018  Diabetes mellitus type 2, controlled ([address] 09/10/2018 Pneumonia not POA 
 
- Vent mgmt per ICU 
- EGD showed large clot in esophagus last week - Cont protonix IV BID 
- Hgb stable s/p 1 unit PRBC - Trend CBC 
- LFTs improving - F/u GI 
- MRI w/ evidence of severe anoxic brain injury - Minimal improvement on neuro exam 
- Appreciate neuro assistance 
- Cont broad spec IV Meropenem and IV Fluconazole  per ID , per ID if cont to spike fever after removal of midline then likely will need rt IJ uldall removed. - Nephro managing HD 
- Pt unlikely to have any meaningful neuro recovery, prognosis is very grim - Family wants to cont to do everything, including trach/PEG 
- DVT/GI protocol I have personally reviewed all pertinent labs and films that have officially resulted over the last 24 hours. I have personally checked for all pending labs that are awaiting final results. Subjective Pt s/e @ bedside Remains intubated Unresponsive MRI suspicious for  anoxia brain injury Febrile Today T 38 Objective Visit Vitals  /61  Pulse 94  Temp (!) 38 °F (3.3 °C)  Resp 16  
 Ht 5' 7\" (1.702 m)  Wt 89.9 kg (198 lb 3.1 oz)  SpO2 97%  BMI 31.04 kg/m2 Physical Exam: 
General Appearance: Intubated, not following commands HENT: normocephalic/atraumatic, + ETT Neck: No JVD, hematoma R side where [facility] is improving Lungs: Coarse B/L w/ vent-assisted BS 
CV: RRR, no m/r/g Abdomen: soft, non-tender, normal bowel sounds Extremities: no cyanosis, no peripheral edema Neuro: Unresponsive to commands, no withdrawal to pain, + corneal reflex and pupillary reaction today Skin: Normal color, intact Intake and Output: 
Current Shift:    
Last three shifts:  10/06 1901 - 10/08 0700 In: 745 [I.V.:555] Out: 2500 Lab/Data Reviewed: 
CMP:  
Lab Results Component Value Date/Time  10/08/2018 04:45 AM  
 K 4.4 10/08/2018 04:45 AM  
  10/08/2018 04:45 AM  
 CO2 26 10/08/2018 04:45 AM  
 AGAP 10 10/08/2018 04:45 AM  
  (H) 10/08/2018 04:45 AM  
 BUN 44 (H) 10/08/2018 04:45 AM  
 CREA 5.46 (H) 10/08/2018 04:45 AM  
 GFRAA 13 (L) 10/08/2018 04:45 AM  
 GFRNA 11 (L) 10/08/2018 04:45 AM  
 CA 7.6 (L) 10/08/2018 04:45 AM  
 ALB 1.6 (L) 10/08/2018 04:45 AM  
 TP 6.8 10/08/2018 04:45 AM  
 GLOB 5.2 (H) 10/08/2018 04:45 AM  
 AGRAT 0.3 (L) 10/08/2018 04:45 AM  
 SGOT 127 (H) 10/08/2018 04:45 AM  
 ALT 74 (H) 10/08/2018 04:45 AM  
 
CBC:  
Lab Results Component Value Date/Time WBC 16.0 (H) 10/08/2018 04:45 AM  
 HGB 7.4 (L) 10/08/2018 04:45 AM  
 HCT 23.2 (L) 10/08/2018 04:45 AM  
  10/08/2018 04:45 AM  
 
 
Imaging Reviewed: 
No results found. Medications Reviewed: Current Facility-Administered Medications Medication Dose Route Frequency  insulin glargine (LANTUS) injection 20 Units  20 Units SubCUTAneous DAILY  vancomycin (VANCOCIN) 750 mg in 0.9% sodium chloride (MBP/ADV) 250 mL ADV  750 mg IntraVENous Q TU, TH & SAT  [START ON 10/9/2018] VANCOMYCIN INFORMATION NOTE   Other ONCE  
 0.9% sodium chloride infusion 250 mL  250 mL IntraVENous PRN  
 influenza vaccine 2018-19 (6 mos+)(PF) (FLUARIX QUAD/FLULAVAL QUAD) injection 0.5 mL  0.5 mL IntraMUSCular PRIOR TO DISCHARGE  fluconazole (DIFLUCAN) 200mg/100 mL IVPB (premix)  200 mg IntraVENous Q24H  
 meropenem (MERREM) 500 mg in 0.9% sodium chloride (MBP/ADV) 50 mL MBP  500 mg IntraVENous Q12H  
 midazolam (VERSED) injection    PRN  
 acetaminophen (TYLENOL) solution 325 mg  325 mg Per NG tube Q4H PRN  pantoprazole (PROTONIX) 40 mg in sodium chloride 0.9% 10 mL injection  40 mg IntraVENous Q12H  
 0.9% sodium chloride infusion 250 mL  250 mL IntraVENous PRN  
 heparin (porcine) pf 100 Units  100 Units InterCATHeter PRN  
 0.9% sodium chloride infusion 250 mL  250 mL IntraVENous PRN  
 insulin lispro (HUMALOG) injection   SubCUTAneous Q6H  
 albuterol (PROVENTIL VENTOLIN) nebulizer solution 2.5 mg  2.5 mg Nebulization Q6H RT  
 heparin (porcine) 1,000 unit/mL injection 1,000 Units  1,000 Units InterCATHeter DIALYSIS PRN  
 VANCOMYCIN INFORMATION NOTE   Other Rx Dosing/Monitoring  senna-docusate (PERICOLACE) 8.6-50 mg per tablet 1 Tab  1 Tab Oral BID PRN  
 ondansetron (ZOFRAN) injection 4 mg  4 mg IntraVENous Q4H PRN  
 glucose chewable tablet 16 g  4 Tab Oral PRN  
 glucagon (GLUCAGEN) injection 1 mg  1 mg IntraMUSCular PRN  
 dextrose (D50) infusion 12.5-25 g  25-50 mL IntraVENous PRN  
 hydrALAZINE (APRESOLINE) 20 mg/mL injection 20 mg  20 mg IntraVENous Q6H PRN

## 2018-10-08 NOTE — PROGRESS NOTES
RENAL PROGRESS NOTE Charlie Challenger Assessment/Plan: · Dialysis dependant JULIANNA (ischemic atn in a setting of acute pyelonephritis/acute cholecystitis with sepsis and cardiac arrest 9/29). No evidence of recovery of renal function at this time. Next dialysis today and continue 3/week. Continue to minimize intake, volume overload is improving. · Hyperphosphatemia. Corrected with dialysis. · S/p cardiopulm arrest 9/29. Off pressors. · Acute pyelonephritis/sepsis. On abx. Febrile. · Abnormal lft's/acute cholecystitis? On abx. GI on the case. · UGI bleed, EGD results noted- esophageal clot. · Acute blood loss anemia. H/H is drifting down. · Asp pneumonia. · Resp failure. R bronch clot removed 10/6 with bronchoscopy. · Anoxic brain injury superimposed on recent cva. MRI results noted. Subjective: Intubated, unresponsive. Off pressors. Tolerates tf. Patient Active Problem List  
Diagnosis Code  Stroke (cerebrum) ([facility]) I63.9  Stroke ([facility] I63.9  Rhabdomyolysis M62.82  
 Dehydration E86.0  
 Essential hypertension I10  
 Diabetes mellitus type 2, controlled ([facility] E11.9  Non compliance w medication regimen Z91.14  
 Pneumonia of both lower lobes J18.9  DM (diabetes mellitus) ([facility] E11.9  History of stroke Z80.78  
 Acute-on-chronic kidney injury ([facility] N17.9, N18.9  Acute cystitis N30.00  Elevated troponin R74.8  Transaminitis R74.0  Acute kidney injury ([facility] N17.9  Elevated LFTs R94.5  Cardiac arrest with ventricular fibrillation ([facility]) I46.9, I49.01  
 Abnormal blood coagulation profile R79.1  Acute pancreatitis K85.90  Septic shock ([facility]) A41.9, R65.21  
 Ischemic encephalopathy I67.89 Current Facility-Administered Medications Medication Dose Route Frequency Provider Last Rate Last Dose  insulin glargine (LANTUS) injection 20 Units  20 Units SubCUTAneous DAILY Becka Andrea DO   20 Units at 10/08/18 0836  
 vancomycin (VANCOCIN) 750 mg in 0.9% sodium chloride (MBP/ADV) 250 mL ADV  750 mg IntraVENous Q TU, TH & SAT Delfina SPRINGER  mL/hr at 10/06/18 2158 750 mg at 10/06/18 2158  [START ON 10/9/2018] VANCOMYCIN INFORMATION NOTE   Other ONCE Maxim Odalis SPRINGER MD      
 0.9% sodium chloride infusion 250 mL  250 mL IntraVENous PRN Becka Andrea DO      
 influenza vaccine 2018-19 (6 mos+)(PF) (FLUARIX QUAD/FLULAVAL QUAD) injection 0.5 mL  0.5 mL IntraMUSCular PRIOR TO DISCHARGE Diogo Mccallum MD      
 fluconazole (DIFLUCAN) 200mg/100 mL IVPB (premix)  200 mg IntraVENous Q24H GRACE Andino  mL/hr at 10/07/18 1442 200 mg at 10/07/18 1442  meropenem (MERREM) 500 mg in 0.9% sodium chloride (MBP/ADV) 50 mL MBP  500 mg IntraVENous Q12H GRACE Andino  mL/hr at 10/07/18 2156 500 mg at 10/07/18 2156  midazolam (VERSED) injection    PRN Rell Craig MD   4 mg at 10/05/18 1140  acetaminophen (TYLENOL) solution 325 mg  325 mg Per NG tube Q4H PRN Diogo Mccallum MD   325 mg at 10/06/18 1369  pantoprazole (PROTONIX) 40 mg in sodium chloride 0.9% 10 mL injection  40 mg IntraVENous Q12H Diogo Mccallum MD   40 mg at 10/08/18 0836  
 0.9% sodium chloride infusion 250 mL  250 mL IntraVENous PRN Becka Andrea DO      
 heparin (porcine) pf 100 Units  100 Units InterCATHeter PRN Sania Dougherty MD      
 0.9% sodium chloride infusion 250 mL  250 mL IntraVENous PRN Diogo Mccallum MD      
 insulin lispro (HUMALOG) injection   SubCUTAneous Q6H Becka Andrea DO   Stopped at 10/07/18 1800  
 albuterol (PROVENTIL VENTOLIN) nebulizer solution 2.5 mg  2.5 mg Nebulization Q6H RT Radha David MD   2.5 mg at 10/08/18 0727  
 heparin (porcine) 1,000 unit/mL injection 1,000 Units  1,000 Units InterCATHeter DIALYSIS PRN Maggie Rangel MD   1,000 Units at 09/28/18 1332  VANCOMYCIN INFORMATION NOTE   Other Rx Dosing/Monitoring Rosemary Griffin MD      
 senna-docusate (PERICOLACE) 8.6-50 mg per tablet 1 Tab  1 Tab Oral BID PRN Gregoria Alvarado DO      
 [facility]ron [facility]) injection 4 mg  4 mg IntraVENous Q4H PRN Gregoria Alvarado, DO   4 mg at 09/28/18 0539  
 glucose chewable tablet 16 g  4 Tab Oral PRN Gregoria Alvarado DO      
 glucagon (GLUCAGEN) injection 1 mg  1 mg IntraMUSCular PRN Gregoria Alvarado DO      
 dextrose (D50) infusion 12.5-25 g  25-50 mL IntraVENous PRN Sylwia Nettles DO   12.5 g at 09/28/18 1206  hydrALAZINE (APRESOLINE) 20 mg/mL injection 20 mg  20 mg IntraVENous Q6H PRN Arely Tobias, NP   20 mg at 10/02/18 1759 Objective Vitals:  
 10/08/18 0723 10/08/18 0800 10/08/18 0900 10/08/18 1000 BP:  132/67 126/61 130/62 Pulse: 91 92 90 90 Resp: 16 16 16 16 Temp:  (!) 37.8 °F (3.2 °C) (!) 37.8 °F (3.2 °C) (!) 37.8 °F (3.2 °C) TempSrc:      
SpO2: 98% 96% 97% 99% Weight:      
Height:      
 
 
 
Intake/Output Summary (Last 24 hours) at 10/08/18 1100 Last data filed at 10/08/18 0700 Gross per 24 hour Intake              335 ml Output                0 ml Net              335 ml Admission weight: Weight: 96.6 kg (213 lb) (09/26/18 2244) Last Weight Metrics: 
Weight Loss Metrics 10/8/2018 9/26/2018 9/13/2018 Today's Wt 198 lb 3.1 oz - 227 lb 15.3 oz  
BMI - 31.04 kg/m2 35.7 kg/m2 Physical Assessment:  
 
General: intubated, unresponsive. Eyes are open. Neck: No jvd. Rt ij temporary dialysis catheter in place, dressing is clear. LUNGS: diminished air entry at the bases. No crackles. CVS EXM: S1, S2  RRR. Abdomen: soft, pos bs. Lower Extremities:  1+ edema. Lab CBC w/Diff Recent Labs 10/08/18 
 0445  10/07/18 
 1409  10/07/18 
 0140 WBC  16.0*  19.3*  21.2*  
RBC  2.57*  2.67*  2.71* HGB  7.4*  7.7*  8.0*  
HCT  23.2*  24.0*  24.1*  
PLT  395  349  311 GRANS  80*   --    --   
LYMPH  10*   --    --   
EOS  3   --    --   
  
 
Chemistry Recent Labs 10/08/18 
 0445  10/07/18 
 0140  10/06/18 
 0430 GLU  130*  145*  176* NA  141  142  143  
K  4.4  3.7  3.8 CL  105  104  104 CO2  26  27  24 BUN  44*  23*  42* CREA  5.46*  3.32*  5.56* CA  7.6*  7.5*  7.3* AGAP  10  11  15 BUCR  8*  7*  8* AP  231*   --    --   
TP  6.8   --    --   
ALB  1.6*  1.7*  1.6*  
GLOB  5.2*   --    --   
AGRAT  0.3*   --    --   
PHOS   --   3.5  5.2* No results found for: IRON, FE, TIBC, IBCT, PSAT, FERR Lab Results Component Value Date/Time Calcium 7.6 (L) 10/08/2018 04:45 AM  
 Phosphorus 3.5 10/07/2018 01:40 AM  
  
 
Korey Mello M.D. [facility] Phone [phone] Pager [phone]

## 2018-10-08 NOTE — PROGRESS NOTES
Gastrointestinal Progress Note Patient Name: Jing Kaur Today's Date: 10/8/2018 Admit Date: 9/26/2018 Assessment: 1. Melena; inactive; EGD 10/2 with large clot in the esophagus which was not able to be cleared. Hb stable. Unclear whether there is significant underlying pathology (ulcer, mass). CT scan did not suggest marilu perforation 2. Elevated liver enzymes; acute on chronic; slowly improving. Possibly due to acute cholecystitis with cholelithiasis/stones on ultrasound. Nondilated CBD on imaging argues against biliary obstruction. 3. S/p vfib cardiac arrest 
4. Anoxic brain injury with diffuse cortical signal changes on MRI brain 5. JULIANNA, on hemodialysis 6. Suspected pyelonephritis 7. Elevated lipase without pancreatitis on CT; suspect due to renal failure 8.  Endobronchial clot with respiratory failure; s/p removal of clot by bronchoscopy 9. DIC 
   
Recommendation: 1. Continue twice daily protonix 2. Monitor for recurrent GI bleeding 3. Check periodic liver enzymes for ongoing resolution. 4. Plan for EGD tomorrow to re-assess the esophagus, evaluate for underlying pathology. Discussed with patient's daughter in detail Subjective:  
 
Jing Kaur is a 57 y.o. Male followed for GI bleeding, elevated liver enzymes. No recurrent bleeding. Hb stable. Receiving dialysis. Remains unresponsive Current Facility-Administered Medications Medication Dose Route Frequency  vancomycin (VANCOCIN) 750 mg in 0.9% sodium chloride (MBP/ADV) 250 mL ADV  750 mg IntraVENous ONCE  
 insulin glargine (LANTUS) injection 20 Units  20 Units SubCUTAneous DAILY  0.9% sodium chloride infusion 250 mL  250 mL IntraVENous PRN  
 influenza vaccine 2018-19 (6 mos+)(PF) (FLUARIX QUAD/FLULAVAL QUAD) injection 0.5 mL  0.5 mL IntraMUSCular PRIOR TO DISCHARGE  fluconazole (DIFLUCAN) 200mg/100 mL IVPB (premix)  200 mg IntraVENous Q24H  meropenem (MERREM) 500 mg in 0.9% sodium chloride (MBP/ADV) 50 mL MBP  500 mg IntraVENous Q12H  
 midazolam (VERSED) injection    PRN  
 acetaminophen (TYLENOL) solution 325 mg  325 mg Per NG tube Q4H PRN  pantoprazole (PROTONIX) 40 mg in sodium chloride 0.9% 10 mL injection  40 mg IntraVENous Q12H  
 0.9% sodium chloride infusion 250 mL  250 mL IntraVENous PRN  
 heparin (porcine) pf 100 Units  100 Units InterCATHeter PRN  
 0.9% sodium chloride infusion 250 mL  250 mL IntraVENous PRN  
 insulin lispro (HUMALOG) injection   SubCUTAneous Q6H  
 albuterol (PROVENTIL VENTOLIN) nebulizer solution 2.5 mg  2.5 mg Nebulization Q6H RT  
 heparin (porcine) 1,000 unit/mL injection 1,000 Units  1,000 Units InterCATHeter DIALYSIS PRN  
 VANCOMYCIN INFORMATION NOTE   Other Rx Dosing/Monitoring  senna-docusate (PERICOLACE) 8.6-50 mg per tablet 1 Tab  1 Tab Oral BID PRN  
 ondansetron (ZOFRAN) injection 4 mg  4 mg IntraVENous Q4H PRN  
 glucose chewable tablet 16 g  4 Tab Oral PRN  
 glucagon (GLUCAGEN) injection 1 mg  1 mg IntraMUSCular PRN  
 dextrose (D50) infusion 12.5-25 g  25-50 mL IntraVENous PRN  
 hydrALAZINE (APRESOLINE) 20 mg/mL injection 20 mg  20 mg IntraVENous Q6H PRN Objective:  
 
Physical Exam: 
 
Visit Vitals  /71  Pulse 90  Temp (!) 37.7 °F (3.2 °C)  Resp 18  Ht 5' 7\" (1.702 m)  Wt 89.9 kg (198 lb 3.1 oz)  SpO2 98%  BMI 31.04 kg/m2  
 
  
Gen: Intubated on ventilator, unresponsive to voice or touch CV: Regular, no murmur Pulm: Coarse breath sounds, no wheeze Abd: Soft, nondistended 
  
Data Review: 
 
Labs: Results:  
   
Chemistry Recent Labs 10/08/18 
 0445  10/07/18 
 0140  10/06/18 
 0430 GLU  130*  145*  176* NA  141  142  143  
K  4.4  3.7  3.8 CL  105  104  104 CO2  26  27  24 BUN  44*  23*  42* CREA  5.46*  3.32*  5.56* CA  7.6*  7.5*  7.3* AGAP  10  11  15 BUCR  8*  7*  8* AP  231*   --    --   
 TP  6.8   --    --   
ALB  1.6*  1.7*  1.6*  
GLOB  5.2*   --    --   
AGRAT  0.3*   --    --   
  
CBC w/Diff Recent Labs 10/08/18 
 1541  10/08/18 
 0445  10/07/18 
 1409 WBC  13.5*  16.0*  19.3*  
RBC  2.60*  2.57*  2.67* HGB  7.8*  7.4*  7.7* HCT  24.6*  23.2*  24.0*  
PLT  381  395  349 GRANS   --   80*   --   
LYMPH   --   10*   --   
EOS   --   3   --   
  
Coagulation No results for input(s): PTP, INR, APTT in the last 72 hours. No lab exists for component: INREXT Liver Enzymes Recent Labs 10/08/18 
 0445  
TP  6.8 ALB  1.6* AP  231* SGOT  127* ALT  74* Quentin Sheets MD 
October 8, 2018

## 2018-10-08 NOTE — PROGRESS NOTES
[address] SBAR report taken from [facility]. Patient on vent, does not respond to anything but painful stimuli. Eyes are wide open, will not blink to threat. Applied saline soaked gauze over eyes to keep them from drying out. Suctioned patient=weak cough. Weak gag reflex with oral care. No family present. Patient unresponsive, thus not able to educate him. Not breathing over the vent 
 
0000 No changes. Still not breathing over vent and only responding to painful stimuli. Blood sugar is not requiring coverage at this time [address] [place] [address] Bathed patient, checked out his skin really well. REplaced linens, gown, leads, BP cuff and pulse oximeter probe Triston Sabinanie Schilder blood off of peripheral iv due to mid line not providing a blood return. [facility] SBAR report given to incoming KAUSHAL Galindo. No change in patient. He will only respond to painful stimuli. Lungs are coarse. Not breathing over vent. Has a very mild gag, when performing mouth care. No family called overnight. No insulin needed for blood sugars.

## 2018-10-08 NOTE — PROGRESS NOTES
Patient remains in life support and therefore  is unable to communicate at this time. No family seen at [facility] of this visit.  offered prayer and left Spiritual Care brochure. Chaplains will continue to follow and will provide pastoral care on an as needed/requested basis. Dayton Baker Board Certified [employer] [facility]ual [facility]  
[phone]

## 2018-10-08 NOTE — PROGRESS NOTES
Patient remains intubated on the ventilator. Nephrology notes, \"No signs of renal recovery\". Patient remains on dialysis, cardiology has signed off. Neurology states,  hypoxic brain injury, and persistent vegetative state. They would like to pursue pathway of trach and PEG. He will need to  transfer to a facility who can preform in house dialysis. Will inform the family that a facility which can accommodate in complex care needs may not be found locally. Patient does not have long term health insurance, a Disability /Mediciad application will need to be obtained.  Patient has been referred to med Assist.   Ihsan Thibodeaux RN

## 2018-10-08 NOTE — PROGRESS NOTES
Recd pt on vent:  AC VC+, Du=763, Rate=16, Fio2=45%, Peep=5 Vent check complete - suction - adj ETT AM ABG Result: pH=7.481, CO2=38.8, PaO2=78, Hco3=28.9, 96% - no changes made

## 2018-10-09 NOTE — PROGRESS NOTES
Received pt on ac/vc+ 12/500/5/45% and tolerating well. Placed pt on SBT but could not obtain adequate Vt. Will continue to monitor.

## 2018-10-09 NOTE — PROGRESS NOTES
Problem: Ventilator Management Goal: *Adequate oxygenation and ventilation VENTILATOR Care plan 
 
Problem: Ventilator Management Goal: *Adequate oxygenation/ ventilation/ and extubation Patient: 
   
 
Cait Solorzano     57 y.o.   male     10/9/2018  9:14 AM 
Patient Active Problem List  
Diagnosis Code  Stroke (cerebrum) ([facility]) I63.9  Stroke ([facility] I63.9  Rhabdomyolysis M62.82  
 Dehydration E86.0  
 Essential hypertension I10  
 Diabetes mellitus type 2, controlled ([facility] E11.9  Non compliance w medication regimen Z91.14  
 Pneumonia of both lower lobes J18.9  DM (diabetes mellitus) ([facility] E11.9  History of stroke Z80.78  
 Acute-on-chronic kidney injury ([facility] N17.9, N18.9  Acute cystitis N30.00  Elevated troponin R74.8  Transaminitis R74.0  Acute kidney injury ([facility] N17.9  Elevated LFTs R94.5  Cardiac arrest with ventricular fibrillation ([facility]) I46.9, I49.01  
 Abnormal blood coagulation profile R79.1  Acute pancreatitis K85.90  Septic shock ([facility]) A41.9, R65.21  
 Ischemic encephalopathy I67.89  
 Pancreatitis K85.90 Pneumonia of both lower lobes due to infectious organism ([facility] [J18.1] Reason patient intubated:Respiratory failure s/p CA Ventilator day:5 Ventilator settings:ac/vc+ 12/500/5/45% ETT Size/Placement:8 mm/ 24 at the lips ABG: 
Date:10/9/2018 Lab Results Component Value Date/Time PHI 7.487 (H) 10/09/2018 05:14 AM  
 PCO2I 41.3 10/09/2018 05:14 AM  
 PO2I 82 10/09/2018 05:14 AM  
 HCO3I 31.1 (H) 10/09/2018 05:14 AM  
 FIO2I 45 10/09/2018 05:14 AM  
 
 
Chest X-ray: 
Date:10/9/2018 Results from [facility] Encounter encounter on 09/26/18 XR CHEST PORT Narrative Chest, single view Indication: Respiratory failure, pneumonia. Comparison: Several prior exams, most recently 10/5/2018 Findings:  Portable upright AP view of the chest was obtained. The patient is rotated on the provided projection with foreshortening of the right hemithorax. There is an endotracheal tube present with tip projecting approximately 2.8 cm 
above the billy. Nasogastric tube noted below the diaphragm, the tip collimated 
from view. Right IJ approach central venous catheter in stable position. Patchy airspace disease throughout the right midlung and medial right lower lung 
similar to prior. Mild interstitial opacities noted bilaterally, unchanged. No 
pneumothorax or discrete pleural effusion. Cardiac size and mediastinal contours 
are stable. No acute osseous abnormality is present. Impression Impression: 1. Endotracheal tube, right IJ central venous catheter, and visualized 
nasogastric tube as above. 2. Overall unchanged appearance to right mid and lower lung opacity in keeping 
with underlying airspace disease. 3. Mild interstitial edema, similar to immediate prior, but persistently 
improved from 10/4/2018 Lab Test: 
Date:10/9/2018 WBC:  
Lab Results Component Value Date/Time WBC 13.0 10/09/2018 03:45 AM  
HGB: Lab Results Component Value Date/Time HGB 7.6 (L) 10/09/2018 03:45 AM  
 PLTS: Lab Results Component Value Date/Time PLATELET 925 71/37/3829 03:45 AM  
 
 
SaO2%/flow:  
SpO2 Readings from Last 1 Encounters:  
10/09/18 100% Vital Signs:   Patient Vitals for the past 8 hrs: 
 Temp Pulse Resp BP SpO2  
10/09/18 0900 (!) 37.3 °F (2.9 °C) 88 13 132/59 100 % 10/09/18 0800 99.3 °F (37.4 °C) 90 17 108/50 100 % 10/09/18 0737 - 90 13 - 99 % 10/09/18 0700 (!) 37.6 °F (3.1 °C) 92 15 132/66 100 % 10/09/18 0600 99.9 °F (37.7 °C) 93 14 132/62 100 % 10/09/18 0501 - 93 13 - 99 % 10/09/18 0500 100.2 °F (37.9 °C) 93 14 115/58 99 % 10/09/18 0400 (!) 100.9 °F (38.3 °C) 96 16 123/62 99 % 10/09/18 0300 (!) 100.6 °F (38.1 °C) 98 15 134/64 98 % 10/09/18 0200 100.2 °F (37.9 °C) 97 17 131/64 99 % Wean Screen Pass (Yes or No): yes Wean Screen Reason for Failure: unable to obtain adequate  Vt Duration of Weaning Trial: 
Additional Comments: PLAN OF CARE:continue plan of care as per MD 
  
 
GOAL:oxygenation/ventilation/airway protection OUTCOME:pt continues to need ventilatory support

## 2018-10-09 NOTE — PROGRESS NOTES
1930 
Received bedside SBAR report from [place] [id number]. Patient is currently being dialyzed. Vitals are stable. Only reacts to painful stimuli. Eyes are wide open does not blink to threat. Has a weak gag when performing mouth care. No family present. Unable to educate this patient. 0000 Tube feeds turned off due to patient having EGD today. 0400 Bathed patient. Had a soft bowel movement. . Cleaned wound and changed out his mepilex. [facility] Bedside report given to incoming KAUSHAL Rangel. No changes overnight, still only responds to noxious stimuli. Skin check performed together.

## 2018-10-09 NOTE — PROGRESS NOTES
Internal Medicine Progress Note Patient's Name: Ravinder Lindsay Admit Date: 9/26/2018 Length of Stay: 12 
 
 
Assessment/Plan Active Hospital Problems Diagnosis Date Noted  Pancreatitis 10/08/2018  Septic shock ([address] 09/30/2018 Probably secondary to pneumonia (suspicious for aspiration). Less likely, secondary to acute pancreatitis.  Ischemic encephalopathy 09/30/2018  Cardiac arrest with ventricular fibrillation ([address] 09/29/2018 ROSC before defibrillation could be attempted.  Abnormal blood coagulation profile 09/29/2018  Acute pancreatitis 09/29/2018 Shock, leukocytosis, elevated lipase but radiographically no ductal dilatation in pancreas. GB stones without radiographic stigmata of acute cholecystitis  Elevated LFTs 09/28/2018  History of stroke 09/27/2018 With residual R hemiparesis  Acute-on-chronic kidney injury ([address] 09/27/2018  Acute cystitis 09/27/2018  Elevated troponin 09/27/2018  Transaminitis 09/27/2018  Acute kidney injury ([address] 09/27/2018  Pneumonia of both lower lobes 09/11/2018  Essential hypertension 09/10/2018  Diabetes mellitus type 2, controlled ([address] 09/10/2018 Pneumonia not POA 
 
- Vent mgmt per ICU 
- EGD showed large clot in esophagus last week repeat EGD scheduled for today 10/9 per GI 
- Cont protonix IV BID 
- Hgb stable s/p 1 unit PRBC - Trend CBC 
- LFTs improving - F/u GI 
- MRI w/ evidence of severe anoxic brain injury - Minimal improvement on neuro exam 
- Appreciate neuro assistance 
- Cont broad spec IV Meropenem and IV Fluconazole  per ID , per ID if cont to spike fever after removal of midline then likely will need rt IJ uldall removed. - Nephro managing HD 
- Pt unlikely to have any meaningful neuro recovery, prognosis is very grim - Family wants to cont to do everything, including trach/PEG 
- DVT/GI protocol I have personally reviewed all pertinent labs and films that have officially resulted over the last 24 hours. I have personally checked for all pending labs that are awaiting final results. Subjective Pt s/e @ bedside Remains intubated Unresponsive MRI suspicious for  anoxia brain injury Still spiking fever WBC trending down Midline removed and results of culture of catheter tip pending EGD scheduled for today Objective Visit Vitals  /53  Pulse 89  Temp 99.3 °F (37.4 °C)  Resp 13  Ht 5' 7\" (1.702 m)  Wt 89.9 kg (198 lb 3.1 oz)  SpO2 100%  BMI 31.04 kg/m2 Physical Exam: 
General Appearance: Intubated, not following commands HENT: normocephalic/atraumatic, + ETT Neck: No JVD, hematoma R side where [facility] is improving Lungs: Coarse B/L w/ vent-assisted BS 
CV: RRR, no m/r/g Abdomen: soft, non-tender, normal bowel sounds Extremities: no cyanosis, no peripheral edema Neuro: Unresponsive to commands, no withdrawal to pain, + corneal reflex and pupillary reaction today Skin: Normal color, intact Intake and Output: 
Current Shift:    
Last three shifts:  10/07 1901 - 10/09 0700 In: 960 [I.V.:430] Out: 2000 Lab/Data Reviewed: 
CMP:  
Lab Results Component Value Date/Time  10/09/2018 03:40 AM  
 K 4.3 10/09/2018 03:40 AM  
  10/09/2018 03:40 AM  
 CO2 28 10/09/2018 03:40 AM  
 AGAP 8 10/09/2018 03:40 AM  
  (H) 10/09/2018 03:40 AM  
 BUN 23 (H) 10/09/2018 03:40 AM  
 CREA 3.34 (H) 10/09/2018 03:40 AM  
 GFRAA 23 (L) 10/09/2018 03:40 AM  
 GFRNA 19 (L) 10/09/2018 03:40 AM  
 CA 7.4 (L) 10/09/2018 03:40 AM  
 PHOS 4.2 10/09/2018 03:40 AM  
 ALB 1.7 (L) 10/09/2018 03:40 AM  
 TP 6.9 10/08/2018 05:12 PM  
 GLOB 5.2 (H) 10/08/2018 05:12 PM  
 AGRAT 0.3 (L) 10/08/2018 05:12 PM  
 SGOT 138 (H) 10/08/2018 05:12 PM  
 ALT 74 (H) 10/08/2018 05:12 PM  
 
CBC:  
Lab Results Component Value Date/Time  WBC 13.0 10/09/2018 03:45 AM  
 HGB 7.6 (L) 10/09/2018 03:45 AM  
 HCT 23.4 (L) 10/09/2018 03:45 AM  
  10/09/2018 03:45 AM  
 
 
Imaging Reviewed: 
No results found. Medications Reviewed: 
Current Facility-Administered Medications Medication Dose Route Frequency  insulin glargine (LANTUS) injection 20 Units  20 Units SubCUTAneous DAILY  0.9% sodium chloride infusion 250 mL  250 mL IntraVENous PRN  
 influenza vaccine 2018-19 (6 mos+)(PF) (FLUARIX QUAD/FLULAVAL QUAD) injection 0.5 mL  0.5 mL IntraMUSCular PRIOR TO DISCHARGE  fluconazole (DIFLUCAN) 200mg/100 mL IVPB (premix)  200 mg IntraVENous Q24H  
 meropenem (MERREM) 500 mg in 0.9% sodium chloride (MBP/ADV) 50 mL MBP  500 mg IntraVENous Q12H  
 midazolam (VERSED) injection    PRN  
 acetaminophen (TYLENOL) solution 325 mg  325 mg Per NG tube Q4H PRN  pantoprazole (PROTONIX) 40 mg in sodium chloride 0.9% 10 mL injection  40 mg IntraVENous Q12H  
 0.9% sodium chloride infusion 250 mL  250 mL IntraVENous PRN  
 heparin (porcine) pf 100 Units  100 Units InterCATHeter PRN  
 0.9% sodium chloride infusion 250 mL  250 mL IntraVENous PRN  
 insulin lispro (HUMALOG) injection   SubCUTAneous Q6H  
 albuterol (PROVENTIL VENTOLIN) nebulizer solution 2.5 mg  2.5 mg Nebulization Q6H RT  
 heparin (porcine) 1,000 unit/mL injection 1,000 Units  1,000 Units InterCATHeter DIALYSIS PRN  
 VANCOMYCIN INFORMATION NOTE   Other Rx Dosing/Monitoring  senna-docusate (PERICOLACE) 8.6-50 mg per tablet 1 Tab  1 Tab Oral BID PRN  
 ondansetron (ZOFRAN) injection 4 mg  4 mg IntraVENous Q4H PRN  
 glucose chewable tablet 16 g  4 Tab Oral PRN  
 glucagon (GLUCAGEN) injection 1 mg  1 mg IntraMUSCular PRN  
 dextrose (D50) infusion 12.5-25 g  25-50 mL IntraVENous PRN  
 hydrALAZINE (APRESOLINE) 20 mg/mL injection 20 mg  20 mg IntraVENous Q6H PRN

## 2018-10-09 NOTE — PROCEDURES
Endoscopy Procedure Note Patient: Scott Dong MRN: [id number]  SSN: [id number] YOB: 1955  Age: 57 y.o. Sex: male Date/Time:  10/9/2018 5:04 PMEsophagogastroduodenoscopy (EGD) Procedure Note Procedure: Esophagogastroduodenoscopy IMPRESSION:  
1. Area of healed ulceration in the distal esophagus 2. Otherwise unremarkable EGD RECOMMENDATIONS: 
1. Check KUB to confirm proper placement of OG tube (replaced) 2. Protonix 40mg once daily for 6 weeks, then stop 3. Monitor liver enzymes to ensure resolution 4. Will sign off. Please call if any additional questions Indication: Hematemesis, esophageal clot on prior EGD :  Melony Cr MD 
Assistants: * No surgical staff found * Referring Provider:   Ifeanyi العراقي MD 
History: The history and physical exam were reviewed and updated. Endoscope: Olympus [id number]-[id number] diagnostic endoscope Extent of Exam: third portion of the duodenum ASA: Per Anesthesia Anethesia/Sedation:  Versed 2 mg IV Description of the procedure: The procedure was discussed with the patient including risks, benefits, alternatives including risks of iv sedation, bleeding, perforation and aspiration. A safety timeout was performed. The patient was placed in the left lateral decubitus position. A bite block was placed. The patient was given incremental doses of intravenous sedation until moderate sedation was achieved. The patients vital signs were monitored at all times including heart rate/rhythm, blood pressure and oxygen saturation. The endoscope was then passed under direct visualization to the third portion of the duodenum. The endoscope was then slowly withdrawn while visualizing the mucosa. In the stomach a retroflexion was performed and gastric fundus and cardia visualized. The endoscope was then slowly withdrawn. The patient was then transferred to recovery in stable condition. Findings: Esophagus: There was a small healed ulceration in the distal esophagus. Otherwise, the esophagus was normal with no  mass or stricture. There was no evidence of Greer's esophagus or reflux esophagitis. Stomach: The gastric mucosa was normal with no ulceration, mass, stricture. There was a small old clot noted which was mobile in the stomach with no underlying pathology. Duodenum: The duodenum mucosa was normal with no ulceration, mass, stricture and no evidence of villous atrophy. The OG tube was removed during the EGD to allow full visualization and a new OGT was placed at the bedside after the EGD and advanced to 65cm at the incisors. Therapies:  None Specimens: * No specimens in log * Complications:   None; patient tolerated the procedure well. EBL:None Discharge disposition:  Ongoing ICU care Quentin Sheets MD 
October 9, 2018 
5:04 PM

## 2018-10-09 NOTE — PROGRESS NOTES
[facility] ICU Progress Note Name: KB Home	[place] : 1955 MRN: [id number] Date: 10/9/2018 11:05 AM 
  
[x]I have reviewed the flowsheet and previous days notes. Events overnight reviewed and discussed with nursing staff. Vital signs and records reviewed. HPI:  
 
56 y/o male recently admitted for stroke with residual right hemiparesis. Readmitted to ICU on 18 with ARF. PMHx of HTN, ARF, CHF, DM, CVA and chronic liver disease from steatohepatits. 18 became hypotensive and arrested. CPR lasted 5 minutes which likely caused catastrophic neurologic injury. Renal failure secondary to pyleonephritis and may have had acute cholecytitis on admission as well. Elevated lipase may have reflected pancreatitis also. Significant clots have been found in his esophagus and right mainstem bronchus. Remains intubated post VF cardiac arrest 18. MRI 10/3/18 consistent with anoxic brain injury. Family aware of grim prognosis but wishes to pursue Trach and Peg. Subjective: 
 
10/9/18 Pt remains intubated. Not sedated. No response to voice. Reflexes to pain Tmax 100.9 [x]The patient is unable to give any meaningful history or review of systems because the patient is: 
[x]Intubated []Sedated  
[]Unresponsive [x]The patient is critically ill on     
[x]Mechanical ventilation []Pressors []BiPAP [] ROS:A comprehensive review of systems was negative except for that written in the HPI. Medication Review: · Pressors - none · Sedation - none · Antibiotics - vanc/diflucan/merrem · Pain - prn tylenol · GI/ DVT - protonix/ scd's · Others - none Safety Bundles: VAP Bundle/ CAUTI/ Severe Sepsis Protocol/ Electrolyte Replacement Protocol Vital Signs:   
Visit Vitals  /59  Pulse 88  Temp (!) 37.3 °F (2.9 °C)  Resp 13  Ht 5' 7\" (1.702 m)  Wt 89.9 kg (198 lb 3.1 oz)  SpO2 100%  BMI 31.04 kg/m2 O2 Device: Endotracheal tube O2 Flow Rate (L/min): 45 l/min Temp (24hrs), Av.2 °F (20.7 °C), Min:36.8 °F (2.7 °C), Max:100.9 °F (38.3 °C) Intake/Output:  
Last shift:        
Last 3 shifts: 10/07 1901 - 10/09 07 In: 960 [I.V.:430] Out: 2000 Intake/Output Summary (Last 24 hours) at 10/09/18 1103 Last data filed at 10/09/18 0800 Gross per 24 hour Intake              740 ml Output             2000 ml Net            -1260 ml Ventilator Settings: 
Ventilator Mode: Assist control, VC+ Respiratory Rate Back-Up Rate: 12 Insp Time (sec): 0.9 sec I:E Ratio: 1:4.56 Ventilator Volumes Vt Set (ml): 500 ml Vt Exhaled (Machine Breath) (ml): 548 ml Vt Spont (ml): 540 ml Ve Observed (l/min): 7.17 l/min Ventilator Pressures Pressure Support (cm H2O): 15 cm H2O 
PIP Observed (cm H2O): 23 cm H2O Plateau Pressure (cm H2O): 19 cm H2O 
MAP (cm H2O): 9.4 PEEP/VENT (cm H2O): 5 cm H20 Auto PEEP Observed (cm H2O): 0 cm H2O Physical Exam: 
 
General: Intubated; unresponsive to voice HEENT:  Anicteric sclerae; pink palpebral conjunctivae; mucosa moist 
Resp:  Symmetrical chest expansion, no accessory muscle use; good airway entry;  Bilateral breath sounds clear to auscultation; (+) cough CV:  S1, S2 present; regular rate and rhythm GI:  Abdomen soft, non-tender; (+) active bowel sounds; palpable liver Extremities:  +2 pulses on all extremities; +2 generalized edema Skin:  Warm; no rashes/ lesions noted Neurologic:  Reflexes to pain in all extremities; (-) corneals, pupils 3 round, reactive Devices:  OGT, HD catheter, ETT 
DATA:  
 
 
Labs: Results:  
   
Chemistry Recent Labs 10/09/18 
 0340  10/08/18 
 1712  10/08/18 
 0445  10/07/18 
 0140 GLU  109*   --   130*  145* NA  141   --   141  142  
K  4.3   --   4.4  3.7 CL  105   --   105  104 CO2  28   --   26  27 BUN  23*   --   44*  23* CREA  3.34*   --   5.46*  3.32* CA  7.4*   --   7.6*  7.5* AGAP  8   --   10  11 BUCR  7*   --   8*  7* AP   --   237*  231*   --   
TP   --   6.9  6.8   --   
ALB  1.7*  1.7*  1.6*  1.7*  
GLOB   --   5.2*  5.2*   --   
AGRAT   --   0.3*  0.3*   --   
  
CBC w/Diff Recent Labs 10/09/18 
 0345  10/08/18 
 1541  10/08/18 
 0445 WBC  13.0  13.5*  16.0*  
RBC  2.56*  2.60*  2.57* HGB  7.6*  7.8*  7.4* HCT  23.4*  24.6*  23.2*  
PLT  384  381  395 GRANS   --    --   80* LYMPH   --    --   10* EOS   --    --   3 Coagulation No results for input(s): PTP, INR, APTT in the last 72 hours. No lab exists for component: INREXT, INREXT Liver Enzymes Recent Labs 10/09/18 
 0340  10/08/18 
 1712 TP   --   6.9 ALB  1.7*  1.7* AP   --   237* SGOT   --   138* ABG Lab Results Component Value Date/Time PHI 7.487 (H) 10/09/2018 05:14 AM  
 PCO2I 41.3 10/09/2018 05:14 AM  
 PO2I 82 10/09/2018 05:14 AM  
 HCO3I 31.1 (H) 10/09/2018 05:14 AM  
 FIO2I 45 10/09/2018 05:14 AM  
  
Microbiology Recent Labs 10/08/18 
 1227 CULT  NO GROWTH AFTER 18 HOURS Telemetry: [x]Sinus []A-flutter []Paced []A-fib []Multiple PVCs Imaging: CXR Results  (Last 48 hours) None CT Results  (Last 48 hours) None IMPRESSION:  
· Acute encepalopathy- MRI 10/3 consistent with anoxic brain injury; EEG 10/5 consistent with poor prognosis · Acute respiratory failure with hypoxia- Pt not a candidate for extubation d/t mental staus; family to schedule trach & peg  
· ARF on Dialysis · Chronic liver disease · Coagulopathy of unclear etiology · Possible acute pancreatitis · Probable pyleonephritis · Probable acute cholecystitis · Cardiomyopathy with grade 1 diastolic dysfunction · Hypoalbunemia Other Hx · S/p V Fib arrest- 10/2/18 EF 46-50%; LV global hypokinesis; cardiology following- recommends to continue supportive measures · HTN 
· ARF 
· CHF · DM 
· CVA PLAN:  
· Resp - Continue mechanical ventilation. Trach and peg per family request.  HOB > 30. Strict aspiration precautions. Pulmonary hygiene. Daily ABG and CXR while intubated. · ID - Febrile and borderline leukocytosis. Continue to trend temp and WBC curve. Blood Cx NGTD. Continue Abx. ID following- Cx catheter tip sent. · CVS - HD stable. Continue to monitor closely. Cardiology signed off- nothing more to offer at this point. · Heme/onc - Daily CBCs. Monitor H/H & platelets. · Metabolic - Monitor electrolytes and replace as needed. · Renal - Continue to rrend renal indices. Nephrology following. · Endocrine - Glycemic control. BS goal < 180/  
· Neuro/ Pain/ Sedation - Monitor neuro status. Avoid sedating medications. Neuro following. · GI - Trickle feeds as tolerated. Continue to trend LFTs and lipase. · Prophylaxis - DVT, GI- SCDs & protonix · Discussed in interdisciplinary rounds · Code status: FULL The patient is: [] acutely ill Risk of deterioration: [] moderate [x] critically ill  [x] high  
 
[x]See my orders for details My assessment/plan was discussed with: 
[x]nursing []PT/OT [x]respiratory therapy [x]Dr. Aundrea Hylton  
[]family [] Lois Solorzano NP

## 2018-10-09 NOTE — PROGRESS NOTES
Physical Exam  
Skin:  
 
  
2781 Dual Assessment with Off going Nurse [facility]. Nurses Notes: 
 
0800 Assessment completed. Pt is unresponsive. Pupils are qual and reactive. No tracting, no focusing. All extremities withdraw to pain. SR on the monitor. With generalized edema. RT arm slightly larger than the left. On vent via ETT. 8.0 @ 24 cm on the lip. Lungs sound coarse. Suctioned by Resp therapist and obtained thick yellow secretions. Abdomen soft;with hypoactive bowel sounds. OGT is clamped . Tube feeding on hold for possible EGD today. Anuric. No Mcbride in place. Rt IJ Dialysis intact. Skin: as above. 1700 Endoscopy done at bedside 1824 Incontinent of large loose green stool. Incontinent care done. 
1907. Blood sugar is 68. D50 given 1943 Repeat blood sugar 1237 mg/dl. Bedside and Verbal shift change report given to [facility] (oncoming nurse) by Nayely Meredith RN 
 (offgoing nurse).  Report included the following information SBAR, Kardex, Intake/Output, MAR and Cardiac Rhythm SR.

## 2018-10-09 NOTE — PROGRESS NOTES
RENAL PROGRESS NOTE Aguilar Talamantes Assessment/Plan: · Dialysis dependant JULIANNA (ischemic atn in a setting of acute pyelonephritis/acute cholecystitis with sepsis and cardiac arrest 9/29). No evidence of recovery of renal function at this time. Next dialysis tomorrow and continue 3/week. Continue to minimize intake, volume overload is improving. · S/p cardiopulm arrest 9/29. Off pressors. · Acute pyelonephritis/sepsis. On abx. Febrile. · Abnormal lft's/acute cholecystitis? On abx. GI on the case. · UGI bleed, EGD results noted- esophageal clot. · Acute blood loss anemia. H/H is drifting down. · Asp pneumonia. · Resp failure. R bronch clot removed 10/6 with bronchoscopy. · Anoxic brain injury superimposed on recent cva. MRI results noted. Subjective: Intubated, unresponsive. Off pressors. Tolerates tf. Patient Active Problem List  
Diagnosis Code  Stroke (cerebrum) ([facility]) I63.9  Stroke ([facility] I63.9  Rhabdomyolysis M62.82  
 Dehydration E86.0  
 Essential hypertension I10  
 Diabetes mellitus type 2, controlled ([facility] E11.9  Non compliance w medication regimen Z91.14  
 Pneumonia of both lower lobes J18.9  DM (diabetes mellitus) ([facility] E11.9  History of stroke Z80.78  
 Acute-on-chronic kidney injury ([facility] N17.9, N18.9  Acute cystitis N30.00  Elevated troponin R74.8  Transaminitis R74.0  Acute kidney injury ([facility] N17.9  Elevated LFTs R94.5  Cardiac arrest with ventricular fibrillation ([facility]) I46.9, I49.01  
 Abnormal blood coagulation profile R79.1  Acute pancreatitis K85.90  Septic shock ([facility]) A41.9, R65.21  
 Ischemic encephalopathy I67.89  
 Pancreatitis K85.90 Current Facility-Administered Medications Medication Dose Route Frequency Provider Last Rate Last Dose  insulin glargine (LANTUS) injection 20 Units  20 Units SubCUTAneous DAILY [place] [facility], DO   20 Units at 10/09/18 7937  
 0.9% sodium chloride infusion 250 mL  250 mL IntraVENous PRN [place] [facility], DO      
 influenza vaccine 2018-19 (6 mos+)(PF) (FLUARIX QUAD/FLULAVAL QUAD) injection 0.5 mL  0.5 mL IntraMUSCular PRIOR TO DISCHARGE Nguyen Mayfield MD      
 fluconazole (DIFLUCAN) 200mg/100 mL IVPB (premix)  200 mg IntraVENous Q24H GRACE Foley  mL/hr at 10/08/18 1455 200 mg at 10/08/18 1455  meropenem (MERREM) 500 mg in 0.9% sodium chloride (MBP/ADV) 50 mL MBP  500 mg IntraVENous Q12H GRACE Foley  mL/hr at 10/09/18 0905 500 mg at 10/09/18 7385  midazolam (VERSED) injection    PRN Derek Cherry MD   4 mg at 10/05/18 1140  acetaminophen (TYLENOL) solution 325 mg  325 mg Per NG tube Q4H PRN Nguyen Mayfield MD   325 mg at 10/09/18 0661  pantoprazole (PROTONIX) 40 mg in sodium chloride 0.9% 10 mL injection  40 mg IntraVENous Q12H Nguyen Mayfield MD   40 mg at 10/09/18 0900  
 0.9% sodium chloride infusion 250 mL  250 mL IntraVENous PRN [place] [facility], DO      
 heparin (porcine) pf 100 Units  100 Units InterCATHeter PRN Kina Hui MD      
 0.9% sodium chloride infusion 250 mL  250 mL IntraVENous PRN Nguyen Mayfield MD      
 insulin lispro (HUMALOG) injection   SubCUTAneous Q6H [place] [facility], DO   Stopped at 10/07/18 1800  
 albuterol (PROVENTIL VENTOLIN) nebulizer solution 2.5 mg  2.5 mg Nebulization Q6H RT Maddie Demarco MD   2.5 mg at 10/09/18 1319  
 heparin (porcine) 1,000 unit/mL injection 1,000 Units  1,000 Units InterCATHeter DIALYSIS PRN Kina uHi MD   1,000 Units at 09/28/18 1332  VANCOMYCIN INFORMATION NOTE   Other Rx Dosing/Monitoring Kenn Seay MD      
 senna-docusate (PERICOLACE) 8.6-50 mg per tablet 1 Tab  1 Tab Oral BID DULCE MARIA Morrissey DO      
  ondansetron (ZOFRAN) injection 4 mg  4 mg IntraVENous Q4H PRN John Mckeon, DO   4 mg at 09/28/18 0539  
 glucose chewable tablet 16 g  4 Tab Oral PRN John Mckeon, DO      
 glucagon (GLUCAGEN) injection 1 mg  1 mg IntraMUSCular PRN John Mike, DO      
 dextrose (D50) infusion 12.5-25 g  25-50 mL IntraVENous PRN Nadiya Crea McQuain, DO   12.5 g at 09/28/18 1206  hydrALAZINE (APRESOLINE) 20 mg/mL injection 20 mg  20 mg IntraVENous Q6H PRN Clydia Aloe, NP   20 mg at 10/02/18 1759 Objective Vitals:  
 10/09/18 1200 10/09/18 1300 10/09/18 1320 10/09/18 1400 BP: 119/61 128/64  122/63 Pulse: 87 87  88 Resp: 23 20  19 Temp: (!) 37.3 °F (2.9 °C) (!) 37.3 °F (2.9 °C)  (!) 37.1 °F (2.8 °C) TempSrc:      
SpO2: 99% 99% 99% 98% Weight:      
Height:      
 
 
 
Intake/Output Summary (Last 24 hours) at 10/09/18 1440 Last data filed at 10/09/18 1200 Gross per 24 hour Intake              640 ml Output             2000 ml Net            -1360 ml Admission weight: Weight: 96.6 kg (213 lb) (09/26/18 2244) Last Weight Metrics: 
Weight Loss Metrics 10/8/2018 9/26/2018 9/13/2018 Today's Wt 198 lb 3.1 oz - 227 lb 15.3 oz  
BMI - 31.04 kg/m2 35.7 kg/m2 Physical Assessment:  
 
General: intubated, unresponsive. Eyes are open. NG tube in place. Neck: No jvd. Rt ij temporary dialysis catheter in place, dressing is clear. LUNGS: diminished air entry at the bases. No crackles. CVS EXM: S1, S2  RRR. Abdomen: soft, pos bs. Lower Extremities:  1+ edema. Lab CBC w/Diff Recent Labs 10/09/18 
 1230  10/09/18 
 0345  10/08/18 
 1541  10/08/18 
 0445 WBC  13.0  13.0  13.5*  16.0*  
RBC  2.55*  2.56*  2.60*  2.57* HGB  7.4*  7.6*  7.8*  7.4* HCT  23.5*  23.4*  24.6*  23.2*  
PLT  410  384  381  395 GRANS   --    --    --   80* LYMPH   --    --    --   10* EOS   --    --    --   3 Chemistry Recent Labs 10/09/18 
 1230  10/09/18 8249  10/08/18 
 1712  10/08/18 
 0445 GLU  91  109*   --   130* NA  140  141   --   141  
K  4.0  4.3   --   4.4 CL  104  105   --   105 CO2  27  28   --   26 BUN  30*  23*   --   44* CREA  4.23*  3.34*   --   5.46* CA  8.2*  7.4*   --   7.6* AGAP  9  8   --   10  
BUCR  7*  7*   --   8* AP   --    --   237*  231* TP   --    --   6.9  6.8 ALB   --   1.7*  1.7*  1.6*  
GLOB   --    --   5.2*  5.2* AGRAT   --    --   0.3*  0.3* PHOS   --   4.2   --    -- No results found for: IRON, FE, TIBC, IBCT, PSAT, FERR Lab Results Component Value Date/Time Calcium 8.2 (L) 10/09/2018 12:30 PM  
 Phosphorus 4.2 10/09/2018 03:40 AM  
  
 
Ananya Lara M.D. [facility] Phone [phone] Pager [phone]

## 2018-10-09 NOTE — PROGRESS NOTES
Problem: Falls - Risk of 
Goal: *Absence of Falls Document Mlel Lindsay Fall Risk and appropriate interventions in the flowsheet. Outcome: Not Progressing Towards Goal 
Fall Risk Interventions: 
Mobility Interventions: Bed/chair exit alarm, Strengthening exercises (ROM-active/passive) Mentation Interventions: Door open when patient unattended, Bed/chair exit alarm, Toileting rounds, More frequent rounding Medication Interventions: Bed/chair exit alarm Elimination Interventions: Call light in reach, Bed/chair exit alarm Problem: Pressure Injury - Risk of 
Goal: *Prevention of pressure injury Document Mitul Scale and appropriate interventions in the flowsheet. Outcome: Not Progressing Towards Goal 
Pressure Injury Interventions: 
Sensory Interventions: Assess changes in LOC, Assess need for specialty bed, Float heels, Keep linens dry and wrinkle-free, Monitor skin under medical devices, Pressure redistribution bed/mattress (bed type), Turn and reposition approx. every two hours (pillows and wedges if needed) Moisture Interventions: Absorbent underpads, Apply protective barrier, creams and emollients Activity Interventions: Assess need for specialty bed, Pressure redistribution bed/mattress(bed type) Mobility Interventions: Assess need for specialty bed, Float heels, HOB 30 degrees or less, Pressure redistribution bed/mattress (bed type), Turn and reposition approx. every two hours(pillow and wedges) Nutrition Interventions: Discuss nutritional consult with provider, Document food/fluid/supplement intake Friction and Shear Interventions: Apply protective barrier, creams and emollients, Foam dressings/transparent film/skin sealants, HOB 30 degrees or less

## 2018-10-09 NOTE — PROGRESS NOTES
I have spoken to the patient daughterRebbeca Frankel . I explained that the only local facility that can accept her father, given his medical needs is [facility].. I explained I could not guarantee acceptance to [facility].. Because of this he will lorraine to be matched  to [facility] facilities within the [facility]. The daughter agreed to this. Place in [address] and matched to [facility] in [place]. I explained to the daughter that her father does not have long term care insurance. I explained that because of this he will need to qualify and do a spend down on assets, if he is over income. I explained that Eloise Powers with [employer] would be in touch with her rearding this application.   Sabi Osborne RN

## 2018-10-09 NOTE — PROGRESS NOTES
Infectious Disease Follow-up Admit Date: 9/26/2018 Current abx Prior abx Meropenem/flucon 10/4- 5, vanco 9/27-12 Zosyn 9/27-7 Assessment ->Rec:  
 
Persistent leukocytosis SIRS poss sepsis- MOF multiple ID and non-infectious concerns outlined below, complicated, wbc 65N today despite vancomycin/zosyn since 9/27 
-C diff neg 9/29, sput C albicans, repeat CT 10/4 no new findings  -> wbc slowly improving, down to 13k 
-> cont to have fever - seems to be better 
-> on Vanc/ meropenem/fluconazole for now 
-> midline out - await cath tip cx   
->if cont to spike fever after removal of midline then likely will need rt IJ uldall removed Suspected Pyelo POA - CT B perineph stranding, UA tntc wbc, uctx ng ->likely treated at this point   
cholelithiaisis choledocholithiais suspected acute cholecystitis  poss cystic stone POA- abnl lft bili 4 alk phos 400 seen by GI and GS Dr. Tierra Sauer, no plan for OR, for poss cholecystostomy if LFT worsen, bili, alk phos declining  ->per GI Poss pancreatitis - lipase 2200 POA now 5500, interpretation complicated by JULIANNA 
-NEW pancreas characterized as nl on CT 10/4 ->defer GI  
B infiltrates - poss edema infiltrate - RLL consolid better 10/4 cxr and suspect endobronchial clot contributed   ->monitor MRSE bacteremia 9/27 1 of 2. Has LUE midline unclear when placed, repeat bctx's IP today  ->monitor repeat bctx's ntd ARF POA now on HD - baseline cr 0.9 132/10.1 in ER 
-JOHN mccray 9/28 ->per renal , dialysis dependant Bleeding -melena earlier, EGD 10/2 clot in esophagus bronch 10/3 R endobronch clot NEW removed 10/5 repeat bronch  ->per others Suspected anoxic brain injury on MRI 10/3 SP VF arrest 9/29 -> overall poor prognosis however family wants all aggressive measures including trach/peg    
CVA R hemiparesis 9/10   
resp failure sp intubation 9/29 Comorbid: HTN HLD CHF h/o steatohepatitis MICROBIOLOGY:  
9/27 bctx 1 of 2 MRSE 
 uctx ng 
 9/29 sput C albicans C diff neg 10/4 bctx x 2 NTD 
 fungitell indeterminate due to contamination 10/8 Cath tip cx IP 
 
LINES AND CATHETERS:  
RIMMA jacques JOHN rubinramez 9/28 Active Hospital Problems Diagnosis Date Noted  Pancreatitis 10/08/2018  Septic shock ([facility] [address] 09/30/2018 Probably secondary to pneumonia (suspicious for aspiration). Less likely, secondary to acute pancreatitis.  Ischemic encephalopathy 09/30/2018  Cardiac arrest with ventricular fibrillation ([address] 09/29/2018 ROSC before defibrillation could be attempted.  Abnormal blood coagulation profile 09/29/2018  Acute pancreatitis 09/29/2018 Shock, leukocytosis, elevated lipase but radiographically no ductal dilatation in pancreas. GB stones without radiographic stigmata of acute cholecystitis  Elevated LFTs 09/28/2018  History of stroke 09/27/2018 With residual R hemiparesis  Acute-on-chronic kidney injury ([address] 09/27/2018  Acute cystitis 09/27/2018  Elevated troponin 09/27/2018  Transaminitis 09/27/2018  Acute kidney injury ([address] 09/27/2018  Pneumonia of both lower lobes 09/11/2018  Essential hypertension 09/10/2018  Diabetes mellitus type 2, controlled ([address] 09/10/2018 Subjective: Interval notes reviewed. Pt remains unresponsive, on vent, continues with low grade fever. Mid line was removed yesterday. No family at bedside. D/w RN. Current Facility-Administered Medications Medication Dose Route Frequency  insulin glargine (LANTUS) injection 20 Units  20 Units SubCUTAneous DAILY  0.9% sodium chloride infusion 250 mL  250 mL IntraVENous PRN  
 influenza vaccine 2018-19 (6 mos+)(PF) (FLUARIX QUAD/FLULAVAL QUAD) injection 0.5 mL  0.5 mL IntraMUSCular PRIOR TO DISCHARGE  fluconazole (DIFLUCAN) 200mg/100 mL IVPB (premix)  200 mg IntraVENous Q24H  
 meropenem (MERREM) 500 mg in 0.9% sodium chloride (MBP/ADV) 50 mL MBP  500 mg IntraVENous Q12H  midazolam (VERSED) injection    PRN  
 acetaminophen (TYLENOL) solution 325 mg  325 mg Per NG tube Q4H PRN  pantoprazole (PROTONIX) 40 mg in sodium chloride 0.9% 10 mL injection  40 mg IntraVENous Q12H  
 0.9% sodium chloride infusion 250 mL  250 mL IntraVENous PRN  
 heparin (porcine) pf 100 Units  100 Units InterCATHeter PRN  
 0.9% sodium chloride infusion 250 mL  250 mL IntraVENous PRN  
 insulin lispro (HUMALOG) injection   SubCUTAneous Q6H  
 albuterol (PROVENTIL VENTOLIN) nebulizer solution 2.5 mg  2.5 mg Nebulization Q6H RT  
 heparin (porcine) 1,000 unit/mL injection 1,000 Units  1,000 Units InterCATHeter DIALYSIS PRN  
 VANCOMYCIN INFORMATION NOTE   Other Rx Dosing/Monitoring  senna-docusate (PERICOLACE) 8.6-50 mg per tablet 1 Tab  1 Tab Oral BID PRN  
 ondansetron (ZOFRAN) injection 4 mg  4 mg IntraVENous Q4H PRN  
 glucose chewable tablet 16 g  4 Tab Oral PRN  
 glucagon (GLUCAGEN) injection 1 mg  1 mg IntraMUSCular PRN  
 dextrose (D50) infusion 12.5-25 g  25-50 mL IntraVENous PRN  
 hydrALAZINE (APRESOLINE) 20 mg/mL injection 20 mg  20 mg IntraVENous Q6H PRN Objective:  
 
Visit Vitals  /53  Pulse 89  Temp (!) 37.4 °F (3 °C)  Resp 13  Ht 5' 7\" (1.702 m)  Wt 89.9 kg (198 lb 3.1 oz)  SpO2 100%  BMI 31.04 kg/m2 Temp (24hrs), Av.6 °F (19.8 °C), Min:36.8 °F (2.7 °C), Max:100.9 °F (38.3 °C) GEN: unresponsive BM on vent in ICU HEENT: anicteric YESSI OGT, pupils non reactive NECK: supple RIJ TDC suspect hematoma no cellulitis CHEST: coarse bs B no wheeze scattered rhonchi CVS: tachycardic no murmur appreciated ABD: soft ? RUQ fullness (+) BS no localized tenderness EXT: anasarca with 2+ pitting edema b/l UE rt >lt SKIN: good turgor no rash CNS: not on sedation but unresponsive, not following any commands, no blink to threat Labs: Results:  
Chemistry Recent Labs 10/09/18 
 0340  10/08/18 
 1712  10/08/18 
 0445  10/07/18 [address] [place] GLU  109*   --   130*  145* NA  141   --   141  142  
K  4.3   --   4.4  3.7 CL  105   --   105  104 CO2  28   --   26  27 BUN  23*   --   44*  23* CREA  3.34*   --   5.46*  3.32* CA  7.4*   --   7.6*  7.5* AGAP  8   --   10  11 BUCR  7*   --   8*  7* AP   --   237*  231*   --   
TP   --   6.9  6.8   --   
ALB  1.7*  1.7*  1.6*  1.7*  
GLOB   --   5.2*  5.2*   --   
AGRAT   --   0.3*  0.3*   --   
  
CBC w/Diff Recent Labs 10/09/18 
 0345  10/08/18 
 1541  10/08/18 
 0445 WBC  13.0  13.5*  16.0*  
RBC  2.56*  2.60*  2.57* HGB  7.6*  7.8*  7.4* HCT  23.4*  24.6*  23.2*  
PLT  384  381  395 GRANS   --    --   80* LYMPH   --    --   10* EOS   --    --   3  
  
 
10/4 CTAP IMPRESSION: 
1. Dense subtotal or total consolidation right lower lobe compatible with 
pneumonia similar to prior study. Small bilateral pleural effusions similar in 
size. 2. Distended gallbladder with gallstones and gallbladder sludge without 
significant change in appearance and also described in detail on ultrasound of 
9/27/2018. 3. Indeterminate small lesion left hepatic lobe without change. Hepatomegaly 
again evident. 4. No intraperitoneal fluid. Possible small left upper pole renal cyst. 
Cardiomegaly. Nasogastric tube tip in body of stomach. cxr 10/5 Impression: 1. Endotracheal tube, visualized nasogastric tube, and right IJ central venous 
catheter in stable position. 2. Overall improved aeration of the right lower lobe, likely improved 
atelectasis with mild residual atelectasis/airspace disease. 3. Mild interstitial edema overall similar to prior. Microbiology Results Recent Labs 10/08/18 
 1227 CULT  NO GROWTH AFTER 18 HOURS Lizzette Crow MD 
October 9, 2018 [facility] [phone]

## 2018-10-10 NOTE — PROGRESS NOTES
Assumed care of pt at 1500 from [facility]. Stable complex assessment. Tolerating ventilator on 35% fiO2. Pt unresponsive, withdraws to pain. Anuric. Turned q2 hours. PIVs intact. Will continue to monitor.

## 2018-10-10 NOTE — PROGRESS NOTES
Internal Medicine Progress Note Patient's Name: Lul Dawson Admit Date: 9/26/2018 Length of Stay: 13 
 
 
Assessment/Plan Active Hospital Problems Diagnosis Date Noted  Pancreatitis 10/08/2018  Septic shock ([address] 09/30/2018 Probably secondary to pneumonia (suspicious for aspiration). Less likely, secondary to acute pancreatitis.  Ischemic encephalopathy 09/30/2018  Cardiac arrest with ventricular fibrillation ([address] 09/29/2018 ROSC before defibrillation could be attempted.  Abnormal blood coagulation profile 09/29/2018  Acute pancreatitis 09/29/2018 Shock, leukocytosis, elevated lipase but radiographically no ductal dilatation in pancreas. GB stones without radiographic stigmata of acute cholecystitis  Elevated LFTs 09/28/2018  History of stroke 09/27/2018 With residual R hemiparesis  Acute-on-chronic kidney injury ([address] 09/27/2018  Acute cystitis 09/27/2018  Elevated troponin 09/27/2018  Transaminitis 09/27/2018  Acute kidney injury ([address] 09/27/2018  Pneumonia of both lower lobes 09/11/2018  Essential hypertension 09/10/2018  Diabetes mellitus type 2, controlled ([address] 09/10/2018 Pneumonia not POA 
 
- Vent mgmt per ICU 
- EGD showed large clot in esophagus last week repeat EGD 10/9 shows healed esophageal ulcer - Cont protonix IV every day  
- Hgb stable s/p 1 unit PRBC - Trend CBC 
- LFTs improving - GI signed off 10/9 
- MRI w/ evidence of severe anoxic brain injury - Minimal improvement on neuro exam 
- Appreciate neuro assistance 
- Cont broad spec IV Meropenem and IV Fluconazole  per ID , per ID if cont to spike fever after removal of midline then likely will need rt IJ uldall removed. - Nephro managing HD TTHS 
- Pt unlikely to have any meaningful neuro recovery, prognosis is very grim - Family wants to cont to do everything, including trach/PEG 
- DVT/GI protocol I have personally reviewed all pertinent labs and films that have officially resulted over the last 24 hours. I have personally checked for all pending labs that are awaiting final results. Subjective Pt s/e @ bedside Remains intubated Unresponsive MRI suspicious for  anoxia brain injury Objective Visit Vitals  /55  Pulse 87  Temp (!) 37.1 °F (2.8 °C)  Resp 14  
 Ht 5' 7\" (1.702 m)  Wt 89.9 kg (198 lb 3.1 oz)  SpO2 100%  BMI 31.04 kg/m2 Physical Exam: 
General Appearance: Intubated, not following commands HENT: normocephalic/atraumatic, + ETT Neck: No JVD, hematoma R side where [facility] is improving Lungs: Coarse B/L w/ vent-assisted BS 
CV: RRR, no m/r/g Abdomen: soft, non-tender, normal bowel sounds Extremities: no cyanosis, no peripheral edema Neuro: Unresponsive to commands, no withdrawal to pain, + corneal reflex and pupillary reaction today Skin: Normal color, intact Intake and Output: 
Current Shift:  10/10 0701 - 10/10 1900 In: 36 Out: - Last three shifts:  10/08 1901 - 10/10 0700 In: 1050 [I.V.:570] Out: 2000 Lab/Data Reviewed: 
CMP:  
Lab Results Component Value Date/Time  10/10/2018 03:45 AM  
 K 4.5 10/10/2018 03:45 AM  
  10/10/2018 03:45 AM  
 CO2 27 10/10/2018 03:45 AM  
 AGAP 11 10/10/2018 03:45 AM  
  (H) 10/10/2018 03:45 AM  
 BUN 40 (H) 10/10/2018 03:45 AM  
 CREA 5.35 (H) 10/10/2018 03:45 AM  
 GFRAA 13 (L) 10/10/2018 03:45 AM  
 GFRNA 11 (L) 10/10/2018 03:45 AM  
 CA 7.6 (L) 10/10/2018 03:45 AM  
 PHOS 7.3 (H) 10/10/2018 03:45 AM  
 ALB 1.6 (L) 10/10/2018 03:45 AM  
 
CBC:  
Lab Results Component Value Date/Time WBC 12.4 10/10/2018 03:45 AM  
 HGB 7.7 (L) 10/10/2018 03:45 AM  
 HCT 24.7 (L) 10/10/2018 03:45 AM  
  (H) 10/10/2018 03:45 AM  
 
 
Imaging Reviewed: 
Xr Abd (kub) Result Date: 10/9/2018 EXAM: SUPINE ABDOMINAL RADIOGRAPH CLINICAL INDICATION/HISTORY: Eval for OG tube position -Additional: None COMPARISON: 10/4/2018 TECHNIQUE: Single supine view of the abdomen _______________ FINDINGS: BOWEL GAS PATTERN: Gas-filled segments of small bowel in the left lower quadrant, nondilated. No evidence of free air. SOFT TISSUES: Heterogeneous opacification in the lower right lung also noted on prior radiograph. Gallstones noted. BONES: Unremarkable. ADDITIONAL FINDINGS: Orogastric tube tip is in the distal stomach. Side-port is well below the GE junction. _______________ IMPRESSION: Orogastric tube tip is in the distal stomach. The side port is well below the GE junction. Xr Chest [facility] Result Date: 10/10/2018 Chest, single view Indication: Fever and leukocytosis Comparison: No prior chest radiographs, most recently 10/7/2018 Findings:  Portable upright AP view of the chest was obtained. There is an endotracheal tube which is present approximately 3.8 cm above the billy. A nasogastric tube is present below the diaphragm, the tip collimated from view. Right IJ approach central venous catheter projects in similar position to prior. Improved aeration of the medial and right lower lung noted with mild residual opacities demonstrated. Faint patchy alveolar density at the left lung base present. No pneumothorax or pleural effusion. Cardiac size and mediastinal contours are stable. No acute osseous abnormality. Impression: 1. Endotracheal tube, visualized nasogastric tube, and right IJ central venous catheter in stable position. 2. Improved aeration of the right mid and lower lung with mild residual atelectatic opacities noted. Small region of opacity at the left lung base may reflect underlying atelectasis versus airspace disease. Medications Reviewed: 
Current Facility-Administered Medications Medication Dose Route Frequency  hydroxypropyl methylcellulose (ISOPTO TEARS) 0.5 % ophthalmic solution 2 Drop  2 Drop Both Eyes BID  
  epoetin manda (EPOGEN;PROCRIT) injection 40,000 Units  40,000 Units IntraVENous Q7D  
 [START ON 10/11/2018] insulin glargine (LANTUS) injection 18 Units  18 Units SubCUTAneous DAILY  [START ON 10/11/2018] vancomycin (VANCOCIN) 750 mg in 0.9% sodium chloride (MBP/ADV) 250 mL ADV  750 mg IntraVENous ONCE  
 [START ON 10/11/2018] VANCOMYCIN INFORMATION NOTE   Other ONCE  
 midazolam (VERSED) injection 1-10 mg  1-10 mg IntraVENous Multiple  pantoprazole (PROTONIX) 40 mg in sodium chloride 0.9% 10 mL injection  40 mg IntraVENous Q24H  
 0.9% sodium chloride infusion 250 mL  250 mL IntraVENous PRN  
 influenza vaccine 2018-19 (6 mos+)(PF) (FLUARIX QUAD/FLULAVAL QUAD) injection 0.5 mL  0.5 mL IntraMUSCular PRIOR TO DISCHARGE  fluconazole (DIFLUCAN) 200mg/100 mL IVPB (premix)  200 mg IntraVENous Q24H  
 meropenem (MERREM) 500 mg in 0.9% sodium chloride (MBP/ADV) 50 mL MBP  500 mg IntraVENous Q12H  
 midazolam (VERSED) injection    PRN  
 acetaminophen (TYLENOL) solution 325 mg  325 mg Per NG tube Q4H PRN  
 0.9% sodium chloride infusion 250 mL  250 mL IntraVENous PRN  
 heparin (porcine) pf 100 Units  100 Units InterCATHeter PRN  
 0.9% sodium chloride infusion 250 mL  250 mL IntraVENous PRN  
 insulin lispro (HUMALOG) injection   SubCUTAneous Q6H  
 albuterol (PROVENTIL VENTOLIN) nebulizer solution 2.5 mg  2.5 mg Nebulization Q6H RT  
 heparin (porcine) 1,000 unit/mL injection 1,000 Units  1,000 Units InterCATHeter DIALYSIS PRN  
 VANCOMYCIN INFORMATION NOTE   Other Rx Dosing/Monitoring  senna-docusate (PERICOLACE) 8.6-50 mg per tablet 1 Tab  1 Tab Oral BID PRN  
 ondansetron (ZOFRAN) injection 4 mg  4 mg IntraVENous Q4H PRN  
 glucose chewable tablet 16 g  4 Tab Oral PRN  
 glucagon (GLUCAGEN) injection 1 mg  1 mg IntraMUSCular PRN  
 dextrose (D50) infusion 12.5-25 g  25-50 mL IntraVENous PRN  
 hydrALAZINE (APRESOLINE) 20 mg/mL injection 20 mg  20 mg IntraVENous Q6H PRN

## 2018-10-10 NOTE — PROGRESS NOTES
VENTILATOR Care plan 
 
Problem: Ventilator Management Goal: *Adequate oxygenation/ ventilation/ and extubation Patient: 
   
 
Ravinder Lindsay     57 y.o.   male     10/10/2018  2:00 PM 
Patient Active Problem List  
Diagnosis Code  Stroke (cerebrum) ([facility]) I63.9  Stroke ([facility] [address] I63.9  Rhabdomyolysis M62.82  
 Dehydration E86.0  
 Essential hypertension I10  
 Diabetes mellitus type 2, controlled ([facility] [address] E11.9  Non compliance w medication regimen Z91.14  
 Pneumonia of both lower lobes J18.9  DM (diabetes mellitus) ([facility] [address] E11.9  History of stroke Z80.78  
 Acute-on-chronic kidney injury ([facility] [address] N17.9, N18.9  Acute cystitis N30.00  Elevated troponin R74.8  Transaminitis R74.0  Acute kidney injury ([facility] [address] N17.9  Elevated LFTs R94.5  Cardiac arrest with ventricular fibrillation ([facility]) I46.9, I49.01  
 Abnormal blood coagulation profile R79.1  Acute pancreatitis K85.90  Septic shock ([facility]) A41.9, R65.21  
 Ischemic encephalopathy I67.89  
 Pancreatitis K85.90 Pneumonia of both lower lobes due to infectious organism ([facility] [address] [J18.1] Reason patient intubated: S/P cardiac arrest 
 
Ventilator day: 6 Ventilator settings: ACVC+, RATE 12, , PEEP 5, FiO2 .40 ETT Size/Placement: 8.0mm / 24cm @ the lips ABG: 
Date:10/10/2018 No results found for: PH, PHI, PCO2, PCO2I, PO2, PO2I, HCO3, HCO3I, FIO2, FIO2I Chest X-ray: 
Date:10/10/2018 Results from [facility] [place] encounter on 09/26/18 XR CHEST PORT Narrative Chest, single view Indication: Fever and leukocytosis Comparison: No prior chest radiographs, most recently 10/7/2018 Findings:  Portable upright AP view of the chest was obtained. There is an 
endotracheal tube which is present approximately 3.8 cm above the billy. A 
nasogastric tube is present below the diaphragm, the tip collimated from view. Right IJ approach central venous catheter projects in similar position to prior. Improved aeration of the medial and right lower lung noted with mild residual 
opacities demonstrated. Faint patchy alveolar density at the left lung base 
present. No pneumothorax or pleural effusion. Cardiac size and mediastinal 
contours are stable. No acute osseous abnormality. Impression Impression: 1. Endotracheal tube, visualized nasogastric tube, and right IJ central venous 
catheter in stable position. 2. Improved aeration of the right mid and lower lung with mild residual 
atelectatic opacities noted. Small region of opacity at the left lung base may 
reflect underlying atelectasis versus airspace disease. Lab Test: 
Date:10/10/2018 WBC:  
Lab Results Component Value Date/Time WBC 12.4 10/10/2018 03:45 AM  
HGB: Lab Results Component Value Date/Time HGB 7.7 (L) 10/10/2018 03:45 AM  
 PLTS: Lab Results Component Value Date/Time PLATELET 061 (H) 03/30/1434 03:45 AM  
 
 
SaO2%/flow:  
SpO2 Readings from Last 1 Encounters:  
10/10/18 100% Vital Signs:   Patient Vitals for the past 8 hrs: 
 Temp Pulse Resp BP SpO2  
10/10/18 1300 (!) 37.1 °F (2.8 °C) 87 14 116/55 100 % 10/10/18 1200 98.8 °F (37.1 °C) 87 12 115/61 100 % 10/10/18 1148 - 88 13 - 100 % 10/10/18 1100 (!) 37.1 °F (2.8 °C) 87 17 122/62 100 % 10/10/18 1000 (!) 37.1 °F (2.8 °C) 90 12 127/59 99 % 10/10/18 0900 (!) 37.1 °F (2.8 °C) 90 15 133/64 99 % 10/10/18 0800 98.6 °F (37 °C) 89 14 131/59 100 % 10/10/18 0732 - 88 14 - 100 % 10/10/18 0700 98.4 °F (36.9 °C) 87 14 126/62 100 % Wean Screen Pass (Yes or No): Wean Screen Reason for Failure: 
 
Duration of Weaning Trial: 
 
Additional Comments: PLAN OF CARE: Continue plan of care, per MD  
 
GOAL: Oxygenation/Ventilation/Maintain airway OUTCOME: Patient remains intubated on mechanical ventilation

## 2018-10-10 NOTE — PROGRESS NOTES
Infectious Disease Follow-up Admit Date: 9/26/2018 Current abx Prior abx Meropenem/flucon 10/4- 6, vanco 9/27-13 Zosyn 9/27-7 Assessment ->Rec:  
 
Persistent leukocytosis SIRS poss sepsis- MOF multiple ID and non-infectious concerns outlined below, complicated, wbc 57K today from high 27K+ on 10/5 On  vancomycin/zosyn since 9/27 
-C diff neg 9/29, sput C albicans, repeat CT 10/4 no new findings  -> wbc slowly improving, down to 12k 
-> fever - seems to be better past 36h 
-> on Vanc/ meropenem/fluconazole for now 
-> midline out -  cath tip cx  ngtd 
->monitor  rt IJ uldall - ok on pe and function Suspected Pyelo POA - CT B perineph stranding, UA tntc wbc, uctx ng ->likely treated at this point   
cholelithiaisis choledocholithiais suspected acute cholecystitis  poss cystic stone POA- abnl lft bili 4 alk phos 400 seen by GI and GS Dr. Loye Frankel, no plan for OR, for poss cholecystostomy if LFT worsen, bili, alk phos declining  ->per GI Poss pancreatitis - lipase 2200 POA now 3191with high of  5500 on 10/4, interpretation complicated by JULIANNA 
-NEW pancreas characterized as nl on CT 10/4 ->defer GI  
B infiltrates - poss edema infiltrate - RLL consolid better 10/4 cxr and suspect endobronchial clot contributed   ->monitor MRSE bacteremia 9/27 1 of 2. Has LUE midline unclear when placed, repeat bctx's IP today  ->monitor repeat bctx's ntd ARF POA now on HD - baseline cr 0.9 132/10.1 in ER 
-JOHN uldall 9/28 ->per renal , dialysis dependant Bleeding -melena earlier, EGD 10/2 clot in esophagus bronch 10/3 R endobronch clot NEW removed 10/5 repeat bronch  ->per others Suspected anoxic brain injury on MRI 10/3 SP VF arrest 9/29 -> overall poor prognosis however family wants all aggressive measures including trach/peg    
CVA R hemiparesis 9/10   
resp failure sp intubation 9/29 Comorbid: HTN HLD CHF h/o steatohepatitis MICROBIOLOGY:  
9/27 bctx 1 of 2 MRSE 
 uctx ng 
 9/29 sput C albicans C diff neg 10/4 bctx x 2 NTD 
 fungitell indeterminate due to contamination 10/8 Cath tip cx IP 
10/8     bl cx ngtd LINES AND CATHETERS:  
RIMMA mccray 9/28 Active Hospital Problems Diagnosis Date Noted  Pancreatitis 10/08/2018  Septic shock ([facility] [address] 09/30/2018 Probably secondary to pneumonia (suspicious for aspiration). Less likely, secondary to acute pancreatitis.  Ischemic encephalopathy 09/30/2018  Cardiac arrest with ventricular fibrillation ([address] 09/29/2018 ROSC before defibrillation could be attempted.  Abnormal blood coagulation profile 09/29/2018  Acute pancreatitis 09/29/2018 Shock, leukocytosis, elevated lipase but radiographically no ductal dilatation in pancreas. GB stones without radiographic stigmata of acute cholecystitis  Elevated LFTs 09/28/2018  History of stroke 09/27/2018 With residual R hemiparesis  Acute-on-chronic kidney injury ([address] 09/27/2018  Acute cystitis 09/27/2018  Elevated troponin 09/27/2018  Transaminitis 09/27/2018  Acute kidney injury ([address] 09/27/2018  Pneumonia of both lower lobes 09/11/2018  Essential hypertension 09/10/2018  Diabetes mellitus type 2, controlled ([address] 09/10/2018 Subjective: Interval notes reviewed. Pt remains unresponsive, on vent, small amts of tan sputum. Mid line was removed 10/8. afeb x > 24h. No family at bedside Current Facility-Administered Medications Medication Dose Route Frequency  hydroxypropyl methylcellulose (ISOPTO TEARS) 0.5 % ophthalmic solution 2 Drop  2 Drop Both Eyes BID  epoetin manda (EPOGEN;PROCRIT) injection 40,000 Units  40,000 Units IntraVENous Q7D  
 [START ON 10/11/2018] insulin glargine (LANTUS) injection 18 Units  18 Units SubCUTAneous DAILY  midazolam (VERSED) injection 1-10 mg  1-10 mg IntraVENous Multiple  pantoprazole (PROTONIX) 40 mg in sodium chloride 0.9% 10 mL injection 40 mg IntraVENous Q24H  
 0.9% sodium chloride infusion 250 mL  250 mL IntraVENous PRN  
 influenza vaccine 2018-19 (6 mos+)(PF) (FLUARIX QUAD/FLULAVAL QUAD) injection 0.5 mL  0.5 mL IntraMUSCular PRIOR TO DISCHARGE  fluconazole (DIFLUCAN) 200mg/100 mL IVPB (premix)  200 mg IntraVENous Q24H  
 meropenem (MERREM) 500 mg in 0.9% sodium chloride (MBP/ADV) 50 mL MBP  500 mg IntraVENous Q12H  
 midazolam (VERSED) injection    PRN  
 acetaminophen (TYLENOL) solution 325 mg  325 mg Per NG tube Q4H PRN  
 0.9% sodium chloride infusion 250 mL  250 mL IntraVENous PRN  
 heparin (porcine) pf 100 Units  100 Units InterCATHeter PRN  
 0.9% sodium chloride infusion 250 mL  250 mL IntraVENous PRN  
 insulin lispro (HUMALOG) injection   SubCUTAneous Q6H  
 albuterol (PROVENTIL VENTOLIN) nebulizer solution 2.5 mg  2.5 mg Nebulization Q6H RT  
 heparin (porcine) 1,000 unit/mL injection 1,000 Units  1,000 Units InterCATHeter DIALYSIS PRN  
 VANCOMYCIN INFORMATION NOTE   Other Rx Dosing/Monitoring  senna-docusate (PERICOLACE) 8.6-50 mg per tablet 1 Tab  1 Tab Oral BID PRN  
 ondansetron (ZOFRAN) injection 4 mg  4 mg IntraVENous Q4H PRN  
 glucose chewable tablet 16 g  4 Tab Oral PRN  
 glucagon (GLUCAGEN) injection 1 mg  1 mg IntraMUSCular PRN  
 dextrose (D50) infusion 12.5-25 g  25-50 mL IntraVENous PRN  
 hydrALAZINE (APRESOLINE) 20 mg/mL injection 20 mg  20 mg IntraVENous Q6H PRN Objective:  
 
Visit Vitals  /59 (BP 1 Location: Right arm, BP Patient Position: At rest)  Pulse 88  Temp 98.6 °F (37 °C)  Resp 13  Ht 5' 7\" (1.702 m)  Wt 89.9 kg (198 lb 3.1 oz)  SpO2 100%  BMI 31.04 kg/m2 Temp (24hrs), Av °F (37.2 °C), Min:98.4 °F (36.9 °C), Max:99.7 °F (37.6 °C) GEN: unresponsive BM on vent in ICU HEENT: anicteric YESSI OGT, pupils non reactive NECK: supple RIJ TDC suspect hematoma no cellulitis CHEST: coarse bs B no wheeze or  rhonchi CVS: RRR,no murmur appreciated ABD: soft ? RUQ fullness (+) BS no localized tenderness EXT: anasarca with 2+ pitting edema b/l UE rt >lt SKIN:no rash CNS: not on sedation but unresponsive, not following any commands, no blink to threat Labs: Results:  
Chemistry Recent Labs 10/10/18 
 0345  10/09/18 
 1230  10/09/18 
 0340  10/08/18 
 1712  10/08/18 
 0445 GLU  106*  91  109*   --   130* NA  141  140  141   --   141  
K  4.5  4.0  4.3   --   4.4 CL  103  104  105   --   105 CO2  27  27  28   --   26 BUN  40*  30*  23*   --   44* CREA  5.35*  4.23*  3.34*   --   5.46* CA  7.6*  8.2*  7.4*   --   7.6* AGAP  11  9  8   --   10  
BUCR  7*  7*  7*   --   8* AP   --    --    --   237*  231* TP   --    --    --   6.9  6.8 ALB  1.6*   --   1.7*  1.7*  1.6*  
GLOB   --    --    --   5.2*  5.2* AGRAT   --    --    --   0.3*  0.3* CBC w/Diff Recent Labs 10/10/18 
 0345  10/09/18 
 1230  10/09/18 
 0345   10/08/18 
 0445 WBC  12.4  13.0  13.0   < >  16.0*  
RBC  2.71*  2.55*  2.56*   < >  2.57* HGB  7.7*  7.4*  7.6*   < >  7.4* HCT  24.7*  23.5*  23.4*   < >  23.2*  
PLT  451*  410  384   < >  395 GRANS   --    --    --    --   80* LYMPH   --    --    --    --   10* EOS   --    --    --    --   3  
 < > = values in this interval not displayed. RADIOLOGY: Reviewed 10/10 cxr  Improved aeration of the right mid and lower lung with mild residual 
atelectatic opacities noted. Small region of opacity at the left lung base may 
reflect underlying atelectasis versus airspace disease. 10/4 CTAP IMPRESSION: 
1. Dense subtotal or total consolidation right lower lobe compatible with 
pneumonia similar to prior study. Small bilateral pleural effusions similar in 
size. 2. Distended gallbladder with gallstones and gallbladder sludge without 
significant change in appearance and also described in detail on ultrasound of 
9/27/2018. 3. Indeterminate small lesion left hepatic lobe without change. Hepatomegaly 
again evident. 4. No intraperitoneal fluid. Possible small left upper pole renal cyst. 
Cardiomegaly. Nasogastric tube tip in body of stomach. cxr 10/5 Impression: 1. Endotracheal tube, visualized nasogastric tube, and right IJ central venous 
catheter in stable position. 2. Overall improved aeration of the right lower lobe, likely improved 
atelectasis with mild residual atelectasis/airspace disease. 3. Mild interstitial edema overall similar to prior. Microbiology Results All Micro Results Procedure Component Value Units Date/Time Phi Hillr TIP [[id number]] Collected:  10/08/18 1215 Order Status:  Completed Specimen:  Catheter Tip Updated:  10/10/18 3610 Special Requests: NO SPECIAL REQUESTS Culture result: NO GROWTH 2 DAYS     
 CULTURE, BLOOD [[id number]] Collected:  10/08/18 1227 Order Status:  Completed Specimen:  Blood from Blood Updated:  10/10/18 0818 Special Requests: PERIPHERAL Culture result: NO GROWTH 2 DAYS     
 CULTURE, BLOOD [[id number]] Collected:  10/04/18 4689 Order Status:  Completed Specimen:  Blood from Blood Updated:  10/10/18 9890 Special Requests: PERIPHERAL Culture result: NO GROWTH 6 DAYS     
 CULTURE, BLOOD [[id number]] Collected:  10/04/18 1120 Order Status:  Completed Specimen:  Blood from Blood Updated:  10/10/18 4140 Special Requests: PERIPHERAL Culture result: NO GROWTH 6 DAYS     
 CULTURE, BLOOD [[id number]] Collected:  09/27/18 0005 Order Status:  Completed Specimen:  Blood from Blood Updated:  10/03/18 0845 Special Requests: NO SPECIAL REQUESTS Culture result: NO GROWTH 6 DAYS     
 CULTURE, BLOOD [[id number]]  (Abnormal)  (Susceptibility) Collected:  09/27/18 0136 Order Status:  Completed Specimen:  Blood from Blood Updated:  10/02/18 8352 Special Requests: NO SPECIAL REQUESTS GRAM STAIN PEDIATRIC BOTTLE GRAM POSITIVE COCCI IN PAIRS IN CLUSTERS  
        
  SMEAR CALLED TO AND CORRECTLY REPEATED BY: Daryl Mayers RN IN 2700 ON 9/29/18 AT 2033 WF Culture result:      
  AEROBIC BOTTLE STAPHYLOCOCCUS EPIDERMIDIS (A) CULTURE, RESPIRATORY/SPUTUM/BRONCH Kiara Hikes STAIN [[id number]]  (Abnormal) Collected:  09/29/18 1210 Order Status:  Completed Specimen:  Sputum from Endotracheal aspirate Updated:  10/02/18 7273 Special Requests: NO SPECIAL REQUESTS     
  GRAM STAIN >25 WBC/lpf     
   <10 EPI/lpf MUCUS PRESENT     
   MODERATE YEAST Culture result: MANY CANDIDA TROPICALIS (A)  
 C. DIFFICILE/EPI PCR [[id number]] Collected:  09/29/18 1400 Order Status:  Completed Specimen:  Stool Updated:  09/29/18 2248 Special Requests: NO SPECIAL REQUESTS Culture result: Toxigenic C. difficile NEGATIVE                         C. difficile target DNA sequences are not detected. CULTURE, URINE [[id number]] Collected:  09/27/18 0029 Order Status:  Completed Specimen:  Urine from Cath Urine Updated:  09/29/18 0944 Special Requests: NO SPECIAL REQUESTS Culture result: NO GROWTH 2 DAYS Celestino Adams MD, [address] October 10, 2018 [facility] [phone]

## 2018-10-10 NOTE — PROGRESS NOTES
3512 Received patient on full vent support: ACVC+, RATE 12, , PEEP 5, FiO2 .40 (titrated from . 39). Current vitals: HR 89, temp 37.0, SpO2 100%, RR 16, /62. Size 8.0mm ETT is @ 24cm at the lips, and is patent and secure. Ambu bag/mask at bedside. -PSS

## 2018-10-10 NOTE — CDMP QUERY
Please clarify if this patient is being treated/managed for: 
 
=>DTI R Buttock and R Ankle POA 
=>Other Explanation of clinical findings =>Unable to Determine (no explanation of clinical findings) The medical record reflects the following: 
 
Risk:  58 yo s/p CVA with R sided weakness from SNF Clinical Indicators: 
--initial RN skin assessment included: 
R buttock unstageable pressure ulcer  POA- zinc paste/foam dressing R ankle DTI maroon/purple POA- foam dressing Treatment: wound care, turn/repositioning Please clarify and document your clinical opinion in the progress notes and discharge summary including the definitive and/or presumptive diagnosis, (suspected or probable), related to the above clinical findings. Please include clinical findings supporting your diagnosis. If you DECLINE this query or would like to communicate with [facility], please utilize the \"[facility] [place] message box\" at the TOP of the Progress Note on the right. Thank you, 
Trey Darden RN BSN CCDS [phone]

## 2018-10-10 NOTE — PROGRESS NOTES
Problem: Falls - Risk of 
Goal: *Absence of Falls Document Rosanne Ross Fall Risk and appropriate interventions in the flowsheet. Outcome: Progressing Towards Goal 
Fall Risk Interventions: 
Mobility Interventions: Bed/chair exit alarm Mentation Interventions: Bed/chair exit alarm, More frequent rounding Medication Interventions: Bed/chair exit alarm Elimination Interventions: Call light in reach Problem: Pressure Injury - Risk of 
Goal: *Prevention of pressure injury Document Mitul Scale and appropriate interventions in the flowsheet. Outcome: Progressing Towards Goal 
Pressure Injury Interventions: 
Sensory Interventions: Float heels Moisture Interventions: Absorbent underpads Activity Interventions: Pressure redistribution bed/mattress(bed type) Mobility Interventions: Turn and reposition approx. every two hours(pillow and wedges) Nutrition Interventions: Document food/fluid/supplement intake Friction and Shear Interventions: Minimize layers

## 2018-10-10 NOTE — PROGRESS NOTES
Pharmacy Dosing Services: Vancomycin Indication: Sepsis Day of therapy: 13 Other Antimicrobials (Include dose, start day & day of therapy): 
Meropenem 500mg IV Q12 (Started 10/4) Fluconazole 200 mg IV daily (Started 10/4) Loading dose (date given): 2500mg on  Current Maintenance dose: HD dosing Goal Vancomycin Level: 15-20 
(Trough 15-20 for most infections, 20 for meningitis/osteomyelitis, pre-HD level ~25) Vancomycin Level (if drawn):  
 - : 27.2 
10/1: 35.1 
10/5: 17.8 
10/10: 27.9 Significant Cultures:  
 - blood - Staph epidermidis  - urine - NG - final 
 Sputum - Candida tropicalis 10/4 - Blood - pending 10/8 - Catheter - pending Renal function stable? (unstable defined as SCr increase of 0.5 mg/dL or > 50% increase from baseline, whichever is greater) (Y/N): intermittent HD 
 
CAPD, Hemodialysis or Renal Replacement Therapy (Y/N): Y (intermittent HD - TTS for now) Recent Labs 10/10/18 
 0345  10/09/18 
 1230  10/09/18 
 0345  10/09/18 
 0340 CREA  5.35*  4.23*   --   3.34* BUN  40*  30*   --   23* WBC  12.4  13.0  13.0   -- Temp (24hrs), Av °F (37.2 °C), Min:98.4 °F (36.9 °C), Max:99.7 °F (37.6 °C) Creatinine Clearance (Creatinine Clearance (ml/min)):  TTS Regimen assessment: - HD due 10/10, rescheduled for 10/11 
- Random level on 10/10 was 27.9, will obtain another random level on 10/11 Maintenance dose: 750 mg IV following dialysis, will reevaluate after random level on 10/11 Next scheduled level: Random on 10/11 at 0400 Pharmacy will follow daily and adjust medications as appropriate for renal function and/or serum levels. Thank you, Lindsey Miramontes

## 2018-10-10 NOTE — PROGRESS NOTES
RENAL PROGRESS NOTE Jocelynn Whitehead Assessment/Plan: · Dialysis dependant JULIANNA (ischemic atn in a setting of acute pyelonephritis/acute cholecystitis with sepsis and cardiac arrest 9/29). No evidence of recovery of renal function at this time. Next dialysis tomorrow and continue 3/week. Continue to minimize intake, volume overload is improving. May soon need tdc if full care to be continued. · S/p cardiopulm arrest 9/29. Off pressors. · Acute pyelonephritis/sepsis. On abx. Afebrile. · Abnormal lft's/acute cholecystitis? On abx. GI on the case. · UGI bleed, EGD results noted- healing esophageal ulceration. · Acute blood loss anemia. H/H is stable, add analia. · Asp pneumonia. · Resp failure. R bronch clot removed 10/6 with bronchoscopy. · Anoxic brain injury superimposed on recent cva. MRI results noted. Subjective: Intubated, unresponsive. Tolerates tf. Patient Active Problem List  
Diagnosis Code  Stroke (cerebrum) ([facility]) I63.9  Stroke ([facility] [address] I63.9  Rhabdomyolysis M62.82  
 Dehydration E86.0  
 Essential hypertension I10  
 Diabetes mellitus type 2, controlled ([facility] [address] E11.9  Non compliance w medication regimen Z91.14  
 Pneumonia of both lower lobes J18.9  DM (diabetes mellitus) ([facility] [address] E11.9  History of stroke Z80.78  
 Acute-on-chronic kidney injury ([facility] [address] N17.9, N18.9  Acute cystitis N30.00  Elevated troponin R74.8  Transaminitis R74.0  Acute kidney injury ([facility] [address] N17.9  Elevated LFTs R94.5  Cardiac arrest with ventricular fibrillation ([facility]) I46.9, I49.01  
 Abnormal blood coagulation profile R79.1  Acute pancreatitis K85.90  Septic shock ([facility]) A41.9, R65.21  
 Ischemic encephalopathy I67.89  
 Pancreatitis K85.90 Current Facility-Administered Medications Medication Dose Route Frequency Provider Last Rate Last Dose  hydroxypropyl methylcellulose (ISOPTO TEARS) 0.5 % ophthalmic solution 2 Drop  2 Drop Both Eyes BID Arslan Lay, SALUD      
 midazolam (VERSED) injection 1-10 mg  1-10 mg IntraVENous Multiple Marylene Blakes, MD   2 mg at 10/09/18 1702  pantoprazole (PROTONIX) 40 mg in sodium chloride 0.9% 10 mL injection  40 mg IntraVENous Q24H Marylene Blakes, MD   40 mg at 10/10/18 4684  insulin glargine (LANTUS) injection 20 Units  20 Units SubCUTAneous DAILY Patti Dougherty DO   20 Units at 10/10/18 2336  
 0.9% sodium chloride infusion 250 mL  250 mL IntraVENous PRN Patti Dougherty DO      
 influenza vaccine 2018-19 (6 mos+)(PF) (FLUARIX QUAD/FLULAVAL QUAD) injection 0.5 mL  0.5 mL IntraMUSCular PRIOR TO DISCHARGE Pedro Luis Gómez MD      
 fluconazole (DIFLUCAN) 200mg/100 mL IVPB (premix)  200 mg IntraVENous Q24H GRACE Rice  mL/hr at 10/09/18 1614 200 mg at 10/09/18 1614  meropenem (MERREM) 500 mg in 0.9% sodium chloride (MBP/ADV) 50 mL MBP  500 mg IntraVENous Q12H GRACE Rice  mL/hr at 10/10/18 0932 500 mg at 10/10/18 0932  
 midazolam (VERSED) injection    PRN Renny Velásquez MD   4 mg at 10/05/18 1140  acetaminophen (TYLENOL) solution 325 mg  325 mg Per NG tube Q4H PRN Pedro Luis Gómez MD   325 mg at 10/09/18 0456  
 0.9% sodium chloride infusion 250 mL  250 mL IntraVENous PRN Patti Dougherty DO      
 heparin (porcine) pf 100 Units  100 Units InterCATHeter PRN Whit Virk MD      
 0.9% sodium chloride infusion 250 mL  250 mL IntraVENous PRN Pedro Luis Gómez MD      
 insulin lispro (HUMALOG) injection   SubCUTAneous Q6H Patti Dougherty DO   Stopped at 10/07/18 1800  
 albuterol (PROVENTIL VENTOLIN) nebulizer solution 2.5 mg  2.5 mg Nebulization Q6H RT Chaz Carballo MD   2.5 mg at 10/10/18 0732  heparin (porcine) 1,000 unit/mL injection 1,000 Units  1,000 Units InterCATHeter DIALYSIS PRN Jessie Johnson MD   1,000 Units at 09/28/18 1332  VANCOMYCIN INFORMATION NOTE   Other Rx Dosing/Monitoring Bren Rosado MD      
 senna-docusate (PERICOLACE) 8.6-50 mg per tablet 1 Tab  1 Tab Oral BID PRN Sergo Fees, DO      
 ondansetron [facility]) injection 4 mg  4 mg IntraVENous Q4H PRN [place] Fees, DO   4 mg at 09/28/18 0539  
 glucose chewable tablet 16 g  4 Tab Oral PRN Sergo Fees, DO      
 glucagon (GLUCAGEN) injection 1 mg  1 mg IntraMUSCular PRN [place] Fees, DO      
 dextrose (D50) infusion 12.5-25 g  25-50 mL IntraVENous PRN [place] Fees, DO   25 g at 10/09/18 1906  hydrALAZINE (APRESOLINE) 20 mg/mL injection 20 mg  20 mg IntraVENous Q6H PRN Trever Kraft NP   20 mg at 10/02/18 1759 Objective Vitals:  
 10/10/18 0600 10/10/18 0700 10/10/18 0732 10/10/18 0800 BP: 136/71 126/62  131/59 Pulse: 86 87 88 89 Resp: 13 14 14 14 Temp: 98.4 °F (36.9 °C) 98.4 °F (36.9 °C)  98.6 °F (37 °C) TempSrc:      
SpO2: 100% 100% 100% 100% Weight:      
Height:      
 
 
 
Intake/Output Summary (Last 24 hours) at 10/10/18 1043 Last data filed at 10/10/18 0800 Gross per 24 hour Intake              550 ml Output                0 ml Net              550 ml Admission weight: Weight: 96.6 kg (213 lb) (09/26/18 2244) Last Weight Metrics: 
Weight Loss Metrics 10/8/2018 9/26/2018 9/13/2018 Today's Wt 198 lb 3.1 oz - 227 lb 15.3 oz  
BMI - 31.04 kg/m2 35.7 kg/m2 Physical Assessment:  
 
General: intubated, unresponsive. Eyes are open. NG tube in place. Neck: No jvd. Rt ij temporary dialysis catheter in place, dressing is clear. LUNGS: diminished air entry at the bases. No crackles. CVS EXM: S1, S2  RRR. Abdomen: soft, pos bs. Lower Extremities:  1+ edema. Lab CBC w/Diff Recent Labs 10/10/18 
 0345  10/09/18 
 1230  10/09/18 
 0345   10/08/18 
 0445 WBC  12.4  13.0  13.0   < >  16.0*  
 RBC  2.71*  2.55*  2.56*   < >  2.57* HGB  7.7*  7.4*  7.6*   < >  7.4* HCT  24.7*  23.5*  23.4*   < >  23.2*  
PLT  451*  410  384   < >  395 GRANS   --    --    --    --   80* LYMPH   --    --    --    --   10* EOS   --    --    --    --   3  
 < > = values in this interval not displayed. Chemistry Recent Labs 10/10/18 
 0345  10/09/18 
 1230  10/09/18 
 0340   10/08/18 
 1712  10/08/18 
 0445 GLU  106*  91  109*   --    --   130* NA  141  140  141   --    --   141  
K  4.5  4.0  4.3   --    --   4.4 CL  103  104  105   --    --   105 CO2  27  27  28   --    --   26 BUN  40*  30*  23*   --    --   44* CREA  5.35*  4.23*  3.34*   --    --   5.46* CA  7.6*  8.2*  7.4*   --    --   7.6* AGAP  11  9  8   --    --   10  
BUCR  7*  7*  7*   --    --   8* AP   --    --    --    --   237*  231* TP   --    --    --    --   6.9  6.8 ALB  1.6*   --   1.7*   --   1.7*  1.6*  
GLOB   --    --    --    --   5.2*  5.2* AGRAT   --    --    --    --   0.3*  0.3* PHOS  7.3*   --   4.2   < >   --    --   
 < > = values in this interval not displayed. No results found for: IRON, FE, TIBC, IBCT, PSAT, FERR Lab Results Component Value Date/Time Calcium 7.6 (L) 10/10/2018 03:45 AM  
 Phosphorus 7.3 (H) 10/10/2018 03:45 AM  
  
 
Michael Jo M.D. [facility] Phone [phone] Pager [phone]

## 2018-10-10 NOTE — PROGRESS NOTES
Met with Dr. hSona Cortes re discharge planning goals.  He will speak with [facility] tomorrow re, trach, comfort care etc.

## 2018-10-10 NOTE — PROGRESS NOTES
[facility] Progress Note Name: Jocelynn Whitehead : 1955 MRN: [id number] Date: 10/10/2018 11:05 AM 
  
[x]I have reviewed the flowsheet and previous days notes. Events overnight reviewed and discussed with nursing staff. Vital signs and records reviewed. HPI:  
 
58 y/o male recently admitted for stroke with residual right hemiparesis. Readmitted to ICU on 18 with ARF. PMHx of HTN, ARF, CHF, DM, CVA and chronic liver disease from steatohepatits. 18 became hypotensive and arrested. CPR lasted 5 minutes which likely caused catastrophic neurologic injury. Renal failure secondary to pyleonephritis and may have had acute cholecytitis on admission as well. Elevated lipase may have reflected pancreatitis also. Significant clots have been found in his esophagus and right mainstem bronchus. Remains intubated post VF cardiac arrest 18. MRI 10/3/18 consistent with anoxic brain injury. Family aware of grim prognosis but wishes to pursue Trach and Peg. Subjective: 
 
10/9/18 Pt remains intubated- thick yellow secretions Not sedated. Reflexes to pain S/p EGD 10/9/18- no new findings; Area of healed ulceration in the distal esophagus [x]The patient is unable to give any meaningful history or review of systems because the patient is: 
[x]Intubated []Sedated  
[]Unresponsive [x]The patient is critically ill on     
[x]Mechanical ventilation []Pressors []BiPAP [] ROS:A comprehensive review of systems was negative except for that written in the HPI. Medication Review: · Pressors - none · Sedation - none · Antibiotics - vanc/diflucan/merrem · Pain - prn tylenol · GI/ DVT - protonix/ scd's · Others - none Safety Bundles: VAP Bundle/ CAUTI/ Severe Sepsis Protocol/ Electrolyte Replacement Protocol Vital Signs:   
Visit Vitals  /62  Pulse 87  Temp (!) 36.9 °F (2.7 °C)  Resp 14  Ht 5' 7\" (1.702 m)  Wt 89.9 kg (198 lb 3.1 oz)  SpO2 100%  BMI 31.04 kg/m2 O2 Device: Endotracheal tube, Ventilator O2 Flow Rate (L/min): 45 l/min Temp (24hrs), Av.6 °F (35.3 °C), Min:36.9 °F (2.7 °C), Max:99.7 °F (37.6 °C) Intake/Output:  
Last shift:        
Last 3 shifts: 10/08 1901 - 10/10 07 In: 990 [I.V.:560] Out: 2000 Intake/Output Summary (Last 24 hours) at 10/10/18 9569 Last data filed at 10/10/18 0000 Gross per 24 hour Intake              450 ml Output                0 ml Net              450 ml Ventilator Settings: 
Ventilator Mode: Assist control Respiratory Rate Back-Up Rate: 12 Insp Time (sec): 0.9 sec I:E Ratio: 1:4.56 Ventilator Volumes Vt Set (ml): 500 ml Vt Exhaled (Machine Breath) (ml): 546 ml Vt Spont (ml): 535 ml Ve Observed (l/min): 6.59 l/min Ventilator Pressures Pressure Support (cm H2O): 15 cm H2O 
PIP Observed (cm H2O): 23 cm H2O Plateau Pressure (cm H2O): 18 cm H2O 
MAP (cm H2O): 8.5 PEEP/VENT (cm H2O): 5 cm H20 Auto PEEP Observed (cm H2O): 0.8 cm H2O Physical Exam: 
 
General: Intubated; unresponsive to voice HEENT:  Anicteric sclerae; pink palpebral conjunctivae; mucosa moist 
Resp:  Symmetrical chest expansion, no accessory muscle use; good airway entry;  Bilateral breath sounds clear to auscultation; (+) cough CV:  S1, S2 present; regular rate and rhythm GI:  Abdomen soft, non-tender; (+) active bowel sounds; palpable liver Extremities:  +2 pulses on all extremities; +2 generalized edema Skin:  Warm; no rashes/ lesions noted Neurologic:  Reflexes to pain in all extremities; (-) corneals, pupils 3 round, reactive Devices:  OGT, HD catheter, ETT 
DATA:  
 
 
Labs: Results:  
   
Chemistry Recent Labs 10/10/18 
 0345  10/09/18 
 1230  10/09/18 
 0340  10/08/18 
 1712  10/08/18 
 0445 GLU  106*  91  109*   --   130* NA  141  140  141   --   141  
K  4.5  4.0  4.3   --   4.4 CL  103  104  105   --   105 CO2  27  27  28   --   26 BUN  40*  30*  23*   --   44* CREA  5.35*  4.23*  3.34*   --   5.46* CA  7.6*  8.2*  7.4*   --   7.6* AGAP  11  9  8   --   10  
BUCR  7*  7*  7*   --   8* AP   --    --    --   237*  231* TP   --    --    --   6.9  6.8 ALB  1.6*   --   1.7*  1.7*  1.6*  
GLOB   --    --    --   5.2*  5.2* AGRAT   --    --    --   0.3*  0.3* CBC w/Diff Recent Labs 10/10/18 
 0345  10/09/18 
 1230  10/09/18 
 0345   10/08/18 
 0445 WBC  12.4  13.0  13.0   < >  16.0*  
RBC  2.71*  2.55*  2.56*   < >  2.57* HGB  7.7*  7.4*  7.6*   < >  7.4* HCT  24.7*  23.5*  23.4*   < >  23.2*  
PLT  451*  410  384   < >  395 GRANS   --    --    --    --   80* LYMPH   --    --    --    --   10* EOS   --    --    --    --   3  
 < > = values in this interval not displayed. Coagulation No results for input(s): PTP, INR, APTT in the last 72 hours. No lab exists for component: INREXT, INREXT Liver Enzymes Recent Labs 10/10/18 
 0345   10/08/18 
 1712 TP   --    --   6.9 ALB  1.6*   < >  1.7* AP   --    --   237* SGOT   --    --   138*  
 < > = values in this interval not displayed. ABG No results found for: PH, PHI, PCO2, PCO2I, PO2, PO2I, HCO3, HCO3I, FIO2, FIO2I Microbiology Recent Labs 10/08/18 
 1227  10/08/18 [address] CULT  NO GROWTH AFTER 18 HOURS  NO GROWTH AFTER 16 HOURS Telemetry: [x]Sinus []A-flutter []Paced []A-fib []Multiple PVCs IMPRESSION:  
· Acute encepalopathy- MRI 10/3 consistent with anoxic brain injury; EEG 10/5 consistent with poor prognosis · Acute respiratory failure with hypoxia- Pt not a candidate for extubation d/t mental staus; family to schedule trach & peg  
· ARF on Dialysis · Chronic liver disease · Coagulopathy of unclear etiology · Possible acute pancreatitis · Probable pyleonephritis · Probable acute cholecystitis · Cardiomyopathy with grade 1 diastolic dysfunction · Hypoalbunemia Other Hx · S/p V Fib arrest- 10/2/18 EF 46-50%; LV global hypokinesis; cardiology following- recommends to continue supportive measures · HTN 
· ARF 
· CHF · DM 
· CVA PLAN:  
· Resp - Continue mechanical ventilation. Trach and peg per family request.  HOB > 30. Strict aspiration precautions. Pulmonary hygiene. Daily ABG and CXR while intubated. · ID - Afebrile, aleukocytosis. Continue to trend temp and WBC curve. Blood Cx NGTD. Continue Abx. ID following- Cx catheter tip sent- NGTD · CVS - HD stable. Continue to monitor closely. · Heme/onc - Daily CBCs. Monitor H/H & platelets. · Metabolic - Monitor electrolytes and replace as needed. · Renal - Continue to trend renal indices. Nephrology following. · Endocrine - Glycemic control. BS goal < 180. SSI. · Neuro/ Pain/ Sedation - Monitor neuro status. Avoid sedating medications. Neuro following. · GI - Advance tube feeds as tolerated. Continue to trend LFTs and lipase. · Prophylaxis - DVT, GI- SCDs & protonix · Discussed in interdisciplinary rounds · Code status: FULL The patient is: [] acutely ill Risk of deterioration: [] moderate [x] critically ill  [x] high  
 
[x]See my orders for details My assessment/plan was discussed with: 
[x]nursing []PT/OT [x]respiratory therapy [x]Dr. Ankur Anderson  
[]family [] Aram Perla NP

## 2018-10-10 NOTE — PROGRESS NOTES
1940 
[facility] SBAR report taken from Eve ANDRE No change in neuro status for this patient. Performed mouth, perineum area and eye care. 0000 No changes 0500 Bathed patient after finding him with incontinent loose stool. Changed linens and gown. no change in neuro except sometimes he will withdraw to painful stimuli to his left foot and sometimes there is no movement at all.   
 
0730 SBAR report given to incoming KAUSHAL Rangel.

## 2018-10-10 NOTE — CDMP QUERY
Please clarify if this patient is being treated/managed for: 
 
=>Sepsis POA with associated JULIANNA 
=>Sepsis not POA 
=>Other Explanation of clinical findings =>Unable to Determine (no explanation of clinical findings) The medical record reflects the following: 
 
Risk:  56 yo sent to ER from SNF with abnormal labs 9/26 Clinical Indicators: 
--9/26 WBC 20.2, neuts 85, creatinine 10.10 (qSOFA 2), Bili 4.0 (qSOFA 2) , PT 19.3/PTT 43.7 
--admission blood cx:  AEROBIC BOTTLE STAPHYLOCOCCUS EPIDERMIDIS 
--GCS 15 in ER at 2245  with a drop to 14 by 0810 
--9/27 renal consul: \" Oliguric JULIANNA. Etio- volume depletion/sepsis in a setting of acute pyelonephritis/acute cholecystitis\" 
--9/28 GI PN \"9/28 GI:  Hb is 5 today, unusual with cholecystitis alone but it may be worse due to sepsis\" 
--9/28 development of DIC with esophageal bleed and bleeding in bronchus, acute respiratory failure, septic shock 
--10/4 ID consult:  \"10/4 ID:  MRSE bacteremia 9/27 1 of 2. Persistent leukocytosis, SIRS, poss sepsis\" Treatment: Vanco and Zosyn started at admission Please clarify and document your clinical opinion in the progress notes and discharge summary including the definitive and/or presumptive diagnosis, (suspected or probable), related to the above clinical findings. Please include clinical findings supporting your diagnosis. If you DECLINE this query or would like to communicate with [facility], please utilize the \"[facility] message box\" at the TOP of the Progress Note on the right. Thank you, 
Hans Quinteros RN BSN CCDS [phone]

## 2018-10-11 NOTE — PROGRESS NOTES
[facility] Progress Note Name: Lilibeth Montana : 1955 MRN: [id number] Date: 10/11/2018 11:05 AM 
  
[x]I have reviewed the flowsheet and previous days notes. Events overnight reviewed and discussed with nursing staff. Vital signs and records reviewed. HPI:  
 
56 y/o male recently admitted for stroke with residual right hemiparesis. Readmitted to ICU on 18 with ARF. PMHx of HTN, ARF, CHF, DM, CVA and chronic liver disease from steatohepatits. 18 became hypotensive and arrested. CPR lasted 5 minutes which likely caused catastrophic neurologic injury. Renal failure secondary to pyleonephritis and may have had acute cholecytitis on admission as well. Elevated lipase may have reflected pancreatitis also. Significant clots have been found in his esophagus and right mainstem bronchus. Remains intubated post VF cardiac arrest 18. MRI 10/3/18 consistent with anoxic brain injury. Family aware of grim prognosis but wishes to pursue Trach and Peg. Subjective: 
 
10/11/18 Pt remains intubated- minimal thick yellow secretions Not sedated. Reflexes to pain 
 
[x]The patient is unable to give any meaningful history or review of systems because the patient is: 
[x]Intubated []Sedated  
[]Unresponsive [x]The patient is critically ill on     
[x]Mechanical ventilation []Pressors []BiPAP [] ROS:A comprehensive review of systems was negative except for that written in the HPI. Medication Review: · Pressors - none · Sedation - none · Antibiotics - vanc/diflucan/merrem · Pain - prn tylenol · GI/ DVT - protonix/ scd's · Others - none Safety Bundles: VAP Bundle/ CAUTI/ Severe Sepsis Protocol/ Electrolyte Replacement Protocol Vital Signs:   
Visit Vitals  /63  Pulse 100  Temp (!) 38 °F (3.3 °C)  Resp 28  Ht 5' 7\" (1.702 m)  Wt 89.9 kg (198 lb 3.1 oz)  SpO2 100%  BMI 31.04 kg/m2 O2 Device: Endotracheal tube O2 Flow Rate (L/min): 45 l/min Temp (24hrs), Av °F (11.7 °C), Min:37.1 °F (2.8 °C), Max:99.7 °F (37.6 °C) Intake/Output:  
Last shift:        
Last 3 shifts: 10/09 1901 - 10/11 0700 In: 3095 [I.V.:380] Out: - Intake/Output Summary (Last 24 hours) at 10/11/18 1029 Last data filed at 10/11/18 0500 Gross per 24 hour Intake             1160 ml Output                0 ml Net             1160 ml Ventilator Settings: 
Ventilator Mode: Assist control Respiratory Rate Back-Up Rate: 12 Insp Time (sec): 0.9 sec I:E Ratio: 1:4.56 Ventilator Volumes Vt Set (ml): 500 ml Vt Exhaled (Machine Breath) (ml): 565 ml Vt Spont (ml): 535 ml Ve Observed (l/min): 9.76 l/min Ventilator Pressures Pressure Support (cm H2O): 15 cm H2O 
PIP Observed (cm H2O): 23 cm H2O Plateau Pressure (cm H2O): 20 cm H2O 
MAP (cm H2O): 9.6 PEEP/VENT (cm H2O): 5 cm H20 Auto PEEP Observed (cm H2O): 0.5 cm H2O Physical Exam: 
 
General: Intubated; unresponsive to voice HEENT:  Anicteric sclerae; pink palpebral conjunctivae; mucosa moist 
Resp:  Symmetrical chest expansion, no accessory muscle use; good airway entry;  Bilateral breath sounds clear to auscultation; (+) cough CV:  S1, S2 present; regular rate and rhythm GI:  Abdomen soft, non-tender; (+) active bowel sounds; palpable liver Extremities:  +2 pulses on all extremities; +2 generalized edema Skin:  Warm; no rashes/ lesions noted Neurologic:  Reflexes to pain in all extremities; (-) corneals, pupils 3 round, reactive Devices:  OGT, HD catheter, ETT 
DATA:  
 
 
Labs: Results:  
   
Chemistry Recent Labs 10/11/18 
 0445  10/10/18 
 0345  10/09/18 
 1230  10/09/18 
 0340   10/08/18 
 1712 GLU  144*  106*  91  109*   < >   --   
NA  142  141  140  141   < >   --   
K  4.8  4.5  4.0  4.3   < >   --   
CL  104  103  104  105   < >   --   
CO2  25  27  27  28   < >   --   
BUN  56*  40*  30*  23*   < >   --   
 CREA  7.07*  5.35*  4.23*  3.34*   < >   --   
CA  7.3*  7.6*  8.2*  7.4*   < >   --   
AGAP  13  11  9  8   < >   --   
BUCR  8*  7*  7*  7*   < >   --   
AP   --   239*   --    --    --   237* TP   --   7.1   --    --    --   6.9 ALB  1.6*  1.7*  1.6*   --   1.7*   --   1.7*  
GLOB   --   5.4*   --    --    --   5.2* AGRAT   --   0.3*   --    --    --   0.3*  
 < > = values in this interval not displayed. CBC w/Diff Recent Labs 10/10/18 
 0345  10/09/18 
 1230  10/09/18 
 0345 WBC  12.4  13.0  13.0  
RBC  2.71*  2.55*  2.56* HGB  7.7*  7.4*  7.6* HCT  24.7*  23.5*  23.4*  
PLT  451*  410  384 Coagulation No results for input(s): PTP, INR, APTT in the last 72 hours. No lab exists for component: INREXT, INREXT Liver Enzymes Recent Labs 10/11/18 
 0445  10/10/18 
 0345 TP   --   7.1 ALB  1.6*  1.7*  1.6* AP   --   239* SGOT   --   152* ABG No results found for: PH, PHI, PCO2, PCO2I, PO2, PO2I, HCO3, HCO3I, FIO2, FIO2I Microbiology Recent Labs 10/08/18 
 1227  10/08/18 [address] CULT  NO GROWTH 2 DAYS  NO GROWTH 2 DAYS Telemetry: [x]Sinus []A-flutter []Paced []A-fib []Multiple PVCs IMPRESSION:  
· Acute encepalopathy- MRI 10/3 consistent with anoxic brain injury; EEG 10/5 consistent with poor prognosis · Acute respiratory failure with hypoxia- Pt not a candidate for extubation d/t mental staus; family to schedule trach & peg  
· ARF on Dialysis · Chronic liver disease · Coagulopathy of unclear etiology · Possible acute pancreatitis · Probable pyleonephritis · Probable acute cholecystitis · Cardiomyopathy with grade 1 diastolic dysfunction · Hypoalbunemia Other Hx · S/p V Fib arrest- 10/2/18 EF 46-50%; LV global hypokinesis; cardiology following- recommends to continue supportive measures · HTN 
· ARF 
· CHF · DM 
· CVA PLAN:  
· Resp - Continue mechanical ventilation.   Trach and peg per family request.  HOB > 30. Strict aspiration precautions. Pulmonary hygiene. Daily ABG and CXR while intubated. · ID - Afebrile, aleukocytosis. Continue to trend temp and WBC curve. Blood Cx NGTD. Continue Abx. ID following- Cx catheter tip sent- NGTD · CVS - HD stable. Continue to monitor closely. · Heme/onc - Daily CBCs. Monitor H/H & platelets. · Metabolic - Monitor electrolytes and replace as needed. · Renal - Continue to trend renal indices. Nephrology following. · Endocrine - Glycemic control. BS goal < 180. SSI. · Neuro/ Pain/ Sedation - Monitor neuro status. Avoid sedating medications. Neuro following. · GI - Advance tube feeds as tolerated. Continue to trend LFTs and lipase. · Prophylaxis - DVT, GI- SCDs & protonix · Discussed in interdisciplinary rounds · Code status: FULL The patient is: [] acutely ill Risk of deterioration: [] moderate [x] critically ill  [x] high  
 
[x]See my orders for details My assessment/plan was discussed with: 
[x]nursing []PT/OT [x]respiratory therapy [x]Dr. Trixie Sheth  
[]family [] Francia Covert, NP

## 2018-10-11 NOTE — PROGRESS NOTES
2000 nursing assessment complete. Intubated. No response to pain or voice. No command following. Anuric. Right neck TDC. PIV bilaterally. 0230 bath complete. Linen and gown changed. 2130 bath complete. Linen changed. Bedside shift change report given to icu nurse (oncoming nurse) by Jose Montenegro RN 
 (offgoing nurse). Report included the following information SBAR, MAR and Recent Results.

## 2018-10-11 NOTE — PROGRESS NOTES
Patient remains unable to communicate at this time as he is still on life support . No family were present at time of visit.  offered prayer and left Spiritual Care brochure. Chaplains will continue to follow and will provide pastoral care on an as needed/requested basis. Dayton Baker Board Certified [employer] [facility]  
[phone]

## 2018-10-11 NOTE — PROGRESS NOTES
Infectious Disease Follow-up Admit Date: 9/26/2018 Current abx Prior abx Meropenem/flucon 10/4- 7, vanco 9/27-14 Zosyn 9/27-7 Assessment ->Rec:  
 
Persistent leukocytosis SIRS poss sepsis- MOF multiple ID and non-infectious concerns outlined below, complicated, wbc 44.7P today from high 27K+ on 10/5 On  vancomycin/zosyn since 9/27 
-C diff neg 9/29, sput C albicans, repeat CT 10/4 no new findings --cxr reviewed - increased atx rt basse. Left base improved -> wbc slowly improving, down to 12k 
-> fever - seems to be better past >48h 
-> on Vanc/ meropenem/fluconazole for now 
-> midline out -  cath tip cx  ngtd 
->monitor  rt IJ uldall - line ok on pe and function Suspected Pyelo POA - CT B perineph stranding, UA tntc wbc, uctx ng ->likely treated at this point   
cholelithiaisis choledocholithiais suspected acute cholecystitis  poss cystic stone POA- abnl lft bili 4 alk phos 400 seen by GI and GS Dr. Mariusz Dawson, no plan for OR, for poss cholecystostomy if LFT worsen, bili, alk phos declining  ->per GI Poss pancreatitis - lipase 2200 POA now 3191with high of  5500 on 10/4, interpretation complicated by JULIANNA 
-NEW pancreas characterized as nl on CT 10/4 ->defer GI  
B infiltrates - poss edema infiltrate - RLL consolid better 10/4 cxr and suspect endobronchial clot contributed   ->monitor MRSE bacteremia 9/27 1 of 2. Has LUE midline unclear when placed, repeat bctx's IP today  ->monitor repeat bctx's ntd ARF POA now on HD - baseline cr 0.9 132/10.1 in ER 
-RIJ uldall 9/28 ->per renal , dialysis dependant Bleeding -melena earlier, EGD 10/2 clot in esophagus bronch 10/3 R endobronch clot NEW removed 10/5 repeat bronch  ->per others Suspected anoxic brain injury on MRI 10/3 SP VF arrest 9/29 -> overall poor prognosis however family wants all aggressive measures including trach/peg    
CVA R hemiparesis 9/10   
resp failure sp intubation 9/29 Comorbid: HTN HLD CHF h/o steatohepatitis MICROBIOLOGY:  
9/27 bctx 1 of 2 MRSE 
 uctx ng 
9/29 sput C albicans C diff neg 10/4 bctx x 2 NTD 
 fungitell indeterminate due to contamination 10/8 Cath tip cx IP 
10/8     bl cx ngtd LINES AND CATHETERS:  
PIV x 2 RIJ caryndall 9/28 OET      10/5 [place]gtube  9/29 Active Hospital Problems Diagnosis Date Noted  Pancreatitis 10/08/2018  Septic shock ([address] 09/30/2018 Probably secondary to pneumonia (suspicious for aspiration). Less likely, secondary to acute pancreatitis.  Ischemic encephalopathy 09/30/2018  Cardiac arrest with ventricular fibrillation ([address] 09/29/2018 ROSC before defibrillation could be attempted.  Abnormal blood coagulation profile 09/29/2018  Acute pancreatitis 09/29/2018 Shock, leukocytosis, elevated lipase but radiographically no ductal dilatation in pancreas. GB stones without radiographic stigmata of acute cholecystitis  Elevated LFTs 09/28/2018  History of stroke 09/27/2018 With residual R hemiparesis  Acute-on-chronic kidney injury ([address] 09/27/2018  Acute cystitis 09/27/2018  Elevated troponin 09/27/2018  Transaminitis 09/27/2018  Acute kidney injury ([address] 09/27/2018  Pneumonia of both lower lobes 09/11/2018  Essential hypertension 09/10/2018  Diabetes mellitus type 2, controlled ([address] 09/10/2018 Subjective: Interval notes reviewed. Pt remains unresponsive, on vent, small amts of tan sputum. Mid line was removed 10/8. afeb x > 48h. No family at bedside Current Facility-Administered Medications Medication Dose Route Frequency  hydroxypropyl methylcellulose (ISOPTO TEARS) 0.5 % ophthalmic solution 2 Drop  2 Drop Both Eyes BID  epoetin manda (EPOGEN;PROCRIT) injection 40,000 Units  40,000 Units IntraVENous Q7D  
 insulin glargine (LANTUS) injection 18 Units  18 Units SubCUTAneous DAILY  vancomycin (VANCOCIN) 750 mg in 0.9% sodium chloride (MBP/ADV) 250 mL ADV  750 mg IntraVENous ONCE  
 midazolam (VERSED) injection 1-10 mg  1-10 mg IntraVENous Multiple  pantoprazole (PROTONIX) 40 mg in sodium chloride 0.9% 10 mL injection  40 mg IntraVENous Q24H  
 0.9% sodium chloride infusion 250 mL  250 mL IntraVENous PRN  
 influenza vaccine 2018- (6 mos+)(PF) (FLUARIX QUAD/FLULAVAL QUAD) injection 0.5 mL  0.5 mL IntraMUSCular PRIOR TO DISCHARGE  fluconazole (DIFLUCAN) 200mg/100 mL IVPB (premix)  200 mg IntraVENous Q24H  
 meropenem (MERREM) 500 mg in 0.9% sodium chloride (MBP/ADV) 50 mL MBP  500 mg IntraVENous Q12H  
 midazolam (VERSED) injection    PRN  
 acetaminophen (TYLENOL) solution 325 mg  325 mg Per NG tube Q4H PRN  
 0.9% sodium chloride infusion 250 mL  250 mL IntraVENous PRN  
 heparin (porcine) pf 100 Units  100 Units InterCATHeter PRN  
 0.9% sodium chloride infusion 250 mL  250 mL IntraVENous PRN  
 insulin lispro (HUMALOG) injection   SubCUTAneous Q6H  
 albuterol (PROVENTIL VENTOLIN) nebulizer solution 2.5 mg  2.5 mg Nebulization Q6H RT  
 heparin (porcine) 1,000 unit/mL injection 1,000 Units  1,000 Units InterCATHeter DIALYSIS PRN  
 VANCOMYCIN INFORMATION NOTE   Other Rx Dosing/Monitoring  senna-docusate (PERICOLACE) 8.6-50 mg per tablet 1 Tab  1 Tab Oral BID PRN  
 ondansetron (ZOFRAN) injection 4 mg  4 mg IntraVENous Q4H PRN  
 glucose chewable tablet 16 g  4 Tab Oral PRN  
 glucagon (GLUCAGEN) injection 1 mg  1 mg IntraMUSCular PRN  
 dextrose (D50) infusion 12.5-25 g  25-50 mL IntraVENous PRN  
 hydrALAZINE (APRESOLINE) 20 mg/mL injection 20 mg  20 mg IntraVENous Q6H PRN Objective:  
 
Visit Vitals  /63  Pulse 98  Temp (!) 37.9 °F (3.3 °C)  Resp 17  Ht 5' 7\" (1.702 m)  Wt 89.9 kg (198 lb 3.1 oz)  SpO2 100%  BMI 31.04 kg/m2 Temp (24hrs), Av.8 °F (10.4 °C), Min:37.7 °F (3.2 °C), Max:99.7 °F (37.6 °C) GEN: unresponsive BM on vent in ICU HEENT: anicteric YESSI OGT, pupils non reactive NECK: supple RIJ TDC suspect hematoma no cellulitis CHEST: coarse bs B no wheeze or  rhonchi CVS: RRR,no murmur appreciated ABD: soft ? RUQ fullness (+) BS no localized tenderness EXT: anasarca with 2+ pitting edema b/l UE rt >lt SKIN:no rash CNS: not on sedation but unresponsive, not following any commands, no blink to threat Labs: Results:  
Chemistry Recent Labs 10/11/18 
 0445  10/10/18 
 0345  10/09/18 
 1230  10/09/18 
 0340   10/08/18 
 1712 GLU  144*  106*  91  109*   < >   --   
NA  142  141  140  141   < >   --   
K  4.8  4.5  4.0  4.3   < >   --   
CL  104  103  104  105   < >   --   
CO2  25  27  27  28   < >   --   
BUN  56*  40*  30*  23*   < >   --   
CREA  7.07*  5.35*  4.23*  3.34*   < >   --   
CA  7.3*  7.6*  8.2*  7.4*   < >   --   
AGAP  13  11  9  8   < >   --   
BUCR  8*  7*  7*  7*   < >   --   
AP   --   239*   --    --    --   237* TP   --   7.1   --    --    --   6.9 ALB  1.6*  1.7*  1.6*   --   1.7*   --   1.7*  
GLOB   --   5.4*   --    --    --   5.2* AGRAT   --   0.3*   --    --    --   0.3*  
 < > = values in this interval not displayed. CBC w/Diff Recent Labs 10/11/18 
 1332  10/10/18 
 0345  10/09/18 
 1230 WBC  11.5  12.4  13.0  
RBC  2.71*  2.71*  2.55* HGB  7.7*  7.7*  7.4* HCT  24.7*  24.7*  23.5*  
PLT  472*  451*  410 RADIOLOGY: Reviewed 10/11 Increasing atelectasis/infiltrate at the right base with resolution of airspace 
disease at the left base. 10/10 cxr  Improved aeration of the right mid and lower lung with mild residual 
atelectatic opacities noted. Small region of opacity at the left lung base may 
reflect underlying atelectasis versus airspace disease. 10/4 CTAP IMPRESSION: 
1. Dense subtotal or total consolidation right lower lobe compatible with 
pneumonia similar to prior study.  Small bilateral pleural effusions similar in 
 size. 
2. Distended gallbladder with gallstones and gallbladder sludge without 
significant change in appearance and also described in detail on ultrasound of 
9/27/2018. 3. Indeterminate small lesion left hepatic lobe without change. Hepatomegaly 
again evident. 4. No intraperitoneal fluid. Possible small left upper pole renal cyst. 
Cardiomegaly. Nasogastric tube tip in body of stomach. cxr 10/5 Impression: 1. Endotracheal tube, visualized nasogastric tube, and right IJ central venous 
catheter in stable position. 2. Overall improved aeration of the right lower lobe, likely improved 
atelectasis with mild residual atelectasis/airspace disease. 3. Mild interstitial edema overall similar to prior. Microbiology Results All Micro Results Procedure Component Value Units Date/Time CULTURE, BLOOD [[id number]] Collected:  10/08/18 1227 Order Status:  Completed Specimen:  Blood from Blood Updated:  10/11/18 1007 Special Requests: PERIPHERAL Culture result: NO GROWTH 3 DAYS     
 CULTURE, CATHETER TIP [[id number]] Collected:  10/08/18 1215 Order Status:  Completed Specimen:  Catheter Tip Updated:  10/11/18 2931 Special Requests: NO SPECIAL REQUESTS Culture result: NO GROWTH 3 DAYS     
 CULTURE, BLOOD [[id number]] Collected:  10/04/18 6608 Order Status:  Completed Specimen:  Blood from Blood Updated:  10/10/18 1560 Special Requests: PERIPHERAL Culture result: NO GROWTH 6 DAYS     
 CULTURE, BLOOD [[id number]] Collected:  10/04/18 1120 Order Status:  Completed Specimen:  Blood from Blood Updated:  10/10/18 0484 Special Requests: PERIPHERAL Culture result: NO GROWTH 6 DAYS     
 CULTURE, BLOOD [[id number]] Collected:  09/27/18 0005 Order Status:  Completed Specimen:  Blood from Blood Updated:  10/03/18 0845 Special Requests: NO SPECIAL REQUESTS Culture result: NO GROWTH 6 DAYS CULTURE, BLOOD [[id number]]  (Abnormal)  (Susceptibility) Collected:  09/27/18 0136 Order Status:  Completed Specimen:  Blood from Blood Updated:  10/02/18 9075 Special Requests: NO SPECIAL REQUESTS     
  GRAM STAIN PEDIATRIC BOTTLE GRAM POSITIVE COCCI IN PAIRS IN CLUSTERS  
        
  SMEAR CALLED TO AND CORRECTLY REPEATED BY: Linus Sousa RN IN 2700 ON 9/29/18 AT 2033 WF Culture result:      
  AEROBIC BOTTLE STAPHYLOCOCCUS EPIDERMIDIS (A) CULTURE, RESPIRATORY/SPUTUM/BRONCH Lonza Barrack STAIN [[id number]]  (Abnormal) Collected:  09/29/18 1210 Order Status:  Completed Specimen:  Sputum from Endotracheal aspirate Updated:  10/02/18 8605 Special Requests: NO SPECIAL REQUESTS     
  GRAM STAIN >25 WBC/lpf     
   <10 EPI/lpf MUCUS PRESENT     
   MODERATE YEAST Culture result: MANY CANDIDA TROPICALIS (A)  
 C. DIFFICILE/EPI PCR [[id number]] Collected:  09/29/18 1400 Order Status:  Completed Specimen:  Stool Updated:  09/29/18 2248 Special Requests: NO SPECIAL REQUESTS Culture result: Toxigenic C. difficile NEGATIVE                         C. difficile target DNA sequences are not detected. CULTURE, URINE [[id number]] Collected:  09/27/18 0029 Order Status:  Completed Specimen:  Urine from Cath Urine Updated:  09/29/18 0944 Special Requests: NO SPECIAL REQUESTS Culture result: NO GROWTH 2 DAYS Negrita Rhodes MD, Maria D Powers October 11, 2018 [facility] [phone]

## 2018-10-11 NOTE — PROGRESS NOTES
RENAL PROGRESS NOTE Eamon Dave Assessment/Plan: · Dialysis dependant JULIANNA (ischemic atn in a setting of acute pyelonephritis/acute cholecystitis with sepsis and cardiac arrest 9/29). No evidence of recovery of renal function at this time. Next dialysis later today (wasn't done yesterday due to staffing) and continue 3/week. Continue to minimize intake, volume overload is improving. May soon need tdc if full care to be continued. · S/p cardiopulm arrest 9/29. Off pressors. · Acute pyelonephritis/sepsis. On abx. Afebrile. · Abnormal lft's/acute cholecystitis? On abx. GI on the case. · UGI bleed, EGD results noted- healing esophageal ulceration. · Acute blood loss anemia/anemia of kidney failure. H/H is stable, continue analia. · Asp pneumonia. · Resp failure. R bronch clot removed 10/6 with bronchoscopy. · Anoxic brain injury superimposed on recent cva. MRI results noted. Subjective: Intubated, unresponsive. Tolerates tf. Patient Active Problem List  
Diagnosis Code  Stroke (cerebrum) ([facility]) I63.9  Stroke ([facility] I63.9  Rhabdomyolysis M62.82  
 Dehydration E86.0  
 Essential hypertension I10  
 Diabetes mellitus type 2, controlled ([address] E11.9  Non compliance w medication regimen Z91.14  
 Pneumonia of both lower lobes J18.9  DM (diabetes mellitus) ([facility] E11.9  History of stroke Z80.78  
 Acute-on-chronic kidney injury ([facility] N17.9, N18.9  Acute cystitis N30.00  Elevated troponin R74.8  Transaminitis R74.0  Acute kidney injury ([facility] N17.9  Elevated LFTs R94.5  Cardiac arrest with ventricular fibrillation ([facility]) I46.9, I49.01  
 Abnormal blood coagulation profile R79.1  Acute pancreatitis K85.90  Septic shock ([facility]) A41.9, R65.21  
 Ischemic encephalopathy I67.89  
 Pancreatitis K85.90 Current Facility-Administered Medications Medication Dose Route Frequency Provider Last Rate Last Dose  hydroxypropyl methylcellulose (ISOPTO TEARS) 0.5 % ophthalmic solution 2 Drop  2 Drop Both Eyes BID Alberto Palacios, SALUD   2 Drop at 10/11/18 1046  epoetin manda (EPOGEN;PROCRIT) injection 40,000 Units  40,000 Units IntraVENous Q7D Derrek SPRINGER MD   40,000 Units at 10/10/18 1620  
 insulin glargine (LANTUS) injection 18 Units  18 Units SubCUTAneous DAILY Mark Wick MD   18 Units at 10/11/18 1047  
 vancomycin (VANCOCIN) 750 mg in 0.9% sodium chloride (MBP/ADV) 250 mL ADV  750 mg IntraVENous ONCE Ifeawilliam Cowan MD      
 midazolam (VERSED) injection 1-10 mg  1-10 mg IntraVENous Multiple Franny Sharif MD   2 mg at 10/09/18 1702  pantoprazole (PROTONIX) 40 mg in sodium chloride 0.9% 10 mL injection  40 mg IntraVENous Q24H Franny Sharif MD   40 mg at 10/11/18 1039  
 0.9% sodium chloride infusion 250 mL  250 mL IntraVENous PRN Melquiades Day DO      
 influenza vaccine 2018-19 (6 mos+)(PF) (FLUARIX QUAD/FLULAVAL QUAD) injection 0.5 mL  0.5 mL IntraMUSCular PRIOR TO DISCHARGE Dennys Koenig MD      
 fluconazole (DIFLUCAN) 200mg/100 mL IVPB (premix)  200 mg IntraVENous Q24H GRACE Pena  mL/hr at 10/10/18 1534 200 mg at 10/10/18 1534  meropenem (MERREM) 500 mg in 0.9% sodium chloride (MBP/ADV) 50 mL MBP  500 mg IntraVENous Q12H GRACE Pena  mL/hr at 10/11/18 1040 500 mg at 10/11/18 1040  
 midazolam (VERSED) injection    PRN Collette Sicks, MD   4 mg at 10/05/18 1140  acetaminophen (TYLENOL) solution 325 mg  325 mg Per NG tube Q4H PRN Dennys Koenig MD   325 mg at 10/09/18 0456  
 0.9% sodium chloride infusion 250 mL  250 mL IntraVENous PRN Melquiades Day DO      
 heparin (porcine) pf 100 Units  100 Units InterCATHeter PRN Cesar Crawford MD      
  0.9% sodium chloride infusion 250 mL  250 mL IntraVENous PRN Gifty Galloway MD      
 insulin lispro (HUMALOG) injection   SubCUTAneous Q6H Cynda Areas, DO   3 Units at 10/11/18 0630  
 albuterol (PROVENTIL VENTOLIN) nebulizer solution 2.5 mg  2.5 mg Nebulization Q6H RT Dyan Cullen MD   2.5 mg at 10/11/18 0908  heparin (porcine) 1,000 unit/mL injection 1,000 Units  1,000 Units InterCATHeter DIALYSIS PRN Kane Pike MD   1,000 Units at 09/28/18 1332  VANCOMYCIN INFORMATION NOTE   Other Rx Dosing/Monitoring Anjel Tomas MD      
 senna-docusate (PERICOLACE) 8.6-50 mg per tablet 1 Tab  1 Tab Oral BID PRN Jacqueline Austin, DO      
 ondansetron [facility]) injection 4 mg  4 mg IntraVENous Q4H PRN Jacqueline Austin, DO   4 mg at 09/28/18 0539  
 glucose chewable tablet 16 g  4 Tab Oral PRN Jacqueline Austin, DO      
 glucagon (GLUCAGEN) injection 1 mg  1 mg IntraMUSCular PRN Jacqueline Austin, DO      
 dextrose (D50) infusion 12.5-25 g  25-50 mL IntraVENous PRN Jacqueline Austin, DO   25 g at 10/09/18 1906  hydrALAZINE (APRESOLINE) 20 mg/mL injection 20 mg  20 mg IntraVENous Q6H PRN Kathryn Alexisr, NP   20 mg at 10/02/18 1759 Objective Vitals:  
 10/11/18 0700 10/11/18 0800 10/11/18 0900 10/11/18 0909 BP: 134/63 141/65 136/63 Pulse: 100 100 99 99 Resp: 28 26 30 27 Temp: (!) 38 °F (3.3 °C) (!) 37.9 °F (3.3 °C) (!) 37.9 °F (3.3 °C) TempSrc:      
SpO2: 100% 100% 100% 100% Weight:      
Height:      
 
 
 
Intake/Output Summary (Last 24 hours) at 10/11/18 1103 Last data filed at 10/11/18 0500 Gross per 24 hour Intake              990 ml Output                0 ml Net              990 ml Admission weight: Weight: 96.6 kg (213 lb) (09/26/18 5894) Last Weight Metrics: 
Weight Loss Metrics 10/8/2018 9/26/2018 9/13/2018 Today's Wt 198 lb 3.1 oz - 227 lb 15.3 oz  
BMI - 31.04 kg/m2 35.7 kg/m2 Physical Assessment: General: intubated, unresponsive. Eyes are open. NG tube in place. Neck: No jvd. Rt ij temporary dialysis catheter in place, dressing is clear. LUNGS: diminished air entry at the bases. No crackles. CVS EXM: S1, S2  RRR. Abdomen: soft, pos bs. Lower Extremities:  1+ edema. Lab CBC w/Diff Recent Labs 10/10/18 
 0345  10/09/18 
 1230  10/09/18 
 0345 WBC  12.4  13.0  13.0  
RBC  2.71*  2.55*  2.56* HGB  7.7*  7.4*  7.6* HCT  24.7*  23.5*  23.4*  
PLT  451*  410  384 Chemistry Recent Labs 10/11/18 
 0445  10/10/18 
 0345  10/09/18 
 1230  10/09/18 
 0340   10/08/18 
 1712 GLU  144*  106*  91  109*   < >   --   
NA  142  141  140  141   < >   --   
K  4.8  4.5  4.0  4.3   < >   --   
CL  104  103  104  105   < >   --   
CO2  25  27  27  28   < >   --   
BUN  56*  40*  30*  23*   < >   --   
CREA  7.07*  5.35*  4.23*  3.34*   < >   --   
CA  7.3*  7.6*  8.2*  7.4*   < >   --   
AGAP  13  11  9  8   < >   --   
BUCR  8*  7*  7*  7*   < >   --   
AP   --   239*   --    --    --   237* TP   --   7.1   --    --    --   6.9 ALB  1.6*  1.7*  1.6*   --   1.7*   --   1.7*  
GLOB   --   5.4*   --    --    --   5.2* AGRAT   --   0.3*   --    --    --   0.3* PHOS  7.3*  7.3*   --   4.2   < >   --   
 < > = values in this interval not displayed. No results found for: IRON, FE, TIBC, IBCT, PSAT, FERR Lab Results Component Value Date/Time Calcium 7.3 (L) 10/11/2018 04:45 AM  
 Phosphorus 7.3 (H) 10/11/2018 04:45 AM  
  
 
Floridalma Alves M.D. [facility] Phone [phone] Pager [phone]

## 2018-10-11 NOTE — DIALYSIS
ACUTE HEMODIALYSIS FLOW SHEET 
 
HEMODIALYSIS ORDERS: Physician: Barbi Norton Dialyzer:revaclear          Duration:4  hr  BFR: 300   DFR:600 Dialysate:  [id number]+  3   Ca+2.5 Na140  Bicarb 35 Weight:89.4   kg    Bed Scale [x]     Unable to Obtain []      Dry weight/UF Goal:2500 Access [place] Needle Gauge Heparin []  Bolus      Units    [] Hourly       Units    []None Catheter locking solution Pre BP: 136/63      Pulse:  98        Temperature:37.8     Respirations: 16  Tx: NS       ml/Bolus  Other        [] N/A Labs: Pre        Post:        [x] N/A Additional Orders(medications, blood products, hypotension management):       [x] N/A [x] Time Out/Safety Check  [x] [facility] Consent Verified CATHETER ACCESS: []N/A   [x]Right   []Left   [x]IJ     []Fem [x] First use X-ray verified     []Tunnel                [x] Non Tunneled [x]No S/S infection  []Redness  []Drainage []Cultured []Swelling []Pain  
[x]Medical Aseptic Prep Utilized   [x]Dressing Changed  [] Biopatch  Date:   10/11/18 []Clotted   [x]Patent   Flows: [x]Good  []Poor  []Reversed If access problem,  notified: []Yes    []N/A  Date:        
 
GRAFT/FISTULA ACCESS:  [x]N/A     []Right     []Left     []UE     []LE []AVG   []AVF        []Buttonhole    []Medical Aseptic Prep Utilized []No S/S infection  []Redness  []Drainage []Cultured []Swelling []Pain Bruit:   [] Strong    [] Weak       Thrill :   [] Strong    [] Weak Needle Gauge:    Length: If access problem,  notified: []Yes     [x]N/A  Date:       
Please describe access if present and not used:  
 
 
GENERAL ASSESSMENT:   
LUNGS:  Rate  SaO2%  100      [] N/A    [] Clear  [x] Coarse  [] Crackles  [] Wheezing 
      [] Diminished     Location : []RLL   []LLL    []RUL  []JOSISE Cough: []Productive  []Dry  [x]N/A   Respirations:  [x]Easy  []Labored Therapy:  []RA  []NC  l/min    Mask: []NRB []Venti       O2% [x]Ventilator  [x]Intubated  [] Trach  [] BiPaP CARDIAC: [x]Regular      [] Irregular   [] Pericardial Rub  [] JVD []  Monitored  [] Bedside  [] Remotely monitored [] N/A  Rhythm: EDEMA: [] None  [x]Generalized  [] Pitting [] 1    [] 2    [] 3    [] 4 [] Facial  [] Pedal  []  UE  [] LE  
SKIN:   [x] Warm  [] Hot     [] Cold   [x] Dry     [] Pale   [] Diaphoretic    
             [] Flushed  [] Jaundiced  [] Cyanotic  [] Rash  [] Weeping LOC:    [] Alert      []Oriented:    [] Person     [] Place  []Time 
             [] Confused  [] Lethargic  [] Medicated  [x] Non-responsive GI / ABDOMEN:  [] Flat    [] Distended    [x] Soft    [] Firm   []  Obese 
                           [] Diarrhea  [x] Bowel Sounds  [] Nausea  [] Vomiting  / URINE ASSESSMENT:[] Voiding   [] Oliguria  [x] Anuria   []  Mcbride [] Incontinent    []  Incontinent Brief      []  Bathroom Privileges PAIN: [x] 0 []1  []2   []3   []4   []5   []6   []7   []8   []9   []10 Scale 0-10  Action/Follow Up: MOBILITY:  [] Amb    [] Amb/Assist    [x] Bed    [] Wheelchair  [] Stretcher All Vitals and Treatment Details on Attached Flowsheet Hospital:  [place] [address] Room # 8411 [] 1st Time Acute  [] Stat  [x] Routine  [] Urgent    
[] Acute Room  []  Bedside  [x] ICU/CCU  [] ER Isolation Precautions:  [x] Dialysis   [] Airborne   [] Contact    [] Reverse Special Considerations:         [x] Blood Consent Verified []N/A ALLERGIES:   [x] NKA Code Status:  [x] Full Code  [] DNR  [] Other HBsAg ONLY: Date Drawn 9/28/18         [x]Negative []Positive []Unknown HBsAb: Date  9/28/18   [x] Susceptible   [] [id number] []Not Drawn  [] Drawn Current Labs:    Date of Labs: Today [x] Results for Clark Dia (MRN [id number]) as of 10/11/2018 18:39 Ref. Range 10/11/2018 13:32 WBC Latest Ref Range: 4.6 - 13.2 K/uL 11.5 RBC Latest Ref Range: 4.70 - 5.50 M/uL 2.71 (L) HGB Latest Ref Range: 13.0 - 16.0 g/dL 7.7 (L) HCT Latest Ref Range: 36.0 - 48.0 % 24.7 (L) MCV Latest Ref Range: 74.0 - 97.0 FL 91.1 MCH Latest Ref Range: 24.0 - 34.0 PG 28.4 MCHC Latest Ref Range: 31.0 - 37.0 g/dL 31.2 RDW Latest Ref Range: 11.6 - 14.5 % 15.7 (H) PLATELET Latest Ref Range: 135 - 420 K/uL 472 (H) MPV Latest Ref Range: 9.2 - 11.8 FL 8.9 (L) Results for Rodri Cortes (MRN [id number]) as of 10/11/2018 18:39 Ref. Range 10/11/2018 04:45 Sodium Latest Ref Range: 136 - 145 mmol/L 142 Potassium Latest Ref Range: 3.5 - 5.5 mmol/L 4.8 Chloride Latest Ref Range: 100 - 108 mmol/L 104 CO2 Latest Ref Range: 21 - 32 mmol/L 25 Anion gap Latest Ref Range: 3.0 - 18 mmol/L 13 Glucose Latest Ref Range: 74 - 99 mg/dL 144 (H) BUN Latest Ref Range: 7.0 - 18 MG/DL 56 (H) Creatinine Latest Ref Range: 0.6 - 1.3 MG/DL 7.07 (H) BUN/Creatinine ratio Latest Ref Range: 12 - 20   8 (L) Calcium Latest Ref Range: 8.5 - 10.1 MG/DL 7.3 (L) Phosphorus Latest Ref Range: 2.5 - 4.9 MG/DL 7.3 (H) GFR est non-AA Latest Ref Range: >60 ml/min/1.73m2 8 (L) GFR est AA Latest Ref Range: >60 ml/min/1.73m2 10 (L) Albumin Latest Ref Range: 3.4 - 5.0 g/dL 1.6 (L) Vancomycin,trough Latest Ref Range: 10.0 - 20.0 ug/mL 26.0 (HH) Results for Rodri Cortes (MRN [id number]) as of 10/11/2018 18:39 Ref. Range 9/28/2018 04:40 Hepatitis B surface Ag Latest Ref Range: <1.00 Index <0.10 Hep B surface Ag Interp. Latest Ref Range: NEG   NEGATIVE Cut and paste current labs here. DIET:  [] Renal    [x] Other     [] NPO     []  Diabetic PRIMARY NURSE REPORT: First initial/Last name/Title Pre Dialysis: Janet Stanton RN    Time: 2006 EDUCATION:   
 [] Patient [] Other         Knowledge Basis: []None []Minimal [] Substantial  
Barriers to learning  []N/A  
[] Access Care     [] S&S of infection     [] Fluid Management     []K+     []Procedural   
[]Albumin     [] Medications     [] Tx Options     [] Transplant     [] Diet     [] Other Teaching Tools:  [] Explain  [] Demo  [] Handouts [] Video Patient response:   [] Verbalized understanding  [] Teach back  [] Return demonstration [] Requires follow up Inappropriate due to    Pt. Unresponsive, no family here RO/HEMODIALYSIS MACHINE SAFETY CHECKS  Before each treatment:    
Machine Number:                   [facility] 
                                [] Unit Machine #  with centralized RO 
                                [] Portable Machine #1/RO serial # [id number] [] Portable Machine #2/RO serial # [id number] [] Portable Machine #3/RO serial # [id number] [facility] 
                                [] Portable Machine #11/RO serial # [id number] [x] Portable Machine #12/RO serial # [id number] [] Portable Machine #13/RO serial #  [id number] Alarm Test:  Pass time  5659        Other:        
[x] RO/Machine Log Complete Temp    37            [x]Extracorporeal Circuit Tested for integrity Dialysate: pH7.2   Conductivity: Meter  14      HD Machine [x][x][x][x][x][x][x][x][x][x][x][x][x][x][x][x][x][x][x][x][x][x][x][x][x][x][x][x][x][x][x][x][x][x][x][x][x][x][x][x][x][x][x][x][x][x][x][x][x][x][x][x][x][x][x][x][x][x][x][x][x][x][x][x]

## 2018-10-11 NOTE — DIABETES MGMT
NUTRITIONAL RE-ASSESSMENT GLYCEMIC CONTROL/ PLAN OF CARE Adrian Cowan           57 y.o.           9/26/2018 1. Acute renal failure, unspecified acute renal failure type ([address] 2. Acute UTI 3. Cholelithiasis with choledocholithiasis 4. History of CVA (cerebrovascular accident) DM INTERVENTIONS/PLAN:  
 Recommend full strength Osmolite 1.2 calorie TF at 20ml/hour. Advance as tolerated to goal rate of 70ml/hour. Tube feeding will provide 2016 calories,  93 g protein/d with 1.3 liters free water/d from TF. ASSESSMENT:  
Nutritional Status: 
Pt is overweight related to excess caloric intake as evidenced by 135% ideal weight and BMI= 31kg/m2. Pt meets criteria for obesity. Altered nutrition-related lab values RT DX. Diabetes Mellitus  related to lack of adherence to nutrition and medication recommendations as evidenced by A1c of 10% Altered nutrition-related lab values RT DX. Acute renal failure aeb requiring dialysis. Pt w/hypoalbuminemia as evidenced by albumin=1.6 mg/dl and prealbumin 10.5. Unstageable area on sacrum noted. Altered nutrition related lab values r/t pancreatitis AEB lipase 5503. Diabetes Management:  BG well controlled on current insulin Recent blood glucose:    
 
Lab Results Component Value Date/Time  (H) 10/11/2018 04:45 AM  
 GLUCPOC 195 (H) 10/11/2018 12:35 PM  
 GLUCPOC 165 (H) 10/11/2018 06:27 AM  
 GLUCPOC 171 (H) 10/11/2018 01:44 AM  
 
Within target range (non-ICU: <140; ICU<180): [] Yes   [x]  No 
 
Current Insulin regimen: 18 units Lantus/24 hrs plus VIR corrective lispro Home medication/insulin regimen:  
Key Antihyperglycemic Medications   
    
  
 insulin glargine (LANTUS) 100 unit/mL injection 10 units qhs  Indications: type 2 diabetes mellitus  
 insulin lispro (HUMALOG) 100 unit/mL injection 4 units with meals HbA1c: 10% equivalent  to ave Blood Glucose of 240mg/dl for 2-3 months prior to admission Adequate glycemic control PTA:  [] Yes  [x] No 
  
SUBJECTIVE/OBJECTIVE: Information obtained from: ICU rounds Diet:  Osmolite1.2 calorie TF at 70 ml/hr after CT No data found. Medications: [x]                Reviewed Labs:  
Lab Results Component Value Date/Time Hemoglobin A1c 10.0 (H) 09/30/2018 04:18 AM  
 
Lab Results Component Value Date/Time Sodium 142 10/11/2018 04:45 AM  
 Potassium 4.8 10/11/2018 04:45 AM  
 Chloride 104 10/11/2018 04:45 AM  
 CO2 25 10/11/2018 04:45 AM  
 Anion gap 13 10/11/2018 04:45 AM  
 Glucose 144 (H) 10/11/2018 04:45 AM  
 BUN 56 (H) 10/11/2018 04:45 AM  
 Creatinine 7.07 (H) 10/11/2018 04:45 AM  
 Calcium 7.3 (L) 10/11/2018 04:45 AM  
 Magnesium 3.8 (H) 09/29/2018 11:10 AM  
 Phosphorus 7.3 (H) 10/11/2018 04:45 AM  
 Albumin 1.6 (L) 10/11/2018 04:45 AM  
prealbumin 17.6 increased from 10.5 Lipase 3191 Anthropometrics: IBW : 69.9 kg (154 lb), % IBW (Calculated): 134.85 %, BMI (calculated): 31 Wt Readings from Last 1 Encounters:  
10/08/18 89.9 kg (198 lb 3.1 oz) Ht Readings from Last 1 Encounters:  
10/02/18 5' 7\" (1.702 m) Estimated Nutrition Needs:  1880 Kcals/day, Protein (g): 85 g Fluid (ml): 1800 ml Based on:   [x]          Actual BW    []          ABW   []            Adjusted BW   
Nutrition Diagnoses: as stated above Nutrition Interventions: TF 
Goal:  
Blood glucose will be within target range of  mg/dL by 10/14 Intake will meet >75% of energy and protein requirements by 10/16. Gradual weight loss post discharge 1 lb per week by 10/21. Nutrition Monitoring and Evaluation []     Monitor po intake on meal rounds 
[x]     Continue inpatient monitoring and intervention 
[]     Other: 
 
 
Nutrition Risk:  [x]   High     []  Moderate    []  Minimal/Uncompromised Ana Luisa Ulrich RD 
pgr [phone]

## 2018-10-11 NOTE — PROGRESS NOTES
(0730) Received report from [facility]. Assumed care of patient.  
 
(0900) Dr. Shiraz Holden at bedside. Patient will be receiving dialysis today. ([phone] Family at bedside. Updated on care and questions answered. (1630) Dialysis at bedside. (1920) Bedside and Verbal shift change report given to KAUSHAL Gonzalez (oncoming nurse) by Trever Palm RN (offgoing nurse). Report included the following information SBAR, Intake/Output, MAR, Recent Results, Cardiac Rhythm NSR to ST and Alarm Parameters .

## 2018-10-11 NOTE — PROGRESS NOTES
Internal Medicine Progress Note Patient's Name: Sarah Segundo Admit Date: 9/26/2018 Length of Stay: 14 
 
 
Assessment/Plan Active Hospital Problems Diagnosis Date Noted  Pancreatitis 10/08/2018  Septic shock ([address] 09/30/2018 Probably secondary to pneumonia (suspicious for aspiration). Less likely, secondary to acute pancreatitis.  Ischemic encephalopathy 09/30/2018  Cardiac arrest with ventricular fibrillation ([address] 09/29/2018 ROSC before defibrillation could be attempted.  Abnormal blood coagulation profile 09/29/2018  Acute pancreatitis 09/29/2018 Shock, leukocytosis, elevated lipase but radiographically no ductal dilatation in pancreas. GB stones without radiographic stigmata of acute cholecystitis  Elevated LFTs 09/28/2018  History of stroke 09/27/2018 With residual R hemiparesis  Acute-on-chronic kidney injury ([address] 09/27/2018  Acute cystitis 09/27/2018  Elevated troponin 09/27/2018  Transaminitis 09/27/2018  Acute kidney injury ([address] 09/27/2018  Pneumonia of both lower lobes 09/11/2018  Essential hypertension 09/10/2018  Diabetes mellitus type 2, controlled ([address] 09/10/2018 Pneumonia not POA 
 
- Vent mgmt per ICU 
- EGD showed large clot in esophagus last week repeat EGD 10/9 shows healed esophageal ulcer - Cont protonix IV every day  
- Hgb stable s/p 1 unit PRBC - Trend CBC 
- LFTs improving - GI signed off 10/9 
- MRI w/ evidence of severe anoxic brain injury - Minimal improvement on neuro exam 
- Appreciate neuro assistance 
- Cont broad spec IV Meropenem and IV Fluconazole  per ID , per ID if cont to spike fever after removal of midline then likely will need rt IJ uldall removed. - Nephro managing HD TTHS 
- Pt unlikely to have any meaningful neuro recovery, prognosis is very grim - Family wants to cont to do everything, including trach/PEG will speak to daughter today  
- DVT/GI protocol I have personally reviewed all pertinent labs and films that have officially resulted over the last 24 hours. I have personally checked for all pending labs that are awaiting final results. Subjective Pt s/e @ bedside Remains intubated Unresponsive MRI suspicious for  anoxia brain injury Objective Visit Vitals  /63  Pulse 99  Temp (!) 37.9 °F (3.3 °C)  Resp 27  
 Ht 5' 7\" (1.702 m)  Wt 89.9 kg (198 lb 3.1 oz)  SpO2 100%  BMI 31.04 kg/m2 Physical Exam: 
General Appearance: Intubated, not following commands HENT: normocephalic/atraumatic, + ETT Neck: No JVD, hematoma R side where [facility] is improving Lungs: Coarse B/L w/ vent-assisted BS 
CV: RRR, no m/r/g Abdomen: soft, non-tender, normal bowel sounds Extremities: no cyanosis, no peripheral edema Neuro: Unresponsive to commands, no withdrawal to pain, + corneal reflex and pupillary reaction today Skin: Normal color, intact Intake and Output: 
Current Shift:    
Last three shifts:  10/09 1901 - 10/11 0700 In: 5888 [I.V.:380] Out: -  
 
Lab/Data Reviewed: 
CMP:  
Lab Results Component Value Date/Time  10/11/2018 04:45 AM  
 K 4.8 10/11/2018 04:45 AM  
  10/11/2018 04:45 AM  
 CO2 25 10/11/2018 04:45 AM  
 AGAP 13 10/11/2018 04:45 AM  
  (H) 10/11/2018 04:45 AM  
 BUN 56 (H) 10/11/2018 04:45 AM  
 CREA 7.07 (H) 10/11/2018 04:45 AM  
 GFRAA 10 (L) 10/11/2018 04:45 AM  
 GFRNA 8 (L) 10/11/2018 04:45 AM  
 CA 7.3 (L) 10/11/2018 04:45 AM  
 PHOS 7.3 (H) 10/11/2018 04:45 AM  
 ALB 1.6 (L) 10/11/2018 04:45 AM  
 
CBC:  
No results found for: WBC, HGB, HGBEXT, HCT, HCTEXT, PLT, PLTEXT, HGBEXT, HCTEXT, PLTEXT Imaging Reviewed: 
[facility] Result Date: 10/11/2018 PORTABLE CHEST Indication: Follow-up intubated patient Comparison study from the previous day. Monitoring leads are superimposed over the chest. Right jugular CVP line tip at the cavoatrial junction.  Endotracheal tube terminates 4.4 cm above the billy. Nasogastric tube can be followed to the stomach, tip not included on the image. Increasing atelectasis/infiltrate at the right lung base. Improved aeration at the left base. Cardiac silhouette mildly enlarged. No pleural effusion and no pneumothorax. ---------------- Impression: Increasing atelectasis/infiltrate at the right base with resolution of airspace disease at the left base. Tubes and lines are stable. Medications Reviewed: 
Current Facility-Administered Medications Medication Dose Route Frequency  hydroxypropyl methylcellulose (ISOPTO TEARS) 0.5 % ophthalmic solution 2 Drop  2 Drop Both Eyes BID  epoetin manda (EPOGEN;PROCRIT) injection 40,000 Units  40,000 Units IntraVENous Q7D  
 insulin glargine (LANTUS) injection 18 Units  18 Units SubCUTAneous DAILY  vancomycin (VANCOCIN) 750 mg in 0.9% sodium chloride (MBP/ADV) 250 mL ADV  750 mg IntraVENous ONCE  
 midazolam (VERSED) injection 1-10 mg  1-10 mg IntraVENous Multiple  pantoprazole (PROTONIX) 40 mg in sodium chloride 0.9% 10 mL injection  40 mg IntraVENous Q24H  
 0.9% sodium chloride infusion 250 mL  250 mL IntraVENous PRN  
 influenza vaccine 2018-19 (6 mos+)(PF) (FLUARIX QUAD/FLULAVAL QUAD) injection 0.5 mL  0.5 mL IntraMUSCular PRIOR TO DISCHARGE  fluconazole (DIFLUCAN) 200mg/100 mL IVPB (premix)  200 mg IntraVENous Q24H  
 meropenem (MERREM) 500 mg in 0.9% sodium chloride (MBP/ADV) 50 mL MBP  500 mg IntraVENous Q12H  
 midazolam (VERSED) injection    PRN  
 acetaminophen (TYLENOL) solution 325 mg  325 mg Per NG tube Q4H PRN  
 0.9% sodium chloride infusion 250 mL  250 mL IntraVENous PRN  
 heparin (porcine) pf 100 Units  100 Units InterCATHeter PRN  
 0.9% sodium chloride infusion 250 mL  250 mL IntraVENous PRN  
 insulin lispro (HUMALOG) injection   SubCUTAneous Q6H  
 albuterol (PROVENTIL VENTOLIN) nebulizer solution 2.5 mg  2.5 mg Nebulization Q6H RT  
  heparin (porcine) 1,000 unit/mL injection 1,000 Units  1,000 Units InterCATHeter DIALYSIS PRN  
 VANCOMYCIN INFORMATION NOTE   Other Rx Dosing/Monitoring  senna-docusate (PERICOLACE) 8.6-50 mg per tablet 1 Tab  1 Tab Oral BID PRN  
 ondansetron (ZOFRAN) injection 4 mg  4 mg IntraVENous Q4H PRN  
 glucose chewable tablet 16 g  4 Tab Oral PRN  
 glucagon (GLUCAGEN) injection 1 mg  1 mg IntraMUSCular PRN  
 dextrose (D50) infusion 12.5-25 g  25-50 mL IntraVENous PRN  
 hydrALAZINE (APRESOLINE) 20 mg/mL injection 20 mg  20 mg IntraVENous Q6H PRN

## 2018-10-12 NOTE — PROGRESS NOTES
Problem: Falls - Risk of 
Goal: *Absence of Falls Document Valentina Cordova Fall Risk and appropriate interventions in the flowsheet. Outcome: Progressing Towards Goal 
Fall Risk Interventions: 
Mobility Interventions: Bed/chair exit alarm Mentation Interventions: Evaluate medications/consider consulting pharmacy Medication Interventions: Evaluate medications/consider consulting pharmacy Elimination Interventions: Bed/chair exit alarm, Call light in reach Problem: Pressure Injury - Risk of 
Goal: *Prevention of pressure injury Document Mitul Scale and appropriate interventions in the flowsheet. Outcome: Progressing Towards Goal 
Pressure Injury Interventions: 
Sensory Interventions: Assess changes in LOC Moisture Interventions: Check for incontinence Q2 hours and as needed Activity Interventions: Pressure redistribution bed/mattress(bed type) Mobility Interventions: Pressure redistribution bed/mattress (bed type) Nutrition Interventions: Document food/fluid/supplement intake Friction and Shear Interventions: Apply protective barrier, creams and emollients

## 2018-10-12 NOTE — PROGRESS NOTES
Internal Medicine Progress Note Patient's Name: Adrian Cowan Admit Date: 9/26/2018 Length of Stay: 15 
 
 
Assessment/Plan Active Hospital Problems Diagnosis Date Noted  Pancreatitis 10/08/2018  Septic shock ([address] 09/30/2018 Probably secondary to pneumonia (suspicious for aspiration). Less likely, secondary to acute pancreatitis.  Ischemic encephalopathy 09/30/2018  Cardiac arrest with ventricular fibrillation ([address] 09/29/2018 ROSC before defibrillation could be attempted.  Abnormal blood coagulation profile 09/29/2018  Acute pancreatitis 09/29/2018 Shock, leukocytosis, elevated lipase but radiographically no ductal dilatation in pancreas. GB stones without radiographic stigmata of acute cholecystitis  Elevated LFTs 09/28/2018  History of stroke 09/27/2018 With residual R hemiparesis  Acute-on-chronic kidney injury ([address] 09/27/2018  Acute cystitis 09/27/2018  Elevated troponin 09/27/2018  Transaminitis 09/27/2018  Acute kidney injury ([address] 09/27/2018  Pneumonia of both lower lobes 09/11/2018  Essential hypertension 09/10/2018  Diabetes mellitus type 2, controlled ([address] 09/10/2018 Pneumonia not POA 
 
- Vent mgmt per ICU 
- EGD showed large clot in esophagus last week repeat EGD 10/9 shows healed esophageal ulcer - Cont protonix IV every day  
- Hgb stable s/p 1 unit PRBC - Trend CBC 
- LFTs improving - GI signed off 10/9 
- MRI w/ evidence of severe anoxic brain injury - Minimal improvement on neuro exam 
- Appreciate neuro assistance 
- Cont broad spec IV Meropenem and IV Fluconazole  per ID , per ID if cont to spike fever after removal of midline then likely will need rt IJ uldall removed. - Nephro managing HD TTHS 
- Pt unlikely to have any meaningful neuro recovery, prognosis is very grim - Family wants to cont to do everything, including trach/PEG will speak to daughter today  
- DVT/GI protocol - Daughter was called  For TRACH/PEG and she was ok with it I have personally reviewed all pertinent labs and films that have officially resulted over the last 24 hours. I have personally checked for all pending labs that are awaiting final results. Subjective Pt s/e @ bedside Remains intubated Unresponsive MRI suspicious for  anoxia brain injury Objective Visit Vitals  /68  Pulse 98  Temp (!) 37.9 °F (3.3 °C)  Resp 18  Ht 5' 7\" (1.702 m)  Wt 89.9 kg (198 lb 3.1 oz)  SpO2 100%  BMI 31.04 kg/m2 Physical Exam: 
General Appearance: Intubated, not following commands HENT: normocephalic/atraumatic, + ETT Neck: No JVD, hematoma R side where [facility] is improving Lungs: Coarse B/L w/ vent-assisted BS 
CV: RRR, no m/r/g Abdomen: soft, non-tender, normal bowel sounds Extremities: no cyanosis, no peripheral edema Neuro: Unresponsive to commands, no withdrawal to pain, + corneal reflex and pupillary reaction today Skin: Normal color, intact Intake and Output: 
Current Shift:  10/12 0701 - 10/12 1900 In: 440 [I.V.:50] Out: - Last three shifts:  10/10 1901 - 10/12 0700 In: 7663 [I.V.:500] Out: -  
 
Lab/Data Reviewed: 
CMP:  
Lab Results Component Value Date/Time  10/12/2018 05:10 AM  
 K 4.4 10/12/2018 05:10 AM  
  10/12/2018 05:10 AM  
 CO2 29 10/12/2018 05:10 AM  
 AGAP 8 10/12/2018 05:10 AM  
  (H) 10/12/2018 05:10 AM  
 BUN 34 (H) 10/12/2018 05:10 AM  
 CREA 4.16 (H) 10/12/2018 05:10 AM  
 GFRAA 18 (L) 10/12/2018 05:10 AM  
 GFRNA 15 (L) 10/12/2018 05:10 AM  
 CA 7.3 (L) 10/12/2018 05:10 AM  
 PHOS 4.3 10/12/2018 05:10 AM  
 ALB 1.6 (L) 10/12/2018 05:10 AM  
 
CBC:  
Lab Results Component Value Date/Time WBC 12.2 10/12/2018 05:10 AM  
 HGB 7.4 (L) 10/12/2018 05:10 AM  
 HCT 23.8 (L) 10/12/2018 05:10 AM  
  (H) 10/12/2018 05:10 AM  
 
 
Imaging Reviewed: 
[facility] Result Date: 10/12/2018 Chest AP portable INDICATION: Endotracheal tube, respiratory distress, pneumonia. COMPARISON: 10/11/2018. FINDINGS: Endotracheal tube tip is approximately 3.6 cm from the billy. NG tube extends into the stomach out of field-of-view. Right IJ pheresis catheter with tip at the superior vena cava. Monitoring leads overlie the chest. Stable cardiomediastinal silhouette. Aortic atherosclerosis noted. There is persistent bilateral diffuse reticular and patchy alveolar opacity, right more the left. More confluent parahilar and medial right lung base opacity stable to minimally improved from comparison study. No new airspace disease identified. Costophrenic angles are sharp. No pneumothorax. IMPRESSION: 1. Endotracheal tube, NG tube, and right IJ line in adequate position as above. 2. Persistent bilateral diffuse reticular and alveolar opacity. Stable to minimally improved more confluent opacity in the right perihilar region and medial right lung base. Medications Reviewed: 
Current Facility-Administered Medications Medication Dose Route Frequency  [START ON 10/13/2018] insulin glargine (LANTUS) injection 20 Units  20 Units SubCUTAneous DAILY  hydroxypropyl methylcellulose (ISOPTO TEARS) 0.5 % ophthalmic solution 2 Drop  2 Drop Both Eyes BID  epoetin manda (EPOGEN;PROCRIT) injection 40,000 Units  40,000 Units IntraVENous Q7D  
 midazolam (VERSED) injection 1-10 mg  1-10 mg IntraVENous Multiple  pantoprazole (PROTONIX) 40 mg in sodium chloride 0.9% 10 mL injection  40 mg IntraVENous Q24H  
 0.9% sodium chloride infusion 250 mL  250 mL IntraVENous PRN  
 influenza vaccine 2018-19 (6 mos+)(PF) (FLUARIX QUAD/FLULAVAL QUAD) injection 0.5 mL  0.5 mL IntraMUSCular PRIOR TO DISCHARGE  fluconazole (DIFLUCAN) 200mg/100 mL IVPB (premix)  200 mg IntraVENous Q24H  
 meropenem (MERREM) 500 mg in 0.9% sodium chloride (MBP/ADV) 50 mL MBP  500 mg IntraVENous Q12H  
 midazolam (VERSED) injection    PRN  
  acetaminophen (TYLENOL) solution 325 mg  325 mg Per NG tube Q4H PRN  
 0.9% sodium chloride infusion 250 mL  250 mL IntraVENous PRN  
 heparin (porcine) pf 100 Units  100 Units InterCATHeter PRN  
 0.9% sodium chloride infusion 250 mL  250 mL IntraVENous PRN  
 insulin lispro (HUMALOG) injection   SubCUTAneous Q6H  
 albuterol (PROVENTIL VENTOLIN) nebulizer solution 2.5 mg  2.5 mg Nebulization Q6H RT  
 heparin (porcine) 1,000 unit/mL injection 1,000 Units  1,000 Units InterCATHeter DIALYSIS PRN  
 VANCOMYCIN INFORMATION NOTE   Other Rx Dosing/Monitoring  senna-docusate (PERICOLACE) 8.6-50 mg per tablet 1 Tab  1 Tab Oral BID PRN  
 ondansetron (ZOFRAN) injection 4 mg  4 mg IntraVENous Q4H PRN  
 glucose chewable tablet 16 g  4 Tab Oral PRN  
 glucagon (GLUCAGEN) injection 1 mg  1 mg IntraMUSCular PRN  
 dextrose (D50) infusion 12.5-25 g  25-50 mL IntraVENous PRN  
 hydrALAZINE (APRESOLINE) 20 mg/mL injection 20 mg  20 mg IntraVENous Q6H PRN

## 2018-10-12 NOTE — PROGRESS NOTES
[facility] ICU Progress Note Name: Charlie Bhatia : 1955 MRN: [id number] Date: 10/12/2018 11:05 AM 
  
[x]I have reviewed the flowsheet and previous days notes. Events overnight reviewed and discussed with nursing staff. Vital signs and records reviewed. HPI:  
 
56 y/o male recently admitted for stroke with residual right hemiparesis. Readmitted to ICU on 18 with ARF. PMHx of HTN, ARF, CHF, DM, CVA and chronic liver disease from steatohepatits. 18 became hypotensive and arrested. CPR lasted 5 minutes which likely caused catastrophic neurologic injury. Renal failure secondary to pyleonephritis and may have had acute cholecytitis on admission as well. Elevated lipase may have reflected pancreatitis also. Significant clots have been found in his esophagus and right mainstem bronchus. Remains intubated post VF cardiac arrest 18. MRI 10/3/18 consistent with anoxic brain injury. Family aware of grim prognosis but wishes to pursue Trach and Peg. Subjective: 
 
10/12/18 Pt remains intubated- minimal thick yellow secretions Not sedated. Reflexes to pain Tmax 100.4 overnight Awaiting family to confirm trach and peg [x]The patient is unable to give any meaningful history or review of systems because the patient is: 
[x]Intubated []Sedated  
[]Unresponsive [x]The patient is critically ill on     
[x]Mechanical ventilation []Pressors []BiPAP [] ROS:A comprehensive review of systems was negative except for that written in the HPI. Medication Review: · Pressors - none · Sedation - none · Antibiotics - diflucan/merrem · Pain - prn tylenol · GI/ DVT - protonix/ scd's · Others - none Safety Bundles: VAP Bundle/ CAUTI/ Severe Sepsis Protocol/ Electrolyte Replacement Protocol Vital Signs:   
Visit Vitals  /71  Pulse 99  Temp (!) 37.7 °F (3.2 °C)  Resp 17  Ht 5' 7\" (1.702 m)  Wt 89.9 kg (198 lb 3.1 oz)  SpO2 100%  BMI 31.04 kg/m2 O2 Device: Ventilator O2 Flow Rate (L/min): 45 l/min Temp (24hrs), Av.6 °F (3.1 °C), Min:36.8 °F (2.7 °C), Max:37.9 °F (3.3 °C) Intake/Output:  
Last shift:        
Last 3 shifts: 10/10 1901 - 10/12 0700 In: 1855 [I.V.:500] Out: - Intake/Output Summary (Last 24 hours) at 10/12/18 0633 Last data filed at 10/12/18 0400 Gross per 24 hour Intake             1820 ml Output                0 ml Net             1820 ml Ventilator Settings: 
Ventilator Mode: Assist control, VC+ Respiratory Rate Back-Up Rate: 12 Insp Time (sec): 0.9 sec I:E Ratio: 1;4 
Ventilator Volumes Vt Set (ml): 500 ml Vt Exhaled (Machine Breath) (ml): 500 ml Vt Spont (ml): 535 ml Ve Observed (l/min): 9.6 l/min Ventilator Pressures Pressure Support (cm H2O): 15 cm H2O 
PIP Observed (cm H2O): 27 cm H2O Plateau Pressure (cm H2O): 18 cm H2O 
MAP (cm H2O): 11 PEEP/VENT (cm H2O): 5 cm H20 Auto PEEP Observed (cm H2O):  (unable to obtain) Physical Exam: 
 
General: Intubated; unresponsive to voice HEENT:  Anicteric sclerae; pink palpebral conjunctivae; mucosa moist 
Resp:  Symmetrical chest expansion, no accessory muscle use; good airway entry;  Bilateral breath sounds clear to auscultation; (+) cough CV:  S1, S2 present; regular rate and rhythm GI:  Abdomen soft, non-tender; (+) active bowel sounds; palpable liver Extremities:  +2 pulses on all extremities; +2 generalized edema Skin:  Warm; no rashes/ lesions noted Neurologic:  Reflexes to pain in BUE & LLE; (-) corneals, pupils 3 round, reactive Devices:  OGT, HD catheter, ETT 
DATA:  
 
 
Labs: Results:  
   
Chemistry Recent Labs 10/12/18 
 0510  10/11/18 
 0445  10/10/18 
 0345 GLU  165*  144*  106* NA  140  142  141  
K  4.4  4.8  4.5  
CL  103  104  103 CO2  29  25  27 BUN  34*  56*  40* CREA  4.16*  7.07*  5.35* CA  7.3*  7.3*  7.6* AGAP  8  13  11  
 BUCR  8*  8*  7* AP   --    --   239* TP   --    --   7.1 ALB  1.6*  1.6*  1.7*  1.6*  
GLOB   --    --   5.4* AGRAT   --    --   0.3* CBC w/Diff Recent Labs 10/12/18 
 0510  10/11/18 
 1332  10/10/18 
 0345 WBC  12.2  11.5  12.4 RBC  2.59*  2.71*  2.71* HGB  7.4*  7.7*  7.7* HCT  23.8*  24.7*  24.7* PLT  422*  472*  451* Coagulation No results for input(s): PTP, INR, APTT in the last 72 hours. No lab exists for component: INREXT, INREXT Liver Enzymes Recent Labs 10/12/18 
 0510   10/10/18 
 0345 TP   --    --   7.1 ALB  1.6*   < >  1.7*  1.6* AP   --    --   239* SGOT   --    --   152*  
 < > = values in this interval not displayed. ABG Lab Results Component Value Date/Time PHI 7.496 (H) 10/12/2018 05:18 AM  
 PCO2I 41.0 10/12/2018 05:18 AM  
 PO2I 70 (L) 10/12/2018 05:18 AM  
 HCO3I 31.6 (H) 10/12/2018 05:18 AM  
 FIO2I 30 10/12/2018 05:18 AM  
  
Microbiology No results for input(s): CULT in the last 72 hours. Telemetry: [x]Sinus []A-flutter []Paced []A-fib []Multiple PVCs IMPRESSION:  
· Acute encepalopathy- MRI 10/3 consistent with anoxic brain injury; EEG 10/5 consistent with poor prognosis · Acute respiratory failure with hypoxia- Pt not a candidate for extubation d/t mental staus; family to schedule trach & peg  
· ARF on Dialysis · Chronic liver disease · Coagulopathy of unclear etiology · Possible acute pancreatitis · Probable pyleonephritis · Probable acute cholecystitis · Cardiomyopathy with grade 1 diastolic dysfunction · Hypoalbunemia Other Hx · S/p V Fib arrest- 10/2/18 EF 46-50%; LV global hypokinesis; cardiology following- recommends to continue supportive measures · HTN 
· ARF 
· CHF · DM 
· CVA PLAN:  
· Resp - Continue mechanical ventilation. Trach and peg per family request.  HOB > 30. Strict aspiration precautions. Pulmonary hygiene. Daily ABG and CXR while intubated. · ID - Febrile, aleukocytosis. Continue to trend temp and WBC curve. Blood Cx NGTD. Continue Abx. ID following- Cx catheter tip sent- NGTD · CVS - HD stable. Continue to monitor closely. · Heme/onc - Daily CBCs. Monitor H/H & platelets. · Metabolic - Monitor electrolytes and replace as needed. · Renal - Continue to trend renal indices. Scheduled HD. Nephrology following. · Endocrine - Glycemic control. BS goal < 180. SSI. · Neuro/ Pain/ Sedation - Monitor neuro status. Avoid sedating medications. Neuro following. · GI - Advance tube feeds as tolerated. Continue to trend LFTs and lipase. · Prophylaxis - DVT, GI- SCDs & protonix · Discussed in interdisciplinary rounds · Family meeting today scheduled to discuss trach and peg per Dr. Ernesto Meza note. · Code status: FULL The patient is: [] acutely ill Risk of deterioration: [] moderate [x] critically ill  [x] high  
 
[x]See my orders for details My assessment/plan was discussed with: 
[x]nursing []PT/OT [x]respiratory therapy [x]Dr. Patric Ambriz  
[]family [] Benjamin Lovett NP

## 2018-10-12 NOTE — PROGRESS NOTES
Physical Exam  
Skin: Skin is warm and dry. Primary Nurse Rosi Amado RN and Lisa RN performed a dual skin assessment on this patient Impairment noted- see wound doc flow sheet Mitul score is 12

## 2018-10-12 NOTE — PROGRESS NOTES
Physical Exam  
Skin: Skin is warm and dry. Primary Nurse Trever Palm RN and Lisa RN performed a dual skin assessment on this patient Impairment noted- see wound doc flow sheet Mitul score is 12

## 2018-10-12 NOTE — PROGRESS NOTES
Called patient relative ( daughter) in [place] this morning and left her a message for her to call me back or call ICU and ask for intensivist.

## 2018-10-12 NOTE — PROGRESS NOTES
Problem: Falls - Risk of 
Goal: *Absence of Falls Document Yoana Reardons Fall Risk and appropriate interventions in the flowsheet. Outcome: Progressing Towards Goal 
Fall Risk Interventions: 
Mobility Interventions: Bed/chair exit alarm Mentation Interventions: Evaluate medications/consider consulting pharmacy Medication Interventions: Evaluate medications/consider consulting pharmacy Elimination Interventions: Bed/chair exit alarm, Call light in reach

## 2018-10-12 NOTE — PROGRESS NOTES
RENAL PROGRESS NOTE Noel Walker Assessment/Plan: · Dialysis dependant JULIANNA (ischemic atn in a setting of acute pyelonephritis/acute cholecystitis with sepsis and cardiac arrest 9/29). No evidence of recovery of renal function at this time. Next dialysis tomorrow and continue 3/week. Continue to minimize intake, volume overload is improving. May soon arrange tdc (although needs to be afebrile). · S/p cardiopulm arrest 9/29. Off pressors. · Acute pyelonephritis/sepsis. On abx per ID. Febrile again. · Abnormal lft's/acute cholecystitis? On abx. GI on the case. · UGI bleed, EGD results noted- healing esophageal ulceration. · Acute blood loss anemia/anemia of kidney failure. H/H is stable, continue analia. · Asp pneumonia. On abx. · Resp failure. R bronch clot removed 10/6 with bronchoscopy. Family decided to proced · Anoxic brain injury superimposed on recent cva. MRI results noted. Subjective: Intubated, unresponsive. Tolerates tf. Patient Active Problem List  
Diagnosis Code  Stroke (cerebrum) ([facility]) I63.9  Stroke ([facility] I63.9  Rhabdomyolysis M62.82  
 Dehydration E86.0  
 Essential hypertension I10  
 Diabetes mellitus type 2, controlled ([facility] E11.9  Non compliance w medication regimen Z91.14  
 Pneumonia of both lower lobes J18.9  DM (diabetes mellitus) ([facility] E11.9  History of stroke Z80.78  
 Acute-on-chronic kidney injury ([facility] N17.9, N18.9  Acute cystitis N30.00  Elevated troponin R74.8  Transaminitis R74.0  Acute kidney injury ([facility] N17.9  Elevated LFTs R94.5  Cardiac arrest with ventricular fibrillation ([facility]) I46.9, I49.01  
 Abnormal blood coagulation profile R79.1  Acute pancreatitis K85.90  Septic shock ([facility]) A41.9, R65.21  
 Ischemic encephalopathy I67.89  
 Pancreatitis K85.90 Current Facility-Administered Medications Medication Dose Route Frequency Provider Last Rate Last Dose  [START ON 10/13/2018] insulin glargine (LANTUS) injection 20 Units  20 Units SubCUTAneous DAILY Anthony Lee MD      
 hydroxypropyl methylcellulose (ISOPTO TEARS) 0.5 % ophthalmic solution 2 Drop  2 Drop Both Eyes BID Ivon Tomlin NP   2 Drop at 10/12/18 1027  epoetin manda (EPOGEN;PROCRIT) injection 40,000 Units  40,000 Units IntraVENous Q7D Cris Holden MD   40,000 Units at 10/10/18 1620  
 midazolam (VERSED) injection 1-10 mg  1-10 mg IntraVENous Multiple Juliánsanya Moody MD   2 mg at 10/09/18 1702  pantoprazole (PROTONIX) 40 mg in sodium chloride 0.9% 10 mL injection  40 mg IntraVENous Q24H Julián Moody MD   40 mg at 10/12/18 1026  
 0.9% sodium chloride infusion 250 mL  250 mL IntraVENous PRN Tram Diez DO      
 influenza vaccine 2018-19 (6 mos+)(PF) (FLUARIX QUAD/FLULAVAL QUAD) injection 0.5 mL  0.5 mL IntraMUSCular PRIOR TO DISCHARGE Dorn Curling, MD      
 fluconazole (DIFLUCAN) 200mg/100 mL IVPB (premix)  200 mg IntraVENous Q24H GRACE Handley  mL/hr at 10/11/18 1630 200 mg at 10/11/18 1630  meropenem (MERREM) 500 mg in 0.9% sodium chloride (MBP/ADV) 50 mL MBP  500 mg IntraVENous Q12H GRACE Handley  mL/hr at 10/12/18 1028 500 mg at 10/12/18 1028  midazolam (VERSED) injection    PRN Sylwia Juan MD   4 mg at 10/05/18 1140  acetaminophen (TYLENOL) solution 325 mg  325 mg Per NG tube Q4H PRN Dorn Curling, MD   325 mg at 10/09/18 0456  
 0.9% sodium chloride infusion 250 mL  250 mL IntraVENous PRN Tram Diez DO      
 heparin (porcine) pf 100 Units  100 Units InterCATHeter PRN Cris Holden MD      
 0.9% sodium chloride infusion 250 mL  250 mL IntraVENous PRN Dorn Curling, MD      
 insulin lispro (HUMALOG) injection   SubCUTAneous Q6H Tram Diez DO   3 Units at 10/12/18 2845  albuterol (PROVENTIL VENTOLIN) nebulizer solution 2.5 mg  2.5 mg Nebulization Q6H RT Chaz Carballo MD   2.5 mg at 10/12/18 7691  heparin (porcine) 1,000 unit/mL injection 1,000 Units  1,000 Units InterCATHeter DIALYSIS PRN Whit Virk MD   1,000 Units at 09/28/18 1332  VANCOMYCIN INFORMATION NOTE   Other Rx Dosing/Monitoring Kaley Mantilla MD      
 senna-docusate (PERICOLACE) 8.6-50 mg per tablet 1 Tab  1 Tab Oral BID PRN John Mckeon, DO      
 [facility]ron [facility]) injection 4 mg  4 mg IntraVENous Q4H PRN John Mckeon, DO   4 mg at 09/28/18 0539  
 glucose chewable tablet 16 g  4 Tab Oral PRN John Mckeon, DO      
 glucagon (GLUCAGEN) injection 1 mg  1 mg IntraMUSCular PRN John Mckeon, DO      
 dextrose (D50) infusion 12.5-25 g  25-50 mL IntraVENous PRN John Mckeon, DO   25 g at 10/09/18 1906  hydrALAZINE (APRESOLINE) 20 mg/mL injection 20 mg  20 mg IntraVENous Q6H PRN Clydia Aloe, NP   20 mg at 10/02/18 1759 Objective Vitals:  
 10/12/18 0800 10/12/18 0900 10/12/18 1000 10/12/18 1100 BP: 138/65 147/67 150/67 148/67 Pulse: (!) 101 100 98 97 Resp: 17 19 17 16 Temp: (!) 38 °F (3.3 °C) (!) 37.9 °F (3.3 °C) (!) 37.9 °F (3.3 °C) (!) 38 °F (3.3 °C) TempSrc:      
SpO2: 100% 100% 100% 100% Weight:      
Height:      
 
 
 
Intake/Output Summary (Last 24 hours) at 10/12/18 1132 Last data filed at 10/12/18 1100 Gross per 24 hour Intake             2050 ml Output                0 ml Net             2050 ml Admission weight: Weight: 96.6 kg (213 lb) (09/26/18 2244) Last Weight Metrics: 
Weight Loss Metrics 10/8/2018 9/26/2018 9/13/2018 Today's Wt 198 lb 3.1 oz - 227 lb 15.3 oz  
BMI - 31.04 kg/m2 35.7 kg/m2 Physical Assessment:  
 
General: intubated, unresponsive. Eyes are open. NG tube in place. Neck: No jvd. Rt ij temporary dialysis catheter in place, dressing is clear. LUNGS: diminished air entry at the bases. No crackles. CVS EXM: S1, S2  RRR. Abdomen: soft, pos bs. Lower Extremities:  1+ edema. Lab CBC w/Diff Recent Labs 10/12/18 
 0510  10/11/18 
 1332  10/10/18 
 0345 WBC  12.2  11.5  12.4 RBC  2.59*  2.71*  2.71* HGB  7.4*  7.7*  7.7* HCT  23.8*  24.7*  24.7* PLT  422*  472*  451* Chemistry Recent Labs 10/12/18 
 0510  10/11/18 
 0445  10/10/18 
 0345 GLU  165*  144*  106* NA  140  142  141  
K  4.4  4.8  4.5  
CL  103  104  103 CO2  29  25  27 BUN  34*  56*  40* CREA  4.16*  7.07*  5.35* CA  7.3*  7.3*  7.6* AGAP  8  13  11 BUCR  8*  8*  7* AP   --    --   239* TP   --    --   7.1 ALB  1.6*  1.6*  1.7*  1.6*  
GLOB   --    --   5.4* AGRAT   --    --   0.3* PHOS  4.3  7.3*  7.3* No results found for: IRON, FE, TIBC, IBCT, PSAT, FERR Lab Results Component Value Date/Time Calcium 7.3 (L) 10/12/2018 05:10 AM  
 Phosphorus 4.3 10/12/2018 05:10 AM  
  
 
Mami Cochran M.D. [facility] Phone [phone] Pager [phone]

## 2018-10-12 NOTE — PROGRESS NOTES
1924 Bedside shift change report given to miky falcon (oncoming nurse) by Nargis Rodriguez (offgoing nurse). Report included the following information SBAR, Kardex, Recent Results and Cardiac Rhythm nsr. Side rails up x 3, call light within reach. Call light within reach. 2000 assessment completed. Oral and mery-care completed. 2150 incontinent small amount blood urine from penis, and large brown loose stool cleansed, 
0000 reassessment completed  Oral and mery-care completed. 0200 resting in bed. Breathing easily with vent. resp in suctioning patinet. 0400 reassessment completed. Oral and mery-care completed. 0600 resting in bed. Breathing with vent. Noted when patient moved blood pressure goes up. 
0645 when doing bath withdrew with left leg.  
0745 Bedside shift change report given to [address] [place] [address] rn (oncoming nurse) by miky falcon (offgoing nurse). Report included the following information SBAR, Kardex, Recent Results and Cardiac Rhythm nsr. Side rails up x 3, call light within reach. Hob elevated x 3. Breathing easily with vent. ike falcon notified DR Jessica Mccormick to be consulted on Monday for trach and peg.

## 2018-10-12 NOTE — PROGRESS NOTES
([id number]) Received report from Coleen, [facility]. Assumed care of patient. [phone] with patient's daughter. Updated on care and questions answered. Daughter gave the ok for trach and PEG. She wants to talk to Darron Mart due to not wanting patient to go to [facility] post trach and PEG. Message left. [phone] Dr. Mckenzie Galarza gave the go ahead to consult general surgery for trach and PEG. Office called for consult.  
 
[phone]) GAVIN Kathleen talked to Dr. Corey Nageotte about trach and PEG. Dr. Corey Nageotte asked to be re-consulted on Monday due to not being able to set up a schedule. (2000) Bedside and Verbal shift change report given to Zane Burks RN (oncoming nurse) by Peter Ventura RN (offgoing nurse). Report included the following information SBAR, Intake/Output, MAR, Recent Results, Cardiac Rhythm NSR to ST and Alarm Parameters .

## 2018-10-12 NOTE — PROGRESS NOTES
We are available over weekend and plan to f/u Monday. Dr Berman Cancer is on call and can be reached at [phone] if any problem or question for ID prior. 
  
   
Infectious Disease Follow-up Admit Date: 9/26/2018 Current abx Prior abx Meropenem/flucon 10/4- 8, vanco 9/27-15 Zosyn 9/27-7 Assessment ->Rec:  
 
Persistent leukocytosis SIRS poss sepsis- MOF multiple ID and non-infectious concerns outlined below, complicated, wbc 53.8P today from high 27K+ on 10/5 On  vancomycin/zosyn since 9/27 
-C diff neg 9/29, sput C albicans, repeat CT 10/4 no new findings --cxr reviewed - increased atx rt basse. Left base improved -> wbc  down to 12.2k 
-> fever - seems to be better past >72h 
-> on Vanc/ meropenem/fluconazole for now 
-> midline out -  cath tip cx  ngtd 
->monitor  rt IJ uldall - line ok on pe and function --prognosis poor-tailor antibiotics next 72 h Suspected Pyelo POA - CT B perineph stranding, UA tntc wbc, uctx ng ->likely treated at this point   
cholelithiaisis choledocholithiais suspected acute cholecystitis  poss cystic stone POA- abnl lft bili 4 alk phos 400 seen by GI and GS Dr. Beti Dean, no plan for OR, for poss cholecystostomy if LFT worsen, bili, alk phos declining  ->per GI Poss pancreatitis - lipase 2200 POA now 3191with high of  5500 on 10/4, interpretation complicated by JULIANNA 
-NEW pancreas characterized as nl on CT 10/4 ->defer GI  
B infiltrates - poss edema infiltrate - RLL consolid better 10/4 cxr and suspect endobronchial clot contributed   ->monitor MRSE bacteremia 9/27 1 of 2. Has LUE midline unclear when placed, repeat bctx's IP today  ->monitor repeat bctx's ntd ARF POA now on HD - baseline cr 0.9 132/10.1 in ER 
-RIJ uldall 9/28 ->per renal , dialysis dependant Bleeding -melena earlier, EGD 10/2 clot in esophagus bronch 10/3 R endobronch clot NEW removed 10/5 repeat bronch  ->per others Suspected anoxic brain injury on MRI 10/3 SP VF arrest 9/29 -> overall poor prognosis however family wants all aggressive measures including trach/peg    
CVA R hemiparesis 9/10   
resp failure sp intubation 9/29 Comorbid: HTN HLD CHF h/o steatohepatitis MICROBIOLOGY:  
9/27 bctx 1 of 2 MRSE 
 uctx ng 
9/29 sput C albicans C diff neg 10/4 bctx x 2 NTD 
 fungitell indeterminate due to contamination 10/8 Cath tip cx IP 
10/8     bl cx ngtd LINES AND CATHETERS:  
PIV x 2 RIJ uldall 9/28 OET      10/5 [place]  9/29 Active Hospital Problems Diagnosis Date Noted  Pancreatitis 10/08/2018  Septic shock ([address] 09/30/2018 Probably secondary to pneumonia (suspicious for aspiration). Less likely, secondary to acute pancreatitis.  Ischemic encephalopathy 09/30/2018  Cardiac arrest with ventricular fibrillation ([address] 09/29/2018 ROSC before defibrillation could be attempted.  Abnormal blood coagulation profile 09/29/2018  Acute pancreatitis 09/29/2018 Shock, leukocytosis, elevated lipase but radiographically no ductal dilatation in pancreas. GB stones without radiographic stigmata of acute cholecystitis  Elevated LFTs 09/28/2018  History of stroke 09/27/2018 With residual R hemiparesis  Acute-on-chronic kidney injury ([address] 09/27/2018  Acute cystitis 09/27/2018  Elevated troponin 09/27/2018  Transaminitis 09/27/2018  Acute kidney injury ([address] 09/27/2018  Pneumonia of both lower lobes 09/11/2018  Essential hypertension 09/10/2018  Diabetes mellitus type 2, controlled ([address] 09/10/2018 Subjective: Interval notes reviewed. Pt remains unresponsive, on vent, small amts of white-tan sputum. Mid line was removed 10/8. afeb x > 72h. No family at bedside Current Facility-Administered Medications Medication Dose Route Frequency  [START ON 10/13/2018] insulin glargine (LANTUS) injection 20 Units  20 Units SubCUTAneous DAILY  hydroxypropyl methylcellulose (ISOPTO TEARS) 0.5 % ophthalmic solution 2 Drop  2 Drop Both Eyes BID  epoetin manda (EPOGEN;PROCRIT) injection 40,000 Units  40,000 Units IntraVENous Q7D  
 midazolam (VERSED) injection 1-10 mg  1-10 mg IntraVENous Multiple  pantoprazole (PROTONIX) 40 mg in sodium chloride 0.9% 10 mL injection  40 mg IntraVENous Q24H  
 0.9% sodium chloride infusion 250 mL  250 mL IntraVENous PRN  
 influenza vaccine 2018-19 (6 mos+)(PF) (FLUARIX QUAD/FLULAVAL QUAD) injection 0.5 mL  0.5 mL IntraMUSCular PRIOR TO DISCHARGE  fluconazole (DIFLUCAN) 200mg/100 mL IVPB (premix)  200 mg IntraVENous Q24H  
 meropenem (MERREM) 500 mg in 0.9% sodium chloride (MBP/ADV) 50 mL MBP  500 mg IntraVENous Q12H  
 midazolam (VERSED) injection    PRN  
 acetaminophen (TYLENOL) solution 325 mg  325 mg Per NG tube Q4H PRN  
 0.9% sodium chloride infusion 250 mL  250 mL IntraVENous PRN  
 heparin (porcine) pf 100 Units  100 Units InterCATHeter PRN  
 0.9% sodium chloride infusion 250 mL  250 mL IntraVENous PRN  
 insulin lispro (HUMALOG) injection   SubCUTAneous Q6H  
 albuterol (PROVENTIL VENTOLIN) nebulizer solution 2.5 mg  2.5 mg Nebulization Q6H RT  
 heparin (porcine) 1,000 unit/mL injection 1,000 Units  1,000 Units InterCATHeter DIALYSIS PRN  
 VANCOMYCIN INFORMATION NOTE   Other Rx Dosing/Monitoring  senna-docusate (PERICOLACE) 8.6-50 mg per tablet 1 Tab  1 Tab Oral BID PRN  
 ondansetron (ZOFRAN) injection 4 mg  4 mg IntraVENous Q4H PRN  
 glucose chewable tablet 16 g  4 Tab Oral PRN  
 glucagon (GLUCAGEN) injection 1 mg  1 mg IntraMUSCular PRN  
 dextrose (D50) infusion 12.5-25 g  25-50 mL IntraVENous PRN  
 hydrALAZINE (APRESOLINE) 20 mg/mL injection 20 mg  20 mg IntraVENous Q6H PRN Objective:  
 
Visit Vitals  /67  Pulse 97  Temp (!) 38 °F (3.3 °C)  Resp 16  
 Ht 5' 7\" (1.702 m)  Wt 89.9 kg (198 lb 3.1 oz)  SpO2 100%  BMI 31.04 kg/m2 Temp (24hrs), Av.6 °F (3.1 °C), Min:36.8 °F (2.7 °C), Max:38 °F (3.3 °C) GEN: unresponsive BM on vent in ICU 
 
CHEST: coarse bs B no wheeze or  rhonchi CVS: RRR,no murmur appreciated ABD: soft ? RUQ fullness (+) BS no localized tenderness EXT: anasarca with 3+ pitting edema b/l UE rt >lt SKIN:no rash CNS: , not following commands Labs: Results:  
Chemistry Recent Labs 10/12/18 
 0510  10/11/18 
 0445  10/10/18 
 0345 GLU  165*  144*  106* NA  140  142  141  
K  4.4  4.8  4.5  
CL  103  104  103 CO2  29  25  27 BUN  34*  56*  40* CREA  4.16*  7.07*  5.35* CA  7.3*  7.3*  7.6* AGAP  8  13  11 BUCR  8*  8*  7* AP   --    --   239* TP   --    --   7.1 ALB  1.6*  1.6*  1.7*  1.6*  
GLOB   --    --   5.4* AGRAT   --    --   0.3* CBC w/Diff Recent Labs 10/12/18 
 0510  10/11/18 
 1332  10/10/18 
 0345 WBC  12.2  11.5  12.4 RBC  2.59*  2.71*  2.71* HGB  7.4*  7.7*  7.7* HCT  23.8*  24.7*  24.7* PLT  422*  472*  451* RADIOLOGY: Reviewed 10/11 Increasing atelectasis/infiltrate at the right base with resolution of airspace 
disease at the left base. 10/10 cxr  Improved aeration of the right mid and lower lung with mild residual 
atelectatic opacities noted. Small region of opacity at the left lung base may 
reflect underlying atelectasis versus airspace disease. 10/4 CTAP IMPRESSION: 
1. Dense subtotal or total consolidation right lower lobe compatible with 
pneumonia similar to prior study. Small bilateral pleural effusions similar in 
size. 2. Distended gallbladder with gallstones and gallbladder sludge without 
significant change in appearance and also described in detail on ultrasound of 
2018. 3. Indeterminate small lesion left hepatic lobe without change. Hepatomegaly 
again evident. 4. No intraperitoneal fluid. Possible small left upper pole renal cyst. 
Cardiomegaly. Nasogastric tube tip in body of stomach. cxr 10/5 Impression: 1. Endotracheal tube, visualized nasogastric tube, and right IJ central venous 
catheter in stable position. 2. Overall improved aeration of the right lower lobe, likely improved 
atelectasis with mild residual atelectasis/airspace disease. 3. Mild interstitial edema overall similar to prior. Microbiology Results All Micro Results Procedure Component Value Units Date/Time CULTURE, BLOOD [[id number]] Collected:  10/08/18 1227 Order Status:  Completed Specimen:  Blood from Blood Updated:  10/12/18 1717 Special Requests: PERIPHERAL Culture result: NO GROWTH 4 DAYS     
 CULTURE, CATHETER TIP [[id number]] Collected:  10/08/18 1215 Order Status:  Completed Specimen:  Catheter Tip Updated:  10/11/18 8953 Special Requests: NO SPECIAL REQUESTS Culture result: NO GROWTH 3 DAYS     
 CULTURE, BLOOD [[id number]] Collected:  10/04/18 7709 Order Status:  Completed Specimen:  Blood from Blood Updated:  10/10/18 3818 Special Requests: PERIPHERAL Culture result: NO GROWTH 6 DAYS     
 CULTURE, BLOOD [[id number]] Collected:  10/04/18 1120 Order Status:  Completed Specimen:  Blood from Blood Updated:  10/10/18 5202 Special Requests: PERIPHERAL Culture result: NO GROWTH 6 DAYS     
 CULTURE, BLOOD [[id number]] Collected:  09/27/18 0005 Order Status:  Completed Specimen:  Blood from Blood Updated:  10/03/18 0845 Special Requests: NO SPECIAL REQUESTS Culture result: NO GROWTH 6 DAYS     
 CULTURE, BLOOD [[id number]]  (Abnormal)  (Susceptibility) Collected:  09/27/18 0136 Order Status:  Completed Specimen:  Blood from Blood Updated:  10/02/18 4057 Special Requests: NO SPECIAL REQUESTS     
  GRAM STAIN PEDIATRIC BOTTLE GRAM POSITIVE COCCI IN PAIRS IN CLUSTERS  
        
  SMEAR CALLED TO AND CORRECTLY REPEATED BY: Claudene Pert RN IN 2700 ON 9/29/18 AT 2033 WF Culture result: AEROBIC BOTTLE STAPHYLOCOCCUS EPIDERMIDIS (A) CULTURE, RESPIRATORY/SPUTUM/BRONCH Dia [employer] STAIN [[id number]]  (Abnormal) Collected:  09/29/18 1210 Order Status:  Completed Specimen:  Sputum from Endotracheal aspirate Updated:  10/02/18 1213 Special Requests: NO SPECIAL REQUESTS     
  GRAM STAIN >25 WBC/lpf     
   <10 EPI/lpf MUCUS PRESENT     
   MODERATE YEAST Culture result: MANY CANDIDA TROPICALIS (A)  
 C. DIFFICILE/EPI PCR [[id number]] Collected:  09/29/18 1400 Order Status:  Completed Specimen:  Stool Updated:  09/29/18 2248 Special Requests: NO SPECIAL REQUESTS Culture result: Toxigenic C. difficile NEGATIVE                         C. difficile target DNA sequences are not detected. CULTURE, URINE [[id number]] Collected:  09/27/18 0029 Order Status:  Completed Specimen:  Urine from Cath Urine Updated:  09/29/18 0944 Special Requests: NO SPECIAL REQUESTS Culture result: NO GROWTH 2 DAYS Fish Alegria MD, Noe Dowd October 12, 2018 [facility] [phone]

## 2018-10-13 NOTE — PROGRESS NOTES
Physical Exam  
Skin:  
 
  
 Primary Nurse Cher Catherine RN and Mian Hurd RN performed a dual skin assessment on this patient .

## 2018-10-13 NOTE — PROGRESS NOTES
Problem: Diabetes Self-Management Goal: *Incorporating nutritional management into lifestyle Describe effect of type, amount and timing of food on blood glucose; list 3 methods for planning meals. Outcome: Not Progressing Towards Goal 
Non-interactive no family here to teach Goal: *Incorporating physical activity into lifestyle State effect of exercise on blood glucose levels. Outcome: Not Progressing Towards Goal 
noninteractive Goal: *Developing strategies to promote health/change behavior Define the ABC's of diabetes; identify appropriate screenings, schedule and personal plan for screenings. Outcome: Not Progressing Towards Goal 
Non-interactive unable to teach Problem: Patient Education: Go to Patient Education Activity Goal: Patient/Family Education Outcome: Not Progressing Towards Goal 
noninteractive unable to teach family not present at current time Problem: Falls - Risk of 
Goal: *Absence of Falls Document Juliana Shows Fall Risk and appropriate interventions in the flowsheet. Outcome: Progressing Towards Goal 
Fall Risk Interventions: 
Mobility Interventions: Bed/chair exit alarm, Assess mobility with egress test 
 
Mentation Interventions: Adequate sleep, hydration, pain control, Bed/chair exit alarm, Door open when patient unattended, More frequent rounding, Reorient patient, Evaluate medications/consider consulting pharmacy Medication Interventions: Bed/chair exit alarm, Patient to call before getting OOB, Evaluate medications/consider consulting pharmacy Elimination Interventions: Bed/chair exit alarm, Call light in reach, Toileting schedule/hourly rounds Problem: General Medical Care Plan Goal: *Optimal pain control at patient's stated goal 
Outcome: Progressing Towards Goal 
Patient unable to state pain level that acceptable, looking at signs pain 
 
Problem: Pressure Injury - Risk of 
Goal: *Prevention of pressure injury Document Mitul Scale and appropriate interventions in the flowsheet. Outcome: Progressing Towards Goal 
Pressure Injury Interventions: 
Sensory Interventions: Assess changes in LOC, Assess need for specialty bed, Float heels, Keep linens dry and wrinkle-free, Minimize linen layers, Monitor skin under medical devices, Turn and reposition approx. every two hours (pillows and wedges if needed), Use 30-degree side-lying position Moisture Interventions: Absorbent underpads, Apply protective barrier, creams and emollients, Assess need for specialty bed, Contain wound drainage, Check for incontinence Q2 hours and as needed, Maintain skin hydration (lotion/cream), Moisture barrier, Minimize layers Activity Interventions: Pressure redistribution bed/mattress(bed type) Mobility Interventions: HOB 30 degrees or less, Float heels, Pressure redistribution bed/mattress (bed type), Turn and reposition approx. every two hours(pillow and wedges) Nutrition Interventions: Document food/fluid/supplement intake Friction and Shear Interventions: Apply protective barrier, creams and emollients, Feet elevated on foot rest, HOB 30 degrees or less, Foam dressings/transparent film/skin sealants, Transferring/repositioning devices, Minimize layers

## 2018-10-13 NOTE — PROGRESS NOTES
VENTILATOR Care plan 
 
Problem: Ventilator Management Goal: *Adequate oxygenation/ ventilation/ and extubation Patient: 
   
 
Adrian Cowan     57 y.o.   male     10/13/2018  12:08 PM 
Patient Active Problem List  
Diagnosis Code  Stroke (cerebrum) ([facility]) I63.9  Stroke ([facility] I63.9  Rhabdomyolysis M62.82  
 Dehydration E86.0  
 Essential hypertension I10  
 Diabetes mellitus type 2, controlled ([facility] E11.9  Non compliance w medication regimen Z91.14  
 Pneumonia of both lower lobes J18.9  DM (diabetes mellitus) ([facility] E11.9  History of stroke Z80.78  
 Acute-on-chronic kidney injury ([facility] N17.9, N18.9  Acute cystitis N30.00  Elevated troponin R74.8  Transaminitis R74.0  Acute kidney injury ([facility] N17.9  Elevated LFTs R94.5  Cardiac arrest with ventricular fibrillation ([facility]) I46.9, I49.01  
 Abnormal blood coagulation profile R79.1  Acute pancreatitis K85.90  Septic shock ([facility]) A41.9, R65.21  
 Ischemic encephalopathy I67.89  
 Pancreatitis K85.90 Pneumonia of both lower lobes due to infectious organism ([facility] [J18.1] Reason patient intubated: code blue Ventilator day: 8 Ventilator settings: ac vc+ ABG: 
Date:10/13/2018 Lab Results Component Value Date/Time PHI 7.486 (H) 10/13/2018 05:40 AM  
 PCO2I 38.5 10/13/2018 05:40 AM  
 PO2I 77 (L) 10/13/2018 05:40 AM  
 HCO3I 28.9 (H) 10/13/2018 05:40 AM  
 FIO2I 30 10/13/2018 05:40 AM  
 
 
Chest X-ray: 
Date:10/13/2018 Results from [facility] Encounter encounter on 09/26/18 XR CHEST PORT Narrative EXAM: One view chest x-ray CLINICAL INDICATION/HISTORY: Respiratory insufficiency. Intubated patient. COMPARISON: 10/12/2018. TECHNIQUE: Single AP view of the chest was obtained. 
 
_______________ FINDINGS: 
 
HEART, VESSELS, MEDIASTINUM: Heart size is enlarged, but stable. Mild central 
vascular congestion persists. LUNGS, PLEURAL SPACES: Slightly improved patchy alveolar opacities in bilateral 
lungs. No effusion or pneumothorax. BONY THORAX, SOFT TISSUES: No acute osseous abnormality. MEDICAL SUPPORT DEVICES: Endotracheal tube 4.4 cm above the billy. NG/OG tube 
courses beyond the limits of the film into the left upper quadrant of the 
abdomen. Right central venous catheter in stable/satisfactory position. _______________ Impression IMPRESSION: 
 
1. Support devices in satisfactory position. 2. Mild improvement in the bilateral pulmonary edema, though a degree of right 
lower lung consolidation persists. Lab Test: 
Date:10/13/2018 WBC:  
Lab Results Component Value Date/Time WBC 11.7 10/13/2018 03:41 AM  
HGB: Lab Results Component Value Date/Time HGB 7.5 (L) 10/13/2018 03:41 AM  
 PLTS: Lab Results Component Value Date/Time PLATELET 055 80/98/1213 03:41 AM  
 
 
SaO2%/flow:  
SpO2 Readings from Last 1 Encounters:  
10/13/18 97% Vital Signs:   Patient Vitals for the past 8 hrs: 
 Temp Pulse Resp BP SpO2  
10/13/18 1121 - 95 16 - 97 % 10/13/18 1000 - 95 17 166/74 98 % 10/13/18 0900 - 97 18 138/68 97 % 10/13/18 0800 99.7 °F (37.6 °C) 98 18 142/64 98 % 10/13/18 0747 - 98 18 - 98 % 10/13/18 0730 - 100 16 - 98 % 10/13/18 0700 - 99 20 143/63 100 % 10/13/18 0630 99.9 °F (37.7 °C) (!) 102 18 - 99 % 10/13/18 0600 - (!) 102 19 178/86 98 % 10/13/18 0530 99.9 °F (37.7 °C) 100 18 - 98 % 10/13/18 0500 - 99 18 178/80 98 % 10/13/18 0448 - 98 19 - 98 % 10/13/18 0430 - 97 18 - 99 % Wean Screen Pass (Yes or No): Wean Screen Reason for Failure: 
Duration of Weaning Trial: 
Additional Comments: PLAN OF CARE: possible tracheostomy? GOAL: 
 continue to maintain mechanical ventilation OUTCOME:

## 2018-10-13 NOTE — PROGRESS NOTES
0800-Received pt resting in bed with eyes open, no sign of distress, vss on ventilator, no command following, will continue to monitor 1000-Large brown loose bowel movement, pericare performed, pad changed tolerated well 
1200-Resting in bed with eyes closed, no sign of distress 1400-Pt seen by hospitalist, updated on pt status 1600-No change in pt status, no sign of distress, family at bedside 1930-Bedside and Verbal shift change report given to [address] [place], [place] [address]vin [place] (oncoming nurse) by Giuseppe Garcia RN (offgoing nurse).  Report included the following information SBAR, Kardex, Intake/Output, MAR, Recent Results and Cardiac Rhythm SR.

## 2018-10-13 NOTE — PROGRESS NOTES
Internal Medicine Progress Note Patient's Name: Orion Figueroa Admit Date: 9/26/2018 Length of Stay: 16 
 
 
Assessment/Plan Active Hospital Problems Diagnosis Date Noted  Pancreatitis 10/08/2018  Septic shock ([address] 09/30/2018 Probably secondary to pneumonia (suspicious for aspiration). Less likely, secondary to acute pancreatitis.  Ischemic encephalopathy 09/30/2018  Cardiac arrest with ventricular fibrillation ([address] 09/29/2018 ROSC before defibrillation could be attempted.  Abnormal blood coagulation profile 09/29/2018  Acute pancreatitis 09/29/2018 Shock, leukocytosis, elevated lipase but radiographically no ductal dilatation in pancreas. GB stones without radiographic stigmata of acute cholecystitis  Elevated LFTs 09/28/2018  History of stroke 09/27/2018 With residual R hemiparesis  Acute-on-chronic kidney injury ([address] 09/27/2018  Acute cystitis 09/27/2018  Elevated troponin 09/27/2018  Transaminitis 09/27/2018  Acute kidney injury ([address] 09/27/2018  Pneumonia of both lower lobes 09/11/2018  Essential hypertension 09/10/2018  Diabetes mellitus type 2, controlled ([address] 09/10/2018 Pneumonia not POA 
 
- Vent mgmt per ICU 
- EGD showed large clot in esophagus last week repeat EGD 10/9 shows healed esophageal ulcer - Cont protonix IV every day  
- Hgb stable s/p 1 unit PRBC - Trend CBC 
- LFTs improving - GI signed off 10/9 
- MRI w/ evidence of severe anoxic brain injury - Minimal improvement on neuro exam 
- Appreciate neuro assistance 
- Cont broad spec IV Meropenem and IV Fluconazole  per ID , per ID if cont to spike fever after removal of midline then likely will need rt IJ uldall removed. - Nephro managing HD TTHS 
- Pt unlikely to have any meaningful neuro recovery, prognosis is very grim - Family wants to cont to do everything, including trach/PEG will speak to daughter today  
- DVT/GI protocol - Daughter was called  For TRACH/PEG and she was ok with it. Will consult Surgery for both PEG and Trach probably on Monday I have personally reviewed all pertinent labs and films that have officially resulted over the last 24 hours. I have personally checked for all pending labs that are awaiting final results. Subjective Pt s/e @ bedside Remains intubated Unresponsive MRI suspicious for  anoxia brain injury Objective Visit Vitals  /69 (BP 1 Location: Right arm, BP Patient Position: At rest)  Pulse 95  Temp 100 °F (37.8 °C)  Resp 17  Ht 5' 7\" (1.702 m)  Wt 90.3 kg (199 lb 1.2 oz)  SpO2 98%  BMI 31.18 kg/m2 Physical Exam: 
General Appearance: Intubated, not following commands HENT: normocephalic/atraumatic, + ETT Neck: No JVD, hematoma R side where [facility] is improving Lungs: Coarse B/L w/ vent-assisted BS 
CV: RRR, no m/r/g Abdomen: soft, non-tender, normal bowel sounds Extremities: no cyanosis, no peripheral edema Neuro: Unresponsive to commands, no withdrawal to pain, + corneal reflex and pupillary reaction today Skin: Normal color, intact Intake and Output: 
Current Shift:  10/13 0701 - 10/13 1900 In: 630 Out: - Last three shifts:  10/11 1901 - 10/13 0700 In: 3461 [I.V.:605] Out: -  
 
Lab/Data Reviewed: 
CMP:  
Lab Results Component Value Date/Time  10/13/2018 03:41 AM  
 K 5.1 10/13/2018 03:41 AM  
  10/13/2018 03:41 AM  
 CO2 28 10/13/2018 03:41 AM  
 AGAP 10 10/13/2018 03:41 AM  
  (H) 10/13/2018 03:41 AM  
 BUN 53 (H) 10/13/2018 03:41 AM  
 CREA 5.54 (H) 10/13/2018 03:41 AM  
 GFRAA 13 (L) 10/13/2018 03:41 AM  
 GFRNA 10 (L) 10/13/2018 03:41 AM  
 CA 7.1 (L) 10/13/2018 03:41 AM  
 PHOS 5.2 (H) 10/13/2018 03:41 AM  
 ALB 1.6 (L) 10/13/2018 03:41 AM  
 
CBC:  
Lab Results Component Value Date/Time  WBC 11.7 10/13/2018 03:41 AM  
 HGB 7.5 (L) 10/13/2018 03:41 AM  
 HCT 23.7 (L) 10/13/2018 03:41 AM  
  10/13/2018 03:41 AM  
 
 
Imaging Reviewed: 
Vergil Mass Chest Corinne  Result Date: 10/13/2018 EXAM: One view chest x-ray CLINICAL INDICATION/HISTORY: Respiratory insufficiency. Intubated patient. COMPARISON: 10/12/2018. TECHNIQUE: Single AP view of the chest was obtained. _______________ FINDINGS: HEART, VESSELS, MEDIASTINUM: Heart size is enlarged, but stable. Mild central vascular congestion persists. LUNGS, PLEURAL SPACES: Slightly improved patchy alveolar opacities in bilateral lungs. No effusion or pneumothorax. BONY THORAX, SOFT TISSUES: No acute osseous abnormality. MEDICAL SUPPORT DEVICES: Endotracheal tube 4.4 cm above the billy. NG/OG tube courses beyond the limits of the film into the left upper quadrant of the abdomen. Right central venous catheter in stable/satisfactory position. _______________ IMPRESSION: 1. Support devices in satisfactory position. 2. Mild improvement in the bilateral pulmonary edema, though a degree of right lower lung consolidation persists. Medications Reviewed: 
Current Facility-Administered Medications Medication Dose Route Frequency  insulin glargine (LANTUS) injection 20 Units  20 Units SubCUTAneous DAILY  vancomycin (VANCOCIN) 750 mg in 0.9% sodium chloride (MBP/ADV) 250 mL ADV  750 mg IntraVENous Q TU, TH & SAT  [START ON 10/16/2018] VANCOMYCIN INFORMATION NOTE   Other ONCE  hydroxypropyl methylcellulose (ISOPTO TEARS) 0.5 % ophthalmic solution 2 Drop  2 Drop Both Eyes BID  epoetin manda (EPOGEN;PROCRIT) injection 40,000 Units  40,000 Units IntraVENous Q7D  
 midazolam (VERSED) injection 1-10 mg  1-10 mg IntraVENous Multiple  pantoprazole (PROTONIX) 40 mg in sodium chloride 0.9% 10 mL injection  40 mg IntraVENous Q24H  
 0.9% sodium chloride infusion 250 mL  250 mL IntraVENous PRN  
 influenza vaccine 2018-19 (6 mos+)(PF) (FLUARIX QUAD/FLULAVAL QUAD) injection 0.5 mL  0.5 mL IntraMUSCular PRIOR TO DISCHARGE  
  fluconazole (DIFLUCAN) 200mg/100 mL IVPB (premix)  200 mg IntraVENous Q24H  
 meropenem (MERREM) 500 mg in 0.9% sodium chloride (MBP/ADV) 50 mL MBP  500 mg IntraVENous Q12H  
 midazolam (VERSED) injection    PRN  
 acetaminophen (TYLENOL) solution 325 mg  325 mg Per NG tube Q4H PRN  
 0.9% sodium chloride infusion 250 mL  250 mL IntraVENous PRN  
 heparin (porcine) pf 100 Units  100 Units InterCATHeter PRN  
 0.9% sodium chloride infusion 250 mL  250 mL IntraVENous PRN  
 insulin lispro (HUMALOG) injection   SubCUTAneous Q6H  
 albuterol (PROVENTIL VENTOLIN) nebulizer solution 2.5 mg  2.5 mg Nebulization Q6H RT  
 heparin (porcine) 1,000 unit/mL injection 1,000 Units  1,000 Units InterCATHeter DIALYSIS PRN  
 VANCOMYCIN INFORMATION NOTE   Other Rx Dosing/Monitoring  senna-docusate (PERICOLACE) 8.6-50 mg per tablet 1 Tab  1 Tab Oral BID PRN  
 ondansetron (ZOFRAN) injection 4 mg  4 mg IntraVENous Q4H PRN  
 glucose chewable tablet 16 g  4 Tab Oral PRN  
 glucagon (GLUCAGEN) injection 1 mg  1 mg IntraMUSCular PRN  
 dextrose (D50) infusion 12.5-25 g  25-50 mL IntraVENous PRN  
 hydrALAZINE (APRESOLINE) 20 mg/mL injection 20 mg  20 mg IntraVENous Q6H PRN

## 2018-10-13 NOTE — PROGRESS NOTES
[address] [place] [address] [facility] Progress Note Name: Jocelynn Whitehead : 1955 MRN: [id number] Date: 10/13/2018 11:05 AM 
  
[x]I have reviewed the flowsheet and previous days notes. Events overnight reviewed and discussed with nursing staff. Vital signs and records reviewed. HPI:  
56 y/o male recently admitted for stroke with residual right hemiparesis. Readmitted to ICU on 18 with ARF. PMHx of HTN, ARF, CHF, DM, CVA and chronic liver disease from steatohepatits. 18 became hypotensive and arrested. CPR lasted 5 minutes which likely caused catastrophic neurologic injury. Renal failure secondary to pyleonephritis and may have had acute cholecytitis on admission as well. Elevated lipase may have reflected pancreatitis also. Significant clots have been found in his esophagus and right mainstem bronchus. Remains intubated post VF cardiac arrest 18. MRI 10/3/18 consistent with anoxic brain injury. Family aware of grim prognosis but wishes to pursue Trach and Peg. 
 
10/13/2018No overnight events, Tmax 100.3 Labs stable Breathing over vent Tolerating TF 
CXR with mild improvement edema, RLL consolidation 
 
 
[x]The patient is unable to give any meaningful history or review of systems because the patient is: 
[x]Intubated []Sedated [x]Unresponsive [x]The patient is critically ill on     
[x]Mechanical ventilation []Pressors []BiPAP [] ROS:A comprehensive review of systems was negative except for that written in the HPI. Medication Review: · Pressors - none · Sedation - none · Antibiotics - diflucan/merrem/vanc · Pain - prn tylenol · GI/ DVT - protonix/ scd's · Others - none Safety Bundles: VAP Bundle/ CAUTI/ Severe Sepsis Protocol/ Electrolyte Replacement Protocol Vital Signs:   
Visit Vitals  /74  Pulse 95  Temp 99.7 °F (37.6 °C)  Resp 17  Ht 5' 7\" (1.702 m)  Wt 90.3 kg (199 lb 1.2 oz)  SpO2 98%  BMI 31.18 kg/m2 O2 Device: Endotracheal tube, Ventilator O2 Flow Rate (L/min): 45 l/min Temp (24hrs), Av.3 °F (24.6 °C), Min:37.4 °F (3 °C), Max:100.3 °F (37.9 °C) Intake/Output:  
Last shift:      10/13 0701 - 10/13 1900 In: 210 Out: - Last 3 shifts: 10/11 1901 - 10/13 0700 In: 8373 [I.V.:605] Out: - Intake/Output Summary (Last 24 hours) at 10/13/18 1058 Last data filed at 10/13/18 1000 Gross per 24 hour Intake             2090 ml Output                0 ml Net             2090 ml Ventilator Settings: 
Ventilator Mode: Assist control, VC+ Respiratory Rate Back-Up Rate: 12 Insp Time (sec): 0.9 sec I:E Ratio: 1;3 
Ventilator Volumes Vt Set (ml): 500 ml Vt Exhaled (Machine Breath) (ml): 500 ml Vt Spont (ml): 535 ml Ve Observed (l/min): 9 l/min Ventilator Pressures Pressure Support (cm H2O): 15 cm H2O 
PIP Observed (cm H2O): 27 cm H2O Plateau Pressure (cm H2O): 19 cm H2O 
MAP (cm H2O): 9 PEEP/VENT (cm H2O): 5 cm H20 Auto PEEP Observed (cm H2O):  (unable to obtain) Physical Exam: 
 
General: Unresponsive on vent, breathing over the vent HEENT:  Anicteric sclerae; ETT in place, OGT, R neck dialysis catheter Resp:  Symmetrical chest expansion, no accessory muscle use; good airway entry;  Bilateral breath sounds clear to auscultation; no cough/gag CV:  Regular, no murmur GI:  Abdomen soft, non-tender; (+) active bowel sounds Extremities:  BUE/BLE edema Skin:  Warm; no rashes/ lesions noted Neurologic:  Reflexes to pain in BUE & LLE; (-) corneals, pupils 3 round, reactive Devices:  OGT, HD catheter, ETT 
DATA:  
 
 
Labs: Results:  
   
Chemistry Recent Labs 10/13/18 
 1016  10/12/18 
 1633  10/12/18 
 0510  10/11/18 
 7307 GLU  182*   --   165*  144* NA  140   --   140  142  
K  5.1   --   4.4  4.8  
CL  102   --   103  104 CO2  28   --   29  25 BUN  53*   --   34*  56* CREA  5.54*   --   4.16*  7.07* CA  7.1*   --   7.3*  7.3* AGAP  10   --   8  13 BUCR  10*   --   8*  8* AP   --   270*   --    --   
TP   --   6.9   --    --   
ALB  1.6*  1.7*  1.6*  1.6*  
GLOB   --   5.2*   --    --   
AGRAT   --   0.3*   --    --   
  
CBC w/Diff Recent Labs 10/13/18 
 0341  10/12/18 
 0510  10/11/18 
 1332 WBC  11.7  12.2  11.5  
RBC  2.58*  2.59*  2.71* HGB  7.5*  7.4*  7.7* HCT  23.7*  23.8*  24.7* PLT  417  422*  472* Coagulation No results for input(s): PTP, INR, APTT in the last 72 hours. No lab exists for component: INREXT, INREXT Liver Enzymes Recent Labs 10/13/18 
 0341  10/12/18 
 1633 TP   --   6.9 ALB  1.6*  1.7* AP   --   270* SGOT   --   134* ABG Lab Results Component Value Date/Time PHI 7.486 (H) 10/13/2018 05:40 AM  
 PCO2I 38.5 10/13/2018 05:40 AM  
 PO2I 77 (L) 10/13/2018 05:40 AM  
 HCO3I 28.9 (H) 10/13/2018 05:40 AM  
 FIO2I 30 10/13/2018 05:40 AM  
  
Microbiology No results for input(s): CULT in the last 72 hours. Telemetry: [x]Sinus []A-flutter []Paced []A-fib []Multiple PVCs IMPRESSION:  
· Anoxic brain injury in setting of cardiac arrest- MRI 10/3 consistent with anoxic brain injury; EEG 10/5 consistent with poor prognosis · Acute respiratory failure with hypoxia- Pt not a candidate for extubation d/t AMS, awaiting likely PEG placement · Fever-likely central 
· S/p Vfib arrest 9/29- EF 46-50%, LV global hypokinesis · ARF-ischemic ATN on Dialysis · Sepsis due to acute pyelonephritis, possible acute cholecystitis · Possible pancreatitis · Chronic liver disease · Cardiomyopathy with grade 1 diastolic dysfunction · Hypoalbuminemia Other Hx 
· HTN 
· ARF 
· CHF · DM 
· CVA with R hemiparesis PLAN:  
· Resp - Continue mechanical ventilation. Trach and peg planning per family request.  HOB > 30. Strict aspiration precautions. Pulmonary hygiene. Daily ABG and CXR while intubated. Cont nebs ATC. · ID - Low grade temps, WBC stable. ID following- Cx catheter tip sent- NGTD. Cont merrem/fluconazole/vanc · CVS - HD stable. Continue to monitor closely. Off pressors. ECHO as above. · Heme/onc - Daily CBCs. Monitor H/H & platelets stable today. On EPO · Metabolic - Monitor electrolytes and replace as needed. · Renal - Scheduled HD. Nephrology following. · Endocrine - Glycemic control. On lantus 20. BS goal < 180. SSI. · Neuro/ Pain/ Sedation - Neuro following. MRI and EEG as above · GI - Advance tube feeds as tolerated. Continue to trend LFTs and lipase. Onprotonix · Prophylaxis - DVT, GI- SCDs (hx melena) & protonix · Discussed in interdisciplinary rounds · Family meeting held, planning for trach/PEG 
· Code status: FULL The patient is: [] acutely ill Risk of deterioration: [] moderate [x] critically ill  [x] high  
 
[x]See my orders for details My assessment/plan was discussed with: 
[x]nursing []PT/OT [x]respiratory therapy [x]Dr. Aurea Tran  
[]family [] GAVIN Henry

## 2018-10-13 NOTE — PROGRESS NOTES
Problem: Falls - Risk of 
Goal: *Absence of Falls Document Yann Macleod Fall Risk and appropriate interventions in the flowsheet. Outcome: Progressing Towards Goal 
Fall Risk Interventions: 
Mobility Interventions: Bed/chair exit alarm Mentation Interventions: Bed/chair exit alarm, Reorient patient Medication Interventions: Evaluate medications/consider consulting pharmacy Elimination Interventions: Bed/chair exit alarm, Toileting schedule/hourly rounds Problem: Pressure Injury - Risk of 
Goal: *Prevention of pressure injury Document Mitul Scale and appropriate interventions in the flowsheet. Outcome: Progressing Towards Goal 
Pressure Injury Interventions: 
Sensory Interventions: Assess changes in LOC Moisture Interventions: Absorbent underpads, Apply protective barrier, creams and emollients Activity Interventions: Pressure redistribution bed/mattress(bed type) Mobility Interventions: Pressure redistribution bed/mattress (bed type) Nutrition Interventions: Document food/fluid/supplement intake Friction and Shear Interventions: Apply protective barrier, creams and emollients, Lift sheet, Minimize layers

## 2018-10-13 NOTE — ROUTINE PROCESS
1930 Bedside shift change report given to miky dumont rn (oncoming nurse) by Laura Alegria rn (offgoing nurse). Report included the following information SBAR, Kardex and Recent Results. Side rails up x 3, call light within reach. . Hob elevated 30 degrees. 2000 assessment completed. oral and pericare provided. 2215 dialysis started. 2245 dr liz notified of sbp in 170's and there is an order to give apresoline for greater than 180 no new orders. 0000 reassessment completed. Oral and mery care completed. 0200 dialysis completed. 0400 reassessment completed. Oral and mery-care completed. 0730 Bedside shift change report given to [employer] (oncoming nurse) by miky rn (offgoing nurse). Report included the following information SBAR, Kardex and Recent Results. Side rails up x 3, call light within reach. Hob elevated 30 degrees.

## 2018-10-13 NOTE — PROGRESS NOTES
In Patient Progress note Admit Date: 9/26/2018 Impression: 1. Dialysis dependant JULIANNA ischemic atn s/p cardiopulm arrest 9/29. Off pressors. HD today for volume and solute , reviewed dialysis orders 2. Acute pyelonephritis/sepsis. On abx per ID. 3. Abnormal lft's/acute cholecystitis? On abx. GI on the case. 4. UGI bleed, EGD results noted- healing esophageal ulceration. 5. Acute blood loss anemia/anemia of kidney failure. H/H is stable, continue analia. 6. Asp pneumonia. On abx. 7. Resp failure. 8. Anoxic brain injury Please call with questions, 
 
Jamie Cortes MD FASN Cell [phone] Pager: [phone] Subjective: Intubated /sedated Current Facility-Administered Medications:  
  insulin glargine (LANTUS) injection 20 Units, 20 Units, SubCUTAneous, DAILY, Anthony Solorzano MD, 20 Units at 10/13/18 0945 
  vancomycin (VANCOCIN) 750 mg in 0.9% sodium chloride (MBP/ADV) 250 mL ADV, 750 mg, IntraVENous, Q TU, TH & SAT, Anthony Roche MD 
  [START ON 10/16/2018] VANCOMYCIN INFORMATION NOTE, , Other, ONCE, Anthony Solorzano MD 
  hydroxypropyl methylcellulose (ISOPTO TEARS) 0.5 % ophthalmic solution 2 Drop, 2 Drop, Both Eyes, BID, Laly Jewell NP, 2 Drop at 10/13/18 1706   epoetin manda (EPOGEN;PROCRIT) injection 40,000 Units, 40,000 Units, IntraVENous, Q7D, Savanna SPRINGER MD, 40,000 Units at 10/10/18 1620 
  midazolam (VERSED) injection 1-10 mg, 1-10 mg, IntraVENous, Multiple, Gabriele Sweeney MD, 2 mg at 10/09/18 1702   pantoprazole (PROTONIX) 40 mg in sodium chloride 0.9% 10 mL injection, 40 mg, IntraVENous, Q24H, Gabriele Sweeney MD, 40 mg at 10/13/18 0945 
  0.9% sodium chloride infusion 250 mL, 250 mL, IntraVENous, PRN, Nino Holloway, DO 
  influenza vaccine 2018-19 (6 mos+)(PF) (FLUARIX QUAD/FLULAVAL QUAD) injection 0.5 mL, 0.5 mL, IntraMUSCular, PRIOR TO DISCHARGE, Reba Vogt MD 
   fluconazole (DIFLUCAN) 200mg/100 mL IVPB (premix), 200 mg, IntraVENous, Q24H, GRACE Bland MD, Last Rate: 100 mL/hr at 10/13/18 1706, 200 mg at 10/13/18 1706   meropenem (MERREM) 500 mg in 0.9% sodium chloride (MBP/ADV) 50 mL MBP, 500 mg, IntraVENous, Q12H, GRACE Bland MD, Last Rate: 100 mL/hr at 10/13/18 0945, 500 mg at 10/13/18 0945 
  midazolam (VERSED) injection, , , PRN, Isaac Escalante MD, 4 mg at 10/05/18 1140   acetaminophen (TYLENOL) solution 325 mg, 325 mg, Per NG tube, Q4H PRN, Stephen Meyer MD, 325 mg at 10/09/18 0456 
  0.9% sodium chloride infusion 250 mL, 250 mL, IntraVENous, PRN, Lex Ahmadi DO 
  heparin (porcine) pf 100 Units, 100 Units, InterCATHeter, PRN, Jessica Montana MD 
  0.9% sodium chloride infusion 250 mL, 250 mL, IntraVENous, PRN, Stephen Meyer MD 
  insulin lispro (HUMALOG) injection, , SubCUTAneous, Q6H, Lex Ahmadi DO, 3 Units at 10/13/18 1216   albuterol (PROVENTIL VENTOLIN) nebulizer solution 2.5 mg, 2.5 mg, Nebulization, Q6H RT, Adrianne Dozier MD, 2.5 mg at 10/13/18 1351   heparin (porcine) 1,000 unit/mL injection 1,000 Units, 1,000 Units, InterCATHeter, DIALYSIS PRN, Facundo SPRINGER MD, 1,000 Units at 09/28/18 1332   VANCOMYCIN INFORMATION NOTE, , Other, Rx Dosing/Monitoring, Urvashi Bills MD 
  senna-docusate (PERICOLACE) 8.6-50 mg per tablet 1 Tab, 1 Tab, Oral, BID PRN, Velinda Formosa, DO 
  ondansetron (ZOFRAN) injection 4 mg, 4 mg, IntraVENous, Q4H PRN, Kalyan Nettles, , 4 mg at 09/28/18 0539 
  glucose chewable tablet 16 g, 4 Tab, Oral, PRN, Velinda Formosa, DO 
  glucagon (GLUCAGEN) injection 1 mg, 1 mg, IntraMUSCular, PRN, Kalyan Nettles DO 
  dextrose (D50) infusion 12.5-25 g, 25-50 mL, IntraVENous, PRN, Kalyan Nettles DO, 25 g at 10/09/18 1906   hydrALAZINE (APRESOLINE) 20 mg/mL injection 20 mg, 20 mg, IntraVENous, Q6H PRN, Jamison Tillman NP, 20 mg at 10/02/18 1759 Objective: Visit Vitals  /69 (BP 1 Location: Right arm, BP Patient Position: At rest)  Pulse 95  Temp 100 °F (37.8 °C)  Resp 17  Ht 5' 7\" (1.702 m)  Wt 90.3 kg (199 lb 1.2 oz)  SpO2 98%  BMI 31.18 kg/m2 Intake/Output Summary (Last 24 hours) at 10/13/18 1749 Last data filed at 10/13/18 1600 Gross per 24 hour Intake             1930 ml Output                0 ml Net             1930 ml Physical Exam: Intubated CVS s1 s2 wnl no JVD 
RS AEBE clear Abd soft BS + Ext trace edema Data Review: 
 
Recent Labs 10/13/18 
 0341 WBC  11.7 RBC  2.58* HCT  23.7*  
MCV  91.9 MCH  29.1 MCHC  31.6 RDW  15.3* Recent Labs 10/13/18 
 1225  10/12/18 
 1633  10/12/18 
 0510  10/11/18 
 0580 BUN  53*   --   34*  56* CREA  5.54*   --   4.16*  7.07* CA  7.1*   --   7.3*  7.3* ALB  1.6*  1.7*  1.6*  1.6*  
K  5.1   --   4.4  4.8 NA  140   --   140  142 CL  102   --   103  104 CO2  28   --   29  25 PHOS  5.2*   --   4.3  7.3*  
GLU  182*   --   165*  144* John Taveras MD

## 2018-10-13 NOTE — PROGRESS NOTES
Physical Exam  
Pulmonary/Chest: Breath sounds normal.  
Skin:  
 
  
2 nurse check with Telly Sullivan RN

## 2018-10-14 NOTE — PROGRESS NOTES
1930 Bedside shift change report given to miky falcon (oncoming nurse) by [facility] (offgoing nurse). Report included the following information SBAR, Kardex and Recent Results. Side rails up x 3, call light within reach. Hob elevated 30 degrees. Dual skin check completed. 2000 assessment completed. Oral and mery care completed. 2130 placed on [facility]/invison bed.tolerated well. 
2200 resting in bed with radio on. 
0000 reassessment completed. Oral and mery care completed. 0200 resting in bed. breathing easily with vent. 0400 reassessment completed. Oral and mery-care provided. 0730 Bedside shift change report given to [facility] rn (oncoming nurse) by miky rn (offgoing nurse). Report included the following information SBAR, Kardex and Recent Results. Side rails up x 3, call light within reach. Radio on. Hob elevated 30 degrees. Dual skin check completed.

## 2018-10-14 NOTE — DIALYSIS
ACUTE HEMODIALYSIS FLOW SHEET 
 
HEMODIALYSIS ORDERS: Physician: Dr. Parveen Enrique Dialyzer: revaclear   Duration: 4 hr  BFR: 300   DFR: 800 Dialysate:  Temp 36 K+   2.0    Ca+  2.5 Na 135 Bicarb 35 Weight:  89.4 kg    Patient Chart [x]     Unable to Obtain []   Dry weight/UF Goal: 2500 Access Right IJ Needle Gauge Heparin []  Bolus      Units    [] Hourly       Units    []None Catheter locking solution 1000 units/mL Pre BP: 147/68   Pulse:     98     Temperature:   99.9  Respirations: 20  Tx: NS   250    ml/Bolus  Other        [x] N/A Labs: Pre        Post:        [x] N/A Additional Orders(medications, blood products, hypotension management):       [x] N/A [x] Festusita Consent Verified CATHETER ACCESS: []N/A   [x]Right   [x]Left   []IJ     []Fem [] First use X-ray verified     []Tunnel                [x] Non Tunneled [x]No S/S infection  []Redness  []Drainage []Cultured []Swelling []Pain []Medical Aseptic Prep Utilized   [x]Dressing Changed  [x] Biopatch  Date: 10/14/18 []Clotted   [x]Patent   Flows: [x]Good  []Poor  []Reversed If access problem,  notified: []Yes    [x]N/A  Date:        
 
GRAFT/FISTULA ACCESS:  [x]N/A     []Right     []Left     []UE     []LE []AVG   []AVF        []Buttonhole    []Medical Aseptic Prep Utilized []No S/S infection  []Redness  []Drainage []Cultured []Swelling []Pain Bruit:   [] Strong    [] Weak       Thrill :   [] Strong    [] Weak Needle Gauge:    Length: If access problem,  notified: []Yes     [x]N/A  Date:       
Please describe access if present and not used:  
 
 
GENERAL ASSESSMENT:   
LUNGS:  Rate 20 SaO2%   98     [] N/A    [] Clear  [x] Coarse  [] Crackles  [] Wheezing 
      [] Diminished     Location : []RLL   []LLL    []RUL  []JOSSIE Cough: []Productive  []Dry  [x]N/A   Respirations:  [x]Easy  []Labored Therapy:  []RA  []NC  l/min    Mask: []NRB []Venti       O2% []Ventilator  [x]Intubated  [] Trach  [] BiPaP CARDIAC: [x]Regular      [] Irregular   [] Pericardial Rub  [] JVD [x]  Monitored  [] Bedside  [] Remotely monitored [] N/A  Rhythm: Regular EDEMA: [] None  [x]Generalized  [] Pitting [] 1    [] 2    [] 3    [] 4 [] Facial  [] Pedal  [x]  UE  [x] LE  
SKIN:   [x] Warm  [] Hot     [] Cold   [x] Dry     [] Pale   [] Diaphoretic    
             [] Flushed  [] Jaundiced  [] Cyanotic  [] Rash  [] Weeping LOC:    [] Alert      []Oriented:    [] Person     [] Place  []Time 
             [] Confused  [] Lethargic  [] Medicated  [x] Non-responsive GI / ABDOMEN:  [x] Flat    [] Distended    [x] Soft    [] Firm   []  Obese 
                           [] Diarrhea  [] Bowel Sounds  [] Nausea  [] Vomiting  / URINE ASSESSMENT:[] Voiding   [] Oliguria  [] Anuria   []  Mcbride [x] Incontinent    []  Incontinent Brief      []  Bathroom Privileges PAIN: [] 0 []1  []2   []3   []4   []5   []6   []7   []8   []9   []10 Scale 0-10  Action/Follow Up: Unable to assess MOBILITY:  [] Amb    [] Amb/Assist    [] Bed    [] Wheelchair  [] Stretcher All Vitals and Treatment Details on Attached Flowsheet Hospital: [facility] Room # 3892 [] 1st Time Acute  [] Stat  [x] Routine  [] Urgent    
[] Acute Room  []  Bedside  [x] ICU/CCU  [] ER Isolation Precautions:  [x] Dialysis   [] Airborne   [] Contact    [] Reverse Special Considerations:         [] Blood Consent Verified [x]N/A ALLERGIES:   [x] NKA Code Status:  [x] Full Code  [] DNR  [] Other HBsAg ONLY: Date Drawn 9/28/18         [x]Negative []Positive []Unknown HBsAb: Date 9/28/18    [x] Susceptible   [] [id number] []Not Drawn  [] Drawn Current Labs:    Date of Labs:  10/13/18         Today [x] Cut and paste current labs here. Results for Nilda Smoker (MRN [id number]) as of 10/14/2018 02:41 Ref. Range 10/13/2018 03:41 Sodium Latest Ref Range: 136 - 145 mmol/L 140 Potassium Latest Ref Range: 3.5 - 5.5 mmol/L 5.1 Chloride Latest Ref Range: 100 - 108 mmol/L 102 CO2 Latest Ref Range: 21 - 32 mmol/L 28 Anion gap Latest Ref Range: 3.0 - 18 mmol/L 10 Glucose Latest Ref Range: 74 - 99 mg/dL 182 (H) BUN Latest Ref Range: 7.0 - 18 MG/DL 53 (H) Creatinine Latest Ref Range: 0.6 - 1.3 MG/DL 5.54 (H) BUN/Creatinine ratio Latest Ref Range: 12 - 20   10 (L) Calcium Latest Ref Range: 8.5 - 10.1 MG/DL 7.1 (L) Phosphorus Latest Ref Range: 2.5 - 4.9 MG/DL 5.2 (H) GFR est non-AA Latest Ref Range: >60 ml/min/1.73m2 10 (L) GFR est AA Latest Ref Range: >60 ml/min/1.73m2 13 (L) Albumin Latest Ref Range: 3.4 - 5.0 g/dL 1.6 (L) DIET:  [] Renal    [x] Other     [] NPO     []  Diabetic PRIMARY NURSE REPORT: First initial/Last name/Title Pre Dialysis: Victor M Ann RN    Time: 2100 EDUCATION:   
[] Patient [] Other         Knowledge Basis: []None []Minimal [] Substantial  
Barriers to learning  [x]N/A  
[] Access Care     [] S&S of infection     [] Fluid Management     []K+     []Procedural   
[]Albumin     [] Medications     [] Tx Options     [] Transplant     [] Diet     [] Other Teaching Tools:  [] Explain  [] Demo  [] Handouts [] Video Patient response:   [] Verbalized understanding  [] Teach back  [] Return demonstration [] Requires follow up Inappropriate due to    Anoxic Brain Inury     
 
[x] Time Out/Safety Check  [x]Extracorporeal Circuit Tested for integrity RO/HEMODIALYSIS MACHINE SAFETY CHECKS  Before each treatment:    
Machine Number:                   [facility]  
                                [] Unit Machine #  with centralized RO 
                                [] Portable Machine #1/RO serial # [id number] [] Portable Machine #2/RO serial # [id number] [] Portable Machine #4/RO serial # [id number] [facility] 
                                [] Portable Machine #11/RO serial # [id number] [] Portable Machine #12/RO serial # [id number] [x] Portable Machine #13/RO serial #  [id number] Alarm Test:  Pass time 2116         Other:        
[x] RO/Machine Log Complete Temp    36 Dialysate: pH  7.4 Conductivity: Meter   14     HD Machine [x][x][x][x][x][x][x][x][x][x][x][x][x][x][x][x][x][x][x][x][x][x][x][x][x][x][x][x][x][x][x][x][x][x][x][x][x][x][x][x][x][x][x][x][x][x][x][x][x][x][x][x][x][x][x][x][x][x][x][x][x][x][x]

## 2018-10-14 NOTE — PROGRESS NOTES
Problem: Falls - Risk of 
Goal: *Absence of Falls Document Anna Mc Fall Risk and appropriate interventions in the flowsheet. Outcome: Progressing Towards Goal 
Fall Risk Interventions: 
Mobility Interventions: Bed/chair exit alarm, Strengthening exercises (ROM-active/passive) Mentation Interventions: Adequate sleep, hydration, pain control, Bed/chair exit alarm, Door open when patient unattended, Room close to nurse's station, More frequent rounding Medication Interventions: Bed/chair exit alarm, Evaluate medications/consider consulting pharmacy Elimination Interventions: Bed/chair exit alarm, Call light in reach, Toileting schedule/hourly rounds Problem: Pressure Injury - Risk of 
Goal: *Prevention of pressure injury Document Mitul Scale and appropriate interventions in the flowsheet. Outcome: Progressing Towards Goal 
Pressure Injury Interventions: 
Sensory Interventions: Assess changes in LOC, Assess need for specialty bed, Keep linens dry and wrinkle-free, Minimize linen layers, Use 30-degree side-lying position, Turn and reposition approx. every two hours (pillows and wedges if needed), Pressure redistribution bed/mattress (bed type) Moisture Interventions: Absorbent underpads, Apply protective barrier, creams and emollients, Moisture barrier, Minimize layers, Check for incontinence Q2 hours and as needed Activity Interventions: Pressure redistribution bed/mattress(bed type) Mobility Interventions: HOB 30 degrees or less, Float heels, Suspension boots, Pressure redistribution bed/mattress (bed type), Turn and reposition approx. every two hours(pillow and wedges) Nutrition Interventions: Document food/fluid/supplement intake Friction and Shear Interventions: Apply protective barrier, creams and emollients, Feet elevated on foot rest, HOB 30 degrees or less, Foam dressings/transparent film/skin sealants, Minimize layers, Transferring/repositioning devices

## 2018-10-14 NOTE — PROGRESS NOTES
Pharmacy Dosing Services: Vancomycin Indication: sepsis Day of therapy: 17 Other Antimicrobials (Include dose, start day & day of therapy): 
Meropenem 500 mg IV every 24 hours Fluconazole 200 mg IV every 24 hours Loading dose (date given): 2500 mg IV on  Current Maintenance dose: 750 mg IV after HD TTS Goal Vancomycin Level: 25-30 pre-dialysis level (Trough 15-20 for most infections, 20 for meningitis/osteomyelitis, pre-HD level ~25) Vancomycin Level (if drawn):  
10/11 -  (random pre-HD level) Significant Cultures: MRSE from blood Renal function stable? (unstable defined as SCr increase of 0.5 mg/dL or > 50% increase from baseline, whichever is greater) (Y/N): No  
 
CAPD, Hemodialysis or Renal Replacement Therapy (Y/N): Yes Recent Labs 10/13/18 
 0341  10/12/18 
 0510  10/11/18 
 1332  10/11/18 
 0445 CREA  5.54*  4.16*   --   7.07* BUN  53*  34*   --   56* WBC  11.7  12.2  11.5   -- Temp (24hrs), Av.4 °F (26.9 °C), Min:37.4 °F (3 °C), Max:100.3 °F (37.9 °C) Creatinine Clearance (Creatinine Clearance (ml/min)): HD Regimen assessment: Continue current regimen Maintenance dose: 750 mg IV TTS after HD Next scheduled level: 10/16 at 0400 Pharmacy will follow daily and adjust medications as appropriate for renal function and/or serum levels. Thank you, JESS Romano

## 2018-10-14 NOTE — PROGRESS NOTES
Pulmonary progress note 
   
History 53-year-old male recently hospitalized for stroke with residual right hemiparesis.  He was readmitted on 9/26/18 with acute renal failure, and was admitted to the ICU.  On 9/29  the patient rapidly became hypotensive and was resuscitated.  CPR appeared to have been performed for only 5 minutes, but the patient's neurologic injury seems catastrophic.   
   
Exam 
Intubated but no sedation.  No response to voice 
   
Tmax 100.4. Heart rate about . Blood pressure 155/75. Respirations in the upper teens. 
   
Neuro: Pupils reactive bilaterally.  No roving eye movements.  No nystagmus.  No gag.  No cough.  No doll's eyes.  No withdrawal or localization to pain. Overall neuro exam remains unchanged. W/D left foot to noxious stimuli Oral mucosa moist 
No JVD Clear breath sounds Heart regular rate and rhythm.   
Abdomen soft, nondistended. Pitting peripheral edema, alexi BUE.  No cyanosis or clubbing 
   
WBC 9.5.  Hemoglobin 7.6. Sodium 141. Albumin 1.7.   
   
Chest x-ray from today suggests pulmonary edema with improved infiltrates, especially on the right. 
   
Assessment Stroke with right hemiparesis Acute renal failure on dialysis Cardiopulmonary arrest with severe anoxic brain injury and very poor prognosis Leukocytosis with fever over past weeks, resolved. Lea Cotter central 
   
Plan Tracheostomy tube and PEG tube per family request.  
Continue dialysis Continue antibiotics per infectious disease direction 
   
The patient is critically ill with organ failure.  Total critical care time without physician overlap or procedure time included: 25 minutes

## 2018-10-14 NOTE — PROGRESS NOTES
Internal Medicine Progress Note Patient's Name: Shannan Azevedo Admit Date: 9/26/2018 Length of Stay: 17 
 
 
Assessment/Plan Active Hospital Problems Diagnosis Date Noted  Pancreatitis 10/08/2018  Septic shock ([address] 09/30/2018 Probably secondary to pneumonia (suspicious for aspiration). Less likely, secondary to acute pancreatitis.  Ischemic encephalopathy 09/30/2018  Cardiac arrest with ventricular fibrillation ([address] 09/29/2018 ROSC before defibrillation could be attempted.  Abnormal blood coagulation profile 09/29/2018  Acute pancreatitis 09/29/2018 Shock, leukocytosis, elevated lipase but radiographically no ductal dilatation in pancreas. GB stones without radiographic stigmata of acute cholecystitis  Elevated LFTs 09/28/2018  History of stroke 09/27/2018 With residual R hemiparesis  Acute-on-chronic kidney injury ([address] 09/27/2018  Acute cystitis 09/27/2018  Elevated troponin 09/27/2018  Transaminitis 09/27/2018  Acute kidney injury ([address] 09/27/2018  Pneumonia of both lower lobes 09/11/2018  Essential hypertension 09/10/2018  Diabetes mellitus type 2, controlled ([address] 09/10/2018 Pneumonia not POA 
 
- Vent mgmt per ICU 
- EGD showed large clot in esophagus last week repeat EGD 10/9 shows healed esophageal ulcer - Cont protonix IV every day  
- Hgb stable s/p 1 unit PRBC - Trend CBC 
- LFTs improving - GI signed off 10/9 
- MRI w/ evidence of severe anoxic brain injury - Minimal improvement on neuro exam 
- Appreciate neuro assistance 
- Cont broad spec IV Meropenem and IV Fluconazole  per ID , per ID if cont to spike fever after removal of midline then likely will need rt IJ uldall removed. - Nephro managing HD TTHS 
- Pt unlikely to have any meaningful neuro recovery, prognosis is very grim - Family wants to cont to do everything, including trach/PEG will speak to daughter today  
- DVT/GI protocol - Daughter was called  for TRACH/PEG and she was ok with it. - Will consult Surgery for both PEG and Trach probably on Monday I have personally reviewed all pertinent labs and films that have officially resulted over the last 24 hours. I have personally checked for all pending labs that are awaiting final results. Subjective Pt s/e @ bedside Remains intubated Unresponsive MRI suspicious for  anoxia brain injury Objective Visit Vitals  /76  Pulse 95  Temp 99.8 °F (37.7 °C)  Resp 13  Ht 5' 7\" (1.702 m)  Wt 87.5 kg (192 lb 14.4 oz)  SpO2 96%  BMI 30.21 kg/m2 Physical Exam: 
General Appearance: Intubated, not following commands HENT: normocephalic/atraumatic, + ETT Neck: No JVD, hematoma R side where [facility] is improving Lungs: Coarse B/L w/ vent-assisted BS 
CV: RRR, no m/r/g Abdomen: soft, non-tender, normal bowel sounds Extremities: no cyanosis, no peripheral edema Neuro: Unresponsive to commands, no withdrawal to pain, + corneal reflex and pupillary reaction today Skin: Normal color, intact Intake and Output: 
Current Shift:  10/14 0701 - 10/14 1900 In: 20 [I.V.:20] Out: - Last three shifts:  10/12 1901 - 10/14 0700 In: 1595 [I.V.:685] Out: 2500 Lab/Data Reviewed: 
CMP:  
Lab Results Component Value Date/Time  10/14/2018 03:46 AM  
 K 4.7 10/14/2018 03:46 AM  
  10/14/2018 03:46 AM  
 CO2 30 10/14/2018 03:46 AM  
 AGAP 8 10/14/2018 03:46 AM  
  (H) 10/14/2018 03:46 AM  
 BUN 31 (H) 10/14/2018 03:46 AM  
 CREA 3.29 (H) 10/14/2018 03:46 AM  
 GFRAA 23 (L) 10/14/2018 03:46 AM  
 GFRNA 19 (L) 10/14/2018 03:46 AM  
 CA 7.5 (L) 10/14/2018 03:46 AM  
 ALB 1.7 (L) 10/14/2018 03:46 AM  
 TP 6.9 10/14/2018 03:46 AM  
 GLOB 5.2 (H) 10/14/2018 03:46 AM  
 AGRAT 0.3 (L) 10/14/2018 03:46 AM  
 SGOT 116 (H) 10/14/2018 03:46 AM  
 ALT 50 10/14/2018 03:46 AM  
 
CBC:  
Lab Results Component Value Date/Time WBC 9.5 10/14/2018 03:46 AM  
 HGB 7.6 (L) 10/14/2018 03:46 AM  
 HCT 24.0 (L) 10/14/2018 03:46 AM  
  10/14/2018 03:46 AM  
 
 
Imaging Reviewed: 
No results found. Medications Reviewed: 
Current Facility-Administered Medications Medication Dose Route Frequency  meropenem (MERREM) 500 mg in 0.9% sodium chloride (MBP/ADV) 50 mL MBP  500 mg IntraVENous Q24H  
 insulin glargine (LANTUS) injection 20 Units  20 Units SubCUTAneous DAILY  vancomycin (VANCOCIN) 750 mg in 0.9% sodium chloride (MBP/ADV) 250 mL ADV  750 mg IntraVENous Q TU, TH & SAT  [START ON 10/16/2018] VANCOMYCIN INFORMATION NOTE   Other ONCE  hydroxypropyl methylcellulose (ISOPTO TEARS) 0.5 % ophthalmic solution 2 Drop  2 Drop Both Eyes BID  epoetin manda (EPOGEN;PROCRIT) injection 40,000 Units  40,000 Units IntraVENous Q7D  
 midazolam (VERSED) injection 1-10 mg  1-10 mg IntraVENous Multiple  pantoprazole (PROTONIX) 40 mg in sodium chloride 0.9% 10 mL injection  40 mg IntraVENous Q24H  
 0.9% sodium chloride infusion 250 mL  250 mL IntraVENous PRN  
 influenza vaccine 2018-19 (6 mos+)(PF) (FLUARIX QUAD/FLULAVAL QUAD) injection 0.5 mL  0.5 mL IntraMUSCular PRIOR TO DISCHARGE  fluconazole (DIFLUCAN) 200mg/100 mL IVPB (premix)  200 mg IntraVENous Q24H  
 midazolam (VERSED) injection    PRN  
 acetaminophen (TYLENOL) solution 325 mg  325 mg Per NG tube Q4H PRN  
 0.9% sodium chloride infusion 250 mL  250 mL IntraVENous PRN  
 heparin (porcine) pf 100 Units  100 Units InterCATHeter PRN  
 0.9% sodium chloride infusion 250 mL  250 mL IntraVENous PRN  
 insulin lispro (HUMALOG) injection   SubCUTAneous Q6H  
 albuterol (PROVENTIL VENTOLIN) nebulizer solution 2.5 mg  2.5 mg Nebulization Q6H RT  
 heparin (porcine) 1,000 unit/mL injection 1,000 Units  1,000 Units InterCATHeter DIALYSIS PRN  
 VANCOMYCIN INFORMATION NOTE   Other Rx Dosing/Monitoring  senna-docusate (PERICOLACE) 8.6-50 mg per tablet 1 Tab  1 Tab Oral BID PRN  
 ondansetron (ZOFRAN) injection 4 mg  4 mg IntraVENous Q4H PRN  
 glucose chewable tablet 16 g  4 Tab Oral PRN  
 glucagon (GLUCAGEN) injection 1 mg  1 mg IntraMUSCular PRN  
 dextrose (D50) infusion 12.5-25 g  25-50 mL IntraVENous PRN  
 hydrALAZINE (APRESOLINE) 20 mg/mL injection 20 mg  20 mg IntraVENous Q6H PRN

## 2018-10-14 NOTE — ROUTINE PROCESS
0720 Pt received after bedside shift report from Jj Reinoso RN. Pt intubated. Tube feed infusing as ordered. VSS stable. No apparent distress noted. Eyes open. Bed locked and in low position. 0800 Mouth and facial care performed. Pt repositioned. Multiple blisters to right arm noted. Blisters intact. Will continue to monitor. 
  
1300 Respiratory therapist at bedside. 1710 Partial bath provided. Pt tolerated. Gown and bed pad changed. Repositioned. Verbal bedside shift change report given to Jj Reinoso RN (oncoming nurse) by Tomas Ely RN. (offgoing nurse). Report included the following information SBAR. MAR, KARDEX AND RECENT RESULTS.

## 2018-10-14 NOTE — PROGRESS NOTES
In Patient Progress note Admit Date: 9/26/2018 Impression: 1. Dialysis dependant JULIANNA ischemic atn s/p cardiopulm arrest 9/29. Off pressors. Tolerated HD well yesterday , continue TTS 2. Acute pyelonephritis/sepsis. On abx per ID. 3. Acute blood loss anemia/anemia of kidney failure. H/H is stable, continue analia. 4. Asp pneumonia. On abx. 5. Resp failure. 6. Anoxic brain injury Please call with questions, 
 
Graeme Wolf MD FASN Cell [phone] Pager: [phone] Subjective: Intubated /sedated Current Facility-Administered Medications:  
  [START ON 10/15/2018] insulin glargine (LANTUS) injection 25 Units, 25 Units, SubCUTAneous, DAILY, Anthony Solorzano MD 
  meropenem (MERREM) 500 mg in 0.9% sodium chloride (MBP/ADV) 50 mL MBP, 500 mg, IntraVENous, Q24H, Maxim Dia SPRINGER MD, Last Rate: 100 mL/hr at 10/14/18 0223, 500 mg at 10/14/18 0223 
  vancomycin (VANCOCIN) 750 mg in 0.9% sodium chloride (MBP/ADV) 250 mL ADV, 750 mg, IntraVENous, Q TU, TH & SAT, Anthony Solorzano MD, Last Rate: 125 mL/hr at 10/14/18 0223, 750 mg at 10/14/18 8684   [START ON 10/16/2018] VANCOMYCIN INFORMATION NOTE, , Other, ONCE, Anthony Solorzano MD 
  hydroxypropyl methylcellulose (ISOPTO TEARS) 0.5 % ophthalmic solution 2 Drop, 2 Drop, Both Eyes, BID, Lazarus Toure, NP, 2 Drop at 10/14/18 7548   epoetin manda (EPOGEN;PROCRIT) injection 40,000 Units, 40,000 Units, IntraVENous, Q7D, Gladystine Ramya SPRINGER MD, 40,000 Units at 10/10/18 1620 
  midazolam (VERSED) injection 1-10 mg, 1-10 mg, IntraVENous, Multiple, Dany White MD, 2 mg at 10/09/18 1702   pantoprazole (PROTONIX) 40 mg in sodium chloride 0.9% 10 mL injection, 40 mg, IntraVENous, Q24H, Dany White MD, 40 mg at 10/14/18 0903 
  0.9% sodium chloride infusion 250 mL, 250 mL, IntraVENous, PRN, Delwin Lennox, DO 
   influenza vaccine 2018-19 (6 mos+)(PF) (FLUARIX QUAD/FLULAVAL QUAD) injection 0.5 mL, 0.5 mL, IntraMUSCular, PRIOR TO DISCHARGE, Arlena Sandhoff, MD 
  fluconazole (DIFLUCAN) 200mg/100 mL IVPB (premix), 200 mg, IntraVENous, Q24H, Arnel Bryan MD, Last Rate: 100 mL/hr at 10/14/18 1605, 200 mg at 10/14/18 1605   midazolam (VERSED) injection, , , PRN, Korin Vega MD, 4 mg at 10/05/18 1140   acetaminophen (TYLENOL) solution 325 mg, 325 mg, Per NG tube, Q4H PRN, Arlena Sandhoff, MD, 325 mg at 10/14/18 0939 
  0.9% sodium chloride infusion 250 mL, 250 mL, IntraVENous, PRN, Valentino Soles, DO 
  heparin (porcine) pf 100 Units, 100 Units, InterCATHeter, PRN, Wilma Pichardo MD 
  0.9% sodium chloride infusion 250 mL, 250 mL, IntraVENous, PRN, Arlena Sandhoff, MD 
  insulin lispro (HUMALOG) injection, , SubCUTAneous, Q6H, Valentino Soles, DO, 6 Units at 10/14/18 1146   albuterol (PROVENTIL VENTOLIN) nebulizer solution 2.5 mg, 2.5 mg, Nebulization, Q6H RT, Cary Hassan MD, 2.5 mg at 10/14/18 1439 
  heparin (porcine) 1,000 unit/mL injection 1,000 Units, 1,000 Units, InterCATHeter, DIALYSIS PRN, Wilma Pichardo MD, 1,000 Units at 09/28/18 1332   VANCOMYCIN INFORMATION NOTE, , Other, Rx Dosing/Monitoring, William Mao MD 
  senna-docusate (PERICOLACE) 8.6-50 mg per tablet 1 Tab, 1 Tab, Oral, BID PRN, Jessie Jara DO 
  ondansetron (ZOFRAN) injection 4 mg, 4 mg, IntraVENous, Q4H PRN, Keren Nettles, DO, 4 mg at 09/28/18 0539 
  glucose chewable tablet 16 g, 4 Tab, Oral, PRN, Jessie Jara DO 
  glucagon (GLUCAGEN) injection 1 mg, 1 mg, IntraMUSCular, PRN, Keren Nettles,  
  dextrose (D50) infusion 12.5-25 g, 25-50 mL, IntraVENous, PRN, Keren Nettles DO, 25 g at 10/09/18 1906   hydrALAZINE (APRESOLINE) 20 mg/mL injection 20 mg, 20 mg, IntraVENous, Q6H PRN, Lisa Rutherford NP, 20 mg at 10/02/18 1759 Objective:  
 
Visit Vitals  /84  Pulse 94  Temp 99.7 °F (37.6 °C)  Resp 22  
 Ht 5' 7\" (1.702 m)  Wt 87.5 kg (192 lb 14.4 oz)  SpO2 95%  BMI 30.21 kg/m2 Intake/Output Summary (Last 24 hours) at 10/14/18 1706 Last data filed at 10/14/18 1605 Gross per 24 hour Intake             1280 ml Output             2500 ml Net            -1220 ml Physical Exam: Intubated CVS s1 s2 wnl no JVD 
RS AEBE clear Abd soft BS + Ext trace edema Data Review: 
 
Recent Labs 10/14/18 
 0346 WBC  9.5  
RBC  2.61* HCT  24.0*  
MCV  92.0 MCH  29.1 MCHC  31.7 RDW  15.4* Recent Labs 10/14/18 
 4381  10/13/18 
 0341  10/12/18 
 1633  10/12/18 
 0510 BUN  31*  53*   --   34* CREA  3.29*  5.54*   --   4.16* CA  7.5*  7.1*   --   7.3* ALB  1.7*  1.6*  1.7*  1.6*  
K  4.7  5.1   --   4.4 NA  141  140   --   140 CL  103  102   --   103 CO2  30  28   --   29 PHOS   --   5.2*   --   4.3 GLU  170*  182*   --   165* Natasha Lockhart MD

## 2018-10-14 NOTE — PROGRESS NOTES
Physical Exam  
Pulmonary/Chest: He is in respiratory distress. Skin:  
 
  
 Primary Nurse Ry Fu RN and Surya Buckner RN performed a dual skin assessment on this patient .

## 2018-10-14 NOTE — PROGRESS NOTES
Problem: Diabetes Self-Management Goal: *Incorporating nutritional management into lifestyle Describe effect of type, amount and timing of food on blood glucose; list 3 methods for planning meals. Outcome: Not Progressing Towards Goal 
noninteractive Goal: *Incorporating physical activity into lifestyle State effect of exercise on blood glucose levels. Outcome: Not Progressing Towards Goal 
Non interactive to be trached and peg Problem: Patient Education: Go to Patient Education Activity Goal: Patient/Family Education Outcome: Not Progressing Towards Goal 
noninteractive Problem: Falls - Risk of 
Goal: *Absence of Falls Document Rubi Tang Fall Risk and appropriate interventions in the flowsheet. Outcome: Progressing Towards Goal 
Fall Risk Interventions: 
Mobility Interventions: Bed/chair exit alarm, Strengthening exercises (ROM-active/passive) Mentation Interventions: Adequate sleep, hydration, pain control, Bed/chair exit alarm, Door open when patient unattended, Room close to nurse's station, More frequent rounding Medication Interventions: Bed/chair exit alarm, Evaluate medications/consider consulting pharmacy Elimination Interventions: Bed/chair exit alarm, Call light in reach, Toileting schedule/hourly rounds Problem: Patient Education: Go to Patient Education Activity Goal: Patient/Family Education Outcome: Not Progressing Towards Goal 
Non interactive no family at bedside Problem: General Medical Care Plan Goal: *Labs within defined limits Outcome: Progressing Towards Goal 
On electrolyte replacement Goal: *Optimal pain control at patient's stated goal 
Outcome: Progressing Towards Goal 
Monitor by signs/symptoms Goal: *Fluid volume balance Outcome: Progressing Towards Goal 
On hemodialysis Goal: *Progressive mobility and function (eg: ADL's) Outcome: Progressing Towards Goal 
Passive rom in bed Problem: Pressure Injury - Risk of 
 Goal: *Prevention of pressure injury Document Mitul Scale and appropriate interventions in the flowsheet. Outcome: Progressing Towards Goal 
Pressure Injury Interventions: 
Sensory Interventions: Assess changes in LOC, Assess need for specialty bed, Keep linens dry and wrinkle-free, Minimize linen layers, Use 30-degree side-lying position, Turn and reposition approx. every two hours (pillows and wedges if needed), Pressure redistribution bed/mattress (bed type) Moisture Interventions: Absorbent underpads, Apply protective barrier, creams and emollients, Moisture barrier, Minimize layers, Check for incontinence Q2 hours and as needed Activity Interventions: Pressure redistribution bed/mattress(bed type) Mobility Interventions: HOB 30 degrees or less, Float heels, Suspension boots, Pressure redistribution bed/mattress (bed type), Turn and reposition approx. every two hours(pillow and wedges) Nutrition Interventions: Document food/fluid/supplement intake Friction and Shear Interventions: Apply protective barrier, creams and emollients, Feet elevated on foot rest, HOB 30 degrees or less, Foam dressings/transparent film/skin sealants, Minimize layers, Transferring/repositioning devices Problem: Patient Education: Go to Patient Education Activity Goal: Patient/Family Education Outcome: Not Progressing Towards Goal 
No family in at bedside pt noninteracte

## 2018-10-14 NOTE — PROGRESS NOTES
Patient in bed on back with head elevated - BBS equal and ess clear, diminished - ETt secured at 24 at the lips and moved to the center of the mouth - patient suctioned - [facility] at [facility] [place] - vent alarms on and functional

## 2018-10-15 NOTE — PROGRESS NOTES
Problem: Pressure Injury - Risk of 
Goal: *Prevention of pressure injury Document Mitul Scale and appropriate interventions in the flowsheet. Outcome: Progressing Towards Goal 
Pressure Injury Interventions: 
Sensory Interventions: Assess changes in LOC, Assess need for specialty bed, Turn and reposition approx. every two hours (pillows and wedges if needed), Minimize linen layers, Keep linens dry and wrinkle-free, Check visual cues for pain, Use 30-degree side-lying position Moisture Interventions: Absorbent underpads, Apply protective barrier, creams and emollients, Check for incontinence Q2 hours and as needed, Moisture barrier, Minimize layers Activity Interventions: Pressure redistribution bed/mattress(bed type) Mobility Interventions: HOB 30 degrees or less, Turn and reposition approx. every two hours(pillow and wedges), Pressure redistribution bed/mattress (bed type) Nutrition Interventions: Document food/fluid/supplement intake Friction and Shear Interventions: Apply protective barrier, creams and emollients, Foam dressings/transparent film/skin sealants, HOB 30 degrees or less, Sit at 90-degree angle, Transferring/repositioning devices, Minimize layers

## 2018-10-15 NOTE — PROGRESS NOTES
-0725-Received patient on ventilator ACVC+ rate-12, Vt-500, PEEP-5, FIO2-40%. O2 Sats-100% HR-92, Rr-23. ETT noted to be patent and secure. Respiratory will continue to monitor. -[address] 
 
-1100-Found pt. On VC+ SIMV rate-10, VT-500, PEEP-5, FIO2-30%. Clarified by Dr. Jared Fernandes he had changed the settings. Order updated. O2 Sats-97% HR-94 RR-20.-[facility] 
 
-1505-Pt. Remains on ventilator VC+ SIMV rate-10, VT-500, PEEP-5, PS-12, FIO2-30%. O2 Sats-99% HR-96, RR-26. ETT remains patent and secure. -[address]

## 2018-10-15 NOTE — PROGRESS NOTES
Problem: Falls - Risk of 
Goal: *Absence of Falls Document Jasmin Sun Fall Risk and appropriate interventions in the flowsheet. Outcome: Progressing Towards Goal 
Fall Risk Interventions: 
Mobility Interventions: Bed/chair exit alarm, Strengthening exercises (ROM-active/passive) Mentation Interventions: Adequate sleep, hydration, pain control, Bed/chair exit alarm, Door open when patient unattended, More frequent rounding, Room close to nurse's station, Update white board, Toileting rounds Medication Interventions: Bed/chair exit alarm, Patient to call before getting OOB Elimination Interventions: Bed/chair exit alarm, Call light in reach, Elevated toilet seat, Toileting schedule/hourly rounds Problem: Pressure Injury - Risk of 
Goal: *Prevention of pressure injury Document Mitul Scale and appropriate interventions in the flowsheet. Outcome: Progressing Towards Goal 
Pressure Injury Interventions: 
Sensory Interventions: Assess changes in LOC, Assess need for specialty bed, Turn and reposition approx. every two hours (pillows and wedges if needed), Minimize linen layers, Keep linens dry and wrinkle-free, Check visual cues for pain, Use 30-degree side-lying position Moisture Interventions: Absorbent underpads, Apply protective barrier, creams and emollients, Check for incontinence Q2 hours and as needed, Moisture barrier, Minimize layers Activity Interventions: Pressure redistribution bed/mattress(bed type) Mobility Interventions: HOB 30 degrees or less, Turn and reposition approx. every two hours(pillow and wedges), Pressure redistribution bed/mattress (bed type) Nutrition Interventions: Document food/fluid/supplement intake Friction and Shear Interventions: Apply protective barrier, creams and emollients, Foam dressings/transparent film/skin sealants, HOB 30 degrees or less, Sit at 90-degree angle, Transferring/repositioning devices, Minimize layers

## 2018-10-15 NOTE — PROGRESS NOTES
Infectious Disease Follow-up Admit Date: 9/26/2018 Current abx Prior abx  
vanco 9/27-18 Zosyn 9/27-7, Meropenem/flucon 10/4- 11, Assessment ->Rec:  
 
Leukocytosis/SIRS poss sepsis- MOF multiple ID and non-infectious concerns outlined below, complicated, wbc 34.9G today from high 27K+ on 10/5 On  vancomycin/zosyn since 9/27 
-C diff neg 9/29, sput C albicans, repeat CT 10/4 no new findings --cxr reviewed - increased atx rt basse. Left base improved -> Fever spikes better, wbc normal at 8k 
-> recent cx ntd 
-> Got Vanco for last +ve blcx for Staph epi and cath tip was neg as were repeat blcx 
-> will stop Meropenem and Fluconazole and continue on Vanco for now pending new TDC and improvement in fluid blisters over right arm   
-> prognosis remains poor and remains at high risk for complications and subsequent infections New right arm blisters- appears to be thick and honey crusted - ?sec to fluid in hemiparetic arm 
- non warm, no active drainage - Monitor - Continue with elevation of arm 
- continue with Iv vanco for now Suspected Pyelo POA - CT B perineph stranding, UA tntc wbc, uctx ng ->treated at this point Cholelithiaisis choledocholithiais suspected acute cholecystitis  poss cystic stone POA- abnl lft bili 4 alk phos 400 seen by GI and GS Dr. Tierra Sauer, no plan for OR, for poss cholecystostomy if LFT worsen, bili, alk phos declining  ->LFts increasing again 
-> GI and Sx were following and if continues to rise- will get US and will alsoask for help Poss pancreatitis - lipase 2200 POA now 3191with high of  5500 on 10/4, interpretation complicated by JULIANNA 
-NEW pancreas characterized as nl on CT 10/4 ->Check lipase to see trend B infiltrates - poss edema infiltrate - RLL consolid better 10/4 cxr and suspect endobronchial clot contributed   ->monitor MRSE bacteremia 9/27 1 of 2. Has LUE midline unclear when placed, repeat bctx's IP today  ->repeat bctx's ntd -> treated by now ARF POA now on HD - baseline cr 0.9 132/10.1 in ER 
-[facility] 9/28 ->per renal , dialysis dependant  
-> plan for new TDC by vascular soon Bleeding -melena earlier, EGD 10/2 clot in esophagus bronch 10/3 R endobronch clot NEW removed 10/5 repeat bronch  ->per others Suspected anoxic brain injury on MRI 10/3 SP VF arrest 9/29 -> overall poor prognosis however family wants all aggressive measures including trach/peg    
CVA R hemiparesis 9/10   
resp failure sp intubation 9/29 Comorbid: HTN HLD CHF h/o steatohepatitis MICROBIOLOGY:  
9/27 bctx 1 of 2 MRSE 
 uctx ng 
9/29 sput C albicans C diff neg 10/4 bctx x 2 NTD 
 fungitell indeterminate due to contamination 10/8 Cath tip cx ntd 10/8     bl cx ngtd LINES AND CATHETERS:  
PIV x 2 [facility] 9/28 OET      10/5 [place]  9/29 Active Hospital Problems Diagnosis Date Noted  Pancreatitis 10/08/2018  Septic shock ([address] 09/30/2018 Probably secondary to pneumonia (suspicious for aspiration). Less likely, secondary to acute pancreatitis.  Ischemic encephalopathy 09/30/2018  Cardiac arrest with ventricular fibrillation ([address] 09/29/2018 ROSC before defibrillation could be attempted.  Abnormal blood coagulation profile 09/29/2018  Acute pancreatitis 09/29/2018 Shock, leukocytosis, elevated lipase but radiographically no ductal dilatation in pancreas. GB stones without radiographic stigmata of acute cholecystitis  Elevated LFTs 09/28/2018  History of stroke 09/27/2018 With residual R hemiparesis  Acute-on-chronic kidney injury ([address] 09/27/2018  Acute cystitis 09/27/2018  Elevated troponin 09/27/2018  Transaminitis 09/27/2018  Acute kidney injury ([address] 09/27/2018  Pneumonia of both lower lobes 09/11/2018  Essential hypertension 09/10/2018  Diabetes mellitus type 2, controlled ([address] 09/10/2018 Subjective: Interval notes reviewed. Pt remains unresponsive, on vent, small amts of white-tan sputum. Mid line was removed 10/8. Afebrile. New fluid blisters over rt arm for last few days per nurse. Plan for trach/peg. Plan for [facility] cath for dialysis soon. No family at bedside Current Facility-Administered Medications Medication Dose Route Frequency  insulin glargine (LANTUS) injection 25 Units  25 Units SubCUTAneous DAILY  meropenem (MERREM) 500 mg in 0.9% sodium chloride (MBP/ADV) 50 mL MBP  500 mg IntraVENous Q24H  
 vancomycin (VANCOCIN) 750 mg in 0.9% sodium chloride (MBP/ADV) 250 mL ADV  750 mg IntraVENous Q TU, TH & SAT  [START ON 10/16/2018] VANCOMYCIN INFORMATION NOTE   Other ONCE  hydroxypropyl methylcellulose (ISOPTO TEARS) 0.5 % ophthalmic solution 2 Drop  2 Drop Both Eyes BID  epoetin manda (EPOGEN;PROCRIT) injection 40,000 Units  40,000 Units IntraVENous Q7D  
 midazolam (VERSED) injection 1-10 mg  1-10 mg IntraVENous Multiple  pantoprazole (PROTONIX) 40 mg in sodium chloride 0.9% 10 mL injection  40 mg IntraVENous Q24H  
 0.9% sodium chloride infusion 250 mL  250 mL IntraVENous PRN  
 influenza vaccine 2018-19 (6 mos+)(PF) (FLUARIX QUAD/FLULAVAL QUAD) injection 0.5 mL  0.5 mL IntraMUSCular PRIOR TO DISCHARGE  fluconazole (DIFLUCAN) 200mg/100 mL IVPB (premix)  200 mg IntraVENous Q24H  
 midazolam (VERSED) injection    PRN  
 acetaminophen (TYLENOL) solution 325 mg  325 mg Per NG tube Q4H PRN  
 0.9% sodium chloride infusion 250 mL  250 mL IntraVENous PRN  
 heparin (porcine) pf 100 Units  100 Units InterCATHeter PRN  
 0.9% sodium chloride infusion 250 mL  250 mL IntraVENous PRN  
 insulin lispro (HUMALOG) injection   SubCUTAneous Q6H  
 albuterol (PROVENTIL VENTOLIN) nebulizer solution 2.5 mg  2.5 mg Nebulization Q6H RT  
 heparin (porcine) 1,000 unit/mL injection 1,000 Units  1,000 Units InterCATHeter DIALYSIS PRN  
 VANCOMYCIN INFORMATION NOTE   Other Rx Dosing/Monitoring  senna-docusate (PERICOLACE) 8.6-50 mg per tablet 1 Tab  1 Tab Oral BID PRN  
 ondansetron (ZOFRAN) injection 4 mg  4 mg IntraVENous Q4H PRN  
 glucose chewable tablet 16 g  4 Tab Oral PRN  
 glucagon (GLUCAGEN) injection 1 mg  1 mg IntraMUSCular PRN  
 dextrose (D50) infusion 12.5-25 g  25-50 mL IntraVENous PRN  
 hydrALAZINE (APRESOLINE) 20 mg/mL injection 20 mg  20 mg IntraVENous Q6H PRN Objective:  
 
Visit Vitals  /70  Pulse 93  Temp 99.1 °F (37.3 °C)  Resp 21  
 Ht 5' 7\" (1.702 m)  Wt 93.7 kg (206 lb 9.1 oz)  SpO2 100%  BMI 32.35 kg/m2 Temp (24hrs), Av.8 °F (37.7 °C), Min:99.1 °F (37.3 °C), Max:100.4 °F (38 °C) GEN: unresponsive male on vent in ICU HENT: no thrush CHEST: coarse bs B no wheeze or rhonchi CVS: RRR, no murmur appreciated ABD: soft, + BS no localized tenderness EXT: anasarca with 3+ pitting edema b/l UE rt >lt SKIN:no rash but thick fluid filled multiple blisters on rt arm and forearm, honey color dried drainage around few blisters CNS:unresponsive, rt hemiparesis, yawns in between Labs: Results:  
Chemistry Recent Labs 10/15/18 
 2782  10/14/18 
 0346  10/13/18 
 0341  10/12/18 
 1633 GLU  152*  170*  182*   --   
NA  141  141  140   --   
K  5.5  4.7  5.1   --   
CL  103  103  102   --   
CO2  29  30  28   --   
BUN  56*  31*  53*   --   
CREA  5.21*  3.29*  5.54*   --   
CA  7.6*  7.5*  7.1*   --   
AGAP  9  8  10   --   
BUCR  11*  9*  10*   --   
AP  268*  276*   --   270* TP  7.0  6.9   --   6.9 ALB  1.7*  1.7*  1.6*  1.7*  
GLOB  5.3*  5.2*   --   5.2* AGRAT  0.3*  0.3*   --   0.3* CBC w/Diff Recent Labs 10/15/18 
 5912  10/14/18 
 0346  10/13/18 
 0733 WBC  8.0  9.5  11.7 RBC  2.60*  2.61*  2.58* HGB  7.5*  7.6*  7.5* HCT  23.6*  24.0*  23.7*  
PLT  327  349  417 RADIOLOGY: Reviewed 10/11 Increasing atelectasis/infiltrate at the right base with resolution of airspace disease at the left base. 10/10 cxr  Improved aeration of the right mid and lower lung with mild residual 
atelectatic opacities noted. Small region of opacity at the left lung base may 
reflect underlying atelectasis versus airspace disease. 10/4 CTAP IMPRESSION: 
1. Dense subtotal or total consolidation right lower lobe compatible with 
pneumonia similar to prior study. Small bilateral pleural effusions similar in 
size. 2. Distended gallbladder with gallstones and gallbladder sludge without 
significant change in appearance and also described in detail on ultrasound of 
9/27/2018. 3. Indeterminate small lesion left hepatic lobe without change. Hepatomegaly 
again evident. 4. No intraperitoneal fluid. Possible small left upper pole renal cyst. 
Cardiomegaly. Nasogastric tube tip in body of stomach. cxr 10/5 Impression: 1. Endotracheal tube, visualized nasogastric tube, and right IJ central venous 
catheter in stable position. 2. Overall improved aeration of the right lower lobe, likely improved 
atelectasis with mild residual atelectasis/airspace disease. 3. Mild interstitial edema overall similar to prior. Microbiology Results All Micro Results Procedure Component Value Units Date/Time CULTURE, BLOOD [[id number]] Collected:  10/08/18 1227 Order Status:  Completed Specimen:  Blood from Blood Updated:  10/14/18 0741 Special Requests: PERIPHERAL Culture result: NO GROWTH 6 DAYS     
 CULTURE, CATHETER TIP [[id number]] Collected:  10/08/18 1215 Order Status:  Completed Specimen:  Catheter Tip Updated:  10/11/18 9483 Special Requests: NO SPECIAL REQUESTS Culture result: NO GROWTH 3 DAYS     
 CULTURE, BLOOD [[id number]] Collected:  10/04/18 4746 Order Status:  Completed Specimen:  Blood from Blood Updated:  10/10/18 0558 Special Requests: PERIPHERAL Culture result: NO GROWTH 6 DAYS     
 CULTURE, BLOOD [[id number]] Collected:  10/04/18 1120 Order Status:  Completed Specimen:  Blood from Blood Updated:  10/10/18 7586 Special Requests: PERIPHERAL Culture result: NO GROWTH 6 DAYS     
 CULTURE, BLOOD [[id number]] Collected:  09/27/18 0005 Order Status:  Completed Specimen:  Blood from Blood Updated:  10/03/18 0845 Special Requests: NO SPECIAL REQUESTS Culture result: NO GROWTH 6 DAYS     
 CULTURE, BLOOD [[id number]]  (Abnormal)  (Susceptibility) Collected:  09/27/18 0136 Order Status:  Completed Specimen:  Blood from Blood Updated:  10/02/18 2431 Special Requests: NO SPECIAL REQUESTS     
  GRAM STAIN PEDIATRIC BOTTLE GRAM POSITIVE COCCI IN PAIRS IN CLUSTERS  
        
  SMEAR CALLED TO AND CORRECTLY REPEATED BY: Don Tellez RN IN 2700 ON 9/29/18 AT 2033 WF Culture result:      
  AEROBIC BOTTLE STAPHYLOCOCCUS EPIDERMIDIS (A) CULTURE, RESPIRATORY/SPUTUM/BRONCH Lindia Shock STAIN [[id number]]  (Abnormal) Collected:  09/29/18 1210 Order Status:  Completed Specimen:  Sputum from Endotracheal aspirate Updated:  10/02/18 4450 Special Requests: NO SPECIAL REQUESTS     
  GRAM STAIN >25 WBC/lpf     
   <10 EPI/lpf MUCUS PRESENT     
   MODERATE YEAST Culture result: MANY CANDIDA TROPICALIS (A)  
 C. DIFFICILE/EPI PCR [[id number]] Collected:  09/29/18 1400 Order Status:  Completed Specimen:  Stool Updated:  09/29/18 2248 Special Requests: NO SPECIAL REQUESTS Culture result: Toxigenic C. difficile NEGATIVE                         C. difficile target DNA sequences are not detected. CULTURE, URINE [[id number]] Collected:  09/27/18 0029 Order Status:  Completed Specimen:  Urine from Cath Urine Updated:  09/29/18 0944 Special Requests: NO SPECIAL REQUESTS Culture result: NO GROWTH 2 DAYS Meño Jacinto MD, FACP October 15, 2018 [facility] [phone]

## 2018-10-15 NOTE — PROGRESS NOTES
RENAL PROGRESS NOTE Jocelynn Whitehead Assessment/Plan: · Prolonged dialysis dependant JULIANNA (ischemic atn in a setting of acute pyelonephritis/acute cholecystitis with sepsis and cardiac arrest 9/29). No evidence of recovery of renal function at this time. Next dialysis tomorrow and continue 3/week. Will ask vascular for tdc. · S/p cardiopulm arrest 9/29. Off pressors. · Acute pyelonephritis/sepsis. On abx per ID. Afebrile at this time. · Abnormal lft's/acute cholecystitis? On abx. · UGI bleed, EGD results noted- healing esophageal ulceration. · Acute blood loss anemia/anemia of kidney failure. H/H is stable, continue analia. · Asp pneumonia. On abx. · Resp failure. R bronch clot removed 10/6 with bronchoscopy. Family decided to proced · Anoxic brain injury superimposed on recent cva. MRI results noted. Subjective: Intubated, unresponsive. Tolerates tf. Patient Active Problem List  
Diagnosis Code  Stroke (cerebrum) ([facility]) I63.9  Stroke ([facility] [address] I63.9  Rhabdomyolysis M62.82  
 Dehydration E86.0  
 Essential hypertension I10  
 Diabetes mellitus type 2, controlled ([facility] [address] E11.9  Non compliance w medication regimen Z91.14  
 Pneumonia of both lower lobes J18.9  DM (diabetes mellitus) ([facility] [address] E11.9  History of stroke Z80.78  
 Acute-on-chronic kidney injury ([facility] [address] N17.9, N18.9  Acute cystitis N30.00  Elevated troponin R74.8  Transaminitis R74.0  Acute kidney injury ([facility] [address] N17.9  Elevated LFTs R94.5  Cardiac arrest with ventricular fibrillation ([facility]) I46.9, I49.01  
 Abnormal blood coagulation profile R79.1  Acute pancreatitis K85.90  Septic shock ([facility]) A41.9, R65.21  
 Ischemic encephalopathy I67.89  
 Pancreatitis K85.90 Current Facility-Administered Medications Medication Dose Route Frequency Provider Last Rate Last Dose  insulin glargine (LANTUS) injection 25 Units  25 Units SubCUTAneous DAILY Ifeawilliam German MD      
 meropenem (MERREM) 500 mg in 0.9% sodium chloride (MBP/ADV) 50 mL MBP  500 mg IntraVENous Q24H Mika SPRINGER MD   Stopped at 10/14/18 2200  
 vancomycin (VANCOCIN) 750 mg in 0.9% sodium chloride (MBP/ADV) 250 mL ADV  750 mg IntraVENous Q TU, TH & SAT Ayden MD Matt 125 mL/hr at 10/14/18 0223 750 mg at 10/14/18 1282  [START ON 10/16/2018] VANCOMYCIN INFORMATION NOTE   Other ONCE Ifeawilliam German MD      
 hydroxypropyl methylcellulose (ISOPTO TEARS) 0.5 % ophthalmic solution 2 Drop  2 Drop Both Eyes BID Coral Covert, NP   2 Drop at 10/14/18 1734  epoetin manda (EPOGEN;PROCRIT) injection 40,000 Units  40,000 Units IntraVENous Q7D Yael Harrington MD   40,000 Units at 10/10/18 1620  
 midazolam (VERSED) injection 1-10 mg  1-10 mg IntraVENous Multiple Laurence Morales MD   2 mg at 10/09/18 1702  pantoprazole (PROTONIX) 40 mg in sodium chloride 0.9% 10 mL injection  40 mg IntraVENous Q24H Laurence Morales MD   40 mg at 10/14/18 1918  
 0.9% sodium chloride infusion 250 mL  250 mL IntraVENous PRN Pink Brew, DO      
 influenza vaccine 2018-19 (6 mos+)(PF) (FLUARIX QUAD/FLULAVAL QUAD) injection 0.5 mL  0.5 mL IntraMUSCular PRIOR TO DISCHARGE Samir Hatch MD      
 fluconazole (DIFLUCAN) 200mg/100 mL IVPB (premix)  200 mg IntraVENous Q24H GRACE Meyer  mL/hr at 10/14/18 1605 200 mg at 10/14/18 1605  midazolam (VERSED) injection    PRN Brandi Smith MD   4 mg at 10/05/18 1140  acetaminophen (TYLENOL) solution 325 mg  325 mg Per NG tube Q4H PRN Samir Hatch MD   325 mg at 10/14/18 0939  
 0.9% sodium chloride infusion 250 mL  250 mL IntraVENous PRN Pink Brew, DO      
 heparin (porcine) pf 100 Units  100 Units InterCATHeter PRN Yael Harrington MD      
  0.9% sodium chloride infusion 250 mL  250 mL IntraVENous PRN Arlena Sandhoff, MD      
 insulin lispro (HUMALOG) injection   SubCUTAneous Q6H Valentino Soles, DO   3 Units at 10/15/18 3012  albuterol (PROVENTIL VENTOLIN) nebulizer solution 2.5 mg  2.5 mg Nebulization Q6H RT Cary Hassan MD   2.5 mg at 10/15/18 2442  heparin (porcine) 1,000 unit/mL injection 1,000 Units  1,000 Units InterCATHeter DIALYSIS PRN Wilma Pichardo MD   1,000 Units at 09/28/18 1332  VANCOMYCIN INFORMATION NOTE   Other Rx Dosing/Monitoring William Mao MD      
 senna-docusate (PERICOLACE) 8.6-50 mg per tablet 1 Tab  1 Tab Oral BID PRN Ane Mormon, DO      
 [facility]ron [facility]) injection 4 mg  4 mg IntraVENous Q4H PRN Ane Mormon, DO   4 mg at 09/28/18 0539  
 glucose chewable tablet 16 g  4 Tab Oral PRN Ane Mormon, DO      
 glucagon (GLUCAGEN) injection 1 mg  1 mg IntraMUSCular PRN Ane Mormon, DO      
 dextrose (D50) infusion 12.5-25 g  25-50 mL IntraVENous PRN Ane Mormon, DO   25 g at 10/09/18 1906  hydrALAZINE (APRESOLINE) 20 mg/mL injection 20 mg  20 mg IntraVENous Q6H PRN Lisa Rutherford NP   20 mg at 10/14/18 1700 Objective Vitals:  
 10/15/18 0630 10/15/18 0700 10/15/18 0726 10/15/18 0730 BP:  132/70 Pulse: 95 94 92 93 Resp: 16 18 23 21 Temp:      
TempSrc:      
SpO2: 100% 99% 100% 100% Weight:      
Height:      
 
 
 
Intake/Output Summary (Last 24 hours) at 10/15/18 0857 Last data filed at 10/15/18 2808 Gross per 24 hour Intake             1140 ml Output                0 ml Net             1140 ml Admission weight: Weight: 96.6 kg (213 lb) (09/26/18 2244) Last Weight Metrics: 
Weight Loss Metrics 10/15/2018 9/26/2018 9/13/2018 Today's Wt 206 lb 9.1 oz - 227 lb 15.3 oz  
BMI - 32.35 kg/m2 35.7 kg/m2 Physical Assessment:  
 
General: intubated, unresponsive. Eyes are closed. NG/ET tubes in place. Neck: No jvd. Rt ij temporary dialysis catheter in place, dressing is clear. LUNGS: diminished air entry at the bases. No crackles. CVS EXM: S1, S2  RRR. Abdomen: soft, pos bs. Lower Extremities:  1+ edema. Lab CBC w/Diff Recent Labs 10/15/18 
 7057  10/14/18 
 0346  10/13/18 
 9990 WBC  8.0  9.5  11.7 RBC  2.60*  2.61*  2.58* HGB  7.5*  7.6*  7.5* HCT  23.6*  24.0*  23.7*  
PLT  327  349  417 Chemistry Recent Labs 10/15/18 
 9031  10/14/18 
 0346  10/13/18 
 0341  10/12/18 
 1633 GLU  152*  170*  182*   --   
NA  141  141  140   --   
K  5.5  4.7  5.1   --   
CL  103  103  102   --   
CO2  29  30  28   --   
BUN  56*  31*  53*   --   
CREA  5.21*  3.29*  5.54*   --   
CA  7.6*  7.5*  7.1*   --   
AGAP  9  8  10   --   
BUCR  11*  9*  10*   --   
AP  268*  276*   --   270* TP  7.0  6.9   --   6.9 ALB  1.7*  1.7*  1.6*  1.7*  
GLOB  5.3*  5.2*   --   5.2* AGRAT  0.3*  0.3*   --   0.3* PHOS   --    --   5.2*   -- No results found for: IRON, FE, TIBC, IBCT, PSAT, FERR Lab Results Component Value Date/Time Calcium 7.6 (L) 10/15/2018 04:33 AM  
 Phosphorus 5.2 (H) 10/13/2018 03:41 AM  
  
 
Lynda Canela M.D. [facility] Phone [phone] Pager [phone]

## 2018-10-15 NOTE — PROGRESS NOTES
1200-Received pt resting in bed with eyes open, no sign of distress, vss on ventilator, Dr. Arely Gaspar at bedside, will continue to monitor 1400-Pt resting in bed with eyes open, no sign of distress, vss on ventilator, will continue to monitor 1600-No change in pt status, family at bedside, blisters on RUE discussed with family and Dr. Inna Garcia 1800-Resting in bed with eyes closed, no sign of distress, vss on ventilator

## 2018-10-15 NOTE — PROGRESS NOTES
Patients prognosis is grim. Family has requested trach and peg and transfer to an LTAC. [facility] a [facility] in [place],  had considered accepting this patient once he had been trached and pegged. A [facility] representative  spoke to the patients daughter and she , the daughter, informed the [facility] representative she  would like a facility locally. I had explained to the patients daughter that this may not be an option given his medical needs. Awaiting surgical consult for trach and peg. Ethic review maybe warranted given the patients prognosis. Will discuss at LOS meeting today.   Yamileth Cantor RN

## 2018-10-15 NOTE — PROGRESS NOTES
Hospitalist Progress Note Jhoana Whitlock MD 
Internal medicine/ Hospitalist 
 
Daily Progress Note: 10/15/2018 10:22 AM   
Interval history / Subjective:  
Lul Dawson is a 57y.o. year old male who presented from [facility] with abnormal labs, elevated BUN/Cr. Found to have JULIANNA, UTI, and markedly elevated LFT on presentation. Patient's recent admission was 9/10/18-9/14/18 for acute CVA and rhabdo. Patient poor historian on admission,stated \"I had heart failure. She was started on vanc,zosyn. On 9/29,patient had cardiac arrest with ventricular fibrillation - s/p ROSC. Her hospital course has since then complicated with sepsis,pancreatitits,pneumonia,acute pancreatitis requiring involvement of gi,surgery,nephrology,cardiology,ID,cardiology. Patient needs PEG and Trach Current Facility-Administered Medications Medication Dose Route Frequency  insulin glargine (LANTUS) injection 25 Units  25 Units SubCUTAneous DAILY  meropenem (MERREM) 500 mg in 0.9% sodium chloride (MBP/ADV) 50 mL MBP  500 mg IntraVENous Q24H  
 vancomycin (VANCOCIN) 750 mg in 0.9% sodium chloride (MBP/ADV) 250 mL ADV  750 mg IntraVENous Q TU, TH & SAT  [START ON 10/16/2018] VANCOMYCIN INFORMATION NOTE   Other ONCE  hydroxypropyl methylcellulose (ISOPTO TEARS) 0.5 % ophthalmic solution 2 Drop  2 Drop Both Eyes BID  epoetin manda (EPOGEN;PROCRIT) injection 40,000 Units  40,000 Units IntraVENous Q7D  
 midazolam (VERSED) injection 1-10 mg  1-10 mg IntraVENous Multiple  pantoprazole (PROTONIX) 40 mg in sodium chloride 0.9% 10 mL injection  40 mg IntraVENous Q24H  
 0.9% sodium chloride infusion 250 mL  250 mL IntraVENous PRN  
 influenza vaccine 2018-19 (6 mos+)(PF) (FLUARIX QUAD/FLULAVAL QUAD) injection 0.5 mL  0.5 mL IntraMUSCular PRIOR TO DISCHARGE  fluconazole (DIFLUCAN) 200mg/100 mL IVPB (premix)  200 mg IntraVENous Q24H  
 midazolam (VERSED) injection    PRN  
  acetaminophen (TYLENOL) solution 325 mg  325 mg Per NG tube Q4H PRN  
 0.9% sodium chloride infusion 250 mL  250 mL IntraVENous PRN  
 heparin (porcine) pf 100 Units  100 Units InterCATHeter PRN  
 0.9% sodium chloride infusion 250 mL  250 mL IntraVENous PRN  
 insulin lispro (HUMALOG) injection   SubCUTAneous Q6H  
 albuterol (PROVENTIL VENTOLIN) nebulizer solution 2.5 mg  2.5 mg Nebulization Q6H RT  
 heparin (porcine) 1,000 unit/mL injection 1,000 Units  1,000 Units InterCATHeter DIALYSIS PRN  
 VANCOMYCIN INFORMATION NOTE   Other Rx Dosing/Monitoring  senna-docusate (PERICOLACE) 8.6-50 mg per tablet 1 Tab  1 Tab Oral BID PRN  
 ondansetron (ZOFRAN) injection 4 mg  4 mg IntraVENous Q4H PRN  
 glucose chewable tablet 16 g  4 Tab Oral PRN  
 glucagon (GLUCAGEN) injection 1 mg  1 mg IntraMUSCular PRN  
 dextrose (D50) infusion 12.5-25 g  25-50 mL IntraVENous PRN  
 hydrALAZINE (APRESOLINE) 20 mg/mL injection 20 mg  20 mg IntraVENous Q6H PRN Review of Systems Intubated,unresponsive. Objective:  
 
Visit Vitals  /70  Pulse 93  Temp 99.1 °F (37.3 °C)  Resp 21  
 Ht 5' 7\" (1.702 m)  Wt 93.7 kg (206 lb 9.1 oz)  SpO2 100%  BMI 32.35 kg/m2 O2 Flow Rate (L/min): 45 l/min O2 Device: Ventilator Temp (24hrs), Av.8 °F (37.7 °C), Min:99.1 °F (37.3 °C), Max:100.4 °F (38 °C) 
 
 
  
10/13 1901 - 10/15 0700 In: 2180 [I.V.:560] Out: 2500 P/E General Appearance: Intubated, not following commands HENT: normocephalic/atraumatic, + ETT Neck: No JVD, hematoma R side where [facility] is improving Lungs: Coarse B/L w/ vent-assisted BS 
CV: RRR, no m/r/g Abdomen: soft, non-tender, normal bowel sounds Extremities: no cyanosis, no peripheral edema Neuro: Unresponsive to commands, no withdrawal to pain, + corneal reflex and pupillary reaction today Skin: Normal color, intact Data Review Recent Results (from the past 12 hour(s)) GLUCOSE, POC  
 Collection Time: 10/15/18 12:40 AM  
Result Value Ref Range Glucose (POC) 180 (H) 70 - 110 mg/dL PREALBUMIN Collection Time: 10/15/18  4:33 AM  
Result Value Ref Range Prealbumin 19.4 (L) 20 - 40 MG/DL  
CBC W/O DIFF Collection Time: 10/15/18  4:33 AM  
Result Value Ref Range WBC 8.0 4.6 - 13.2 K/uL  
 RBC 2.60 (L) 4.70 - 5.50 M/uL HGB 7.5 (L) 13.0 - 16.0 g/dL HCT 23.6 (L) 36.0 - 48.0 % MCV 90.8 74.0 - 97.0 FL  
 MCH 28.8 24.0 - 34.0 PG  
 MCHC 31.8 31.0 - 37.0 g/dL  
 RDW 15.5 (H) 11.6 - 14.5 % PLATELET 494 188 - 842 K/uL MPV 8.8 (L) 9.2 - 94.8 FL  
METABOLIC PANEL, COMPREHENSIVE Collection Time: 10/15/18  4:33 AM  
Result Value Ref Range Sodium 141 136 - 145 mmol/L Potassium 5.5 3.5 - 5.5 mmol/L Chloride 103 100 - 108 mmol/L  
 CO2 29 21 - 32 mmol/L Anion gap 9 3.0 - 18 mmol/L Glucose 152 (H) 74 - 99 mg/dL BUN 56 (H) 7.0 - 18 MG/DL Creatinine 5.21 (H) 0.6 - 1.3 MG/DL  
 BUN/Creatinine ratio 11 (L) 12 - 20 GFR est AA 14 (L) >60 ml/min/1.73m2 GFR est non-AA 11 (L) >60 ml/min/1.73m2 Calcium 7.6 (L) 8.5 - 10.1 MG/DL Bilirubin, total 0.7 0.2 - 1.0 MG/DL  
 ALT (SGPT) 43 16 - 61 U/L  
 AST (SGOT) 97 (H) 15 - 37 U/L Alk. phosphatase 268 (H) 45 - 117 U/L Protein, total 7.0 6.4 - 8.2 g/dL Albumin 1.7 (L) 3.4 - 5.0 g/dL Globulin 5.3 (H) 2.0 - 4.0 g/dL A-G Ratio 0.3 (L) 0.8 - 1.7 GLUCOSE, POC Collection Time: 10/15/18  5:40 AM  
Result Value Ref Range Glucose (POC) 161 (H) 70 - 110 mg/dL Assessment/Plan:  
 
Principal Problem: 
  Cardiac arrest with ventricular fibrillation ([facility] [address] (9/29/2018) Overview: ROSC before defibrillation could be attempted. Active Problems: 
  Essential hypertension (9/10/2018) Diabetes mellitus type 2, controlled ([facility] [address] (9/10/2018) Pneumonia of both lower lobes (9/11/2018) History of stroke (9/27/2018) Overview: With residual R hemiparesis Acute-on-chronic kidney injury ([facility] [address] (9/27/2018) Acute cystitis (9/27/2018) Elevated troponin (9/27/2018) Transaminitis (9/27/2018) Acute kidney injury ([facility] [address] (9/27/2018) Elevated LFTs (9/28/2018) Abnormal blood coagulation profile (9/29/2018) Acute pancreatitis (9/29/2018) Overview: Shock, leukocytosis, elevated lipase but radiographically no ductal  
    dilatation in pancreas. GB stones without radiographic stigmata of acute  
    cholecystitis Septic shock ([facility] [address] (9/30/2018) Overview: Probably secondary to pneumonia (suspicious for aspiration). Less likely,  
    secondary to acute pancreatitis. Ischemic encephalopathy (9/30/2018) Pancreatitis (10/8/2018) Care Plan - Vent mgmt per ICU 
- EGD showed large clot in esophagus last week repeat EGD 10/9 shows healed esophageal ulcer - Cont protonix IV every day  
- Hgb stable s/p 1 unit PRBC - Trend CBC 
- LFTs improving - GI signed off 10/9 
- MRI w/ evidence of severe anoxic brain injury - Minimal improvement on neuro exam 
- Appreciate neuro assistance 
- Cont broad spec IV Meropenem and IV Fluconazole  per ID , per ID if cont to spike fever after removal of midline then likely will need rt IJ uldall removed.   
- Nephro managing HD TTHS 
- Pt unlikely to have any meaningful neuro recovery, prognosis is very grim - Family wants to cont to do everything, including trach/PEG will speak to daughter today  
- DVT/GI protocol - Daughter was called  for TRACH/PEG and she was ok with it. - Surgery for both PEG and Trach DVT prophylaxis:scds Full code. Disposition:tbd.

## 2018-10-15 NOTE — PROGRESS NOTES
VENTILATOR Care plan 
 
Problem: Ventilator Management Goal: *Adequate oxygenation/ ventilation/ and extubation Patient: 
   
 
Yue Gamez     57 y.o.   male     10/15/2018  10:02 AM 
Patient Active Problem List  
Diagnosis Code  Stroke (cerebrum) ([facility]) I63.9  [facility] I63.9  Rhabdomyolysis M62.82  
 Dehydration E86.0  
 Essential hypertension I10  
 Diabetes mellitus type 2, controlled [facility] E11.9  Non compliance w medication regimen Z91.14  
 Pneumonia of both lower lobes J18.9  DM (diabetes mellitus) ([facility] [address] E11.9  History of stroke Z80.78  
 Acute-on-chronic kidney injury ([facility] N17.9, N18.9  Acute cystitis N30.00  Elevated troponin R74.8  Transaminitis R74.0  Acute kidney injury ([facility] N17.9  Elevated LFTs R94.5  Cardiac arrest with ventricular fibrillation ([facility]) I46.9, I49.01  
 Abnormal blood coagulation profile R79.1  Acute pancreatitis K85.90  Septic shock ([facility]) A41.9, R65.21  
 Ischemic encephalopathy I67.89  
 Pancreatitis K85.90 Pneumonia of both lower lobes due to infectious organism ([facility] [J18.1] Reason patient intubated: Bilateral pneumonia, Airway protection Ventilator day:11 Ventilator settings: ACVC+ rate-12, VT-500, PEEP-5, FIO2-40%. ETT Size/Placement:#8 ETT, 24 cm at lip ABG: 
Date:10/15/2018 No results found for: PH, PHI, PCO2, PCO2I, PO2, PO2I, HCO3, HCO3I, FIO2, FIO2I Chest X-ray: 
Date:10/15/2018 Results from [facility] [place] [place] Encounter encounter on 09/26/18 XR CHEST PORT Narrative EXAM: One view chest x-ray CLINICAL INDICATION/HISTORY: Respiratory insufficiency. Intubated patient. COMPARISON: 10/12/2018. TECHNIQUE: Single AP view of the chest was obtained. 
 
_______________ FINDINGS: 
 
HEART, VESSELS, MEDIASTINUM: Heart size is enlarged, but stable. Mild central 
vascular congestion persists.  
 
LUNGS, PLEURAL SPACES: Slightly improved patchy alveolar opacities in bilateral 
 lungs. No effusion or pneumothorax. BONY THORAX, SOFT TISSUES: No acute osseous abnormality. MEDICAL SUPPORT DEVICES: Endotracheal tube 5 cm above the billy. NG/OG tube 
courses beyond the limits of the film into the left upper quadrant of the 
abdomen. Right central venous catheter in stable/satisfactory position. _______________ Impression IMPRESSION: 
 
1. Support devices in satisfactory position. 2. Continued improvement of right lower lung airspace infiltrates. Lab Test: 
Date:10/15/2018 WBC:  
Lab Results Component Value Date/Time WBC 8.0 10/15/2018 04:33 AM  
HGB: Lab Results Component Value Date/Time HGB 7.5 (L) 10/15/2018 04:33 AM  
 PLTS: Lab Results Component Value Date/Time PLATELET 498 63/06/1272 04:33 AM  
 
 
SaO2%/flow:  
SpO2 Readings from Last 1 Encounters:  
10/15/18 100% Vital Signs:   Patient Vitals for the past 8 hrs: 
 Temp Pulse Resp BP SpO2  
10/15/18 0730 - 93 21 - 100 % 10/15/18 0726 - 92 23 - 100 % 10/15/18 0700 - 94 18 132/70 99 % 10/15/18 0630 - 95 16 - 100 % 10/15/18 0600 - 95 22 151/71 100 % 10/15/18 0530 - 95 17 155/73 100 % 10/15/18 0500 99.1 °F (37.3 °C) 95 19 151/68 100 % 10/15/18 0447 - 95 17 - 100 % 10/15/18 0430 - 95 17 149/67 100 % 10/15/18 0400 99.7 °F (37.6 °C) 98 17 156/76 100 % 10/15/18 0330 - 98 15 113/61 98 % 10/15/18 0300 99.5 °F (37.5 °C) 99 17 116/60 99 % 10/15/18 0230 - 98 20 133/67 100 % Wean Screen Pass (Yes or No):Yes, However, pt. Unable to protect airway Wean Screen Reason for Failure:n/a Duration of Weaning Trial:n/a Additional Comments: PLAN OF CARE: Maintain ventilatory support GOAL:  Trach/PEG?  
 
 
OUTCOME:

## 2018-10-15 NOTE — PROGRESS NOTES
Patient remains intubated and on life support and therefore he is unable to communicate at this time. No family seen at time of this visit.  offered prayer and left Spiritual Care brochure. Chaplains will continue to follow and will provide pastoral care on an as needed/requested basis. [place] [employer] Board Certified [employer]  
[phone]

## 2018-10-15 NOTE — PROGRESS NOTES
0700- Bedside shift change report given to Caren Shah RN (oncoming nurse) by Griffin Baumann RN (offgoing nurse). Report included the following information SBAR, Intake/Output, MAR, Recent Results and Quality Measures. 1200- Bedside shift change report given to Atul Law RN (oncoming nurse) by Caren Shah RN (offgoing nurse). Report included the following information SBAR, Intake/Output, Recent Results and Quality Measures.

## 2018-10-15 NOTE — PROGRESS NOTES
[facility] Progress Note Name: Charlie Bhatia : 1955 MRN: [id number] Date: 10/15/2018 11:05 AM 
  
[x]I have reviewed the flowsheet and previous days notes. Events overnight reviewed and discussed with nursing staff. Vital signs and records reviewed. HPI:  
 
58 y/o male recently admitted for stroke with residual right hemiparesis. Readmitted to ICU on 18 with ARF. PMHx of HTN, ARF, CHF, DM, CVA and chronic liver disease from steatohepatits. 18 became hypotensive and arrested. CPR lasted 5 minutes which likely caused catastrophic neurologic injury. Renal failure secondary to pyleonephritis and may have had acute cholecytitis on admission as well. Elevated lipase may have reflected pancreatitis also. Significant clots have been found in his esophagus and right mainstem bronchus. Remains intubated post VF cardiac arrest 18. MRI 10/3/18 consistent with anoxic brain injury. Family aware of grim prognosis but wishes to pursue Trach and Peg. Subjective: 
 
10/15/18 Pt remains intubated- minimal thick white secretions Not sedated. Reflexes to pain Tmax 100.4 Trach and peg per family request 
 
 
[x]The patient is unable to give any meaningful history or review of systems because the patient is: 
[x]Intubated []Sedated  
[]Unresponsive [x]The patient is critically ill on     
[x]Mechanical ventilation []Pressors []BiPAP [] ROS:A comprehensive review of systems was negative except for that written in the HPI. Medication Review: · Pressors - none · Sedation - none · Antibiotics - vanc · Pain - prn tylenol · GI/ DVT - protonix/ scd's · Others - none Safety Bundles: VAP Bundle/ Severe Sepsis Protocol/ Electrolyte Replacement Protocol Vital Signs:   
Visit Vitals  /70  Pulse 93  Temp 99.1 °F (37.3 °C)  Resp 21  
 Ht 5' 7\" (1.702 m)  Wt 93.7 kg (206 lb 9.1 oz)  SpO2 100%  BMI 32.35 kg/m2 O2 Device: Ventilator O2 Flow Rate (L/min): 45 l/min Temp (24hrs), Av.8 °F (37.7 °C), Min:99.1 °F (37.3 °C), Max:100.4 °F (38 °C) Intake/Output:  
Last shift:        
Last 3 shifts: 10/13 1901 - 10/15 0700 In: 2180 [I.V.:560] Out: 2500 Intake/Output Summary (Last 24 hours) at 10/15/18 1406 Last data filed at 10/15/18 8134 Gross per 24 hour Intake             1140 ml Output                0 ml Net             1140 ml Ventilator Settings: 
Ventilator Mode: Assist control, VC+ Respiratory Rate Back-Up Rate: 12 Insp Time (sec): 0.9 sec I:E Ratio: 1;3 
Ventilator Volumes Vt Set (ml): 500 ml Vt Exhaled (Machine Breath) (ml): 556 ml Vt Spont (ml): 535 ml Ve Observed (l/min): 10.7 l/min Ventilator Pressures Pressure Support (cm H2O): 15 cm H2O 
PIP Observed (cm H2O): 23 cm H2O Plateau Pressure (cm H2O): 19 cm H2O 
MAP (cm H2O): 10 PEEP/VENT (cm H2O): 5 cm H20 Auto PEEP Observed (cm H2O): 0 cm H2O Physical Exam: 
 
General: Intubated; unresponsive to voice HEENT:  Anicteric sclerae; pink palpebral conjunctivae; mucosa moist 
Resp:  Symmetrical chest expansion, no accessory muscle use; good airway entry;  Bilateral breath sounds clear to auscultation; (+) cough CV:  S1, S2 present; regular rate and rhythm GI:  Abdomen soft, non-tender; (-) active bowel sounds; palpable liver Extremities:  +2 pulses on all extremities; +2 generalized edema; +2 pitting edema BUE Skin:  Warm; scattered fluid filled honey crusted blisters to RUE Neurologic:  Reflexes to pain in BUE & LLE; (-) corneals, pupils 3 round, reactive Devices:  OGT, HD catheter, ETT 
DATA:  
 
 
Labs: Results:  
   
Chemistry Recent Labs 10/15/18 
 8748  10/14/18 
 0346  10/13/18 
 0341  10/12/18 
 1633 GLU  152*  170*  182*   --   
NA  141  141  140   --   
K  5.5  4.7  5.1   --   
CL  103  103  102   --   
CO2  29  30  28   --   
BUN  56*  31*  53*   --   
 CREA  5.21*  3.29*  5.54*   --   
CA  7.6*  7.5*  7.1*   --   
AGAP  9  8  10   --   
BUCR  11*  9*  10*   --   
AP  268*  276*   --   270* TP  7.0  6.9   --   6.9 ALB  1.7*  1.7*  1.6*  1.7*  
GLOB  5.3*  5.2*   --   5.2* AGRAT  0.3*  0.3*   --   0.3* CBC w/Diff Recent Labs 10/15/18 
 1909  10/14/18 
 0346  10/13/18 
 4776 WBC  8.0  9.5  11.7 RBC  2.60*  2.61*  2.58* HGB  7.5*  7.6*  7.5* HCT  23.6*  24.0*  23.7*  
PLT  327  349  417 Coagulation No results for input(s): PTP, INR, APTT in the last 72 hours. No lab exists for component: INREXT, INREXT Liver Enzymes Recent Labs 10/15/18 
 9930 TP  7.0 ALB  1.7* AP  268* SGOT  97* ABG No results found for: PH, PHI, PCO2, PCO2I, PO2, PO2I, HCO3, HCO3I, FIO2, FIO2I Microbiology No results for input(s): CULT in the last 72 hours. Telemetry: [x]Sinus []A-flutter []Paced []A-fib []Multiple PVCs IMPRESSION:  
· Acute encepalopathy- MRI 10/3 consistent with anoxic brain injury; EEG 10/5 consistent with poor prognosis · Acute respiratory failure with hypoxia- Pt not a candidate for extubation d/t mental staus; family to schedule trach & peg  
· ARF on Dialysis · Chronic liver disease · Possible acute pancreatitis · Probable pyleonephritis · Probable acute cholecystitis · Cardiomyopathy with grade 1 diastolic dysfunction · Hypoalbunemia Other Hx · S/p V Fib arrest- 10/2/18 EF 46-50%; LV global hypokinesis; cardiology following- recommends to continue supportive measures · HTN 
· ARF 
· CHF · DM 
· CVA PLAN:  
· Resp - Continue mechanical ventilation. Hospitalist to schedule trach and peg per family request.  HOB > 30. Strict aspiration precautions. Pulmonary hygiene. Daily ABG and CXR while intubated. · ID - Afebrile, aleukocytosis. Continue to trend temp and WBC curve. Blood Cx NGTD. Continue vanc per ID for blisters to RUE. Cx catheter tip- NGTD · CVS - HD stable. Continue to monitor closely. · Heme/onc - Daily CBCs. Monitor H/H & platelets. · Metabolic - Monitor electrolytes and replace as needed. · Renal - Continue to trend renal indices. Scheduled HD 3/week. Nephrology following. · Endocrine - Glycemic control. BS goal < 180. SSI. · Neuro/ Pain/ Sedation - Monitor neuro status. Avoid sedating medications. Neuro following. · GI - Tube feeds as tolerated. Continue to trend LFTs and lipase. · Prophylaxis - DVT, GI- SCDs & protonix · Discussed in interdisciplinary rounds · Code status: FULL The patient is: [] acutely ill Risk of deterioration: [] moderate [x] critically ill  [x] high  
 
[x]See my orders for details My assessment/plan was discussed with: 
[x]nursing []PT/OT [x]respiratory therapy [x]Dr. Odalis Glass  
[]family [] Axel Jang NP

## 2018-10-15 NOTE — PROGRESS NOTES
Problem: Falls - Risk of 
Goal: *Absence of Falls Document Naz Matthews Fall Risk and appropriate interventions in the flowsheet. Outcome: Progressing Towards Goal 
Fall Risk Interventions: 
Mobility Interventions: Bed/chair exit alarm, Strengthening exercises (ROM-active/passive) Mentation Interventions: Adequate sleep, hydration, pain control, Bed/chair exit alarm, Door open when patient unattended, More frequent rounding, Room close to nurse's station, Update white board, Toileting rounds Medication Interventions: Bed/chair exit alarm, Patient to call before getting OOB Elimination Interventions: Bed/chair exit alarm, Call light in reach, Elevated toilet seat, Toileting schedule/hourly rounds

## 2018-10-16 NOTE — PROGRESS NOTES
1930 
Bedside SBAR report taken from offgoing RN Anat Flood RN. Took care of this patient last week and not much has changed. Still with open eyes that are deviated to upper left. No blink to threat. Has mild gag with mouth care and weak cough with suction. Now with blisters on up and down right arm and towards the back of his shoulder. Withdraws to all extremities except left leg 
 
 
0100 Bathed patient and performed wound care to buttocks area. New dressings applied. Patient had a large very loose stool prior to wound care. [address] [facility],[address] AM Labs drawn which included a vancomycin trough level. 0500 Amparo Gómez given via OG tube for high potassium level. Sonny Getting ABG's drawn. [facility] Bedside SBAR report given to incoming RN Aracely Kern. No changes overnight.

## 2018-10-16 NOTE — PROGRESS NOTES
[facility] Progress Note Name: Jocelynn Whitehead : 1955 MRN: [id number] Date: 10/16/2018 11:05 AM 
  
[x]I have reviewed the flowsheet and previous days notes. Events overnight reviewed and discussed with nursing staff. Vital signs and records reviewed. HPI:  
 
58 y/o male recently admitted for stroke with residual right hemiparesis. Readmitted to ICU on 18 with ARF. PMHx of HTN, ARF, CHF, DM, CVA and chronic liver disease from steatohepatits. 18 became hypotensive and arrested. CPR lasted 5 minutes which likely caused catastrophic neurologic injury. Renal failure secondary to pyleonephritis and may have had acute cholecytitis on admission as well. Elevated lipase may have reflected pancreatitis also. Significant clots have been found in his esophagus and right mainstem bronchus. Remains intubated post VF cardiac arrest 18. MRI 10/3/18 consistent with anoxic brain injury. Family aware of grim prognosis but wishes to pursue Trach and Peg. Subjective: 
 
10/16/18 No new events overnight Pt remains intubated- minimal thick white secretions Not sedated. Reflexes to pain Tmax 101.2 Trach and peg consult placed [x]The patient is unable to give any meaningful history or review of systems because the patient is: 
[x]Intubated []Sedated [x]Unresponsive [x]The patient is critically ill on     
[x]Mechanical ventilation []Pressors []BiPAP [] ROS:A comprehensive review of systems was negative except for that written in the HPI. Medication Review: · Pressors - none · Sedation - none · Antibiotics - vanc · Pain - prn tylenol · GI/ DVT - protonix/ SQH 
· Others - none Safety Bundles: VAP Bundle/ Severe Sepsis Protocol/ Electrolyte Replacement Protocol Vital Signs:   
Visit Vitals  /73  Pulse 98  Temp (!) 101.2 °F (38.4 °C)  Resp 27  
 Ht 5' 7\" (1.702 m)  Wt 82 kg (180 lb 12.8 oz)  SpO2 99%  BMI 28.32 kg/m2 O2 Device: Endotracheal tube, Ventilator O2 Flow Rate (L/min): 45 l/min Temp (24hrs), Av.6 °F (37.6 °C), Min:98.7 °F (37.1 °C), Max:101.2 °F (38.4 °C) Intake/Output:  
Last shift:        
Last 3 shifts: 10/14 1901 - 10/16 07 In: 2700 [I.V.:140] Out: 0 Intake/Output Summary (Last 24 hours) at 10/16/18 9042 Last data filed at 10/16/18 0700 Gross per 24 hour Intake             1590 ml Output                0 ml Net             1590 ml Ventilator Settings: 
Ventilator Mode: SIMV, VC+ Respiratory Rate Back-Up Rate: 10 Insp Time (sec): 0.9 sec I:E Ratio: 1;3 
Ventilator Volumes Vt Set (ml): 500 ml Vt Exhaled (Machine Breath) (ml): 597 ml Vt Spont (ml): 335 ml Ve Observed (l/min): 11.8 l/min Ventilator Pressures Pressure Support (cm H2O): 12 cm H2O 
PIP Observed (cm H2O): 24 cm H2O Plateau Pressure (cm H2O): 20 cm H2O 
MAP (cm H2O): 11 PEEP/VENT (cm H2O): 5 cm H20 Auto PEEP Observed (cm H2O): 0 cm H2O Physical Exam: 
 
General: Intubated; unresponsive to voice HEENT:  Anicteric sclerae; pink palpebral conjunctivae; mucosa moist 
Resp:  Symmetrical chest expansion, no accessory muscle use; good airway entry;  Bilateral breath sounds clear to auscultation; (+) cough CV:  S1, S2 present; regular rate and rhythm GI:  Abdomen soft, non-tender; (-) active bowel sounds; palpable liver Extremities:  +2 pulses on all extremities; +2 generalized edema; +2 pitting edema BUE Skin:  Warm; scattered fluid filled honey crusted blisters to RUE Neurologic:  Reflexes to pain in BUE & RLE; (-) corneals, Right pupil 4, Left pupil 3, both round and reactive Devices:  OGT, HD catheter, ETT 
DATA:  
 
 
Labs: Results:  
   
Chemistry Recent Labs 10/16/18 
 0300  10/15/18 
 1106  10/15/18 
 0433  10/14/18 
 0346 GLU  162*   --   152*  170* NA  140   --   141  141  
K  6.3*   --   5.5  4.7 CL  101   --   103  103 CO2  28   --   29  30 BUN  72*   --   56*  31* CREA  6.31*   --   5.21*  3.29* CA  7.3*   --   7.6*  7.5* AGAP  11   --   9  8 BUCR  11*   --   11*  9* AP  266*  244*  268*  276* TP  6.8  7.5  7.0  6.9 ALB  1.6*  1.6*  1.7*  1.7*  
GLOB  5.2*  5.9*  5.3*  5.2* AGRAT  0.3*  0.3*  0.3*  0.3* CBC w/Diff Recent Labs 10/16/18 
 0300  10/15/18 
 0433  10/14/18 
 0346 WBC  8.5  8.0  9.5  
RBC  2.52*  2.60*  2.61* HGB  7.2*  7.5*  7.6* HCT  23.0*  23.6*  24.0*  
PLT  324  327  349 Coagulation Recent Labs 10/15/18 
 1106 PTP  14.0 INR  1.1 APTT  22.4* Liver Enzymes Recent Labs 10/16/18 
 0300 TP  6.8 ALB  1.6* AP  266* SGOT  94* ABG Lab Results Component Value Date/Time PHI 7.483 (H) 10/16/2018 06:03 AM  
 PCO2I 38.1 10/16/2018 06:03 AM  
 PO2I 83 10/16/2018 06:03 AM  
 HCO3I 28.5 (H) 10/16/2018 06:03 AM  
 FIO2I 30 10/16/2018 06:03 AM  
  
Microbiology No results for input(s): CULT in the last 72 hours. Telemetry: [x]Sinus []A-flutter []Paced []A-fib []Multiple PVCs IMPRESSION:  
· Acute encepalopathy- MRI 10/3 consistent with anoxic brain injury; EEG 10/5 consistent with poor prognosis · Acute respiratory failure with hypoxia- Pt not a candidate for extubation d/t mental staus; family to schedule trach & peg  
· ARF on Dialysis · Chronic liver disease · Possible acute pancreatitis · Probable pyleonephritis · Probable acute cholecystitis · Cardiomyopathy with grade 1 diastolic dysfunction · Hypoalbunemia · Hyperkalemia- Dialysis today Other Hx · S/p V Fib arrest- 10/2/18 EF 46-50%; LV global hypokinesis; cardiology following- recommends to continue supportive measures · HTN 
· ARF 
· CHF · DM 
· CVA PLAN:  
· Resp - Continue mechanical ventilation. HOB > 30. Strict aspiration precautions. Pulmonary hygiene. Daily ABG and CXR while intubated. · ID - Afebrile, aleukocytosis. Continue to trend temp and WBC curve. Blood Cx NGTD. Continue vanc per ID for blisters to RUE. Cx catheter tip- NGTD · CVS - HD stable. Continue to monitor closely. · Heme/onc - Daily CBCs. Monitor H/H & platelets. · Metabolic - Monitor electrolytes and replace as needed. · Renal - Continue to trend renal indices. Scheduled HD 3/week. Nephrology following. · Endocrine - Glycemic control. BS goal < 180. SSI. · Neuro/ Pain/ Sedation - Monitor neuro status. Avoid sedating medications. Neuro following. · GI - Tube feeds as tolerated. Continue to trend LFTs and lipase. · Prophylaxis - DVT, GI- SQH & protonix · Discussed in interdisciplinary rounds · Code status: FULL The patient is: [] acutely ill Risk of deterioration: [] moderate [x] critically ill  [x] high  
 
[x]See my orders for details My assessment/plan was discussed with: 
[x]nursing []PT/OT [x]respiratory therapy [x]Dr. Tayler Malhotra  
[]family [] Francia Covert, NP

## 2018-10-16 NOTE — PROGRESS NOTES
Hospitalist Progress Note Theo Key MD 
Internal medicine/ Hospitalist 
 
Daily Progress Note: 10/16/2018 10:22 AM   
Interval history / Subjective:  
Jason Simpson is a 57y.o. year old male who presented from [facility] with abnormal labs, elevated BUN/Cr. Found to have JULIANNA, UTI, and markedly elevated LFT on presentation. Patient's recent admission was 9/10/18-9/14/18 for acute CVA and rhabdo. Patient poor historian on admission,stated \"I had heart failure. She was started on vanc,zosyn. On 9/29,patient had cardiac arrest with ventricular fibrillation - s/p ROSC. Her hospital course has since then complicated with sepsis,pancreatitits,pneumonia,acute pancreatitis requiring involvement of gi,surgery,nephrology,cardiology,ID,cardiology. Patient needs PEG and Trach Current Facility-Administered Medications Medication Dose Route Frequency  vancomycin (VANCOCIN) 1,000 mg in 0.9% sodium chloride (MBP/ADV) 250 mL adv  1,000 mg IntraVENous Q TU, TH & SAT  [START ON 10/20/2018] VANCOMYCIN INFORMATION NOTE   Other ONCE  
 heparin (porcine) injection 5,000 Units  5,000 Units SubCUTAneous Q12H  
 insulin glargine (LANTUS) injection 25 Units  25 Units SubCUTAneous DAILY  hydroxypropyl methylcellulose (ISOPTO TEARS) 0.5 % ophthalmic solution 2 Drop  2 Drop Both Eyes BID  epoetin manda (EPOGEN;PROCRIT) injection 40,000 Units  40,000 Units IntraVENous Q7D  
 midazolam (VERSED) injection 1-10 mg  1-10 mg IntraVENous Multiple  pantoprazole (PROTONIX) 40 mg in sodium chloride 0.9% 10 mL injection  40 mg IntraVENous Q24H  
 0.9% sodium chloride infusion 250 mL  250 mL IntraVENous PRN  
 influenza vaccine 2018-19 (6 mos+)(PF) (FLUARIX QUAD/FLULAVAL QUAD) injection 0.5 mL  0.5 mL IntraMUSCular PRIOR TO DISCHARGE  midazolam (VERSED) injection    PRN  
 acetaminophen (TYLENOL) solution 325 mg  325 mg Per NG tube Q4H PRN  
 0.9% sodium chloride infusion 250 mL  250 mL IntraVENous PRN  
  heparin (porcine) pf 100 Units  100 Units InterCATHeter PRN  
 0.9% sodium chloride infusion 250 mL  250 mL IntraVENous PRN  
 insulin lispro (HUMALOG) injection   SubCUTAneous Q6H  
 albuterol (PROVENTIL VENTOLIN) nebulizer solution 2.5 mg  2.5 mg Nebulization Q6H RT  
 heparin (porcine) 1,000 unit/mL injection 1,000 Units  1,000 Units InterCATHeter DIALYSIS PRN  
 VANCOMYCIN INFORMATION NOTE   Other Rx Dosing/Monitoring  senna-docusate (PERICOLACE) 8.6-50 mg per tablet 1 Tab  1 Tab Oral BID PRN  
 ondansetron (ZOFRAN) injection 4 mg  4 mg IntraVENous Q4H PRN  
 glucose chewable tablet 16 g  4 Tab Oral PRN  
 glucagon (GLUCAGEN) injection 1 mg  1 mg IntraMUSCular PRN  
 dextrose (D50) infusion 12.5-25 g  25-50 mL IntraVENous PRN  
 hydrALAZINE (APRESOLINE) 20 mg/mL injection 20 mg  20 mg IntraVENous Q6H PRN Review of Systems Intubated,unresponsive. Objective:  
 
Visit Vitals  /77  Pulse 96  Temp (!) 36.5 °F (2.5 °C)  Resp 23  
 Ht 5' 7\" (1.702 m)  Wt 82 kg (180 lb 12.8 oz)  SpO2 100%  BMI 28.32 kg/m2 O2 Flow Rate (L/min): 45 l/min O2 Device: Ventilator Temp (24hrs), Av.5 °F (23.1 °C), Min:36.5 °F (2.5 °C), Max:101.2 °F (38.4 °C) 
 
 
10/16 0701 - 10/16 1900 In: 620 Out: 0  
10/14 1901 - 10/16 07 In: 2700 [I.V.:140] Out: 0   
P/E General Appearance: Intubated, not following commands HENT: normocephalic/atraumatic, + ETT Neck: No JVD, hematoma R side where [facility] is improving Lungs: Coarse B/L w/ vent-assisted BS 
CV: RRR, no m/r/g Abdomen: soft, non-tender, normal bowel sounds Extremities: no cyanosis, no peripheral edema Neuro: Unresponsive to commands, no withdrawal to pain, + corneal reflex and pupillary reaction today Skin: Normal color, intact Data Review Recent Results (from the past 12 hour(s)) GLUCOSE, POC Collection Time: 10/16/18  5:34 AM  
Result Value Ref Range Glucose (POC) 167 (H) 70 - 110 mg/dL POC G3  
 Collection Time: 10/16/18  6:03 AM  
Result Value Ref Range Device: VENT    
 FIO2 (POC) 30 % pH (POC) 7.483 (H) 7.35 - 7.45    
 pCO2 (POC) 38.1 35.0 - 45.0 MMHG  
 pO2 (POC) 83 80 - 100 MMHG  
 HCO3 (POC) 28.5 (H) 22 - 26 MMOL/L  
 sO2 (POC) 97 92 - 97 % Base excess (POC) 5 mmol/L Mode SIMV Tidal volume 500 ml Set Rate 10 bpm  
 PEEP/CPAP (POC) 5.0 cmH2O Pressure support 12 cmH2O Allens test (POC) N/A Inspiratory Time 0.90 sec Total resp. rate 25 Site RIGHT RADIAL Patient temp. 37.5 Specimen type (POC) ARTERIAL Performed by Gwendolyn Deshpande Volume control plus YES    
CULTURE, BLOOD Collection Time: 10/16/18 10:47 AM  
Result Value Ref Range Special Requests: PERIPHERAL Culture result: PENDING   
CULTURE, BLOOD Collection Time: 10/16/18 10:55 AM  
Result Value Ref Range Special Requests: PERIPHERAL Culture result: PENDING   
GLUCOSE, POC Collection Time: 10/16/18 12:21 PM  
Result Value Ref Range Glucose (POC) 175 (H) 70 - 110 mg/dL Assessment/Plan:  
 
Principal Problem: 
  Cardiac arrest with ventricular fibrillation ([facility] [address] (9/29/2018) Overview: ROSC before defibrillation could be attempted. Active Problems: 
  Essential hypertension (9/10/2018) Diabetes mellitus type 2, controlled ([address] (9/10/2018) Pneumonia of both lower lobes (9/11/2018) History of stroke (9/27/2018) Overview: With residual R hemiparesis Acute-on-chronic kidney injury ([address] (9/27/2018) Acute cystitis (9/27/2018) Elevated troponin (9/27/2018) Transaminitis (9/27/2018) Acute kidney injury ([address] (9/27/2018) Elevated LFTs (9/28/2018) Abnormal blood coagulation profile (9/29/2018) Acute pancreatitis (9/29/2018) Overview: Shock, leukocytosis, elevated lipase but radiographically no ductal  
    dilatation in pancreas. GB stones without radiographic stigmata of acute  
    cholecystitis Septic shock ([facility] [address] (9/30/2018) Overview: Probably secondary to pneumonia (suspicious for aspiration). Less likely,  
    secondary to acute pancreatitis. Ischemic encephalopathy (9/30/2018) Pancreatitis (10/8/2018) Care Plan - Vent mgmt per ICU 
- EGD showed large clot in esophagus last week repeat EGD 10/9 shows healed esophageal ulcer - Cont protonix IV every day  
- Hgb stable s/p 1 unit PRBC - Trend CBC 
- LFTs improving - GI signed off 10/9 
- MRI w/ evidence of severe anoxic brain injury - Minimal improvement on neuro exam 
- Appreciate neuro assistance - IV Meropenem and IV Fluconazole d/heron on 10/15 per ID. 
- Currently on vanc only which was started on 9/27 
- Nephro managing HD TTHS 
- Pt unlikely to have any meaningful neuro recovery, prognosis is very grim - Family wants to cont to do everything, including trach/PEG will speak to daughter today  
- DVT/GI protocol - Daughter was called  for TRACH/PEG and she was ok with it. - Surgery for both PEG and Trach DVT prophylaxis:scds Full code. Disposition:tbd.

## 2018-10-16 NOTE — PROGRESS NOTES
VENTILATOR Care plan 
 
Problem: Ventilator Management Goal: *Adequate oxygenation/ ventilation/ and extubation Patient: 
   
 
Shannan Azevedo     57 y.o.   male     10/16/2018  9:09 AM 
Patient Active Problem List  
Diagnosis Code  Stroke (cerebrum) ([facility]) I63.9  Stroke ([address] I63.9  Rhabdomyolysis M62.82  
 Dehydration E86.0  
 Essential hypertension I10  
 Diabetes mellitus type 2, controlled ([address] [facility] E11.9  Non compliance w medication regimen Z91.14  
 Pneumonia of both lower lobes J18.9  DM (diabetes mellitus) ([address] E11.9  History of stroke Z80.78  
 Acute-on-chronic kidney injury ([address] [facility] N17.9, N18.9  Acute cystitis N30.00  Elevated troponin R74.8  Transaminitis R74.0  Acute kidney injury ([facility] N17.9  Elevated LFTs R94.5  Cardiac arrest with ventricular fibrillation ([facility]) I46.9, I49.01  
 Abnormal blood coagulation profile R79.1  Acute pancreatitis K85.90  Septic shock ([facility]) A41.9, R65.21  
 Ischemic encephalopathy I67.89  
 Pancreatitis K85.90 Pneumonia of both lower lobes due to infectious organism ([facility] [address] [facility] [J18.1] Reason patient intubated: Encephalopathy, airway protection Ventilator day:12 Ventilator settings: VC+SIMV rate-10, VT-500. PEEP-5, FIO2-30%, PS-12 ETT Size/Placement: 8.0 ETT, 24 cm at lip ABG: 
Date:10/16/2018 Lab Results Component Value Date/Time PHI 7.483 (H) 10/16/2018 06:03 AM  
 PCO2I 38.1 10/16/2018 06:03 AM  
 PO2I 83 10/16/2018 06:03 AM  
 HCO3I 28.5 (H) 10/16/2018 06:03 AM  
 FIO2I 30 10/16/2018 06:03 AM  
 
 
Chest X-ray: 
Date:10/16/2018 Results from [facility] Encounter encounter on 09/26/18 XR CHEST PORT Narrative EXAM: Chest, portable semiupright, one view INDICATION: Intubated patient. Pneumonia of both lower lobes due to infectious 
organism. COMPARISON: 10/14/2018 
 
_______________ FINDINGS: 
 
ETT is 3.2 cm above the billy, right jugular central venous catheter tip projects at the distal SVC. Cardiac silhouette is within normal range in size. Aorta is tortuous. Pulmonary 
vessels are normal. Improving streaky opacities in the perihilar and basilar 
lungs, nearly completely cleared. Focal horizontal linear parenchymal bands 
remain in the right perihilar region, scarring versus atelectasis. No 
pneumothorax or pleural effusion. _______________ Impression IMPRESSION: 
 
Improving bilateral atelectasis and/or infiltrate, nearly completely cleared. Right perihilar linear scar versus atelectasis without change. Lab Test: 
Date:10/16/2018 WBC:  
Lab Results Component Value Date/Time WBC 8.5 10/16/2018 03:00 AM  
HGB: Lab Results Component Value Date/Time HGB 7.2 (L) 10/16/2018 03:00 AM  
 PLTS: Lab Results Component Value Date/Time PLATELET 366 87/34/0764 03:00 AM  
 
 
SaO2%/flow:  
SpO2 Readings from Last 1 Encounters:  
10/16/18 99% Vital Signs:   Patient Vitals for the past 8 hrs: 
 Temp Pulse Resp BP SpO2  
10/16/18 0828 (!) 101.2 °F (38.4 °C) - - - -  
10/16/18 0822 - 98 27 - 99 % 10/16/18 0800 98.7 °F (37.1 °C) - - - -  
10/16/18 0700 99.7 °F (37.6 °C) (!) 101 23 160/73 98 % 10/16/18 0600 99.5 °F (37.5 °C) 100 25 161/78 98 % 10/16/18 0500 99.3 °F (37.4 °C) 97 27 153/64 98 % 10/16/18 0441 - 97 16 - 98 % 10/16/18 0400 99.1 °F (37.3 °C) 98 25 155/68 98 % 10/16/18 0300 99.1 °F (37.3 °C) 99 25 155/67 98 % 10/16/18 0200 99.1 °F (37.3 °C) 98 23 141/65 97 % Wean Screen Pass (Yes or No):Yes, However, pt. Unable to follow commands and protect airway Wean Screen Reason for Failure:n/a Duration of Weaning Trial:n/a Additional Comments: PLAN OF CARE: Maintain ventilatory support GOAL: Trach/PEG 
 
 
OUTCOME:

## 2018-10-16 NOTE — DIABETES MGMT
NUTRITIONAL RE-ASSESSMENT GLYCEMIC CONTROL/ PLAN OF CARE [facility]           57 y.o.           9/26/2018 1. Acute renal failure, unspecified acute renal failure type ([address] 2. Acute UTI 3. Cholelithiasis with choledocholithiasis 4. History of CVA (cerebrovascular accident) 5. ESRD (end stage renal disease) ([facility] DM INTERVENTIONS/PLAN:  
Pt is receiving  full strength Osmolite 1.2 calorie TF at 70ml/hour. Tube feeding is providing 2016 calories,  93 g protein/d with 1.3 liters free water/d from TF. ASSESSMENT:  
Nutritional Status: 
Pt is overweight related to excess caloric intake as evidenced by 135% ideal weight and BMI= 28.3kg/m2. Pt meets criteria for obesity. Altered nutrition-related lab values RT DX. Diabetes Mellitus  related to lack of adherence to nutrition and medication recommendations as evidenced by A1c of 10% Altered nutrition-related lab values RT DX. Acute renal failure aeb requiring dialysis. Pt w/hypoalbuminemia as evidenced by albumin=1.6 mg/dl and prealbumin 10.5. Pt with DT injury on heels, multiple blisters on arms and stage 2  area on sacrum noted. Diabetes Management:  BG well controlled on current insulin Recent blood glucose:    
 
Lab Results Component Value Date/Time  (H) 10/16/2018 03:00 AM  
 GLUCPOC 175 (H) 10/16/2018 12:21 PM  
 GLUCPOC 167 (H) 10/16/2018 05:34 AM  
 GLUCPOC 173 (H) 10/15/2018 11:59 PM  
 
Within target range (non-ICU: <140; ICU<180): [x] Yes   []  No 
 
Current Insulin regimen: 25units Lantus/24 hrs plus VIR corrective lispro Home medication/insulin regimen:  
Key Antihyperglycemic Medications   
    
  
 insulin glargine (LANTUS) 100 unit/mL injection 10 units qhs  Indications: type 2 diabetes mellitus  
 insulin lispro (HUMALOG) 100 unit/mL injection 4 units with meals HbA1c: 10% equivalent  to ave Blood Glucose of 240mg/dl for 2-3 months prior to admission Adequate glycemic control PTA:  [] Yes  [x] No 
  
SUBJECTIVE/OBJECTIVE: Information obtained from: ICU rounds/pt is on a vent Diet:  Osmolite1.2 calorie TF at 70 ml/hr No data found. Medications: [x]                Reviewed Labs:  
Lab Results Component Value Date/Time Hemoglobin A1c 10.0 (H) 09/30/2018 04:18 AM  
 
Lab Results Component Value Date/Time Sodium 140 10/16/2018 03:00 AM  
 Potassium 6.3 (HH) 10/16/2018 03:00 AM  
 Chloride 101 10/16/2018 03:00 AM  
 CO2 28 10/16/2018 03:00 AM  
 Anion gap 11 10/16/2018 03:00 AM  
 Glucose 162 (H) 10/16/2018 03:00 AM  
 BUN 72 (H) 10/16/2018 03:00 AM  
 Creatinine 6.31 (H) 10/16/2018 03:00 AM  
 Calcium 7.3 (L) 10/16/2018 03:00 AM  
 Magnesium 3.8 (H) 09/29/2018 11:10 AM  
 Phosphorus 5.2 (H) 10/13/2018 03:41 AM  
 Albumin 1.6 (L) 10/16/2018 03:00 AM  
prealbumin 17.6 increased from 10.5 Anthropometrics: IBW : 69.9 kg (154 lb), % IBW (Calculated): 134.85 %, BMI (calculated): 28.3 Wt Readings from Last 1 Encounters:  
10/16/18 82 kg (180 lb 12.8 oz) Ht Readings from Last 1 Encounters:  
10/02/18 5' 7\" (1.702 m) Estimated Nutrition Needs:  1880 Kcals/day, Protein (g): 85 g Fluid (ml): 1800 ml Based on:   [x]          Actual BW    []          ABW   []            Adjusted BW   
Nutrition Diagnoses: as stated above Nutrition Interventions: TF 
Goal:  
Blood glucose will be within target range of  mg/dL by 10/19 Intake will meet >75% of energy and protein requirements by 10/21. Gradual weight loss post discharge 1 lb per week by 10/26. Nutrition Monitoring and Evaluation []     Monitor po intake on meal rounds 
[x]     Continue inpatient monitoring and intervention 
[]     Other: 
 
 
Nutrition Risk:  [x]   High     []  Moderate    []  Minimal/Uncompromised Ana Luisa Ulrich RD 
pgr [phone]

## 2018-10-16 NOTE — CONSULTS
Clinical Ethics Consultation Note Contacted by nursing leadership regarding this 56yo man with multiple medical problems including CVA, pneumonia, pancreatitis, and renal failure. The patient is ventilator dependent and dialysis dependent and essentially unresponsive after his stroke. Medical providers have documented that prognosis is exceedingly poor. Treatment appropriateness is in question, and family is unwilling to limit interventions. Family has also refused LTAC acceptance at [facility] in [place]. Daughter in [place] is surrogate by statute, although patient's mother and sister are active in his care at [facility]. Discussed options with team. Policy for Determining Medically Unnecessary Treatment Not Required is still in approval process in , and policy must be given to family if it is to be used. Will meet with family next week to discuss medically appropriate options according to team and limitations of care if team feels it would be helpful and family willing. For now recommend continuing to work on placement. Please contact me directly for questions and concerns. Millie Davis MD, MPH, [address] [place] [address], MSNDR 
[phone]

## 2018-10-16 NOTE — PROGRESS NOTES
Infectious Disease Follow-up Admit Date: 9/26/2018 Current abx Prior abx  
vanco 9/27-19 Zosyn 9/27-7, Meropenem/flucon 10/4- 11, Assessment ->Rec:  
 
Leukocytosis/SIRS poss sepsis- MOF multiple ID and non-infectious concerns outlined below, complicated, wbc 61.9V today from high 27K+ on 10/5 On  vancomycin/zosyn since 9/27 
-C diff neg 9/29, sput C albicans, repeat CT 10/4 no new findings --cxr reviewed - increased atx rt basse. Left base improved -> Pt started spiking high grade fever again yesterday - Differential- ? Sec to de-escalating Abx vs central fever vs ongoing/new infection vs skin blisters vs deep tissue injury of toe/buttock or all contributing 
-> WBC is normal, no change clinically 
-> plan noted for Ethic committee 
-> will repeat pancx 
-> Pt with [facility] and will ask to get it out after dialysis today and if possible give line holiday before placing new one 
-> Cont on Vanco for now but if continues to spike, will add Meropenem  
-> prognosis remains poor and remains at high risk for complications and subsequent infections New right arm blisters- appears to be thick and honey crusted - ?sec to fluid in hemiparetic arm 
- non warm, no active drainage - Monitor - Continue with elevation of arm 
- continue with Iv vanco for now Suspected Pyelo POA - CT B perineph stranding, UA tntc wbc, uctx ng ->treated at this point Cholelithiaisis choledocholithiais suspected acute cholecystitis  poss cystic stone POA- abnl lft bili 4 alk phos 400 seen by GI and GS Dr. Ev Hussein, no plan for OR, for poss cholecystostomy if LFT worsen, bili, alk phos declining  ->LFts increasing again 
-> GI and Sx were following and if continues to rise- will get US and will alsoask for help Poss pancreatitis - lipase 2200 POA now 3191with high of  5500 on 10/4, interpretation complicated by JULIANNA 
-NEW pancreas characterized as nl on CT 10/4 ->Check lipase to see trend B infiltrates - poss edema infiltrate - RLL consolid better 10/4 cxr and suspect endobronchial clot contributed   ->monitor MRSE bacteremia 9/27 1 of 2. Has LUE midline unclear when placed, repeat bctx's IP today  ->repeat bctx's ntd  
-> treated by now ARF POA now on HD - baseline cr 0.9 132/10.1 in ER 
-RIGABE mccray 9/28 ->per renal , dialysis dependant  
-> plan for new TDC by vascular soon Bleeding -melena earlier, EGD 10/2 clot in esophagus bronch 10/3 R endobronch clot NEW removed 10/5 repeat bronch  ->per others Suspected anoxic brain injury on MRI 10/3 SP VF arrest 9/29 -> overall poor prognosis however family wants all aggressive measures including trach/peg    
CVA R hemiparesis 9/10   
resp failure sp intubation 9/29 Comorbid: HTN HLD CHF h/o steatohepatitis MICROBIOLOGY:  
9/27 bctx 1 of 2 MRSE 
 uctx ng 
9/29 sput C albicans C diff neg 10/4 bctx x 2 NTD 
 fungitell indeterminate due to contamination 10/8 Cath tip cx ntd 10/8     bl cx ngtd LINES AND CATHETERS:  
PIV x 2 RIJ uldall 9/28 OET      10/5 [employer]  9/29 Active Hospital Problems Diagnosis Date Noted  Pancreatitis 10/08/2018  Septic shock ([address] 09/30/2018 Probably secondary to pneumonia (suspicious for aspiration). Less likely, secondary to acute pancreatitis.  Ischemic encephalopathy 09/30/2018  Cardiac arrest with ventricular fibrillation ([address] 09/29/2018 ROSC before defibrillation could be attempted.  Abnormal blood coagulation profile 09/29/2018  Acute pancreatitis 09/29/2018 Shock, leukocytosis, elevated lipase but radiographically no ductal dilatation in pancreas. GB stones without radiographic stigmata of acute cholecystitis  Elevated LFTs 09/28/2018  History of stroke 09/27/2018 With residual R hemiparesis  Acute-on-chronic kidney injury ([address] 09/27/2018  Acute cystitis 09/27/2018  Elevated troponin 09/27/2018  Transaminitis 09/27/2018  Acute kidney injury ([facility] 09/27/2018  Pneumonia of both lower lobes 09/11/2018  Essential hypertension 09/10/2018  Diabetes mellitus type 2, controlled ([facility] 09/10/2018 Subjective: Interval notes reviewed. Pt remains unresponsive, on vent. Started spiking new fever yesterday- coincided with de-escalating abx. Remains with fluid blisters over rt arm. Wd care also mentioned deep tissue injury to left great toe, rt heel and sacral area. Plan for trach/peg but ethic committee before that. No family at bedside. D/w RN. Current Facility-Administered Medications Medication Dose Route Frequency  vancomycin (VANCOCIN) 1,000 mg in 0.9% sodium chloride (MBP/ADV) 250 mL adv  1,000 mg IntraVENous Q TU, TH & SAT  [START ON 10/20/2018] VANCOMYCIN INFORMATION NOTE   Other ONCE  
 heparin (porcine) injection 5,000 Units  5,000 Units SubCUTAneous Q12H  
 insulin glargine (LANTUS) injection 25 Units  25 Units SubCUTAneous DAILY  hydroxypropyl methylcellulose (ISOPTO TEARS) 0.5 % ophthalmic solution 2 Drop  2 Drop Both Eyes BID  epoetin manda (EPOGEN;PROCRIT) injection 40,000 Units  40,000 Units IntraVENous Q7D  
 midazolam (VERSED) injection 1-10 mg  1-10 mg IntraVENous Multiple  pantoprazole (PROTONIX) 40 mg in sodium chloride 0.9% 10 mL injection  40 mg IntraVENous Q24H  
 0.9% sodium chloride infusion 250 mL  250 mL IntraVENous PRN  
 influenza vaccine 2018-19 (6 mos+)(PF) (FLUARIX QUAD/FLULAVAL QUAD) injection 0.5 mL  0.5 mL IntraMUSCular PRIOR TO DISCHARGE  midazolam (VERSED) injection    PRN  
 acetaminophen (TYLENOL) solution 325 mg  325 mg Per NG tube Q4H PRN  
 0.9% sodium chloride infusion 250 mL  250 mL IntraVENous PRN  
 heparin (porcine) pf 100 Units  100 Units InterCATHeter PRN  
 0.9% sodium chloride infusion 250 mL  250 mL IntraVENous PRN  
 insulin lispro (HUMALOG) injection   SubCUTAneous Q6H  
 albuterol (PROVENTIL VENTOLIN) nebulizer solution 2.5 mg  2.5 mg Nebulization Q6H RT  
 heparin (porcine) 1,000 unit/mL injection 1,000 Units  1,000 Units InterCATHeter DIALYSIS PRN  
 VANCOMYCIN INFORMATION NOTE   Other Rx Dosing/Monitoring  senna-docusate (PERICOLACE) 8.6-50 mg per tablet 1 Tab  1 Tab Oral BID PRN  
 ondansetron (ZOFRAN) injection 4 mg  4 mg IntraVENous Q4H PRN  
 glucose chewable tablet 16 g  4 Tab Oral PRN  
 glucagon (GLUCAGEN) injection 1 mg  1 mg IntraMUSCular PRN  
 dextrose (D50) infusion 12.5-25 g  25-50 mL IntraVENous PRN  
 hydrALAZINE (APRESOLINE) 20 mg/mL injection 20 mg  20 mg IntraVENous Q6H PRN Objective:  
 
Visit Vitals  /73  Pulse 98  Temp (!) 101.2 °F (38.4 °C)  Resp 27  
 Ht 5' 7\" (1.702 m)  Wt 82 kg (180 lb 12.8 oz)  SpO2 99%  BMI 28.32 kg/m2 Temp (24hrs), Av.6 °F (37.6 °C), Min:98.7 °F (37.1 °C), Max:101.2 °F (38.4 °C) GEN: unresponsive male on vent in ICU HENT: no thrush CHEST: coarse bs B no wheeze or rhonchi CVS: RRR, no murmur appreciated ABD: soft, + BS no localized tenderness EXT: anasarca with 3+ pitting edema b/l UE rt >lt SKIN: thick fluid filled multiple blisters on rt arm and forearm, honey color dried drainage around few blisters, deep tissue injury rt heel and sacral area, left great toe with darkening but warm /deep tissue injury CNS:unresponsive, rt hemiparesis, yawns in between Labs: Results:  
Chemistry Recent Labs 10/16/18 
 0300  10/15/18 
 1106  10/15/18 
 0433  10/14/18 
 0346 GLU  162*   --   152*  170* NA  140   --   141  141  
K  6.3*   --   5.5  4.7 CL  101   --   103  103 CO2  28   --   29  30 BUN  72*   --   56*  31* CREA  6.31*   --   5.21*  3.29* CA  7.3*   --   7.6*  7.5* AGAP  11   --   9  8 BUCR  11*   --   11*  9* AP  266*  244*  268*  276* TP  6.8  7.5  7.0  6.9 ALB  1.6*  1.6*  1.7*  1.7*  
GLOB  5.2*  5.9*  5.3*  5.2* AGRAT  0.3*  0.3*  0.3*  0.3* CBC w/Diff Recent Labs 10/16/18 0300  10/15/18 
 [phone]  10/14/18 
 0346 WBC  8.5  8.0  9.5  
RBC  2.52*  2.60*  2.61* HGB  7.2*  7.5*  7.6* HCT  23.0*  23.6*  24.0*  
PLT  324  327  349 RADIOLOGY: Reviewed 10/11 Increasing atelectasis/infiltrate at the right base with resolution of airspace 
disease at the left base. 10/10 cxr  Improved aeration of the right mid and lower lung with mild residual 
atelectatic opacities noted. Small region of opacity at the left lung base may 
reflect underlying atelectasis versus airspace disease. 10/4 CTAP IMPRESSION: 
1. Dense subtotal or total consolidation right lower lobe compatible with 
pneumonia similar to prior study. Small bilateral pleural effusions similar in 
size. 2. Distended gallbladder with gallstones and gallbladder sludge without 
significant change in appearance and also described in detail on ultrasound of 
9/27/2018. 3. Indeterminate small lesion left hepatic lobe without change. Hepatomegaly 
again evident. 4. No intraperitoneal fluid. Possible small left upper pole renal cyst. 
Cardiomegaly. Nasogastric tube tip in body of stomach. cxr 10/5 Impression: 1. Endotracheal tube, visualized nasogastric tube, and right IJ central venous 
catheter in stable position. 2. Overall improved aeration of the right lower lobe, likely improved 
atelectasis with mild residual atelectasis/airspace disease. 3. Mild interstitial edema overall similar to prior. Microbiology Results All Micro Results Procedure Component Value Units Date/Time CULTURE, BLOOD [[id number]] Collected:  10/08/18 1227 Order Status:  Completed Specimen:  Blood from Blood Updated:  10/14/18 0741 Special Requests: PERIPHERAL Culture result: NO GROWTH 6 DAYS     
 CULTURE, CATHETER TIP [[id number]] Collected:  10/08/18 1215 Order Status:  Completed Specimen:  Catheter Tip Updated:  10/11/18 9252 Special Requests: NO SPECIAL REQUESTS Culture result: NO GROWTH 3 DAYS CULTURE, BLOOD [[id number]] Collected:  10/04/18 7350 Order Status:  Completed Specimen:  Blood from Blood Updated:  10/10/18 9067 Special Requests: PERIPHERAL Culture result: NO GROWTH 6 DAYS     
 CULTURE, BLOOD [[id number]] Collected:  10/04/18 1120 Order Status:  Completed Specimen:  Blood from Blood Updated:  10/10/18 3141 Special Requests: PERIPHERAL Culture result: NO GROWTH 6 DAYS     
 CULTURE, BLOOD [[id number]] Collected:  09/27/18 0005 Order Status:  Completed Specimen:  Blood from Blood Updated:  10/03/18 0845 Special Requests: NO SPECIAL REQUESTS Culture result: NO GROWTH 6 DAYS     
 CULTURE, BLOOD [[id number]]  (Abnormal)  (Susceptibility) Collected:  09/27/18 0136 Order Status:  Completed Specimen:  Blood from Blood Updated:  10/02/18 9982 Special Requests: NO SPECIAL REQUESTS     
  GRAM STAIN PEDIATRIC BOTTLE GRAM POSITIVE COCCI IN PAIRS IN CLUSTERS  
        
  SMEAR CALLED TO AND CORRECTLY REPEATED BY: Casey Hammond RN IN 2700 ON 9/29/18 AT 2033 WF Culture result:      
  AEROBIC BOTTLE STAPHYLOCOCCUS EPIDERMIDIS (A) CULTURE, RESPIRATORY/SPUTUM/BRONCH Charleen Ohm STAIN [[id number]]  (Abnormal) Collected:  09/29/18 1210 Order Status:  Completed Specimen:  Sputum from Endotracheal aspirate Updated:  10/02/18 4012 Special Requests: NO SPECIAL REQUESTS     
  GRAM STAIN >25 WBC/lpf     
   <10 EPI/lpf MUCUS PRESENT     
   MODERATE YEAST Culture result: MANY CANDIDA TROPICALIS (A)  
 C. DIFFICILE/EPI PCR [[id number]] Collected:  09/29/18 1400 Order Status:  Completed Specimen:  Stool Updated:  09/29/18 2248 Special Requests: NO SPECIAL REQUESTS Culture result: Toxigenic C. difficile NEGATIVE                         C. difficile target DNA sequences are not detected. CULTURE, URINE [[id number]] Collected:  09/27/18 0029  Order Status:  Completed Specimen:  Urine from Cath Urine Updated: 09/29/18 7386 Special Requests: NO SPECIAL REQUESTS Culture result: NO GROWTH 2 DAYS Malorie Castillo MD, FACP October 16, 2018 [facility] [phone]

## 2018-10-16 NOTE — PROGRESS NOTES
Pharmacy Dosing Services: Vancomycin Indication: sepsis Day of therapy: 23 Other Antimicrobials (Include dose, start day & day of therapy): 
None, meropenem and fluconazole discontinued on 10.15.18 Loading dose (date given): 2500 mg IV on  Current Maintenance dose: 750 mg IV after HD TTHS Goal Vancomycin Level: 25-30 pre-dialysis (Trough 15-20 for most infections, 20 for meningitis/osteomyelitis, pre-HD level ~25) Vancomycin Level (if drawn): 20 this am with labs Significant Cultures: MRSE from blood Renal function stable? (unstable defined as SCr increase of 0.5 mg/dL or > 50% increase from baseline, whichever is greater) (Y/N): NO  
 
CAPD, Hemodialysis or Renal Replacement Therapy (Y/N): YES Recent Labs 10/16/18 
 0300  10/15/18 
 0433  10/14/18 
 0346 CREA  6.31*  5.21*  3.29* BUN  72*  56*  31* WBC   --   8.0  9.5 Temp (24hrs), Av.4 °F (37.4 °C), Min:98.8 °F (37.1 °C), Max:100.8 °F (38.2 °C) Creatinine Clearance (Creatinine Clearance (ml/min)): HD patient Regimen assessment:  vancomycin below target therapeutic level Maintenance dose: Will increase vancomycin to 1000 mg IV after HD on T-Th-Sat Next scheduled level: with am labs on Saturday am  
 
 
Pharmacy will follow daily and adjust medications as appropriate for renal function and/or serum levels. Thank you, Pernell Hopkins, PHARMD

## 2018-10-16 NOTE — PROGRESS NOTES
-0820-Received patient on ventilator. VC+ SIMV rate-10, VT-500, PEEP-5, FIO2-30%, PS-12. O2 Sats-99% HR-98, RR-28. ETT noted to be patent and secure. -[facility] 
 
-1520-PT. Remains on above settings. O2 Sats-100% HR-97, RR-20. ETT remains patent and secure. Pt. Is currently receiving dialyisis -[address]

## 2018-10-16 NOTE — PROGRESS NOTES
RENAL PROGRESS NOTE Jason Cluster Assessment/Plan: · Prolonged dialysis dependant JULIANNA (ischemic atn in a setting of acute pyelonephritis/acute cholecystitis with sepsis and cardiac arrest 9/29). No evidence of recovery of renal function at this time. Next dialysis later today and continue 3/week. Dialysis will address hyperkalemia. I have asked vascular for tdc, although intermittent fever is a concern. BC are ngtd. · S/p cardiopulm arrest 9/29. Off pressors. · Acute pyelonephritis/sepsis. On abx per ID. · Abnormal lft's/acute cholecystitis? On abx. · UGI bleed, EGD results noted- healing esophageal ulceration. · Acute blood loss anemia/anemia of kidney failure. H/H is drifting down, continue analia. · Asp pneumonia. On abx. · Resp failure. R bronch clot removed 10/6 with bronchoscopy. Family decided to proceed with peg/trach. · Anoxic brain injury superimposed on recent cva. Subjective: Intubated, unresponsive. Tolerates tf. Patient Active Problem List  
Diagnosis Code  Stroke (cerebrum) ([facility]) I63.9  Stroke ([facility] I63.9  Rhabdomyolysis M62.82  
 Dehydration E86.0  
 Essential hypertension I10  
 Diabetes mellitus type 2, controlled ([facility] [address] E11.9  Non compliance w medication regimen Z91.14  
 Pneumonia of both lower lobes J18.9  DM (diabetes mellitus) ([facility] [address] E11.9  History of stroke Z80.78  
 Acute-on-chronic kidney injury ([facility] [address] N17.9, N18.9  Acute cystitis N30.00  Elevated troponin R74.8  Transaminitis R74.0  Acute kidney injury ([facility] [address] N17.9  Elevated LFTs R94.5  Cardiac arrest with ventricular fibrillation ([facility]) I46.9, I49.01  
 Abnormal blood coagulation profile R79.1  Acute pancreatitis K85.90  Septic shock ([facility]) A41.9, R65.21  
 Ischemic encephalopathy I67.89  
 Pancreatitis K85.90 Current Facility-Administered Medications Medication Dose Route Frequency Provider Last Rate Last Dose  vancomycin (VANCOCIN) 1,000 mg in 0.9% sodium chloride (MBP/ADV) 250 mL adv  1,000 mg IntraVENous Q TU, TH & SAT J. Marzetta Pou, MD  Sueellen Lanes ON 10/20/2018] VANCOMYCIN INFORMATION NOTE   Other ONCE Uday Smith MD      
 heparin (porcine) injection 5,000 Units  5,000 Units SubCUTAneous Q12H Mercedes Munson MD   5,000 Units at 10/16/18 7042  insulin glargine (LANTUS) injection 25 Units  25 Units SubCUTAneous DAILY Lucita Gibson MD   25 Units at 10/16/18 6467  hydroxypropyl methylcellulose (ISOPTO TEARS) 0.5 % ophthalmic solution 2 Drop  2 Drop Both Eyes BID Lazarus Dress, NP   2 Drop at 10/16/18 9630  epoetin manda (EPOGEN;PROCRIT) injection 40,000 Units  40,000 Units IntraVENous Q7D Polly Bhatt MD   40,000 Units at 10/10/18 1620  
 midazolam (VERSED) injection 1-10 mg  1-10 mg IntraVENous Multiple Dany White MD   2 mg at 10/09/18 1702  pantoprazole (PROTONIX) 40 mg in sodium chloride 0.9% 10 mL injection  40 mg IntraVENous Q24H Dany White MD   40 mg at 10/16/18 0830  
 0.9% sodium chloride infusion 250 mL  250 mL IntraVENous PRN Isabel Lynnox, DO      
 influenza vaccine 2018-19 (6 mos+)(PF) (FLUARIX QUAD/FLULAVAL QUAD) injection 0.5 mL  0.5 mL IntraMUSCular PRIOR TO DISCHARGE Mukul Mobley MD      
 midazolam (VERSED) injection    PRN Mercedes Munson MD   4 mg at 10/05/18 1140  acetaminophen (TYLENOL) solution 325 mg  325 mg Per NG tube Q4H PRN Mukul Mobley MD   325 mg at 10/16/18 0830  
 0.9% sodium chloride infusion 250 mL  250 mL IntraVENous PRN Isabel Lynnox, DO      
 heparin (porcine) pf 100 Units  100 Units InterCATHeter PRN Polly Bhatt MD      
 0.9% sodium chloride infusion 250 mL  250 mL IntraVENous PRN Mukul Mobley MD      
 insulin lispro (HUMALOG) injection   SubCUTAneous Q6H [facility] Clarissa Balzarine, DO   3 Units at 10/16/18 9866  albuterol (PROVENTIL VENTOLIN) nebulizer solution 2.5 mg  2.5 mg Nebulization Q6H RT Gaby Clay MD   2.5 mg at 10/16/18 0051  heparin (porcine) 1,000 unit/mL injection 1,000 Units  1,000 Units InterCATHeter DIALYSIS PRN Shaji Shah MD   1,000 Units at 09/28/18 1332  VANCOMYCIN INFORMATION NOTE   Other Rx Dosing/Monitoring William Hawthorne MD      
 senna-docusate (PERICOLACE) 8.6-50 mg per tablet 1 Tab  1 Tab Oral BID PRN Kellee Nehawka, DO      
 ondansetron [facility]) injection 4 mg  4 mg IntraVENous Q4H PRN Kellee Nehawka, DO   4 mg at 09/28/18 0539  
 glucose chewable tablet 16 g  4 Tab Oral PRN Kellee Nehawka, DO      
 glucagon (GLUCAGEN) injection 1 mg  1 mg IntraMUSCular PRN Kellee Nehawka, DO      
 dextrose (D50) infusion 12.5-25 g  25-50 mL IntraVENous PRN Kellee Nehawka, DO   25 g at 10/09/18 1906  hydrALAZINE (APRESOLINE) 20 mg/mL injection 20 mg  20 mg IntraVENous Q6H PRN Ailyn Paul NP   20 mg at 10/14/18 1700 Objective Vitals:  
 10/16/18 0800 10/16/18 0822 10/16/18 0828 10/16/18 0900 BP: 168/75   177/79 Pulse: 97 98  99 Resp: 25 27  26 Temp: (!) 38 °F (3.3 °C)  (!) 101.2 °F (38.4 °C) (!) 37.9 °F (3.3 °C) TempSrc:      
SpO2: 99% 99%  99% Weight:      
Height:      
 
 
 
Intake/Output Summary (Last 24 hours) at 10/16/18 3929 Last data filed at 10/16/18 0800 Gross per 24 hour Intake             1520 ml Output                0 ml Net             1520 ml Admission weight: Weight: 96.6 kg (213 lb) (09/26/18 2244) Last Weight Metrics: 
Weight Loss Metrics 10/16/2018 9/26/2018 9/13/2018 Today's Wt 180 lb 12.8 oz - 227 lb 15.3 oz  
BMI - 28.32 kg/m2 35.7 kg/m2 Physical Assessment:  
 
General: intubated, unresponsive. Eyes are closed. NG/ET tubes in place. Neck: No jvd. Rt ij temporary dialysis catheter in place, dressing is clear. LUNGS: diminished air entry at the bases. No crackles. CVS EXM: S1, S2  RRR. Abdomen: soft, pos bs. Lower Extremities:  1+ edema. Rt arm with multiple blisters, filled with yellowish fluid. Lab CBC w/Diff Recent Labs 10/16/18 
 0300  10/15/18 
 0433  10/14/18 
 0346 WBC  8.5  8.0  9.5  
RBC  2.52*  2.60*  2.61* HGB  7.2*  7.5*  7.6* HCT  23.0*  23.6*  24.0*  
PLT  324  327  349 Chemistry Recent Labs 10/16/18 
 0300  10/15/18 
 1106  10/15/18 
 0433  10/14/18 
 0346 GLU  162*   --   152*  170* NA  140   --   141  141  
K  6.3*   --   5.5  4.7 CL  101   --   103  103 CO2  28   --   29  30 BUN  72*   --   56*  31* CREA  6.31*   --   5.21*  3.29* CA  7.3*   --   7.6*  7.5* AGAP  11   --   9  8 BUCR  11*   --   11*  9* AP  266*  244*  268*  276* TP  6.8  7.5  7.0  6.9 ALB  1.6*  1.6*  1.7*  1.7*  
GLOB  5.2*  5.9*  5.3*  5.2* AGRAT  0.3*  0.3*  0.3*  0.3* No results found for: IRON, FE, TIBC, IBCT, PSAT, FERR Lab Results Component Value Date/Time Calcium 7.3 (L) 10/16/2018 03:00 AM  
 Phosphorus 5.2 (H) 10/13/2018 03:41 AM  
  
 
Willian Baum M.D. [facility] Phone [phone] Pager [phone]

## 2018-10-16 NOTE — DIALYSIS
ACUTE HEMODIALYSIS FLOW SHEET 
 
HEMODIALYSIS ORDERS: Physician: Andrew Gamez Dialyzer: revaclear         Duration:4  hr  BFR: 300 DFR: 046 Dialysate:  Temp37  K+2       Ca+2.5   Na 140 Bicarb 35 Weight:   kg    Bed Scale []     Unable to Obtain []      Dry weight/UF Goal:3000  Access Needle Gauge Heparin []  Bolus      Units    [] Hourly       Units    []None Catheter locking solution Pre BP:  164/77     Pulse: 99       Temperature: 36.4    Respirations: 23  Tx: NS       ml/Bolus  Other        [] N/A Labs: Pre        Post:        [] N/A Additional Orders(medications, blood products, hypotension management):       [x] N/A [x] Time Out/Safety Check  [x] [facility] Consent Verified CATHETER ACCESS: []N/A   [x]Right   []Left   [x]IJ     []Fem [] First use X-ray verified     []Tunnel                [x] Non Tunneled [x]No S/S infection  []Redness  []Drainage []Cultured []Swelling []Pain  
[x]Medical Aseptic Prep Utilized   []Dressing Changed  [] Biopatch  Date:      
[]Clotted   [x]Patent   Flows: [x]Good  []Poor  []Reversed If access problem,  notified: []Yes    [x]N/A  Date:        
 
GRAFT/FISTULA ACCESS:  []N/A     []Right     []Left     []UE     []LE []AVG   []AVF        []Buttonhole    []Medical Aseptic Prep Utilized []No S/S infection  []Redness  []Drainage []Cultured []Swelling []Pain Bruit:   [] Strong    [] Weak       Thrill :   [] Strong    [] Weak Needle Gauge:    Length: If access problem,  notified: []Yes     [x]N/A  Date:       
Please describe access if present and not used:  
 
 
GENERAL ASSESSMENT:   
LUNGS:  Rate  SaO2%        [] N/A    [x] Clear  [] Coarse  [] Crackles  [] Wheezing 
      [x] Diminished     Location : []RLL   []LLL    []RUL  []JOSSIE Cough: []Productive  []Dry  [x]N/A   Respirations:  [x]Easy  []Labored Therapy:  []RA  []NC  l/min    Mask: []NRB []Venti       O2% [x]Ventilator  [x]Intubated  [] Trach  [] BiPaP CARDIAC: [x]Regular      [] Irregular   [] Pericardial Rub  [] JVD []  Monitored  [] Bedside  [] Remotely monitored [] N/A  Rhythm: EDEMA: [] None  [x]Generalized  [] Pitting [] 1    [] 2    [] 3    [] 4 [] Facial  [] Pedal  []  UE  [] LE  
SKIN:   [x] Warm  [] Hot     [] Cold   [x] Dry     [] Pale   [] Diaphoretic    
             [] Flushed  [] Jaundiced  [] Cyanotic  [x] Rash  [] Weeping    Blisters in rt arm LOC:    [] Alert      []Oriented:    [] Person     [] Place  []Time 
             [] Confused  [] Lethargic  [] Medicated  [x] Non-responsive GI / ABDOMEN:  [] Flat    [] Distended    [x] Soft    [] Firm   []  Obese 
                           [] Diarrhea  [x] Bowel Sounds  [] Nausea  [] Vomiting  / URINE ASSESSMENT:[] Voiding   [] Oliguria  [x] Anuria   []  Mcbride [] Incontinent    []  Incontinent Brief      []  Bathroom Privileges PAIN: [x] 0 []1  []2   []3   []4   []5   []6   []7   []8   []9   []10 Scale 0-10  Action/Follow Up: MOBILITY:  [] Amb    [] Amb/Assist    [x] Bed    [] Wheelchair  [] Stretcher All Vitals and Treatment Details on Attached Flowsheet Hospital:  [facility] [id number] [] 1st Time Acute  [] Stat  [] Routine  [x] Urgent    
[] Acute Room  []  Bedside  [x] ICU/CCU  [] ER Isolation Precautions:  [x] Dialysis   [] Airborne   [] Contact    [] Reverse Special Considerations:         [] Blood Consent Verified [x]N/A ALLERGIES:   [x] NKA Code Status:  [x] Full Code  [] DNR  [] Other HBsAg ONLY: Date Drawn   9/28/18       [x]Negative []Positive []Unknown HBsAb: Date 9/28/18    [x] Susceptible   [] [id number] []Not Drawn  [] Drawn Current Labs:    Date of Labs: Today [x] Results for Bettina Reid (MRN [id number]) as of 10/16/2018 15:08 Ref.  Range 10/16/2018 03:00  
 Sodium Latest Ref Range: 136 - 145 mmol/L 140 Potassium Latest Ref Range: 3.5 - 5.5 mmol/L 6.3 (HH) Chloride Latest Ref Range: 100 - 108 mmol/L 101 CO2 Latest Ref Range: 21 - 32 mmol/L 28 Anion gap Latest Ref Range: 3.0 - 18 mmol/L 11 Glucose Latest Ref Range: 74 - 99 mg/dL 162 (H) BUN Latest Ref Range: 7.0 - 18 MG/DL 72 (H) Creatinine Latest Ref Range: 0.6 - 1.3 MG/DL 6.31 (H) BUN/Creatinine ratio Latest Ref Range: 12 - 20   11 (L) Calcium Latest Ref Range: 8.5 - 10.1 MG/DL 7.3 (L)  
GFR est non-AA Latest Ref Range: >60 ml/min/1.73m2 9 (L) GFR est AA Latest Ref Range: >60 ml/min/1.73m2 11 (L) Bilirubin, total Latest Ref Range: 0.2 - 1.0 MG/DL 0.7 Protein, total Latest Ref Range: 6.4 - 8.2 g/dL 6.8 Albumin Latest Ref Range: 3.4 - 5.0 g/dL 1.6 (L) Globulin Latest Ref Range: 2.0 - 4.0 g/dL 5.2 (H) A-G Ratio Latest Ref Range: 0.8 - 1.7   0.3 (L) ALT (SGPT) Latest Ref Range: 16 - 61 U/L 41 AST Latest Ref Range: 15 - 37 U/L 94 (H) Alk. phosphatase Latest Ref Range: 45 - 117 U/L 266 (H) Results for Alexandra Mckinney (MRN [id number]) as of 10/16/2018 15:08 Ref. Range 10/16/2018 03:00 WBC Latest Ref Range: 4.6 - 13.2 K/uL 8.5  
RBC Latest Ref Range: 4.70 - 5.50 M/uL 2.52 (L) HGB Latest Ref Range: 13.0 - 16.0 g/dL 7.2 (L) HCT Latest Ref Range: 36.0 - 48.0 % 23.0 (L) MCV Latest Ref Range: 74.0 - 97.0 FL 91.3 MCH Latest Ref Range: 24.0 - 34.0 PG 28.6 MCHC Latest Ref Range: 31.0 - 37.0 g/dL 31.3 RDW Latest Ref Range: 11.6 - 14.5 % 15.6 (H) PLATELET Latest Ref Range: 135 - 420 K/uL 324 Results for Alexandra Mckinney (MRN [id number]) as of 10/16/2018 15:08 Ref. Range 9/28/2018 04:40 Hepatitis B surface Ag Latest Ref Range: <1.00 Index <0.10 Hep B surface Ag Interp. Latest Ref Range: NEG   NEGATIVE Cut and paste current labs here. DIET:  [] Renal    [x] Other     [] NPO     []  Diabetic PRIMARY NURSE REPORT: First initial/Last name/Title Pre Dialysis:  Neymar Gonzalez RN   Time: 7223 EDUCATION:   
[] Patient [] Other         Knowledge Basis: []None []Minimal [] Substantial  
Barriers to learning  []N/A  
[] Access Care     [] S&S of infection     [] Fluid Management     []K+     []Procedural   
[]Albumin     [] Medications     [] Tx Options     [] Transplant     [] Diet     [] Other Teaching Tools:  [] Explain  [] Demo  [] Handouts [] Video Patient response:   [] Verbalized understanding  [] Teach back  [] Return demonstration [] Requires follow up Inappropriate due to    Pt. Unresponsive no family present RO/HEMODIALYSIS MACHINE SAFETY CHECKS  Before each treatment:    
Machine Number:                   [facility]  
                                [] Unit Machine #  with centralized RO 
                                [] Portable Machine #1/RO serial # [id number] [] Portable Machine #2/RO serial # [id number] [] Portable Machine #3/RO serial # [id number] [facility] [x] Portable Machine #11/RO serial # [id number] [] Portable Machine #12/RO serial # [id number] [] Portable Machine #13/RO serial #  [id number] Alarm Test:  Pass time 1430         Other:        
[x] RO/Machine Log Complete Temp  37            [x]Extracorporeal Circuit Tested for integrity Dialysate: pH  7.2Conductivity: Meter 14       HD Machine   14                 TCD: 13.9 Dialyzer Lot # [id number]            Blood Tubing Lot #  [id number]         Saline Lot #  [id number]  
 
CHLORINE TESTING-Before each treatment and every 4 hours Total Chlorine: [x] less than 0.1 ppm  Time:  4 Hr/2nd Check Time:   
(if greater than 0.1 ppm from Primary then every 30 minutes from Secondary) TREATMENT INITIATION  with Dialysis Precautions:  
[x] All Connections Secured                 [x] Saline Line Double Clamped  
[x] Venous Parameters Set                  [x] Arterial Parameters Set [x] Prime Given   ml  250           [x]Air Foam Detector Engaged Treatment Initiation Note: started treatment in ICU using rt ij HD catheter. Pt. Unresponsive vitals stable. Medication Dose Volume Route Initials Dialyzer Cleared: [] Good [x] Fair  [] Poor Blood processed: 66.8  L 
UF Removed 2500  Ml Post Wt:     kg 
POst BP:133/68          Pulse: 109      Respirations: 21  Temperature: 36.7 Post Tx Vascular Access: AVF/AVG: Bleeding stopped Art  min. Shree. Min   N/A Catheter: Locking solution: Heparin 1:1000 Art. 1.4   Shree.1.4   N/A Post Assessment:  
  
                              Skin:  [x] Warm  [x] Dry [] Diaphoretic    [] Flushed  [] Pale [] Cyanotic [facility] Signatures Title Initials  Time Lungs: [x] Clear    [] Course  [] Crackles  [] Wheezing [] Diminished Cardiac: [x] Regular   [] Irregular   [] Monitor  [] N/A  Rhythm:      
    Edema:  [] None    [x] General     [] Facial   [] Pedal    [] UE    [] LE  
    Pain: [x]0  []1  []2   []3  []4   []5   []6   []7   []8   []9   []10 Post Treatment Note: Pt. Tolerated treatment well. POST TREATMENT PRIMARY NURSE HANDOFF REPORT:  
 
First initial/Last name/Title Percy Zepeda RN 7007 Post Dialysis:  Time:    
 
Abbreviations: AVG-arterial venous graft, AVF-arterial venous fistula, IJ-Internal Jugular, Subcl-Subclavian, Fem-Femoral, Tx-treatment, AP/HR-apical heart rate, DFR-dialysate flow rate, BFR-blood flow rate, AP-arterial pressure, -venous pressure, UF-ultrafiltrate, TMP-transmembrane pressure, Shree-Venous, Art-Arterial, RO-Reverse Osmosis

## 2018-10-17 NOTE — PROGRESS NOTES
2000 Assumed care of pt after bedside report with pt lying in bed with HOB elevated. Pt intubated and on ventilator at prescribed settings. Pt not interactive and unresponsive. Monitor denotes ST. Rt pupil is fixed. Pt dose not flinch with nail bed pressure except  To lt great toe. Lt pupil is deviated. Lungs coarse, diminished. Abd distended, obese. Pt has OGT in place  And at 60 cm. Osmolite 1.2 teodoro infusing at 70 ml/hr which is goal. Active bowel sounds. Pt is anuric. Just completed HD. Pt has rt arm blisters of unknown etiology. Pt has esophageal temp probe in place. 2200 Pt status unchanged. VSS. Pt remains unresponsive and  not interactive. 10/17/2018 0000 Accucheck result 203. Lispro insulin 6 units SQ given as per sliding scale coverage. Tube feedings turned off and pt now NPO for trach and peg today as scheduled. Pt status remains unchanged. 0200 No change in pt's condition. Still unresponsive. No diarrhea noted at present. 0400 AM labs drawn and sent to lab. 0500 Tylenol 325 liquid given via OGT for fever. 0600 Accucheck result 120 No Lispro insulin given as per sliding scale coverage. 0700 Preop checklist completed per protocol. 0730 Anesthesia personnel requests that hemodialysis catheter with pigtail remain in place until after surgical procedure today for PEG and trach due to pt's limited IV access.

## 2018-10-17 NOTE — PROGRESS NOTES
Infectious Disease Follow-up Admit Date: 9/26/2018 Current abx Prior abx  
vanco 9/27-20  ACV 10/17 - 0 Zosyn 9/27-7, Meropenem/flucon 10/4- 11, Assessment ->Rec:  
 
Leukocytosis/SIRS poss sepsis - MOF multiple ID and non-infectious concerns outlined below, complicated, wbc 86.6F today from high 27K+ on 10/5 On  vancomycin/zosyn since 9/27 
- C diff neg 9/29, sput C albicans, repeat CT 10/4 no new findings --cxr reviewed - increased atx rt base. Left base improved 
- Higher grade fever 10/16 101.2   
- Differential- ? Sec to de-escalating Abx vs central fever vs ongoing/new infection vs skin blisters vs deep tissue injury of toe/buttock or all contributing - WBC is normal, no change clinically  -> plan noted for Ethic committee 
-> will repeat pancx 
-> d/w Dr. Miguel Ruth to be removed 
-> Cont on Vanco for now, observe on Acyclovir but if continues to spike, will add Meropenem  
-> prognosis remains poor and remains at high risk for complications and subsequent infections Herpes Zoster R arm, C 6 (?C5) dermatome 
- vesicular lesions progressing compared to onset per NP 
- confluent in upper arm -> start Acyclovir renal dosing (5 mg/kg q 24h) x 7 days 
-> airborne / contact precautions Suspected Pyelo POA - CT B perineph stranding, UA tntc wbc, uctx ng -> treated at this point Cholelithiasis choledocholithiais suspected acute cholecystitis  poss cystic stone POA- abnl lft bili 4 alk phos 400 seen by GI and GS Dr. Aubree Moreau, no plan for OR, for poss cholecystostomy if LFT worsen, bili, alk phos declining  -> LFts increasing again 
-> GI and Sx were following and if continues to rise- will get US and will alsoask for help Poss pancreatitis - lipase 2200 POA now 3191with high of  5500 on 10/4, interpretation complicated by JULIANNA 
-NEW pancreas characterized as nl on CT 10/4 -> Check lipase to see trend B infiltrates - poss edema infiltrate - RLL consolid better 10/4 cxr and suspect endobronchial clot contributed   ->monitor MRSE bacteremia 9/27 1 of 2. Has LUE midline unclear when placed, repeat bctx's IP today  ->repeat bctx's ntd  
-> treated by now ARF POA now on HD - baseline cr 0.9 132/10.1 in ER 
-RIGABE mccray 9/28 ->per renal , dialysis dependant  
-> plan for new TDC by vascular soon Bleeding -melena earlier, EGD 10/2 clot in esophagus bronch 10/3 R endobronch clot NEW removed 10/5 repeat bronch  ->per others Suspected anoxic brain injury on MRI 10/3 SP VF arrest 9/29 -> overall poor prognosis however family wants all aggressive measures including trach/peg    
CVA R hemiparesis 9/10   
resp failure sp intubation 9/29 Comorbid: HTN HLD CHF h/o steatohepatitis MICROBIOLOGY:  
9/27 bctx 1 of 2 MRSE 
 uctx ng 
9/29 sput C albicans C diff neg 10/4 bctx x 2 NTD 
 fungitell indeterminate due to contamination 10/8 Cath tip cx ntd 10/8     bl cx ngtd LINES AND CATHETERS:  
PIV x 2 RIJ uldall 9/28 OET      10/5 Ogtube  9/29 Active Hospital Problems Diagnosis Date Noted  Pancreatitis 10/08/2018  Septic shock ([address] 09/30/2018 Probably secondary to pneumonia (suspicious for aspiration). Less likely, secondary to acute pancreatitis.  Ischemic encephalopathy 09/30/2018  Cardiac arrest with ventricular fibrillation ([address] 09/29/2018 ROSC before defibrillation could be attempted.  Abnormal blood coagulation profile 09/29/2018  Acute pancreatitis 09/29/2018 Shock, leukocytosis, elevated lipase but radiographically no ductal dilatation in pancreas. GB stones without radiographic stigmata of acute cholecystitis  Elevated LFTs 09/28/2018  History of stroke 09/27/2018 With residual R hemiparesis  Acute-on-chronic kidney injury ([address] 09/27/2018  Acute cystitis 09/27/2018  Elevated troponin 09/27/2018  Transaminitis 09/27/2018  Acute kidney injury ([facility] 09/27/2018  Pneumonia of both lower lobes 09/11/2018  Essential hypertension 09/10/2018  Diabetes mellitus type 2, controlled ([facility] 09/10/2018 Subjective: Interval notes reviewed. Just back in ICU from trach/PEG. Unresponsive. Still with fever. Multiple vesicular lesions on right forearm to upper arm. Looks like shingles. D/w ICU team 
 
Current Facility-Administered Medications Medication Dose Route Frequency  0.9% sodium chloride 1,000 mL Irrigation    PRN  
 heparin (porcine) injection 5,000 Units  5,000 Units SubCUTAneous Q8H  
 vancomycin (VANCOCIN) 1,000 mg in 0.9% sodium chloride (MBP/ADV) 250 mL adv  1,000 mg IntraVENous Q TU, TH & SAT  [START ON 10/20/2018] VANCOMYCIN INFORMATION NOTE   Other ONCE  
 insulin glargine (LANTUS) injection 25 Units  25 Units SubCUTAneous DAILY  hydroxypropyl methylcellulose (ISOPTO TEARS) 0.5 % ophthalmic solution 2 Drop  2 Drop Both Eyes BID  epoetin manda (EPOGEN;PROCRIT) injection 40,000 Units  40,000 Units IntraVENous Q7D  
 pantoprazole (PROTONIX) 40 mg in sodium chloride 0.9% 10 mL injection  40 mg IntraVENous Q24H  
 0.9% sodium chloride infusion 250 mL  250 mL IntraVENous PRN  
 influenza vaccine 2018-19 (6 mos+)(PF) (FLUARIX QUAD/FLULAVAL QUAD) injection 0.5 mL  0.5 mL IntraMUSCular PRIOR TO DISCHARGE  acetaminophen (TYLENOL) solution 325 mg  325 mg Per NG tube Q4H PRN  
 0.9% sodium chloride infusion 250 mL  250 mL IntraVENous PRN  
 heparin (porcine) pf 100 Units  100 Units InterCATHeter PRN  
 0.9% sodium chloride infusion 250 mL  250 mL IntraVENous PRN  
 insulin lispro (HUMALOG) injection   SubCUTAneous Q6H  
 albuterol (PROVENTIL VENTOLIN) nebulizer solution 2.5 mg  2.5 mg Nebulization Q6H RT  
 heparin (porcine) 1,000 unit/mL injection 1,000 Units  1,000 Units InterCATHeter DIALYSIS PRN  
 VANCOMYCIN INFORMATION NOTE   Other Rx Dosing/Monitoring  senna-docusate (PERICOLACE) 8.6-50 mg per tablet 1 Tab  1 Tab Oral BID PRN  
 ondansetron (ZOFRAN) injection 4 mg  4 mg IntraVENous Q4H PRN  
 glucose chewable tablet 16 g  4 Tab Oral PRN  
 glucagon (GLUCAGEN) injection 1 mg  1 mg IntraMUSCular PRN  
 dextrose (D50) infusion 12.5-25 g  25-50 mL IntraVENous PRN  
 hydrALAZINE (APRESOLINE) 20 mg/mL injection 20 mg  20 mg IntraVENous Q6H PRN Facility-Administered Medications Ordered in Other Encounters Medication Dose Route Frequency  PHENYLephrine (FOUZIA-SYNEPHRINE) 10,000 mcg in 0.9% sodium chloride 100 mL infusion   IntraVENous CONTINUOUS  
 0.9% sodium chloride infusion   IntraVENous CONTINUOUS  
 fentaNYL citrate (PF) injection   IntraVENous PRN  
 midazolam (VERSED) injection   IntraVENous PRN  
 ondansetron (ZOFRAN) injection    PRN  
 lidocaine (PF) (XYLOCAINE) 20 mg/mL (2 %) injection   IntraVENous PRN  propofol (DIPRIVAN) 10 mg/mL injection   IntraVENous PRN Objective:  
 
Visit Vitals /68 Pulse (!) 104 Temp (!) 37.8 °F (3.2 °C) Resp 17 Ht 5' 7\" (1.702 m) Wt 82 kg (180 lb 12.8 oz) SpO2 98% BMI 28.32 kg/m² Temp (24hrs), Av.3 °F (12.4 °C), Min:36.3 °F (2.4 °C), Max:101 °F (38.3 °C) GEN: unresponsive male on vent in ICU HENT: no thrush Neck: new tracheostomy in place CHEST: coarse bs B no wheeze or rhonchi CVS: RRR, no murmur appreciated ABD: soft, + BS no localized tenderness, PEG in place EXT: anasarca with 3+ pitting edema b/l UE rt >lt SKIN: thick fluid filled multiple blisters on erythematous base on rt arm and forearm, honey color dried drainage around few blisters, become confluent on upper arm - suspect HZ C6 dermatome 
deep tissue injury rt heel and sacral area, left great toe with darkening but warm /deep tissue injury CNS:unresponsive, rt hemiparesis Labs: Results:  
Chemistry Recent Labs 10/17/18 
0410 10/16/18 
0300 10/15/18 
1106 10/15/18 
4398 * 162*  --  152*  140  --  141  
K 5.6* 6.3*  --  5.5  101  --  103 CO2 29 28  --  29 BUN 46* 72*  --  56* CREA 4.00* 6.31*  --  5.21* CA 7.5* 7.3*  --  7.6* AGAP 10 11  --  9  
BUCR 12 11*  --  11* * 266* 244* 268* TP 7.2 6.8 7.5 7.0 ALB 1.7* 1.6* 1.6* 1.7*  
GLOB 5.5* 5.2* 5.9* 5.3* AGRAT 0.3* 0.3* 0.3* 0.3* CBC w/Diff Recent Labs 10/17/18 
0410 10/16/18 
0300 10/15/18 
4641 WBC 8.6 8.5 8.0  
RBC 2.77* 2.52* 2.60* HGB 7.8* 7.2* 7.5* HCT 25.0* 23.0* 23.6*  
 324 327 RADIOLOGY: Reviewed 10/11 Increasing atelectasis/infiltrate at the right base with resolution of airspace 
disease at the left base. 10/10 cxr  Improved aeration of the right mid and lower lung with mild residual 
atelectatic opacities noted. Small region of opacity at the left lung base may 
reflect underlying atelectasis versus airspace disease. 10/4 CTAP IMPRESSION: 
1. Dense subtotal or total consolidation right lower lobe compatible with 
pneumonia similar to prior study. Small bilateral pleural effusions similar in 
size. 2. Distended gallbladder with gallstones and gallbladder sludge without 
significant change in appearance and also described in detail on ultrasound of 
9/27/2018. 3. Indeterminate small lesion left hepatic lobe without change. Hepatomegaly 
again evident. 4. No intraperitoneal fluid. Possible small left upper pole renal cyst. 
Cardiomegaly. Nasogastric tube tip in body of stomach. cxr 10/5 Impression: 1. Endotracheal tube, visualized nasogastric tube, and right IJ central venous 
catheter in stable position. 2. Overall improved aeration of the right lower lobe, likely improved 
atelectasis with mild residual atelectasis/airspace disease. 3. Mild interstitial edema overall similar to prior. Microbiology Results All Micro Results Procedure Component Value Units Date/Time CULTURE, BLOOD [[id number]] Collected:  10/16/18 3738 Order Status:  Completed Specimen:  Blood Updated:  10/17/18 4484 Special Requests: PERIPHERAL Culture result: NO GROWTH AFTER 20 HOURS     
 CULTURE, BLOOD [[id number]] Collected:  10/16/18 1055 Order Status:  Completed Specimen:  Blood Updated:  10/17/18 7640 Special Requests: PERIPHERAL Culture result: NO GROWTH AFTER 20 HOURS     
 CULTURE, RESPIRATORY/SPUTUM/BRONCH Nettie Diony STAIN [[id number]] Collected:  10/16/18 1915 Order Status:  Completed Specimen:  Sputum,ET Suction Updated:  10/16/18 2253 Special Requests: NO SPECIAL REQUESTS     
  GRAM STAIN 10-25 WBC/lpf     
   <10 EPI/lpf FEW GRAM POSITIVE COCCI IN PAIRS MODERATE GRAM POSITIVE COCCI IN GROUPS MUCUS PRESENT Culture result: PENDING  
 CULTURE, BLOOD [[id number]] Collected:  10/08/18 1227 Order Status:  Completed Specimen:  Blood Updated:  10/14/18 0741 Special Requests: PERIPHERAL Culture result: NO GROWTH 6 DAYS     
 CULTURE, CATHETER TIP [[id number]] Collected:  10/08/18 1215 Order Status:  Completed Specimen:  Catheter Tip Updated:  10/11/18 1995 Special Requests: NO SPECIAL REQUESTS Culture result: NO GROWTH 3 DAYS     
 CULTURE, BLOOD [[id number]] Collected:  10/04/18 8755 Order Status:  Completed Specimen:  Blood Updated:  10/10/18 8391 Special Requests: PERIPHERAL Culture result: NO GROWTH 6 DAYS     
 CULTURE, BLOOD [[id number]] Collected:  10/04/18 1120 Order Status:  Completed Specimen:  Blood Updated:  10/10/18 2558 Special Requests: PERIPHERAL Culture result: NO GROWTH 6 DAYS     
 CULTURE, BLOOD [[id number]] Collected:  09/27/18 0005 Order Status:  Completed Specimen:  Blood Updated:  10/03/18 0845 Special Requests: NO SPECIAL REQUESTS Culture result: NO GROWTH 6 DAYS     
 CULTURE, BLOOD [[id number]]  (Abnormal)  (Susceptibility) Collected:  09/27/18 0136 Order Status:  Completed Specimen:  Blood Updated:  10/02/18 8913 Special Requests: NO SPECIAL REQUESTS     
  GRAM STAIN PEDIATRIC BOTTLE GRAM POSITIVE COCCI IN PAIRS IN CLUSTERS  
      
  SMEAR CALLED TO AND CORRECTLY REPEATED BY: Pastor Renae RN IN 2700 ON 9/29/18 AT 2033 WF Culture result:    
  AEROBIC BOTTLE STAPHYLOCOCCUS EPIDERMIDIS  
     
 CULTURE, RESPIRATORY/SPUTUM/BRONCH Darlynn Mais STAIN [[id number]]  (Abnormal) Collected:  09/29/18 1210 Order Status:  Completed Specimen:  Sputum from Endotracheal aspirate Updated:  10/02/18 6369 Special Requests: NO SPECIAL REQUESTS     
  GRAM STAIN >25 WBC/lpf     
   <10 EPI/lpf MUCUS PRESENT     
   MODERATE YEAST Culture result: MANY CANDIDA TROPICALIS C. DIFFICILE/EPI PCR [[id number]] Collected:  09/29/18 1400 Order Status:  Completed Specimen:  Stool Updated:  09/29/18 2248 Special Requests: NO SPECIAL REQUESTS Culture result: Toxigenic C. difficile NEGATIVE                         C. difficile target DNA sequences are not detected. CULTURE, URINE [[id number]] Collected:  09/27/18 0029 Order Status:  Completed Specimen:  Cath Urine Updated:  09/29/18 0944 Special Requests: NO SPECIAL REQUESTS Culture result: NO GROWTH 2 DAYS Maira Colon MD, Andriy Ortiz October 17, 2018 [facility] [phone]

## 2018-10-17 NOTE — PROGRESS NOTES
[facility] ICU Progress Note Name: Reynaldo Aguilar : 1955 MRN: [id number] Date: 10/17/2018 11:05 AM 
  
[x]I have reviewed the flowsheet and previous days notes. Events overnight reviewed and discussed with nursing staff. Vital signs and records reviewed. HPI:  
 
58 y/o male recently admitted for stroke with residual right hemiparesis. Readmitted to ICU on 18 with ARF. PMHx of HTN, ARF, CHF, DM, CVA and chronic liver disease from steatohepatits. 18 became hypotensive and arrested. CPR lasted 5 minutes which likely caused catastrophic neurologic injury. Renal failure secondary to pyleonephritis and may have had acute cholecytitis on admission as well. Elevated lipase may have reflected pancreatitis also. Significant clots have been found in his esophagus and right mainstem bronchus. Remains intubated post VF cardiac arrest 18. MRI 10/3/18 consistent with anoxic brain injury. Family aware of grim prognosis but wishes to pursue Trach and Peg. Subjective: 
 
10/17/18 No new events overnight Scheduled for trach and peg today Pt remains intubated- minimal thick white secretions Not sedated. Decreased reflexes to pain  
Continues to be febrile- Tmax 101 [x]The patient is unable to give any meaningful history or review of systems because the patient is: 
[x]Intubated []Sedated [x]Unresponsive [x]The patient is critically ill on     
[x]Mechanical ventilation []Pressors []BiPAP [] ROS:A comprehensive review of systems was negative except for that written in the HPI. Medication Review: · Pressors - none · Sedation - none · Antibiotics - vanc · Pain - prn tylenol · GI/ DVT - protonix/ SQH 
· Others - none Safety Bundles: VAP Bundle/ Severe Sepsis Protocol/ Electrolyte Replacement Protocol Vital Signs:   
Visit Vitals /71 Pulse (!) 107 Temp 100 °F (37.8 °C) Resp 22 Ht 5' 7\" (1.702 m) Wt 82 kg (180 lb 12.8 oz) SpO2 98% BMI 28.32 kg/m² O2 Device: Endotracheal tube, Ventilator O2 Flow Rate (L/min): 45 l/min Temp (24hrs), Av.5 °F (13.1 °C), Min:36.3 °F (2.4 °C), Max:101.2 °F (38.4 °C) Intake/Output:  
Last shift:      No intake/output data recorded. Last 3 shifts: 10/15 1901 - 10/17 0700 In: 2180 [I.V.:280] Out: 2500 Intake/Output Summary (Last 24 hours) at 10/17/2018 6276 Last data filed at 10/17/2018 0000 Gross per 24 hour Intake 1290 ml Output 2500 ml Net -1210 ml Ventilator Settings: 
Ventilator Mode: SIMV, VC+ Respiratory Rate Back-Up Rate: 10 Insp Time (sec): 0.9 sec I:E Ratio: 1;3 
Ventilator Volumes Vt Set (ml): 500 ml Vt Exhaled (Machine Breath) (ml): 445 ml Vt Spont (ml): 580 ml Ve Observed (l/min): 10.5 l/min Ventilator Pressures Pressure Support (cm H2O): 12 cm H2O 
PIP Observed (cm H2O): 28 cm H2O Plateau Pressure (cm H2O): 23 cm H2O 
MAP (cm H2O): 10 PEEP/VENT (cm H2O): 5 cm H20 Auto PEEP Observed (cm H2O): (unable to obtain) Physical Exam: 
 
General: Intubated; unresponsive to voice HEENT:  Anicteric sclerae; pink palpebral conjunctivae; mucosa moist 
Resp:  Symmetrical chest expansion, no accessory muscle use; good airway entry;  Bilateral breath sounds clear to auscultation; (+) cough, Diminished BLL 
CV:  S1, S2 present; sinus tach GI:  Abdomen soft, non-tender; (+) active bowel sounds; palpable liver Extremities:  +2 pulses on all extremities; +2 generalized edema; +2 pitting edema BUE Skin:  Diaphoretic, Warm; scattered fluid filled honey crusted blisters to RUE Neurologic:  Reflexes to pain in RUE & RLE; (-) corneals, Pupils 2, round and reactive Devices:  OGT, HD catheter, ETT 
DATA:  
 
 
Labs: Results:  
   
Chemistry Recent Labs 10/17/18 
0410 10/16/18 
0300 10/15/18 
1106 10/15/18 
4721 * 162*  --  152*  140  --  141  
K 5.6* 6.3*  --  5.5  101  --  103 CO2 29 28  --  29 BUN 46* 72*  --  56* CREA 4.00* 6.31*  --  5.21* CA 7.5* 7.3*  --  7.6* AGAP 10 11  --  9  
BUCR 12 11*  --  11* * 266* 244* 268* TP 7.2 6.8 7.5 7.0 ALB 1.7* 1.6* 1.6* 1.7*  
GLOB 5.5* 5.2* 5.9* 5.3* AGRAT 0.3* 0.3* 0.3* 0.3* CBC w/Diff Recent Labs 10/17/18 
0410 10/16/18 
0300 10/15/18 
6173 WBC 8.6 8.5 8.0  
RBC 2.77* 2.52* 2.60* HGB 7.8* 7.2* 7.5* HCT 25.0* 23.0* 23.6*  
 324 327 Coagulation Recent Labs 10/15/18 
1106 PTP 14.0 INR 1.1 APTT 22.4* Liver Enzymes Recent Labs 10/17/18 
0410 TP 7.2 ALB 1.7* * SGOT 112* ABG No results found for: PH, PHI, PCO2, PCO2I, PO2, PO2I, HCO3, HCO3I, FIO2, FIO2I Microbiology Recent Labs 10/16/18 
1915 10/16/18 
1055 10/16/18 
1047 CULT PENDING NO GROWTH AFTER 20 HOURS NO GROWTH AFTER 20 HOURS Telemetry: [x]Sinus []A-flutter []Paced []A-fib []Multiple PVCs IMPRESSION:  
· Acute encepalopathy- MRI 10/3 consistent with anoxic brain injury; EEG 10/5 consistent with poor prognosis · Acute respiratory failure with hypoxia- Pt not a candidate for extubation d/t mental staus; family to schedule trach & peg  
· ARF on Dialysis · Chronic liver disease · Possible acute pancreatitis · Probable pyleonephritis · Probable acute cholecystitis · Cardiomyopathy with grade 1 diastolic dysfunction · Hypoalbunemia · Hyperkalemia- improved with dialysis Other Hx · S/p V Fib arrest- 10/2/18 EF 46-50%; LV global hypokinesis; cardiology following- recommends to continue supportive measures · HTN 
· ARF 
· CHF · DM 
· CVA PLAN:  
· Resp - Trach and peg today. Continue mechanical ventilation. HOB > 30. Strict aspiration precautions. Pulmonary hygiene. Daily ABG and CXR while intubated. · ID - Febrile, aleukocytosis. Continue to trend temp and WBC curve. Repeat blood Cx NGTD. Continue vanc per ID for blisters to RUE.   Cx catheter tip- NGTD. Sputum Cx prelim 10/16/18- gram + cocci in pairs & groups. · CVS - HD stable. Continue to monitor closely. · Heme/onc - Daily CBCs. Monitor H/H & platelets. · Metabolic - Monitor electrolytes and replace as needed. · Renal - Continue to trend renal indices. Scheduled HD 3/week. Nephrology following. · Endocrine - Glycemic control. BS goal < 180. SSI. · Neuro/ Pain/ Sedation - Monitor neuro status. Avoid sedating medications. Neuro following. · GI - NPO for trach and peg. Continue to trend LFTs and lipase. · Prophylaxis - DVT, GI- SQH & protonix · Discussed in interdisciplinary rounds · Code status: FULL The patient is: [] acutely ill Risk of deterioration: [] moderate [x] critically ill  [x] high  
 
[x]See my orders for details My assessment/plan was discussed with: 
[x]nursing []PT/OT [x]respiratory therapy [x]Dr. Kali Woods  
[]family [] [facility], [facility]

## 2018-10-17 NOTE — PROGRESS NOTES
-2730-Received patient on ventilator VC+SIMV rate-10, VT-500, PEEP-5, FIO2-30% PS-12. O2 sats-98% HR-103, RR-17. ETT noted to be patent and secure. Pt. Is scheduled for trach/PEG. Respiratory will continue to monitor. -[facility] 
 
-0827-Pt. Being transported to OR for Trach/PEG-[facility] 
 
-1013-Pt. Returned from OR s/p  Tracheostomy. Pt. Is noted to  Have a #8  Portex in place. Air noted around stoma. Trach  Noted to be positional in regards to amount of air heard around stoma. Dr. Trip Motley notified and at bedside. MD placed on pt. On VC+AC rate-10, VT-500, PEEP-5, FIO2-40%. O2 Sats-92% HR-99, RR-22. Respiratory will continue to monitor-[facility] 
 
-1513-Pt. Is s/p bronchoscopy. Current ventilator settings. ACVC+ rate-10, VT-500, PEEP-5, FIO2-50%. O2 Sats-100% HR-107, RR-17. Zandra Britton is patent and secure. Spare Trachs and ambubag with mask at bedside. -[address]

## 2018-10-17 NOTE — ANESTHESIA PREPROCEDURE EVALUATION
Anesthetic History No history of anesthetic complications Review of Systems / Medical History Nursing notes reviewed and pertinent labs reviewed Pulmonary Within defined limits Neuro/Psych CVA TIA Cardiovascular Hypertension CHF Dysrhythmias GI/Hepatic/Renal 
  
 
 
 
 
 
 Endo/Other Diabetes Other Findings Physical Exam 
 
Airway Intubated Cardiovascular Rhythm: irregular Dental 
 
 
  
Pulmonary Comments: Mechanical BS Abdominal 
GI exam deferred Other Findings Anesthetic Plan ASA: 4 Anesthesia type: general 
 
 
 
 
Induction: Inhalational 
Anesthetic plan and risks discussed with: Patient

## 2018-10-17 NOTE — ROUTINE PROCESS
TRANSFER - OUT REPORT: 
 
Verbal report given to Sandhya HUGHES  on [employer] [facility]	[place]  being transferred to [facility](unit) for routine post - op Report consisted of patients Situation, Background, Assessment and  
Recommendations(SBAR). Information from the following report(s) Procedure Summary was reviewed with the receiving nurse. Lines:  
Peripheral IV 10/05/18 Left Forearm (Active) Site Assessment Clean, dry, & intact 10/17/2018  6:00 AM  
Phlebitis Assessment 0 10/17/2018  6:00 AM  
Infiltration Assessment 0 10/17/2018  6:00 AM  
Dressing Status Clean, dry, & intact 10/17/2018  6:00 AM  
Dressing Type Transparent 10/17/2018  6:00 AM  
Hub Color/Line Status Pink;Capped;Flushed 10/17/2018  6:00 AM  
Action Taken Open ports on tubing capped 10/17/2018  6:00 AM  
Alcohol Cap Used Yes 10/17/2018  6:00 AM  
  
 
Opportunity for questions and clarification was provided. Patient transported with: 
 Registered Nurse

## 2018-10-17 NOTE — PROGRESS NOTES
Temporary Dialysis Catheter R Subclavian Peg Tube placed 10/17/18 Tracheostomy placed 10/17/18 Left F/A # 20 peripheral IV d/c'd 10/17/18 1200 DTI L great toe DTI bilateral heels Blisters determined to be Shingles by ID top of right hand to top of right shoulder Stage II right buttock

## 2018-10-17 NOTE — PROGRESS NOTES
Hospitalist Progress Note Les Malloy MD 
Internal medicine/ Hospitalist 
 
Daily Progress Note: 10/17/2018 10:22 AM   
Interval history / Subjective:  
Eamon Dave is a 57y.o. year old male who presented from [facility] with abnormal labs, elevated BUN/Cr. Found to have JULIANNA, UTI, and markedly elevated LFT on presentation. Patient's recent admission was 9/10/18-9/14/18 for acute CVA and rhabdo. Patient poor historian on admission,stated \"I had heart failure. She was started on vanc,zosyn. On 9/29,patient had cardiac arrest with ventricular fibrillation - s/p ROSC. Her hospital course has since then complicated with sepsis,pancreatitits,pneumonia,acute pancreatitis requiring involvement of gi,surgery,nephrology,cardiology,ID,cardiology. Patient had PEG/Trach placement on 10/17. Current Facility-Administered Medications Medication Dose Route Frequency  heparin (porcine) injection 5,000 Units  5,000 Units SubCUTAneous Q8H  
 acyclovir sodium (ZOVIRAX) 330.5 mg in 0.9% sodium chloride 100 mL IVPB  5 mg/kg (Ideal) IntraVENous Q24H  
 vancomycin (VANCOCIN) 1,000 mg in 0.9% sodium chloride (MBP/ADV) 250 mL adv  1,000 mg IntraVENous Q TU, TH & SAT  [START ON 10/20/2018] VANCOMYCIN INFORMATION NOTE   Other ONCE  
 insulin glargine (LANTUS) injection 25 Units  25 Units SubCUTAneous DAILY  hydroxypropyl methylcellulose (ISOPTO TEARS) 0.5 % ophthalmic solution 2 Drop  2 Drop Both Eyes BID  epoetin manda (EPOGEN;PROCRIT) injection 40,000 Units  40,000 Units IntraVENous Q7D  
 pantoprazole (PROTONIX) 40 mg in sodium chloride 0.9% 10 mL injection  40 mg IntraVENous Q24H  
 0.9% sodium chloride infusion 250 mL  250 mL IntraVENous PRN  
 influenza vaccine 2018-19 (6 mos+)(PF) (FLUARIX QUAD/FLULAVAL QUAD) injection 0.5 mL  0.5 mL IntraMUSCular PRIOR TO DISCHARGE  acetaminophen (TYLENOL) solution 325 mg  325 mg Per NG tube Q4H PRN  
  0.9% sodium chloride infusion 250 mL  250 mL IntraVENous PRN  
 heparin (porcine) pf 100 Units  100 Units InterCATHeter PRN  
 0.9% sodium chloride infusion 250 mL  250 mL IntraVENous PRN  
 insulin lispro (HUMALOG) injection   SubCUTAneous Q6H  
 albuterol (PROVENTIL VENTOLIN) nebulizer solution 2.5 mg  2.5 mg Nebulization Q6H RT  
 heparin (porcine) 1,000 unit/mL injection 1,000 Units  1,000 Units InterCATHeter DIALYSIS PRN  
 VANCOMYCIN INFORMATION NOTE   Other Rx Dosing/Monitoring  senna-docusate (PERICOLACE) 8.6-50 mg per tablet 1 Tab  1 Tab Oral BID PRN  
 ondansetron (ZOFRAN) injection 4 mg  4 mg IntraVENous Q4H PRN  
 glucose chewable tablet 16 g  4 Tab Oral PRN  
 glucagon (GLUCAGEN) injection 1 mg  1 mg IntraMUSCular PRN  
 dextrose (D50) infusion 12.5-25 g  25-50 mL IntraVENous PRN  
 hydrALAZINE (APRESOLINE) 20 mg/mL injection 20 mg  20 mg IntraVENous Q6H PRN Review of Systems Intubated,unresponsive. Objective:  
 
Visit Vitals /52 Pulse 99 Temp (!) 37.8 °F (3.2 °C) Resp 22 Ht 5' 7\" (1.702 m) Wt 82 kg (180 lb 12.8 oz) SpO2 92% BMI 28.32 kg/m² O2 Flow Rate (L/min): 45 l/min O2 Device: Other (comment) Temp (24hrs), Av.3 °F (12.9 °C), Min:36.3 °F (2.4 °C), Max:101 °F (38.3 °C) 
 
 
10/17 0701 - 10/17 1900 In: 300 [I.V.:300] Out: -  
10/15 1901 - 10/17 0700 In: 2180 [I.V.:280] Out: 2500 P/E General Appearance:S/p PEG/Trach today HENT: normocephalic/atraumatic, + ETT Neck: No JVD, hematoma R side where [facility] is improving Lungs: Coarse B/L w/ vent-assisted BS 
CV: RRR, no m/r/g Abdomen: soft, non-tender, normal bowel sounds Extremities: no cyanosis, no peripheral edema Neuro: Unresponsive to commands, no withdrawal to pain, + corneal reflex and pupillary reaction today Skin: Normal color, intact Data Review Recent Results (from the past 12 hour(s)) GLUCOSE, POC Collection Time: 10/17/18 12:01 AM  
Result Value Ref Range Glucose (POC) 203 (H) 70 - 110 mg/dL METABOLIC PANEL, COMPREHENSIVE Collection Time: 10/17/18  4:10 AM  
Result Value Ref Range Sodium 140 136 - 145 mmol/L Potassium 5.6 (H) 3.5 - 5.5 mmol/L Chloride 101 100 - 108 mmol/L  
 CO2 29 21 - 32 mmol/L Anion gap 10 3.0 - 18 mmol/L Glucose 104 (H) 74 - 99 mg/dL BUN 46 (H) 7.0 - 18 MG/DL Creatinine 4.00 (H) 0.6 - 1.3 MG/DL  
 BUN/Creatinine ratio 12 12 - 20 GFR est AA 18 (L) >60 ml/min/1.73m2 GFR est non-AA 15 (L) >60 ml/min/1.73m2 Calcium 7.5 (L) 8.5 - 10.1 MG/DL Bilirubin, total 0.7 0.2 - 1.0 MG/DL  
 ALT (SGPT) 49 16 - 61 U/L  
 AST (SGOT) 112 (H) 15 - 37 U/L Alk. phosphatase 302 (H) 45 - 117 U/L Protein, total 7.2 6.4 - 8.2 g/dL Albumin 1.7 (L) 3.4 - 5.0 g/dL Globulin 5.5 (H) 2.0 - 4.0 g/dL A-G Ratio 0.3 (L) 0.8 - 1.7    
CBC W/O DIFF Collection Time: 10/17/18  4:10 AM  
Result Value Ref Range WBC 8.6 4.6 - 13.2 K/uL  
 RBC 2.77 (L) 4.70 - 5.50 M/uL HGB 7.8 (L) 13.0 - 16.0 g/dL HCT 25.0 (L) 36.0 - 48.0 % MCV 90.3 74.0 - 97.0 FL  
 MCH 28.2 24.0 - 34.0 PG  
 MCHC 31.2 31.0 - 37.0 g/dL  
 RDW 15.1 (H) 11.6 - 14.5 % PLATELET 552 410 - 840 K/uL MPV 9.1 (L) 9.2 - 11.8 FL  
GLUCOSE, POC Collection Time: 10/17/18  6:39 AM  
Result Value Ref Range Glucose (POC) 120 (H) 70 - 110 mg/dL Assessment/Plan:  
 
Principal Problem: 
  Cardiac arrest with ventricular fibrillation ([facility] (9/29/2018) Overview: ROSC before defibrillation could be attempted. Active Problems: 
  Essential hypertension (9/10/2018) Diabetes mellitus type 2, controlled ([address] (9/10/2018) Pneumonia of both lower lobes (9/11/2018) History of stroke (9/27/2018) Overview: With residual R hemiparesis Acute-on-chronic kidney injury ([address] (9/27/2018) Acute cystitis (9/27/2018) Elevated troponin (9/27/2018) Transaminitis (9/27/2018) Acute kidney injury ([address] (9/27/2018) Elevated LFTs (9/28/2018) Abnormal blood coagulation profile (9/29/2018) Acute pancreatitis (9/29/2018) Overview: Shock, leukocytosis, elevated lipase but radiographically no ductal  
    dilatation in pancreas. GB stones without radiographic stigmata of acute  
    cholecystitis Septic shock ([facility] (9/30/2018) Overview: Probably secondary to pneumonia (suspicious for aspiration). Less likely,  
    secondary to acute pancreatitis. Ischemic encephalopathy (9/30/2018) Pancreatitis (10/8/2018) Care Plan - Vent mgmt per ICU 
- EGD showed large clot in esophagus last week repeat EGD 10/9 shows healed esophageal ulcer - Cont protonix IV every day  
- Hgb stable s/p 1 unit PRBC - Trend CBC 
- LFTs improving - GI signed off 10/9 
- MRI w/ evidence of severe anoxic brain injury - Minimal improvement on neuro exam 
- Appreciate neuro assistance - IV Meropenem and IV Fluconazole d/heron on 10/15 per ID. 
- Currently on vanc only which was started on 9/27 
- Nephro managing HD TTHS 
- Pt unlikely to have any meaningful neuro recovery, prognosis is very grim - Family wants to cont to do everything, including trach/PEG which were placed on 10/17 
- DVT/GI protocol DVT prophylaxis:scds Full code. Disposition:tbd - need placement -  involved.

## 2018-10-17 NOTE — PROCEDURES
[facility] Pulmonary, Critical Care, and Sleep Medicine Name: Memo Ying MRN: [id number] : 1955 Hospital: [address] [place] [address] Date: 10/17/2018 Bronchoscopy Report Procedure: Therapeutic bronchoscopy. Indication: Hypoxia, White out R side on CXR Consent/Treatment: Informed consent was obtained from the  family after risks, benefits and alternatives were explained. Timeout verified the correct patient and correct procedure. Anesthesia:  
2mg  versed Procedure Details:  
-- The bronchoscope was introduced tracheostomy -- The trachea and billy were completely inspected and were found to be normal. 
-- There was extensive mucus plugging all way up the the right mainstem bronchus. Multiple aliquots of saline were used to suction the secretions and open the airway. Once the airways were clear, there was diffuse erythema and swelling of the R sided subsegments. No blood seen 
-- The left-sided endobronchial anatomy was completely inspected and was found to be normal.  
 
Specimens:  
None Complications: none Estimated Blood Loss: Minimal 
 
Plan: 
Continue vent support Add mucomyst nebulizers TID May need repeat bronch later in the week CXR tomorrow Sylwia Juan MD 
2018 
3:20 PM

## 2018-10-17 NOTE — PROGRESS NOTES
VENTILATOR Care plan 
 
Problem: Ventilator Management Goal: *Adequate oxygenation/ ventilation/ and extubation Patient: 
   
 
Orion Figueroa     57 y.o.   male     10/17/2018  8:31 AM 
Patient Active Problem List  
Diagnosis Code  Stroke (cerebrum) ([facility]) I63.9  Stroke ([facility] I63.9  Rhabdomyolysis M62.82  
 Dehydration E86.0  
 Essential hypertension I10  
 Diabetes mellitus type 2, controlled ([facility] E11.9  Non compliance w medication regimen Z91.14  
 Pneumonia of both lower lobes J18.9  DM (diabetes mellitus) ([facility] E11.9  History of stroke Z80.78  
 Acute-on-chronic kidney injury ([facility] N17.9, N18.9  Acute cystitis N30.00  Elevated troponin R74.8  Transaminitis R74.0  Acute kidney injury ([facility] N17.9  Elevated LFTs R94.5  Cardiac arrest with ventricular fibrillation ([facility]) I46.9, I49.01  
 Abnormal blood coagulation profile R79.1  Acute pancreatitis K85.90  Septic shock ([facility]) A41.9, R65.21  
 Ischemic encephalopathy I67.89  
 Pancreatitis K85.90 Pneumonia of both lower lobes due to infectious organism ([facility] [J18.1] ESRD (end stage renal disease) ([facility] [N18.6] ESRD (end stage renal disease) ([facility] [N18.6] Reason patient intubated: Encephalopathy, Airway protection Ventilator day: 12 Ventilator settings: VC+ SIMV rate-10, Vt-500, PEEP-5, FIO2-30%, PS-12 ETT Size/Placement:8.0 ETT, 24 at lip ABG: 
Date:10/17/2018 No results found for: PH, PHI, PCO2, PCO2I, PO2, PO2I, HCO3, HCO3I, FIO2, FIO2I Chest X-ray: 
Date:10/17/2018 Results from [facility] Encounter encounter on 09/26/18 XR CHEST PORT Narrative EXAM: Chest, portable semiupright, one view INDICATION: Intubated patient. Pneumonia of both lower lobes due to infectious 
organism. COMPARISON: 10/14/2018 
 
_______________ FINDINGS: 
 
ETT is 3.2 cm above the billy, right jugular central venous catheter tip 
projects at the distal SVC. Cardiac silhouette is within normal range in size. Aorta is tortuous. Pulmonary 
vessels are normal. Improving streaky opacities in the perihilar and basilar 
lungs, nearly completely cleared. Focal horizontal linear parenchymal bands 
remain in the right perihilar region, scarring versus atelectasis. No 
pneumothorax or pleural effusion. _______________ Impression IMPRESSION: 
 
Improving bilateral atelectasis and/or infiltrate, nearly completely cleared. Right perihilar linear scar versus atelectasis without change. Lab Test: 
Date:10/17/2018 WBC:  
Lab Results Component Value Date/Time WBC 8.6 10/17/2018 04:10 AM  
HGB:  
Lab Results Component Value Date/Time HGB 7.8 (L) 10/17/2018 04:10 AM  
 PLTS:  
Lab Results Component Value Date/Time PLATELET 705 29/63/4548 04:10 AM  
 
 
SaO2%/flow: @LJTUVDM4(7)@ Vital Signs:    
Patient Vitals for the past 8 hrs: 
 Temp Pulse Resp BP SpO2  
10/17/18 0735  (!) 104 19  98 % 10/17/18 0700 100 °F (37.8 °C) (!) 107 22 140/71 98 % 10/17/18 0600 (!) 100.5 °F (38.1 °C) (!) 114 28 154/70 98 % 10/17/18 0537  (!) 114 30  99 % 10/17/18 0500 (!) 101 °F (38.3 °C) (!) 112 26 154/76 98 % 10/17/18 0400 (!) 101 °F (38.3 °C) (!) 112 29 155/79 98 % 10/17/18 0300  (!) 115 27 165/81 97 % 10/17/18 0200 (!) 100.5 °F (38.1 °C) (!) 118 26 169/86 99 % 10/17/18 0100  (!) 114 27 160/75 98 % 10/17/18 0034  (!) 110 27  96 % Wean Screen Pass (Yes or No): Yes, However, pt. Is unable to protect airway, follow commands Wean Screen Reason for Failure: n/a Duration of Weaning Trial: 
 
Additional Comments: PLAN OF CARE: Maintain ventilatory support GOAL: Trach/PEG 
 
 
OUTCOME:

## 2018-10-17 NOTE — INTERVAL H&P NOTE
H&P Update: 
Yina Terrazas was seen and examined. Ask to see for placement of Trach and G-Tube placement. Pt. In vegetative state. Discussed with daughter, who wishes to proceed with procedure. Neck: unremarkable Abd: very soft. No surgical scars or masses.  
 
Signed By: Ct Ward MD   
 October 17, 2018 8:08 AM

## 2018-10-17 NOTE — PROGRESS NOTES
Problem: Pressure Injury - Risk of 
Goal: *Prevention of pressure injury Document Mitul Scale and appropriate interventions in the flowsheet. Outcome: Progressing Towards Goal 
Pressure Injury Interventions: 
Sensory Interventions: Assess changes in LOC, Check visual cues for pain Moisture Interventions: Absorbent underpads Activity Interventions: Pressure redistribution bed/mattress(bed type) Mobility Interventions: HOB 30 degrees or less, Pressure redistribution bed/mattress (bed type) Nutrition Interventions: Document food/fluid/supplement intake Friction and Shear Interventions: Foam dressings/transparent film/skin sealants, HOB 30 degrees or less

## 2018-10-17 NOTE — PROGRESS NOTES
Problem: Falls - Risk of 
Goal: *Absence of Falls Document Cy [place] Fall Risk and appropriate interventions in the flowsheet. Outcome: Progressing Towards Goal 
Fall Risk Interventions: 
Mobility Interventions: Bed/chair exit alarm Mentation Interventions: Bed/chair exit alarm Medication Interventions: Bed/chair exit alarm Elimination Interventions: Bed/chair exit alarm, Call light in reach

## 2018-10-17 NOTE — PROGRESS NOTES
RENAL PROGRESS NOTE Chey Mckeon Assessment/Plan: · Prolonged dialysis dependant JULIANNA (ischemic atn in a setting of acute pyelonephritis/acute cholecystitis with sepsis and cardiac arrest 9/29). No evidence of recovery of renal function at this time. Next dialysis tomorrow and  continue 3/week. I have asked vascular for tdc, hopefully tomorrow or Friday. BC are ngtd. Rt ij temporary dialysis catheter wasn't pulled yesterday despite an order. Per day nurse, night nurse felt it was outside her scope of practice. Will keep it for now given borderline hyperkalemia. · S/p cardiopulm arrest 9/29. Off pressors. · Acute pyelonephritis/sepsis. On abx per ID. · Abnormal lft's/acute cholecystitis? On abx. · UGI bleed, EGD results noted- healing esophageal ulceration. · Acute blood loss anemia/anemia of kidney failure. Continue analia. · Asp pneumonia. On abx. · Concern for rt shoulder shingles. On acyclovir. · Resp failure. S/p peg/trach 10/17, s/p bronch. · Anoxic brain injury superimposed on recent cva. Subjective: Intubated, unresponsive. Had peg/trach this am and then bronchoscopy. Patient Active Problem List  
Diagnosis Code  Stroke (cerebrum) ([facility]) I63.9  Stroke ([facility] I63.9  Rhabdomyolysis M62.82  
 Dehydration E86.0  
 Essential hypertension I10  
 Diabetes mellitus type 2, controlled ([address] E11.9  Non compliance w medication regimen Z91.14  
 Pneumonia of both lower lobes J18.9  DM (diabetes mellitus) ([address] E11.9  History of stroke Z80.78  
 Acute-on-chronic kidney injury ([address] N17.9, N18.9  Acute cystitis N30.00  Elevated troponin R74.8  Transaminitis R74.0  Acute kidney injury ([address] N17.9  Elevated LFTs R94.5  Cardiac arrest with ventricular fibrillation ([facility]) I46.9, I49.01  
  Abnormal blood coagulation profile R79.1  Acute pancreatitis K85.90  Septic shock ([facility]) A41.9, R65.21  
 Ischemic encephalopathy I67.89  
 Pancreatitis K85.90 Current Facility-Administered Medications Medication Dose Route Frequency Provider Last Rate Last Dose  heparin (porcine) injection 5,000 Units  5,000 Units SubCUTAneous Q8H Jones Ackerman MD   5,000 Units at 10/17/18 1347  
 acyclovir sodium (ZOVIRAX) 330.5 mg in 0.9% sodium chloride 100 mL IVPB  5 mg/kg (Ideal) IntraVENous Q24H Dejan Curtis MD   330.5 mg at 10/17/18 1347  
 midazolam (VERSED) 1 mg/mL injection  acetylcysteine (MUCOMYST) 100 mg/mL (10 %) nebulizer solution 400 mg  4 mL Nebulization TID RT [place] Ackerman MD      
 vancomycin (VANCOCIN) 1,000 mg in 0.9% sodium chloride (MBP/ADV) 250 mL adv  1,000 mg IntraVENous Q TU, TH & SAT Schwab, J. Andrey Cummins,  mL/hr at 10/16/18 1834 1,000 mg at 10/16/18 1834  
 [START ON 10/20/2018] VANCOMYCIN INFORMATION NOTE   Other Susan Gordon MD      
 insulin glargine (LANTUS) injection 25 Units  25 Units SubCUTAneous DAILY Cresencio Solorzano MD   25 Units at 10/17/18 1147  hydroxypropyl methylcellulose (ISOPTO TEARS) 0.5 % ophthalmic solution 2 Drop  2 Drop Both Eyes BID Yoana  A, NP   2 Drop at 10/17/18 1147  epoetin manda (EPOGEN;PROCRIT) injection 40,000 Units  40,000 Units IntraVENous Q7D Melyssa Louis MD   40,000 Units at 10/10/18 1620  
 pantoprazole (PROTONIX) 40 mg in sodium chloride 0.9% 10 mL injection  40 mg IntraVENous Q24H Daina SHAFFER MD   40 mg at 10/17/18 1147  
 0.9% sodium chloride infusion 250 mL  250 mL IntraVENous PRN Devon Ellis DO      
 influenza vaccine 2018-19 (6 mos+)(PF) (FLUARIX QUAD/FLULAVAL QUAD) injection 0.5 mL  0.5 mL IntraMUSCular PRIOR TO DISCHARGE Corrinne Fryer, MD      
 acetaminophen (TYLENOL) solution 325 mg  325 mg Per NG tube Q4H PRN Corrie Tatum MD   325 mg at 10/17/18 0502  
 0.9% sodium chloride infusion 250 mL  250 mL IntraVENous PRN Palomodedrick Lopez, DO      
 heparin (porcine) pf 100 Units  100 Units InterCATHeter PRN Jhonny Noyola MD      
 0.9% sodium chloride infusion 250 mL  250 mL IntraVENous PRN Corrie Tatum MD      
 insulin lispro (HUMALOG) injection   SubCUTAneous Q6H Ijeoma Ates H, DO   Stopped at 10/17/18 0600  
 albuterol (PROVENTIL VENTOLIN) nebulizer solution 2.5 mg  2.5 mg Nebulization Q6H RT Mimi Clay MD   2.5 mg at 10/17/18 1332  heparin (porcine) 1,000 unit/mL injection 1,000 Units  1,000 Units InterCATHeter DIALYSIS PRN Jhonny Noyola MD   1,000 Units at 09/28/18 1332  VANCOMYCIN INFORMATION NOTE   Other Rx Dosing/Monitoring Hernesto Holden MD      
 senna-docusate (PERICOLACE) 8.6-50 mg per tablet 1 Tab  1 Tab Oral BID PRN Haily Andrade, DO      
 ondansetron (ZOFRAN) injection 4 mg  4 mg IntraVENous Q4H PRN Alfred Nettles, DO   4 mg at 09/28/18 0539  
 glucose chewable tablet 16 g  4 Tab Oral PRN Loree LAL, DO      
 glucagon (GLUCAGEN) injection 1 mg  1 mg IntraMUSCular PRN Yoon Alfred A, DO      
 dextrose (D50) infusion 12.5-25 g  25-50 mL IntraVENous PRN Alfred Nettles, DO   25 g at 10/09/18 1906  hydrALAZINE (APRESOLINE) 20 mg/mL injection 20 mg  20 mg IntraVENous Q6H PRN Alanis Guevara NP   20 mg at 10/14/18 1700 Objective Vitals:  
 10/17/18 1502 10/17/18 1503 10/17/18 1504 10/17/18 1505 BP:      
Pulse: (!) 104 (!) 105 (!) 107 (!) 106 Resp: 26 (!) 31 27 25 Temp:      
TempSrc:      
SpO2: 100% 100% 100% 100% Weight:      
Height:      
 
 
 
Intake/Output Summary (Last 24 hours) at 10/17/2018 1518 Last data filed at 10/17/2018 8888 Gross per 24 hour Intake 970 ml Output 2500 ml Net -1530 ml Admission weight: Weight: 96.6 kg (213 lb) (09/26/18 2244) Last Weight Metrics: Weight Loss Metrics 10/16/2018 9/26/2018 9/13/2018 Today's Wt 180 lb 12.8 oz - 227 lb 15.3 oz  
BMI - 28.32 kg/m2 35.7 kg/m2 Physical Assessment:  
 
General: intubated, unresponsive. Eyes are closed. [place] Neck: No jvd. Rt ij temporary dialysis catheter in place, dressing is clear. LUNGS: diminished air entry at the bases. No crackles. CVS EXM: S1, S2  RRR. Abdomen: soft, pos bs. Lower Extremities:  1+ edema. Rt arm with multiple blisters, filled with yellowish fluid. Lab CBC w/Diff Recent Labs 10/17/18 
0410 10/16/18 
0300 10/15/18 
5550 WBC 8.6 8.5 8.0  
RBC 2.77* 2.52* 2.60* HGB 7.8* 7.2* 7.5* HCT 25.0* 23.0* 23.6*  
 324 327 Chemistry Recent Labs 10/17/18 
0410 10/16/18 
0300 10/15/18 
1106 10/15/18 
9348 * 162*  --  152*  140  --  141  
K 5.6* 6.3*  --  5.5  101  --  103 CO2 29 28  --  29 BUN 46* 72*  --  56* CREA 4.00* 6.31*  --  5.21* CA 7.5* 7.3*  --  7.6* AGAP 10 11  --  9  
BUCR 12 11*  --  11* * 266* 244* 268* TP 7.2 6.8 7.5 7.0 ALB 1.7* 1.6* 1.6* 1.7*  
GLOB 5.5* 5.2* 5.9* 5.3* AGRAT 0.3* 0.3* 0.3* 0.3* No results found for: IRON, FE, TIBC, IBCT, PSAT, FERR Lab Results Component Value Date/Time Calcium 7.5 (L) 10/17/2018 04:10 AM  
 Phosphorus 5.2 (H) 10/13/2018 03:41 AM  
  
 
Tad Gabriel M.D. [facility] Phone [phone] Pager [phone]

## 2018-10-17 NOTE — PROGRESS NOTES
Problem: Falls - Risk of 
Goal: *Absence of Falls Document Judy Barclay Fall Risk and appropriate interventions in the flowsheet. Outcome: Progressing Towards Goal 
Fall Risk Interventions: 
Mobility Interventions: Bed/chair exit alarm Mentation Interventions: Bed/chair exit alarm Medication Interventions: Bed/chair exit alarm Elimination Interventions: Bed/chair exit alarm, Call light in reach

## 2018-10-17 NOTE — BRIEF OP NOTE
BRIEF OPERATIVE NOTE Date of Procedure: 10/17/2018 Preoperative Diagnosis: ischemic encephalopathy Postoperative Diagnosis: acute kidney injury Procedure(s): 
TRACHEOSTOMY PERCUTANEOUS ENDOSCOPIC GASTROSTOMY TUBE INSERTION Surgeon(s) and Role: Philomena Vee MD - Primary Surgical Assistant:  
 
Surgical Staff: 
Circ-1: Manuela Colney RN Endoscopy Technician-1: Klever Briggs 
Scrub Tech-1: Jacqui Hicks Surg Asst-1: Nuris Keller Surg Asst-2: Orlin Potter Event Time In Time Out Incision Start 10/17/2018 2670 Incision Close Anesthesia: General  
Estimated Blood Loss: 0 Specimens:   
Findings: 8F fen trach 20F gtube EGD-negative Complications: none Implants:

## 2018-10-17 NOTE — ROUTINE PROCESS
0725 Bedside report given to Erich Holder (oncoming nurse) per Sushil Esposito RN (offgoing nurse) utilizing SBAR, MAR, Kardex, I&O, results review, and EKG interpretation with rate and rhythm.

## 2018-10-17 NOTE — PROGRESS NOTES
Patient has been matched to Denise Roman in the [facility]. The patient daughter has declined to accept an offer from [facility] in [place] thus far.  I have requested review for disability/Mediciad from [address],  RN

## 2018-10-17 NOTE — ANESTHESIA POSTPROCEDURE EVALUATION
Procedure(s): 
TRACHEOSTOMY PERCUTANEOUS ENDOSCOPIC GASTROSTOMY TUBE INSERTION. Anesthesia Post Evaluation Multimodal analgesia: multimodal analgesia used between 6 hours prior to anesthesia start to PACU discharge Patient location during evaluation: ICU Patient participation: complete - patient cannot participate Post-procedure mental status: sedated. Pain management: adequate Airway patency: intubated. Anesthetic complications: no 
Cardiovascular status: acceptable Respiratory status: acceptable Hydration status: acceptable Visit Vitals /52 Pulse 99 Temp (!) 3.2 °C (37.8 °F) Resp 22 Ht 5' 7\" (1.702 m) Wt 82 kg (180 lb 12.8 oz) SpO2 92% BMI 28.32 kg/m²

## 2018-10-17 NOTE — PROGRESS NOTES
Patient transported to O.R. On monitor with Anesthesia, & transport tech. Verbal report given at bedside.

## 2018-10-18 NOTE — CONSULTS
[address]  [facility] MR#: [id number] : 1955 ACCOUNT #: [de-identified] DATE OF SERVICE: 10/16/2018 HISTORY OF PRESENT ILLNESS:  The patient is a 64-year-old who was admitted on 2018 for JULIANNA, UTI, and elevated liver function studies. He had recently suffered an acute CVA on about 09/10/2018. On  he unfortunately experienced a cardiac arrest.  He has remained in a persistent vegetative state since and is requiring mechanical ventilation and tube feeds. Family desires continued care. I am asked to provide assistance in tracheostomy and gastrostomy tube placement. PAST MEDICAL HISTORY:  Notable for renal failure requiring hemodialysis, congestive heart failure, diabetes, and a history of stroke. PAST SURGICAL HISTORY:  (Via chart review.)  Appears to be no prior procedures. MEDICATIONS:  Typically normal at home:  Lipitor, aspirin, Lantus, Humalog, and Cozaar. For current medicine list refer to the EMR. ALLERGIES:  NONE. SOCIAL HISTORY:  He is , has several children. There is no history of tobacco or alcohol use. I am not certain as to his home arrangements. REVIEW OF SYSTEMS:  Not obtainable. PHYSICAL EXAMINATION: 
GENERAL:  The patient is well-nourished appearing individual.  He is mechanically ventilated and unresponsive. HEENT:  There is an orotracheal tube in place with an oral enteric feeding tube positioned as well. He blinks rarely. He does have corneal response. NECK:  Unremarkable. LUNGS:  Clear with no wheezing. HEART:  Regular rate. There are no chest incisions. ABDOMEN:  Doughy, soft and does not appear to be tender (no change in eye function with palpation). There is no abdominal wall hernias. No abdominal incisions. No hernias noted. No pulsatile or palpable abdominal masses. EXTREMITIES:  All unremarkable. Feet and hands are well perfused. IMPRESSION: 
1. Ischemic encephalopathy. 2.  Respiratory failure requiring mechanical ventilation. 3.  Renal failure requiring hemodialysis. PLAN:  We will proceed with a tracheostomy and percutaneous endoscopic gastrostomy tube placement. I have discussed both these procedures at length with his daughter who has power of . I have covered surgical risks and potential complications. All of her questions are answered. A consent is obtained. This is witnessed through the patient's nurse, and this was all done over the phone. Forty-five minutes was spent on the patient's behalf, most of which was face-to-face and speaking on the phone with family. MD MIGUEL Loera / MANUELA 
D: 10/18/2018 12:24    
T: 10/18/2018 19:14 
JOB #: [id number]

## 2018-10-18 NOTE — PROGRESS NOTES
Surgery Progress Note Subjective:  
Daily Progress Note: 10/18/2018 1:05 PM 
 
No issues with Trach or G-tube (to gravity). ventilating well. No bleeding. Pt unresponsive Current Facility-Administered Medications Medication Dose Route Frequency  heparin (porcine) injection 5,000 Units  5,000 Units SubCUTAneous Q8H  
 acyclovir sodium (ZOVIRAX) 330.5 mg in 0.9% sodium chloride 100 mL IVPB  5 mg/kg (Ideal) IntraVENous Q24H  
 acetylcysteine (MUCOMYST) 100 mg/mL (10 %) nebulizer solution 400 mg  4 mL Nebulization TID RT  
 vancomycin (VANCOCIN) 1,000 mg in 0.9% sodium chloride (MBP/ADV) 250 mL adv  1,000 mg IntraVENous Q TU, TH & SAT  [START ON 10/20/2018] VANCOMYCIN INFORMATION NOTE   Other ONCE  
 insulin glargine (LANTUS) injection 25 Units  25 Units SubCUTAneous DAILY  hydroxypropyl methylcellulose (ISOPTO TEARS) 0.5 % ophthalmic solution 2 Drop  2 Drop Both Eyes BID  epoetin manda (EPOGEN;PROCRIT) injection 40,000 Units  40,000 Units IntraVENous Q7D  
 pantoprazole (PROTONIX) 40 mg in sodium chloride 0.9% 10 mL injection  40 mg IntraVENous Q24H  
 0.9% sodium chloride infusion 250 mL  250 mL IntraVENous PRN  
 influenza vaccine 2018-19 (6 mos+)(PF) (FLUARIX QUAD/FLULAVAL QUAD) injection 0.5 mL  0.5 mL IntraMUSCular PRIOR TO DISCHARGE  acetaminophen (TYLENOL) solution 325 mg  325 mg Per NG tube Q4H PRN  
 0.9% sodium chloride infusion 250 mL  250 mL IntraVENous PRN  
 heparin (porcine) pf 100 Units  100 Units InterCATHeter PRN  
 0.9% sodium chloride infusion 250 mL  250 mL IntraVENous PRN  
 insulin lispro (HUMALOG) injection   SubCUTAneous Q6H  
 albuterol (PROVENTIL VENTOLIN) nebulizer solution 2.5 mg  2.5 mg Nebulization Q6H RT  
 heparin (porcine) 1,000 unit/mL injection 1,000 Units  1,000 Units InterCATHeter DIALYSIS PRN  
 VANCOMYCIN INFORMATION NOTE   Other Rx Dosing/Monitoring  senna-docusate (PERICOLACE) 8.6-50 mg per tablet 1 Tab  1 Tab Oral BID PRN  
  ondansetron (ZOFRAN) injection 4 mg  4 mg IntraVENous Q4H PRN  
 glucose chewable tablet 16 g  4 Tab Oral PRN  
 glucagon (GLUCAGEN) injection 1 mg  1 mg IntraMUSCular PRN  
 dextrose (D50) infusion 12.5-25 g  25-50 mL IntraVENous PRN  
 hydrALAZINE (APRESOLINE) 20 mg/mL injection 20 mg  20 mg IntraVENous Q6H PRN Objective:  
 
Visit Vitals BP (P) 105/63 Pulse (!) (P) 109 Temp 99.4 °F (37.4 °C) (Axillary) Resp (P) 18 Ht 5' 7\" (1.702 m) Wt 82 kg (180 lb 12.8 oz) SpO2 100% BMI 28.32 kg/m² O2 Flow Rate (L/min): 45 l/min O2 Device: Tracheostomy, Ventilator Temp (24hrs), Av.6 °F (37.6 °C), Min:99.2 °F (37.3 °C), Max:100.5 °F (38.1 °C) Trach site clean, no bleeding. G-tube site clean. No abd distention. ..very soft Data Review Recent Results (from the past 24 hour(s)) HEPATIC FUNCTION PANEL Collection Time: 10/17/18  5:10 PM  
Result Value Ref Range Protein, total 6.8 6.4 - 8.2 g/dL Albumin 1.6 (L) 3.4 - 5.0 g/dL Globulin 5.2 (H) 2.0 - 4.0 g/dL A-G Ratio 0.3 (L) 0.8 - 1.7 Bilirubin, total 0.7 0.2 - 1.0 MG/DL Bilirubin, direct 0.5 (H) 0.0 - 0.2 MG/DL Alk. phosphatase 272 (H) 45 - 117 U/L  
 AST (SGOT) 97 (H) 15 - 37 U/L  
 ALT (SGPT) 44 16 - 61 U/L  
GLUCOSE, POC Collection Time: 10/18/18 12:10 AM  
Result Value Ref Range Glucose (POC) 89 70 - 110 mg/dL CBC W/O DIFF Collection Time: 10/18/18  4:30 AM  
Result Value Ref Range WBC 9.2 4.6 - 13.2 K/uL  
 RBC 2.69 (L) 4.70 - 5.50 M/uL HGB 7.5 (L) 13.0 - 16.0 g/dL HCT 24.4 (L) 36.0 - 48.0 % MCV 90.7 74.0 - 97.0 FL  
 MCH 27.9 24.0 - 34.0 PG  
 MCHC 30.7 (L) 31.0 - 37.0 g/dL  
 RDW 15.4 (H) 11.6 - 14.5 % PLATELET 362 080 - 770 K/uL MPV 9.0 (L) 9.2 - 08.0 FL  
METABOLIC PANEL, COMPREHENSIVE Collection Time: 10/18/18  4:30 AM  
Result Value Ref Range Sodium 141 136 - 145 mmol/L Potassium 6.5 (HH) 3.5 - 5.5 mmol/L  Chloride 103 100 - 108 mmol/L  
 CO2 27 21 - 32 mmol/L Anion gap 11 3.0 - 18 mmol/L Glucose 81 74 - 99 mg/dL BUN 66 (H) 7.0 - 18 MG/DL Creatinine 5.63 (H) 0.6 - 1.3 MG/DL  
 BUN/Creatinine ratio 12 12 - 20 GFR est AA 12 (L) >60 ml/min/1.73m2 GFR est non-AA 10 (L) >60 ml/min/1.73m2 Calcium 7.5 (L) 8.5 - 10.1 MG/DL Bilirubin, total 0.7 0.2 - 1.0 MG/DL  
 ALT (SGPT) 46 16 - 61 U/L  
 AST (SGOT) 104 (H) 15 - 37 U/L Alk. phosphatase 277 (H) 45 - 117 U/L Protein, total 7.0 6.4 - 8.2 g/dL Albumin 1.7 (L) 3.4 - 5.0 g/dL Globulin 5.3 (H) 2.0 - 4.0 g/dL A-G Ratio 0.3 (L) 0.8 - 1.7 GLUCOSE, POC Collection Time: 10/18/18  5:48 AM  
Result Value Ref Range Glucose (POC) 88 70 - 110 mg/dL GLUCOSE, POC Collection Time: 10/18/18 12:23 PM  
Result Value Ref Range Glucose (POC) 79 70 - 110 mg/dL Assessment/Plan:  
 
Ischemic Encephalopathy req mechanical vent and feeding Start tube feeds at 1/2 rate (35cc/hr) then slowly advance tomorrow if no significant residuals to goal of 70cc/Hr Signed By: Pawel Kim MD   
 October 18, 2018

## 2018-10-18 NOTE — PROGRESS NOTES
Problem: Falls - Risk of 
Goal: *Absence of Falls Document Jitendrao Horn Fall Risk and appropriate interventions in the flowsheet. Outcome: Progressing Towards Goal 
Fall Risk Interventions: 
Mobility Interventions: Bed/chair exit alarm Mentation Interventions: Bed/chair exit alarm Medication Interventions: Bed/chair exit alarm Elimination Interventions: Bed/chair exit alarm

## 2018-10-18 NOTE — ROUTINE PROCESS
0730 Bedside report given to [place] (oncoming nurse) per Lalita Kearney RN (offgoing nurse) utilizing SBAR, MAR, Kardex, I&O, results review, and EKG interpretation with rate and rhythm.

## 2018-10-18 NOTE — PROGRESS NOTES
Infectious Disease Follow-up Admit Date: 9/26/2018 Current abx Prior abx  
vanco 9/27-21  ACV 10/17 - 1 Zosyn 9/27-7, Meropenem/flucon 10/4- 11, Assessment ->Rec:  
 
Leukocytosis/SIRS poss sepsis - MOF multiple ID and non-infectious concerns outlined below, complicated, wbc 46.1D today from high 27K+ on 10/5 On  vancomycin/zosyn since 9/27 
- C diff neg 9/29, sput C albicans, repeat CT 10/4 no new findings --cxr reviewed - increased atx rt base. Left base improved 
- Higher grade fever 10/16 101.2   
- Differential- ? Sec to de-escalating Abx vs central fever vs ongoing/new infection vs skin blisters vs deep tissue injury of toe/buttock or all contributing - WBC is normal, no change clinically  -> repeat pancx NTD 
-> await removal of RIJ Uldall and send tip for cx 
-> Cont on Vanco for now and likely stop once [place] out 
-> cont  on Acyclovir but if continues to spike, will add Meropenem  
-> prognosis remains poor and remains at high risk for complications and subsequent infections Herpes Zoster R arm, C 6 (?C5) dermatome 
- vesicular lesions progressing compared to onset per NP 
- confluent in upper arm -> cont Acyclovir renal dosing (5 mg/kg q 24h) x 7 days 
-> maintain airborne / contact precautions 
-> will order fluid from blisters for HSV/VXV PCR testing Suspected Pyelo POA - CT B perineph stranding, UA tntc wbc, uctx ng -> treated at this point Cholelithiasis choledocholithiais suspected acute cholecystitis  poss cystic stone POA- abnl lft bili 4 alk phos 400 seen by GI and GS Dr. Lakeshia Giles, no plan for OR, for poss cholecystostomy if LFT worsen, bili, alk phos declining  -> LFts increasing again 
-> GI and Sx were following and if continues to rise- will get US and will alsoask for help Poss pancreatitis - lipase 2200 POA now 3191with high of  5500 on 10/4, interpretation complicated by JULIANNA 
-NEW pancreas characterized as nl on CT 10/4 -> Check lipase to see trend- ordered for am 
  
B infiltrates - poss edema infiltrate - RLL consolid better 10/4 cxr and suspect endobronchial clot contributed   ->monitor MRSE bacteremia 9/27 1 of 2. Has LUE midline unclear when placed, repeat bctx's IP today  ->repeat bctx's ntd  
-> treated by now ARF POA now on HD - baseline cr 0.9 132/10.1 in ER 
-[facility] carynarmando 9/28 ->per renal , dialysis dependant  
-> plan for new TDC by vascular soon Bleeding -melena earlier, EGD 10/2 clot in esophagus bronch 10/3 R endobronch clot NEW removed 10/5 repeat bronch  ->per others Suspected anoxic brain injury on MRI 10/3 SP VF arrest 9/29 -> overall poor prognosis however family wants all aggressive measures including trach/peg    
CVA R hemiparesis 9/10   
resp failure sp intubation 9/29 Comorbid: HTN HLD CHF h/o steatohepatitis MICROBIOLOGY:  
9/27 bctx 1 of 2 MRSE 
 uctx ng 
9/29 sput C albicans C diff neg 10/4 bctx x 2 NTD 
 fungitell indeterminate due to contamination 10/8 Cath tip cx ntd 10/8     bl cx ngtd LINES AND CATHETERS:  
PIV x 2 [facility] uldall 9/28 OET      10/5 Ogtube  9/29 Active Hospital Problems Diagnosis Date Noted  Pancreatitis 10/08/2018  Septic shock ([facility] [address] 09/30/2018 Probably secondary to pneumonia (suspicious for aspiration). Less likely, secondary to acute pancreatitis.  Ischemic encephalopathy 09/30/2018  Cardiac arrest with ventricular fibrillation ([address] 09/29/2018 ROSC before defibrillation could be attempted.  Abnormal blood coagulation profile 09/29/2018  Acute pancreatitis 09/29/2018 Shock, leukocytosis, elevated lipase but radiographically no ductal dilatation in pancreas. GB stones without radiographic stigmata of acute cholecystitis  Elevated LFTs 09/28/2018  History of stroke 09/27/2018 With residual R hemiparesis  Acute-on-chronic kidney injury ([address] 09/27/2018  Acute cystitis 09/27/2018  Elevated troponin 09/27/2018  Transaminitis 09/27/2018  Acute kidney injury ([facility] [address] 09/27/2018  Pneumonia of both lower lobes 09/11/2018  Essential hypertension 09/10/2018  Diabetes mellitus type 2, controlled ([facility] [address] 09/10/2018 Subjective: Interval notes reviewed. Pt afebrile. Remains unresponsive in ICU. Now in airborne isolation. Multiple vesicular lesions on right forearm to upper arm- now with crusting and umbilication. Sebastián Daniels still not removed- planning to do after dialysis next time. No family at bedside. Current Facility-Administered Medications Medication Dose Route Frequency  heparin (porcine) injection 5,000 Units  5,000 Units SubCUTAneous Q8H  
 acyclovir sodium (ZOVIRAX) 330.5 mg in 0.9% sodium chloride 100 mL IVPB  5 mg/kg (Ideal) IntraVENous Q24H  
 acetylcysteine (MUCOMYST) 100 mg/mL (10 %) nebulizer solution 400 mg  4 mL Nebulization TID RT  
 vancomycin (VANCOCIN) 1,000 mg in 0.9% sodium chloride (MBP/ADV) 250 mL adv  1,000 mg IntraVENous Q TU, TH & SAT  [START ON 10/20/2018] VANCOMYCIN INFORMATION NOTE   Other ONCE  
 insulin glargine (LANTUS) injection 25 Units  25 Units SubCUTAneous DAILY  hydroxypropyl methylcellulose (ISOPTO TEARS) 0.5 % ophthalmic solution 2 Drop  2 Drop Both Eyes BID  epoetin manda (EPOGEN;PROCRIT) injection 40,000 Units  40,000 Units IntraVENous Q7D  
 pantoprazole (PROTONIX) 40 mg in sodium chloride 0.9% 10 mL injection  40 mg IntraVENous Q24H  
 0.9% sodium chloride infusion 250 mL  250 mL IntraVENous PRN  
 influenza vaccine 2018-19 (6 mos+)(PF) (FLUARIX QUAD/FLULAVAL QUAD) injection 0.5 mL  0.5 mL IntraMUSCular PRIOR TO DISCHARGE  acetaminophen (TYLENOL) solution 325 mg  325 mg Per NG tube Q4H PRN  
 0.9% sodium chloride infusion 250 mL  250 mL IntraVENous PRN  
 heparin (porcine) pf 100 Units  100 Units InterCATHeter PRN  
 0.9% sodium chloride infusion 250 mL  250 mL IntraVENous PRN  
 insulin lispro (HUMALOG) injection   SubCUTAneous Q6H  
  albuterol (PROVENTIL VENTOLIN) nebulizer solution 2.5 mg  2.5 mg Nebulization Q6H RT  
 heparin (porcine) 1,000 unit/mL injection 1,000 Units  1,000 Units InterCATHeter DIALYSIS PRN  
 VANCOMYCIN INFORMATION NOTE   Other Rx Dosing/Monitoring  senna-docusate (PERICOLACE) 8.6-50 mg per tablet 1 Tab  1 Tab Oral BID PRN  
 ondansetron (ZOFRAN) injection 4 mg  4 mg IntraVENous Q4H PRN  
 glucose chewable tablet 16 g  4 Tab Oral PRN  
 glucagon (GLUCAGEN) injection 1 mg  1 mg IntraMUSCular PRN  
 dextrose (D50) infusion 12.5-25 g  25-50 mL IntraVENous PRN  
 hydrALAZINE (APRESOLINE) 20 mg/mL injection 20 mg  20 mg IntraVENous Q6H PRN Objective:  
 
Visit Vitals /63 Pulse 96 Temp 99.4 °F (37.4 °C) Resp 18 Ht 5' 7\" (1.702 m) Wt 82 kg (180 lb 12.8 oz) SpO2 100% BMI 28.32 kg/m² Temp (24hrs), Av.7 °F (37.6 °C), Min:98.7 °F (37.1 °C), Max:100.9 °F (38.3 °C) GEN: unresponsive male on vent in ICU HENT: no thrush Neck: new tracheostomy in place CHEST: coarse bs B no wheeze or rhonchi CVS: RRR, no murmur appreciated ABD: soft, + BS no localized tenderness, PEG in place EXT: anasarca with 3+ pitting edema b/l UE rt >lt SKIN: thick fluid filled multiple blisters on erythematous base on rt arm and forearm, honey color dried drainage around few blisters, become confluent on upper arm, New umbilication and crusting - suspect HZ C6 dermatome 
deep tissue injury rt heel and sacral area, left great toe with darkening but warm /deep tissue injury CNS:unresponsive, rt hemiparesis Labs: Results:  
Chemistry Recent Labs 10/18/18 
0430 10/17/18 
1710 10/17/18 
0410 10/16/18 
0300 GLU 81  --  104* 162*   --  140 140  
K 6.5*  --  5.6* 6.3*  
  --  101 101 CO2 27  --  29 28 BUN 66*  --  46* 72* CREA 5.63*  --  4.00* 6.31* CA 7.5*  --  7.5* 7.3* AGAP 11  --  10 11 BUCR 12  --  12 11* * 272* 302* 266* TP 7.0 6.8 7.2 6.8 ALB 1.7* 1.6* 1.7* 1.6*  
GLOB 5.3* 5.2* 5.5* 5.2* AGRAT 0.3* 0.3* 0.3* 0.3* CBC w/Diff Recent Labs 10/18/18 
0430 10/17/18 
0410 10/16/18 
0300 WBC 9.2 8.6 8.5  
RBC 2.69* 2.77* 2.52* HGB 7.5* 7.8* 7.2* HCT 24.4* 25.0* 23.0*  
 358 324 RADIOLOGY: Reviewed 10/11 Increasing atelectasis/infiltrate at the right base with resolution of airspace 
disease at the left base. 10/10 cxr  Improved aeration of the right mid and lower lung with mild residual 
atelectatic opacities noted. Small region of opacity at the left lung base may 
reflect underlying atelectasis versus airspace disease. 10/4 CTAP IMPRESSION: 
1. Dense subtotal or total consolidation right lower lobe compatible with 
pneumonia similar to prior study. Small bilateral pleural effusions similar in 
size. 2. Distended gallbladder with gallstones and gallbladder sludge without 
significant change in appearance and also described in detail on ultrasound of 
9/27/2018. 3. Indeterminate small lesion left hepatic lobe without change. Hepatomegaly 
again evident. 4. No intraperitoneal fluid. Possible small left upper pole renal cyst. 
Cardiomegaly. Nasogastric tube tip in body of stomach. cxr 10/5 Impression: 1. Endotracheal tube, visualized nasogastric tube, and right IJ central venous 
catheter in stable position. 2. Overall improved aeration of the right lower lobe, likely improved 
atelectasis with mild residual atelectasis/airspace disease. 3. Mild interstitial edema overall similar to prior. Microbiology Results All Micro Results Procedure Component Value Units Date/Time CULTURE, BLOOD [[id number]] Collected:  10/16/18 1047 Order Status:  Completed Specimen:  Blood Updated:  10/18/18 7918 Special Requests: PERIPHERAL Culture result: NO GROWTH 2 DAYS     
 CULTURE, BLOOD [[id number]] Collected:  10/16/18 1055 Order Status:  Completed Specimen:  Blood Updated:  10/18/18 0680 Special Requests: PERIPHERAL Culture result: NO GROWTH 2 DAYS     
 CULTURE, RESPIRATORY/SPUTUM/BRONCH Michael Pod [[id number]] Collected:  10/16/18 1915 Order Status:  Completed Specimen:  Sputum,ET Suction Updated:  10/17/18 1202 Special Requests: NO SPECIAL REQUESTS     
  GRAM STAIN 10-25 WBC/lpf     
   <10 EPI/lpf FEW GRAM POSITIVE COCCI IN PAIRS MODERATE GRAM POSITIVE COCCI IN GROUPS MUCUS PRESENT Culture result: MODERATE NORMAL RESPIRATORY SHANAE  
     
 CULTURE, BLOOD [[id number]] Collected:  10/08/18 1227 Order Status:  Completed Specimen:  Blood Updated:  10/14/18 0741 Special Requests: PERIPHERAL Culture result: NO GROWTH 6 DAYS     
 CULTURE, CATHETER TIP [[id number]] Collected:  10/08/18 1215 Order Status:  Completed Specimen:  Catheter Tip Updated:  10/11/18 4177 Special Requests: NO SPECIAL REQUESTS Culture result: NO GROWTH 3 DAYS     
 CULTURE, BLOOD [[id number]] Collected:  10/04/18 7513 Order Status:  Completed Specimen:  Blood Updated:  10/10/18 8274 Special Requests: PERIPHERAL Culture result: NO GROWTH 6 DAYS     
 CULTURE, BLOOD [[id number]] Collected:  10/04/18 1120 Order Status:  Completed Specimen:  Blood Updated:  10/10/18 4877 Special Requests: PERIPHERAL Culture result: NO GROWTH 6 DAYS     
 CULTURE, BLOOD [[id number]] Collected:  09/27/18 0005 Order Status:  Completed Specimen:  Blood Updated:  10/03/18 0845 Special Requests: NO SPECIAL REQUESTS Culture result: NO GROWTH 6 DAYS     
 CULTURE, BLOOD [[id number]]  (Abnormal)  (Susceptibility) Collected:  09/27/18 0136 Order Status:  Completed Specimen:  Blood Updated:  10/02/18 8522   Special Requests: NO SPECIAL REQUESTS     
  GRAM STAIN PEDIATRIC BOTTLE GRAM POSITIVE COCCI IN PAIRS IN CLUSTERS  
      
  SMEAR CALLED TO AND CORRECTLY REPEATED BY: Jenifer Hernandez RN IN 2700 ON 9/29/18 AT 2033 WF  
 Culture result:    
  AEROBIC BOTTLE STAPHYLOCOCCUS EPIDERMIDIS  
     
 CULTURE, RESPIRATORY/SPUTUM/BRONCH Eladio Knoll STAIN [[id number]]  (Abnormal) Collected:  09/29/18 1210 Order Status:  Completed Specimen:  Sputum from Endotracheal aspirate Updated:  10/02/18 1057 Special Requests: NO SPECIAL REQUESTS     
  GRAM STAIN >25 WBC/lpf     
   <10 EPI/lpf MUCUS PRESENT     
   MODERATE YEAST Culture result: MANY CANDIDA TROPICALIS C. DIFFICILE/EPI PCR [[id number]] Collected:  09/29/18 1400 Order Status:  Completed Specimen:  Stool Updated:  09/29/18 2248 Special Requests: NO SPECIAL REQUESTS Culture result: Toxigenic C. difficile NEGATIVE                         C. difficile target DNA sequences are not detected. CULTURE, URINE [[id number]] Collected:  09/27/18 0029 Order Status:  Completed Specimen:  Cath Urine Updated:  09/29/18 0944 Special Requests: NO SPECIAL REQUESTS Culture result: NO GROWTH 2 DAYS Malorie Castillo MD, FACP October 18, 2018 [facility] [phone]

## 2018-10-18 NOTE — PROGRESS NOTES
In Patient Progress note Admit Date: 9/26/2018 Impression: 1. Dialysis dependant JULIANNA d/t ischemic atn in a setting of acute pyelonephritis/acute cholecystitis with sepsis and cardiac arrest 9/29). No evidence of recovery of renal function at this time. Seen on HD today ,hyperkalemioa npoted,  tolerating well., continue TTS 
      [facility] placement requested , tem HD catheter in place for HD today. 2. S/p cardiopulm arrest 9/29. Off pressors. 3. Acute pyelonephritis/sepsis. On AB 4. Abnormal lft's/acute cholecystitis 5. UGI bleed, EGD results noted- healing esophageal ulceration. 6. Acute blood loss anemia/anemia of kidney failure. Continue analia. 7. Asp pneumonia. On abx.  
8. Concern for rt shoulder shingles. On acyclovir. 9. Resp failure. S/p peg/trach 10/17, s/p bronch. 10. Anoxic brain injury superimposed on recent cva. Please call with questions, 
 
[facility]isholm [facility] [phone] Pager: [phone] Subjective:  
Trach and peg placed yesterday Current Facility-Administered Medications:  
  bacitracin 500 unit/gram packet 1 Packet, 1 Packet, Topical, PRN, Claudia Ackerman MD 
  sodium chloride (NS) flush 10-30 mL, 10-30 mL, InterCATHeter, PRN, Claudia Ackerman MD 
  sodium chloride (NS) flush 10 mL, 10 mL, InterCATHeter, Q24H, Claudia Ackerman MD 
  sodium chloride (NS) flush 10 mL, 10 mL, InterCATHeter, PRN, Claudia Ackerman MD 
  sodium chloride (NS) flush 10-40 mL, 10-40 mL, InterCATHeter, Q8H, Jones Ackerman MD 
  sodium chloride (NS) flush 20 mL, 20 mL, InterCATHeter, Q24H, Jones Ackerman MD 
  heparin (porcine) injection 5,000 Units, 5,000 Units, SubCUTAneous, Q8H, Claudia Ackerman MD, 5,000 Units at 10/18/18 1226   acyclovir sodium (ZOVIRAX) 330.5 mg in 0.9% sodium chloride 100 mL IVPB, 5 mg/kg (Ideal), IntraVENous, Q24H, Dejan Curtis MD, 330.5 mg at 10/18/18 1420   acetylcysteine (MUCOMYST) 100 mg/mL (10 %) nebulizer solution 400 mg, 4 mL, Nebulization, TID RT, Jones Ackerman MD, 400 mg at 10/18/18 1322 
  vancomycin (VANCOCIN) 1,000 mg in 0.9% sodium chloride (MBP/ADV) 250 mL adv, 1,000 mg, IntraVENous, Q TU, TH & SAT, Schwab, J. Nonnie Rust, MD, Last Rate: 250 mL/hr at 10/16/18 1834, 1,000 mg at 10/16/18 1834 
  [START ON 10/20/2018] VANCOMYCIN INFORMATION NOTE, , Other, ONCE, Mariann Medina MD 
  insulin glargine (LANTUS) injection 25 Units, 25 Units, SubCUTAneous, DAILY, Anthony Solorzano MD, 25 Units at 10/18/18 0900   hydroxypropyl methylcellulose (ISOPTO TEARS) 0.5 % ophthalmic solution 2 Drop, 2 Drop, Both Eyes, BID, Lakesha Gonzales NP, 2 Drop at 10/18/18 1224   epoetin manda (EPOGEN;PROCRIT) injection 40,000 Units, 40,000 Units, IntraVENous, Q7D, Vaibhav Estrada MD, 40,000 Units at 10/17/18 1621 
  pantoprazole (PROTONIX) 40 mg in sodium chloride 0.9% 10 mL injection, 40 mg, IntraVENous, Q24H, Dago Ruiz MD, 40 mg at 10/18/18 0912 
  0.9% sodium chloride infusion 250 mL, 250 mL, IntraVENous, PRN, Mariajose Grewal DO 
  influenza vaccine 2018-19 (6 mos+)(PF) (FLUARIX QUAD/FLULAVAL QUAD) injection 0.5 mL, 0.5 mL, IntraMUSCular, PRIOR TO DISCHARGE, Ash Littlejohn MD 
  acetaminophen (TYLENOL) solution 325 mg, 325 mg, Per NG tube, Q4H PRN, Ash Littlejohn MD, 325 mg at 10/17/18 0502 
  0.9% sodium chloride infusion 250 mL, 250 mL, IntraVENous, PRN, McGraw Tiffanie H, DO 
  heparin (porcine) pf 100 Units, 100 Units, InterCATHeter, PRN, Barbi Norton, Vaibhav Caldwell MD 
  0.9% sodium chloride infusion 250 mL, 250 mL, IntraVENous, PRN, Ashtye Littlejohn MD 
  insulin lispro (HUMALOG) injection, , SubCUTAneous, Q6H, Marjan LEE, DO, Stopped at 10/17/18 0600 
  albuterol (PROVENTIL VENTOLIN) nebulizer solution 2.5 mg, 2.5 mg, Nebulization, Q6H RT, Lynette Hernandez MD, 2.5 mg at 10/18/18 1322   heparin (porcine) 1,000 unit/mL injection 1,000 Units, 1,000 Units, InterCATHeter, DIALYSIS PRN, Chrissy Raines MD, 1,000 Units at 09/28/18 1332   VANCOMYCIN INFORMATION NOTE, , Other, Rx Dosing/Monitoring, Gurpreet Rayo MD 
  senna-docusate (PERICOLACE) 8.6-50 mg per tablet 1 Tab, 1 Tab, Oral, BID PRN, McQuain, Alfred A, DO 
  ondansetron (ZOFRAN) injection 4 mg, 4 mg, IntraVENous, Q4H PRN, McQuain, Alfred A, DO, 4 mg at 09/28/18 0539 
  glucose chewable tablet 16 g, 4 Tab, Oral, PRN, McQuain, Alfred A, DO 
  glucagon (GLUCAGEN) injection 1 mg, 1 mg, IntraMUSCular, PRN, McQuain, Alfred A, DO 
  dextrose (D50) infusion 12.5-25 g, 25-50 mL, IntraVENous, PRN, McQuain, Alfred A, DO, 25 g at 10/09/18 1906   hydrALAZINE (APRESOLINE) 20 mg/mL injection 20 mg, 20 mg, IntraVENous, Q6H PRN, Shasta Hastings NP, 20 mg at 10/14/18 1700 Objective:  
 
Visit Vitals /60 Pulse (!) 113 Temp 99.9 °F (37.7 °C) (Axillary) Resp 21 Ht 5' 7\" (1.702 m) Wt 82 kg (180 lb 12.8 oz) SpO2 99% BMI 28.32 kg/m² Intake/Output Summary (Last 24 hours) at 10/18/2018 1658 Last data filed at 10/18/2018 1430 Gross per 24 hour Intake 100 ml Output 2000 ml Net -1900 ml Physical Exam:  
 
Trach on the vent  
gen NAD HENT mmm RS AEBE clear CVS s1 s2 wnl no JVD 
GI soft BS +PEG tube in place Ext trace edema Data Review: 
 
Recent Labs 10/18/18 
0430 WBC 9.2  
RBC 2.69* HCT 24.4*  
MCV 90.7 MCH 27.9 MCHC 30.7* RDW 15.4* Recent Labs 10/18/18 
0430 10/17/18 
1710 10/17/18 
0410 10/16/18 
0300 BUN 66*  --  46* 72* CREA 5.63*  --  4.00* 6.31* CA 7.5*  --  7.5* 7.3* ALB 1.7* 1.6* 1.7* 1.6*  
K 6.5*  --  5.6* 6.3*   --  140 140   --  101 101 CO2 27  --  29 28 GLU 81  --  104* 162* Justin Thornton MD

## 2018-10-18 NOTE — PROGRESS NOTES
attempted to  completea follow up visit with patient in room 2705 as patient was still on life support this morning. No family seen at time of this visit. Chaplains will continue to follow and will provide pastoral care on an as needed/requested basis Reiseñor 3 Board Certified [employer] [facility]  
[phone]

## 2018-10-18 NOTE — PROGRESS NOTES
. 
Acute HEmodialysis flow sheet Patient information Barbara Vogt MRN: [id number]      SBM:[id number]  [place]# MZQV#[id number] Hospital: [place] [address] DX: unknown  
       []1st Time Acute  []Stat[x] Routine []Urgent []Chronic Unit  
       []Acute Room []Bedside  []ICU/CCU []ER Isolation Precautions: [x]Dialysis[] Airborne []Contact []Droplet []Reverse Special Considerations:    [] Blood Consent Verified  []N/A Allergies: 
[] NKA  [] Reviewed No Known Allergies Code Status []Full Code [] DNR  []Other Diet: tube feed Diabetic: []Yes []No 
  
 Hemodialysis Orders Physician: Rin Fuller Dialyzer Revaclear 300 Duration 4 BFR: 300 DFR:600 Dialysate: K 2 CA 2.5 Na 140 Bicarb 35 Dry Weight: UF Goal: 3000 ml Heparin:  []Bolus   Units    []Hourly  Units      [x]None BP Tx:  []NS  200ml/Bolus    []Other     [x]N/A  
    Labs: Pre:  Post: [x]N/A Additional Orders: [x]N/A [x]Signed Treatment Consent   [x] Verified   [] Obtained Primary Nurse Report: First initial/ Last Name/Title Pre Dialysis:L María RN    Time: 1000 Access CATHETER ACCESS:  [] N/A  [x] RIGHT  [] LEFT  [x] IJ  [] SUBCL [] FEM [] First use X-ray  [] Tunnel     [x] Non-Tunneled [x] No S/S infection  [] Redness [] Drainage  [] Cultured [] Swelling 
                       [] Pain  
                       [] Medical Aseptic [] Prep Dressing Changed  
                       [] Clotted [x] Patent []      Flows: [x] Good [] Poor [] Reversed If Access Problem Dr. Mary iSnha: [] Yes [] No    Date:    [x] N/A  
     GRAFT/FISTULA ACCESS:  [x] N/A  [] RIGHT  [] LEFT  [] UE  [] LE   
                       [] AVG  [] AVF [] BUTTONHOLE  [] +BRUIT/THRILL 
     [] MEDICAL ASEPTIC PREP [x] No S/S infection  [] Redness [] Drainage  [] Cultured [] Swelling [] Pain Needle Gauge                              Length: If Access Problem Dr. Garcia Stable: [] Yes [] No    Date:     [] N/A General Assessment LUNGS:  SaO2% 97 [] Clear [x] Coarse [] Crackles [] Wheezing  
            [] Diminished Location: [] RLL [] LLL [] RUL [] JOSSIE   
     COUGH:  [] Productive [] Dry [x] N/A  RESPIRATIONS: [] Easy [] Labored THERAPY: [] RA   [] NC     L/min    Mask: [] NRB [] Venti  O2% [x] Ventilator [] Intubated [x] Trach [] BiPap [] CPap CARDIAC: [x] Regular [] Irregular [] Pericardial Rub [] JVD [] Monitored Rhythm: EDEMA: [] None [x] Generalized [] Facial [] Pedal [] UE [] LE 
           [] Pitting [] 1 [] 2 [] 3 [] 4    [] Right [] Left [] Bilateral  
    SKIN:    [x] Warm [] Hot [] Cold  [] Dry [] Pale [] Diaphoretic  
           [] Flushed [] Jaundiced [] Cyanotic [] Rash [] Weeping LOC:    [] Alert  Oriented to: [] Person [] Place [] Time  
          [] Confused [] Lethargic [x] Medically sedated [] Non-responsive GI/ABDOMEN: [] Flat [x] Distended [] Soft [] Firm [] Obese [] Diarrhea 
 [] Bowel Sounds     []Nausea [] Vomiting PAIN: [x] 0 [] 1 [] 2 [] 3 [] 4 [] 5 [] 6 [] 7 [] 8 [] 9 [] 10 Scale 1-10 Action/Follow Up MOBILITY: [] Amb [] Amb/Assist [x] Bed  [] Wheelchair CUrrent LABS HBsAg ONLY: Date Drawn 9/28/18 [x] Negative [] Positive  [] Unknown. HBsAb: Date Drawn 9/28/18 [] Susceptible <10 [] Immune ?10 [] Unknown Date of Current Labs:  
    
Lab Results Component Value Date/Time  Sodium 141 10/18/2018 04:30 AM  
 Potassium 6.5 (HH) 10/18/2018 04:30 AM  
 Chloride 103 10/18/2018 04:30 AM  
 CO2 27 10/18/2018 04:30 AM  
 BUN 66 (H) 10/18/2018 04:30 AM  
 Creatinine 5.63 (H) 10/18/2018 04:30 AM  
 Calcium 7.5 (L) 10/18/2018 04:30 AM  
 Magnesium 3.8 (H) 09/29/2018 11:10 AM  
 Phosphorus 5.2 (H) 10/13/2018 03:41 AM  
 HCT 24.4 (L) 10/18/2018 04:30 AM  
 HGB 7.5 (L) 10/18/2018 04:30 AM  
 PLATELET 465 68/71/5159 04:30 AM  
 INR 1.1 10/15/2018 11:06 AM  
  
Education Person Educated: [x] Patient [] Family [] Other Knowledge base: [x] None [] Minimal [] Substantial   
     Barriers to learning patient intubated sedated  [] None Preferred method of learning: [] Written [x] Oral [] Visual [] Hands on Topic: [x] Access Care [] S&S of infection [] Fluid Management [] K+ 
               [] Procedural    [] Albumin [] Medications [] Tx Options [] Transplant 
                [] Diet [] Other Teaching Tools: [x] Explain [] Demonstration [] Handout [] Teachback Ro/hemodiaylsis machine safety checks- before each treatment Machine Serial #12 [] # 1 [id number] [] # 2 [id number] [] # 3 [id number] [] # 4 [id number] [] # 5 [id number] [] # [phone] [] # [phone] Alarm Test: [x] Pass Time        RO Serial # 12 
[] [id number] (Large dual station RO) [] # 1  [id number]  (Portable # 1) [] # 2  [id number]  (Portable # 2) [] # 3  [id number]  (Portable # 3) [] # 4  [id number]  (Portable # 4) [] # 5  [id number]  (Portable # 5) Lot #s: Dialyzer [id number] exp. Tubing [id number] exp. Saline -jt exp. [x] RO/Machine Log Complete    [x] Extracorporeal circuit Tested for integrity Dialysate: pH 7.4 Temp. 36  Conductivity: Meter 14 HD Machine 14  
chlorine testing- before each treatment and every 4 hours Total Chlorine: [x] Less than 0.1 ppm Time: 1015 2nd Check Time: 0619 (If greater than 0.1 ppm from Primary then every 30 minutes from Secondary) treatment Iniation-with dialysis precautions [x] All Connections Secured   [x] Saline Line Double Clamped    
[x] Venous Parameters Set    [x] Arterial Parameters Set    [x] Prime Given 250 ml            [x] Air Foam Detector Engaged Zohaib Nurse Signature/Title_Philip P Long__ Pre-Treatment Time 1015 B/P 158/68  Temp. 99.4 Resp Rate 18 Pre Wt  
UF Calculations: Wt to lose:3000 ml(+) Oral:  ml(+) IV Meds/Fluids/Blood prods  ml(+) Prime/Rinse 500 ml 
(=)Total UF Goal 3500 mL Scale Type:[] Bed scale [] Sling Scale [] Wheel Chair Scale [x]  Not Ordered [] Unable to obtain pt on stretcher Tx Initiation Note: right IJ catheter accessed and treatment started without complication 1245: patient hypotensive 80/46  Goal reduced to 2 liters MD notified  
[x] Time Out/Safety Check  Time: 6533 [facility] Signature/Title_Philchikis P Long INTRADIALYTIC MONITORING 
(SEE ATTACHED FLOWSHEET) POST TREATMENT Time 1430 B/P 113/56  Temp 99.9 Resp. Rate 18 Post wt Time Medication Dose Volume Route Initials [facility] Signatures Title Initials Time 52 [facility] RN [address] 1430 [facility] Signature/Title:_Saleem P Long_ Dialyzer cleared: [] Good [] Fair [] Poor    Blood Processed 61 Liters Net UF Removed 2000 mL Post Tx Access: AVF/AVG: Bleeding Stop                   Art. min Shree min []+bruit/thrill Catheter: Locking Solution heparin     Art. 1.2 ml Shree 1.2 ml 
 
Post Assessment:  Skin: [x]Warm []Dry []Diaphoretic []Flushed [] Pale []Cyanotic Lungs: [x]Clear []Coarse []Crackles []Wheezing Cardiac: [x]Regular []Irregular  []Monitored rhyth Edema: []None [x]General []Facial []Pedal  []UE []LE []RIGHT []LEFT    []Bilateral  
   Pain: []0 []1[]2 []3 []4 []5 []6 []7 []8 []9 []10  
POST Tx Note: remove 2 liters locked catheter as per MD order Primary Nurse Report: First initial/Last name/Title Post Dialysis:_L Aweaning_         Time:_1445_ Abbreviations: AVG-arterial venous graft, AVF-arterial venous fistula, IJ-Internal Jugular, Subcl-Subclavian, Fem-Femoral, Tx-treatment, AP/HR-apical heart rate, DFR-dialysate flow rate, BFR-blood flow rate, AP-arterial pressure, -venous pressure, UF-ultrafiltrate, TMP-transmembrane pressure, Shree-Venous, Art-Arterial, RO-Reverse Osmosis

## 2018-10-18 NOTE — PROGRESS NOTES
[id number] [facility] is attempting to obtain authorization for patient admission to [place] [address]. This is the only local LTAC facility that has agreed to accept this patient. I have called the patients daughter, Mendez Vega, [phone] and left a message updating her on his transition plan. I had explained in the message that this LTAC is the only accepting LTAC locally.  Alex Gonzalez RN

## 2018-10-18 NOTE — ROUTINE PROCESS
Left PICC inserted utilizing 3CG for SVC tip verification. Released for use, [employer] notified. 1ml 1% lidocaine injected SC at insertion site for local anesthetic.

## 2018-10-18 NOTE — OP NOTES
[facility] - [facility] with [facility] 
OPERATIVE REPORT Beatrice Diana MR#: [id number] : 1955 ACCOUNT #: [de-identified] DATE OF SERVICE: 10/17/2018 PREOPERATIVE DIAGNOSIS:  Ischemic encephalopathy. POSTOPERATIVE DIAGNOSIS:   Ischemic encephalopathy. PROCEDURES PERFORMED: 1. Tracheostomy. 2.  Percutaneous endoscopic gastrostomy tube placement. SURGEON:  Ebony Vazquez MD  
 
ASSISTANT: 
 
ANESTHESIA: General 
 
ESTIMATED BLOOD LOSS: Julietkellen Ramos SPECIMENS REMOVED:  none COMPLICATIONS: none IMPLANTS: Gtube INDICATIONS:  The patient is a 76-year-old with ischemic encephalopathy after recent cardiac arrest.  He is requiring ongoing mechanical ventilation and tube feeding. DESCRIPTION OF PROCEDURE:  An 8-English cuffed, fenestrated tracheostomy tube was placed through the 2nd tracheal ring. A 20-English gastrostomy tube was placed percutaneously. After informed consent had been obtained from the patient's daughter yesterday, the patient was brought to the operating room and left on his hospital bed. The abdomen and neck were prepped with ChloraPrep and draped sterilely. A formal timeout was completed. Initially, the tracheostomy was performed. An incision was made transversely in the lower neck. The strap muscles were divided using electrocautery and the 2nd tracheal ring was identified. This space was entered and the tracheal ring was divided laterally on either side. A 0 silk suture was placed through the central aspect of the tracheal ring to use as a retraction stitch. The surgical field was free of all bleeding. I did not have to divide any of the thyroid isthmus. The endotracheal tube was slowly backed out and once the trachea was free of this tube, I easily slid in the 8-English fenestrated cuffed tracheostomy tube. The cuff was filled with air and mechanical ventilation was continued through the tracheostomy tube.   The tube was then secured with nylon sutures placed through the tube bumper in addition to umbilical tape wrapped around his neck, further securing the trach tube. Gauze was placed around the entrance site. The patient tolerated this well. The flexible Olympus videogastroscope was passed transorally into the stomach. The esophagus was unremarkable. He had previously had a lower esophageal ulcer. This is no longer present. The stomach had some atrophic changes in the antrum, but otherwise was unremarkable. There were no active erosions or ulcerations. No abnormal findings were noted all the way to the 2nd portion of the duodenum. The abdominal wall had previously been prepped with ChloraPrep. With the scope angled anteriorly, the abdominal wall was transilluminated. With even the slightest touch of the skin an indentation on the stomach was visualized. It seemed there was a nice clear path to the stomach. Using a needle, the stomach was entered and a wire passed through the skin. This wire was then pulled out transorally and attached to the 20-Arabic gastrostomy tube. This tube was lubricated and ultimately pulled into position without difficulty. The scope was reinserted and the bumper of the G-tube was seen intragastrically. It was well away from the pylorus. There was no bleeding present. The gastrostomy tube was then secured in position. It was placed to gravity drainage. The patient tolerated both procedures well with no significant blood loss. He was taken back to the intensive care unit. MD MIGUEL Lamas / MANUELA 
D: 10/18/2018 12:24    
T: 10/18/2018 19:19 
JOB #: [id number]

## 2018-10-18 NOTE — PROGRESS NOTES
[facility] ICU Progress Note Name: Blanca Gee : 1955 MRN: [id number] Date: 10/18/2018 11:05 AM 
  
[x]I have reviewed the flowsheet and previous days notes. Events overnight reviewed and discussed with nursing staff. Vital signs and records reviewed. HPI:  
 
56 y/o male recently admitted for stroke with residual right hemiparesis. Readmitted to ICU on 18 with ARF. PMHx of HTN, ARF, CHF, DM, CVA and chronic liver disease from steatohepatits. 18 became hypotensive and arrested. CPR lasted 5 minutes which likely caused catastrophic neurologic injury. Renal failure secondary to pyleonephritis and may have had acute cholecytitis on admission as well. Elevated lipase may have reflected pancreatitis also. Significant clots have been found in his esophagus and right mainstem bronchus. Remains intubated post VF cardiac arrest 18. MRI 10/3/18 consistent with anoxic brain injury. Ethics consulted 10/15/18 regarding goals of care. Trach and peg placed 10/17/18. S/p bronch 10/17/18. Subjective: 
 
10/18/18 No new events overnight S/p trach and peg 10/17/18 S/p bronchoscopy 10/17/18- extensive mucus plugging all way up the the right mainstem bronchus- effectively suctioned with saline to open the airway S/p concern for shingles 10/17/18- placed on acyclovir by ID Not sedated. Reflexes to pain  
Continues to be febrile- Tmax 100.5 [x]The patient is unable to give any meaningful history or review of systems because the patient is: 
[x]Intubated []Sedated [x]Unresponsive [x]The patient is critically ill on     
[x]Mechanical ventilation []Pressors []BiPAP [] ROS:A comprehensive review of systems was negative except for that written in the HPI. Medication Review: · Pressors - none · Sedation - none · Antibiotics - vanc/acyclovir · Pain - prn tylenol · GI/ DVT - protonix/ SQH 
 · Others - none Safety Bundles: VAP Bundle/ Severe Sepsis Protocol/ Electrolyte Replacement Protocol Vital Signs:   
Visit Vitals /63 Pulse 95 Temp 99.4 °F (37.4 °C) Resp 16 Ht 5' 7\" (1.702 m) Wt 82 kg (180 lb 12.8 oz) SpO2 99% BMI 28.32 kg/m² O2 Device: Tracheostomy, Ventilator O2 Flow Rate (L/min): 45 l/min Temp (24hrs), Av.7 °F (37.6 °C), Min:98.7 °F (37.1 °C), Max:100.9 °F (38.3 °C) Intake/Output:  
Last shift:      No intake/output data recorded. Last 3 shifts: 10/16 1901 - 10/18 0700 In: 900 [I.V.:550] Out: - Intake/Output Summary (Last 24 hours) at 10/18/2018 4879 Last data filed at 10/17/2018 3539 Gross per 24 hour Intake 300 ml Output  Net 300 ml Ventilator Settings: 
Ventilator Mode: Assist control, VC+ Respiratory Rate Back-Up Rate: 10 Insp Time (sec): 1 sec I:E Ratio: 1;3 
Ventilator Volumes Vt Set (ml): 500 ml Vt Exhaled (Machine Breath) (ml): 532 ml Vt Spont (ml): 298 ml Ve Observed (l/min): 7.75 l/min Ventilator Pressures Pressure Support (cm H2O): 12 cm H2O 
PIP Observed (cm H2O): 24 cm H2O Plateau Pressure (cm H2O): 20 cm H2O 
MAP (cm H2O): 10 PEEP/VENT (cm H2O): 5 cm H20 Auto PEEP Observed (cm H2O): (unable to obtain) Physical Exam: 
 
General: Tracheostomy; unresponsive to voice HEENT:  Anicteric sclerae; pink palpebral conjunctivae; mucosa moist 
Resp:  Symmetrical chest expansion, no accessory muscle use; good airway entry;  Bilateral breath sounds clear to auscultation; (+) cough CV:  S1, S2 present; sinus tach/NSR 
GI:  Peg tube, Abdomen soft, non-tender; (+) active bowel sounds; palpable liver Extremities:  +2 pulses on all extremities; +2 generalized edema; +2 pitting edema BUE Skin:  Diaphoretic, Warm; scattered fluid filled honey crusted blisters to RUE -dermatome C5/C6 Neurologic:  Reflexes to pain in RBE & RLE; (-) corneals, Pupils 2, round and reactive Devices:  HD catheter, Tracheostomy, Peg 
 DATA:  
 
 
Labs: Results:  
   
Chemistry Recent Labs 10/18/18 
0430 10/17/18 
1710 10/17/18 
0410 10/16/18 
0300 GLU 81  --  104* 162*   --  140 140  
K 6.5*  --  5.6* 6.3*  
  --  101 101 CO2 27  --  29 28 BUN 66*  --  46* 72* CREA 5.63*  --  4.00* 6.31* CA 7.5*  --  7.5* 7.3* AGAP 11  --  10 11 BUCR 12  --  12 11* * 272* 302* 266* TP 7.0 6.8 7.2 6.8 ALB 1.7* 1.6* 1.7* 1.6*  
GLOB 5.3* 5.2* 5.5* 5.2* AGRAT 0.3* 0.3* 0.3* 0.3* CBC w/Diff Recent Labs 10/18/18 
0430 10/17/18 
0410 10/16/18 
0300 WBC 9.2 8.6 8.5  
RBC 2.69* 2.77* 2.52* HGB 7.5* 7.8* 7.2* HCT 24.4* 25.0* 23.0*  
 358 324 Coagulation Recent Labs 10/15/18 
1106 PTP 14.0 INR 1.1 APTT 22.4* Liver Enzymes Recent Labs 10/18/18 
0430 TP 7.0 ALB 1.7* * SGOT 104* ABG No results found for: PH, PHI, PCO2, PCO2I, PO2, PO2I, HCO3, HCO3I, FIO2, FIO2I Microbiology Recent Labs 10/16/18 
1915 10/16/18 
1055 10/16/18 
1047 CULT MODERATE NORMAL RESPIRATORY SHANAE NO GROWTH 2 DAYS NO GROWTH 2 DAYS Telemetry: [x]Sinus []A-flutter []Paced []A-fib []Multiple PVCs IMPRESSION:  
· Acute encepalopathy- MRI 10/3 consistent with anoxic brain injury; EEG 10/5 consistent with poor prognosis · Acute respiratory failure with hypoxia- Pt not a candidate for extubation d/t mental staus; s/p trach and peg 10/17/18 · ARF on Dialysis · Chronic liver disease · Possible acute pancreatitis · Probable pyleonephritis · Probable acute cholecystitis · Cardiomyopathy with grade 1 diastolic dysfunction · Hypoalbuminemia- continues · Hyperkalemia- improved with dialysis Other Hx · S/p V Fib arrest- 10/2/18 EF 46-50%; LV global hypokinesis; cardiology following- recommends to continue supportive measures · HTN 
· ARF 
· CHF · DM 
· CVA PLAN:  
· Resp - Tracheostomy- trach care per unit routine.   Obturator in the room.  Continue mechanical ventilation. CXR improved s/p bronch. Mucomyst to prevent additional mucus plugging. HOB > 30. Strict aspiration precautions. Pulmonary hygiene. ABG and CXR as needed. · ID - Febrile, aleukocytosis. Continue to trend temp and WBC curve. Repeat blood Cx NGTD. Acyclovir per ID for blisters to RUE. Contact and airborne isolation for possible Shingles. Cx catheter tip- NGTD. Sputum Cx prelim 10/16/18- gram + cocci in pairs & groups. · CVS - HD stable. Continue to monitor closely. · Heme/onc - Daily CBCs. Monitor H/H & platelets. · Metabolic - Monitor electrolytes and replace as needed. · Renal - Continue to trend renal indices. Scheduled HD 3/week. Nephrology following. Joshua Chang 83 w/ vascular surgery this week and remove temp HD catheter per ID for possible source of infection. · Endocrine - Glycemic control. BS goal < 180. SSI. · Neuro/ Pain/ Sedation - Monitor neuro status. Avoid sedating medications. Neuro following. · GI - Peg tube- restart TF today per general surgery. Continue to trend LFTs and lipase. · Prophylaxis - DVT, GI- SQH & protonix · Discussed in interdisciplinary rounds · Code status: FULL The patient is: [] acutely ill Risk of deterioration: [] moderate [x] critically ill  [x] high  
 
[x]See my orders for details My assessment/plan was discussed with: 
[x]nursing []PT/OT [x]respiratory therapy [x]Dr. Daniels Spine  
[]family [] Malika Temple NP

## 2018-10-18 NOTE — PROGRESS NOTES
Hospitalist Progress Note Kimberly Sorenson MD 
Internal medicine/ Hospitalist 
 
Daily Progress Note: 10/18/2018 10:22 AM   
Interval history / Subjective:  
Ignacio Rosen is a 57y.o. year old male who presented from [facility] with abnormal labs, elevated BUN/Cr. Found to have JULIANNA, UTI, and markedly elevated LFT on presentation. Patient's recent admission was 9/10/18-9/14/18 for acute CVA and rhabdo. Patient poor historian on admission,stated \"I had heart failure. She was started on vanc,zosyn. On 9/29,patient had cardiac arrest with ventricular fibrillation - s/p ROSC. Her hospital course has since then complicated with sepsis,pancreatitits,pneumonia,acute pancreatitis requiring involvement of gi,surgery,nephrology,cardiology,ID,cardiology. So far patient has remained without major improvement. Now in vegetative state. Patient had PEG/Trach placement on 10/17. Current Facility-Administered Medications Medication Dose Route Frequency  heparin (porcine) injection 5,000 Units  5,000 Units SubCUTAneous Q8H  
 acyclovir sodium (ZOVIRAX) 330.5 mg in 0.9% sodium chloride 100 mL IVPB  5 mg/kg (Ideal) IntraVENous Q24H  
 acetylcysteine (MUCOMYST) 100 mg/mL (10 %) nebulizer solution 400 mg  4 mL Nebulization TID RT  
 vancomycin (VANCOCIN) 1,000 mg in 0.9% sodium chloride (MBP/ADV) 250 mL adv  1,000 mg IntraVENous Q TU, TH & SAT  [START ON 10/20/2018] VANCOMYCIN INFORMATION NOTE   Other ONCE  
 insulin glargine (LANTUS) injection 25 Units  25 Units SubCUTAneous DAILY  hydroxypropyl methylcellulose (ISOPTO TEARS) 0.5 % ophthalmic solution 2 Drop  2 Drop Both Eyes BID  epoetin manda (EPOGEN;PROCRIT) injection 40,000 Units  40,000 Units IntraVENous Q7D  
 pantoprazole (PROTONIX) 40 mg in sodium chloride 0.9% 10 mL injection  40 mg IntraVENous Q24H  
 0.9% sodium chloride infusion 250 mL  250 mL IntraVENous PRN  
 influenza vaccine 2018-19 (6 mos+)(PF) (FLUARIX QUAD/FLULAVAL QUAD) injection 0.5 mL  0.5 mL IntraMUSCular PRIOR TO DISCHARGE  acetaminophen (TYLENOL) solution 325 mg  325 mg Per NG tube Q4H PRN  
 0.9% sodium chloride infusion 250 mL  250 mL IntraVENous PRN  
 heparin (porcine) pf 100 Units  100 Units InterCATHeter PRN  
 0.9% sodium chloride infusion 250 mL  250 mL IntraVENous PRN  
 insulin lispro (HUMALOG) injection   SubCUTAneous Q6H  
 albuterol (PROVENTIL VENTOLIN) nebulizer solution 2.5 mg  2.5 mg Nebulization Q6H RT  
 heparin (porcine) 1,000 unit/mL injection 1,000 Units  1,000 Units InterCATHeter DIALYSIS PRN  
 VANCOMYCIN INFORMATION NOTE   Other Rx Dosing/Monitoring  senna-docusate (PERICOLACE) 8.6-50 mg per tablet 1 Tab  1 Tab Oral BID PRN  
 ondansetron (ZOFRAN) injection 4 mg  4 mg IntraVENous Q4H PRN  
 glucose chewable tablet 16 g  4 Tab Oral PRN  
 glucagon (GLUCAGEN) injection 1 mg  1 mg IntraMUSCular PRN  
 dextrose (D50) infusion 12.5-25 g  25-50 mL IntraVENous PRN  
 hydrALAZINE (APRESOLINE) 20 mg/mL injection 20 mg  20 mg IntraVENous Q6H PRN Review of Systems Intubated,unresponsive. Objective:  
 
Visit Vitals /55 Pulse (!) 108 Temp 99.4 °F (37.4 °C) (Axillary) Resp 18 Ht 5' 7\" (1.702 m) Wt 82 kg (180 lb 12.8 oz) SpO2 99% BMI 28.32 kg/m² O2 Flow Rate (L/min): 45 l/min O2 Device: Tracheostomy, Ventilator Temp (24hrs), Av.6 °F (37.6 °C), Min:99.2 °F (37.3 °C), Max:100.5 °F (38.1 °C) No intake/output data recorded. 10/16 1901 - 10/18 0700 In: 900 [I.V.:550] Out: -   
P/E General Appearance:S/p PEG/Trach today HENT: normocephalic/atraumatic, + ETT Neck: No JVD, hematoma R side where [facility] is improving Lungs: Coarse B/L w/ vent-assisted BS 
CV: RRR, no m/r/g Abdomen: soft, non-tender, normal bowel sounds Extremities: no cyanosis, no peripheral edema Neuro: Unresponsive to commands, no withdrawal to pain, + corneal reflex and pupillary reaction today Skin: Normal color, intact Data Review Recent Results (from the past 12 hour(s)) CBC W/O DIFF Collection Time: 10/18/18  4:30 AM  
Result Value Ref Range WBC 9.2 4.6 - 13.2 K/uL  
 RBC 2.69 (L) 4.70 - 5.50 M/uL HGB 7.5 (L) 13.0 - 16.0 g/dL HCT 24.4 (L) 36.0 - 48.0 % MCV 90.7 74.0 - 97.0 FL  
 MCH 27.9 24.0 - 34.0 PG  
 MCHC 30.7 (L) 31.0 - 37.0 g/dL  
 RDW 15.4 (H) 11.6 - 14.5 % PLATELET 704 383 - 983 K/uL MPV 9.0 (L) 9.2 - 10.4 FL  
METABOLIC PANEL, COMPREHENSIVE Collection Time: 10/18/18  4:30 AM  
Result Value Ref Range Sodium 141 136 - 145 mmol/L Potassium 6.5 (HH) 3.5 - 5.5 mmol/L Chloride 103 100 - 108 mmol/L  
 CO2 27 21 - 32 mmol/L Anion gap 11 3.0 - 18 mmol/L Glucose 81 74 - 99 mg/dL BUN 66 (H) 7.0 - 18 MG/DL Creatinine 5.63 (H) 0.6 - 1.3 MG/DL  
 BUN/Creatinine ratio 12 12 - 20 GFR est AA 12 (L) >60 ml/min/1.73m2 GFR est non-AA 10 (L) >60 ml/min/1.73m2 Calcium 7.5 (L) 8.5 - 10.1 MG/DL Bilirubin, total 0.7 0.2 - 1.0 MG/DL  
 ALT (SGPT) 46 16 - 61 U/L  
 AST (SGOT) 104 (H) 15 - 37 U/L Alk. phosphatase 277 (H) 45 - 117 U/L Protein, total 7.0 6.4 - 8.2 g/dL Albumin 1.7 (L) 3.4 - 5.0 g/dL Globulin 5.3 (H) 2.0 - 4.0 g/dL A-G Ratio 0.3 (L) 0.8 - 1.7 GLUCOSE, POC Collection Time: 10/18/18  5:48 AM  
Result Value Ref Range Glucose (POC) 88 70 - 110 mg/dL GLUCOSE, POC Collection Time: 10/18/18 12:23 PM  
Result Value Ref Range Glucose (POC) 79 70 - 110 mg/dL Assessment/Plan:  
 
Principal Problem: 
  Cardiac arrest with ventricular fibrillation ([facility] (9/29/2018) Overview: ROSC before defibrillation could be attempted. Active Problems: 
  Essential hypertension (9/10/2018) Diabetes mellitus type 2, controlled ([facility] (9/10/2018) Pneumonia of both lower lobes (9/11/2018) History of stroke (9/27/2018) Overview: With residual R hemiparesis Acute-on-chronic kidney injury ([facility] (9/27/2018) Acute cystitis (9/27/2018) Elevated troponin (9/27/2018) Transaminitis (9/27/2018) Acute kidney injury ([facility] (9/27/2018) Elevated LFTs (9/28/2018) Abnormal blood coagulation profile (9/29/2018) Acute pancreatitis (9/29/2018) Overview: Shock, leukocytosis, elevated lipase but radiographically no ductal  
    dilatation in pancreas. GB stones without radiographic stigmata of acute  
    cholecystitis Septic shock ([facility] (9/30/2018) Overview: Probably secondary to pneumonia (suspicious for aspiration). Less likely,  
    secondary to acute pancreatitis. Ischemic encephalopathy (9/30/2018) Pancreatitis (10/8/2018) Care Plan - Vent mgmt per ICU - S/p trach/PEG on 10/17 as patient did not show significant improvement. 
- EGD showed large clot in esophagus last week repeat EGD 10/9 shows healed esophageal ulcer - Cont protonix IV every day  
- Hgb stable - LFTs improving - GI signed off 10/9 
- MRI w/ evidence of severe anoxic brain injury - Minimal improvement on neuro exam 
- Appreciate neuro assistance - IV Meropenem and IV Fluconazole d/heron on 10/15 per ID. 
- Currently on vanc only which was started on 9/27 
- Nephro managing HD TTHS 
- Pt unlikely to have any meaningful neuro recovery, prognosis is very grim - Family wanted to cont to do everything, including trach/PEG -> was done10/17 
- Hyperkalemia on 10/18,defer to nephrology DVT prophylaxis:scds Full code. Disposition:tbd - need placement -  involved.

## 2018-10-18 NOTE — PROGRESS NOTES
Problem: Falls - Risk of 
Goal: *Absence of Falls Document Miquel Dust Fall Risk and appropriate interventions in the flowsheet. Outcome: Progressing Towards Goal 
Fall Risk Interventions: 
Mobility Interventions: Bed/chair exit alarm Mentation Interventions: Bed/chair exit alarm Medication Interventions: Bed/chair exit alarm Elimination Interventions: Bed/chair exit alarm Problem: Pressure Injury - Risk of 
Goal: *Prevention of pressure injury Document Mitul Scale and appropriate interventions in the flowsheet. Outcome: Progressing Towards Goal 
Pressure Injury Interventions: 
Sensory Interventions: Check visual cues for pain Moisture Interventions: Absorbent underpads, Apply protective barrier, creams and emollients Activity Interventions: Pressure redistribution bed/mattress(bed type) Mobility Interventions: HOB 30 degrees or less Nutrition Interventions: Document food/fluid/supplement intake Friction and Shear Interventions: Foam dressings/transparent film/skin sealants, HOB 30 degrees or less

## 2018-10-18 NOTE — PROGRESS NOTES
2000 Assumed care of pt after bedsde report  With pt lying in bed with HOB elevated. Pt unresponsive. Pt now on contact and airborne isolation for shingles as noted on rt arm, shoulder, back with some open areas and some blisters down length of rt arm. Monitor denotes NSR-ST. Pt does not move any extremities. Pt transferred from 2701 to [id number] for negative pressure room. Pt on ventilator via trach. PEG in place in abd and clamped for now. Pt edematous and swollen. Abd distended, obese,.+bs. Lungs coarse diminished in bases. 2200 Pt status unchanged. Some swelling to neck and face noted but is soft and no problems with resp status or ventilation. 10/18/2018 0000 Accucheck result 89. No Lispro insulin given as per sliding scale coverage. Peyton Sohan 0200 Pt incontinent of large amount of liquid brownish stool. Pt cleansed, bed linens changed, bed pads changed, gown changed, an Mepilex applied to posterior sacrum/coccyx over pressure area where skin is peeled off revealing pinkish area. 0400 AM labs drawn and sent to lab. Pt status unchanged. 0600 Accucheck result 88. No Lispro insulin given as per sliding scale coverage.

## 2018-10-19 NOTE — PROGRESS NOTES
At approximately 1640, this  visited with the family of Sandee Lopez, who is a 57 y.o.,male. His mother and sister were standing outside the room because of precautions in place for patient's visitors. The  provided the following interventions: 
Continued a relationship of care and support with patient's family. Offered prayer and assurance of continued prayers on patient's behalf. Plan: 
Chaplains will continue to follow and will provide pastoral care on an as- needed/requested basis. [facility]  [address] [place] [address]  
[phone]

## 2018-10-19 NOTE — PROGRESS NOTES
[facility] spec hosp started [address] [facility] yesterday for LTAC, message sent to see status of auth.

## 2018-10-19 NOTE — PROGRESS NOTES
1930 
Bedside SBAR report taken from Alan [address] Dr. Patient lying in bed with eyes open, looking straight ahead. Pupils brisk and reactive and same size. He will respond to noxious stimuli, but no other. No spontaneous movements assessed. Has a trach now, also a new PEG. TUbe feedings are infusing. On contact and airborne isolation due to ?shingles on left arm. No family or visitors present. Unable to educate this patient due to neuro status. 0000 NO CHANGES 
 
0400 No changes [facility] SBAR report given to incoming RN Appy Pie.

## 2018-10-19 NOTE — ROUTINE PROCESS
Bedside shift change report given to Todd Greco RN (oncoming nurse) by Jhonny Collins RN. (offgoing nurse). Report included the following information SBAR, MAR, KARDEX AND RECENT RESULTS

## 2018-10-19 NOTE — PROGRESS NOTES
Plan for [facility] placement possible central venous line as well -  Tomorrow - 10/19/18. Discussed with pt's daughter this am, phone consent obtained  Consent on chart.

## 2018-10-19 NOTE — PROGRESS NOTES
Problem: Falls - Risk of 
Goal: *Absence of Falls Document Yonis Yadav Fall Risk and appropriate interventions in the flowsheet. Outcome: Progressing Towards Goal 
Fall Risk Interventions: 
Mobility Interventions: Communicate number of staff needed for ambulation/transfer Mentation Interventions: Door open when patient unattended Medication Interventions: Evaluate medications/consider consulting pharmacy Elimination Interventions: Toileting schedule/hourly rounds History of Falls Interventions: Door open when patient unattended Problem: Pressure Injury - Risk of 
Goal: *Prevention of pressure injury Document Mitul Scale and appropriate interventions in the flowsheet. Outcome: Progressing Towards Goal 
Pressure Injury Interventions: 
Sensory Interventions: Assess changes in LOC, Assess need for specialty bed, Avoid rigorous massage over bony prominences, Check visual cues for pain, Float heels, Keep linens dry and wrinkle-free, Maintain/enhance activity level, Minimize linen layers, Monitor skin under medical devices, Pad between skin to skin, Pressure redistribution bed/mattress (bed type), Turn and reposition approx. every two hours (pillows and wedges if needed) Moisture Interventions: Absorbent underpads, Apply protective barrier, creams and emollients, Assess need for specialty bed, Check for incontinence Q2 hours and as needed, Contain wound drainage, Limit adult briefs, Minimize layers, Moisture barrier, Maintain skin hydration (lotion/cream) Activity Interventions: Assess need for specialty bed, Pressure redistribution bed/mattress(bed type) Mobility Interventions: Assess need for specialty bed, Float heels, Pressure redistribution bed/mattress (bed type), Turn and reposition approx. every two hours(pillow and wedges) Nutrition Interventions: Document food/fluid/supplement intake Friction and Shear Interventions: Apply protective barrier, creams and emollients, Foam dressings/transparent film/skin sealants, Lift sheet, Minimize layers

## 2018-10-19 NOTE — PROCEDURES
[facility] Pulmonary, Critical Care, and Sleep Medicine Name: Scott Dong MRN: [id number] : 1955 Hospital: [address] [place] [address] Date: 10/19/2018 Bronchoscopy Report Procedure: Therapeutic bronchoscopy. Indication: Abnormal chest imaging Consent/Treatment: Informed consent was obtained from the  family after risks, benefits and alternatives were explained. Timeout verified the correct patient and correct procedure. Anesthesia:  
Versed 2mg Procedure Details:  
-- The bronchoscope was introduced through a tracheostomy tube. -- Severe edema of the R and L sided subsegments, very friable mucosa 
-- Minimal secretions seen, all large airways patent Specimens:  
None Complications: none Estimated Blood Loss: Minimal 
 
Impression/Plan: No mucus plugging seen of the large airways. Very edematous and friable mucosa. Continue vent support, wean as tolerated Isaac Escalante MD 
2018 
6:01 PM

## 2018-10-19 NOTE — ROUTINE PROCESS
1810 Pt soiled with bowel movement and urine. Carla care performed. Mepilex removed. Bed and gown changed. Alpha dressing soiled from urine. Dressing changed with sterile technique maintained. Repositioned pt up in bed.

## 2018-10-19 NOTE — PROGRESS NOTES
Infectious Disease Follow-up We are available over weekend and plan to f/u Monday. Dr Jaden Winters is on call and can be reached at [phone] if any problem or question for ID prior. Will ask him to check cx remotely. Admit Date: 9/26/2018 Current abx Prior abx  
vanco 9/27-22,  ACV 10/17 - 2 Zosyn 9/27-7, Meropenem/flucon 10/4- 11, Assessment ->Rec:  
 
Leukocytosis/SIRS poss sepsis - MOF multiple ID and non-infectious concerns outlined below, complicated, wbc 59.7C today from high 27K+ on 10/5 On  vancomycin/zosyn since 9/27 
- C diff neg 9/29, sput C albicans, repeat CT 10/4 no new findings --cxr reviewed - increased atx rt base. Left base improved 
- Higher grade fever 10/16 101.2   
- Differential- ? Sec to de-escalating Abx vs central fever vs ongoing/new infection vs skin blisters vs deep tissue injury of toe/buttock or all contributing - WBC is normal, no change clinically  -> repeat pancx NTD including bronch cx from mucus suction 
-> continues to spike intermittently but stable WBC 
-> await removal of RIJ Uldall - planned today. Send tip for cx. Now with new PICC 
-> Cont on Vanco for now and likely stop once [place] out 
-> cont on Acyclovir as Zoster might be contributing to fever 
-> low threshold to add Meropenem - d/w ICU attending and has been stable otherwise so will wait for now as can be from other causes including mucus plugging or central 
  
Herpes Zoster R arm, C 6 (?C5) dermatome 
- vesicular lesions progressing compared to onset per NP 
- confluent in upper arm -> cont Acyclovir renal dosing (5 mg/kg q 24h) x 7 days 
-> maintain airborne / contact precautions 
-> Await cx from blisters for HSV/VZV PCR testing Suspected Pyelo POA - CT B perineph stranding, UA tntc wbc, uctx ng -> treated at this point Cholelithiasis choledocholithiais suspected acute cholecystitis  poss cystic stone POA- abnl lft bili 4 alk phos 400 seen by GI and GS  Harle Iona, no plan for OR, for poss cholecystostomy if LFT worsen, bili, alk phos declining  -> LFts increasing/stably elevated  
-> GI and Sx were following and if continues to rise- will need US but can be just from being in ICU for so long Poss pancreatitis - lipase 2200 POA now 3191with high of  5500 on 10/4, interpretation complicated by JULIANNA 
-NEW pancreas characterized as nl on CT 10/4 -> lipase down to 1200 ( from 3k) B infiltrates - poss edema infiltrate - RLL consolid better 10/4 cxr and suspect endobronchial clot contributed   ->monitor MRSE bacteremia 9/27 1 of 2. Has LUE midline unclear when placed, repeat bctx's IP today  ->repeat bctx's ntd  
-> treated by now ARF POA now on HD - baseline cr 0.9 132/10.1 in ER 
-JOHN mccray 9/28 ->per renal , dialysis dependant  
-> plan for new TDC by vascular today 
-> D/w Dr Zeeshan Crabtree and ideally would like to give a line holiday but as pt already with a PICC line placed yesterday, its not possible given concern with IV access 
-> will recommend to get line on left side if possible Bleeding -melena earlier, EGD 10/2 clot in esophagus bronch 10/3 R endobronch clot NEW removed 10/5 repeat bronch  ->per others Suspected anoxic brain injury on MRI 10/3 SP VF arrest 9/29 -> overall poor prognosis however family wants all aggressive measures including trach/peg    
CVA R hemiparesis 9/10   
resp failure sp intubation 9/29 Comorbid: HTN HLD CHF h/o steatohepatitis MICROBIOLOGY:  
9/27 bctx 1 of 2 MRSE 
 uctx ng 
9/29 sput C albicans C diff neg 10/4 bctx x 2 NTD 
 fungitell indeterminate due to contamination 10/8 Cath tip cx ntd 10/8     bl cx ngtd 10/16 Blcx x2 ntd Spcx NF 
 
LINES AND CATHETERS:  
PIV x 2 JOHN mccray 9/28 Left PICC 10/18 Active Hospital Problems Diagnosis Date Noted  Pancreatitis 10/08/2018  Septic shock ([address] 09/30/2018 Probably secondary to pneumonia (suspicious for aspiration).  Less likely, secondary to acute pancreatitis.  Ischemic encephalopathy 09/30/2018  Cardiac arrest with ventricular fibrillation ([facility] 09/29/2018 ROSC before defibrillation could be attempted.  Abnormal blood coagulation profile 09/29/2018  Acute pancreatitis 09/29/2018 Shock, leukocytosis, elevated lipase but radiographically no ductal dilatation in pancreas. GB stones without radiographic stigmata of acute cholecystitis  Elevated LFTs 09/28/2018  History of stroke 09/27/2018 With residual R hemiparesis  Acute-on-chronic kidney injury ([facility] 09/27/2018  Acute cystitis 09/27/2018  Elevated troponin 09/27/2018  Transaminitis 09/27/2018  Acute kidney injury ([facility] 09/27/2018  Pneumonia of both lower lobes 09/11/2018  Essential hypertension 09/10/2018  Diabetes mellitus type 2, controlled ([address] 09/10/2018 Subjective: Interval notes reviewed. Pt febrile again, Tmax 101. Other vitals stable. Pt got a new PICC line in left arm as issues with iv access. Pt scheduled to get Bromide out today and then going for [facility] after that. Remains unresponsive in ICU in airborne isolation. Multiple vesicular lesions on right forearm to upper arm with crusting and umbilication- unchanged. No family at bedside. D/w RN. Current Facility-Administered Medications Medication Dose Route Frequency  bacitracin 500 unit/gram packet 1 Packet  1 Packet Topical PRN  
 sodium chloride (NS) flush 10-30 mL  10-30 mL InterCATHeter PRN  
 sodium chloride (NS) flush 10 mL  10 mL InterCATHeter Q24H  
 sodium chloride (NS) flush 10 mL  10 mL InterCATHeter PRN  
 sodium chloride (NS) flush 10-40 mL  10-40 mL InterCATHeter Q8H  
 sodium chloride (NS) flush 20 mL  20 mL InterCATHeter Q24H  
 heparin (porcine) injection 5,000 Units  5,000 Units SubCUTAneous Q8H  
 acyclovir sodium (ZOVIRAX) 330.5 mg in 0.9% sodium chloride 100 mL IVPB  5 mg/kg (Ideal) IntraVENous Q24H  acetylcysteine (MUCOMYST) 100 mg/mL (10 %) nebulizer solution 400 mg  4 mL Nebulization TID RT  
 vancomycin (VANCOCIN) 1,000 mg in 0.9% sodium chloride (MBP/ADV) 250 mL adv  1,000 mg IntraVENous Q TU, TH & SAT  [START ON 10/20/2018] VANCOMYCIN INFORMATION NOTE   Other ONCE  
 insulin glargine (LANTUS) injection 25 Units  25 Units SubCUTAneous DAILY  hydroxypropyl methylcellulose (ISOPTO TEARS) 0.5 % ophthalmic solution 2 Drop  2 Drop Both Eyes BID  epoetin manda (EPOGEN;PROCRIT) injection 40,000 Units  40,000 Units IntraVENous Q7D  
 pantoprazole (PROTONIX) 40 mg in sodium chloride 0.9% 10 mL injection  40 mg IntraVENous Q24H  
 0.9% sodium chloride infusion 250 mL  250 mL IntraVENous PRN  
 influenza vaccine 2018-19 (6 mos+)(PF) (FLUARIX QUAD/FLULAVAL QUAD) injection 0.5 mL  0.5 mL IntraMUSCular PRIOR TO DISCHARGE  acetaminophen (TYLENOL) solution 325 mg  325 mg Per NG tube Q4H PRN  
 0.9% sodium chloride infusion 250 mL  250 mL IntraVENous PRN  
 heparin (porcine) pf 100 Units  100 Units InterCATHeter PRN  
 0.9% sodium chloride infusion 250 mL  250 mL IntraVENous PRN  
 insulin lispro (HUMALOG) injection   SubCUTAneous Q6H  
 albuterol (PROVENTIL VENTOLIN) nebulizer solution 2.5 mg  2.5 mg Nebulization Q6H RT  
 heparin (porcine) 1,000 unit/mL injection 1,000 Units  1,000 Units InterCATHeter DIALYSIS PRN  
 VANCOMYCIN INFORMATION NOTE   Other Rx Dosing/Monitoring  senna-docusate (PERICOLACE) 8.6-50 mg per tablet 1 Tab  1 Tab Oral BID PRN  
 ondansetron (ZOFRAN) injection 4 mg  4 mg IntraVENous Q4H PRN  
 glucose chewable tablet 16 g  4 Tab Oral PRN  
 glucagon (GLUCAGEN) injection 1 mg  1 mg IntraMUSCular PRN  
 dextrose (D50) infusion 12.5-25 g  25-50 mL IntraVENous PRN  
 hydrALAZINE (APRESOLINE) 20 mg/mL injection 20 mg  20 mg IntraVENous Q6H PRN Objective:  
 
Visit Vitals /69 Pulse 91 Temp 97.3 °F (36.3 °C) Resp 16 Ht 5' 7\" (1.702 m) Wt 82 kg (180 lb 12.8 oz) SpO2 100% BMI 28.32 kg/m² Temp (24hrs), Av.6 °F (37.6 °C), Min:97.3 °F (36.3 °C), Max:101.4 °F (38.6 °C) GEN: unresponsive male on vent in ICU HENT: no thrush Neck: tracheostomy in place CHEST: coarse bs B no wheeze or rhonchi CVS: RRR, no murmur appreciated ABD: soft, + BS no localized tenderness, PEG in place EXT: anasarca with 3+ pitting edema b/l UE rt >lt SKIN: thick fluid filled multiple blisters on erythematous base on rt arm and forearm, honey color dried drainage around few blisters, become confluent on upper arm, umbilication and crusting - suspect HZ C6 dermatome 
deep tissue injury rt heel and sacral area, left great toe with darkening but warm /deep tissue injury CNS:unresponsive, rt hemiparesis Labs: Results:  
Chemistry Recent Labs 10/19/18 
0330 10/18/18 
0430 10/17/18 
1710 10/17/18 
0410 * 81  --  104*  141  --  140  
K 4.6 6.5*  --  5.6*  
 103  --  101 CO2 31 27  --  29 BUN 39* 66*  --  46* CREA 3.69* 5.63*  --  4.00* CA 7.4* 7.5*  --  7.5* AGAP 8 11  --  10  
BUCR 11* 12  --  12  
* 277* 272* 302* TP 6.9 7.0 6.8 7.2 ALB 1.6* 1.7* 1.6* 1.7*  
GLOB 5.3* 5.3* 5.2* 5.5* AGRAT 0.3* 0.3* 0.3* 0.3* CBC w/Diff Recent Labs 10/19/18 
0330 10/18/18 
0430 10/17/18 
0410 WBC 9.2 9.2 8.6  
RBC 2.65* 2.69* 2.77* HGB 7.3* 7.5* 7.8* HCT 24.4* 24.4* 25.0*  
 389 358 RADIOLOGY: Reviewed 10/11 Increasing atelectasis/infiltrate at the right base with resolution of airspace 
disease at the left base. 10/10 cxr  Improved aeration of the right mid and lower lung with mild residual 
atelectatic opacities noted. Small region of opacity at the left lung base may 
reflect underlying atelectasis versus airspace disease. 10/4 CTAP IMPRESSION: 
1. Dense subtotal or total consolidation right lower lobe compatible with 
pneumonia similar to prior study.  Small bilateral pleural effusions similar in 
size. 2. Distended gallbladder with gallstones and gallbladder sludge without 
significant change in appearance and also described in detail on ultrasound of 
9/27/2018. 3. Indeterminate small lesion left hepatic lobe without change. Hepatomegaly 
again evident. 4. No intraperitoneal fluid. Possible small left upper pole renal cyst. 
Cardiomegaly. Nasogastric tube tip in body of stomach. cxr 10/5 Impression: 1. Endotracheal tube, visualized nasogastric tube, and right IJ central venous 
catheter in stable position. 2. Overall improved aeration of the right lower lobe, likely improved 
atelectasis with mild residual atelectasis/airspace disease. 3. Mild interstitial edema overall similar to prior. Microbiology Results All Micro Results Procedure Component Value Units Date/Time CULTURE, BLOOD [[id number]] Collected:  10/16/18 1047 Order Status:  Completed Specimen:  Blood Updated:  10/19/18 0809 Special Requests: PERIPHERAL Culture result: NO GROWTH 3 DAYS     
 CULTURE, BLOOD [[id number]] Collected:  10/16/18 1055 Order Status:  Completed Specimen:  Blood Updated:  10/19/18 0809 Special Requests: PERIPHERAL Culture result: NO GROWTH 3 DAYS     
 HSV 1 AND 2 BY PCR [[id number]] Collected:  10/18/18 1815 Order Status:  Completed Specimen:  Other Updated:  10/18/18 1838 CULTURE, RESPIRATORY/SPUTUM/BRONCH Hang Dodson [[id number]] Collected:  10/16/18 1915 Order Status:  Completed Specimen:  Sputum,ET Suction Updated:  10/18/18 1011 Special Requests: NO SPECIAL REQUESTS     
  GRAM STAIN 10-25 WBC/lpf     
   <10 EPI/lpf FEW GRAM POSITIVE COCCI IN PAIRS MODERATE GRAM POSITIVE COCCI IN GROUPS MUCUS PRESENT Culture result: MODERATE NORMAL RESPIRATORY SHANAE  
     
 CULTURE, BLOOD [[id number]] Collected:  10/08/18 1227 Order Status:  Completed Specimen:  Blood Updated:  10/14/18 3453 Special Requests: PERIPHERAL Culture result: NO GROWTH 6 DAYS     
 CULTURE, CATHETER TIP [[id number]] Collected:  10/08/18 1215 Order Status:  Completed Specimen:  Catheter Tip Updated:  10/11/18 5579 Special Requests: NO SPECIAL REQUESTS Culture result: NO GROWTH 3 DAYS     
 CULTURE, BLOOD [[id number]] Collected:  10/04/18 2065 Order Status:  Completed Specimen:  Blood Updated:  10/10/18 2410 Special Requests: PERIPHERAL Culture result: NO GROWTH 6 DAYS     
 CULTURE, BLOOD [[id number]] Collected:  10/04/18 1120 Order Status:  Completed Specimen:  Blood Updated:  10/10/18 1269 Special Requests: PERIPHERAL Culture result: NO GROWTH 6 DAYS     
 CULTURE, BLOOD [[id number]] Collected:  09/27/18 0005 Order Status:  Completed Specimen:  Blood Updated:  10/03/18 0845 Special Requests: NO SPECIAL REQUESTS Culture result: NO GROWTH 6 DAYS     
 CULTURE, BLOOD [[id number]]  (Abnormal)  (Susceptibility) Collected:  09/27/18 0136 Order Status:  Completed Specimen:  Blood Updated:  10/02/18 5291 Special Requests: NO SPECIAL REQUESTS     
  GRAM STAIN PEDIATRIC BOTTLE GRAM POSITIVE COCCI IN PAIRS IN CLUSTERS  
      
  SMEAR CALLED TO AND CORRECTLY REPEATED BY: Kristine Cameron RN IN 2700 ON 9/29/18 AT 2033 WF Culture result:    
  AEROBIC BOTTLE STAPHYLOCOCCUS EPIDERMIDIS  
     
 CULTURE, RESPIRATORY/SPUTUM/BRONCH Darian Delta STAIN [[id number]]  (Abnormal) Collected:  09/29/18 1210 Order Status:  Completed Specimen:  Sputum from Endotracheal aspirate Updated:  10/02/18 9785 Special Requests: NO SPECIAL REQUESTS     
  GRAM STAIN >25 WBC/lpf     
   <10 EPI/lpf MUCUS PRESENT     
   MODERATE YEAST Culture result: MANY CANDIDA TROPICALIS C. DIFFICILE/EPI PCR [[id number]] Collected:  09/29/18 1400 Order Status:  Completed Specimen:  Stool Updated:  09/29/18 2248 Special Requests: NO SPECIAL REQUESTS Culture result: Toxigenic C. difficile NEGATIVE                         C. difficile target DNA sequences are not detected. CULTURE, URINE [[id number]] Collected:  09/27/18 0029 Order Status:  Completed Specimen:  Cath Urine Updated:  09/29/18 0944 Special Requests: NO SPECIAL REQUESTS Culture result: NO GROWTH 2 DAYS Sharonda Deshpande MD, FACP October 19, 2018 [facility] [phone]

## 2018-10-19 NOTE — PROGRESS NOTES
RENAL PROGRESS NOTE Charlie Challenger Assessment/Plan: · Prolonged dialysis dependant JULIANNA (ischemic atn in a setting of acute pyelonephritis/acute cholecystitis with sepsis and cardiac arrest 9/29). No evidence of recovery of renal function at this time. Next dialysis tomorrow and  continue 3/week. The plan was to replace rt ij temporary dialysis catheter with tdc but it is currently on hold due to patient being on airborne precautions. May need another temporary dialysis catheter. · S/p cardiopulm arrest 9/29. Off pressors. · Acute pyelonephritis/sepsis. On abx per ID. · Abnormal lft's/acute cholecystitis? On abx. · UGI bleed, EGD results noted- healing esophageal ulceration. · Acute blood loss anemia/anemia of kidney failure. Continue analia. · Asp pneumonia. On abx. · Concern for rt shoulder shingles. On acyclovir. · Resp failure. S/p peg/trach 10/17, s/p bronch. · Anoxic brain injury superimposed on recent cva. Subjective: Intubated, unresponsive. Had peg/trach this am and then bronchoscopy. Patient Active Problem List  
Diagnosis Code  Stroke (cerebrum) ([facility]) I63.9  Stroke ([facility] I63.9  Rhabdomyolysis M62.82  
 Dehydration E86.0  
 Essential hypertension I10  
 Diabetes mellitus type 2, controlled ([facility] [facility] E11.9  Non compliance w medication regimen Z91.14  
 Pneumonia of both lower lobes J18.9  DM (diabetes mellitus) ([facility] [facility] E11.9  History of stroke Z80.78  
 Acute-on-chronic kidney injury ([facility] N17.9, N18.9  Acute cystitis N30.00  Elevated troponin R74.8  Transaminitis R74.0  Acute kidney injury ([facility] [address] N17.9  Elevated LFTs R94.5  Cardiac arrest with ventricular fibrillation ([facility]) I46.9, I49.01  
 Abnormal blood coagulation profile R79.1  Acute pancreatitis K85.90  Septic shock ([facility]) A41.9, R65.21  
  Ischemic encephalopathy I67.89  
 Pancreatitis K85.90 Current Facility-Administered Medications Medication Dose Route Frequency Provider Last Rate Last Dose  bacitracin 500 unit/gram packet 1 Packet  1 Packet Topical PRN Kelsea Higuera MD      
 sodium chloride (NS) flush 10-30 mL  10-30 mL InterCATHeter PRN Ninoska Ackerman MD      
 sodium chloride (NS) flush 10 mL  10 mL InterCATHeter Q24H Kelsea Higuera MD   10 mL at 10/18/18 1752  sodium chloride (NS) flush 10 mL  10 mL InterCATHeter PRN Kelsea Higuera MD      
 sodium chloride (NS) flush 10-40 mL  10-40 mL InterCATHeter Q8H Jones Ackerman MD   10 mL at 10/19/18 0730  
 sodium chloride (NS) flush 20 mL  20 mL InterCATHeter Q24H Kelsea Higuera MD   20 mL at 10/18/18 1751  heparin (porcine) injection 5,000 Units  5,000 Units SubCUTAneous Q8H Kelsea Higuera MD   5,000 Units at 10/19/18 5222  acyclovir sodium (ZOVIRAX) 330.5 mg in 0.9% sodium chloride 100 mL IVPB  5 mg/kg (Ideal) IntraVENous Q24H Dejan Curtis MD   330.5 mg at 10/18/18 1420  acetylcysteine (MUCOMYST) 100 mg/mL (10 %) nebulizer solution 400 mg  4 mL Nebulization TID RT [place] Suzan LAL MD   400 mg at 10/19/18 0832  
 vancomycin (VANCOCIN) 1,000 mg in 0.9% sodium chloride (MBP/ADV) 250 mL adv  1,000 mg IntraVENous Q TU, TH & SAT Schwab, J. Judd Carrie,  mL/hr at 10/18/18 1802 1,000 mg at 10/18/18 1802  [START ON 10/20/2018] VANCOMYCIN INFORMATION NOTE   Other ONCE Sveta Medina MD      
 insulin glargine (LANTUS) injection 25 Units  25 Units SubCUTAneous DAILY Anthony Solorzano MD   25 Units at 10/19/18 0900  hydroxypropyl methylcellulose (ISOPTO TEARS) 0.5 % ophthalmic solution 2 Drop  2 Drop Both Eyes BID Orville LAL NP   2 Drop at 10/19/18 0901  epoetin manda (EPOGEN;PROCRIT) injection 40,000 Units  40,000 Units IntraVENous Q7D [address] MD   40,000 Units at 10/17/18 1629  pantoprazole (PROTONIX) 40 mg in sodium chloride 0.9% 10 mL injection  40 mg IntraVENous Q24H Haylee Ruiz MD   40 mg at 10/19/18 0859  
 0.9% sodium chloride infusion 250 mL  250 mL IntraVENous PRN Nellie Reeves DO      
 influenza vaccine 2018-19 (6 mos+)(PF) (FLUARIX QUAD/FLULAVAL QUAD) injection 0.5 mL  0.5 mL IntraMUSCular PRIOR TO DISCHARGE Doug Bruce MD      
 acetaminophen (TYLENOL) solution 325 mg  325 mg Per NG tube Q4H PRN Doug Bruce MD   325 mg at 10/18/18 2130  
 0.9% sodium chloride infusion 250 mL  250 mL IntraVENous PRN Nellie Reeves DO      
 heparin (porcine) pf 100 Units  100 Units InterCATHeter PRN Griselda Alan MD      
 0.9% sodium chloride infusion 250 mL  250 mL IntraVENous PRN Doug Bruce MD      
 insulin lispro (HUMALOG) injection   SubCUTAneous Q6H [facility] H, DO   Stopped at 10/17/18 0600  
 albuterol (PROVENTIL VENTOLIN) nebulizer solution 2.5 mg  2.5 mg Nebulization Q6H RT Yrn Cook MD   2.5 mg at 10/19/18 3832  heparin (porcine) 1,000 unit/mL injection 1,000 Units  1,000 Units InterCATHeter DIALYSIS PRN Griselda Alan MD   1,000 Units at 09/28/18 1332  VANCOMYCIN INFORMATION NOTE   Other Rx Dosing/Monitoring Lupillo Caro MD      
 senna-docusate (PERICOLACE) 8.6-50 mg per tablet 1 Tab  1 Tab Oral BID PRN Lizbeth Kenney DO      
 ondansetron (ZOFRAN) injection 4 mg  4 mg IntraVENous Q4H PRN Alfred Nettles DO   4 mg at 09/28/18 0539  
 glucose chewable tablet 16 g  4 Tab Oral PRN Cosmo LAL DO      
 glucagon (GLUCAGEN) injection 1 mg  1 mg IntraMUSCular PRN Alfred Nettles DO      
 dextrose (D50) infusion 12.5-25 g  25-50 mL IntraVENous PRN Alfred eNttles DO   25 g at 10/09/18 1906  hydrALAZINE (APRESOLINE) 20 mg/mL injection 20 mg  20 mg IntraVENous Q6H PRN David Adkins NP   20 mg at 10/14/18 1700 Objective Vitals: 10/19/18 0500 10/19/18 0600 10/19/18 0700 10/19/18 0834 BP: 134/69 135/70 139/69 Pulse: 96 94 92 91 Resp: 14 16 16 16 Temp:      
TempSrc:      
SpO2: 100% 100% 100% 100% Weight:      
Height:      
 
 
 
Intake/Output Summary (Last 24 hours) at 10/19/2018 0915 Last data filed at 10/19/2018 0700 Gross per 24 hour Intake 810 ml Output 2000 ml Net -1190 ml Admission weight: Weight: 96.6 kg (213 lb) (09/26/18 2244) Last Weight Metrics: 
Weight Loss Metrics 10/16/2018 9/26/2018 9/13/2018 Today's Wt 180 lb 12.8 oz - 227 lb 15.3 oz  
BMI - 28.32 kg/m2 35.7 kg/m2 Physical Assessment:  
 
General: intubated, unresponsive. Eyes are open. Neck: Trach in place. Neck: No jvd. Rt ij temporary dialysis catheter in place, dressing is clear. LUNGS: diminished air entry at the bases. No crackles. CVS EXM: S1, S2  RRR. Abdomen: soft, pos bs. Lower Extremities:  1+ edema. Rt arm with multiple flassid blisters, filled with yellowish fluid. Lab CBC w/Diff Recent Labs 10/19/18 
0330 10/18/18 
0430 10/17/18 
0410 WBC 9.2 9.2 8.6  
RBC 2.65* 2.69* 2.77* HGB 7.3* 7.5* 7.8* HCT 24.4* 24.4* 25.0*  
 389 358 Chemistry Recent Labs 10/19/18 
0330 10/18/18 
0430 10/17/18 
1710 10/17/18 
0410 * 81  --  104*  141  --  140  
K 4.6 6.5*  --  5.6*  
 103  --  101 CO2 31 27  --  29 BUN 39* 66*  --  46* CREA 3.69* 5.63*  --  4.00* CA 7.4* 7.5*  --  7.5* AGAP 8 11  --  10  
BUCR 11* 12  --  12  
* 277* 272* 302* TP 6.9 7.0 6.8 7.2 ALB 1.6* 1.7* 1.6* 1.7*  
GLOB 5.3* 5.3* 5.2* 5.5* AGRAT 0.3* 0.3* 0.3* 0.3* No results found for: IRON, FE, TIBC, IBCT, PSAT, FERR Lab Results Component Value Date/Time Calcium 7.4 (L) 10/19/2018 03:30 AM  
 Phosphorus 5.2 (H) 10/13/2018 03:41 AM  
  
 
Cathy Donis M.D. [facility] Phone [phone] Pager [phone]

## 2018-10-19 NOTE — PROGRESS NOTES
Hospitalist Progress Note Urban Gerardo MD 
Internal medicine/ Hospitalist 
 
Daily Progress Note: 10/19/2018    
Interval history / Subjective:  
Willie Christensen is a 57y.o. year old male who presented from [facility] with abnormal labs, elevated BUN/Cr. Found to have JULIANNA, UTI, and markedly elevated LFT on presentation. Patient's recent admission was 9/10/18-9/14/18 for acute CVA and rhabdo. Patient poor historian on admission,stated \"I had heart failure. She was started on vanc,zosyn. On 9/29,patient had cardiac arrest with ventricular fibrillation - s/p ROSC. Her hospital course has since then complicated with sepsis,pancreatitits,pneumonia,acute pancreatitis requiring involvement of gi,surgery,nephrology,cardiology,ID,cardiology. So far patient has remained without major improvement. Now in vegetative state. Patient had PEG/Trach placement on 10/17. On 10/17,patient started on acyclovir for veicular rash rt arm,suspected being Zoster. Current Facility-Administered Medications Medication Dose Route Frequency  acetylcysteine (MUCOMYST) 100 mg/mL (10 %) nebulizer solution 400 mg  4 mL Nebulization ONCE  
 bacitracin 500 unit/gram packet 1 Packet  1 Packet Topical PRN  
 sodium chloride (NS) flush 10-30 mL  10-30 mL InterCATHeter PRN  
 sodium chloride (NS) flush 10 mL  10 mL InterCATHeter Q24H  
 sodium chloride (NS) flush 10 mL  10 mL InterCATHeter PRN  
 sodium chloride (NS) flush 10-40 mL  10-40 mL InterCATHeter Q8H  
 sodium chloride (NS) flush 20 mL  20 mL InterCATHeter Q24H  
 heparin (porcine) injection 5,000 Units  5,000 Units SubCUTAneous Q8H  
 acyclovir sodium (ZOVIRAX) 330.5 mg in 0.9% sodium chloride 100 mL IVPB  5 mg/kg (Ideal) IntraVENous Q24H  
 acetylcysteine (MUCOMYST) 100 mg/mL (10 %) nebulizer solution 400 mg  4 mL Nebulization TID RT  
 vancomycin (VANCOCIN) 1,000 mg in 0.9% sodium chloride (MBP/ADV) 250 mL adv  1,000 mg IntraVENous Q TU, TH & SAT  
  [START ON 10/20/2018] VANCOMYCIN INFORMATION NOTE   Other ONCE  
 insulin glargine (LANTUS) injection 25 Units  25 Units SubCUTAneous DAILY  hydroxypropyl methylcellulose (ISOPTO TEARS) 0.5 % ophthalmic solution 2 Drop  2 Drop Both Eyes BID  epoetin manda (EPOGEN;PROCRIT) injection 40,000 Units  40,000 Units IntraVENous Q7D  
 pantoprazole (PROTONIX) 40 mg in sodium chloride 0.9% 10 mL injection  40 mg IntraVENous Q24H  
 0.9% sodium chloride infusion 250 mL  250 mL IntraVENous PRN  
 influenza vaccine 2018-19 (6 mos+)(PF) (FLUARIX QUAD/FLULAVAL QUAD) injection 0.5 mL  0.5 mL IntraMUSCular PRIOR TO DISCHARGE  acetaminophen (TYLENOL) solution 325 mg  325 mg Per NG tube Q4H PRN  
 0.9% sodium chloride infusion 250 mL  250 mL IntraVENous PRN  
 heparin (porcine) pf 100 Units  100 Units InterCATHeter PRN  
 0.9% sodium chloride infusion 250 mL  250 mL IntraVENous PRN  
 insulin lispro (HUMALOG) injection   SubCUTAneous Q6H  
 albuterol (PROVENTIL VENTOLIN) nebulizer solution 2.5 mg  2.5 mg Nebulization Q6H RT  
 heparin (porcine) 1,000 unit/mL injection 1,000 Units  1,000 Units InterCATHeter DIALYSIS PRN  
 VANCOMYCIN INFORMATION NOTE   Other Rx Dosing/Monitoring  senna-docusate (PERICOLACE) 8.6-50 mg per tablet 1 Tab  1 Tab Oral BID PRN  
 ondansetron (ZOFRAN) injection 4 mg  4 mg IntraVENous Q4H PRN  
 glucose chewable tablet 16 g  4 Tab Oral PRN  
 glucagon (GLUCAGEN) injection 1 mg  1 mg IntraMUSCular PRN  
 dextrose (D50) infusion 12.5-25 g  25-50 mL IntraVENous PRN  
 hydrALAZINE (APRESOLINE) 20 mg/mL injection 20 mg  20 mg IntraVENous Q6H PRN Review of Systems Intubated,unresponsive. Objective:  
 
Visit Vitals /69 Pulse 91 Temp 97.3 °F (36.3 °C) Resp 16 Ht 5' 7\" (1.702 m) Wt 82 kg (180 lb 12.8 oz) SpO2 100% BMI 28.32 kg/m² O2 Flow Rate (L/min): 45 l/min O2 Device: Tracheostomy, Ventilator Temp (24hrs), Av.6 °F (37.6 °C), Min:97.3 °F (36.3 °C), Max:101.4 °F (38.6 °C) No intake/output data recorded. 10/17 1901 - 10/19 0700 In: 810 [I.V.:390] Out: 2000 P/E General Appearance:S/p PEG/Trach today HENT: normocephalic/atraumatic, + ETT Neck: No JVD, hematoma R side where [facility] is improving Lungs: Coarse B/L w/ vent-assisted BS 
CV: RRR, no m/r/g Abdomen: soft, non-tender, normal bowel sounds Extremities: versicular rash rt arm and right side of chest,no cyanosis, no peripheral edema Neuro: Unresponsive to commands, no withdrawal to pain, + corneal reflex and pupillary reaction today Skin: Normal color, intact Data Review Recent Results (from the past 12 hour(s)) GLUCOSE, POC Collection Time: 10/18/18 11:30 PM  
Result Value Ref Range Glucose (POC) 145 (H) 70 - 110 mg/dL CBC W/O DIFF Collection Time: 10/19/18  3:30 AM  
Result Value Ref Range WBC 9.2 4.6 - 13.2 K/uL  
 RBC 2.65 (L) 4.70 - 5.50 M/uL HGB 7.3 (L) 13.0 - 16.0 g/dL HCT 24.4 (L) 36.0 - 48.0 % MCV 92.1 74.0 - 97.0 FL  
 MCH 27.5 24.0 - 34.0 PG  
 MCHC 29.9 (L) 31.0 - 37.0 g/dL  
 RDW 15.3 (H) 11.6 - 14.5 % PLATELET 672 812 - 586 K/uL MPV 9.3 9.2 - 55.5 FL  
METABOLIC PANEL, COMPREHENSIVE Collection Time: 10/19/18  3:30 AM  
Result Value Ref Range Sodium 142 136 - 145 mmol/L Potassium 4.6 3.5 - 5.5 mmol/L Chloride 103 100 - 108 mmol/L  
 CO2 31 21 - 32 mmol/L Anion gap 8 3.0 - 18 mmol/L Glucose 128 (H) 74 - 99 mg/dL BUN 39 (H) 7.0 - 18 MG/DL Creatinine 3.69 (H) 0.6 - 1.3 MG/DL  
 BUN/Creatinine ratio 11 (L) 12 - 20 GFR est AA 20 (L) >60 ml/min/1.73m2 GFR est non-AA 17 (L) >60 ml/min/1.73m2 Calcium 7.4 (L) 8.5 - 10.1 MG/DL Bilirubin, total 0.6 0.2 - 1.0 MG/DL  
 ALT (SGPT) 57 16 - 61 U/L  
 AST (SGOT) 119 (H) 15 - 37 U/L Alk. phosphatase 290 (H) 45 - 117 U/L Protein, total 6.9 6.4 - 8.2 g/dL Albumin 1.6 (L) 3.4 - 5.0 g/dL Globulin 5.3 (H) 2.0 - 4.0 g/dL A-G Ratio 0.3 (L) 0.8 - 1.7 LIPASE Collection Time: 10/19/18  3:30 AM  
Result Value Ref Range Lipase 1,207 (H) 73 - 393 U/L  
POC G3 Collection Time: 10/19/18  5:37 AM  
Result Value Ref Range Device: VENT    
 FIO2 (POC) 50 % pH (POC) 7.466 (H) 7.35 - 7.45    
 pCO2 (POC) 41.6 35.0 - 45.0 MMHG  
 pO2 (POC) 148 (H) 80 - 100 MMHG  
 HCO3 (POC) 29.9 (H) 22 - 26 MMOL/L  
 sO2 (POC) 99 (H) 92 - 97 % Base excess (POC) 6 mmol/L Mode ASSIST CONTROL Tidal volume 500 ml Set Rate 10 bpm  
 PEEP/CPAP (POC) 5.0 cmH2O Allens test (POC) N/A Inspiratory Time 1 sec Total resp. rate 15 Site LEFT RADIAL Patient temp. 99.0 Specimen type (POC) ARTERIAL Performed by Darlina Lamp Volume control plus YES    
GLUCOSE, POC Collection Time: 10/19/18  6:48 AM  
Result Value Ref Range Glucose (POC) 135 (H) 70 - 110 mg/dL Assessment/Plan:  
 
Principal Problem: 
  Cardiac arrest with ventricular fibrillation ([address] (9/29/2018) Overview: ROSC before defibrillation could be attempted. Active Problems: 
  Essential hypertension (9/10/2018) Diabetes mellitus type 2, controlled ([address] (9/10/2018) Pneumonia of both lower lobes (9/11/2018) History of stroke (9/27/2018) Overview: With residual R hemiparesis Acute-on-chronic kidney injury ([address] (9/27/2018) Acute cystitis (9/27/2018) Elevated troponin (9/27/2018) Transaminitis (9/27/2018) Acute kidney injury ([address] (9/27/2018) Elevated LFTs (9/28/2018) Abnormal blood coagulation profile (9/29/2018) Acute pancreatitis (9/29/2018) Overview: Shock, leukocytosis, elevated lipase but radiographically no ductal  
    dilatation in pancreas. GB stones without radiographic stigmata of acute  
    cholecystitis Septic shock ([address] (9/30/2018) Overview: Probably secondary to pneumonia (suspicious for aspiration). Less likely,  
    secondary to acute pancreatitis. Ischemic encephalopathy (9/30/2018) Pancreatitis (10/8/2018) Zoster. Care Plan - Vent mgmt per ICU - S/p trach/PEG on 10/17 as patient did not show significant improvement. 
- EGD showed large clot in esophagus last week repeat EGD 10/9 shows healed esophageal ulcer - Cont protonix IV every day  
- Hgb stable - LFTs improving - GI signed off 10/9 
- MRI w/ evidence of severe anoxic brain injury - Minimal improvement on neuro exam 
- Appreciate neuro assistance - IV Meropenem and IV Fluconazole d/heron on 10/15 per ID. 
- Currently on vanc only which was started on 9/27 
- Nephro managing HD TTHS 
- Pt unlikely to have any meaningful neuro recovery, prognosis is very grim - Family wanted to cont to do everything, including trach/PEG -> was done10/17 
- On 10/17:Started on acyclovir for possible Zoster rt arm and rt upper chest area. - Hyperkalemia on 10/18 ->corrected DVT prophylaxis:scds Full code. Disposition:tbd - need placement -  involved.

## 2018-10-19 NOTE — ROUTINE PROCESS
0730 Pt received after bedside shift report from Billie Shane RN. Pt resting in bed with eyes open. VSS via monitor. Ventilation via tracheostomy. Bed locked and in low position. 1200 Dialysis in progress. 1410 Tube feed infusing as ordered by doctor Deirdre Díaz 1430 PICC line insertion in progress. End of shift bedside report given to [facility], RN. Report included the following information. SBAR, MAR, KARDEX AND RECENT RESULTS.

## 2018-10-19 NOTE — PROGRESS NOTES
PCCM Progress Note I have reviewed the flowsheet and previous days notes. Events, vitals, medications and notes from last 24 hours reviewed. Care plan discussed with staff and on multidisciplinary rounds. Subjective: 
10/19/2018 No acute events. Remains hypoxic, still with low grade fevers. Non-responsive, only w/d to pain in R leg. Patient Active Problem List  
Diagnosis Code  Stroke (cerebrum) ([facility]) I63.9  Stroke ([facility] I63.9  Rhabdomyolysis M62.82  
 Dehydration E86.0  
 Essential hypertension I10  
 Diabetes mellitus type 2, controlled ([facility] E11.9  Non compliance w medication regimen Z91.14  
 Pneumonia of both lower lobes J18.9  DM (diabetes mellitus) ([facility] E11.9  History of stroke Z80.78  
 Acute-on-chronic kidney injury ([facility] N17.9, N18.9  Acute cystitis N30.00  Elevated troponin R74.8  Transaminitis R74.0  Acute kidney injury ([facility] N17.9  Elevated LFTs R94.5  Cardiac arrest with ventricular fibrillation ([facility]) I46.9, I49.01  
 Abnormal blood coagulation profile R79.1  Acute pancreatitis K85.90  Septic shock ([facility]) A41.9, R65.21  
 Ischemic encephalopathy I67.89  
 Pancreatitis K85.90 Assessment: 
Acute Respiratory Failure with Hypoxia - Intubated x18 days, breathing spontaneously - s/p tracheostomy on 10/17 Anoxic Brain Injury - 2/2 cardiac arrest, MRI with consistent findings Acute Kidney Injury 
- anuric, on HD Persistent Fevers/Leukocytosis - Likely central in etiology, possibly from atelectasis? 
- s/p multiple antimicrobials without source, ID following Hx of CVA 
- R hemiparesis GI Bleed - s/p endoscopy x2 with ulceration noted in esophagus Dense Mucus Plugging - s/p bronchoscopy 10/17- entire R side obstructed 
- added mucomyst nebs Coagulopathy 
- resolved DM 
- on insulin Bullous rash on R arm 
- coma blisters?, possible VZV 
- Acyclovir started 
  
Impression/Plan: - Bronch this afternoon - Vasc cath removed and replaced per vascular surgery, unable to place tunneled catheter due to contact precautions. - nebulizers and mucomyst 
- ABX and antivirals per ID 
- Start feeds through PEG tube -  GI and DVT ppx- heparin and protonix 
  
Very poor prognosis, daughter wishes to pursue aggressive care OTHER: 
Glycemic Control- glucose stabilizer per ICU protocol when on insulin drip. Maintain blood glucose 140-180. Ventilator bundle & Sedation protocol followed. Daily morning sedation holiday, assessment for readiness for SBT & weaning from ventilator; and then re-titrate if required. Aim to keep peak plateau pressure 88-92SV H2O in ARDS patient. [place] tube to suction at 20-30 cm H2O, Maintain Anastasiya tube with 5-10ml air every 4 hours. Chlorhexidine mouth washes and routine oral care every 4 hours. Stress ulcer and DVT prophylaxis. HOB >=30 degree elevation all the time. HOB >=30 degree elevation all the time. Aggressive pulmonary toileting. Incentive spirometry when appropriate. Quality Care: Stress ulcer prophylaxis, DVT prophylaxis, HOB elevated, Infection control all reviewed and addressed. Events and notes from last 24 hours reviewed. Care plan discussed with nursing. D/w patient and family above medical problems and answered all questions to their satisfaction. CC TIME: >30 min Medication Reviewed: 
 
No Known Allergies Past Medical History:  
Diagnosis Date  CHF (congestive heart failure) ([facility]  Diabetes ([facility]  Stroke ([facility] History reviewed. No pertinent surgical history. Social History Tobacco Use  Smoking status: Never Smoker  Smokeless tobacco: Never Used Substance Use Topics  Alcohol use: No  
  
History reviewed. No pertinent family history. Prior to Admission medications Medication Sig Start Date End Date Taking? Authorizing Provider  
aspirin (ASPIRIN) 325 mg tablet Take 1 Tab by mouth daily.  9/15/18 Paula Norton MD  
atorvastatin (LIPITOR) 80 mg tablet Take 1 Tab by mouth nightly. 9/14/18   Paula Norton MD  
insulin glargine (LANTUS) 100 unit/mL injection 10 units qhs  Indications: type 2 diabetes mellitus 9/15/18   Paula Norton MD  
insulin lispro (HUMALOG) 100 unit/mL injection 4 units with meals 9/14/18   Paula Norton MD  
losartan (COZAAR) 50 mg tablet Take 1 Tab by mouth daily. 9/14/18   Paula Norton MD  
 
Current Facility-Administered Medications Medication Dose Route Frequency  sodium chloride (NS) flush 10 mL  10 mL InterCATHeter Q24H  
 sodium chloride (NS) flush 10-40 mL  10-40 mL InterCATHeter Q8H  
 sodium chloride (NS) flush 20 mL  20 mL InterCATHeter Q24H  
 heparin (porcine) injection 5,000 Units  5,000 Units SubCUTAneous Q8H  
 acyclovir sodium (ZOVIRAX) 330.5 mg in 0.9% sodium chloride 100 mL IVPB  5 mg/kg (Ideal) IntraVENous Q24H  
 acetylcysteine (MUCOMYST) 100 mg/mL (10 %) nebulizer solution 400 mg  4 mL Nebulization TID RT  
 vancomycin (VANCOCIN) 1,000 mg in 0.9% sodium chloride (MBP/ADV) 250 mL adv  1,000 mg IntraVENous Q TU, TH & SAT  [START ON 10/20/2018] VANCOMYCIN INFORMATION NOTE   Other ONCE  
 insulin glargine (LANTUS) injection 25 Units  25 Units SubCUTAneous DAILY  hydroxypropyl methylcellulose (ISOPTO TEARS) 0.5 % ophthalmic solution 2 Drop  2 Drop Both Eyes BID  epoetin manda (EPOGEN;PROCRIT) injection 40,000 Units  40,000 Units IntraVENous Q7D  
 pantoprazole (PROTONIX) 40 mg in sodium chloride 0.9% 10 mL injection  40 mg IntraVENous Q24H  
 influenza vaccine 2018-19 (6 mos+)(PF) (FLUARIX QUAD/FLULAVAL QUAD) injection 0.5 mL  0.5 mL IntraMUSCular PRIOR TO DISCHARGE  insulin lispro (HUMALOG) injection   SubCUTAneous Q6H  
 albuterol (PROVENTIL VENTOLIN) nebulizer solution 2.5 mg  2.5 mg Nebulization Q6H RT  
 VANCOMYCIN INFORMATION NOTE   Other Rx Dosing/Monitoring Lines: All central lines examined by me. No signs of erythema, induration, discharge. Peripherally Inserted Central Catheter: PICC Double Lumen 10/18/18 Left;Brachial (Active) Central Line Being Utilized Yes 10/19/2018  4:00 AM  
Criteria for Appropriate Use Irritant/vesicant medication 10/18/2018  8:00 PM  
Site Assessment Clean, dry, & intact 10/19/2018  4:00 AM  
Phlebitis Assessment 0 10/19/2018  4:00 AM  
Infiltration Assessment 0 10/19/2018  4:00 AM  
Arm Circumference (cm) 38 cm 10/18/2018  8:00 PM  
Date of Last Dressing Change 10/18/18 10/18/2018  8:00 PM  
Dressing Status Clean, dry, & intact 10/18/2018  8:00 PM  
Action Taken Blood drawn 10/19/2018  4:00 AM  
External Catheter Length (cm) 0 centimeters 10/18/2018  8:00 PM  
Dressing Type Disk with Chlorhexadine gluconate (CHG) 10/18/2018  8:00 PM  
Hub Color/Line Status Purple;Capped;Flushed;Patent 10/19/2018  4:00 AM  
Positive Blood Return (Site #1) Yes 10/19/2018  4:00 AM  
Hub Color/Line Status Red;Capped;Flushed;Patent 10/19/2018  4:00 AM  
Positive Blood Return (Site #2) Yes 10/19/2018 12:01 AM  
Alcohol Cap Used Yes 10/19/2018 12:01 AM  
 
Hemodialysis Catheter: 
  
Drain(s): PEG/Gastrostomy Tube 10/17/18 (Active) Site Assessment Clean, dry, & intact 10/19/2018  8:00 AM  
Dressing Status Clean, dry, & intact 10/19/2018  8:00 AM  
G Port Status Infusing 10/19/2018  8:00 AM  
External Catheter Length (cm) 3 centimeters 10/18/2018  8:00 PM  
Action Taken Placement verified (comment) 10/19/2018  8:00 AM  
Drainage Description Tan 10/19/2018  8:00 AM  
Gastric Residual (mL) 5 ml 10/19/2018  8:00 AM  
Tube Feeding/Formula Options Osmolite 1.2 10/19/2018  8:00 AM  
Tube Feeding/Verify Rate (mL/hr) 55 10/19/2018  2:16 PM  
Water Flush Volume (mL) 0 mL 10/18/2018  8:00 PM  
Intake (ml) 35 ml 10/19/2018  7:00 AM  
 
Airway: Airway - Tracheostomy Tube 10/17/18 Portex; Cuffed (Active) Cuff Pressure 30 cmH20 10/17/2018 10:25 AM  
 Site Assessment Clean, dry, & intact 10/19/2018  8:34 AM  
Trach Dressing Changed Yes 10/19/2018  8:34 AM  
Trach Cleaned With Normal saline 10/19/2018  8:34 AM  
Trach Tie Changed No 10/19/2018  8:34 AM  
Trach Cannula Changed Yes 10/19/2018  4:58 AM  
Inner Cannula #8 [place] 10/19/2018 12:06 PM  
Line Jl Top of the lock 10/19/2018 12:00 AM  
Side Secured Device 10/19/2018  4:58 AM  
Spare Trach at Bedside Yes 10/19/2018 12:06 PM  
Spare Herminio Blush Tube One Size Smaller at Bedside Yes 10/19/2018 12:06 PM  
Ambu Bag With Mask at Bedside Yes 10/19/2018 12:06 PM  
 
 
Objective: 
Vital Signs:   
Visit Vitals /63 Pulse 99 Temp 99.6 °F (37.6 °C) Resp 16 Ht 5' 7\" (1.702 m) Wt 82 kg (180 lb 12.8 oz) SpO2 100% BMI 28.32 kg/m² O2 Device: Tracheostomy O2 Flow Rate (L/min): 45 l/min Temp (24hrs), Av.5 °F (37.5 °C), Min:97.3 °F (36.3 °C), Max:101.4 °F (38.6 °C) Intake/Output:  
Last shift:      No intake/output data recorded. Last 3 shifts: 10/17 1901 - 10/19 0700 In: 810 [I.V.:390] Out: 2000 Intake/Output Summary (Last 24 hours) at 10/19/2018 1524 Last data filed at 10/19/2018 0700 Gross per 24 hour Intake 710 ml Output 0 ml Net 710 ml Last 3 Recorded Weights in this Encounter 10/14/18 2132 10/15/18 0400 10/16/18 0020 Weight: 94 kg (207 lb 3.7 oz) 93.7 kg (206 lb 9.1 oz) 82 kg (180 lb 12.8 oz) Ventilator Settings: 
Mode Rate Tidal Volume Pressure FiO2 PEEP Assist control, VC+   500 ml  15 cm H2O 50 % 5 cm H20 Peak airway pressure: 21 cm H2O Plateau pressure:   
Tidal volume:  
Minute ventilation: 9 l/min SPO2  
 
ARDS network Guidelines: Lung protective strategy and Plateau pressure goals less than or equal to 30 Physical Exam:  
Gen: Intubated, unresponsive off sedation HEENT: pupils small but B/L reactive, tracheostomy in place, no bleeding, CVC in place Resp: decreased BS on R side, no rales or wheezing Card: RRR, no murmurs heard Abd: +BS, nontender to palpation, + PEG tube Ext: good distal pulses, no LE edema Neuro: eyes open, not tracking. Withdrawal to pain in R leg only. Noncommunicative Skin: Large bullae present on extensor portion of R arm Data: 
   
Recent Results (from the past 24 hour(s)) GLUCOSE, POC Collection Time: 10/18/18  5:26 PM  
Result Value Ref Range Glucose (POC) 102 70 - 110 mg/dL GLUCOSE, POC Collection Time: 10/18/18 11:30 PM  
Result Value Ref Range Glucose (POC) 145 (H) 70 - 110 mg/dL CBC W/O DIFF Collection Time: 10/19/18  3:30 AM  
Result Value Ref Range WBC 9.2 4.6 - 13.2 K/uL  
 RBC 2.65 (L) 4.70 - 5.50 M/uL HGB 7.3 (L) 13.0 - 16.0 g/dL HCT 24.4 (L) 36.0 - 48.0 % MCV 92.1 74.0 - 97.0 FL  
 MCH 27.5 24.0 - 34.0 PG  
 MCHC 29.9 (L) 31.0 - 37.0 g/dL  
 RDW 15.3 (H) 11.6 - 14.5 % PLATELET 814 035 - 668 K/uL MPV 9.3 9.2 - 81.2 FL  
METABOLIC PANEL, COMPREHENSIVE Collection Time: 10/19/18  3:30 AM  
Result Value Ref Range Sodium 142 136 - 145 mmol/L Potassium 4.6 3.5 - 5.5 mmol/L Chloride 103 100 - 108 mmol/L  
 CO2 31 21 - 32 mmol/L Anion gap 8 3.0 - 18 mmol/L Glucose 128 (H) 74 - 99 mg/dL BUN 39 (H) 7.0 - 18 MG/DL Creatinine 3.69 (H) 0.6 - 1.3 MG/DL  
 BUN/Creatinine ratio 11 (L) 12 - 20 GFR est AA 20 (L) >60 ml/min/1.73m2 GFR est non-AA 17 (L) >60 ml/min/1.73m2 Calcium 7.4 (L) 8.5 - 10.1 MG/DL Bilirubin, total 0.6 0.2 - 1.0 MG/DL  
 ALT (SGPT) 57 16 - 61 U/L  
 AST (SGOT) 119 (H) 15 - 37 U/L Alk. phosphatase 290 (H) 45 - 117 U/L Protein, total 6.9 6.4 - 8.2 g/dL Albumin 1.6 (L) 3.4 - 5.0 g/dL Globulin 5.3 (H) 2.0 - 4.0 g/dL A-G Ratio 0.3 (L) 0.8 - 1.7 LIPASE Collection Time: 10/19/18  3:30 AM  
Result Value Ref Range Lipase 1,207 (H) 73 - 393 U/L  
POC G3 Collection Time: 10/19/18  5:37 AM  
Result Value Ref Range Device: VENT    
 FIO2 (POC) 50 %  pH (POC) 7.466 (H) 7.35 - 7.45    
 pCO2 (POC) 41.6 35.0 - 45.0 MMHG  
 pO2 (POC) 148 (H) 80 - 100 MMHG  
 HCO3 (POC) 29.9 (H) 22 - 26 MMOL/L  
 sO2 (POC) 99 (H) 92 - 97 % Base excess (POC) 6 mmol/L Mode ASSIST CONTROL Tidal volume 500 ml Set Rate 10 bpm  
 PEEP/CPAP (POC) 5.0 cmH2O Allens test (POC) N/A Inspiratory Time 1 sec Total resp. rate 15 Site LEFT RADIAL Patient temp. 99.0 Specimen type (POC) ARTERIAL Performed by Quang Merida Volume control plus YES    
GLUCOSE, POC Collection Time: 10/19/18  6:48 AM  
Result Value Ref Range Glucose (POC) 135 (H) 70 - 110 mg/dL GLUCOSE, POC Collection Time: 10/19/18 12:19 PM  
Result Value Ref Range Glucose (POC) 206 (H) 70 - 110 mg/dL Chemistry Recent Labs 10/19/18 
0330 10/18/18 
0430 10/17/18 
1710 10/17/18 
0410 * 81  --  104*  141  --  140  
K 4.6 6.5*  --  5.6*  
 103  --  101 CO2 31 27  --  29 BUN 39* 66*  --  46* CREA 3.69* 5.63*  --  4.00* CA 7.4* 7.5*  --  7.5* AGAP 8 11  --  10  
BUCR 11* 12  --  12  
* 277* 272* 302* TP 6.9 7.0 6.8 7.2 ALB 1.6* 1.7* 1.6* 1.7*  
GLOB 5.3* 5.3* 5.2* 5.5* AGRAT 0.3* 0.3* 0.3* 0.3* CBC w/Diff Recent Labs 10/19/18 
0330 10/18/18 
0430 10/17/18 
0410 WBC 9.2 9.2 8.6  
RBC 2.65* 2.69* 2.77* HGB 7.3* 7.5* 7.8* HCT 24.4* 24.4* 25.0*  
 389 358 ABG Recent Labs 10/19/18 
5698 PHI 7.466* PCO2I 41.6 PO2I 148* HCO3I 29.9*  
FIO2I 50 Micro Recent Labs 10/16/18 
1915 CULT MODERATE NORMAL RESPIRATORY SHANAE Recent Labs 10/16/18 
1915 CULT MODERATE NORMAL RESPIRATORY SHANAE  
 
 
CT (Most Recent) Results from [facility] Encounter encounter on 09/26/18 CT ABD PELV W CONT Narrative EXAM: CT of the abdomen and pelvis INDICATION: Pneumonia. Acute pyelonephritis. Sepsis. Abnormal liver function 
tests. Dialysis patient. COMPARISON: None. TECHNIQUE: Axial CT imaging of the abdomen and pelvis was performed with 
intravenous contrast. Multiplanar reformats were generated. One or more dose 
reduction techniques were used on this CT: automated exposure control, 
adjustment of the mAs and/or kVp according to patient size, and iterative 
reconstruction techniques. The specific techniques used on this CT exam have 
been documented in the patient's electronic medical record. 
 
_______________ FINDINGS: 
 
LOWER CHEST: Dense consolidation right lower lobe compatible with pneumonia. Small bilateral pleural effusions. Cardiomegaly. Fluid-filled esophagus with 
nasogastric tube in place. LIVER, BILIARY: Indeterminate low-attenuation 7 mm focus anterior left hepatic 
lobe. Hepatomegaly. No biliary dilation. Distended gallbladder with gallstones 
and indeterminate material in the gallbladder which may represent sludge. PANCREAS: Normal. 
 
SPLEEN: Normal. 
 
ADRENALS: Normal. 
 
KIDNEYS/URETERS: No hydronephrosis or renal calculus. Small low attenuation 
cortical focus medial upper pole left kidney not well delineated measuring about 1.1 cm. This may represent renal cyst. 
 
LYMPH NODES: No enlarged lymph nodes. GASTROINTESTINAL TRACT: No bowel dilation or wall thickening. Normal appendix. PELVIC ORGANS: No intraperitoneal fluid. Incidental vas deferens calcifications. VASCULATURE: Mild atherosclerosis. BONES: No acute or aggressive osseous abnormalities identified. OTHER: None. 
 
_______________ Impression IMPRESSION: 
 
 
1. Dense subtotal or total consolidation right lower lobe compatible with 
pneumonia similar to prior study. Small bilateral pleural effusions similar in 
size. 2. Distended gallbladder with gallstones and gallbladder sludge without 
significant change in appearance and also described in detail on ultrasound of 
9/27/2018. 3. Indeterminate small lesion left hepatic lobe without change. Hepatomegaly again evident. 4. No intraperitoneal fluid. Possible small left upper pole renal cyst. 
Cardiomegaly. Nasogastric tube tip in body of stomach. XR (Most Recent). CXR reviewed by me and compared with previous CXR Results from [facility] Encounter encounter on 09/26/18 XR CHEST PORT Narrative EXAM: XR CHEST PORT 
 
CLINICAL INDICATION/HISTORY: respiratory failure, s/p tracheostomy 
-Additional: None COMPARISON: 10/17/2018 TECHNIQUE: Frontal view of the chest 
 
_______________ FINDINGS: 
 
HEART AND MEDIASTINUM: Significant rightward rotation. Right IJ catheter 
position unchanged. Tracheostomy noted. Grossly stable cardiomediastinal 
silhouette. LUNGS AND PLEURAL SPACES: Evidence of small right-sided pleural effusion. Improved aeration in the right lung since most recent prior, appearance similar 
to 10/16/2018. No acute opacities on the left. BONY THORAX AND SOFT TISSUES: Unremarkable. 
 
_______________ Impression IMPRESSION: 
 
Improved right pulmonary aeration in the interval. Probable small right-sided 
effusion. High complexity decision making was performed during the evaluation of this patient at high risk for decompensation with multiple organ involvement Above mentioned total time spent on reviewing the case/medical record/data/notes/EMR/patient examination/documentation/coordinating care with nurse/consultants, exclusive of procedures with complex decision making performed and > 50% time spent in face to face evaluation. Darrin Prabhakar MD 
Critical Care Medicine

## 2018-10-20 NOTE — PROGRESS NOTES
Problem: Ventilator Management Goal: *Adequate oxygenation and ventilation VENTILATOR Care plan 
 
Problem: Ventilator Management Goal: *Adequate oxygenation/ ventilation/ and extubation Patient: 
   
 
Ravinder Lindsay     57 y.o.   male     10/20/2018  8:11 AM 
Patient Active Problem List  
Diagnosis Code  Stroke (cerebrum) ([facility]) I63.9  Stroke ([facility] I63.9  Rhabdomyolysis M62.82  
 Dehydration E86.0  
 Essential hypertension I10  
 Diabetes mellitus type 2, controlled ([facility] E11.9  Non compliance w medication regimen Z91.14  
 Pneumonia of both lower lobes J18.9  DM (diabetes mellitus) ([facility] E11.9  History of stroke Z80.78  
 Acute-on-chronic kidney injury ([facility] N17.9, N18.9  Acute cystitis N30.00  Elevated troponin R74.8  Transaminitis R74.0  Acute kidney injury ([facility] N17.9  Elevated LFTs R94.5  Cardiac arrest with ventricular fibrillation ([facility]) I46.9, I49.01  
 Abnormal blood coagulation profile R79.1  Acute pancreatitis K85.90  Septic shock ([facility]) A41.9, R65.21  
 Ischemic encephalopathy I67.89  
 Pancreatitis K85.90 Pneumonia of both lower lobes due to infectious organism ([facility] [J18.1] ESRD (end stage renal disease) ([facility] [N18.6] ESRD (end stage renal disease) ([facility] [N18.6] Reason patient intubated: cardiac arrest 
 
Ventilator day:22 Ventilator settings:ac/vc+ 10/500/5/40% ETT Size/Placement:Trach size 8 shiley at top of the lock. ABG: 
Date:10/20/2018 No results found for: PH, PHI, PCO2, PCO2I, PO2, PO2I, HCO3, HCO3I, FIO2, FIO2I Chest X-ray: 
Date:10/20/2018 Results from [facility] Encounter encounter on 09/26/18 XR CHEST PORT Narrative EXAM: XR CHEST PORT 
 
CLINICAL INDICATION/HISTORY: respiratory failure, s/p tracheostomy 
-Additional: None COMPARISON: 10/17/2018 TECHNIQUE: Frontal view of the chest 
 
_______________ FINDINGS: 
 
HEART AND MEDIASTINUM: Significant rightward rotation. Right IJ catheter position unchanged. Tracheostomy noted. Grossly stable cardiomediastinal 
silhouette. LUNGS AND PLEURAL SPACES: Evidence of small right-sided pleural effusion. Improved aeration in the right lung since most recent prior, appearance similar 
to 10/16/2018. No acute opacities on the left. BONY THORAX AND SOFT TISSUES: Unremarkable. 
 
_______________ Impression IMPRESSION: 
 
Improved right pulmonary aeration in the interval. Probable small right-sided 
effusion. Lab Test: 
Date:10/20/2018 WBC:  
Lab Results Component Value Date/Time WBC 10.6 10/20/2018 03:30 AM  
HGB:  
Lab Results Component Value Date/Time HGB 7.1 (L) 10/20/2018 03:30 AM  
 PLTS:  
Lab Results Component Value Date/Time PLATELET 092 (H) 55/90/7645 03:30 AM  
 
 
SaO2%/flow: @IEFRABU4(1)@ Vital Signs:    
Patient Vitals for the past 8 hrs: 
 Temp Pulse Resp BP SpO2  
10/20/18 0736  95 18  99 % 10/20/18 0700  93 16 149/69 100 % 10/20/18 0600  94 16 148/71 100 % 10/20/18 0510  89 16  100 % 10/20/18 0500  90 17 153/72 100 % 10/20/18 0400 99.4 °F (37.4 °C) 89 17 152/68 100 % 10/20/18 0300  89 16 140/64 100 % 10/20/18 0200  90 16 131/61 100 % 10/20/18 0100  91 17 144/61 100 % Wean Screen Pass (Yes or No): Wean Screen Reason for Failure: 
Duration of Weaning Trial: 
Additional Comments: PLAN OF CARE:continue plan of care as per MD 
  
 
GOAL:oxygenation/ventilation/airway protection OUTCOME:continues on ventilator support at this time. Will wean when appropriate.

## 2018-10-20 NOTE — PROGRESS NOTES
Bedside SBAR report taken from offgoing RN [employer]. Patient lying in bed. Is trached and has a peg tube. His [place] catheter was taken ouot of his neck today and another was placed in his right groin. There is a PICC line in his left upper arm. No changes in neuro status from when I had him last night. Tolerating tube feeds well.   
 
2300 NO CHANGE 
 
0130 Bathed patient. Changed gown and linens. [address] [facility] Patient had a large rust colored bowel movement. Has hyperactive bowel sounds. Cleaned him up and changed gowns and linens and provided wound care again due to stooling. [facility] SBAR report given to [id number] RN Johanna Madden.

## 2018-10-20 NOTE — PROGRESS NOTES
[facility] Pulmonary, Critical Care, and Sleep Medicine Critical Care Progress Note Name: Gabe Mendoza : 1955 MRN: [id number] Date: 10/20/2018 [x]I have reviewed the flowsheet and previous days notes. Events, vitals, medications and notes from last 24 hours reviewed. Care plan discussed on multidisciplinary rounds. My assessment, plan of care, findings, medications, side effects etc were discussed with: 
[x] Nurse [] PT/OT   
[] Respiratory therapy [] Dr.  
[] Family [] Patient: answered all questions to satisfaction  
[] Pharmacist [] ASSESSMENT/PLAN:  
Principal Problem: 
  Cardiac arrest with [facility] fibrillation ([facility] (2018) Overview: ROSC before defibrillation could be attempted. Active Problems: 
  Pneumonia of both lower lobes (2018) Septic shock ([facility] (2018) Overview: Probably secondary to pneumonia (suspicious for aspiration). Less likely,  
    secondary to acute pancreatitis. Diabetes mellitus type 2, controlled ([facility] (9/10/2018) Acute-on-chronic kidney injury ([facility] (2018) Abnormal blood coagulation profile (2018) Ischemic encephalopathy (2018) Acute pancreatitis (2018) Overview: Shock, leukocytosis, elevated lipase but radiographically no ductal  
    dilatation in pancreas. GB stones without radiographic stigmata of acute  
    cholecystitis Elevated LFTs (2018) History of stroke (2018) Overview: With residual R hemiparesis Essential hypertension (9/10/2018) Acute cystitis (2018) Elevated troponin (2018) Transaminitis (2018) Acute kidney injury ([facility] (2018) Pancreatitis (10/8/2018) · Case management help appreciated. · Continue tube feeding. · Continue acyclovir per ID. · Inhaled therapy: albuterol · Renal dosing of medications. NUTRITION: Osmolite 1.2 @ 75 mL/hr. Check prealbumin q week. Consult Clinical Dietician. ICU electrolyte replacement protocol PROPHYLAXIS: 
DVT prophylaxis: heparin GI prophylaxis: Protonix HOB 30-degree elevation Chlorhexidine mouth washes CODE STATUS: FULL CODE Further recommendations will be based on the patient's response to recommended treatment and results of the investigation ordered. DISPOSITION: 
 
The patient is: [x] acutely ill Risk of deterioration: [] moderate [x] critically ill  [x] high  
 
[x]See my orders for details [x] The patient is unable to give any meaningful history or review of systems because the patient is: 
[x] Intubated [] Sedated  
[x] Unresponsive [] [x]The patient is critically ill on     
[x] Mechanical ventilation [x] Vasoactive agents  
[] BiPAP [] Inotropes SUBJECTIVE 
- This 57 y.o.  male is seen in consultation at the request of Dr. Sarai Ahmadi for recommendations on further evaluation and management of acute hypoxia. Review of the chart, discussion with Dr. Curry Pina, and bedside RN Christ Bueno) reveals that the patient recently was hospitalized (9/10/2018 - 9/14/2018) for stroke with neurologic residua (R hemiparesis). Patient discharged from [facility] 9/10/2018 - 9/14/2018 to [facility] on 9/14/2018, only to return on 9/26/2018 with acute renal failure/abnormal lab values. He experienced VT/VF arrest during my initial clinical assessment. The patient has undergone tracheostomy and percutaneous gastrostomy. He has a R femoral PermCath for HD access. - Overnight events: Remains intubated. Not following commands. Still has active shingles lesions that have not yet dried. [x] Nutrition: Osmolite 1.2 @ 70 mL/hr 
[] BM today. ROS: Review of systems not obtained due to patient factors. Past Medical History:  
Diagnosis Date  CHF (congestive heart failure) ([facility]  Diabetes ([facility]  Stroke ([facility] No Known Allergies Current Facility-Administered Medications:  
  acetylcysteine (MUCOMYST) 100 mg/mL (10 %) nebulizer solution 400 mg, 4 mL, Nebulization, BID, Ninoska Ackerman MD, 400 mg at 10/20/18 7785   albuterol (PROVENTIL VENTOLIN) nebulizer solution 2.5 mg, 2.5 mg, Nebulization, BID, Jones Ackerman MD, 2.5 mg at 10/20/18 0736 
  insulin glargine (LANTUS) injection 23 Units, 23 Units, SubCUTAneous, DAILY, Amity Mariann Davey MD, 23 Units at 10/20/18 1028   bacitracin 500 unit/gram packet 1 Packet, 1 Packet, Topical, PRN, Ninoska Ackerman MD 
  sodium chloride (NS) flush 10-30 mL, 10-30 mL, InterCATHeter, PRN, Ninoska Ackerman MD 
  sodium chloride (NS) flush 10 mL, 10 mL, InterCATHeter, Q24H, Jones Ackerman MD, 10 mL at 10/19/18 1559   sodium chloride (NS) flush 10 mL, 10 mL, InterCATHeter, PRN, Kelsea Higuera MD, 10 mL at 10/20/18 3559   sodium chloride (NS) flush 10-40 mL, 10-40 mL, InterCATHeter, Q8H, Jones Ackerman MD, 10 mL at 10/20/18 0641 
  sodium chloride (NS) flush 20 mL, 20 mL, InterCATHeter, Q24H, Jones Ackerman MD, 20 mL at 10/19/18 1559   heparin (porcine) injection 5,000 Units, 5,000 Units, SubCUTAneous, Q8H, Ninoska Ackerman MD, 5,000 Units at 10/20/18 4634   acyclovir sodium (ZOVIRAX) 330.5 mg in 0.9% sodium chloride 100 mL IVPB, 5 mg/kg (Ideal), IntraVENous, Q24H, Dejan Curtis MD, 330.5 mg at 10/19/18 1310 
  vancomycin (VANCOCIN) 1,000 mg in 0.9% sodium chloride (MBP/ADV) 250 mL adv, 1,000 mg, IntraVENous, Q TU, TH & SAT, Schwab, J. Judd Carrie, MD, Last Rate: 250 mL/hr at 10/18/18 1802, 1,000 mg at 10/18/18 1802   hydroxypropyl methylcellulose (ISOPTO TEARS) 0.5 % ophthalmic solution 2 Drop, 2 Drop, Both Eyes, BID, Lakesha Gonzales NP, 2 Drop at 10/20/18 1029   epoetin manda (EPOGEN;PROCRIT) injection 40,000 Units, 40,000 Units, IntraVENous, Q7D, Paris [address] MD, 40,000 Units at 10/17/18 1621   pantoprazole (PROTONIX) 40 mg in sodium chloride 0.9% 10 mL injection, 40 mg, IntraVENous, Q24H, Heidi Ruiz MD, 40 mg at 10/20/18 1028 
  0.9% sodium chloride infusion 250 mL, 250 mL, IntraVENous, PRN, Pheobe Jonathan, DO 
  influenza vaccine 2018-19 (6 mos+)(PF) (FLUARIX QUAD/FLULAVAL QUAD) injection 0.5 mL, 0.5 mL, IntraMUSCular, PRIOR TO DISCHARGE, Shannon Aldana MD 
  acetaminophen (TYLENOL) solution 325 mg, 325 mg, Per NG tube, Q4H PRN, Shannon Aldana MD, 325 mg at 10/18/18 2130 
  0.9% sodium chloride infusion 250 mL, 250 mL, IntraVENous, PRN, Jaqueline Ash H, DO 
  heparin (porcine) pf 100 Units, 100 Units, InterCATHeter, PRN, Nanette Estrada MD 
  0.9% sodium chloride infusion 250 mL, 250 mL, IntraVENous, PRN, Shannon Aldana MD 
  insulin lispro (HUMALOG) injection, , SubCUTAneous, Q6H, Pheobe Jonathan, DO, 3 Units at 10/20/18 9599   heparin (porcine) 1,000 unit/mL injection 1,000 Units, 1,000 Units, InterCATHeter, DIALYSIS PRN, Kt Bulm MD, 1,000 Units at 09/28/18 1332   VANCOMYCIN INFORMATION NOTE, , Other, Rx Dosing/Monitoring, Brooke Min MD 
  senna-docusate (PERICOLACE) 8.6-50 mg per tablet 1 Tab, 1 Tab, Oral, BID PRN, MarcoQuain Alfred A, DO 
  ondansetron (ZOFRAN) injection 4 mg, 4 mg, IntraVENous, Q4H PRN, MarcoQuain Alfred A, DO, 4 mg at 09/28/18 0539 
  glucose chewable tablet 16 g, 4 Tab, Oral, PRN, McQuain, Alfred A, DO 
  glucagon (GLUCAGEN) injection 1 mg, 1 mg, IntraMUSCular, PRN, McQuain, Alfred A, DO 
  dextrose (D50) infusion 12.5-25 g, 25-50 mL, IntraVENous, PRN, McQuain, Alfred A, DO, 25 g at 10/09/18 1906   hydrALAZINE (APRESOLINE) 20 mg/mL injection 20 mg, 20 mg, IntraVENous, Q6H PRN, Mary Jo Reynoso NP, 20 mg at 10/14/18 1700 OBJECTIVE Vital Signs:   
Patient Vitals for the past 24 hrs: 
 Temp Pulse Resp BP SpO2  
10/20/18 0900  94 17 149/70 100 % 10/20/18 0800  93 16 149/69 100 % 10/20/18 0736  95 18  99 % 10/20/18 0700  93 16 149/69 100 % 10/20/18 0600  94 16 148/71 100 % 10/20/18 0510  89 16  100 % 10/20/18 0500  90 17 153/72 100 % 10/20/18 0400 99.4 °F (37.4 °C) 89 17 152/68 100 % 10/20/18 0300  89 16 140/64 100 % 10/20/18 0200  90 16 131/61 100 % 10/20/18 0100  91 17 144/61 100 % 10/20/18 0000 99.1 °F (37.3 °C) 92 16 131/54 100 % 10/19/18 2300  94 18 121/54 100 % 10/19/18 2200  94 15 116/53 100 % 10/19/18 2100  88 13 121/54 100 % 10/19/18 2040  88 16  100 % 10/19/18 2000 99.1 °F (37.3 °C) 87 14 113/54 100 % 10/19/18 1800  90 20 140/85 100 % 10/19/18 1700  96 16 105/62 100 % 10/19/18 1604  93 15  100 % 10/19/18 1600 98.7 °F (37.1 °C) 92 22 139/85 100 % 10/19/18 1548  94 15  100 % 10/19/18 1500  98 14 109/55 100 % 10/19/18 1400  99 16 125/63 100 % 10/19/18 1300  (!) 101 20 121/62 99 % 10/19/18 1206  96 20  100 % 10/19/18 1100  97 16 135/61 100 % O2 Device: Tracheostomy, Ventilator O2 Flow Rate (L/min): 45 l/min Temp (24hrs), Av.1 °F (37.3 °C), Min:98.7 °F (37.1 °C), Max:99.4 °F (37.4 °C) PHYSICAL EXAM:  
General: intubated. Not on sedation. Does not follow commands. Head: atraumatic, normocephalic Eye: L gaze preference Nose: Nares are patent. No exudate. Throat: No oral thrush. No exudate. Mucous membranes are moist. 
Neck: tracheostomy in situ. no thyromegaly, no JVD, no lymphadenopathy. Trachea midline Lung: Symmetric in development and expansion. Good air entry. Heart: Regular S1, S2 without murmur, rub or gallop. Abdomen: PEG in situ. soft, nontender, nondistended. Normoactive bowel sounds. No rebound. No guarding. Extremities: no pedal edema, no clubbing, 2+ peripheral pulses in DP. L great toe ischemia. Lymphatic: no cervical/axillary/inguinal lymphadenopathy Neurologic: R facial droop. Withdraws to pain @ B LE. Demonstrating spontaneous eye movement, blinking and R LE movement. Doll's eyes intact. Corneal intact. Cough/gag intact. Spontaneous respirations intact. B Babinski. Skin: umbilicated bullous lesions involving the R shoulder and R arm. DATA:  
 
Intake/Output:  
Last shift:      No intake/output data recorded. Last 3 shifts: 10/18 1901 - 10/20 0700 In: 1200 [I.V.:120] Out: 0 Intake/Output Summary (Last 24 hours) at 10/20/2018 1045 Last data filed at 10/20/2018 0700 Gross per 24 hour Intake 740 ml Output  Net 740 ml Last 3 Recorded Weights in this Encounter 10/14/18 2132 10/15/18 0400 10/16/18 0020 Weight: 94 kg (207 lb 3.7 oz) 93.7 kg (206 lb 9.1 oz) 82 kg (180 lb 12.8 oz) Lines: All central lines examined by me. No signs of erythema, induration, discharge. Peripherally Inserted Central Catheter: PICC Double Lumen 10/18/18 Left;Brachial (Active) Central Line Being Utilized Yes 10/20/2018  4:00 AM  
Criteria for Appropriate Use Long term IV/antibiotic administration 10/19/2018  8:00 PM  
Site Assessment Clean, dry, & intact 10/19/2018  8:00 PM  
Phlebitis Assessment 0 10/20/2018 12:00 AM  
Infiltration Assessment 0 10/20/2018 12:00 AM  
Arm Circumference (cm) 38 cm 10/19/2018  8:00 PM  
Date of Last Dressing Change 10/18/18 10/20/2018 12:00 AM  
Dressing Status Clean, dry, & intact 10/19/2018  8:00 PM  
Action Taken Open ports on tubing capped 10/20/2018 12:00 AM  
External Catheter Length (cm) 0 centimeters 10/19/2018  8:00 PM  
Dressing Type Disk with Chlorhexadine gluconate (CHG) 10/19/2018  8:00 PM  
Hub Color/Line Status Purple;Capped;Flushed 10/20/2018  4:00 AM  
Positive Blood Return (Site #1) Yes 10/20/2018  4:00 AM  
Hub Color/Line Status Red;Capped; Patent; Flushed 10/20/2018  4:00 AM  
Positive Blood Return (Site #2) Yes 10/20/2018  4:00 AM  
Alcohol Cap Used Yes 10/19/2018  8:00 PM  
 
Hemodialysis Catheter: 
Hemodialysis Access 10/19/18 (Active) Central Line Being Utilized Yes 10/20/2018  4:00 AM  
 Criteria for Appropriate Use Dialysis/apheresis 10/19/2018  8:00 PM  
Site Assessment Clean, dry, & intact 10/19/2018  8:00 PM  
Date of Last Dressing Change 10/19/18 10/19/2018  8:00 PM  
Dressing Status Clean, dry, & intact 10/19/2018  8:00 PM  
Dressing Type Disk with Chlorhexadine gluconate (CHG) 10/19/2018  8:00 PM  
Proximal Hub Color/Line Status Capped 10/19/2018  8:00 PM  
Distal Hub Color/Line Status Capped 10/19/2018  8:00 PM  
 
Drain(s): PEG/Gastrostomy Tube 10/17/18 (Active) Site Assessment Clean, dry, & intact 10/20/2018  4:00 AM  
Dressing Status Clean, dry, & intact 10/20/2018 12:00 AM  
G Port Status Infusing 10/20/2018 12:00 AM  
External Catheter Length (cm) 3 centimeters 10/19/2018  8:00 PM  
Action Taken Placement verified (comment) 10/20/2018  4:00 AM  
Drainage Description Tan 10/20/2018 12:00 AM  
Gastric Residual (mL) 5 ml 10/20/2018  4:00 AM  
Tube Feeding/Formula Options Jevity 1.2 10/20/2018  4:00 AM  
Tube Feeding/Verify Rate (mL/hr) 55 10/20/2018  7:00 AM  
Water Flush Volume (mL) 0 mL 10/19/2018  8:00 PM  
Intake (ml) 55 ml 10/20/2018  7:00 AM  
 
Airway: Airway - Tracheostomy Tube 10/17/18 Portex; Cuffed (Active) Cuff Pressure 30 cmH20 10/17/2018 10:25 AM  
Site Assessment Clean, dry, & intact 10/20/2018  7:36 AM  
Trach Dressing Changed No 10/20/2018  7:36 AM  
Trach Cleaned With Normal saline 10/20/2018 12:00 AM  
Trach Tie Changed No 10/20/2018  7:36 AM  
Trach Cannula Changed No 10/20/2018  7:36 AM  
Inner Cannula #8 Ilaley 10/20/2018  7:36 AM  
Line Jl Top of the lock 10/20/2018  7:36 AM  
Side Secured Centered 10/20/2018  7:36 AM  
Spare Trach at Bedside Yes 10/20/2018  7:36 AM  
Spare Anastasiya Kemps Tube One Size Smaller at Bedside Yes 10/20/2018  7:36 AM  
Ambu Bag With Mask at Bedside Yes 10/20/2018  7:36 AM  
 
  
Vent Date (intubated 9/29/2018, tracheostomy 10/17/2018) Ventilator Settings: 
Ventilator Mode: Assist control, VC+ Respiratory Rate Back-Up Rate: 10 
 Insp Time (sec): 1 sec I:E Ratio: 1:5.67 Ventilator Volumes Vt Set (ml): 500 ml Vt Exhaled (Machine Breath) (ml): 505 ml Vt Spont (ml): 298 ml Ve Observed (l/min): 9.01 l/min Ventilator Pressures Pressure Support (cm H2O): 15 cm H2O 
PIP Observed (cm H2O): 20 cm H2O Plateau Pressure (cm H2O): 17 cm H2O 
MAP (cm H2O): 9.4 PEEP/VENT (cm H2O): 5 cm H20 Auto PEEP Observed (cm H2O): 0.2 cm H2O Mode Rate Tidal Volume Pressure FiO2 PEEP Assist control, VC+   500 ml  15 cm H2O 40 % 5 cm H20 Peak airway pressure: 20 cm H2O Plateau pressure:    
Tidal volume:   
Minute ventilation: 9.01 l/min SPO2   
 
ARDSNet Guidelines: Lung protective ventilation (VT 6 cc/kg IBW) and Pplat <= 30 Telemetry: [x] Sinus [] A-flutter [] Paced [] A-fib [] Multiple PVCs Imaging: 
[x] I have personally reviewed the patients radiographs 
[] Radiographs reviewed with radiologist 
[] No CXR study available for review today 
[] No change from prior, tubes and lines are in adequate position 
[x] Improved   [] Worsening No results found. Labs: 
Recent Results (from the past 24 hour(s)) GLUCOSE, POC Collection Time: 10/19/18 12:19 PM  
Result Value Ref Range Glucose (POC) 206 (H) 70 - 110 mg/dL GLUCOSE, POC Collection Time: 10/19/18  5:38 PM  
Result Value Ref Range Glucose (POC) 145 (H) 70 - 110 mg/dL GLUCOSE, POC Collection Time: 10/19/18 10:22 PM  
Result Value Ref Range Glucose (POC) 185 (H) 70 - 110 mg/dL GLUCOSE, POC Collection Time: 10/20/18 12:51 AM  
Result Value Ref Range Glucose (POC) 183 (H) 70 - 110 mg/dL Odella Servant Collection Time: 10/20/18  3:30 AM  
Result Value Ref Range Vancomycin, random 25.2 5.0 - 72.9 UG/ML  
METABOLIC PANEL, COMPREHENSIVE Collection Time: 10/20/18  3:30 AM  
Result Value Ref Range Sodium 140 136 - 145 mmol/L Potassium 5.1 3.5 - 5.5 mmol/L  Chloride 102 100 - 108 mmol/L  
 CO2 28 21 - 32 mmol/L  
 Anion gap 10 3.0 - 18 mmol/L Glucose 136 (H) 74 - 99 mg/dL BUN 58 (H) 7.0 - 18 MG/DL Creatinine 5.17 (H) 0.6 - 1.3 MG/DL  
 BUN/Creatinine ratio 11 (L) 12 - 20 GFR est AA 14 (L) >60 ml/min/1.73m2 GFR est non-AA 11 (L) >60 ml/min/1.73m2 Calcium 7.4 (L) 8.5 - 10.1 MG/DL Bilirubin, total 0.5 0.2 - 1.0 MG/DL  
 ALT (SGPT) 56 16 - 61 U/L  
 AST (SGOT) 114 (H) 15 - 37 U/L Alk. phosphatase 303 (H) 45 - 117 U/L Protein, total 7.0 6.4 - 8.2 g/dL Albumin 1.6 (L) 3.4 - 5.0 g/dL Globulin 5.4 (H) 2.0 - 4.0 g/dL A-G Ratio 0.3 (L) 0.8 - 1.7    
CBC W/O DIFF Collection Time: 10/20/18  3:30 AM  
Result Value Ref Range WBC 10.6 4.6 - 13.2 K/uL  
 RBC 2.56 (L) 4.70 - 5.50 M/uL HGB 7.1 (L) 13.0 - 16.0 g/dL HCT 23.5 (L) 36.0 - 48.0 % MCV 91.8 74.0 - 97.0 FL  
 MCH 27.7 24.0 - 34.0 PG  
 MCHC 30.2 (L) 31.0 - 37.0 g/dL  
 RDW 15.4 (H) 11.6 - 14.5 % PLATELET 040 (H) 802 - 420 K/uL MPV 8.9 (L) 9.2 - 11.8 FL  
GLUCOSE, POC Collection Time: 10/20/18  6:39 AM  
Result Value Ref Range Glucose (POC) 184 (H) 70 - 110 mg/dL Lab Results Component Value Date/Time Color RED 09/27/2018 12:29 AM  
 Appearance CLOUDY 09/27/2018 12:29 AM  
 Specific gravity 1.010 09/27/2018 12:29 AM  
 pH (UA) 9.0 (H) 09/27/2018 12:29 AM  
 Protein >300 (A) 09/27/2018 12:29 AM  
 Glucose 100 (A) 09/27/2018 12:29 AM  
 Ketone >80 (A) 09/27/2018 12:29 AM  
 Bilirubin LARGE (A) 09/27/2018 12:29 AM  
 Urobilinogen >8.0 (H) 09/27/2018 12:29 AM  
 Nitrites POSITIVE (A) 09/27/2018 12:29 AM  
 Leukocyte Esterase LARGE (A) 09/27/2018 12:29 AM  
 Epithelial cells 3+ 09/27/2018 12:29 AM  
 Bacteria 4+ (A) 09/27/2018 12:29 AM  
 WBC TOO NUMEROUS TO COUNT 09/27/2018 12:29 AM  
 RBC TOO NUMEROUS TO COUNT 09/27/2018 12:29 AM  
 
Cultures: All Micro Results Procedure Component Value Units Date/Time CULTURE, BLOOD [[id number]] Collected:  10/16/18 1043 Order Status:  Completed Specimen:  Blood Updated:  10/20/18 0936 Special Requests: PERIPHERAL Culture result: NO GROWTH 4 DAYS     
 CULTURE, BLOOD [[id number]] Collected:  10/16/18 1055 Order Status:  Completed Specimen:  Blood Updated:  10/20/18 3025 Special Requests: PERIPHERAL Culture result: NO GROWTH 4 DAYS     
 CULTURE, CATHETER TIP [[id number]] Collected:  10/19/18 1330 Order Status:  Completed Specimen:  Catheter Tip Updated:  10/19/18 1936 HSV 1 AND 2 BY PCR [[id number]] Collected:  10/18/18 1815 Order Status:  Completed Specimen:  Other Updated:  10/18/18 1838 CULTURE, RESPIRATORY/SPUTUM/BRONCH Elenor Durie [[id number]] Collected:  10/16/18 1915 Order Status:  Completed Specimen:  Sputum,ET Suction Updated:  10/18/18 1011 Special Requests: NO SPECIAL REQUESTS     
  GRAM STAIN 10-25 WBC/lpf     
   <10 EPI/lpf FEW GRAM POSITIVE COCCI IN PAIRS MODERATE GRAM POSITIVE COCCI IN GROUPS MUCUS PRESENT Culture result: MODERATE NORMAL RESPIRATORY SHANAE  
     
 CULTURE, BLOOD [[id number]] Collected:  10/08/18 1227 Order Status:  Completed Specimen:  Blood Updated:  10/14/18 0741 Special Requests: PERIPHERAL Culture result: NO GROWTH 6 DAYS     
 CULTURE, CATHETER TIP [[id number]] Collected:  10/08/18 1215 Order Status:  Completed Specimen:  Catheter Tip Updated:  10/11/18 8654 Special Requests: NO SPECIAL REQUESTS Culture result: NO GROWTH 3 DAYS     
 CULTURE, BLOOD [[id number]] Collected:  10/04/18 0787 Order Status:  Completed Specimen:  Blood Updated:  10/10/18 0654 Special Requests: PERIPHERAL Culture result: NO GROWTH 6 DAYS     
 CULTURE, BLOOD [[id number]] Collected:  10/04/18 1120 Order Status:  Completed Specimen:  Blood Updated:  10/10/18 8008 Special Requests: PERIPHERAL Culture result: NO GROWTH 6 DAYS CULTURE, BLOOD [[id number]] Collected:  09/27/18 0005 Order Status:  Completed Specimen:  Blood Updated:  10/03/18 0845 Special Requests: NO SPECIAL REQUESTS Culture result: NO GROWTH 6 DAYS     
 CULTURE, BLOOD [[id number]]  (Abnormal)  (Susceptibility) Collected:  09/27/18 0136 Order Status:  Completed Specimen:  Blood Updated:  10/02/18 6805 Special Requests: NO SPECIAL REQUESTS     
  GRAM STAIN PEDIATRIC BOTTLE GRAM POSITIVE COCCI IN PAIRS IN CLUSTERS  
      
  SMEAR CALLED TO AND CORRECTLY REPEATED BY: Alma Quinonez RN IN 2700 ON 9/29/18 AT 2033 WF Culture result:    
  AEROBIC BOTTLE STAPHYLOCOCCUS EPIDERMIDIS  
     
 CULTURE, RESPIRATORY/SPUTUM/BRONCH Maribell Parkton STAIN [[id number]]  (Abnormal) Collected:  09/29/18 1210 Order Status:  Completed Specimen:  Sputum from Endotracheal aspirate Updated:  10/02/18 7296 Special Requests: NO SPECIAL REQUESTS     
  GRAM STAIN >25 WBC/lpf     
   <10 EPI/lpf MUCUS PRESENT     
   MODERATE YEAST Culture result: MANY CANDIDA TROPICALIS C. DIFFICILE/EPI PCR [[id number]] Collected:  09/29/18 1400 Order Status:  Completed Specimen:  Stool Updated:  09/29/18 2248 Special Requests: NO SPECIAL REQUESTS Culture result: Toxigenic C. difficile NEGATIVE                         C. difficile target DNA sequences are not detected. CULTURE, URINE [[id number]] Collected:  09/27/18 0029 Order Status:  Completed Specimen:  Cath Urine Updated:  09/29/18 0944 Special Requests: NO SPECIAL REQUESTS Culture result: NO GROWTH 2 DAYS During this entire length of time I was immediately available to the patient. The reason for providing this level of medical care for this critically ill patient was due a critical illness that impaired one or more vital organ systems such that there was a high probability of imminent or life threatening deterioration in the patients condition.  This care involved high complexity decision making to assess, manipulate, and support vital system functions, to treat this degreee vital organ system failure and to prevent further life threatening deterioration of the patients condition 
 
[] Total critical care time spent on reviewing the case/medical record/data/notes/EMR/patient examination/documentation/coordinating care with nurse/consultants, exclusive of procedures - minutes with complex decision making performed and > 50% time spent in face to face evaluation. Radha David MD  
Board Certified by the [facility], Kristin Nur 10/20/2018

## 2018-10-20 NOTE — PROGRESS NOTES
Hospitalist Progress Note Subhash Santos MD 
Internal medicine/ Hospitalist 
 
Daily Progress Note: 10/20/2018    
Interval history / Subjective:  
Jersey Kaminski is a 57y.o. year old male who presented from [facility] with abnormal labs, elevated BUN/Cr. Found to have JULIANNA, UTI, and markedly elevated LFT on presentation. Patient's recent admission was 9/10/18-9/14/18 for acute CVA and rhabdo. Patient poor historian on admission,stated \"I had heart failure. She was started on vanc,zosyn. On 9/29,patient had cardiac arrest with ventricular fibrillation - s/p ROSC. Her hospital course has since then complicated with sepsis,pancreatitits,pneumonia,acute pancreatitis requiring involvement of gi,surgery,nephrology,cardiology,ID,cardiology. So far patient has remained without major improvement. Now in vegetative state. Patient had PEG/Trach placement on 10/17. On 10/17,patient started on acyclovir for veicular rash rt arm,suspected being Zoster. Current Facility-Administered Medications Medication Dose Route Frequency  acetylcysteine (MUCOMYST) 100 mg/mL (10 %) nebulizer solution 400 mg  4 mL Nebulization BID  albuterol (PROVENTIL VENTOLIN) nebulizer solution 2.5 mg  2.5 mg Nebulization BID  insulin glargine (LANTUS) injection 23 Units  23 Units SubCUTAneous DAILY  bacitracin 500 unit/gram packet 1 Packet  1 Packet Topical PRN  
 sodium chloride (NS) flush 10-30 mL  10-30 mL InterCATHeter PRN  
 sodium chloride (NS) flush 10 mL  10 mL InterCATHeter Q24H  
 sodium chloride (NS) flush 10 mL  10 mL InterCATHeter PRN  
 sodium chloride (NS) flush 10-40 mL  10-40 mL InterCATHeter Q8H  
 sodium chloride (NS) flush 20 mL  20 mL InterCATHeter Q24H  
 heparin (porcine) injection 5,000 Units  5,000 Units SubCUTAneous Q8H  
 acyclovir sodium (ZOVIRAX) 330.5 mg in 0.9% sodium chloride 100 mL IVPB  5 mg/kg (Ideal) IntraVENous Q24H  
 vancomycin (VANCOCIN) 1,000 mg in 0.9% sodium chloride (MBP/ADV) 250 mL adv  1,000 mg IntraVENous Q , TH & SAT  hydroxypropyl methylcellulose (ISOPTO TEARS) 0.5 % ophthalmic solution 2 Drop  2 Drop Both Eyes BID  epoetin manda (EPOGEN;PROCRIT) injection 40,000 Units  40,000 Units IntraVENous Q7D  
 pantoprazole (PROTONIX) 40 mg in sodium chloride 0.9% 10 mL injection  40 mg IntraVENous Q24H  
 0.9% sodium chloride infusion 250 mL  250 mL IntraVENous PRN  
 influenza vaccine - (6 mos+)(PF) (FLUARIX QUAD/FLULAVAL QUAD) injection 0.5 mL  0.5 mL IntraMUSCular PRIOR TO DISCHARGE  acetaminophen (TYLENOL) solution 325 mg  325 mg Per NG tube Q4H PRN  
 0.9% sodium chloride infusion 250 mL  250 mL IntraVENous PRN  
 heparin (porcine) pf 100 Units  100 Units InterCATHeter PRN  
 0.9% sodium chloride infusion 250 mL  250 mL IntraVENous PRN  
 insulin lispro (HUMALOG) injection   SubCUTAneous Q6H  
 heparin (porcine) 1,000 unit/mL injection 1,000 Units  1,000 Units InterCATHeter DIALYSIS PRN  
 VANCOMYCIN INFORMATION NOTE   Other Rx Dosing/Monitoring  senna-docusate (PERICOLACE) 8.6-50 mg per tablet 1 Tab  1 Tab Oral BID PRN  
 ondansetron (ZOFRAN) injection 4 mg  4 mg IntraVENous Q4H PRN  
 glucose chewable tablet 16 g  4 Tab Oral PRN  
 glucagon (GLUCAGEN) injection 1 mg  1 mg IntraMUSCular PRN  
 dextrose (D50) infusion 12.5-25 g  25-50 mL IntraVENous PRN  
 hydrALAZINE (APRESOLINE) 20 mg/mL injection 20 mg  20 mg IntraVENous Q6H PRN Review of Systems Intubated,unresponsive. Objective:  
 
Visit Vitals /70 Pulse 89 Temp 99.4 °F (37.4 °C) Resp 14 Ht 5' 7\" (1.702 m) Wt 82 kg (180 lb 12.8 oz) SpO2 100% BMI 28.32 kg/m² O2 Flow Rate (L/min): 45 l/min O2 Device: Tracheostomy, Ventilator Temp (24hrs), Av.1 °F (37.3 °C), Min:98.7 °F (37.1 °C), Max:99.4 °F (37.4 °C) No intake/output data recorded. 10/18 1901 - 10/20 0700 In: 1200 [I.V.:120] Out: 0   
P/E General Appearance:S/p PEG/Trach today HENT: normocephalic/atraumatic, + ETT Neck: No JVD, hematoma R side where [facility] is improving Lungs: Coarse B/L w/ vent-assisted BS 
CV: RRR, no m/r/g Abdomen: soft, non-tender, normal bowel sounds Extremities: versicular rash rt arm and right side of chest,no cyanosis, no peripheral edema Neuro: Unresponsive to commands, no withdrawal to pain, + corneal reflex and pupillary reaction today Skin: Normal color, intact Data Review Recent Results (from the past 12 hour(s)) Paul Rosales Collection Time: 10/20/18  3:30 AM  
Result Value Ref Range Vancomycin, random 25.2 5.0 - 06.5 UG/ML  
METABOLIC PANEL, COMPREHENSIVE Collection Time: 10/20/18  3:30 AM  
Result Value Ref Range Sodium 140 136 - 145 mmol/L Potassium 5.1 3.5 - 5.5 mmol/L Chloride 102 100 - 108 mmol/L  
 CO2 28 21 - 32 mmol/L Anion gap 10 3.0 - 18 mmol/L Glucose 136 (H) 74 - 99 mg/dL BUN 58 (H) 7.0 - 18 MG/DL Creatinine 5.17 (H) 0.6 - 1.3 MG/DL  
 BUN/Creatinine ratio 11 (L) 12 - 20 GFR est AA 14 (L) >60 ml/min/1.73m2 GFR est non-AA 11 (L) >60 ml/min/1.73m2 Calcium 7.4 (L) 8.5 - 10.1 MG/DL Bilirubin, total 0.5 0.2 - 1.0 MG/DL  
 ALT (SGPT) 56 16 - 61 U/L  
 AST (SGOT) 114 (H) 15 - 37 U/L Alk. phosphatase 303 (H) 45 - 117 U/L Protein, total 7.0 6.4 - 8.2 g/dL Albumin 1.6 (L) 3.4 - 5.0 g/dL Globulin 5.4 (H) 2.0 - 4.0 g/dL A-G Ratio 0.3 (L) 0.8 - 1.7    
CBC W/O DIFF Collection Time: 10/20/18  3:30 AM  
Result Value Ref Range WBC 10.6 4.6 - 13.2 K/uL  
 RBC 2.56 (L) 4.70 - 5.50 M/uL HGB 7.1 (L) 13.0 - 16.0 g/dL HCT 23.5 (L) 36.0 - 48.0 % MCV 91.8 74.0 - 97.0 FL  
 MCH 27.7 24.0 - 34.0 PG  
 MCHC 30.2 (L) 31.0 - 37.0 g/dL  
 RDW 15.4 (H) 11.6 - 14.5 % PLATELET 629 (H) 937 - 420 K/uL MPV 8.9 (L) 9.2 - 11.8 FL  
GLUCOSE, POC Collection Time: 10/20/18  6:39 AM  
Result Value Ref Range Glucose (POC) 184 (H) 70 - 110 mg/dL Assessment/Plan: Principal Problem: 
  Cardiac arrest with ventricular fibrillation ([facility] [address] (9/29/2018) Overview: ROSC before defibrillation could be attempted. Active Problems: 
  Essential hypertension (9/10/2018) Diabetes mellitus type 2, controlled ([facility] [address] (9/10/2018) Pneumonia of both lower lobes (9/11/2018) History of stroke (9/27/2018) Overview: With residual R hemiparesis Acute-on-chronic kidney injury ([address] (9/27/2018) Acute cystitis (9/27/2018) Elevated troponin (9/27/2018) Transaminitis (9/27/2018) Acute kidney injury ([facility] [address] (9/27/2018) Elevated LFTs (9/28/2018) Abnormal blood coagulation profile (9/29/2018) Acute pancreatitis (9/29/2018) Overview: Shock, leukocytosis, elevated lipase but radiographically no ductal  
    dilatation in pancreas. GB stones without radiographic stigmata of acute  
    cholecystitis [facility] (9/30/2018) Overview: Probably secondary to pneumonia (suspicious for aspiration). Less likely,  
    secondary to acute pancreatitis. Ischemic encephalopathy (9/30/2018) Pancreatitis (10/8/2018) Zoster. Care Plan - Vent mgmt per ICU - S/p trach/PEG on 10/17 as patient did not show significant improvement. 
- EGD showed large clot in esophagus last week repeat EGD 10/9 shows healed esophageal ulcer - Cont protonix IV every day  
- Hgb stable - LFTs improving - GI signed off 10/9 
- MRI w/ evidence of severe anoxic brain injury - Minimal improvement on neuro exam 
- Appreciate neuro assistance - IV Meropenem and IV Fluconazole d/heron on 10/15 per ID. 
- Currently on vanc only which was started on 9/27 
- Nephro managing HD TTHS 
- Pt unlikely to have any meaningful neuro recovery, prognosis is very grim - Family wanted to cont to do everything, including trach/PEG -> was done10/17 
- On 10/17:Started on acyclovir for possible Zoster rt arm and rt upper chest area. - Hyperkalemia on 10/18 ->corrected DVT prophylaxis:scds Full code. Disposition:tbd - need placement -  involved.

## 2018-10-20 NOTE — PROGRESS NOTES
Received pt on AC/VC+ 10/500/5/40%. Breathing tx given and tolerated well no adverse reaction. Bernard Kemps is patent, secure and midline, stoma clean, back up spare trachs and emergency equipment is at the bedside.

## 2018-10-21 PROBLEM — B02.9 SHINGLES: Status: ACTIVE | Noted: 2018-01-01

## 2018-10-21 PROBLEM — A41.9 SEPTIC SHOCK (HCC): Status: RESOLVED | Noted: 2018-01-01 | Resolved: 2018-01-01

## 2018-10-21 PROBLEM — N17.9 ACUTE KIDNEY INJURY (HCC): Status: RESOLVED | Noted: 2018-01-01 | Resolved: 2018-01-01

## 2018-10-21 PROBLEM — R79.1 ABNORMAL BLOOD COAGULATION PROFILE: Status: RESOLVED | Noted: 2018-01-01 | Resolved: 2018-01-01

## 2018-10-21 PROBLEM — J18.9 PNEUMONIA OF BOTH LOWER LOBES: Status: RESOLVED | Noted: 2018-01-01 | Resolved: 2018-01-01

## 2018-10-21 PROBLEM — K85.90 PANCREATITIS: Status: RESOLVED | Noted: 2018-01-01 | Resolved: 2018-01-01

## 2018-10-21 PROBLEM — E88.09 HYPOALBUMINEMIA: Status: ACTIVE | Noted: 2018-01-01

## 2018-10-21 PROBLEM — R65.21 SEPTIC SHOCK (HCC): Status: RESOLVED | Noted: 2018-01-01 | Resolved: 2018-01-01

## 2018-10-21 NOTE — PROGRESS NOTES
Problem: Falls - Risk of 
Goal: *Absence of Falls Document [place] Falls Rank Fall Risk and appropriate interventions in the flowsheet. Outcome: Progressing Towards Goal 
Fall Risk Interventions: 
Mobility Interventions: Bed/chair exit alarm Mentation Interventions: Adequate sleep, hydration, pain control, Bed/chair exit alarm, Door open when patient unattended, Room close to nurse's station, Update white board, Toileting rounds Medication Interventions: Evaluate medications/consider consulting pharmacy Elimination Interventions: Bed/chair exit alarm, Call light in reach History of Falls Interventions: Bed/chair exit alarm, Door open when patient unattended, Room close to nurse's station Problem: General Medical Care Plan Goal: *Fluid volume balance Outcome: Progressing Towards Goal 
On hemodialysis Goal: *Progressive mobility and function (eg: ADL's) Outcome: Not Progressing Towards Goal 
noninteractive Problem: Pressure Injury - Risk of 
Goal: *Prevention of pressure injury Document Mitul Scale and appropriate interventions in the flowsheet. Outcome: Progressing Towards Goal 
Pressure Injury Interventions: 
Sensory Interventions: Use 30-degree side-lying position, Turn and reposition approx. every two hours (pillows and wedges if needed), Pressure redistribution bed/mattress (bed type), Minimize linen layers, Keep linens dry and wrinkle-free, Float heels, Discuss PT/OT consult with provider Moisture Interventions: Absorbent underpads, Apply protective barrier, creams and emollients, Check for incontinence Q2 hours and as needed, Maintain skin hydration (lotion/cream), Minimize layers, Moisture barrier Activity Interventions: Pressure redistribution bed/mattress(bed type) Mobility Interventions: Assess need for specialty bed, Float heels, HOB 30 degrees or less, Pressure redistribution bed/mattress (bed type), Turn and reposition approx. every two hours(pillow and wedges) Nutrition Interventions: Document food/fluid/supplement intake Friction and Shear Interventions: Apply protective barrier, creams and emollients, Foam dressings/transparent film/skin sealants, HOB 30 degrees or less, Minimize layers, Transferring/repositioning devices

## 2018-10-21 NOTE — PROGRESS NOTES
[facility] 
10/21/18 
 
 
10/21 1901 - 10/22 0700 In: 120 Out: 100 [Urine:100] Intake/Output Summary (Last 24 hours) at 10/22/2018 0545 Last data filed at 10/22/2018 0500 Gross per 24 hour Intake 560 ml Output 500 ml Net 60 ml Last 3 Recorded Weights in this Encounter 10/15/18 0400 10/16/18 0020 10/21/18 9437 Weight: 93.7 kg (206 lb 9.1 oz) 82 kg (180 lb 12.8 oz) 91.2 kg (201 lb 1.6 oz) Overnight Shift Events: 
1904: · Bedside and verbal shift change report received from Orquidea Arevalo, off-going RN. Provider's instructions/meds/plan for current shift reviewed by this writer; ongoing education rendered at this time. Rate verify on TF.   
· Dual RN neuro exam. 
· Pt lying in bed, call bell within reach, bed in lowest position, side rails up x 3 & bed alarm engaged for pt safety; will continue to monitor pt. 
 
2130:  
· TF turned off by this RN 2/2 pts residual accompanied by continued reflux/spitting up of tube feeding observed by this RN. Pt appears to be having difficulty with tolerating feeding. In an effort to avoid pt possibly aspirating - should he vomit - this writer stopped tube feeding. · Pt to be NPO after MN for AM procedure - will pass on to dayshift RN that feeding was held as documented above. 0000:  
· Pt NPO at this time for AM procedure per Dr. Duong Valencia. · Straight cath performed by this RN via sterile technique - 75mls of braydon, hazy urine obtained. Pt tolerated straight cath without issues. 0209:  
· PRN Hydralazine administered as ordered for SBP > 180; see eMAR/flowsheets. 0430:  
· Straight cath performed by this RN via sterile technique - no urine output obtained at this time. Pt tolerated procedure without issues. · Linens, gown, trach dressing, and bed pad changed & pt bathed. Mepilex to sacrum changed; barrier cream applied to pressure area on pts buttocks. Trach care performed. Pt tolerated hygiene without issues; will continue to monitor pt.  
 
0527:  
· Order for add on lipase placed by this writer per Dr. Rajiv Renee note. This writer spoke with Nicko Romero in the lab to let her know add on order was placed & specimen is in the lab.    
 
4372: Bedside verbal shift report given to Tami Saunders RN (oncoming nurse) by Sal Mota RN(offgoing nurse). Report included the following information: SBAR, Kardex, Procedure Summary, Intake/Output, MAR and Recent Results. Normal Sinus Rhythm. Dual RN Neuro exam performed at this time. Date 10/21/18 0700 - 10/22/18 2804 10/22/18 0700 - 10/23/18 4845 Shift [id number] 2232-7299 24 Hour Total 8950-1615 9426-1182 24 Hour Total  
INTAKE  
NG/ 120 500 Water Flush Volume (mL) (PEG/Gastrostomy Tube 10/17/18) 60 10 70 Medication Volume (PEG/Gastrostomy Tube 10/17/18)  0 0 Intake (ml) (PEG/Gastrostomy Tube 10/17/18) 320 110 430 Shift Total(mL/kg) 380(4.2) 120(1.3) 500(5.5) OUTPUT Urine(mL/kg/hr) 400(0.4) 100 500 Urine 400 100 500 Emesis/NG output  0 0 Output (ml) (PEG/Gastrostomy Tube 10/17/18)  0 0 Shift Total(mL/kg) 400(4.4) 100(1.1) 500(5.5) NET -20 20 0 Weight (kg) 91.2 91.2 91.2 91.2 91.2 91.2

## 2018-10-21 NOTE — PROGRESS NOTES
Hospitalist Progress Note Blayne Broussard MD 
Internal medicine/ Hospitalist 
 
Daily Progress Note: 10/21/2018    
Interval history / Subjective:  
Jing Kaur is a 57y.o. year old male who presented from [facility] with abnormal labs, elevated BUN/Cr. Found to have JULIANNA, UTI, and markedly elevated LFT on presentation. Patient's recent admission was 9/10/18-9/14/18 for acute CVA and rhabdo. Patient poor historian on admission,stated \"I had heart failure. She was started on vanc,zosyn. On 9/29,patient had cardiac arrest with ventricular fibrillation - s/p ROSC. Her hospital course has since then complicated with sepsis,pancreatitits,pneumonia,acute pancreatitis requiring involvement of gi,surgery,nephrology,cardiology,ID,cardiology. So far patient has remained without major improvement. Now in vegetative state. Patient had PEG/Trach placement on 10/17. On 10/17,patient started on acyclovir for veicular rash rt arm,suspected being Zoster. Current Facility-Administered Medications Medication Dose Route Frequency  acetylcysteine (MUCOMYST) 100 mg/mL (10 %) nebulizer solution 400 mg  4 mL Nebulization BID  albuterol (PROVENTIL VENTOLIN) nebulizer solution 2.5 mg  2.5 mg Nebulization BID  insulin glargine (LANTUS) injection 23 Units  23 Units SubCUTAneous DAILY  bacitracin 500 unit/gram packet 1 Packet  1 Packet Topical PRN  
 sodium chloride (NS) flush 10-30 mL  10-30 mL InterCATHeter PRN  
 sodium chloride (NS) flush 10 mL  10 mL InterCATHeter Q24H  
 sodium chloride (NS) flush 10 mL  10 mL InterCATHeter PRN  
 sodium chloride (NS) flush 10-40 mL  10-40 mL InterCATHeter Q8H  
 sodium chloride (NS) flush 20 mL  20 mL InterCATHeter Q24H  
 heparin (porcine) injection 5,000 Units  5,000 Units SubCUTAneous Q8H  
 acyclovir sodium (ZOVIRAX) 330.5 mg in 0.9% sodium chloride 100 mL IVPB  5 mg/kg (Ideal) IntraVENous Q24H  
 vancomycin (VANCOCIN) 1,000 mg in 0.9% sodium chloride (MBP/ADV) 250 mL adv  1,000 mg IntraVENous Q ,  & SAT  hydroxypropyl methylcellulose (ISOPTO TEARS) 0.5 % ophthalmic solution 2 Drop  2 Drop Both Eyes BID  epoetin manda (EPOGEN;PROCRIT) injection 40,000 Units  40,000 Units IntraVENous Q7D  
 pantoprazole (PROTONIX) 40 mg in sodium chloride 0.9% 10 mL injection  40 mg IntraVENous Q24H  
 0.9% sodium chloride infusion 250 mL  250 mL IntraVENous PRN  
 influenza vaccine - (6 mos+)(PF) (FLUARIX QUAD/FLULAVAL QUAD) injection 0.5 mL  0.5 mL IntraMUSCular PRIOR TO DISCHARGE  acetaminophen (TYLENOL) solution 325 mg  325 mg Per NG tube Q4H PRN  
 0.9% sodium chloride infusion 250 mL  250 mL IntraVENous PRN  
 heparin (porcine) pf 100 Units  100 Units InterCATHeter PRN  
 0.9% sodium chloride infusion 250 mL  250 mL IntraVENous PRN  
 insulin lispro (HUMALOG) injection   SubCUTAneous Q6H  
 heparin (porcine) 1,000 unit/mL injection 1,000 Units  1,000 Units InterCATHeter DIALYSIS PRN  
 VANCOMYCIN INFORMATION NOTE   Other Rx Dosing/Monitoring  senna-docusate (PERICOLACE) 8.6-50 mg per tablet 1 Tab  1 Tab Oral BID PRN  
 ondansetron (ZOFRAN) injection 4 mg  4 mg IntraVENous Q4H PRN  
 glucose chewable tablet 16 g  4 Tab Oral PRN  
 glucagon (GLUCAGEN) injection 1 mg  1 mg IntraMUSCular PRN  
 dextrose (D50) infusion 12.5-25 g  25-50 mL IntraVENous PRN  
 hydrALAZINE (APRESOLINE) 20 mg/mL injection 20 mg  20 mg IntraVENous Q6H PRN Review of Systems Intubated,unresponsive. Objective:  
 
Visit Vitals /64 Pulse 90 Temp 99 °F (37.2 °C) Resp 17 Ht 5' 7\" (1.702 m) Wt 91.2 kg (201 lb 1.6 oz) SpO2 97% BMI 31.50 kg/m² O2 Flow Rate (L/min): 45 l/min O2 Device: Ventilator Temp (24hrs), Av °F (37.2 °C), Min:98.6 °F (37 °C), Max:99.6 °F (37.6 °C) 
 
 
10/21 07 - 10/21 1900 In: 150 Out: 300 [Urine:300] 10/19 1901 - 10/21 0700 In:  [I.V.:460] Out: 962 [Urine:1] P/E General Appearance:S/p PEG/Trach today HENT: normocephalic/atraumatic, + ETT Neck: No JVD, hematoma R side where [facility] is improving Lungs: Coarse B/L w/ vent-assisted BS 
CV: RRR, no m/r/g Abdomen: soft, non-tender, normal bowel sounds Extremities: versicular rash rt arm and right side of chest,no cyanosis, no peripheral edema Neuro: Unresponsive to commands, no withdrawal to pain, + corneal reflex and pupillary reaction today Skin: Normal color, intact Data Review Recent Results (from the past 12 hour(s)) METABOLIC PANEL, COMPREHENSIVE Collection Time: 10/21/18  4:30 AM  
Result Value Ref Range Sodium 140 136 - 145 mmol/L Potassium 4.4 3.5 - 5.5 mmol/L Chloride 102 100 - 108 mmol/L  
 CO2 27 21 - 32 mmol/L Anion gap 11 3.0 - 18 mmol/L Glucose 143 (H) 74 - 99 mg/dL BUN 38 (H) 7.0 - 18 MG/DL Creatinine 3.52 (H) 0.6 - 1.3 MG/DL  
 BUN/Creatinine ratio 11 (L) 12 - 20 GFR est AA 21 (L) >60 ml/min/1.73m2 GFR est non-AA 18 (L) >60 ml/min/1.73m2 Calcium 7.3 (L) 8.5 - 10.1 MG/DL Bilirubin, total 0.6 0.2 - 1.0 MG/DL  
 ALT (SGPT) 59 16 - 61 U/L  
 AST (SGOT) 109 (H) 15 - 37 U/L Alk. phosphatase 324 (H) 45 - 117 U/L Protein, total 7.3 6.4 - 8.2 g/dL Albumin 1.7 (L) 3.4 - 5.0 g/dL Globulin 5.6 (H) 2.0 - 4.0 g/dL A-G Ratio 0.3 (L) 0.8 - 1.7    
CBC W/O DIFF Collection Time: 10/21/18  4:30 AM  
Result Value Ref Range WBC 10.4 4.6 - 13.2 K/uL  
 RBC 2.62 (L) 4.70 - 5.50 M/uL HGB 7.5 (L) 13.0 - 16.0 g/dL HCT 23.9 (L) 36.0 - 48.0 % MCV 91.2 74.0 - 97.0 FL  
 MCH 28.6 24.0 - 34.0 PG  
 MCHC 31.4 31.0 - 37.0 g/dL  
 RDW 15.3 (H) 11.6 - 14.5 % PLATELET 623 (H) 562 - 420 K/uL MPV 8.8 (L) 9.2 - 11.8 FL  
LIPASE Collection Time: 10/21/18  4:30 AM  
Result Value Ref Range Lipase 1,675 (H) 73 - 393 U/L  
GLUCOSE, POC Collection Time: 10/21/18  5:03 AM  
Result Value Ref Range Glucose (POC) 159 (H) 70 - 110 mg/dL GLUCOSE, POC  Collection Time: 10/21/18 11:52 AM  
 Result Value Ref Range Glucose (POC) 116 (H) 70 - 110 mg/dL Assessment/Plan:  
 
Principal Problem: 
  Cardiac arrest with ventricular fibrillation ([facility] [address] (9/29/2018) Overview: ROSC before defibrillation could be attempted. Active Problems: 
  Essential hypertension (9/10/2018) Diabetes mellitus type 2, controlled ([facility] [address] (9/10/2018) History of stroke (9/27/2018) Overview: With residual R hemiparesis Acute-on-chronic kidney injury ([facility] [address] (9/27/2018) Acute cystitis (9/27/2018) Elevated troponin (9/27/2018) Elevated LFTs (9/28/2018) Acute pancreatitis (9/29/2018) Overview: Shock, leukocytosis, elevated lipase but radiographically no ductal  
    dilatation in pancreas. GB stones without radiographic stigmata of acute  
    cholecystitis Ischemic encephalopathy (9/30/2018) Hypoalbuminemia (10/21/2018) Shingles (10/21/2018) Zoster. Care Plan - Vent mgmt per ICU - S/p trach/PEG on 10/17 as patient did not show significant improvement. 
- EGD showed large clot in esophagus last week repeat EGD 10/9 shows healed esophageal ulcer - Cont protonix IV every day  
- Hgb stable - LFTs improving - GI signed off 10/9 
- MRI w/ evidence of severe anoxic brain injury - Minimal improvement on neuro exam 
- Appreciate neuro assistance - IV Meropenem and IV Fluconazole d/heron on 10/15 per ID. 
- Currently on vanc only which was started on 9/27 
- Nephro managing HD TTHS 
- Pt unlikely to have any meaningful neuro recovery, prognosis is very grim - Family wanted to cont to do everything, including trach/PEG -> was done10/17 
- On 10/17:Started on acyclovir for possible Zoster rt arm and rt upper chest area. - Hyperkalemia on 10/18 ->corrected DVT prophylaxis:scds Full code. Disposition:tbd - need placement -  involved.

## 2018-10-21 NOTE — PROGRESS NOTES
1930 Bedside shift change report given to miky rn (oncoming nurse) by Kiera Melgar rn (offgoing nurse). Report included the following information SBAR, Kardex, Recent Results and Cardiac Rhythm nsr.side rails up x 3, call light within reach. hob elevated 30 degrees. incontinent of loose brown stool and urine , cleansed and bed bath completed. Barrier cream applied to unstagable on buttock, sacrum, mepliex changed. Tolerated well. 2000 assessment completed. Oral care provided. 2129 incontinent of large amount loose brown stool, barrier cream applied to unstagable areas buttock and sacrum. 2130 suctioned orally,  Vomited small amount tan and clear secretions, suctioned small amount white clear suctioned ett. .tube feeds turned off. Oral care provided. 2140 DR Hoffman notified of vomiting and tube feeds placed on hold. 2230 dialysis remains in process. 0000 reassessment completed. Oral and mery care completed. 0115 resting in bed. 
0400 resting in bed. Reassessment completed. Oral and mery care completed. 0530 incontinent of stool cleansed, and bed pad and gown changed. 0730 Bedside shift change report given to gregorio suazo rn (oncoming nurse) by miky rn (offgoing nurse). Report included the following information SBAR, Kardex, Recent Results and Cardiac Rhythm nsr.hob elevated 30 degrees. Call light within reach. Side rails up x 3. Dual skin check completed. Tho Chowdary

## 2018-10-21 NOTE — PROGRESS NOTES
Physical Exam  
Skin:  
 
  
 Primary Nurse Karla Samuels, RN and Yesenia Mercado rn, RN performed a dual skin assessment on this patient Impairment noted- see wound doc flow sheet.

## 2018-10-21 NOTE — PROGRESS NOTES
[facility] Pulmonary, Critical Care, and Sleep Medicine Critical Care Progress Note Name: Tulio Seo : 1955 MRN: [id number] Date: 10/21/2018 [] I have reviewed the flowsheet and previous days notes. Events, vitals, medications and notes from last 24 hours reviewed. Care plan discussed on multidisciplinary rounds. My assessment, plan of care, findings, medications, side effects etc were discussed with: 
[x] Nurse [] PT/OT   
[] Respiratory therapy []   
[] Family [] Patient: answered all questions to satisfaction  
[] Pharmacist [] ASSESSMENT/PLAN:  
Principal Problem: 
  Cardiac arrest with ventricular fibrillation ([facility] (2018) Overview: ROSC before defibrillation could be attempted. Active Problems: 
  Acute pancreatitis (2018) Overview: Shock, leukocytosis, elevated lipase but radiographically no ductal  
    dilatation in pancreas. GB stones without radiographic stigmata of acute  
    cholecystitis Diabetes mellitus type 2, controlled ([facility] (9/10/2018) Acute-on-chronic kidney injury ([facility] (2018) Ischemic encephalopathy (2018) Hypoalbuminemia (10/21/2018) Shingles (10/21/2018) Elevated LFTs (2018) Essential hypertension (9/10/2018) History of stroke (2018) Overview: With residual R hemiparesis Acute cystitis (2018) Elevated troponin (2018) · Case management help appreciated. · HD per Nephrology. · Straight cath urinary bladder q 4 hr. · Continue tube feeding for now. Hold TF at midnight. Repeat LFT and lipase in am. U/S hepatobiliary tree in am. 
· Continue acyclovir per ID. · Inhaled therapy: albuterol · Renal dosing of medications. NUTRITION: Osmolite 1.2 @ 75 mL/hr. Check prealbumin q week. Consult Clinical Dietician. ICU electrolyte replacement protocol PROPHYLAXIS: 
 DVT prophylaxis: heparin GI prophylaxis: Protonix HOB 30-degree elevation Chlorhexidine mouth washes CODE STATUS: FULL CODE Further recommendations will be based on the patient's response to recommended treatment and results of the investigation ordered. DISPOSITION: 
 
The patient is: [x] acutely ill Risk of deterioration: [] moderate [x] critically ill  [x] high  
 
[x]See my orders for details [x] The patient is unable to give any meaningful history or review of systems because the patient is: 
[x] Intubated [] Sedated  
[x] Unresponsive [] [x]The patient is critically ill on     
[x] Mechanical ventilation [x] Vasoactive agents  
[] BiPAP [] Inotropes SUBJECTIVE 
- This 57 y.o.  male is seen in consultation at the request of Dr. Sarai Ahmadi for recommendations on further evaluation and management of acute hypoxia. He hospitalized (9/10/2018 - 9/14/2018) for stroke with neurologic residua (R hemiparesis). Patient discharged from [facility] to [facility] ([facility]) on 9/14/2018, only to return on 9/26/2018 with acute renal failure/abnormal lab values. He experienced VT/VF arrest during my initial clinical assessment. The patient has undergone tracheostomy and percutaneous gastrostomy. He has a R femoral PermCath for HD access. - Overnight events: Remains intubated. Not following commands. Still has active shingles lesions that have not yet dried. On vancomycin. [x] Nutrition: Osmolite 1.2 @ 70 mL/hr 
[] BM today. ROS: Review of systems not obtained due to patient factors. Past Medical History:  
Diagnosis Date  CHF (congestive heart failure) ([facility]  Diabetes ([facility]  Stroke ([facility] No Known Allergies Current Facility-Administered Medications:  
  acetylcysteine (MUCOMYST) 100 mg/mL (10 %) nebulizer solution 400 mg, 4 mL, Nebulization, BID, Jacqueline Ackerman MD, 400 mg at 10/21/18 6608   albuterol (PROVENTIL VENTOLIN) nebulizer solution 2.5 mg, 2.5 mg, Nebulization, BID, Jones Ackerman MD, 2.5 mg at 10/21/18 0859 
  insulin glargine (LANTUS) injection 23 Units, 23 Units, SubCUTAneous, DAILY, Kia Fernandez MD, 23 Units at 10/21/18 7868   bacitracin 500 unit/gram packet 1 Packet, 1 Packet, Topical, PRN, Susan Ackerman MD 
  sodium chloride (NS) flush 10-30 mL, 10-30 mL, InterCATHeter, PRN, Jones Ackerman MD, 30 mL at 10/21/18 0430 
  sodium chloride (NS) flush 10 mL, 10 mL, InterCATHeter, Q24H, Jones Ackerman MD, 10 mL at 10/20/18 1854   sodium chloride (NS) flush 10 mL, 10 mL, InterCATHeter, PRN, Emily Telles MD, 10 mL at 10/20/18 7243   sodium chloride (NS) flush 10-40 mL, 10-40 mL, InterCATHeter, Q8H, Jones Ackerman MD, 30 mL at 10/21/18 5963   sodium chloride (NS) flush 20 mL, 20 mL, InterCATHeter, Q24H, Jones Ackerman MD, 20 mL at 10/20/18 1855   heparin (porcine) injection 5,000 Units, 5,000 Units, SubCUTAneous, Q8H, Susan Ackerman MD, 5,000 Units at 10/21/18 4409   acyclovir sodium (ZOVIRAX) 330.5 mg in 0.9% sodium chloride 100 mL IVPB, 5 mg/kg (Ideal), IntraVENous, Q24H, Dejan Curtis MD, 330.5 mg at 10/20/18 1330 
  vancomycin (VANCOCIN) 1,000 mg in 0.9% sodium chloride (MBP/ADV) 250 mL adv, 1,000 mg, IntraVENous, Q TU, TH & SAT, Schwab, J. Blain Blush, MD, [address] at 10/21/18 0300   hydroxypropyl methylcellulose (ISOPTO TEARS) 0.5 % ophthalmic solution 2 Drop, 2 Drop, Both Eyes, BID, Christian, Lakesha A, NP, 2 Drop at 10/21/18 2610   epoetin manda (EPOGEN;PROCRIT) injection 40,000 Units, 40,000 Units, IntraVENous, Q7D, Poli Estrada MD, 40,000 Units at 10/17/18 1621 
  pantoprazole (PROTONIX) 40 mg in sodium chloride 0.9% 10 mL injection, 40 mg, IntraVENous, Q24H, Roxi Ruiz MD, 40 mg at 10/21/18 0858 
  0.9% sodium chloride infusion 250 mL, 250 mL, IntraVENous, PRN, Trip Torrez, DO 
   influenza vaccine 2018-19 (6 mos+)(PF) (FLUARIX QUAD/FLULAVAL QUAD) injection 0.5 mL, 0.5 mL, IntraMUSCular, PRIOR TO DISCHARGE, Simon Cadet MD 
  acetaminophen (TYLENOL) solution 325 mg, 325 mg, Per NG tube, Q4H PRN, Simon Cadet MD, 325 mg at 10/18/18 2130 
  0.9% sodium chloride infusion 250 mL, 250 mL, IntraVENous, PRN, Ofilia Carla H, DO 
  heparin (porcine) pf 100 Units, 100 Units, InterCATHeter, PRN, Gisselle Estrada MD 
  0.9% sodium chloride infusion 250 mL, 250 mL, IntraVENous, PRN, Simon Cadet MD 
  insulin lispro (HUMALOG) injection, , SubCUTAneous, Q6H, Michelle Perez DO, 3 Units at 10/21/18 1404   heparin (porcine) 1,000 unit/mL injection 1,000 Units, 1,000 Units, InterCATHeter, DIALYSIS PRN, Lisa Santillan MD, 1,000 Units at 09/28/18 1332   VANCOMYCIN INFORMATION NOTE, , Other, Rx Dosing/Monitoring, Elena Barroso MD 
  senna-docusate (PERICOLACE) 8.6-50 mg per tablet 1 Tab, 1 Tab, Oral, BID PRN, McQuain Alfred A, DO 
  ondansetron (ZOFRAN) injection 4 mg, 4 mg, IntraVENous, Q4H PRN, McQuain Alfred A, DO, 4 mg at 09/28/18 0539 
  glucose chewable tablet 16 g, 4 Tab, Oral, PRN, McQuain, Alfred A, DO 
  glucagon (GLUCAGEN) injection 1 mg, 1 mg, IntraMUSCular, PRN, McQuain, Alfred A, DO 
  dextrose (D50) infusion 12.5-25 g, 25-50 mL, IntraVENous, PRN, McQuain, Alfred A, DO, 25 g at 10/09/18 1906   hydrALAZINE (APRESOLINE) 20 mg/mL injection 20 mg, 20 mg, IntraVENous, Q6H PRN, Reuben Gibbons, SALUD, 20 mg at 10/20/18 2109 OBJECTIVE Vital Signs:   
Patient Vitals for the past 24 hrs: 
 Temp Pulse Resp BP SpO2  
10/21/18 0851  89 17  97 % 10/21/18 0800 99 °F (37.2 °C) 94 16 157/77 97 % 10/21/18 0730  96 18  93 % 10/21/18 0700  98 20 162/69 98 % 10/21/18 0630  97 17  97 % 10/21/18 0600  98 16 158/66 97 % 10/21/18 0530  100 16  98 % 10/21/18 0515  100 15  98 % 10/21/18 0500  100 19 150/66 97 % 10/21/18 0445  100 21  100 % 10/21/18 0430  98 20  100 % 10/21/18 0419  97 20  100 % 10/21/18 0415  99 20  99 % 10/21/18 0400 98.6 °F (37 °C) 99 21 136/58 99 % 10/21/18 0345  100 21  99 % 10/21/18 0330  (!) 101 20  99 % 10/21/18 0315  (!) 102 20  99 % 10/21/18 0300  (!) 103 17 164/72 96 % 10/21/18 0245  (!) 103 22  100 % 10/21/18 0230  (!) 104 19  99 % 10/21/18 0215  (!) 104 19  99 % 10/21/18 0200  (!) 103 18 180/69 100 % 10/21/18 0145  99 16  100 % 10/21/18 0130  (!) 104 19  100 % 10/21/18 0115  (!) 103 18  100 % 10/21/18 0100  (!) 104 18 150/67 100 % 10/21/18 0045  (!) 106 17  100 % 10/21/18 0030  (!) 105 17  100 % 10/21/18 0024  (!) 105 17  100 % 10/21/18 0015  (!) 105 18  100 % 10/21/18 0000 99.6 °F (37.6 °C) (!) 106 19 155/72 100 % 10/20/18 2345  (!) 105 18 139/76 100 % 10/20/18 2330 99.4 °F (37.4 °C) (!) 105 18 140/77 100 % 10/20/18 2315  (!) 104 16 151/73 100 % 10/20/18 2300  (!) 104 17 137/70 100 % 10/20/18 2245  (!) 106 18 131/70 100 % 10/20/18 2230  (!) 106 17 141/69 100 % 10/20/18 2215  (!) 106 22 131/68 100 % 10/20/18 2200  (!) 103 20 139/66 100 % 10/20/18 2145  (!) 107 21 149/69 100 % 10/20/18 2130  (!) 105 17 182/75 100 % 10/20/18 2115  (!) 101 20 199/75 100 % 10/20/18 2109  (!) 101  192/82   
10/20/18 2100  98 22 192/82 100 % 10/20/18 2045  96 27 183/73 100 % 10/20/18 2030  92 23 186/70 100 % 10/20/18 2026  93 18  98 % 10/20/18 2015  93 19 174/70 100 % 10/20/18 2000 98.6 °F (37 °C) 93 19 177/70 100 % 10/20/18 1945  93 19 174/61 100 % 10/20/18 1600  98 15 117/63 97 % 10/20/18 1532  97 16  99 % 10/20/18 1500  94 17 136/67 100 % 10/20/18 1400  92 22 137/66 100 % 10/20/18 1300  90 13 145/68 100 % 10/20/18 1218  89 14  100 % 10/20/18 1200  90 20 145/71 100 % 10/20/18 1100  88 20 144/68 100 % O2 Device: Ventilator O2 Flow Rate (L/min): 45 l/min Temp (24hrs), Av °F (37.2 °C), Min:98.6 °F (37 °C), Max:99.6 °F (37.6 °C) PHYSICAL EXAM:  
General: intubated. Not on sedation. Does not follow commands. Head: atraumatic, normocephalic Eye: L gaze preference Nose: Nares are patent. No exudate. Throat: No oral thrush. No exudate. Mucous membranes are moist. 
Neck: tracheostomy in situ. no thyromegaly, no JVD, no lymphadenopathy. Trachea midline Lung: Symmetric in development and expansion. Good air entry. Heart: Regular S1, S2 without murmur, rub or gallop. Abdomen: PEG in situ. soft, nontender, no ndistended. Normoactive bowel sounds. No rebound. No guarding. Extremities: no pedal edema, no clubbing, 2+ peripheral pulses in DP. L great toe ischemic changes/dry gangrene. Lymphatic: no cervical/axillary/inguinal lymphadenopathy Neurologic: R facial droop. Withdraws to pain @ B LE. Demonstrating spontaneous eye movement, blinking and R LE movement. Doll's eyes intact. Corneal intact. Cough/gag intact. Spontaneous respirations intact. L LE triple flexion response to plantar dorsiflexion. DTR 1+ @ B biceps and B patellar tendons. Skin: umbilicated bullous lesions involving the R shoulder and R arm. DATA:  
 
Intake/Output:  
Last shift:      10/21 0701 - 10/21 1900 In: 79 Out: - Last 3 shifts: 10/19 1901 - 10/21 0700 In: 2017 [I.V.:460] Out: 8534 [Urine:1] Intake/Output Summary (Last 24 hours) at 10/21/2018 1036 Last data filed at 10/21/2018 5133 Gross per 24 hour Intake 1167 ml Output 2502 ml Net -1335 ml Last 3 Recorded Weights in this Encounter 10/15/18 0400 10/16/18 0020 10/21/18 3679 Weight: 93.7 kg (206 lb 9.1 oz) 82 kg (180 lb 12.8 oz) 91.2 kg (201 lb 1.6 oz) Lines: All central lines examined by me. No signs of erythema, induration, discharge. Peripherally Inserted Central Catheter: PICC Double Lumen 10/18/18 Left;Brachial (Active) Central Line Being Utilized Yes 10/21/2018  8:00 AM  
Criteria for Appropriate Use Long term IV/antibiotic administration 10/21/2018  8:00 AM  
Site Assessment Clean, dry, & intact 10/21/2018  8:00 AM  
Phlebitis Assessment 0 10/21/2018  8:00 AM  
Infiltration Assessment 0 10/21/2018  8:00 AM  
Arm Circumference (cm) 38 cm 10/21/2018  8:00 AM  
Date of Last Dressing Change 10/18/18 10/21/2018  8:00 AM  
Dressing Status Clean, dry, & intact 10/21/2018  8:00 AM  
Action Taken Open ports on tubing capped 10/21/2018  8:00 AM  
External Catheter Length (cm) 0 centimeters 10/21/2018  8:00 AM  
Dressing Type Disk with Chlorhexadine gluconate (CHG) 10/21/2018  8:00 AM  
Hub Color/Line Status Purple;Capped;Flushed 10/21/2018  8:00 AM  
Positive Blood Return (Site #1) Yes 10/21/2018  8:00 AM  
Hub Color/Line Status Red;Capped;Flushed 10/21/2018  8:00 AM  
Positive Blood Return (Site #2) Yes 10/21/2018  8:00 AM  
Alcohol Cap Used Yes 10/21/2018  8:00 AM  
  
Hemodialysis Catheter: 
Hemodialysis Access 10/19/18 (Active) Central Line Being Utilized Yes 10/21/2018  8:00 AM  
Criteria for Appropriate Use Dialysis/apheresis 10/21/2018  8:00 AM  
Site Assessment Clean, dry, & intact 10/21/2018  8:00 AM  
Date of Last Dressing Change 10/20/18 10/21/2018  8:00 AM  
Dressing Status Clean, dry, & intact 10/21/2018  8:00 AM  
Dressing Type Disk with Chlorhexadine gluconate (CHG) 10/21/2018  8:00 AM  
Proximal Hub Color/Line Status Capped 10/21/2018  8:00 AM  
Distal Hub Color/Line Status Capped 10/21/2018  8:00 AM  
 
Drain(s): PEG/Gastrostomy Tube 10/17/18 (Active) Site Assessment Clean, dry, & intact 10/21/2018  8:00 AM  
Dressing Status Clean, dry, & intact 10/21/2018  8:00 AM  
G Port Status Infusing 10/21/2018  8:00 AM  
External Catheter Length (cm) 3 centimeters 10/21/2018  8:00 AM  
Action Taken Placement verified (comment) 10/21/2018  8:00 AM  
Drainage Description Other (Comment) 10/21/2018 12:22 AM  
 Gastric Residual (mL) 0 ml 10/21/2018  8:00 AM  
Tube Feeding/Formula Options Osmolite 1.2 10/21/2018  8:00 AM  
Tube Feeding/Verify Rate (mL/hr) 40 10/21/2018  9:09 AM  
Water Flush Volume (mL) 20 mL 10/21/2018  4:00 AM  
Intake (ml) 40 ml 10/21/2018  9:09 AM  
 
Airway: Airway - Tracheostomy Tube 10/17/18 Portex; Cuffed (Active) Cuff Pressure 30 cmH20 10/17/2018 10:25 AM  
Site Assessment Clean, dry, & intact 10/21/2018  8:51 AM  
Trach Dressing Changed No 10/21/2018  8:51 AM  
Trach Cleaned With Normal saline 10/21/2018  8:00 AM  
Trach Tie Changed No 10/21/2018  8:51 AM  
Trach Cannula Changed No 10/21/2018  8:51 AM  
Inner Cannula Cuffed, cuff inflated 10/21/2018  8:51 AM  
Line Jl Top of the lock 10/21/2018  8:51 AM  
Side Secured Centered 10/21/2018  8:51 AM  
Spare Trach at Bedside Yes 10/21/2018  8:51 AM  
Spare Umang Devoid Tube One Size Smaller at Bedside Yes 10/21/2018  8:51 AM  
Ambu Bag With Mask at Bedside Yes 10/21/2018  8:51 AM  
 
  
Vent Date (intubated 9/29/2018, tracheostomy 10/17/2018) Ventilator Settings: 
Ventilator Mode: Assist control, VC+ Respiratory Rate Back-Up Rate: 10 Insp Time (sec): 1 sec I:E Ratio: 1:5.67 Ventilator Volumes Vt Set (ml): 500 ml Vt Exhaled (Machine Breath) (ml): 523 ml Vt Spont (ml): 298 ml Ve Observed (l/min): 7.92 l/min Ventilator Pressures Pressure Support (cm H2O): 15 cm H2O 
PIP Observed (cm H2O): 22 cm H2O Plateau Pressure (cm H2O): 18 cm H2O 
MAP (cm H2O): 9.3 PEEP/VENT (cm H2O): 5 cm H20 Auto PEEP Observed (cm H2O): 0 cm H2O Mode Rate Tidal Volume Pressure FiO2 PEEP Assist control, VC+   500 ml  15 cm H2O 30 % 5 cm H20 Peak airway pressure: 22 cm H2O Plateau pressure:    
Tidal volume:   
Minute ventilation: 7.92 l/min SPO2   
 
ARDSNet Guidelines: Lung protective ventilation (VT 6 cc/kg IBW) and Pplat <= 30 Telemetry: [x] Sinus [] A-flutter [] Paced [] A-fib [] Multiple PVCs Imaging: [x] I have personally reviewed the patients radiographs 
[] Radiographs reviewed with radiologist 
[] No CXR study available for review today 
[] No change from prior, tubes and lines are in adequate position 
[x] Improved   [] Worsening No results found. Labs: 
Recent Results (from the past 24 hour(s)) GLUCOSE, POC Collection Time: 10/20/18  1:08 PM  
Result Value Ref Range Glucose (POC) 123 (H) 70 - 110 mg/dL GLUCOSE, POC Collection Time: 10/20/18  6:52 PM  
Result Value Ref Range Glucose (POC) 178 (H) 70 - 110 mg/dL GLUCOSE, POC Collection Time: 10/20/18 11:57 PM  
Result Value Ref Range Glucose (POC) 129 (H) 70 - 110 mg/dL METABOLIC PANEL, COMPREHENSIVE Collection Time: 10/21/18  4:30 AM  
Result Value Ref Range Sodium 140 136 - 145 mmol/L Potassium 4.4 3.5 - 5.5 mmol/L Chloride 102 100 - 108 mmol/L  
 CO2 27 21 - 32 mmol/L Anion gap 11 3.0 - 18 mmol/L Glucose 143 (H) 74 - 99 mg/dL BUN 38 (H) 7.0 - 18 MG/DL Creatinine 3.52 (H) 0.6 - 1.3 MG/DL  
 BUN/Creatinine ratio 11 (L) 12 - 20 GFR est AA 21 (L) >60 ml/min/1.73m2 GFR est non-AA 18 (L) >60 ml/min/1.73m2 Calcium 7.3 (L) 8.5 - 10.1 MG/DL Bilirubin, total 0.6 0.2 - 1.0 MG/DL  
 ALT (SGPT) 59 16 - 61 U/L  
 AST (SGOT) 109 (H) 15 - 37 U/L Alk. phosphatase 324 (H) 45 - 117 U/L Protein, total 7.3 6.4 - 8.2 g/dL Albumin 1.7 (L) 3.4 - 5.0 g/dL Globulin 5.6 (H) 2.0 - 4.0 g/dL A-G Ratio 0.3 (L) 0.8 - 1.7    
CBC W/O DIFF Collection Time: 10/21/18  4:30 AM  
Result Value Ref Range WBC 10.4 4.6 - 13.2 K/uL  
 RBC 2.62 (L) 4.70 - 5.50 M/uL HGB 7.5 (L) 13.0 - 16.0 g/dL HCT 23.9 (L) 36.0 - 48.0 % MCV 91.2 74.0 - 97.0 FL  
 MCH 28.6 24.0 - 34.0 PG  
 MCHC 31.4 31.0 - 37.0 g/dL  
 RDW 15.3 (H) 11.6 - 14.5 % PLATELET 059 (H) 877 - 420 K/uL MPV 8.8 (L) 9.2 - 11.8 FL  
GLUCOSE, POC Collection Time: 10/21/18  5:03 AM  
Result Value Ref Range Glucose (POC) 159 (H) 70 - 110 mg/dL Lab Results Component Value Date/Time Color RED 09/27/2018 12:29 AM  
 Appearance CLOUDY 09/27/2018 12:29 AM  
 Specific gravity 1.010 09/27/2018 12:29 AM  
 pH (UA) 9.0 (H) 09/27/2018 12:29 AM  
 Protein >300 (A) 09/27/2018 12:29 AM  
 Glucose 100 (A) 09/27/2018 12:29 AM  
 Ketone >80 (A) 09/27/2018 12:29 AM  
 Bilirubin LARGE (A) 09/27/2018 12:29 AM  
 Urobilinogen >8.0 (H) 09/27/2018 12:29 AM  
 Nitrites POSITIVE (A) 09/27/2018 12:29 AM  
 Leukocyte Esterase LARGE (A) 09/27/2018 12:29 AM  
 Epithelial cells 3+ 09/27/2018 12:29 AM  
 Bacteria 4+ (A) 09/27/2018 12:29 AM  
 WBC TOO NUMEROUS TO COUNT 09/27/2018 12:29 AM  
 RBC TOO NUMEROUS TO COUNT 09/27/2018 12:29 AM  
 
Cultures: All Micro Results Procedure Component Value Units Date/Time CULTURE, CATHETER TIP [[id number]]  (Abnormal) Collected:  10/19/18 1330 Order Status:  Completed Specimen:  Catheter Tip Updated:  10/21/18 1026 Special Requests: NO SPECIAL REQUESTS Culture result:    
  >15 CFU POSSIBLE STAPHYLOCOCCUS SPECIES, COAGULASE NEGATIVE CULTURE, BLOOD [[id number]] Collected:  10/16/18 1047 Order Status:  Completed Specimen:  Blood Updated:  10/21/18 0805 Special Requests: PERIPHERAL Culture result: NO GROWTH 5 DAYS     
 CULTURE, BLOOD [[id number]] Collected:  10/16/18 1055 Order Status:  Completed Specimen:  Blood Updated:  10/21/18 0805 Special Requests: PERIPHERAL Culture result: NO GROWTH 5 DAYS     
 HSV 1 AND 2 BY PCR [[id number]] Collected:  10/18/18 1815 Order Status:  Completed Specimen:  Other Updated:  10/18/18 1838 CULTURE, RESPIRATORY/SPUTUM/BRONCH Inderjit Gores [[id number]] Collected:  10/16/18 1915 Order Status:  Completed Specimen:  Sputum,ET Suction Updated:  10/18/18 1011 Special Requests: NO SPECIAL REQUESTS     
  GRAM STAIN 10-25 WBC/lpf     
   <10 EPI/lpf FEW GRAM POSITIVE COCCI IN PAIRS MODERATE GRAM POSITIVE COCCI IN GROUPS MUCUS PRESENT Culture result: MODERATE NORMAL RESPIRATORY SHANAE  
     
 CULTURE, BLOOD [[id number]] Collected:  10/08/18 1227 Order Status:  Completed Specimen:  Blood Updated:  10/14/18 0741 Special Requests: PERIPHERAL Culture result: NO GROWTH 6 DAYS     
 CULTURE, CATHETER TIP [[id number]] Collected:  10/08/18 1215 Order Status:  Completed Specimen:  Catheter Tip Updated:  10/11/18 7929 Special Requests: NO SPECIAL REQUESTS Culture result: NO GROWTH 3 DAYS     
 CULTURE, BLOOD [[id number]] Collected:  10/04/18 5769 Order Status:  Completed Specimen:  Blood Updated:  10/10/18 2382 Special Requests: PERIPHERAL Culture result: NO GROWTH 6 DAYS     
 CULTURE, BLOOD [[id number]] Collected:  10/04/18 1120 Order Status:  Completed Specimen:  Blood Updated:  10/10/18 0854 Special Requests: PERIPHERAL Culture result: NO GROWTH 6 DAYS     
 CULTURE, BLOOD [[id number]] Collected:  09/27/18 0005 Order Status:  Completed Specimen:  Blood Updated:  10/03/18 0845 Special Requests: NO SPECIAL REQUESTS Culture result: NO GROWTH 6 DAYS     
 CULTURE, BLOOD [[id number]]  (Abnormal)  (Susceptibility) Collected:  09/27/18 0136 Order Status:  Completed Specimen:  Blood Updated:  10/02/18 7079 Special Requests: NO SPECIAL REQUESTS     
  GRAM STAIN PEDIATRIC BOTTLE GRAM POSITIVE COCCI IN PAIRS IN CLUSTERS  
      
  SMEAR CALLED TO AND CORRECTLY REPEATED BY: Alexander Bueno RN IN 2700 ON 9/29/18 AT 2033 WF Culture result:    
  AEROBIC BOTTLE STAPHYLOCOCCUS EPIDERMIDIS  
     
 CULTURE, RESPIRATORY/SPUTUM/BRONCH Monisha  STAIN [[id number]]  (Abnormal) Collected:  09/29/18 1210 Order Status:  Completed Specimen:  Sputum from Endotracheal aspirate Updated:  10/02/18 5724 Special Requests: NO SPECIAL REQUESTS     
  GRAM STAIN >25 WBC/lpf     
   <10 EPI/lpf MUCUS PRESENT     
   MODERATE YEAST Culture result: MANY CANDIDA TROPICALIS C. DIFFICILE/EPI PCR [[id number]] Collected:  09/29/18 1400 Order Status:  Completed Specimen:  Stool Updated:  09/29/18 2248 Special Requests: NO SPECIAL REQUESTS Culture result: Toxigenic C. difficile NEGATIVE                         C. difficile target DNA sequences are not detected. CULTURE, URINE [[id number]] Collected:  09/27/18 0029 Order Status:  Completed Specimen:  Cath Urine Updated:  09/29/18 0944 Special Requests: NO SPECIAL REQUESTS Culture result: NO GROWTH 2 DAYS During this entire length of time I was immediately available to the patient. The reason for providing this level of medical care for this critically ill patient was due a critical illness that impaired one or more vital organ systems such that there was a high probability of imminent or life threatening deterioration in the patients condition. This care involved high complexity decision making to assess, manipulate, and support vital system functions, to treat this degreee vital organ system failure and to prevent further life threatening deterioration of the patients condition 
 
[] Total critical care time spent on reviewing the case/medical record/data/notes/EMR/patient examination/documentation/coordinating care with nurse/consultants, exclusive of procedures - minutes with complex decision making performed and > 50% time spent in face to face evaluation. Joyce Singh MD  
Board Certified by the [employer] 10/21/2018

## 2018-10-21 NOTE — PROGRESS NOTES
Problem: Ventilator Management Goal: *Adequate oxygenation and ventilation VENTILATOR Care plan 
 
Problem: Ventilator Management Goal: *Adequate oxygenation/ ventilation/ and extubation Patient: 
   
 
Ravinder Lindsay     57 y.o.   male     10/21/2018  1:41 PM 
Patient Active Problem List  
Diagnosis Code  Stroke (cerebrum) ([facility]) I63.9  Stroke ([facility] I63.9  Rhabdomyolysis M62.82  
 Dehydration E86.0  
 Essential hypertension I10  
 Diabetes mellitus type 2, controlled ([facility] E11.9  Non compliance w medication regimen Z91.14  
 DM (diabetes mellitus) ([facility] E11.9  History of stroke Z80.78  
 Acute-on-chronic kidney injury ([facility] N17.9, N18.9  Acute cystitis N30.00  Elevated troponin R74.8  Elevated LFTs R94.5  Cardiac arrest with ventricular fibrillation ([facility]) I46.9, I49.01  
 Acute pancreatitis K85.90  
 Ischemic encephalopathy I67.89  
 Hypoalbuminemia E88.09  
 Shingles B02.9 Pneumonia of both lower lobes due to infectious organism ([facility] [J18.1] ESRD (end stage renal disease) ([facility] [N18.6] ESRD (end stage renal disease) ([facility] [N18.6] Reason patient intubated: Acute respiratory failure & need for airway protection secondary to episode of cardiac arrest during administration of dialysis. Ventilator day: Vent day 23; Intubated 09/29/18, tube change on 10/05/18; Placement of trach on 10/17/18 Ventilator settings:  AC VC+, Rate=10, Zy=354, Fio2=30%, Peep=5 ETT Size/Placement: Size 8 trach, portex, cuffed, cuff inflated. ABG: 
Date:10/21/2018 No results found for: PH, PHI, PCO2, PCO2I, PO2, PO2I, HCO3, HCO3I, FIO2, FIO2I Chest X-ray: 
Date:10/21/2018 Results from [facility] Encounter encounter on 09/26/18 XR CHEST PORT Narrative EXAM: XR CHEST PORT 
 
CLINICAL INDICATION/HISTORY: respiratory failure, s/p tracheostomy 
-Additional: None COMPARISON: 10/17/2018 TECHNIQUE: Frontal view of the chest 
 
_______________ FINDINGS: 
 
 HEART AND MEDIASTINUM: Significant rightward rotation. Right IJ catheter 
position unchanged. Tracheostomy noted. Grossly stable cardiomediastinal 
silhouette. LUNGS AND PLEURAL SPACES: Evidence of small right-sided pleural effusion. Improved aeration in the right lung since most recent prior, appearance similar 
to 10/16/2018. No acute opacities on the left. BONY THORAX AND SOFT TISSUES: Unremarkable. 
 
_______________ Impression IMPRESSION: 
 
Improved right pulmonary aeration in the interval. Probable small right-sided 
effusion. Lab Test: 
Date:10/21/2018 WBC:  
Lab Results Component Value Date/Time WBC 10.4 10/21/2018 04:30 AM  
HGB:  
Lab Results Component Value Date/Time HGB 7.5 (L) 10/21/2018 04:30 AM  
 PLTS:  
Lab Results Component Value Date/Time PLATELET 167 (H) 51/53/5241 04:30 AM  
 
 
SaO2%/flow: @ZIXQOEI0(5)@ Vital Signs:    
Patient Vitals for the past 8 hrs: 
 Temp Pulse Resp BP SpO2  
10/21/18 1247  89 17  97 % 10/21/18 1200  90 17 139/64 97 % 10/21/18 1100  91 15 145/67 96 % 10/21/18 1000  95 16 146/64 96 % 10/21/18 0900  91 16 152/67 97 % 10/21/18 0851  89 17  97 % 10/21/18 0800 99 °F (37.2 °C) 94 16 157/77 97 % 10/21/18 0730  96 18  93 % 10/21/18 0700  98 20 162/69 98 % 10/21/18 0630  97 17  97 % 10/21/18 0600  98 16 158/66 97 % Wean Screen Pass (Yes or No): n 
Wean Screen Reason for Failure: 
Duration of Weaning Trial: 
Additional Comments: Neuro status remains obstacle to extubation. PLAN OF CARE:  Monitor pt on vent. Wean/titrate settings as needed to maintain respiratory stability & comfort. Provide trach care, nebs, & suction to ensure patent airway. GOAL:  Respiratory stability, extubation to trach collar. OUTCOME:  Pt maintained respiratory stability on ventilator.

## 2018-10-21 NOTE — DIALYSIS
ACUTE HEMODIALYSIS FLOW SHEET 
 
HEMODIALYSIS ORDERS: Physician: Shireen Fontenot Dialyzer: revaclear         Duration:4  hr  BFR: 300   DFR: 914 Dialysate:  Temp  K+ 2      Ca+ 2.5   Na 140  Bicarb 35 Weight:   kg    Bed Scale [x]     Unable to Obtain []      Dry weight/UF Goal:2500  Access [facility] Needle Gauge Heparin []  Bolus      Units    [] Hourly       Units    []None Catheter locking solution Pre BP:  177/70     Pulse:   93       Temperature:     Respirations:19   Tx: NS       ml/Bolus  Other        [x] N/A Labs: Pre        Post:        [x] N/A Additional Orders(medications, blood products, hypotension management):       [x] N/A [x] Time Out/Safety Check  [x] [facility] Consent Verified CATHETER ACCESS: []N/A   [x]Right   []Left   []IJ     [x]Fem [] First use X-ray verified     [x]Tunnel                [] Non Tunneled [x]No S/S infection  []Redness  []Drainage []Cultured []Swelling []Pain  
[x]Medical Aseptic Prep Utilized   [x]Dressing Changed  [x] Biopatch  Date:   10/20/18 []Clotted   [x]Patent   Flows: [x]Good  []Poor  []Reversed If access problem,  notified: []Yes    [x]N/A  Date:        
 
GRAFT/FISTULA ACCESS:  [x]N/A     []Right     []Left     []UE     []LE []AVG   []AVF        []Buttonhole    []Medical Aseptic Prep Utilized []No S/S infection  []Redness  []Drainage []Cultured []Swelling []Pain Bruit:   [] Strong    [] Weak       Thrill :   [] Strong    [] Weak Needle Gauge:    Length: If access problem,  notified: []Yes     [x]N/A  Date:       
Please describe access if present and not used:  
 
 
GENERAL ASSESSMENT:   
LUNGS:  Rate  SaO2%        [] N/A    [x] Clear  [] Coarse  [] Crackles  [] Wheezing 
      [] Diminished     Location : []RLL   []LLL    []RUL  []JOSSIE Cough: []Productive  []Dry  [x]N/A   Respirations:  [x]Easy  []Labored Therapy:  []RA  []NC  l/min    Mask: []NRB []Venti       O2% [x]Ventilator  []Intubated  [x] Trach  [] BiPaP CARDIAC: [x]Regular      [] Irregular   [] Pericardial Rub  [] JVD []  Monitored  [] Bedside  [] Remotely monitored [] N/A  Rhythm: EDEMA: [] None  [x]Generalized  [] Pitting [] 1    [] 2    [] 3    [] 4 [] Facial  [] Pedal  []  UE  [] LE  
SKIN:   [x] Warm  [] Hot     [] Cold   [x] Dry     [] Pale   [] Diaphoretic    
             [] Flushed  [] Jaundiced  [] Cyanotic  [x] Rash  [] Weeping  Rt arm with many blisters. Zoster. LOC:    [] Alert      []Oriented:    [] Person     [] Place  []Time 
             [] Confused  [] Lethargic  [] Medicated  [x] Non-responsive GI / ABDOMEN:  [] Flat    [] Distended    [x] Soft    [] Firm   []  Obese [x] Diarrhea  [x] Bowel Sounds  [] Nausea  [] Vomiting  / URINE ASSESSMENT:[] Voiding   [] Oliguria  [] Anuria   []  Mcbride [x] Incontinent    []  Incontinent Brief      []  Bathroom Privileges PAIN: [x] 0 []1  []2   []3   []4   []5   []6   []7   []8   []9   []10 Scale 0-10  Action/Follow Up: MOBILITY:  [] Amb    [] Amb/Assist    [x] Bed    [] Wheelchair  [] Stretcher All Vitals and Treatment Details on Attached Flowsheet Hospital:  [facility] Room #  8271 [] 1st Time Acute  [] Stat  [x] Routine  [] Urgent    
[] Acute Room  []  Bedside  [x] ICU/CCU  [] ER Isolation Precautions:  [x] Dialysis   [x] Airborne   [x] Contact    [] Reverse Special Considerations:         [] Blood Consent Verified [x]N/A ALLERGIES:   [x] NKA Code Status:  [x] Full Code  [] DNR  [] Other HBsAg ONLY: Date Drawn   9/28/18       [x]Negative []Positive []Unknown HBsAb: Date 9/28/18     [x] Susceptible   [] [id number] []Not Drawn  [] Drawn Current Labs:    Date of Labs: Today [x] Results for Ana Valenzuela (MRN [id number]) as of 10/20/2018 20:54 Ref.  Range 10/20/2018 03:30  
 WBC Latest Ref Range: 4.6 - 13.2 K/uL 10.6 RBC Latest Ref Range: 4.70 - 5.50 M/uL 2.56 (L) HGB Latest Ref Range: 13.0 - 16.0 g/dL 7.1 (L) HCT Latest Ref Range: 36.0 - 48.0 % 23.5 (L) MCV Latest Ref Range: 74.0 - 97.0 FL 91.8 MCH Latest Ref Range: 24.0 - 34.0 PG 27.7 MCHC Latest Ref Range: 31.0 - 37.0 g/dL 30.2 (L) RDW Latest Ref Range: 11.6 - 14.5 % 15.4 (H) PLATELET Latest Ref Range: 135 - 420 K/uL 457 (H) MPV Latest Ref Range: 9.2 - 11.8 FL 8.9 (L) Sodium Latest Ref Range: 136 - 145 mmol/L 140 Potassium Latest Ref Range: 3.5 - 5.5 mmol/L 5.1 Chloride Latest Ref Range: 100 - 108 mmol/L 102 CO2 Latest Ref Range: 21 - 32 mmol/L 28 Anion gap Latest Ref Range: 3.0 - 18 mmol/L 10 Glucose Latest Ref Range: 74 - 99 mg/dL 136 (H) BUN Latest Ref Range: 7.0 - 18 MG/DL 58 (H) Creatinine Latest Ref Range: 0.6 - 1.3 MG/DL 5.17 (H) BUN/Creatinine ratio Latest Ref Range: 12 - 20   11 (L) Calcium Latest Ref Range: 8.5 - 10.1 MG/DL 7.4 (L)  
GFR est non-AA Latest Ref Range: >60 ml/min/1.73m2 11 (L)  
GFR est AA Latest Ref Range: >60 ml/min/1.73m2 14 (L) Bilirubin, total Latest Ref Range: 0.2 - 1.0 MG/DL 0.5 Protein, total Latest Ref Range: 6.4 - 8.2 g/dL 7.0 Albumin Latest Ref Range: 3.4 - 5.0 g/dL 1.6 (L) Globulin Latest Ref Range: 2.0 - 4.0 g/dL 5.4 (H) A-G Ratio Latest Ref Range: 0.8 - 1.7   0.3 (L) ALT (SGPT) Latest Ref Range: 16 - 61 U/L 56 AST Latest Ref Range: 15 - 37 U/L 114 (H) Alk. phosphatase Latest Ref Range: 45 - 117 U/L 303 (H) Vancomycin, random Latest Ref Range: 5.0 - 40.0 UG/ML 25.2 Results for Edwina Moncada (MRN [id number]) as of 10/20/2018 20:54 Ref. Range 9/28/2018 04:40 Hepatitis B surface Ag Latest Ref Range: <1.00 Index <0.10 Hep B surface Ag Interp. Latest Ref Range: NEG   NEGATIVE Cut and paste current labs here. DIET:  [] Renal    [x] Other     [] NPO     []  Diabetic PRIMARY NURSE REPORT: First initial/Last name/Title Pre Dialysis:  Donte Gonzales  RN   Time: 0391 EDUCATION:   
[] Patient [x] Other         Knowledge Basis: []None []Minimal [] Substantial  
Barriers to learning  []N/A  
[] Access Care     [] S&S of infection     [] Fluid Management     []K+     []Procedural   
[]Albumin     [] Medications     [] Tx Options     [] Transplant     [] Diet     [] Other Teaching Tools:  [] Explain  [] Demo  [] Handouts [] Video Patient response:   [] Verbalized understanding  [] Teach back  [] Return demonstration [] Requires follow up Inappropriate due to     Pt unresponsive and no family present RO/HEMODIALYSIS MACHINE SAFETY CHECKS  Before each treatment:    
Machine Number:                   [facility]  
                                [] Unit Machine #  with centralized RO 
                                [] Portable Machine #1/RO serial # [id number] [] Portable Machine #2/RO serial # [id number] [] Portable Machine #3/RO serial # [id number] [facility] [x] Portable Machine #11/RO serial # [id number] [] Portable Machine #12/RO serial # [id number] [] Portable Machine #13/RO serial #  [id number] Alarm Test:  Pass time 1915         Other:        
[x] RO/Machine Log Complete Temp   37             [x]Extracorporeal Circuit Tested for integrity Dialysate: pH 7.2  Conductivity: Meter   14     HD Machine  14                   TCD: 13.9 Dialyzer Lot #  [id number]      Blood Tubing Lot #   [id number]        Saline Lot #  [id number] CHLORINE TESTING-Before each treatment and every 4 hours Total Chlorine: [x] less than 0.1 ppm  Time:1910  4 Hr/2nd Check Time:   
(if greater than 0.1 ppm from Primary then every 30 minutes from Secondary) TREATMENT INITIATION  with Dialysis Precautions:  
[x] All Connections Secured                 [] Saline Line Double Clamped  
[x] Venous Parameters Set                  [x] Arterial Parameters Set [x] Prime Given   ml 250              [x]Air Foam Detector Engaged Treatment Initiation Note: started treatment after nurses cleaned up pt from stool. I also changed dressing before starting. Pr is non responsive. Vitals stable. Pt is on airborne and contact isolation for herpes Zoster all over rt arm. Medication Dose Volume Route Initials Dialyzer Cleared: [] Good [x] Fair  [] Poor Blood processed:   L 
UF Removed 2500  Ml Post Wt:  BREANNA   kg POst BP:  140/77        Pulse:   105    Respirations: 18  Temperature: 99.4 Post Tx Vascular Access: AVF/AVG: Bleeding stopped Art  min. Shree. Min   N/A Catheter: Locking solution: Heparin 1:1000 Art. 1.4  Ven1.4  N/A Post Assessment:  
  
                              Skin:  [x] Warm  [] Dry [] Diaphoretic    [] Flushed  [] Pale [] Cyanotic [facility] Signatures Title Initials  Time Lungs: [x] Clear    [] Course  [] Crackles  [] Wheezing [] Diminished Cardiac: [x] Regular   [] Irregular   [] Monitor  [] N/A  Rhythm:  st    
    Edema:  [] None    [x] General     [] Facial   [] Pedal    [] UE    [] LE  
    Pain: [x]0  []1  []2   []3  []4   []5   []6   []7   []8   []9   []10 Post Treatment Note: Pt.tolerated treatment well, POST TREATMENT PRIMARY NURSE HANDOFF REPORT:  
 
First initial/Last name/Title Post Dialysis:Jocelin Parham RN  Time: 0000 Abbreviations: AVG-arterial venous graft, AVF-arterial venous fistula, IJ-Internal Jugular, Subcl-Subclavian, Fem-Femoral, Tx-treatment, AP/HR-apical heart rate, DFR-dialysate flow rate, BFR-blood flow rate, AP-arterial pressure, -venous pressure, UF-ultrafiltrate, TMP-transmembrane pressure, Shree-Venous, Art-Arterial, RO-Reverse Osmosis

## 2018-10-21 NOTE — PROGRESS NOTES
Recd pt on vent:  AC VC+, rate=10, Yu=247, Fio2=30%, Peep=5 
--Pt on trach size 8 -- spares at bedside: size 8 & 6 & BVM 
--vent check complete - suction via trach - administer neb 1245--Vent check complete Trach care - change inner cannula - clean with NS 
-Neuro status remains obstacle to extubation

## 2018-10-22 PROBLEM — I96 GANGRENE (HCC): Status: ACTIVE | Noted: 2018-01-01

## 2018-10-22 NOTE — PROGRESS NOTES
Hospitalist Progress Note Daily Progress Note: 10/22/2018    
Interval history / Subjective:  
Charlie Bhatia is a 57y.o. year old male who presented from [facility] with abnormal labs, elevated BUN/Cr. Found to have JULIANNA, UTI, and markedly elevated LFT on presentation. Patient's recent admission was 9/10/18-9/14/18 for acute CVA and rhabdo. Patient poor historian on admission,stated \"I had heart failure. She was started on vanc,zosyn. On 9/29,patient had cardiac arrest with ventricular fibrillation - s/p ROSC. Her hospital course has since then complicated with sepsis,pancreatitits,pneumonia,acute pancreatitis requiring involvement of gi,surgery,nephrology,cardiology,ID,cardiology. So far patient has remained without major improvement. Now in vegetative state. Patient had PEG/Trach placement on 10/17. On 10/17,patient started on acyclovir for veicular rash rt arm,suspected being Zoster. Lipase 2169. US abd 10/22 shows Abnormal appearance of the gallbladder with stones/sludge with wall thickening  
and possible pericholecystic fluid similar to the prior study of 9/27/2018 
10/22: Patient on treatment for shingles in contact isolation per ID team. He also had left big toe gangrene . We will consult Podiatry. His lipase was elevated , US of the abdomen shows sludge and possible cholecystitis. Patient afebrile. Will consult GI for possible MRCP. Current Facility-Administered Medications Medication Dose Route Frequency  acetylcysteine (MUCOMYST) 100 mg/mL (10 %) nebulizer solution 400 mg  4 mL Nebulization BID  albuterol (PROVENTIL VENTOLIN) nebulizer solution 2.5 mg  2.5 mg Nebulization BID  insulin glargine (LANTUS) injection 23 Units  23 Units SubCUTAneous DAILY  bacitracin 500 unit/gram packet 1 Packet  1 Packet Topical PRN  
 sodium chloride (NS) flush 10-30 mL  10-30 mL InterCATHeter PRN  
 sodium chloride (NS) flush 10 mL  10 mL InterCATHeter Q24H  sodium chloride (NS) flush 10 mL  10 mL InterCATHeter PRN  
 sodium chloride (NS) flush 10-40 mL  10-40 mL InterCATHeter Q8H  
 sodium chloride (NS) flush 20 mL  20 mL InterCATHeter Q24H  
 heparin (porcine) injection 5,000 Units  5,000 Units SubCUTAneous Q8H  
 acyclovir sodium (ZOVIRAX) 330.5 mg in 0.9% sodium chloride 100 mL IVPB  5 mg/kg (Ideal) IntraVENous Q24H  
 vancomycin (VANCOCIN) 1,000 mg in 0.9% sodium chloride (MBP/ADV) 250 mL adv  1,000 mg IntraVENous Q TU, TH & SAT  hydroxypropyl methylcellulose (ISOPTO TEARS) 0.5 % ophthalmic solution 2 Drop  2 Drop Both Eyes BID  epoetin manda (EPOGEN;PROCRIT) injection 40,000 Units  40,000 Units IntraVENous Q7D  
 pantoprazole (PROTONIX) 40 mg in sodium chloride 0.9% 10 mL injection  40 mg IntraVENous Q24H  
 0.9% sodium chloride infusion 250 mL  250 mL IntraVENous PRN  
 influenza vaccine 2018-19 (6 mos+)(PF) (FLUARIX QUAD/FLULAVAL QUAD) injection 0.5 mL  0.5 mL IntraMUSCular PRIOR TO DISCHARGE  acetaminophen (TYLENOL) solution 325 mg  325 mg Per NG tube Q4H PRN  
 0.9% sodium chloride infusion 250 mL  250 mL IntraVENous PRN  
 heparin (porcine) pf 100 Units  100 Units InterCATHeter PRN  
 0.9% sodium chloride infusion 250 mL  250 mL IntraVENous PRN  
 insulin lispro (HUMALOG) injection   SubCUTAneous Q6H  
 heparin (porcine) 1,000 unit/mL injection 1,000 Units  1,000 Units InterCATHeter DIALYSIS PRN  
 VANCOMYCIN INFORMATION NOTE   Other Rx Dosing/Monitoring  senna-docusate (PERICOLACE) 8.6-50 mg per tablet 1 Tab  1 Tab Oral BID PRN  
 ondansetron (ZOFRAN) injection 4 mg  4 mg IntraVENous Q4H PRN  
 glucose chewable tablet 16 g  4 Tab Oral PRN  
 glucagon (GLUCAGEN) injection 1 mg  1 mg IntraMUSCular PRN  
 dextrose (D50) infusion 12.5-25 g  25-50 mL IntraVENous PRN  
 hydrALAZINE (APRESOLINE) 20 mg/mL injection 20 mg  20 mg IntraVENous Q6H PRN Review of Systems Intubated,unresponsive. Objective:  
 
Visit Vitals /70 Pulse 92 Temp 98.4 °F (36.9 °C) Resp 25 Ht 5' 7\" (1.702 m) Wt 88 kg (194 lb 0.1 oz) SpO2 95% BMI 30.39 kg/m² O2 Flow Rate (L/min): 45 l/min O2 Device: Tracheostomy, Ventilator Temp (24hrs), Av.7 °F (37.1 °C), Min:98.4 °F (36.9 °C), Max:99.4 °F (37.4 °C) No intake/output data recorded. 10/20 1901 - 10/22 0700 In: 5085 [I.V.:380] Out: 3702 [Urine:576] P/E General Appearance:S/p PEG/Trach today HENT: normocephalic/atraumatic, + ETT Neck: No JVD, hematoma R side where [facility] is improving Lungs: Coarse B/L w/ vent-assisted BS 
CV: RRR, no m/r/g Abdomen: soft, non-tender, normal bowel sounds Extremities: versicular rash rt arm and right side of chest,no cyanosis, no peripheral edema Neuro: Unresponsive to commands, no withdrawal to pain, + corneal reflex and pupillary reaction today Skin: Normal color, intact Data Review Recent Results (from the past 12 hour(s)) CBC W/O DIFF Collection Time: 10/22/18  4:42 AM  
Result Value Ref Range WBC 11.7 4.6 - 13.2 K/uL  
 RBC 2.69 (L) 4.70 - 5.50 M/uL HGB 7.6 (L) 13.0 - 16.0 g/dL HCT 24.4 (L) 36.0 - 48.0 % MCV 90.7 74.0 - 97.0 FL  
 MCH 28.3 24.0 - 34.0 PG  
 MCHC 31.1 31.0 - 37.0 g/dL  
 RDW 15.7 (H) 11.6 - 14.5 % PLATELET 301 (H) 797 - 420 K/uL MPV 8.7 (L) 9.2 - 13.7 FL  
METABOLIC PANEL, COMPREHENSIVE Collection Time: 10/22/18  4:42 AM  
Result Value Ref Range Sodium 140 136 - 145 mmol/L Potassium 4.7 3.5 - 5.5 mmol/L Chloride 102 100 - 108 mmol/L  
 CO2 27 21 - 32 mmol/L Anion gap 11 3.0 - 18 mmol/L Glucose 128 (H) 74 - 99 mg/dL BUN 57 (H) 7.0 - 18 MG/DL Creatinine 4.95 (H) 0.6 - 1.3 MG/DL  
 BUN/Creatinine ratio 12 12 - 20 GFR est AA 14 (L) >60 ml/min/1.73m2 GFR est non-AA 12 (L) >60 ml/min/1.73m2 Calcium 7.4 (L) 8.5 - 10.1 MG/DL  Bilirubin, total 0.6 0.2 - 1.0 MG/DL  
 ALT (SGPT) 56 16 - 61 U/L  
 AST (SGOT) 92 (H) 15 - 37 U/L  
 Alk. phosphatase 318 (H) 45 - 117 U/L Protein, total 7.4 6.4 - 8.2 g/dL Albumin 1.7 (L) 3.4 - 5.0 g/dL Globulin 5.7 (H) 2.0 - 4.0 g/dL A-G Ratio 0.3 (L) 0.8 - 1.7 LIPASE Collection Time: 10/22/18  4:42 AM  
Result Value Ref Range Lipase 2,169 (H) 73 - 393 U/L Assessment/Plan:  
 
Principal Problem: 
  [facility] (9/29/2018) Overview: ROSC before defibrillation could be attempted. Active Problems: 
  Essential hypertension (9/10/2018) Diabetes mellitus type 2, controlled ([facility] (9/10/2018) History of stroke (9/27/2018) Overview: With residual R hemiparesis Acute-on-chronic kidney injury ([facility] (9/27/2018) Acute cystitis (9/27/2018) Elevated troponin (9/27/2018) Elevated LFTs (9/28/2018) Acute pancreatitis (9/29/2018) Overview: Shock, leukocytosis, elevated lipase but radiographically no ductal  
    dilatation in pancreas. GB stones without radiographic stigmata of acute  
    cholecystitis Ischemic encephalopathy (9/30/2018) Hypoalbuminemia (10/21/2018) Shingles (10/21/2018) Zoster. Care Plan - Vent mgmt per ICU - S/p trach/PEG on 10/17 as patient did not show significant improvement. 
- EGD showed large clot in esophagus last week repeat EGD 10/9 shows healed esophageal ulcer - Cont protonix IV every day  
- Hgb stable - LFTs improving - GI signed off 10/9 
- MRI w/ evidence of severe anoxic brain injury - Minimal improvement on neuro exam 
- Appreciate neuro assistance - IV Meropenem and IV Fluconazole d/heron on 10/15 per ID. 
- Currently on vanc only which was started on 9/27 
- Nephro managing HD TTHS 
- Pt unlikely to have any meaningful neuro recovery, prognosis is very grim - Family wanted to cont to do everything, including trach/PEG -> was done10/17 
- On 10/17:Started on acyclovir for possible Zoster rt arm and rt upper chest area. - Hyperkalemia on 10/18 ->corrected - Lipase 2169 10/22. US abd Abnormal appearance of the gallbladder with stones/sludge with wall thickening  
and possible pericholecystic fluid similar to the prior study of 9/27/2018  
- On Acyclovir for shingles , contact precaution per ID commitee 
- 10/22: Consult GI today for possible MRCP 
- 10/22: Consult Podiatry for left big toe gangrene DVT prophylaxis:scds Full code. Disposition:tbd - need placement -  involved.

## 2018-10-22 NOTE — PROGRESS NOTES
VENTILATOR Care plan 
 
Problem: Ventilator Management Goal: *Adequate oxygenation/ ventilation/ and extubation Patient: 
   
 
Lilibeth Montana     57 y.o.   male     10/22/2018  9:19 AM 
Patient Active Problem List  
Diagnosis Code  Stroke (cerebrum) ([facility]) I63.9  Stroke ([facility] I63.9  Rhabdomyolysis M62.82  
 Dehydration E86.0  
 Essential hypertension I10  
 Diabetes mellitus type 2, controlled ([facility] E11.9  Non compliance w medication regimen Z91.14  
 DM (diabetes mellitus) ([facility] E11.9  History of stroke Z80.78  
 Acute-on-chronic kidney injury ([facility] N17.9, N18.9  Acute cystitis N30.00  Elevated troponin R74.8  Elevated LFTs R94.5  Cardiac arrest with ventricular fibrillation ([facility]) I46.9, I49.01  
 Acute pancreatitis K85.90  
 Ischemic encephalopathy I67.89  
 Hypoalbuminemia E88.09  
 Shingles B02.9 Pneumonia of both lower lobes due to infectious organism ([facility] [J18.1] ESRD (end stage renal disease) ([facility] [N18.6] ESRD (end stage renal disease) ([facility] [N18.6] Reason patient intubated:Acute respiratory failure, Airway protection Ventilator day:24 Ventilator settings: ACVc+ rate 10 Vt-500, PEEP-5, FIO2-30% Trach size/Placement:# 8 Port[place], ABG: 
Date:10/22/2018 No results found for: PH, PHI, PCO2, PCO2I, PO2, PO2I, HCO3, HCO3I, FIO2, FIO2I Chest X-ray: 
Date:10/22/2018 Results from [facility] Encounter encounter on 09/26/18 XR CHEST PORT Narrative EXAM: XR CHEST PORT 
 
CLINICAL INDICATION/HISTORY: respiratory failure, s/p tracheostomy 
-Additional: None COMPARISON: 10/17/2018 TECHNIQUE: Frontal view of the chest 
 
_______________ FINDINGS: 
 
HEART AND MEDIASTINUM: Significant rightward rotation. Right IJ catheter 
position unchanged. Tracheostomy noted. Grossly stable cardiomediastinal 
silhouette. LUNGS AND PLEURAL SPACES: Evidence of small right-sided pleural effusion. Improved aeration in the right lung since most recent prior, appearance similar 
to 10/16/2018. No acute opacities on the left. BONY THORAX AND SOFT TISSUES: Unremarkable. 
 
_______________ Impression IMPRESSION: 
 
Improved right pulmonary aeration in the interval. Probable small right-sided 
effusion. Lab Test: 
Date:10/22/2018 WBC:  
Lab Results Component Value Date/Time WBC 11.7 10/22/2018 04:42 AM  
HGB:  
Lab Results Component Value Date/Time HGB 7.6 (L) 10/22/2018 04:42 AM  
 PLTS:  
Lab Results Component Value Date/Time PLATELET 478 (H) 52/70/9787 04:42 AM  
 
 
SaO2%/flow: @WYZYJSN6(1)@ Vital Signs:    
Patient Vitals for the past 8 hrs: 
 Temp Pulse Resp BP SpO2  
10/22/18 0855  92 25  95 % 10/22/18 0845  92 16  97 % 10/22/18 0700  93 16 169/70 97 % 10/22/18 0600  94 15 163/72 96 % 10/22/18 0500  95 18 155/71 96 % 10/22/18 0435  96 17  97 % 10/22/18 0400 98.4 °F (36.9 °C) 95 17 160/70 96 % 10/22/18 0300  97 17 152/68 97 % 10/22/18 0240  95 20 142/67 98 % 10/22/18 0209  90  196/87   
10/22/18 0200  90 16 196/87 98 % Wean Screen Pass (Yes or No): Yes Wean Screen Reason for Failure:n/a Duration of Weaning Trial: 
Additional Comments: PLAN OF CARE: Wean as tolerated GOAL: Trach collar OUTCOME:

## 2018-10-22 NOTE — PROGRESS NOTES
LTAC stated [facility] physician has stated patient fever has prevented the patient to be accepted to LTAC. Patient has remained afebrile throughout the weekend. Clinical information updated to [facility] facility. Discharge plan is for LTAC. SBT attempted, failed . Breanna Cleveland is reviewing patient for possible admission to [facility]. I receivied a message from Anahi from Breanna Cleveland. She stated the patient needs 3 SBT and will need to be trached for 21 days on the ventilator before [facility] will consider authorization for admision.  Francisco Tiwari RN

## 2018-10-22 NOTE — PROGRESS NOTES
Infectious Disease Follow-up Admit Date: 9/26/2018 Current abx Prior abx  
vanco 9/27-25,  ACV 10/17 - 5 Zosyn 9/27-7, Meropenem/flucon 10/4- 11, Assessment ->Rec:  
 
Leukocytosis/SIRS poss sepsis - MOF multiple ID and non-infectious concerns outlined below, complicated, wbc 14.3G today from high 27K+ on 10/5 On  vancomycin/zosyn since 9/27 
- C diff neg 9/29, sput C albicans, repeat CT 10/4 no new findings --cxr reviewed - increased atx rt base. Left base improved 
- Higher fever 10/16 101.2, 10/18 101.4 afebrile since 
- Ddx: HZV vs deep tissue injury of toe/buttock vs Line infection 
- blcx 10/16 NG x 2 
- TDC Tip 10/19 >15 CFU MRSE  
- WBC is normal, no change clinically - Afebrile > 3 days now  -> dc Vancomycin 
-> cont on Acyclovir as below Herpes Zoster R arm, C 6 (?C5) dermatome 
- vesicular lesions onset ~10/15 progressing compared to onset per NP 
- confluent in upper arm 
- still with new vesicles on forearm 10/22 -> cont Acyclovir renal dosing (5 mg/kg q 24h) x 10 days (5 more) at least 
  
Suspected Pyelo POA - CT B perineph stranding, UA tntc wbc, uctx ng 
- treated at this point Cholelithiasis choledocholithiais suspected acute cholecystitis  poss cystic stone POA- abnl lft bili 4 alk phos 400 seen by GI and GS Dr. Eduard Hernandez, no plan for OR, for poss cholecystostomy if LFT worsen, bili, alk phos declining  -> LFts increasing/stably elevated  
-> GI and Sx were following and if continues to rise- will need US but can be just from being in ICU for so long Poss pancreatitis  
- lipase 2200 POA now 3191with high of  5500 on 10/4, reached 1207 on 10/19 but up again to 2169 10/22 interpretation complicated by JULIANNA 
- CTAP 10/4: Normal pancreas ->   
B infiltrates - poss edema infiltrate - RLL consolid better 10/4 cxr and suspect endobronchial clot contributed   ->monitor MRSE bacteremia 9/27 1 of 2.   Has LUE midline unclear when placed, repeat bctx's IP today  ->repeat bctx's ntd  
-> treated by now JULIANNA POA dialysis dependent 
- baseline cr 0.9 132/10.1 in ER 
-RIJ uldall 9/28 out 10/19 - R fem uldall 10/20 -> per renal  
Bleeding -melena earlier, EGD 10/2 clot in esophagus bronch 10/3 R endobronch clot NEW removed 10/5 repeat bronch  ->per others Suspected anoxic brain injury on MRI 10/3 SP VF arrest 9/29 -> overall poor prognosis but family wishes aggressive care CVA R hemiparesis 9/10   
resp failure sp intubation 9/29 Comorbid: HTN HLD CHF h/o steatohepatitis MICROBIOLOGY:  
9/27 bctx 1 of 2 MRSE 
 uctx ng 
9/29 sput C albicans C diff neg 10/4 bctx x 2 NTD 
 fungitell indeterminate due to contamination 10/8 Cath tip cx ntd 10/8     bl cx ngtd 10/16 Blcx x2 ntd Spcx NF 
 
LINES AND CATHETERS:  
PIV x 2 RIJ uldall 9/28 Left PICC 10/18 Active Hospital Problems Diagnosis Date Noted  Hypoalbuminemia 10/21/2018  Shingles 10/21/2018  Ischemic encephalopathy 09/30/2018  Cardiac arrest with ventricular fibrillation ([facility] 09/29/2018 ROSC before defibrillation could be attempted.  Acute pancreatitis 09/29/2018 Shock, leukocytosis, elevated lipase but radiographically no ductal dilatation in pancreas. GB stones without radiographic stigmata of acute cholecystitis  Elevated LFTs 09/28/2018  History of stroke 09/27/2018 With residual R hemiparesis  Acute-on-chronic kidney injury ([address] 09/27/2018  Acute cystitis 09/27/2018  Elevated troponin 09/27/2018  Essential hypertension 09/10/2018  Diabetes mellitus type 2, controlled ([facility] 09/10/2018 Subjective: Interval notes reviewed. Afebrile x 3 days now. Remains unresponsive in ICU. D/w Dr. Ban Brown / ICU team. 
 
Current Facility-Administered Medications Medication Dose Route Frequency  multivitamin (MULTI-DELYN, WELLESSE) oral liquid 30 mL  30 mL Oral DAILY  albuterol (PROVENTIL VENTOLIN) nebulizer solution 2.5 mg  2.5 mg Nebulization BID  insulin glargine (LANTUS) injection 23 Units  23 Units SubCUTAneous DAILY  bacitracin 500 unit/gram packet 1 Packet  1 Packet Topical PRN  
 sodium chloride (NS) flush 10-30 mL  10-30 mL InterCATHeter PRN  
 sodium chloride (NS) flush 10 mL  10 mL InterCATHeter Q24H  
 sodium chloride (NS) flush 10 mL  10 mL InterCATHeter PRN  
 sodium chloride (NS) flush 10-40 mL  10-40 mL InterCATHeter Q8H  
 sodium chloride (NS) flush 20 mL  20 mL InterCATHeter Q24H  
 heparin (porcine) injection 5,000 Units  5,000 Units SubCUTAneous Q8H  
 acyclovir sodium (ZOVIRAX) 330.5 mg in 0.9% sodium chloride 100 mL IVPB  5 mg/kg (Ideal) IntraVENous Q24H  
 vancomycin (VANCOCIN) 1,000 mg in 0.9% sodium chloride (MBP/ADV) 250 mL adv  1,000 mg IntraVENous Q TU, TH & SAT  hydroxypropyl methylcellulose (ISOPTO TEARS) 0.5 % ophthalmic solution 2 Drop  2 Drop Both Eyes BID  epoetin manda (EPOGEN;PROCRIT) injection 40,000 Units  40,000 Units IntraVENous Q7D  
 pantoprazole (PROTONIX) 40 mg in sodium chloride 0.9% 10 mL injection  40 mg IntraVENous Q24H  
 0.9% sodium chloride infusion 250 mL  250 mL IntraVENous PRN  
 influenza vaccine 2018-19 (6 mos+)(PF) (FLUARIX QUAD/FLULAVAL QUAD) injection 0.5 mL  0.5 mL IntraMUSCular PRIOR TO DISCHARGE  acetaminophen (TYLENOL) solution 325 mg  325 mg Per NG tube Q4H PRN  
 0.9% sodium chloride infusion 250 mL  250 mL IntraVENous PRN  
 heparin (porcine) pf 100 Units  100 Units InterCATHeter PRN  
 0.9% sodium chloride infusion 250 mL  250 mL IntraVENous PRN  
 insulin lispro (HUMALOG) injection   SubCUTAneous Q6H  
 heparin (porcine) 1,000 unit/mL injection 1,000 Units  1,000 Units InterCATHeter DIALYSIS PRN  
 VANCOMYCIN INFORMATION NOTE   Other Rx Dosing/Monitoring  senna-docusate (PERICOLACE) 8.6-50 mg per tablet 1 Tab  1 Tab Oral BID PRN  
  ondansetron (ZOFRAN) injection 4 mg  4 mg IntraVENous Q4H PRN  
 glucose chewable tablet 16 g  4 Tab Oral PRN  
 glucagon (GLUCAGEN) injection 1 mg  1 mg IntraMUSCular PRN  
 dextrose (D50) infusion 12.5-25 g  25-50 mL IntraVENous PRN  
 hydrALAZINE (APRESOLINE) 20 mg/mL injection 20 mg  20 mg IntraVENous Q6H PRN Objective:  
 
Visit Vitals /70 Pulse 92 Temp 98.4 °F (36.9 °C) Resp 25 Ht 5' 7\" (1.702 m) Wt 88 kg (194 lb 0.1 oz) SpO2 95% BMI 30.39 kg/m² Temp (24hrs), Av.7 °F (37.1 °C), Min:98.4 °F (36.9 °C), Max:99.4 °F (37.4 °C) GEN: unresponsive male on vent in ICU HENT: no thrush Neck: tracheostomy in place CHEST: coarse bs B no wheeze or rhonchi CVS: RRR, no murmur appreciated ABD: soft, + BS no localized tenderness, PEG in place EXT: anasarca with 3+ pitting edema b/l UE rt >lt SKIN: thick fluid filled multiple blisters on erythematous base on rt arm and forearm, honey color dried drainage around few blisters, become confluent on upper arm, umbilication and crusting - suspect HZ C6 dermatome 
deep tissue injury rt heel and sacral area, left great toe with dark, gangrenous changes ?embolic CNS:unresponsive, rt hemiparesis Labs: Results:  
Chemistry Recent Labs 10/22/18 
0442 10/21/18 
0430 10/20/18 
0330 * 143* 136*  140 140  
K 4.7 4.4 5.1  102 102 CO2 27 27 28 BUN 57* 38* 58* CREA 4.95* 3.52* 5.17* CA 7.4* 7.3* 7.4* AGAP 11 11 10 BUCR 12 11* 11* * 324* 303* TP 7.4 7.3 7.0 ALB 1.7* 1.7* 1.6*  
GLOB 5.7* 5.6* 5.4* AGRAT 0.3* 0.3* 0.3* CBC w/Diff Recent Labs 10/22/18 
0442 10/21/18 
0430 10/20/18 
0330 WBC 11.7 10.4 10.6 RBC 2.69* 2.62* 2.56* HGB 7.6* 7.5* 7.1*  
HCT 24.4* 23.9* 23.5*  
* 474* 457* RADIOLOGY: Reviewed 10/11 Increasing atelectasis/infiltrate at the right base with resolution of airspace 
disease at the left base. 10/10 cxr  Improved aeration of the right mid and lower lung with mild residual 
atelectatic opacities noted. Small region of opacity at the left lung base may 
reflect underlying atelectasis versus airspace disease. 10/4 CTAP IMPRESSION: 
1. Dense subtotal or total consolidation right lower lobe compatible with 
pneumonia similar to prior study. Small bilateral pleural effusions similar in 
size. 2. Distended gallbladder with gallstones and gallbladder sludge without 
significant change in appearance and also described in detail on ultrasound of 
9/27/2018. 3. Indeterminate small lesion left hepatic lobe without change. Hepatomegaly 
again evident. 4. No intraperitoneal fluid. Possible small left upper pole renal cyst. 
Cardiomegaly. Nasogastric tube tip in body of stomach. cxr 10/5 Impression: 1. Endotracheal tube, visualized nasogastric tube, and right IJ central venous 
catheter in stable position. 2. Overall improved aeration of the right lower lobe, likely improved 
atelectasis with mild residual atelectasis/airspace disease. 3. Mild interstitial edema overall similar to prior. Microbiology Results All Micro Results Procedure Component Value Units Date/Time CULTURE, CATHETER TIP [[id number]]  (Abnormal)  (Susceptibility) Collected:  10/19/18 1330 Order Status:  Completed Specimen:  Catheter Tip Updated:  10/22/18 7140 Special Requests: NO SPECIAL REQUESTS Culture result:    
  >15 CFU STAPHYLOCOCCUS EPIDERMIDIS  
     
 CULTURE, BLOOD [[id number]] Collected:  10/16/18 1055 Order Status:  Completed Specimen:  Blood Updated:  10/22/18 0809 Special Requests: PERIPHERAL Culture result: NO GROWTH 6 DAYS     
 CULTURE, BLOOD [[id number]] Collected:  10/16/18 1047 Order Status:  Completed Specimen:  Blood Updated:  10/22/18 0809 Special Requests: PERIPHERAL Culture result: NO GROWTH 6 DAYS     
 HSV 1 AND 2 BY PCR [[id number]] Collected:  10/18/18 3485 Order Status:  Completed Specimen:  Other from Miscellaneous sample Updated:  10/21/18 1636 Source OTHER TEST     
  HSV-1 DNA by PCR NEGATIVE      
  HSV-2 DNA by PCR NEGATIVE Comment: (NOTE) This test was developed and its performance characteristics 
determined by [facility]. It has not been cleared or 
approved by the U.S. Food and Drug Administration. The FDA has 
determined that such clearance or approval is not necessary. This 
test is used for clinical purposes. It should not be regarded as 
investigational or research. Performed At: [address] [id number] Melissa Thibodeaux MD CU:[id number] CULTURE, RESPIRATORY/SPUTUM/BRONCH Cris Goes [[id number]] Collected:  10/16/18 1915 Order Status:  Completed Specimen:  Sputum,ET Suction Updated:  10/18/18 1011 Special Requests: NO SPECIAL REQUESTS     
  GRAM STAIN 10-25 WBC/lpf     
   <10 EPI/lpf FEW GRAM POSITIVE COCCI IN PAIRS MODERATE GRAM POSITIVE COCCI IN GROUPS MUCUS PRESENT Culture result: MODERATE NORMAL RESPIRATORY SHANAE  
     
 CULTURE, BLOOD [[id number]] Collected:  10/08/18 1227 Order Status:  Completed Specimen:  Blood Updated:  10/14/18 0741 Special Requests: PERIPHERAL Culture result: NO GROWTH 6 DAYS     
 CULTURE, CATHETER TIP [[id number]] Collected:  10/08/18 1215 Order Status:  Completed Specimen:  Catheter Tip Updated:  10/11/18 0648 Special Requests: NO SPECIAL REQUESTS Culture result: NO GROWTH 3 DAYS     
 CULTURE, BLOOD [[id number]] Collected:  10/04/18 6114 Order Status:  Completed Specimen:  Blood Updated:  10/10/18 4660 Special Requests: PERIPHERAL Culture result: NO GROWTH 6 DAYS     
 CULTURE, BLOOD [[id number]] Collected:  10/04/18 1120 Order Status:  Completed Specimen:  Blood Updated:  10/10/18 5639   Special Requests: PERIPHERAL     
 Culture result: NO GROWTH 6 DAYS     
 CULTURE, BLOOD [[id number]] Collected:  09/27/18 0005 Order Status:  Completed Specimen:  Blood Updated:  10/03/18 0845 Special Requests: NO SPECIAL REQUESTS Culture result: NO GROWTH 6 DAYS     
 CULTURE, BLOOD [[id number]]  (Abnormal)  (Susceptibility) Collected:  09/27/18 0136 Order Status:  Completed Specimen:  Blood Updated:  10/02/18 6264 Special Requests: NO SPECIAL REQUESTS     
  GRAM STAIN PEDIATRIC BOTTLE GRAM POSITIVE COCCI IN PAIRS IN CLUSTERS  
      
  SMEAR CALLED TO AND CORRECTLY REPEATED BY: Giana Wallace RN IN 2700 ON 9/29/18 AT 2033 WF Culture result:    
  AEROBIC BOTTLE STAPHYLOCOCCUS EPIDERMIDIS  
     
 CULTURE, RESPIRATORY/SPUTUM/BRONCH [place] STAIN [[id number]]  (Abnormal) Collected:  09/29/18 1210 Order Status:  Completed Specimen:  Sputum from Endotracheal aspirate Updated:  10/02/18 1216 Special Requests: NO SPECIAL REQUESTS     
  GRAM STAIN >25 WBC/lpf     
   <10 EPI/lpf MUCUS PRESENT     
   MODERATE YEAST Culture result: MANY CANDIDA TROPICALIS C. DIFFICILE/EPI PCR [[id number]] Collected:  09/29/18 1400 Order Status:  Completed Specimen:  Stool Updated:  09/29/18 2248 Special Requests: NO SPECIAL REQUESTS Culture result: Toxigenic C. difficile NEGATIVE                         C. difficile target DNA sequences are not detected. CULTURE, URINE [[id number]] Collected:  09/27/18 0029 Order Status:  Completed Specimen:  Cath Urine Updated:  09/29/18 0944 Special Requests: NO SPECIAL REQUESTS Culture result: NO GROWTH 2 DAYS Adrianne Garcia MD, Opal Puri October 22, 2018 [facility] [phone]

## 2018-10-22 NOTE — PROGRESS NOTES
-0840-Received patient on ventilator ACVC+ rate-10, VT-500, PEEP-5, FIO2-30%. O2 Sats-97% HR-91, RR-18. # 8 Portex trach noted to be patent and secure. Spare Trachs and [place] with mask noted at bedside. Respiratory will continue to monitor. -[facility] 
 
-0855-Pt. Placed on SBT per protocol PS-7, PEEP-5, FIO2-30%. O2 Sats-95% HR-91, Rr-25, RSBI-71. Respiratory will continue to monitor. -[facility] 
 
-115-SBT ended at this time to rest. Pt. Noted to have increased WOB rr-34, [id number]. Pt. Returned to full support ACV rate-10, Vt-500, PEEP-5, FIO2-30%. O2 sats-97% HR-91, RR-16. Respiratory will continue to monitor. -[address] 
 
-1605-Pt. Remains on above settings. O2 sats-100% HR-90, RR-21. Trach care performed. Inner cannula changed. Roe Castro remains patent and secure. -[address]

## 2018-10-22 NOTE — DIABETES MGMT
NUTRITIONAL RE-ASSESSMENT GLYCEMIC CONTROL/ PLAN OF CARE Charlie Bhatia           57 y.o.           9/26/2018 1. Acute renal failure, unspecified acute renal failure type ([facility] 2. Acute UTI 3. Cholelithiasis with choledocholithiasis 4. History of CVA (cerebrovascular accident) 5. ESRD (end stage renal disease) ([facility] DM INTERVENTIONS/PLAN:  
Full strength Osmolite 1.2 calorie TF was on hold since 2100 10/21 r/t RN concern for aspiration. ASSESSMENT:  
Nutritional Status: 
Pt is overweight related to excess caloric intake as evidenced by 135% ideal weight and BMI= 28.3kg/m2. Pt meets criteria for obesity. Altered nutrition-related lab values RT DX. Diabetes Mellitus  related to lack of adherence to nutrition and medication recommendations as evidenced by A1c of 10% Altered nutrition-related lab values RT DX. Acute renal failure aeb requiring dialysis. Pt w/hypoalbuminemia as evidenced by albumin=1.7 mg/dl and prealbumin 19.4. Pt with DT injury on heels, multiple blisters on arms and increased stage 2  area on sacrum noted. Altered nutrition-related lab values AEB increased lipase 2169 even with TF off 6hrs before am lab. Diabetes Management:  BG well controlled on current insulin, with decrease in TF rec decrease Lantus to 18 units/d. Recent blood glucose:    
 
Lab Results Component Value Date/Time  (H) 10/22/2018 04:42 AM  
 GLUCPOC 109 10/22/2018 01:40 PM  
 GLUCPOC 174 (H) 10/21/2018 06:17 PM  
 
Within target range (non-ICU: <140; ICU<180): [x] Yes   []  No 
 
Current Insulin regimen: 23units Lantus/24 hrs plus VIR corrective lispro Home medication/insulin regimen:  
Key Antihyperglycemic Medications   
    
  
 insulin glargine (LANTUS) 100 unit/mL injection 10 units qhs  Indications: type 2 diabetes mellitus  
 insulin lispro (HUMALOG) 100 unit/mL injection 4 units with meals HbA1c: 10% equivalent  to ave Blood Glucose of 240mg/dl for 2-3 months prior to admission Adequate glycemic control PTA:  [] Yes  [x] No 
  
SUBJECTIVE/OBJECTIVE: Information obtained from: ICU rounds/pt is on a vent Diet:  Plan at ICU  rounds to decrease Osmolite1.2 calorie TF to  30 ml/hr x 2 days No data found. Medications: [x]                Reviewed Labs:  
Lab Results Component Value Date/Time Hemoglobin A1c 10.0 (H) 09/30/2018 04:18 AM  
 
Lab Results Component Value Date/Time Sodium 140 10/22/2018 04:42 AM  
 Potassium 4.7 10/22/2018 04:42 AM  
 Chloride 102 10/22/2018 04:42 AM  
 CO2 27 10/22/2018 04:42 AM  
 Anion gap 11 10/22/2018 04:42 AM  
 Glucose 128 (H) 10/22/2018 04:42 AM  
 BUN 57 (H) 10/22/2018 04:42 AM  
 Creatinine 4.95 (H) 10/22/2018 04:42 AM  
 Calcium 7.4 (L) 10/22/2018 04:42 AM  
 Magnesium 3.8 (H) 09/29/2018 11:10 AM  
 Phosphorus 5.2 (H) 10/13/2018 03:41 AM  
 Albumin 1.7 (L) 10/22/2018 04:42 AM  
prealbumin 19.4 increased from 10.5  initially Anthropometrics: IBW : 69.9 kg (154 lb), % IBW (Calculated): 134.85 %, BMI (calculated): 30.4 Wt Readings from Last 1 Encounters:  
10/22/18 88 kg (194 lb 0.1 oz) Ht Readings from Last 1 Encounters:  
10/22/18 5' 7\" (1.702 m) Estimated Nutrition Needs:  1880 Kcals/day, Protein (g): 85 g Fluid (ml): 1800 ml Based on:   [x]          Actual BW    []          ABW   []            Adjusted BW   
Nutrition Diagnoses: as stated above Nutrition Interventions: TF Added multivit/mineralwith wounds Goal:  
Blood glucose will be within target range of  mg/dL by 10/25 Intake will meet >75% of energy and protein requirements by 10/27. Gradual weight loss post discharge 1 lb per week by 11/6. Nutrition Monitoring and Evaluation []     Monitor po intake on meal rounds 
[x]     Continue inpatient monitoring and intervention 
[]     Other: Nutrition Risk:  [x]   High     []  Moderate    []  Minimal/Uncompromised Leanor Merrill, RD 
pgr [phone]

## 2018-10-22 NOTE — PROGRESS NOTES
[facility] Pulmonary, Critical Care, and Sleep Medicine Critical Care Progress Note Name: Scott Dong : 1955 MRN: [id number] Date: 10/22/2018 [x] I have reviewed the flowsheet and previous days notes. Events, vitals, medications and notes from last 24 hours reviewed. Care plan discussed on multidisciplinary rounds. My assessment, plan of care, findings, medications, side effects etc were discussed with: 
[x] Nurse [] PT/OT [x] Respiratory therapy [x] Dr. Barbi Norton, Dr. Gabriele Hurd  
[] Family [] Patient: answered all questions to satisfaction  
[x] Pharmacist [] ASSESSMENT/PLAN:  
Principal Problem: 
  Cardiac arrest with [facility] fibrillation [facility] (2018) Overview: ROSC before defibrillation could be attempted. Active Problems: 
  Acute pancreatitis (2018) Overview: Shock, leukocytosis, elevated lipase but radiographically no ductal  
    dilatation in pancreas. GB stones without radiographic stigmata of acute  
    cholecystitis Diabetes mellitus type 2, controlled ([facility] (9/10/2018) Acute-on-chronic kidney injury ([facility] (2018) Ischemic encephalopathy (2018) Hypoalbuminemia (10/21/2018) Shingles (10/21/2018) Elevated LFTs (2018) Essential hypertension (9/10/2018) History of stroke (2018) Overview: With residual R hemiparesis Acute cystitis (2018) Elevated troponin (2018) · HD per Nephrology. · Straight cath urinary bladder q 12 hr. 
· Decrease TF. Monitor lipase. Repeat LFT and lipase in am. 
· Add MVI to TF. · Continue acyclovir per ID. · Inhaled therapy: albuterol · Renal dosing of medications. NUTRITION: Will decrease Osmolite 1.2 to 30 mL/hr to see if it impacts his lipase. Check prealbumin q week. Consult Clinical Dietician. ICU electrolyte replacement protocol PROPHYLAXIS: 
 DVT prophylaxis: heparin GI prophylaxis: Protonix HOB 30-degree elevation Chlorhexidine mouth washes CODE STATUS: FULL CODE Further recommendations will be based on the patient's response to recommended treatment and results of the investigation ordered. DISPOSITION: 
 
The patient is: [x] acutely ill Risk of deterioration: [] moderate [x] critically ill  [x] high  
 
[x]See my orders for details [x] The patient is unable to give any meaningful history or review of systems because the patient is: 
[x] Intubated [] Sedated  
[x] Unresponsive [] [x]The patient is critically ill on     
[x] Mechanical ventilation [x] Vasoactive agents  
[] BiPAP [] Inotropes SUBJECTIVE 
- This 57 y.o.  male is seen in consultation at the request of Dr. Juanita Vigil for recommendations on further evaluation and management of acute hypoxia. He hospitalized (9/10/2018 - 9/14/2018) for stroke with neurologic residua (R hemiparesis). Patient discharged from [facility] to [facility] on 9/14/2018, only to return on 9/26/2018 with acute renal failure/abnormal lab values. He experienced VT/VF arrest during my initial clinical assessment. The patient has undergone tracheostomy and percutaneous gastrostomy. He has a R femoral PermCath for HD access. - Overnight events: ~ 500 mL urine output via straight cath. Lipase 2000+. Remains on mechanical ventilatory support. Not following commands. Still has active shingles lesions that have not yet dried. On vancomycin. [x] Nutrition: Osmolite 1.2 @ 70 mL/hr 
[x] BM today. ROS: Review of systems not obtained due to patient factors. Past Medical History:  
Diagnosis Date  CHF (congestive heart failure) ([facility]  Diabetes ([facility]  Stroke ([facility] No Known Allergies Current Facility-Administered Medications:  
  albuterol (PROVENTIL VENTOLIN) nebulizer solution 2.5 mg, 2.5 mg, Nebulization, BID, Jones Ackerman MD, 2.5 mg at 10/22/18 0845 
  insulin glargine (LANTUS) injection 23 Units, 23 Units, SubCUTAneous, DAILY, Joe Bingham MD, 23 Units at 10/21/18 6968   bacitracin 500 unit/gram packet 1 Packet, 1 Packet, Topical, PRN, Tyron, Ninoska Flaherty MD 
  sodium chloride (NS) flush 10-30 mL, 10-30 mL, InterCATHeter, PRN, Jones Ackerman MD, 30 mL at 10/21/18 0430 
  sodium chloride (NS) flush 10 mL, 10 mL, InterCATHeter, Q24H, Jones Ackerman MD, 10 mL at 10/21/18 1633   sodium chloride (NS) flush 10 mL, 10 mL, InterCATHeter, PRN, Kelsea Higuera MD, 10 mL at 10/20/18 8684   sodium chloride (NS) flush 10-40 mL, 10-40 mL, InterCATHeter, Q8H, Jones Ackerman MD, 10 mL at 10/22/18 0610 
  sodium chloride (NS) flush 20 mL, 20 mL, InterCATHeter, Q24H, Jones Ackerman MD, 20 mL at 10/21/18 1633   heparin (porcine) injection 5,000 Units, 5,000 Units, SubCUTAneous, Q8H, Jones Ackerman MD, 5,000 Units at 10/22/18 0609 
  acyclovir sodium (ZOVIRAX) 330.5 mg in 0.9% sodium chloride 100 mL IVPB, 5 mg/kg (Ideal), IntraVENous, Q24H, Dejan Curtis MD, 330.5 mg at 10/21/18 1308   vancomycin (VANCOCIN) 1,000 mg in 0.9% sodium chloride (MBP/ADV) 250 mL adv, 1,000 mg, IntraVENous, Q TU, TH & SAT, Schwab, J. Judd Carrie, MD, [address] at 10/21/18 0300   hydroxypropyl methylcellulose (ISOPTO TEARS) 0.5 % ophthalmic solution 2 Drop, 2 Drop, Both Eyes, BID, Lakesha Gonzales NP, 2 Drop at 10/21/18 1820   epoetin manda (EPOGEN;PROCRIT) injection 40,000 Units, 40,000 Units, IntraVENous, Q7D, Poli Estrada MD, 40,000 Units at 10/17/18 1621 
  pantoprazole (PROTONIX) 40 mg in sodium chloride 0.9% 10 mL injection, 40 mg, IntraVENous, Q24H, Albert Ruiz MD, 40 mg at 10/21/18 0858 
  0.9% sodium chloride infusion 250 mL, 250 mL, IntraVENous, PRN, Raymundo Richmond DO 
  influenza vaccine 2018-19 (6 mos+)(PF) (FLUARIX QUAD/FLULAVAL QUAD) injection 0.5 mL, 0.5 mL, IntraMUSCular, PRIOR TO DISCHARGE, Vini Sun MD 
  acetaminophen (TYLENOL) solution 325 mg, 325 mg, Per NG tube, Q4H PRN, Vini Sun MD, 325 mg at 10/18/18 2130 
  0.9% sodium chloride infusion 250 mL, 250 mL, IntraVENous, PRN, Jefferson Chavarria H, DO 
  heparin (porcine) pf 100 Units, 100 Units, InterCATHeter, PRN, Ria Estrada MD 
  0.9% sodium chloride infusion 250 mL, 250 mL, IntraVENous, PRN, Vini Sun MD 
  insulin lispro (HUMALOG) injection, , SubCUTAneous, Q6H, Anni Orosco DO, Stopped at 10/22/18 0000   heparin (porcine) 1,000 unit/mL injection 1,000 Units, 1,000 Units, InterCATHeter, DIALYSIS PRN, Anatoliy Black MD, 1,000 Units at 09/28/18 1332   VANCOMYCIN INFORMATION NOTE, , Other, Rx Dosing/Monitoring, Beck Downey MD 
  senna-docusate (PERICOLACE) 8.6-50 mg per tablet 1 Tab, 1 Tab, Oral, BID PRN, McQuain, Alfred A, DO 
  ondansetron (ZOFRAN) injection 4 mg, 4 mg, IntraVENous, Q4H PRN, McQuain Alfred A, DO, 4 mg at 09/28/18 0539 
  glucose chewable tablet 16 g, 4 Tab, Oral, PRN, McQuain, Alfred A, DO 
  glucagon (GLUCAGEN) injection 1 mg, 1 mg, IntraMUSCular, PRN, McQuain, Alfred A, DO 
  dextrose (D50) infusion 12.5-25 g, 25-50 mL, IntraVENous, PRN, McQuain, Alfred A, DO, 25 g at 10/09/18 1906   hydrALAZINE (APRESOLINE) 20 mg/mL injection 20 mg, 20 mg, IntraVENous, Q6H PRN, Meg Connolly NP, 20 mg at 10/22/18 0209 OBJECTIVE Vital Signs:   
Patient Vitals for the past 24 hrs: 
 Temp Pulse Resp BP SpO2  
10/22/18 0855  92 25  95 % 10/22/18 0845  92 16  97 % 10/22/18 0700  93 16 169/70 97 % 10/22/18 0600  94 15 163/72 96 % 10/22/18 0500  95 18 155/71 96 % 10/22/18 0435  96 17  97 % 10/22/18 0400 98.4 °F (36.9 °C) 95 17 160/70 96 % 10/22/18 0300  97 17 152/68 97 % 10/22/18 0240  95 20 142/67 98 % 10/22/18 0209  90  196/87   
10/22/18 0200  90 16 196/87 98 % 10/22/18 0100  90 17 184/87 98 % 10/22/18 0000 98.6 °F (37 °C)      
10/21/18 2300  95 18 172/72 98 % 10/21/18 2200  96 18 147/69 96 % 10/21/18 2100  95 17 130/56 96 % 10/21/18 2009  91 17  97 % 10/21/18 2000 98.4 °F (36.9 °C) 93 16 133/61 96 % 10/21/18 1900  93 18 126/57 97 % 10/21/18 1800  93 17 126/59 96 % 10/21/18 1700  93 17 124/60 96 % 10/21/18 1624  93 18  96 % 10/21/18 1600 99.4 °F (37.4 °C) 94 19 134/59 96 % 10/21/18 1500  93 18 175/76 96 % 10/21/18 1400  89 18 129/61 96 % 10/21/18 1300  89 17 119/59 96 % 10/21/18 1247  89 17  97 % 10/21/18 1200  90 17 139/64 97 % 10/21/18 1100  91 15 145/67 96 % 10/21/18 1000  95 16 146/64 96 % O2 Device: Tracheostomy, Ventilator O2 Flow Rate (L/min): 45 l/min Temp (24hrs), Av.7 °F (37.1 °C), Min:98.4 °F (36.9 °C), Max:99.4 °F (37.4 °C) PHYSICAL EXAM:  
General: intubated. Not on sedation. Does not follow commands. Head: atraumatic, normocephalic Eye: L gaze preference Nose: Nares are patent. No exudate. Throat: No oral thrush. No exudate. Mucous membranes are moist. 
Neck: tracheostomy in situ. no thyromegaly, no JVD, no lymphadenopathy. Trachea midline Lung: Symmetric in development and expansion. Good air entry. Heart: Regular S1, S2 without murmur, rub or gallop. Abdomen: PEG in situ. soft, nontender, no ndistended. Normoactive bowel sounds. No rebound. No guarding. Extremities: no pedal edema, no clubbing, 2+ peripheral pulses in DP. L great toe ischemic changes/dry gangrene. Lymphatic: no cervical/axillary/inguinal lymphadenopathy Neurologic: R facial droop. Withdraws to pain @ B LE. Demonstrating spontaneous eye movement, blinking and R LE movement. Doll's eyes intact. Corneal intact. Cough/gag intact. Spontaneous respirations intact. L LE triple flexion response to plantar dorsiflexion. DTR 1+ @ B biceps and B patellar tendons. Skin: umbilicated bullous lesions involving the R shoulder and R arm. Buttocks DTI 
DATA:  
 
Intake/Output:  
Last shift:      No intake/output data recorded. Last 3 shifts: 10/20 1901 - 10/22 0700 In: 0897 [I.V.:380] Out: 8005 [Urine:576] Intake/Output Summary (Last 24 hours) at 10/22/2018 9177 Last data filed at 10/22/2018 0500 Gross per 24 hour Intake 430 ml Output 575 ml Net -145 ml Last 3 Recorded Weights in this Encounter 10/16/18 0020 10/21/18 0509 10/22/18 0500 Weight: 82 kg (180 lb 12.8 oz) 91.2 kg (201 lb 1.6 oz) 88 kg (194 lb 0.1 oz) Lines: All central lines examined by me. No signs of erythema, induration, discharge. Peripherally Inserted Central Catheter: PICC Double Lumen 10/18/18 Left;Brachial (Active) Central Line Being Utilized Yes 10/22/2018  4:00 AM  
Criteria for Appropriate Use Irritant/vesicant medication 10/22/2018  4:00 AM  
Site Assessment Clean, dry, & intact 10/22/2018  4:00 AM  
Phlebitis Assessment 0 10/22/2018  4:00 AM  
Infiltration Assessment 0 10/22/2018  4:00 AM  
Arm Circumference (cm) 38 cm 10/21/2018  4:00 PM  
Date of Last Dressing Change 10/18/18 10/22/2018  4:00 AM  
Dressing Status Clean, dry, & intact 10/22/2018  4:00 AM  
Action Taken Blood drawn 10/22/2018  4:44 AM  
External Catheter Length (cm) 0 centimeters 10/21/2018  4:00 PM  
Dressing Type Disk with Chlorhexadine gluconate (CHG); Stabilization/securement device;Transparent 10/22/2018  4:00 AM  
Hub Color/Line Status Red;Flushed;Patent;Cap end changed; Capped 10/22/2018  4:44 AM  
Positive Blood Return (Site #1) Yes 10/22/2018  4:44 AM  
Hub Color/Line Status Purple;Flushed;Patent;Cap end changed; Capped 10/22/2018  4:44 AM  
Positive Blood Return (Site #2) Yes 10/22/2018  4:44 AM  
Alcohol Cap Used Yes 10/22/2018  4:44 AM  
  
Hemodialysis Catheter: 
Hemodialysis Access 10/19/18 (Active) Central Line Being Utilized Yes 10/22/2018  4:00 AM  
 Criteria for Appropriate Use Dialysis/apheresis 10/22/2018  4:00 AM  
Site Assessment Clean, dry, & intact 10/22/2018  4:00 AM  
Date of Last Dressing Change 10/20/18 10/22/2018  4:00 AM  
Dressing Status Clean, dry, & intact 10/22/2018  4:00 AM  
Dressing Type Disk with Chlorhexadine gluconate (CHG) 10/21/2018  4:00 PM  
Proximal Hub Color/Line Status Capped 10/22/2018  4:00 AM  
Distal Hub Color/Line Status Capped 10/22/2018  4:00 AM  
 
Drain(s): PEG/Gastrostomy Tube 10/17/18 (Active) Site Assessment Clean, dry, & intact 10/22/2018  4:00 AM  
Dressing Status Clean, dry, & intact 10/22/2018  4:00 AM  
G Port Status Clamped 10/22/2018  4:00 AM  
External Catheter Length (cm) 3 centimeters 10/21/2018  4:00 PM  
Action Taken Placement verified (comment) 10/22/2018 12:00 AM  
Drainage Description Other (Comment) 10/21/2018 12:22 AM  
Gastric Residual (mL) 10 ml 10/22/2018  4:00 AM  
Tube Feeding/Formula Options Osmolite 1.2 10/21/2018  8:00 PM  
Tube Feeding/Verify Rate (mL/hr) 0 10/22/2018  4:00 AM  
Water Flush Volume (mL) 0 mL 10/22/2018  4:00 AM  
Intake (ml) 0 ml 10/22/2018  5:00 AM  
Medication Volume 0 ml 10/21/2018  8:00 PM  
Drainage Chamber Level (ml) 0 ml 10/21/2018  8:00 PM  
Output (ml) 0 ml 10/21/2018  8:00 PM  
 
Airway: Airway - Tracheostomy Tube 10/17/18 Portex; Cuffed (Active) Cuff Pressure 30 cmH20 10/17/2018 10:25 AM  
Site Assessment Clean, dry, & intact; Clean 10/22/2018 11:15 AM  
Trach Dressing Changed No 10/22/2018 11:15 AM  
Trach Cleaned With Normal saline 10/22/2018 11:15 AM  
Trach Tie Changed No 10/22/2018 11:15 AM  
Trach Cannula Changed No 10/22/2018 11:15 AM  
Inner Cannula Cuffed, cuff inflated 10/22/2018 11:15 AM  
Line Jl Top of the lock 10/22/2018 11:15 AM  
Side Secured Centered 10/22/2018 11:15 AM  
Spare Trach at Bedside Yes 10/22/2018 11:15 AM  
Spare [place] Tube One Size Smaller at Bedside Yes 10/22/2018 11:15 AM  
Ambu Bag With Mask at Bedside Yes 10/22/2018 11:15 AM  
 
 Vent Date (intubated 9/29/2018, tracheostomy 10/17/2018) Ventilator Settings: 
Ventilator Mode: Assist control, VC+ Respiratory Rate Resp Rate Observed: 25 Back-Up Rate: 10 Insp Time (sec): 1 sec I:E Ratio: 1:5.67 Ventilator Volumes Vt Set (ml): 500 ml Vt Exhaled (Machine Breath) (ml): 504 ml Vt Spont (ml): 298 ml Ve Observed (l/min): 8.48 l/min Ventilator Pressures Pressure Support (cm H2O): 15 cm H2O 
PIP Observed (cm H2O): 22 cm H2O Plateau Pressure (cm H2O): 18 cm H2O 
MAP (cm H2O): 10 PEEP/VENT (cm H2O): 5 cm H20 Auto PEEP Observed (cm H2O): 0 cm H2O Mode Rate Tidal Volume Pressure FiO2 PEEP Assist control, VC+   500 ml  15 cm H2O 30 % 5 cm H20 Peak airway pressure: 22 cm H2O Plateau pressure:    
Tidal volume:   
Minute ventilation: 8.48 l/min SPO2   
 
ARDSNet Guidelines: Lung protective ventilation (VT 6 cc/kg IBW) and Pplat <= 30 Telemetry: [x] Sinus [] A-flutter [] Paced [] A-fib [] Multiple PVCs Imaging: 
[x] I have personally reviewed the patients radiographs 
[] Radiographs reviewed with radiologist 
[x] No CXR study available for review today 
[] No change from prior, tubes and lines are in adequate position 
[] Improved   [] Worsening No results found. Labs: 
Recent Results (from the past 24 hour(s)) GLUCOSE, POC Collection Time: 10/21/18 11:52 AM  
Result Value Ref Range Glucose (POC) 116 (H) 70 - 110 mg/dL GLUCOSE, POC Collection Time: 10/21/18  6:17 PM  
Result Value Ref Range Glucose (POC) 174 (H) 70 - 110 mg/dL CBC W/O DIFF Collection Time: 10/22/18  4:42 AM  
Result Value Ref Range WBC 11.7 4.6 - 13.2 K/uL  
 RBC 2.69 (L) 4.70 - 5.50 M/uL HGB 7.6 (L) 13.0 - 16.0 g/dL HCT 24.4 (L) 36.0 - 48.0 % MCV 90.7 74.0 - 97.0 FL  
 MCH 28.3 24.0 - 34.0 PG  
 MCHC 31.1 31.0 - 37.0 g/dL  
 RDW 15.7 (H) 11.6 - 14.5 % PLATELET 298 (H) 051 - 420 K/uL  MPV 8.7 (L) 9.2 - 16.1 FL  
METABOLIC PANEL, COMPREHENSIVE  
 Collection Time: 10/22/18  4:42 AM  
Result Value Ref Range Sodium 140 136 - 145 mmol/L Potassium 4.7 3.5 - 5.5 mmol/L Chloride 102 100 - 108 mmol/L  
 CO2 27 21 - 32 mmol/L Anion gap 11 3.0 - 18 mmol/L Glucose 128 (H) 74 - 99 mg/dL BUN 57 (H) 7.0 - 18 MG/DL Creatinine 4.95 (H) 0.6 - 1.3 MG/DL  
 BUN/Creatinine ratio 12 12 - 20 GFR est AA 14 (L) >60 ml/min/1.73m2 GFR est non-AA 12 (L) >60 ml/min/1.73m2 Calcium 7.4 (L) 8.5 - 10.1 MG/DL Bilirubin, total 0.6 0.2 - 1.0 MG/DL  
 ALT (SGPT) 56 16 - 61 U/L  
 AST (SGOT) 92 (H) 15 - 37 U/L Alk. phosphatase 318 (H) 45 - 117 U/L Protein, total 7.4 6.4 - 8.2 g/dL Albumin 1.7 (L) 3.4 - 5.0 g/dL Globulin 5.7 (H) 2.0 - 4.0 g/dL A-G Ratio 0.3 (L) 0.8 - 1.7 LIPASE Collection Time: 10/22/18  4:42 AM  
Result Value Ref Range Lipase 2,169 (H) 73 - 393 U/L Lab Results Component Value Date/Time Color RED 09/27/2018 12:29 AM  
 Appearance CLOUDY 09/27/2018 12:29 AM  
 Specific gravity 1.010 09/27/2018 12:29 AM  
 pH (UA) 9.0 (H) 09/27/2018 12:29 AM  
 Protein >300 (A) 09/27/2018 12:29 AM  
 Glucose 100 (A) 09/27/2018 12:29 AM  
 Ketone >80 (A) 09/27/2018 12:29 AM  
 Bilirubin LARGE (A) 09/27/2018 12:29 AM  
 Urobilinogen >8.0 (H) 09/27/2018 12:29 AM  
 Nitrites POSITIVE (A) 09/27/2018 12:29 AM  
 Leukocyte Esterase LARGE (A) 09/27/2018 12:29 AM  
 Epithelial cells 3+ 09/27/2018 12:29 AM  
 Bacteria 4+ (A) 09/27/2018 12:29 AM  
 WBC TOO NUMEROUS TO COUNT 09/27/2018 12:29 AM  
 RBC TOO NUMEROUS TO COUNT 09/27/2018 12:29 AM  
 
Cultures: All Micro Results Procedure Component Value Units Date/Time CULTURE, BLOOD [[id number]] Collected:  10/16/18 1055 Order Status:  Completed Specimen:  Blood Updated:  10/22/18 0809 Special Requests: PERIPHERAL Culture result: NO GROWTH 6 DAYS     
 CULTURE, BLOOD [[id number]] Collected:  10/16/18 1047 Order Status:  Completed Specimen:  Blood Updated:  10/22/18 0809 Special Requests: PERIPHERAL Culture result: NO GROWTH 6 DAYS     
 HSV 1 AND 2 BY PCR [[id number]] Collected:  10/18/18 1815 Order Status:  Completed Specimen:  Other from Miscellaneous sample Updated:  10/21/18 1636 Source OTHER TEST     
  HSV-1 DNA by PCR NEGATIVE      
  HSV-2 DNA by PCR NEGATIVE Comment: (NOTE) This test was developed and its performance characteristics 
determined by [facility]. It has not been cleared or 
approved by the U.S. Food and Drug Administration. The FDA has 
determined that such clearance or approval is not necessary. This 
test is used for clinical purposes. It should not be regarded as 
investigational or research. Performed At: [address] [id number] Dory Wallace MD [id number] CULTURE, CATHETER TIP [[id number]]  (Abnormal) Collected:  10/19/18 1330 Order Status:  Completed Specimen:  Catheter Tip Updated:  10/21/18 1026 Special Requests: NO SPECIAL REQUESTS Culture result:    
  >15 CFU POSSIBLE STAPHYLOCOCCUS SPECIES, COAGULASE NEGATIVE CULTURE, RESPIRATORY/SPUTUM/BRONCH [place] [[id number]] Collected:  10/16/18 1915 Order Status:  Completed Specimen:  Sputum,ET Suction Updated:  10/18/18 1011 Special Requests: NO SPECIAL REQUESTS     
  GRAM STAIN 10-25 WBC/lpf     
   <10 EPI/lpf FEW GRAM POSITIVE COCCI IN PAIRS MODERATE GRAM POSITIVE COCCI IN GROUPS MUCUS PRESENT Culture result: MODERATE NORMAL RESPIRATORY SHANAE  
     
 CULTURE, BLOOD [[id number]] Collected:  10/08/18 1227 Order Status:  Completed Specimen:  Blood Updated:  10/14/18 0741 Special Requests: PERIPHERAL Culture result: NO GROWTH 6 DAYS     
 CULTURE, CATHETER TIP [[id number]] Collected:  10/08/18 1215 Order Status:  Completed Specimen:  Catheter Tip Updated:  10/11/18 9376 Special Requests: NO SPECIAL REQUESTS Culture result: NO GROWTH 3 DAYS     
 CULTURE, BLOOD [[id number]] Collected:  10/04/18 1238 Order Status:  Completed Specimen:  Blood Updated:  10/10/18 8816 Special Requests: PERIPHERAL Culture result: NO GROWTH 6 DAYS     
 CULTURE, BLOOD [[id number]] Collected:  10/04/18 1120 Order Status:  Completed Specimen:  Blood Updated:  10/10/18 4811 Special Requests: PERIPHERAL Culture result: NO GROWTH 6 DAYS     
 CULTURE, BLOOD [[id number]] Collected:  09/27/18 0005 Order Status:  Completed Specimen:  Blood Updated:  10/03/18 0845 Special Requests: NO SPECIAL REQUESTS Culture result: NO GROWTH 6 DAYS     
 CULTURE, BLOOD [[id number]]  (Abnormal)  (Susceptibility) Collected:  09/27/18 0136 Order Status:  Completed Specimen:  Blood Updated:  10/02/18 9442 Special Requests: NO SPECIAL REQUESTS     
  GRAM STAIN PEDIATRIC BOTTLE GRAM POSITIVE COCCI IN PAIRS IN CLUSTERS  
      
  SMEAR CALLED TO AND CORRECTLY REPEATED BY: Izabela Aguillon RN IN 2700 ON 9/29/18 AT 2033 WF Culture result:    
  AEROBIC BOTTLE STAPHYLOCOCCUS EPIDERMIDIS  
     
 CULTURE, RESPIRATORY/SPUTUM/BRONCH Annetta Beech STAIN [[id number]]  (Abnormal) Collected:  09/29/18 1210 Order Status:  Completed Specimen:  Sputum from Endotracheal aspirate Updated:  10/02/18 1617 Special Requests: NO SPECIAL REQUESTS     
  GRAM STAIN >25 WBC/lpf     
   <10 EPI/lpf MUCUS PRESENT     
   MODERATE YEAST Culture result: MANY CANDIDA TROPICALIS C. DIFFICILE/EPI PCR [[id number]] Collected:  09/29/18 1400 Order Status:  Completed Specimen:  Stool Updated:  09/29/18 2248 Special Requests: NO SPECIAL REQUESTS Culture result: Toxigenic C. difficile NEGATIVE                         C. difficile target DNA sequences are not detected. CULTURE, URINE [[id number]] Collected:  09/27/18 0029 Order Status:  Completed Specimen:  Cath Urine Updated:  09/29/18 0944 Special Requests: NO SPECIAL REQUESTS Culture result: NO GROWTH 2 DAYS During this entire length of time I was immediately available to the patient. The reason for providing this level of medical care for this critically ill patient was due a critical illness that impaired one or more vital organ systems such that there was a high probability of imminent or life threatening deterioration in the patients condition. This care involved high complexity decision making to assess, manipulate, and support vital system functions, to treat this degreee vital organ system failure and to prevent further life threatening deterioration of the patients condition 
 
[] Total critical care time spent on reviewing the case/medical record/data/notes/EMR/patient examination/documentation/coordinating care with nurse/consultants, exclusive of procedures - minutes with complex decision making performed and > 50% time spent in face to face evaluation. Vikas Burr MD  
Board Certified by the [facility] 10/22/2018

## 2018-10-22 NOTE — PROGRESS NOTES
In patients chart due to speaking with daughter on the phone who was concerned about patient being on isolation and the reasons why. Explained as much as I could, letting her know about the lesions, the specimen that was obtained and sent to lab and why Mr. Donahue is on isolation. She is supposed to come into the hospital today at 10am.  I told her I would pass her concerns onto day shift and ensure that a physician is available to speak with her. Primary overnight nurse (Nelli) was made aware of this information.

## 2018-10-22 NOTE — PROGRESS NOTES
RENAL PROGRESS NOTE Cait Solorzano Assessment/Plan: · Prolonged dialysis dependant JULIANNA (ischemic atn in a setting of acute pyelonephritis/acute cholecystitis with sepsis and cardiac arrest 9/29). No evidence of recovery of renal function at this time, although some increase in uo noted. Next dialysis tomorrow and  continue 3/week. · S/p cardiopulm arrest 9/29. Off pressors. · Acute pyelonephritis/sepsis. Finished per ID. · Abnormal lft's/acute cholecystitis. · UGI bleed, EGD results noted- healing esophageal ulceration. · Acute blood loss anemia/anemia of kidney failure. Continue analia. · Asp pneumonia. · Resp failure. S/p peg/trach 10/17, s/p bronch. · Concern for rt shoulder shingles. On acyclovir. · Anoxic brain injury superimposed on recent cva. Subjective: Intubated, unresponsive. Tolerates tf. Patient Active Problem List  
Diagnosis Code  Stroke (cerebrum) ([facility]) I63.9  Stroke ([facility] I63.9  Rhabdomyolysis M62.82  
 Dehydration E86.0  
 Essential hypertension I10  
 Diabetes mellitus type 2, controlled ([facility] E11.9  Non compliance w medication regimen Z91.14  
 DM (diabetes mellitus) ([facility] E11.9  History of stroke Z80.78  
 Acute-on-chronic kidney injury ([facility] N17.9, N18.9  Acute cystitis N30.00  Elevated troponin R74.8  Elevated LFTs R94.5  Cardiac arrest with ventricular fibrillation ([facility]) I46.9, I49.01  
 Acute pancreatitis K85.90  
 Ischemic encephalopathy I67.89  
 Hypoalbuminemia E88.09  
 Shingles B02.9 Current Facility-Administered Medications Medication Dose Route Frequency Provider Last Rate Last Dose  multivitamin (MULTI-DELYN, WELLESSE) oral liquid 30 mL  30 mL Oral DAILY Naye Barney MD   30 mL at 10/22/18 1030  
 albuterol (PROVENTIL VENTOLIN) nebulizer solution 2.5 mg  2.5 mg Nebulization BID Rosas Vallejo MD   2.5 mg at 10/22/18 0845  
 insulin glargine (LANTUS) injection 23 Units  23 Units SubCUTAneous DAILY Edlison Bahena MD   23 Units at 10/22/18 1029  
 bacitracin 500 unit/gram packet 1 Packet  1 Packet Topical PRN Rosas Vallejo MD      
 sodium chloride (NS) flush 10-30 mL  10-30 mL InterCATHeter PRN Rosas Vallejo MD   30 mL at 10/21/18 0430  
 sodium chloride (NS) flush 10 mL  10 mL InterCATHeter Q24H Rosas Vallejo MD   10 mL at 10/21/18 1633  sodium chloride (NS) flush 10 mL  10 mL InterCATHeter PRN Rosas Vallejo MD   10 mL at 10/20/18 3239  sodium chloride (NS) flush 10-40 mL  10-40 mL InterCATHeter Q8H Jones Ackerman MD   10 mL at 10/22/18 0610  
 sodium chloride (NS) flush 20 mL  20 mL InterCATHeter Q24H Rosas Vallejo MD   20 mL at 10/21/18 1633  heparin (porcine) injection 5,000 Units  5,000 Units SubCUTAneous Q8H Jones Ackerman MD   5,000 Units at 10/22/18 0609  
 acyclovir sodium (ZOVIRAX) 330.5 mg in 0.9% sodium chloride 100 mL IVPB  5 mg/kg (Ideal) IntraVENous Q24H Dejan Curtis MD   330.5 mg at 10/21/18 1308  hydroxypropyl methylcellulose (ISOPTO TEARS) 0.5 % ophthalmic solution 2 Drop  2 Drop Both Eyes BID Fox LAL NP   2 Drop at 10/22/18 1030  epoetin manda (EPOGEN;PROCRIT) injection 40,000 Units  40,000 Units IntraVENous Q7D Jose Goddard MD   40,000 Units at 10/17/18 1621  
 pantoprazole (PROTONIX) 40 mg in sodium chloride 0.9% 10 mL injection  40 mg IntraVENous Q24H Marylou SHAFFER MD   40 mg at 10/22/18 1030  
 0.9% sodium chloride infusion 250 mL  250 mL IntraVENous PRN Shaq Silver DO      
 influenza vaccine 2018-19 (6 mos+)(PF) (FLUARIX QUAD/FLULAVAL QUAD) injection 0.5 mL  0.5 mL IntraMUSCular PRIOR TO DISCHARGE Zach [place], MD      
 acetaminophen (TYLENOL) solution 325 mg  325 mg Per NG tube Q4H PRN Zach Landrum MD   325 mg at 10/18/18 8665  0.9% sodium chloride infusion 250 mL  250 mL IntraVENous PRN Inna Mines H, DO      
 heparin (porcine) pf 100 Units  100 Units InterCATHeter PRN Deya Gann MD      
 0.9% sodium chloride infusion 250 mL  250 mL IntraVENous PRN Carolyne Crenshaw MD      
 insulin lispro (HUMALOG) injection   SubCUTAneous Q6H [place] H, DO   Stopped at 10/22/18 0000  heparin (porcine) 1,000 unit/mL injection 1,000 Units  1,000 Units InterCATHeter DIALYSIS PRN Deya Gann MD   1,000 Units at 09/28/18 1332  senna-docusate (PERICOLACE) 8.6-50 mg per tablet 1 Tab  1 Tab Oral BID PRN Dominic Orozco, DO      
 ondansetron (ZOFRAN) injection 4 mg  4 mg IntraVENous Q4H PRN Alfred Nettles DO   4 mg at 09/28/18 0539  
 glucose chewable tablet 16 g  4 Tab Oral PRN Zhane Rojas A, DO      
 glucagon (GLUCAGEN) injection 1 mg  1 mg IntraMUSCular PRN Alfred Nettles, DO      
 dextrose (D50) infusion 12.5-25 g  25-50 mL IntraVENous PRN Alfred Nettles DO   25 g at 10/09/18 1906  hydrALAZINE (APRESOLINE) 20 mg/mL injection 20 mg  20 mg IntraVENous Q6H PRN Kesha Dove NP   20 mg at 10/22/18 0209 Objective Vitals:  
 10/22/18 0600 10/22/18 0700 10/22/18 0845 10/22/18 2724 BP: 163/72 169/70 Pulse: 94 93 92 92 Resp: 15 16 16 25 Temp:      
TempSrc:      
SpO2: 96% 97% 97% 95% Weight:      
Height:      
 
 
 
Intake/Output Summary (Last 24 hours) at 10/22/2018 1117 Last data filed at 10/22/2018 0500 Gross per 24 hour Intake 430 ml Output 575 ml Net -145 ml Admission weight: Weight: 96.6 kg (213 lb) (09/26/18 2244) Last Weight Metrics: 
Weight Loss Metrics 10/22/2018 9/26/2018 9/13/2018 Today's Wt 194 lb 0.1 oz - 227 lb 15.3 oz  
BMI - 30.39 kg/m2 35.7 kg/m2 Physical Assessment:  
 
General: intubated, unresponsive. Eyes are open. Neck: Trach in place. Neck: No jvd. LUNGS: diminished air entry at the bases. No crackles. CVS EXM: S1, S2  RRR. Abdomen: soft, pos bs. Lower Extremities:  1+ edema. Rt arm with multiple flassid blisters- some with crusting. Rt femoral tdc. Lab CBC w/Diff Recent Labs 10/22/18 
0442 10/21/18 
0430 10/20/18 
0330 WBC 11.7 10.4 10.6 RBC 2.69* 2.62* 2.56* HGB 7.6* 7.5* 7.1*  
HCT 24.4* 23.9* 23.5*  
* 474* 457* Chemistry Recent Labs 10/22/18 
0442 10/21/18 
0430 10/20/18 
0330 * 143* 136*  140 140  
K 4.7 4.4 5.1  102 102 CO2 27 27 28 BUN 57* 38* 58* CREA 4.95* 3.52* 5.17* CA 7.4* 7.3* 7.4* AGAP 11 11 10 BUCR 12 11* 11* * 324* 303* TP 7.4 7.3 7.0 ALB 1.7* 1.7* 1.6*  
GLOB 5.7* 5.6* 5.4* AGRAT 0.3* 0.3* 0.3* No results found for: IRON, FE, TIBC, IBCT, PSAT, FERR Lab Results Component Value Date/Time Calcium 7.4 (L) 10/22/2018 04:42 AM  
 Phosphorus 5.2 (H) 10/13/2018 03:41 AM  
  
 
Theresa Mejia M.D. [facility] Phone [phone] Pager [phone]

## 2018-10-22 NOTE — CONSULTS
Podiatry Consultation Note Assessment:  
 
 
Patient Active Problem List  
Diagnosis Code  Stroke (cerebrum) ([facility]) I63.9  Stroke ([place] [address] I63.9  Rhabdomyolysis M62.82  
 Dehydration E86.0  
 Essential hypertension I10  
 Diabetes mellitus type 2, controlled ([place] [address] E11.9  Non compliance w medication regimen Z91.14  
 DM (diabetes mellitus) ([place] [address] E11.9  History of stroke Z80.78  
 Acute-on-chronic kidney injury ([facility] N17.9, N18.9  Acute cystitis N30.00  Elevated troponin R74.8  Elevated LFTs R94.5  Cardiac arrest with ventricular fibrillation ([facility]) I46.9, I49.01  
 Acute pancreatitis K85.90  
 Ischemic encephalopathy I67.89  
 Hypoalbuminemia E88.09  
 Shingles B02.9  Gangrene ([place] [address] [id number] Plan: Will start LWc with Betadine solution and mepitlex Q 24 hrs left hallux, RAQUEL cTBI studies. BLE. Subjective:  
 
I was asked by Dr. Omar Moran to evaluate Eamon Dave for ischemic changes to the distal left hallux. He  is a 57 y.o. male admitted on 9/26/2018 for Pneumonia of both lower lobes due to infectious organism ([place] [address] [J18.1] ESRD (end stage renal disease) ([facility] [N18.6] ESRD (end stage renal disease) ([facility] [N18.6]. No Known Allergies Current Facility-Administered Medications Medication Dose Route Frequency  multivitamin (MULTI-DELYN, WELLESSE) oral liquid 30 mL  30 mL Oral DAILY  [START ON 10/23/2018] insulin glargine (LANTUS) injection 18 Units  18 Units SubCUTAneous DAILY  albuterol (PROVENTIL VENTOLIN) nebulizer solution 2.5 mg  2.5 mg Nebulization BID  
 bacitracin 500 unit/gram packet 1 Packet  1 Packet Topical PRN  
 sodium chloride (NS) flush 10-30 mL  10-30 mL InterCATHeter PRN  
 sodium chloride (NS) flush 10 mL  10 mL InterCATHeter Q24H  
 sodium chloride (NS) flush 10 mL  10 mL InterCATHeter PRN  
 sodium chloride (NS) flush 10-40 mL  10-40 mL InterCATHeter Q8H  
 sodium chloride (NS) flush 20 mL  20 mL InterCATHeter Q24H  heparin (porcine) injection 5,000 Units  5,000 Units SubCUTAneous Q8H  
 acyclovir sodium (ZOVIRAX) 330.5 mg in 0.9% sodium chloride 100 mL IVPB  5 mg/kg (Ideal) IntraVENous Q24H  hydroxypropyl methylcellulose (ISOPTO TEARS) 0.5 % ophthalmic solution 2 Drop  2 Drop Both Eyes BID  epoetin manda (EPOGEN;PROCRIT) injection 40,000 Units  40,000 Units IntraVENous Q7D  
 pantoprazole (PROTONIX) 40 mg in sodium chloride 0.9% 10 mL injection  40 mg IntraVENous Q24H  
 0.9% sodium chloride infusion 250 mL  250 mL IntraVENous PRN  
 influenza vaccine 2018-19 (6 mos+)(PF) (FLUARIX QUAD/FLULAVAL QUAD) injection 0.5 mL  0.5 mL IntraMUSCular PRIOR TO DISCHARGE  acetaminophen (TYLENOL) solution 325 mg  325 mg Per NG tube Q4H PRN  
 0.9% sodium chloride infusion 250 mL  250 mL IntraVENous PRN  
 heparin (porcine) pf 100 Units  100 Units InterCATHeter PRN  
 0.9% sodium chloride infusion 250 mL  250 mL IntraVENous PRN  
 insulin lispro (HUMALOG) injection   SubCUTAneous Q6H  
 heparin (porcine) 1,000 unit/mL injection 1,000 Units  1,000 Units InterCATHeter DIALYSIS PRN  
 senna-docusate (PERICOLACE) 8.6-50 mg per tablet 1 Tab  1 Tab Oral BID PRN  
 ondansetron (ZOFRAN) injection 4 mg  4 mg IntraVENous Q4H PRN  
 glucose chewable tablet 16 g  4 Tab Oral PRN  
 glucagon (GLUCAGEN) injection 1 mg  1 mg IntraMUSCular PRN  
 dextrose (D50) infusion 12.5-25 g  25-50 mL IntraVENous PRN  
 hydrALAZINE (APRESOLINE) 20 mg/mL injection 20 mg  20 mg IntraVENous Q6H PRN Past Medical History:  
Diagnosis Date  CHF (congestive heart failure) ([facility]  Diabetes ([facility]  Stroke ([facility] History reviewed. No pertinent surgical history. History reviewed. No pertinent family history. Social History Socioeconomic History  Marital status:  Spouse name: Not on file  Number of children: Not on file  Years of education: Not on file  Highest education level: Not on file Social Needs  Financial resource strain: Not on file  Food insecurity - worry: Not on file  Food insecurity - inability: Not on file  Transportation needs - medical: Not on file  Transportation needs - non-medical: Not on file Occupational History  Not on file Tobacco Use  Smoking status: Never Smoker  Smokeless tobacco: Never Used Substance and Sexual Activity  Alcohol use: No  
 Drug use: No  
 Sexual activity: Not on file Other Topics Concern  Not on file Social History Narrative  Not on file Physical Exam: 
GENERAL: fatigued, cooperative, no distress, appears older than stated age, unresponsive REVIEW OF SYSTEMS: 
 Unable to assess, patient intubated Vitals:  
 10/22/18 1400 10/22/18 1500 10/22/18 1600 10/22/18 1605 BP: 140/57 158/64 175/63 Pulse: 90 90 92 89 Resp: 18 27 24 20 Temp:      
TempSrc:      
SpO2: 95% 96% 94% 99% Weight:      
Height: OBJECTIVE: 
 
Patient is in no apparent distress. Lower Extremity Exam:  
 
VASCULAR EXAM:. Pedal pulses are palpable 0/4 DP 0/4 PT right and left foot. Skin temperature is warm to warm right and left foot. Digital capillary fill time is 3 seconds right and left foot. There is not edema of the right and left foot and ankle. NEUROLOGICAL EXAM:. Unable to assess MUSCULOSKELETAL EXAM:. Muscle tone is not normal.   
 
MYCOTIC NAIL Dystrophic nail 1,2,3,4,5 bilateral. Elongated thickened nails 1,2,3,4,5 bilateral Hypertrophic nails 1,2,3,4,5 DERMATOLOGICAL EXAM:. Skin is of abnormal texture and turgor with atrophic skin changes noting hair growth, nail changes (thickening), Bilateral. There is gangrene over the distal aspect of the left hallux with an area of hyperpigmentation over the 2nd digit left eponychium. No exudate noted and no odor noted. Recent Results (from the past 24 hour(s)) GLUCOSE, POC Collection Time: 10/21/18  6:17 PM  
Result Value Ref Range Glucose (POC) 174 (H) 70 - 110 mg/dL CBC W/O DIFF Collection Time: 10/22/18  4:42 AM  
Result Value Ref Range WBC 11.7 4.6 - 13.2 K/uL  
 RBC 2.69 (L) 4.70 - 5.50 M/uL HGB 7.6 (L) 13.0 - 16.0 g/dL HCT 24.4 (L) 36.0 - 48.0 % MCV 90.7 74.0 - 97.0 FL  
 MCH 28.3 24.0 - 34.0 PG  
 MCHC 31.1 31.0 - 37.0 g/dL  
 RDW 15.7 (H) 11.6 - 14.5 % PLATELET 759 (H) 880 - 420 K/uL MPV 8.7 (L) 9.2 - 49.3 FL  
METABOLIC PANEL, COMPREHENSIVE Collection Time: 10/22/18  4:42 AM  
Result Value Ref Range Sodium 140 136 - 145 mmol/L Potassium 4.7 3.5 - 5.5 mmol/L Chloride 102 100 - 108 mmol/L  
 CO2 27 21 - 32 mmol/L Anion gap 11 3.0 - 18 mmol/L Glucose 128 (H) 74 - 99 mg/dL BUN 57 (H) 7.0 - 18 MG/DL Creatinine 4.95 (H) 0.6 - 1.3 MG/DL  
 BUN/Creatinine ratio 12 12 - 20 GFR est AA 14 (L) >60 ml/min/1.73m2 GFR est non-AA 12 (L) >60 ml/min/1.73m2 Calcium 7.4 (L) 8.5 - 10.1 MG/DL Bilirubin, total 0.6 0.2 - 1.0 MG/DL  
 ALT (SGPT) 56 16 - 61 U/L  
 AST (SGOT) 92 (H) 15 - 37 U/L Alk. phosphatase 318 (H) 45 - 117 U/L Protein, total 7.4 6.4 - 8.2 g/dL Albumin 1.7 (L) 3.4 - 5.0 g/dL Globulin 5.7 (H) 2.0 - 4.0 g/dL A-G Ratio 0.3 (L) 0.8 - 1.7 LIPASE Collection Time: 10/22/18  4:42 AM  
Result Value Ref Range Lipase 2,169 (H) 73 - 393 U/L  
GLUCOSE, POC Collection Time: 10/22/18  1:40 PM  
Result Value Ref Range Glucose (POC) 109 70 - 110 mg/dL Imaging: none ordered XENIA Allen DPM 
[facility] [address] [facility] [phone]  
10/22/2018, 5:26 PM

## 2018-10-23 PROBLEM — G25.2 COARSE TREMORS: Status: ACTIVE | Noted: 2018-01-01

## 2018-10-23 NOTE — PROGRESS NOTES
Infectious Disease Follow-up Admit Date: 9/26/2018 Current abx Prior abx ACV 10/17 - 6 Zosyn 9/27-7, Meropenem/flucon 10/4- 11, vanco 9/27-25 Assessment ->Rec:  
 
Herpes Zoster R arm, C 6 (?C5) dermatome 
- vesicular lesions onset ~10/15 progressing compared to onset per NP 
- confluent in upper arm 
- still with new vesicles on forearm 10/22 but drying up on 10/23 -> cont Acyclovir renal dosing (5 mg/kg q 24h) x 10 days (4 more) at least 
-> d/w Dr. Olinda Tanner Leukocytosis/SIRS poss sepsis - MOF multiple ID and non-infectious concerns outlined below, complicated, wbc 93.8U today from high 27K+ on 10/5 On  vancomycin/zosyn since 9/27 
- C diff neg 9/29, sput C albicans, repeat CT 10/4 no new findings --cxr reviewed - increased atx rt base. Left base improved 
- Higher fever 10/16 101.2, 10/18 101.4 afebrile since 
- Ddx: HZV vs deep tissue injury of toe/buttock vs Line infection 
- blcx 10/16 NG x 2 
- TDC Tip 10/19 >15 CFU MRSE (1 of 2 blcx on adm 9/27 MRSE but afebrile then prob contaminant) - Afebrile > 4 days now  -> cont on Acyclovir as above Suspected Pyelo POA  
- CTAP 9/27:  B perineph stranding, UA tntc wbc, uctx ng 
- treated at this point Cholelithiasis choledocholithiais suspected acute cholecystitis  poss cystic stone POA- abnl lft bili 4 alk phos 400 seen by GI and GS Dr. Piyush Cortes, no plan for OR, for poss cholecystostomy if LFT worsen, bili, alk phos declining  -> LFts increasing/stably elevated  
-> GI and Sx were following and if continues to rise- will need US but can be just from being in ICU for so long Elevated Lipase  
- lipase 2200 POA -> 3191 ->  5500 on 10/4 improved to 1207 10/19 but up again to 2169 10/22 interpretation complicated by JULIANNA -> better today 1586 (off mucomyst, TF) 
- CTAP 10/4: Normal pancreas ->   
B infiltrates - poss edema infiltrate - RLL consolid better 10/4 cxr and suspect endobronchial clot contributed   ->monitor MRSE bacteremia 9/27 1 of 2. Has LUE midline unclear when placed, repeat bctx's IP today  ->repeat bctx's ntd  
-> treated by now JULIANNA POA dialysis dependent 
- baseline cr 0.9 132/10.1 in ER 
-RIJ caryndall 9/28 out 10/19 - R fem uldall 10/20 -> per renal  
Bleeding -melena earlier, EGD 10/2 clot in esophagus bronch 10/3 R endobronch clot NEW removed 10/5 repeat bronch  ->per others Suspected anoxic brain injury on MRI 10/3 SP VF arrest 9/29 -> overall poor prognosis but family wishes aggressive care CVA R hemiparesis 9/10   
resp failure sp intubation 9/29 Comorbid: HTN HLD CHF h/o steatohepatitis MICROBIOLOGY:  
9/27 bctx 1 of 2 MRSE 
 uctx ng 
9/29 sput C albicans C diff neg 10/4 bctx x 2 NTD 
 fungitell indeterminate due to contamination 10/8 Cath tip cx ntd 10/8     bl cx ngtd 10/16 Blcx x2 ntd Spcx NF 
 
LINES AND CATHETERS:  
PIV x 2 RIJ uldall 9/28 Left PICC 10/18 Active Hospital Problems Diagnosis Date Noted  Gangrene ([address] 10/22/2018  Hypoalbuminemia 10/21/2018  Shingles 10/21/2018  Ischemic encephalopathy 09/30/2018  Cardiac arrest with ventricular fibrillation ([facility] 09/29/2018 ROSC before defibrillation could be attempted.  Acute pancreatitis 09/29/2018 Lipase downtrending with interruption in tube feed and Mucomyst. Doubt that the elevated lipase is due to excessive lipid in tube feeding. May be secondary to Mucomyst. 
  
 Elevated LFTs 09/28/2018  History of stroke 09/27/2018 With residual R hemiparesis  Acute-on-chronic kidney injury ([address] 09/27/2018  Acute cystitis 09/27/2018  Elevated troponin 09/27/2018  Essential hypertension 09/10/2018  Diabetes mellitus type 2, controlled ([address] 09/10/2018 Subjective: Interval notes reviewed. Undergoing dialysis in ICU. Remains unresponsive in ICU. Right arm vesicles beginning to dry up. D/w Dr. Gustavo Fuentes. Current Facility-Administered Medications Medication Dose Route Frequency  [START ON 10/24/2018] insulin glargine (LANTUS) injection 14 Units  14 Units SubCUTAneous DAILY  multivitamin (MULTI-DELYN, WELLESSE) oral liquid 30 mL  30 mL Oral DAILY  albuterol (PROVENTIL VENTOLIN) nebulizer solution 2.5 mg  2.5 mg Nebulization BID  
 bacitracin 500 unit/gram packet 1 Packet  1 Packet Topical PRN  
 sodium chloride (NS) flush 10-30 mL  10-30 mL InterCATHeter PRN  
 sodium chloride (NS) flush 10 mL  10 mL InterCATHeter Q24H  
 sodium chloride (NS) flush 10 mL  10 mL InterCATHeter PRN  
 sodium chloride (NS) flush 10-40 mL  10-40 mL InterCATHeter Q8H  
 sodium chloride (NS) flush 20 mL  20 mL InterCATHeter Q24H  
 heparin (porcine) injection 5,000 Units  5,000 Units SubCUTAneous Q8H  
 acyclovir sodium (ZOVIRAX) 330.5 mg in 0.9% sodium chloride 100 mL IVPB  5 mg/kg (Ideal) IntraVENous Q24H  hydroxypropyl methylcellulose (ISOPTO TEARS) 0.5 % ophthalmic solution 2 Drop  2 Drop Both Eyes BID  epoetin manda (EPOGEN;PROCRIT) injection 40,000 Units  40,000 Units IntraVENous Q7D  
 pantoprazole (PROTONIX) 40 mg in sodium chloride 0.9% 10 mL injection  40 mg IntraVENous Q24H  
 0.9% sodium chloride infusion 250 mL  250 mL IntraVENous PRN  
 influenza vaccine 2018-19 (6 mos+)(PF) (FLUARIX QUAD/FLULAVAL QUAD) injection 0.5 mL  0.5 mL IntraMUSCular PRIOR TO DISCHARGE  acetaminophen (TYLENOL) solution 325 mg  325 mg Per NG tube Q4H PRN  
 0.9% sodium chloride infusion 250 mL  250 mL IntraVENous PRN  
 heparin (porcine) pf 100 Units  100 Units InterCATHeter PRN  
 0.9% sodium chloride infusion 250 mL  250 mL IntraVENous PRN  
 insulin lispro (HUMALOG) injection   SubCUTAneous Q6H  
 heparin (porcine) 1,000 unit/mL injection 1,000 Units  1,000 Units InterCATHeter DIALYSIS PRN  
 senna-docusate (PERICOLACE) 8.6-50 mg per tablet 1 Tab  1 Tab Oral BID PRN  
 ondansetron (ZOFRAN) injection 4 mg  4 mg IntraVENous Q4H PRN  
  glucose chewable tablet 16 g  4 Tab Oral PRN  
 glucagon (GLUCAGEN) injection 1 mg  1 mg IntraMUSCular PRN  
 dextrose (D50) infusion 12.5-25 g  25-50 mL IntraVENous PRN  
 hydrALAZINE (APRESOLINE) 20 mg/mL injection 20 mg  20 mg IntraVENous Q6H PRN Objective:  
 
Visit Vitals /73 Pulse 98 Temp 97.8 °F (36.6 °C) Resp 20 Ht 5' 7\" (1.702 m) Wt 86.3 kg (190 lb 4.1 oz) SpO2 99% BMI 29.80 kg/m² Temp (24hrs), Av °F (36.7 °C), Min:97.1 °F (36.2 °C), Max:98.8 °F (37.1 °C) GEN: unresponsive male on vent in ICU HENT: no thrush Neck: tracheostomy in place CHEST: coarse bs B no wheeze or rhonchi CVS: RRR, no murmur appreciated ABD: soft, + BS no localized tenderness, PEG in place EXT: anasarca with 3+ pitting edema b/l UE rt >lt SKIN: thick fluid filled multiple blisters on erythematous base on rt arm and forearm, honey color dried drainage around few blisters, become confluent on upper arm, umbilication and crusting - drying up c/w yesterday 
deep tissue injury rt heel and sacral area, left great toe with dark, gangrenous changes ?embolic CNS:unresponsive, rt hemiparesis Labs: Results:  
Chemistry Recent Labs 10/23/18 
9365 10/22/18 
8165 10/21/18 
0430 * 128* 143*  140 140  
K 4.9 4.7 4.4  102 102 CO2 26 27 27 BUN 71* 57* 38* CREA 5.96* 4.95* 3.52* CA 7.6* 7.4* 7.3* AGAP 12 11 11 BUCR 12 12 11* * 318* 324* TP 7.0 7.4 7.3 ALB 1.6* 1.7* 1.7*  
GLOB 5.4* 5.7* 5.6* AGRAT 0.3* 0.3* 0.3* CBC w/Diff Recent Labs 10/23/18 
1203 10/22/18 
4720 10/21/18 
0430 WBC 10.8 11.7 10.4 RBC 2.65* 2.69* 2.62* HGB 7.5* 7.6* 7.5* HCT 23.7* 24.4* 23.9*  
* 549* 474* RADIOLOGY: Reviewed 10/11 Increasing atelectasis/infiltrate at the right base with resolution of airspace 
disease at the left base.  
  
 
10/10 cxr  Improved aeration of the right mid and lower lung with mild residual 
 atelectatic opacities noted. Small region of opacity at the left lung base may 
reflect underlying atelectasis versus airspace disease. 10/4 CTAP IMPRESSION: 
1. Dense subtotal or total consolidation right lower lobe compatible with 
pneumonia similar to prior study. Small bilateral pleural effusions similar in 
size. 2. Distended gallbladder with gallstones and gallbladder sludge without 
significant change in appearance and also described in detail on ultrasound of 
9/27/2018. 3. Indeterminate small lesion left hepatic lobe without change. Hepatomegaly 
again evident. 4. No intraperitoneal fluid. Possible small left upper pole renal cyst. 
Cardiomegaly. Nasogastric tube tip in body of stomach. cxr 10/5 Impression: 1. Endotracheal tube, visualized nasogastric tube, and right IJ central venous 
catheter in stable position. 2. Overall improved aeration of the right lower lobe, likely improved 
atelectasis with mild residual atelectasis/airspace disease. 3. Mild interstitial edema overall similar to prior. Microbiology Results All Micro Results Procedure Component Value Units Date/Time CULTURE, CATHETER TIP [[id number]]  (Abnormal)  (Susceptibility) Collected:  10/19/18 1330 Order Status:  Completed Specimen:  Catheter Tip Updated:  10/22/18 3540 Special Requests: NO SPECIAL REQUESTS Culture result:    
  >15 CFU STAPHYLOCOCCUS EPIDERMIDIS  
     
 CULTURE, BLOOD [[id number]] Collected:  10/16/18 1055 Order Status:  Completed Specimen:  Blood Updated:  10/22/18 0809 Special Requests: PERIPHERAL Culture result: NO GROWTH 6 DAYS     
 CULTURE, BLOOD [[id number]] Collected:  10/16/18 1047 Order Status:  Completed Specimen:  Blood Updated:  10/22/18 0809 Special Requests: PERIPHERAL Culture result: NO GROWTH 6 DAYS     
 HSV 1 AND 2 BY PCR [[id number]] Collected:  10/18/18 1815  Order Status:  Completed Specimen:  Other from Miscellaneous sample Updated:  10/21/18 1636 Source OTHER TEST     
  HSV-1 DNA by PCR NEGATIVE      
  HSV-2 DNA by PCR NEGATIVE Comment: (NOTE) This test was developed and its performance characteristics 
determined by [facility] [employer] [facility] [employer]. It has not been cleared or 
approved by the U.S. Food and Drug Administration. The FDA has 
determined that such clearance or approval is not necessary. This 
test is used for clinical purposes. It should not be regarded as 
investigational or research. Performed At: [address] [id number] Sahra Mckinley MD [id number] CULTURE, RESPIRATORY/SPUTUM/BRONCH [place] [[id number]] Collected:  10/16/18 1915 Order Status:  Completed Specimen:  Sputum,ET Suction Updated:  10/18/18 1011 Special Requests: NO SPECIAL REQUESTS     
  GRAM STAIN 10-25 WBC/lpf     
   <10 EPI/lpf FEW GRAM POSITIVE COCCI IN PAIRS MODERATE GRAM POSITIVE COCCI IN GROUPS MUCUS PRESENT Culture result: MODERATE NORMAL RESPIRATORY SHANAE  
     
 CULTURE, BLOOD [[id number]] Collected:  10/08/18 1227 Order Status:  Completed Specimen:  Blood Updated:  10/14/18 0741 Special Requests: PERIPHERAL Culture result: NO GROWTH 6 DAYS     
 CULTURE, CATHETER TIP [[id number]] Collected:  10/08/18 1215 Order Status:  Completed Specimen:  Catheter Tip Updated:  10/11/18 5145 Special Requests: NO SPECIAL REQUESTS Culture result: NO GROWTH 3 DAYS     
 CULTURE, BLOOD [[id number]] Collected:  10/04/18 9612 Order Status:  Completed Specimen:  Blood Updated:  10/10/18 6745 Special Requests: PERIPHERAL Culture result: NO GROWTH 6 DAYS     
 CULTURE, BLOOD [[id number]] Collected:  10/04/18 1120 Order Status:  Completed Specimen:  Blood Updated:  10/10/18 0291 Special Requests: PERIPHERAL Culture result: NO GROWTH 6 DAYS CULTURE, BLOOD [[id number]] Collected:  09/27/18 0005 Order Status:  Completed Specimen:  Blood Updated:  10/03/18 0845 Special Requests: NO SPECIAL REQUESTS Culture result: NO GROWTH 6 DAYS     
 CULTURE, BLOOD [[id number]]  (Abnormal)  (Susceptibility) Collected:  09/27/18 0136 Order Status:  Completed Specimen:  Blood Updated:  10/02/18 2786 Special Requests: NO SPECIAL REQUESTS     
  GRAM STAIN PEDIATRIC BOTTLE GRAM POSITIVE COCCI IN PAIRS IN CLUSTERS  
      
  SMEAR CALLED TO AND CORRECTLY REPEATED BY: Selwyn Hendrickson RN IN 2700 ON 9/29/18 AT 2033 WF Culture result:    
  AEROBIC BOTTLE STAPHYLOCOCCUS EPIDERMIDIS  
     
 CULTURE, RESPIRATORY/SPUTUM/BRONCH Nettie Diony STAIN [[id number]]  (Abnormal) Collected:  09/29/18 1210 Order Status:  Completed Specimen:  Sputum from Endotracheal aspirate Updated:  10/02/18 8727 Special Requests: NO SPECIAL REQUESTS     
  GRAM STAIN >25 WBC/lpf     
   <10 EPI/lpf MUCUS PRESENT     
   MODERATE YEAST Culture result: MANY CANDIDA TROPICALIS C. DIFFICILE/EPI PCR [[id number]] Collected:  09/29/18 1400 Order Status:  Completed Specimen:  Stool Updated:  09/29/18 2248 Special Requests: NO SPECIAL REQUESTS Culture result: Toxigenic C. difficile NEGATIVE                         C. difficile target DNA sequences are not detected. CULTURE, URINE [[id number]] Collected:  09/27/18 0029 Order Status:  Completed Specimen:  Cath Urine Updated:  09/29/18 0944 Special Requests: NO SPECIAL REQUESTS Culture result: NO GROWTH 2 DAYS Senora Goldmann, MD, [address] October 23, 2018 [facility] [phone]

## 2018-10-23 NOTE — PROGRESS NOTES
RENAL PROGRESS NOTE Jayden Brown Assessment/Plan: · Prolonged dialysis dependant JULIANNA (ischemic atn in a setting of acute pyelonephritis/acute cholecystitis with sepsis and cardiac arrest 9/29). No evidence of recovery of renal function at this time, although some increase in uo noted (continue straight cath every 8 hours). Dialysis today and continue tts. Will aski for tdc once airborne isolation is . · S/p cardiopulm arrest 9/29. Off pressors. · Acute pyelonephritis/sepsis. Finished per ID. · Abnormal lft's/acute cholecystitis/pancreatitis. GI on the case. · UGI bleed, EGD results noted- healing esophageal ulceration. · Acute blood loss anemia/anemia of kidney failure. Continue analia. · Asp pneumonia. · Resp failure. S/p peg/trach 10/17, s/p bronch. · Rt shoulder shingles. On acyclovir. · Anoxic brain injury superimposed on recent cva. Subjective: 
Seen on dialysis. Intubated, unresponsive. Doesn't tolerate tf. Patient Active Problem List  
Diagnosis Code  Stroke (cerebrum) ([facility]) I63.9  Stroke ([facility] I63.9  Rhabdomyolysis M62.82  
 Dehydration E86.0  
 Essential hypertension I10  
 Diabetes mellitus type 2, controlled ([facility] E11.9  Non compliance w medication regimen Z91.14  
 DM (diabetes mellitus) ([facility] E11.9  History of stroke Z80.78  
 Acute-on-chronic kidney injury ([facility] N17.9, N18.9  Acute cystitis N30.00  Elevated troponin R74.8  Elevated LFTs R94.5  Cardiac arrest with ventricular fibrillation ([facility]) I46.9, I49.01  
 Acute pancreatitis K85.90  
 Ischemic encephalopathy I67.89  
 Hypoalbuminemia E88.09  
 Shingles B02.9  Gangrene ([facility] [id number] Current Facility-Administered Medications Medication Dose Route Frequency Provider Last Rate Last Dose  multivitamin (MULTI-DELYN, WELLESSE) oral liquid 30 mL  30 mL Oral DAILY Reina Weiss MD   30 mL at 10/23/18 9287  insulin glargine (LANTUS) injection 18 Units  18 Units SubCUTAneous DAILY Reina Weiss MD   18 Units at 10/23/18 7520  albuterol (PROVENTIL VENTOLIN) nebulizer solution 2.5 mg  2.5 mg Nebulization BID Anatoliy Stevens MD   2.5 mg at 10/23/18 9484  bacitracin 500 unit/gram packet 1 Packet  1 Packet Topical PRN Anatoliy Stevens MD      
 sodium chloride (NS) flush 10-30 mL  10-30 mL InterCATHeter PRN Anatoliy Stevens MD   30 mL at 10/21/18 0430  
 sodium chloride (NS) flush 10 mL  10 mL InterCATHeter Q24H Jones Ackerman MD   10 mL at 10/22/18 1620  
 sodium chloride (NS) flush 10 mL  10 mL InterCATHeter PRN Anatoliy Stevens MD   10 mL at 10/20/18 0135  sodium chloride (NS) flush 10-40 mL  10-40 mL InterCATHeter Q8H Nasir Ackerman MD   10 mL at 10/23/18 0550  sodium chloride (NS) flush 20 mL  20 mL InterCATHeter Q24H Jones Ackerman MD   20 mL at 10/22/18 1621  
 heparin (porcine) injection 5,000 Units  5,000 Units SubCUTAneous Q8H Nasir Ackerman MD   5,000 Units at 10/23/18 0530  
 acyclovir sodium (ZOVIRAX) 330.5 mg in 0.9% sodium chloride 100 mL IVPB  5 mg/kg (Ideal) IntraVENous Q24H Dejan Curtis MD   330.5 mg at 10/22/18 1620  hydroxypropyl methylcellulose (ISOPTO TEARS) 0.5 % ophthalmic solution 2 Drop  2 Drop Both Eyes BID Axel LAL NP   2 Drop at 10/23/18 3258  epoetin manda (EPOGEN;PROCRIT) injection 40,000 Units  40,000 Units IntraVENous Q7D Sharath Paul MD   40,000 Units at 10/17/18 1621  
 pantoprazole (PROTONIX) 40 mg in sodium chloride 0.9% 10 mL injection  40 mg IntraVENous Q24H Julia England MD   40 mg at 10/23/18 0908  
 0.9% sodium chloride infusion 250 mL  250 mL IntraVENous PRN Franko Guzman DO      
 influenza vaccine 2018-19 (6 mos+)(PF) (FLUARIX QUAD/FLULAVAL QUAD) injection 0.5 mL  0.5 mL IntraMUSCular PRIOR TO DISCHARGE Jose Ackerman MD      
 acetaminophen (TYLENOL) solution 325 mg  325 mg Per NG tube Q4H PRN Jose Ackerman MD   325 mg at 10/18/18 2130  
 0.9% sodium chloride infusion 250 mL  250 mL IntraVENous PRN Luba Mejia DO      
 heparin (porcine) pf 100 Units  100 Units InterCATHeter PRN Adrianne Moritz, MD      
 0.9% sodium chloride infusion 250 mL  250 mL IntraVENous PRN Jose Ackerman MD      
 insulin lispro (HUMALOG) injection   SubCUTAneous Q6H Virgle Human H, DO   Stopped at 10/22/18 0000  heparin (porcine) 1,000 unit/mL injection 1,000 Units  1,000 Units InterCATHeter DIALYSIS PRN Adrianne Moritz, MD   1,000 Units at 09/28/18 1332  senna-docusate (PERICOLACE) 8.6-50 mg per tablet 1 Tab  1 Tab Oral BID PRN Stanton Cheval, DO      
 ondansetron (ZOFRAN) injection 4 mg  4 mg IntraVENous Q4H PRN Alfred Nettles DO   4 mg at 09/28/18 0539  
 glucose chewable tablet 16 g  4 Tab Oral PRN Niya Bowles A, DO      
 glucagon (GLUCAGEN) injection 1 mg  1 mg IntraMUSCular PRN Alfred Nettles, DO      
 dextrose (D50) infusion 12.5-25 g  25-50 mL IntraVENous PRN Alfred Nettles, DO   25 g at 10/09/18 1906  hydrALAZINE (APRESOLINE) 20 mg/mL injection 20 mg  20 mg IntraVENous Q6H PRN Patti Gale NP   20 mg at 10/22/18 2318 Objective Vitals:  
 10/23/18 0500 10/23/18 0600 10/23/18 0700 10/23/18 0855 BP: 173/49 176/53 171/50 Pulse: (!) 101 (!) 102 (!) 101 97 Resp: 19 21 18 23 Temp:      
TempSrc:      
SpO2: 99% 99% 98% 100% Weight:  86.3 kg (190 lb 4.1 oz) Height:      
 
 
 
Intake/Output Summary (Last 24 hours) at 10/23/2018 1018 Last data filed at 10/23/2018 0100 Gross per 24 hour Intake 420 ml Output 300 ml Net 120 ml Admission weight: Weight: 96.6 kg (213 lb) (09/26/18 2244) Last Weight Metrics: 
Weight Loss Metrics 10/23/2018 9/26/2018 9/13/2018 Today's Wt 190 lb 4.1 oz - 227 lb 15.3 oz  
BMI - 29.8 kg/m2 35.7 kg/m2 Physical Assessment:  
 
General: intubated, unresponsive. Eyes are open. Neck: Trach in place. Neck: No jvd. LUNGS: diminished air entry at the bases. No crackles. CVS EXM: S1, S2  RRR. Abdomen: soft, pos bs. Lower Extremities:  1+ edema. Rt arm with multiple flassid blisters- some with crusting. Rt femoral temporary dialysis catheter. Lab CBC w/Diff Recent Labs 10/23/18 
1499 10/22/18 
9690 10/21/18 
0430 WBC 10.8 11.7 10.4 RBC 2.65* 2.69* 2.62* HGB 7.5* 7.6* 7.5* HCT 23.7* 24.4* 23.9*  
* 549* 474* Chemistry Recent Labs 10/23/18 
8373 10/22/18 
8519 10/21/18 
0430 * 128* 143*  140 140  
K 4.9 4.7 4.4  102 102 CO2 26 27 27 BUN 71* 57* 38* CREA 5.96* 4.95* 3.52* CA 7.6* 7.4* 7.3* AGAP 12 11 11 BUCR 12 12 11* * 318* 324* TP 7.0 7.4 7.3 ALB 1.6* 1.7* 1.7*  
GLOB 5.4* 5.7* 5.6* AGRAT 0.3* 0.3* 0.3* No results found for: IRON, FE, TIBC, IBCT, PSAT, FERR Lab Results Component Value Date/Time Calcium 7.6 (L) 10/23/2018 04:35 AM  
 Phosphorus 5.2 (H) 10/13/2018 03:41 AM  
  
 
Matt Sullivan M.D. [facility] Phone [phone] Pager [phone]

## 2018-10-23 NOTE — PROGRESS NOTES
Patient's Mother and sister at the bedside. Updates reviewed with Mother by Dr. Gavino Sanabria. Mother under the impression patient's daughter came to hospital to visit patient this past weekend. (10/21-10/22). Mother asked nurse after speaking to Dr. Gavino Sanabria, \"When is patient coming home? \"

## 2018-10-23 NOTE — PROGRESS NOTES
VENTILATOR Care plan 
 
Problem: Ventilator Management Goal: *Adequate oxygenation/ ventilation/ and extubation Patient: 
   
 
Hanny Enriquez     57 y.o.   male     10/23/2018  11:02 AM 
Patient Active Problem List  
Diagnosis Code  Stroke (cerebrum) ([facility]) I63.9  Stroke ([facility] I63.9  Rhabdomyolysis M62.82  
 Dehydration E86.0  
 Essential hypertension I10  
 Diabetes mellitus type 2, controlled ([facility] E11.9  Non compliance w medication regimen Z91.14  
 DM (diabetes mellitus) ([facility] E11.9  History of stroke Z80.78  
 Acute-on-chronic kidney injury ([facility] N17.9, N18.9  Acute cystitis N30.00  Elevated troponin R74.8  Elevated LFTs R94.5  Cardiac arrest with ventricular fibrillation ([facility]) I46.9, I49.01  
 Acute pancreatitis K85.90  
 Ischemic encephalopathy I67.89  
 Hypoalbuminemia E88.09  
 Shingles B02.9  Gangrene ([facility] [id number] Pneumonia of both lower lobes due to infectious organism ([facility] [J18.1] ESRD (end stage renal disease) ([facility] [N18.6] ESRD (end stage renal disease) ([facility] [N18.6] Reason patient intubated: Acute Respiratory failure Ventilator day:25 Ventilator settings:  ACVC+ rate-10, VT-500, PEEP5, FIO2-30% Trach Size/Placement: #8.0 Portex ABG: 
Date:10/23/2018 No results found for: PH, PHI, PCO2, PCO2I, PO2, PO2I, HCO3, HCO3I, FIO2, FIO2I Chest X-ray: 
Date:10/23/2018 [place] [facility] [place]26/18 XR CHEST PORT Narrative EXAM: XR CHEST PORT 
 
CLINICAL INDICATION/HISTORY: respiratory failure, s/p tracheostomy 
-Additional: None COMPARISON: 10/17/2018 TECHNIQUE: Frontal view of the chest 
 
_______________ FINDINGS: 
 
HEART AND MEDIASTINUM: Significant rightward rotation. Right IJ catheter 
position unchanged. Tracheostomy noted. Grossly stable cardiomediastinal 
silhouette. LUNGS AND PLEURAL SPACES: Evidence of small right-sided pleural effusion. Improved aeration in the right lung since most recent prior, appearance similar 
to 10/16/2018. No acute opacities on the left. BONY THORAX AND SOFT TISSUES: Unremarkable. 
 
_______________ Impression IMPRESSION: 
 
Improved right pulmonary aeration in the interval. Probable small right-sided 
effusion. Lab Test: 
Date:10/23/2018 WBC:  
Lab Results Component Value Date/Time WBC 10.8 10/23/2018 04:35 AM  
HGB:  
Lab Results Component Value Date/Time HGB 7.5 (L) 10/23/2018 04:35 AM  
 PLTS:  
Lab Results Component Value Date/Time PLATELET 779 (H) 09/11/5820 04:35 AM  
 
 
SaO2%/flow: @LBFLYYN2(9)@ Vital Signs:    
Patient Vitals for the past 8 hrs: 
 Temp Pulse Resp BP SpO2  
10/23/18 1045  97 16 145/63 98 % 10/23/18 1030  98 16 113/58 98 % 10/23/18 1015 97.8 °F (36.6 °C) 98 15 113/56 98 % 10/23/18 1000  100 16 120/53 98 % 10/23/18 0945 97.8 °F (36.6 °C) 99 20 151/60 99 % 10/23/18 0855  97 23  100 % 10/23/18 0700  (!) 101 18 171/50 98 % 10/23/18 0600  (!) 102 21 176/53 99 % 10/23/18 0500  (!) 101 19 173/49 99 % 10/23/18 0415  96 17  99 % 10/23/18 0400 97.8 °F (36.6 °C) 96 20 186/58 100 % Wean Screen Pass (Yes or No):Yes Wean Screen Reason for Failure:n/a Duration of Weaning Trial: 
 
Additional Comments: PLAN OF CARE: Maintain ventilatory support, wean as tolerated GOAL: Trach collar OUTCOME:

## 2018-10-23 NOTE — PROGRESS NOTES
Physical Exam  
Skin:  
 
  
 
 
Bedside shift change report was given to me, Anthony Pruitt RN  (oncoming night shift nurse), by Phong Souza RN (offgoing day shift nurse). Report included the following information:  SBAR, kardex, consultations, hemodynamic monitoring, intake/output, MAR, lab results, VS trends, cardiac rhythm noted NSR. Skin, neuro, respiratory status, and order verification have been dually noted and verified at the bedside with: Phong oSuza RN                                                              
ICU Nurse's Note: 
2000: Pt's eyes are open, right eye deviated to the left. Pt has no notable purposeful movements to his extremities, and he requires max assist with all ADLs and repositioning. He has minimal reaction, but will occasionally withdraw his BLE upon tactile stim. He is especially hypersensitive to the left foot. Left great toe, observed to be purple/black discolored, was dabbed with gauze soaked betadine and left open to air. This writer did not have access to smaller mepilex drsgs, and felt that a larger mepilex would further compromise LLE skin integrity, and ability to monitor circulation and any other associated changes. Will continue to monitor. Neurological: as noted in assessment Cardiovascular:  NSR/ST on the monitor. Pulmonary: breath sounds diminished and coarse bilaterally. Saturations maintained at % via tracheostomy on vent via AC w/ Vt 500, PEEP 5, FiO2 30%. Inline suctioned of a moderate amount of green, yellow thick sputum. + cough with suction noted. GI/: Abdomen is obese, soft, non-distended. PEG tube intact and infusing Osmolite 1.2 teodoro at 30 ml/hr, with < 5 ml tan residual noted. Last BM indicated 10/23/2018, soft, pastey, orange rust colored. Pt is a hemodialysis client who is oliguric at baseline, and has not yet voided independently.  Last straight catheterization was performed by day RN at approximately 10 am.      
 IVF/Critical gtts: Double lumen PICC, patent, has been capped after flushing. Skin: as noted above 
 
0000:Q 6hr accucheck 124, no SSI indicated. Pt was assisted with repositioning and was bathed after having had a medium, orange/rust colored soft and pasty BM. Pt was also straight catheterized at this time. 300 ml braydon colored urine was collected, a small amount of white colored drainage noted. VS at baseline, pt is afebrile 97.1. Interventions are ongoing, will continue to monitor. 0200: Pt was incontinent of another large, soft and pasty BM that was orange/rust colored. Barrier cream applied after a complete bed bath and linen change. No current urine output at this time, oliguric. Tube feeding was stopped, PEG tube flushed and clamped as pt is to be NPO for at least 6 hrs for scheduled MRI with MRCP later this morning, per Dr. Isaak Reid, GI.  
0400: AM labs drawn from PICC without incident. Pt was bathed, repositioned, and tolerated mouth care. He continues to exhibit a strong, productive cough with inline trache suctioning. Sputum is in small increments, yellow/green and thick. 0600: Q 6hr accucheck 113, no SSI indicated. Pt tolerated scheduled AM meds and appears to be resting comfortably in bed at this time after repositioning, mouth care, and mery care. Interventions are ongoing, will continue to monitor.  
 
0710: Bedside change of shift report given to: Keegan Lara RN

## 2018-10-23 NOTE — PROGRESS NOTES
-0855-Received patient on ventilator VC+AC rate-10, VT-500, PEEP-5, FIO2-30%  O2 Sats-99% HR-97, RR-16, #8 Portex  Noted to be patent and secure. Respiratory will continue to monitor. -[address] 
 
-1604-Pt remains on above settings. O2 Sats-99% HR-108, Rr-19. Trach care performed, Inner cannula changed. [place] remains patent and secure. Spare Trachs and ambubag with mask at bedside. -[address]

## 2018-10-23 NOTE — PROGRESS NOTES
Hospitalist Progress Note Daily Progress Note: 10/23/2018    
Interval history / Subjective:  
Aguilar Talamantes is a 57y.o. year old male who presented from [facility] with abnormal labs, elevated BUN/Cr. Found to have JULIANNA, UTI, and markedly elevated LFT on presentation. Patient's recent admission was 9/10/18-9/14/18 for acute CVA and rhabdo. Patient poor historian on admission,stated \"I had heart failure. She was started on vanc,zosyn. On 9/29,patient had cardiac arrest with ventricular fibrillation - s/p ROSC. Her hospital course has since then complicated with sepsis,pancreatitits,pneumonia,acute pancreatitis requiring involvement of gi,surgery,nephrology,cardiology,ID,cardiology. So far patient has remained without major improvement. Now in vegetative state. Patient had PEG/Trach placement on 10/17. On 10/17,patient started on acyclovir for veicular rash rt arm,suspected being Zoster. Lipase 2169. US abd 10/22 shows Abnormal appearance of the gallbladder with stones/sludge with wall thickening  
and possible pericholecystic fluid similar to the prior study of 9/27/2018 
10/22: Patient on treatment for shingles in contact isolation per ID team. He also had left big toe gangrene . We will consult Podiatry. His lipase was elevated , US of the abdomen shows sludge and possible cholecystitis. Patient afebrile. GI consulted  for possible MRCP. Current Facility-Administered Medications Medication Dose Route Frequency  [START ON 10/24/2018] insulin glargine (LANTUS) injection 14 Units  14 Units SubCUTAneous DAILY  multivitamin (MULTI-DELYN, WELLESSE) oral liquid 30 mL  30 mL Oral DAILY  albuterol (PROVENTIL VENTOLIN) nebulizer solution 2.5 mg  2.5 mg Nebulization BID  
 bacitracin 500 unit/gram packet 1 Packet  1 Packet Topical PRN  
 sodium chloride (NS) flush 10-30 mL  10-30 mL InterCATHeter PRN  
 sodium chloride (NS) flush 10 mL  10 mL InterCATHeter Q24H  sodium chloride (NS) flush 10 mL  10 mL InterCATHeter PRN  
 sodium chloride (NS) flush 10-40 mL  10-40 mL InterCATHeter Q8H  
 sodium chloride (NS) flush 20 mL  20 mL InterCATHeter Q24H  
 heparin (porcine) injection 5,000 Units  5,000 Units SubCUTAneous Q8H  
 acyclovir sodium (ZOVIRAX) 330.5 mg in 0.9% sodium chloride 100 mL IVPB  5 mg/kg (Ideal) IntraVENous Q24H  hydroxypropyl methylcellulose (ISOPTO TEARS) 0.5 % ophthalmic solution 2 Drop  2 Drop Both Eyes BID  epoetin manad (EPOGEN;PROCRIT) injection 40,000 Units  40,000 Units IntraVENous Q7D  
 pantoprazole (PROTONIX) 40 mg in sodium chloride 0.9% 10 mL injection  40 mg IntraVENous Q24H  
 0.9% sodium chloride infusion 250 mL  250 mL IntraVENous PRN  
 influenza vaccine 2018-19 (6 mos+)(PF) (FLUARIX QUAD/FLULAVAL QUAD) injection 0.5 mL  0.5 mL IntraMUSCular PRIOR TO DISCHARGE  acetaminophen (TYLENOL) solution 325 mg  325 mg Per NG tube Q4H PRN  
 0.9% sodium chloride infusion 250 mL  250 mL IntraVENous PRN  
 heparin (porcine) pf 100 Units  100 Units InterCATHeter PRN  
 0.9% sodium chloride infusion 250 mL  250 mL IntraVENous PRN  
 insulin lispro (HUMALOG) injection   SubCUTAneous Q6H  
 heparin (porcine) 1,000 unit/mL injection 1,000 Units  1,000 Units InterCATHeter DIALYSIS PRN  
 senna-docusate (PERICOLACE) 8.6-50 mg per tablet 1 Tab  1 Tab Oral BID PRN  
 ondansetron (ZOFRAN) injection 4 mg  4 mg IntraVENous Q4H PRN  
 glucose chewable tablet 16 g  4 Tab Oral PRN  
 glucagon (GLUCAGEN) injection 1 mg  1 mg IntraMUSCular PRN  
 dextrose (D50) infusion 12.5-25 g  25-50 mL IntraVENous PRN  
 hydrALAZINE (APRESOLINE) 20 mg/mL injection 20 mg  20 mg IntraVENous Q6H PRN Review of Systems Intubated,unresponsive. Objective:  
 
Visit Vitals /71 Pulse (!) 108 Temp 96.7 °F (35.9 °C) (Axillary) Resp 18 Ht 5' 7\" (1.702 m) Wt 86.3 kg (190 lb 4.1 oz) SpO2 99% BMI 29.80 kg/m² O2 Flow Rate (L/min): 45 l/min O2 Device: Tracheostomy Temp (24hrs), Av.6 °F (36.4 °C), Min:96.7 °F (35.9 °C), Max:98.4 °F (36.9 °C) 
 
 
10/23 0701 - 10/23 1900 In: 190 Out: 3000  
10/21 1901 - 10/23 0700 In: 600 Out: 475 [Urine:475] P/E General Appearance:S/p PEG/Trach today HENT: normocephalic/atraumatic, + ETT Neck: No JVD, hematoma R side where [facility] is improving Lungs: Coarse B/L w/ vent-assisted BS 
CV: RRR, no m/r/g Abdomen: soft, non-tender, normal bowel sounds Extremities: versicular rash rt arm and right side of chest,no cyanosis, no peripheral edema Neuro: Unresponsive to commands, no withdrawal to pain, + corneal reflex and pupillary reaction today Skin: Normal color, intact Data Review Recent Results (from the past 12 hour(s)) CBC W/O DIFF Collection Time: 10/23/18  4:35 AM  
Result Value Ref Range WBC 10.8 4.6 - 13.2 K/uL  
 RBC 2.65 (L) 4.70 - 5.50 M/uL HGB 7.5 (L) 13.0 - 16.0 g/dL HCT 23.7 (L) 36.0 - 48.0 % MCV 89.4 74.0 - 97.0 FL  
 MCH 28.3 24.0 - 34.0 PG  
 MCHC 31.6 31.0 - 37.0 g/dL  
 RDW 15.8 (H) 11.6 - 14.5 % PLATELET 127 (H) 352 - 420 K/uL MPV 8.8 (L) 9.2 - 37.6 FL  
METABOLIC PANEL, COMPREHENSIVE Collection Time: 10/23/18  4:35 AM  
Result Value Ref Range Sodium 139 136 - 145 mmol/L Potassium 4.9 3.5 - 5.5 mmol/L Chloride 101 100 - 108 mmol/L  
 CO2 26 21 - 32 mmol/L Anion gap 12 3.0 - 18 mmol/L Glucose 125 (H) 74 - 99 mg/dL BUN 71 (H) 7.0 - 18 MG/DL Creatinine 5.96 (H) 0.6 - 1.3 MG/DL  
 BUN/Creatinine ratio 12 12 - 20 GFR est AA 12 (L) >60 ml/min/1.73m2 GFR est non-AA 10 (L) >60 ml/min/1.73m2 Calcium 7.6 (L) 8.5 - 10.1 MG/DL Bilirubin, total 0.5 0.2 - 1.0 MG/DL  
 ALT (SGPT) 52 16 - 61 U/L  
 AST (SGOT) 85 (H) 15 - 37 U/L Alk. phosphatase 308 (H) 45 - 117 U/L Protein, total 7.0 6.4 - 8.2 g/dL Albumin 1.6 (L) 3.4 - 5.0 g/dL Globulin 5.4 (H) 2.0 - 4.0 g/dL A-G Ratio 0.3 (L) 0.8 - 1.7 LIPASE Collection Time: 10/23/18  4:35 AM  
Result Value Ref Range Lipase 1,586 (H) 73 - 393 U/L  
GLUCOSE, POC Collection Time: 10/23/18  5:45 AM  
Result Value Ref Range Glucose (POC) 113 (H) 70 - 110 mg/dL GLUCOSE, POC Collection Time: 10/23/18  2:22 PM  
Result Value Ref Range Glucose (POC) 88 70 - 110 mg/dL Assessment/Plan:  
 
Principal Problem: 
  Cardiac arrest with ventricular fibrillation ([facility] [address] (9/29/2018) Overview: ROSC before defibrillation could be attempted. Active Problems: 
  Essential hypertension (9/10/2018) Diabetes mellitus type 2, controlled ([facility] [address] (9/10/2018) History of stroke (9/27/2018) Overview: With residual R hemiparesis Acute-on-chronic kidney injury ([facility] [address] (9/27/2018) Acute cystitis (9/27/2018) Elevated troponin (9/27/2018) Elevated LFTs (9/28/2018) Acute pancreatitis (9/29/2018) Overview: Lipase downtrending with interruption in tube feed and Mucomyst. Doubt  
    that the elevated lipase is due to excessive lipid in tube feeding. May be  
    secondary to Mucomyst. 
 
  Ischemic encephalopathy (9/30/2018) Hypoalbuminemia (10/21/2018) Shingles (10/21/2018) Gangrene ([facility] [address] (10/22/2018) Coarse tremors (10/23/2018) Overview: Tremors vs chills vs seizure vs other. Will monitor. If persistent, will  
    consider 24-hr EEG monitoring. Zoster. Care Plan - Vent mgmt per ICU - S/p trach/PEG on 10/17 as patient did not show significant improvement. 
- EGD showed large clot in esophagus last week repeat EGD 10/9 shows healed esophageal ulcer - Cont protonix IV every day  
- Hgb stable - LFTs improving - GI signed off 10/9 
- MRI w/ evidence of severe anoxic brain injury - Minimal improvement on neuro exam 
- Appreciate neuro assistance - IV Meropenem and IV Fluconazole d/heron on 10/15 per ID. 
- Currently on vanc only which was started on 9/27 
- Nephro managing HD TTHS - Pt unlikely to have any meaningful neuro recovery, prognosis is very grim - Family wanted to cont to do everything, including trach/PEG -> was done10/17 
- On 10/17:Started on acyclovir for possible Zoster rt arm and rt upper chest area. - Hyperkalemia on 10/18 ->corrected - Lipase 2169 10/22. US abd Abnormal appearance of the gallbladder with stones/sludge with wall thickening  
and possible pericholecystic fluid similar to the prior study of 9/27/2018  
- On Acyclovir for shingles , contact precaution per ID commitee 
- 10/22: Consult GI today for possible MRCP 
- 10/22: Consult Podiatry for left big toe gangrene  
- 10/23 : Podiatry recommend RAQUEL for Left Big toe  
- 10/23: GI recommend MRI/MRCP but will need patient off vent for multiple times for procedure. Not sure if this is feasible now DVT prophylaxis:scds Full code. Disposition:tbd - need placement -  involved.

## 2018-10-23 NOTE — PROGRESS NOTES
[facility] Pulmonary, Critical Care, and Sleep Medicine Critical Care Progress Note Name: Scott Dong : 1955 MRN: [id number] Date: 10/23/2018 [x] I have reviewed the flowsheet and previous days notes. Events, vitals, medications and notes from last 24 hours reviewed. Care plan discussed on multidisciplinary rounds. My assessment, plan of care, findings, medications, side effects etc were discussed with: 
[x] Nurse [] PT/OT [x] Respiratory therapy []   
[] Family [] Patient: answered all questions to satisfaction  
[x] Pharmacist [] ASSESSMENT/PLAN:  
Principal Problem: 
  Cardiac arrest with ventricular fibrillation ([address] (2018) Overview: ROSC before defibrillation could be attempted. Active Problems: 
  Acute pancreatitis (2018) Overview: Shock, leukocytosis, elevated lipase but radiographically no ductal  
    dilatation in pancreas. GB stones without radiographic stigmata of acute  
    cholecystitis Diabetes mellitus type 2, controlled ([address] (9/10/2018) Acute-on-chronic kidney injury ([address] (2018) Ischemic encephalopathy (2018) Hypoalbuminemia (10/21/2018) Shingles (10/21/2018) Elevated LFTs (2018) Essential hypertension (9/10/2018) History of stroke (2018) Overview: With residual R hemiparesis Acute cystitis (2018) Elevated troponin (2018) Gangrene ([address] (10/22/2018) · This patient is ventilator dependent and cannot at this time tolerate repeated interruptions in mechanical ventilatory support to obtain MRCP images. · Restart tube feeding · Monitor tremors. · HD per Nephrology. · Straight cath urinary bladder q 8 hr. 
· Monitor lipase. Repeat lipase in am. 
· Add MVI to TF. · Continue acyclovir per ID. · Inhaled therapy: albuterol · Renal dosing of medications. NUTRITION: Advance Osmolite 1.2 to goal. Check prealbumin q week. Consult Clinical Dietician. ICU electrolyte replacement protocol PROPHYLAXIS: 
DVT prophylaxis: heparin GI prophylaxis: Protonix HOB 30-degree elevation Chlorhexidine mouth washes CODE STATUS: FULL CODE Further recommendations will be based on the patient's response to recommended treatment and results of the investigation ordered. DISPOSITION: 
 
The patient is: [x] acutely ill Risk of deterioration: [] moderate [x] critically ill  [x] high  
 
[x]See my orders for details [x] The patient is unable to give any meaningful history or review of systems because the patient is: 
[x] Intubated [] Sedated  
[x] Unresponsive [] [x]The patient is critically ill on     
[x] Mechanical ventilation [x] Vasoactive agents  
[] BiPAP [] Inotropes SUBJECTIVE 
- This 57 y.o.  male is seen in consultation at the request of Dr. Pedro Rooney for recommendations on further evaluation and management of acute hypoxia. He hospitalized (9/10/2018 - 9/14/2018) for stroke with neurologic residua (R hemiparesis). Patient discharged from [facility] to [facility] ([facility]) on 9/14/2018, only to return on 9/26/2018 with acute renal failure/abnormal lab values. He experienced VT/VF arrest during my initial clinical assessment. The patient has undergone tracheostomy and percutaneous gastrostomy. He has a R femoral PermCath for HD access. - Overnight events: ~ 500 mL (over 24 hours) urine output via straight cath. Lipase downtrending with discontinuing tube feed and Mucomyst. GI has recommended MRCP. Demonstrates intermittent tremors (seizures?), perhaps best described as low frequency, low amplitude contractions which appear to be primarily involving trunk. Noted while on HD today. Remains on mechanical ventilatory support. Not following commands. Still has active shingles lesions that have not yet dried. On vancomycin. [x] Nutrition: Osmolite 1.2 @ 30 mL/hr 
[x] BM today. ROS: Review of systems not obtained due to patient factors. Past Medical History:  
Diagnosis Date  CHF (congestive heart failure) ([email or web address]  Diabetes ([facility]  Stroke ([facility] No Known Allergies Current Facility-Administered Medications:  
  [START ON 10/24/2018] insulin glargine (LANTUS) injection 14 Units, 14 Units, SubCUTAneous, DAILY, Anthony Solorzano MD 
  multivitamin (MULTI-DELYN, WELLESSE) oral liquid 30 mL, 30 mL, Oral, DAILY, Rudy Garner MD, 30 mL at 10/23/18 5088   albuterol (PROVENTIL VENTOLIN) nebulizer solution 2.5 mg, 2.5 mg, Nebulization, BID, Libby Ackerman MD, 2.5 mg at 10/23/18 7351   bacitracin 500 unit/gram packet 1 Packet, 1 Packet, Topical, PRN, Libby Ackerman MD 
  sodium chloride (NS) flush 10-30 mL, 10-30 mL, InterCATHeter, PRN, Jones Ackerman MD, 30 mL at 10/21/18 0430 
  sodium chloride (NS) flush 10 mL, 10 mL, InterCATHeter, Q24H, Jones Ackerman MD, 10 mL at 10/22/18 1620 
  sodium chloride (NS) flush 10 mL, 10 mL, InterCATHeter, PRN, Keeley Martino MD, 10 mL at 10/20/18 1170   sodium chloride (NS) flush 10-40 mL, 10-40 mL, InterCATHeter, Q8H, Jones Ackerman MD, 10 mL at 10/23/18 0550   sodium chloride (NS) flush 20 mL, 20 mL, InterCATHeter, Q24H, Jones Ackerman MD, 20 mL at 10/22/18 1621 
  heparin (porcine) injection 5,000 Units, 5,000 Units, SubCUTAneous, Q8H, Jones Ackerman MD, 5,000 Units at 10/23/18 0530 
  acyclovir sodium (ZOVIRAX) 330.5 mg in 0.9% sodium chloride 100 mL IVPB, 5 mg/kg (Ideal), IntraVENous, Q24H, Dejan Curtis MD, 330.5 mg at 10/22/18 1620   hydroxypropyl methylcellulose (ISOPTO TEARS) 0.5 % ophthalmic solution 2 Drop, 2 Drop, Both Eyes, BID, Lakesha Gonzales NP, 2 Drop at 10/23/18 7219   epoetin manda (EPOGEN;PROCRIT) injection 40,000 Units, 40,000 Units, IntraVENous, Q7D, Sandrita Hernadez MD, 40,000 Units at 10/17/18 1621   pantoprazole (PROTONIX) 40 mg in sodium chloride 0.9% 10 mL injection, 40 mg, IntraVENous, Q24H, Raymundo SHAFFER MD, 40 mg at 10/23/18 0908 
  0.9% sodium chloride infusion 250 mL, 250 mL, IntraVENous, PRN, Mariajose Grewal DO 
  influenza vaccine 2018-19 (6 mos+)(PF) (FLUARIX QUAD/FLULAVAL QUAD) injection 0.5 mL, 0.5 mL, IntraMUSCular, PRIOR TO DISCHARGE, Ash Littlejohn MD 
  acetaminophen (TYLENOL) solution 325 mg, 325 mg, Per NG tube, Q4H PRN, Ash Littlejohn MD, 325 mg at 10/18/18 2130 
  0.9% sodium chloride infusion 250 mL, 250 mL, IntraVENous, PRN, Marjan LEE DO 
  heparin (porcine) pf 100 Units, 100 Units, InterCATHeter, PRN, Vaibhav Estrada MD 
  0.9% sodium chloride infusion 250 mL, 250 mL, IntraVENous, PRN, Ash Littlejohn MD 
  insulin lispro (HUMALOG) injection, , SubCUTAneous, Q6H, Mariajose Grewal DO, Stopped at 10/22/18 0000   heparin (porcine) 1,000 unit/mL injection 1,000 Units, 1,000 Units, InterCATHeter, DIALYSIS PRN, Kitty Gayle MD, 1,000 Units at 09/28/18 1332   senna-docusate (PERICOLACE) 8.6-50 mg per tablet 1 Tab, 1 Tab, Oral, BID PRN, Vivek Nettlesry A, DO 
  ondansetron (ZOFRAN) injection 4 mg, 4 mg, IntraVENous, Q4H PRN, Vivek Nettlesry A, DO, 4 mg at 09/28/18 0539 
  glucose chewable tablet 16 g, 4 Tab, Oral, PRN, McQuain, Alfred A, DO 
  glucagon (GLUCAGEN) injection 1 mg, 1 mg, IntraMUSCular, PRN, McQuain Alfred A, DO 
  dextrose (D50) infusion 12.5-25 g, 25-50 mL, IntraVENous, PRN, McQuain, Alfred A, DO, 25 g at 10/09/18 1906   hydrALAZINE (APRESOLINE) 20 mg/mL injection 20 mg, 20 mg, IntraVENous, Q6H PRN, Jasmin Mccullough, SALUD, 20 mg at 10/22/18 9604 OBJECTIVE Vital Signs:   
Patient Vitals for the past 24 hrs: 
 Temp Pulse Resp BP SpO2  
10/23/18 0855  97 23  100 % 10/23/18 0700  (!) 101 18 171/50 98 % 10/23/18 0600  (!) 102 21 176/53 99 % 10/23/18 0500  (!) 101 19 173/49 99 % 10/23/18 0415  96 17  99 % 10/23/18 0400 97.8 °F (36.6 °C) 96 20 186/58 100 % 10/23/18 0300  100 18 176/52 99 % 10/23/18 0200  100 22 170/56 99 % 10/23/18 0100  98 17 156/51 98 % 10/23/18 0024  95 19  100 % 10/23/18 0000 97.1 °F (36.2 °C) 97 22 157/45 99 % 10/22/18 2300  90 17 195/81 100 % 10/22/18 2200  90 18 187/72 99 % 10/22/18 2100  90 21 172/68 99 % 10/22/18 2034  89 17  99 % 10/22/18 2000 97.8 °F (36.6 °C) 91 17 172/65 95 % 10/22/18 1900  90 17 170/68 97 % 10/22/18 1605  89 20  99 % 10/22/18 1600 98.8 °F (37.1 °C) 92 24 175/63 94 % 10/22/18 1500  90 27 158/64 96 % 10/22/18 1400  90 18 140/57 95 % 10/22/18 1300  92 14 166/72 97 % 10/22/18 1200 98.7 °F (37.1 °C) 92 18 161/75 97 % 10/22/18 1115  92 20  97 % 10/22/18 1100  94 (!) 31 165/72 97 % O2 Device: Tracheostomy O2 Flow Rate (L/min): 45 l/min Temp (24hrs), Av °F (36.7 °C), Min:97.1 °F (36.2 °C), Max:98.8 °F (37.1 °C) PHYSICAL EXAM:  
General: intubated. Not on sedation. Does not follow commands. Head: atraumatic, normocephalic Eye: pupils are midline today. Nose: Nares are patent. No exudate. Throat: No oral thrush. No exudate. Mucous membranes are moist. 
Neck: tracheostomy in situ. no thyromegaly, no JVD, no lymphadenopathy. Trachea midline. Creamy off-white secretions around the outer cannula. Lung: Symmetric in development and expansion. Good air entry. Heart: Regular S1, S2 without murmur, rub or gallop. Abdomen: PEG in situ. soft, nontender, no ndistended. Normoactive bowel sounds. No rebound. No guarding. Extremities: no pedal edema, no clubbing, 2+ peripheral pulses in DP. L great toe ischemic changes/dry gangrene. Lymphatic: no cervical/axillary/inguinal lymphadenopathy Neurologic: R facial droop is less obvious today. Withdraws to pain @ B LE. Demonstrating spontaneous eye movement, blinking and R LE movement. Doll's eyes intact. Corneal intact. Cough/gag intact.  Spontaneous respirations intact. L LE triple flexion response to plantar dorsiflexion. DTR 1+ @ B biceps and B patellar tendons. Skin: umbilicated bullous lesions involving the R shoulder and R arm are starting to dry up. Buttocks DTI 
DATA:  
 
Intake/Output:  
Last shift:      No intake/output data recorded. Last 3 shifts: 10/21 1901 - 10/23 0700 In: 600 Out: 475 [Urine:475] Intake/Output Summary (Last 24 hours) at 10/23/2018 1028 Last data filed at 10/23/2018 0100 Gross per 24 hour Intake 420 ml Output 300 ml Net 120 ml Last 3 Recorded Weights in this Encounter 10/21/18 0509 10/22/18 0500 10/23/18 0600 Weight: 91.2 kg (201 lb 1.6 oz) 88 kg (194 lb 0.1 oz) 86.3 kg (190 lb 4.1 oz) Lines: All central lines examined by me. No signs of erythema, induration, discharge. Peripherally Inserted Central Catheter: PICC Double Lumen 10/18/18 Left;Brachial (Active) Central Line Being Utilized Yes 10/23/2018  7:00 AM  
Criteria for Appropriate Use Long term IV/antibiotic administration 10/23/2018  4:00 AM  
Site Assessment Clean, dry, & intact 10/23/2018  7:00 AM  
Phlebitis Assessment 0 10/23/2018  4:00 AM  
Infiltration Assessment 0 10/23/2018  4:00 AM  
Arm Circumference (cm) 38 cm 10/23/2018  4:00 AM  
Date of Last Dressing Change 10/18/18 10/23/2018  4:00 AM  
Dressing Status Clean, dry, & intact 10/23/2018  7:00 AM  
Action Taken Open ports on tubing capped 10/23/2018  7:00 AM  
External Catheter Length (cm) 0 centimeters 10/23/2018  4:00 AM  
Dressing Type Disk with Chlorhexadine gluconate (CHG); Transparent 10/23/2018  4:00 AM  
Hub Color/Line Status Red;Patent; Flushed;Capped 10/23/2018  4:00 AM  
Positive Blood Return (Site #1) Yes 10/23/2018  4:00 AM  
Hub Color/Line Status Purple;Patent; Flushed;Capped 10/23/2018  4:00 AM  
Positive Blood Return (Site #2) Yes 10/23/2018  4:00 AM  
Alcohol Cap Used Yes 10/23/2018  4:00 AM  
  
Hemodialysis Catheter: Hemodialysis Access 10/19/18 (Active) Central Line Being Utilized Yes 10/23/2018  3:00 AM  
Criteria for Appropriate Use Dialysis/apheresis 10/23/2018  7:00 AM  
Site Assessment Clean, dry, & intact 10/23/2018  3:00 AM  
Date of Last Dressing Change 10/20/18 10/23/2018 12:00 AM  
Dressing Status Clean, dry, & intact 10/23/2018 12:00 AM  
Dressing Type Non-adherent dressing 10/23/2018 12:00 AM  
Proximal Hub Color/Line Status Capped 10/23/2018 12:00 AM  
Distal Hub Color/Line Status Capped 10/23/2018 12:00 AM  
 
Drain(s): PEG/Gastrostomy Tube 10/17/18 (Active) Site Assessment Clean, dry, & intact 10/23/2018  7:00 AM  
Dressing Status Clean, dry, & intact 10/23/2018  7:00 AM  
G Port Status Clamped 10/23/2018  7:00 AM  
External Catheter Length (cm) 3 centimeters 10/22/2018  8:00 PM  
Action Taken Placement verified (comment) 10/22/2018  8:00 PM  
Drainage Description Other (Comment) 10/21/2018 12:22 AM  
Gastric Residual (mL) 5 ml 10/22/2018  8:00 PM  
Tube Feeding/Formula Options Osmolite 1.2 10/23/2018  1:00 AM  
Tube Feeding/Verify Rate (mL/hr) 30 10/23/2018  1:00 AM  
Water Flush Volume (mL) 0 mL 10/23/2018  1:00 AM  
Intake (ml) 30 ml 10/23/2018  1:00 AM  
Medication Volume 0 ml 10/22/2018  4:00 PM  
Drainage Chamber Level (ml) 0 ml 10/22/2018  4:00 PM  
Output (ml) 0 ml 10/22/2018  4:00 PM  
 
Airway: Airway - Tracheostomy Tube 10/17/18 Portex; Cuffed (Active) Cuff Pressure 30 cmH20 10/17/2018 10:25 AM  
Site Assessment Clean, dry, & intact 10/23/2018  8:55 AM  
Trach Dressing Changed No 10/23/2018  4:15 AM  
Trach Cleaned With Normal saline 10/22/2018  4:05 PM  
Trach Tie Changed No 10/23/2018  4:15 AM  
Trach Cannula Changed No 10/23/2018  4:15 AM  
Inner Cannula Cuffed, cuff inflated 10/23/2018  8:55 AM  
Line Jl Top of the lock 10/23/2018  8:55 AM  
Side Secured Centered 10/23/2018  4:15 AM  
Spare Trach at Bedside Yes 10/23/2018  8:55 AM  
 Spare Trach Tube One Size Smaller at Bedside Yes 10/23/2018  8:55 AM  
Ambu Bag With Mask at Bedside Yes 10/23/2018  8:55 AM  
 
 
Vent Date (intubated 9/29/2018, tracheostomy 10/17/2018) Ventilator Settings: 
Ventilator Mode: Assist control, VC+ Respiratory Rate Resp Rate Observed: 34 Back-Up Rate: 10 Insp Time (sec): 1 sec I:E Ratio: 1:5.67 Ventilator Volumes Vt Set (ml): 500 ml Vt Exhaled (Machine Breath) (ml): 572 ml Vt Spont (ml): 298 ml Ve Observed (l/min): 10.3 l/min Ventilator Pressures Pressure Support (cm H2O): 15 cm H2O 
PIP Observed (cm H2O): 22 cm H2O Plateau Pressure (cm H2O): 18 cm H2O 
MAP (cm H2O): 11 PEEP/VENT (cm H2O): 5 cm H20 Auto PEEP Observed (cm H2O): 0 cm H2O Mode Rate Tidal Volume Pressure FiO2 PEEP Assist control, VC+   500 ml  15 cm H2O 30 % 5 cm H20 Peak airway pressure: 22 cm H2O Plateau pressure:    
Tidal volume:   
Minute ventilation: 10.3 l/min SPO2   
 
ARDSNet Guidelines: Lung protective ventilation (VT 6 cc/kg IBW) and Pplat <= 30 Telemetry: [x] Sinus [] A-flutter [] Paced [] A-fib [] Multiple PVCs Imaging: 
[x] I have personally reviewed the patients radiographs 
[] Radiographs reviewed with radiologist 
[x] No CXR study available for review today 
[] No change from prior, tubes and lines are in adequate position 
[] Improved   [] Worsening No results found. Labs: 
Recent Results (from the past 24 hour(s)) GLUCOSE, POC Collection Time: 10/22/18  1:40 PM  
Result Value Ref Range Glucose (POC) 109 70 - 110 mg/dL GLUCOSE, POC Collection Time: 10/22/18 11:15 PM  
Result Value Ref Range Glucose (POC) 124 (H) 70 - 110 mg/dL CBC W/O DIFF Collection Time: 10/23/18  4:35 AM  
Result Value Ref Range WBC 10.8 4.6 - 13.2 K/uL  
 RBC 2.65 (L) 4.70 - 5.50 M/uL HGB 7.5 (L) 13.0 - 16.0 g/dL HCT 23.7 (L) 36.0 - 48.0 % MCV 89.4 74.0 - 97.0 FL  
 MCH 28.3 24.0 - 34.0 PG  
 MCHC 31.6 31.0 - 37.0 g/dL RDW 15.8 (H) 11.6 - 14.5 % PLATELET 654 (H) 218 - 420 K/uL MPV 8.8 (L) 9.2 - 92.7 FL  
METABOLIC PANEL, COMPREHENSIVE Collection Time: 10/23/18  4:35 AM  
Result Value Ref Range Sodium 139 136 - 145 mmol/L Potassium 4.9 3.5 - 5.5 mmol/L Chloride 101 100 - 108 mmol/L  
 CO2 26 21 - 32 mmol/L Anion gap 12 3.0 - 18 mmol/L Glucose 125 (H) 74 - 99 mg/dL BUN 71 (H) 7.0 - 18 MG/DL Creatinine 5.96 (H) 0.6 - 1.3 MG/DL  
 BUN/Creatinine ratio 12 12 - 20 GFR est AA 12 (L) >60 ml/min/1.73m2 GFR est non-AA 10 (L) >60 ml/min/1.73m2 Calcium 7.6 (L) 8.5 - 10.1 MG/DL Bilirubin, total 0.5 0.2 - 1.0 MG/DL  
 ALT (SGPT) 52 16 - 61 U/L  
 AST (SGOT) 85 (H) 15 - 37 U/L Alk. phosphatase 308 (H) 45 - 117 U/L Protein, total 7.0 6.4 - 8.2 g/dL Albumin 1.6 (L) 3.4 - 5.0 g/dL Globulin 5.4 (H) 2.0 - 4.0 g/dL A-G Ratio 0.3 (L) 0.8 - 1.7 LIPASE Collection Time: 10/23/18  4:35 AM  
Result Value Ref Range Lipase 1,586 (H) 73 - 393 U/L  
GLUCOSE, POC Collection Time: 10/23/18  5:45 AM  
Result Value Ref Range Glucose (POC) 113 (H) 70 - 110 mg/dL Cultures: All Micro Results Procedure Component Value Units Date/Time CULTURE, CATHETER TIP [[id number]]  (Abnormal)  (Susceptibility) Collected:  10/19/18 1330 Order Status:  Completed Specimen:  Catheter Tip Updated:  10/22/18 3615 Special Requests: NO SPECIAL REQUESTS Culture result:    
  >15 CFU STAPHYLOCOCCUS EPIDERMIDIS  
     
 CULTURE, BLOOD [[id number]] Collected:  10/16/18 1055 Order Status:  Completed Specimen:  Blood Updated:  10/22/18 9748 Special Requests: PERIPHERAL Culture result: NO GROWTH 6 DAYS     
 CULTURE, BLOOD [[id number]] Collected:  10/16/18 1047 Order Status:  Completed Specimen:  Blood Updated:  10/22/18 0809 Special Requests: PERIPHERAL Culture result: NO GROWTH 6 DAYS     
 HSV 1 AND 2 BY PCR [[id number]] Collected:  10/18/18 4714 Order Status:  Completed Specimen:  Other from Miscellaneous sample Updated:  10/21/18 1636 Source OTHER TEST     
  HSV-1 DNA by PCR NEGATIVE      
  HSV-2 DNA by PCR NEGATIVE Comment: (NOTE) This test was developed and its performance characteristics 
determined by [employer]. It has not been cleared or 
approved by the U.S. Food and Drug Administration. The FDA has 
determined that such clearance or approval is not necessary. This 
test is used for clinical purposes. It should not be regarded as 
investigational or research. Performed At: [address] [id number] Charlie Virk MD [id number] CULTURE, RESPIRATORY/SPUTUM/BRONCH Andreiarthur Fields [[id number]] Collected:  10/16/18 1915 Order Status:  Completed Specimen:  Sputum,ET Suction Updated:  10/18/18 1011 Special Requests: NO SPECIAL REQUESTS     
  GRAM STAIN 10-25 WBC/lpf     
   <10 EPI/lpf FEW GRAM POSITIVE COCCI IN PAIRS MODERATE GRAM POSITIVE COCCI IN GROUPS MUCUS PRESENT Culture result: MODERATE NORMAL RESPIRATORY SHANAE  
     
 CULTURE, BLOOD [[id number]] Collected:  10/08/18 1227 Order Status:  Completed Specimen:  Blood Updated:  10/14/18 0741 Special Requests: PERIPHERAL Culture result: NO GROWTH 6 DAYS     
 CULTURE, CATHETER TIP [[id number]] Collected:  10/08/18 1215 Order Status:  Completed Specimen:  Catheter Tip Updated:  10/11/18 0429 Special Requests: NO SPECIAL REQUESTS Culture result: NO GROWTH 3 DAYS     
 CULTURE, BLOOD [[id number]] Collected:  10/04/18 1004 Order Status:  Completed Specimen:  Blood Updated:  10/10/18 3883 Special Requests: PERIPHERAL Culture result: NO GROWTH 6 DAYS     
 CULTURE, BLOOD [[id number]] Collected:  10/04/18 1120 Order Status:  Completed Specimen:  Blood Updated:  10/10/18 4396   Special Requests: PERIPHERAL     
 Culture result: NO GROWTH 6 DAYS     
 CULTURE, BLOOD [[id number]] Collected:  09/27/18 0005 Order Status:  Completed Specimen:  Blood Updated:  10/03/18 0845 Special Requests: NO SPECIAL REQUESTS Culture result: NO GROWTH 6 DAYS     
 CULTURE, BLOOD [[id number]]  (Abnormal)  (Susceptibility) Collected:  09/27/18 0136 Order Status:  Completed Specimen:  Blood Updated:  10/02/18 2075 Special Requests: NO SPECIAL REQUESTS     
  GRAM STAIN PEDIATRIC BOTTLE GRAM POSITIVE COCCI IN PAIRS IN CLUSTERS  
      
  SMEAR CALLED TO AND CORRECTLY REPEATED BY: Mike Verduzco RN IN 2700 ON 9/29/18 AT 2033 WF Culture result:    
  AEROBIC BOTTLE STAPHYLOCOCCUS EPIDERMIDIS  
     
 CULTURE, RESPIRATORY/SPUTUM/BRONCH Kenith Hoots STAIN [[id number]]  (Abnormal) Collected:  09/29/18 1210 Order Status:  Completed Specimen:  Sputum from Endotracheal aspirate Updated:  10/02/18 8507 Special Requests: NO SPECIAL REQUESTS     
  GRAM STAIN >25 WBC/lpf     
   <10 EPI/lpf MUCUS PRESENT     
   MODERATE YEAST Culture result: MANY CANDIDA TROPICALIS C. DIFFICILE/EPI PCR [[id number]] Collected:  09/29/18 1400 Order Status:  Completed Specimen:  Stool Updated:  09/29/18 2248 Special Requests: NO SPECIAL REQUESTS Culture result: Toxigenic C. difficile NEGATIVE                         C. difficile target DNA sequences are not detected. CULTURE, URINE [[id number]] Collected:  09/27/18 0029 Order Status:  Completed Specimen:  Cath Urine Updated:  09/29/18 0944 Special Requests: NO SPECIAL REQUESTS Culture result: NO GROWTH 2 DAYS During this entire length of time I was immediately available to the patient.  The reason for providing this level of medical care for this critically ill patient was due a critical illness that impaired one or more vital organ systems such that there was a high probability of imminent or life threatening deterioration in the patients condition. This care involved high complexity decision making to assess, manipulate, and support vital system functions, to treat this degreee vital organ system failure and to prevent further life threatening deterioration of the patients condition 
 
[] Total critical care time spent on reviewing the case/medical record/data/notes/EMR/patient examination/documentation/coordinating care with nurse/consultants, exclusive of procedures - minutes with complex decision making performed and > 50% time spent in face to face evaluation. Romana Oliva MD  
Board Certified by the [facility], Kristin Nur 10/23/2018

## 2018-10-23 NOTE — PROGRESS NOTES
Problem: Falls - Risk of 
Goal: *Absence of Falls Document Talya Hussein Fall Risk and appropriate interventions in the flowsheet. Outcome: Progressing Towards Goal 
Fall Risk Interventions: 
Mobility Interventions: Bed/chair exit alarm, Strengthening exercises (ROM-active/passive) Mentation Interventions: Bed/chair exit alarm, Evaluate medications/consider consulting pharmacy, Room close to nurse's station, More frequent rounding, Toileting rounds, Update white board, Increase mobility Medication Interventions: Evaluate medications/consider consulting pharmacy Elimination Interventions: Bed/chair exit alarm, Call light in reach, Toileting schedule/hourly rounds History of Falls Interventions: Bed/chair exit alarm, Door open when patient unattended, Evaluate medications/consider consulting pharmacy, Room close to nurse's station Problem: Pressure Injury - Risk of 
Goal: *Prevention of pressure injury Document Mitul Scale and appropriate interventions in the flowsheet. Outcome: Progressing Towards Goal 
Pressure Injury Interventions: 
Sensory Interventions: Assess changes in LOC, Check visual cues for pain, Float heels, Keep linens dry and wrinkle-free, Maintain/enhance activity level, Minimize linen layers, Monitor skin under medical devices, Pad between skin to skin, Pressure redistribution bed/mattress (bed type), Turn and reposition approx. every two hours (pillows and wedges if needed) Moisture Interventions: Absorbent underpads, Apply protective barrier, creams and emollients, Assess need for specialty bed, Check for incontinence Q2 hours and as needed, Contain wound drainage, Internal/External urinary devices, Maintain skin hydration (lotion/cream), Minimize layers, Moisture barrier, Offer toileting Q_hr Activity Interventions: Pressure redistribution bed/mattress(bed type), PT/OT evaluation Mobility Interventions: Pressure redistribution bed/mattress (bed type), Turn and reposition approx. every two hours(pillow and wedges) Nutrition Interventions: Document food/fluid/supplement intake, Discuss nutritional consult with provider, Offer support with meals,snacks and hydration Friction and Shear Interventions: Apply protective barrier, creams and emollients, Feet elevated on foot rest, HOB 30 degrees or less, Lift sheet, Minimize layers, Lift team/patient mobility team

## 2018-10-23 NOTE — PROGRESS NOTES
Attempted to call Mr. Donahue's daughter, Kin Ro to review patient progress and answer any questions for her. Did not reach her due to no answer.

## 2018-10-23 NOTE — PROGRESS NOTES
PROGRESS NOTE PATIENT:  Sandee Lopez MRN: [id number] [facility]/[address], 2705/01 
         10/23/2018, 6:32 PM 
   
 
 
SUBJECTIVE: 
Tube feeding resumed, currently running at goal. Patient seems to be tolerating that. Pasty bowel movements reported. OBJECTIVE: 
Patient Vitals for the past 24 hrs: 
 Temp Pulse Resp BP SpO2  
10/23/18 1800  (!) 110 22 144/50 98 % 10/23/18 1700  (!) 108 21 149/54 98 % 10/23/18 1605  (!) 108 18  99 % 10/23/18 1600 98.4 °F (36.9 °C) (!) 107 19 190/70 100 % 10/23/18 1500  (!) 101 16 142/71 99 % 10/23/18 1410 96.7 °F (35.9 °C) (!) 101 18 139/66 100 % 10/23/18 1400  100 20 139/67 100 % 10/23/18 1345  100 18 143/70 100 % 10/23/18 1330  99 18 141/74 100 % 10/23/18 1315  98 19 151/71 100 % 10/23/18 1300  99 17 151/74 100 % 10/23/18 1245  98 17 152/75 100 % 10/23/18 1230  99 22 149/72 100 % 10/23/18 1215  99 18 155/74 100 % 10/23/18 1200  99 19 162/76 100 % 10/23/18 1145  100 20 164/76 100 % 10/23/18 1130  98 20 166/72 99 % 10/23/18 1115  98 20 165/73 99 % 10/23/18 1100  96 20 160/70 99 % 10/23/18 1045  97 16 145/63 98 % 10/23/18 1030  98 16 113/58 98 % 10/23/18 1015 97.8 °F (36.6 °C) 98 15 113/56 98 % 10/23/18 1000  100 16 120/53 98 % 10/23/18 0945 97.8 °F (36.6 °C) 99 20 151/60 99 % 10/23/18 0900  97 21 199/60 100 % 10/23/18 0855  97 23  100 % 10/23/18 0800 98.4 °F (36.9 °C) 99 17 183/54 99 % 10/23/18 0700  (!) 101 18 171/50 98 % 10/23/18 0600  (!) 102 21 176/53 99 % 10/23/18 0500  (!) 101 19 173/49 99 % 10/23/18 0415  96 17  99 % 10/23/18 0400 97.8 °F (36.6 °C) 96 20 186/58 100 % 10/23/18 0300  100 18 176/52 99 % 10/23/18 0200  100 22 170/56 99 % 10/23/18 0100  98 17 156/51 98 % 10/23/18 0024  95 19  100 % 10/23/18 0000 97.1 °F (36.2 °C) 97 22 157/45 99 % 10/22/18 2300  90 17 195/81 100 % 10/22/18 2200  90 18 187/72 99 % 10/22/18 2100  90 21 172/68 99 % 10/22/18 2034  89 17  99 % 10/22/18 2000 97.8 °F (36.6 °C) 91 17 172/65 95 % 10/22/18 1900  90 17 170/68 97 % Intake/Output Summary (Last 24 hours) at 10/23/2018 1832 Last data filed at 10/23/2018 1600 Gross per 24 hour Intake 530 ml Output 3300 ml Net -2770 ml  
 
 
Lungs: Clear B/L  
CVS exam: Regular rate and rhythm Abd  : Soft, non tender, BS +, No masses felt. Labs: Results:  
Chemistry Recent Labs 10/23/18 
0569 10/22/18 
4956 10/21/18 
0430 * 128* 143*  140 140  
K 4.9 4.7 4.4  102 102 CO2 26 27 27 BUN 71* 57* 38* CREA 5.96* 4.95* 3.52* CA 7.6* 7.4* 7.3* AGAP 12 11 11 BUCR 12 12 11* * 318* 324* TP 7.0 7.4 7.3 ALB 1.6* 1.7* 1.7*  
GLOB 5.4* 5.7* 5.6* AGRAT 0.3* 0.3* 0.3* Estimated Creatinine Clearance: 13.1 mL/min (A) (based on SCr of 5.96 mg/dL (H)). CBC w/Diff Recent Labs 10/23/18 
7821 10/22/18 
7218 10/21/18 
0430 WBC 10.8 11.7 10.4 RBC 2.65* 2.69* 2.62* HGB 7.5* 7.6* 7.5* HCT 23.7* 24.4* 23.9*  
* 549* 474* Cardiac Enzymes No results for input(s): CPK, CKND1, REJI in the last 72 hours. No lab exists for component: Ayse Norman Coagulation No results for input(s): PTP, INR, APTT in the last 72 hours. No lab exists for component: INREXT Hepatitis Panel Lab Results Component Value Date/Time Hepatitis B surface Ag <0.10 09/28/2018 04:40 AM  
  
Amylase Lipase Liver Enzymes Recent Labs 10/23/18 
5071 10/22/18 
5002 10/21/18 
0430 TP 7.0 7.4 7.3 ALB 1.6* 1.7* 1.7* TBILI 0.5 0.6 0.6 * 318* 324* SGOT 85* 92* 109* ALT 52 56 59 Thyroid Studies No results for input(s): T4, T3U, TSH, TSHEXT in the last 72 hours. No lab exists for component: T3RU Pathology pathology No Known Allergies Current Facility-Administered Medications Medication Dose Route Frequency  [START ON 10/24/2018] insulin glargine (LANTUS) injection 14 Units  14 Units SubCUTAneous DAILY  multivitamin (MULTI-DELYN, WELLESSE) oral liquid 30 mL  30 mL Oral DAILY  albuterol (PROVENTIL VENTOLIN) nebulizer solution 2.5 mg  2.5 mg Nebulization BID  
 bacitracin 500 unit/gram packet 1 Packet  1 Packet Topical PRN  
 sodium chloride (NS) flush 10-30 mL  10-30 mL InterCATHeter PRN  
 sodium chloride (NS) flush 10 mL  10 mL InterCATHeter Q24H  
 sodium chloride (NS) flush 10 mL  10 mL InterCATHeter PRN  
 sodium chloride (NS) flush 10-40 mL  10-40 mL InterCATHeter Q8H  
 sodium chloride (NS) flush 20 mL  20 mL InterCATHeter Q24H  
 heparin (porcine) injection 5,000 Units  5,000 Units SubCUTAneous Q8H  
 acyclovir sodium (ZOVIRAX) 330.5 mg in 0.9% sodium chloride 100 mL IVPB  5 mg/kg (Ideal) IntraVENous Q24H  hydroxypropyl methylcellulose (ISOPTO TEARS) 0.5 % ophthalmic solution 2 Drop  2 Drop Both Eyes BID  epoetin manda (EPOGEN;PROCRIT) injection 40,000 Units  40,000 Units IntraVENous Q7D  
 pantoprazole (PROTONIX) 40 mg in sodium chloride 0.9% 10 mL injection  40 mg IntraVENous Q24H  
 0.9% sodium chloride infusion 250 mL  250 mL IntraVENous PRN  
 influenza vaccine 2018-19 (6 mos+)(PF) (FLUARIX QUAD/FLULAVAL QUAD) injection 0.5 mL  0.5 mL IntraMUSCular PRIOR TO DISCHARGE  acetaminophen (TYLENOL) solution 325 mg  325 mg Per NG tube Q4H PRN  
 0.9% sodium chloride infusion 250 mL  250 mL IntraVENous PRN  
 heparin (porcine) pf 100 Units  100 Units InterCATHeter PRN  
 0.9% sodium chloride infusion 250 mL  250 mL IntraVENous PRN  
 insulin lispro (HUMALOG) injection   SubCUTAneous Q6H  
 heparin (porcine) 1,000 unit/mL injection 1,000 Units  1,000 Units InterCATHeter DIALYSIS PRN  
 senna-docusate (PERICOLACE) 8.6-50 mg per tablet 1 Tab  1 Tab Oral BID PRN  
 ondansetron (ZOFRAN) injection 4 mg  4 mg IntraVENous Q4H PRN  
 glucose chewable tablet 16 g  4 Tab Oral PRN  
  glucagon (GLUCAGEN) injection 1 mg  1 mg IntraMUSCular PRN  
 dextrose (D50) infusion 12.5-25 g  25-50 mL IntraVENous PRN  
 hydrALAZINE (APRESOLINE) 20 mg/mL injection 20 mg  20 mg IntraVENous Q6H PRN  
 
 
ASSESSMENT: 
 
Hyperlipasemia. ELevated LFTs. Cholelithiasis/cholecystitis. Stable from GI standpoint. Continue tube feeding. MRI/MRCP when able to get it.   
 
Phong Hickey MD

## 2018-10-23 NOTE — CONSULTS
Consult Note Patient: Charlie Bhatia               Sex: male          DOA: 9/26/2018 YOB: 1955      Age:  57 y.o.        LOS:  LOS: 25 days HPI:  
 
Charlie Bhatia is a 57 y.o. male with a complex recent medical hx, He has been in the hospital for about the past month. He was seen by [facility] at the end of September for elevated LFTs and lipase. At that time the patient had no abdominal pain. Imaging by ultrasound and CT scan showed gallstones and an abnormal gallbladder suggesting cholecystitis. CBD was normal in caliber. The pancreas looked normal on CT. At some point within the past month the patient sustained cardiac arrest and was resuscitated but has been comatose with little cortical activity since. I am now asked to see him because of inability to tolerate tube feeding at goal rate, a rising lipase level and a cholestatic elevation in LFTs. Past Medical History:  
Diagnosis Date  [facility]  Diabetes ([facility]  Stroke ([facility] History reviewed. No pertinent surgical history. History reviewed. No pertinent family history. Social History Socioeconomic History  Marital status:  Spouse name: Not on file  Number of children: Not on file  Years of education: Not on file  Highest education level: Not on file Social Needs  Financial resource strain: Not on file  Food insecurity - worry: Not on file  Food insecurity - inability: Not on file  Transportation needs - medical: Not on file  Transportation needs - non-medical: Not on file Occupational History  Not on file Tobacco Use  Smoking status: Never Smoker  Smokeless tobacco: Never Used Substance and Sexual Activity  Alcohol use: No  
 Drug use: No  
 Sexual activity: Not on file Other Topics Concern  Not on file Social History Narrative  Not on file Prior to Admission medications Medication Sig Start Date End Date Taking? Authorizing Provider  
aspirin (ASPIRIN) 325 mg tablet Take 1 Tab by mouth daily. 9/15/18   Alice Solorzano MD  
atorvastatin (LIPITOR) 80 mg tablet Take 1 Tab by mouth nightly. 9/14/18   Germain Bañuelos MD  
insulin glargine (LANTUS) 100 unit/mL injection 10 units qhs  Indications: type 2 diabetes mellitus 9/15/18   Germain Bañuelos MD  
insulin lispro (HUMALOG) 100 unit/mL injection 4 units with meals 9/14/18   Germain Bañuelos MD  
losartan (COZAAR) 50 mg tablet Take 1 Tab by mouth daily. 9/14/18   Alice Solorzano MD  
 
 
No Known Allergies Review of Systems Not obtainable. Physical Exam:  
  
Visit Vitals /65 Pulse 91 Temp 98.8 °F (37.1 °C) Resp 17 Ht 5' 7\" (1.702 m) Wt 88 kg (194 lb 0.1 oz) SpO2 95% BMI 30.39 kg/m² Physical Exam: 
Comatose, unresponsive Eyes: negative Ears, nose, mouth, throat, and face: negative Respiratory: negative Cardiovascular: negative Gastrointestinal: soft, no masses, bowel sounds present Genitourinary:negative Integument/breast: negative Hematologic/lymphatic: negative Musculoskeletal:negative Labs Reviewed: 
CMP:  
Lab Results Component Value Date/Time  10/22/2018 04:42 AM  
 K 4.7 10/22/2018 04:42 AM  
  10/22/2018 04:42 AM  
 CO2 27 10/22/2018 04:42 AM  
 AGAP 11 10/22/2018 04:42 AM  
  (H) 10/22/2018 04:42 AM  
 BUN 57 (H) 10/22/2018 04:42 AM  
 CREA 4.95 (H) 10/22/2018 04:42 AM  
 GFRAA 14 (L) 10/22/2018 04:42 AM  
 GFRNA 12 (L) 10/22/2018 04:42 AM  
 CA 7.4 (L) 10/22/2018 04:42 AM  
 ALB 1.7 (L) 10/22/2018 04:42 AM  
 TP 7.4 10/22/2018 04:42 AM  
 GLOB 5.7 (H) 10/22/2018 04:42 AM  
 AGRAT 0.3 (L) 10/22/2018 04:42 AM  
 SGOT 92 (H) 10/22/2018 04:42 AM  
 ALT 56 10/22/2018 04:42 AM  
 
CBC:  
Lab Results Component Value Date/Time  WBC 11.7 10/22/2018 04:42 AM  
 HGB 7.6 (L) 10/22/2018 04:42 AM  
 HCT 24.4 (L) 10/22/2018 04:42 AM  
  (H) 10/22/2018 04:42 AM  
 
 
Imaging: 
CT scan reviewed and ultrasound reviewed Assessment/Plan Maurice Patton is a post-arrest patient with coma and little to no cortical activity, on tube feeding. He has a cholestatic elevation in LFTs, and a rising lipase. He has known cholelithiasis. He is difficult to evaluate but does not appear to have significant pancreatitis based on exam and labs. Ruling out choledocholithiasis will take an MRCP.   
It is reasonable to get an MRI/MRCP to rule out choledocholithiasis and evaluate the pancreas for signifiicant inflammation given that it is a non-invasive test. 
 
Corey Kearns MD.

## 2018-10-23 NOTE — DIABETES MGMT
NUTRITIONAL RE-ASSESSMENT GLYCEMIC CONTROL/ PLAN OF CARE Reynaldo Aguilar           57 y.o.           9/26/2018 1. Acute renal failure, unspecified acute renal failure type ([facility] 2. Acute UTI 3. Cholelithiasis with choledocholithiasis 4. History of CVA (cerebrovascular accident) 5. ESRD (end stage renal disease) ([facility] DM INTERVENTIONS/PLAN:  
 Osmolite 1.2 calorie TF was on hold for procedure. TF to be resumed at 70ml/hour. Tube feeding is providing 2016 calories,  93 g protein/d with 1.3 liters free water/d from TF. Added liquid multivitamin/mineral 10/22 with wounds. ASSESSMENT:  
Nutritional Status: 
Pt is overweight related to excess caloric intake as evidenced by 135% ideal weight and BMI= 28.3kg/m2. Pt meets criteria for obesity. Altered nutrition-related lab values RT DX. Diabetes Mellitus  related to lack of adherence to nutrition and medication recommendations as evidenced by A1c of 10% Altered nutrition-related lab values RT DX. Acute renal failure aeb requiring dialysis. Pt w/hypoalbuminemia as evidenced by albumin=1.6 mg/dl and prealbumin 19.4 Pt with DT injury on heels, multiple blisters on arms and stage 2  area on sacrum noted. Diabetes Management:  BG well controlled on current insulin,decreased to 12 units Lantus while TF has been off Recent blood glucose:    
 
Lab Results Component Value Date/Time  (H) 10/23/2018 04:35 AM  
 GLUCPOC 113 (H) 10/23/2018 05:45 AM  
 GLUCPOC 124 (H) 10/22/2018 11:15 PM  
 GLUCPOC 109 10/22/2018 01:40 PM  
 
Within target range (non-ICU: <140; ICU<180): [x] Yes   []  No 
 
Current Insulin regimen: 18 units Lantus/24 hrs plus VIR corrective lispro Home medication/insulin regimen:  
Key Antihyperglycemic Medications   
    
  
 insulin glargine (LANTUS) 100 unit/mL injection 10 units qhs  Indications: type 2 diabetes mellitus  
 insulin lispro (HUMALOG) 100 unit/mL injection 4 units with meals HbA1c: 10% equivalent  to ave Blood Glucose of 240mg/dl for 2-3 months prior to admission Adequate glycemic control PTA:  [] Yes  [x] No 
  
SUBJECTIVE/OBJECTIVE: Information obtained from: ICU rounds/pt is on a vent Diet:  Osmolite1.2 calorie TF to be resumed at 70 ml/hr No data found. Medications: [x]                Reviewed Labs:  
Lab Results Component Value Date/Time Hemoglobin A1c 10.0 (H) 09/30/2018 04:18 AM  
 
Lab Results Component Value Date/Time Sodium 139 10/23/2018 04:35 AM  
 Potassium 4.9 10/23/2018 04:35 AM  
 Chloride 101 10/23/2018 04:35 AM  
 CO2 26 10/23/2018 04:35 AM  
 Anion gap 12 10/23/2018 04:35 AM  
 Glucose 125 (H) 10/23/2018 04:35 AM  
 BUN 71 (H) 10/23/2018 04:35 AM  
 Creatinine 5.96 (H) 10/23/2018 04:35 AM  
 Calcium 7.6 (L) 10/23/2018 04:35 AM  
 Magnesium 3.8 (H) 09/29/2018 11:10 AM  
 Phosphorus 5.2 (H) 10/13/2018 03:41 AM  
 Albumin 1.6 (L) 10/23/2018 04:35 AM  
prealbumin 19.4increased from 10.5( current pending) Lipase was 2169 decreased to 1586 Anthropometrics: IBW : 69.9 kg (154 lb), % IBW (Calculated): 134.85 %, BMI (calculated): 29.8 Wt Readings from Last 1 Encounters:  
10/23/18 86.3 kg (190 lb 4.1 oz) Ht Readings from Last 1 Encounters:  
10/22/18 5' 7\" (1.702 m) Estimated Nutrition Needs:  1880 Kcals/day, Protein (g): 85 g Fluid (ml): 1800 ml Based on:   [x]          Actual BW    []          ABW   []            Adjusted BW   
Nutrition Diagnoses: as stated above Nutrition Interventions: TF 
Goal:  
Blood glucose will be within target range of  mg/dL by 10/26 Intake will meet >75% of energy and protein requirements by 10/28. Gradual weight loss post discharge 1 lb per week by 11/2. Nutrition Monitoring and Evaluation []     Monitor po intake on meal rounds 
[x]     Continue inpatient monitoring and intervention 
[]     Other: 
 
 
Nutrition Risk:  [x]   High     []  Moderate    []  Minimal/Uncompromised Polina Jernigan, RD 
[facility] [phone]

## 2018-10-23 NOTE — PROGRESS NOTES
. 
Acute HEmodialysis flow sheet Patient information Barbara Vogt MRN: [id number]      ESK:[id number]  [place]# JKBP#[id number] Hospital: [place] [address] DX: resp fail []1st Time Acute  []Stat[x] Routine []Urgent []Chronic Unit  
       []Acute Room []Bedside  []ICU/CCU []ER Isolation Precautions: [x]Dialysis[] Airborne []Contact []Droplet []Reverse Special Considerations:    [] Blood Consent Verified  [x]N/A Allergies: 
[x] NKA  [] Reviewed No Known Allergies Code Status [x]Full Code [] DNR  []Other Diet: TUBE FEED Diabetic: []Yes []No 
  
 Hemodialysis Orders Physician: Elvia Banks Dialyzer Revaclear 300 Duration 4 BFR: 300 DFR:600 Dialysate: K 2 CA 2.5 Na 140 Bicarb 35 Dry Weight: UF Goal: 2000 ml Heparin:  []Bolus   Units    []Hourly  Units      [x]None BP Tx:  []NS  200ml/Bolus    []Other     []N/A  
    Labs: Pre:  Post: []N/A Additional Orders: []N/A [x]Signed Treatment Consent   [x] Verified   [] Obtained Primary Nurse Report: First initial/ Last Name/Title Pre Dialysis: kyle Vegas    Time: 930 Access CATHETER ACCESS:  [] N/A  [x] RIGHT  [] LEFT  [] IJ  [] SUBCL [x] FEM [] First use X-ray  [] Tunnel     [] Non-Tunneled  
                       [] No S/S infection  [] Redness [] Drainage  [] Cultured [] Swelling 
                       [] Pain  
                       [x] Medical Aseptic [] Prep Dressing Changed  
                       [] Clotted [x] Patent []      Flows: [x] Good [] Poor [] Reversed If Access Problem Dr. Megan Contreras: [] Yes [] No    Date:    [x] N/A  
     GRAFT/FISTULA ACCESS:  [x] N/A  [] RIGHT  [] LEFT  [] UE  [] LE   
                       [] AVG  [] AVF [] BUTTONHOLE  [] +BRUIT/THRILL 
     [] MEDICAL ASEPTIC PREP [] No S/S infection  [] Redness [] Drainage  [] Cultured [] Swelling [] Pain Needle Gauge                              Length: If Access Problem Dr. Liu Showers: [] Yes [] No    Date:     [] N/A General Assessment LUNGS:  SaO2% 98 [] Clear [x] Coarse [] Crackles [] Wheezing  
            [] Diminished Location: [] RLL [] LLL [] RUL [] JOSSIE   
     COUGH:  [] Productive [] Dry [x] N/A  RESPIRATIONS: [x] Easy [] Labored THERAPY: [] RA   [] NC     L/min    Mask: [] NRB [] Venti  O2%    
             [] Ventilator [x] Intubated [x] Trach [] BiPap [] CPap CARDIAC: [x] Regular [] Irregular [] Pericardial Rub [] JVD [] Monitored Rhythm: EDEMA: [] None [x] Generalized [] Facial [] Pedal [] UE [] LE 
           [] Pitting [] 1 [] 2 [] 3 [] 4    [] Right [] Left [] Bilateral  
    SKIN:    [x] Warm [] Hot [] Cold  [] Dry [] Pale [] Diaphoretic  
           [] Flushed [] Jaundiced [] Cyanotic [] Rash [] Weeping LOC:    [] Alert  Oriented to: [] Person [] Place [] Time  
          [] Confused [] Lethargic [x] Medically sedated [x] Non-responsive GI/ABDOMEN: [] Flat [x] Distended [x] Soft [] Firm [] Obese [] Diarrhea 
 [] Bowel Sounds     []Nausea [] Vomiting PAIN: [] 0 [] 1 [] 2 [] 3 [] 4 [] 5 [] 6 [] 7 [] 8 [] 9 [] 10 Scale 1-10 Action/Follow Up MOBILITY: [] Amb [] Amb/Assist [x] Bed  [] Wheelchair CUrrent LABS HBsAg ONLY: Date Drawn 9/28/18 [x] Negative [] Positive  [] Unknown. HBsAb: Date Drawn 9/28/18 [x] Susceptible <10 [] Immune ?10 [] Unknown Date of Current Labs:  
    
Lab Results Component Value Date/Time  Sodium 139 10/23/2018 04:35 AM  
 Potassium 4.9 10/23/2018 04:35 AM  
 Chloride 101 10/23/2018 04:35 AM  
 CO2 26 10/23/2018 04:35 AM  
 BUN 71 (H) 10/23/2018 04:35 AM  
 Creatinine 5.96 (H) 10/23/2018 04:35 AM  
 Calcium 7.6 (L) 10/23/2018 04:35 AM  
 Magnesium 3.8 (H) 09/29/2018 11:10 AM  
 Phosphorus 5.2 (H) 10/13/2018 03:41 AM  
 HCT 23.7 (L) 10/23/2018 04:35 AM  
 HGB 7.5 (L) 10/23/2018 04:35 AM  
 PLATELET 130 (H) 63/89/4433 04:35 AM  
 INR 1.1 10/15/2018 11:06 AM  
  
Education Person Educated: [x] Patient [] Family [] Other Knowledge base: [x] None [] Minimal [] Substantial   
     Barriers to learning patient nonverbal intubated  [] None Preferred method of learning: [] Written [x] Oral [] Visual [] Hands on Topic: [] Access Care [] S&S of infection [] Fluid Management [] K+ 
               [x] Procedural    [] Albumin [] Medications [] Tx Options [] Transplant 
                [] Diet [] Other Teaching Tools: [x] Explain [] Demonstration [] Handout [] Teachback Ro/hemodiaylsis machine safety checks- before each treatment Machine Serial #13 [] # 1 [id number] [] # 2 [id number] [] # 3 [id number] [] # 4 [id number] [] # 5 [id number] [] # [phone] [] # [phone] Alarm Test: [x] Pass Time 950       RO Serial # 13 
[] [id number] (Large dual station RO) [] # 1  [id number]  (Portable # 1) [] # 2  [id number]  (Portable # 2) [] # 3  [id number]  (Portable # 3) [] # 4  [id number]  (Portable # 4) [] # 5  [id number]  (Portable # 5) Lot #s: Dialyzer [id number] exp. Tubing [id number] exp. Saline -jt exp. [x] RO/Machine Log Complete    [x] Extracorporeal circuit Tested for integrity Dialysate: pH 7.4 Temp. 36  Conductivity: Meter 14 HD Machine 14  
chlorine testing- before each treatment and every 4 hours Total Chlorine: [x] Less than 0.1 ppm Time: 950 2nd Check Time: 1150 (If greater than 0.1 ppm from Primary then every 30 minutes from Secondary) treatment Iniation-with dialysis precautions [x] All Connections Secured   [x] Saline Line Double Clamped    
[x] Venous Parameters Set    [x] Arterial Parameters Set    [x] Prime Given 250 ml            [x] Air Foam Detector Engaged [facility] Nurse Signature/Title_Saleem SHAFFER Long__ Pre-Treatment Time 945 B/P 151/60 HR 98 Temp. 97.8 Resp Rate 18 Pre Wt  
UF Calculations: Wt to lose:2000 ml(+) Oral:  ml(+) IV Meds/Fluids/Blood prods  ml(+) Prime/Rinse 500 ml 
(=)Total UF Goal 2500 mL Scale Type:[] Bed scale [] Sling Scale [] Wheel Chair Scale [x]  Not Ordered [] Unable to obtain pt on stretcher Tx Initiation Note: right femoral catheter accessed and treatment started without complicaiton  
[x] Time Out/Safety Check  Time: 950 [facility] Signature/Title_Philip P Long INTRADIALYTIC MONITORING 
(SEE ATTACHED FLOWSHEET) POST TREATMENT Time 1410 B/P 141/65  Temp 96.7 Resp. Rate 18 Post wt Time Medication Dose Volume Route Initials [facility] Signatures Title Initials Time 52 [facility] RN [address] [facility] Signature/Title:_Saleem P Long_ Dialyzer cleared: [] Good [x] Fair [] Poor    Blood Processed 64 Liters Net UF Removed 3000 mL Post Tx Access: AVF/AVG: Bleeding Stop                   Art. min Shree min []+bruit/thrill Catheter: Locking Solution heparin     Art. 1.4 ml Shree 1.4 ml Post Assessment:  Skin: [x]Warm []Dry []Diaphoretic []Flushed [] Pale []Cyanotic Lungs: []Clear []Coarse []Crackles []Wheezing Cardiac: [x]Regular []Irregular  []Monitored rhyt Edema: []None [x]General []Facial []Pedal  []UE []LE []RIGHT []LEFT    []Bilateral  
   Pain: []0 []1[]2 []3 []4 []5 []6 []7 []8 []9 []10  
POST Tx Note: removed 3 liters patient tolerated treatment well Primary Nurse Report: First initial/Last name/Title Post Dialysis:_LULÚ Carrion RN_         Time:_1415_ Abbreviations: AVG-arterial venous graft, AVF-arterial venous fistula, IJ-Internal Jugular, Subcl-Subclavian, Fem-Femoral, Tx-treatment, AP/HR-apical heart rate, DFR-dialysate flow rate, BFR-blood flow rate, AP-arterial pressure, -venous pressure, UF-ultrafiltrate, TMP-transmembrane pressure, Shree-Venous, Art-Arterial, RO-Reverse Osmosis

## 2018-10-23 NOTE — PROGRESS NOTES
Patient remains on life support through a trach and therefore is unable to communicate at this time. No family seen at time of this visit.  offered prayer and left Spiritual Care brochure. Chaplains will continue to follow and will provide pastoral care on an as needed/requested basis. [place] 3 Board Certified [employer] Spiritual Care  
[phone]

## 2018-10-24 NOTE — PROGRESS NOTES
Problem: Diabetes Self-Management Goal: *Disease process and treatment process Define diabetes and identify own type of diabetes; list 3 options for treating diabetes. Outcome: Not Met Pt is unresponsive Goal: *Incorporating nutritional management into lifestyle Describe effect of type, amount and timing of food on blood glucose; list 3 methods for planning meals. Outcome: Not Met Pt is unresponsive; family in [place] Problem: Patient Education: Go to Patient Education Activity Goal: Patient/Family Education Outcome: Not Progressing Towards Goal 
No family at bedside Problem: Falls - Risk of 
Goal: *Absence of Falls Document Mell Lindsay Fall Risk and appropriate interventions in the flowsheet. Outcome: Progressing Towards Goal 
Fall Risk Interventions: 
Mobility Interventions: Bed/chair exit alarm Mentation Interventions: Bed/chair exit alarm Medication Interventions: Bed/chair exit alarm Elimination Interventions: Bed/chair exit alarm, Toileting schedule/hourly rounds History of Falls Interventions: Bed/chair exit alarm Problem: Patient Education: Go to Patient Education Activity Goal: Patient/Family Education Outcome: Not Progressing Towards Goal 
No Family at bedside. Pt is unresponsive Problem: Pressure Injury - Risk of 
Goal: *Prevention of pressure injury Document Mitul Scale and appropriate interventions in the flowsheet. Outcome: Progressing Towards Goal 
Pressure Injury Interventions: 
Sensory Interventions: Assess changes in LOC, Float heels, Keep linens dry and wrinkle-free, Minimize linen layers, Monitor skin under medical devices, Pressure redistribution bed/mattress (bed type), Turn and reposition approx. every two hours (pillows and wedges if needed) Moisture Interventions: Absorbent underpads Activity Interventions: Pressure redistribution bed/mattress(bed type) Mobility Interventions: Pressure redistribution bed/mattress (bed type) Nutrition Interventions: Discuss nutritional consult with provider Friction and Shear Interventions: Apply protective barrier, creams and emollients, Foam dressings/transparent film/skin sealants, HOB 30 degrees or less, Transferring/repositioning devices

## 2018-10-24 NOTE — PROGRESS NOTES
Podiatry Surgery Progress Note Patient: Orion Figueroa MRN: [id number]  SSN: [id number] YOB: 1955  Age: 57 y.o. Sex: male Assessment:  
 
Patient Active Problem List  
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  
 Dehydration E86.0  
 Essential hypertension I10  
 Diabetes mellitus type 2, controlled ([facility] E11.9  Non compliance w medication regimen Z91.14  
 DM (diabetes mellitus) ([facility] E11.9  History of stroke Z80.78  
 Acute-on-chronic kidney injury ([facility] N17.9, N18.9  Acute cystitis N30.00  Elevated troponin R74.8  Elevated LFTs R94.5  Cardiac arrest with ventricular fibrillation ([facility]) I46.9, I49.01  
 Acute pancreatitis K85.90  
 Ischemic encephalopathy I67.89  
 Hypoalbuminemia E88.09  
 Shingles B02.9  Gangrene ([facility]) I96  
 Coarse tremors G25.2 Plan:  
Patient at this time is not a candidate for surgical intervention. Will continue [facility] with Betadine paint daily(stable) will continue to monitor. Total time spent with patient: 30 Minutes Care Plan discussed with: Patient and Nursing Staff Discussed:  Care Plan Disposition:  Stable Mr. Zeeshan Perez is a 57 y.o. male who was seen at bedside. Jagjit Zapata Subjective:  
Past Medical History Past Medical History:  
Diagnosis Date  CHF (congestive heart failure) ([facility]  Diabetes ([facility]  Stroke ([facility] Social History Socioeconomic History  Marital status:  Spouse name: Not on file  Number of children: Not on file  Years of education: Not on file  Highest education level: Not on file Social Needs  Financial resource strain: Not on file  Food insecurity - worry: Not on file  Food insecurity - inability: Not on file  Transportation needs - medical: Not on file  Transportation needs - non-medical: Not on file Occupational History  Not on file Tobacco Use  
  Smoking status: Never Smoker  Smokeless tobacco: Never Used Substance and Sexual Activity  Alcohol use: No  
 Drug use: No  
 Sexual activity: Not on file Other Topics Concern  Not on file Social History Narrative  Not on file Current Medications Current Facility-Administered Medications Medication Dose Route Frequency Provider Last Rate Last Dose  [START ON 10/25/2018] insulin glargine (LANTUS) injection 18 Units  18 Units SubCUTAneous DAILY Anthony Solorzano MD      
 0.9% sodium chloride infusion  60 mL/hr IntraVENous CONTINUOUS Mitali Gutierrez MD 60 mL/hr at 10/24/18 1102 60 mL/hr at 10/24/18 1102  multivitamin (MULTI-DELYN, WELLESSE) oral liquid 30 mL  30 mL Oral DAILY Mitali Gutierrez MD   30 mL at 10/24/18 0846  
 albuterol (PROVENTIL VENTOLIN) nebulizer solution 2.5 mg  2.5 mg Nebulization BID Caprice Pereyra MD   2.5 mg at 10/24/18 1118  bacitracin 500 unit/gram packet 1 Packet  1 Packet Topical PRN Caprice Pereyra MD      
 sodium chloride (NS) flush 10-30 mL  10-30 mL InterCATHeter PRN Caprice Pereyra MD   30 mL at 10/21/18 0430  
 sodium chloride (NS) flush 10 mL  10 mL InterCATHeter Q24H Caprice Pereyra MD   10 mL at 10/23/18 1750  sodium chloride (NS) flush 10 mL  10 mL InterCATHeter PRN Caprice Pereyra MD   10 mL at 10/20/18 1360  sodium chloride (NS) flush 10-40 mL  10-40 mL InterCATHeter Q8H Aliza Ackerman MD   10 mL at 10/24/18 0544  sodium chloride (NS) flush 20 mL  20 mL InterCATHeter Q24H Caprice Pereyra MD   20 mL at 10/23/18 1750  heparin (porcine) injection 5,000 Units  5,000 Units SubCUTAneous Q8H Caprice Pereyra MD   5,000 Units at 10/24/18 0574  acyclovir sodium (ZOVIRAX) 330.5 mg in 0.9% sodium chloride 100 mL IVPB  5 mg/kg (Ideal) IntraVENous Q24H Dejan Curtis MD   330.5 mg at 10/23/18 1424  hydroxypropyl methylcellulose (ISOPTO TEARS) 0.5 % ophthalmic solution 2 Drop  2 Drop Both Eyes BID Emanuel LAL NP   2 Drop at 10/24/18 1102  epoetin manda (EPOGEN;PROCRIT) injection 40,000 Units  40,000 Units IntraVENous Q7D Kt Blum MD   40,000 Units at 10/17/18 1621  
 pantoprazole (PROTONIX) 40 mg in sodium chloride 0.9% 10 mL injection  40 mg IntraVENous Q24H Shaylee Wallace MD   40 mg at 10/24/18 0846  
 0.9% sodium chloride infusion 250 mL  250 mL IntraVENous PRN Kennedy Castillo DO      
 influenza vaccine 2018-19 (6 mos+)(PF) (FLUARIX QUAD/FLULAVAL QUAD) injection 0.5 mL  0.5 mL IntraMUSCular PRIOR TO DISCHARGE Shannon Aldana MD      
 acetaminophen (TYLENOL) solution 325 mg  325 mg Per NG tube Q4H PRN Shannon Aldana MD   325 mg at 10/18/18 2130  
 0.9% sodium chloride infusion 250 mL  250 mL IntraVENous PRN Kennedy Castillo DO      
 heparin (porcine) pf 100 Units  100 Units InterCATHeter PRN Kt Blum MD      
 0.9% sodium chloride infusion 250 mL  250 mL IntraVENous PRN Shannon Aldana MD      
 insulin lispro (HUMALOG) injection   SubCUTAneous Q6H Jaqueline LEE, DO   3 Units at 10/24/18 0282  heparin (porcine) 1,000 unit/mL injection 1,000 Units  1,000 Units InterCATHeter DIALYSIS PRN Kt Blum MD   1,000 Units at 09/28/18 1332  senna-docusate (PERICOLACE) 8.6-50 mg per tablet 1 Tab  1 Tab Oral BID PRN Kristofer Corpus, DO      
 ondansetron (ZOFRAN) injection 4 mg  4 mg IntraVENous Q4H PRN Alfred Nettles DO   4 mg at 09/28/18 0539  
 glucose chewable tablet 16 g  4 Tab Oral PRN Eda Cross A, DO      
 glucagon (GLUCAGEN) injection 1 mg  1 mg IntraMUSCular PRN Alfred Nettles, DO      
 dextrose (D50) infusion 12.5-25 g  25-50 mL IntraVENous PRN McVivek Dallasry A, DO   25 g at 10/09/18 1906  hydrALAZINE (APRESOLINE) 20 mg/mL injection 20 mg  20 mg IntraVENous Q6H PRN Mary Jo Reynoso NP   20 mg at 10/24/18 0557 Patient Allergies No Known Allergies Objective:  
General Exam 
 Non verbal and non responsive to verbal commands or stimuli. Vitals Visit Vitals /47 (BP 1 Location: Left arm, BP Patient Position: At rest) Pulse (!) 109 Temp 99.8 °F (37.7 °C) Resp 25 Ht 5' 7\" (1.702 m) Wt 88.4 kg (194 lb 14.2 oz) SpO2 99% BMI 30.52 kg/m² REVIEW OF SYSTEMS: 
Unable to assess Foot Exam 
VASCULAR EXAM:. Pedal pulses are palpable 0/4 DP 0/4 PT right and left foot. Skin temperature is warm to warm right and left foot. Digital capillary fill time is 3 seconds right and left foot. There is not edema of the right and left foot and ankle.  
  
NEUROLOGICAL EXAM:. Unable to assess 
  MUSCULOSKELETAL EXAM:. Muscle tone is not normal.   
  
MYCOTIC NAIL Dystrophic nail 1,2,3,4,5 bilateral. Elongated thickened nails 1,2,3,4,5 bilateral Hypertrophic nails 1,2,3,4,5 
  
DERMATOLOGICAL EXAM:. Skin is of abnormal texture and turgor with atrophic skin changes noting hair growth, nail changes (thickening), Bilateral. There is gangrene over the distal aspect of the left hallux with an area of hyperpigmentation over the 2nd digit left eponychium ( Stable). No exudate noted and no odor noted. Wound Buttocks Right;Proximal (Active) DRESSING STATUS Clean, dry, and intact 10/23/2018  8:00 PM  
DRESSING TYPE Foam;Silicone 63/89/8149  2:92 PM  
Pressure Injury Unstageable 10/23/2018  8:00 PM  
Wound Length (cm) 8.4 cm 9/27/2018 10:06 AM  
Wound Width (cm) 3 cm 9/27/2018 10:06 AM  
Wound Surface area (cm^2) 25.2 cm^2 9/27/2018 10:06 AM  
Change in Wound Size % -02022 9/27/2018 10:06 AM  
Condition of Base Sl[place] 10/23/2018  5:00 PM  
Tissue Type Pink;Yellow 10/23/2018  5:00 PM  
Tissue Type Percent Pink 40 10/20/2018  7:30 PM  
Tissue Type Percent Red 10 10/20/2018  7:30 PM  
Tissue Type Percent Yellow 50 10/20/2018  7:30 PM  
Drainage Amount  Small  10/23/2018  8:00 PM  
Drainage Color Serosanguinous 10/23/2018  8:00 PM  
Wound Odor None 10/23/2018  8:00 PM  
 Periwound Skin Condition Edematous 10/23/2018  8:00 PM  
Cleansing and Cleansing Agents  Dermal wound cleanser 10/23/2018  5:00 PM  
Dressing Type Applied Foam;Silicone 28/53/9922  6:29 PM  
Procedure Tolerated Well 10/23/2018  5:00 PM  
Number of days: 44 Wound Buttocks Medial (Active) DRESSING STATUS Old drainage 10/23/2018  8:00 PM  
DRESSING TYPE Foam;Silicone 72/25/4873  8:69 PM  
Pressure Injury Unstageable 10/23/2018  8:00 PM  
Condition of Base [place];Slough 10/23/2018  8:00 PM  
Condition of Edges Rolled/curled 10/23/2018  8:00 PM  
Tissue Type Yellow;Red 10/23/2018  8:00 PM  
Drainage Amount  Small  10/23/2018  8:00 PM  
Drainage Color Serosanguinous 10/23/2018  8:00 PM  
Wound Odor None 10/23/2018  8:00 PM  
Cleansing and Cleansing Agents  Dermal wound cleanser 10/23/2018  5:00 PM  
Dressing Type Applied Foam;Silicone 81/82/4230  6:46 PM  
Procedure Tolerated Well 10/23/2018  5:00 PM  
Number of days: 44 Wound Buttocks Right;Distal (Active) Number of days: 44 Wound Ankle Right;Lateral (Active) DRESSING STATUS Clean, dry, and intact 10/20/2018  7:30 PM  
DRESSING TYPE Foam;Silicone 78/59/0110  4:94 PM  
Pressure Injury Deep Tissue Injury 10/20/2018  7:30 PM  
Wound Length (cm) 1.2 cm 9/27/2018 10:06 AM  
Wound Width (cm) 1.5 cm 9/27/2018 10:06 AM  
Wound Surface area (cm^2) 1.8 cm^2 9/27/2018 10:06 AM  
Tissue Type Maroon/purple 10/20/2018  7:30 PM  
Tissue Type Percent Maroon/Purple 100 % 10/20/2018  7:30 PM  
Periwound Skin Condition Intact 10/20/2018  7:30 PM  
Dressing Type Applied Foam;Silicone 15/00/9268 02:46 PM  
Procedure Tolerated Well 10/20/2018  7:30 PM  
Number of days: 27 Wound Nose (Active) DRESSING STATUS Other (comment) 10/20/2018  7:30 PM  
Number of days: 7 Wound Abdomen (Active) DRESSING STATUS Other (comment) 10/20/2018  7:30 PM  
Number of days: 7 Labs Recent Results (from the past 24 hour(s)) GLUCOSE, POC  
 Collection Time: 10/23/18  2:22 PM  
Result Value Ref Range Glucose (POC) 88 70 - 110 mg/dL GLUCOSE, POC Collection Time: 10/23/18  5:48 PM  
Result Value Ref Range Glucose (POC) 142 (H) 70 - 110 mg/dL GLUCOSE, POC Collection Time: 10/24/18 12:21 AM  
Result Value Ref Range Glucose (POC) 240 (H) 70 - 110 mg/dL METABOLIC PANEL, COMPREHENSIVE Collection Time: 10/24/18  4:16 AM  
Result Value Ref Range Sodium 140 136 - 145 mmol/L Potassium 4.3 3.5 - 5.5 mmol/L Chloride 101 100 - 108 mmol/L  
 CO2 27 21 - 32 mmol/L Anion gap 12 3.0 - 18 mmol/L Glucose 177 (H) 74 - 99 mg/dL BUN 48 (H) 7.0 - 18 MG/DL Creatinine 4.02 (H) 0.6 - 1.3 MG/DL  
 BUN/Creatinine ratio 12 12 - 20 GFR est AA 18 (L) >60 ml/min/1.73m2 GFR est non-AA 15 (L) >60 ml/min/1.73m2 Calcium 7.6 (L) 8.5 - 10.1 MG/DL Bilirubin, total 0.5 0.2 - 1.0 MG/DL  
 ALT (SGPT) 67 (H) 16 - 61 U/L  
 AST (SGOT) 97 (H) 15 - 37 U/L Alk. phosphatase 340 (H) 45 - 117 U/L Protein, total 7.6 6.4 - 8.2 g/dL Albumin 1.7 (L) 3.4 - 5.0 g/dL Globulin 5.9 (H) 2.0 - 4.0 g/dL A-G Ratio 0.3 (L) 0.8 - 1.7 LIPASE Collection Time: 10/24/18  4:16 AM  
Result Value Ref Range Lipase 1,723 (H) 73 - 393 U/L  
CBC W/O DIFF Collection Time: 10/24/18  4:16 AM  
Result Value Ref Range WBC 14.0 (H) 4.6 - 13.2 K/uL  
 RBC 2.75 (L) 4.70 - 5.50 M/uL HGB 7.8 (L) 13.0 - 16.0 g/dL HCT 24.9 (L) 36.0 - 48.0 % MCV 90.5 74.0 - 97.0 FL  
 MCH 28.4 24.0 - 34.0 PG  
 MCHC 31.3 31.0 - 37.0 g/dL  
 RDW 15.8 (H) 11.6 - 14.5 % PLATELET 329 (H) 123 - 420 K/uL MPV 9.0 (L) 9.2 - 11.8 FL  
MAGNESIUM Collection Time: 10/24/18  4:16 AM  
Result Value Ref Range Magnesium 2.4 1.6 - 2.6 mg/dL PREALBUMIN Collection Time: 10/24/18  4:16 AM  
Result Value Ref Range Prealbumin 17.9 (L) 20 - 40 MG/DL  
GLUCOSE, POC Collection Time: 10/24/18  5:23 AM  
Result Value Ref Range Glucose (POC) 198 (H) 70 - 110 mg/dL GLUCOSE, POC Collection Time: 10/24/18 11:59 AM  
Result Value Ref Range Glucose (POC) 262 (H) 70 - 110 mg/dL X-Ray:  none Procedures:   none Mari Carbajal DPM 
October 24, 2018

## 2018-10-24 NOTE — PROGRESS NOTES
Physical Exam  
Skin:  
 
  
Nurses Notes: 
0800 Assessment done. Opens eyes spontaneously but not tracking. Pupils are equal but reacting sluggishly. SR on the monitor. With edema on upper extremities. RT arm with vesicular  Lesions. Some are open but drying up; others are still intact. Left great toe is necrotic. Left calf with dressing intact underneath is an open blister. Sacral decub stage 2; open area pink . 1000Multidisciplinary rounds done with Dr. Linda Baltazar. Lipase is trending up. MD wants NS @ 60 ml to be started. 1100 Vent changes done by Dr. Linda Baltazar from AC/VC mode to SIMV with pressure support. 1200 Reassessment done. No changes on pt's condition. 0 Pt's daughter came in to visit. She spoke to Care Manager. Dr. Lara Kinds updated family of Pt's condition. 1500 Dr. Linda Baltazar also had a conference with pt's daughter. 1600 Incontinent of loose yellow stoo, small amount. Incontinent care done. Linen changed. 56 Pt is straight cath and obtained cloudy braydon urine with sediments. 1800 No changes on pt's condition. Tolerating tube feeding 1924 Bedside and Verbal shift change report given to [address] [place] [address] (oncoming nurse) by Yamileth Lebron RN 
 (offgoing nurse). Report included the following information SBAR, Kardex, ED Summary, OR Summary, Procedure Summary, Intake/Output, MAR, Accordion, Recent Results, Med Rec Status and Cardiac Rhythm ST.

## 2018-10-24 NOTE — PROGRESS NOTES
Infectious Disease Follow-up Admit Date: 9/26/2018 Current abx Prior abx ACV 10/17 - 7 Zosyn 9/27-7, Meropenem/flucon 10/4- 11, vanco 9/27-25 Assessment ->Rec:  
 
Herpes Zoster R arm, C 6 (?C5) dermatome 
- vesicular lesions onset ~10/15 progressing compared to onset per NP 
- confluent in upper arm 
- still with new vesicles on forearm 10/22 but drying up on 10/23 -> cont Acyclovir renal dosing (5 mg/kg q 24h) x 10 days (3 more) at least 
-> d/w Dr. Linda Baltazar Leukocytosis/SIRS poss sepsis - MOF multiple ID and non-infectious concerns outlined below, complicated, wbc 06.9Z today from high 27K+ on 10/5 On  vancomycin/zosyn since 9/27 
- C diff neg 9/29, sput C albicans, repeat CT 10/4 no new findings --cxr reviewed - increased atx rt base. Left base improved 
- Higher fever 10/16 101.2, 10/18 101.4 afebrile since 
- Ddx: HZV vs deep tissue injury of toe/buttock vs Line infection 
- blcx 10/16 NG x 2 
- TDC Tip 10/19 >15 CFU MRSE (1 of 2 blcx on adm 9/27 MRSE but afebrile then prob contaminant) - Afebrile > 5 days now  -> cont on Acyclovir as above Suspected Pyelo POA  
- CTAP 9/27:  B perineph stranding, UA tntc wbc, uctx ng 
- treated at this point Cholelithiasis choledocholithiais suspected acute cholecystitis  poss cystic stone POA- abnl lft bili 4 alk phos 400 seen by GI and GS Dr. Tierra Sauer, no plan for OR, for poss cholecystostomy if LFT worsen, bili, alk phos declining - AST fluctuating downward  -> LFts increasing/stably elevated  
-> GI and Sx were following and if continues to rise- will need US but can be just from being in ICU for so long Elevated Lipase  
- lipase 2200 POA -> 3191 ->  5500 on 10/4 improved to 1207 10/19 but up again to 2169 10/22 interpretation complicated by JULIANNA -> fluctuating between [id number] (off mucomyst, TF) 
- CTAP 10/4: Normal pancreas -> per ICU team  
B infiltrates - poss edema infiltrate - RLL consolid better 10/4 cxr and suspect endobronchial clot contributed   ->monitor MRSE bacteremia 9/27 1 of 2. Has LUE midline unclear when placed, repeat bctx's IP today  ->repeat bctx's ntd  
-> treated by now JULIANNA POA dialysis dependent 
- baseline cr 0.9 132/10.1 in ER 
-RIJ uldall 9/28 out 10/19 - R fem uldall 10/20 -> per renal  
Bleeding -melena earlier, EGD 10/2 clot in esophagus bronch 10/3 R endobronch clot NEW removed 10/5 repeat bronch  -> per others Suspected anoxic brain injury on MRI 10/3 SP VF arrest 9/29 -> overall poor prognosis but family wishes aggressive care CVA R hemiparesis 9/10   
resp failure sp intubation 9/29 Comorbid: HTN HLD CHF h/o steatohepatitis MICROBIOLOGY:  
9/27 bctx 1 of 2 MRSE 
 uctx ng 
9/29 sput C albicans C diff neg 10/4 bctx x 2 NTD 
 fungitell indeterminate due to contamination 10/8 Cath tip cx ntd 10/8     bl cx ngtd 10/16 Blcx x2 ntd Spcx NF 
 
LINES AND CATHETERS:  
PIV x 2 RIJ uldall 9/28 Left PICC 10/18 Active Hospital Problems Diagnosis Date Noted  Coarse tremors 10/23/2018 Tremors vs chills vs seizure vs other. Will monitor. If persistent, will consider 24-hr EEG monitoring.  Gangrene ([facility] [address] 10/22/2018  Hypoalbuminemia 10/21/2018  Shingles 10/21/2018  Ischemic encephalopathy 09/30/2018  Cardiac arrest with ventricular fibrillation ([facility] [address] 09/29/2018 ROSC before defibrillation could be attempted.  Acute pancreatitis 09/29/2018 Lipase downtrending with interruption in tube feed and Mucomyst. Doubt that the elevated lipase is due to excessive lipid in tube feeding. May be secondary to Mucomyst. 
  
 Elevated LFTs 09/28/2018  History of stroke 09/27/2018 With residual R hemiparesis  Acute-on-chronic kidney injury ([facility] [address] 09/27/2018  Acute cystitis 09/27/2018  Elevated troponin 09/27/2018  Essential hypertension 09/10/2018  Diabetes mellitus type 2, controlled ([address] 09/10/2018 Subjective: Interval notes reviewed. Lying in bed in ICU with eyes open but no response to commands or painful stimuli. Temp and WBC count creeping up again Current Facility-Administered Medications Medication Dose Route Frequency  [START ON 10/25/2018] insulin glargine (LANTUS) injection 18 Units  18 Units SubCUTAneous DAILY  0.9% sodium chloride infusion  60 mL/hr IntraVENous CONTINUOUS  
 multivitamin (MULTI-DELYN, WELLESSE) oral liquid 30 mL  30 mL Oral DAILY  albuterol (PROVENTIL VENTOLIN) nebulizer solution 2.5 mg  2.5 mg Nebulization BID  
 bacitracin 500 unit/gram packet 1 Packet  1 Packet Topical PRN  
 sodium chloride (NS) flush 10-30 mL  10-30 mL InterCATHeter PRN  
 sodium chloride (NS) flush 10 mL  10 mL InterCATHeter Q24H  
 sodium chloride (NS) flush 10 mL  10 mL InterCATHeter PRN  
 sodium chloride (NS) flush 10-40 mL  10-40 mL InterCATHeter Q8H  
 sodium chloride (NS) flush 20 mL  20 mL InterCATHeter Q24H  
 heparin (porcine) injection 5,000 Units  5,000 Units SubCUTAneous Q8H  
 acyclovir sodium (ZOVIRAX) 330.5 mg in 0.9% sodium chloride 100 mL IVPB  5 mg/kg (Ideal) IntraVENous Q24H  hydroxypropyl methylcellulose (ISOPTO TEARS) 0.5 % ophthalmic solution 2 Drop  2 Drop Both Eyes BID  epoetin manda (EPOGEN;PROCRIT) injection 40,000 Units  40,000 Units IntraVENous Q7D  
 pantoprazole (PROTONIX) 40 mg in sodium chloride 0.9% 10 mL injection  40 mg IntraVENous Q24H  
 0.9% sodium chloride infusion 250 mL  250 mL IntraVENous PRN  
 influenza vaccine 2018-19 (6 mos+)(PF) (FLUARIX QUAD/FLULAVAL QUAD) injection 0.5 mL  0.5 mL IntraMUSCular PRIOR TO DISCHARGE  acetaminophen (TYLENOL) solution 325 mg  325 mg Per NG tube Q4H PRN  
 0.9% sodium chloride infusion 250 mL  250 mL IntraVENous PRN  
 heparin (porcine) pf 100 Units  100 Units InterCATHeter PRN  
 0.9% sodium chloride infusion 250 mL  250 mL IntraVENous PRN  
 insulin lispro (HUMALOG) injection   SubCUTAneous Q6H  
  heparin (porcine) 1,000 unit/mL injection 1,000 Units  1,000 Units InterCATHeter DIALYSIS PRN  
 senna-docusate (PERICOLACE) 8.6-50 mg per tablet 1 Tab  1 Tab Oral BID PRN  
 ondansetron (ZOFRAN) injection 4 mg  4 mg IntraVENous Q4H PRN  
 glucose chewable tablet 16 g  4 Tab Oral PRN  
 glucagon (GLUCAGEN) injection 1 mg  1 mg IntraMUSCular PRN  
 dextrose (D50) infusion 12.5-25 g  25-50 mL IntraVENous PRN  
 hydrALAZINE (APRESOLINE) 20 mg/mL injection 20 mg  20 mg IntraVENous Q6H PRN Objective:  
 
Visit Vitals /47 (BP 1 Location: Left arm, BP Patient Position: At rest) Pulse (!) 109 Temp 99.8 °F (37.7 °C) Resp 25 Ht 5' 7\" (1.702 m) Wt 88.4 kg (194 lb 14.2 oz) SpO2 99% BMI 30.52 kg/m² Temp (24hrs), Av.7 °F (37.1 °C), Min:96.7 °F (35.9 °C), Max:99.8 °F (37.7 °C) GEN: well developed 57year-old AA male on vent/trach in ICU, unresponsive HENT: no thrush Neck: tracheostomy in place CHEST: coarse bs B no wheeze or rhonchi CVS: RRR, no murmur appreciated ABD: soft, + BS no localized tenderness, PEG in place EXT: 1+ pitting edema b/l LE  
SKIN:  multiple blisters on rt arm and forearm confluent on upper arm, further dried up c/w yesterday, deep tissue injury rt heel and sacral area, left great toe with dark, gangrenous changes ?embolic CNS:eyes open, unresponsive to commands or deep sternal rub Labs: Results:  
Chemistry Recent Labs 10/24/18 
3250 10/23/18 
8085 10/22/18 
0638 * 125* 128*  139 140  
K 4.3 4.9 4.7  101 102 CO2 27 26 27 BUN 48* 71* 57* CREA 4.02* 5.96* 4.95* CA 7.6* 7.6* 7.4* AGAP 12 12 11 BUCR 12 12 12 * 308* 318* TP 7.6 7.0 7.4 ALB 1.7* 1.6* 1.7*  
GLOB 5.9* 5.4* 5.7* AGRAT 0.3* 0.3* 0.3* CBC w/Diff Recent Labs 10/24/18 
0830 10/23/18 
5015 10/22/18 
5015 WBC 14.0* 10.8 11.7 RBC 2.75* 2.65* 2.69* HGB 7.8* 7.5* 7.6* HCT 24.9* 23.7* 24.4*  
* 513* 549* RADIOLOGY: Reviewed 10/11 Increasing atelectasis/infiltrate at the right base with resolution of airspace 
disease at the left base. 10/10 cxr  Improved aeration of the right mid and lower lung with mild residual 
atelectatic opacities noted. Small region of opacity at the left lung base may 
reflect underlying atelectasis versus airspace disease. 10/4 CTAP IMPRESSION: 
1. Dense subtotal or total consolidation right lower lobe compatible with 
pneumonia similar to prior study. Small bilateral pleural effusions similar in 
size. 2. Distended gallbladder with gallstones and gallbladder sludge without 
significant change in appearance and also described in detail on ultrasound of 
9/27/2018. 3. Indeterminate small lesion left hepatic lobe without change. Hepatomegaly 
again evident. 4. No intraperitoneal fluid. Possible small left upper pole renal cyst. 
Cardiomegaly. Nasogastric tube tip in body of stomach. cxr 10/5 Impression: 1. Endotracheal tube, visualized nasogastric tube, and right IJ central venous 
catheter in stable position. 2. Overall improved aeration of the right lower lobe, likely improved 
atelectasis with mild residual atelectasis/airspace disease. 3. Mild interstitial edema overall similar to prior. Microbiology Results All Micro Results Procedure Component Value Units Date/Time CULTURE, CATHETER TIP [[id number]]  (Abnormal)  (Susceptibility) Collected:  10/19/18 1330 Order Status:  Completed Specimen:  Catheter Tip Updated:  10/22/18 8176 Special Requests: NO SPECIAL REQUESTS Culture result:    
  >15 CFU STAPHYLOCOCCUS EPIDERMIDIS  
     
 CULTURE, BLOOD [[id number]] Collected:  10/16/18 1055 Order Status:  Completed Specimen:  Blood Updated:  10/22/18 0809 Special Requests: PERIPHERAL Culture result: NO GROWTH 6 DAYS     
 CULTURE, BLOOD [[id number]] Collected:  10/16/18 1047 Order Status:  Completed Specimen:  Blood Updated:  10/22/18 0809 Special Requests: PERIPHERAL Culture result: NO GROWTH 6 DAYS     
 HSV 1 AND 2 BY PCR [[id number]] Collected:  10/18/18 1815 Order Status:  Completed Specimen:  Other from Miscellaneous sample Updated:  10/21/18 1636 Source OTHER TEST     
  HSV-1 DNA by PCR NEGATIVE      
  HSV-2 DNA by PCR NEGATIVE Comment: (NOTE) This test was developed and its performance characteristics 
determined by [facility]. It has not been cleared or 
approved by the U.S. Food and Drug Administration. The FDA has 
determined that such clearance or approval is not necessary. This 
test is used for clinical purposes. It should not be regarded as 
investigational or research. Performed At: [address] [id number] Angel Ruiz MD NM:[id number] CULTURE, RESPIRATORY/SPUTUM/BRONCH Ulysseso Pollo [[id number]] Collected:  10/16/18 1915 Order Status:  Completed Specimen:  Sputum,ET Suction Updated:  10/18/18 1011 Special Requests: NO SPECIAL REQUESTS     
  GRAM STAIN 10-25 WBC/lpf     
   <10 EPI/lpf FEW GRAM POSITIVE COCCI IN PAIRS MODERATE GRAM POSITIVE COCCI IN GROUPS MUCUS PRESENT Culture result: MODERATE NORMAL RESPIRATORY SHANAE  
     
 CULTURE, BLOOD [[id number]] Collected:  10/08/18 1227 Order Status:  Completed Specimen:  Blood Updated:  10/14/18 0741 Special Requests: PERIPHERAL Culture result: NO GROWTH 6 DAYS     
 CULTURE, CATHETER TIP [[id number]] Collected:  10/08/18 1215 Order Status:  Completed Specimen:  Catheter Tip Updated:  10/11/18 4057 Special Requests: NO SPECIAL REQUESTS Culture result: NO GROWTH 3 DAYS     
 CULTURE, BLOOD [[id number]] Collected:  10/04/18 7555 Order Status:  Completed Specimen:  Blood Updated:  10/10/18 1046   Special Requests: PERIPHERAL     
 Culture result: NO GROWTH 6 DAYS     
 CULTURE, BLOOD [[id number]] Collected:  10/04/18 1120 Order Status:  Completed Specimen:  Blood Updated:  10/10/18 0493 Special Requests: PERIPHERAL Culture result: NO GROWTH 6 DAYS     
 CULTURE, BLOOD [[id number]] Collected:  09/27/18 0005 Order Status:  Completed Specimen:  Blood Updated:  10/03/18 0845 Special Requests: NO SPECIAL REQUESTS Culture result: NO GROWTH 6 DAYS     
 CULTURE, BLOOD [[id number]]  (Abnormal)  (Susceptibility) Collected:  09/27/18 0136 Order Status:  Completed Specimen:  Blood Updated:  10/02/18 8908 Special Requests: NO SPECIAL REQUESTS     
  GRAM STAIN PEDIATRIC BOTTLE GRAM POSITIVE COCCI IN PAIRS IN CLUSTERS  
      
  SMEAR CALLED TO AND CORRECTLY REPEATED BY: Queen Doug EASLEY IN 2700 ON 9/29/18 AT 2033 WF Culture result:    
  AEROBIC BOTTLE STAPHYLOCOCCUS EPIDERMIDIS  
     
 CULTURE, RESPIRATORY/SPUTUM/BRONCH Murriel Golconda STAIN [[id number]]  (Abnormal) Collected:  09/29/18 1210 Order Status:  Completed Specimen:  Sputum from Endotracheal aspirate Updated:  10/02/18 8569 Special Requests: NO SPECIAL REQUESTS     
  GRAM STAIN >25 WBC/lpf     
   <10 EPI/lpf MUCUS PRESENT     
   MODERATE YEAST Culture result: MANY CANDIDA TROPICALIS C. DIFFICILE/EPI PCR [[id number]] Collected:  09/29/18 1400 Order Status:  Completed Specimen:  Stool Updated:  09/29/18 2248 Special Requests: NO SPECIAL REQUESTS Culture result: Toxigenic C. difficile NEGATIVE                         C. difficile target DNA sequences are not detected. CULTURE, URINE [[id number]] Collected:  09/27/18 0029 Order Status:  Completed Specimen:  Cath Urine Updated:  09/29/18 0944 Special Requests: NO SPECIAL REQUESTS Culture result: NO GROWTH 2 DAYS Aileen Saleh MD, [address] October 24, 2018 [facility] [phone]

## 2018-10-24 NOTE — DIABETES MGMT
NUTRITIONAL RE-ASSESSMENT GLYCEMIC CONTROL/ PLAN OF CARE Charlie Bhatia           57 y.o.           9/26/2018 1. Acute renal failure, unspecified acute renal failure type ([facility] [address] 2. Acute UTI 3. Cholelithiasis with choledocholithiasis 4. History of CVA (cerebrovascular accident) 5. ESRD (end stage renal disease) ([facility] [address] DM INTERVENTIONS/PLAN:  
 Osmolite 1.2 calorie TF is infusing at 70ml/hour. Tube feeding is providing 2016 calories,  93 g protein/d with 1.3 liters free water/d from TF. Formula provides 29% of calories as lipids (66g /24 hrs or 0.75g/kg) Pt is receiving liquid multivitamin/mineral 10/22 for wounds. ASSESSMENT:  
Nutritional Status: 
Pt is overweight related to excess caloric intake as evidenced by 135% ideal weight and BMI= 28.3kg/m2. Pt meets criteria for obesity. Altered nutrition-related lab values RT DX. Diabetes Mellitus  related to lack of adherence to nutrition and medication recommendations as evidenced by A1c of 10% Altered nutrition-related lab values RT DX. Acute renal failure aeb requiring dialysis. Pt w/hypoalbuminemia as evidenced by albumin=1.7 mg/dl and prealbumin 17.9 (decreased but had two extended periods of TF on hold or <half goal rate due to intolerance) Pt with DT injury on heels, multiple blisters on arms and stage 2  area on sacrum noted. Diabetes Management: resumed   18 units Lantus since  TF resumed at goal of 70 ml/hr Recent blood glucose:    
 
Lab Results Component Value Date/Time  (H) 10/24/2018 04:16 AM  
 GLUCPOC 262 (H) 10/24/2018 11:59 AM  
 GLUCPOC 198 (H) 10/24/2018 05:23 AM  
 GLUCPOC 240 (H) 10/24/2018 12:21 AM  
 
Within target range (non-ICU: <140; ICU<180): [] Yes   []  No 
 
Current Insulin regimen: 18 units Lantus/24 hrs plus VIR corrective lispro Home medication/insulin regimen:  
Key Antihyperglycemic Medications   
    
  
 insulin glargine (LANTUS) 100 unit/mL injection 10 units qhs Indications: type 2 diabetes mellitus  
 insulin lispro (HUMALOG) 100 unit/mL injection 4 units with meals HbA1c: 10% equivalent  to ave Blood Glucose of 240mg/dl for 2-3 months prior to admission Adequate glycemic control PTA:  [] Yes  [x] No 
  
SUBJECTIVE/OBJECTIVE: Information obtained from: ICU rounds/pt is on a vent Diet:  Osmolite1.2 calorie TF  resumed at 70 ml/hr No data found. Medications: [x]                Reviewed NS IV fluids added at 60ml/hr Labs:  
Lab Results Component Value Date/Time Hemoglobin A1c 10.0 (H) 09/30/2018 04:18 AM  
 
Lab Results Component Value Date/Time Sodium 140 10/24/2018 04:16 AM  
 Potassium 4.3 10/24/2018 04:16 AM  
 Chloride 101 10/24/2018 04:16 AM  
 CO2 27 10/24/2018 04:16 AM  
 Anion gap 12 10/24/2018 04:16 AM  
 Glucose 177 (H) 10/24/2018 04:16 AM  
 BUN 48 (H) 10/24/2018 04:16 AM  
 Creatinine 4.02 (H) 10/24/2018 04:16 AM  
 Calcium 7.6 (L) 10/24/2018 04:16 AM  
 Magnesium 2.4 10/24/2018 04:16 AM  
 Phosphorus 5.2 (H) 10/13/2018 03:41 AM  
 Albumin 1.7 (L) 10/24/2018 04:16 AM  
prealbumin 17.9 Lipase was 2169, then 1586, now 1723 Anthropometrics: IBW : 69.9 kg (154 lb), % IBW (Calculated): 134.85 %, BMI (calculated): 30.5 Wt Readings from Last 1 Encounters:  
10/24/18 88.4 kg (194 lb 14.2 oz) Ht Readings from Last 1 Encounters:  
10/22/18 5' 7\" (1.702 m) Estimated Nutrition Needs:  1880 Kcals/day, Protein (g): 85 g Fluid (ml): 1800 ml Based on:   [x]          Actual BW    []          ABW   []            Adjusted BW   
Nutrition Diagnoses: as stated above Nutrition Interventions: TF 
Goal:  
Blood glucose will be within target range of  mg/dL by 10/27 Intake will meet >75% of energy and protein requirements by 10/29. Gradual weight loss post discharge 1 lb per week by 11/3. Nutrition Monitoring and Evaluation []     Monitor po intake on meal rounds 
[x]     Continue inpatient monitoring and intervention []     Other: 
 
 
Nutrition Risk:  [x]   High     []  Moderate    []  Minimal/Uncompromised Ana Luisa Ulrich, JOYCE 
pgr [phone]

## 2018-10-24 NOTE — PROGRESS NOTES
Physical Exam  
Skin:  
 
  
 
 
Bedside shift change report was given to me, Anna Santana RN  (oncoming night shift nurse), by Hazel Tineo RN (offgoing day shift nurse). Report included the following information:  SBAR, kardex, consultations, hemodynamic monitoring, intake/output, MAR, lab results, VS trends, cardiac rhythm noted ST. Skin, neuro, respiratory status, and order verification have been dually noted and verified at the bedside with: Hazel Tineo RN                                                              
ICU Nurse's Note: 
2000: Pt's eyes are open with spontaneous eye movements, dysconjugate gaze, right eye remains deviated to the left, no noted tracking or visual focusing, no ability to follow commands or communicate needs. Pt has no notable purposeful movements to his extremities, and he requires max assist with all ADLs and repositioning. He has minimal reaction, but will occasionally withdraw his BLE upon tactile stim. He is especially hypersensitive to the left foot. Left great toe, observed to be purple/black discolored, was dabbed with gauze soaked betadine and left open to air per orders. Will continue to monitor. Neurological: as noted in assessment Cardiovascular:  ST on the monitor. Pulmonary: breath sounds diminished and coarse bilaterally. Saturations maintained at 100% via tracheostomy on vent via AC w/ Vt 500, PEEP 5, FiO2 30%. Inline suctioned of a moderate amount of green, yellow thick sputum. + cough with suction noted. GI/: Abdomen is obese, soft, non-distended. PEG tube intact and infusing Osmolite 1.2 teodoro at 70 ml/hr, with < 5 ml tan residual noted. Last BM indicated 10/24/2018, soft, pastey, orange rust colored. Pt is a hemodialysis client who is oliguric at baseline, with occasional episodes of incontinence . IVF/Critical gtts: Double lumen PICC, patent, has been capped after flushing. Skin: as noted above 2300:Q 6hr accucheck 240, SSI administered per protocol. Pt was assisted with repositioning. Pt was also straight catheterized at this time. 250 ml braydon colored urine was collected, a small amount of white colored drainage noted. VS at baseline, pt is afebrile. Interventions are ongoing, will continue to monitor. 0200: Pt was incontinent of medium, soft and pasty BM that was orange/rust colored. Barrier cream applied after a complete bed bath and linen change. Incontinent of a moderate amount of urine output at this time. Tube feeding continues to infuse, PEG tube care completed. 0400: AM labs drawn from PICC without incident. Pt was bathed, repositioned, and tolerated mouth care. He continues to exhibit a strong, productive cough with inline trache suctioning. Sputum is in small increments, yellow/green and thick. 0545: Sustained BP remained elevated 183/79 and 188/80, after being suctioned and reposiitioned. Pt was given prn Hydralazine with good results. 0600: Q 6hr accucheck 198, 3 units SSI indicated. Pt tolerated scheduled AM meds and appears to be resting comfortably in bed at this time after repositioning, mouth care, and mery care. He was straight cathed at this time per orders with 75 ml or braydon urine and proceeded to have a moderate incontinence episode of urine. Interventions are ongoing, will continue to monitor.  
 
0710: Bedside change of shift report given to: Marian Love RN

## 2018-10-24 NOTE — CONSULTS
[facility] 
[address] [employer], [address] [facility] [address] Niall Smith, Dr. Ry Preston, Dr. Robert Bender [phone] FAX# [phone] Consult Patient: Willie Christensen MRN: [id number]  SSN: [id number] YOB: 1955  Age: 57 y.o. Sex: male Subjective:  
  
Willie Christensen is a 57 y.o. male who is being seen for JULIANNA on CKD. Past Medical History:  
Diagnosis Date  CHF (congestive heart failure) ([facility]  Diabetes ([facility]  Stroke ([facility] History reviewed. No pertinent surgical history. History reviewed. No pertinent family history. Social History Tobacco Use  Smoking status: Never Smoker  Smokeless tobacco: Never Used Substance Use Topics  Alcohol use: No  
  
Current Facility-Administered Medications Medication Dose Route Frequency Provider Last Rate Last Dose  [START ON 10/25/2018] insulin glargine (LANTUS) injection 18 Units  18 Units SubCUTAneous DAILY Anthony Solorzano MD      
 0.9% sodium chloride infusion  60 mL/hr IntraVENous CONTINUOUS Ekta Sanabria MD 60 mL/hr at 10/24/18 1102 60 mL/hr at 10/24/18 1102  multivitamin (MULTI-DELYN, WELLESSE) oral liquid 30 mL  30 mL Oral DAILY Ekta Sanabria MD   30 mL at 10/24/18 0846  
 albuterol (PROVENTIL VENTOLIN) nebulizer solution 2.5 mg  2.5 mg Nebulization BID Cody Dee MD   2.5 mg at 10/24/18 4670  bacitracin 500 unit/gram packet 1 Packet  1 Packet Topical PRN Cody Dee MD      
 sodium chloride (NS) flush 10-30 mL  10-30 mL InterCATHeter PRN Cody Dee MD   30 mL at 10/21/18 0430  
 sodium chloride (NS) flush 10 mL  10 mL InterCATHeter Q24H Cody Dee MD   10 mL at 10/23/18 1750  sodium chloride (NS) flush 10 mL  10 mL InterCATHeter PRN Cody Dee MD   10 mL at 10/20/18 3001  sodium chloride (NS) flush 10-40 mL  10-40 mL InterCATHeter Q8H Tyron, Kylee Ramos MD   10 mL at 10/24/18 0429  sodium chloride (NS) flush 20 mL  20 mL InterCATHeter Q24H Ana Cuenca MD   20 mL at 10/23/18 1750  heparin (porcine) injection 5,000 Units  5,000 Units SubCUTAneous Q8H Ana Cuenca MD   5,000 Units at 10/24/18 9225  acyclovir sodium (ZOVIRAX) 330.5 mg in 0.9% sodium chloride 100 mL IVPB  5 mg/kg (Ideal) IntraVENous Q24H Dejan Curtis MD   330.5 mg at 10/23/18 1424  hydroxypropyl methylcellulose (ISOPTO TEARS) 0.5 % ophthalmic solution 2 Drop  2 Drop Both Eyes BID Lemuel LAL NP   2 Drop at 10/24/18 1102  epoetin manda (EPOGEN;PROCRIT) injection 40,000 Units  40,000 Units IntraVENous Q7D Stephen Lindsay MD   40,000 Units at 10/17/18 1621  
 pantoprazole (PROTONIX) 40 mg in sodium chloride 0.9% 10 mL injection  40 mg IntraVENous Q24H Brendan Boo MD   40 mg at 10/24/18 0846  
 0.9% sodium chloride infusion 250 mL  250 mL IntraVENous PRN Erich Sy DO      
 influenza vaccine 2018-19 (6 mos+)(PF) (FLUARIX QUAD/FLULAVAL QUAD) injection 0.5 mL  0.5 mL IntraMUSCular PRIOR TO DISCHARGE Ev Fernandez MD      
 acetaminophen (TYLENOL) solution 325 mg  325 mg Per NG tube Q4H PRN Ev Fernandez MD   325 mg at 10/18/18 2130  
 0.9% sodium chloride infusion 250 mL  250 mL IntraVENous PRN Erich Sy DO      
 heparin (porcine) pf 100 Units  100 Units InterCATHeter PRN Stephen Lindsay MD      
 0.9% sodium chloride infusion 250 mL  250 mL IntraVENous PRN Ev Fernandez MD      
 insulin lispro (HUMALOG) injection   SubCUTAneous Q6H Ruben Helms H, DO   3 Units at 10/24/18 9174  heparin (porcine) 1,000 unit/mL injection 1,000 Units  1,000 Units InterCATHeter DIALYSIS PRN Stephen Lindsay MD   1,000 Units at 09/28/18 1332  senna-docusate (PERICOLACE) 8.6-50 mg per tablet 1 Tab  1 Tab Oral BID PRN Alfred Nettles DO      
 ondansetron (ZOFRAN) injection 4 mg  4 mg IntraVENous Q4H PRN Yoon, Luranahy Escort A, DO   4 mg at 09/28/18 0539  
 glucose chewable tablet 16 g  4 Tab Oral PRN Hieumaida Toddmarita,       
 glucagon (GLUCAGEN) injection 1 mg  1 mg IntraMUSCular PRN Alfred Nettles DO      
 dextrose (D50) infusion 12.5-25 g  25-50 mL IntraVENous PRN Alfred Nettles, DO   25 g at 10/09/18 1906  hydrALAZINE (APRESOLINE) 20 mg/mL injection 20 mg  20 mg IntraVENous Q6H PRN Sebastian Homans, NP   20 mg at 10/24/18 3059 No Known Allergies Review of Systems: 
Review of systems not obtained due to patient factors. Objective:  
 
Vitals:  
 10/24/18 0735 10/24/18 0800 10/24/18 1022 10/24/18 1112 BP:      
Pulse: (!) 114  (!) 112 (!) 110 Resp: 18  20 (!) 31 Temp:  99.6 °F (37.6 °C) TempSrc:      
SpO2: 100%  99% 97% Weight:      
Height:      
  
 
Physical Exam: 
GENERAL: alert, uncooperative, combative, mild distress LUNG: clear to auscultation bilaterally HEART: regular rate and rhythm, S1, S2 normal, no murmur, click, rub or gallop Patent IJ on U/S Assessment:  
 
Hospital Problems  Date Reviewed: 10/23/2018 Codes Class Noted POA Coarse tremors ICD-10-CM: G25.2 ICD-9-CM: 781.0  10/23/2018 No  
 Overview Signed 10/23/2018 11:50 AM by Eun Calvin MD  
  Tremors vs chills vs seizure vs other. Will monitor. If persistent, will consider 24-hr EEG monitoring. Gangrene ([facility] ICD-10-CM: Y70 
ICD-9-CM: 785.4  10/22/2018 Unknown Hypoalbuminemia ICD-10-CM: E88.09 
ICD-9-CM: 273.8  10/21/2018 Yes Shingles ICD-10-CM: B02.9 ICD-9-CM: 053.9  10/21/2018 No  
   
 Ischemic encephalopathy ICD-10-CM: I67.89 ICD-9-CM: 437.1  9/30/2018 No  
   
 * (Principal) Cardiac arrest with ventricular fibrillation (HCC) ICD-10-CM: I46.9, I49.01 
ICD-9-CM: 427.5, 427.41  9/29/2018 No  
 Overview Signed 9/29/2018 11:13 AM by Eun Calvin MD  
  ROSC before defibrillation could be attempted. Acute pancreatitis ICD-10-CM: K85.90 ICD-9-CM: 643.4  9/29/2018 Yes Overview Addendum 10/23/2018 10:57 AM by Lillian Curiel MD  
  Lipase downtrending with interruption in tube feed and Mucomyst. Doubt that the elevated lipase is due to excessive lipid in tube feeding. May be secondary to Mucomyst. 
  
  
   
 Elevated LFTs ICD-10-CM: R94.5 ICD-9-CM: 790.6  9/28/2018 Yes History of stroke ICD-10-CM: Z86.73 
ICD-9-CM: V12.54  9/27/2018 Yes Overview Signed 9/29/2018 11:14 AM by Lillian Curiel MD  
  With residual R hemiparesis Acute-on-chronic kidney injury ([facility] ICD-10-CM: N17.9, N18.9 ICD-9-CM: 584.9, 585.9  9/27/2018 Yes Acute cystitis ICD-10-CM: N30.00 ICD-9-CM: 595.0  9/27/2018 Yes Elevated troponin ICD-10-CM: R74.8 ICD-9-CM: 790.6  9/27/2018 Yes Essential hypertension (Chronic) ICD-10-CM: I10 
ICD-9-CM: 401.9  9/10/2018 Yes Diabetes mellitus type 2, controlled ([facility] (Chronic) ICD-10-CM: E11.9 ICD-9-CM: 250.00  9/10/2018 Yes Plan:  
 
Insert right IJ TDC under ultrasound guidance. Signed By: Mayra Myers MD   
 October 24, 2018

## 2018-10-24 NOTE — PROGRESS NOTES
Hospitalist Progress Note Daily Progress Note: 10/24/2018    
Interval history / Subjective:  
Hanny Enriquez is a 57y.o. year old male who presented from [facility] with abnormal labs, elevated BUN/Cr. Found to have JULIANNA, UTI, and markedly elevated LFT on presentation. Patient's recent admission was 9/10/18-9/14/18 for acute CVA and rhabdo. Patient poor historian on admission,stated \"I had heart failure. She was started on vanc,zosyn. On 9/29,patient had cardiac arrest with ventricular fibrillation - s/p ROSC. Her hospital course has since then complicated with sepsis,pancreatitits,pneumonia,acute pancreatitis requiring involvement of gi,surgery,nephrology,cardiology,ID,cardiology. So far patient has remained without major improvement. Now in vegetative state. Patient had PEG/Trach placement on 10/17. On 10/17,patient started on acyclovir for veicular rash rt arm,suspected being Zoster. Lipase 2169. US abd 10/22 shows Abnormal appearance of the gallbladder with stones/sludge with wall thickening  
and possible pericholecystic fluid similar to the prior study of 9/27/2018 
10/22: Patient on treatment for shingles in contact isolation per ID team. He also had left big toe gangrene . We will consult Podiatry. His lipase was elevated , US of the abdomen shows sludge and possible cholecystitis. Patient afebrile. GI consulted  for possible MRCP. Current Facility-Administered Medications Medication Dose Route Frequency  [START ON 10/25/2018] insulin glargine (LANTUS) injection 18 Units  18 Units SubCUTAneous DAILY  0.9% sodium chloride infusion  60 mL/hr IntraVENous CONTINUOUS  
 multivitamin (MULTI-DELYN, WELLESSE) oral liquid 30 mL  30 mL Oral DAILY  albuterol (PROVENTIL VENTOLIN) nebulizer solution 2.5 mg  2.5 mg Nebulization BID  
 bacitracin 500 unit/gram packet 1 Packet  1 Packet Topical PRN  
 sodium chloride (NS) flush 10-30 mL  10-30 mL InterCATHeter PRN  
  sodium chloride (NS) flush 10 mL  10 mL InterCATHeter Q24H  
 sodium chloride (NS) flush 10 mL  10 mL InterCATHeter PRN  
 sodium chloride (NS) flush 10-40 mL  10-40 mL InterCATHeter Q8H  
 sodium chloride (NS) flush 20 mL  20 mL InterCATHeter Q24H  
 heparin (porcine) injection 5,000 Units  5,000 Units SubCUTAneous Q8H  
 acyclovir sodium (ZOVIRAX) 330.5 mg in 0.9% sodium chloride 100 mL IVPB  5 mg/kg (Ideal) IntraVENous Q24H  hydroxypropyl methylcellulose (ISOPTO TEARS) 0.5 % ophthalmic solution 2 Drop  2 Drop Both Eyes BID  epoetin manda (EPOGEN;PROCRIT) injection 40,000 Units  40,000 Units IntraVENous Q7D  
 pantoprazole (PROTONIX) 40 mg in sodium chloride 0.9% 10 mL injection  40 mg IntraVENous Q24H  
 0.9% sodium chloride infusion 250 mL  250 mL IntraVENous PRN  
 influenza vaccine 2018-19 (6 mos+)(PF) (FLUARIX QUAD/FLULAVAL QUAD) injection 0.5 mL  0.5 mL IntraMUSCular PRIOR TO DISCHARGE  acetaminophen (TYLENOL) solution 325 mg  325 mg Per NG tube Q4H PRN  
 0.9% sodium chloride infusion 250 mL  250 mL IntraVENous PRN  
 heparin (porcine) pf 100 Units  100 Units InterCATHeter PRN  
 0.9% sodium chloride infusion 250 mL  250 mL IntraVENous PRN  
 insulin lispro (HUMALOG) injection   SubCUTAneous Q6H  
 heparin (porcine) 1,000 unit/mL injection 1,000 Units  1,000 Units InterCATHeter DIALYSIS PRN  
 senna-docusate (PERICOLACE) 8.6-50 mg per tablet 1 Tab  1 Tab Oral BID PRN  
 ondansetron (ZOFRAN) injection 4 mg  4 mg IntraVENous Q4H PRN  
 glucose chewable tablet 16 g  4 Tab Oral PRN  
 glucagon (GLUCAGEN) injection 1 mg  1 mg IntraMUSCular PRN  
 dextrose (D50) infusion 12.5-25 g  25-50 mL IntraVENous PRN  
 hydrALAZINE (APRESOLINE) 20 mg/mL injection 20 mg  20 mg IntraVENous Q6H PRN Review of Systems Intubated,unresponsive. Objective:  
 
Visit Vitals /47 (BP 1 Location: Left arm, BP Patient Position: At rest) Pulse (!) 109 Temp 99.8 °F (37.7 °C) Resp 25  
 Ht 5' 7\" (1.702 m) Wt 88.4 kg (194 lb 14.2 oz) SpO2 99% BMI 30.52 kg/m² O2 Flow Rate (L/min): 45 l/min O2 Device: Tracheostomy Temp (24hrs), Av °F (37.2 °C), Min:98.1 °F (36.7 °C), Max:99.8 °F (37.7 °C) 
 
 
10/24 0701 - 10/24 1900 In: 440 Out: -  
10/22 1901 - 10/24 0700 In: [phone] Out: 4228 [Urine:625] P/E General Appearance:S/p PEG/Trach today HENT: normocephalic/atraumatic, + ETT Neck: No JVD, hematoma R side where [facility] is improving Lungs: Coarse B/L w/ vent-assisted BS 
CV: RRR, no m/r/g Abdomen: soft, non-tender, normal bowel sounds Extremities: versicular rash rt arm and right side of chest,no cyanosis, no peripheral edema Neuro: Unresponsive to commands, no withdrawal to pain, + corneal reflex and pupillary reaction today Skin: Normal color, intact Data Review Recent Results (from the past 12 hour(s)) METABOLIC PANEL, COMPREHENSIVE Collection Time: 10/24/18  4:16 AM  
Result Value Ref Range Sodium 140 136 - 145 mmol/L Potassium 4.3 3.5 - 5.5 mmol/L Chloride 101 100 - 108 mmol/L  
 CO2 27 21 - 32 mmol/L Anion gap 12 3.0 - 18 mmol/L Glucose 177 (H) 74 - 99 mg/dL BUN 48 (H) 7.0 - 18 MG/DL Creatinine 4.02 (H) 0.6 - 1.3 MG/DL  
 BUN/Creatinine ratio 12 12 - 20 GFR est AA 18 (L) >60 ml/min/1.73m2 GFR est non-AA 15 (L) >60 ml/min/1.73m2 Calcium 7.6 (L) 8.5 - 10.1 MG/DL Bilirubin, total 0.5 0.2 - 1.0 MG/DL  
 ALT (SGPT) 67 (H) 16 - 61 U/L  
 AST (SGOT) 97 (H) 15 - 37 U/L Alk. phosphatase 340 (H) 45 - 117 U/L Protein, total 7.6 6.4 - 8.2 g/dL Albumin 1.7 (L) 3.4 - 5.0 g/dL Globulin 5.9 (H) 2.0 - 4.0 g/dL A-G Ratio 0.3 (L) 0.8 - 1.7 LIPASE Collection Time: 10/24/18  4:16 AM  
Result Value Ref Range Lipase 1,723 (H) 73 - 393 U/L  
CBC W/O DIFF Collection Time: 10/24/18  4:16 AM  
Result Value Ref Range WBC 14.0 (H) 4.6 - 13.2 K/uL  
 RBC 2.75 (L) 4.70 - 5.50 M/uL HGB 7.8 (L) 13.0 - 16.0 g/dL HCT 24.9 (L) 36.0 - 48.0 % MCV 90.5 74.0 - 97.0 FL  
 MCH 28.4 24.0 - 34.0 PG  
 MCHC 31.3 31.0 - 37.0 g/dL  
 RDW 15.8 (H) 11.6 - 14.5 % PLATELET 100 (H) 685 - 420 K/uL MPV 9.0 (L) 9.2 - 11.8 FL  
MAGNESIUM Collection Time: 10/24/18  4:16 AM  
Result Value Ref Range Magnesium 2.4 1.6 - 2.6 mg/dL PREALBUMIN Collection Time: 10/24/18  4:16 AM  
Result Value Ref Range Prealbumin 17.9 (L) 20 - 40 MG/DL  
GLUCOSE, POC Collection Time: 10/24/18  5:23 AM  
Result Value Ref Range Glucose (POC) 198 (H) 70 - 110 mg/dL GLUCOSE, POC Collection Time: 10/24/18 11:59 AM  
Result Value Ref Range Glucose (POC) 262 (H) 70 - 110 mg/dL Assessment/Plan:  
 
Principal Problem: 
  Cardiac arrest with ventricular fibrillation ([facility] (9/29/2018) Overview: ROSC before defibrillation could be attempted. Active Problems: 
  Essential hypertension (9/10/2018) Diabetes mellitus type 2, controlled ([address] (9/10/2018) History of stroke (9/27/2018) Overview: With residual R hemiparesis Acute-on-chronic kidney injury ([facility] (9/27/2018) Acute cystitis (9/27/2018) Elevated troponin (9/27/2018) Elevated LFTs (9/28/2018) Acute pancreatitis (9/29/2018) Overview: Lipase downtrending with interruption in tube feed and Mucomyst. Doubt  
    that the elevated lipase is due to excessive lipid in tube feeding. May be  
    secondary to Mucomyst. 
 
  Ischemic encephalopathy (9/30/2018) Hypoalbuminemia (10/21/2018) Shingles (10/21/2018) Gangrene ([address] (10/22/2018) Coarse tremors (10/23/2018) Overview: Tremors vs chills vs seizure vs other. Will monitor. If persistent, will  
    consider 24-hr EEG monitoring. Zoster. Care Plan - Vent mgmt per ICU - S/p trach/PEG on 10/17 as patient did not show significant improvement. 
- EGD showed large clot in esophagus last week repeat EGD 10/9 shows healed esophageal ulcer - Cont protonix IV every day  
- Hgb stable - LFTs improving - GI signed off 10/9 
- MRI w/ evidence of severe anoxic brain injury - Minimal improvement on neuro exam 
- Appreciate neuro assistance - IV Meropenem and IV Fluconazole d/heron on 10/15 per ID. 
- Currently on vanc only which was started on 9/27 
- Nephro managing HD TTHS 
- Pt unlikely to have any meaningful neuro recovery, prognosis is very grim - Family wanted to cont to do everything, including trach/PEG -> was done10/17 
- On 10/17:Started on acyclovir for possible Zoster rt arm and rt upper chest area. - Hyperkalemia on 10/18 ->corrected - Lipase 2169 10/22. US abd Abnormal appearance of the gallbladder with stones/sludge with wall thickening  
and possible pericholecystic fluid similar to the prior study of 9/27/2018  
- On Acyclovir for shingles , contact precaution per ID commitee 
- 10/22: Consult GI today for possible MRCP 
- 10/22: Consult Podiatry for left big toe gangrene  
- 10/23 : Podiatry recommend RAQUEL for Left Big toe  
- 10/23: GI recommend MRI/MRCP but will need patient off vent for multiple times for procedure. Not sure if this is feasible now DVT prophylaxis:scds Full code. Disposition:tbd - need placement -  involved.

## 2018-10-24 NOTE — PROCEDURES
[facility], [facility] Procedure Note Date of Surgery: 10/19/2018 Hospital: [facility] Surgeon(s):John Renteria MD 
Pre-operative Diagnosis: ESRD with old R IJ HD catheter in need for change. Post-operative Diagnosis: Same as above Procedure(s) Performed:  Non-tunneled dialysis catheter placement with ultrasound guidance Anesthesia:  Local 
Findings:  Catheter flushed and frida well at conclusion of the procedure. Complications: None Estimated Blood Loss:  Minimal 
Tubes and Drains:  None Specimens: * No specimens in log * Procedure in Detail: After informed consent was obtained, the patient was placed supine. A surgical time-out was performed. The patient was identified and the procedure verified. The right leg was prepped with chlorhexidine and draped in a sterile manner. 1% Lidocaine was used to anesthetize the skin. The right femoral vein was punctured under ultrasound guidance with an 18G needle. A J-wire was then placed into the IVC. The skin of the thigh area was incised. Next, the needle was removed over the wire. 2 dilators were passed consecutively over the wire into the IVC. Next, the 20 cm catheter was inserted into the IVC over the wire. The wire was then removed and the pigtail port was closed. The catheter was found to aspirate blood easily. All ports were flushed with saline. The catheter was sutured to the skin with3-0 and nylon. Biopatch was applied to the catheter exit site. The surgical site was covered with a gauze and a Tegaderm. The patient tolerated the procedure well without complications.   
 
 
Cecy Rose MD PA-C

## 2018-10-24 NOTE — PROGRESS NOTES
Received patient on full vent support: ACVC+, rate 10, , PEEP 5, FiO2 . 30. Current vitals: , SpO2 100, RR 17, /57. Size 8.0mm trach tube is patent and secure. Ambu bag/mask at bedside. No resp distress noted. -PSS

## 2018-10-24 NOTE — PROGRESS NOTES
VENTILATOR Care plan 
 
Problem: Ventilator Management Goal: *Adequate oxygenation/ ventilation/ and extubation Patient: 
   
 
Eamon Dave     57 y.o.   male     10/24/2018  10:42 AM 
Patient Active Problem List  
Diagnosis Code  Stroke (cerebrum) ([facility]) I63.9  Stroke ([facility] I63.9  Rhabdomyolysis M62.82  
 Dehydration E86.0  
 Essential hypertension I10  
 Diabetes mellitus type 2, controlled ([facility] E11.9  Non compliance w medication regimen Z91.14  
 DM (diabetes mellitus) ([facility] E11.9  History of stroke Z80.78  
 Acute-on-chronic kidney injury ([facility] N17.9, N18.9  Acute cystitis N30.00  Elevated troponin R74.8  Elevated LFTs R94.5  Cardiac arrest with ventricular fibrillation ([facility]) I46.9, I49.01  
 Acute pancreatitis K85.90  
 Ischemic encephalopathy I67.89  
 Hypoalbuminemia E88.09  
 Shingles B02.9  Gangrene ([facility]) I96  
 Coarse tremors G25.2 Pneumonia of both lower lobes due to infectious organism ([facility] [J18.1] ESRD (end stage renal disease) ([facility] [N18.6] ESRD (end stage renal disease) ([facility] [N18.6] Reason patient intubated: Acute respiratory failure Ventilator day: 26 Ventilator settings: ACVC+, rate 10, VT 50, PEEP 5, FiO2 .30 
 
ETT Size/Placement: 8.0mm Portex trach ABG: 
Date:10/24/2018 No results found for: PH, PHI, PCO2, PCO2I, PO2, PO2I, HCO3, HCO3I, FIO2, FIO2I Chest X-ray: 
Date:10/24/2018 [place] [facility] on 09/26/18 XR CHEST PORT Narrative EXAM: XR CHEST PORT 
 
CLINICAL INDICATION/HISTORY: respiratory failure, s/p tracheostomy 
-Additional: None COMPARISON: 10/17/2018 TECHNIQUE: Frontal view of the chest 
 
_______________ FINDINGS: 
 
HEART AND MEDIASTINUM: Significant rightward rotation. Right IJ catheter 
position unchanged. Tracheostomy noted. Grossly stable cardiomediastinal 
silhouette. LUNGS AND PLEURAL SPACES: Evidence of small right-sided pleural effusion. Improved aeration in the right lung since most recent prior, appearance similar 
to 10/16/2018. No acute opacities on the left. BONY THORAX AND SOFT TISSUES: Unremarkable. 
 
_______________ Impression IMPRESSION: 
 
Improved right pulmonary aeration in the interval. Probable small right-sided 
effusion. Lab Test: 
Date:10/24/2018 WBC:  
Lab Results Component Value Date/Time WBC 14.0 (H) 10/24/2018 04:16 AM  
HGB:  
Lab Results Component Value Date/Time HGB 7.8 (L) 10/24/2018 04:16 AM  
 PLTS:  
Lab Results Component Value Date/Time PLATELET 151 (H) 41/77/7306 04:16 AM  
 
 
SaO2%/flow: @JDXGPMB1(5)@ Vital Signs:    
Patient Vitals for the past 8 hrs: 
 Temp Pulse Resp BP SpO2  
10/24/18 1022  (!) 112 20  99 % 10/24/18 0800 99.6 °F (37.6 °C)      
10/24/18 0735  (!) 114 18  100 % 10/24/18 0700  (!) 115 21 150/60 100 % 10/24/18 0600  (!) 110 21 143/59 100 % 10/24/18 0545  (!) 108 20 188/80 100 % 10/24/18 0500  (!) 105 19 183/79 100 % 10/24/18 0400 98.1 °F (36.7 °C) (!) 106 17 172/71 100 % 10/24/18 0315  (!) 102 19  100 % 10/24/18 0300  100 17 158/66 100 % Wean Screen Pass (Yes or No): Yes Wean Screen Reason for Failure: N/A Duration of Weaning Trial: 1 minute Additional Comments: RR > 35 (37), RSBI > 105 (133) PLAN OF CARE: Continue plan of care, per MD 
  
GOAL: Oxygenation/Ventilation/Maintain airway OUTCOME: Patient remains trached on mechanical ventilation

## 2018-10-24 NOTE — PROGRESS NOTES
RENAL PROGRESS NOTE Jayden Brown Assessment/Plan: · Prolonged dialysis dependant JULIANNA (ischemic atn in a setting of acute pyelonephritis/acute cholecystitis with sepsis and cardiac arrest 9/29). No evidence of recovery of renal function at this time, although some increase in uo noted (continue straight cath every 8 hours). Dialysis tomorrow and continue tts. Will aski for tdc once airborne isolation is . D/c ivf. · S/p cardiopulm arrest 9/29. · Acute pyelonephritis/sepsis. Finished per ID. · Abnormal lft's/acute cholecystitis/pancreatitis. GI on the case. · UGI bleed, EGD results noted- healing esophageal ulceration. · Acute blood loss anemia/anemia of kidney failure. Continue analia. · Asp pneumonia. · Resp failure. S/p peg/trach 10/17, s/p bronch. · Rt shoulder shingles. On acyclovir. · Anoxic brain injury superimposed on recent cva. Subjective: 
Seen on dialysis. Intubated, unresponsive. Eyes are open. Tolerates tf. Patient Active Problem List  
Diagnosis Code  Stroke (cerebrum) ([facility]) I63.9  Stroke ([facility] [address] I63.9  Rhabdomyolysis M62.82  
 Dehydration E86.0  
 Essential hypertension I10  
 Diabetes mellitus type 2, controlled ([facility] E11.9  Non compliance w medication regimen Z91.14  
 DM (diabetes mellitus) ([facility] E11.9  History of stroke Z80.78  
 Acute-on-chronic kidney injury ([facility] N17.9, N18.9  Acute cystitis N30.00  Elevated troponin R74.8  Elevated LFTs R94.5  Cardiac arrest with ventricular fibrillation ([facility]) I46.9, I49.01  
 Acute pancreatitis K85.90  
 Ischemic encephalopathy I67.89  
 Hypoalbuminemia E88.09  
 Shingles B02.9  Gangrene ([facility]) I96  
 Coarse tremors G25.2 Current Facility-Administered Medications Medication Dose Route Frequency Provider Last Rate Last Dose  [START ON 10/25/2018] insulin glargine (LANTUS) injection 18 Units  18 Units SubCUTAneous DAILY Anthony Solorzano MD      
 0.9% sodium chloride infusion  60 mL/hr IntraVENous CONTINUOUS Minerva Dove MD 60 mL/hr at 10/24/18 1102 60 mL/hr at 10/24/18 1102  multivitamin (MULTI-DELYN, WELLESSE) oral liquid 30 mL  30 mL Oral DAILY Minerva Dove MD   30 mL at 10/24/18 0846  
 albuterol (PROVENTIL VENTOLIN) nebulizer solution 2.5 mg  2.5 mg Nebulization BID Tea Vega MD   2.5 mg at 10/24/18 4857  bacitracin 500 unit/gram packet 1 Packet  1 Packet Topical PRN Tea Vega MD      
 sodium chloride (NS) flush 10-30 mL  10-30 mL InterCATHeter PRN Tea Vega MD   30 mL at 10/21/18 0430  
 sodium chloride (NS) flush 10 mL  10 mL InterCATHeter Q24H Tea Vega MD   10 mL at 10/23/18 1750  sodium chloride (NS) flush 10 mL  10 mL InterCATHeter PRN Tea Vega MD   10 mL at 10/20/18 5379  sodium chloride (NS) flush 10-40 mL  10-40 mL InterCATHeter Q8H Jones Ackerman MD   10 mL at 10/24/18 1337  
 sodium chloride (NS) flush 20 mL  20 mL InterCATHeter Q24H Tea Vega MD   20 mL at 10/23/18 1750  heparin (porcine) injection 5,000 Units  5,000 Units SubCUTAneous Q8H Tea Vega MD   5,000 Units at 10/24/18 1228  acyclovir sodium (ZOVIRAX) 330.5 mg in 0.9% sodium chloride 100 mL IVPB  5 mg/kg (Ideal) IntraVENous Q24H Dejan Curtis MD   330.5 mg at 10/23/18 1424  hydroxypropyl methylcellulose (ISOPTO TEARS) 0.5 % ophthalmic solution 2 Drop  2 Drop Both Eyes BID Si Amnada LAL NP   2 Drop at 10/24/18 1102  epoetin manda (EPOGEN;PROCRIT) injection 40,000 Units  40,000 Units IntraVENous Q7D Meena Dangelo MD   40,000 Units at 10/24/18 1226  pantoprazole (PROTONIX) 40 mg in sodium chloride 0.9% 10 mL injection  40 mg IntraVENous Q24H Felix Renee MD   40 mg at 10/24/18 5004  0.9% sodium chloride infusion 250 mL  250 mL IntraVENous PRN Palomo John, DO      
 influenza vaccine 2018-19 (6 mos+)(PF) (FLUARIX QUAD/FLULAVAL QUAD) injection 0.5 mL  0.5 mL IntraMUSCular PRIOR TO DISCHARGE Corrie Tatum MD      
 acetaminophen (TYLENOL) solution 325 mg  325 mg Per NG tube Q4H PRN Corrie Tatum MD   325 mg at 10/18/18 2130  
 0.9% sodium chloride infusion 250 mL  250 mL IntraVENous PRN Palomo Dumonte, DO      
 heparin (porcine) pf 100 Units  100 Units InterCATHeter PRN Jhonny Noyola MD      
 0.9% sodium chloride infusion 250 mL  250 mL IntraVENous PRN Corrie Tatum MD      
 insulin lispro (HUMALOG) injection   SubCUTAneous Q6H Ijeoma Ates H, DO   9 Units at 10/24/18 1226  heparin (porcine) 1,000 unit/mL injection 1,000 Units  1,000 Units InterCATHeter DIALYSIS PRN Jhonny Noyola MD   1,000 Units at 09/28/18 1332  senna-docusate (PERICOLACE) 8.6-50 mg per tablet 1 Tab  1 Tab Oral BID PRN Haily Andrade, DO      
 ondansetron (ZOFRAN) injection 4 mg  4 mg IntraVENous Q4H PRN Alfred Nettles DO   4 mg at 09/28/18 0539  
 glucose chewable tablet 16 g  4 Tab Oral PRN Loree LAL, DO      
 glucagon (GLUCAGEN) injection 1 mg  1 mg IntraMUSCular PRN Alfred Nettles, DO      
 dextrose (D50) infusion 12.5-25 g  25-50 mL IntraVENous PRN Alfred Nettles DO   25 g at 10/09/18 1906  hydrALAZINE (APRESOLINE) 20 mg/mL injection 20 mg  20 mg IntraVENous Q6H PRN Alanis Guevara NP   20 mg at 10/24/18 7399 Objective Vitals:  
 10/24/18 1130 10/24/18 1200 10/24/18 1300 10/24/18 1400 BP: 128/47 139/50 162/57 143/56 Pulse: (!) 109 (!) 107 (!) 106 (!) 106 Resp: 25 24 22 18 Temp: 99.8 °F (37.7 °C) TempSrc:      
SpO2: 99% 99% 99% 99% Weight:      
Height:      
 
 
 
Intake/Output Summary (Last 24 hours) at 10/24/2018 1503 Last data filed at 10/24/2018 1200 Gross per 24 hour Intake 1640 ml Output 325 ml  
 Net 1315 ml Admission weight: Weight: 96.6 kg (213 lb) (09/26/18 2244) Last Weight Metrics: 
Weight Loss Metrics 10/24/2018 9/26/2018 9/13/2018 Today's Wt 194 lb 14.2 oz - 227 lb 15.3 oz  
BMI - 30.52 kg/m2 35.7 kg/m2 Physical Assessment:  
 
General: intubated, unresponsive. Eyes are open. Neck: Trach in place. Neck: No jvd. LUNGS: diminished air entry at the bases. No crackles. CVS EXM: S1, S2  RRR. Abdomen: soft, pos bs. Lower Extremities:  1+ edema. Rt arm with multiple flassid blisters- some with crusting. Rt femoral temporary dialysis catheter. Lab CBC w/Diff Recent Labs 10/24/18 
5342 10/23/18 
3071 10/22/18 
6791 WBC 14.0* 10.8 11.7 RBC 2.75* 2.65* 2.69* HGB 7.8* 7.5* 7.6* HCT 24.9* 23.7* 24.4*  
* 513* 549* Chemistry Recent Labs 10/24/18 
4413 10/23/18 
3982 10/22/18 
5841 * 125* 128*  139 140  
K 4.3 4.9 4.7  101 102 CO2 27 26 27 BUN 48* 71* 57* CREA 4.02* 5.96* 4.95* CA 7.6* 7.6* 7.4* AGAP 12 12 11 BUCR 12 12 12 * 308* 318* TP 7.6 7.0 7.4 ALB 1.7* 1.6* 1.7*  
GLOB 5.9* 5.4* 5.7* AGRAT 0.3* 0.3* 0.3* No results found for: IRON, FE, TIBC, IBCT, PSAT, FERR Lab Results Component Value Date/Time Calcium 7.6 (L) 10/24/2018 04:16 AM  
 Phosphorus 5.2 (H) 10/13/2018 03:41 AM  
  
 
Ananya Lara M.D. [facility] Phone [phone] Pager [phone]

## 2018-10-24 NOTE — PROGRESS NOTES
Problem: Falls - Risk of 
Goal: *Absence of Falls Document Camila Oconnell Fall Risk and appropriate interventions in the flowsheet. Outcome: Progressing Towards Goal 
Fall Risk Interventions: 
Mobility Interventions: Bed/chair exit alarm, Strengthening exercises (ROM-active/passive) Mentation Interventions: Adequate sleep, hydration, pain control, Bed/chair exit alarm, Door open when patient unattended, Evaluate medications/consider consulting pharmacy, Familiar objects from home, More frequent rounding, Reorient patient, Room close to nurse's station, Toileting rounds, Update white board Medication Interventions: Bed/chair exit alarm, Evaluate medications/consider consulting pharmacy Elimination Interventions: Bed/chair exit alarm, Call light in reach History of Falls Interventions: Bed/chair exit alarm, Evaluate medications/consider consulting pharmacy Problem: Pressure Injury - Risk of 
Goal: *Prevention of pressure injury Document Mitul Scale and appropriate interventions in the flowsheet. Outcome: Progressing Towards Goal 
Pressure Injury Interventions: 
Sensory Interventions: Assess changes in LOC Moisture Interventions: Absorbent underpads, Apply protective barrier, creams and emollients, Assess need for specialty bed, Check for incontinence Q2 hours and as needed, Contain wound drainage, Internal/External urinary devices, Maintain skin hydration (lotion/cream), Minimize layers, Moisture barrier, Offer toileting Q_hr Activity Interventions: Pressure redistribution bed/mattress(bed type) Mobility Interventions: Pressure redistribution bed/mattress (bed type) Nutrition Interventions: Document food/fluid/supplement intake Friction and Shear Interventions: Apply protective barrier, creams and emollients, HOB 30 degrees or less

## 2018-10-25 NOTE — PROGRESS NOTES
Physical Exam  
Skin:  
 
  
 
 
Bedside shift change report was given to me, Anna Santana RN  (oncoming night shift nurse), by Loren Jean (offgoing day shift nurse). Report included the following information:  SBAR, kardex, consultations, hemodynamic monitoring, intake/output, MAR, lab results, VS trends, cardiac rhythm noted ST. Skin, neuro, respiratory status, and order verification have been dually noted and verified at the bedside with: Katia Mota RN                                                              
ICU Nurse's Note: 
2000: Pt's eyes are open with ongoing spontaneous eye movements, dysconjugate gaze, right eye deviates outward to the left, no noted tracking or visual focusing, no ability to follow commands or communicate needs. Pt has no notable purposeful movements to his extremities. BUE remain flaccid and requiring support and frequent ROM. This pt requires max assist with all ADLs and repositioning. Pt will occasionally withdraw his BLE upon application of painful stim. He is especially hypersensitive to the left great toe, which remains significantly blackened and discolored, surrounding skin is cool and non-blanchable. Will continue to monitor. Neurological: as noted in assessment Cardiovascular:  ST on the monitor. Pulmonary: breath sounds diminished and coarse bilaterally. Saturations maintained at 100% via 8 mm Portex cuffed tracheostomy, managed via vent  AC w/ Vt 500, PEEP 5, FiO2 30%. Intermittent Inline suctioning of small amounts of yellow thick sputum. Positive cough and gag with suction noted. GI/: Abdomen is obese, soft, non-distended. PEG tube intact and infusing Osmolite 1.2 teodoro at 70 ml/hr, with < 5 ml tan residual noted. Last BM indicated 10/25/2018, soft, mucousy, and malgorzata colored. Pt is a hemodialysis client who is oliguric at baseline, with occasional episodes of urinary incontinence . IVF/Critical gtts: Double lumen PICC, patent, has been capped after flushing. Skin: as noted above 
 
2300:Q 6hr accucheck 197, SSI administered per protocol. Pt was assisted with repositioning. 0000: VSS, pt is afebrile. Pt was straight catheterized at this time of 140 ml braydon colored urine. VS at baseline, pt is afebrile. Interventions are ongoing, will continue to monitor. 0200: Pt was incontinent of another, large, soft and pasty BM that was orange/rust colored. Barrier cream applied after a complete bed bath and linen change. Incontinent of a moderate amount of urine output at this time. Tube feeding continues to infuse, PEG tube care completed. 0400: AM labs drawn from PICC without incident. Pt was bathed, repositioned, and tolerated mouth care. He continues to exhibit a strong, productive cough with inline trache suctioning. Sputum is in small increments, yellow/green and thick. 0545: Sustained BP remained elevated 183/79 and 188/80, after being suctioned and reposiitioned. Pt was given prn Hydralazine with good results. 0600: Q 6hr accucheck 198, 3 units SSI indicated. Pt tolerated scheduled AM meds and appears to be resting comfortably in bed at this time after repositioning, mouth care, and mery care. Interventions are ongoing, will continue to monitor.  
0710: Bedside change of shift report given to: Marian Love RN

## 2018-10-25 NOTE — PROGRESS NOTES
[facility] Pulmonary, Critical Care, and Sleep Medicine Critical Care Progress Note Name: Jersey Kaminski : 1955 MRN: [id number] Date: 10/25/2018 [x] I have reviewed the flowsheet and previous days notes. Events, vitals, medications and notes from last 24 hours reviewed. Care plan discussed on multidisciplinary rounds. My assessment, plan of care, findings, medications, side effects etc were discussed with: 
[x] Nurse [] PT/OT [x] Respiratory therapy [] Dr. Liyah Doss  
[] Family (daughter, niece) [] Patient: answered all questions to satisfaction  
[] Pharmacist [] ASSESSMENT/PLAN:  
Principal Problem: 
  Cardiac arrest with ventricular fibrillation ([facility] (2018) Overview: ROSC before defibrillation could be attempted. Active Problems: 
  Acute pancreatitis (2018) Overview: Lipase downtrending with interruption in tube feed and Mucomyst. Doubt  
    that the elevated lipase is due to excessive lipid in tube feeding. May be  
    secondary to Mucomyst. 
 
  Coarse tremors (10/23/2018) Overview: Tremors vs chills vs seizure vs other. Will monitor. If persistent, will  
    consider 24-hr EEG monitoring. Diabetes mellitus type 2, controlled ([facility] (9/10/2018) Acute-on-chronic kidney injury ([facility] (2018) Ischemic encephalopathy (2018) Hypoalbuminemia (10/21/2018) Shingles (10/21/2018) Elevated LFTs (2018) Essential hypertension (9/10/2018) History of stroke (2018) Overview: With residual R hemiparesis Acute cystitis (2018) Elevated troponin (2018) Gangrene ([facility] (10/22/2018) · Trach aspirate and tracheostomy wound culture for Gram stain and C/S. 
· Urine for C/S. 
· Continue SIMV mode.  This patient is ventilator dependent and cannot at this time tolerate repeated interruptions in mechanical ventilatory support to obtain MRCP images. Certainly unable to comply with breath hold maneuvers. And, demonstrates tachypnea (RR 40s) when placed on PSV alone. · Trachesotomy change tomorrow · Continue tube feeding with MVI · HD per Nephrology. · Straight cath urinary bladder q 12 hr. 
· Monitor lipase. Repeat lipase in am. 
· Continue acyclovir per ID. · Inhaled therapy: albuterol · Renal dosing of medications. NUTRITION: Osmolite 1.2 @ goal. Check prealbumin q week. Consult Clinical Dietician. ICU electrolyte replacement protocol PROPHYLAXIS: 
DVT prophylaxis: heparin GI prophylaxis: Protonix HOB 30-degree elevation Chlorhexidine mouth washes CODE STATUS: FULL CODE Further recommendations will be based on the patient's response to recommended treatment and results of the investigation ordered. DISPOSITION: 
 
The patient is: [x] acutely ill Risk of deterioration: [] moderate [x] critically ill  [x] high  
 
[x]See my orders for details [x] The patient is unable to give any meaningful history or review of systems because the patient is: 
[x] Intubated [] Sedated  
[x] Unresponsive [] [x]The patient is critically ill on     
[x] Mechanical ventilation [x] Vasoactive agents  
[] BiPAP [] Inotropes SUBJECTIVE 
- This 57 y.o.  male is seen in consultation at the request of Dr. Jazmine Marshall for recommendations on further evaluation and management of acute hypoxia. He hospitalized (9/10/2018 - 9/14/2018) for stroke with neurologic residua (R hemiparesis). Patient [facility] from [facility] to [facility] ([facility]) on 9/14/2018, only to return on 9/26/2018 with acute renal failure/abnormal lab values. He experienced VT/VF arrest during my initial clinical assessment. The patient has undergone tracheostomy and percutaneous gastrostomy. He has a R femoral PermCath for HD access. - Overnight events: Remains on mechanical ventilatory support.  Not following commands. Still has shingles lesions are starting to crust and dry up. On acyclovir. Temp up to 100.4 F today. Increase in respiratory secretions and treacheostomy wound secretions today. ~300 mL cloudy urine by straight cath over the past 24 hours. [x] Nutrition: Osmolite 1.2 @ 30 mL/hr 
[x] BM today. ROS: Review of systems not obtained due to patient factors. Past Medical History:  
Diagnosis Date  CHF (congestive heart failure) ([facility]  Diabetes ([facility]  Stroke ([facility] No Known Allergies Current Facility-Administered Medications:  
  insulin glargine (LANTUS) injection 22 Units, 22 Units, SubCUTAneous, DAILY, Anthony Solorzano MD, 22 Units at 10/25/18 0917 
  multivitamin (MULTI-DELYN, WELLESSE) oral liquid 30 mL, 30 mL, Oral, DAILY, Wendy Mitchell MD, 30 mL at 10/25/18 0917 
  albuterol (PROVENTIL VENTOLIN) nebulizer solution 2.5 mg, 2.5 mg, Nebulization, BID, Jones Ackerman MD, 2.5 mg at 10/25/18 0270   bacitracin 500 unit/gram packet 1 Packet, 1 Packet, Topical, PRN, Felisa Ackerman MD 
  sodium chloride (NS) flush 10-30 mL, 10-30 mL, InterCATHeter, PRN, Jones Ackerman MD, 30 mL at 10/21/18 0430 
  sodium chloride (NS) flush 10 mL, 10 mL, InterCATHeter, Q24H, Jones Ackerman MD, 10 mL at 10/24/18 1643   sodium chloride (NS) flush 10 mL, 10 mL, InterCATHeter, PRN, Sid Keller MD, 10 mL at 10/20/18 5294   sodium chloride (NS) flush 10-40 mL, 10-40 mL, InterCATHeter, Q8H, Jones Ackerman MD, 10 mL at 10/25/18 0629 
  sodium chloride (NS) flush 20 mL, 20 mL, InterCATHeter, Q24H, Jones Ackerman MD, 20 mL at 10/24/18 1643   heparin (porcine) injection 5,000 Units, 5,000 Units, SubCUTAneous, Q8H, Felisa Ackerman MD, 5,000 Units at 10/25/18 9251   acyclovir sodium (ZOVIRAX) 330.5 mg in 0.9% sodium chloride 100 mL IVPB, 5 mg/kg (Ideal), IntraVENous, Q24H, Dejan Curtis MD, 330.5 mg at 10/24/18 4777   hydroxypropyl methylcellulose (ISOPTO TEARS) 0.5 % ophthalmic solution 2 Drop, 2 Drop, Both Eyes, BID, Lakesha Gonzales NP, 2 Drop at 10/25/18 2834   epoetin manda (EPOGEN;PROCRIT) injection 40,000 Units, 40,000 Units, IntraVENous, Q7D, Mira Fong MD, 40,000 Units at 10/24/18 1227   pantoprazole (PROTONIX) 40 mg in sodium chloride 0.9% 10 mL injection, 40 mg, IntraVENous, Q24H, Kassi Ruiz MD, 40 mg at 10/25/18 0917 
  0.9% sodium chloride infusion 250 mL, 250 mL, IntraVENous, PRN, Rupinder Rasmussen, DO 
  influenza vaccine 2018-19 (6 mos+)(PF) (FLUARIX QUAD/FLULAVAL QUAD) injection 0.5 mL, 0.5 mL, IntraMUSCular, PRIOR TO DISCHARGE, Leda Lora MD 
  acetaminophen (TYLENOL) solution 325 mg, 325 mg, Per NG tube, Q4H PRN, Leda Lora MD, 325 mg at 10/18/18 2130 
  0.9% sodium chloride infusion 250 mL, 250 mL, IntraVENous, PRN, Harriett LEE, DO 
  heparin (porcine) pf 100 Units, 100 Units, InterCATHeter, PRN, Sandra Estrada MD 
  0.9% sodium chloride infusion 250 mL, 250 mL, IntraVENous, PRN, Leda Lora MD 
  insulin lispro (HUMALOG) injection, , SubCUTAneous, Q6H, Rupinder Rasmussen DO, 3 Units at 10/25/18 0740   heparin (porcine) 1,000 unit/mL injection 1,000 Units, 1,000 Units, InterCATHeter, DIALYSIS PRN, Mira Fong MD, 1,000 Units at 09/28/18 1332   senna-docusate (PERICOLACE) 8.6-50 mg per tablet 1 Tab, 1 Tab, Oral, BID PRN, Alfred Nettles, DO 
  ondansetron (ZOFRAN) injection 4 mg, 4 mg, IntraVENous, Q4H PRN, Alfred Nettles, DO, 4 mg at 09/28/18 0539 
  glucose chewable tablet 16 g, 4 Tab, Oral, PRN, Yoon, Alfred A, DO 
  glucagon (GLUCAGEN) injection 1 mg, 1 mg, IntraMUSCular, PRN, Yoon, Alfred A, DO 
  dextrose (D50) infusion 12.5-25 g, 25-50 mL, IntraVENous, PRN, Yoon, Alfred A, DO, 25 g at 10/09/18 1906   hydrALAZINE (APRESOLINE) 20 mg/mL injection 20 mg, 20 mg, IntraVENous, Q6H PRN, Duane Reno, NP, 20 mg at 10/25/18 0230 OBJECTIVE Vital Signs:   
Patient Vitals for the past 24 hrs: 
 Temp Pulse Resp BP SpO2  
10/25/18 1100  (!) 106 (!) 32 147/57 98 % 10/25/18 1000  (!) 110 30 138/49 98 % 10/25/18 0900  (!) 114 (!) 33 146/63 93 % 10/25/18 0800 100.4 °F (38 °C) (!) 116 (!) 32 155/65 97 % 10/25/18 0740  (!) 115 27  97 % 10/25/18 0700  (!) 115 28 164/74 95 % 10/25/18 0600  (!) 117 26 151/67 97 % 10/25/18 0500  (!) 110 22 144/71 95 % 10/25/18 0400 98.4 °F (36.9 °C) (!) 114 25 122/61 99 % 10/25/18 0309  (!) 115 24  100 % 10/25/18 0300  (!) 114 24 126/59 100 % 10/25/18 0200  (!) 111 25 177/77 100 % 10/25/18 0100  (!) 110 23 173/79 100 % 10/25/18 0000 97 °F (36.1 °C) (!) 108 25 174/80 100 % 10/24/18 2339  (!) 107 24  100 % 10/24/18 2300  (!) 108 23 147/71 100 % 10/24/18 2200  (!) 110 19 176/60 98 % 10/24/18 2100  (!) 110 21 165/59 100 % 10/24/18 2023  (!) 106 (!) 32  100 % 10/24/18 2000 99 °F (37.2 °C) (!) 107 22 163/61 100 % 10/24/18 1900  (!) 112 20 148/60 96 % 10/24/18 1800  (!) 109 22 161/57 98 % 10/24/18 1700  (!) 111 21 155/63 100 % 10/24/18 1624  (!) 111 24  95 % 10/24/18 1600 98.9 °F (37.2 °C) (!) 108 24 177/67 96 % 10/24/18 1500  (!) 106 23 173/65 100 % 10/24/18 1400  (!) 106 18 143/56 99 % 10/24/18 1300  (!) 106 22 162/57 99 % O2 Device: Tracheostomy O2 Flow Rate (L/min): 45 l/min Temp (24hrs), Av.7 °F (37.1 °C), Min:97 °F (36.1 °C), Max:100.4 °F (38 °C) PHYSICAL EXAM:  
General: intubated. Not on sedation. Does not follow commands. Head: atraumatic, normocephalic Eye: pupils are midline today. Spontaneous blinking. Nose: Nares are patent. No exudate. Throat: No oral thrush. No exudate. Mucous membranes are moist. 
Neck: tracheostomy in situ. no thyromegaly, no JVD, no lymphadenopathy. Trachea midline. Creamy off-white secretions around the outer cannula. Lung: Symmetric in development and expansion. Good air entry. Heart: Regular S1, S2 without murmur, rub or gallop. Abdomen: PEG in situ. soft, nontender, no ndistended. Normoactive bowel sounds. No rebound. No guarding. Extremities: no pedal edema, no clubbing, 2+ peripheral pulses in DP. L great toe ischemic changes/dry gangrene. Lymphatic: no cervical/axillary/inguinal lymphadenopathy Neurologic: R facial droop is less obvious today. Withdraws to pain @ B LE. Demonstrating spontaneous eye movement, blinking and R LE movement. Doll's eyes intact. Corneal intact. Cough/gag intact. Spontaneous respirations intact. L LE triple flexion response to plantar dorsiflexion. DTR 1+ @ B biceps and B patellar tendons. Skin: umbilicated bullous lesions involving the R shoulder and R arm are starting to dry up. Buttocks DTI 
DATA:  
 
Intake/Output:  
Last shift:      10/25 0701 - 10/25 1900 In: 340 Out: 0 Last 3 shifts: 10/23 1901 - 10/25 0700 In: 2898 [I.V.:278] Out: 617 [Urine:615] Intake/Output Summary (Last 24 hours) at 10/25/2018 1220 Last data filed at 10/25/2018 1100 Gross per 24 hour Intake 1880 ml Output 292 ml Net 1588 ml Last 3 Recorded Weights in this Encounter 10/23/18 0600 10/24/18 0600 10/25/18 0700 Weight: 86.3 kg (190 lb 4.1 oz) 88.4 kg (194 lb 14.2 oz) 88.7 kg (195 lb 8.8 oz) Lines: All central lines examined by me. No signs of erythema, induration, discharge. Peripherally Inserted Central Catheter: PICC Double Lumen 10/18/18 Left;Brachial (Active) Central Line Being Utilized Yes 10/25/2018 11:36 AM  
Criteria for Appropriate Use Limited/no vessel suitable for conventional peripheral access 10/25/2018 11:36 AM  
Site Assessment Clean, dry, & intact 10/25/2018 11:36 AM  
Phlebitis Assessment 0 10/25/2018 11:36 AM  
Infiltration Assessment 0 10/25/2018 11:36 AM  
Arm Circumference (cm) 38 cm 10/25/2018  4:00 AM  
 Date of Last Dressing Change 10/25/18 10/25/2018  8:00 AM  
Dressing Status Clean, dry, & intact 10/25/2018 11:36 AM  
Action Taken Open ports on tubing capped 10/25/2018 11:36 AM  
External Catheter Length (cm) 0 centimeters 10/25/2018 12:00 AM  
Dressing Type Disk with Chlorhexadine gluconate (CHG) 10/25/2018 11:36 AM  
Hub Color/Line Status Red;Patent 10/25/2018 11:36 AM  
Positive Blood Return (Site #1) Yes 10/25/2018 11:36 AM  
Hub Color/Line Status Purple;Clotted 10/25/2018 11:36 AM  
Positive Blood Return (Site #2) No 10/25/2018 11:36 AM  
Alcohol Cap Used Yes 10/25/2018  8:00 AM  
 
Hemodialysis Catheter: 
Hemodialysis Access 10/19/18 (Active) Central Line Being Utilized Yes 10/25/2018  8:00 AM  
Criteria for Appropriate Use Dialysis/apheresis 10/25/2018  8:00 AM  
Site Assessment Clean, dry, & intact 10/25/2018  8:00 AM  
Date of Last Dressing Change 10/24/18 10/25/2018  8:00 AM  
Dressing Status Clean, dry, & intact 10/25/2018  8:00 AM  
Dressing Type Disk with Chlorhexadine gluconate (CHG) 10/25/2018  8:00 AM  
Proximal Hub Color/Line Status Capped 10/25/2018  8:00 AM  
Distal Hub Color/Line Status Capped 10/25/2018  8:00 AM  
 
Drain(s): PEG/Gastrostomy Tube 10/17/18 (Active) Site Assessment Clean, dry, & intact 10/25/2018  8:00 AM  
Dressing Status Clean, dry, & intact 10/25/2018  8:00 AM  
G Port Status Infusing 10/25/2018  8:00 AM  
External Catheter Length (cm) 3 centimeters 10/24/2018  8:00 PM  
Action Taken Placement verified (comment) 10/25/2018  8:00 AM  
Drainage Description Other (Comment) 10/24/2018  8:00 AM  
Gastric Residual (mL) 5 ml 10/24/2018  8:00 PM  
Tube Feeding/Formula Options Osmolite 1.2 10/25/2018  8:00 AM  
Tube Feeding/Verify Rate (mL/hr) 70 10/25/2018  8:00 AM  
Water Flush Volume (mL) 30 mL 10/25/2018  9:00 AM  
Intake (ml) 70 ml 10/25/2018 11:00 AM  
Medication Volume 30 ml 10/25/2018  9:00 AM  
Drainage Chamber Level (ml) 0 ml 10/23/2018  8:00 PM  
 Output (ml) 0 ml 10/23/2018  8:00 PM  
 
Airway: Airway - Tracheostomy Tube 10/17/18 Portex; Cuffed (Active) Cuff Pressure 30 cmH20 10/25/2018  7:40 AM  
Site Assessment Clean, dry, & intact 10/25/2018  8:00 AM  
Trach Dressing Changed Yes 10/25/2018  8:00 AM  
Trach Cleaned With Normal saline 10/25/2018  8:00 AM  
Trach Tie Changed No 10/25/2018  8:00 AM  
Trach Cannula Changed No 10/25/2018  8:00 AM  
Inner Cannula Cuffed, cuff inflated 10/25/2018  8:00 AM  
Line [employer] of the lock 10/25/2018  8:00 AM  
Side Secured Centered 10/25/2018  8:00 AM  
Spare Trach at Bedside Yes 10/25/2018  8:00 AM  
Spare Trach Tube One Size Smaller at Bedside Yes 10/25/2018  8:00 AM  
Ambu Bag With Mask at Bedside Yes 10/25/2018  8:00 AM  
 
 
Vent Date (intubated 9/29/2018, tracheostomy 10/17/2018) Ventilator Settings: 
Ventilator Mode: SIMV, VC+ Respiratory Rate Resp Rate Observed: 30 Back-Up Rate: 12 Insp Time (sec): 0.9 sec I:E Ratio: 1:4.56 Ventilator Volumes Vt Set (ml): 500 ml Vt Exhaled (Machine Breath) (ml): 502 ml Vt Spont (ml): 422 ml Ve Observed (l/min): 13.4 l/min Ventilator Pressures Pressure Support (cm H2O): 12 cm H2O 
PIP Observed (cm H2O): 25 cm H2O Plateau Pressure (cm H2O): 18 cm H2O 
MAP (cm H2O): 12 PEEP/VENT (cm H2O): 5 cm H20 Auto PEEP Observed (cm H2O): 0 cm H2O Mode Rate Tidal Volume Pressure FiO2 PEEP  
SIMV, VC+   500 ml  12 cm H2O 30 % 5 cm H20 Peak airway pressure: 25 cm H2O Plateau pressure:    
Tidal volume:   
Minute ventilation: 13.4 l/min SPO2   
 
ARDSNet Guidelines: Lung protective ventilation (VT 6 cc/kg IBW) and Pplat <= 30 Telemetry: [x] Sinus [] A-flutter [] Paced [] A-fib [] Multiple PVCs Imaging: 
[x] I have personally reviewed the patients radiographs 
[] Radiographs reviewed with radiologist 
[x] No CXR study available for review today 
[] No change from prior, tubes and lines are in adequate position 
[] Improved   [] Worsening Xr Chest [facility] Result Date: 10/24/2018 IMPRESSION: 1. Interval removal right IJ catheter. No pneumothorax. 2. New left-sided PICC line with tip in satisfactory radiographic position. 3. Findings suggesting vascular congestion. No consolidation. Labs: 
Recent Results (from the past 24 hour(s)) GLUCOSE, POC Collection Time: 10/24/18  6:01 PM  
Result Value Ref Range Glucose (POC) 199 (H) 70 - 110 mg/dL GLUCOSE, POC Collection Time: 10/25/18 12:02 AM  
Result Value Ref Range Glucose (POC) 197 (H) 70 - 110 mg/dL RENAL FUNCTION PANEL Collection Time: 10/25/18  4:00 AM  
Result Value Ref Range Sodium 138 136 - 145 mmol/L Potassium 4.9 3.5 - 5.5 mmol/L Chloride 99 (L) 100 - 108 mmol/L  
 CO2 26 21 - 32 mmol/L Anion gap 13 3.0 - 18 mmol/L Glucose 190 (H) 74 - 99 mg/dL BUN 68 (H) 7.0 - 18 MG/DL Creatinine 5.25 (H) 0.6 - 1.3 MG/DL  
 BUN/Creatinine ratio 13 12 - 20 GFR est AA 13 (L) >60 ml/min/1.73m2 GFR est non-AA 11 (L) >60 ml/min/1.73m2 Calcium 7.3 (L) 8.5 - 10.1 MG/DL Phosphorus 6.7 (H) 2.5 - 4.9 MG/DL Albumin 1.7 (L) 3.4 - 5.0 g/dL LIPASE Collection Time: 10/25/18  4:00 AM  
Result Value Ref Range Lipase 1,803 (H) 73 - 393 U/L  
CBC W/O DIFF Collection Time: 10/25/18  4:00 AM  
Result Value Ref Range WBC 18.3 (H) 4.6 - 13.2 K/uL  
 RBC 2.80 (L) 4.70 - 5.50 M/uL HGB 7.8 (L) 13.0 - 16.0 g/dL HCT 24.9 (L) 36.0 - 48.0 % MCV 88.9 74.0 - 97.0 FL  
 MCH 27.9 24.0 - 34.0 PG  
 MCHC 31.3 31.0 - 37.0 g/dL  
 RDW 15.7 (H) 11.6 - 14.5 % PLATELET 142 (H) 434 - 420 K/uL MPV 8.9 (L) 9.2 - 11.8 FL  
GLUCOSE, POC Collection Time: 10/25/18  6:12 AM  
Result Value Ref Range Glucose (POC) 188 (H) 70 - 110 mg/dL Cultures: All Micro Results Procedure Component Value Units Date/Time CULTURE, Jose Enrique Yuliana STAIN [[id number]] Order Status:  Sent Specimen:  Wound from Tracheostomy site CULTURE, URINE [[id number]] Order Status:  Sent Specimen:  Cath Urine CULTURE, RESPIRATORY/SPUTUM/BRONCH Rowley Drum STAIN [[id number]] Order Status:  Sent Specimen:  Sputum from Tracheal Aspirate CULTURE, CATHETER TIP [[id number]]  (Abnormal)  (Susceptibility) Collected:  10/19/18 1330 Order Status:  Completed Specimen:  Catheter Tip Updated:  10/22/18 2646 Special Requests: NO SPECIAL REQUESTS Culture result:    
  >15 CFU STAPHYLOCOCCUS EPIDERMIDIS  
     
 CULTURE, BLOOD [[id number]] Collected:  10/16/18 1055 Order Status:  Completed Specimen:  Blood Updated:  10/22/18 0809 Special Requests: PERIPHERAL Culture result: NO GROWTH 6 DAYS     
 CULTURE, BLOOD [[id number]] Collected:  10/16/18 1047 Order Status:  Completed Specimen:  Blood Updated:  10/22/18 0809 Special Requests: PERIPHERAL Culture result: NO GROWTH 6 DAYS     
 HSV 1 AND 2 BY PCR [[id number]] Collected:  10/18/18 1815 Order Status:  Completed Specimen:  Other from Miscellaneous sample Updated:  10/21/18 1636 Source OTHER TEST     
  HSV-1 DNA by PCR NEGATIVE      
  HSV-2 DNA by PCR NEGATIVE Comment: (NOTE) This test was developed and its performance characteristics 
determined by [employer]. It has not been cleared or 
approved by the U.S. Food and Drug Administration. The FDA has 
determined that such clearance or approval is not necessary. This 
test is used for clinical purposes. It should not be regarded as 
investigational or research. Performed At: [address] [id number] Kyung Mcgill MD [id number] CULTURE, RESPIRATORY/SPUTUM/BRONCH Evertt Sleight [[id number]] Collected:  10/16/18 1915 Order Status:  Completed Specimen:  Sputum,ET Suction Updated:  10/18/18 1011 Special Requests: NO SPECIAL REQUESTS     
  GRAM STAIN 10-25 WBC/lpf     
   <10 EPI/lpf FEW GRAM POSITIVE COCCI IN PAIRS MODERATE GRAM POSITIVE COCCI IN GROUPS MUCUS PRESENT Culture result: MODERATE NORMAL RESPIRATORY SHANAE  
     
 CULTURE, BLOOD [[id number]] Collected:  10/08/18 1227 Order Status:  Completed Specimen:  Blood Updated:  10/14/18 0741 Special Requests: PERIPHERAL Culture result: NO GROWTH 6 DAYS     
 CULTURE, CATHETER TIP [[id number]] Collected:  10/08/18 1215 Order Status:  Completed Specimen:  Catheter Tip Updated:  10/11/18 1304 Special Requests: NO SPECIAL REQUESTS Culture result: NO GROWTH 3 DAYS     
 CULTURE, BLOOD [[id number]] Collected:  10/04/18 8787 Order Status:  Completed Specimen:  Blood Updated:  10/10/18 8592 Special Requests: PERIPHERAL Culture result: NO GROWTH 6 DAYS     
 CULTURE, BLOOD [[id number]] Collected:  10/04/18 1120 Order Status:  Completed Specimen:  Blood Updated:  10/10/18 9926 Special Requests: PERIPHERAL Culture result: NO GROWTH 6 DAYS     
 CULTURE, BLOOD [[id number]] Collected:  09/27/18 0005 Order Status:  Completed Specimen:  Blood Updated:  10/03/18 0845 Special Requests: NO SPECIAL REQUESTS Culture result: NO GROWTH 6 DAYS     
 CULTURE, BLOOD [[id number]]  (Abnormal)  (Susceptibility) Collected:  09/27/18 0136 Order Status:  Completed Specimen:  Blood Updated:  10/02/18 3797 Special Requests: NO SPECIAL REQUESTS     
  GRAM STAIN PEDIATRIC BOTTLE GRAM POSITIVE COCCI IN PAIRS IN CLUSTERS  
      
  SMEAR CALLED TO AND CORRECTLY REPEATED BY: Dean Simpson RN IN 2700 ON 9/29/18 AT 2033 WF Culture result:    
  AEROBIC BOTTLE STAPHYLOCOCCUS EPIDERMIDIS  
     
 CULTURE, RESPIRATORY/SPUTUM/BRONCH Casey Hedy STAIN [[id number]]  (Abnormal) Collected:  09/29/18 1210 Order Status:  Completed Specimen:  Sputum from Endotracheal aspirate Updated:  10/02/18 7522 Special Requests: NO SPECIAL REQUESTS     
  GRAM STAIN >25 WBC/lpf     
   <10 EPI/lpf MUCUS PRESENT     
   MODERATE YEAST Culture result: MANY CANDIDA TROPICALIS C. DIFFICILE/EPI PCR [[id number]] Collected:  09/29/18 1400 Order Status:  Completed Specimen:  Stool Updated:  09/29/18 2248 Special Requests: NO SPECIAL REQUESTS Culture result: Toxigenic C. difficile NEGATIVE                         C. difficile target DNA sequences are not detected. CULTURE, URINE [[id number]] Collected:  09/27/18 0029 Order Status:  Completed Specimen:  Cath Urine Updated:  09/29/18 0944 Special Requests: NO SPECIAL REQUESTS Culture result: NO GROWTH 2 DAYS During this entire length of time I was immediately available to the patient. The reason for providing this level of medical care for this critically ill patient was due a critical illness that impaired one or more vital organ systems such that there was a high probability of imminent or life threatening deterioration in the patients condition. This care involved high complexity decision making to assess, manipulate, and support vital system functions, to treat this degreee vital organ system failure and to prevent further life threatening deterioration of the patients condition 
 
[] Total critical care time spent on reviewing the case/medical record/data/notes/EMR/patient examination/documentation/coordinating care with nurse/consultants, exclusive of procedures - minutes with complex decision making performed and > 50% time spent in face to face evaluation. Romana Oliva MD  
Board Certified by the [employer] 10/25/2018

## 2018-10-25 NOTE — PROGRESS NOTES
PROGRESS NOTE PATIENT:  Charlie Bhatia MRN: [id number] [facility], 2705/01 
         10/24/2018, 8:23 PM 
   
 
 
SUBJECTIVE: 
No new complaints reported. Tolerating tube feeding at goal rate. Pasty stools. Remains comatose. OBJECTIVE: 
Patient Vitals for the past 24 hrs: 
 Temp Pulse Resp BP SpO2  
10/24/18 1900  (!) 112 20 148/60 96 % 10/24/18 1800  (!) 109 22 161/57 98 % 10/24/18 1700  (!) 111 21 155/63 100 % 10/24/18 1624  (!) 111 24  95 % 10/24/18 1600 98.9 °F (37.2 °C) (!) 108 24 177/67 96 % 10/24/18 1500  (!) 106 23 173/65 100 % 10/24/18 1400  (!) 106 18 143/56 99 % 10/24/18 1300  (!) 106 22 162/57 99 % 10/24/18 1200  (!) 107 24 139/50 99 % 10/24/18 1130 99.8 °F (37.7 °C) (!) 109 25 128/47 99 % 10/24/18 1115  (!) 110 21 153/51 97 % 10/24/18 1112  (!) 110 (!) 31  97 % 10/24/18 1100  (!) 111 24 122/56 99 % 10/24/18 1030  (!) 112 22 128/49 99 % 10/24/18 1022  (!) 112 20  99 % 10/24/18 1000  (!) 111 19 128/52 98 % 10/24/18 0930  (!) 111 19 114/55 98 % 10/24/18 0900  (!) 114 21 129/48 99 % 10/24/18 0830  (!) 115 19 130/49 99 % 10/24/18 0800 99.6 °F (37.6 °C) (!) 115 19 136/59 100 % 10/24/18 0735  (!) 114 18  100 % 10/24/18 0730  (!) 115 20 146/63 100 % 10/24/18 0700  (!) 115 21 150/60 100 % 10/24/18 0600  (!) 110 21 143/59 100 % 10/24/18 0545  (!) 108 20 188/80 100 % 10/24/18 0500  (!) 105 19 183/79 100 % 10/24/18 0400 98.1 °F (36.7 °C) (!) 106 17 172/71 100 % 10/24/18 0315  (!) 102 19  100 % 10/24/18 0300  100 17 158/66 100 % 10/24/18 0200  (!) 104 18 126/68 100 % 10/24/18 0100  (!) 102 19 132/60 99 % 10/24/18 0054  (!) 103 18  100 % 10/24/18 0000 98.9 °F (37.2 °C) (!) 102 17 155/56 100 % 10/23/18 2300  (!) 102 18 134/48 100 % 10/23/18 2200  (!) 105 19 123/47 99 % 10/23/18 2100  (!) 110 17 141/52 97 % Intake/Output Summary (Last 24 hours) at 10/24/2018 2023 Last data filed at 10/24/2018 1800 Gross per 24 hour Intake 1838 ml Output 475 ml Net 1363 ml  
 
 
 
Lungs: Clear B/L  
CVS exam: Regular rate and rhythm Abd  : Soft, non tender, BS +, No masses felt. Labs: Results:  
Chemistry Recent Labs 10/24/18 
9022 10/23/18 
7880 10/22/18 
1023 * 125* 128*  139 140  
K 4.3 4.9 4.7  101 102 CO2 27 26 27 BUN 48* 71* 57* CREA 4.02* 5.96* 4.95* CA 7.6* 7.6* 7.4* AGAP 12 12 11 BUCR 12 12 12 *  340* 308* 318* TP 7.5  7.6 7.0 7.4 ALB 1.8*  1.7* 1.6* 1.7*  
GLOB 5.7*  5.9* 5.4* 5.7* AGRAT 0.3*  0.3* 0.3* 0.3* Estimated Creatinine Clearance: 19.7 mL/min (A) (based on SCr of 4.02 mg/dL (H)). CBC w/Diff Recent Labs 10/24/18 
4055 10/23/18 
4980 10/22/18 
4988 WBC 14.0* 10.8 11.7 RBC 2.75* 2.65* 2.69* HGB 7.8* 7.5* 7.6* HCT 24.9* 23.7* 24.4*  
* 513* 549* Cardiac Enzymes No results for input(s): CPK, CKND1, REJI in the last 72 hours. No lab exists for component: Samuel Divers Coagulation No results for input(s): PTP, INR, APTT in the last 72 hours. No lab exists for component: INREXT Hepatitis Panel Lab Results Component Value Date/Time Hepatitis B surface Ag <0.10 09/28/2018 04:40 AM  
  
Amylase Lipase Liver Enzymes Recent Labs 10/24/18 
3627 10/23/18 
2532 10/22/18 
0456 TP 7.5  7.6 7.0 7.4 ALB 1.8*  1.7* 1.6* 1.7* TBILI 0.5  0.5 0.5 0.6 *  340* 308* 318* SGOT 96*  97* 85* 92* ALT 70*  67* 52 56 Thyroid Studies No results for input(s): T4, T3U, TSH, TSHEXT in the last 72 hours. No lab exists for component: T3RU Pathology pathology No Known Allergies Current Facility-Administered Medications Medication Dose Route Frequency  [START ON 10/25/2018] insulin glargine (LANTUS) injection 22 Units  22 Units SubCUTAneous DAILY  multivitamin (MULTI-DELYN, WELLESSE) oral liquid 30 mL  30 mL Oral DAILY  albuterol (PROVENTIL VENTOLIN) nebulizer solution 2.5 mg  2.5 mg Nebulization BID  
 bacitracin 500 unit/gram packet 1 Packet  1 Packet Topical PRN  
 sodium chloride (NS) flush 10-30 mL  10-30 mL InterCATHeter PRN  
 sodium chloride (NS) flush 10 mL  10 mL InterCATHeter Q24H  
 sodium chloride (NS) flush 10 mL  10 mL InterCATHeter PRN  
 sodium chloride (NS) flush 10-40 mL  10-40 mL InterCATHeter Q8H  
 sodium chloride (NS) flush 20 mL  20 mL InterCATHeter Q24H  
 heparin (porcine) injection 5,000 Units  5,000 Units SubCUTAneous Q8H  
 acyclovir sodium (ZOVIRAX) 330.5 mg in 0.9% sodium chloride 100 mL IVPB  5 mg/kg (Ideal) IntraVENous Q24H  hydroxypropyl methylcellulose (ISOPTO TEARS) 0.5 % ophthalmic solution 2 Drop  2 Drop Both Eyes BID  epoetin manda (EPOGEN;PROCRIT) injection 40,000 Units  40,000 Units IntraVENous Q7D  
 pantoprazole (PROTONIX) 40 mg in sodium chloride 0.9% 10 mL injection  40 mg IntraVENous Q24H  
 0.9% sodium chloride infusion 250 mL  250 mL IntraVENous PRN  
 influenza vaccine 2018-19 (6 mos+)(PF) (FLUARIX QUAD/FLULAVAL QUAD) injection 0.5 mL  0.5 mL IntraMUSCular PRIOR TO DISCHARGE  acetaminophen (TYLENOL) solution 325 mg  325 mg Per NG tube Q4H PRN  
 0.9% sodium chloride infusion 250 mL  250 mL IntraVENous PRN  
 heparin (porcine) pf 100 Units  100 Units InterCATHeter PRN  
 0.9% sodium chloride infusion 250 mL  250 mL IntraVENous PRN  
 insulin lispro (HUMALOG) injection   SubCUTAneous Q6H  
 heparin (porcine) 1,000 unit/mL injection 1,000 Units  1,000 Units InterCATHeter DIALYSIS PRN  
 senna-docusate (PERICOLACE) 8.6-50 mg per tablet 1 Tab  1 Tab Oral BID PRN  
 ondansetron (ZOFRAN) injection 4 mg  4 mg IntraVENous Q4H PRN  
 glucose chewable tablet 16 g  4 Tab Oral PRN  
 glucagon (GLUCAGEN) injection 1 mg  1 mg IntraMUSCular PRN  
  dextrose (D50) infusion 12.5-25 g  25-50 mL IntraVENous PRN  
 hydrALAZINE (APRESOLINE) 20 mg/mL injection 20 mg  20 mg IntraVENous Q6H PRN  
 
 
ASSESSMENT: 
 
 Hyperlipasemia. ELevated LFTs. Cholelithiasis/cholecystitis. Stable from GI standpoint. 
  
Continue tube feeding. MRI/MRCP when able to get it. Dr Rachell Hare covering starting tomorrow.  Please call him if needed. 
  
Sarah Holland MD

## 2018-10-25 NOTE — PROGRESS NOTES
Hospitalist Progress Note Daily Progress Note: 10/25/2018    
Interval history / Subjective:  
Ignacio Rosen is a 57y.o. year old male who presented from [facility] with abnormal labs, elevated BUN/Cr. Found to have JULIANNA, UTI, and markedly elevated LFT on presentation. Patient's recent admission was 9/10/18-9/14/18 for acute CVA and rhabdo. Patient poor historian on admission,stated \"I had heart failure. She was started on vanc,zosyn. On 9/29,patient had cardiac arrest with ventricular fibrillation - s/p ROSC. Her hospital course has since then complicated with sepsis,pancreatitits,pneumonia,acute pancreatitis requiring involvement of gi,surgery,nephrology,cardiology,ID,cardiology. So far patient has remained without major improvement. Now in vegetative state. Patient had PEG/Trach placement on 10/17. On 10/17,patient started on acyclovir for veicular rash rt arm,suspected being Zoster. Lipase 2169. US abd 10/22 shows Abnormal appearance of the gallbladder with stones/sludge with wall thickening  
and possible pericholecystic fluid similar to the prior study of 9/27/2018 
10/22: Patient on treatment for shingles in contact isolation per ID team. He also had left big toe gangrene . We will consult Podiatry. His lipase was elevated , US of the abdomen shows sludge and possible cholecystitis. Patient afebrile. GI consulted  for possible MRCP. Patient has leukocytosis. ID started Vancomycin and ordered Urine , blood and sputum culture and trach site discharge. Patient off air bourne precaution but continue contact precaution per ID Current Facility-Administered Medications Medication Dose Route Frequency  cefepime (MAXIPIME) 1 g in 0.9% sodium chloride (MBP/ADV) 50 mL MBP  1 g IntraVENous Q24H  
 insulin glargine (LANTUS) injection 22 Units  22 Units SubCUTAneous DAILY  multivitamin (MULTI-DELYN, WELLESSE) oral liquid 30 mL  30 mL Oral DAILY  albuterol (PROVENTIL VENTOLIN) nebulizer solution 2.5 mg  2.5 mg Nebulization BID  
 bacitracin 500 unit/gram packet 1 Packet  1 Packet Topical PRN  
 sodium chloride (NS) flush 10-30 mL  10-30 mL InterCATHeter PRN  
 sodium chloride (NS) flush 10 mL  10 mL InterCATHeter Q24H  
 sodium chloride (NS) flush 10 mL  10 mL InterCATHeter PRN  
 sodium chloride (NS) flush 10-40 mL  10-40 mL InterCATHeter Q8H  
 sodium chloride (NS) flush 20 mL  20 mL InterCATHeter Q24H  
 heparin (porcine) injection 5,000 Units  5,000 Units SubCUTAneous Q8H  
 acyclovir sodium (ZOVIRAX) 330.5 mg in 0.9% sodium chloride 100 mL IVPB  5 mg/kg (Ideal) IntraVENous Q24H  hydroxypropyl methylcellulose (ISOPTO TEARS) 0.5 % ophthalmic solution 2 Drop  2 Drop Both Eyes BID  epoetin manda (EPOGEN;PROCRIT) injection 40,000 Units  40,000 Units IntraVENous Q7D  
 pantoprazole (PROTONIX) 40 mg in sodium chloride 0.9% 10 mL injection  40 mg IntraVENous Q24H  
 0.9% sodium chloride infusion 250 mL  250 mL IntraVENous PRN  
 influenza vaccine 2018-19 (6 mos+)(PF) (FLUARIX QUAD/FLULAVAL QUAD) injection 0.5 mL  0.5 mL IntraMUSCular PRIOR TO DISCHARGE  acetaminophen (TYLENOL) solution 325 mg  325 mg Per NG tube Q4H PRN  
 0.9% sodium chloride infusion 250 mL  250 mL IntraVENous PRN  
 heparin (porcine) pf 100 Units  100 Units InterCATHeter PRN  
 0.9% sodium chloride infusion 250 mL  250 mL IntraVENous PRN  
 insulin lispro (HUMALOG) injection   SubCUTAneous Q6H  
 heparin (porcine) 1,000 unit/mL injection 1,000 Units  1,000 Units InterCATHeter DIALYSIS PRN  
 senna-docusate (PERICOLACE) 8.6-50 mg per tablet 1 Tab  1 Tab Oral BID PRN  
 ondansetron (ZOFRAN) injection 4 mg  4 mg IntraVENous Q4H PRN  
 glucose chewable tablet 16 g  4 Tab Oral PRN  
 glucagon (GLUCAGEN) injection 1 mg  1 mg IntraMUSCular PRN  
 dextrose (D50) infusion 12.5-25 g  25-50 mL IntraVENous PRN  
  hydrALAZINE (APRESOLINE) 20 mg/mL injection 20 mg  20 mg IntraVENous Q6H PRN Review of Systems Intubated,unresponsive. Objective:  
 
Visit Vitals /61 Pulse (!) 103 Temp 100.4 °F (38 °C) Resp 29 Ht 5' 7\" (1.702 m) Wt 88.7 kg (195 lb 8.8 oz) SpO2 100% BMI 30.63 kg/m² O2 Flow Rate (L/min): 45 l/min O2 Device: Tracheal collar Temp (24hrs), Av.7 °F (37.1 °C), Min:97 °F (36.1 °C), Max:100.4 °F (38 °C) 
 
 
10/25 0701 - 10/25 1900 In: 340 Out: 0  
10/23 1901 - 10/25 07 In: 2898 [I.V.:278] Out: 617 [Urine:615] P/E General Appearance:S/p PEG/Trach today HENT: normocephalic/atraumatic, + ETT Neck: No JVD, hematoma R side where [facility] is improving Lungs: Coarse B/L w/ vent-assisted BS 
CV: RRR, no m/r/g Abdomen: soft, non-tender, normal bowel sounds Extremities: versicular rash rt arm and right side of chest,no cyanosis, no peripheral edema Neuro: Unresponsive to commands, no withdrawal to pain, + corneal reflex and pupillary reaction today Skin: Normal color, intact Data Review Recent Results (from the past 12 hour(s)) RENAL FUNCTION PANEL Collection Time: 10/25/18  4:00 AM  
Result Value Ref Range Sodium 138 136 - 145 mmol/L Potassium 4.9 3.5 - 5.5 mmol/L Chloride 99 (L) 100 - 108 mmol/L  
 CO2 26 21 - 32 mmol/L Anion gap 13 3.0 - 18 mmol/L Glucose 190 (H) 74 - 99 mg/dL BUN 68 (H) 7.0 - 18 MG/DL Creatinine 5.25 (H) 0.6 - 1.3 MG/DL  
 BUN/Creatinine ratio 13 12 - 20 GFR est AA 13 (L) >60 ml/min/1.73m2 GFR est non-AA 11 (L) >60 ml/min/1.73m2 Calcium 7.3 (L) 8.5 - 10.1 MG/DL Phosphorus 6.7 (H) 2.5 - 4.9 MG/DL Albumin 1.7 (L) 3.4 - 5.0 g/dL LIPASE Collection Time: 10/25/18  4:00 AM  
Result Value Ref Range Lipase 1,803 (H) 73 - 393 U/L  
CBC W/O DIFF Collection Time: 10/25/18  4:00 AM  
Result Value Ref Range WBC 18.3 (H) 4.6 - 13.2 K/uL  
 RBC 2.80 (L) 4.70 - 5.50 M/uL HGB 7.8 (L) 13.0 - 16.0 g/dL HCT 24.9 (L) 36.0 - 48.0 % MCV 88.9 74.0 - 97.0 FL  
 MCH 27.9 24.0 - 34.0 PG  
 MCHC 31.3 31.0 - 37.0 g/dL  
 RDW 15.7 (H) 11.6 - 14.5 % PLATELET 876 (H) 488 - 420 K/uL MPV 8.9 (L) 9.2 - 11.8 FL  
GLUCOSE, POC Collection Time: 10/25/18  6:12 AM  
Result Value Ref Range Glucose (POC) 188 (H) 70 - 110 mg/dL GLUCOSE, POC Collection Time: 10/25/18 12:31 PM  
Result Value Ref Range Glucose (POC) 223 (H) 70 - 110 mg/dL POC G3 Collection Time: 10/25/18  1:32 PM  
Result Value Ref Range Device: VENT    
 FIO2 (POC) 0.30 % pH (POC) 7.451 (H) 7.35 - 7.45    
 pCO2 (POC) 38.2 35.0 - 45.0 MMHG  
 pO2 (POC) 78 (L) 80 - 100 MMHG  
 HCO3 (POC) 26.4 (H) 22 - 26 MMOL/L  
 sO2 (POC) 95 92 - 97 % Base excess (POC) 3 mmol/L Mode SIMV Tidal volume 512 ml Set Rate 12 bpm  
 PEEP/CPAP (POC) 5 cmH2O Pressure support 10 cmH2O Allens test (POC) YES Total resp. rate 31 Site LEFT RADIAL Patient temp. 100.4 Specimen type (POC) ARTERIAL Performed by Kt Macdonald Volume control YES Assessment/Plan:  
 
Principal Problem: 
  [facility] (9/29/2018) Overview: ROSC before defibrillation could be attempted. Active Problems: 
  Essential hypertension (9/10/2018) Diabetes mellitus type 2, controlled ([facility] (9/10/2018) History of stroke (9/27/2018) Overview: With residual R hemiparesis Acute-on-chronic kidney injury ([facility] (9/27/2018) Acute cystitis (9/27/2018) Elevated troponin (9/27/2018) Elevated LFTs (9/28/2018) Acute pancreatitis (9/29/2018) Overview: Lipase downtrending with interruption in tube feed and Mucomyst. Doubt  
    that the elevated lipase is due to excessive lipid in tube feeding. May be  
    secondary to Mucomyst. 
 
  Ischemic encephalopathy (9/30/2018) Hypoalbuminemia (10/21/2018) Shingles (10/21/2018) Gangrene ([facility] (10/22/2018) Coarse tremors (10/23/2018) Overview: Tremors vs chills vs seizure vs other. Will monitor. If persistent, will  
    consider 24-hr EEG monitoring. Zoster. Care Plan - Vent mgmt per ICU - S/p trach/PEG on 10/17 as patient did not show significant improvement. 
- EGD showed large clot in esophagus last week repeat EGD 10/9 shows healed esophageal ulcer - Cont protonix IV every day  
- Hgb stable - LFTs improving - GI signed off 10/9 
- MRI w/ evidence of severe anoxic brain injury - Minimal improvement on neuro exam 
- Appreciate neuro assistance - IV Meropenem and IV Fluconazole d/heron on 10/15 per ID. 
- Currently on vanc only which was started on 9/27 
- Nephro managing HD TTHS 
- Pt unlikely to have any meaningful neuro recovery, prognosis is very grim - Family wanted to cont to do everything, including trach/PEG -> was done10/17 
- On 10/17:Started on acyclovir for possible Zoster rt arm and rt upper chest area. - Hyperkalemia on 10/18 ->corrected - Lipase 2169 10/22. US abd Abnormal appearance of the gallbladder with stones/sludge with wall thickening  
and possible pericholecystic fluid similar to the prior study of 9/27/2018  
- On Acyclovir for shingles , contact precaution per ID commitee 
- 10/22: Consult GI today for possible MRCP 
- 10/22: Consult Podiatry for left big toe gangrene  
- 10/23 : Podiatry recommend RAQUEL for Left Big toe  
- 10/23: GI recommend MRI/MRCP but will need patient off vent for multiple times for procedure. Not sure if this is feasible now 
- 10/25 : Blood , urine , sputum , trach discharge cultures 
- 10/25 IV antibiotics by ID  
- Change GI tube to Jejunostomy tube tomorrow by IR  
 
DVT prophylaxis:scds Full code. Disposition:tbd - need placement -  involved.

## 2018-10-25 NOTE — PROGRESS NOTES
Spoke to patients daughter visiting from [place]. I explained the limited options for discharge given his vent dependency and need for dialysis. I have explained that because of these limited options he will need to transfer to an accepting facility that may not be local. I explained that SBT was attempted and he was unable to maintain respiratory effort without ventilatory support. She enquired, \" What does that mean? \" I explained that he requires full life support that with out the ventilator and his dialysis he would pass. She became tearful. She asked how long he would \". .be this way? \" I explained the medical team is working to improve his dependency but thus far because of his overall medical  condition they have not been successful. I explained It is unclear if he will ever be able to return to his former state of health. I suggested she consider what he would want if this were to be his ongoing reality.  Feliz Reeves RN

## 2018-10-25 NOTE — PROGRESS NOTES
Problem: Patient Education: Go to Patient Education Activity Goal: Patient/Family Education Outcome: Not Progressing Towards Goal 
Pt's condition Problem: Falls - Risk of 
Goal: *Absence of Falls Document Rubi Tang Fall Risk and appropriate interventions in the flowsheet. Outcome: Progressing Towards Goal 
Fall Risk Interventions: 
Mobility Interventions: Bed/chair exit alarm, Strengthening exercises (ROM-active/passive) Mentation Interventions: Bed/chair exit alarm Medication Interventions: Bed/chair exit alarm Elimination Interventions: Bed/chair exit alarm, Toileting schedule/hourly rounds History of Falls Interventions: Bed/chair exit alarm Problem: Pressure Injury - Risk of 
Goal: *Prevention of pressure injury Document Mitul Scale and appropriate interventions in the flowsheet. Outcome: Progressing Towards Goal 
Pressure Injury Interventions: 
Sensory Interventions: Assess changes in LOC, Check visual cues for pain, Float heels, Keep linens dry and wrinkle-free, Minimize linen layers, Monitor skin under medical devices, Pressure redistribution bed/mattress (bed type), Turn and reposition approx. every two hours (pillows and wedges if needed) Moisture Interventions: Apply protective barrier, creams and emollients Activity Interventions: Pressure redistribution bed/mattress(bed type) Mobility Interventions: Float heels, HOB 30 degrees or less, Turn and reposition approx. every two hours(pillow and wedges) Nutrition Interventions: Document food/fluid/supplement intake Friction and Shear Interventions: Apply protective barrier, creams and emollients, HOB 30 degrees or less, Foam dressings/transparent film/skin sealants, Minimize layers

## 2018-10-25 NOTE — PROGRESS NOTES
VENTILATOR Care plan 
 
Problem: Ventilator Management Goal: *Adequate oxygenation/ ventilation/ and extubation Patient: 
   
 
Lilibeth Montana     57 y.o.   male     10/25/2018  7:57 AM 
Patient Active Problem List  
Diagnosis Code  Stroke (cerebrum) ([facility]) I63.9  Stroke ([facility] I63.9  Rhabdomyolysis M62.82  
 Dehydration E86.0  
 Essential hypertension I10  
 Diabetes mellitus type 2, controlled ([facility] E11.9  Non compliance w medication regimen Z91.14  
 DM [facility]diabetes mellitus) ([facility] E11.9  History of stroke Z80.78  
 Acute-on-chronic kidney injury ([facility] N17.9, N18.9  Acute cystitis N30.00  Elevated troponin R74.8  Elevated LFTs R94.5  Cardiac arrest with ventricular fibrillation ([facility]) I46.9, I49.01  
 Acute pancreatitis K85.90  
 Ischemic encephalopathy I67.89  
 Hypoalbuminemia E88.09  
 Shingles B02.9  Gangrene ([facility]) I96  
 Coarse tremors G25.2 Pneumonia of both lower lobes due to infectious organism ([facility] [J18.1] ESRD (end stage renal disease) ([facility] [N18.6] ESRD (end stage renal disease) ([facility] [N18.6] Reason patient intubated: 
 
Ventilator day: 
 
Ventilator settings: ETT Size/Placement: 
  
 
ABG: 
Date:10/25/2018 No results found for: PH, PHI, PCO2, PCO2I, PO2, PO2I, HCO3, HCO3I, FIO2, FIO2I Chest X-ray: 
Date:10/25/2018 Results from [facility] Encounter encounter on 09/26/18 XR CHEST PORT Narrative Portable Chest   
 
History: atelectasis Comparison: Portable chest 10/18/2018 Portable view of the chest demonstrates patient is mildly rotated to the right. Cardiothymic silhouette is unchanged given differences in rotation. Previously 
seen right IJ catheter has been removed. No pneumothorax is seen. Left-sided PICC line is present with tip projecting over SVC. Perihilar indistinct interstitium is present. No focal consolidation is seen. Left apical pleural capping is again noted. No definite pleural effusion is seen. Cardiac monitoring leads are present. Degenerative changes are in the 
spine. Impression IMPRESSION: 
 
1. Interval removal right IJ catheter. No pneumothorax. 2. New left-sided PICC line with tip in satisfactory radiographic position. 3. Findings suggesting vascular congestion. No consolidation. Lab Test: 
Date:10/25/2018 WBC:  
Lab Results Component Value Date/Time WBC 18.3 (H) 10/25/2018 04:00 AM  
HGB:  
Lab Results Component Value Date/Time HGB 7.8 (L) 10/25/2018 04:00 AM  
 PLTS:  
Lab Results Component Value Date/Time PLATELET 143 (H) 94/67/4846 04:00 AM  
 
 
SaO2%/flow: @LASTSAO2(1)@ Vital Signs:    
Patient Vitals for the past 8 hrs: 
 Temp Pulse Resp BP SpO2  
10/25/18 0740  (!) 115 27  97 % 10/25/18 0700  (!) 115 28 164/74 95 % 10/25/18 0600  (!) 117 26 151/67 97 % 10/25/18 0500  (!) 110 22 144/71 95 % 10/25/18 0400 98.4 °F (36.9 °C) (!) 114 25 122/61 99 % 10/25/18 0309  (!) 115 24  100 % 10/25/18 0300  (!) 114 24 126/59 100 % 10/25/18 0200  (!) 111 25 177/77 100 % 10/25/18 0100  (!) 110 23 173/79 100 % 10/25/18 0000 97 °F (36.1 °C) (!) 108 25 174/80 100 % Wean Screen Pass (Yes or No): Wean Screen Reason for Failure: 
Duration of Weaning Trial: 
Additional Comments: PLAN OF CARE: 
  
 
GOAL: 
 
 
OUTCOME: 
0348 SBT started

## 2018-10-25 NOTE — DIALYSIS
ACUTE HEMODIALYSIS FLOW SHEET 
 
HEMODIALYSIS ORDERS: Physician: KATHLEEN Hall MD 
  
Dialyzer: revaclear   Duration: 4 hr  BFR: 300   DFR: 600 Dialysate:  Temp 37 K+   2    Ca+  2.5 Na 140 Bicarb 35 Weight:  88.7 kg    Patient Chart [x]     Unable to Obtain []   Dry weight/UF Goal: 3000 ml Access Right femoral catheter Needle Gauge NA Heparin []  Bolus      Units    [] Hourly       Units    []None Catheter locking solution Heparin Pre BP:   176/64    Pulse:     104     Temperature:   100  Respirations: 26  Tx: NS   250    ml/Bolus  Other        [] N/A Labs: Pre        Post:        [] N/A Additional Orders(medications, blood products, hypotension management):       [x] N/A  
 
[] [facility] Consent Verified CATHETER ACCESS: []N/A   [x]Right   []Left   []IJ     [x]Fem [] First use X-ray verified     []Tunnel                [] Non Tunneled [x]No S/S infection  []Redness  []Drainage []Cultured []Swelling []Pain  
[x]Medical Aseptic Prep Utilized   []Dressing Changed  [] Biopatch  Date:      
[]Clotted   []Patent   Flows: [x]Good  []Poor  []Reversed If access problem,  notified: []Yes    []N/A  Date:        
 
GRAFT/FISTULA ACCESS:  [x]N/A     []Right     []Left     []UE     []LE []AVG   []AVF        []Buttonhole    []Medical Aseptic Prep Utilized []No S/S infection  []Redness  []Drainage []Cultured []Swelling []Pain Bruit:   [] Strong    [] Weak       Thrill :   [] Strong    [] Weak Needle Gauge: NA   Length: NA If access problem,  notified: []Yes     [x]N/A  Date:       
Please describe access if present and not used:  
 
 
GENERAL ASSESSMENT:   
LUNGS:  Rate 31 SaO2%        [] N/A    [] Clear  [] Coarse  [] Crackles  [] Wheezing 
      [x] Diminished     Location : []RLL   []LLL    []RUL  []JOSSIE Cough: []Productive  []Dry  [x]N/A   Respirations:  []Easy  []Labored Therapy:  []RA  []NC  l/min    Mask: []NRB []Venti       O2% []Ventilator  []Intubated  [x] Trach  [] BiPaP CARDIAC: [x]Regular      [] Irregular   [] Pericardial Rub  [] JVD []  Monitored  [] Bedside  [] Remotely monitored [] N/A  Rhythm: EDEMA: [] None  [x]Generalized  [] Pitting [] 1    [] 2    [] 3    [] 4 [] Facial  [] Pedal  []  UE  [] LE  
SKIN:   [] Warm  [] Hot     [] Cold   [x] Dry  (Dry, crusted lesions on his right arm)   [] Pale   [] Diaphoretic    
             [] Flushed  [] Jaundiced  [] Cyanotic  [] Rash  [] Weeping LOC:    [] Alert      []Oriented:    [] Person     [] Place  []Time 
             [] Confused  [] Lethargic  [] Medicated  [x] Non-responsive GI / ABDOMEN:  [] Flat    [] Distended    [x] Soft    [] Firm   []  Obese 
                           [] Diarrhea  [] Bowel Sounds  [] Nausea  [] Vomiting  / URINE ASSESSMENT:[] Voiding   [] Oliguria  [] Anuria   []  Mcbride [] Incontinent    []  Incontinent Brief      []  Bathroom Privileges PAIN: [] 0 []1  []2   []3   []4   []5   []6   []7   []8   []9   []10 Scale 0-10  Action/Follow Up: MOBILITY:  [] Amb    [] Amb/Assist    [x] Bed    [] Wheelchair  [] Stretcher All Vitals and Treatment Details on Attached Flowsheet Hospital:     [facility] Room # 9313 [] 1st Time Acute  [] Stat  [x] Routine  [] Urgent    
[] Acute Room  []  Bedside  [x] ICU/CCU  [] ER Isolation Precautions:  [x] Dialysis   [] Airborne   [] Contact    [] Reverse Special Considerations:         [] Blood Consent Verified []N/A ALLERGIES:   [x] NKA Code Status:  [x] Full Code  [] DNR  [] Other HBsAg ONLY: Date Drawn 9/28/2018         [x]Negative []Positive []Unknown HBsAb: Date 9/28/2018    [x] Susceptible   [] [id number] []Not Drawn  [] Drawn Current Labs:    Date of Labs: 10/25/2018          Today [x] Cut and paste current labs here.     Results for Nuha Guzmán (MRN [id number]) as of 10/25/2018 16:18 
 Ref. Range 10/25/2018 04:00 10/25/2018 06:12 10/25/2018 12:31 10/25/2018 13:25 10/25/2018 13:28 10/25/2018 13:28 10/25/2018 13:32 10/25/2018 13:59 GLUCOSE,FAST - POC Latest Ref Range: 70 - 110 mg/dL  188 (H) 223 (H) WBC Latest Ref Range: 4.6 - 13.2 K/uL 18.3 (H) RBC Latest Ref Range: 4.70 - 5.50 M/uL 2.80 (L) HGB Latest Ref Range: 13.0 - 16.0 g/dL 7.8 (L) HCT Latest Ref Range: 36.0 - 48.0 % 24.9 (L) MCV Latest Ref Range: 74.0 - 97.0 FL 88.9 MCH Latest Ref Range: 24.0 - 34.0 PG 27.9 MCHC Latest Ref Range: 31.0 - 37.0 g/dL 31.3 RDW Latest Ref Range: 11.6 - 14.5 % 15.7 (H) PLATELET Latest Ref Range: 135 - 420 K/uL 558 (H) MPV Latest Ref Range: 9.2 - 11.8 FL 8.9 (L) Color Latest Units:          DARK YELLOW Appearance Latest Units:          CLOUDY Specific gravity Latest Ref Range: 1.005 - 1.030          1.020  
pH (UA) Latest Ref Range: 5.0 - 8.0          6.0 Protein Latest Ref Range: NEG mg/dL        300 (A) Glucose Latest Ref Range: NEG mg/dL        100 (A) Ketone Latest Ref Range: NEG mg/dL        NEGATIVE Blood Latest Ref Range: NEG          LARGE (A) Bilirubin Latest Ref Range: NEG          NEGATIVE Urobilinogen Latest Ref Range: 0.2 - 1.0 EU/dL        0.2 Nitrites Latest Ref Range: NEG          NEGATIVE Leukocyte Esterase Latest Ref Range: NEG          MODERATE (A) Epithelial cells Latest Ref Range: 0 - 5 /lpf        FEW  
WBC Latest Ref Range: 0 - 4 /hpf        36 to 50 RBC Latest Ref Range: 0 - 5 /hpf        TOO NUMEROUS TO C... Bacteria Latest Ref Range: NEG /hpf        2+ (A) Sodium Latest Ref Range: 136 - 145 mmol/L 138 Potassium Latest Ref Range: 3.5 - 5.5 mmol/L 4.9 Chloride Latest Ref Range: 100 - 108 mmol/L 99 (L)         
CO2 Latest Ref Range: 21 - 32 mmol/L 26 Anion gap Latest Ref Range: 3.0 - 18 mmol/L 13         
 Glucose Latest Ref Range: 74 - 99 mg/dL 190 (H) BUN Latest Ref Range: 7.0 - 18 MG/DL 68 (H) Creatinine Latest Ref Range: 0.6 - 1.3 MG/DL 5.25 (H) BUN/Creatinine ratio Latest Ref Range: 12 - 20   13 Calcium Latest Ref Range: 8.5 - 10.1 MG/DL 7.3 (L) Phosphorus Latest Ref Range: 2.5 - 4.9 MG/DL 6.7 (H) GFR est non-AA Latest Ref Range: >60 ml/min/1.73m2 11 (L)         
GFR est AA Latest Ref Range: >60 ml/min/1.73m2 13 (L) Albumin Latest Ref Range: 3.4 - 5.0 g/dL 1.7 (L) Lipase Latest Ref Range: 73 - 393 U/L 1,803 (H) CULTURE, BLOOD Unknown     Rpt Rpt CULTURE, RESPIRATORY/SPUTUM/BRONCH W GRAM STAIN Unknown    Rpt CULTURE, URINE Unknown        Rpt CULTURE, WOUND W GRAM STAIN Unknown        Rpt  
pH (POC) Latest Ref Range: 7.35 - 7.45         7.451 (H)   
pCO2 (POC) Latest Ref Range: 35.0 - 45.0 MMHG       38.2 pO2 (POC) Latest Ref Range: 80 - 100 MMHG       78 (L) HCO3 (POC) Latest Ref Range: 22 - 26 MMOL/L       26.4 (H)   
sO2 (POC) Latest Ref Range: 92 - 97 %       95 Base excess (POC) Latest Units: mmol/L       3   
FIO2 (POC) Latest Units: %       0.30 Patient temp. Latest Units:         100.4 Specimen type (POC) Latest Units:         ARTERIAL Set Rate Latest Units: bpm       12 Site Latest Units:         LEFT RADIAL Device: Latest Units:         VENT Total resp. rate Latest Units:         31 Mode Latest Units:         SIMV Tidal volume Latest Units: ml       512 Volume control Latest Units:         YES   
PEEP/CPAP (POC) Latest Units: cmH2O       5 Pressure support Latest Units: cmH2O       10 Allens test (POC) Latest Units:         YES   
  
                                                                         
DIET:  [] Renal    [] Other     [x] NPO (tube feeding)    []  Diabetic PRIMARY NURSE REPORT: First initial/Last name/Title Pre Dialysis: Ivone Pacheco RN    Time: 0591 EDUCATION:   
[x] Patient [] Other         Knowledge Basis: [x]None []Minimal [] Substantial  
Barriers to learning  []N/A  
[] Access Care     [] S&S of infection     [] Fluid Management     []K+     [x]Procedural   
[]Albumin     [] Medications     [] Tx Options     [] Transplant     [] Diet     [] Other Teaching Tools:  [x] Explain  [] Demo  [] Handouts [] Video Patient response:   [] Verbalized understanding  [] Teach back  [] Return demonstration [] Requires follow up Inappropriate due to     Patient is not responsive. [x] Time Out/Safety Check  []Extracorporeal Circuit Tested for integrity RO/HEMODIALYSIS MACHINE SAFETY CHECKS  Before each treatment:    
Machine Number:                   [facility] 
                                [] Unit Machine #  with centralized RO 
                                [] Portable Machine #1/RO serial # [id number] [] Portable Machine #2/RO serial # [id number] [] Portable Machine #4/RO serial # [id number] [facility] [x] Portable Machine #11/RO serial # [id number] [] Portable Machine #12/RO serial # [id number] [] Portable Machine #13/RO serial #  [id number] Alarm Test:  Pass time 0481         Other:        
[] RO/Machine Log Complete Temp    37 Dialysate: pH  7.4 Conductivity: Meter   14     HD Machine   14.2                  TCD: 13.9 Dialyzer Lot # [id number]            Blood Tubing Lot # [id number]          Saline Lot #  [id number]  
 
CHLORINE TESTING-Before each treatment and every 4 hours Total Chlorine: [x] less than 0.1 ppm  Time: 2373 4 Hr/2nd Check Time: 9956 (if greater than 0.1 ppm from Primary then every 30 minutes from Secondary) TREATMENT INITIATION  with Dialysis Precautions:  
[x] All Connections Secured                 [x] Saline Line Double Clamped  
[x] Venous Parameters Set                  [x] Arterial Parameters Set [x] Prime Given 250 ml                          [x]Air Foam Detector Engaged Treatment Initiation Note:Patient was stable to do dialysis. He is contact isolation with several crusted over lesions on his right forearm. During Treatment Notes:  Patient was repositioned at 1900 by two nurses. Medication Dose Volume Route Initials Dialyzer Cleared: [] Good [] Fair  [] Poor Blood processed:  56.4 L 
UF Removed  2990 Ml Post Wt: NA    kg 
POst BP:   135/59       Pulse: 110      Respirations: 28  Temperature:  
  
                              Post Tx Vascular Access:   N/A Catheter: Locking solution: Heparin 1:1000 Art. 1.4 ml  Shree. 1.4 ml Post Assessment:  
  
                              Skin:  [x] Warm  [x] Dry [] Diaphoretic    [] Flushed  [] Pale [] Cyanotic [facility] Signatures Title Initials  Time Lungs: [] Clear    [] Course  [] Crackles  [] Wheezing [x] Diminished Francisco J South RN   Cardiac: [x] Regular   [] Irregular   [] Monitor  [] N/A  Rhythm:      
    Edema:  [] None    [x] General     [] Facial   [] Pedal    [] UE    [] LE  
    Pain: [x]0  []1  []2   []3  []4   []5   []6   []7   []8   []9   []10 Post Treatment Note: 
 
 Patient tolerated treatment well. His right femoral catheter was locked with Heparin. POST TREATMENT PRIMARY NURSE HANDOFF REPORT:  
 
First initial/Last name/Title Post Dialysis: Cherie Camacho Time:  2020 Abbreviations: AVG-arterial venous graft, AVF-arterial venous fistula, IJ-Internal Jugular, Subcl-Subclavian, Fem-Femoral, Tx-treatment, AP/HR-apical heart rate, DFR-dialysate flow rate, BFR-blood flow rate, AP-arterial pressure, -venous pressure, UF-ultrafiltrate, TMP-transmembrane pressure, Shree-Venous, Art-Arterial, RO-Reverse Osmosis

## 2018-10-25 NOTE — PROGRESS NOTES
Problem: Falls - Risk of 
Goal: *Absence of Falls Document Valentina Cordova Fall Risk and appropriate interventions in the flowsheet. Outcome: Progressing Towards Goal 
Fall Risk Interventions: 
Mobility Interventions: Bed/chair exit alarm Mentation Interventions: Bed/chair exit alarm Medication Interventions: Bed/chair exit alarm Elimination Interventions: Bed/chair exit alarm, Toileting schedule/hourly rounds History of Falls Interventions: Bed/chair exit alarm Problem: Patient Education: Go to Patient Education Activity Goal: Patient/Family Education Outcome: Not Met Pt has no appropriate cognitive ability to participate in education needs, and there isn't any family member available at the bedside at this time Problem: Pressure Injury - Risk of 
Goal: *Prevention of pressure injury Document Mitul Scale and appropriate interventions in the flowsheet. Outcome: Progressing Towards Goal 
Pressure Injury Interventions: 
Sensory Interventions: Assess changes in LOC, Float heels, Keep linens dry and wrinkle-free, Minimize linen layers, Monitor skin under medical devices, Pressure redistribution bed/mattress (bed type), Turn and reposition approx. every two hours (pillows and wedges if needed) Moisture Interventions: Absorbent underpads Activity Interventions: Pressure redistribution bed/mattress(bed type) Mobility Interventions: Pressure redistribution bed/mattress (bed type) Nutrition Interventions: Discuss nutritional consult with provider Friction and Shear Interventions: Apply protective barrier, creams and emollients, Foam dressings/transparent film/skin sealants, HOB 30 degrees or less, Transferring/repositioning devices

## 2018-10-25 NOTE — PROGRESS NOTES
Physical Exam  
Skin:  
 
  
 
Nurses Notes: 
0745 Assessment done. Remains unresponsive. Opens eyes spontaneously. Febrile. Temp 100.4 ST on the monitor. Lungs sound diminished. On vent via trach. Abdomen soft; non tender. Peg intact. On tube feeding, tolerating . Oliguric. Skin: as above. 1240 Pt is incontinent of loose stool. Incontinent care; mery care done. Fecal management system inserted. Linens and bed pad changed. 1235 Infectious disease Doctor Felecia Vera 
at bedside. MD discussed CDC Guideline for preventing transmission of Varicella - Zoster in health care setting. MD recommended contact isolation even though lesions are localized and not disseminated  because lesions are in the exposed area of the arm and not  completely covered. MD stated to follow infection control precaution here. 1300 Tried to contact  the ID Nurse  But she is not in her office. 1330 Dr. Tania Renee  and Dr. Joaquim Juarez made aware. Both MD wants  pt in contact precaution. 1525 Family at bedside visiting Pt had blood culture; sputum culture; urine culture done. 1600 Dialysis started by Dialysis nurse. 1800 No changes on pt's condition. VSS 
 
 
1930 Bedside and Verbal shift change report given to Madhu Hollins (oncoming nurse) by Trinidad Bañuelos RN 
 (offgoing nurse). Report included the following information SBAR, Kardex, Recent Results, Cardiac Rhythm SR-ST and Alarm Parameters .

## 2018-10-25 NOTE — PROGRESS NOTES
Infectious Disease Follow-up Admit Date: 9/26/2018 Current abx Prior abx ACV 10/17 - 8  Vanco/cefepime 10/25-0  Zosyn 9/27-7, Meropenem/flucon 10/4- 11, vanco 9/27-25 Assessment ->Rec: NEW SIRS T 100.4 10/25 wbc 18k 
 - more secretions, reported cloudy urine, cxr yest no pneumonia  
-line tip culture 10/19 >15 CFU CoNS now with L picc, dialysis catheter   
 ->monitor new cultures- urine sputum   
->also check bctx's x 2  
->will start abx w/vanco/cefepime as T and wbc climbing past 2 days -- this is not from shingles NEW suspect PAD PVL's s/o significant arterial disease of bilateral lower extremities 10/24. -POD Dr. Kaitlin Woo saw 10/24,  indicated not OR candidate foot for gangrene distal L gt toe Shingles R arm, C 6 (?C5) dermatome 
- vesicular lesions onset ~10/15 progressing compared to onset per NP 
- confluent in upper arm, not crusted yet 
-VZV PCR pos 10/18 -> cont Acyclovir renal dosing (5 mg/kg q 24h) x 10 days (2 more) at least 
->CDC IC guidance d/w nursing and would cover lesions  but defer hospital infection control re: precautions here Leukocytosis/SIRS poss sepsis - MOF multiple ID and non-infectious concerns outlined below, complicated, wbc 41.5L today from high 27K+ on 10/5 On  vancomycin/zosyn 9/27 
- C diff neg 9/29, sput C albicans, repeat CT 10/4 no new findings --cxr reviewed - increased atx rt base. Left base improved 
- Higher fever 10/16 101.2, 10/18 101.4 afebrile since 
- Ddx: HZV vs deep tissue injury of toe/buttock vs Line infection 
- blcx 10/16 NG x 2 
- TDC Tip 10/19 >15 CFU MRSE (1 of 2 blcx on adm 9/27 MRSE but afebrile then prob contaminant) -> cont on Acyclovir as above Suspected Pyelo POA  
- CTAP 9/27:  B perineph stranding, UA tntc wbc, uctx ng 
- treated at this point Cholelithiasis choledocholithiais suspected acute cholecystitis  poss cystic stone POA- abnl lft bili 4 alk phos 400 seen by GI and GS  Piyush Cortes, no plan for OR, for poss cholecystostomy if LFT worsen, bili, alk phos declining - AST fluctuating downward  -Dr. Jefferson Osler saw for GI 10/24 Elevated Lipase  
- lipase 2200 POA -> 3191 ->  5500 on 10/4 improved to 1207 10/19 but up again to 2169 10/22 interpretation complicated by JULIANNA -> fluctuating between [phone] (off mucomyst, TF) 
- CTAP 10/4: Normal pancreas -> per ICU team  
B infiltrates - poss edema infiltrate - RLL consolid better 10/4 cxr and suspect endobronchial clot contributed   ->monitor MRSE bacteremia 9/27 1 of 2. Has LUE midline unclear when placed Treated JULIANNA POA dialysis dependent 
- baseline cr 0.9 132/10.1 in ER 
-RIJ uldall 9/28 out 10/19 - R fem uldall 10/20 -> per renal  
Bleeding -melena earlier, EGD 10/2 clot in esophagus bronch 10/3 R endobronch clot NEW removed 10/5 repeat bronch  -> per others Suspected anoxic brain injury on MRI 10/3 SP VF arrest 9/29 -> overall poor prognosis but family wishes aggressive care CVA R hemiparesis 9/10   
resp failure sp intubation 9/29 Comorbid: HTN HLD CHF h/o steatohepatitis MICROBIOLOGY:  
9/27 bctx 1 of 2 MRSE 
 uctx ng 
9/29 sput C albicans C diff neg 10/4 bctx x 2 NTD 
 fungitell indeterminate due to contamination 10/8 Cath tip cx ntd 10/8     bl cx ng 
10/16 Blcx x2 ng 
 Spcx NF 
10/19 Cath tip >15 CFU MRSE 
 
LINES AND CATHETERS:  
 
Left PICC 10/18 Active Hospital Problems Diagnosis Date Noted  Coarse tremors 10/23/2018 Tremors vs chills vs seizure vs other. Will monitor. If persistent, will consider 24-hr EEG monitoring.  Gangrene ([address] 10/22/2018  Hypoalbuminemia 10/21/2018  Shingles 10/21/2018  Ischemic encephalopathy 09/30/2018  Cardiac arrest with ventricular fibrillation ([facility] [address] 09/29/2018 ROSC before defibrillation could be attempted.  Acute pancreatitis 09/29/2018   Lipase downtrending with interruption in tube feed and Mucomyst. Doubt that the elevated lipase is due to excessive lipid in tube feeding. May be secondary to Mucomyst. 
  
 Elevated LFTs 09/28/2018  History of stroke 09/27/2018 With residual R hemiparesis  Acute-on-chronic kidney injury ([facility] [address] 09/27/2018  Acute cystitis 09/27/2018  Elevated troponin 09/27/2018  Essential hypertension 09/10/2018  Diabetes mellitus type 2, controlled ([facility] [address] 09/10/2018 Subjective: Interval notes reviewed, d/w nurse WBC higher again today and T also up. PCCM notes increased pul secretions and cloudy urine, being cultured. Last line tip pos for CONS and has 2 lines so also repeat bctx's. Fever not from shingles will start vanco/cefepime empiric coverage pending more info. RUE lesions not crusted d/w nursing rec to cover lesions until crusted, defer hospital infection control if other measures here. No response voice or exam, lying in ICU no family at bedside. Current Facility-Administered Medications Medication Dose Route Frequency  insulin glargine (LANTUS) injection 22 Units  22 Units SubCUTAneous DAILY  multivitamin (MULTI-DELYN, WELLESSE) oral liquid 30 mL  30 mL Oral DAILY  albuterol (PROVENTIL VENTOLIN) nebulizer solution 2.5 mg  2.5 mg Nebulization BID  
 bacitracin 500 unit/gram packet 1 Packet  1 Packet Topical PRN  
 sodium chloride (NS) flush 10-30 mL  10-30 mL InterCATHeter PRN  
 sodium chloride (NS) flush 10 mL  10 mL InterCATHeter Q24H  
 sodium chloride (NS) flush 10 mL  10 mL InterCATHeter PRN  
 sodium chloride (NS) flush 10-40 mL  10-40 mL InterCATHeter Q8H  
 sodium chloride (NS) flush 20 mL  20 mL InterCATHeter Q24H  
 heparin (porcine) injection 5,000 Units  5,000 Units SubCUTAneous Q8H  
 acyclovir sodium (ZOVIRAX) 330.5 mg in 0.9% sodium chloride 100 mL IVPB  5 mg/kg (Ideal) IntraVENous Q24H  hydroxypropyl methylcellulose (ISOPTO TEARS) 0.5 % ophthalmic solution 2 Drop  2 Drop Both Eyes BID  
  epoetin manda (EPOGEN;PROCRIT) injection 40,000 Units  40,000 Units IntraVENous Q7D  
 pantoprazole (PROTONIX) 40 mg in sodium chloride 0.9% 10 mL injection  40 mg IntraVENous Q24H  
 0.9% sodium chloride infusion 250 mL  250 mL IntraVENous PRN  
 influenza vaccine - (6 mos+)(PF) (FLUARIX QUAD/FLULAVAL QUAD) injection 0.5 mL  0.5 mL IntraMUSCular PRIOR TO DISCHARGE  acetaminophen (TYLENOL) solution 325 mg  325 mg Per NG tube Q4H PRN  
 0.9% sodium chloride infusion 250 mL  250 mL IntraVENous PRN  
 heparin (porcine) pf 100 Units  100 Units InterCATHeter PRN  
 0.9% sodium chloride infusion 250 mL  250 mL IntraVENous PRN  
 insulin lispro (HUMALOG) injection   SubCUTAneous Q6H  
 heparin (porcine) 1,000 unit/mL injection 1,000 Units  1,000 Units InterCATHeter DIALYSIS PRN  
 senna-docusate (PERICOLACE) 8.6-50 mg per tablet 1 Tab  1 Tab Oral BID PRN  
 ondansetron (ZOFRAN) injection 4 mg  4 mg IntraVENous Q4H PRN  
 glucose chewable tablet 16 g  4 Tab Oral PRN  
 glucagon (GLUCAGEN) injection 1 mg  1 mg IntraMUSCular PRN  
 dextrose (D50) infusion 12.5-25 g  25-50 mL IntraVENous PRN  
 hydrALAZINE (APRESOLINE) 20 mg/mL injection 20 mg  20 mg IntraVENous Q6H PRN Objective:  
 
Visit Vitals /61 Pulse (!) 103 Temp 100.4 °F (38 °C) Resp 29 Ht 5' 7\" (1.702 m) Wt 88.7 kg (195 lb 8.8 oz) SpO2 99% BMI 30.63 kg/m² Temp (24hrs), Av.7 °F (37.1 °C), Min:97 °F (36.1 °C), Max:100.4 °F (38 °C) GEN: well developed 57year-old AA male on vent/trach in ICU, unresponsive HENT: no thrush Neck: tracheostomy in place CHEST: coarse bs B no wheeze or rhonchi CVS: RRR, no murmur appreciated ABD: soft, + BS no localized tenderness, PEG in place EXT: 1+ pitting edema b/l LE  
SKIN:  RUE hemorrhagic blisters not all dry or covered. left great toe gangrenous change. CNS:eyes open, unresponsive to commands or deep sternal rub Labs: Results:  
Chemistry Recent Labs 10/25/18 
0400 10/24/18 
0416 10/23/18 
0435 * 177* 125*  140 139  
K 4.9 4.3 4.9 CL 99* 101 101 CO2 26 27 26 BUN 68* 48* 71* CREA 5.25* 4.02* 5.96* CA 7.3* 7.6* 7.6* AGAP 13 12 12 BUCR 13 12 12 AP  --  339*  340* 308* TP  --  7.5  7.6 7.0 ALB 1.7* 1.8*  1.7* 1.6*  
GLOB  --  5.7*  5.9* 5.4* AGRAT  --  0.3*  0.3* 0.3* CBC w/Diff Recent Labs 10/25/18 
0400 10/24/18 
0416 10/23/18 
0435 WBC 18.3* 14.0* 10.8 RBC 2.80* 2.75* 2.65* HGB 7.8* 7.8* 7.5* HCT 24.9* 24.9* 23.7*  
* 581* 513*  
  
10/24 cxr IMPRESSION: 
  
1. Interval removal right IJ catheter. No pneumothorax. 
  
2. New left-sided PICC line with tip in satisfactory radiographic position. 
  
3. Findings suggesting vascular congestion. No consolidation. 10/4 CTAP IMPRESSION: 
1. Dense subtotal or total consolidation right lower lobe compatible with 
pneumonia similar to prior study. Small bilateral pleural effusions similar in 
size. 2. Distended gallbladder with gallstones and gallbladder sludge without 
significant change in appearance and also described in detail on ultrasound of 
9/27/2018. 3. Indeterminate small lesion left hepatic lobe without change. Hepatomegaly 
again evident. 4. No intraperitoneal fluid. Possible small left upper pole renal cyst. 
Cardiomegaly. Nasogastric tube tip in body of stomach. cxr 10/5 Impression: 1. Endotracheal tube, visualized nasogastric tube, and right IJ central venous 
catheter in stable position. 2. Overall improved aeration of the right lower lobe, likely improved 
atelectasis with mild residual atelectasis/airspace disease. 3. Mild interstitial edema overall similar to prior. Microbiology Results All Micro Results Procedure Component Value Units Date/Time CULTURE, Everton Orn STAIN [[id number]] Order Status:  Sent Specimen:  Wound from Tracheostomy site CULTURE, URINE [[id number]] Order Status:  Sent Specimen:  Cath Urine CULTURE, RESPIRATORY/SPUTUM/BRONCH Brown Songster STAIN [[id number]] Order Status:  Sent Specimen:  Sputum from Tracheal Aspirate CULTURE, CATHETER TIP [[id number]]  (Abnormal)  (Susceptibility) Collected:  10/19/18 1330 Order Status:  Completed Specimen:  Catheter Tip Updated:  10/22/18 9283 Special Requests: NO SPECIAL REQUESTS Culture result:    
  >15 CFU STAPHYLOCOCCUS EPIDERMIDIS  
     
 CULTURE, BLOOD [[id number]] Collected:  10/16/18 1055 Order Status:  Completed Specimen:  Blood Updated:  10/22/18 0809 Special Requests: PERIPHERAL Culture result: NO GROWTH 6 DAYS     
 CULTURE, BLOOD [[id number]] Collected:  10/16/18 1047 Order Status:  Completed Specimen:  Blood Updated:  10/22/18 0809 Special Requests: PERIPHERAL Culture result: NO GROWTH 6 DAYS     
 HSV 1 AND 2 BY PCR [[id number]] Collected:  10/18/18 1815 Order Status:  Completed Specimen:  Other from Miscellaneous sample Updated:  10/21/18 1636 Source OTHER TEST     
  HSV-1 DNA by PCR NEGATIVE      
  HSV-2 DNA by PCR NEGATIVE Comment: (NOTE) This test was developed and its performance characteristics 
determined by [facility] ([facility]). It has not been cleared or 
approved by the U.S. Food and Drug Administration. The FDA has 
determined that such clearance or approval is not necessary. This 
test is used for clinical purposes. It should not be regarded as 
investigational or research. Performed At: [address] [id number] Natasha Granado MD EM:[id number] CULTURE, RESPIRATORY/SPUTUM/BRONCH Audie Ruiz [[id number]] Collected:  10/16/18 1915 Order Status:  Completed Specimen:  Sputum,ET Suction Updated:  10/18/18 1011 Special Requests: NO SPECIAL REQUESTS     
  GRAM STAIN 10-25 WBC/lpf     
   <10 EPI/lpf FEW GRAM POSITIVE COCCI IN PAIRS MODERATE GRAM POSITIVE COCCI IN GROUPS MUCUS PRESENT Culture result: MODERATE NORMAL RESPIRATORY SHANAE  
     
 CULTURE, BLOOD [[id number]] Collected:  10/08/18 1227 Order Status:  Completed Specimen:  Blood Updated:  10/14/18 0741 Special Requests: PERIPHERAL Culture result: NO GROWTH 6 DAYS     
 CULTURE, CATHETER TIP [[id number]] Collected:  10/08/18 1215 Order Status:  Completed Specimen:  Catheter Tip Updated:  10/11/18 7146 Special Requests: NO SPECIAL REQUESTS Culture result: NO GROWTH 3 DAYS     
 CULTURE, BLOOD [[id number]] Collected:  10/04/18 3153 Order Status:  Completed Specimen:  Blood Updated:  10/10/18 3575 Special Requests: PERIPHERAL Culture result: NO GROWTH 6 DAYS     
 CULTURE, BLOOD [[id number]] Collected:  10/04/18 1120 Order Status:  Completed Specimen:  Blood Updated:  10/10/18 9454 Special Requests: PERIPHERAL Culture result: NO GROWTH 6 DAYS     
 CULTURE, BLOOD [[id number]] Collected:  09/27/18 0005 Order Status:  Completed Specimen:  Blood Updated:  10/03/18 0845 Special Requests: NO SPECIAL REQUESTS Culture result: NO GROWTH 6 DAYS     
 CULTURE, BLOOD [[id number]]  (Abnormal)  (Susceptibility) Collected:  09/27/18 0136 Order Status:  Completed Specimen:  Blood Updated:  10/02/18 1060 Special Requests: NO SPECIAL REQUESTS     
  GRAM STAIN PEDIATRIC BOTTLE GRAM POSITIVE COCCI IN PAIRS IN CLUSTERS  
      
  SMEAR CALLED TO AND CORRECTLY REPEATED BY: Irena Richards RN IN 2700 ON 9/29/18 AT 2033 WF Culture result:    
  AEROBIC BOTTLE STAPHYLOCOCCUS EPIDERMIDIS  
     
 CULTURE, RESPIRATORY/SPUTUM/BRONCH Marceil Found STAIN [[id number]]  (Abnormal) Collected:  09/29/18 1210 Order Status:  Completed Specimen:  Sputum from Endotracheal aspirate Updated:  10/02/18 3776 Special Requests: NO SPECIAL REQUESTS     
  GRAM STAIN >25 WBC/lpf     
   <10 EPI/lpf    MUCUS PRESENT     
 MODERATE YEAST Culture result: MANY CANDIDA TROPICALIS C. DIFFICILE/EPI PCR [[id number]] Collected:  09/29/18 1400 Order Status:  Completed Specimen:  Stool Updated:  09/29/18 2248 Special Requests: NO SPECIAL REQUESTS Culture result: Toxigenic C. difficile NEGATIVE                         C. difficile target DNA sequences are not detected. CULTURE, URINE [[id number]] Collected:  09/27/18 0029 Order Status:  Completed Specimen:  Cath Urine Updated:  09/29/18 0944 Special Requests: NO SPECIAL REQUESTS Culture result: NO GROWTH 2 DAYS Maria De Jesus Her MD, FACP October 25, 2018 [facility] [phone]

## 2018-10-25 NOTE — PROGRESS NOTES
Pharmacy Dosing Services: Vancomycin Indication: BSI Day of therapy: 1 (restarted - on vancomycin from  - 10/21) Other Antimicrobials (Include dose, start day & day of therapy): Acyclovir 5mg/kg IV daily for 7 days (10/17 - 10/27) Loading dose (date given): reloaded with 2000 on 10/25 Current Maintenance dose: None at this time Goal Vancomycin Level: 25-30 pre-dialysis (Trough 15-20 for most infections, 20 for meningitis/osteomyelitis, pre-HD level ~25) Vancomycin Level (if drawn):  
10/16 - 20 (pre-HD level) 10/20 - 25.2 (pre-HD level) Significant Cultures: MRSE from blood Renal function stable? (unstable defined as SCr increase of 0.5 mg/dL or > 50% increase from baseline, whichever is greater) (Y/N): NO  
 
CAPD, Hemodialysis or Renal Replacement Therapy (Y/N): YES Recent Labs 10/25/18 
0400 10/24/18 
0416 10/23/18 
0435 CREA 5.25* 4.02* 5.96* BUN 68* 48* 71* WBC 18.3* 14.0* 10.8 Temp (24hrs), Av.8 °F (37.1 °C), Min:97 °F (36.1 °C), Max:100.4 °F (38 °C) Creatinine Clearance (Creatinine Clearance (ml/min)): HD patient Regimen assessment: - ID restarted vancomycin - will resume same dose as before as it yielded trough goal 
 
Maintenance dose: 1000 mg IV after HD on -Sat Next scheduled level: 10/30 at 0400 Pharmacy will follow daily and adjust medications as appropriate for renal function and/or serum levels.  
 
Thank you, 
Paul Muñiz, PHARMD

## 2018-10-25 NOTE — PROGRESS NOTES
RENAL PROGRESS NOTE Jayden Brown Assessment/Plan: · Prolonged dialysis dependant JULIANNA (ischemic atn in a setting of acute pyelonephritis/acute cholecystitis with sepsis and cardiac arrest 9/29). No evidence of recovery of renal function at this time, although some increase in uo noted (continue straight cath every 8 hours). Dialysis today and continue tts. Will ask vascular for  tdc since  airborne isolation was . · S/p cardiopulm arrest 9/29. · Acute pyelonephritis/sepsis. Finished per ID. · Abnormal lft's/acute cholecystitis/pancreatitis. GI on the case. · UGI bleed, EGD results noted- healing esophageal ulceration. · Acute blood loss anemia/anemia of kidney failure. Continue analia. · Asp pneumonia. · Resp failure. S/p peg/trach 10/17, s/p bronch. · Rt shoulder shingles. On acyclovir. · Anoxic brain injury superimposed on recent cva. Subjective: 
Seen on dialysis. Intubated, unresponsive. Eyes are open. Tolerates tf. Patient Active Problem List  
Diagnosis Code  Stroke (cerebrum) ([facility]) I63.9  Stroke ([facility] [address] I63.9  Rhabdomyolysis M62.82  
 Dehydration E86.0  
 Essential hypertension I10  
 Diabetes mellitus type 2, controlled ([facility] [address] E11.9  Non compliance w medication regimen Z91.14  
 DM (diabetes mellitus) ([facility] [address] E11.9  History of stroke Z80.78  
 Acute-on-chronic kidney injury ([facility] [address] N17.9, N18.9  Acute cystitis N30.00  Elevated troponin R74.8  Elevated LFTs R94.5  Cardiac arrest with ventricular fibrillation ([facility]) I46.9, I49.01  
 Acute pancreatitis K85.90  
 Ischemic encephalopathy I67.89  
 Hypoalbuminemia E88.09  
 Shingles B02.9  Gangrene ([facility]) I96  
 Coarse tremors G25.2 Current Facility-Administered Medications Medication Dose Route Frequency Provider Last Rate Last Dose  insulin glargine (LANTUS) injection 22 Units  22 Units SubCUTAneous DAILY Anthony Solorzano MD   22 Units at 10/25/18 0917  
 multivitamin (MULTI-DELYN, WELLESSE) oral liquid 30 mL  30 mL Oral DAILY Iona Zamora MD   30 mL at 10/25/18 0917  
 albuterol (PROVENTIL VENTOLIN) nebulizer solution 2.5 mg  2.5 mg Nebulization BID Kelsea Higuera MD   2.5 mg at 10/25/18 4109  bacitracin 500 unit/gram packet 1 Packet  1 Packet Topical PRN Kelsea Higuera MD      
 sodium chloride (NS) flush 10-30 mL  10-30 mL InterCATHeter PRN Kelsea Higuera MD   30 mL at 10/21/18 0430  
 sodium chloride (NS) flush 10 mL  10 mL InterCATHeter Q24H Kelsea Higuera MD   10 mL at 10/24/18 1643  sodium chloride (NS) flush 10 mL  10 mL InterCATHeter PRN Kelsea Higuera MD   10 mL at 10/20/18 1537  sodium chloride (NS) flush 10-40 mL  10-40 mL InterCATHeter Q8H Jones Ackerman MD   10 mL at 10/25/18 0629  
 sodium chloride (NS) flush 20 mL  20 mL InterCATHeter Q24H Kelsea Higuera MD   20 mL at 10/24/18 1643  heparin (porcine) injection 5,000 Units  5,000 Units SubCUTAneous Q8H Kelsea Higuera MD   5,000 Units at 10/25/18 1336  acyclovir sodium (ZOVIRAX) 330.5 mg in 0.9% sodium chloride 100 mL IVPB  5 mg/kg (Ideal) IntraVENous Q24H Dejan Curtis MD   330.5 mg at 10/24/18 1551  hydroxypropyl methylcellulose (ISOPTO TEARS) 0.5 % ophthalmic solution 2 Drop  2 Drop Both Eyes BID Orville LAL NP   2 Drop at 10/25/18 0456  epoetin manda (EPOGEN;PROCRIT) injection 40,000 Units  40,000 Units IntraVENous Q7D [address] MD   40,000 Units at 10/24/18 1227  pantoprazole (PROTONIX) 40 mg in sodium chloride 0.9% 10 mL injection  40 mg IntraVENous Q24H Lay Villa MD   40 mg at 10/25/18 0917  
 0.9% sodium chloride infusion 250 mL  250 mL IntraVENous PRN Raymundo Richmond DO      
 influenza vaccine 2018-19 (6 mos+)(PF) (FLUARIX QUAD/FLULAVAL QUAD) injection 0.5 mL  0.5 mL IntraMUSCular PRIOR TO DISCHARGE Tamanna Vann MD      
 acetaminophen (TYLENOL) solution 325 mg  325 mg Per NG tube Q4H PRN Tamanna Vann MD   325 mg at 10/18/18 2130  
 0.9% sodium chloride infusion 250 mL  250 mL IntraVENous PRN Alana Love DO      
 heparin (porcine) pf 100 Units  100 Units InterCATHeter PRN Bonny Alas MD      
 0.9% sodium chloride infusion 250 mL  250 mL IntraVENous PRN Tamanna Vann MD      
 insulin lispro (HUMALOG) injection   SubCUTAneous Q6H Alana Love DO   3 Units at 10/25/18 5617  heparin (porcine) 1,000 unit/mL injection 1,000 Units  1,000 Units InterCATHeter DIALYSIS PRN Bonny Alas MD   1,000 Units at 09/28/18 1332  senna-docusate (PERICOLACE) 8.6-50 mg per tablet 1 Tab  1 Tab Oral BID PRN Pansmichelle Chaidezs, DO      
 ondansetron (ZOFRAN) injection 4 mg  4 mg IntraVENous Q4H PRN Alfred Nettles DO   4 mg at 09/28/18 0539  
 glucose chewable tablet 16 g  4 Tab Oral PRN Kait Joie A, DO      
 glucagon (GLUCAGEN) injection 1 mg  1 mg IntraMUSCular PRN Alfred Nettles, DO      
 dextrose (D50) infusion 12.5-25 g  25-50 mL IntraVENous PRN Alfred Nettles, DO   25 g at 10/09/18 1906  hydrALAZINE (APRESOLINE) 20 mg/mL injection 20 mg  20 mg IntraVENous Q6H PRN Chelly Prim, NP   20 mg at 10/25/18 0230 Objective Vitals:  
 10/25/18 0740 10/25/18 0800 10/25/18 0900 10/25/18 1000 BP:  155/65 146/63 138/49 Pulse: (!) 115 (!) 116 (!) 114 (!) 110 Resp: 27 (!) 32 (!) 33 30 Temp:  100.4 °F (38 °C) TempSrc:      
SpO2: 97% 97% 93% 98% Weight:      
Height:      
 
 
 
Intake/Output Summary (Last 24 hours) at 10/25/2018 1023 Last data filed at 10/25/2018 1000 Gross per 24 hour Intake 1808 ml Output 292 ml Net 1516 ml Admission weight: Weight: 96.6 kg (213 lb) (09/26/18 2244) Last Weight Metrics: 
Weight Loss Metrics 10/25/2018 9/26/2018 9/13/2018 Today's Wt 195 lb 8.8 oz - 227 lb 15.3 oz  
BMI - 30.63 kg/m2 35.7 kg/m2 Physical Assessment:  
 
General: intubated, unresponsive. Eyes are open. Neck: Trach in place. Neck: No jvd. LUNGS: diminished air entry at the bases. No crackles. CVS EXM: S1, S2  RRR. Abdomen: soft, pos bs. Lower Extremities:  1+ edema. Rt arm with multiple flassid blisters- some with crusting. Rt femoral temporary dialysis catheter. Lab CBC w/Diff Recent Labs 10/25/18 
0400 10/24/18 
0416 10/23/18 
0435 WBC 18.3* 14.0* 10.8 RBC 2.80* 2.75* 2.65* HGB 7.8* 7.8* 7.5* HCT 24.9* 24.9* 23.7*  
* 581* 513* Chemistry Recent Labs 10/25/18 
0400 10/24/18 
0416 10/23/18 
0435 * 177* 125*  140 139  
K 4.9 4.3 4.9 CL 99* 101 101 CO2 26 27 26 BUN 68* 48* 71* CREA 5.25* 4.02* 5.96* CA 7.3* 7.6* 7.6* AGAP 13 12 12 BUCR 13 12 12 AP  --  339*  340* 308* TP  --  7.5  7.6 7.0 ALB 1.7* 1.8*  1.7* 1.6*  
GLOB  --  5.7*  5.9* 5.4* AGRAT  --  0.3*  0.3* 0.3* PHOS 6.7*  --   -- No results found for: IRON, FE, TIBC, IBCT, PSAT, FERR Lab Results Component Value Date/Time Calcium 7.3 (L) 10/25/2018 04:00 AM  
 Phosphorus 6.7 (H) 10/25/2018 04:00 AM  
  
 
Meño Carr M.D. [facility] Phone [phone] Pager [phone]

## 2018-10-26 NOTE — PROGRESS NOTES
Problem: Falls - Risk of 
Goal: *Absence of Falls Document Kiara Royal Fall Risk and appropriate interventions in the flowsheet. Outcome: Progressing Towards Goal 
Fall Risk Interventions: 
Mobility Interventions: Bed/chair exit alarm, Strengthening exercises (ROM-active/passive) Mentation Interventions: Bed/chair exit alarm Medication Interventions: Bed/chair exit alarm Elimination Interventions: Bed/chair exit alarm, Toileting schedule/hourly rounds History of Falls Interventions: Bed/chair exit alarm Problem: Patient Education: Go to Patient Education Activity Goal: Patient/Family Education Outcome: Not Met 
D/t cognitive decline, pt is unable to actively participate in plan of care. Family is not present at the bedside at this time Problem: Pressure Injury - Risk of 
Goal: *Prevention of pressure injury Document Mitul Scale and appropriate interventions in the flowsheet. Outcome: Progressing Towards Goal 
Pressure Injury Interventions: 
Sensory Interventions: Assess changes in LOC, Check visual cues for pain, Float heels, Keep linens dry and wrinkle-free, Minimize linen layers, Monitor skin under medical devices, Pressure redistribution bed/mattress (bed type), Turn and reposition approx. every two hours (pillows and wedges if needed) Moisture Interventions: Apply protective barrier, creams and emollients Activity Interventions: Pressure redistribution bed/mattress(bed type) Mobility Interventions: Float heels, HOB 30 degrees or less, Turn and reposition approx. every two hours(pillow and wedges) Nutrition Interventions: Document food/fluid/supplement intake Friction and Shear Interventions: Apply protective barrier, creams and emollients, HOB 30 degrees or less, Foam dressings/transparent film/skin sealants, Minimize layers

## 2018-10-26 NOTE — DIABETES MGMT
NUTRITIONAL RE-ASSESSMENT GLYCEMIC CONTROL/ PLAN OF CARE Cait Solorzano           57 y.o.           9/26/2018 1. Acute renal failure, unspecified acute renal failure type ([address] 2. Acute UTI 3. Cholelithiasis with choledocholithiasis 4. History of CVA (cerebrovascular accident) 5. ESRD (end stage renal disease) ([facility] [address] DM INTERVENTIONS/PLAN:  
 Pt for jejeunostomy feeding tube placement today. When TF resumed pt's requirements can be met by Osmolite 1.2 calorie TF at goal rate of 70ml/hour. Tube feeding will provide 2016 calories,  93 g protein/d with 1.3 liters free water/d from TF. Formula provides 29% of calories as lipids (66g /24 hrs or 0.75g/kg) Pt is receiving liquid multivitamin/mineral  for wounds. ASSESSMENT:  
Nutritional Status: 
Pt is overweight related to excess caloric intake as evidenced by 135% ideal weight and BMI= 29.8kg/m2. Pt meets criteria for obesity. Altered nutrition-related lab values RT DX. Diabetes Mellitus  related to lack of adherence to nutrition and medication recommendations as evidenced by A1c of 10% Altered nutrition-related lab values RT DX. Acute renal failure aeb requiring dialysis. Altered nutrition-related lab values lipase 1181 improved from 1803. Pt w/hypoalbuminemia as evidenced by albumin=1.6 mg/dl and prealbumin 17.9 (decreased but had two extended periods of TF on hold or <half goal rate due to intolerance) Pt with DT injury on heels, multiple blisters on arms and stage 2  area on sacrum noted. Diabetes Management: Lantus 15 units daily Recent blood glucose:    
 
Lab Results Component Value Date/Time  (H) 10/26/2018 03:55 AM  
 GLUCPOC 129 (H) 10/26/2018 05:09 AM  
 GLUCPOC 219 (H) 10/26/2018 12:17 AM  
 GLUCPOC 193 (H) 10/25/2018 05:49 PM  
 
Within target range (non-ICU: <140; ICU<180): [] Yes   [x]  No 
 
Current Insulin regimen: 15 units Lantus/24 hrs plus VIR corrective lispro Home medication/insulin regimen:  
Key Antihyperglycemic Medications   
    
  
 insulin glargine (LANTUS) 100 unit/mL injection 10 units qhs  Indications: type 2 diabetes mellitus  
 insulin lispro (HUMALOG) 100 unit/mL injection 4 units with meals HbA1c: 10% equivalent  to ave Blood Glucose of 240mg/dl for 2-3 months prior to admission Adequate glycemic control PTA:  [] Yes  [x] No 
  
SUBJECTIVE/OBJECTIVE: Information obtained from: ICU rounds/pt is on a vent Diet:  Osmolite1.2 calorie TF  On hold Medications: [x]                Reviewed NS IV fluids added at 60ml/hr Labs:  
Lab Results Component Value Date/Time Hemoglobin A1c 10.0 (H) 09/30/2018 04:18 AM  
 
Lab Results Component Value Date/Time Sodium 139 10/26/2018 03:55 AM  
 Potassium 4.5 10/26/2018 03:55 AM  
 Chloride 100 10/26/2018 03:55 AM  
 CO2 27 10/26/2018 03:55 AM  
 Anion gap 12 10/26/2018 03:55 AM  
 Glucose 135 (H) 10/26/2018 03:55 AM  
 BUN 39 (H) 10/26/2018 03:55 AM  
 Creatinine 3.32 (H) 10/26/2018 03:55 AM  
 Calcium 7.7 (L) 10/26/2018 03:55 AM  
 Magnesium 2.1 10/26/2018 03:55 AM  
 Phosphorus 4.5 10/26/2018 03:55 AM  
 Albumin 1.6 (L) 10/26/2018 03:55 AM  
prealbumin 17.9 Lipase was 2169, then 1586, now 1723 Anthropometrics: IBW : 69.9 kg (154 lb), % IBW (Calculated): 134.85 %, BMI (calculated): 29.8 Wt Readings from Last 1 Encounters:  
10/26/18 86.4 kg (190 lb 7.6 oz) Ht Readings from Last 1 Encounters:  
10/22/18 5' 7\" (1.702 m) Estimated Nutrition Needs:  1880 Kcals/day, Protein (g): 85 g Fluid (ml): 1800 ml Based on:   [x]          Actual BW    []          ABW   []            Adjusted BW   
Nutrition Diagnoses: as stated above Nutrition Interventions: TF 
Goal:  
Blood glucose will be within target range of  mg/dL by 10/29 Intake will meet >75% of energy and protein requirements by 10/31. Gradual weight loss post discharge 1 lb per week by 11/5/18. Nutrition Monitoring and Evaluation []     Monitor po intake on meal rounds 
[x]     Continue inpatient monitoring and intervention 
[]     Other: 
 
 
Nutrition Risk:  [x]   High     []  Moderate    []  Minimal/Uncompromised Roberto Lopez RD 
[facility] [phone]

## 2018-10-26 NOTE — PROGRESS NOTES
Patient has been matched to the entire [facility] to [facility] facilities with dialysis and vent capabilities. At this time he has not been accepted to any facility with his specific medical requirements. Sabi Osborne RN.

## 2018-10-26 NOTE — PROCEDURES
[facility] Name: Memo Ying MRN: [id number] : 1955 Hospital: [address] [place] [address] Date: 10/26/2018 Tracheostomy Change Procedure Note PROCEDURE: Tracheostomy Change INDICATION: s/p trachestomy; ventilator dependence; chronic respiratory failure ANESTHESIA: NEBULIZED 2% LIDOCAINE 2.5 mL in 2.5 mL saline (5 mL total volume) DESCRIPTION OF PROCEDURE: 
· A new Portex 8 Blue Line Ultra(R) Cuffed tracheostomy tube was available at the bedside. · Obturator in lumen was removed to allow replacement over a COOK(R) Tracheostomy Exchange Catheter. · Inner cannula available. · Distal portion of the tracheostomy tube was lubricated. · Suctioning of oral cavity, airway care and suctioning via tracheostomy was done. · Cuff deflated. · Vent circuit disconnected. · COOK(R) Tracheostomy Exchange Catheter was placed. · Tracheostomy tube was removed along natural curvature of stoma over the exchange catheter. · Stoma was cleaned with 4x4 gauze. · A new Portex 8 was inserted over the Tracheostomy Exchange Catheter. · The Trachoestomy Exchange Catheter was removed. · Audible air flow. · Inner cannula was inserted and clicked into place. · Reconnected ventilator circuit. · Cuff inflated. · Trach tie applied. · The patient tolerated the procedure well without complications. · Estimated blood loss 0 mL. Laly Tejada MD 
Board Certified by the Kristin CARREON 2:16 PM

## 2018-10-26 NOTE — PROGRESS NOTES
[facility] Pulmonary, Critical Care, and Sleep Medicine Critical Care Progress Note Name: Reynaldo Aguilar : 1955 MRN: [id number] Date: 10/26/2018 [x] I have reviewed the flowsheet and previous days notes. Events, vitals, medications and notes from last 24 hours reviewed. Care plan discussed on multidisciplinary rounds. My assessment, plan of care, findings, medications, side effects etc were discussed with: 
[x] Nurse [] PT/OT [x] Respiratory therapy [] Dr. Adrian Meyer  
[] Family (daughter, niece) [] Patient: answered all questions to satisfaction  
[] Pharmacist [] ASSESSMENT/PLAN:  
Principal Problem: 
  Cardiac arrest with ventricular fibrillation ([facility] (2018) Overview: ROSC before defibrillation could be attempted. Active Problems: 
  Acute pancreatitis (2018) Overview: Lipase downtrending with interruption in tube feed and Mucomyst. Doubt  
    that the elevated lipase is due to excessive lipid in tube feeding. May be  
    secondary to Mucomyst. 
 
  Coarse tremors (10/23/2018) Overview: Tremors vs chills vs seizure vs other. Will monitor. If persistent, will  
    consider 24-hr EEG monitoring. Diabetes mellitus type 2, controlled ([facility] (9/10/2018) Acute-on-chronic kidney injury ([facility] (2018) Ischemic encephalopathy (2018) Hypoalbuminemia (10/21/2018) Shingles (10/21/2018) Elevated LFTs (2018) Essential hypertension (9/10/2018) History of stroke (2018) Overview: With residual R hemiparesis Acute cystitis (2018) Elevated troponin (2018) Gangrene ([facility] (10/22/2018) · Trach change today · On cefepime/vancomycin per ID. · PSV trial today.  This patient is ventilator dependent and cannot at this time tolerate repeated interruptions in mechanical ventilatory support to obtain MRCP images. Certainly unable to comply with breath hold maneuvers. · Continue tube feeding with MVI · HD per Nephrology. · Straight cath urinary bladder q 12 hr. 
· Monitor lipase. Repeat lipase in am. 
· Continue acyclovir per ID. · Inhaled therapy: albuterol · Renal dosing of medications. NUTRITION: Osmolite 1.2 @ goal. Check prealbumin q week. Consult Clinical Dietician. ICU electrolyte replacement protocol PROPHYLAXIS: 
DVT prophylaxis: heparin GI prophylaxis: Protonix HOB 30-degree elevation Chlorhexidine mouth washes CODE STATUS: FULL CODE Further recommendations will be based on the patient's response to recommended treatment and results of the investigation ordered. DISPOSITION: 
 
The patient is: [x] acutely ill Risk of deterioration: [] moderate [x] critically ill  [x] high  
 
[x]See my orders for details [x] The patient is unable to give any meaningful history or review of systems because the patient is: 
[x] Intubated [] Sedated  
[x] Unresponsive [] [x]The patient is critically ill on     
[x] Mechanical ventilation [x] Vasoactive agents  
[] BiPAP [] Inotropes SUBJECTIVE 
- This 57 y.o.  male is seen in consultation at the request of Dr. Carline Giles for recommendations on further evaluation and management of acute hypoxia. He hospitalized (9/10/2018 - 9/14/2018) for stroke with neurologic residua (R hemiparesis). Patient discharged from [facility] to [facility] ([facility]) on 9/14/2018, only to return on 9/26/2018 with acute renal failure/abnormal lab values. He experienced VT/VF arrest during my initial clinical assessment. The patient has undergone tracheostomy and percutaneous gastrostomy. He has a R femoral PermCath for HD access. - Overnight events: Remains on mechanical ventilatory support. Not following commands.  Still has shingles lesions are starting to crust and dry up. On acyclovir. On cefepime/vancomycin. Afebrile in the interim. Respiratory and wound (trach) culture isolating GNR. Will go for J-tube placement today. TF put on hold. ~300 mL cloudy urine by straight cath over the past 24 hours. [x] Nutrition: Osmolite 1.2 @ 70 mL/hr on hold for J-tube placement 
[x] BM today. ROS: Review of systems not obtained due to patient factors. Past Medical History:  
Diagnosis Date  CHF (congestive heart failure) ([facility]  Diabetes [facility]  Stroke ([facility] No Known Allergies Current Facility-Administered Medications:  
  cefepime (MAXIPIME) 1 g in 0.9% sodium chloride (MBP/ADV) 50 mL MBP, 1 g, IntraVENous, Q24H, Schwab, J. Fredrick Marble, MD, [address] at 10/25/18 1700 
  VANCOMYCIN INFORMATION NOTE, , Other, Rx Dosing/Monitoring, GRACE Nava MD 
[employer]  Dane Tripp ON 10/27/2018] vancomycin (VANCOCIN) 1,000 mg in 0.9% sodium chloride (MBP/ADV) 250 mL adv, 1,000 mg, IntraVENous, Q TU, TH & SAT, Schwab, J. Fredrick Marble, MD  Richardo Sella ON 10/30/2018] VANCOMYCIN INFORMATION NOTE, , Other, ONCE, GRACE Nava MD 
  insulin glargine (LANTUS) injection 26 Units, 26 Units, SubCUTAneous, DAILY, Anthony Solorzano MD, Stopped at 10/26/18 0900   multivitamin (MULTI-DELYN, WELLESSE) oral liquid 30 mL, 30 mL, Oral, DAILY, Allison Hayes MD, Stopped at 10/26/18 0900 
  albuterol (PROVENTIL VENTOLIN) nebulizer solution 2.5 mg, 2.5 mg, Nebulization, BID, Jones Ackerman MD, 2.5 mg at 10/26/18 0711 
  bacitracin 500 unit/gram packet 1 Packet, 1 Packet, Topical, PRN, Leilani Ackerman MD 
  sodium chloride (NS) flush 10-30 mL, 10-30 mL, InterCATHeter, PRN, Jones Ackerman MD, 30 mL at 10/21/18 0430 
  sodium chloride (NS) flush 10 mL, 10 mL, InterCATHeter, Q24H, Jones Ackerman MD, 10 mL at 10/25/18 1831 
  sodium chloride (NS) flush 10 mL, 10 mL, InterCATHeter, PRN, Flor Doss MD, 10 mL at 10/20/18 7969   sodium chloride (NS) flush 10-40 mL, 10-40 mL, InterCATHeter, Q8H, Jones Ackerman MD, 10 mL at 10/26/18 1075   sodium chloride (NS) flush 20 mL, 20 mL, InterCATHeter, Q24H, Jones Ackerman MD, 20 mL at 10/25/18 1831 
  heparin (porcine) injection 5,000 Units, 5,000 Units, SubCUTAneous, Q8H, Jones Ackerman MD, 5,000 Units at 10/26/18 0513 
  acyclovir sodium (ZOVIRAX) 330.5 mg in 0.9% sodium chloride 100 mL IVPB, 5 mg/kg (Ideal), IntraVENous, Q24H, Dejan Curtis MD, 330.5 mg at 10/25/18 1519   hydroxypropyl methylcellulose (ISOPTO TEARS) 0.5 % ophthalmic solution 2 Drop, 2 Drop, Both Eyes, BID, Lakesha Gonzales NP, 2 Drop at 10/26/18 7329   epoetin manda (EPOGEN;PROCRIT) injection 40,000 Units, 40,000 Units, IntraVENous, Q7D, Lorena Townsend MD, 40,000 Units at 10/24/18 1227   pantoprazole (PROTONIX) 40 mg in sodium chloride 0.9% 10 mL injection, 40 mg, IntraVENous, Q24H, Bharat Ruiz MD, 40 mg at 10/26/18 2526 
  0.9% sodium chloride infusion 250 mL, 250 mL, IntraVENous, PRN, Ravinder Fortune DO 
  influenza vaccine 2018-19 (6 mos+)(PF) (FLUARIX QUAD/FLULAVAL QUAD) injection 0.5 mL, 0.5 mL, IntraMUSCular, PRIOR TO DISCHARGE, Mark Multani MD 
  acetaminophen (TYLENOL) solution 325 mg, 325 mg, Per NG tube, Q4H PRN, Mark Multani MD, 325 mg at 10/18/18 2130 
  0.9% sodium chloride infusion 250 mL, 250 mL, IntraVENous, PRN, Sun LEE DO 
  heparin (porcine) pf 100 Units, 100 Units, InterCATHeter, PRN, Alena Gonzales MD 
  0.9% sodium chloride infusion 250 mL, 250 mL, IntraVENous, PRN, Mark Multani MD 
  insulin lispro (HUMALOG) injection, , SubCUTAneous, Q6H, Sun LEE DO, Stopped at 10/26/18 0600 
  heparin (porcine) 1,000 unit/mL injection 1,000 Units, 1,000 Units, InterCATHeter, DIALYSIS PRN, Lorena Townsend MD, 1,000 Units at 09/28/18 1332   senna-docusate (PERICOLACE) 8.6-50 mg per tablet 1 Tab, 1 Tab, Oral, BID PRN, Roopa Abraham DO 
  ondansetron (ZOFRAN) injection 4 mg, 4 mg, IntraVENous, Q4H PRN, Alfred Nettles DO, 4 mg at 09/28/18 0539 
  glucose chewable tablet 16 g, 4 Tab, Oral, PRN, Vivek Nettlesry A, DO 
  glucagon (GLUCAGEN) injection 1 mg, 1 mg, IntraMUSCular, PRN, Alfred Nettles A, DO 
  dextrose (D50) infusion 12.5-25 g, 25-50 mL, IntraVENous, PRN, Alfred Nettles, DO, 25 g at 10/09/18 1906   hydrALAZINE (APRESOLINE) 20 mg/mL injection 20 mg, 20 mg, IntraVENous, Q6H PRN, Wing Leal NP, 20 mg at 10/25/18 0230 OBJECTIVE Vital Signs:   
Patient Vitals for the past 24 hrs: 
 Temp Pulse Resp BP SpO2  
10/26/18 0700  100 18 144/67 100 % 10/26/18 0600  (!) 102 21 136/66 100 % 10/26/18 0500  (!) 104 22 171/61 100 % 10/26/18 0423  (!) 105 24  100 % 10/26/18 0400 98.3 °F (36.8 °C) (!) 106 20 163/64 100 % 10/26/18 0300  (!) 104 22 155/61 100 % 10/26/18 0200  (!) 104 23 137/55 100 % 10/26/18 0100  (!) 103 21 140/62 100 % 10/26/18 0000 98.6 °F (37 °C) (!) 106 25 141/62 100 % 10/25/18 2352  (!) 107 19  100 % 10/25/18 2300  (!) 106 22 142/62 100 % 10/25/18 2200  (!) 111 18 123/67 100 % 10/25/18 2100  (!) 108 25 150/70 100 % 10/25/18 2055  (!) 109 27  100 % 10/25/18 2000 98.7 °F (37.1 °C) (!) 107 (!) 32 128/61 100 % 10/25/18 1945  (!) 109 27 115/48 100 % 10/25/18 1930  (!) 109 29 132/64 100 % 10/25/18 1915  (!) 108 28 138/66 100 % 10/25/18 1900  (!) 109 24 138/68 100 % 10/25/18 1845  (!) 109 29 150/73 100 % 10/25/18 1830  (!) 108 29 135/66 100 % 10/25/18 1815  (!) 108 27 138/63 100 % 10/25/18 1800  (!) 107 28 141/65 100 % 10/25/18 1745  (!) 108 30 142/65 100 % 10/25/18 1730  (!) 107 (!) 31 133/60 100 % 10/25/18 1715  (!) 107 27 132/64 100 % 10/25/18 1700  (!) 107 28 129/63 100 % 10/25/18 1651  (!) 108 30  100 % 10/25/18 1643 99 °F (37.2 °C)      
10/25/18 1615  (!) 105 30 142/63 100 % 10/25/18 1600 99 °F (37.2 °C) (!) 105 24 160/64 100 % 10/25/18 1545  (!) 104 24  100 % 10/25/18 1530  (!) 104 24  100 % 10/25/18 1515  (!) 103 28  100 % 10/25/18 1500  (!) 104 26 176/64 100 % 10/25/18 1400  (!) 105 29 174/61 100 % 10/25/18 1335     100 % 10/25/18 1300  (!) 104 (!) 31 160/50 98 % 10/25/18 1223  (!) 103 29 166/61 99 % 10/25/18 1100  (!) 106 (!) 32 147/57 98 % 10/25/18 1000  (!) 110 30 138/49 98 % O2 Device: Tracheostomy O2 Flow Rate (L/min): 45 l/min Temp (24hrs), Av.7 °F (37.1 °C), Min:98.3 °F (36.8 °C), Max:99 °F (37.2 °C) PHYSICAL EXAM:  
General: intubated. Not on sedation. Does not follow commands. Head: atraumatic, normocephalic Eye: pupils are midline today. Spontaneous blinking. Nose: Nares are patent. No exudate. Throat: No oral thrush. No exudate. Mucous membranes are moist. 
Neck: tracheostomy in situ. no thyromegaly, no JVD, no lymphadenopathy. Trachea midline. Creamy off-white secretions around the outer cannula. Lung: Symmetric in development and expansion. Good air entry. Heart: Regular S1, S2 without murmur, rub or gallop. Abdomen: PEG in situ. soft, nontender, no ndistended. Normoactive bowel sounds. No rebound. No guarding. Extremities: no pedal edema, no clubbing, 2+ peripheral pulses in DP. L great toe ischemic changes/dry gangrene. Lymphatic: no cervical/axillary/inguinal lymphadenopathy Neurologic: R facial droop is less obvious today. Withdraws to pain @ B LE. Demonstrating spontaneous eye movement, blinking and R LE movement. Doll's eyes intact. Corneal intact. Cough/gag intact. Spontaneous respirations intact. L LE triple flexion response to plantar dorsiflexion. DTR 1+ @ B biceps and B patellar tendons. Skin: umbilicated bullous lesions involving the R shoulder and R arm are starting to dry up. Buttocks DTI 
DATA:  
 
Intake/Output:  
Last shift:      No intake/output data recorded. Last 3 shifts: 10/24 1901 - 10/26 0700 In: 2110 Out: 6962 [Urine:265; Drains:100] Intake/Output Summary (Last 24 hours) at 10/26/2018 4685 Last data filed at 10/26/2018 0000 Gross per 24 hour Intake 1010 ml Output 3215 ml Net -2205 ml Last 3 Recorded Weights in this Encounter 10/24/18 0600 10/25/18 0700 10/26/18 0700 Weight: 88.4 kg (194 lb 14.2 oz) 88.7 kg (195 lb 8.8 oz) 86.4 kg (190 lb 7.6 oz) Lines: All central lines examined by me. No signs of erythema, induration, discharge. Feeding Tube:                    
  
Sheath:                      
  
Infusion Wire/Catheter:  
  
Peripherally Inserted Central Catheter: PICC Double Lumen 10/18/18 Left;Brachial (Active) Central Line Being Utilized Yes 10/26/2018  3:00 AM  
Criteria for Appropriate Use Limited/no vessel suitable for conventional peripheral access 10/26/2018  3:00 AM  
Site Assessment Clean, dry, & intact 10/26/2018  3:00 AM  
Phlebitis Assessment 0 10/26/2018 12:00 AM  
Infiltration Assessment 0 10/26/2018 12:00 AM  
Arm Circumference (cm) 38 cm 10/26/2018 12:00 AM  
Date of Last Dressing Change 10/25/18 10/26/2018 12:00 AM  
Dressing Status Clean, dry, & intact 10/26/2018  3:00 AM  
Action Taken Open ports on tubing capped 10/26/2018  3:00 AM  
External Catheter Length (cm) 0 centimeters 10/26/2018 12:00 AM  
Dressing Type Disk with Chlorhexadine gluconate (CHG); Transparent 10/26/2018 12:00 AM  
Hub Color/Line Status Red;Patent 10/26/2018 12:00 AM  
Positive Blood Return (Site #1) Yes 10/26/2018 12:00 AM  
Hub Color/Line Status Purple;Patent 10/26/2018 12:00 AM  
Positive Blood Return (Site #2) Yes 10/26/2018 12:00 AM  
Alcohol Cap Used Yes 10/26/2018 12:00 AM  
 
Central Venous Catheter: 
  
Venous Access Device: 
  
Peripheral Intravenous Line: 
  
Arterial Line: 
  
Hemodialysis Catheter: 
Hemodialysis Access 10/19/18 (Active) Central Line Being Utilized Yes 10/26/2018 12:00 AM  
 Criteria for Appropriate Use Dialysis/apheresis 10/26/2018  3:00 AM  
Site Assessment Clean, dry, & intact 10/26/2018 12:00 AM  
Date of Last Dressing Change 10/24/18 10/26/2018 12:00 AM  
Dressing Status Clean, dry, & intact 10/26/2018 12:00 AM  
Dressing Type Stabilization/securement device 10/26/2018 12:00 AM  
Proximal Hub Color/Line Status Capped 10/26/2018 12:00 AM  
Distal Hub Color/Line Status Capped 10/26/2018 12:00 AM  
 
Drain(s): PEG/Gastrostomy Tube 10/17/18 (Active) Site Assessment Clean, dry, & intact 10/26/2018  3:00 AM  
Dressing Status Clean, dry, & intact 10/26/2018  3:00 AM  
G Port Status Clamped 10/26/2018  3:00 AM  
External Catheter Length (cm) 3 centimeters 10/26/2018 12:00 AM  
Action Taken Placement verified (comment) 10/26/2018 12:00 AM  
Drainage Description Other (Comment) 10/24/2018  8:00 AM  
Gastric Residual (mL) 5 ml 10/25/2018  8:00 PM  
Tube Feeding/Formula Options Other 10/26/2018 12:00 AM  
Tube Feeding/Verify Rate (mL/hr) 70 10/25/2018 11:00 PM  
Water Flush Volume (mL) 30 mL 10/25/2018  8:00 PM  
Intake (ml) 70 ml 10/25/2018 11:00 PM  
Medication Volume 30 ml 10/25/2018  9:00 AM  
Drainage Chamber Level (ml) 0 ml 10/23/2018  8:00 PM  
Output (ml) 0 ml 10/23/2018  8:00 PM  
   
Fecal Management (Active) Stool Consistency Liquid 10/26/2018  7:00 AM  
Position of Indicator Line Visible 10/26/2018  7:00 AM  
Signal Indicator Bubble Appropriate 10/26/2018  6:00 AM  
Skin Assessment of the Anal Area Denuded/excoriated 10/26/2018  6:00 AM  
Tube Irrigated No 10/26/2018  6:00 AM  
Irrigation Volume (mL) 20 10/25/2018  8:00 PM  
Drainage Bag Level (mL) 200 10/26/2018  6:00 AM  
Output (ml) 180 ml 10/26/2018  6:00 AM  
 
Airway: Airway - Tracheostomy Tube 10/17/18 Portex; Cuffed (Active) Cuff Pressure 25 cmH20 10/26/2018  7:26 AM  
Site Assessment Clean, dry, & intact 10/26/2018  8:00 AM  
Trach Dressing Changed Yes 10/26/2018  7:26 AM  
 Trach Cleaned With Normal saline 10/26/2018  7:26 AM  
Trach Tie Changed Yes 10/26/2018  7:26 AM  
Trach Cannula Changed No 10/26/2018  4:23 AM  
Inner Cannula Cuffed, cuff inflated 10/26/2018  7:26 AM  
Line Jl Top of the lock 10/26/2018  8:00 AM  
Side Secured Centered 10/26/2018  8:00 AM  
Spare Trach at Bedside Yes 10/26/2018  8:00 AM  
Spare Trach Tube One Size Smaller at Bedside Yes 10/26/2018  8:00 AM  
Ambu Bag With Mask at Bedside Yes 10/26/2018  8:00 AM  
 
 
Vent Date (intubated 9/29/2018, tracheostomy 10/17/2018) Ventilator Settings: 
Ventilator Mode: Pressure support Respiratory Rate Resp Rate Observed: 30 Back-Up Rate: 12 Insp Time (sec): 0.9 sec Insp Flow (l/min): 61 l/min I:E Ratio: 1;1 
Ventilator Volumes Vt Set (ml): 500 ml Vt Exhaled (Machine Breath) (ml): 500 ml Vt Spont (ml): 402 ml Ve Observed (l/min): 9.9 l/min Ventilator Pressures Pressure Support (cm H2O): 10 cm H2O 
PIP Observed (cm H2O): 20 cm H2O Plateau Pressure (cm H2O): 18 cm H2O 
MAP (cm H2O): 9.8 PEEP/VENT (cm H2O): 5 cm H20 Auto PEEP Observed (cm H2O): 0 cm H2O Mode Rate Tidal Volume Pressure FiO2 PEEP Pressure support   500 ml  10 cm H2O 30 % 5 cm H20 Peak airway pressure: 20 cm H2O Plateau pressure:    
Tidal volume:   
Minute ventilation: 9.9 l/min SPO2   
 
ARDSNet Guidelines: Lung protective ventilation (VT 6 cc/kg IBW) and Pplat <= 30 Telemetry: [x] Sinus [] A-flutter [] Paced [] A-fib [] Multiple PVCs Imaging: 
[x] I have personally reviewed the patients radiographs 
[] Radiographs reviewed with radiologist 
[x] No CXR study available for review today 
[] No change from prior, tubes and lines are in adequate position 
[] Improved   [] Worsening Labs: 
Recent Results (from the past 24 hour(s)) GLUCOSE, POC Collection Time: 10/25/18 12:31 PM  
Result Value Ref Range Glucose (POC) 223 (H) 70 - 110 mg/dL CULTURE, RESPIRATORY/SPUTUM/BRONCH W GRAM STAIN  
 Collection Time: 10/25/18  1:25 PM  
Result Value Ref Range Special Requests: NO SPECIAL REQUESTS    
 GRAM STAIN MODERATE WBC'S    
 GRAM STAIN RARE GRAM NEGATIVE RODS Culture result: PENDING   
POC G3 Collection Time: 10/25/18  1:32 PM  
Result Value Ref Range Device: VENT    
 FIO2 (POC) 0.30 % pH (POC) 7.451 (H) 7.35 - 7.45    
 pCO2 (POC) 38.2 35.0 - 45.0 MMHG  
 pO2 (POC) 78 (L) 80 - 100 MMHG  
 HCO3 (POC) 26.4 (H) 22 - 26 MMOL/L  
 sO2 (POC) 95 92 - 97 % Base excess (POC) 3 mmol/L Mode SIMV Tidal volume 512 ml Set Rate 12 bpm  
 PEEP/CPAP (POC) 5 cmH2O Pressure support 10 cmH2O Allens test (POC) YES Total resp. rate 31 Site LEFT RADIAL Patient temp. 100.4 Specimen type (POC) ARTERIAL Performed by Ari Kaufman Volume control YES    
URINALYSIS W/MICROSCOPIC Collection Time: 10/25/18  1:59 PM  
Result Value Ref Range Color DARK YELLOW Appearance CLOUDY Specific gravity 1.020 1.005 - 1.030    
 pH (UA) 6.0 5.0 - 8.0 Protein 300 (A) NEG mg/dL Glucose 100 (A) NEG mg/dL Ketone NEGATIVE  NEG mg/dL Bilirubin NEGATIVE  NEG Blood LARGE (A) NEG Urobilinogen 0.2 0.2 - 1.0 EU/dL Nitrites NEGATIVE  NEG Leukocyte Esterase MODERATE (A) NEG    
 WBC 36 to 50 0 - 4 /hpf  
 RBC TOO NUMEROUS TO COUNT 0 - 5 /hpf Epithelial cells FEW 0 - 5 /lpf Bacteria 2+ (A) NEG /hpf CULTURE, WOUND W GRAM STAIN Collection Time: 10/25/18  1:59 PM  
Result Value Ref Range Special Requests: NO SPECIAL REQUESTS    
 GRAM STAIN FEW WBC'S    
 GRAM STAIN RARE GRAM NEGATIVE RODS Culture result: PENDING   
GLUCOSE, POC Collection Time: 10/25/18  5:49 PM  
Result Value Ref Range Glucose (POC) 193 (H) 70 - 110 mg/dL GLUCOSE, POC Collection Time: 10/26/18 12:17 AM  
Result Value Ref Range Glucose (POC) 219 (H) 70 - 110 mg/dL LIPASE Collection Time: 10/26/18  3:55 AM  
Result Value Ref Range Lipase 1,181 (H) 73 - 393 U/L  
CBC W/O DIFF Collection Time: 10/26/18  3:55 AM  
Result Value Ref Range WBC 18.5 (H) 4.6 - 13.2 K/uL  
 RBC 2.82 (L) 4.70 - 5.50 M/uL HGB 8.0 (L) 13.0 - 16.0 g/dL HCT 25.8 (L) 36.0 - 48.0 % MCV 91.5 74.0 - 97.0 FL  
 MCH 28.4 24.0 - 34.0 PG  
 MCHC 31.0 31.0 - 37.0 g/dL  
 RDW 15.9 (H) 11.6 - 14.5 % PLATELET 181 (H) 859 - 420 K/uL MPV 9.0 (L) 9.2 - 11.8 FL  
RENAL FUNCTION PANEL Collection Time: 10/26/18  3:55 AM  
Result Value Ref Range Sodium 139 136 - 145 mmol/L Potassium 4.5 3.5 - 5.5 mmol/L Chloride 100 100 - 108 mmol/L  
 CO2 27 21 - 32 mmol/L Anion gap 12 3.0 - 18 mmol/L Glucose 135 (H) 74 - 99 mg/dL BUN 39 (H) 7.0 - 18 MG/DL Creatinine 3.32 (H) 0.6 - 1.3 MG/DL  
 BUN/Creatinine ratio 12 12 - 20 GFR est AA 23 (L) >60 ml/min/1.73m2 GFR est non-AA 19 (L) >60 ml/min/1.73m2 Calcium 7.7 (L) 8.5 - 10.1 MG/DL Phosphorus 4.5 2.5 - 4.9 MG/DL Albumin 1.6 (L) 3.4 - 5.0 g/dL CALCIUM, IONIZED Collection Time: 10/26/18  3:55 AM  
Result Value Ref Range Ionized Calcium 1.01 (L) 1.12 - 1.32 MMOL/L  
MAGNESIUM Collection Time: 10/26/18  3:55 AM  
Result Value Ref Range Magnesium 2.1 1.6 - 2.6 mg/dL GLUCOSE, POC Collection Time: 10/26/18  5:09 AM  
Result Value Ref Range Glucose (POC) 129 (H) 70 - 110 mg/dL Cultures: All Micro Results Procedure Component Value Units Date/Time Luba Galla STAIN [[id number]] Collected:  10/25/18 1359 Order Status:  Completed Specimen:  Wound from Tracheostomy site Updated:  10/25/18 2354 Special Requests: NO SPECIAL REQUESTS     
  GRAM STAIN FEW WBC'S     
   RARE GRAM NEGATIVE RODS Culture result: PENDING  
 CULTURE, RESPIRATORY/SPUTUM/BRONCH Linde Hamman [[id number]] Collected:  10/25/18 1325 Order Status:  Completed Specimen:  Sputum from Tracheal Aspirate Updated:  10/25/18 8091 Special Requests: NO SPECIAL REQUESTS GRAM STAIN MODERATE WBC'S     
   RARE GRAM NEGATIVE RODS Culture result: PENDING  
 CULTURE, URINE [[id number]] Collected:  10/25/18 1359 Order Status:  Completed Specimen:  Cath Urine Updated:  10/25/18 1937 CULTURE, BLOOD [[id number]] Collected:  10/25/18 1328 Order Status:  Completed Specimen:  Whole Blood Updated:  10/25/18 1511 CULTURE, BLOOD [[id number]] Collected:  10/25/18 1328 Order Status:  Completed Specimen:  Whole Blood Updated:  10/25/18 1511 CULTURE, CATHETER TIP [[id number]]  (Abnormal)  (Susceptibility) Collected:  10/19/18 1330 Order Status:  Completed Specimen:  Catheter Tip Updated:  10/22/18 6480 Special Requests: NO SPECIAL REQUESTS Culture result:    
  >15 CFU STAPHYLOCOCCUS EPIDERMIDIS  
     
 CULTURE, BLOOD [[id number]] Collected:  10/16/18 1055 Order Status:  Completed Specimen:  Blood Updated:  10/22/18 0809 Special Requests: PERIPHERAL Culture result: NO GROWTH 6 DAYS     
 CULTURE, BLOOD [[id number]] Collected:  10/16/18 1047 Order Status:  Completed Specimen:  Blood Updated:  10/22/18 0809 Special Requests: PERIPHERAL Culture result: NO GROWTH 6 DAYS     
 HSV 1 AND 2 BY PCR [[id number]] Collected:  10/18/18 1815 Order Status:  Completed Specimen:  Other from Miscellaneous sample Updated:  10/21/18 1636 Source OTHER TEST     
  HSV-1 DNA by PCR NEGATIVE      
  HSV-2 DNA by PCR NEGATIVE Comment: (NOTE) This test was developed and its performance characteristics 
determined by [employer]. It has not been cleared or 
approved by the U.S. Food and Drug Administration. The FDA has 
determined that such clearance or approval is not necessary. This 
test is used for clinical purposes. It should not be regarded as 
investigational or research. Performed At: [address] [id number] Roula Montelongo MD SE:[id number] CULTURE, RESPIRATORY/SPUTUM/BRONCH Melyssa Avik [[id number]] Collected:  10/16/18 1915 Order Status:  Completed Specimen:  Sputum,ET Suction Updated:  10/18/18 1011 Special Requests: NO SPECIAL REQUESTS     
  GRAM STAIN 10-25 WBC/lpf     
   <10 EPI/lpf FEW GRAM POSITIVE COCCI IN PAIRS MODERATE GRAM POSITIVE COCCI IN GROUPS MUCUS PRESENT Culture result: MODERATE NORMAL RESPIRATORY SHANAE  
     
 CULTURE, BLOOD [[id number]] Collected:  10/08/18 1227 Order Status:  Completed Specimen:  Blood Updated:  10/14/18 0741 Special Requests: PERIPHERAL Culture result: NO GROWTH 6 DAYS     
 CULTURE, CATHETER TIP [[id number]] Collected:  10/08/18 1215 Order Status:  Completed Specimen:  Catheter Tip Updated:  10/11/18 9679 Special Requests: NO SPECIAL REQUESTS Culture result: NO GROWTH 3 DAYS     
 CULTURE, BLOOD [[id number]] Collected:  10/04/18 1207 Order Status:  Completed Specimen:  Blood Updated:  10/10/18 8161 Special Requests: PERIPHERAL Culture result: NO GROWTH 6 DAYS     
 CULTURE, BLOOD [[id number]] Collected:  10/04/18 1120 Order Status:  Completed Specimen:  Blood Updated:  10/10/18 6224 Special Requests: PERIPHERAL Culture result: NO GROWTH 6 DAYS     
 CULTURE, BLOOD [[id number]] Collected:  09/27/18 0005 Order Status:  Completed Specimen:  Blood Updated:  10/03/18 0845 Special Requests: NO SPECIAL REQUESTS Culture result: NO GROWTH 6 DAYS     
 CULTURE, BLOOD [[id number]]  (Abnormal)  (Susceptibility) Collected:  09/27/18 0136 Order Status:  Completed Specimen:  Blood Updated:  10/02/18 9859 Special Requests: NO SPECIAL REQUESTS     
  GRAM STAIN PEDIATRIC BOTTLE GRAM POSITIVE COCCI IN PAIRS IN CLUSTERS  
      
  SMEAR CALLED TO AND CORRECTLY REPEATED BY: Irena Richards RN IN 2700 ON 9/29/18 AT 2033 WF   Culture result:    
  AEROBIC BOTTLE STAPHYLOCOCCUS EPIDERMIDIS  
 CULTURE, RESPIRATORY/SPUTUM/BRONCH Darlynn Mais STAIN [[id number]]  (Abnormal) Collected:  09/29/18 1210 Order Status:  Completed Specimen:  Sputum from Endotracheal aspirate Updated:  10/02/18 6981 Special Requests: NO SPECIAL REQUESTS     
  GRAM STAIN >25 WBC/lpf     
   <10 EPI/lpf MUCUS PRESENT     
   MODERATE YEAST Culture result: MANY CANDIDA TROPICALIS C. DIFFICILE/EPI PCR [[id number]] Collected:  09/29/18 1400 Order Status:  Completed Specimen:  Stool Updated:  09/29/18 2248 Special Requests: NO SPECIAL REQUESTS Culture result: Toxigenic C. difficile NEGATIVE                         C. difficile target DNA sequences are not detected. CULTURE, URINE [[id number]] Collected:  09/27/18 0029 Order Status:  Completed Specimen:  Cath Urine Updated:  09/29/18 0944 Special Requests: NO SPECIAL REQUESTS Culture result: NO GROWTH 2 DAYS During this entire length of time I was immediately available to the patient. The reason for providing this level of medical care for this critically ill patient was due a critical illness that impaired one or more vital organ systems such that there was a high probability of imminent or life threatening deterioration in the patients condition. This care involved high complexity decision making to assess, manipulate, and support vital system functions, to treat this degreee vital organ system failure and to prevent further life threatening deterioration of the patients condition 
 
[] Total critical care time spent on reviewing the case/medical record/data/notes/EMR/patient examination/documentation/coordinating care with nurse/consultants, exclusive of procedures - minutes with complex decision making performed and > 50% time spent in face to face evaluation. Humble Teresa MD  
Board Certified by the [facility] 10/26/2018

## 2018-10-26 NOTE — PROGRESS NOTES
Physical Exam  
Skin:  
 
  
 
Primary Nurse Jenaro Souza and GETA, RN performed a dual skin assessment on this patient. No impairment noted.

## 2018-10-26 NOTE — PROGRESS NOTES
Infectious Disease Follow-up Will plan to check back on Monday 10/29/2018. Dr. Elijah Cm is on call for ID this weekend and can be reached at [phone] if needed. Admit Date: 9/26/2018 Current abx Prior abx ACV 10/17 - 9  Vanco/cefepime 10/25-1 Zosyn 9/27-7, Meropenem/flucon 10/4- 11, vanco 9/27-25 Assessment ->Rec:  
 
Recurrent SIRS T 100.4 10/25 wbc 18k 
 - more secretions, reported cloudy urine, cxr yest no pneumonia  
-line tip culture 10/19 >15 CFU CoNS now with L picc, dialysis catheter   
- blcx 10/25 NGTD x 2, spgs rare GNR, cx IP -> continue Vanc/cefepime but likely dc Vanc if no gpc in blcx PAD  
- PVL's s/o significant arterial disease of bilateral lower extremities 10/24. -POD Dr. John Abreu saw 10/24,  indicated not OR candidate foot for gangrene distal L gt toe Shingles R arm, C 6 (?C5) dermatome 
- vesicular lesions onset ~10/15 progressing compared to onset per NP 
- confluent in upper arm, not crusted yet  
-VZV PCR pos 10/18 -> cont Acyclovir renal dosing (5 mg/kg q 24h) x 10 days (1 more) at least 
->CDC IC guidance d/w nursing and would cover lesions  but defer hospital infection control re: precautions here Prior Leukocytosis/SIRS poss sepsis (resolved 10/19) 
- MOF multiple ID and non-infectious concerns outlined below, complicated, wbc 63.2H today from high 27K+ on 10/5 On  vancomycin/zosyn 9/27 
- C diff neg 9/29, sput C albicans, repeat CT 10/4 no new findings --cxr reviewed - increased atx rt base. Left base improved 
- Higher fever 10/16 101.2, 10/18 101.4 afebrile since 
- Ddx: HZV vs deep tissue injury of toe/buttock vs Line infection 
- blcx 10/16 NG x 2 
- TDC Tip 10/19 >15 CFU MRSE (1 of 2 blcx on adm 9/27 MRSE but afebrile then prob contaminant) -> cont on Acyclovir as above Suspected Pyelo POA  
- CTAP 9/27:  B perineph stranding, UA tntc wbc, uctx ng 
- treated at this point Cholelithiasis choledocholithiais suspected acute cholecystitis  poss cystic stone POA- abnl lft bili 4 alk phos 400 seen by GI and GS Dr. Paul Rosales, no plan for OR, for poss cholecystostomy if LFT worsen, bili, alk phos declining - AST fluctuating downward  -Dr. Erika Aponte saw for GI 10/24 Elevated Lipase  
- lipase 2200 POA -> 3191 ->  5500 on 10/4 improved to 1207 10/19 but up again to 2169 10/22 interpretation complicated by JULIANNA -> fluctuating between 7040-2786 (off mucomyst, TF) 
- CTAP 10/4: Normal pancreas -> per ICU team  
B infiltrates - poss edema infiltrate - RLL consolid better 10/4 cxr and suspect endobronchial clot contributed   ->monitor MRSE bacteremia 9/27 1 of 2. Has LUE midline unclear when placed Treated JULIANNA POA dialysis dependent 
- baseline cr 0.9 132/10.1 in ER 
-RIJ uldall 9/28 out 10/19 - R fem uldall 10/20 -> per renal  
Bleeding -melena earlier, EGD 10/2 clot in esophagus bronch 10/3 R endobronch clot NEW removed 10/5 repeat bronch  -> per others Suspected anoxic brain injury on MRI 10/3 SP VF arrest 9/29 -> overall poor prognosis but family wishes aggressive care CVA R hemiparesis 9/10   
resp failure sp intubation 9/29 Comorbid: HTN HLD CHF h/o steatohepatitis MICROBIOLOGY:  
9/27 bctx 1 of 2 MRSE 
 uctx ng 
9/29 sput C albicans C diff neg 10/4 bctx x 2 NTD 
 fungitell indeterminate due to contamination 10/8 Cath tip cx ntd 10/8     bl cx ng 
10/16 Blcx x2 ng 
 Spcx NF 
10/19 Cath tip >15 CFU MRSE 
 
LINES AND CATHETERS:  
 
Left PICC 10/18 Active Hospital Problems Diagnosis Date Noted  Coarse tremors 10/23/2018 Tremors vs chills vs seizure vs other. Will monitor. If persistent, will consider 24-hr EEG monitoring.  Gangrene ([facility] [address] 10/22/2018  Hypoalbuminemia 10/21/2018  Shingles 10/21/2018  Ischemic encephalopathy 09/30/2018  Cardiac arrest with ventricular fibrillation ([facility] [address] 09/29/2018 ROSC before defibrillation could be attempted.  Acute pancreatitis 09/29/2018 Lipase downtrending with interruption in tube feed and Mucomyst. Doubt that the elevated lipase is due to excessive lipid in tube feeding. May be secondary to Mucomyst. 
  
 Elevated LFTs 09/28/2018  History of stroke 09/27/2018 With residual R hemiparesis  Acute-on-chronic kidney injury ([facility] [address] 09/27/2018  Acute cystitis 09/27/2018  Elevated troponin 09/27/2018  Essential hypertension 09/10/2018  Diabetes mellitus type 2, controlled ([facility] [address] 09/10/2018 Subjective: Interval notes reviewed, Afebrile but WBC remains 18K. Trach changed at bedside by Dr. Noemy Ramos. Current Facility-Administered Medications Medication Dose Route Frequency  insulin glargine (LANTUS) injection 15 Units  15 Units SubCUTAneous DAILY  calcium gluconate 2 g in 0.9% sodium chloride 50 mL IVPB  2 g IntraVENous ONCE  
 cefepime (MAXIPIME) 1 g in 0.9% sodium chloride (MBP/ADV) 50 mL MBP  1 g IntraVENous Q24H  VANCOMYCIN INFORMATION NOTE   Other Rx Dosing/Monitoring  [START ON 10/27/2018] vancomycin (VANCOCIN) 1,000 mg in 0.9% sodium chloride (MBP/ADV) 250 mL adv  1,000 mg IntraVENous Q TU, TH & SAT  [START ON 10/30/2018] VANCOMYCIN INFORMATION NOTE   Other ONCE  
 multivitamin (MULTI-DELYN, WELLESSE) oral liquid 30 mL  30 mL Oral DAILY  albuterol (PROVENTIL VENTOLIN) nebulizer solution 2.5 mg  2.5 mg Nebulization BID  
 bacitracin 500 unit/gram packet 1 Packet  1 Packet Topical PRN  
 sodium chloride (NS) flush 10-30 mL  10-30 mL InterCATHeter PRN  
 sodium chloride (NS) flush 10 mL  10 mL InterCATHeter Q24H  
 sodium chloride (NS) flush 10 mL  10 mL InterCATHeter PRN  
 sodium chloride (NS) flush 10-40 mL  10-40 mL InterCATHeter Q8H  
 sodium chloride (NS) flush 20 mL  20 mL InterCATHeter Q24H  
 heparin (porcine) injection 5,000 Units  5,000 Units SubCUTAneous Q8H  
  acyclovir sodium (ZOVIRAX) 330.5 mg in 0.9% sodium chloride 100 mL IVPB  5 mg/kg (Ideal) IntraVENous Q24H  hydroxypropyl methylcellulose (ISOPTO TEARS) 0.5 % ophthalmic solution 2 Drop  2 Drop Both Eyes BID  epoetin manda (EPOGEN;PROCRIT) injection 40,000 Units  40,000 Units IntraVENous Q7D  
 pantoprazole (PROTONIX) 40 mg in sodium chloride 0.9% 10 mL injection  40 mg IntraVENous Q24H  
 0.9% sodium chloride infusion 250 mL  250 mL IntraVENous PRN  
 influenza vaccine - (6 mos+)(PF) (FLUARIX QUAD/FLULAVAL QUAD) injection 0.5 mL  0.5 mL IntraMUSCular PRIOR TO DISCHARGE  acetaminophen (TYLENOL) solution 325 mg  325 mg Per NG tube Q4H PRN  
 0.9% sodium chloride infusion 250 mL  250 mL IntraVENous PRN  
 heparin (porcine) pf 100 Units  100 Units InterCATHeter PRN  
 0.9% sodium chloride infusion 250 mL  250 mL IntraVENous PRN  
 insulin lispro (HUMALOG) injection   SubCUTAneous Q6H  
 heparin (porcine) 1,000 unit/mL injection 1,000 Units  1,000 Units InterCATHeter DIALYSIS PRN  
 senna-docusate (PERICOLACE) 8.6-50 mg per tablet 1 Tab  1 Tab Oral BID PRN  
 ondansetron (ZOFRAN) injection 4 mg  4 mg IntraVENous Q4H PRN  
 glucose chewable tablet 16 g  4 Tab Oral PRN  
 glucagon (GLUCAGEN) injection 1 mg  1 mg IntraMUSCular PRN  
 dextrose (D50) infusion 12.5-25 g  25-50 mL IntraVENous PRN  
 hydrALAZINE (APRESOLINE) 20 mg/mL injection 20 mg  20 mg IntraVENous Q6H PRN Objective:  
 
Visit Vitals /59 Pulse 99 Temp 98.3 °F (36.8 °C) Resp 21 Ht 5' 7\" (1.702 m) Wt 86.4 kg (190 lb 7.6 oz) SpO2 100% BMI 29.83 kg/m² Temp (24hrs), Av.7 °F (37.1 °C), Min:98.3 °F (36.8 °C), Max:99 °F (37.2 °C) GEN: well developed 57year-old AA male on vent/trach in ICU, unresponsive HENT: no thrush Neck: tracheostomy in place CHEST: coarse bs B no wheeze or rhonchi CVS: RRR, no murmur appreciated ABD: soft, + BS no localized tenderness, PEG in place EXT: 1+ pitting edema b/l LE  
SKIN:  RUE hemorrhagic blisters further dried up but not yet completely dry. left great toe gangrenous change. CNS:eyes open, unresponsive to commands or deep sternal rub Labs: Results:  
Chemistry Recent Labs 10/26/18 
0355 10/25/18 
0400 10/24/18 
0416 * 190* 177*  138 140  
K 4.5 4.9 4.3  99* 101 CO2 27 26 27 BUN 39* 68* 48* CREA 3.32* 5.25* 4.02* CA 7.7* 7.3* 7.6* AGAP 12 13 12 BUCR 12 13 12 AP  --   --  339*  340* TP  --   --  7.5  7.6 ALB 1.6* 1.7* 1.8*  1.7*  
GLOB  --   --  5.7*  5.9* AGRAT  --   --  0.3*  0.3* CBC w/Diff Recent Labs 10/26/18 
0355 10/25/18 
0400 10/24/18 
0416 WBC 18.5* 18.3* 14.0*  
RBC 2.82* 2.80* 2.75* HGB 8.0* 7.8* 7.8* HCT 25.8* 24.9* 24.9*  
* 558* 581*  
  
10/24 cxr IMPRESSION: 
  
1. Interval removal right IJ catheter. No pneumothorax. 
  
2. New left-sided PICC line with tip in satisfactory radiographic position. 
  
3. Findings suggesting vascular congestion. No consolidation. 10/4 CTAP IMPRESSION: 
1. Dense subtotal or total consolidation right lower lobe compatible with 
pneumonia similar to prior study. Small bilateral pleural effusions similar in 
size. 2. Distended gallbladder with gallstones and gallbladder sludge without 
significant change in appearance and also described in detail on ultrasound of 
9/27/2018. 3. Indeterminate small lesion left hepatic lobe without change. Hepatomegaly 
again evident. 4. No intraperitoneal fluid. Possible small left upper pole renal cyst. 
Cardiomegaly. Nasogastric tube tip in body of stomach. cxr 10/5 Impression: 1. Endotracheal tube, visualized nasogastric tube, and right IJ central venous 
catheter in stable position. 2. Overall improved aeration of the right lower lobe, likely improved 
atelectasis with mild residual atelectasis/airspace disease. 3. Mild interstitial edema overall similar to prior. Microbiology Results All Micro Results Procedure Component Value Units Date/Time CULTURE, BLOOD [[id number]] Collected:  10/25/18 1328 Order Status:  Completed Specimen:  Whole Blood Updated:  10/26/18 1023 Special Requests: NO SPECIAL REQUESTS Culture result: NO GROWTH AFTER 19 HOURS     
 CULTURE, BLOOD [[id number]] Collected:  10/25/18 1328 Order Status:  Completed Specimen:  Whole Blood Updated:  10/26/18 1023 Special Requests: NO SPECIAL REQUESTS Culture result: [facility]     
 CULTURE, Najma Oz STAIN [[id number]] Collected:  10/25/18 1359 Order Status:  Completed Specimen:  Wound from Tracheostomy site Updated:  10/25/18 2354 Special Requests: NO SPECIAL REQUESTS     
  GRAM STAIN FEW WBC'S     
   RARE GRAM NEGATIVE RODS Culture result: PENDING  
 CULTURE, RESPIRATORY/SPUTUM/BRONCH Moreno Jesenia [[id number]] Collected:  10/25/18 1325 Order Status:  Completed Specimen:  Sputum from Tracheal Aspirate Updated:  10/25/18 2353 Special Requests: NO SPECIAL REQUESTS     
  GRAM STAIN MODERATE WBC'S     
   RARE GRAM NEGATIVE RODS Culture result: PENDING  
 CULTURE, URINE [[id number]] Collected:  10/25/18 1359 Order Status:  Completed Specimen:  Cath Urine Updated:  10/25/18 1937 CULTURE, CATHETER TIP [[id number]]  (Abnormal)  (Susceptibility) Collected:  10/19/18 1330 Order Status:  Completed Specimen:  Catheter Tip Updated:  10/22/18 6820 Special Requests: NO SPECIAL REQUESTS Culture result:    
  >15 CFU STAPHYLOCOCCUS EPIDERMIDIS  
     
 CULTURE, BLOOD [[id number]] Collected:  10/16/18 1055 Order Status:  Completed Specimen:  Blood Updated:  10/22/18 0809 Special Requests: PERIPHERAL Culture result: NO GROWTH 6 DAYS     
 CULTURE, BLOOD [[id number]] Collected:  10/16/18 1047 Order Status:  Completed Specimen:  Blood Updated:  10/22/18 0809   Special Requests: PERIPHERAL     
 Culture result: NO GROWTH 6 DAYS     
 HSV 1 AND 2 BY PCR [[id number]] Collected:  10/18/18 1815 Order Status:  Completed Specimen:  Other from Miscellaneous sample Updated:  10/21/18 1636 Source OTHER TEST     
  HSV-1 DNA by PCR NEGATIVE      
  HSV-2 DNA by PCR NEGATIVE Comment: (NOTE) This test was developed and its performance characteristics 
determined by [facility]. It has not been cleared or 
approved by the U.S. Food and Drug Administration. The FDA has 
determined that such clearance or approval is not necessary. This 
test is used for clinical purposes. It should not be regarded as 
investigational or research. Performed At: [address] [id number] Mercedes Nuñez MD PZ:[id number] CULTURE, RESPIRATORY/SPUTUM/BRONCH Milradha Anna [[id number]] Collected:  10/16/18 1915 Order Status:  Completed Specimen:  Sputum,ET Suction Updated:  10/18/18 1011 Special Requests: NO SPECIAL REQUESTS     
  GRAM STAIN 10-25 WBC/lpf     
   <10 EPI/lpf FEW GRAM POSITIVE COCCI IN PAIRS MODERATE GRAM POSITIVE COCCI IN GROUPS MUCUS PRESENT Culture result: MODERATE NORMAL RESPIRATORY SHANAE  
     
 CULTURE, BLOOD [[id number]] Collected:  10/08/18 1227 Order Status:  Completed Specimen:  Blood Updated:  10/14/18 0741 Special Requests: PERIPHERAL Culture result: NO GROWTH 6 DAYS     
 CULTURE, CATHETER TIP [[id number]] Collected:  10/08/18 1215 Order Status:  Completed Specimen:  Catheter Tip Updated:  10/11/18 4550 Special Requests: NO SPECIAL REQUESTS Culture result: NO GROWTH 3 DAYS     
 CULTURE, BLOOD [[id number]] Collected:  10/04/18 1982 Order Status:  Completed Specimen:  Blood Updated:  10/10/18 3687 Special Requests: PERIPHERAL Culture result: NO GROWTH 6 DAYS     
 CULTURE, BLOOD [[id number]] Collected:  10/04/18 1120 Order Status:  Completed Specimen:  Blood Updated:  10/10/18 3165 Special Requests: PERIPHERAL Culture result: NO GROWTH 6 DAYS     
 CULTURE, BLOOD [[id number]] Collected:  09/27/18 0005 Order Status:  Completed Specimen:  Blood Updated:  10/03/18 0845 Special Requests: NO SPECIAL REQUESTS Culture result: NO GROWTH 6 DAYS     
 CULTURE, BLOOD [[id number]]  (Abnormal)  (Susceptibility) Collected:  09/27/18 0136 Order Status:  Completed Specimen:  Blood Updated:  10/02/18 5563 Special Requests: NO SPECIAL REQUESTS     
  GRAM STAIN PEDIATRIC BOTTLE GRAM POSITIVE COCCI IN PAIRS IN CLUSTERS  
      
  SMEAR CALLED TO AND CORRECTLY REPEATED BY: Irena Richards RN IN 2700 ON 9/29/18 AT 2033 WF Culture result:    
  AEROBIC BOTTLE STAPHYLOCOCCUS EPIDERMIDIS  
     
 CULTURE, RESPIRATORY/SPUTUM/BRONCH Marceil Found STAIN [[id number]]  (Abnormal) Collected:  09/29/18 1210 Order Status:  Completed Specimen:  Sputum from Endotracheal aspirate Updated:  10/02/18 1880 Special Requests: NO SPECIAL REQUESTS     
  GRAM STAIN >25 WBC/lpf     
   <10 EPI/lpf MUCUS PRESENT     
   MODERATE YEAST Culture result: MANY CANDIDA TROPICALIS C. DIFFICILE/EPI PCR [[id number]] Collected:  09/29/18 1400 Order Status:  Completed Specimen:  Stool Updated:  09/29/18 2248 Special Requests: NO SPECIAL REQUESTS Culture result: Toxigenic C. difficile NEGATIVE                         C. difficile target DNA sequences are not detected. CULTURE, URINE [[id number]] Collected:  09/27/18 0029 Order Status:  Completed Specimen:  Cath Urine Updated:  09/29/18 0944 Special Requests: NO SPECIAL REQUESTS Culture result: NO GROWTH 2 DAYS Donaldo Rodas MD, [address] October 26, 2018 [facility] [phone]

## 2018-10-26 NOTE — PROGRESS NOTES
0730- Report received from [facility] and assumed care of patient. Patient opens eyes spontaneously; no command following. Withdraws lower extremities. No signs of distress or pain. VS stable on ventilator. Will continue to monitor. 0800- Carla care provided at this time. 1030- Rounded with treatment team. New orders received. 1200- Carla care provided at this time. 1250- Patient off floor for procedure. 1440- Patient returned to floor from IR without incident. Patient connected to monitor. SBP 170s. No signs of distress. Will continue to monitor. 1455- Call patient's daughter Kelsey Ponce to notify her that patient was back from procedure. No answer. Voicemail left by this RN. 1500- Patient's sacral wound dressing changed. Patient tolerated well. Will continue to monitor. 0- Dr. Valda Nyhan made aware that patient back to floor and unable to place J- Tube. Orders to restart tube feed at 35 cc/hr. Bedside and Verbal shift change report given to Antonio Almanza RN (oncoming nurse) by Niels Diego RN (offgoing nurse). Report included the following information SBAR, Kardex, Intake/Output, MAR, Recent Results and Cardiac Rhythm ST.

## 2018-10-26 NOTE — PROGRESS NOTES
Physical Exam  
Skin:  
 
  
 
 
Bedside shift change report was given to me, Delisa Peraza RN  (oncoming night shift nurse), by Marie Krishnan (offgoing day shift nurse). Report included the following information:  SBAR, kardex, consultations, hemodynamic monitoring, intake/output, MAR, lab results, VS trends, cardiac rhythm noted ST. Skin, neuro, respiratory status, and order verification have been dually noted and verified at the bedside with: Cece Garg RN                                                              
ICU Nurse's Note: 
2000: Pt's eyes are open with ongoing spontaneous eye movements, dysconjugate gaze, right eye deviates outward to the left, no noted tracking or visual focusing, no ability to follow commands or communicate needs. Pt has no notable purposeful movements to his extremities. BUE remain flaccid and requiring support and frequent ROM. This pt requires max assist with all ADLs and repositioning. Pt will occasionally withdraw his BLE upon application of painful stim. He is especially hypersensitive to the left great toe, which remains significantly blackened and discolored, surrounding skin is cool and non-blanchable. Will continue to monitor. Neurological: as noted in assessment Cardiovascular:  ST on the monitor. Pulmonary: breath sounds diminished and coarse bilaterally. Saturations maintained at 100% via 8 mm Portex cuffed tracheostomy, managed via vent  AC w/ Vt 500, PEEP 5, FiO2 30%. Intermittent Inline suctioning of small amounts of yellow thick sputum. Positive cough and gag with suction noted. GI/: Abdomen is obese, soft, non-distended. PEG tube intact and infusing Osmolite 1.2 teodoro at 70 ml/hr, with < 5 ml tan residual noted. Last BM indicated 10/25/2018, brown and watery consistency in FMS collection bag. Pt is a hemodialysis client who is oliguric at baseline, with occasional episodes of urinary incontinence . IVF/Critical gtts: Double lumen PICC, patent, has been capped after flushing. Skin: as noted above 
 
0000: Q 6hr accucheck 219, SSI administered per protocol. Pt was straight catheterized at this time of 130 ml braydon colored urine. VS at baseline, pt is afebrile. Interventions are ongoing, will continue to monitor. Tube feeding stopped at this time to implement NPO status after midnight for scheduled procedure in IR later this morning. PEG was flushed and clamped, tube and stoma care completed, a moderate amount of serosanguinous drainage cleaned around stoma site. Pt tolerated without incident. 0200: Pt was incontinent of a moderate amount of urine output at this time. He was repositioned and suctioned a moderate amount of thick 0345: AM labs drawn from PICC without incident. Pt was bathed, repositioned, and tolerated mouth care. He continues to exhibit a strong, productive cough with thick yellow sputum. 0600: Q 6hr accucheck 129, no SSI indicated. Pt tolerated scheduled AM meds and appears to be resting comfortably in bed at this time after repositioning, mouth care, and mery care. FMS is patent and draining. Interventions are ongoing, will continue to monitor.  
0710: Bedside change of shift report given to: Eris Thornton RN

## 2018-10-26 NOTE — PROCEDURES
Vascular & Interventional Radiology Brief Procedure Note Interventional Radiologist: Malathi Richards MD 
Pre-operative Diagnosis:  G to [address] tube request. 
 
Post-operative Diagnosis: Same as pre-op dx Procedure(s) Performed:  Unsuccessful attempt to convert G to [address]. Assistant:  none Anesthesia:  None. Findings:  EGD placed 20 F G tube. Gas filled dilated stomach on , immediately decompressed with G tube cap was removed. Could not pass wire catheter, contrast or air into SB, procedure made difficult by angle of G tube directed towards fundus. Complications: None Estimated Blood Loss:  minimal 
 
Tubes and Drains: None Specimens: None Condition: Good Disposition: floor. Malathi Richards MD, MD 
[address] [place] [address] [facility] 10/26/2018

## 2018-10-26 NOTE — PROGRESS NOTES
TRANSFER - IN REPORT: 
 
Verbal report received from Kenn Garrido, [address] [facility] (name) on [employer]	Orla  being received from ICU, 2700 (unit) for routine progression of care Report consisted of patients Situation, Background, Assessment and  
Recommendations(SBAR). Information from the following report(s) SBAR, Kardex and MAR was reviewed with the receiving nurse. Opportunity for questions and clarification was provided. Assessment completed upon patients arrival to unit and care assumed.

## 2018-10-26 NOTE — PROGRESS NOTES
Podiatry Surgery Progress Note Patient: Ignacio Rosen MRN: [id number]  SSN: [id number] YOB: 1955  Age: 57 y.o. Sex: male Assessment:  
 
Patient Active Problem List  
Diagnosis Code  Stroke (cerebrum) ([facility]) I63.9  Stroke ([facility] I63.9  Rhabdomyolysis M62.82  
 Dehydration E86.0  
 Essential hypertension I10  
 Diabetes mellitus type 2, controlled ([facility] E11.9  Non compliance w medication regimen Z91.14  
 DM (diabetes mellitus) ([facility] E11.9  History of stroke Z80.78  
 Acute-on-chronic kidney injury ([facility] N17.9, N18.9  Acute cystitis N30.00  Elevated troponin R74.8  Elevated LFTs R94.5  Cardiac arrest with ventricular fibrillation ([facility]) I46.9, I49.01  
 Acute pancreatitis K85.90  
 Ischemic encephalopathy I67.89  
 Hypoalbuminemia E88.09  
 Shingles B02.9  Gangrene ([facility]) I96  
 Coarse tremors G25.2 Plan:  
Unable obtain vascular testing. Patient at this time is not a candidate for surgical intervention. Will continue [address] with Betadine paint daily(stable) Call if needed Total time spent with patient: 30 Minutes Care Plan discussed with: Patient Discussed:  Care Plan Disposition:  Stable Mr. Orville Kaur is a 57 y.o. male who was seen at bedside. Patient is no responsive to commands or stimuli at this time. Subjective:  
Past Medical History Past Medical History:  
Diagnosis Date  CHF (congestive heart failure) ([facility]  Diabetes ([facility]  Stroke ([facility] Social History Socioeconomic History  Marital status:  Spouse name: Not on file  Number of children: Not on file  Years of education: Not on file  Highest education level: Not on file Social Needs  Financial resource strain: Not on file  Food insecurity - worry: Not on file  Food insecurity - inability: Not on file  Transportation needs - medical: Not on file  Transportation needs - non-medical: Not on file Occupational History  Not on file Tobacco Use  Smoking status: Never Smoker  Smokeless tobacco: Never Used Substance and Sexual Activity  Alcohol use: No  
 Drug use: No  
 Sexual activity: Not on file Other Topics Concern  Not on file Social History Narrative  Not on file Current Medications Current Facility-Administered Medications Medication Dose Route Frequency Provider Last Rate Last Dose  cefepime (MAXIPIME) 1 g in 0.9% sodium chloride (MBP/ADV) 50 mL MBP  1 g IntraVENous Q24H Schwab, J. Fredrick Marble, MD   Stopped at 10/25/18 1700  
 VANCOMYCIN INFORMATION NOTE   Other Rx Dosing/Monitoring Liam Richards. Neil Montemayor MD      
[facility] Dane Tripp ON 10/27/2018] vancomycin (VANCOCIN) 1,000 mg in 0.9% sodium chloride (MBP/ADV) 250 mL adv  1,000 mg IntraVENous Q TU, TH & SAT Schwab, J. Fredrick Marble, MD  Richardo Sella ON 10/30/2018] VANCOMYCIN INFORMATION NOTE   Other ONCE Liam Richards. Neil Montemayor MD      
 insulin glargine (LANTUS) injection 26 Units  26 Units SubCUTAneous DAILY Chiara Solorzano MD   Stopped at 10/26/18 0900  multivitamin (MULTI-DELYN, WELLESSE) oral liquid 30 mL  30 mL Oral DAILY Allison Hayes MD   Stopped at 10/26/18 0900  
 albuterol (PROVENTIL VENTOLIN) nebulizer solution 2.5 mg  2.5 mg Nebulization BID Shannon LAL MD   2.5 mg at 10/26/18 0711  
 bacitracin 500 unit/gram packet 1 Packet  1 Packet Topical PRN Flor Doss MD      
 sodium chloride (NS) flush 10-30 mL  10-30 mL InterCATHeter PRN Flor Doss MD   30 mL at 10/21/18 0430  
 sodium chloride (NS) flush 10 mL  10 mL InterCATHeter Q24H Shannon LAL MD   10 mL at 10/25/18 1831  
 sodium chloride (NS) flush 10 mL  10 mL InterCATHeter PRN Flor Doss MD   10 mL at 10/20/18 0274  sodium chloride (NS) flush 10-40 mL  10-40 mL InterCATHeter Q8H Leilani Ackerman MD   10 mL at 10/26/18 3836  sodium chloride (NS) flush 20 mL  20 mL InterCATHeter Q24H Jones Ackerman MD   20 mL at 10/25/18 1831  
 heparin (porcine) injection 5,000 Units  5,000 Units SubCUTAneous Q8H Leilani Ackerman MD   5,000 Units at 10/26/18 0513  
 acyclovir sodium (ZOVIRAX) 330.5 mg in 0.9% sodium chloride 100 mL IVPB  5 mg/kg (Ideal) IntraVENous Q24H Dejan Curtis MD   330.5 mg at 10/25/18 1519  hydroxypropyl methylcellulose (ISOPTO TEARS) 0.5 % ophthalmic solution 2 Drop  2 Drop Both Eyes BID Cecily LAL NP   2 Drop at 10/26/18 4722  epoetin manda (EPOGEN;PROCRIT) injection 40,000 Units  40,000 Units IntraVENous Q7D Farhat New MD   40,000 Units at 10/24/18 1227  pantoprazole (PROTONIX) 40 mg in sodium chloride 0.9% 10 mL injection  40 mg IntraVENous Q24H Jaz Bates MD   40 mg at 10/26/18 0460  
 0.9% sodium chloride infusion 250 mL  250 mL IntraVENous PRN Lear Medal, DO      
 influenza vaccine 2018-19 (6 mos+)(PF) (FLUARIX QUAD/FLULAVAL QUAD) injection 0.5 mL  0.5 mL IntraMUSCular PRIOR TO DISCHARGE Elwanda Kehr, MD      
 acetaminophen (TYLENOL) solution 325 mg  325 mg Per NG tube Q4H PRN Elwanda Kehr, MD   325 mg at 10/18/18 2130  
 0.9% sodium chloride infusion 250 mL  250 mL IntraVENous PRN Lear Medal, DO      
 heparin (porcine) pf 100 Units  100 Units InterCATHeter PRN Farhat New MD      
 0.9% sodium chloride infusion 250 mL  250 mL IntraVENous PRN Elwanda Kehr, MD      
 insulin lispro (HUMALOG) injection   SubCUTAneous Q6H Brandy LEE DO   Stopped at 10/26/18 0600  
 heparin (porcine) 1,000 unit/mL injection 1,000 Units  1,000 Units InterCATHeter DIALYSIS PRN Farhat New MD   1,000 Units at 09/28/18 1332  senna-docusate (PERICOLACE) 8.6-50 mg per tablet 1 Tab  1 Tab Oral BID PRN Ramiro Cathryn LAL DO      
 ondansetron (ZOFRAN) injection 4 mg  4 mg IntraVENous Q4H PRN Alfred Nettles DO   4 mg at 09/28/18 1412  glucose chewable tablet 16 g  4 Tab Oral PRN Loree Brannon A,       
 glucagon (GLUCAGEN) injection 1 mg  1 mg IntraMUSCular PRN Alfred Nettles DO      
 dextrose (D50) infusion 12.5-25 g  25-50 mL IntraVENous PRN Alfred Nettles DO   25 g at 10/09/18 1906  hydrALAZINE (APRESOLINE) 20 mg/mL injection 20 mg  20 mg IntraVENous Q6H PRN Alanis Guevara, NP   20 mg at 10/25/18 0230 Patient Allergies No Known Allergies Objective:  
General Exam 
 vegatative state Vitals Visit Vitals /67 (BP 1 Location: Left arm) Pulse 100 Temp 98.3 °F (36.8 °C) Resp 18 Ht 5' 7\" (1.702 m) Wt 86.4 kg (190 lb 7.6 oz) SpO2 100% BMI 29.83 kg/m² REVIEW OF SYSTEMS: 
Unable to assess Foot Exam 
VASCULAR EXAM:. Pedal pulses are palpable 0/4 DP 0/4 PT right and left foot. Skin temperature is warm to warm right and left foot. Digital capillary fill time is 3 seconds right and left foot. There is not edema of the right and left foot and ankle.  
  
NEUROLOGICAL EXAM:. Unable to assess 
  MUSCULOSKELETAL EXAM:. Muscle tone is not normal.   
  
MYCOTIC NAIL Dystrophic nail 1,2,3,4,5 bilateral. Elongated thickened nails 1,2,3,4,5 bilateral Hypertrophic nails 1,2,3,4,5 
  
DERMATOLOGICAL EXAM:. Skin is of abnormal texture and turgor with atrophic skin changes noting hair growth, nail changes (thickening), Bilateral. There is gangrene over the distal aspect of the left hallux with an area of hyperpigmentation over the 2nd digit left eponychium ( Stable). No exudate noted and no odor noted.   
 
 
 
Wound Buttocks Right;Proximal (Active) DRESSING STATUS Other (comment) 10/25/2018  8:00 PM  
DRESSING TYPE Foam;Silicone 27/05/2043  6:16 PM  
Pressure Injury Unstageable 10/25/2018  8:00 PM  
Wound Length (cm) 8.4 cm 9/27/2018 10:06 AM  
Wound Width (cm) 3 cm 9/27/2018 10:06 AM  
Wound Surface area (cm^2) 25.2 cm^2 9/27/2018 10:06 AM  
Change in Wound Size % -75089 9/27/2018 10:06 AM  
 Condition of Base Slough;Mentor 10/25/2018  8:00 PM  
Tissue Type Pink;Yellow 10/23/2018  5:00 PM  
Tissue Type Percent Pink 40 10/20/2018  7:30 PM  
Tissue Type Percent Red 10 10/20/2018  7:30 PM  
Tissue Type Percent Yellow 50 10/20/2018  7:30 PM  
Drainage Amount  Small  10/25/2018  8:00 PM  
Drainage Color Serosanguinous 10/25/2018  8:00 PM  
Wound Odor None 10/23/2018  8:00 PM  
Periwound Skin Condition Edematous 10/24/2018  8:00 PM  
Cleansing and Cleansing Agents  Dermal wound cleanser 10/24/2018  8:00 PM  
Dressing Type Applied Foam;Silicone 00/72/5695  8:17 PM  
Procedure Tolerated Well 10/23/2018  5:00 PM  
Number of days: 46 Wound Buttocks Medial (Active) DRESSING STATUS Old drainage 10/25/2018  8:00 PM  
DRESSING TYPE Foam;Silicone 16/39/9618  2:29 PM  
Pressure Injury Unstageable 10/25/2018  8:00 PM  
Condition of Base Mentor;Slough 10/25/2018  8:00 PM  
Condition of Edges Rolled/curled 10/23/2018  8:00 PM  
Tissue Type Yellow;Pink 10/25/2018  8:00 PM  
Drainage Amount  Small  10/24/2018  8:00 PM  
Drainage Color Serosanguinous 10/24/2018  8:00 PM  
Wound Odor None 10/24/2018  8:00 PM  
Cleansing and Cleansing Agents  Dermal wound cleanser 10/24/2018  8:00 PM  
Dressing Type Applied Foam;Silicone 30/50/5951  0:11 PM  
Procedure Tolerated Well 10/23/2018  5:00 PM  
Number of days: 46 Wound Buttocks Right;Distal (Active) Number of days: 46 Wound Ankle Right;Lateral;Left (Active) DRESSING STATUS Clean, dry, and intact 10/25/2018  8:00 PM  
DRESSING TYPE Foam;Silicone 41/96/7907  0:88 PM  
Pressure Injury Unstageable 10/25/2018  8:00 PM  
Wound Length (cm) 1.2 cm 9/27/2018 10:06 AM  
Wound Width (cm) 1.5 cm 9/27/2018 10:06 AM  
Wound Surface area (cm^2) 1.8 cm^2 9/27/2018 10:06 AM  
Tissue Type Maroon/purple 10/20/2018  7:30 PM  
Tissue Type Percent Maroon/Purple 100 % 10/20/2018  7:30 PM  
Periwound Skin Condition Intact 10/25/2018  8:00 PM  
 Dressing Type Applied Foam;Silicone 12/43/5756  2:91 PM  
Procedure Tolerated Well 10/20/2018  7:30 PM  
Number of days: 29 Wound Abdomen (Active) DRESSING STATUS Other (comment) 10/25/2018  8:00 PM  
DRESSING TYPE Split gauze 10/25/2018  8:00 PM  
Incision site well approximated? Yes 10/25/2018  8:00 PM  
Drainage Amount  Small  10/25/2018  8:00 PM  
Drainage Color Serosanguinous 10/25/2018  8:00 PM  
Wound Odor None 10/25/2018  8:00 PM  
Periwound Skin Condition Intact 10/24/2018  8:00 PM  
Cleansing and Cleansing Agents  Dermal wound cleanser 10/24/2018  8:00 PM  
Dressing Type Applied Split gauze 10/24/2018  8:00 PM  
Number of days: 9 [REMOVED] Wound Nose (Removed) DRESSING STATUS Other (comment) 10/20/2018  7:30 PM  
Number of days: 8 Labs Recent Results (from the past 24 hour(s)) GLUCOSE, POC Collection Time: 10/25/18 12:31 PM  
Result Value Ref Range Glucose (POC) 223 (H) 70 - 110 mg/dL CULTURE, RESPIRATORY/SPUTUM/BRONCH W GRAM STAIN Collection Time: 10/25/18  1:25 PM  
Result Value Ref Range Special Requests: NO SPECIAL REQUESTS    
 GRAM STAIN MODERATE WBC'S    
 GRAM STAIN RARE GRAM NEGATIVE RODS Culture result: PENDING   
POC G3 Collection Time: 10/25/18  1:32 PM  
Result Value Ref Range Device: VENT    
 FIO2 (POC) 0.30 % pH (POC) 7.451 (H) 7.35 - 7.45    
 pCO2 (POC) 38.2 35.0 - 45.0 MMHG  
 pO2 (POC) 78 (L) 80 - 100 MMHG  
 HCO3 (POC) 26.4 (H) 22 - 26 MMOL/L  
 sO2 (POC) 95 92 - 97 % Base excess (POC) 3 mmol/L Mode SIMV Tidal volume 512 ml Set Rate 12 bpm  
 PEEP/CPAP (POC) 5 cmH2O Pressure support 10 cmH2O Allens test (POC) YES Total resp. rate 31 Site LEFT RADIAL Patient temp. 100.4 Specimen type (POC) ARTERIAL Performed by Dayo Cos Volume control YES    
URINALYSIS W/MICROSCOPIC Collection Time: 10/25/18  1:59 PM  
Result Value Ref Range Color DARK YELLOW Appearance CLOUDY Specific gravity 1.020 1.005 - 1.030    
 pH (UA) 6.0 5.0 - 8.0 Protein 300 (A) NEG mg/dL Glucose 100 (A) NEG mg/dL Ketone NEGATIVE  NEG mg/dL Bilirubin NEGATIVE  NEG Blood LARGE (A) NEG Urobilinogen 0.2 0.2 - 1.0 EU/dL Nitrites NEGATIVE  NEG Leukocyte Esterase MODERATE (A) NEG    
 WBC 36 to 50 0 - 4 /hpf  
 RBC TOO NUMEROUS TO COUNT 0 - 5 /hpf Epithelial cells FEW 0 - 5 /lpf Bacteria 2+ (A) NEG /hpf CULTURE, WOUND W GRAM STAIN Collection Time: 10/25/18  1:59 PM  
Result Value Ref Range Special Requests: NO SPECIAL REQUESTS    
 GRAM STAIN FEW WBC'S    
 GRAM STAIN RARE GRAM NEGATIVE RODS Culture result: PENDING   
GLUCOSE, POC Collection Time: 10/25/18  5:49 PM  
Result Value Ref Range Glucose (POC) 193 (H) 70 - 110 mg/dL GLUCOSE, POC Collection Time: 10/26/18 12:17 AM  
Result Value Ref Range Glucose (POC) 219 (H) 70 - 110 mg/dL LIPASE Collection Time: 10/26/18  3:55 AM  
Result Value Ref Range Lipase 1,181 (H) 73 - 393 U/L  
CBC W/O DIFF Collection Time: 10/26/18  3:55 AM  
Result Value Ref Range WBC 18.5 (H) 4.6 - 13.2 K/uL  
 RBC 2.82 (L) 4.70 - 5.50 M/uL HGB 8.0 (L) 13.0 - 16.0 g/dL HCT 25.8 (L) 36.0 - 48.0 % MCV 91.5 74.0 - 97.0 FL  
 MCH 28.4 24.0 - 34.0 PG  
 MCHC 31.0 31.0 - 37.0 g/dL  
 RDW 15.9 (H) 11.6 - 14.5 % PLATELET 950 (H) 024 - 420 K/uL MPV 9.0 (L) 9.2 - 11.8 FL  
RENAL FUNCTION PANEL Collection Time: 10/26/18  3:55 AM  
Result Value Ref Range Sodium 139 136 - 145 mmol/L Potassium 4.5 3.5 - 5.5 mmol/L Chloride 100 100 - 108 mmol/L  
 CO2 27 21 - 32 mmol/L Anion gap 12 3.0 - 18 mmol/L Glucose 135 (H) 74 - 99 mg/dL BUN 39 (H) 7.0 - 18 MG/DL Creatinine 3.32 (H) 0.6 - 1.3 MG/DL  
 BUN/Creatinine ratio 12 12 - 20 GFR est AA 23 (L) >60 ml/min/1.73m2 GFR est non-AA 19 (L) >60 ml/min/1.73m2 Calcium 7.7 (L) 8.5 - 10.1 MG/DL  Phosphorus 4.5 2.5 - 4.9 MG/DL  
 Albumin 1.6 (L) 3.4 - 5.0 g/dL CALCIUM, IONIZED Collection Time: 10/26/18  3:55 AM  
Result Value Ref Range Ionized Calcium 1.01 (L) 1.12 - 1.32 MMOL/L  
MAGNESIUM Collection Time: 10/26/18  3:55 AM  
Result Value Ref Range Magnesium 2.1 1.6 - 2.6 mg/dL GLUCOSE, POC Collection Time: 10/26/18  5:09 AM  
Result Value Ref Range Glucose (POC) 129 (H) 70 - 110 mg/dL X-Ray:  none Procedures:   none Kaley Banks DPM 
October 26, 2018

## 2018-10-26 NOTE — PROGRESS NOTES
Dr. Mckeon Claw trach out and removed stiches RT at bedside patient stable and doing well on PSV  Will try trach collar today

## 2018-10-26 NOTE — PROGRESS NOTES
Problem: Falls - Risk of 
Goal: *Absence of Falls Document Anna Mc Fall Risk and appropriate interventions in the flowsheet. Outcome: Progressing Towards Goal 
Fall Risk Interventions: 
Mobility Interventions: Bed/chair exit alarm, Patient to call before getting OOB Mentation Interventions: Bed/chair exit alarm, Door open when patient unattended, More frequent rounding, Reorient patient, Room close to nurse's station Medication Interventions: Bed/chair exit alarm, Patient to call before getting OOB Elimination Interventions: Call light in reach, Patient to call for help with toileting needs, Toileting schedule/hourly rounds History of Falls Interventions: Bed/chair exit alarm, Door open when patient unattended, Room close to nurse's station Problem: Pressure Injury - Risk of 
Goal: *Prevention of pressure injury Document Mitul Scale and appropriate interventions in the flowsheet. Outcome: Progressing Towards Goal 
Pressure Injury Interventions: 
Sensory Interventions: Assess changes in LOC, Assess need for specialty bed, Float heels, Keep linens dry and wrinkle-free, Minimize linen layers, Turn and reposition approx. every two hours (pillows and wedges if needed) Moisture Interventions: Absorbent underpads, Check for incontinence Q2 hours and as needed, Minimize layers Activity Interventions: Assess need for specialty bed Mobility Interventions: Assess need for specialty bed, HOB 30 degrees or less, Pressure redistribution bed/mattress (bed type), Turn and reposition approx. every two hours(pillow and wedges) Nutrition Interventions: Document food/fluid/supplement intake, Offer support with meals,snacks and hydration Friction and Shear Interventions: Apply protective barrier, creams and emollients, Feet elevated on foot rest, HOB 30 degrees or less, Foam dressings/transparent film/skin sealants, Minimize layers

## 2018-10-26 NOTE — PROGRESS NOTES
Hospitalist Progress Note Daily Progress Note: 10/26/2018    
Interval history / Subjective:  
Lilibeth Montana is a 57y.o. year old male who presented from [facility] with abnormal labs, elevated BUN/Cr. Found to have JULIANNA, UTI, and markedly elevated LFT on presentation. Patient's recent admission was 9/10/18-9/14/18 for acute CVA and rhabdo. Patient poor historian on admission,stated \"I had heart failure. She was started on vanc,zosyn. On 9/29,patient had cardiac arrest with ventricular fibrillation - s/p ROSC. Her hospital course has since then complicated with sepsis,pancreatitits,pneumonia,acute pancreatitis requiring involvement of gi,surgery,nephrology,cardiology,ID,cardiology. So far patient has remained without major improvement. Now in vegetative state. Patient had PEG/Trach placement on 10/17. On 10/17,patient started on acyclovir for veicular rash rt arm,suspected being Zoster. Lipase 2169. US abd 10/22 shows Abnormal appearance of the gallbladder with stones/sludge with wall thickening  
and possible pericholecystic fluid similar to the prior study of 9/27/2018 
10/22: Patient on treatment for shingles in contact isolation per ID team. He also had left big toe gangrene . We will consult Podiatry. His lipase was elevated , US of the abdomen shows sludge and possible cholecystitis. Patient afebrile. GI consulted  for possible MRCP. Patient has leukocytosis. ID started Vancomycin and ordered Urine , blood and sputum culture and trach site discharge. Patient off air bourne precaution but continue contact precaution per ID. Patient was started on Vancomycin and cefepime for SIRSper ID. Blood and urine cx pending. Patient today is going for jejunostomy. Current Facility-Administered Medications Medication Dose Route Frequency  insulin glargine (LANTUS) injection 15 Units  15 Units SubCUTAneous DAILY  calcium gluconate 2 g in 0.9% sodium chloride 50 mL IVPB  2 g IntraVENous ONCE  
  cefepime (MAXIPIME) 1 g in 0.9% sodium chloride (MBP/ADV) 50 mL MBP  1 g IntraVENous Q24H  VANCOMYCIN INFORMATION NOTE   Other Rx Dosing/Monitoring  [START ON 10/27/2018] vancomycin (VANCOCIN) 1,000 mg in 0.9% sodium chloride (MBP/ADV) 250 mL adv  1,000 mg IntraVENous Q TU, TH & SAT  [START ON 10/30/2018] VANCOMYCIN INFORMATION NOTE   Other ONCE  
 multivitamin (MULTI-DELYN, WELLESSE) oral liquid 30 mL  30 mL Oral DAILY  albuterol (PROVENTIL VENTOLIN) nebulizer solution 2.5 mg  2.5 mg Nebulization BID  
 bacitracin 500 unit/gram packet 1 Packet  1 Packet Topical PRN  
 sodium chloride (NS) flush 10-30 mL  10-30 mL InterCATHeter PRN  
 sodium chloride (NS) flush 10 mL  10 mL InterCATHeter Q24H  
 sodium chloride (NS) flush 10 mL  10 mL InterCATHeter PRN  
 sodium chloride (NS) flush 10-40 mL  10-40 mL InterCATHeter Q8H  
 sodium chloride (NS) flush 20 mL  20 mL InterCATHeter Q24H  
 heparin (porcine) injection 5,000 Units  5,000 Units SubCUTAneous Q8H  
 acyclovir sodium (ZOVIRAX) 330.5 mg in 0.9% sodium chloride 100 mL IVPB  5 mg/kg (Ideal) IntraVENous Q24H  hydroxypropyl methylcellulose (ISOPTO TEARS) 0.5 % ophthalmic solution 2 Drop  2 Drop Both Eyes BID  epoetin manda (EPOGEN;PROCRIT) injection 40,000 Units  40,000 Units IntraVENous Q7D  
 pantoprazole (PROTONIX) 40 mg in sodium chloride 0.9% 10 mL injection  40 mg IntraVENous Q24H  
 0.9% sodium chloride infusion 250 mL  250 mL IntraVENous PRN  
 influenza vaccine 2018-19 (6 mos+)(PF) (FLUARIX QUAD/FLULAVAL QUAD) injection 0.5 mL  0.5 mL IntraMUSCular PRIOR TO DISCHARGE  acetaminophen (TYLENOL) solution 325 mg  325 mg Per NG tube Q4H PRN  
 0.9% sodium chloride infusion 250 mL  250 mL IntraVENous PRN  
 heparin (porcine) pf 100 Units  100 Units InterCATHeter PRN  
 0.9% sodium chloride infusion 250 mL  250 mL IntraVENous PRN  
 insulin lispro (HUMALOG) injection   SubCUTAneous Q6H  
  heparin (porcine) 1,000 unit/mL injection 1,000 Units  1,000 Units InterCATHeter DIALYSIS PRN  
 senna-docusate (PERICOLACE) 8.6-50 mg per tablet 1 Tab  1 Tab Oral BID PRN  
 ondansetron (ZOFRAN) injection 4 mg  4 mg IntraVENous Q4H PRN  
 glucose chewable tablet 16 g  4 Tab Oral PRN  
 glucagon (GLUCAGEN) injection 1 mg  1 mg IntraMUSCular PRN  
 dextrose (D50) infusion 12.5-25 g  25-50 mL IntraVENous PRN  
 hydrALAZINE (APRESOLINE) 20 mg/mL injection 20 mg  20 mg IntraVENous Q6H PRN Review of Systems Intubated,unresponsive. Objective:  
 
Visit Vitals /59 Pulse 99 Temp 98.3 °F (36.8 °C) Resp 21 Ht 5' 7\" (1.702 m) Wt 86.4 kg (190 lb 7.6 oz) SpO2 99% BMI 29.83 kg/m² O2 Flow Rate (L/min): 45 l/min O2 Device: Ventilator Temp (24hrs), Av.7 °F (37.1 °C), Min:98.3 °F (36.8 °C), Max:99 °F (37.2 °C) No intake/output data recorded. 10/24 1901 - 10/26 0700 In: 2110 Out: 1412 [Urine:395; Drains:400] P/E General Appearance:S/p PEG/Trach today HENT: normocephalic/atraumatic, + ETT Neck: No JVD, hematoma R side where [facility] is improving Lungs: Coarse B/L w/ vent-assisted BS 
CV: RRR, no m/r/g Abdomen: soft, non-tender, normal bowel sounds Extremities: versicular rash rt arm and right side of chest,no cyanosis, no peripheral edema Neuro: Unresponsive to commands, no withdrawal to pain, + corneal reflex and pupillary reaction today Skin: Normal color, intact Data Review Recent Results (from the past 12 hour(s)) LIPASE Collection Time: 10/26/18  3:55 AM  
Result Value Ref Range Lipase 1,181 (H) 73 - 393 U/L  
CBC W/O DIFF Collection Time: 10/26/18  3:55 AM  
Result Value Ref Range WBC 18.5 (H) 4.6 - 13.2 K/uL  
 RBC 2.82 (L) 4.70 - 5.50 M/uL HGB 8.0 (L) 13.0 - 16.0 g/dL HCT 25.8 (L) 36.0 - 48.0 % MCV 91.5 74.0 - 97.0 FL  
 MCH 28.4 24.0 - 34.0 PG  
 MCHC 31.0 31.0 - 37.0 g/dL  
 RDW 15.9 (H) 11.6 - 14.5 % PLATELET 709 (H) 189 - 420 K/uL MPV 9.0 (L) 9.2 - 11.8 FL  
RENAL FUNCTION PANEL Collection Time: 10/26/18  3:55 AM  
Result Value Ref Range Sodium 139 136 - 145 mmol/L Potassium 4.5 3.5 - 5.5 mmol/L Chloride 100 100 - 108 mmol/L  
 CO2 27 21 - 32 mmol/L Anion gap 12 3.0 - 18 mmol/L Glucose 135 (H) 74 - 99 mg/dL BUN 39 (H) 7.0 - 18 MG/DL Creatinine 3.32 (H) 0.6 - 1.3 MG/DL  
 BUN/Creatinine ratio 12 12 - 20 GFR est AA 23 (L) >60 ml/min/1.73m2 GFR est non-AA 19 (L) >60 ml/min/1.73m2 Calcium 7.7 (L) 8.5 - 10.1 MG/DL Phosphorus 4.5 2.5 - 4.9 MG/DL Albumin 1.6 (L) 3.4 - 5.0 g/dL CALCIUM, IONIZED Collection Time: 10/26/18  3:55 AM  
Result Value Ref Range Ionized Calcium 1.01 (L) 1.12 - 1.32 MMOL/L  
MAGNESIUM Collection Time: 10/26/18  3:55 AM  
Result Value Ref Range Magnesium 2.1 1.6 - 2.6 mg/dL GLUCOSE, POC Collection Time: 10/26/18  5:09 AM  
Result Value Ref Range Glucose (POC) 129 (H) 70 - 110 mg/dL GLUCOSE, POC Collection Time: 10/26/18 11:47 AM  
Result Value Ref Range Glucose (POC) 121 (H) 70 - 110 mg/dL Assessment/Plan:  
 
Principal Problem: 
  Cardiac arrest with ventricular fibrillation ([facility] (9/29/2018) Overview: ROSC before defibrillation could be attempted. Active Problems: 
  Essential hypertension (9/10/2018) Diabetes mellitus type 2, controlled ([facility] (9/10/2018) History of stroke (9/27/2018) Overview: With residual R hemiparesis Acute-on-chronic kidney injury ([facility] (9/27/2018) Acute cystitis (9/27/2018) Elevated troponin (9/27/2018) Elevated LFTs (9/28/2018) Acute pancreatitis (9/29/2018) Overview: Lipase downtrending with interruption in tube feed and Mucomyst. Doubt  
    that the elevated lipase is due to excessive lipid in tube feeding. May be  
    secondary to Mucomyst. 
 
  Ischemic encephalopathy (9/30/2018) Hypoalbuminemia (10/21/2018) Shingles (10/21/2018) Gangrene ([address] (10/22/2018) Coarse tremors (10/23/2018) Overview: Tremors vs chills vs seizure vs other. Will monitor. If persistent, will  
    consider 24-hr EEG monitoring. Zoster. Care Plan - Vent mgmt per ICU - S/p trach/PEG on 10/17 as patient did not show significant improvement. 
- EGD showed large clot in esophagus last week repeat EGD 10/9 shows healed esophageal ulcer - Cont protonix IV every day  
- Hgb stable - LFTs improving - GI signed off 10/9 
- MRI w/ evidence of severe anoxic brain injury - Minimal improvement on neuro exam 
- Appreciate neuro assistance - IV Meropenem and IV Fluconazole d/heron on 10/15 per ID. 
- Currently on vanc only which was started on 9/27 
- Nephro managing HD TTHS 
- Pt unlikely to have any meaningful neuro recovery, prognosis is very grim - Family wanted to cont to do everything, including trach/PEG -> was done10/17 
- On 10/17:Started on acyclovir for possible Zoster rt arm and rt upper chest area. - Hyperkalemia on 10/18 ->corrected - Lipase 2169 10/22. US abd Abnormal appearance of the gallbladder with stones/sludge with wall thickening  
and possible pericholecystic fluid similar to the prior study of 9/27/2018  
- On Acyclovir for shingles , contact precaution per ID commitee 
- 10/22: Consult GI today for possible MRCP 
- 10/22: Consult Podiatry for left big toe gangrene  
- 10/23 : Podiatry recommend RAQUEL for Left Big toe  
- 10/23: GI recommend MRI/MRCP but will need patient off vent for multiple times for procedure. Not sure if this is feasible now 
- 10/25 : Blood , urine , sputum , trach discharge cultures 
- 10/25 IV antibiotics by ID  
- Change GI tube to Jejunostomy tube tomorrow by IR  
- 10/26: Vancomycin and Cefepime per ID  
- 10/26 : For Jejunostomy today DVT prophylaxis:scds Full code. Disposition:tbd - need placement -  involved.

## 2018-10-26 NOTE — PROGRESS NOTES
RENAL PROGRESS NOTE Jayden Erie Assessment/Plan: · Prolonged dialysis dependant JULIANNA (ischemic atn in a setting of acute pyelonephritis/acute cholecystitis with sepsis and cardiac arrest 9/29). No evidence of recovery of renal function at this time, although nonolliguric (continue straight cath every 8 hours). Dialyzed yesterday and continue tts. I have asked vascular for [facility] [place], operated by [facility] next week provided that patient is afebrile and with neg bc. · S/p cardiopulm arrest 9/29. · Acute pyelonephritis/sepsis. Febrile on/off. Abx restarted by ID. · Abnormal lft's/acute cholecystitis/pancreatitis. GI on the case. PEG to be replaced by Jtube. · UGI bleed, EGD results noted- healing esophageal ulceration. · Acute blood loss anemia/anemia of kidney failure. Continue analia. · Asp pneumonia. · Resp failure. S/p peg/trach 10/17, s/p bronch. · Rt shoulder shingles. On acyclovir. · Anoxic brain injury superimposed on recent cva. Subjective: 
Seen on dialysis. Intubated, unresponsive. Eyes are open. TF is on hold for Jtube placement. Patient Active Problem List  
Diagnosis Code  Stroke (cerebrum) ([facility]) I63.9  Stroke ([facility] I63.9  Rhabdomyolysis M62.82  
 Dehydration E86.0  
 Essential hypertension I10  
 Diabetes mellitus type 2, controlled ([facility] E11.9  Non compliance w medication regimen Z91.14  
 DM (diabetes mellitus) ([facility] E11.9  History of stroke Z80.78  
 Acute-on-chronic kidney injury ([facility] N17.9, N18.9  Acute cystitis N30.00  Elevated troponin R74.8  Elevated LFTs R94.5  Cardiac arrest with ventricular fibrillation ([facility]) I46.9, I49.01  
 Acute pancreatitis K85.90  
 Ischemic encephalopathy I67.89  
 Hypoalbuminemia E88.09  
 Shingles B02.9  Gangrene ([facility]) I96  
 Coarse tremors G25.2 Current Facility-Administered Medications Medication Dose Route Frequency Provider Last Rate Last Dose  insulin glargine (LANTUS) injection 15 Units  15 Units SubCUTAneous DAILY Anthony Solorzano MD      
 cefepime (MAXIPIME) 1 g in 0.9% sodium chloride (MBP/ADV) 50 mL MBP  1 g IntraVENous Q24H Schwab, J. Rhonda Riggs, MD   Stopped at 10/25/18 1700  
 VANCOMYCIN INFORMATION NOTE   Other Rx Dosing/Monitoring Elida Trinidad. MD Stephanie Mcintosh [place] ON 10/27/2018] vancomycin (VANCOCIN) 1,000 mg in 0.9% sodium chloride (MBP/ADV) 250 mL adv  1,000 mg IntraVENous Q TU, TH & SAT Schwab, J. Rhonda Riggs, MD      
 [place] ON 10/30/2018] VANCOMYCIN INFORMATION NOTE   Other ONCE Elida Lemus MD      
 multivitamin (MULTI-DELYN, WELLESSE) oral liquid 30 mL  30 mL Oral DAILY Ekta Pena MD   Stopped at 10/26/18 0900  
 albuterol (PROVENTIL VENTOLIN) nebulizer solution 2.5 mg  2.5 mg Nebulization BID Brendan LAL MD   2.5 mg at 10/26/18 0711  
 bacitracin 500 unit/gram packet 1 Packet  1 Packet Topical PRN Adenike Howard MD      
 sodium chloride (NS) flush 10-30 mL  10-30 mL InterCATHeter PRN Adenike Howard MD   30 mL at 10/21/18 0430  
 sodium chloride (NS) flush 10 mL  10 mL InterCATHeter Q24H Brendan LAL MD   10 mL at 10/25/18 1831  
 sodium chloride (NS) flush 10 mL  10 mL InterCATHeter PRN Adenike Howard MD   10 mL at 10/20/18 4065  sodium chloride (NS) flush 10-40 mL  10-40 mL InterCATHeter Q8H Bartolome Ackerman MD   10 mL at 10/26/18 0703  sodium chloride (NS) flush 20 mL  20 mL InterCATHeter Q24H Jones Ackerman MD   20 mL at 10/25/18 1831  
 heparin (porcine) injection 5,000 Units  5,000 Units SubCUTAneous Q8H Bartolome Ackerman MD   5,000 Units at 10/26/18 1775  
 acyclovir sodium (ZOVIRAX) 330.5 mg in 0.9% sodium chloride 100 mL IVPB  5 mg/kg (Ideal) IntraVENous Q24H Dejan Curtis MD   330.5 mg at 10/25/18 9616  hydroxypropyl methylcellulose (ISOPTO TEARS) 0.5 % ophthalmic solution 2 Drop  2 Drop Both Eyes BID Colerhodaa Carlin A, NP   2 Drop at 10/26/18 4821  epoetin manda (EPOGEN;PROCRIT) injection 40,000 Units  40,000 Units IntraVENous Q7D Gwen Guaman MD   40,000 Units at 10/24/18 1227  pantoprazole (PROTONIX) 40 mg in sodium chloride 0.9% 10 mL injection  40 mg IntraVENous Q24H Jennifer Hull MD   40 mg at 10/26/18 8610  
 0.9% sodium chloride infusion 250 mL  250 mL IntraVENous PRN Shefali Calvo DO      
 influenza vaccine 2018-19 (6 mos+)(PF) (FLUARIX QUAD/FLULAVAL QUAD) injection 0.5 mL  0.5 mL IntraMUSCular PRIOR TO DISCHARGE Isaiah Rivas MD      
 acetaminophen (TYLENOL) solution 325 mg  325 mg Per NG tube Q4H PRN Isaiah Rivas MD   325 mg at 10/18/18 2130  
 0.9% sodium chloride infusion 250 mL  250 mL IntraVENous PRN Shefali Calvo DO      
 heparin (porcine) pf 100 Units  100 Units InterCATHeter PRN Gwen Guaman MD      
 0.9% sodium chloride infusion 250 mL  250 mL IntraVENous PRN Isaiah Rivas MD      
 insulin lispro (HUMALOG) injection   SubCUTAneous Q6H Tyler LEE DO   Stopped at 10/26/18 0600  
 heparin (porcine) 1,000 unit/mL injection 1,000 Units  1,000 Units InterCATHeter DIALYSIS PRN Gwen Guaman MD   1,000 Units at 09/28/18 1332  senna-docusate (PERICOLACE) 8.6-50 mg per tablet 1 Tab  1 Tab Oral BID PRN Ilana Westbrook, DO      
 ondansetron (ZOFRAN) injection 4 mg  4 mg IntraVENous Q4H PRN Alfred Nettles DO   4 mg at 09/28/18 0539  
 glucose chewable tablet 16 g  4 Tab Oral PRN Jasvir LAL DO      
 glucagon (GLUCAGEN) injection 1 mg  1 mg IntraMUSCular PRN Alfred Nettles DO      
 dextrose (D50) infusion 12.5-25 g  25-50 mL IntraVENous PRN Alfred Nettles DO   25 g at 10/09/18 1906  hydrALAZINE (APRESOLINE) 20 mg/mL injection 20 mg  20 mg IntraVENous Q6H PRN Trina Saucedo NP   20 mg at 10/25/18 8740 Objective Vitals:  
 10/26/18 0700 10/26/18 0800 10/26/18 0900 10/26/18 1000 BP: 144/67 134/59 137/57 138/59 Pulse: 100 (!) 104 (!) 102 99 Resp: 18 25 23 21 Temp:      
TempSrc:      
SpO2: 100% 98% 99% 100% Weight: 86.4 kg (190 lb 7.6 oz) Height:      
 
 
 
Intake/Output Summary (Last 24 hours) at 10/26/2018 1042 Last data filed at 10/26/2018 0000 Gross per 24 hour Intake 940 ml Output 3215 ml Net -2275 ml Admission weight: Weight: 96.6 kg (213 lb) (09/26/18 2244) Last Weight Metrics: 
Weight Loss Metrics 10/26/2018 9/26/2018 9/13/2018 Today's Wt 190 lb 7.6 oz - 227 lb 15.3 oz  
BMI - 29.83 kg/m2 35.7 kg/m2 Physical Assessment:  
 
General: intubated, unresponsive. Eyes are open. Neck: Trach in place. Neck: No jvd. LUNGS: diminished air entry at the bases. No crackles. CVS EXM: S1, S2  RRR. Abdomen: soft, pos bs. Lower Extremities:  1+ edema. Rt arm with multiple flassid blisters- some with crusting. Rt femoral temporary dialysis catheter. Lab CBC w/Diff Recent Labs 10/26/18 
0355 10/25/18 
0400 10/24/18 
0416 WBC 18.5* 18.3* 14.0*  
RBC 2.82* 2.80* 2.75* HGB 8.0* 7.8* 7.8* HCT 25.8* 24.9* 24.9*  
* 558* 581* Chemistry Recent Labs 10/26/18 
0355 10/25/18 
0400  10/24/18 
0416 * 190*  --  177*  138  --  140  
K 4.5 4.9  --  4.3  99*  --  101 CO2 27 26  --  27 BUN 39* 68*  --  48* CREA 3.32* 5.25*  --  4.02* CA 7.7* 7.3*  --  7.6* AGAP 12 13  --  12  
BUCR 12 13  --  12  
AP  --   --   --  339*  340* TP  --   --   --  7.5  7.6 ALB 1.6* 1.7*  --  1.8*  1.7*  
GLOB  --   --   --  5.7*  5.9* AGRAT  --   --   --  0.3*  0.3* PHOS 4.5 6.7*   < >  --   
 < > = values in this interval not displayed. No results found for: IRON, FE, TIBC, IBCT, PSAT, FERR Lab Results Component Value Date/Time  Calcium 7.7 (L) 10/26/2018 03:55 AM  
 Phosphorus 4.5 10/26/2018 03:55 AM  
  
 
Margarita Steinberg M.D. [facility] Phone [phone] Pager [phone]

## 2018-10-26 NOTE — PROGRESS NOTES
Problem: Falls - Risk of 
Goal: *Absence of Falls Document Cy Claxton Fall Risk and appropriate interventions in the flowsheet. Outcome: Progressing Towards Goal 
Fall Risk Interventions: 
Mobility Interventions: Bed/chair exit alarm Mentation Interventions: Evaluate medications/consider consulting pharmacy Medication Interventions: Evaluate medications/consider consulting pharmacy Elimination Interventions: Bed/chair exit alarm, Call light in reach History of Falls Interventions: Evaluate medications/consider consulting pharmacy

## 2018-10-26 NOTE — PROGRESS NOTES
At approximately [phone], this  attempted to conduct a follow-up consultation and spiritual assessment for Willie Christensen, who is a 57 y.o. male. However, patient was asleep, with no family at bedside. The  provided the following interventions: 
Offered silent prayer on patient's behalf. Reviewed chart. Plan: 
Chaplains will continue to follow and will provide pastoral care on an as-needed/requested basis. [facility] Certified  [address] [facility]  
[phone]

## 2018-10-27 PROBLEM — A49.8 PSEUDOMONAS INFECTION: Status: ACTIVE | Noted: 2018-01-01

## 2018-10-27 NOTE — PROGRESS NOTES
Recd pt on vent:  SIMV, VC, Pn=527, Rate=12, PSV=10, Fio2=30%, Peep=5 Pt on trach - size 8 - clean trach with NS, change inner cannula & drain bandage 0830-Begin SBT with PSV=10 Initial results:  
Ub=226, Ve=7.3, RR=26, BF=492, RSBI=93, Sat=99%, BN=499/59 (77) 
--will monitor for tolerance & ability to further wean 0845-Stop SBT - pt not maintaining adequate volumes BF=247, /56 (70), RR=28-32, Xd=273-368, Ve=5.68, FXPI=516 
 
1235-Vent check complete Pt appears comfortable - improved volumes & RR 
--attempted to place back on SBT, pt needed PSV=12 to maintain volumes in 400's Initial readings: PSV=12 Ee=853, Ve=11, RR=26, RSBI=54, AX=900, Sat=100%, AO=391/49 (66 
 
1250-Recd verbal order for wean of PSV=10 - Pt can return to SIMV at night & Spont during day as tolerates Tm=018-111, RR=30, RSBI=74

## 2018-10-27 NOTE — PROGRESS NOTES
Hospitalist Progress Note Daily Progress Note: 10/27/2018    
Interval history / Subjective:  
[facility]	Yony Schafer is a 57y.o. year old male who presented from [facility] with abnormal labs, elevated BUN/Cr. Found to have JULIANNA, UTI, and markedly elevated LFT on presentation. Patient's recent admission was 9/10/18-9/14/18 for acute CVA and rhabdo. Patient poor historian on admission,stated \"I had heart failure. She was started on vanc,zosyn. On 9/29,patient had cardiac arrest with ventricular fibrillation - s/p ROSC. Her hospital course has since then complicated with sepsis,pancreatitits,pneumonia,acute pancreatitis requiring involvement of gi,surgery,nephrology,cardiology,ID,cardiology. So far patient has remained without major improvement. Now in vegetative state. Patient had PEG/Trach placement on 10/17. On 10/17,patient started on acyclovir for veicular rash rt arm,suspected being Zoster. Lipase 2169. US abd 10/22 shows Abnormal appearance of the gallbladder with stones/sludge with wall thickening  
and possible pericholecystic fluid similar to the prior study of 9/27/2018 
10/22: Patient on treatment for shingles in contact isolation per ID team. He also had left big toe gangrene . We will consult Podiatry. His lipase was elevated , US of the abdomen shows sludge and possible cholecystitis. Patient afebrile. GI consulted  for possible MRCP. Patient has leukocytosis. ID started Vancomycin and ordered Urine , blood and sputum culture and trach site discharge. Patient off air bourne precaution but continue contact precaution per ID. Patient was started on Vancomycin and cefepime for SIRSper ID. Blood and urine cx pending. Patient today is going for jejunostomy. Patient BC NGTD Vancomycin d/c'd per ID. Patient respiratory  cx + GNR , STREPT, Wound cx + pseudomonas . PCCM added Ciprofloxacin today . IR attempt at Jejunostomy was unsuccessful yesterday. Current Facility-Administered Medications Medication Dose Route Frequency  cefepime (MAXIPIME) 0.5 g in 0.9% sodium chloride 100 mL IVPB  0.5 g IntraVENous Q24H  
 insulin glargine (LANTUS) injection 15 Units  15 Units SubCUTAneous DAILY  multivitamin (MULTI-DELYN, WELLESSE) oral liquid 30 mL  30 mL Oral DAILY  albuterol (PROVENTIL VENTOLIN) nebulizer solution 2.5 mg  2.5 mg Nebulization BID  
 bacitracin 500 unit/gram packet 1 Packet  1 Packet Topical PRN  
 sodium chloride (NS) flush 10-30 mL  10-30 mL InterCATHeter PRN  
 sodium chloride (NS) flush 10 mL  10 mL InterCATHeter Q24H  
 sodium chloride (NS) flush 10 mL  10 mL InterCATHeter PRN  
 sodium chloride (NS) flush 10-40 mL  10-40 mL InterCATHeter Q8H  
 sodium chloride (NS) flush 20 mL  20 mL InterCATHeter Q24H  
 heparin (porcine) injection 5,000 Units  5,000 Units SubCUTAneous Q8H  
 hydroxypropyl methylcellulose (ISOPTO TEARS) 0.5 % ophthalmic solution 2 Drop  2 Drop Both Eyes BID  epoetin manda (EPOGEN;PROCRIT) injection 40,000 Units  40,000 Units IntraVENous Q7D  
 pantoprazole (PROTONIX) 40 mg in sodium chloride 0.9% 10 mL injection  40 mg IntraVENous Q24H  
 0.9% sodium chloride infusion 250 mL  250 mL IntraVENous PRN  
 influenza vaccine 2018-19 (6 mos+)(PF) (FLUARIX QUAD/FLULAVAL QUAD) injection 0.5 mL  0.5 mL IntraMUSCular PRIOR TO DISCHARGE  acetaminophen (TYLENOL) solution 325 mg  325 mg Per NG tube Q4H PRN  
 0.9% sodium chloride infusion 250 mL  250 mL IntraVENous PRN  
 heparin (porcine) pf 100 Units  100 Units InterCATHeter PRN  
 0.9% sodium chloride infusion 250 mL  250 mL IntraVENous PRN  
 insulin lispro (HUMALOG) injection   SubCUTAneous Q6H  
 heparin (porcine) 1,000 unit/mL injection 1,000 Units  1,000 Units InterCATHeter DIALYSIS PRN  
 senna-docusate (PERICOLACE) 8.6-50 mg per tablet 1 Tab  1 Tab Oral BID PRN  
 ondansetron (ZOFRAN) injection 4 mg  4 mg IntraVENous Q4H PRN  
 glucose chewable tablet 16 g  4 Tab Oral PRN  
  glucagon (GLUCAGEN) injection 1 mg  1 mg IntraMUSCular PRN  
 dextrose (D50) infusion 12.5-25 g  25-50 mL IntraVENous PRN  
 hydrALAZINE (APRESOLINE) 20 mg/mL injection 20 mg  20 mg IntraVENous Q6H PRN Review of Systems Intubated,unresponsive. Objective:  
 
Visit Vitals /60 Pulse (!) 104 Temp 99.4 °F (37.4 °C) Resp 18 Ht 5' 7\" (1.702 m) Wt 86.4 kg (190 lb 7.6 oz) SpO2 100% BMI 29.83 kg/m² O2 Flow Rate (L/min): 45 l/min O2 Device: Tracheostomy Temp (24hrs), Av.2 °F (36.8 °C), Min:97.5 °F (36.4 °C), Max:99.4 °F (37.4 °C) 
 
 
10/27 0701 - 10/27 1900 In: 28 Out: -  
10/25 1901 - 10/27 07 In: 905 [I.V.:100] Out: 5228 [Urine:430; [place] P/E General Appearance:S/p PEG/Trach today HENT: normocephalic/atraumatic, + ETT Neck: No JVD, hematoma R side where [facility] is improving Lungs: Coarse B/L w/ vent-assisted BS 
CV: RRR, no m/r/g Abdomen: soft, non-tender, normal bowel sounds Extremities: versicular rash rt arm and right side of chest,no cyanosis, no peripheral edema Neuro: Unresponsive to commands, no withdrawal to pain, + corneal reflex and pupillary reaction today Skin: Normal color, intact Data Review Recent Results (from the past 12 hour(s)) LIPASE Collection Time: 10/27/18  3:50 AM  
Result Value Ref Range Lipase 1,285 (H) 73 - 393 U/L  
CBC W/O DIFF Collection Time: 10/27/18  3:50 AM  
Result Value Ref Range WBC 17.4 (H) 4.6 - 13.2 K/uL  
 RBC 2.81 (L) 4.70 - 5.50 M/uL HGB 7.9 (L) 13.0 - 16.0 g/dL HCT 25.6 (L) 36.0 - 48.0 % MCV 91.1 74.0 - 97.0 FL  
 MCH 28.1 24.0 - 34.0 PG  
 MCHC 30.9 (L) 31.0 - 37.0 g/dL  
 RDW 15.9 (H) 11.6 - 14.5 % PLATELET 160 (H) 264 - 420 K/uL MPV 8.7 (L) 9.2 - 11.8 FL  
RENAL FUNCTION PANEL Collection Time: 10/27/18  3:50 AM  
Result Value Ref Range Sodium 137 136 - 145 mmol/L Potassium 5.3 3.5 - 5.5 mmol/L  Chloride 99 (L) 100 - 108 mmol/L  
 CO2 27 21 - 32 mmol/L  
 Anion gap 11 3.0 - 18 mmol/L Glucose 127 (H) 74 - 99 mg/dL BUN 60 (H) 7.0 - 18 MG/DL Creatinine 4.68 (H) 0.6 - 1.3 MG/DL  
 BUN/Creatinine ratio 13 12 - 20 GFR est AA 15 (L) >60 ml/min/1.73m2 GFR est non-AA 13 (L) >60 ml/min/1.73m2 Calcium 8.2 (L) 8.5 - 10.1 MG/DL Phosphorus 7.8 (H) 2.5 - 4.9 MG/DL Albumin 1.7 (L) 3.4 - 5.0 g/dL MAGNESIUM Collection Time: 10/27/18  3:50 AM  
Result Value Ref Range Magnesium 2.5 1.6 - 2.6 mg/dL CALCIUM, IONIZED Collection Time: 10/27/18  3:50 AM  
Result Value Ref Range Ionized Calcium 0.98 (L) 1.12 - 1.32 MMOL/L  
GLUCOSE, POC Collection Time: 10/27/18  5:09 AM  
Result Value Ref Range Glucose (POC) 152 (H) 70 - 110 mg/dL GLUCOSE, POC Collection Time: 10/27/18 11:59 AM  
Result Value Ref Range Glucose (POC) 188 (H) 70 - 110 mg/dL Assessment/Plan:  
 
Principal Problem: 
  Cardiac arrest with ventricular fibrillation ([facility] [address] (9/29/2018) Overview: ROSC before defibrillation could be attempted. Active Problems: 
  Essential hypertension (9/10/2018) Diabetes mellitus type 2, controlled ([address] (9/10/2018) History of stroke (9/27/2018) Overview: With residual R hemiparesis Acute-on-chronic kidney injury ([address] (9/27/2018) Acute cystitis (9/27/2018) Elevated troponin (9/27/2018) Elevated LFTs (9/28/2018) Acute pancreatitis (9/29/2018) Overview: Lipase downtrending with interruption in tube feed and Mucomyst. Doubt  
    that the elevated lipase is due to excessive lipid in tube feeding. May be  
    secondary to Mucomyst. 
 
  Ischemic encephalopathy (9/30/2018) Hypoalbuminemia (10/21/2018) Shingles (10/21/2018) Gangrene ([address] (10/22/2018) Pseudomonas infection (10/27/2018) Zoster. Care Plan - Vent mgmt per ICU - S/p trach/PEG on 10/17 as patient did not show significant improvement. - EGD showed large clot in esophagus last week repeat EGD 10/9 shows healed esophageal ulcer - Cont protonix IV every day  
- Hgb stable - LFTs improving - GI signed off 10/9 
- MRI w/ evidence of severe anoxic brain injury - Minimal improvement on neuro exam 
- Appreciate neuro assistance - IV Meropenem and IV Fluconazole d/heron on 10/15 per ID. 
- Currently on vanc only which was started on 9/27 
- Nephro managing HD TTHS 
- Pt unlikely to have any meaningful neuro recovery, prognosis is very grim - Family wanted to cont to do everything, including trach/PEG -> was done10/17 
- On 10/17:Started on acyclovir for possible Zoster rt arm and rt upper chest area. - Hyperkalemia on 10/18 ->corrected - Lipase 2169 10/22. US abd Abnormal appearance of the gallbladder with stones/sludge with wall thickening  
and possible pericholecystic fluid similar to the prior study of 9/27/2018  
- On Acyclovir for shingles , contact precaution per ID commitee 
- 10/22: Consult GI today for possible MRCP 
- 10/22: Consult Podiatry for left big toe gangrene  
- 10/23 : Podiatry recommend RAQUEL for Left Big toe  
- 10/23: GI recommend MRI/MRCP but will need patient off vent for multiple times for procedure. Not sure if this is feasible now 
- 10/25 : Blood , urine , sputum , trach discharge cultures 
- 10/25 IV antibiotics by ID  
- Change GI tube to Jejunostomy tube tomorrow by IR  
- 10/26: Vancomycin and Cefepime per ID  
- 10/26 : Jejunostomy attempt by IR unsuccessful . Will need to discuss with GI  
- 10/27 : Ciprofloxacin added 10/27 for double coverage of pseudomonas with cefepime per PCCM  
 
DVT prophylaxis:scds Full code. Disposition:tbd - need placement -  involved.

## 2018-10-27 NOTE — PROGRESS NOTES
[facility] Pulmonary, Critical Care, and Sleep Medicine Critical Care Progress Note Name: Iliana Ford : 1955 MRN: [id number] Date: 10/27/2018 [x] I have reviewed the flowsheet and previous days notes. Events, vitals, medications and notes from last 24 hours reviewed. Care plan discussed on multidisciplinary rounds. My assessment, plan of care, findings, medications, side effects etc were discussed with: 
[x] Nurse [] PT/OT [x] Respiratory therapy [x] Dr. Lamonte Cho  
[] Family (daughter, niece) [] Patient: answered all questions to satisfaction  
[] Pharmacist [] ASSESSMENT/PLAN:  
Principal Problem: 
  Cardiac arrest with ventricular fibrillation ([facility] (2018) Overview: ROSC before defibrillation could be attempted. Active Problems: 
  Acute pancreatitis (2018) Overview: Lipase downtrends with interruption in tube feed and Mucomyst. Restarting  
    tube feed did result in modest elevation but not to the levels seen while  
    on Mucomyst. Nonetheless, there is signficant decrease in lipase levels  
    with interruptions in tube feeding. Pseudomonas infection (10/27/2018) Ischemic encephalopathy (2018) Hypoalbuminemia (10/21/2018) Acute-on-chronic kidney injury ([facility] (2018) Essential hypertension (9/10/2018) Diabetes mellitus type 2, controlled ([facility] (9/10/2018) Elevated LFTs (2018) History of stroke (2018) Overview: With residual R hemiparesis Shingles (10/21/2018) Acute cystitis (2018) Elevated troponin (2018) Gangrene ([facility] (10/22/2018) · Continue cefepime/ciprofloxacin. · PSV trial 
· Hold TF. GI consult PENDING. · IV fluids: NS @ 500 mL/hr x 4 hours. · HD per Nephrology. Straight cath urinary bladder q 12 hr. 
· Monitor lipase.  Repeat lipase in am. 
 · This patient is ventilator dependent and cannot at this time tolerate repeated interruptions in mechanical ventilatory support to obtain MRCP images. · Inhaled therapy: albuterol · Renal dosing of medications. NUTRITION: Hold TF. Check prealbumin q week. Consult Clinical Dietician. ICU electrolyte replacement protocol PROPHYLAXIS: 
DVT prophylaxis: heparin GI prophylaxis: Protonix HOB 30-degree elevation Chlorhexidine mouth washes CODE STATUS: FULL CODE Further recommendations will be based on the patient's response to recommended treatment and results of the investigation ordered. DISPOSITION: 
 
The patient is: [x] acutely ill Risk of deterioration: [] moderate [x] critically ill  [x] high  
 
[x]See my orders for details [x] The patient is unable to give any meaningful history or review of systems because the patient is: 
[x] Intubated [] Sedated  
[x] Unresponsive [] [x]The patient is critically ill on     
[x] Mechanical ventilation [x] Vasoactive agents  
[] BiPAP [] Inotropes SUBJECTIVE 
- This 57 y.o.  male is seen in consultation at the request of Dr. Gregory Elise for recommendations on further evaluation and management of acute hypoxia. He hospitalized (9/10/2018 - 9/14/2018) for stroke with neurologic residua (R hemiparesis). Patient discharged from [facility] to [facility] ([facility]) on 9/14/2018, only to return on 9/26/2018 with acute renal failure/abnormal lab values. He experienced VT/VF arrest during my initial clinical assessment. The patient has undergone tracheostomy and percutaneous gastrostomy. He has a R femoral PermCath for HD access. - Overnight events: Remains on mechanical ventilatory support. Not following commands. Shingles lesions have dried up. Off acyclovir. On cefepime/ciprofloxacin. Spiking fevers, Tmax 101.9 F. Respiratory and wound (trach) culture isolating GNR. Urine culture isolated P aeruginosa. IR was unsuccessful in passing J-tube. TF @ 35 mL/hr (half of goal). ~200 mL cloudy urine by straight cath over the past 24 hours. [x] Nutrition: Osmolite 1.2 @ 35 mL/hr 
[x] BM today. ROS: Review of systems not obtained due to patient factors. Past Medical History:  
Diagnosis Date  CHF (congestive heart failure) ([facility]  Diabetes ([facility]  Stroke ([facility] No Known Allergies Current Facility-Administered Medications:  
  calcium gluconate 4 g in 0.9% sodium chloride 100 mL IVPB, 4 g, IntraVENous, ONCE, Soledad Marx MD 
  cefepime (MAXIPIME) 0.5 g in 0.9% sodium chloride 100 mL IVPB, 0.5 g, IntraVENous, Q24H, Anthony Solorzano MD 
  insulin glargine (LANTUS) injection 15 Units, 15 Units, SubCUTAneous, DAILY, Anthony Solorzano MD, 15 Units at 10/27/18 5646   VANCOMYCIN INFORMATION NOTE, , Other, Rx Dosing/Monitoring, GRACE Gomez Ala, MD 
  vancomycin [facility]) 1,000 mg in 0.9% sodium chloride (MBP/ADV) 250 mL adv, 1,000 mg, IntraVENous, Q TU, TH & SAT, Schwab, J. Elpidio Ice, MD 
  Yuridia Bandar ON 10/30/2018] VANCOMYCIN INFORMATION NOTE, , Other, ONCE, GRACE Gomez Ala, MD 
  multivitamin (MULTI-DELYN, WELLESSE) oral liquid 30 mL, 30 mL, Oral, DAILY, rYn Cook MD, 30 mL at 10/27/18 0807 
  albuterol (PROVENTIL VENTOLIN) nebulizer solution 2.5 mg, 2.5 mg, Nebulization, BID, Manda Ackerman MD, 2.5 mg at 10/27/18 2286   bacitracin 500 unit/gram packet 1 Packet, 1 Packet, Topical, PRN, Manda Ackerman MD 
  sodium chloride (NS) flush 10-30 mL, 10-30 mL, InterCATHeter, PRN, Jones Ackerman MD, 30 mL at 10/21/18 0430 
  sodium chloride (NS) flush 10 mL, 10 mL, InterCATHeter, Q24H, Jones Ackerman MD, 10 mL at 10/26/18 1525   sodium chloride (NS) flush 10 mL, 10 mL, InterCATHeter, PRN, Jatinder Guardado MD, 10 mL at 10/20/18 6937   sodium chloride (NS) flush 10-40 mL, 10-40 mL, InterCATHeter, Q8H, Jones Ackerman MD, 10 mL at 10/27/18 0031   sodium chloride (NS) flush 20 mL, 20 mL, InterCATHeter, Q24H, Jones Ackerman MD, 20 mL at 10/26/18 1525 
  heparin (porcine) injection 5,000 Units, 5,000 Units, SubCUTAneous, Q8H, Faizan Ackerman MD, 5,000 Units at 10/27/18 5470   hydroxypropyl methylcellulose (ISOPTO TEARS) 0.5 % ophthalmic solution 2 Drop, 2 Drop, Both Eyes, BID, Lakesha Gonzales NP, 2 Drop at 10/26/18 1718   epoetin manda (EPOGEN;PROCRIT) injection 40,000 Units, 40,000 Units, IntraVENous, Q7D, Abena Cody MD, 40,000 Units at 10/24/18 1227   pantoprazole (PROTONIX) 40 mg in sodium chloride 0.9% 10 mL injection, 40 mg, IntraVENous, Q24H, Caren Ruiz MD, 40 mg at 10/27/18 0807 
  0.9% sodium chloride infusion 250 mL, 250 mL, IntraVENous, PRN, Jaime Simon DO 
  influenza vaccine 2018-19 (6 mos+)(PF) (FLUARIX QUAD/FLULAVAL QUAD) injection 0.5 mL, 0.5 mL, IntraMUSCular, PRIOR TO DISCHARGE, Stacy Osorio MD 
  acetaminophen (TYLENOL) solution 325 mg, 325 mg, Per NG tube, Q4H PRN, Stacy Osorio MD, 325 mg at 10/18/18 2130 
  0.9% sodium chloride infusion 250 mL, 250 mL, IntraVENous, PRN, Manish LEE, DO 
  heparin (porcine) pf 100 Units, 100 Units, InterCATHeter, PRN, Nabeel Estrada MD 
  0.9% sodium chloride infusion 250 mL, 250 mL, IntraVENous, PRN, Stacy Osorio MD 
  insulin lispro (HUMALOG) injection, , SubCUTAneous, Q6H, Jaime Simon DO, 3 Units at 10/27/18 9034   heparin (porcine) 1,000 unit/mL injection 1,000 Units, 1,000 Units, InterCATHeter, DIALYSIS PRN, Abena Cody MD, 1,000 Units at 09/28/18 1332   senna-docusate (PERICOLACE) 8.6-50 mg per tablet 1 Tab, 1 Tab, Oral, BID PRN, McQuain, Alfred A, DO 
  ondansetron (ZOFRAN) injection 4 mg, 4 mg, IntraVENous, Q4H PRN, McQuain, Alfred A, DO, 4 mg at 09/28/18 0539 
  glucose chewable tablet 16 g, 4 Tab, Oral, PRN, McQuain, Alfred A, DO 
  glucagon (GLUCAGEN) injection 1 mg, 1 mg, IntraMUSCular, PRN, McQuain, Luba LAL DO 
  dextrose (D50) infusion 12.5-25 g, 25-50 mL, IntraVENous, PRN, Alfred Nettles DO, 25 g at 10/09/18 1906   hydrALAZINE (APRESOLINE) 20 mg/mL injection 20 mg, 20 mg, IntraVENous, Q6H PRN, iMtul Massey, SALUD, 20 mg at 10/27/18 0503 OBJECTIVE Vital Signs:   
Patient Vitals for the past 24 hrs: 
 Temp Pulse Resp BP SpO2  
10/27/18 1000  (!) 105 26 125/55 99 % 10/27/18 0900  (!) 107 17 135/56 99 % 10/27/18 0830  (!) 106 26  99 % 10/27/18 0800 99.4 °F (37.4 °C) (!) 106 28 158/52 100 % 10/27/18 0735  (!) 106 24  100 % 10/27/18 0700  (!) 105 22 153/52 100 % 10/27/18 0600  (!) 108 25 152/51 100 % 10/27/18 0500  (!) 102 23 200/68 100 % 10/27/18 0439  100 21  100 % 10/27/18 0400 97.5 °F (36.4 °C) (!) 103 26 180/68 100 % 10/27/18 0300  (!) 104 22 184/78 96 % 10/27/18 0200  (!) 104 19 178/83 99 % 10/27/18 0100  (!) 103 22 186/86 99 % 10/27/18 0030  (!) 103 20 180/84 96 % 10/27/18 0028  (!) 103 24  98 % 10/27/18 0001 97.8 °F (36.6 °C)  21 173/75 98 % 10/26/18 2300  100 17 164/71 99 % 10/26/18 2200  (!) 101 19 153/65 95 % 10/26/18 2100  (!) 102 19 139/59 95 % 10/26/18 2014  98 19  100 % 10/26/18 2000  100 19 146/68 99 % 10/26/18 1940 97.8 °F (36.6 °C)      
10/26/18 1900  (!) 101 19 130/64 99 % 10/26/18 1800  (!) 102 18 137/59 100 % 10/26/18 1700  (!) 104 14 144/65 99 % 10/26/18 1600 98.6 °F (37 °C) (!) 102 20 145/67 99 % 10/26/18 1530  (!) 102 14 129/68 99 % 10/26/18 1515  (!) 101 12  99 % 10/26/18 1500  (!) 102 24 166/80 97 % 10/26/18 1443    171/78   
10/26/18 1200 98.6 °F (37 °C) 99 22 96/50 98 % 10/26/18 1152  99 21  99 % 10/26/18 1149     100 % O2 Device: Tracheostomy O2 Flow Rate (L/min): 45 l/min Temp (24hrs), Av.3 °F (36.8 °C), Min:97.5 °F (36.4 °C), Max:99.4 °F (37.4 °C) PHYSICAL EXAM:  
General: intubated. Not on sedation. Does not follow commands. Head: atraumatic, normocephalic Eye: pupils are midline today. Spontaneous blinking. Nose: Nares are patent. No exudate. Throat: No oral thrush. No exudate. Mucous membranes are moist. 
Neck: tracheostomy in situ. no thyromegaly, no JVD, no lymphadenopathy. Trachea midline. Creamy off-white secretions around the outer cannula. Lung: Symmetric in development and expansion. Good air entry. Heart: Regular S1, S2 without murmur, rub or gallop. Abdomen: PEG in situ. soft, nontender, no ndistended. Normoactive bowel sounds. No rebound. No guarding. Extremities: no pedal edema, no clubbing, 2+ peripheral pulses in DP. L great toe ischemic changes/dry gangrene. Lymphatic: no cervical/axillary/inguinal lymphadenopathy Neurologic: R facial droop is less obvious today. Withdraws to pain @ B LE. Demonstrating spontaneous eye movement, blinking and R LE movement. Doll's eyes intact. Corneal intact. Cough/gag intact. Spontaneous respirations intact. L LE triple flexion response to plantar dorsiflexion. DTR 1+ @ B biceps and B patellar tendons. Skin: umbilicated bullous lesions involving the R shoulder and R arm are starting to dry up. Buttocks DTI 
DATA:  
 
Intake/Output:  
Last shift:      10/27 0701 - 10/27 1900 In: 28 Out: - Last 3 shifts: 10/25 1901 - 10/27 0700 In: 661 [I.V.:100] Out: 7584 [Urine:430; Dontrell Wilson Intake/Output Summary (Last 24 hours) at 10/27/2018 1101 Last data filed at 10/27/2018 0800 Gross per 24 hour Intake 660 ml Output 525 ml Net 135 ml Last 3 Recorded Weights in this Encounter 10/24/18 0600 10/25/18 0700 10/26/18 0700 Weight: 88.4 kg (194 lb 14.2 oz) 88.7 kg (195 lb 8.8 oz) 86.4 kg (190 lb 7.6 oz) Lines: All central lines examined by me. No signs of erythema, induration, discharge. Peripherally Inserted Central Catheter: PICC Double Lumen 10/18/18 Left;Brachial (Active) Central Line Being Utilized Yes 10/26/2018  3:00 AM  
 Criteria for Appropriate Use Limited/no vessel suitable for conventional peripheral access 10/26/2018  3:00 AM  
Site Assessment Clean, dry, & intact 10/26/2018  3:00 AM  
Phlebitis Assessment 0 10/26/2018 12:00 AM  
Infiltration Assessment 0 10/26/2018 12:00 AM  
Arm Circumference (cm) 38 cm 10/26/2018 12:00 AM  
Date of Last Dressing Change 10/25/18 10/26/2018 12:00 AM  
Dressing Status Clean, dry, & intact 10/26/2018  3:00 AM  
Action Taken Open ports on tubing capped 10/26/2018  3:00 AM  
External Catheter Length (cm) 0 centimeters 10/26/2018 12:00 AM  
Dressing Type Disk with Chlorhexadine gluconate (CHG); Transparent 10/26/2018 12:00 AM  
Hub Color/Line Status Red;Patent 10/26/2018 12:00 AM  
Positive Blood Return (Site #1) Yes 10/26/2018 12:00 AM  
Hub Color/Line Status Purple;Patent 10/26/2018 12:00 AM  
Positive Blood Return (Site #2) Yes 10/26/2018 12:00 AM  
Alcohol Cap Used Yes 10/26/2018 12:00 AM  
  
Hemodialysis Catheter: 
Hemodialysis Access 10/19/18 (Active) Central Line Being Utilized Yes 10/26/2018 12:00 AM  
Criteria for Appropriate Use Dialysis/apheresis 10/26/2018  3:00 AM  
Site Assessment Clean, dry, & intact 10/26/2018 12:00 AM  
Date of Last Dressing Change 10/24/18 10/26/2018 12:00 AM  
Dressing Status Clean, dry, & intact 10/26/2018 12:00 AM  
Dressing Type Stabilization/securement device 10/26/2018 12:00 AM  
Proximal Hub Color/Line Status Capped 10/26/2018 12:00 AM  
Distal Hub Color/Line Status Capped 10/26/2018 12:00 AM  
 
Drain(s): PEG/Gastrostomy Tube 10/17/18 (Active) Site Assessment Clean, dry, & intact 10/26/2018  3:00 AM  
Dressing Status Clean, dry, & intact 10/26/2018  3:00 AM  
G Port Status Clamped 10/26/2018  3:00 AM  
External Catheter Length (cm) 3 centimeters 10/26/2018 12:00 AM  
Action Taken Placement verified (comment) 10/26/2018 12:00 AM  
Drainage Description Other (Comment) 10/24/2018  8:00 AM  
Gastric Residual (mL) 5 ml 10/25/2018  8:00 PM  
 Tube Feeding/Formula Options Other 10/26/2018 12:00 AM  
Tube Feeding/Verify Rate (mL/hr) 70 10/25/2018 11:00 PM  
Water Flush Volume (mL) 30 mL 10/25/2018  8:00 PM  
Intake (ml) 70 ml 10/25/2018 11:00 PM  
Medication Volume 30 ml 10/25/2018  9:00 AM  
Drainage Chamber Level (ml) 0 ml 10/23/2018  8:00 PM  
Output (ml) 0 ml 10/23/2018  8:00 PM  
   
Fecal Management (Active) Stool Consistency Liquid 10/26/2018  7:00 AM  
Position of Indicator Line Visible 10/26/2018  7:00 AM  
Signal Indicator Bubble Appropriate 10/26/2018  6:00 AM  
Skin Assessment of the Anal Area Denuded/excoriated 10/26/2018  6:00 AM  
Tube Irrigated No 10/26/2018  6:00 AM  
Irrigation Volume (mL) 20 10/25/2018  8:00 PM  
Drainage Bag Level (mL) 200 10/26/2018  6:00 AM  
Output (ml) 180 ml 10/26/2018  6:00 AM  
 
Airway: Airway - Tracheostomy Tube 10/17/18 Portex; Cuffed (Active) Cuff Pressure 25 cmH20 10/26/2018  7:26 AM  
Site Assessment Clean, dry, & intact 10/26/2018  8:00 AM  
Trach Dressing Changed Yes 10/26/2018  7:26 AM  
Trach Cleaned With Normal saline 10/26/2018  7:26 AM  
Trach Tie Changed Yes 10/26/2018  7:26 AM  
Trach Cannula Changed No 10/26/2018  4:23 AM  
Inner Cannula Cuffed, cuff inflated 10/26/2018  7:26 AM  
Line Jl Top of the lock 10/26/2018  8:00 AM  
Side Secured Centered 10/26/2018  8:00 AM  
Spare Trach at Bedside Yes 10/26/2018  8:00 AM  
Spare Trach Tube One Size Smaller at Bedside Yes 10/26/2018  8:00 AM  
Ambu Bag With Mask at Bedside Yes 10/26/2018  8:00 AM  
 
 
Vent Date (intubated 9/29/2018, tracheostomy 10/17/2018) Ventilator Settings: 
Ventilator Mode: SIMV, Pressure support Respiratory Rate Resp Rate Observed: 30 Back-Up Rate: 12 Insp Time (sec): 0.9 sec Insp Flow (l/min): 61 l/min I:E Ratio: 1;2 
Ventilator Volumes Vt Set (ml): 500 ml Vt Exhaled (Machine Breath) (ml): 478 ml Vt Spont (ml): 505 ml Ve Observed (l/min): 9.56 l/min Ventilator Pressures Pressure Support (cm H2O): 10 cm H2O 
 PIP Observed (cm H2O): 16 cm H2O Plateau Pressure (cm H2O): 12 cm H2O 
MAP (cm H2O): 9.6 PEEP/VENT (cm H2O): 5 cm H20 Auto PEEP Observed (cm H2O): 0 cm H2O Mode Rate Tidal Volume Pressure FiO2 PEEP  
SIMV, Pressure support   500 ml  10 cm H2O 30 % 5 cm H20 Peak airway pressure: 16 cm H2O Plateau pressure:    
Tidal volume:   
Minute ventilation: 9.56 l/min SPO2   
 
ARDSNet Guidelines: Lung protective ventilation (VT 6 cc/kg IBW) and Pplat <= 30 Telemetry: [x] Sinus [] A-flutter [] Paced [] A-fib [] Multiple PVCs Imaging: 
[x] I have personally reviewed the patients radiographs 
[] Radiographs reviewed with radiologist 
[x] No CXR study available for review today 
[] No change from prior, tubes and lines are in adequate position 
[] Improved   [] Worsening Xr Chest [facility] Result Date: 10/27/2018 IMPRESSION: Right lung perihilar infiltrate appears present Labs: 
Recent Results (from the past 24 hour(s)) GLUCOSE, POC Collection Time: 10/26/18 11:47 AM  
Result Value Ref Range Glucose (POC) 121 (H) 70 - 110 mg/dL HEPATIC FUNCTION PANEL Collection Time: 10/26/18  4:30 PM  
Result Value Ref Range Protein, total 7.3 6.4 - 8.2 g/dL Albumin 1.7 (L) 3.4 - 5.0 g/dL Globulin 5.6 (H) 2.0 - 4.0 g/dL A-G Ratio 0.3 (L) 0.8 - 1.7 Bilirubin, total 0.5 0.2 - 1.0 MG/DL Bilirubin, direct 0.4 (H) 0.0 - 0.2 MG/DL Alk. phosphatase 347 (H) 45 - 117 U/L  
 AST (SGOT) 77 (H) 15 - 37 U/L  
 ALT (SGPT) 75 (H) 16 - 61 U/L  
GLUCOSE, POC Collection Time: 10/26/18  5:07 PM  
Result Value Ref Range Glucose (POC) 133 (H) 70 - 110 mg/dL GLUCOSE, POC Collection Time: 10/26/18 10:45 PM  
Result Value Ref Range Glucose (POC) 146 (H) 70 - 110 mg/dL LIPASE Collection Time: 10/27/18  3:50 AM  
Result Value Ref Range Lipase 1,285 (H) 73 - 393 U/L  
CBC W/O DIFF Collection Time: 10/27/18  3:50 AM  
Result Value Ref Range WBC 17.4 (H) 4.6 - 13.2 K/uL  
 RBC 2.81 (L) 4.70 - 5.50 M/uL HGB 7.9 (L) 13.0 - 16.0 g/dL HCT 25.6 (L) 36.0 - 48.0 % MCV 91.1 74.0 - 97.0 FL  
 MCH 28.1 24.0 - 34.0 PG  
 MCHC 30.9 (L) 31.0 - 37.0 g/dL  
 RDW 15.9 (H) 11.6 - 14.5 % PLATELET 950 (H) 842 - 420 K/uL MPV 8.7 (L) 9.2 - 11.8 FL  
RENAL FUNCTION PANEL Collection Time: 10/27/18  3:50 AM  
Result Value Ref Range Sodium 137 136 - 145 mmol/L Potassium 5.3 3.5 - 5.5 mmol/L Chloride 99 (L) 100 - 108 mmol/L  
 CO2 27 21 - 32 mmol/L Anion gap 11 3.0 - 18 mmol/L Glucose 127 (H) 74 - 99 mg/dL BUN 60 (H) 7.0 - 18 MG/DL Creatinine 4.68 (H) 0.6 - 1.3 MG/DL  
 BUN/Creatinine ratio 13 12 - 20 GFR est AA 15 (L) >60 ml/min/1.73m2 GFR est non-AA 13 (L) >60 ml/min/1.73m2 Calcium 8.2 (L) 8.5 - 10.1 MG/DL Phosphorus 7.8 (H) 2.5 - 4.9 MG/DL Albumin 1.7 (L) 3.4 - 5.0 g/dL MAGNESIUM Collection Time: 10/27/18  3:50 AM  
Result Value Ref Range Magnesium 2.5 1.6 - 2.6 mg/dL CALCIUM, IONIZED Collection Time: 10/27/18  3:50 AM  
Result Value Ref Range Ionized Calcium 0.98 (L) 1.12 - 1.32 MMOL/L  
GLUCOSE, POC Collection Time: 10/27/18  5:09 AM  
Result Value Ref Range Glucose (POC) 152 (H) 70 - 110 mg/dL Cultures: All Micro Results Procedure Component Value Units Date/Time CULTURE, RESPIRATORY/SPUTUM/BRONCH Dia [place] STAIN [[id number]]  (Abnormal) Collected:  10/25/18 1325 Order Status:  Completed Specimen:  Sputum from Tracheal Aspirate Updated:  10/27/18 1058 Special Requests: NO SPECIAL REQUESTS     
  GRAM STAIN MODERATE WBC'S     
   RARE GRAM NEGATIVE RODS Culture result: MANY GRAM NEGATIVE RODS     
      
  MANY POSSIBLE 2ND GRAM NEGATIVE DONNA  
     
      
  RARE NORMAL RESPIRATORY SHANAE  
     
 CULTURE, BLOOD [[id number]] Collected:  10/25/18 1328 Order Status:  Completed Specimen:  Whole Blood Updated:  10/27/18 4362 Special Requests: NO SPECIAL REQUESTS Culture result: NO GROWTH 2 DAYS     
 CULTURE, BLOOD [[id number]] Collected:  10/25/18 1328 Order Status:  Completed Specimen:  Whole Blood Updated:  10/27/18 7261 Special Requests: NO SPECIAL REQUESTS Culture result: NO GROWTH 2 DAYS     
 CULTURE, URINE [[id number]]  (Abnormal)  (Susceptibility) Collected:  10/25/18 1359 Order Status:  Completed Specimen:  Cath Urine Updated:  10/27/18 7582 Special Requests: NO SPECIAL REQUESTS Culture result:    
  35100 COLONIES/mL PSEUDOMONAS AERUGINOSA CULTURE, Azalee [place] STAIN [[id number]]  (Abnormal) Collected:  10/25/18 1359 Order Status:  Completed Specimen:  Wound from Tracheostomy site Updated:  10/26/18 1405 Special Requests: NO SPECIAL REQUESTS     
  GRAM STAIN FEW WBC'S     
   RARE GRAM NEGATIVE RODS Culture result: MANY GRAM NEGATIVE RODS     
 CULTURE, CATHETER TIP [[id number]]  (Abnormal)  (Susceptibility) Collected:  10/19/18 1330 Order Status:  Completed Specimen:  Catheter Tip Updated:  10/22/18 6506 Special Requests: NO SPECIAL REQUESTS Culture result:    
  >15 CFU STAPHYLOCOCCUS EPIDERMIDIS  
     
 CULTURE, BLOOD [[id number]] Collected:  10/16/18 1055 Order Status:  Completed Specimen:  Blood Updated:  10/22/18 0809 Special Requests: PERIPHERAL Culture result: NO GROWTH 6 DAYS     
 CULTURE, BLOOD [[id number]] Collected:  10/16/18 1047 Order Status:  Completed Specimen:  Blood Updated:  10/22/18 0809 Special Requests: PERIPHERAL Culture result: NO GROWTH 6 DAYS     
 HSV 1 AND 2 BY PCR [[id number]] Collected:  10/18/18 1815 Order Status:  Completed Specimen:  Other from Miscellaneous sample Updated:  10/21/18 1636 Source OTHER TEST     
  HSV-1 DNA by PCR NEGATIVE      
  HSV-2 DNA by PCR NEGATIVE Comment: (NOTE) This test was developed and its performance characteristics 
determined by [facility]. It has not been cleared or approved by the U.S. Food and Drug Administration. The FDA has 
determined that such clearance or approval is not necessary. This 
test is used for clinical purposes. It should not be regarded as 
investigational or research. Performed At: [address] [id number] Melissa Thibodeaux MD WZ:[id number] CULTURE, RESPIRATORY/SPUTUM/BRONCH Cris Goes [[id number]] Collected:  10/16/18 1915 Order Status:  Completed Specimen:  Sputum,ET Suction Updated:  10/18/18 1011 Special Requests: NO SPECIAL REQUESTS     
  GRAM STAIN 10-25 WBC/lpf     
   <10 EPI/lpf FEW GRAM POSITIVE COCCI IN PAIRS MODERATE GRAM POSITIVE COCCI IN GROUPS MUCUS PRESENT Culture result: MODERATE NORMAL RESPIRATORY SHANAE  
     
 CULTURE, BLOOD [[id number]] Collected:  10/08/18 1227 Order Status:  Completed Specimen:  Blood Updated:  10/14/18 0741 Special Requests: PERIPHERAL Culture result: NO GROWTH 6 DAYS     
 CULTURE, CATHETER TIP [[id number]] Collected:  10/08/18 1215 Order Status:  Completed Specimen:  Catheter Tip Updated:  10/11/18 3308 Special Requests: NO SPECIAL REQUESTS Culture result: NO GROWTH 3 DAYS     
 CULTURE, BLOOD [[id number]] Collected:  10/04/18 0338 Order Status:  Completed Specimen:  Blood Updated:  10/10/18 3353 Special Requests: PERIPHERAL Culture result: NO GROWTH 6 DAYS     
 CULTURE, BLOOD [[id number]] Collected:  10/04/18 1120 Order Status:  Completed Specimen:  Blood Updated:  10/10/18 2549 Special Requests: PERIPHERAL Culture result: NO GROWTH 6 DAYS     
 CULTURE, BLOOD [[id number]] Collected:  09/27/18 0005 Order Status:  Completed Specimen:  Blood Updated:  10/03/18 0845 Special Requests: NO SPECIAL REQUESTS Culture result: NO GROWTH 6 DAYS     
 CULTURE, BLOOD [[id number]]  (Abnormal)  (Susceptibility) Collected:  09/27/18 0136 Order Status:  Completed Specimen:  Blood Updated:  10/02/18 7624 Special Requests: NO SPECIAL REQUESTS     
  GRAM STAIN PEDIATRIC BOTTLE GRAM POSITIVE COCCI IN PAIRS IN CLUSTERS  
      
  SMEAR CALLED TO AND CORRECTLY REPEATED BY: Hammad Steve RN IN 2700 ON 9/29/18 AT 2033 WF Culture result:    
  AEROBIC BOTTLE STAPHYLOCOCCUS EPIDERMIDIS  
     
 CULTURE, RESPIRATORY/SPUTUM/BRONCH Charolotte Rather STAIN [[id number]]  (Abnormal) Collected:  09/29/18 1210 Order Status:  Completed Specimen:  Sputum from Endotracheal aspirate Updated:  10/02/18 3703 Special Requests: NO SPECIAL REQUESTS     
  GRAM STAIN >25 WBC/lpf     
   <10 EPI/lpf MUCUS PRESENT     
   MODERATE YEAST Culture result: MANY CANDIDA TROPICALIS C. DIFFICILE/EPI PCR [[id number]] Collected:  09/29/18 1400 Order Status:  Completed Specimen:  Stool Updated:  09/29/18 2248 Special Requests: NO SPECIAL REQUESTS Culture result: Toxigenic C. difficile NEGATIVE                         C. difficile target DNA sequences are not detected. CULTURE, URINE [[id number]] Collected:  09/27/18 0029 Order Status:  Completed Specimen:  Cath Urine Updated:  09/29/18 0944 Special Requests: NO SPECIAL REQUESTS Culture result: NO GROWTH 2 DAYS During this entire length of time I was immediately available to the patient. The reason for providing this level of medical care for this critically ill patient was due a critical illness that impaired one or more vital organ systems such that there was a high probability of imminent or life threatening deterioration in the patients condition.  This care involved high complexity decision making to assess, manipulate, and support vital system functions, to treat this degreee vital organ system failure and to prevent further life threatening deterioration of the patients condition 
 
[] Total critical care time spent on reviewing the case/medical record/data/notes/EMR/patient examination/documentation/coordinating care with nurse/consultants, exclusive of procedures - minutes with complex decision making performed and > 50% time spent in face to face evaluation. Boris Erickson MD   
Board Certified by the [facility] 10/27/2018

## 2018-10-27 NOTE — PROGRESS NOTES
Problem: Falls - Risk of 
Goal: *Absence of Falls Document Sean Nina Fall Risk and appropriate interventions in the flowsheet. Outcome: Progressing Towards Goal 
Fall Risk Interventions: 
Mobility Interventions: Bed/chair exit alarm, Strengthening exercises (ROM-active/passive) Mentation Interventions: Bed/chair exit alarm, Toileting rounds Medication Interventions: Evaluate medications/consider consulting pharmacy Elimination Interventions: Bed/chair exit alarm, Call light in reach, Toileting schedule/hourly rounds History of Falls Interventions: Evaluate medications/consider consulting pharmacy Problem: Pressure Injury - Risk of 
Goal: *Prevention of pressure injury Document Mitul Scale and appropriate interventions in the flowsheet. Outcome: Progressing Towards Goal 
Pressure Injury Interventions: 
Sensory Interventions: Assess changes in LOC, Float heels, Keep linens dry and wrinkle-free, Minimize linen layers, Monitor skin under medical devices, Pressure redistribution bed/mattress (bed type), Turn and reposition approx. every two hours (pillows and wedges if needed) Moisture Interventions: Apply protective barrier, creams and emollients, Absorbent underpads, Internal/External urinary devices, Internal/External fecal devices, Maintain skin hydration (lotion/cream), Minimize layers, Moisture barrier Activity Interventions: Pressure redistribution bed/mattress(bed type) Mobility Interventions: HOB 30 degrees or less, Float heels, Turn and reposition approx. every two hours(pillow and wedges), Pressure redistribution bed/mattress (bed type) Nutrition Interventions: Document food/fluid/supplement intake Friction and Shear Interventions: Apply protective barrier, creams and emollients

## 2018-10-27 NOTE — PROGRESS NOTES
Physical Exam  
Skin:  
 
  
0800 Assessment done. Unresponsive. SR on the monitor. With Low grade temp. On vent via trach. With copious mucoid green to brown colored secretions around trach area. Tracheal secretions white to pale yellow secretions. Abdomen soft; non tender. Tube feeding ongoing @ 35 ml/hr via Peg. With loose stool. Fecal management system intact. Pt is oliguric. With scheduled straight cath q 12 hours. Skin: as above. 1140 Noted stool oozing around FMS. Incontinent care done. Bedpad changed; gown changed. 1510 Bedside and Verbal shift change report given to Rosa Maria Hoyt (oncoming nurse) by Desmond Alston RN 
 (offgoing nurse). Report included the following information SBAR, Kardex, Recent Results and Cardiac Rhythm SR-ST.

## 2018-10-27 NOTE — PROGRESS NOTES
1545-Received pt resting in bed with eyes closed, no sign of distress, vss on ventilator, will continue to monitor 1700-Straight cath performed, Great toe painted with betadine, fairly tolerated 1930-Bedside and Verbal shift change report given to Samy Roberson (oncoming nurse) by Sascha Erazo RN (offgoing nurse). Report included the following information SBAR, Kardex, Intake/Output, MAR and Cardiac Rhythm SR/ST.

## 2018-10-27 NOTE — PROGRESS NOTES
In Patient Progress note Admit Date: 9/26/2018 Impression: 1. Prolonged dialysis dependant JULIANNA d/t ATN Has been HD dependent TTS , HD today Plans for possible TDC early next week provided Remains afebrile and negative BCX 2. S/p cardiopulm arrest 9/29.   
3. Acute pyelonephritis/sepsis. on AB 4. Abnormal lft's/acute cholecystitis/pancreatitis 5. UGI bleed, EGD 6. Anemia , acute bld loss + JULIANNA . ABIGAIL with HD 7. Asp pneumonia. 8. Resp failure. S/p peg/trach 10/17, s/p bronch. 9. Rt shoulder shingles. On acyclovir. 10. Anoxic brain injury superimposed on recent cva. Please call with questions, 
Mary Nelson MD FASN Cell [phone] Pager: [phone] Subjective:  
 
Denies SOB/CP Current Facility-Administered Medications:  
  cefepime (MAXIPIME) 0.5 g in 0.9% sodium chloride 100 mL IVPB, 0.5 g, IntraVENous, Q24H, Anthony Solorzano MD 
  insulin glargine (LANTUS) injection 15 Units, 15 Units, SubCUTAneous, DAILY, Anthony Solorzano MD, 15 Units at 10/27/18 6018   VANCOMYCIN INFORMATION NOTE, , Other, Rx Dosing/Monitoring, GRACE Cunningham MD 
  vancomycin [facility]) 1,000 mg in 0.9% sodium chloride (MBP/ADV) 250 mL adv, 1,000 mg, IntraVENous, Q TU, TH & SAT, Schwab, J. Angelia Guest, MD  Noe Nobles ON 10/30/2018] VANCOMYCIN INFORMATION NOTE, , Other, ONCE, GRACE Cunningham MD 
  multivitamin (MULTI-DELYN, WELLESSE) oral liquid 30 mL, 30 mL, Oral, DAILY, Victor M Medina MD, 30 mL at 10/27/18 0807 
  albuterol (PROVENTIL VENTOLIN) nebulizer solution 2.5 mg, 2.5 mg, Nebulization, BID, Jacqueline Ackerman MD, 2.5 mg at 10/27/18 3550   bacitracin 500 unit/gram packet 1 Packet, 1 Packet, Topical, PRN, Jacqueline Ackerman MD 
  sodium chloride (NS) flush 10-30 mL, 10-30 mL, InterCATHeter, PRN, Jacqueline Garcia MD, 30 mL at 10/21/18 8484   sodium chloride (NS) flush 10 mL, 10 mL, InterCATHeter, Q24H, Jones Ackerman MD, 10 mL at 10/26/18 1525   sodium chloride (NS) flush 10 mL, 10 mL, InterCATHeter, PRN, Dennis Hernandez MD, 10 mL at 10/20/18 7312   sodium chloride (NS) flush 10-40 mL, 10-40 mL, InterCATHeter, Q8H, Jones Ackerman MD, 10 mL at 10/27/18 2690   sodium chloride (NS) flush 20 mL, 20 mL, InterCATHeter, Q24H, Jones Ackerman MD, 20 mL at 10/26/18 1525 
  heparin (porcine) injection 5,000 Units, 5,000 Units, SubCUTAneous, Q8H, David Ackerman MD, 5,000 Units at 10/27/18 3714   hydroxypropyl methylcellulose (ISOPTO TEARS) 0.5 % ophthalmic solution 2 Drop, 2 Drop, Both Eyes, BID, Lakesha Gonzales NP, 2 Drop at 10/27/18 1110   epoetin manda (EPOGEN;PROCRIT) injection 40,000 Units, 40,000 Units, IntraVENous, Q7D, Mira Fong MD, 40,000 Units at 10/24/18 1227   pantoprazole (PROTONIX) 40 mg in sodium chloride 0.9% 10 mL injection, 40 mg, IntraVENous, Q24H, Kassi Ruiz MD, 40 mg at 10/27/18 0807 
  0.9% sodium chloride infusion 250 mL, 250 mL, IntraVENous, PRN, Rupinder Rasmussen DO 
  influenza vaccine 2018-19 (6 mos+)(PF) (FLUARIX QUAD/FLULAVAL QUAD) injection 0.5 mL, 0.5 mL, IntraMUSCular, PRIOR TO DISCHARGE, Leda Lora MD 
  acetaminophen (TYLENOL) solution 325 mg, 325 mg, Per NG tube, Q4H PRN, Leda Lora MD, 325 mg at 10/18/18 2130 
  0.9% sodium chloride infusion 250 mL, 250 mL, IntraVENous, PRN, Harriett LEE DO 
  heparin (porcine) pf 100 Units, 100 Units, InterCATHeter, PRN, Sandra Estrada MD 
  0.9% sodium chloride infusion 250 mL, 250 mL, IntraVENous, PRN, Leda Lora MD 
  insulin lispro (HUMALOG) injection, , SubCUTAneous, Q6H, Rupinder Rasmussen DO, 3 Units at 10/27/18 2291   heparin (porcine) 1,000 unit/mL injection 1,000 Units, 1,000 Units, InterCATHeter, DIALYSIS PRN, Mira Fong MD, 1,000 Units at 09/28/18 1332   senna-docusate (PERICOLACE) 8.6-50 mg per tablet 1 Tab, 1 Tab, Oral, BID PRN, McQuain, Alfred A, DO 
  ondansetron (ZOFRAN) injection 4 mg, 4 mg, IntraVENous, Q4H PRN, McQuain, Alfred A, DO, 4 mg at 09/28/18 0539 
  glucose chewable tablet 16 g, 4 Tab, Oral, PRN, McQuain, Alfred A, DO 
  glucagon (GLUCAGEN) injection 1 mg, 1 mg, IntraMUSCular, PRN, McQuain, Alfred A, DO 
  dextrose (D50) infusion 12.5-25 g, 25-50 mL, IntraVENous, PRN, McQuain, Alfred A, DO, 25 g at 10/09/18 1906   hydrALAZINE (APRESOLINE) 20 mg/mL injection 20 mg, 20 mg, IntraVENous, Q6H PRN, Georgina Santillan, NP, 20 mg at 10/27/18 0503 Objective:  
 
Visit Vitals /60 Pulse (!) 104 Temp 99.4 °F (37.4 °C) Resp 26 Ht 5' 7\" (1.702 m) Wt 86.4 kg (190 lb 7.6 oz) SpO2 100% BMI 29.83 kg/m² Intake/Output Summary (Last 24 hours) at 10/27/2018 1201 Last data filed at 10/27/2018 0800 Gross per 24 hour Intake 660 ml Output 525 ml Net 135 ml Physical Exam:  
 
Naga Leaf in place HENT mmm RS AEBE clear CVS s1 s 2wnl GI PEG tube in place Ext no edema Access [place] Data Review: 
 
Recent Labs 10/27/18 
0350 WBC 17.4*  
RBC 2.81* HCT 25.6* MCV 91.1 MCH 28.1 MCHC 30.9*  
RDW 15.9* Recent Labs 10/27/18 
0350 10/26/18 
1630 10/26/18 
0355 10/25/18 
0400 BUN 60*  --  39* 68* CREA 4.68*  --  3.32* 5.25* CA 8.2*  --  7.7* 7.3* ALB 1.7* 1.7* 1.6* 1.7*  
K 5.3  --  4.5 4.9   --  139 138 CL 99*  --  100 99* CO2 27  --  27 26 PHOS 7.8*  --  4.5 6.7*  
*  --  135* 190* Vero Berry MD

## 2018-10-27 NOTE — PROGRESS NOTES
Nursing assessment complete. Patient with trach. Fms in place. osmolite 1.0 infusing @35. Peg tube in placed. Left picc line. Patient unresponsive. Opens eyes spontanously but does not track or follow commands Bedside shift change report given to sourav (oncoming nurse) by Igor Guzman RN 
 (offgoing nurse). Report included the following information SBAR, MAR and Recent Results.

## 2018-10-27 NOTE — PROGRESS NOTES
Day #3 of cefepime Day #1 of ciprofloxacin Indication:  UTI / SSTI Current regimen: - Cefepime 500 mg IV every 24 hours - Ciprofloxacin 200 mg IV every 12 hours Recent Labs 10/27/18 
0350 10/26/18 
0355 10/25/18 
0400 WBC 17.4* 18.5* 18.3*  
CREA 4.68* 3.32* 5.25* BUN 60* 39* 68* Est CrCl: HD Temp (24hrs), Av.5 °F (36.9 °C), Min:97.5 °F (36.4 °C), Max:99.4 °F (37.4 °C) Cultures:  
Urine - Pseudomonas Wound - Many GNR - pending Plan:  
- Change to ciproflaxacin 200 mg IV every 24 hours based on renal function to be given after HD on dialysis days. - Cefepime dosed appropriately based on renal function and indication. Please call pharmacy for questions.  
 
Thanks, 
Marek May, PHARMD

## 2018-10-28 NOTE — PROGRESS NOTES
[id number]- shift summary Assumed care of pt with eyes open, unresponsive. Assessment done. Pt cleaned for some stool leakage around FMS. Pt was dialyzed from 2045 until 0100. Antibiotics hung following completion of HD. Mouth care and mery care done Q4h. Complete bath and linen change done. All mepilex removed and wounds cleaned and new mepilex placed. Bedside and Verbal shift change report given to [address] (oncoming nurse) by Immanuel Downey RN 
 (offgoing nurse). Report included the following information SBAR, Kardex, Intake/Output, MAR, Recent Results and Cardiac Rhythm ST.

## 2018-10-28 NOTE — PROGRESS NOTES
Physical Exam  
Skin:  
 
  
Nurses Notes: 
0800 Assessment done. Remain unresponsive. Febrile. 1000  Pt is on SBT. 1040 Pt is tachypneic. Put back on SIMV mode by RT 
 
1200 Febrile. Temp 101.9 axillary. Dr. Sean Sweeney made aware. 1230 IVF NS 2 liters bolus started; infusing at 500 ml/hr. Tube feeding stopped. 1630 NS bolus completed. Pt's BP is elevated. Dr. Sean Sweeney is aware. 1735 BP continuous to be elebated. Hydralazine 20 mg IVP given 1800 Trach care done by RT. Bedside and Verbal shift change report given to Brad Nielsen RN (oncoming nurse) by Sushil Garcia RN 
 (offgoing nurse). Report included the following information SBAR, Kardex, Intake/Output, MAR, Cardiac Rhythm SR-ST and Alarm Parameters .

## 2018-10-28 NOTE — PROGRESS NOTES
Problem: Falls - Risk of 
Goal: *Absence of Falls Document Jasmin Sun Fall Risk and appropriate interventions in the flowsheet. Outcome: Progressing Towards Goal 
Fall Risk Interventions: 
Mobility Interventions: Bed/chair exit alarm Mentation Interventions: Evaluate medications/consider consulting pharmacy Medication Interventions: Bed/chair exit alarm Elimination Interventions: Call light in reach, Toileting schedule/hourly rounds History of Falls Interventions: Evaluate medications/consider consulting pharmacy Problem: Patient Education: Go to Patient Education Activity Goal: Patient/Family Education Outcome: Not Progressing Towards Goal 
No evidence of learning Problem: Pressure Injury - Risk of 
Goal: *Prevention of pressure injury Document Mitul Scale and appropriate interventions in the flowsheet. Outcome: Progressing Towards Goal 
Pressure Injury Interventions: 
Sensory Interventions: Assess changes in LOC, Assess need for specialty bed, Check visual cues for pain, Float heels, Keep linens dry and wrinkle-free, Pressure redistribution bed/mattress (bed type), Turn and reposition approx. every two hours (pillows and wedges if needed) Moisture Interventions: Absorbent underpads, Apply protective barrier, creams and emollients, Internal/External fecal devices, Maintain skin hydration (lotion/cream), Offer toileting Q_hr Activity Interventions: Pressure redistribution bed/mattress(bed type) Mobility Interventions: Assess need for specialty bed, Float heels, HOB 30 degrees or less, Pressure redistribution bed/mattress (bed type), Turn and reposition approx. every two hours(pillow and wedges) Nutrition Interventions: Document food/fluid/supplement intake Friction and Shear Interventions: Apply protective barrier, creams and emollients, Feet elevated on foot rest, Foam dressings/transparent film/skin sealants, HOB 30 degrees or less, Lift sheet, Lift team/patient mobility team 
 
  
 
 
 
 
Problem: Patient Education: Go to Patient Education Activity Goal: Patient/Family Education Outcome: Not Progressing Towards Goal 
No evidence of learning Problem: Ventilator Management Goal: *Patient maintains clear airway/free of aspiration Outcome: Progressing Towards Goal 
Pt has strong cough and is able to cough up sputum Problem: Patient Education: Go to Patient Education Activity Goal: Patient/Family Education Outcome: Not Progressing Towards Goal 
No evidence of learning Problem: Patient Education: Go to Patient Education Activity Goal: Patient/Family Education Outcome: Not Progressing Towards Goal 
No evidence of learning

## 2018-10-28 NOTE — PROGRESS NOTES
Patient in bed on back with head elevated - BBS equal and sl diminished - trach tube midline and secure - trach dressing changed, trach site cleaned, inner cannula replaced - patient suctyioned by RN - spare trachs at bedside - MV[facility] at [facility] - vent alarms on and functional

## 2018-10-28 NOTE — PROGRESS NOTES
Problem: Ventilator Management Goal: *Adequate oxygenation and ventilation VENTILATOR Care plan 
 
Problem: Ventilator Management Goal: *Adequate oxygenation/ ventilation/ and extubation Patient: 
   
 
Shannan Azevedo     57 y.o.   male     10/28/2018  5:57 PM 
Patient Active Problem List  
Diagnosis Code  Stroke (cerebrum) ([facility]) I63.9  Stroke ([facility] I63.9  Rhabdomyolysis M62.82  
 Dehydration E86.0  
 Essential hypertension I10  
 Diabetes mellitus type 2, controlled ([facility] E11.9  Non compliance w medication regimen Z91.14  
 [facility] mellitus) [facility] E11.9  History of stroke Z80.78  
 Acute-on-chronic kidney injury ([facility] N17.9, N18.9  Acute cystitis N30.00  Elevated troponin R74.8  Elevated LFTs R94.5  Cardiac arrest with ventricular fibrillation ([facility]) I46.9, I49.01  
 Acute pancreatitis K85.90  
 Ischemic encephalopathy I67.89  
 Hypoalbuminemia E88.09  
 Shingles B02.9  Gangrene ([facility] [id number]  Pseudomonas infection B96.5 Pneumonia of both lower lobes due to infectious organism ([facility] [J18.1] ESRD (end stage renal disease) ([facility] [N18.6] ESRD (end stage renal disease) ([facility] [N18.6] Reason patient intubated:  Acute respiratory failure & need for airway protection secondary to episode of cardiac arrest during administration of dialysis Ventilator day: Ventilator day: Vent day 23; Intubated 09/29/18, tube change on 10/05/18; Placement of trach on 10/17/18 Ventilator settings:  SIMV, PSV=12, Rate=12, Fz=400, Fio2=30%, Peep=5; Settings changed to PSV=15 
 
ETT Size/Placement: Trach:  Portex, size 8, fenestrated with cuff, cuff inflated ABG: 
Date:10/28/2018 Lab Results Component Value Date/Time PHI 7.467 (H) 10/28/2018 05:18 PM  
 PCO2I 36.8 10/28/2018 05:18 PM  
 PO2I 106 (H) 10/28/2018 05:18 PM  
 HCO3I 26.5 (H) 10/28/2018 05:18 PM  
 FIO2I 0.30 10/28/2018 05:18 PM  
 
 
Chest X-ray: 
Date:10/28/2018 Results from [facility] Encounter encounter on 09/26/18 XR CHEST PORT Narrative EXAM: Portable Chest 
 
CLINICAL INDICATION:  trach change; atelectasis -Additional:  None COMPARISON:  10/24/18 TECHNIQUE: AP portable view at [address] 
 
______________ FINDINGS: 
 
HEART AND MEDIASTINUM:  The heart size is stable LUNGS AND AIRWAYS:  Right chest perihilar infiltrate is present PLEURA:  No pneumothorax or pleural effusion BONES:  Vertebral spondylosis OTHER:  None 
 
______________ Impression IMPRESSION: 
 
Right lung perihilar infiltrate appears present Lab Test: 
Date:10/28/2018 WBC:  
Lab Results Component Value Date/Time WBC 19.4 (H) 10/28/2018 04:00 AM  
HGB:  
Lab Results Component Value Date/Time HGB 8.2 (L) 10/28/2018 04:00 AM  
 PLTS:  
Lab Results Component Value Date/Time PLATELET 653 (H) 27/08/4859 04:00 AM  
 
 
SaO2%/flow: @LASTSAO2(1)@ Vital Signs:    
Patient Vitals for the past 8 hrs: 
 Temp Pulse Resp BP SpO2  
10/28/18 1637  100 22  100 % 10/28/18 1600 99.5 °F (37.5 °C) 99 20 180/68 100 % 10/28/18 1500  97 17 147/51 100 % 10/28/18 1400  (!) 101 19 117/44 100 % 10/28/18 1300  (!) 108 18 115/40 99 % 10/28/18 1225  (!) 106 18  100 % 10/28/18 1200 (!) 101.9 °F (38.8 °C) (!) 110 (!) 36 (!) 131/32 99 % 10/28/18 1100  (!) 110 22 126/48 99 % 10/28/18 1046  (!) 112 (!) 40  99 % 10/28/18 1000  (!) 112 (!) 32 149/45 100 % Wean Screen Pass (Yes or No): y Wean Screen Reason for Failure: 
Duration of Weaning Trial:  
Additional Comments: Wean trial failed due to low volumes, tachypnea PLAN OF CARE:  Monitor pt on vent;  Wean/titrate as tolerated to maintain sats. Initiate SBT, pt unable to tolerate PSV=7, or PSV=10; Increased to PSV=15 with plan to wean as tolerated. GOAL:  Removal from mechanical ventilation to trach collar.  
 
 
OUTCOME:  Pt remains vent dependent - unable to pass SBT due to low tidal volumes, tachypnea.

## 2018-10-28 NOTE — PROGRESS NOTES
Recd pt on vent:  SIMV, rate=12, PSV=12, Az=189, Fio2=30%, Peep=5 - vent check complete Pt on trach size 8 portex, fenestrated, with cuff inflated 
-Spare trachs at bedside including size 8 & size smaller 
-Trach care completed:  Clean with NS, change drain bandage, trach tie, & inner cannula 
 
0922-Begin SBT with PSV=10 
 
 10/28/18 0930 Weaning Parameters Resp Rate Observed 36 Ve 12.1  RSBI 109  
 
1040-Discussed with MD pt exhibits periods of tachypnea, alexi. With stimulation - will try to allow pt to continue on SBT while monitoring to see if rate returns when not stimulated - verbal order for ABG this afternoon 
 
--Plan to have pt remain on PSV if tolerates overnight per verbal order from MD 
 
1225--Verbal order to increase PSV=15 per MD due to pt tachypnea, increased WOB

## 2018-10-28 NOTE — DIALYSIS
ACUTE HEMODIALYSIS FLOW SHEET 
 
HEMODIALYSIS ORDERS: Physician: Gayathri Carney Dialyzer: revaclear         Duration:4  hr  BFR:300    DFR: 600 Dialysate:  Temp 36 K+   2    Ca+2.5   Na140  Bicarb 35 Weight:   kg    Bed Scale []     Unable to Obtain [x]      Dry weight/UF Goal: 3000  Access Needle Gauge Heparin []  Bolus      Units    [] Hourly       Units    []None Catheter locking solution Pre BP: 120/60      Pulse:  100        Temperature:  100.0   Respirations: 17  Tx: NS       ml/Bolus  Other        [x] N/A Labs: Pre        Post:        [x] N/A Additional Orders(medications, blood products, hypotension management):       [x] N/A [x] Time Out/Safety Check  [x] [facility] Consent Verified CATHETER ACCESS: []N/A   [x]Right   []Left   []IJ     [x]Fem [] First use X-ray verified     []Tunnel                [x] Non Tunneled [x]No S/S infection  []Redness  []Drainage []Cultured []Swelling []Pain  
[x]Medical Aseptic Prep Utilized   []Dressing Changed  [] Biopatch  Date:      
[]Clotted   [x]Patent   Flows: [x]Good  []Poor  [x]Reversed If access problem,  notified: []Yes    [x]N/A  Date:        
 
GRAFT/FISTULA ACCESS:  [x]N/A     []Right     []Left     []UE     []LE []AVG   []AVF        []Buttonhole    []Medical Aseptic Prep Utilized []No S/S infection  []Redness  []Drainage []Cultured []Swelling []Pain Bruit:   [] Strong    [] Weak       Thrill :   [] Strong    [] Weak Needle Gauge:    Length: If access problem,  notified: []Yes     [x]N/A  Date:       
Please describe access if present and not used:  
 
 
GENERAL ASSESSMENT:   
LUNGS:  Rate  SaO2%        [] N/A    [x] Clear  [] Coarse  [] Crackles  [] Wheezing 
      [x] Diminished     Location : []RLL   []LLL    []RUL  []JOSSIE Cough: []Productive  []Dry  [x]N/A   Respirations:  [x]Easy  []Labored Therapy:  []RA  []NC  l/min    Mask: []NRB []Venti       O2% [x]Ventilator  []Intubated  [x] Trach  [] BiPaP CARDIAC: []Regular      [] Irregular   [] Pericardial Rub  [] JVD []  Monitored  [] Bedside  [] Remotely monitored [] N/A  Rhythm: EDEMA: [x] None  []Generalized  [] Pitting [] 1    [] 2    [] 3    [] 4 [] Facial  [] Pedal  []  UE  [] LE  
SKIN:   [x] Warm  [] Hot     [] Cold   [x] Dry     [] Pale   [] Diaphoretic   Breakdown on rt buttock [] Flushed  [] Jaundiced  [] Cyanotic  [] Rash  [] Weeping LOC:    [] Alert      []Oriented:    [] Person     [] Place  []Time 
             [] Confused  [] Lethargic  [] Medicated  [x] Non-responsive GI / ABDOMEN:  [] Flat    [] Distended    [x] Soft    [] Firm   []  Obese 
                           [] Diarrhea  [x] Bowel Sounds  [] Nausea  [] Vomiting  Fecal bag in place  / URINE ASSESSMENT:[] Voiding   [x] Oliguria  [] Anuria   []  Mcbride [] Incontinent    []  Incontinent Brief      []  Bathroom Privileges PAIN: [x] 0 []1  []2   []3   []4   []5   []6   []7   []8   []9   []10 Scale 0-10  Action/Follow Up: MOBILITY:  [] Amb    [] Amb/Assist    [x] Bed    [] Wheelchair  [] Stretcher All Vitals and Treatment Details on Attached Flowsheet Hospital:   [place] [address] ICU Room # 1827 [] 1st Time Acute  [] Stat  [x] Routine  [] Urgent    
[] Acute Room  []  Bedside  [x] ICU/CCU  [] ER Isolation Precautions:  [x] Dialysis   [] Airborne   [x] Contact    [] Reverse Special Considerations:         [] Blood Consent Verified [x]N/A ALLERGIES:   [x] NKA Code Status:  [x] Full Code  [] DNR  [] Other HBsAg ONLY: Date Drawn          [x]Negative []Positive []Unknown HBsAb: Date     [x] Susceptible   [] [id number] []Not Drawn  [] Drawn Current Labs:    Date of Labs: Today [x] Results for Jolie Jaimes (MRN [id number]) as of 10/27/2018 21:48 Ref.  Range 10/27/2018 03:50  
 WBC Latest Ref Range: 4.6 - 13.2 K/uL 17.4 (H) RBC Latest Ref Range: 4.70 - 5.50 M/uL 2.81 (L) HGB Latest Ref Range: 13.0 - 16.0 g/dL 7.9 (L) HCT Latest Ref Range: 36.0 - 48.0 % 25.6 (L) MCV Latest Ref Range: 74.0 - 97.0 FL 91.1 MCH Latest Ref Range: 24.0 - 34.0 PG 28.1 MCHC Latest Ref Range: 31.0 - 37.0 g/dL 30.9 (L) RDW Latest Ref Range: 11.6 - 14.5 % 15.9 (H) PLATELET Latest Ref Range: 135 - 420 K/uL 604 (H) MPV Latest Ref Range: 9.2 - 11.8 FL 8.7 (L) Sodium Latest Ref Range: 136 - 145 mmol/L 137 Potassium Latest Ref Range: 3.5 - 5.5 mmol/L 5.3 Chloride Latest Ref Range: 100 - 108 mmol/L 99 (L)  
CO2 Latest Ref Range: 21 - 32 mmol/L 27 Anion gap Latest Ref Range: 3.0 - 18 mmol/L 11 Glucose Latest Ref Range: 74 - 99 mg/dL 127 (H) BUN Latest Ref Range: 7.0 - 18 MG/DL 60 (H) Creatinine Latest Ref Range: 0.6 - 1.3 MG/DL 4.68 (H) BUN/Creatinine ratio Latest Ref Range: 12 - 20   13 Calcium Latest Ref Range: 8.5 - 10.1 MG/DL 8.2 (L) Ionized Calcium Latest Ref Range: 1.12 - 1.32 MMOL/L 0.98 (L) Phosphorus Latest Ref Range: 2.5 - 4.9 MG/DL 7.8 (H) Magnesium Latest Ref Range: 1.6 - 2.6 mg/dL 2.5  
GFR est non-AA Latest Ref Range: >60 ml/min/1.73m2 13 (L)  
GFR est AA Latest Ref Range: >60 ml/min/1.73m2 15 (L) Results for Claudine Ordoñez (MRN [id number]) as of 10/27/2018 21:48 Ref. Range 9/28/2018 04:40 Hepatitis B surface Ag Latest Ref Range: <1.00 Index <0.10 Hep B surface Ag Interp. Latest Ref Range: NEG   NEGATIVE Cut and paste current labs here. DIET:  [] Renal    [x] Other     [x] NPO     []  Diabetic PRIMARY NURSE REPORT: First initial/Last name/Title Pre Dialysis: Tootie Loera RN    Time: 2030 EDUCATION:   
[] Patient [x] Other         Knowledge Basis: []None []Minimal [] Substantial  
Barriers to learning  []N/A  
 [] Access Care     [] S&S of infection     [] Fluid Management     []K+     []Procedural   
[]Albumin     [] Medications     [] Tx Options     [] Transplant     [] Diet     [] Other Teaching Tools:  [] Explain  [] Demo  [] Handouts [] Video Patient response:   [] Verbalized understanding  [] Teach back  [] Return demonstration [] Requires follow up Inappropriate due to    Pt. Unresponsive. No family present. RO/HEMODIALYSIS MACHINE SAFETY CHECKS  Before each treatment:    
Machine Number:                   [facility]  
                                [] Unit Machine #  with centralized RO 
                                [] Portable Machine #1/RO serial # [id number] [] Portable Machine #2/RO serial # [id number] [] Portable Machine #3/RO serial # [id number] [facility] [x] Portable Machine #11/RO serial # [id number] [] Portable Machine #12/RO serial # [id number] [] Portable Machine #13/RO serial #  [id number] Alarm Test:  Pass time   2040       Other:        
[x] RO/Machine Log Complete Temp   36             [x]Extracorporeal Circuit Tested for integrity Dialysate: pH 7  Conductivity: Meter 14       HD Machine   14.2                  TCD: 14 
Dialyzer Lot # [id number]            Blood Tubing Lot # [id number]          Saline Lot # [id number]   
 
CHLORINE TESTING-Before each treatment and every 4 hours Total Chlorine: [x] less than 0.1 ppm  Time:2030  4 Hr/2nd Check Time:   
(if greater than 0.1 ppm from Primary then every 30 minutes from Secondary) TREATMENT INITIATION  with Dialysis Precautions:  
[x] All Connections Secured                 [x] Saline Line Double Clamped [x] Venous Parameters Set                  [x] Arterial Parameters Set [x] Prime Given   ml  250            [x]Air Foam Detector Engaged Treatment Initiation Note: Started treatment using rt fem HD catheter. Medication Dose Volume Route Initials Dialyzer Cleared: [] Good [x] Fair  [] Poor Blood processed:   L 
UF Removed   Ml 
 
Post Wt:     kg 
POst BP: 142/80         Pulse:   110    Respirations: 20  Temperature: 99.1 Post Tx Vascular Access: AVF/AVG: Bleeding stopped Art  min. Shree. Min   N/A Catheter: Locking solution: Heparin 1:1000 Art. 1.4   Shree.1.4   N/A Post Assessment:  
  
                              Skin:  [x] Warm  [x] Dry [] Diaphoretic    [] Flushed  [] Pale [] Cyanotic [facility] Signatures Title Initials  Time Lungs: [x] Clear    [] Course  [] Crackles  [] Wheezing [] Diminished Cardiac: [x] Regular   [] Irregular   [] Monitor  [] N/A  Rhythm:      
    Edema:  [x] None    [] General     [] Facial   [] Pedal    [] UE    [] LE  
    Pain: [x]0  []1  []2   []3  []4   []5   []6   []7   []8   []9   []10 Post Treatment Note: Pt. Tolerated dialysis well. POST TREATMENT PRIMARY NURSE HANDOFF REPORT:  
 
First initial/Last name/Title Post Dialysis: Ravinder Dunaway RN  Time:  0100 Abbreviations: AVG-arterial venous graft, AVF-arterial venous fistula, IJ-Internal Jugular, Subcl-Subclavian, Fem-Femoral, Tx-treatment, AP/HR-apical heart rate, DFR-dialysate flow rate, BFR-blood flow rate, AP-arterial pressure, -venous pressure, UF-ultrafiltrate, TMP-transmembrane pressure, Shree-Venous, Art-Arterial, RO-Reverse Osmosis

## 2018-10-28 NOTE — PROGRESS NOTES
Hospitalist Progress Note Daily Progress Note: 10/28/2018 11:40 AM 
   
Interval history / Subjective:  
Ignacio Rosen is a 57y.o. year old male who presented from [facility] with abnormal labs, elevated BUN/Cr. Found to have JULIANNA, UTI, and markedly elevated LFT on presentation. Patient's recent admission was 9/10/18-9/14/18 for acute CVA and rhabdo. Patient poor historian on admission,stated \"I had heart failure. She was started on vanc,zosyn. On 9/29,patient had cardiac arrest with ventricular fibrillation - s/p ROSC. Her hospital course has since then complicated with sepsis,pancreatitits,pneumonia,acute pancreatitis requiring involvement of gi,surgery,nephrology,cardiology,ID,cardiology. So far patient has remained without major improvement. Now in vegetative state. Patient had PEG/Trach placement on 10/17. On 10/17,patient started on acyclovir for veicular rash rt arm,suspected being Zoster. Lipase 2169. US abd 10/22 shows Abnormal appearance of the gallbladder with stones/sludge with wall thickening  
and possible pericholecystic fluid similar to the prior study of 9/27/2018 
10/22: Patient on treatment for shingles in contact isolation per ID team. He also had left big toe gangrene . We will consult Podiatry. His lipase was elevated , US of the abdomen shows sludge and possible cholecystitis. Patient afebrile. GI consulted  for possible MRCP. Patient has leukocytosis. ID started Vancomycin and ordered Urine , blood and sputum culture and trach site discharge. Patient off air bourne precaution but continue contact precaution per ID. Patient was started on Vancomycin and cefepime for SIRSper ID. Blood and urine cx pending. Patient today is going for jejunostomy. Patient BC NGTD Vancomycin d/c'd per ID. Patient respiratory  cx + GNR , STREPT, Wound cx + pseudomonas . PCCM added Ciprofloxacin today . IR attempt at Jejunostomy was unsuccessful yesterday. 10/28: Fever today. Resp cx, wound cx noted. On cefepime and Cipro for coverage per sensitivisty result . Blood cx NGTD . Lipase still trending up. IR unsuccessful attempt at jejunostomy 10/26. Will re consult GI today Current Facility-Administered Medications Medication Dose Route Frequency  cefepime (MAXIPIME) 0.5 g in 0.9% sodium chloride 100 mL IVPB  0.5 g IntraVENous Q24H  ciprofloxacin (CIPRO) 200 mg IVPB (premix)  200 mg IntraVENous Q24H  
 albuterol (PROVENTIL VENTOLIN) nebulizer solution 2.5 mg  2.5 mg Nebulization BID RT  
 insulin glargine (LANTUS) injection 15 Units  15 Units SubCUTAneous DAILY  multivitamin (MULTI-DELYN, WELLESSE) oral liquid 30 mL  30 mL Oral DAILY  bacitracin 500 unit/gram packet 1 Packet  1 Packet Topical PRN  
 sodium chloride (NS) flush 10-30 mL  10-30 mL InterCATHeter PRN  
 sodium chloride (NS) flush 10 mL  10 mL InterCATHeter Q24H  
 sodium chloride (NS) flush 10 mL  10 mL InterCATHeter PRN  
 sodium chloride (NS) flush 10-40 mL  10-40 mL InterCATHeter Q8H  
 sodium chloride (NS) flush 20 mL  20 mL InterCATHeter Q24H  
 heparin (porcine) injection 5,000 Units  5,000 Units SubCUTAneous Q8H  
 hydroxypropyl methylcellulose (ISOPTO TEARS) 0.5 % ophthalmic solution 2 Drop  2 Drop Both Eyes BID  epoetin manda (EPOGEN;PROCRIT) injection 40,000 Units  40,000 Units IntraVENous Q7D  
 pantoprazole (PROTONIX) 40 mg in sodium chloride 0.9% 10 mL injection  40 mg IntraVENous Q24H  
 0.9% sodium chloride infusion 250 mL  250 mL IntraVENous PRN  
 influenza vaccine 2018-19 (6 mos+)(PF) (FLUARIX QUAD/FLULAVAL QUAD) injection 0.5 mL  0.5 mL IntraMUSCular PRIOR TO DISCHARGE  acetaminophen (TYLENOL) solution 325 mg  325 mg Per NG tube Q4H PRN  
 0.9% sodium chloride infusion 250 mL  250 mL IntraVENous PRN  
 heparin (porcine) pf 100 Units  100 Units InterCATHeter PRN  
 0.9% sodium chloride infusion 250 mL  250 mL IntraVENous PRN  
  insulin lispro (HUMALOG) injection   SubCUTAneous Q6H  
 heparin (porcine) 1,000 unit/mL injection 1,000 Units  1,000 Units InterCATHeter DIALYSIS PRN  
 senna-docusate (PERICOLACE) 8.6-50 mg per tablet 1 Tab  1 Tab Oral BID PRN  
 ondansetron (ZOFRAN) injection 4 mg  4 mg IntraVENous Q4H PRN  
 glucose chewable tablet 16 g  4 Tab Oral PRN  
 glucagon (GLUCAGEN) injection 1 mg  1 mg IntraMUSCular PRN  
 dextrose (D50) infusion 12.5-25 g  25-50 mL IntraVENous PRN  
 hydrALAZINE (APRESOLINE) 20 mg/mL injection 20 mg  20 mg IntraVENous Q6H PRN Review of Systems Intubated,unresponsive. Objective:  
 
Visit Vitals /48 Pulse (!) 110 Temp (!) 100.5 °F (38.1 °C) Resp 22 Ht 5' 7\" (1.702 m) Wt 87.5 kg (192 lb 14.4 oz) SpO2 99% BMI 30.21 kg/m² O2 Flow Rate (L/min): 45 l/min O2 Device: Tracheostomy Temp (24hrs), Av.8 °F (37.7 °C), Min:99.1 °F (37.3 °C), Max:101 °F (38.3 °C) 
 
 
10/28 07 - 10/28 1900 In: 105 Out: 0  
10/26 1901 - 10/28 07 In: 1844 [I.V.:200] Out: 1628 [Urine:235; OWWRVL:454] P/E General Appearance:S/p PEG/Trach today HENT: normocephalic/atraumatic, + ETT Neck: No JVD, hematoma R side where [facility] is improving Lungs: Coarse B/L w/ vent-assisted BS 
CV: RRR, no m/r/g Abdomen: soft, non-tender, normal bowel sounds Extremities: versicular rash rt arm and right side of chest,no cyanosis, no peripheral edema Neuro: Unresponsive to commands, no withdrawal to pain, + corneal reflex and pupillary reaction today Skin: Normal color, intact Data Review Recent Results (from the past 12 hour(s)) HEP B SURFACE AG Collection Time: 10/27/18 11:45 PM  
Result Value Ref Range Hepatitis B surface Ag <0.10 <1.00 Index Hep B surface Ag Interp. NEGATIVE  NEG    
GLUCOSE, POC Collection Time: 10/28/18  1:42 AM  
Result Value Ref Range Glucose (POC) 160 (H) 70 - 110 mg/dL LIPASE Collection Time: 10/28/18  4:00 AM  
Result Value Ref Range Lipase 1,339 (H) 73 - 393 U/L  
CBC W/O DIFF Collection Time: 10/28/18  4:00 AM  
Result Value Ref Range WBC 19.4 (H) 4.6 - 13.2 K/uL  
 RBC 2.90 (L) 4.70 - 5.50 M/uL HGB 8.2 (L) 13.0 - 16.0 g/dL HCT 26.4 (L) 36.0 - 48.0 % MCV 91.0 74.0 - 97.0 FL  
 MCH 28.3 24.0 - 34.0 PG  
 MCHC 31.1 31.0 - 37.0 g/dL  
 RDW 16.4 (H) 11.6 - 14.5 % PLATELET 813 (H) 005 - 420 K/uL MPV 9.0 (L) 9.2 - 11.8 FL  
RENAL FUNCTION PANEL Collection Time: 10/28/18  4:00 AM  
Result Value Ref Range Sodium 137 136 - 145 mmol/L Potassium 4.4 3.5 - 5.5 mmol/L Chloride 100 100 - 108 mmol/L  
 CO2 27 21 - 32 mmol/L Anion gap 10 3.0 - 18 mmol/L Glucose 191 (H) 74 - 99 mg/dL BUN 36 (H) 7.0 - 18 MG/DL Creatinine 3.02 (H) 0.6 - 1.3 MG/DL  
 BUN/Creatinine ratio 12 12 - 20 GFR est AA 25 (L) >60 ml/min/1.73m2 GFR est non-AA 21 (L) >60 ml/min/1.73m2 Calcium 8.0 (L) 8.5 - 10.1 MG/DL Phosphorus 4.3 2.5 - 4.9 MG/DL Albumin 1.7 (L) 3.4 - 5.0 g/dL MAGNESIUM Collection Time: 10/28/18  4:00 AM  
Result Value Ref Range Magnesium 2.3 1.6 - 2.6 mg/dL CALCIUM, IONIZED Collection Time: 10/28/18  4:00 AM  
Result Value Ref Range Ionized Calcium 0.96 (L) 1.12 - 1.32 MMOL/L  
GLUCOSE, POC Collection Time: 10/28/18  6:42 AM  
Result Value Ref Range Glucose (POC) 206 (H) 70 - 110 mg/dL Assessment/Plan:  
 
Principal Problem: 
  Cardiac arrest with ventricular fibrillation ([address] (9/29/2018) Overview: ROSC before defibrillation could be attempted. Active Problems: 
  Essential hypertension (9/10/2018) Diabetes mellitus type 2, controlled ([facility] (9/10/2018) History of stroke (9/27/2018) Overview: With residual R hemiparesis Acute-on-chronic kidney injury ([facility] (9/27/2018) Acute cystitis (9/27/2018) Elevated troponin (9/27/2018) Elevated LFTs (9/28/2018) Acute pancreatitis (9/29/2018) Overview: Lipase downtrends with interruption in tube feed and Mucomyst. Restarting  
    tube feed did result in modest elevation but not to the levels seen while  
    on Mucomyst. Nonetheless, there is signficant decrease in lipase levels  
    with interruptions in tube feeding. Ischemic encephalopathy (9/30/2018) Hypoalbuminemia (10/21/2018) Shingles (10/21/2018) Gangrene ([address] (10/22/2018) Pseudomonas infection (10/27/2018) Zoster. Care Plan - Vent mgmt per ICU - S/p trach/PEG on 10/17 as patient did not show significant improvement. 
- EGD showed large clot in esophagus last week repeat EGD 10/9 shows healed esophageal ulcer - Cont protonix IV every day  
- Hgb stable - LFTs improving - GI signed off 10/9 
- MRI w/ evidence of severe anoxic brain injury - Minimal improvement on neuro exam 
- Appreciate neuro assistance - IV Meropenem and IV Fluconazole d/heron on 10/15 per ID. 
- Currently on vanc only which was started on 9/27 
- Nephro managing HD TTHS 
- Pt unlikely to have any meaningful neuro recovery, prognosis is very grim - Family wanted to cont to do everything, including trach/PEG -> was done10/17 
- On 10/17:Started on acyclovir for possible Zoster rt arm and rt upper chest area. - Hyperkalemia on 10/18 ->corrected - Lipase 2169 10/22. US abd Abnormal appearance of the gallbladder with stones/sludge with wall thickening  
and possible pericholecystic fluid similar to the prior study of 9/27/2018  
- On Acyclovir for shingles , contact precaution per ID commitee 
- 10/22: Consult GI today for possible MRCP 
- 10/22: Consult Podiatry for left big toe gangrene  
- 10/23 : Podiatry recommend RAQUEL for Left Big toe  
- 10/23: GI recommend MRI/MRCP but will need patient off vent for multiple times for procedure.  Not sure if this is feasible now 
- 10/25 : Blood , urine , sputum , trach discharge cultures 
- 10/25 IV antibiotics by ID  
 - Change GI tube to Jejunostomy tube tomorrow by IR  
- 10/26: Vancomycin and Cefepime per ID  
- 10/26 : Jejunostomy attempt by IR unsuccessful . Will need to discuss with GI  
- 10/27 : Ciprofloxacin added 10/27 for double coverage of pseudomonas with cefepime per PCCM  
- 10/28: Pancreatitis ? Biliary  . Will reconsult GI today DVT prophylaxis:scds Full code. Disposition:tbd - need placement -  involved.

## 2018-10-28 NOTE — PROGRESS NOTES
In Patient Progress note Admit Date: 9/26/2018 Impression: 1. Prolonged dialysis dependant JULIANNA d/t ATN Has been HD dependent TTS , tolerating HD Plans for possible TDC early next week provided Remains afebrile and negative BCX 2. S/p cardiopulm arrest 9/29.   
3. Acute pyelonephritis/sepsis. on AB 4. Abnormal lft's/acute cholecystitis/pancreatitis 5. UGI bleed, EGD 6. Anemia , acute bld loss + JULIANNA . ABIGAIL with HD 7. Asp pneumonia. 8. Resp failure. S/p peg/trach 10/17, s/p bronch. 9. Rt shoulder shingles. On acyclovir. 10. Anoxic brain injury superimposed on recent cva. Please call with questions, 
Robe Katz MD FASN Cell [phone] Pager: [phone] Subjective:  
 
Denies SOB/CP Current Facility-Administered Medications:  
  cefepime (MAXIPIME) 0.5 g in 0.9% sodium chloride 100 mL IVPB, 0.5 g, IntraVENous, Q24H, Anthony Solorzano MD, 0.5 g at 10/28/18 0129   ciprofloxacin (CIPRO) 200 mg IVPB (premix), 200 mg, IntraVENous, Q24H, Lynette Hernandez MD, Last Rate: 100 mL/hr at 10/28/18 0132, 200 mg at 10/28/18 0132 
  albuterol (PROVENTIL VENTOLIN) nebulizer solution 2.5 mg, 2.5 mg, Nebulization, BID RT, Ashtye Littlejohn MD, 2.5 mg at 10/28/18 0859 
  insulin glargine (LANTUS) injection 15 Units, 15 Units, SubCUTAneous, DAILY, Anthony Solorzano MD, 15 Units at 10/28/18 0823 
  multivitamin (MULTI-DELYN, WELLESSE) oral liquid 30 mL, 30 mL, Oral, DAILY, Lynette Hernandez MD, 30 mL at 10/28/18 2123 
  bacitracin 500 unit/gram packet 1 Packet, 1 Packet, Topical, PRN, TyronSarahi kirkland MD 
  sodium chloride (NS) flush 10-30 mL, 10-30 mL, InterCATHeter, PRN, Jones Ackerman MD, 30 mL at 10/21/18 0430 
  sodium chloride (NS) flush 10 mL, 10 mL, InterCATHeter, Q24H, Jones Ackerman MD, 10 mL at 10/28/18 1721   sodium chloride (NS) flush 10 mL, 10 mL, InterCATHeter, PRN, Yogesh Archuleta MD, 10 mL at 10/20/18 0314   sodium chloride (NS) flush 10-40 mL, 10-40 mL, InterCATHeter, Q8H, Jones Ackerman MD, 10 mL at 10/28/18 1553   sodium chloride (NS) flush 20 mL, 20 mL, InterCATHeter, Q24H, Jones Ackerman MD, 20 mL at 10/28/18 1553   heparin (porcine) injection 5,000 Units, 5,000 Units, SubCUTAneous, Q8H, Glenn Ackerman MD, 5,000 Units at 10/28/18 1218   hydroxypropyl methylcellulose (ISOPTO TEARS) 0.5 % ophthalmic solution 2 Drop, 2 Drop, Both Eyes, BID, Lakesha Gonzales NP, 2 Drop at 10/28/18 1722   epoetin manda (EPOGEN;PROCRIT) injection 40,000 Units, 40,000 Units, IntraVENous, Q7D, Chrissy Raines MD, 40,000 Units at 10/24/18 1227   pantoprazole (PROTONIX) 40 mg in sodium chloride 0.9% 10 mL injection, 40 mg, IntraVENous, Q24H, Juani SHAFFER MD, 40 mg at 10/28/18 9076 
  0.9% sodium chloride infusion 250 mL, 250 mL, IntraVENous, PRN, Gigi Renee DO 
  influenza vaccine 2018-19 (6 mos+)(PF) (FLUARIX QUAD/FLULAVAL QUAD) injection 0.5 mL, 0.5 mL, IntraMUSCular, PRIOR TO DISCHARGE, Lars Santiago MD 
  acetaminophen (TYLENOL) solution 325 mg, 325 mg, Per NG tube, Q4H PRN, Lars Santiago MD, 325 mg at 10/28/18 1218 
  0.9% sodium chloride infusion 250 mL, 250 mL, IntraVENous, PRN, Denisse LEE DO 
  heparin (porcine) pf 100 Units, 100 Units, InterCATHeter, PRN, Richi Estrada MD 
  0.9% sodium chloride infusion 250 mL, 250 mL, IntraVENous, PRN, Lars Santiago MD 
  insulin lispro (HUMALOG) injection, , SubCUTAneous, Q6H, Denisse LEE DO, Stopped at 10/28/18 1800 
  heparin (porcine) 1,000 unit/mL injection 1,000 Units, 1,000 Units, InterCATHeter, DIALYSIS PRN, Chrissy Raines MD, 1,000 Units at 09/28/18 1332   senna-docusate (PERICOLACE) 8.6-50 mg per tablet 1 Tab, 1 Tab, Oral, BID PRN, Alfred Nettles, DO 
   ondansetron (ZOFRAN) injection 4 mg, 4 mg, IntraVENous, Q4H PRN, Alfred Nettles A, DO, 4 mg at 09/28/18 0539 
  glucose chewable tablet 16 g, 4 Tab, Oral, PRN, Yoon Alfred A, DO 
  glucagon (GLUCAGEN) injection 1 mg, 1 mg, IntraMUSCular, PRN, McQuain, Alfred A, DO 
  dextrose (D50) infusion 12.5-25 g, 25-50 mL, IntraVENous, PRN, Alfred Nettles A, DO, 25 g at 10/09/18 1906   hydrALAZINE (APRESOLINE) 20 mg/mL injection 20 mg, 20 mg, IntraVENous, Q6H PRN, Trina Saucedo NP, 20 mg at 10/27/18 0503 Objective:  
 
Visit Vitals /68 (BP Patient Position: At rest) Pulse 100 Temp 99.5 °F (37.5 °C) Resp 22 Ht 5' 7\" (1.702 m) Wt 87.5 kg (192 lb 14.4 oz) SpO2 100% BMI 30.21 kg/m² Intake/Output Summary (Last 24 hours) at 10/28/2018 1726 Last data filed at 10/28/2018 1637 Gross per 24 hour Intake 2952.5 ml Output 3210 ml Net -257.5 ml Physical Exam:  
 
Dominguez Rafael in place HENT mmm RS AEBE clear CVS s1 s 2wnl GI PEG tube in place Ext no edema Access [place] Data Review: 
 
Recent Labs 10/28/18 
0400 WBC 19.4*  
RBC 2.90* HCT 26.4*  
MCV 91.0 MCH 28.3 MCHC 31.1 RDW 16.4* Recent Labs 10/28/18 
0400 10/27/18 
0350 10/26/18 
1630 10/26/18 
0355 BUN 36* 60*  --  39* CREA 3.02* 4.68*  --  3.32* CA 8.0* 8.2*  --  7.7* ALB 1.7* 1.7* 1.7* 1.6*  
K 4.4 5.3  --  4.5  137  --  139  99*  --  100 CO2 27 27  --  27 PHOS 4.3 7.8*  --  4.5 * 127*  --  135* Nabeel Hardy MD

## 2018-10-29 NOTE — PROGRESS NOTES
Patient in bed on back with head elevated - BBS equal and sl coarse - trach tube midline and secure - cleaned site with normal saline, replaced gauze, replaced inner cannula - patient suctioned - [facility] at [facility] - spare trachs at bedside - vent alarms on and funstional

## 2018-10-29 NOTE — PROGRESS NOTES
Pt on ventilator unresponsive with trach/PEG and has uldall catheter. ASSESSMENT:  
· Prolonged dialysis dependant JULIANNA (ischemic atn in a setting of acute pyelonephritis/acute cholecystitis with sepsis and cardiac arrest 9/29). No evidence of recovery of renal function at this time, although nonolliguric (continue straight cath every 8 hours). Next dialysis tomorrow and TTS. Needs tdc but is febrile on/off, wbc is up. Add ivf since npo. · S/p cardiopulm arrest 9/29. · Acute pyelonephritis/sepsis. Febrile on/off. Abx restarted by ID. · Abnormal lft's/acute cholecystitis/pancreatitis. GI on the case. Unable to place jtube. · UGI bleed, EGD results noted- healing esophageal ulceration. · Acute blood loss anemia/anemia of kidney failure. Continue analia. · Asp pneumonia. · Resp failure. S/p peg/trach 10/17, s/p bronch. · Rt shoulder shingles. Finished acyclovir. · Anoxic brain injury superimposed on recent cva Pt with acute on chronic ESRD on HD via [place] -2nd catheter. Cancelled TDC last week due to zoster rash and respiratory precautions. Now only contact precautions - plan TDC next several days if afebrile / stable.

## 2018-10-29 NOTE — PROGRESS NOTES
Hospitalist Progress Note Daily Progress Note: 10/29/2018 11:40 AM 
   
Interval history / Subjective:  
Jing Kaur is a 57y.o. year old male who presented from [facility] with abnormal labs, elevated BUN/Cr. Found to have JULIANNA, UTI, and markedly elevated LFT on presentation. Patient's recent admission was 9/10/18-9/14/18 for acute CVA and rhabdo. Patient poor historian on admission,stated \"I had heart failure. She was started on vanc,zosyn. On 9/29,patient had cardiac arrest with ventricular fibrillation - s/p ROSC. Her hospital course has since then complicated with sepsis,pancreatitits,pneumonia,acute pancreatitis requiring involvement of gi,surgery,nephrology,cardiology,ID,cardiology. So far patient has remained without major improvement. Now in vegetative state. Patient had PEG/Trach placement on 10/17. On 10/17,patient started on acyclovir for veicular rash rt arm,suspected being Zoster. Lipase 2169. US abd 10/22 shows Abnormal appearance of the gallbladder with stones/sludge with wall thickening  
and possible pericholecystic fluid similar to the prior study of 9/27/2018 
10/22: Patient on treatment for shingles in contact isolation per ID team. He also had left big toe gangrene . We will consult Podiatry. His lipase was elevated , US of the abdomen shows sludge and possible cholecystitis. Patient afebrile. GI consulted  for possible MRCP. Patient has leukocytosis. ID started Vancomycin and ordered Urine , blood and sputum culture and trach site discharge. Patient off air bourne precaution but continue contact precaution per ID. Patient was started on Vancomycin and cefepime for SIRSper ID. Blood and urine cx pending. Patient today is going for jejunostomy. Patient BC NGTD Vancomycin d/c'd per ID. Patient respiratory  cx + GNR , STREPT, Wound cx + pseudomonas . PCCM added Ciprofloxacin today . IR attempt at Jejunostomy was unsuccessful yesterday. 10/28: Fever today. Resp cx, wound cx noted. On cefepime and Cipro for coverage per sensitivisty result . Blood cx NGTD . Lipase still trending up. IR unsuccessful attempt at jejunostomy 10/26. Will re consult GI today 10/29:no change in care. Current Facility-Administered Medications Medication Dose Route Frequency  dextrose 5 % - 0.45% NaCl infusion  75 mL/hr IntraVENous CONTINUOUS  
 insulin glargine (LANTUS) injection 17 Units  17 Units SubCUTAneous DAILY  cefepime (MAXIPIME) 0.5 g in 0.9% sodium chloride 100 mL IVPB  0.5 g IntraVENous Q24H  
 albuterol (PROVENTIL VENTOLIN) nebulizer solution 2.5 mg  2.5 mg Nebulization BID RT  
 multivitamin (MULTI-DELYN, WELLESSE) oral liquid 30 mL  30 mL Oral DAILY  bacitracin 500 unit/gram packet 1 Packet  1 Packet Topical PRN  
 sodium chloride (NS) flush 10-30 mL  10-30 mL InterCATHeter PRN  
 sodium chloride (NS) flush 10 mL  10 mL InterCATHeter Q24H  
 sodium chloride (NS) flush 10 mL  10 mL InterCATHeter PRN  
 sodium chloride (NS) flush 10-40 mL  10-40 mL InterCATHeter Q8H  
 sodium chloride (NS) flush 20 mL  20 mL InterCATHeter Q24H  
 heparin (porcine) injection 5,000 Units  5,000 Units SubCUTAneous Q8H  
 hydroxypropyl methylcellulose (ISOPTO TEARS) 0.5 % ophthalmic solution 2 Drop  2 Drop Both Eyes BID  epoetin manda (EPOGEN;PROCRIT) injection 40,000 Units  40,000 Units IntraVENous Q7D  
 pantoprazole (PROTONIX) 40 mg in sodium chloride 0.9% 10 mL injection  40 mg IntraVENous Q24H  
 0.9% sodium chloride infusion 250 mL  250 mL IntraVENous PRN  
 influenza vaccine 2018-19 (6 mos+)(PF) (FLUARIX QUAD/FLULAVAL QUAD) injection 0.5 mL  0.5 mL IntraMUSCular PRIOR TO DISCHARGE  acetaminophen (TYLENOL) solution 325 mg  325 mg Per NG tube Q4H PRN  
 0.9% sodium chloride infusion 250 mL  250 mL IntraVENous PRN  
 heparin (porcine) pf 100 Units  100 Units InterCATHeter PRN  
 0.9% sodium chloride infusion 250 mL  250 mL IntraVENous PRN  
  insulin lispro (HUMALOG) injection   SubCUTAneous Q6H  
 heparin (porcine) 1,000 unit/mL injection 1,000 Units  1,000 Units InterCATHeter DIALYSIS PRN  
 senna-docusate (PERICOLACE) 8.6-50 mg per tablet 1 Tab  1 Tab Oral BID PRN  
 ondansetron (ZOFRAN) injection 4 mg  4 mg IntraVENous Q4H PRN  
 glucose chewable tablet 16 g  4 Tab Oral PRN  
 glucagon (GLUCAGEN) injection 1 mg  1 mg IntraMUSCular PRN  
 dextrose (D50) infusion 12.5-25 g  25-50 mL IntraVENous PRN  
 hydrALAZINE (APRESOLINE) 20 mg/mL injection 20 mg  20 mg IntraVENous Q6H PRN Review of Systems Intubated,unresponsive. Objective:  
 
Visit Vitals /59 Pulse (!) 105 Temp 99.4 °F (37.4 °C) Resp 27 Ht 5' 7\" (1.702 m) Wt 76.3 kg (168 lb 4.8 oz) SpO2 95% BMI 26.36 kg/m² O2 Flow Rate (L/min): 45 l/min O2 Device: Ventilator, Tracheostomy Temp (24hrs), Av.3 °F (37.4 °C), Min:98.8 °F (37.1 °C), Max:99.7 °F (37.6 °C) 
 
 
10/29 07 - 10/29 1900 In: 437.5 [I.V.:127.5] Out: 70 [Drains:70] 10/27 1901 - 10/29 0700 In: 3182.5 [I.V.:2500] Out: 5956 [Urine:165; Drains:100] P/E General Appearance:S/p PEG/Trach today HENT: normocephalic/atraumatic, + ETT Neck: No JVD, hematoma R side where [facility] is improving Lungs: Coarse B/L w/ vent-assisted BS 
CV: RRR, no m/r/g Abdomen: soft, non-tender, normal bowel sounds Extremities: versicular rash rt arm and right side of chest,no cyanosis, no peripheral edema Neuro: Unresponsive to commands, no withdrawal to pain, + corneal reflex and pupillary reaction today Skin: Normal color, intact Data Review Recent Results (from the past 12 hour(s)) LIPASE Collection Time: 10/29/18  4:00 AM  
Result Value Ref Range Lipase 1,044 (H) 73 - 393 U/L  
CBC W/O DIFF Collection Time: 10/29/18  4:00 AM  
Result Value Ref Range WBC 18.7 (H) 4.6 - 13.2 K/uL  
 RBC 2.58 (L) 4.70 - 5.50 M/uL HGB 7.4 (L) 13.0 - 16.0 g/dL HCT 23.4 (L) 36.0 - 48.0 % MCV 90.7 74.0 - 97.0 FL  
 MCH 28.7 24.0 - 34.0 PG  
 MCHC 31.6 31.0 - 37.0 g/dL  
 RDW 16.4 (H) 11.6 - 14.5 % PLATELET 465 (H) 688 - 420 K/uL MPV 8.7 (L) 9.2 - 11.8 FL  
RENAL FUNCTION PANEL Collection Time: 10/29/18  4:00 AM  
Result Value Ref Range Sodium 138 136 - 145 mmol/L Potassium 4.7 3.5 - 5.5 mmol/L Chloride 103 100 - 108 mmol/L  
 CO2 24 21 - 32 mmol/L Anion gap 11 3.0 - 18 mmol/L Glucose 120 (H) 74 - 99 mg/dL BUN 56 (H) 7.0 - 18 MG/DL Creatinine 4.08 (H) 0.6 - 1.3 MG/DL  
 BUN/Creatinine ratio 14 12 - 20 GFR est AA 18 (L) >60 ml/min/1.73m2 GFR est non-AA 15 (L) >60 ml/min/1.73m2 Calcium 8.5 8.5 - 10.1 MG/DL Phosphorus 6.2 (H) 2.5 - 4.9 MG/DL Albumin 1.5 (L) 3.4 - 5.0 g/dL MAGNESIUM Collection Time: 10/29/18  4:00 AM  
Result Value Ref Range Magnesium 2.3 1.6 - 2.6 mg/dL CALCIUM, IONIZED Collection Time: 10/29/18  4:00 AM  
Result Value Ref Range Ionized Calcium 1.14 1.12 - 1.32 MMOL/L  
GLUCOSE, POC Collection Time: 10/29/18  6:23 AM  
Result Value Ref Range Glucose (POC) 119 (H) 70 - 110 mg/dL GLUCOSE, POC Collection Time: 10/29/18 11:51 AM  
Result Value Ref Range Glucose (POC) 121 (H) 70 - 110 mg/dL Assessment/Plan:  
 
Principal Problem: 
  Card[facility] (9/29/2018) Overview: ROSC before defibrillation could be attempted. Active Problems: 
  Essential hypertension (9/10/2018) Diabetes mellitus type 2, controlled ([facility] (9/10/2018) History of stroke (9/27/2018) Overview: With residual R hemiparesis Acute-on-chronic kidney injury ([facility] (9/27/2018) Acute cystitis (9/27/2018) Elevated troponin (9/27/2018) Elevated LFTs (9/28/2018) Acute pancreatitis (9/29/2018) Overview: Lipase downtrends with interruption in tube feed and Mucomyst. Restarting  
    tube feed did result in modest elevation but not to the levels seen while on Mucomyst. Nonetheless, there is signficant decrease in lipase levels  
    with interruptions in tube feeding. Ischemic encephalopathy (9/30/2018) Hypoalbuminemia (10/21/2018) Shingles (10/21/2018) Gangrene ([address] (10/22/2018) Pseudomonas infection (10/27/2018) Zoster. Care Plan - Vent mgmt per ICU - S/p trach/PEG on 10/17 as patient did not show significant improvement. 
- EGD showed large clot in esophagus last week repeat EGD 10/9 shows healed esophageal ulcer - Cont protonix IV every day  
- Hgb stable - LFTs improving - GI signed off 10/9 
- MRI w/ evidence of severe anoxic brain injury - Minimal improvement on neuro exam 
- Appreciate neuro assistance - IV Meropenem and IV Fluconazole d/heron on 10/15 per ID. 
- Currently on vanc only which was started on 9/27 
- Nephro managing HD TTHS 
- Pt unlikely to have any meaningful neuro recovery, prognosis is very grim - Family wanted to cont to do everything, including trach/PEG -> was done10/17 
- On 10/17:Started on acyclovir for possible Zoster rt arm and rt upper chest area. - Hyperkalemia on 10/18 ->corrected - Lipase 2169 10/22. US abd Abnormal appearance of the gallbladder with stones/sludge with wall thickening  
and possible pericholecystic fluid similar to the prior study of 9/27/2018  
- On Acyclovir for shingles , contact precaution per ID commitee 
- 10/22: Consult GI today for possible MRCP 
- 10/22: Consult Podiatry for left big toe gangrene  
- 10/23 : Podiatry recommend RAQUEL for Left Big toe  
- 10/23: GI recommend MRI/MRCP but will need patient off vent for multiple times for procedure. Not sure if this is feasible now 
- 10/25 : Blood , urine , sputum , trach discharge cultures 
- 10/25 IV antibiotics by ID  
- Change GI tube to Jejunostomy tube tomorrow by IR  
- 10/26: Vancomycin and Cefepime per ID  
- 10/26 : Jejunostomy attempt by IR unsuccessful .  Will need to discuss with GI  
 - 10/27 : Ciprofloxacin added 10/27 for double coverage of pseudomonas with cefepime per PCCM  
- 10/28: Pancreatitis ? Biliary  . Will reconsult GI today - Lipase trending down. DVT prophylaxis:scds Full code. Disposition:tbd - need placement -  involved.

## 2018-10-29 NOTE — PROGRESS NOTES
Infectious Disease Follow-up Admit Date: 9/26/2018 Current abx Prior abx  
cefepime 10/25-4,  Zosyn 9/27-7, Meropenem/flucon 10/4- 11, vanco 9/27-25, ACV 10/17 - 10,  Vanco 10/25- 1, Cipro 10/28-1 Assessment ->Rec:  
 
Recurrent SIRS T 100.4 10/25 wbc 18k 
 - more secretions, reported cloudy urine, cxr yest no pneumonia  
-line tip culture 10/19 >15 CFU CoNS now with L picc, dialysis catheter   
- blcx 10/25 NGTD x 2, spgs rare Pseud, wd Pseud  -> continue Cefepime but agree with stopping vanco 
- Will also dc Cipro as Pseud is panS and double coverage not needed  
-> repeat blcx x2 if spikes fever >100.8, including from [facility] tomorrow and send tip for cx and repeat line based on response and cx PAD  
- PVL's s/o significant arterial disease of bilateral lower extremities 10/24. -POD Dr. Garvey Severe saw 10/24,  indicated not OR candidate foot for gangrene distal L gt toe - per others Shingles R arm, C 6 (?C5) dermatome 
- vesicular lesions onset ~10/15 progressing compared to onset per NP 
- confluent in upper arm, not crusted yet  
-VZV PCR pos 10/18 -> completed 10 days of Acyclovir on 10/26 
->CDC IC guidance d/w nursing and would cover lesions  but defer hospital infection control re: precautions here Prior Leukocytosis/SIRS poss sepsis (resolved 10/19) 
- MOF multiple ID and non-infectious concerns outlined below, complicated, wbc 64.7U today from high 27K+ on 10/5 On  vancomycin/zosyn 9/27 
- C diff neg 9/29, sput C albicans, repeat CT 10/4 no new findings --cxr reviewed - increased atx rt base. Left base improved 
- Higher fever 10/16 101.2, 10/18 101.4 afebrile since 
- Ddx: HZV vs deep tissue injury of toe/buttock vs Line infection 
- blcx 10/16 NG x 2 
- TDC Tip 10/19 >15 CFU MRSE (1 of 2 blcx on adm 9/27 MRSE but afebrile then prob contaminant) ->completed Acyclovir - Suspect colonized with Pseudomonas now and at risk for subsequent infections given vent, HD and lines Suspected Pyelo POA  
- CTAP 9/27:  B perineph stranding, UA tntc wbc, uctx ng 
- treated at this point Cholelithiasis choledocholithiais suspected acute cholecystitis  poss cystic stone POA- abnl lft bili 4 alk phos 400 seen by GI and GS Dr. Aubree Moreau, no plan for OR, for poss cholecystostomy if LFT worsen, bili, alk phos declining - AST fluctuating downward  -Dr. Nicolette Hu saw for GI 10/24 Elevated Lipase  
- lipase 2200 POA -> 3191 ->  5500 on 10/4 improved to 1207 10/19 but up again to 2169 10/22 interpretation complicated by JULIANNA -> fluctuating between 4211-6239 (off mucomyst, TF) 
- CTAP 10/4: Normal pancreas -> per ICU team  
B infiltrates - poss edema infiltrate - RLL consolid better 10/4 cxr and suspect endobronchial clot contributed   ->monitor MRSE bacteremia 9/27 1 of 2. Has LUE midline unclear when placed Treated JULIANNA POA dialysis dependent 
- baseline cr 0.9 132/10.1 in ER 
-RIJ uldall 9/28 out 10/19 - R fem uldall 10/20 -> per renal  
Bleeding -melena earlier, EGD 10/2 clot in esophagus bronch 10/3 R endobronch clot NEW removed 10/5 repeat bronch  -> per others Suspected anoxic brain injury on MRI 10/3 SP VF arrest 9/29 -> overall poor prognosis but family wishes aggressive care CVA R hemiparesis 9/10   
resp failure sp intubation 9/29 Comorbid: HTN HLD CHF h/o steatohepatitis MICROBIOLOGY:  
9/27 bctx 1 of 2 MRSE 
 uctx ng 
9/29 sput C albicans C diff neg 10/4 bctx x 2 NTD 
 fungitell indeterminate due to contamination 10/8 Cath tip cx ntd 10/8     bl cx ng 
10/16 Blcx x2 ng 
 Spcx NF 
10/18 VZV PCr +ve, HSV PCR neg 
10/19 Cath tip >15 CFU MRSE 
10/25 Spcx pseudomonas x2- panS 
 blcx x2 ntd Wd cx Pseud CARLOS Pseud x2 panS, E faecalis S amp, CoNS 
 
LINES AND CATHETERS:  
 
Left PICC 10/18 Femoral udall 10/19 Active Hospital Problems Diagnosis Date Noted  Pseudomonas infection 10/27/2018  [facility] 10/22/2018  Hypoalbuminemia 10/21/2018  Shingles 10/21/2018  Ischemic encephalopathy 09/30/2018  Cardiac arrest with ventricular fibrillation ([facility] 09/29/2018 ROSC before defibrillation could be attempted.  Acute pancreatitis 09/29/2018 Lipase downtrends with interruption in tube feed and Mucomyst. Restarting tube feed did result in modest elevation but not to the levels seen while on Mucomyst. Nonetheless, there is signficant decrease in lipase levels with interruptions in tube feeding.  Elevated LFTs 09/28/2018  History of stroke 09/27/2018 With residual R hemiparesis  Acute-on-chronic kidney injury ([facility] 09/27/2018  Acute cystitis 09/27/2018  Elevated troponin 09/27/2018  Essential hypertension 09/10/2018  Diabetes mellitus type 2, [facility] 09/10/2018 Subjective: Interval notes reviewed, pt continues to spike fever intermittently. WBC remains ~ 18K. Minimal resp secretions and around trach. Loose stools and with FMS. Tube feeds on hold as with high residuals. D/w Dr Julius Motley and would like to get udall out. He will plan to dc [place] after HD and plan either another [place] vs TDC based on fever trend and cx. Current Facility-Administered Medications Medication Dose Route Frequency  insulin glargine (LANTUS) injection 17 Units  17 Units SubCUTAneous DAILY  cefepime (MAXIPIME) 0.5 g in 0.9% sodium chloride 100 mL IVPB  0.5 g IntraVENous Q24H  ciprofloxacin (CIPRO) 200 mg IVPB (premix)  200 mg IntraVENous Q24H  
 albuterol (PROVENTIL VENTOLIN) nebulizer solution 2.5 mg  2.5 mg Nebulization BID RT  
 multivitamin (MULTI-DELYN, WELLESSE) oral liquid 30 mL  30 mL Oral DAILY  bacitracin 500 unit/gram packet 1 Packet  1 Packet Topical PRN  
 sodium chloride (NS) flush 10-30 mL  10-30 mL InterCATHeter PRN  
 sodium chloride (NS) flush 10 mL  10 mL InterCATHeter Q24H  
 sodium chloride (NS) flush 10 mL  10 mL InterCATHeter PRN  
  sodium chloride (NS) flush 10-40 mL  10-40 mL InterCATHeter Q8H  
 sodium chloride (NS) flush 20 mL  20 mL InterCATHeter Q24H  
 heparin (porcine) injection 5,000 Units  5,000 Units SubCUTAneous Q8H  
 hydroxypropyl methylcellulose (ISOPTO TEARS) 0.5 % ophthalmic solution 2 Drop  2 Drop Both Eyes BID  epoetin manda (EPOGEN;PROCRIT) injection 40,000 Units  40,000 Units IntraVENous Q7D  
 pantoprazole (PROTONIX) 40 mg in sodium chloride 0.9% 10 mL injection  40 mg IntraVENous Q24H  
 0.9% sodium chloride infusion 250 mL  250 mL IntraVENous PRN  
 influenza vaccine - (6 mos+)(PF) (FLUARIX QUAD/FLULAVAL QUAD) injection 0.5 mL  0.5 mL IntraMUSCular PRIOR TO DISCHARGE  acetaminophen (TYLENOL) solution 325 mg  325 mg Per NG tube Q4H PRN  
 0.9% sodium chloride infusion 250 mL  250 mL IntraVENous PRN  
 heparin (porcine) pf 100 Units  100 Units InterCATHeter PRN  
 0.9% sodium chloride infusion 250 mL  250 mL IntraVENous PRN  
 insulin lispro (HUMALOG) injection   SubCUTAneous Q6H  
 heparin (porcine) 1,000 unit/mL injection 1,000 Units  1,000 Units InterCATHeter DIALYSIS PRN  
 senna-docusate (PERICOLACE) 8.6-50 mg per tablet 1 Tab  1 Tab Oral BID PRN  
 ondansetron (ZOFRAN) injection 4 mg  4 mg IntraVENous Q4H PRN  
 glucose chewable tablet 16 g  4 Tab Oral PRN  
 glucagon (GLUCAGEN) injection 1 mg  1 mg IntraMUSCular PRN  
 dextrose (D50) infusion 12.5-25 g  25-50 mL IntraVENous PRN  
 hydrALAZINE (APRESOLINE) 20 mg/mL injection 20 mg  20 mg IntraVENous Q6H PRN Objective:  
 
Visit Vitals /48 Pulse (!) 104 Temp 99.1 °F (37.3 °C) Resp 18 Ht 5' 7\" (1.702 m) Wt 84.7 kg (186 lb 11.2 oz) SpO2 100% BMI 29.24 kg/m² Temp (24hrs), Av.7 °F (37.6 °C), Min:98.8 °F (37.1 °C), Max:101.9 °F (38.8 °C) GEN: well developed 57year-old AA male on vent/trach in ICU, unresponsive HENT: no thrush Neck: tracheostomy in place CHEST: coarse bs B no wheeze or rhonchi CVS: RRR, no murmur appreciated ABD: soft, + BS no localized tenderness, PEG in place EXT: 1+ pitting edema b/l LE  
SKIN:  RUE hemorrhagic blisters further dried up but not yet completely dry. left great toe gangrenous change. CNS:eyes open, unresponsive to commands or deep sternal rub Labs: Results:  
Chemistry Recent Labs 10/29/18 
0400 10/28/18 
0400 10/27/18 
0350 10/26/18 
1630 * 191* 127*  --   
 137 137  --   
K 4.7 4.4 5.3  --   
 100 99*  --   
CO2 24 27 27  --   
BUN 56* 36* 60*  --   
CREA 4.08* 3.02* 4.68*  --   
CA 8.5 8.0* 8.2*  --   
AGAP 11 10 11  --   
BUCR 14 12 13  --   
AP  --   --   --  347* TP  --   --   --  7.3 ALB 1.5* 1.7* 1.7* 1.7*  
GLOB  --   --   --  5.6* AGRAT  --   --   --  0.3* CBC w/Diff Recent Labs 10/29/18 
0400 10/28/18 
0400 10/27/18 
0350 WBC 18.7* 19.4* 17.4*  
RBC 2.58* 2.90* 2.81* HGB 7.4* 8.2* 7.9*  
HCT 23.4* 26.4* 25.6*  
* 600* 604*  
  
10/24 cxr IMPRESSION: 
  
1. Interval removal right IJ catheter. No pneumothorax. 
  
2. New left-sided PICC line with tip in satisfactory radiographic position. 
  
3. Findings suggesting vascular congestion. No consolidation. 10/4 CTAP IMPRESSION: 
1. Dense subtotal or total consolidation right lower lobe compatible with 
pneumonia similar to prior study. Small bilateral pleural effusions similar in 
size. 2. Distended gallbladder with gallstones and gallbladder sludge without 
significant change in appearance and also described in detail on ultrasound of 
9/27/2018. 3. Indeterminate small lesion left hepatic lobe without change. Hepatomegaly 
again evident. 4. No intraperitoneal fluid. Possible small left upper pole renal cyst. 
Cardiomegaly. Nasogastric tube tip in body of stomach. cxr 10/5 Impression: 1. Endotracheal tube, visualized nasogastric tube, and right IJ central venous 
catheter in stable position. 2. Overall improved aeration of the right lower lobe, likely improved 
atelectasis with mild residual atelectasis/airspace disease. 3. Mild interstitial edema overall similar to prior. Microbiology Results All Micro Results Procedure Component Value Units Date/Time CULTURE, BLOOD [[id number]] Collected:  10/25/18 1328 Order Status:  Completed Specimen:  Whole Blood Updated:  10/29/18 7379 Special Requests: NO SPECIAL REQUESTS Culture result: NO GROWTH 4 DAYS     
 CULTURE, BLOOD [[id number]] Collected:  10/25/18 1328 Order Status:  Completed Specimen:  Whole Blood Updated:  10/29/18 9346 Special Requests: NO SPECIAL REQUESTS Culture result: NO GROWTH 4 DAYS     
 CULTURE, WOUND Samira Mura STAIN [[id number]]  (Abnormal)  (Susceptibility) Collected:  10/25/18 1359 Order Status:  Completed Specimen:  Wound from Tracheostomy site Updated:  10/28/18 1851 Special Requests: NO SPECIAL REQUESTS     
  GRAM STAIN FEW WBC'S     
   RARE GRAM NEGATIVE RODS Culture result: MANY PSEUDOMONAS AERUGINOSA MODERATE PSEUDOMONAS AERUGINOSA 2ND MORPHOTYPE  
     
      
  FEW ENTEROCOCCUS FAECALIS GROUP D  
     
      
  FEW STAPHYLOCOCCUS SPECIES, COAGULASE NEGATIVE (TWO MORPHOTYPES) CULTURE, RESPIRATORY/SPUTUM/BRONCH Samira Mura STAIN [[id number]]  (Abnormal)  (Susceptibility) Collected:  10/25/18 1325 Order Status:  Completed Specimen:  Sputum from Tracheal Aspirate Updated:  10/28/18 7138 Special Requests: NO SPECIAL REQUESTS     
  GRAM STAIN MODERATE WBC'S     
   RARE GRAM NEGATIVE RODS Culture result: MANY PSEUDOMONAS AERUGINOSA  
     
      
  MANY PSEUDOMONAS AERUGINOSA 2ND MORPHOTYPE  
     
      
  RARE NORMAL RESPIRATORY SHANAE  
     
 CULTURE, URINE [[id number]]  (Abnormal)  (Susceptibility) Collected:  10/25/18 5896 Order Status:  Completed Specimen:  Cath Urine Updated:  10/27/18 0242 Special Requests: NO SPECIAL REQUESTS Culture result:    
  90694 COLONIES/mL PSEUDOMONAS AERUGINOSA CULTURE, CATHETER TIP [[id number]]  (Abnormal)  (Susceptibility) Collected:  10/19/18 1330 Order Status:  Completed Specimen:  Catheter Tip Updated:  10/22/18 0800 Special Requests: NO SPECIAL REQUESTS Culture result:    
  >15 CFU STAPHYLOCOCCUS EPIDERMIDIS  
     
 CULTURE, BLOOD [[id number]] Collected:  10/16/18 1055 Order Status:  Completed Specimen:  Blood Updated:  10/22/18 0809 Special Requests: PERIPHERAL Culture result: NO GROWTH 6 DAYS     
 CULTURE, BLOOD [[id number]] Collected:  10/16/18 1047 Order Status:  Completed Specimen:  Blood Updated:  10/22/18 0809 Special Requests: PERIPHERAL Culture result: NO GROWTH 6 DAYS     
 HSV 1 AND 2 BY PCR [[id number]] Collected:  10/18/18 1815 Order Status:  Completed Specimen:  Other from Miscellaneous sample Updated:  10/21/18 1636 Source OTHER TEST     
  HSV-1 DNA by PCR NEGATIVE      
  HSV-2 DNA by PCR NEGATIVE Comment: (NOTE) This test was developed and its performance characteristics 
determined by [facility]. It has not been cleared or 
approved by the U.S. Food and Drug Administration. The FDA has 
determined that such clearance or approval is not necessary. This 
test is used for clinical purposes. It should not be regarded as 
investigational or research. Performed At: [address] [id number] Wauneta Hamman MD TW:[id number] CULTURE, RESPIRATORY/SPUTUM/BRONCH Randal Ramirez [[id number]] Collected:  10/16/18 1915 Order Status:  Completed Specimen:  Sputum,ET Suction Updated:  10/18/18 1011 Special Requests: NO SPECIAL REQUESTS     
  GRAM STAIN 10-25 WBC/lpf     
   <10 EPI/lpf FEW GRAM POSITIVE COCCI IN PAIRS MODERATE GRAM POSITIVE COCCI IN GROUPS    MUCUS PRESENT     
 Culture result: MODERATE NORMAL RESPIRATORY SHANAE  
     
 CULTURE, BLOOD [[id number]] Collected:  10/08/18 1227 Order Status:  Completed Specimen:  Blood Updated:  10/14/18 0741 Special Requests: PERIPHERAL Culture result: NO GROWTH 6 DAYS     
 CULTURE, CATHETER TIP [[id number]] Collected:  10/08/18 1215 Order Status:  Completed Specimen:  Catheter Tip Updated:  10/11/18 8361 Special Requests: NO SPECIAL REQUESTS Culture result: NO GROWTH 3 DAYS     
 CULTURE, BLOOD [[id number]] Collected:  10/04/18 5554 Order Status:  Completed Specimen:  Blood Updated:  10/10/18 2159 Special Requests: PERIPHERAL Culture result: NO GROWTH 6 DAYS     
 CULTURE, BLOOD [[id number]] Collected:  10/04/18 1120 Order Status:  Completed Specimen:  Blood Updated:  10/10/18 4473 Special Requests: PERIPHERAL Culture result: NO GROWTH 6 DAYS     
 CULTURE, BLOOD [[id number]] Collected:  09/27/18 0005 Order Status:  Completed Specimen:  Blood Updated:  10/03/18 0845 Special Requests: NO SPECIAL REQUESTS Culture result: NO GROWTH 6 DAYS     
 CULTURE, BLOOD [[id number]]  (Abnormal)  (Susceptibility) Collected:  09/27/18 0136 Order Status:  Completed Specimen:  Blood Updated:  10/02/18 6746 Special Requests: NO SPECIAL REQUESTS     
  GRAM STAIN PEDIATRIC BOTTLE GRAM POSITIVE COCCI IN PAIRS IN CLUSTERS  
      
  SMEAR CALLED TO AND CORRECTLY REPEATED BY: Kristine Cameron RN IN 2700 ON 9/29/18 AT 2033 WF Culture result:    
  AEROBIC BOTTLE STAPHYLOCOCCUS EPIDERMIDIS  
     
 CULTURE, RESPIRATORY/SPUTUM/BRONCH Darian Delta STAIN [[id number]]  (Abnormal) Collected:  09/29/18 1210 Order Status:  Completed Specimen:  Sputum from Endotracheal aspirate Updated:  10/02/18 9512 Special Requests: NO SPECIAL REQUESTS     
  GRAM STAIN >25 WBC/lpf     
   <10 EPI/lpf MUCUS PRESENT     
   MODERATE YEAST   Culture result: MANY CANDIDA TROPICALIS     
 C. DIFFICILE/EPI PCR [[id number]] Collected:  09/29/18 1400 Order Status:  Completed Specimen:  Stool Updated:  09/29/18 2248 Special Requests: NO SPECIAL REQUESTS Culture result: Toxigenic C. difficile NEGATIVE                         C. difficile target DNA sequences are not detected. CULTURE, URINE [[id number]] Collected:  09/27/18 0029 Order Status:  Completed Specimen:  Cath Urine Updated:  09/29/18 0944 Special Requests: NO SPECIAL REQUESTS Culture result: NO GROWTH 2 DAYS Chelsea Robert MD, FACP October 29, 2018 [facility] [phone]

## 2018-10-29 NOTE — PROGRESS NOTES
RENAL PROGRESS NOTE Sandee Lopez Assessment/Plan: · Prolonged dialysis dependant JULIANNA (ischemic atn in a setting of acute pyelonephritis/acute cholecystitis with sepsis and cardiac arrest 9/29). No evidence of recovery of renal function at this time, although nonolliguric (continue straight cath every 8 hours). Next dialysis tomorrow and continue tts. Needs tdc but is febrile on/off, wbc is up. Add ivf since npo. · S/p cardiopulm arrest 9/29. · Acute pyelonephritis/sepsis. Febrile on/off. Abx restarted by ID. · Abnormal lft's/acute cholecystitis/pancreatitis. GI on the case. Unable to place jtube. · UGI bleed, EGD results noted- healing esophageal ulceration. · Acute blood loss anemia/anemia of kidney failure. Continue analia. · Asp pneumonia. · Resp failure. S/p peg/trach 10/17, s/p bronch. · Rt shoulder shingles. Finished acyclovir. · Anoxic brain injury superimposed on recent cva. Subjective: 
Seen on dialysis. Intubated, unresponsive. Eyes are open. TF is on hold for Jtube placement. Patient Active Problem List  
Diagnosis Code  Stroke (cerebrum) ([facility]) I63.9  Stroke ([facility] I63.9  Rhabdomyolysis M62.82  
 Dehydration E86.0  
 Essential hypertension I10  
 Diabetes mellitus type 2, controlled ([facility] E11.9  Non compliance w medication regimen Z91.14  
 DM (diabetes mellitus) ([facility] E11.9  History of stroke Z80.78  
 Acute-on-chronic kidney injury ([facility] N17.9, N18.9  Acute cystitis N30.00  Elevated troponin R74.8  Elevated LFTs R94.5  Cardiac arrest with ventricular fibrillation ([facility]) I46.9, I49.01  
 Acute pancreatitis K85.90  
 Ischemic encephalopathy I67.89  
 Hypoalbuminemia E88.09  
 Shingles B02.9  Gangrene ([facility] [id number]  Pseudomonas infection B96.5 Current Facility-Administered Medications Medication Dose Route Frequency Provider Last Rate Last Dose  insulin glargine (LANTUS) injection 17 Units  17 Units SubCUTAneous DAILY Chiara Solorzano MD   17 Units at 10/29/18 4439  cefepime (MAXIPIME) 0.5 g in 0.9% sodium chloride 100 mL IVPB  0.5 g IntraVENous Q24H Anthony Solorzano MD   0.5 g at 10/28/18 2155  ciprofloxacin (CIPRO) 200 mg IVPB (premix)  200 mg IntraVENous Q24H Allison Hayes  mL/hr at 10/28/18 2330 200 mg at 10/28/18 2330  
 albuterol (PROVENTIL VENTOLIN) nebulizer solution 2.5 mg  2.5 mg Nebulization BID RT Elwanda Kehr, MD   2.5 mg at 10/29/18 3714  
 multivitamin (MULTI-DELYN, WELLESSE) oral liquid 30 mL  30 mL Oral DAILY Allison Hayes MD   30 mL at 10/29/18 1449  bacitracin 500 unit/gram packet 1 Packet  1 Packet Topical PRN Flor Doss MD      
 sodium chloride (NS) flush 10-30 mL  10-30 mL InterCATHeter PRN Flor Doss MD   30 mL at 10/21/18 0430  
 sodium chloride (NS) flush 10 mL  10 mL InterCATHeter Q24H Jones Ackerman MD   10 mL at 10/28/18 1721  
 sodium chloride (NS) flush 10 mL  10 mL InterCATHeter PRN Flor Doss MD   10 mL at 10/20/18 6433  sodium chloride (NS) flush 10-40 mL  10-40 mL InterCATHeter Q8H Leilani Ackerman MD   10 mL at 10/29/18 5308  sodium chloride (NS) flush 20 mL  20 mL InterCATHeter Q24H Flor Doss MD   20 mL at 10/28/18 1553  heparin (porcine) injection 5,000 Units  5,000 Units SubCUTAneous Q8H Flor Doss MD   5,000 Units at 10/29/18 0602  hydroxypropyl methylcellulose (ISOPTO TEARS) 0.5 % ophthalmic solution 2 Drop  2 Drop Both Eyes BID Cecily LAL NP   2 Drop at 10/29/18 8432  epoetin manda (EPOGEN;PROCRIT) injection 40,000 Units  40,000 Units IntraVENous Q7D Farhat New MD   40,000 Units at 10/24/18 1227  pantoprazole (PROTONIX) 40 mg in sodium chloride 0.9% 10 mL injection 40 mg IntraVENous Q24H Karina Rapp MD   40 mg at 10/29/18 0856  
 0.9% sodium chloride infusion 250 mL  250 mL IntraVENous PRN Francy Yang DO      
 influenza vaccine 2018-19 (6 mos+)(PF) (FLUARIX QUAD/FLULAVAL QUAD) injection 0.5 mL  0.5 mL IntraMUSCular PRIOR TO DISCHARGE Elis Villarreal MD      
 acetaminophen (TYLENOL) solution 325 mg  325 mg Per NG tube Q4H PRN Elis Villarreal MD   325 mg at 10/28/18 1218  
 0.9% sodium chloride infusion 250 mL  250 mL IntraVENous PRN Francy Yang, DO      
 heparin (porcine) pf 100 Units  100 Units InterCATHeter PRN Em Samuels MD      
 0.9% sodium chloride infusion 250 mL  250 mL IntraVENous PRN Elis Villarreal MD      
 insulin lispro (HUMALOG) injection   SubCUTAneous Q6H Gennett Purple H, DO   Stopped at 10/28/18 1800  
 heparin (porcine) 1,000 unit/mL injection 1,000 Units  1,000 Units InterCATHeter DIALYSIS PRN Em Samuels MD   1,000 Units at 09/28/18 1332  senna-docusate (PERICOLACE) 8.6-50 mg per tablet 1 Tab  1 Tab Oral BID PRN Jacki Vela, DO      
 ondansetron (ZOFRAN) injection 4 mg  4 mg IntraVENous Q4H PRN Alfred Nettles DO   4 mg at 09/28/18 0539  
 glucose chewable tablet 16 g  4 Tab Oral PRN Robb LAL, DO      
 glucagon (GLUCAGEN) injection 1 mg  1 mg IntraMUSCular PRN Alfred Nettles DO      
 dextrose (D50) infusion 12.5-25 g  25-50 mL IntraVENous PRN Alfred Nettles DO   25 g at 10/09/18 1906  hydrALAZINE (APRESOLINE) 20 mg/mL injection 20 mg  20 mg IntraVENous Q6H PRN Yuliet Aguayo NP   20 mg at 10/29/18 9051 Objective Vitals:  
 10/29/18 5381 10/29/18 0700 10/29/18 0712 10/29/18 0800 BP: 189/71 166/62  139/48 Pulse: 99 (!) 103 (!) 103 (!) 104 Resp: 22 23 22 18 Temp:      
TempSrc:      
SpO2: 100% 100% 100% 100% Weight:      
Height:      
 
 
 
Intake/Output Summary (Last 24 hours) at 10/29/2018 0910 Last data filed at 10/29/2018 0500 Gross per 24 hour Intake 2422.5 ml Output 230 ml Net 2192.5 ml Admission weight: Weight: 96.6 kg (213 lb) (09/26/18 2244) Last Weight Metrics: 
Weight Loss Metrics 10/28/2018 9/26/2018 9/13/2018 Today's Wt 186 lb 11.2 oz - 227 lb 15.3 oz  
BMI - 29.24 kg/m2 35.7 kg/m2 Physical Assessment:  
 
General: intubated, unresponsive. Eyes are open. Neck: Trach in place. Neck: No jvd. LUNGS: diminished air entry at the bases. No crackles. CVS EXM: S1, S2  RRR. Abdomen: soft, pos bs. PEG. Lower Extremities:  1+ edema. Rt femoral temporary dialysis catheter. Lab CBC w/Diff Recent Labs 10/29/18 
0400 10/28/18 
0400 10/27/18 
0350 WBC 18.7* 19.4* 17.4*  
RBC 2.58* 2.90* 2.81* HGB 7.4* 8.2* 7.9*  
HCT 23.4* 26.4* 25.6*  
* 600* 604* Chemistry Recent Labs 10/29/18 
0400 10/28/18 
0400 10/27/18 
0350  10/26/18 
1630 * 191* 127*  --   --   
 137 137  --   --   
K 4.7 4.4 5.3  --   --   
 100 99*  --   --   
CO2 24 27 27  --   --   
BUN 56* 36* 60*  --   --   
CREA 4.08* 3.02* 4.68*  --   --   
CA 8.5 8.0* 8.2*  --   --   
AGAP 11 10 11  --   --   
BUCR 14 12 13  --   --   
AP  --   --   --   --  347* TP  --   --   --   --  7.3 ALB 1.5* 1.7* 1.7*  --  1.7*  
GLOB  --   --   --   --  5.6* AGRAT  --   --   --   --  0.3* PHOS 6.2* 4.3 7.8*   < >  --   
 < > = values in this interval not displayed. No results found for: IRON, FE, TIBC, IBCT, PSAT, FERR Lab Results Component Value Date/Time Calcium 8.5 10/29/2018 04:00 AM  
 Phosphorus 6.2 (H) 10/29/2018 04:00 AM  
  
 
Macarena Lafleur M.D. [facility] Phone [phone] Pager [phone]

## 2018-10-29 NOTE — PROGRESS NOTES
0720 bedside verbal report received from Sugey Barclay. Dual neuro/skin assessment completed at this time. 0800 Physical assessment completed. 1905 Bedside verbal shift change report given to Manuel Jarrett RN (oncoming nurse) by Omero Blanchard RN (offgoing nurse). Report included the following information SBAR, Kardex, Intake/Output and MAR.

## 2018-10-29 NOTE — PROGRESS NOTES
Recd pt on vent:  PSV=15, Fio2=30%, Peep=5 Pt on size 8 trach, spares in room including smaller size & BVM Vent check complete 
 
0920-Trach care complete - clean with NS, change inner cannula, drain bandage, suction 
 
--Verified with MD no change in PSV - pt to remain on PSV=15 
 
1630--Vent check complete Clean trach with NS, change drain bandage

## 2018-10-29 NOTE — PROGRESS NOTES
0- Report received Espitia Geovanna and assumed care of patient with Julissa Elias RN. All documentation and assessments will be reviewed by this RN.

## 2018-10-29 NOTE — PROGRESS NOTES
PCCM Progress Note I have reviewed the flowsheet and previous days notes. Events, vitals, medications and notes from last 24 hours reviewed. Care plan discussed with staff and on multidisciplinary rounds. Subjective: 
10/29/2018 Pt remains ventilated and unresponsive. Fever up to 101.0 yesterday. BP stable. Tolerating PS 15/5 overnight. Patient Active Problem List  
Diagnosis Code  Stroke (cerebrum) ([facility]) I63.9  Stroke ([facility] [address] I63.9  Rhabdomyolysis M62.82  
 Dehydration E86.0  
 Essential hypertension I10  
 Diabetes mellitus type 2, controlled ([facility] [address] E11.9  Non compliance w medication regimen Z91.14  
 DM (diabetes mellitus) ([facility] [address] E11.9  History of stroke Z80.78  
 Acute-on-chronic kidney injury ([facility] [address] N17.9, N18.9  Acute cystitis N30.00  Elevated troponin R74.8  Elevated LFTs R94.5  Cardiac arrest with ventricular fibrillation ([facility]) I46.9, I49.01  
 Acute pancreatitis K85.90  
 Ischemic encephalopathy I67.89  
 Hypoalbuminemia E88.09  
 Shingles B02.9  Gangrene ([facility] [address] [id number]  Pseudomonas infection B96.5 Assessment: 
Acute Respiratory Failure with Hypoxia - Intubated x18 days, breathing spontaneously - s/p tracheostomy on 10/17 
- Trach exchange done 10/27 
- Tolerating PS 15/5 Anoxic Brain Injury - 2/2 cardiac arrest, MRI with consistent findings Acute Kidney Injury 
- oliguric,  on HD Pseudomonas UTI/Bronchitis - BCx with NGTD 
- UA and respiratory culture with pan-senstivity 
- ID following Hx of CVA 
- R hemiparesis GI Bleed - s/p endoscopy x2 with ulceration noted in esophagus - Resolved Coagulopathy 
- resolved DM 
- on insulin VZV of R arm 
- completed Acyclovir Gangrene of L great toe - Podiatry states to place betadine every day Elevated Lipase - Remains over 1000 
- No evidence of pancreatitis on CT, + GBS 
- Cannot get MRCP at this time 
- Unable to pass J-tube 
  
Impression/Plan: - Unable to pass J-tube - Without clinical evidence of pancreatitis, will resume TFs 
- Mucomyst stopped, may have been contributing to elevated lipase - Does not to tolerate less than 15 of PS on the vent, will place for a slow wean. - ABX de-escalated by ID 
- Recheck lipase 10/30 
-  GI and DVT ppx- heparin and protonix 
  
Very poor prognosis, daughter wishes to pursue aggressive care 
  
OTHER: 
Glycemic Control- glucose stabilizer per ICU protocol when on insulin drip. Maintain blood glucose 140-180. Replace electrolytes per ICU electrolyte replacement protocol Ventilator bundle & Sedation protocol followed. Daily morning sedation holiday, assessment for readiness for SBT & weaning from ventilator; and then re-titrate if required. Aim to keep peak plateau pressure 83-88MX H2O in ARDS patient. [place] tube to suction at 20-30 cm H2O, Maintain Anastasiya tube with 5-10ml air every 4 hours. Chlorhexidine mouth washes and routine oral care every 4 hours. Stress ulcer and DVT prophylaxis. HOB >=30 degree elevation all the time. HOB >=30 degree elevation all the time. Aggressive pulmonary toileting. Incentive spirometry when appropriate. Aspiration precautions. CC TIME: >40 min Medication Reviewed: 
 
No Known Allergies Past Medical History:  
Diagnosis Date  CHF (congestive heart failure) ([facility] [address]  Diabetes ([facility] [address]  Stroke ([facility] [address] History reviewed. No pertinent surgical history. Social History Tobacco Use  Smoking status: Never Smoker  Smokeless tobacco: Never Used Substance Use Topics  Alcohol use: No  
  
History reviewed. No pertinent family history. Prior to Admission medications Medication Sig Start Date End Date Taking? Authorizing Provider  
aspirin (ASPIRIN) 325 mg tablet Take 1 Tab by mouth daily. 9/15/18   Chiara Solorzano MD  
atorvastatin (LIPITOR) 80 mg tablet Take 1 Tab by mouth nightly.  9/14/18   Estevan Kruger MD  
 insulin glargine (LANTUS) 100 unit/mL injection 10 units qhs  Indications: type 2 diabetes mellitus 9/15/18   Marilyn Harrison MD  
insulin lispro (HUMALOG) 100 unit/mL injection 4 units with meals 9/14/18   Marilyn Harrison MD  
losartan (COZAAR) 50 mg tablet Take 1 Tab by mouth daily. 9/14/18   Marilyn Harrison MD  
 
Current Facility-Administered Medications Medication Dose Route Frequency  dextrose 5 % - 0.45% NaCl infusion  75 mL/hr IntraVENous CONTINUOUS  
 insulin glargine (LANTUS) injection 17 Units  17 Units SubCUTAneous DAILY  cefepime (MAXIPIME) 0.5 g in 0.9% sodium chloride 100 mL IVPB  0.5 g IntraVENous Q24H  
 albuterol (PROVENTIL VENTOLIN) nebulizer solution 2.5 mg  2.5 mg Nebulization BID RT  
 multivitamin (MULTI-DELYN, WELLESSE) oral liquid 30 mL  30 mL Oral DAILY  sodium chloride (NS) flush 10 mL  10 mL InterCATHeter Q24H  
 sodium chloride (NS) flush 10-40 mL  10-40 mL InterCATHeter Q8H  
 sodium chloride (NS) flush 20 mL  20 mL InterCATHeter Q24H  
 heparin (porcine) injection 5,000 Units  5,000 Units SubCUTAneous Q8H  
 hydroxypropyl methylcellulose (ISOPTO TEARS) 0.5 % ophthalmic solution 2 Drop  2 Drop Both Eyes BID  epoetin manda (EPOGEN;PROCRIT) injection 40,000 Units  40,000 Units IntraVENous Q7D  
 pantoprazole (PROTONIX) 40 mg in sodium chloride 0.9% 10 mL injection  40 mg IntraVENous Q24H  
 influenza vaccine 2018-19 (6 mos+)(PF) (FLUARIX QUAD/FLULAVAL QUAD) injection 0.5 mL  0.5 mL IntraMUSCular PRIOR TO DISCHARGE  insulin lispro (HUMALOG) injection   SubCUTAneous Q6H Lines: All central lines examined by me. No signs of erythema, induration, discharge. Peripherally Inserted Central Catheter: PICC Double Lumen 10/18/18 Left;Brachial (Active) Central Line Being Utilized Yes 10/29/2018  4:00 AM  
Criteria for Appropriate Use Limited/no vessel suitable for conventional peripheral access 10/29/2018  4:00 AM  
 Site Assessment Clean, dry, & intact 10/29/2018  4:00 AM  
Phlebitis Assessment 0 10/29/2018  4:00 AM  
Infiltration Assessment 0 10/29/2018  4:00 AM  
Arm Circumference (cm) 38 cm 10/26/2018 12:00 AM  
Date of Last Dressing Change 10/25/18 10/29/2018  4:00 AM  
Dressing Status Clean, dry, & intact; Occlusive 10/29/2018  4:00 AM  
Action Taken Open ports on tubing capped 10/29/2018  4:00 AM  
External Catheter Length (cm) 0 centimeters 10/26/2018 12:00 AM  
Dressing Type Disk with Chlorhexadine gluconate (CHG); Stabilization/securement device;Transparent 10/29/2018  4:00 AM  
Hub Color/Line Status Red; Infusing 10/29/2018  4:00 AM  
Positive Blood Return (Site #1) Yes 10/29/2018  4:00 AM  
Hub Color/Line Status Purple;Flushed;Capped 10/29/2018  4:00 AM  
Positive Blood Return (Site #2) No 10/29/2018  4:00 AM  
Alcohol Cap Used Yes 10/29/2018  4:00 AM  
 
  
Hemodialysis Catheter: 
Hemodialysis Access 10/19/18 (Active) Central Line Being Utilized Yes 10/29/2018  4:00 AM  
Criteria for Appropriate Use Dialysis/apheresis 10/29/2018  4:00 AM  
Date Accessed  10/27/18 10/29/2018  4:00 AM  
Site Assessment Clean, dry, & intact 10/29/2018  4:00 AM  
Date of Last Dressing Change 10/24/18 10/29/2018  4:00 AM  
Dressing Status Clean, dry, & intact; Occlusive 10/29/2018  4:00 AM  
Dressing Type Disk with Chlorhexadine gluconate (CHG); Transparent 10/29/2018  4:00 AM  
Proximal Hub Color/Line Status Capped 10/29/2018  4:00 AM  
Distal Hub Color/Line Status Capped 10/29/2018  4:00 AM  
 
Drain(s): PEG/Gastrostomy Tube 10/17/18 (Active) Site Assessment Clean, dry, & intact 10/29/2018  4:00 AM  
Dressing Status Clean, dry, & intact 10/29/2018  4:00 AM  
G Port Status Clamped 10/29/2018  4:00 AM  
External Catheter Length (cm) 3 centimeters 10/26/2018  4:00 PM  
Action Taken Placement verified (comment) 10/28/2018 12:00 PM  
Drainage Description Other (Comment) 10/24/2018  8:00 AM  
Gastric Residual (mL) 0 ml 10/28/2018  8:00 PM  
 Tube Feeding/Formula Options Osmolite 1.2 10/28/2018 12:00 PM  
Tube Feeding/Verify Rate (mL/hr) 0 10/28/2018  4:00 PM  
Water Flush Volume (mL) 60 mL 10/27/2018  8:30 AM  
Intake (ml) 0 ml 10/28/2018  6:00 PM  
Medication Volume 30 ml 10/27/2018  8:30 AM  
Drainage Chamber Level (ml) 0 ml 10/23/2018  8:00 PM  
Output (ml) 0 ml 10/23/2018  8:00 PM  
   
Fecal Management (Active) Stool Consistency Liquid 10/29/2018  9:24 AM  
Position of Indicator Line Visible 10/29/2018  9:24 AM  
Signal Indicator Bubble Appropriate 10/29/2018  9:24 AM  
Skin Assessment of the Anal Area Denuded/excoriated 10/29/2018  9:24 AM  
Tube Irrigated No 10/29/2018  9:24 AM  
Irrigation Volume (mL) 0 10/29/2018  9:24 AM  
Drainage Bag Level (mL) 450 10/29/2018  9:24 AM  
Output (ml) 20 ml 10/29/2018  9:24 AM  
 
Airway: Airway - Tracheostomy Tube 10/17/18 Portex; Cuffed (Active) Cuff Pressure 30 cmH20 10/28/2018  8:43 PM  
Site Assessment Clean, dry, & intact 10/29/2018  9:20 AM  
Trach Dressing Changed Yes 10/29/2018  9:20 AM  
Trach Cleaned With Normal saline 10/29/2018  9:20 AM  
Trach Tie Changed No 10/29/2018  9:20 AM  
Trach Cannula Changed Yes 10/29/2018  9:20 AM  
Inner Cannula Cuffed, cuff inflated; Fenestrated; Other (comment) 10/29/2018  9:20 AM  
Ariana Christianson Top of the lock 10/29/2018  9:20 AM  
Side Secured Centered 10/29/2018  9:20 AM  
Spare Trach at Bedside Yes 10/29/2018  9:20 AM  
Spare Gabriele Moose Tube One Size Smaller at Bedside Yes 10/29/2018  9:20 AM  
Ambu Bag With Mask at Bedside Yes 10/29/2018  9:20 AM  
 
 
Objective: 
Vital Signs:   
Visit Vitals /50 Pulse 99 Temp 99.7 °F (37.6 °C) Resp 20 Ht 5' 7\" (1.702 m) Wt 76.3 kg (168 lb 4.8 oz) SpO2 100% BMI 26.36 kg/m² O2 Device: Ventilator, Tracheal collar O2 Flow Rate (L/min): 45 l/min Temp (24hrs), Av.7 °F (37.6 °C), Min:98.8 °F (37.1 °C), Max:101.9 °F (38.8 °C) Intake/Output:  
Last shift:      10/29 0701 - 10/29 1900 In: -  
 Out: 20 [Drains:20] Last 3 shifts: 10/27 1901 - 10/29 0700 In: 3182.5 [I.V.:2500] Out: 6525 [Urine:165; Drains:100] Intake/Output Summary (Last 24 hours) at 10/29/2018 1101 Last data filed at 10/29/2018 2414 Gross per 24 hour Intake 2352.5 ml Output 250 ml Net 2102.5 ml Last 3 Recorded Weights in this Encounter 10/27/18 1700 10/28/18 0700 10/29/18 1016 Weight: 87.5 kg (192 lb 14.4 oz) 84.7 kg (186 lb 11.2 oz) 76.3 kg (168 lb 4.8 oz) Ventilator Settings: 
Mode Rate Tidal Volume Pressure FiO2 PEEP Pressure support, Spontaneous   500 ml  15 cm H2O 30 % 5 cm H20 Peak airway pressure: 21 cm H2O Plateau pressure:   
Tidal volume:  
Minute ventilation: 12 l/min SPO2  
 
ARDS network Guidelines: Lung protective strategy and Plateau pressure goals less than or equal to 30 Physical Exam:  
 
General/Neurology: Eyes open, nonresponsive, withdrawing to pain Head:   Normocephalic, without obvious abnormality Eye:   Small pupils but reactive,  
Oral:   Mucus membranes moist 
Neck:   Supple, trach in place, wound clean 
Lung:   B/l air movement. No wheezing or rales. Heart:   Systolic murmur present, RRR Abdomen/: Soft, non tender, + PEG tube Extremities:  Gangrenous L Great toe Skin:   Blisters of R arm improving. Data: 
   
Recent Results (from the past 24 hour(s)) GLUCOSE, POC Collection Time: 10/28/18 11:54 AM  
Result Value Ref Range Glucose (POC) 257 (H) 70 - 110 mg/dL CALCIUM, IONIZED Collection Time: 10/28/18  4:20 PM  
Result Value Ref Range Ionized Calcium 0.99 (L) 1.12 - 1.32 MMOL/L  
GLUCOSE, POC Collection Time: 10/28/18  5:16 PM  
Result Value Ref Range Glucose (POC) 130 (H) 70 - 110 mg/dL POC G3 Collection Time: 10/28/18  5:18 PM  
Result Value Ref Range Device: VENT    
 FIO2 (POC) 0.30 % pH (POC) 7.467 (H) 7.35 - 7.45    
 pCO2 (POC) 36.8 35.0 - 45.0 MMHG  
 pO2 (POC) 106 (H) 80 - 100 MMHG HCO3 (POC) 26.5 (H) 22 - 26 MMOL/L  
 sO2 (POC) 98 (H) 92 - 97 % Base excess (POC) 3 mmol/L Mode CPAP/SPON    
 PEEP/CPAP (POC) 5 cmH2O Pressure support 15 cmH2O Allens test (POC) N/A Total resp. rate 21 Site LEFT RADIAL Patient temp. 99.5 Specimen type (POC) ARTERIAL Performed by Samina Wise GLUCOSE, POC Collection Time: 10/28/18 11:58 PM  
Result Value Ref Range Glucose (POC) 138 (H) 70 - 110 mg/dL LIPASE Collection Time: 10/29/18  4:00 AM  
Result Value Ref Range Lipase 1,044 (H) 73 - 393 U/L  
CBC W/O DIFF Collection Time: 10/29/18  4:00 AM  
Result Value Ref Range WBC 18.7 (H) 4.6 - 13.2 K/uL  
 RBC 2.58 (L) 4.70 - 5.50 M/uL HGB 7.4 (L) 13.0 - 16.0 g/dL HCT 23.4 (L) 36.0 - 48.0 % MCV 90.7 74.0 - 97.0 FL  
 MCH 28.7 24.0 - 34.0 PG  
 MCHC 31.6 31.0 - 37.0 g/dL  
 RDW 16.4 (H) 11.6 - 14.5 % PLATELET 557 (H) 289 - 420 K/uL MPV 8.7 (L) 9.2 - 11.8 FL  
RENAL FUNCTION PANEL Collection Time: 10/29/18  4:00 AM  
Result Value Ref Range Sodium 138 136 - 145 mmol/L Potassium 4.7 3.5 - 5.5 mmol/L Chloride 103 100 - 108 mmol/L  
 CO2 24 21 - 32 mmol/L Anion gap 11 3.0 - 18 mmol/L Glucose 120 (H) 74 - 99 mg/dL BUN 56 (H) 7.0 - 18 MG/DL Creatinine 4.08 (H) 0.6 - 1.3 MG/DL  
 BUN/Creatinine ratio 14 12 - 20 GFR est AA 18 (L) >60 ml/min/1.73m2 GFR est non-AA 15 (L) >60 ml/min/1.73m2 Calcium 8.5 8.5 - 10.1 MG/DL Phosphorus 6.2 (H) 2.5 - 4.9 MG/DL Albumin 1.5 (L) 3.4 - 5.0 g/dL MAGNESIUM Collection Time: 10/29/18  4:00 AM  
Result Value Ref Range Magnesium 2.3 1.6 - 2.6 mg/dL CALCIUM, IONIZED Collection Time: 10/29/18  4:00 AM  
Result Value Ref Range Ionized Calcium 1.14 1.12 - 1.32 MMOL/L  
GLUCOSE, POC Collection Time: 10/29/18  6:23 AM  
Result Value Ref Range Glucose (POC) 119 (H) 70 - 110 mg/dL Chemistry Recent Labs 10/29/18 
0400 10/28/18 
0400 10/27/18 
0350 10/26/18 36 * 191* 127*  --   
 137 137  --   
K 4.7 4.4 5.3  --   
 100 99*  --   
CO2 24 27 27  --   
BUN 56* 36* 60*  --   
CREA 4.08* 3.02* 4.68*  --   
CA 8.5 8.0* 8.2*  --   
MG 2.3 2.3 2.5  --   
PHOS 6.2* 4.3 7.8*  --   
AGAP 11 10 11  --   
BUCR 14 12 13  --   
AP  --   --   --  347* TP  --   --   --  7.3 ALB 1.5* 1.7* 1.7* 1.7*  
GLOB  --   --   --  5.6* AGRAT  --   --   --  0.3* CBC w/Diff Recent Labs 10/29/18 
0400 10/28/18 
0400 10/27/18 
0350 WBC 18.7* 19.4* 17.4*  
RBC 2.58* 2.90* 2.81* HGB 7.4* 8.2* 7.9*  
HCT 23.4* 26.4* 25.6*  
* 600* 604* ABG Recent Labs 10/28/18 
1718 PHI 7.467* PCO2I 36.8 PO2I 106* HCO3I 26.5*  
FIO2I 0.30 Micro   No results for input(s): SDES, CULT in the last 72 hours. No results for input(s): CULT in the last 72 hours. CT (Most Recent) [place] [facility] [place] CT ABD PELV W CONT Narrative EXAM: CT of the abdomen and pelvis INDICATION: Pneumonia. Acute pyelonephritis. Sepsis. Abnormal liver function 
tests. Dialysis patient. COMPARISON: None. TECHNIQUE: Axial CT imaging of the abdomen and pelvis was performed with 
intravenous contrast. Multiplanar reformats were generated. One or more dose 
reduction techniques were used on this CT: automated exposure control, 
adjustment of the mAs and/or kVp according to patient size, and iterative 
reconstruction techniques. The specific techniques used on this CT exam have 
been documented in the patient's electronic medical record. 
 
_______________ FINDINGS: 
 
LOWER CHEST: Dense consolidation right lower lobe compatible with pneumonia. Small bilateral pleural effusions. Cardiomegaly. Fluid-filled esophagus with 
nasogastric tube in place. LIVER, BILIARY: Indeterminate low-attenuation 7 mm focus anterior left hepatic 
lobe. Hepatomegaly. No biliary dilation. Distended gallbladder with gallstones and indeterminate material in the gallbladder which may represent sludge. PANCREAS: Normal. 
 
SPLEEN: Normal. 
 
ADRENALS: Normal. 
 
KIDNEYS/URETERS: No hydronephrosis or renal calculus. Small low attenuation 
cortical focus medial upper pole left kidney not well delineated measuring about 1.1 cm. This may represent renal cyst. 
 
LYMPH NODES: No enlarged lymph nodes. GASTROINTESTINAL TRACT: No bowel dilation or wall thickening. Normal appendix. PELVIC ORGANS: No intraperitoneal fluid. Incidental vas deferens calcifications. VASCULATURE: Mild atherosclerosis. BONES: No acute or aggressive osseous abnormalities identified. OTHER: None. 
 
_______________ Impression IMPRESSION: 
 
 
1. Dense subtotal or total consolidation right lower lobe compatible with 
pneumonia similar to prior study. Small bilateral pleural effusions similar in 
size. 2. Distended gallbladder with gallstones and gallbladder sludge without 
significant change in appearance and also described in detail on ultrasound of 
9/27/2018. 3. Indeterminate small lesion left hepatic lobe without change. Hepatomegaly 
again evident. 4. No intraperitoneal fluid. Possible small left upper pole renal cyst. 
Cardiomegaly. Nasogastric tube tip in body of stomach. XR (Most Recent). CXR reviewed by me and compared with previous CXR Results from [facility] [place] Encounter encounter on 09/26/18 XR CHEST PORT Narrative EXAM: Portable Chest 
 
CLINICAL INDICATION:  trach change; atelectasis -Additional:  None COMPARISON:  10/24/18 TECHNIQUE: AP portable view at [address] [facility] 
 
______________ FINDINGS: 
 
HEART AND MEDIASTINUM:  The heart size is stable LUNGS AND AIRWAYS:  Right chest perihilar infiltrate is present PLEURA:  No pneumothorax or pleural effusion BONES:  Vertebral spondylosis OTHER:  None 
 
______________ Impression IMPRESSION: 
 
Right lung perihilar infiltrate appears present High complexity decision making was performed during the evaluation of this patient at high risk for decompensation with multiple organ involvement Above mentioned total time spent on reviewing the case/medical record/data/notes/EMR/patient examination/documentation/coordinating care with nurse/consultants, exclusive of procedures with complex decision making performed and > 50% time spent in face to face evaluation. Dejan Zavala MD 
Critical Care Medicine

## 2018-10-29 NOTE — PROGRESS NOTES
Problem: Falls - Risk of 
Goal: *Absence of Falls Document Orvel Buffy Fall Risk and appropriate interventions in the flowsheet. Outcome: Progressing Towards Goal 
Fall Risk Interventions: 
Mobility Interventions: Bed/chair exit alarm Mentation Interventions: Adequate sleep, hydration, pain control, More frequent rounding Medication Interventions: Bed/chair exit alarm Elimination Interventions: Bed/chair exit alarm, Call light in reach, Patient to call for help with toileting needs, Toileting schedule/hourly rounds History of Falls Interventions: Bed/chair exit alarm, Evaluate medications/consider consulting pharmacy Problem: Pressure Injury - Risk of 
Goal: *Prevention of pressure injury Document Mitul Scale and appropriate interventions in the flowsheet. Outcome: Progressing Towards Goal 
Pressure Injury Interventions: 
Sensory Interventions: Assess changes in LOC, Keep linens dry and wrinkle-free, Minimize linen layers, Turn and reposition approx. every two hours (pillows and wedges if needed) Moisture Interventions: Absorbent underpads Activity Interventions: Increase time out of bed, Pressure redistribution bed/mattress(bed type) Mobility Interventions: Assess need for specialty bed, Float heels, HOB 30 degrees or less, Pressure redistribution bed/mattress (bed type), Turn and reposition approx. every two hours(pillow and wedges) Nutrition Interventions: Document food/fluid/supplement intake Friction and Shear Interventions: Apply protective barrier, creams and emollients, Foam dressings/transparent film/skin sealants, HOB 30 degrees or less, Lift sheet, Lift team/patient mobility team

## 2018-10-29 NOTE — PROGRESS NOTES
[id number] shift summary Assumed care of pt awake, moving BLE spontaneously. Assessment done, mouth care and pericare done. PICC flushed with saline and capped. mouthcare done Q4h. Pt straight cathed for 55 cc urine at 0435. Complete bath and linen change done. Pt desatted to 46% while turned on lt side. Pt turn supine and bagged. Some resistance felt but then air way opened up and pt O2 sats climbed to 100%. Lt great toe painted with betadine, prevalon boot reapplied. Bedside and Verbal shift change report given to [address] [place] [address] [address] and Ilsa Interiano RN (oncoming nurse) by Thierno Galloway RN 
 (offgoing nurse). Report included the following information SBAR, Kardex, Intake/Output, MAR, Recent Results and Cardiac Rhythm ST, NSR.

## 2018-10-30 NOTE — PROGRESS NOTES
RENAL PROGRESS NOTE Rony Ballard Assessment/Plan: · Prolonged dialysis dependant JULIANNA (ischemic atn in a setting of acute pyelonephritis/acute cholecystitis with sepsis and cardiac arrest 9/29). No evidence of recovery of renal function at this time, although nonolliguric (continue straight cath every 8 hours). Next dialysis today and continue tts. Will pull rt femoral uldall after dialysis today with plans for tdc in 1-2 days. If febrile, may need another uldall rather then tdc. · S/p cardiopulm arrest 9/29. · Acute pyelonephritis/sepsis. Febrile on/off. On abx by ID. · Abnormal lft's/acute cholecystitis/pancreatitis. GI on the case. Unable to place jtube. Lipase is rising. · UGI bleed, EGD results noted- healing esophageal ulceration. · Acute blood loss anemia/anemia of kidney failure. Continue analia. · HTN. Monitor bp with dialysis/volume removal.  
· Asp pneumonia. · Resp failure. S/p peg/trach 10/17, s/p bronch. · Rt shoulder shingles. Finished acyclovir. · Anoxic brain injury superimposed on recent cva. Subjective: Intubated, unresponsive. Eyes are open. Tolerates tf. Patient Active Problem List  
Diagnosis Code  Stroke (cerebrum) (HCC) I63.9  Stroke ([facility] I63.9  Rhabdomyolysis M62.82  
 Dehydration E86.0  
 Essential hypertension I10  
 Diabetes mellitus type 2, controlled ([facility] E11.9  Non compliance w medication regimen Z91.14  
 DM (diabetes mellitus) ([facility] E11.9  History of stroke Z80.78  
 Acute-on-chronic kidney injury ([facility] N17.9, N18.9  Acute cystitis N30.00  Elevated troponin R74.8  Elevated LFTs R94.5  Cardiac arrest with ventricular fibrillation (HCC) I46.9, I49.01  
 Acute pancreatitis K85.90  
 Ischemic encephalopathy I67.89  
 Hypoalbuminemia E88.09  
 Shingles B02.9  [facility] [id number]  Pseudomonas infection B96.5 Current Facility-Administered Medications Medication Dose Route Frequency Provider Last Rate Last Dose  calcium gluconate 2 g in 0.9% sodium chloride 50 mL IVPB  2 g IntraVENous ONCE MD Albert Ray.Karen Fairbanks ON 10/31/2018] insulin glargine (LANTUS) injection 22 Units  22 Units SubCUTAneous DAILY Edyta Kim MD      
 insulin glargine (LANTUS) injection 5 Units  5 Units SubCUTAneous ONCE Mariann Medina MD      
 0.9% sodium chloride infusion 250 mL  250 mL IntraVENous PRN Donnice Monday A, NP      
 cefepime (MAXIPIME) 0.5 g in 0.9% sodium chloride 100 mL IVPB  0.5 g IntraVENous Q24H Anthony Solorzano MD   0.5 g at 10/29/18 2205  albuterol (PROVENTIL VENTOLIN) nebulizer solution 2.5 mg  2.5 mg Nebulization BID RT Maxx Barcenas MD   2.5 mg at 10/30/18 4211  
 multivitamin (MULTI-DELYN, WELLESSE) oral liquid 30 mL  30 mL Oral DAILY Mary Anne Huitron MD   30 mL at 10/30/18 0800  
 bacitracin 500 unit/gram packet 1 Packet  1 Packet Topical PRN Talyor Villalta MD      
 sodium chloride (NS) flush 10-30 mL  10-30 mL InterCATHeter PRN Taylor Villalta MD   30 mL at 10/21/18 0430  
 sodium chloride (NS) flush 10 mL  10 mL InterCATHeter Q24H Taylor Villalta MD   10 mL at 10/29/18 1556  sodium chloride (NS) flush 10 mL  10 mL InterCATHeter PRN Taylor Fails, MD   10 mL at 10/20/18 3525  sodium chloride (NS) flush 10-40 mL  10-40 mL InterCATHeter Q8H Jones Ackerman MD   10 mL at 10/30/18 0617  
 sodium chloride (NS) flush 20 mL  20 mL InterCATHeter Q24H Taylor Villalta MD   20 mL at 10/29/18 1720  heparin (porcine) injection 5,000 Units  5,000 Units SubCUTAneous Q8H Taylor Villalta MD   5,000 Units at 10/30/18 9867  hydroxypropyl methylcellulose (ISOPTO TEARS) 0.5 % ophthalmic solution 2 Drop  2 Drop Both Eyes BID Sergey Monday A, NP   2 Drop at 10/30/18 0800  epoetin manda (EPOGEN;PROCRIT) injection 40,000 Units  40,000 Units IntraVENous Q7D [address] MD   40,000 Units at 10/24/18 1227  pantoprazole (PROTONIX) 40 mg in sodium chloride 0.9% 10 mL injection  40 mg IntraVENous Q24H Darline SHAFFER MD   40 mg at 10/30/18 0800  
 0.9% sodium chloride infusion 250 mL  250 mL IntraVENous PRN Raymundo Richmond DO      
 influenza vaccine 2018-19 (6 mos+)(PF) (FLUARIX QUAD/FLULAVAL QUAD) injection 0.5 mL  0.5 mL IntraMUSCular PRIOR TO DISCHARGE Anali Sneed MD      
 acetaminophen (TYLENOL) solution 325 mg  325 mg Per NG tube Q4H PRN Anali Sneed MD   325 mg at 10/28/18 1218  
 0.9% sodium chloride infusion 250 mL  250 mL IntraVENous PRN Raymundo Richmond DO      
 heparin (porcine) pf 100 Units  100 Units InterCATHeter PRN [address] MD      
 0.9% sodium chloride infusion 250 mL  250 mL IntraVENous PRN Anali Sneed MD      
 insulin lispro (HUMALOG) injection   SubCUTAneous Q6H Teryl Pavy H, DO   6 Units at 10/30/18 8595  
 heparin (porcine) 1,000 unit/mL injection 1,000 Units  1,000 Units InterCATHeter DIALYSIS PRN [address] MD   1,000 Units at 09/28/18 1332  senna-docusate (PERICOLACE) 8.6-50 mg per tablet 1 Tab  1 Tab Oral BID PRN Joli Lennox, DO      
 ondansetron (ZOFRAN) injection 4 mg  4 mg IntraVENous Q4H PRN Alfred Nettles DO   4 mg at 09/28/18 0539  
 glucose chewable tablet 16 g  4 Tab Oral PRN Rosemarie LAL, DO      
 glucagon (GLUCAGEN) injection 1 mg  1 mg IntraMUSCular PRN Alfred Ntetles DO      
 dextrose (D50) infusion 12.5-25 g  25-50 mL IntraVENous PRN Alfred Nettles DO   25 g at 10/09/18 1906  hydrALAZINE (APRESOLINE) 20 mg/mL injection 20 mg  20 mg IntraVENous Q6H PRN Charlene Kennedy NP   20 mg at 10/30/18 3821 Objective Vitals:  
 10/30/18 0621 10/30/18 0700 10/30/18 0800 10/30/18 0841 BP:  (!) 205/76 Pulse:  98  (!) 107 Resp:  21  (!) 33  
 Temp:   98.9 °F (37.2 °C) TempSrc:      
SpO2:  100%  100% Weight: 89.3 kg (196 lb 12.8 oz) Height:      
 
 
 
Intake/Output Summary (Last 24 hours) at 10/30/2018 1025 Last data filed at 10/30/2018 0700 Gross per 24 hour Intake 3212.5 ml Output 325 ml Net 2887.5 ml Admission weight: Weight: 96.6 kg (213 lb) (09/26/18 2244) Last Weight Metrics: 
Weight Loss Metrics 10/30/2018 9/26/2018 9/13/2018 Today's Wt 196 lb 12.8 oz - 227 lb 15.3 oz  
BMI - 30.82 kg/m2 35.7 kg/m2 Physical Assessment:  
 
General: intubated, unresponsive. Eyes are open. Neck: Trach in place. Neck: No jvd. LUNGS: diminished air entry at the bases. No crackles. CVS EXM: S1, S2  RRR. Abdomen: soft, pos bs. PEG. Lower Extremities:  1+ edema. Rt femoral temporary dialysis catheter. Lab CBC w/Diff Recent Labs 10/30/18 
0435 10/29/18 
0400 10/28/18 
0400 WBC 13.1 18.7* 19.4*  
RBC 2.44* 2.58* 2.90* HGB 6.7* 7.4* 8.2* HCT 21.6* 23.4* 26.4*  
* 520* 600* Chemistry Recent Labs 10/30/18 
0435 10/29/18 
1645 10/29/18 
0400 10/28/18 
0400 *  --  120* 191*   --  138 137  
K 5.0  --  4.7 4.4   --  103 100 CO2 22  --  24 27 BUN 67*  --  56* 36* CREA 4.98*  --  4.08* 3.02* CA 7.7*  --  8.5 8.0* AGAP 14  --  11 10 BUCR 13  --  14 12 AP  --  375*  --   --   
TP  --  6.7  --   --   
ALB 1.4* 1.5* 1.5* 1.7*  
GLOB  --  5.2*  --   --   
AGRAT  --  0.3*  --   --   
PHOS 6.5*  --  6.2* 4.3 No results found for: IRON, FE, TIBC, IBCT, PSAT, FERR Lab Results Component Value Date/Time Calcium 7.7 (L) 10/30/2018 04:35 AM  
 Phosphorus 6.5 (H) 10/30/2018 04:35 AM  
  
 
Ananya Lara M.D. [facility] Phone [phone] Pager [phone]

## 2018-10-30 NOTE — PROGRESS NOTES
1930- assumed care of pt with eyes open, responding to pain, trached on vent. 2000- assessment done. PEG with TF, no residual. Mouth care and mery care done. pt repositioned. 0001- reassessment done. Mouth care mery care done. 0400- reassessment done, mouth care and mery care done. 0500- pt straight cathed for 125 ml braydon urine. 0600- complete bath and linen change done. Lt great toe painted with betadine. 0700- hydralyzine 20mg given slow IVP for SBP>180. 
0730- Bedside and Verbal shift change report given to [address] [place] [address] [address] (oncoming nurse) by Jenise Mc RN 
 (offgoing nurse). Report included the following information SBAR, Kardex, Intake/Output, MAR, Recent Results and Cardiac Rhythm NSR.

## 2018-10-30 NOTE — PROGRESS NOTES
VENTILATOR Care plan 
 
Problem: Ventilator Management Goal: *Adequate oxygenation/ ventilation/ and extubation Patient: 
   
 
Chey Mckeon     57 y.o.   male     10/30/2018  9:41 AM 
Patient Active Problem List  
Diagnosis Code  Stroke (cerebrum) ([facility]) I63.9  Stroke ([facility] I63.9  Rhabdomyolysis M62.82  
 Dehydration E86.0  
 Essential hypertension I10  
 Diabetes mellitus type 2, controlled ([facility] E11.9  Non compliance w medication regimen Z91.14  
 DM [facility]di[facility] mellitus[facility] E11.9  History of stroke Z80.78  
 Acute-on-chronic kidney injury ([facility] N17.9, N18.9  Acute cystitis N30.00  Elevated troponin R74.8  Elevated LFTs R94.5  Cardiac arrest with ventricular fibrillation ([facility]) I46.9, I49.01  
 Acute pancreatitis K85.90  
 Ischemic encephalopathy I67.89  
 Hypoalbuminemia E88.09  
 Shingles B02.9  Gangrene [facility] [id number]  Pseudomonas infection B96.5 Pneumonia of both lower lobes due to infectious organism ([facility] [J18.1] ESRD (end stage renal disease) ([facility] [N18.6] ESRD (end stage renal disease) ([facility] [N18.6] Reason patient intubated: code blue Ventilator settings: PSV 10 ETT Size/Placement: tracheotomy on 10/17/18 size 8 ABG: 
Date:10/30/2018 No results found for: PH, PHI, PCO2, PCO2I, PO2, PO2I, HCO3, HCO3I, FIO2, FIO2I Chest X-ray: 
Date:10/30/2018 Results from [facility] Encounter encounter on 09/26/18 XR CHEST PORT Narrative EXAM: Portable Chest 
 
CLINICAL INDICATION:  trach change; atelectasis -Additional:  None COMPARISON:  10/24/18 TECHNIQUE: AP portable view at [address] A 
 
______________ FINDINGS: 
 
HEART AND MEDIASTINUM:  The heart size is stable LUNGS AND AIRWAYS:  Right chest perihilar infiltrate is present PLEURA:  No pneumothorax or pleural effusion BONES:  Vertebral spondylosis OTHER:  None 
 
______________ Impression IMPRESSION: 
 
Right lung perihilar infiltrate appears present Lab Test: 
Date:10/30/2018 WBC:  
Lab Results Component Value Date/Time WBC 13.1 10/30/2018 04:35 AM  
HGB:  
Lab Results Component Value Date/Time HGB 6.7 (L) 10/30/2018 04:35 AM  
 PLTS:  
Lab Results Component Value Date/Time PLATELET 206 (H) 84/20/0492 04:35 AM  
 
 
SaO2%/flow: @OLRAPVU5(8)@ Vital Signs:    
Patient Vitals for the past 8 hrs: 
 Temp Pulse Resp BP SpO2  
10/30/18 0841  (!) 107 (!) 33  100 % 10/30/18 0700  98 21 (!) 205/76 100 % 10/30/18 0600  98 22 (!) 210/70 100 % 10/30/18 0520  95 23  100 % 10/30/18 0500  95 20 176/60 100 % 10/30/18 0400 99.7 °F (37.6 °C) 94 21 176/54 100 % 10/30/18 0300  95 24 174/53 100 % 10/30/18 0200  96 17 163/50 100 % Wean Screen Pass (Yes or No): yes Wean Screen Reason for Failure:  
Duration of Weaning Trial: 
Additional Comments: PLAN OF CARE: wean vent as patient tolerates and try trach collar when ready GOAL: trach collar OUTCOME:

## 2018-10-30 NOTE — PROGRESS NOTES
Problem: Falls - Risk of 
Goal: *Absence of Falls Document Shasha Rosas Fall Risk and appropriate interventions in the flowsheet. Outcome: Progressing Towards Goal 
Fall Risk Interventions: 
Mobility Interventions: Bed/chair exit alarm, Patient to call before getting OOB Mentation Interventions: Bed/chair exit alarm, Door open when patient unattended, More frequent rounding, Reorient patient, Room close to nurse's station Medication Interventions: Bed/chair exit alarm, Patient to call before getting OOB Elimination Interventions: Call light in reach, Patient to call for help with toileting needs, Toileting schedule/hourly rounds History of Falls Interventions: Bed/chair exit alarm, Door open when patient unattended, Room close to nurse's station Problem: Pressure Injury - Risk of 
Goal: *Prevention of pressure injury Document Mitul Scale and appropriate interventions in the flowsheet. Pressure Injury Interventions: 
Sensory Interventions: Assess changes in LOC, Keep linens dry and wrinkle-free, Minimize linen layers Moisture Interventions: Absorbent underpads, Check for incontinence Q2 hours and as needed, Minimize layers Activity Interventions: Assess need for specialty bed, Pressure redistribution bed/mattress(bed type), PT/OT evaluation Mobility Interventions: Assess need for specialty bed, HOB 30 degrees or less, Pressure redistribution bed/mattress (bed type), PT/OT evaluation, Turn and reposition approx. every two hours(pillow and wedges) Nutrition Interventions: Document food/fluid/supplement intake, Offer support with meals,snacks and hydration Friction and Shear Interventions: Apply protective barrier, creams and emollients, HOB 30 degrees or less, Minimize layers

## 2018-10-30 NOTE — DIABETES MGMT
NUTRITIONAL RE-ASSESSMENT GLYCEMIC CONTROL/ PLAN OF CARE Cari Morgan           57 y.o.           9/26/2018 1. Acute renal failure, unspecified acute renal failure type ([facility] 2. Acute UTI 3. Cholelithiasis with choledocholithiasis 4. History of CVA (cerebrovascular accident) 5. ESRD (end stage renal disease) ([facility] DM INTERVENTIONS/PLAN:  
 Pt with increased lipase 3671 today when Osmolite 1.2 calorie TF was increased to goal rate of 70ml/hour. Trial of Perative TF at 30 ml/hr which will provide 936 calories, 48 g protein/d, 27 g fat/d  with 0.6 liters free water/d from TF. Formula at 30ml/hr has 40% less fat than when pt was  At goal rate of 70ml/hr Osmolite 1.2 calories. Pt is receiving liquid multivitamin/mineral  for wounds. ASSESSMENT:  
Nutritional Status: 
Pt is overweight related to excess caloric intake as evidenced by 135% ideal weight and BMI= 29.8kg/m2. Pt meets criteria for obesity. Altered nutrition-related lab values RT DX. Diabetes Mellitus  related to lack of adherence to nutrition and medication recommendations as evidenced by A1c of 10% Altered nutrition-related lab values RT DX. Acute renal failure aeb requiring dialysis. Altered nutrition-related lab values AEB  lipase 3671. Pt w/hypoalbuminemia as evidenced by albumin=1.4 mg/dl and prealbumin 17.9 (decreased but had three  extended periods of TF on hold or <half goal rate due to intolerance) Pt with DT injury on heels, multiple blisters on arms and stage 2  area on sacrum noted. Diabetes Management: Lantus 22 units daily- rec increase to 27 units/d for 10/31/18. Recent blood glucose:    
 
Lab Results Component Value Date/Time   (H) 10/30/2018 04:35 AM  
 GLUCPOC 266 (H) 10/30/2018 11:24 AM  
 GLUCPOC 237 (H) 10/30/2018 06:15 AM  
 GLUCPOC 247 (H) 10/29/2018 11:58 PM  
 
Within target range (non-ICU: <140; ICU<180): [] Yes   [x]  No 
 
 Current Insulin regimen: 22 units Lantus/24 hrs  Plus 5 units Lantus once and VIR corrective lispro Home medication/insulin regimen:  
Key Antihyperglycemic Medications   
    
  
 insulin glargine (LANTUS) 100 unit/mL injection 10 units qhs  Indications: type 2 diabetes mellitus  
 insulin lispro (HUMALOG) 100 unit/mL injection 4 units with meals HbA1c: 10% equivalent  to ave Blood Glucose of 240mg/dl for 2-3 months prior to admission Adequate glycemic control PTA:  [] Yes  [x] No 
  
SUBJECTIVE/OBJECTIVE: Information obtained from: ICU rounds/pt is on a vent Diet:  Osmolite1.2 calorie TF  - Changed for a trial of Perative at 30 ml/hr with increased lipase Medications: [x]                Reviewed Labs:  
Lab Results Component Value Date/Time Hemoglobin A1c 10.0 (H) 09/30/2018 04:18 AM  
 
Lab Results Component Value Date/Time Sodium 138 10/30/2018 04:35 AM  
 Potassium 5.0 10/30/2018 04:35 AM  
 Chloride 102 10/30/2018 04:35 AM  
 CO2 22 10/30/2018 04:35 AM  
 Anion gap 14 10/30/2018 04:35 AM  
 Glucose 226 (H) 10/30/2018 04:35 AM  
 BUN 67 (H) 10/30/2018 04:35 AM  
 Creatinine 4.98 (H) 10/30/2018 04:35 AM  
 Calcium 7.7 (L) 10/30/2018 04:35 AM  
 Magnesium 2.3 10/30/2018 04:35 AM  
 Phosphorus 6.5 (H) 10/30/2018 04:35 AM  
 Albumin 1.4 (L) 10/30/2018 04:35 AM  
prealbumin 17.9 Lipase was  1723 now 3671 following increase in TF to goal  
Anthropometrics: IBW : 69.9 kg (154 lb), % IBW (Calculated): 134.85 %, BMI (calculated): 30.8 Wt Readings from Last 1 Encounters:  
10/30/18 89.3 kg (196 lb 12.8 oz) Ht Readings from Last 1 Encounters:  
10/22/18 5' 7\" (1.702 m) Estimated Nutrition Needs:  1880 Kcals/day, Protein (g): 85 g Fluid (ml): 1800 ml Based on:   [x]          Actual BW    []          ABW   []            Adjusted BW   
Nutrition Diagnoses: as stated above Nutrition Interventions: trial of Perative TF Goal: Blood glucose will be within target range of  mg/dL by 11/2 Intake will meet >75% of energy and protein requirements by 11/5. Gradual weight loss post discharge 1 lb per week by 11/14/18 is acceptable. Wound healing. Nutrition Monitoring and Evaluation []     Monitor po intake on meal rounds 
[x]     Continue inpatient monitoring and intervention 
[]     Other: 
 
 
Nutrition Risk:  [x]   High     []  Moderate    []  Minimal/Uncompromised Ankur Pak RD 
[facility] [phone]

## 2018-10-30 NOTE — PROGRESS NOTES
Physical Exam  
Skin:  
 
  
Primary Nurse Pamela Lozoya and Panchito Eldridge RN performed a dual skin assessment on this patient. No impairment noted.

## 2018-10-30 NOTE — PROGRESS NOTES
[facility] 
[address] [facility], [address]. Alia Ramirez, Dr. Jonas Mark, Dr. Lisa Lopez, & Dr. Jewell Pod [phone] FAX# [phone] Progress Note Patient: Cait Solorzano MRN: [id number]  SSN: [id number] YOB: 1955  Age: 57 y.o. Sex: male Admit Date: 9/26/2018 LOS: 33 days Subjective: PPD#11 RCFV temporary HD catheter insertion and s/p RIJV temporary catheter insertion. Patient poor historian due to anoxic brain injury with recent CVA. S/p tracheostomy on vent with Peg tube placement. Currently on Cefepime. Afebrile since 10/28 with (-) Bcx. Objective:  
 
Vitals:  
 10/30/18 1400 10/30/18 1415 10/30/18 1430 10/30/18 1445 BP: 158/50 161/55 167/59 167/53 Pulse: (!) 104 (!) 103 (!) 103 (!) 103 Resp: (!) 34 26 (!) 33 (!) 35 Temp:      
TempSrc:      
SpO2: 100% 100% 100% 100% Weight:      
Height:      
  
 
Intake and Output: 
Current Shift: 10/30 0701 - 10/30 1900 In: 270 Out: - Last three shifts: 10/28 1901 - 10/30 0700 In: 3682.5 [I.V.:1952.5] Out: 450 [Urine:280; Drains:170] Physical Exam:  
GENERAL: Disoriented, non-verbal, does not follow commands , cooperative, no distress, EYE: PERRL, mucous membranes moist 
NECK: supple, symmetric. No JVD or carotid bruits. Tracheostomy in place. LUNG: On Vent. clear to auscultation bilaterally HEART: regular rate and rhythm ABDOMEN: soft, non-tender, non-distended. Bowel sounds normal.  
EXTREMITIES: RCFV temporary HD catheter: in place and intact. No erythema, edema, hematoma, or drainage noted. No edema BLE. NTTP BLE. Palpable 2+ BUE radial pulses. NEUROLOGIC: Deferred Lab/Data Review: 
BMP:  
Lab Results Component Value Date/Time   10/30/2018 04:35 AM  
 K 5.0 10/30/2018 04:35 AM  
  10/30/2018 04:35 AM  
 CO2 22 10/30/2018 04:35 AM  
 AGAP 14 10/30/2018 04:35 AM  
  (H) 10/30/2018 04:35 AM  
 BUN 67 (H) 10/30/2018 04:35 AM  
 CREA 4.98 (H) 10/30/2018 04:35 AM  
 GFRAA 14 (L) 10/30/2018 04:35 AM  
 GFRNA 12 (L) 10/30/2018 04:35 AM  
 
CBC:  
Lab Results Component Value Date/Time WBC 13.1 10/30/2018 04:35 AM  
 HGB 6.7 (L) 10/30/2018 04:35 AM  
 HCT 21.6 (L) 10/30/2018 04:35 AM  
  (H) 10/30/2018 04:35 AM  
  
CULTURE BLOOD 10/25/18: No growth in 5 days CT ABD/PELV W 10/4/18:   
1. Dense subtotal or total consolidation right lower lobe compatible with 
pneumonia similar to prior study. Small bilateral pleural effusions similar in 
size. 
  
2. Distended gallbladder with gallstones and gallbladder sludge without 
significant change in appearance and also described in detail on ultrasound of 
9/27/2018. 
  
3. Indeterminate small lesion left hepatic lobe without change. Hepatomegaly 
again evident. 
  
4. No intraperitoneal fluid. Possible small left upper pole renal cyst. 
Cardiomegaly. Nasogastric tube tip in body of stomach. Assessment:  
 
Principal Problem: 
  Cardiac arrest with ventricular fibrillation ([facility] (9/29/2018) Overview: ROSC before defibrillation could be attempted. Active Problems: 
  Essential hypertension (9/10/2018) Diabetes mellitus type 2, controlled ([address] (9/10/2018) History of stroke (9/27/2018) Overview: With residual R hemiparesis Acute-on-chronic kidney injury ([address] (9/27/2018) Acute cystitis (9/27/2018) Elevated troponin (9/27/2018) Elevated LFTs (9/28/2018) Acute pancreatitis (9/29/2018) Overview: Lipase downtrends with interruption in tube feed and Mucomyst. Restarting  
    tube feed did result in modest elevation but not to the levels seen while  
    on Mucomyst. Nonetheless, there is signficant decrease in lipase levels  
    with interruptions in tube feeding. Ischemic encephalopathy (9/30/2018) Hypoalbuminemia (10/21/2018) Shingles (10/21/2018) Gangrene ([facility]) (10/22/2018) Pseudomonas infection (10/27/2018) JULIANNA in need of HD  
POD#11 RCFV temporary HD catheter insertion S/p RIJV temporary HD catheter insertion S/p Tracheostomy and PEG tube placement 10/17/18 RUE shoulder VZV infection PEA arrest 9/29/18 Acute pyelonephritis/sepsis - on Abx, Afebrile x2 days Acute cholecystitis Acute pancreatitis UGI Bleed - esophageal ulcer, healing Acute blood loss anemia Aspiration PNA Plan:  
 
[place] plan for [facility] [place] vs. Temporary HD catheter insertion/exchange tomorrow 10/31 - consent obtained per patient's daughter, discussed with nephrology Catheter choice will depend if patient remains afebrile throughout the night/ (-) Bcx remain. Discussed with and patient's daughter understands risk/benefits to procedure including bleeding, infection, pain, allergic reaction Would like to proceed Hold tube feedings at midnight PT/INR in AM 
Continue to use RCFV temporary HD catheter for HD today Abx per ID Signed By: Darline Ramos October 30, 2018

## 2018-10-30 NOTE — PROGRESS NOTES
Problem: Patient Education: Go to Patient Education Activity Goal: Patient/Family Education Outcome: Not Progressing Towards Goal 
No evidence of learning Problem: Falls - Risk of 
Goal: *Absence of Falls Document Imelda End Fall Risk and appropriate interventions in the flowsheet. Outcome: Progressing Towards Goal 
Fall Risk Interventions: 
Mobility Interventions: Bed/chair exit alarm Mentation Interventions: Bed/chair exit alarm, Adequate sleep, hydration, pain control Medication Interventions: Bed/chair exit alarm Elimination Interventions: Bed/chair exit alarm, Call light in reach, Toileting schedule/hourly rounds History of Falls Interventions: Bed/chair exit alarm Problem: Patient Education: Go to Patient Education Activity Goal: Patient/Family Education Outcome: Not Progressing Towards Goal 
No evidence of learning Problem: General Medical Care Plan Goal: *Skin integrity maintained Outcome: Progressing Towards Goal 
No new pressure wounds noted Goal: *Optimize nutritional status Outcome: Progressing Towards Goal 
TF restarted yesterday Problem: Pressure Injury - Risk of 
Goal: *Prevention of pressure injury Document Mitul Scale and appropriate interventions in the flowsheet. Outcome: Progressing Towards Goal 
Pressure Injury Interventions: 
Sensory Interventions: Assess changes in LOC, Assess need for specialty bed, Check visual cues for pain, Pressure redistribution bed/mattress (bed type), Turn and reposition approx. every two hours (pillows and wedges if needed) Moisture Interventions: Absorbent underpads, Apply protective barrier, creams and emollients, Check for incontinence Q2 hours and as needed, Internal/External fecal devices, Maintain skin hydration (lotion/cream) Activity Interventions: Pressure redistribution bed/mattress(bed type) Mobility Interventions: Assess need for specialty bed, Float heels, HOB 30 degrees or less, Pressure redistribution bed/mattress (bed type), Turn and reposition approx. every two hours(pillow and wedges) Nutrition Interventions: Document food/fluid/supplement intake Friction and Shear Interventions: Apply protective barrier, creams and emollients, Feet elevated on foot rest, Foam dressings/transparent film/skin sealants, HOB 30 degrees or less, Lift sheet, Lift team/patient mobility team 
 
  
 
 
 
 
Problem: Patient Education: Go to Patient Education Activity Goal: Patient/Family Education Outcome: Not Progressing Towards Goal 
No evidence of learning Problem: Ventilator Management Goal: *Normal spontaneous ventilation Outcome: Progressing Towards Goal 
Pt on spont setting on vent Problem: Patient Education: Go to Patient Education Activity Goal: Patient/Family Education Outcome: Not Progressing Towards Goal 
No evidence of learning Problem: Patient Education: Go to Patient Education Activity Goal: Patient/Family Education Outcome: Not Progressing Towards Goal 
No evidence of learning Problem: Patient Education: Go to Patient Education Activity Goal: Patient/Family Education Outcome: Not Progressing Towards Goal 
No evidence of learning

## 2018-10-30 NOTE — PROGRESS NOTES
Patient remains on the vent through a trache and  is therefore unable to unable to communicate at this time. No family seen at bindu of this visit.  offered prayer and left Spiritual Care brochure. Chaplains will continue to follow and will provide pastoral care on an as needed/requested basis. Dayton Baker Board Certified [address] [place] [address] Spiritual Care  
[phone]

## 2018-10-30 NOTE — PROGRESS NOTES
0700- Report received from [address] and assumed care of patient. Patient's eyes open spontaneously; no command following. VS stable on ventilator. No signs of distress. Will continue to monitor. 0800- Mouth care and trach care provided at this time. 0930- Rounded with treatment team. New orders received. 1200- Trach care provided at this time. 1400- Patient resting comfortably. No signs of distress. VS stable on ventilator. Will continue to monitor. 1600- Patient dressing to R buttock wound change. Patient tolerated well. No signs of distress. PICC dressing also changed at this time. Will continue to monitor. 1850- Dialysis nurse stated she will be at the bedside soon to perform dialysis. Bedside and Verbal shift change report given to Hattie Winters RN (oncoming nurse) by Camila Ta RN (offgoing nurse). Report included the following information SBAR, Kardex, Intake/Output, MAR, Recent Results and Cardiac Rhythm SR-ST.

## 2018-10-30 NOTE — PROGRESS NOTES
[facility] I have reviewed the flowsheet and previous days notes. Events, vitals, medications and notes from last 24 hours reviewed. Care plan discussed with staff and on multidisciplinary rounds. Subjective: 
10/30/2018 No changes overnight. Remain unresponsive. Tolerating PS, episodic hypertension. Continues to have fevers. Patient Active Problem List  
Diagnosis Code  Stroke (cerebrum) ([facility]) I63.9  Stroke ([facility] I63.9  Rhabdomyolysis M62.82  
 Dehydration E86.0  
 Essential hypertension I10  
 Diabetes mellitus type 2, controlled ([facility] E11.9  Non compliance w medication regimen Z91.14  
 DM (diabetes mellitus) ([facility] E11.9  History of stroke Z80.78  
 Acute-on-chronic kidney injury ([facility] N17.9, N18.9  Acute cystitis N30.00  Elevated troponin R74.8  Elevated LFTs R94.5  Cardiac arrest with ventricular fibrillation ([facility]) I46.9, I49.01  
 Acute pancreatitis K85.90  
 Ischemic encephalopathy I67.89  
 Hypoalbuminemia E88.09  
 Shingles B02.9  Gangrene ([facility] [id number]  Pseudomonas infection B96.5 Assessment: 
Acute Respiratory Failure with Hypoxia - Intubated x18 days, breathing spontaneously - s/p tracheostomy on 10/17 
- Trach exchange done 10/27 
- Tolerating PS 15/5 Anoxic Brain Injury - 2/2 cardiac arrest, MRI with consistent findings Acute Kidney Injury 
- oliguric,  on HD Pseudomonas UTI/Bronchitis - BCx with NGTD 
- UA and respiratory culture with pan-senstivity 
- ID following Hx of CVA 
- R hemiparesis GI Bleed - s/p endoscopy x2 with ulceration noted in esophagus - Resolved Anemia 
- likely due to ESRD and iatrogenic 
- transfuse as needed -  On EPO 
DM 
- on insulin VZV of R arm 
- completed Acyclovir Gangrene of L great toe - Podiatry states to place betadine every day Elevated Lipase - Remains over 1000 
- No evidence of pancreatitis on CT, + GBS 
- Cannot get MRCP at this time - Unable to pass J-tube 
  
Impression/Plan: - Lipase increased to over 3000 with feeds today, will hold. Discussed with dietary about starting high protein feeds. - Will continue vent weaning and change PS to 10 - ABX de-escalated by ID 
- Give one unit blood this AM, type and cross -  GI and DVT ppx- heparin and protonix 
  
Very poor prognosis, daughter wishes to pursue aggressive care 
  
OTHER: 
Glycemic Control- glucose stabilizer per ICU protocol when on insulin drip. Maintain blood glucose 140-180. Replace electrolytes per ICU electrolyte replacement protocol Ventilator bundle & Sedation protocol followed. Daily morning sedation holiday, assessment for readiness for SBT & weaning from ventilator; and then re-titrate if required. Aim to keep peak plateau pressure 16-87MN H2O in ARDS patient. Shannon tube to suction at 20-30 cm H2O, Maintain Anastasiya tube with 5-10ml air every 4 hours. Chlorhexidine mouth washes and routine oral care every 4 hours. Stress ulcer and DVT prophylaxis. HOB >=30 degree elevation all the time. HOB >=30 degree elevation all the time. Aggressive pulmonary toileting. Incentive spirometry when appropriate. Aspiration precautions. CC TIME: >35 min Medication Reviewed: 
 
No Known Allergies Past Medical History:  
Diagnosis Date  CHF (congestive heart failure) ([facility]  Diabetes ([facility]  Stroke ([facility] History reviewed. No pertinent surgical history. Social History Tobacco Use  Smoking status: Never Smoker  Smokeless tobacco: Never Used Substance Use Topics  Alcohol use: No  
  
History reviewed. No pertinent family history. Prior to Admission medications Medication Sig Start Date End Date Taking? Authorizing Provider  
aspirin (ASPIRIN) 325 mg tablet Take 1 Tab by mouth daily. 9/15/18   Laura Solorzano MD  
atorvastatin (LIPITOR) 80 mg tablet Take 1 Tab by mouth nightly.  9/14/18   Paula Norton MD  
 insulin glargine (LANTUS) 100 unit/mL injection 10 units qhs  Indications: type 2 diabetes mellitus 9/15/18   Coy Garza MD  
insulin lispro (HUMALOG) 100 unit/mL injection 4 units with meals 9/14/18   Coy Garza MD  
losartan (COZAAR) 50 mg tablet Take 1 Tab by mouth daily. 9/14/18   Coy Garza MD  
 
Current Facility-Administered Medications Medication Dose Route Frequency  calcium gluconate 2 g in 0.9% sodium chloride 50 mL IVPB  2 g IntraVENous ONCE  
 [START ON 10/31/2018] insulin glargine (LANTUS) injection 22 Units  22 Units SubCUTAneous DAILY  insulin glargine (LANTUS) injection 5 Units  5 Units SubCUTAneous ONCE  
 cefepime (MAXIPIME) 0.5 g in 0.9% sodium chloride 100 mL IVPB  0.5 g IntraVENous Q24H  
 albuterol (PROVENTIL VENTOLIN) nebulizer solution 2.5 mg  2.5 mg Nebulization BID RT  
 multivitamin (MULTI-DELYN, WELLESSE) oral liquid 30 mL  30 mL Oral DAILY  sodium chloride (NS) flush 10 mL  10 mL InterCATHeter Q24H  
 sodium chloride (NS) flush 10-40 mL  10-40 mL InterCATHeter Q8H  
 sodium chloride (NS) flush 20 mL  20 mL InterCATHeter Q24H  
 heparin (porcine) injection 5,000 Units  5,000 Units SubCUTAneous Q8H  
 hydroxypropyl methylcellulose (ISOPTO TEARS) 0.5 % ophthalmic solution 2 Drop  2 Drop Both Eyes BID  epoetin manda (EPOGEN;PROCRIT) injection 40,000 Units  40,000 Units IntraVENous Q7D  
 pantoprazole (PROTONIX) 40 mg in sodium chloride 0.9% 10 mL injection  40 mg IntraVENous Q24H  
 influenza vaccine 2018-19 (6 mos+)(PF) (FLUARIX QUAD/FLULAVAL QUAD) injection 0.5 mL  0.5 mL IntraMUSCular PRIOR TO DISCHARGE  insulin lispro (HUMALOG) injection   SubCUTAneous Q6H Lines: All central lines examined by me. No signs of erythema, induration, discharge. Peripherally Inserted Central Catheter: PICC Double Lumen 10/18/18 Left;Brachial (Active) Central Line Being Utilized Yes 10/29/2018  4:00 AM  
 Criteria for Appropriate Use Limited/no vessel suitable for conventional peripheral access 10/29/2018  4:00 AM  
Site Assessment Clean, dry, & intact 10/29/2018  4:00 AM  
Phlebitis Assessment 0 10/29/2018  4:00 AM  
Infiltration Assessment 0 10/29/2018  4:00 AM  
Arm Circumference (cm) 38 cm 10/26/2018 12:00 AM  
Date of Last Dressing Change 10/25/18 10/29/2018  4:00 AM  
Dressing Status Clean, dry, & intact; Occlusive 10/29/2018  4:00 AM  
Action Taken Open ports on tubing capped 10/29/2018  4:00 AM  
External Catheter Length (cm) 0 centimeters 10/26/2018 12:00 AM  
Dressing Type Disk with Chlorhexadine gluconate (CHG); Stabilization/securement device;Transparent 10/29/2018  4:00 AM  
Hub Color/Line Status Red; Infusing 10/29/2018  4:00 AM  
Positive Blood Return (Site #1) Yes 10/29/2018  4:00 AM  
Hub Color/Line Status Purple;Flushed;Capped 10/29/2018  4:00 AM  
Positive Blood Return (Site #2) No 10/29/2018  4:00 AM  
Alcohol Cap Used Yes 10/29/2018  4:00 AM  
 
  
Hemodialysis Catheter: 
Hemodialysis Access 10/19/18 (Active) Central Line Being Utilized Yes 10/29/2018  4:00 AM  
Criteria for Appropriate Use Dialysis/apheresis 10/29/2018  4:00 AM  
Date Accessed  10/27/18 10/29/2018  4:00 AM  
Site Assessment Clean, dry, & intact 10/29/2018  4:00 AM  
Date of Last Dressing Change 10/24/18 10/29/2018  4:00 AM  
Dressing Status Clean, dry, & intact; Occlusive 10/29/2018  4:00 AM  
Dressing Type Disk with Chlorhexadine gluconate (CHG); Transparent 10/29/2018  4:00 AM  
Proximal Hub Color/Line Status Capped 10/29/2018  4:00 AM  
Distal Hub Color/Line Status Capped 10/29/2018  4:00 AM  
 
Drain(s): PEG/Gastrostomy Tube 10/17/18 (Active) Site Assessment Clean, dry, & intact 10/29/2018  4:00 AM  
Dressing Status Clean, dry, & intact 10/29/2018  4:00 AM  
G Port Status Clamped 10/29/2018  4:00 AM  
External Catheter Length (cm) 3 centimeters 10/26/2018  4:00 PM  
 Action Taken Placement verified (comment) 10/28/2018 12:00 PM  
Drainage Description Other (Comment) 10/24/2018  8:00 AM  
Gastric Residual (mL) 0 ml 10/28/2018  8:00 PM  
Tube Feeding/Formula Options Osmolite 1.2 10/28/2018 12:00 PM  
Tube Feeding/Verify Rate (mL/hr) 0 10/28/2018  4:00 PM  
Water Flush Volume (mL) 60 mL 10/27/2018  8:30 AM  
Intake (ml) 0 ml 10/28/2018  6:00 PM  
Medication Volume 30 ml 10/27/2018  8:30 AM  
Drainage Chamber Level (ml) 0 ml 10/23/2018  8:00 PM  
Output (ml) 0 ml 10/23/2018  8:00 PM  
   
Fecal Management (Active) Stool Consistency Liquid 10/29/2018  9:24 AM  
Position of Indicator Line Visible 10/29/2018  9:24 AM  
Signal Indicator Bubble Appropriate 10/29/2018  9:24 AM  
Skin Assessment of the Anal Area Denuded/excoriated 10/29/2018  9:24 AM  
Tube Irrigated No 10/29/2018  9:24 AM  
Irrigation Volume (mL) 0 10/29/2018  9:24 AM  
Drainage Bag Level (mL) 450 10/29/2018  9:24 AM  
Output (ml) 20 ml 10/29/2018  9:24 AM  
 
Airway: Airway - Tracheostomy Tube 10/17/18 Portex; Cuffed (Active) Cuff Pressure 30 cmH20 10/28/2018  8:43 PM  
Site Assessment Clean, dry, & intact 10/29/2018  9:20 AM  
Trach Dressing Changed Yes 10/29/2018  9:20 AM  
Trach Cleaned With Normal saline 10/29/2018  9:20 AM  
Trach Tie Changed No 10/29/2018  9:20 AM  
Trach Cannula Changed Yes 10/29/2018  9:20 AM  
Inner Cannula Cuffed, cuff inflated; Fenestrated; Other (comment) 10/29/2018  9:20 AM  
Line Cronin Loots Top of the lock 10/29/2018  9:20 AM  
Side Secured Centered 10/29/2018  9:20 AM  
Spare Trach at Bedside Yes 10/29/2018  9:20 AM  
Spare Juliaette Arthur Tube One Size Smaller at Bedside Yes 10/29/2018  9:20 AM  
Ambu Bag With Mask at Bedside Yes 10/29/2018  9:20 AM  
 
 
Objective: 
Vital Signs:   
Visit Vitals BP (!) 205/76 Pulse (!) 107 Temp 98.9 °F (37.2 °C) Resp (!) 33 Ht 5' 7\" (1.702 m) Wt 89.3 kg (196 lb 12.8 oz) SpO2 100% BMI 30.82 kg/m² O2 Device: Tracheostomy, Ventilator O2 Flow Rate (L/min): 45 l/min Temp (24hrs), Av.1 °F (37.3 °C), Min:98.4 °F (36.9 °C), Max:99.7 °F (37.6 °C) Intake/Output:  
Last shift:      No intake/output data recorded. Last 3 shifts: 10/28 1901 - 10/30 0700 In: 3682.5 [I.V.:1952.5] Out: 450 [Urine:280; Drains:170] Intake/Output Summary (Last 24 hours) at 10/30/2018 1059 Last data filed at 10/30/2018 0700 Gross per 24 hour Intake 3212.5 ml Output 325 ml Net 2887.5 ml Last 3 Recorded Weights in this Encounter 10/28/18 0700 10/29/18 1016 10/30/18 0596 Weight: 84.7 kg (186 lb 11.2 oz) 76.3 kg (168 lb 4.8 oz) 89.3 kg (196 lb 12.8 oz) Ventilator Settings: 
Mode Rate Tidal Volume Pressure FiO2 PEEP Pressure support   500 ml  10 cm H2O 30 % 5 cm H20 Peak airway pressure: 16 cm H2O Plateau pressure:   
Tidal volume:  
Minute ventilation: 13 l/min SPO2  
 
ARDS network Guidelines: Lung protective strategy and Plateau pressure goals less than or equal to 30 Physical Exam:  
 
General/Neurology: Eyes open, nonresponsive, withdrawing to pain Head:   Normocephalic, without obvious abnormality Eye:   Small pupils but reactive,  
Oral:   Mucus membranes moist 
Neck:   Supple, trach in place, wound clean 
Lung:   B/l air movement. No wheezing or rales. Heart:   Systolic murmur present, RRR Abdomen/: Soft, non tender, + PEG tube Extremities:  Gangrenous L Great toe Skin:   Blisters of R arm improving. Data: 
   
Recent Results (from the past 24 hour(s)) GLUCOSE, POC Collection Time: 10/29/18 11:51 AM  
Result Value Ref Range Glucose (POC) 121 (H) 70 - 110 mg/dL HEPATIC FUNCTION PANEL Collection Time: 10/29/18  4:45 PM  
Result Value Ref Range Protein, total 6.7 6.4 - 8.2 g/dL Albumin 1.5 (L) 3.4 - 5.0 g/dL Globulin 5.2 (H) 2.0 - 4.0 g/dL A-G Ratio 0.3 (L) 0.8 - 1.7 Bilirubin, total 0.6 0.2 - 1.0 MG/DL  Bilirubin, direct 0.5 (H) 0.0 - 0.2 MG/DL  
 Alk. phosphatase 375 (H) 45 - 117 U/L  
 AST (SGOT) 80 (H) 15 - 37 U/L  
 ALT (SGPT) 77 (H) 16 - 61 U/L  
GLUCOSE, POC Collection Time: 10/29/18  5:24 PM  
Result Value Ref Range Glucose (POC) 195 (H) 70 - 110 mg/dL GLUCOSE, POC Collection Time: 10/29/18 11:58 PM  
Result Value Ref Range Glucose (POC) 247 (H) 70 - 110 mg/dL LIPASE Collection Time: 10/30/18  4:35 AM  
Result Value Ref Range Lipase 3,671 (H) 73 - 393 U/L MAGNESIUM Collection Time: 10/30/18  4:35 AM  
Result Value Ref Range Magnesium 2.3 1.6 - 2.6 mg/dL CALCIUM, IONIZED Collection Time: 10/30/18  4:35 AM  
Result Value Ref Range Ionized Calcium 1.02 (L) 1.12 - 1.32 MMOL/L  
RENAL FUNCTION PANEL Collection Time: 10/30/18  4:35 AM  
Result Value Ref Range Sodium 138 136 - 145 mmol/L Potassium 5.0 3.5 - 5.5 mmol/L Chloride 102 100 - 108 mmol/L  
 CO2 22 21 - 32 mmol/L Anion gap 14 3.0 - 18 mmol/L Glucose 226 (H) 74 - 99 mg/dL BUN 67 (H) 7.0 - 18 MG/DL Creatinine 4.98 (H) 0.6 - 1.3 MG/DL  
 BUN/Creatinine ratio 13 12 - 20 GFR est AA 14 (L) >60 ml/min/1.73m2 GFR est non-AA 12 (L) >60 ml/min/1.73m2 Calcium 7.7 (L) 8.5 - 10.1 MG/DL Phosphorus 6.5 (H) 2.5 - 4.9 MG/DL Albumin 1.4 (L) 3.4 - 5.0 g/dL CBC W/O DIFF Collection Time: 10/30/18  4:35 AM  
Result Value Ref Range WBC 13.1 4.6 - 13.2 K/uL  
 RBC 2.44 (L) 4.70 - 5.50 M/uL HGB 6.7 (L) 13.0 - 16.0 g/dL HCT 21.6 (L) 36.0 - 48.0 % MCV 88.5 74.0 - 97.0 FL  
 MCH 27.5 24.0 - 34.0 PG  
 MCHC 31.0 31.0 - 37.0 g/dL  
 RDW 16.3 (H) 11.6 - 14.5 % PLATELET 508 (H) 021 - 420 K/uL MPV 8.9 (L) 9.2 - 11.8 FL  
GLUCOSE, POC Collection Time: 10/30/18  6:15 AM  
Result Value Ref Range Glucose (POC) 237 (H) 70 - 110 mg/dL Chemistry Recent Labs 10/30/18 
0435 10/29/18 
1645 10/29/18 
0400 10/28/18 
0400 *  --  120* 191*   --  138 137  
K 5.0  --  4.7 4.4   --  103 100 CO2 22  --  24 27 BUN 67*  --  56* 36* CREA 4.98*  --  4.08* 3.02* CA 7.7*  --  8.5 8.0*  
MG 2.3  --  2.3 2.3 PHOS 6.5*  --  6.2* 4.3 AGAP 14  --  11 10 BUCR 13  --  14 12 AP  --  375*  --   --   
TP  --  6.7  --   --   
ALB 1.4* 1.5* 1.5* 1.7*  
GLOB  --  5.2*  --   --   
AGRAT  --  0.3*  --   --   
 
 
CBC w/Diff Recent Labs 10/30/18 
0435 10/29/18 
0400 10/28/18 
0400 WBC 13.1 18.7* 19.4*  
RBC 2.44* 2.58* 2.90* HGB 6.7* 7.4* 8.2* HCT 21.6* 23.4* 26.4*  
* 520* 600* ABG Recent Labs 10/28/18 
1718 PHI 7.467* PCO2I 36.8 PO2I 106* HCO3I 26.5*  
FIO2I 0.30 Micro   No results for input(s): SDES, CULT in the last 72 hours. No results for input(s): CULT in the last 72 hours. CT (Most Recent) Results from [facility] Encounter encounter on 09/26/18 CT ABD PELV W CONT Narrative EXAM: CT of the abdomen and pelvis INDICATION: Pneumonia. Acute pyelonephritis. Sepsis. Abnormal liver function 
tests. Dialysis patient. COMPARISON: None. TECHNIQUE: Axial CT imaging of the abdomen and pelvis was performed with 
intravenous contrast. Multiplanar reformats were generated. One or more dose 
reduction techniques were used on this CT: automated exposure control, 
adjustment of the mAs and/or kVp according to patient size, and iterative 
reconstruction techniques. The specific techniques used on this CT exam have 
been documented in the patient's electronic medical record. 
 
_______________ FINDINGS: 
 
LOWER CHEST: Dense consolidation right lower lobe compatible with pneumonia. Small bilateral pleural effusions. Cardiomegaly. Fluid-filled esophagus with 
nasogastric tube in place. LIVER, BILIARY: Indeterminate low-attenuation 7 mm focus anterior left hepatic 
lobe. Hepatomegaly. No biliary dilation. Distended gallbladder with gallstones 
and indeterminate material in the gallbladder which may represent sludge.  
 
PANCREAS: Normal. 
 
 SPLEEN: Normal. 
 
ADRENALS: Normal. 
 
KIDNEYS/URETERS: No hydronephrosis or renal calculus. Small low attenuation 
cortical focus medial upper pole left kidney not well delineated measuring about 1.1 cm. This may represent renal cyst. 
 
LYMPH NODES: No enlarged lymph nodes. GASTROINTESTINAL TRACT: No bowel dilation or wall thickening. Normal appendix. PELVIC ORGANS: No intraperitoneal fluid. Incidental vas deferens calcifications. VASCULATURE: Mild atherosclerosis. BONES: No acute or aggressive osseous abnormalities identified. OTHER: None. 
 
_______________ Impression IMPRESSION: 
 
 
1. Dense subtotal or total consolidation right lower lobe compatible with 
pneumonia similar to prior study. Small bilateral pleural effusions similar in 
size. 2. Distended gallbladder with gallstones and gallbladder sludge without 
significant change in appearance and also described in detail on ultrasound of 
9/27/2018. 3. Indeterminate small lesion left hepatic lobe without change. Hepatomegaly 
again evident. 4. No intraperitoneal fluid. Possible small left upper pole renal cyst. 
Cardiomegaly. Nasogastric tube tip in body of stomach. XR (Most Recent). CXR reviewed by me and compared with previous CXR Results from [facility] [place]counter encounter on 09/26/18 XR CHEST PORT Narrative EXAM: Portable Chest 
 
CLINICAL INDICATION:  trach change; atelectasis -Additional:  None COMPARISON:  10/24/18 TECHNIQUE: AP portable view at [address] 
 
______________ FINDINGS: 
 
HEART AND MEDIASTINUM:  The heart size is stable LUNGS AND AIRWAYS:  Right chest perihilar infiltrate is present PLEURA:  No pneumothorax or pleural effusion BONES:  Vertebral spondylosis OTHER:  None 
 
______________ Impression IMPRESSION: 
 
Right lung perihilar infiltrate appears present High complexity decision making was performed during the evaluation of this patient at high risk for decompensation with multiple organ involvement Above mentioned total time spent on reviewing the case/medical record/data/notes/EMR/patient examination/documentation/coordinating care with nurse/consultants, exclusive of procedures with complex decision making performed and > 50% time spent in face to face evaluation. Juany Qureshi MD 
Critical Care Medicine

## 2018-10-30 NOTE — PROGRESS NOTES
Infectious Disease Follow-up Admit Date: 9/26/2018 Current abx Prior abx  
cefepime 10/25- 5 Zosyn 9/27-7, Meropenem/flucon 10/4- 11, vanco 9/27-25, ACV 10/17 - 10,  Vanco 10/25- 1, Cipro 10/28-1 Assessment ->Rec:  
 
Recurrent SIRS T 100.4 10/25 wbc 18k 
 - more secretions, reported cloudy urine, cxr yest no pneumonia  
-line tip culture 10/19 >15 CFU CoNS now with L picc, dialysis catheter   
- blcx 10/25 NGTD x 2, spgs rare Pseud, wd Pseud  -> continue Cefepime  
-> repeat blcx x2 if spikes fever >100.8, including from [place] 
-> seen by vascular and plan for Joshua Chang 83 at some point based on fever trend and clinical situation 
-> Plan to dc [place] and send tip for cx if spikes fever PAD  
- PVL's s/o significant arterial disease of bilateral lower extremities 10/24. -POD Dr. Lucy Simon saw 10/24,  indicated not OR candidate foot for gangrene distal L gt toe - per others Shingles R arm, C 6 (?C5) dermatome 
- vesicular lesions onset ~10/15 progressing compared to onset per NP 
- confluent in upper arm, not crusted yet  
-VZV PCR pos 10/18 -> completed 10 days of Acyclovir on 10/26 
-> almost most of the lesions have crusted except 2 in upper arm 
->CDC IC guidance d/w nursing and would cover lesions  but defer hospital infection control re: precautions here Prior Leukocytosis/SIRS poss sepsis (resolved 10/19) 
- MOF multiple ID and non-infectious concerns outlined below, complicated, wbc 68.5D today from high 27K+ on 10/5 On  vancomycin/zosyn 9/27 
- C diff neg 9/29, sput C albicans, repeat CT 10/4 no new findings --cxr reviewed - increased atx rt base. Left base improved 
- Higher fever 10/16 101.2, 10/18 101.4 afebrile since 
- Ddx: HZV vs deep tissue injury of toe/buttock vs Line infection 
- blcx 10/16 NG x 2 
- TDC Tip 10/19 >15 CFU MRSE (1 of 2 blcx on adm 9/27 MRSE but afebrile then prob contaminant) ->completed Acyclovir - Suspect colonized with Pseudomonas now and at risk for subsequent infections given vent, HD and lines Suspected Pyelo POA  
- CTAP 9/27:  B perineph stranding, UA tntc wbc, uctx ng 
- treated at this point Cholelithiasis choledocholithiais suspected acute cholecystitis  poss cystic stone POA- abnl lft bili 4 alk phos 400 seen by GI and GS Dr. Ev Hussein, no plan for OR, for poss cholecystostomy if LFT worsen, bili, alk phos declining - AST fluctuating downward  -Dr. Danish Toure saw for GI 10/24 Elevated Lipase  
- lipase 2200 POA -> 3191 ->  5500 on 10/4 improved to 1207 10/19 but up again to 2169 10/22 interpretation complicated by JULIANNA -> fluctuating between [phone] (off mucomyst, TF) 
- CTAP 10/4: Normal pancreas -> per ICU team  
B infiltrates - poss edema infiltrate - RLL consolid better 10/4 cxr and suspect endobronchial clot contributed   ->monitor MRSE bacteremia 9/27 1 of 2. Has LUE midline unclear when placed Treated JULIANNA POA dialysis dependent 
- baseline cr 0.9 132/10.1 in ER 
-RIJ uldall 9/28 out 10/19 - R fem uldall 10/20 -> per renal  
Bleeding -melena earlier, EGD 10/2 clot in esophagus bronch 10/3 R endobronch clot NEW removed 10/5 repeat bronch  -> per others Suspected anoxic brain injury on MRI 10/3 SP VF arrest 9/29 -> overall poor prognosis but family wishes aggressive care CVA R hemiparesis 9/10   
resp failure sp intubation 9/29 Comorbid: HTN HLD CHF h/o steatohepatitis MICROBIOLOGY:  
9/27 bctx 1 of 2 MRSE 
 uctx ng 
9/29 sput C albicans C diff neg 10/4 bctx x 2 NTD 
 fungitell indeterminate due to contamination 10/8 Cath tip cx ntd 10/8     bl cx ng 
10/16 Blcx x2 ng 
 Spcx NF 
10/18 VZV PCr +ve, HSV PCR neg 
10/19 Cath tip >15 CFU MRSE 
10/25 Spcx pseudomonas x2- panS 
 blcx x2 ntd Wd cx Pseud CARLOS Pseud x2 panS, E faecalis S amp, CoNS 
 
LINES AND CATHETERS:  
 
Left PICC 10/18 Femoral udall 10/19 Active Hospital Problems Diagnosis Date Noted  Pseudomonas infection 10/27/2018  [facility] 10/22/2018  Hypoalbuminemia 10/21/2018  Shingles 10/21/2018  Ischemic encephalopathy 09/30/2018  Cardiac arrest with ventricular fibrillation ([facility] 09/29/2018 ROSC before defibrillation could be attempted.  Acute pancreatitis 09/29/2018 Lipase downtrends with interruption in tube feed and Mucomyst. Restarting tube feed did result in modest elevation but not to the levels seen while on Mucomyst. Nonetheless, there is signficant decrease in lipase levels with interruptions in tube feeding.  Elevated LFTs 09/28/2018  History of stroke 09/27/2018 With residual R hemiparesis  Acute-on-chronic kidney injury ([facility] 09/27/2018  Acute cystitis 09/27/2018  Elevated troponin 09/27/2018  Essential hypertension 09/10/2018  Diabetes mellitus type 2, controlled ([facility] 09/10/2018 Subjective: Interval notes reviewed, Afebrile. WBC better. Remains in ICU. On vent through trach and tolerating peg feeds. Minimal resp secretions and around trach. With FMS. Current Facility-Administered Medications Medication Dose Route Frequency  calcium gluconate 2 g in 0.9% sodium chloride 50 mL IVPB  2 g IntraVENous ONCE  
 dextrose 5 % - 0.45% NaCl infusion  75 mL/hr IntraVENous CONTINUOUS  
 insulin glargine (LANTUS) injection 17 Units  17 Units SubCUTAneous DAILY  cefepime (MAXIPIME) 0.5 g in 0.9% sodium chloride 100 mL IVPB  0.5 g IntraVENous Q24H  
 albuterol (PROVENTIL VENTOLIN) nebulizer solution 2.5 mg  2.5 mg Nebulization BID RT  
 multivitamin (MULTI-DELYN, WELLESSE) oral liquid 30 mL  30 mL Oral DAILY  bacitracin 500 unit/gram packet 1 Packet  1 Packet Topical PRN  
 sodium chloride (NS) flush 10-30 mL  10-30 mL InterCATHeter PRN  
 sodium chloride (NS) flush 10 mL  10 mL InterCATHeter Q24H  
 sodium chloride (NS) flush 10 mL  10 mL InterCATHeter PRN  
  sodium chloride (NS) flush 10-40 mL  10-40 mL InterCATHeter Q8H  
 sodium chloride (NS) flush 20 mL  20 mL InterCATHeter Q24H  
 heparin (porcine) injection 5,000 Units  5,000 Units SubCUTAneous Q8H  
 hydroxypropyl methylcellulose (ISOPTO TEARS) 0.5 % ophthalmic solution 2 Drop  2 Drop Both Eyes BID  epoetin manda (EPOGEN;PROCRIT) injection 40,000 Units  40,000 Units IntraVENous Q7D  
 pantoprazole (PROTONIX) 40 mg in sodium chloride 0.9% 10 mL injection  40 mg IntraVENous Q24H  
 0.9% sodium chloride infusion 250 mL  250 mL IntraVENous PRN  
 influenza vaccine - (6 mos+)(PF) (FLUARIX QUAD/FLULAVAL QUAD) injection 0.5 mL  0.5 mL IntraMUSCular PRIOR TO DISCHARGE  acetaminophen (TYLENOL) solution 325 mg  325 mg Per NG tube Q4H PRN  
 0.9% sodium chloride infusion 250 mL  250 mL IntraVENous PRN  
 heparin (porcine) pf 100 Units  100 Units InterCATHeter PRN  
 0.9% sodium chloride infusion 250 mL  250 mL IntraVENous PRN  
 insulin lispro (HUMALOG) injection   SubCUTAneous Q6H  
 heparin (porcine) 1,000 unit/mL injection 1,000 Units  1,000 Units InterCATHeter DIALYSIS PRN  
 senna-docusate (PERICOLACE) 8.6-50 mg per tablet 1 Tab  1 Tab Oral BID PRN  
 ondansetron (ZOFRAN) injection 4 mg  4 mg IntraVENous Q4H PRN  
 glucose chewable tablet 16 g  4 Tab Oral PRN  
 glucagon (GLUCAGEN) injection 1 mg  1 mg IntraMUSCular PRN  
 dextrose (D50) infusion 12.5-25 g  25-50 mL IntraVENous PRN  
 hydrALAZINE (APRESOLINE) 20 mg/mL injection 20 mg  20 mg IntraVENous Q6H PRN Objective:  
 
Visit Vitals BP (!) 205/76 Pulse 98 Temp 99.7 °F (37.6 °C) Resp 21 Ht 5' 7\" (1.702 m) Wt 89.3 kg (196 lb 12.8 oz) SpO2 100% BMI 30.82 kg/m² Temp (24hrs), Av.2 °F (37.3 °C), Min:98.4 °F (36.9 °C), Max:99.7 °F (37.6 °C) GEN: well developed 57year-old AA male on vent/trach in ICU, unresponsive HENT: no thrush Neck: tracheostomy in place CHEST: coarse bs B no wheeze or rhonchi CVS: RRR, no murmur appreciated ABD: soft, + BS no localized tenderness, PEG in place EXT: 1+ pitting edema b/l LE  
SKIN:  RUE hemorrhagic blisters further dried up but not yet completely dry. left great toe gangrenous change. CNS:eyes open, unresponsive to commands or deep sternal rub Labs: Results:  
Chemistry Recent Labs 10/30/18 
0435 10/29/18 
1645 10/29/18 
0400 10/28/18 
0400 *  --  120* 191*   --  138 137  
K 5.0  --  4.7 4.4   --  103 100 CO2 22  --  24 27 BUN 67*  --  56* 36* CREA 4.98*  --  4.08* 3.02* CA 7.7*  --  8.5 8.0* AGAP 14  --  11 10 BUCR 13  --  14 12 AP  --  375*  --   --   
TP  --  6.7  --   --   
ALB 1.4* 1.5* 1.5* 1.7*  
GLOB  --  5.2*  --   --   
AGRAT  --  0.3*  --   --   
  
CBC w/Diff Recent Labs 10/30/18 
0435 10/29/18 
0400 10/28/18 
0400 WBC 13.1 18.7* 19.4*  
RBC 2.44* 2.58* 2.90* HGB 6.7* 7.4* 8.2* HCT 21.6* 23.4* 26.4*  
* 520* 600*  
  
10/24 cxr IMPRESSION: 
  
1. Interval removal right IJ catheter. No pneumothorax. 
  
2. New left-sided PICC line with tip in satisfactory radiographic position. 
  
3. Findings suggesting vascular congestion. No consolidation. 10/4 CTAP IMPRESSION: 
1. Dense subtotal or total consolidation right lower lobe compatible with 
pneumonia similar to prior study. Small bilateral pleural effusions similar in 
size. 2. Distended gallbladder with gallstones and gallbladder sludge without 
significant change in appearance and also described in detail on ultrasound of 
9/27/2018. 3. Indeterminate small lesion left hepatic lobe without change. Hepatomegaly 
again evident. 4. No intraperitoneal fluid. Possible small left upper pole renal cyst. 
Cardiomegaly. Nasogastric tube tip in body of stomach. cxr 10/5 Impression: 1. Endotracheal tube, visualized nasogastric tube, and right IJ central venous 
catheter in stable position. 2. Overall improved aeration of the right lower lobe, likely improved 
atelectasis with mild residual atelectasis/airspace disease. 3. Mild interstitial edema overall similar to prior. Microbiology Results All Micro Results Procedure Component Value Units Date/Time CULTURE, BLOOD [[id number]] Collected:  10/25/18 1328 Order Status:  Completed Specimen:  Whole Blood Updated:  10/30/18 2313 Special Requests: NO SPECIAL REQUESTS Culture result: NO GROWTH 5 DAYS     
 CULTURE, BLOOD [[id number]] Collected:  10/25/18 1328 Order Status:  Completed Specimen:  Whole Blood Updated:  10/30/18 4247 Special Requests: NO SPECIAL REQUESTS Culture result: NO GROWTH 5 DAYS     
 CULTURE, WOUND Cesilia Gunner STAIN [[id number]]  (Abnormal)  (Susceptibility) Collected:  10/25/18 1359 Order Status:  Completed Specimen:  Wound from Tracheostomy site Updated:  10/28/18 0227 Special Requests: NO SPECIAL REQUESTS     
  GRAM STAIN FEW WBC'S     
   RARE GRAM NEGATIVE RODS Culture result: MANY PSEUDOMONAS AERUGINOSA MODERATE PSEUDOMONAS AERUGINOSA 2ND MORPHOTYPE  
     
      
  FEW ENTEROCOCCUS FAECALIS GROUP D  
     
      
  FEW STAPHYLOCOCCUS SPECIES, COAGULASE NEGATIVE (TWO MORPHOTYPES) CULTURE, RESPIRATORY/SPUTUM/BRONCH Cesilia Gunner STAIN [[id number]]  (Abnormal)  (Susceptibility) Collected:  10/25/18 1325 Order Status:  Completed Specimen:  Sputum from Tracheal Aspirate Updated:  10/28/18 1032 Special Requests: NO SPECIAL REQUESTS     
  GRAM STAIN MODERATE WBC'S     
   RARE GRAM NEGATIVE RODS Culture result: MANY PSEUDOMONAS AERUGINOSA  
     
      
  MANY PSEUDOMONAS AERUGINOSA 2ND MORPHOTYPE  
     
      
  RARE NORMAL RESPIRATORY SHANAE  
     
 CULTURE, URINE [[id number]]  (Abnormal)  (Susceptibility) Collected:  10/25/18 1359 Order Status:  Completed Specimen:  Cath Urine Updated:  10/27/18 0863 Special Requests: NO SPECIAL REQUESTS Culture result:    
  04618 COLONIES/mL PSEUDOMONAS AERUGINOSA CULTURE, CATHETER TIP [[id number]]  (Abnormal)  (Susceptibility) Collected:  10/19/18 1330 Order Status:  Completed Specimen:  Catheter Tip Updated:  10/22/18 7201 Special Requests: NO SPECIAL REQUESTS Culture result:    
  >15 CFU STAPHYLOCOCCUS EPIDERMIDIS  
     
 CULTURE, BLOOD [[id number]] Collected:  10/16/18 1055 Order Status:  Completed Specimen:  Blood Updated:  10/22/18 0809 Special Requests: PERIPHERAL Culture result: NO GROWTH 6 DAYS     
 CULTURE, BLOOD [[id number]] Collected:  10/16/18 1047 Order Status:  Completed Specimen:  Blood Updated:  10/22/18 0809 Special Requests: PERIPHERAL Culture result: NO GROWTH 6 DAYS     
 HSV 1 AND 2 BY PCR [[id number]] Collected:  10/18/18 1815 Order Status:  Completed Specimen:  Other from Miscellaneous sample Updated:  10/21/18 1636 Source OTHER TEST     
  HSV-1 DNA by PCR NEGATIVE      
  HSV-2 DNA by PCR NEGATIVE Comment: (NOTE) This test was developed and its performance characteristics 
determined by [facility]. It has not been cleared or 
approved by the U.S. Food and Drug Administration. The FDA has 
determined that such clearance or approval is not necessary. This 
test is used for clinical purposes. It should not be regarded as 
investigational or research. Performed At: [address] [id number] Sahra Mckinley MD NR:[id number] CULTURE, RESPIRATORY/SPUTUM/BRONCH [place] [[id number]] Collected:  10/16/18 1915 Order Status:  Completed Specimen:  Sputum,ET Suction Updated:  10/18/18 1011 Special Requests: NO SPECIAL REQUESTS     
  GRAM STAIN 10-25 WBC/lpf     
   <10 EPI/lpf FEW GRAM POSITIVE COCCI IN PAIRS MODERATE GRAM POSITIVE COCCI IN GROUPS    MUCUS PRESENT     
 Culture result: MODERATE NORMAL RESPIRATORY SHANAE  
     
 CULTURE, BLOOD [[id number]] Collected:  10/08/18 1227 Order Status:  Completed Specimen:  Blood Updated:  10/14/18 0741 Special Requests: PERIPHERAL Culture result: NO GROWTH 6 DAYS     
 CULTURE, CATHETER TIP [[id number]] Collected:  10/08/18 1215 Order Status:  Completed Specimen:  Catheter Tip Updated:  10/11/18 3249 Special Requests: NO SPECIAL REQUESTS Culture result: NO GROWTH 3 DAYS     
 CULTURE, BLOOD [[id number]] Collected:  10/04/18 1680 Order Status:  Completed Specimen:  Blood Updated:  10/10/18 5601 Special Requests: PERIPHERAL Culture result: NO GROWTH 6 DAYS     
 CULTURE, BLOOD [[id number]] Collected:  10/04/18 1120 Order Status:  Completed Specimen:  Blood Updated:  10/10/18 0227 Special Requests: PERIPHERAL Culture result: NO GROWTH 6 DAYS     
 CULTURE, BLOOD [[id number]] Collected:  09/27/18 0005 Order Status:  Completed Specimen:  Blood Updated:  10/03/18 0845 Special Requests: NO SPECIAL REQUESTS Culture result: NO GROWTH 6 DAYS     
 CULTURE, BLOOD [[id number]]  (Abnormal)  (Susceptibility) Collected:  09/27/18 0136 Order Status:  Completed Specimen:  Blood Updated:  10/02/18 0505 Special Requests: NO SPECIAL REQUESTS     
  GRAM STAIN PEDIATRIC BOTTLE GRAM POSITIVE COCCI IN PAIRS IN CLUSTERS  
      
  SMEAR CALLED TO AND CORRECTLY REPEATED BY: Penny Figueroa RN IN 2700 ON 9/29/18 AT 2033 WF Culture result:    
  AEROBIC BOTTLE STAPHYLOCOCCUS EPIDERMIDIS  
     
 CULTURE, RESPIRATORY/SPUTUM/BRONCH Devoria Greulich STAIN [[id number]]  (Abnormal) Collected:  09/29/18 1210 Order Status:  Completed Specimen:  Sputum from Endotracheal aspirate Updated:  10/02/18 7928 Special Requests: NO SPECIAL REQUESTS     
  GRAM STAIN >25 WBC/lpf     
   <10 EPI/lpf MUCUS PRESENT     
   MODERATE YEAST   Culture result: MANY CANDIDA TROPICALIS     
 C. DIFFICILE/EPI PCR [[id number]] Collected:  09/29/18 1400 Order Status:  Completed Specimen:  Stool Updated:  09/29/18 2248 Special Requests: NO SPECIAL REQUESTS Culture result: Toxigenic C. difficile NEGATIVE                         C. difficile target DNA sequences are not detected. CULTURE, URINE [[id number]] Collected:  09/27/18 0029 Order Status:  Completed Specimen:  Cath Urine Updated:  09/29/18 0944 Special Requests: NO SPECIAL REQUESTS Culture result: NO GROWTH 2 DAYS Sasha Gan MD, FACP October 30, 2018 [facility] [phone]

## 2018-10-30 NOTE — PROGRESS NOTES
Hospitalist Progress Note Daily Progress Note: 10/30/2018 11:40 AM 
   
Interval history / Subjective:  
Willie Christensen is a 57y.o. year old male who presented from [facility] with abnormal labs, elevated BUN/Cr. Found to have JULIANNA, UTI, and markedly elevated LFT on presentation. Patient's recent admission was 9/10/18-9/14/18 for acute CVA and rhabdo. Patient poor historian on admission,stated \"I had heart failure. She was started on vanc,zosyn. On 9/29,patient had cardiac arrest with ventricular fibrillation - s/p ROSC. Her hospital course has since then complicated with sepsis,pancreatitits,pneumonia,acute pancreatitis requiring involvement of gi,surgery,nephrology,cardiology,ID,cardiology. So far patient has remained without major improvement. Now in vegetative state. Patient had PEG/Trach placement on 10/17. On 10/17,patient started on acyclovir for veicular rash rt arm,suspected being Zoster. Lipase 2169. US abd 10/22 shows Abnormal appearance of the gallbladder with stones/sludge with wall thickening  
and possible pericholecystic fluid similar to the prior study of 9/27/2018 
10/22: Patient on treatment for shingles in contact isolation per ID team. He also had left big toe gangrene . We will consult Podiatry. His lipase was elevated , US of the abdomen shows sludge and possible cholecystitis. Patient afebrile. GI consulted  for possible MRCP. Patient has leukocytosis. ID started Vancomycin and ordered Urine , blood and sputum culture and trach site discharge. Patient off air bourne precaution but continue contact precaution per ID. Patient was started on Vancomycin and cefepime for SIRSper ID. Blood and urine cx pending. Patient today is going for jejunostomy. Patient BC NGTD Vancomycin d/c'd per ID. Patient respiratory  cx + GNR , STREPT, Wound cx + pseudomonas . PCCM added Ciprofloxacin today . IR attempt at Jejunostomy was unsuccessful yesterday. 10/28: Fever today. Resp cx, wound cx noted. On cefepime and Cipro for coverage per sensitivisty result . Blood cx NGTD . Lipase still trending up. IR unsuccessful attempt at jejunostomy 10/26. Will re consult GI today 10/29:no change in care. 10/30:no change in care Current Facility-Administered Medications Medication Dose Route Frequency  0.9% sodium chloride infusion 250 mL  250 mL IntraVENous PRN  
 [START ON 10/31/2018] insulin glargine (LANTUS) injection 27 Units  27 Units SubCUTAneous DAILY  cefepime (MAXIPIME) 0.5 g in 0.9% sodium chloride 100 mL IVPB  0.5 g IntraVENous Q24H  
 albuterol (PROVENTIL VENTOLIN) nebulizer solution 2.5 mg  2.5 mg Nebulization BID RT  
 multivitamin (MULTI-DELYN, WELLESSE) oral liquid 30 mL  30 mL Oral DAILY  bacitracin 500 unit/gram packet 1 Packet  1 Packet Topical PRN  
 sodium chloride (NS) flush 10-30 mL  10-30 mL InterCATHeter PRN  
 sodium chloride (NS) flush 10 mL  10 mL InterCATHeter Q24H  
 sodium chloride (NS) flush 10 mL  10 mL InterCATHeter PRN  
 sodium chloride (NS) flush 10-40 mL  10-40 mL InterCATHeter Q8H  
 sodium chloride (NS) flush 20 mL  20 mL InterCATHeter Q24H  
 heparin (porcine) injection 5,000 Units  5,000 Units SubCUTAneous Q8H  
 hydroxypropyl methylcellulose (ISOPTO TEARS) 0.5 % ophthalmic solution 2 Drop  2 Drop Both Eyes BID  epoetin manda (EPOGEN;PROCRIT) injection 40,000 Units  40,000 Units IntraVENous Q7D  
 pantoprazole (PROTONIX) 40 mg in sodium chloride 0.9% 10 mL injection  40 mg IntraVENous Q24H  
 0.9% sodium chloride infusion 250 mL  250 mL IntraVENous PRN  
 influenza vaccine 2018-19 (6 mos+)(PF) (FLUARIX QUAD/FLULAVAL QUAD) injection 0.5 mL  0.5 mL IntraMUSCular PRIOR TO DISCHARGE  acetaminophen (TYLENOL) solution 325 mg  325 mg Per NG tube Q4H PRN  
 0.9% sodium chloride infusion 250 mL  250 mL IntraVENous PRN  
 heparin (porcine) pf 100 Units  100 Units InterCATHeter PRN  
  0.9% sodium chloride infusion 250 mL  250 mL IntraVENous PRN  
 insulin lispro (HUMALOG) injection   SubCUTAneous Q6H  
 heparin (porcine) 1,000 unit/mL injection 1,000 Units  1,000 Units InterCATHeter DIALYSIS PRN  
 senna-docusate (PERICOLACE) 8.6-50 mg per tablet 1 Tab  1 Tab Oral BID PRN  
 ondansetron (ZOFRAN) injection 4 mg  4 mg IntraVENous Q4H PRN  
 glucose chewable tablet 16 g  4 Tab Oral PRN  
 glucagon (GLUCAGEN) injection 1 mg  1 mg IntraMUSCular PRN  
 dextrose (D50) infusion 12.5-25 g  25-50 mL IntraVENous PRN  
 hydrALAZINE (APRESOLINE) 20 mg/mL injection 20 mg  20 mg IntraVENous Q6H PRN Review of Systems Intubated,unresponsive. Objective:  
 
Visit Vitals /53 Pulse (!) 103 Temp 99.4 °F (37.4 °C) Resp 24 Ht 5' 7\" (1.702 m) Wt 89.3 kg (196 lb 12.8 oz) SpO2 100% BMI 30.82 kg/m² O2 Flow Rate (L/min): 45 l/min O2 Device: Tracheostomy, Ventilator Temp (24hrs), Av.2 °F (37.3 °C), Min:98.4 °F (36.9 °C), Max:99.7 °F (37.6 °C) 
 
 
10/30 0701 - 10/30 1900 In: 270 Out: -  
10/28 1901 - 10/30 07 In: 3682.5 [I.V.:1952.5] Out: 450 [Urine:280; Drains:170] P/E General Appearance:S/p PEG/Trach today HENT: normocephalic/atraumatic, + ETT Neck: No JVD, hematoma R side where [facility] is improving Lungs: Coarse B/L w/ vent-assisted BS 
CV: RRR, no m/r/g Abdomen: soft, non-tender, normal bowel sounds Extremities: versicular rash rt arm and right side of chest,no cyanosis, no peripheral edema Neuro: Unresponsive to commands, no withdrawal to pain, + corneal reflex and pupillary reaction today Skin: Normal color, intact Data Review Recent Results (from the past 12 hour(s)) LIPASE Collection Time: 10/30/18  4:35 AM  
Result Value Ref Range Lipase 3,671 (H) 73 - 393 U/L MAGNESIUM Collection Time: 10/30/18  4:35 AM  
Result Value Ref Range Magnesium 2.3 1.6 - 2.6 mg/dL CALCIUM, IONIZED  Collection Time: 10/30/18  4:35 AM  
 Result Value Ref Range Ionized Calcium 1.02 (L) 1.12 - 1.32 MMOL/L  
RENAL FUNCTION PANEL Collection Time: 10/30/18  4:35 AM  
Result Value Ref Range Sodium 138 136 - 145 mmol/L Potassium 5.0 3.5 - 5.5 mmol/L Chloride 102 100 - 108 mmol/L  
 CO2 22 21 - 32 mmol/L Anion gap 14 3.0 - 18 mmol/L Glucose 226 (H) 74 - 99 mg/dL BUN 67 (H) 7.0 - 18 MG/DL Creatinine 4.98 (H) 0.6 - 1.3 MG/DL  
 BUN/Creatinine ratio 13 12 - 20 GFR est AA 14 (L) >60 ml/min/1.73m2 GFR est non-AA 12 (L) >60 ml/min/1.73m2 Calcium 7.7 (L) 8.5 - 10.1 MG/DL Phosphorus 6.5 (H) 2.5 - 4.9 MG/DL Albumin 1.4 (L) 3.4 - 5.0 g/dL CBC W/O DIFF Collection Time: 10/30/18  4:35 AM  
Result Value Ref Range WBC 13.1 4.6 - 13.2 K/uL  
 RBC 2.44 (L) 4.70 - 5.50 M/uL HGB 6.7 (L) 13.0 - 16.0 g/dL HCT 21.6 (L) 36.0 - 48.0 % MCV 88.5 74.0 - 97.0 FL  
 MCH 27.5 24.0 - 34.0 PG  
 MCHC 31.0 31.0 - 37.0 g/dL  
 RDW 16.3 (H) 11.6 - 14.5 % PLATELET 484 (H) 888 - 420 K/uL MPV 8.9 (L) 9.2 - 11.8 FL  
GLUCOSE, POC Collection Time: 10/30/18  6:15 AM  
Result Value Ref Range Glucose (POC) 237 (H) 70 - 110 mg/dL TYPE + CROSSMATCH Collection Time: 10/30/18 11:00 AM  
Result Value Ref Range Crossmatch Expiration 11/02/2018 ABO/Rh(D) A POSITIVE Antibody screen NEG   
 CALLED TO: SATNAM ESPINOSA, ICU, ON 10/30/2018 AT 1203 BY TSH Unit number [id number] Blood component type RC LR Unit division 00 Status of unit ISSUED Crossmatch result Compatible GLUCOSE, POC Collection Time: 10/30/18 11:24 AM  
Result Value Ref Range Glucose (POC) 266 (H) 70 - 110 mg/dL Assessment/Plan:  
 
Principal Problem: 
  Cardiac arrest with ventricular fibrillation ([facility] (9/29/2018) Overview: ROSC before defibrillation could be attempted. Active Problems: 
  Essential hypertension (9/10/2018) Diabetes mellitus type 2, controlled ([facility] (9/10/2018) History of stroke (9/27/2018) Overview: With residual R hemiparesis Acute-on-chronic kidney injury ([facility] (9/27/2018) Acute cystitis (9/27/2018) Elevated troponin (9/27/2018) Elevated LFTs (9/28/2018) Acute pancreatitis (9/29/2018) Overview: Lipase downtrends with interruption in tube feed and Mucomyst. Restarting  
    tube feed did result in modest elevation but not to the levels seen while  
    on Mucomyst. Nonetheless, there is signficant decrease in lipase levels  
    with interruptions in tube feeding. Ischemic encephalopathy (9/30/2018) Hypoalbuminemia (10/21/2018) Shingles (10/21/2018) Gangrene ([facility] (10/22/2018) Pseudomonas infection (10/27/2018) Zoster. Care Plan - Vent mgmt per ICU - S/p trach/PEG on 10/17 as patient did not show significant improvement. 
- EGD showed large clot in esophagus last week repeat EGD 10/9 shows healed esophageal ulcer - Cont protonix IV every day  
- Hgb stable - LFTs improving - GI signed off 10/9 
- MRI w/ evidence of severe anoxic brain injury - Minimal improvement on neuro exam 
- Appreciate neuro assistance - IV Meropenem and IV Fluconazole d/heron on 10/15 per ID. 
- Currently on vanc only which was started on 9/27 
- Nephro managing HD TTHS 
- Pt unlikely to have any meaningful neuro recovery, prognosis is very grim - Family wanted to cont to do everything, including trach/PEG -> was done10/17 
- On 10/17:Started on acyclovir for possible Zoster rt arm and rt upper chest area. - Hyperkalemia on 10/18 ->corrected - Lipase 2169 10/22. US abd Abnormal appearance of the gallbladder with stones/sludge with wall thickening  
and possible pericholecystic fluid similar to the prior study of 9/27/2018  
- On Acyclovir for shingles , contact precaution per ID commitee 
- 10/22: Consult GI today for possible MRCP 
- 10/22: Consult Podiatry for left big toe gangrene - 10/23 : Podiatry recommend RAQUEL for Left Big toe  
- 10/23: GI recommend MRI/MRCP but will need patient off vent for multiple times for procedure. Not sure if this is feasible now 
- 10/25 : Blood , urine , sputum , trach discharge cultures 
- 10/25 IV antibiotics by ID  
- Change GI tube to Jejunostomy tube tomorrow by IR  
- 10/26: Vancomycin and Cefepime per ID  
- 10/26 : Jejunostomy attempt by IR unsuccessful . Will need to discuss with GI  
- 10/27 : Ciprofloxacin added 10/27 for double coverage of pseudomonas with cefepime per PCCM  
- 10/28: Pancreatitis ? Biliary  . Will reconsult GI today - Lipase trending down. DVT prophylaxis:scds Full code. Disposition:tbd - need placement -  involved.

## 2018-10-31 NOTE — PROGRESS NOTES
Spoke with  jocelyn in [place], lilly in [place], they are  ltacs, and left message for bryan at [employer]. Updates placed this am by frank perez. Answered questions asked them to reach out to family. jocelyn is not in network with [employer] so they are trying to go outside [facility] with [employer], they will let me know.

## 2018-10-31 NOTE — PROGRESS NOTES
Problem: Falls - Risk of 
Goal: *Absence of Falls Document Talya Hussein Fall Risk and appropriate interventions in the flowsheet. Outcome: Progressing Towards Goal 
Fall Risk Interventions: 
Mobility Interventions: Assess mobility with egress test 
 
Mentation Interventions: Evaluate medications/consider consulting pharmacy Medication Interventions: Evaluate medications/consider consulting pharmacy Elimination Interventions: Bed/chair exit alarm, Call light in reach History of Falls Interventions: Evaluate medications/consider consulting pharmacy

## 2018-10-31 NOTE — DIALYSIS
ACUTE HEMODIALYSIS FLOW SHEET 
 
HEMODIALYSIS ORDERS: Physician:  Shirley Franklin Dialyzer:  revaclear        Duration: 4 hr  BFR: 300   DFR: 600 Dialysate:  Temp 37  K+  2     Ca+2.5   Na140  Bicarb 35 Weight:   kg    Bed Scale []     Unable to Obtain [x]      Dry weight/UF Goal: 3000 Access Uldall Needle Gauge Heparin []  Bolus      Units    [] Hourly       Units    []None Catheter locking solution Pre BP: 171/71      Pulse:   106       Temperature: 99.9    Respirations: 26  Tx: NS       ml/Bolus  Other        [x] N/A Labs: Pre        Post:        [x] N/A Additional Orders(medications, blood products, hypotension management):       [x] N/A [x] Time Out/Safety Check  [x] [facility] Consent Verified CATHETER ACCESS: []N/A   [x]Right   []Left   []IJ     [x]Fem [] First use X-ray verified     []Tunnel                [x] Non Tunneled [x]No S/S infection  []Redness  []Drainage []Cultured []Swelling []Pain  
[x]Medical Aseptic Prep Utilized   []Dressing Changed  [] Biopatch  Date:    Line due to be pulled tonight after dialysis []Clotted   [x]Patent   Flows: []Good  [x]Poor  []Reversed If access problem,  notified: []Yes    [x]N/A  Date:        
 
GRAFT/FISTULA ACCESS:  [x]N/A     []Right     []Left     []UE     []LE []AVG   []AVF        []Buttonhole    []Medical Aseptic Prep Utilized []No S/S infection  []Redness  []Drainage []Cultured []Swelling []Pain Bruit:   [] Strong    [] Weak       Thrill :   [] Strong    [] Weak Needle Gauge:    Length: If access problem,  notified: []Yes     [x]N/A  Date:       
Please describe access if present and not used:  
 
 
GENERAL ASSESSMENT:   
LUNGS:  Rate  SaO2%  100      [] N/A    [] Clear  [x] Coarse  [] Crackles  [] Wheezing 
      [] Diminished     Location : []RLL   []LLL    []RUL  []JOSSIE Cough: []Productive  []Dry  [x]N/A   Respirations:  [x]Easy  []Labored Therapy:  []RA  []NC  l/min    Mask: []NRB []Venti       O2% [x]Ventilator  []Intubated  [x] Trach  [] BiPaP CARDIAC: [x]Regular      [] Irregular   [] Pericardial Rub  [] JVD [x]  Monitored  [x] Bedside  [] Remotely monitored [] N/A  Rhythm:  ST  
EDEMA: [] None  [x]Generalized  [] Pitting [x] 1    [] 2    [] 3    [] 4 [] Facial  [] Pedal  [x]  UE  [x] LE  
SKIN:   [x] Warm  [] Hot     [] Cold   [x] Dry     [] Pale   [] Diaphoretic    
             [] Flushed  [] Jaundiced  [] Cyanotic  [x] Rash  [] Weeping LOC:    [] Alert      []Oriented:    [] Person     [] Place  []Time 
             [] Confused  [] Lethargic  [] Medicated  [x] Non-responsive GI / ABDOMEN:  [] Flat    [] Distended    [x] Soft    [] Firm   []  Obese 
                           [] Diarrhea  [x] Bowel Sounds  [] Nausea  [] Vomiting  / URINE ASSESSMENT:[] Voiding   [x] Oliguria  [] Anuria   []  Mcbride [] Incontinent    []  Incontinent Brief      []  Bathroom Privileges PAIN: [x] 0 []1  []2   []3   []4   []5   []6   []7   []8   []9   []10 Scale 0-10  Action/Follow Up: MOBILITY:  [] Amb    [] Amb/Assist    [x] Bed    [] Wheelchair  [] Stretcher All Vitals and Treatment Details on Attached Flowsheet Hospital: [facility] [address] ICU Room # 0374 [] 1st Time Acute  [] Stat  [x] Routine  [] Urgent    
[] Acute Room  []  Bedside  [x] ICU/CCU  [] ER Isolation Precautions:  [x] Dialysis   [] Airborne   [x] Contact    [] Reverse Special Considerations:         [] Blood Consent Verified [x]N/A ALLERGIES:   [x] NKA Code Status:  [x] Full Code  [] DNR  [] Other HBsAg ONLY: Date Drawn  10/27/18        [x]Negative []Positive []Unknown HBsAb: Date  10/27/18   [x] Susceptible   [] [id number] []Not Drawn  [] Drawn Current Labs:    Date of Labs: Today [x]    Results for Peri Villatoro (MRN [id number]) as of 10/30/2018 22:26 
 Ref. Range 10/30/2018 04:35 WBC Latest Ref Range: 4.6 - 13.2 K/uL 13.1 RBC Latest Ref Range: 4.70 - 5.50 M/uL 2.44 (L) HGB Latest Ref Range: 13.0 - 16.0 g/dL 6.7 (L) HCT Latest Ref Range: 36.0 - 48.0 % 21.6 (L) MCV Latest Ref Range: 74.0 - 97.0 FL 88.5 MCH Latest Ref Range: 24.0 - 34.0 PG 27.5 MCHC Latest Ref Range: 31.0 - 37.0 g/dL 31.0  
RDW Latest Ref Range: 11.6 - 14.5 % 16.3 (H) PLATELET Latest Ref Range: 135 - 420 K/uL 490 (H) MPV Latest Ref Range: 9.2 - 11.8 FL 8.9 (L) Sodium Latest Ref Range: 136 - 145 mmol/L 138 Potassium Latest Ref Range: 3.5 - 5.5 mmol/L 5.0 Chloride Latest Ref Range: 100 - 108 mmol/L 102 CO2 Latest Ref Range: 21 - 32 mmol/L 22 Anion gap Latest Ref Range: 3.0 - 18 mmol/L 14 Glucose Latest Ref Range: 74 - 99 mg/dL 226 (H) BUN Latest Ref Range: 7.0 - 18 MG/DL 67 (H) Creatinine Latest Ref Range: 0.6 - 1.3 MG/DL 4.98 (H) BUN/Creatinine ratio Latest Ref Range: 12 - 20   13 Calcium Latest Ref Range: 8.5 - 10.1 MG/DL 7.7 (L) Ionized Calcium Latest Ref Range: 1.12 - 1.32 MMOL/L 1.02 (L) Phosphorus Latest Ref Range: 2.5 - 4.9 MG/DL 6.5 (H) Magnesium Latest Ref Range: 1.6 - 2.6 mg/dL 2.3 GFR est non-AA Latest Ref Range: >60 ml/min/1.73m2 12 (L)  
GFR est AA Latest Ref Range: >60 ml/min/1.73m2 14 (L) Albumin Latest Ref Range: 3.4 - 5.0 g/dL 1.4 (L) Lipase Latest Ref Range: 73 - 393 U/L 3,671 (H) Results for Meaghan Handley (MRN [id number]) as of 10/30/2018 22:26 Ref. Range 10/27/2018 23:45 Hepatitis B surface Ag Latest Ref Range: <1.00 Index <0.10 Hep B surface Ag Interp. Latest Ref Range: NEG   NEGATIVE Hepatitis B surface Ab Latest Ref Range: >10.0 mIU/mL <3.10 (L) Hep B surface Ab Interp. Latest Ref Range: POS   NEGATIVE (A) Hep B surface Ab comment Latest Units:   Samples with a  v... Cut and paste current labs here. DIET:  [] Renal    [x] Other     [] NPO     []  Diabetic PRIMARY NURSE REPORT: First initial/Last name/Title Pre Dialysis:  Sindy Lawrence RN    Time:  2020 EDUCATION:   
[] Patient [x] Other         Knowledge Basis: []None []Minimal [] Substantial  
Barriers to learning  []N/A  
[] Access Care     [] S&S of infection     [] Fluid Management     []K+     []Procedural   
[]Albumin     [] Medications     [] Tx Options     [] Transplant     [] Diet     [] Other Teaching Tools:  [] Explain  [] Demo  [] Handouts [] Video Patient response:   [] Verbalized understanding  [] Teach back  [] Return demonstration [] Requires follow up Inappropriate due to    Pt. Unresponsive. No family present RO/HEMODIALYSIS MACHINE SAFETY CHECKS  Before each treatment:    
Machine Number:                   [facility] 
                                [] Unit Machine #  with centralized RO 
                                [] Portable Machine #1/RO serial # [id number] [] Portable Machine #2/RO serial # [id number] [] Portable Machine #3/RO serial # [id number] [facility] 
                                [] Portable Machine #11/RO serial # [id number] [x] Portable Machine #12/RO serial # [id number] [] Portable Machine #13/RO serial #  [id number] Alarm Test:  Pass time  2030        Other:        
[x] RO/Machine Log Complete Temp  37              [x]Extracorporeal Circuit Tested for integrity Dialysate: pH 7.2  Conductivity: Meter14        HD Machine  14                   TCD: 13.9 Dialyzer Lot #  [id number]           Blood Tubing Lot # [id number]        Saline Lot #  [id number] CHLORINE TESTING-Before each treatment and every 4 hours Total Chlorine: [x] less than 0.1 ppm  Time:2030  4 Hr/2nd Check Time:   
(if greater than 0.1 ppm from Primary then every 30 minutes from Secondary) TREATMENT INITIATION  with Dialysis Precautions:  
[x] All Connections Secured                 [x] Saline Line Double Clamped  
[x] Venous Parameters Set                  [x] Arterial Parameters Set [x] Prime Given   ml  250             [x]Air Foam Detector Engaged Treatment Initiation Note: Started treatment using rt fem Uldall catheter. Pt. Is non responsive. temp 99.9 trach and peg on vent and tube feedings. Im having problems with arterial flow thru catheter. Medication Dose Volume Route Initials Dialyzer Cleared: [] Good [x] Fair  [] Poor Blood processed:   L 
UF Removed 3000  Ml Post Wt:     kg 
POst BP:163/80          Pulse:  110     Respirations:   Temperature:  
  
                              Post Tx Vascular Access: AVF/AVG: Bleeding stopped Art  min. Shree. Min   N/A Catheter: Locking solution: Heparin 1:1000 Art. 1.4  Shree.1.4   N/A Post Assessment:  
  
                              Skin:  [x] Warm  [x] Dry [] Diaphoretic    [] Flushed  [] Pale [] Cyanotic [facility] Signatures Title Initials  Time Lungs: [] Clear    [x] Course  [] Crackles  [] Wheezing [] Diminished Cardiac: [x] Regular   [] Irregular   [] Monitor  [] N/A  Rhythm:      
    Edema:  [] None    [x] General     [] Facial   [] Pedal    [] UE    [] LE  
    Pain: [x]0  []1  []2   []3  []4   []5   []6   []7   []8   []9   []10 Post Treatment Note: POST TREATMENT PRIMARY NURSE HANDOFF REPORT:  
 
First initial/Last name/Title Post Dialysis:Lisa Cleveland RN  Time:  5428 Abbreviations: AVG-arterial venous graft, AVF-arterial venous fistula, IJ-Internal Jugular, Subcl-Subclavian, Fem-Femoral, Tx-treatment, AP/HR-apical heart rate, DFR-dialysate flow rate, BFR-blood flow rate, AP-arterial pressure, -venous pressure, UF-ultrafiltrate, TMP-transmembrane pressure, Shree-Venous, Art-Arterial, RO-Reverse Osmosis

## 2018-10-31 NOTE — PROGRESS NOTES
PCCM Progress Note I have reviewed the flowsheet and previous days notes. Events, vitals, medications and notes from last 24 hours reviewed. Care plan discussed with staff and on multidisciplinary rounds. Subjective: 
10/31/2018 Stable overnight. Went for tunneled HD cath today. Remains unresponsive. Tolerating PS on vent. Patient Active Problem List  
Diagnosis Code  Stroke (cerebrum) ([facility]) I63.9  Stroke ([facility] I63.9  Rhabdomyolysis M62.82  
 Dehydration E86.0  
 Essential hypertension I10  
 Diabetes mellitus type 2, controlled ([facility] E11.9  Non compliance w medication regimen Z91.14  
 DM (diabetes mellitus) ([facility] E11.9  History of stroke Z80.78  
 Acute-on-chronic kidney injury ([facility] N17.9, N18.9  Acute cystitis N30.00  Elevated troponin R74.8  Elevated LFTs R94.5  Cardiac arrest with ventricular fibrillation ([facility]) I46.9, I49.01  
 Acute pancreatitis K85.90  
 Ischemic encephalopathy I67.89  
 Hypoalbuminemia E88.09  
 Shingles B02.9  Gangrene ([facility] [id number]  Pseudomonas infection B96.5 Assessment: 
Acute Respiratory Failure with Hypoxia - Intubated x18 days, breathing spontaneously - s/p tracheostomy on 10/17 
- Trach exchange done 10/27 
- Tolerating PS 15/5 Anoxic Brain Injury - 2/2 cardiac arrest, MRI with consistent findings Acute Kidney Injury 
- oliguric,  on HD Pseudomonas UTI/Bronchitis - BCx with NGTD 
- UA and respiratory culture with pan-senstivity 
- ID following Hx of CVA 
- R hemiparesis GI Bleed - s/p endoscopy x2 with ulceration noted in esophagus - Resolved Anemia 
- likely due to ESRD and iatrogenic 
- transfuse as needed -  On EPO 
DM 
- on insulin VZV of R arm 
- completed Acyclovir Gangrene of L great toe - Podiatry states to place betadine every day Elevated Lipase - Remains over 1000 
- No evidence of pancreatitis on CT, + GBS 
- Cannot get MRCP at this time - Unable to pass J-tube 
  
Impression/Plan: - Will continue high protein TFs, monitor lipase. - Will continue vent weaning and change reduce PS as tolerated - Cefepime per ID 
- GI and DVT ppx- heparin and protonix Awaiting LTAC placement 
  
Very poor prognosis, daughter wishes to pursue aggressive care 
  
OTHER: 
Glycemic Control- glucose stabilizer per ICU protocol when on insulin drip. Maintain blood glucose 140-180. Replace electrolytes per ICU electrolyte replacement protocol Ventilator bundle & Sedation protocol followed. Daily morning sedation holiday, assessment for readiness for SBT & weaning from ventilator; and then re-titrate if required. Aim to keep peak plateau pressure 50-07ZE H2O in ARDS patient. Anastasiya tube to suction at 20-30 cm H2O, Maintain Potter tube with 5-10ml air every 4 hours. Chlorhexidine mouth washes and routine oral care every 4 hours. Stress ulcer and DVT prophylaxis. HOB >=30 degree elevation all the time. HOB >=30 degree elevation all the time. Aggressive pulmonary toileting. Incentive spirometry when appropriate. Aspiration precautions. CC TIME: >25 min Medication Reviewed: 
 
No Known Allergies Past Medical History:  
Diagnosis Date  CHF (congestive heart failure) ([facility]  Diabetes ([facility]  Stroke ([facility] History reviewed. No pertinent surgical history. Social History Tobacco Use  Smoking status: Never Smoker  Smokeless tobacco: Never Used Substance Use Topics  Alcohol use: No  
  
History reviewed. No pertinent family history. Prior to Admission medications Medication Sig Start Date End Date Taking? Authorizing Provider  
aspirin (ASPIRIN) 325 mg tablet Take 1 Tab by mouth daily. 9/15/18   Lester Solorzano MD  
atorvastatin (LIPITOR) 80 mg tablet Take 1 Tab by mouth nightly.  9/14/18   Anthony Solorzano MD  
insulin glargine (LANTUS) 100 unit/mL injection 10 units qhs  Indications: type 2 diabetes mellitus 9/15/18   Christina Tovar MD  
insulin lispro (HUMALOG) 100 unit/mL injection 4 units with meals 9/14/18   Christina Tovar MD  
losartan (COZAAR) 50 mg tablet Take 1 Tab by mouth daily. 9/14/18   Christina Tovar MD  
 
Current Facility-Administered Medications Medication Dose Route Frequency  insulin glargine (LANTUS) injection 27 Units  27 Units SubCUTAneous DAILY  cefepime (MAXIPIME) 0.5 g in 0.9% sodium chloride 100 mL IVPB  0.5 g IntraVENous Q24H  
 albuterol (PROVENTIL VENTOLIN) nebulizer solution 2.5 mg  2.5 mg Nebulization BID RT  
 multivitamin (MULTI-DELYN, WELLESSE) oral liquid 30 mL  30 mL Oral DAILY  sodium chloride (NS) flush 10 mL  10 mL InterCATHeter Q24H  
 sodium chloride (NS) flush 10-40 mL  10-40 mL InterCATHeter Q8H  
 sodium chloride (NS) flush 20 mL  20 mL InterCATHeter Q24H  
 heparin (porcine) injection 5,000 Units  5,000 Units SubCUTAneous Q8H  
 hydroxypropyl methylcellulose (ISOPTO TEARS) 0.5 % ophthalmic solution 2 Drop  2 Drop Both Eyes BID  epoetin manda (EPOGEN;PROCRIT) injection 40,000 Units  40,000 Units IntraVENous Q7D  
 pantoprazole (PROTONIX) 40 mg in sodium chloride 0.9% 10 mL injection  40 mg IntraVENous Q24H  
 influenza vaccine 2018-19 (6 mos+)(PF) (FLUARIX QUAD/FLULAVAL QUAD) injection 0.5 mL  0.5 mL IntraMUSCular PRIOR TO DISCHARGE  insulin lispro (HUMALOG) injection   SubCUTAneous Q6H Lines: All central lines examined by me. No signs of erythema, induration, discharge. Peripherally Inserted Central Catheter: PICC Double Lumen 10/18/18 Left;Brachial (Active) Central Line Being Utilized Yes 10/29/2018  4:00 AM  
Criteria for Appropriate Use Limited/no vessel suitable for conventional peripheral access 10/29/2018  4:00 AM  
Site Assessment Clean, dry, & intact 10/29/2018  4:00 AM  
Phlebitis Assessment 0 10/29/2018  4:00 AM  
Infiltration Assessment 0 10/29/2018  4:00 AM  
 Arm Circumference (cm) 38 cm 10/26/2018 12:00 AM  
Date of Last Dressing Change 10/25/18 10/29/2018  4:00 AM  
Dressing Status Clean, dry, & intact; Occlusive 10/29/2018  4:00 AM  
Action Taken Open ports on tubing capped 10/29/2018  4:00 AM  
External Catheter Length (cm) 0 centimeters 10/26/2018 12:00 AM  
Dressing Type Disk with Chlorhexadine gluconate (CHG); Stabilization/securement device;Transparent 10/29/2018  4:00 AM  
Hub Color/Line Status Red; Infusing 10/29/2018  4:00 AM  
Positive Blood Return (Site #1) Yes 10/29/2018  4:00 AM  
Hub Color/Line Status Purple;Flushed;Capped 10/29/2018  4:00 AM  
Positive Blood Return (Site #2) No 10/29/2018  4:00 AM  
Alcohol Cap Used Yes 10/29/2018  4:00 AM  
 
  
Hemodialysis Catheter: 
Hemodialysis Access 10/19/18 (Active) Central Line Being Utilized Yes 10/29/2018  4:00 AM  
Criteria for Appropriate Use Dialysis/apheresis 10/29/2018  4:00 AM  
Date Accessed  10/27/18 10/29/2018  4:00 AM  
Site Assessment Clean, dry, & intact 10/29/2018  4:00 AM  
Date of Last Dressing Change 10/24/18 10/29/2018  4:00 AM  
Dressing Status Clean, dry, & intact; Occlusive 10/29/2018  4:00 AM  
Dressing Type Disk with Chlorhexadine gluconate (CHG); Transparent 10/29/2018  4:00 AM  
Proximal Hub Color/Line Status Capped 10/29/2018  4:00 AM  
Distal Hub Color/Line Status Capped 10/29/2018  4:00 AM  
 
Drain(s): PEG/Gastrostomy Tube 10/17/18 (Active) Site Assessment Clean, dry, & intact 10/29/2018  4:00 AM  
Dressing Status Clean, dry, & intact 10/29/2018  4:00 AM  
G Port Status Clamped 10/29/2018  4:00 AM  
External Catheter Length (cm) 3 centimeters 10/26/2018  4:00 PM  
Action Taken Placement verified (comment) 10/28/2018 12:00 PM  
Drainage Description Other (Comment) 10/24/2018  8:00 AM  
Gastric Residual (mL) 0 ml 10/28/2018  8:00 PM  
Tube Feeding/Formula Options Osmolite 1.2 10/28/2018 12:00 PM  
Tube Feeding/Verify Rate (mL/hr) 0 10/28/2018  4:00 PM  
 Water Flush Volume (mL) 60 mL 10/27/2018  8:30 AM  
Intake (ml) 0 ml 10/28/2018  6:00 PM  
Medication Volume 30 ml 10/27/2018  8:30 AM  
Drainage Chamber Level (ml) 0 ml 10/23/2018  8:00 PM  
Output (ml) 0 ml 10/23/2018  8:00 PM  
   
Fecal Management (Active) Stool Consistency Liquid 10/29/2018  9:24 AM  
Position of Indicator Line Visible 10/29/2018  9:24 AM  
Signal Indicator Bubble Appropriate 10/29/2018  9:24 AM  
Skin Assessment of the Anal Area Denuded/excoriated 10/29/2018  9:24 AM  
Tube Irrigated No 10/29/2018  9:24 AM  
Irrigation Volume (mL) 0 10/29/2018  9:24 AM  
Drainage Bag Level (mL) 450 10/29/2018  9:24 AM  
Output (ml) 20 ml 10/29/2018  9:24 AM  
 
Airway: Airway - Tracheostomy Tube 10/17/18 Portex; Cuffed (Active) Cuff Pressure 30 cmH20 10/28/2018  8:43 PM  
Site Assessment Clean, dry, & intact 10/29/2018  9:20 AM  
Trach Dressing Changed Yes 10/29/2018  9:20 AM  
Trach Cleaned With Normal saline 10/29/2018  9:20 AM  
Trach Tie Changed No 10/29/2018  9:20 AM  
Trach Cannula Changed Yes 10/29/2018  9:20 AM  
Inner Cannula Cuffed, cuff inflated; Fenestrated; Other (comment) 10/29/2018  9:20 AM  
Line Chucky Gunning Top of the lock 10/29/2018  9:20 AM  
Side Secured Centered 10/29/2018  9:20 AM  
Spare Trach at Bedside Yes 10/29/2018  9:20 AM  
Spare Naga Leaf Tube One Size Smaller at Bedside Yes 10/29/2018  9:20 AM  
Ambu Bag With Mask at Bedside Yes 10/29/2018  9:20 AM  
 
 
Objective: 
Vital Signs:   
Visit Vitals /54 Pulse 95 Temp 99.4 °F (37.4 °C) Resp 24 Ht 5' 7\" (1.702 m) Wt 89.2 kg (196 lb 9.6 oz) SpO2 100% BMI 30.79 kg/m² O2 Device: Ventilator, Tracheal collar O2 Flow Rate (L/min): 45 l/min Temp (24hrs), Av.6 °F (37.6 °C), Min:98.5 °F (36.9 °C), Max:100.2 °F (37.9 °C) Intake/Output:  
Last shift:      No intake/output data recorded. Last 3 shifts: 10/29 1901 - 10/31 0700 In: 2985.8 [I.V.:1100] Out: 3700 [Urine:575; Drains:125] Intake/Output Summary (Last 24 hours) at 10/31/2018 1318 Last data filed at 10/31/2018 0300 Gross per 24 hour Intake 815.8 ml Output 3550 ml Net -2734.2 ml Last 3 Recorded Weights in this Encounter 10/29/18 1016 10/30/18 0621 10/31/18 0930 Weight: 76.3 kg (168 lb 4.8 oz) 89.3 kg (196 lb 12.8 oz) 89.2 kg (196 lb 9.6 oz) Ventilator Settings: 
Mode Rate Tidal Volume Pressure FiO2 PEEP Spontaneous   500 ml  12 cm H2O(changed by Dr. Dallas Goss) 30 % 5 cm H20 Peak airway pressure: 17 cm H2O Plateau pressure:   
Tidal volume:  
Minute ventilation: 11.1 l/min SPO2  
 
ARDS network Guidelines: Lung protective strategy and Plateau pressure goals less than or equal to 30 Physical Exam:  
 
General/Neurology: Eyes open, nonresponsive, withdrawing to pain in LE. Head:   Normocephalic, without obvious abnormality Eye:   Small pupils but reactive,  
Oral:   Mucus membranes moist 
Neck:   Supple, trach in place, wound clean 
Lung:   B/l air movement. No wheezing or rales. Heart:   Systolic murmur present, RRR Abdomen/: Soft, non tender, + PEG tube Extremities:  Gangrenous L Great toe Skin:   Blisters of R arm improving. Data: 
   
Recent Results (from the past 24 hour(s)) GLUCOSE, POC Collection Time: 10/30/18  5:36 PM  
Result Value Ref Range Glucose (POC) 151 (H) 70 - 110 mg/dL GLUCOSE, POC Collection Time: 10/31/18 12:16 AM  
Result Value Ref Range Glucose (POC) 135 (H) 70 - 110 mg/dL LIPASE Collection Time: 10/31/18  3:36 AM  
Result Value Ref Range Lipase 1,307 (H) 73 - 393 U/L  
RENAL FUNCTION PANEL Collection Time: 10/31/18  3:36 AM  
Result Value Ref Range Sodium 138 136 - 145 mmol/L Potassium 4.2 3.5 - 5.5 mmol/L Chloride 101 100 - 108 mmol/L  
 CO2 28 21 - 32 mmol/L Anion gap 9 3.0 - 18 mmol/L Glucose 115 (H) 74 - 99 mg/dL BUN 32 (H) 7.0 - 18 MG/DL  Creatinine 2.57 (H) 0.6 - 1.3 MG/DL  
 BUN/Creatinine ratio 12 12 - 20    
 GFR est AA 31 (L) >60 ml/min/1.73m2 GFR est non-AA 25 (L) >60 ml/min/1.73m2 Calcium 7.9 (L) 8.5 - 10.1 MG/DL Phosphorus 3.8 2.5 - 4.9 MG/DL Albumin 1.5 (L) 3.4 - 5.0 g/dL CBC W/O DIFF Collection Time: 10/31/18  3:36 AM  
Result Value Ref Range WBC 16.6 (H) 4.6 - 13.2 K/uL  
 RBC 2.97 (L) 4.70 - 5.50 M/uL HGB 8.3 (L) 13.0 - 16.0 g/dL HCT 26.0 (L) 36.0 - 48.0 % MCV 87.5 74.0 - 97.0 FL  
 MCH 27.9 24.0 - 34.0 PG  
 MCHC 31.9 31.0 - 37.0 g/dL  
 RDW 16.6 (H) 11.6 - 14.5 % PLATELET 346 (H) 669 - 420 K/uL MPV 8.7 (L) 9.2 - 11.8 FL PROTHROMBIN TIME + INR Collection Time: 10/31/18  3:36 AM  
Result Value Ref Range Prothrombin time 14.3 11.5 - 15.2 sec INR 1.1 0.8 - 1.2 GLUCOSE, POC Collection Time: 10/31/18  5:19 AM  
Result Value Ref Range Glucose (POC) 113 (H) 70 - 110 mg/dL GLUCOSE, POC Collection Time: 10/31/18 12:02 PM  
Result Value Ref Range Glucose (POC) 116 (H) 70 - 110 mg/dL Chemistry Recent Labs 10/31/18 
0336 10/30/18 
0435 10/29/18 
1645 10/29/18 
0400 * 226*  --  120*  138  --  138  
K 4.2 5.0  --  4.7  102  --  103 CO2 28 22  --  24 BUN 32* 67*  --  56* CREA 2.57* 4.98*  --  4.08* CA 7.9* 7.7*  --  8.5 MG  --  2.3  --  2.3 PHOS 3.8 6.5*  --  6.2* AGAP 9 14  --  11  
BUCR 12 13  --  14  
AP  --   --  375*  --   
TP  --   --  6.7  --   
ALB 1.5* 1.4* 1.5* 1.5*  
GLOB  --   --  5.2*  --   
AGRAT  --   --  0.3*  --   
 
 
CBC w/Diff Recent Labs 10/31/18 
0336 10/30/18 
0435 10/29/18 
0400 WBC 16.6* 13.1 18.7*  
RBC 2.97* 2.44* 2.58* HGB 8.3* 6.7* 7.4* HCT 26.0* 21.6* 23.4*  
* 490* 520* ABG Recent Labs 10/28/18 
1718 PHI 7.467* PCO2I 36.8 PO2I 106* HCO3I 26.5*  
FIO2I 0.30 Micro   No results for input(s): SDES, CULT in the last 72 hours. No results for input(s): CULT in the last 72 hours. CT (Most Recent) Results from [facility] Encounter encounter on 09/26/18 CT ABD PELV W CONT Narrative EXAM: CT of the abdomen and pelvis INDICATION: Pneumonia. Acute pyelonephritis. Sepsis. Abnormal liver function 
tests. Dialysis patient. COMPARISON: None. TECHNIQUE: Axial CT imaging of the abdomen and pelvis was performed with 
intravenous contrast. Multiplanar reformats were generated. One or more dose 
reduction techniques were used on this CT: automated exposure control, 
adjustment of the mAs and/or kVp according to patient size, and iterative 
reconstruction techniques. The specific techniques used on this CT exam have 
been documented in the patient's electronic medical record. 
 
_______________ FINDINGS: 
 
LOWER CHEST: Dense consolidation right lower lobe compatible with pneumonia. Small bilateral pleural effusions. Cardiomegaly. Fluid-filled esophagus with 
nasogastric tube in place. LIVER, BILIARY: Indeterminate low-attenuation 7 mm focus anterior left hepatic 
lobe. Hepatomegaly. No biliary dilation. Distended gallbladder with gallstones 
and indeterminate material in the gallbladder which may represent sludge. PANCREAS: Normal. 
 
SPLEEN: Normal. 
 
ADRENALS: Normal. 
 
KIDNEYS/URETERS: No hydronephrosis or renal calculus. Small low attenuation 
cortical focus medial upper pole left kidney not well delineated measuring about 1.1 cm. This may represent renal cyst. 
 
LYMPH NODES: No enlarged lymph nodes. GASTROINTESTINAL TRACT: No bowel dilation or wall thickening. Normal appendix. PELVIC ORGANS: No intraperitoneal fluid. Incidental vas deferens calcifications. VASCULATURE: Mild atherosclerosis. BONES: No acute or aggressive osseous abnormalities identified. OTHER: None. 
 
_______________ Impression IMPRESSION: 
 
 
1. Dense subtotal or total consolidation right lower lobe compatible with 
pneumonia similar to prior study. Small bilateral pleural effusions similar in 
size. 2. Distended gallbladder with gallstones and gallbladder sludge without 
significant change in appearance and also described in detail on ultrasound of 
9/27/2018. 3. Indeterminate small lesion left hepatic lobe without change. Hepatomegaly 
again evident. 4. No intraperitoneal fluid. Possible small left upper pole renal cyst. 
Cardiomegaly. Nasogastric tube tip in body of stomach. XR (Most Recent). CXR reviewed by me and compared with previous CXR Results from [facility] Encounter encounter on 09/26/18 XR CHEST PORT Narrative EXAM: Portable Chest 
 
CLINICAL INDICATION:  trach change; atelectasis -Additional:  None COMPARISON:  10/24/18 TECHNIQUE: AP portable view at [address],[place] 
 
______________ FINDINGS: 
 
HEART AND MEDIASTINUM:  The heart size is stable LUNGS AND AIRWAYS:  Right chest perihilar infiltrate is present PLEURA:  No pneumothorax or pleural effusion BONES:  Vertebral spondylosis OTHER:  None 
 
______________ Impression IMPRESSION: 
 
Right lung perihilar infiltrate appears present High complexity decision making was performed during the evaluation of this patient at high risk for decompensation with multiple organ involvement Above mentioned total time spent on reviewing the case/medical record/data/notes/EMR/patient examination/documentation/coordinating care with nurse/consultants, exclusive of procedures with complex decision making performed and > 50% time spent in face to face evaluation. Mulu Cardoza MD 
Critical Care Medicine

## 2018-10-31 NOTE — PROGRESS NOTES
Hospitalist Progress Note Daily Progress Note: 10/31/2018 11:40 AM 
   
Interval history / Subjective:  
Iliana Ford is a 57y.o. year old male who presented from [facility] with abnormal labs, elevated BUN/Cr. Found to have JULIANNA, UTI, and markedly elevated LFT on presentation. Patient's recent admission was 9/10/18-9/14/18 for acute CVA and rhabdo. Patient poor historian on admission,stated \"I had heart failure. She was started on vanc,zosyn. On 9/29,patient had cardiac arrest with ventricular fibrillation - s/p ROSC. Her hospital course has since then complicated with sepsis,pancreatitits,pneumonia,acute pancreatitis requiring involvement of gi,surgery,nephrology,cardiology,ID,cardiology. So far patient has remained without major improvement. Now in vegetative state. Patient had PEG/Trach placement on 10/17. On 10/17,patient started on acyclovir for veicular rash rt arm,suspected being Zoster. Lipase 2169. US abd 10/22 shows Abnormal appearance of the gallbladder with stones/sludge with wall thickening  
and possible pericholecystic fluid similar to the prior study of 9/27/2018 
10/22: Patient on treatment for shingles in contact isolation per ID team. He also had left big toe gangrene . We will consult Podiatry. His lipase was elevated , US of the abdomen shows sludge and possible cholecystitis. Patient afebrile. GI consulted  for possible MRCP. Patient has leukocytosis. ID started Vancomycin and ordered Urine , blood and sputum culture and trach site discharge. Patient off air bourne precaution but continue contact precaution per ID. Patient was started on Vancomycin and cefepime for SIRSper ID. Blood and urine cx pending. Patient today is going for jejunostomy. Patient BC NGTD Vancomycin d/c'd per ID. Patient respiratory  cx + GNR , STREPT, Wound cx + pseudomonas . PCCM added Ciprofloxacin today . IR attempt at Jejunostomy was unsuccessful yesterday. 10/28: Fever today. Resp cx, wound cx noted. On cefepime and Cipro for coverage per sensitivisty result . Blood cx NGTD . Lipase still trending up. IR unsuccessful attempt at jejunostomy 10/26. Will re consult GI today 10/29:no change in care. 10/30:no change in care 10/30:pt underwent dialysis today. Currently on cefepime only. Patient is still in the hospital,waiting for placement. Current Facility-Administered Medications Medication Dose Route Frequency  0.9% sodium chloride infusion 250 mL  250 mL IntraVENous PRN  
 insulin glargine (LANTUS) injection 27 Units  27 Units SubCUTAneous DAILY  cefepime (MAXIPIME) 0.5 g in 0.9% sodium chloride 100 mL IVPB  0.5 g IntraVENous Q24H  
 albuterol (PROVENTIL VENTOLIN) nebulizer solution 2.5 mg  2.5 mg Nebulization BID RT  
 multivitamin (MULTI-DELYN, WELLESSE) oral liquid 30 mL  30 mL Oral DAILY  bacitracin 500 unit/gram packet 1 Packet  1 Packet Topical PRN  
 sodium chloride (NS) flush 10-30 mL  10-30 mL InterCATHeter PRN  
 sodium chloride (NS) flush 10 mL  10 mL InterCATHeter Q24H  
 sodium chloride (NS) flush 10 mL  10 mL InterCATHeter PRN  
 sodium chloride (NS) flush 10-40 mL  10-40 mL InterCATHeter Q8H  
 sodium chloride (NS) flush 20 mL  20 mL InterCATHeter Q24H  
 heparin (porcine) injection 5,000 Units  5,000 Units SubCUTAneous Q8H  
 hydroxypropyl methylcellulose (ISOPTO TEARS) 0.5 % ophthalmic solution 2 Drop  2 Drop Both Eyes BID  epoetin manda (EPOGEN;PROCRIT) injection 40,000 Units  40,000 Units IntraVENous Q7D  
 pantoprazole (PROTONIX) 40 mg in sodium chloride 0.9% 10 mL injection  40 mg IntraVENous Q24H  
 0.9% sodium chloride infusion 250 mL  250 mL IntraVENous PRN  
 influenza vaccine 2018-19 (6 mos+)(PF) (FLUARIX QUAD/FLULAVAL QUAD) injection 0.5 mL  0.5 mL IntraMUSCular PRIOR TO DISCHARGE  acetaminophen (TYLENOL) solution 325 mg  325 mg Per NG tube Q4H PRN  
  0.9% sodium chloride infusion 250 mL  250 mL IntraVENous PRN  
 heparin (porcine) pf 100 Units  100 Units InterCATHeter PRN  
 0.9% sodium chloride infusion 250 mL  250 mL IntraVENous PRN  
 insulin lispro (HUMALOG) injection   SubCUTAneous Q6H  
 heparin (porcine) 1,000 unit/mL injection 1,000 Units  1,000 Units InterCATHeter DIALYSIS PRN  
 senna-docusate (PERICOLACE) 8.6-50 mg per tablet 1 Tab  1 Tab Oral BID PRN  
 ondansetron (ZOFRAN) injection 4 mg  4 mg IntraVENous Q4H PRN  
 glucose chewable tablet 16 g  4 Tab Oral PRN  
 glucagon (GLUCAGEN) injection 1 mg  1 mg IntraMUSCular PRN  
 dextrose (D50) infusion 12.5-25 g  25-50 mL IntraVENous PRN  
 hydrALAZINE (APRESOLINE) 20 mg/mL injection 20 mg  20 mg IntraVENous Q6H PRN Review of Systems Unresponsive Objective:  
 
Visit Vitals /54 Pulse 95 Temp 99.4 °F (37.4 °C) Resp 24 Ht 5' 7\" (1.702 m) Wt 89.2 kg (196 lb 9.6 oz) SpO2 100% BMI 30.79 kg/m² O2 Flow Rate (L/min): 45 l/min O2 Device: Tracheostomy, Ventilator Temp (24hrs), Av.5 °F (37.5 °C), Min:98.5 °F (36.9 °C), Max:100.2 °F (37.9 °C) No intake/output data recorded. 10/29 1901 - 10/31 0700 In: 2985.8 [I.V.:1100] Out: 3700 [Urine:575; Drains:125] P/E General Appearance:S/p PEG/Trach HENT: normocephalic/atraumatic, + ETT Neck: No JVD, hematoma R side where [facility] is improving Lungs: Coarse B/L w/ vent-assisted BS 
CV: RRR, no m/r/g Abdomen: soft, non-tender, normal bowel sounds Extremities: versicular rash rt arm and right side of chest,no cyanosis, no peripheral edema Neuro: Unresponsive to commands, no withdrawal to pain, + corneal reflex and pupillary reaction today Skin: Normal color, intact Data Review Recent Results (from the past 12 hour(s)) LIPASE Collection Time: 10/31/18  3:36 AM  
Result Value Ref Range Lipase 1,307 (H) 73 - 393 U/L  
RENAL FUNCTION PANEL  Collection Time: 10/31/18  3:36 AM  
 Result Value Ref Range Sodium 138 136 - 145 mmol/L Potassium 4.2 3.5 - 5.5 mmol/L Chloride 101 100 - 108 mmol/L  
 CO2 28 21 - 32 mmol/L Anion gap 9 3.0 - 18 mmol/L Glucose 115 (H) 74 - 99 mg/dL BUN 32 (H) 7.0 - 18 MG/DL Creatinine 2.57 (H) 0.6 - 1.3 MG/DL  
 BUN/Creatinine ratio 12 12 - 20 GFR est AA 31 (L) >60 ml/min/1.73m2 GFR est non-AA 25 (L) >60 ml/min/1.73m2 Calcium 7.9 (L) 8.5 - 10.1 MG/DL Phosphorus 3.8 2.5 - 4.9 MG/DL Albumin 1.5 (L) 3.4 - 5.0 g/dL CBC W/O DIFF Collection Time: 10/31/18  3:36 AM  
Result Value Ref Range WBC 16.6 (H) 4.6 - 13.2 K/uL  
 RBC 2.97 (L) 4.70 - 5.50 M/uL HGB 8.3 (L) 13.0 - 16.0 g/dL HCT 26.0 (L) 36.0 - 48.0 % MCV 87.5 74.0 - 97.0 FL  
 MCH 27.9 24.0 - 34.0 PG  
 MCHC 31.9 31.0 - 37.0 g/dL  
 RDW 16.6 (H) 11.6 - 14.5 % PLATELET 449 (H) 158 - 420 K/uL MPV 8.7 (L) 9.2 - 11.8 FL PROTHROMBIN TIME + INR Collection Time: 10/31/18  3:36 AM  
Result Value Ref Range Prothrombin time 14.3 11.5 - 15.2 sec INR 1.1 0.8 - 1.2 GLUCOSE, POC Collection Time: 10/31/18  5:19 AM  
Result Value Ref Range Glucose (POC) 113 (H) 70 - 110 mg/dL GLUCOSE, POC Collection Time: 10/31/18 12:02 PM  
Result Value Ref Range Glucose (POC) 116 (H) 70 - 110 mg/dL Assessment/Plan:  
 
Principal Problem: 
  Cardiac arrest with ventricular fibrillation ([facility] (9/29/2018) Overview: ROSC before defibrillation could be attempted. Active Problems: 
  Essential hypertension (9/10/2018) Diabetes mellitus type 2, controlled ([facility] (9/10/2018) History of stroke (9/27/2018) Overview: With residual R hemiparesis Acute-on-chronic kidney injury ([facility] (9/27/2018) Acute cystitis (9/27/2018) Elevated troponin (9/27/2018) Elevated LFTs (9/28/2018) Acute pancreatitis (9/29/2018) Overview: Lipase downtrends with interruption in tube feed and Mucomyst. Restarting tube feed did result in modest elevation but not to the levels seen while  
    on Mucomyst. Nonetheless, there is signficant decrease in lipase levels  
    with interruptions in tube feeding. Ischemic encephalopathy (9/30/2018) Hypoalbuminemia (10/21/2018) Shingles (10/21/2018) Gangrene ([address] (10/22/2018) Pseudomonas infection (10/27/2018) Zoster. Care Plan - Vent mgmt per ICU - S/p trach/PEG on 10/17 as patient did not show significant improvement. 
- EGD showed large clot in esophagus last week repeat EGD 10/9 shows healed esophageal ulcer - Cont protonix IV every day  
- Hgb stable - LFTs improving - GI signed off 10/9 
- MRI w/ evidence of severe anoxic brain injury - Minimal improvement on neuro exam 
- Appreciate neuro assistance - IV Meropenem and IV Fluconazole d/heron on 10/15 per ID. 
- Currently on vanc only which was started on 9/27 
- Nephro managing HD TTHS 
- Pt unlikely to have any meaningful neuro recovery, prognosis is very grim - Family wanted to cont to do everything, including trach/PEG -> was done10/17 
- On 10/17:Started on acyclovir for possible Zoster rt arm and rt upper chest area. - Hyperkalemia on 10/18 ->corrected - Lipase 2169 10/22. US abd Abnormal appearance of the gallbladder with stones/sludge with wall thickening  
and possible pericholecystic fluid similar to the prior study of 9/27/2018  
- On Acyclovir for shingles , contact precaution per ID commitee 
- 10/22: Consult GI today for possible MRCP 
- 10/22: Consult Podiatry for left big toe gangrene  
- 10/23 : Podiatry recommend RAQUEL for Left Big toe  
- 10/23: GI recommend MRI/MRCP but will need patient off vent for multiple times for procedure.  Not sure if this is feasible now 
- 10/25 : Blood , urine , sputum , trach discharge cultures 
- 10/25 IV antibiotics by ID  
- Change GI tube to Jejunostomy tube tomorrow by IR  
- 10/26: Vancomycin and Cefepime per ID  
 - 10/26 : Jejunostomy attempt by IR unsuccessful . Will need to discuss with GI  
- 10/27 : Ciprofloxacin added 10/27 for double coverage of pseudomonas with cefepime per PCCM  
- 10/28: Pancreatitis ? Biliary  . Will reconsult GI today - Lipase trending down. DVT prophylaxis:scds Full code. Disposition:tbd - need placement -  involved.

## 2018-10-31 NOTE — OP NOTES
[facility], Former[facility] [address] Alex Peña MD 
 
Date of Surgery: 10/31/2018 Hospital: [facility] Surgeon(s): Alex Peña MD 
Assistant(s): NonePre-operative Diagnosis: ESRD Post-operative Diagnosis: ESRD Procedure(s) Performed: Tunneled dialysis catheter placement with ultrasound and fluoroscopic guidance Anesthesia:  Local 
Findings:  Catheter flushed and frida well at conclusion of the procedure. Complications: None Estimated Blood Loss:  Minimal 
Tubes and Drains:  None Specimens: * No specimens in log * Procedure in Detail: After informed consent was obtained, the patient taken to the procedure room and  placed supine on the table. A surgical time-out was performed. The patient was identified and the procedure verified. The right chest and neck was prepped with chlorhexidine and draped in a sterile manner. 0.50% Marcaine with epinephrine was used to anesthetize the skin. The right jugular vein was punctured under ultrasound guidance with an micropuncture needle. An 0.018in microwire was then placed into the superior vena cava. C-arm fluoroscopy was employed to demonstrate the position of the microwire. The skin of the pectoral area  was anesthetized and incised and the intended tunnel track anesthetized as well. The 23 cm catheter was tunneled under the skin and the Dacron cuff positioned in the subcutaneous tissue 2 cm from the skin incision. The puncture site at the neck was enlarged with a small incision. A Bentson wire was then placed to the inferior vena cava under fluorscopic guidance. The #12 Polish sheath was inserted over the wire. The wire was removed. Through the sheath, the catheter tip was inserted, and the peel-away sheath removed. The catheter tip position within the right atrium was verified by fluoroscopy. The images were saved and transferred to PACS.  The catheter aspirated blood easily, was flushed with saline, and each port locked with 2mL of 1000 units/mL Heparin. The catheter was sutured to the skin with 4-0 nylon. The right and neck incision was closed with 4-0 monocryl. The incision was covered with Dermabond. Biopatch was applied to the catheter exit site. The surgical sites were covered with gauze and Op-Site. The patient tolerated the procedure well without complications.   
 
 
Risa Gomez MD

## 2018-10-31 NOTE — PROGRESS NOTES
RENAL PROGRESS NOTE Indra Monge Assessment/Plan: · Prolonged dialysis dependant JULIANNA (ischemic atn in a setting of acute pyelonephritis/acute cholecystitis with sepsis and cardiac arrest 9/29). No evidence of recovery of renal function at this time, although nonolliguric (continue straight cath every 8 hours). Next dialysis tomorrow  and continue tts. Rt fem uldall was pulled and new rt ij tdc placed today. · S/p cardiopulm arrest 9/29. · Acute pyelonephritis/sepsis. Febrile on/off. On abx by ID. · Abnormal lft's/acute cholecystitis/pancreatitis. GI on the case. Unable to place jtube. Lipase is down today. · UGI bleed, EGD results noted- healing esophageal ulceration. · Acute blood loss anemia/anemia of kidney failure. Continue analia. · HTN. Improved with dialysis/volume removal.  
· Asp pneumonia. · Resp failure. S/p peg/trach 10/17, s/p bronch. · Rt shoulder shingles. Finished acyclovir. · Anoxic brain injury superimposed on recent cva. Subjective: Intubated, unresponsive. Eyes are open. Tolerates tf. Patient Active Problem List  
Diagnosis Code  Stroke (cerebrum) ([facility]) I63.9  Stroke ([facility] I63.9  Rhabdomyolysis M62.82  
 Dehydration E86.0  
 Essential hypertension I10  
 Diabetes mellitus type 2, controlled ([facility] E11.9  Non compliance w medication regimen Z91.14  
 DM (diabetes mellitus) ([facility] E11.9  History of stroke Z80.78  
 Acute-on-chronic kidney injury ([facility] N17.9, N18.9  Acute cystitis N30.00  Elevated troponin R74.8  Elevated LFTs R94.5  Cardiac arrest with ventricular fibrillation (HCC) I46.9, I49.01  
 Acute pancreatitis K85.90  
 Ischemic encephalopathy I67.89  
 Hypoalbuminemia E88.09  
 Shingles B02.9  Gangrene ([facility] [id number]  Pseudomonas infection B96.5 Current Facility-Administered Medications Medication Dose Route Frequency Provider Last Rate Last Dose  
 0.9% sodium chloride infusion 250 mL  250 mL IntraVENous PRN Lakesha Gonzales NP      
 insulin glargine (LANTUS) injection 27 Units  27 Units SubCUTAneous DAILY Caren Wilhelm MD   27 Units at 10/31/18 0900  cefepime (MAXIPIME) 0.5 g in 0.9% sodium chloride 100 mL IVPB  0.5 g IntraVENous Q24H Caren Wilhelm MD   0.5 g at 10/31/18 0012  albuterol (PROVENTIL VENTOLIN) nebulizer solution 2.5 mg  2.5 mg Nebulization BID RT Tunde Roman MD   2.5 mg at 10/31/18 2023  multivitamin (MULTI-DELYN, WELLESSE) oral liquid 30 mL  30 mL Oral DAILY Ekta Pena MD   30 mL at 10/31/18 1027  
 bacitracin 500 unit/gram packet 1 Packet  1 Packet Topical PRN Adenike Howard MD      
 sodium chloride (NS) flush 10-30 mL  10-30 mL InterCATHeter PRN Adenike Howard MD   30 mL at 10/21/18 0430  
 sodium chloride (NS) flush 10 mL  10 mL InterCATHeter Q24H Adenike Howard MD   10 mL at 10/30/18 1642  sodium chloride (NS) flush 10 mL  10 mL InterCATHeter PRN Adenike Howard MD   10 mL at 10/20/18 8832  sodium chloride (NS) flush 10-40 mL  10-40 mL InterCATHeter Q8H Jones Ackerman MD   10 mL at 10/31/18 1400  
 sodium chloride (NS) flush 20 mL  20 mL InterCATHeter Q24H Adenike Howard MD   20 mL at 10/30/18 1642  heparin (porcine) injection 5,000 Units  5,000 Units SubCUTAneous Q8H Adenike Howard MD   Stopped at 10/31/18 1405  hydroxypropyl methylcellulose (ISOPTO TEARS) 0.5 % ophthalmic solution 2 Drop  2 Drop Both Eyes BID Corky LAL NP   2 Drop at 10/31/18 1027  epoetin manda (EPOGEN;PROCRIT) injection 40,000 Units  40,000 Units IntraVENous Q7D Bruce Simpson MD   40,000 Units at 10/31/18 1406  pantoprazole (PROTONIX) 40 mg in sodium chloride 0.9% 10 mL injection  40 mg IntraVENous Q24H Yolanda SHAFFER MD   40 mg at 10/31/18 1027  0.9% sodium chloride infusion 250 mL  250 mL IntraVENous PRN Meryle Mccune, DO      
 influenza vaccine 2018-19 (6 mos+)(PF) (FLUARIX QUAD/FLULAVAL QUAD) injection 0.5 mL  0.5 mL IntraMUSCular PRIOR TO DISCHARGE Nenita Trimble MD      
 acetaminophen (TYLENOL) solution 325 mg  325 mg Per NG tube Q4H PRN Nenita Trimble MD   325 mg at 10/31/18 1540  
 0.9% sodium chloride infusion 250 mL  250 mL IntraVENous PRN Meryle Mccune, DO      
 heparin (porcine) pf 100 Units  100 Units InterCATHeter PRN Benjamin Law MD      
 0.9% sodium chloride infusion 250 mL  250 mL IntraVENous PRN Nenita Trimble MD      
 insulin lispro (HUMALOG) injection   SubCUTAneous Q6H Curtis LEE, DO   Stopped at 10/31/18 0021  
 heparin (porcine) 1,000 unit/mL injection 1,000 Units  1,000 Units InterCATHeter DIALYSIS PRN Benjamin Law MD   1,000 Units at 09/28/18 1332  senna-docusate (PERICOLACE) 8.6-50 mg per tablet 1 Tab  1 Tab Oral BID PRN Deitra Dull, DO      
 ondansetron (ZOFRAN) injection 4 mg  4 mg IntraVENous Q4H PRN Alfred Nettles DO   4 mg at 09/28/18 0539  
 glucose chewable tablet 16 g  4 Tab Oral PRN Sujit Castilloper A, DO      
 glucagon (GLUCAGEN) injection 1 mg  1 mg IntraMUSCular PRN Alfred Nettles, DO      
 dextrose (D50) infusion 12.5-25 g  25-50 mL IntraVENous PRN Alfred Nettles DO   25 g at 10/09/18 1906  hydrALAZINE (APRESOLINE) 20 mg/mL injection 20 mg  20 mg IntraVENous Q6H PRN Cathy Matthew, NP   20 mg at 10/30/18 9423 Objective Vitals:  
 10/31/18 1100 10/31/18 1200 10/31/18 1210 10/31/18 1530 BP: 122/56 128/54 Pulse: 98 94 95 Resp: 21 14 24 Temp:    (!) 100.6 °F (38.1 °C) TempSrc:      
SpO2: 98% 100% 100% Weight:      
Height:      
 
 
 
Intake/Output Summary (Last 24 hours) at 10/31/2018 1612 Last data filed at 10/31/2018 0300 Gross per 24 hour Intake 725.8 ml Output 3250 ml Net -2524.2 ml  
 
 
 
 
 Admission weight: Weight: 96.6 kg (213 lb) (09/26/18 2244) Last Weight Metrics: 
Weight Loss Metrics 10/31/2018 9/26/2018 9/13/2018 Today's Wt 196 lb 9.6 oz - 227 lb 15.3 oz  
BMI - 30.79 kg/m2 35.7 kg/m2 Physical Assessment:  
 
General: intubated, unresponsive. Eyes are open. Neck: Trach in place. Rt ij tdc. Neck: No jvd. LUNGS: diminished air entry at the bases. No crackles. CVS EXM: S1, S2  RRR. Abdomen: soft, pos bs. PEG. Lower Extremities:  1+ edema. Lab CBC w/Diff Recent Labs 10/31/18 
0336 10/30/18 
0435 10/29/18 
0400 WBC 16.6* 13.1 18.7*  
RBC 2.97* 2.44* 2.58* HGB 8.3* 6.7* 7.4* HCT 26.0* 21.6* 23.4*  
* 490* 520* Chemistry Recent Labs 10/31/18 
0336 10/30/18 
0435 10/29/18 
1645 10/29/18 
0400 * 226*  --  120*  138  --  138  
K 4.2 5.0  --  4.7  102  --  103 CO2 28 22  --  24 BUN 32* 67*  --  56* CREA 2.57* 4.98*  --  4.08* CA 7.9* 7.7*  --  8.5 AGAP 9 14  --  11  
BUCR 12 13  --  14  
AP  --   --  375*  --   
TP  --   --  6.7  --   
ALB 1.5* 1.4* 1.5* 1.5*  
GLOB  --   --  5.2*  --   
AGRAT  --   --  0.3*  --   
PHOS 3.8 6.5*  --  6.2* No results found for: IRON, FE, TIBC, IBCT, PSAT, FERR Lab Results Component Value Date/Time Calcium 7.9 (L) 10/31/2018 03:36 AM  
 Phosphorus 3.8 10/31/2018 03:36 AM  
  
 
Sharla Hawk M.D. [facility] Phone [phone] Pager [phone]

## 2018-10-31 NOTE — PROGRESS NOTES
Problem: Pressure Injury - Risk of 
Goal: *Prevention of pressure injury Document Mitul Scale and appropriate interventions in the flowsheet. Outcome: Progressing Towards Goal 
Pressure Injury Interventions: 
Sensory Interventions: Assess changes in LOC Moisture Interventions: Apply protective barrier, creams and emollients Activity Interventions: Pressure redistribution bed/mattress(bed type) Mobility Interventions: Pressure redistribution bed/mattress (bed type) Nutrition Interventions: Document food/fluid/supplement intake Friction and Shear Interventions: Foam dressings/transparent film/skin sealants, HOB 30 degrees or less

## 2018-10-31 NOTE — PROGRESS NOTES
Infectious Disease Follow-up Admit Date: 9/26/2018 Current abx Prior abx  
cefepime 10/25-6 Zosyn 9/27-7, Meropenem/flucon 10/4- 11, vanco 9/27-25, ACV 10/17 - 10,  Vanco 10/25- 1, Cipro 10/28-1 Assessment ->Rec:  
 
Recurrent SIRS T 100.4 10/25 wbc 18k 
 - more secretions, reported cloudy urine, cxr yest no pneumonia  
-line tip culture 10/19 >15 CFU CoNS now with L picc, dialysis catheter   
- blcx 10/25 NG x 2, spgs rare Pseud, wd Pseud, cxr 10/27 poss R perihilar infiltrate-? Pul source here vs from prior bleeding -> continue Cefepime  
->appreciate removal femoral uldall today-> monitor tip culture 
-> repeat blcx x2 if spikes fever PAD  
- PVL's s/o significant arterial disease of bilateral lower extremities 10/24. -POD Dr. Agrawal Knee saw 10/24,  indicated not OR candidate foot for gangrene distal L gt toe - per others Shingles R arm, C 6 (?C5) dermatome 
- vesicular lesions onset ~10/15 progressing compared to onset per NP 
- confluent in upper arm, not crusted yet  
-VZV PCR pos 10/18 
- completed 10 days of Acyclovir on 10/26  
-> can stop contact isolation and covering wounds now appear crusted if okay with hospital infection control Prior Leukocytosis/SIRS poss sepsis (resolved 10/19) 
- MOF multiple ID and non-infectious concerns outlined below, complicated, wbc 77.5E today from high 27K+ on 10/5 On  vancomycin/zosyn 9/27 
- C diff neg 9/29, sput C albicans, repeat CT 10/4 no new findings --cxr reviewed - increased atx rt base. Left base improved 
- Higher fever 10/16 101.2, 10/18 101.4 afebrile since 
- Ddx: HZV vs deep tissue injury of toe/buttock vs Line infection 
- blcx 10/16 NG x 2 
- TDC Tip 10/19 >15 CFU MRSE (1 of 2 blcx on adm 9/27 MRSE but afebrile then prob contaminant) - Suspect colonized with Pseudomonas now and at risk for subsequent infections given vent, HD and lines Suspected Pyelo POA  
- CTAP 9/27:  B perineph stranding, UA tntc wbc, uctx ng 
 - treated at this point Cholelithiasis choledocholithiais suspected acute cholecystitis  poss cystic stone POA- abnl lft bili 4 alk phos 400 seen by GI and GS Dr. Carrie Mullins, no plan for OR, for poss cholecystostomy if LFT worsen, bili, alk phos declining - AST fluctuating downward  -Dr. Anushka Marsh saw for GI 10/24 Elevated Lipase  
- lipase 2200 POA -> 3191 ->  5500 on 10/4 improved to 1207 10/19 but up again to 2169 10/22 interpretation complicated by JULIANNA -> fluctuating between [id number] (off mucomyst, TF) 
- CTAP 10/4: Normal pancreas -> per ICU team  
B infiltrates - poss edema infiltrate - RLL consolid better 10/4 cxr and suspect endobronchial clot contributed   ->monitor MRSE bacteremia 9/27 1 of 2. Has LUE midline unclear when placed Treated JULIANNA POA dialysis dependent 
- baseline cr 0.9 132/10.1 in ER 
-RIJ uldall 9/28 out 10/19 - R fem uldall 10/20 
-NEW [facility] TDC 10/31 -> per renal  
Bleeding -melena earlier, EGD 10/2 clot in esophagus bronch 10/3 R endobronch clot NEW removed 10/5 repeat bronch  -> per others Suspected anoxic brain injury on MRI 10/3 SP VF arrest 9/29 -> overall poor prognosis but family wishes aggressive care CVA R hemiparesis 9/10   
resp failure sp intubation 9/29 Comorbid: HTN HLD CHF h/o steatohepatitis MICROBIOLOGY:  
9/27 bctx 1 of 2 MRSE 
 uctx ng 
9/29 sput C albicans C diff neg 10/4 bctx x 2 NTD 
 fungitell indeterminate due to contamination 10/8 Cath tip cx ntd 10/8     bl cx ng 
10/16 Blcx x2 ng 
 Spcx NF 
10/18 VZV PCr +ve, HSV PCR neg 
10/19 Cath tip >15 CFU MRSE 
10/25 Spcx pseudomonas x2- panS 
 blcx x2 nG 
 uctx Pseudomonas Wd cx Pseud x2 panS, E faecalis S amp, CoNS 
10/31  Cath tip ip LINES AND CATHETERS:  
 
Left PICC 10/18 [facility] 10/31 Active Hospital Problems Diagnosis Date Noted  Pseudomonas infection 10/27/2018  Gangrene ([address] 10/22/2018  Hypoalbuminemia 10/21/2018  Shingles 10/21/2018  Ischemic encephalopathy 09/30/2018  Cardiac arrest with ventricular fibrillation ([facility] 09/29/2018 ROSC before defibrillation could be attempted.  Acute pancreatitis 09/29/2018 Lipase downtrends with interruption in tube feed and Mucomyst. Restarting tube feed did result in modest elevation but not to the levels seen while on Mucomyst. Nonetheless, there is signficant decrease in lipase levels with interruptions in tube feeding.  Elevated LFTs 09/28/2018  History of stroke 09/27/2018 With residual R hemiparesis  Acute-on-chronic kidney injury ([facility] 09/27/2018  Acute cystitis 09/27/2018  Elevated troponin 09/27/2018  Essential hypertension 09/10/2018  Diabetes mellitus type 2, controlled ([facility] 09/10/2018 Subjective: Interval notes reviewed, Afebrile. WBC mildly elvated. Remains in ICU. On vent through trach and tolerating peg feeds. Had new RIJ TDC placed today with removal of fem line and tip culture. No family at bedside Current Facility-Administered Medications Medication Dose Route Frequency  0.9% sodium chloride infusion 250 mL  250 mL IntraVENous PRN  
 insulin glargine (LANTUS) injection 27 Units  27 Units SubCUTAneous DAILY  cefepime (MAXIPIME) 0.5 g in 0.9% sodium chloride 100 mL IVPB  0.5 g IntraVENous Q24H  
 albuterol (PROVENTIL VENTOLIN) nebulizer solution 2.5 mg  2.5 mg Nebulization BID RT  
 multivitamin (MULTI-DELYN, WELLESSE) oral liquid 30 mL  30 mL Oral DAILY  bacitracin 500 unit/gram packet 1 Packet  1 Packet Topical PRN  
 sodium chloride (NS) flush 10-30 mL  10-30 mL InterCATHeter PRN  
 sodium chloride (NS) flush 10 mL  10 mL InterCATHeter Q24H  
 sodium chloride (NS) flush 10 mL  10 mL InterCATHeter PRN  
 sodium chloride (NS) flush 10-40 mL  10-40 mL InterCATHeter Q8H  
 sodium chloride (NS) flush 20 mL  20 mL InterCATHeter Q24H  
 heparin (porcine) injection 5,000 Units  5,000 Units SubCUTAneous Q8H  
  hydroxypropyl methylcellulose (ISOPTO TEARS) 0.5 % ophthalmic solution 2 Drop  2 Drop Both Eyes BID  epoetin manda (EPOGEN;PROCRIT) injection 40,000 Units  40,000 Units IntraVENous Q7D  
 pantoprazole (PROTONIX) 40 mg in sodium chloride 0.9% 10 mL injection  40 mg IntraVENous Q24H  
 0.9% sodium chloride infusion 250 mL  250 mL IntraVENous PRN  
 influenza vaccine 2018- (6 mos+)(PF) (FLUARIX QUAD/FLULAVAL QUAD) injection 0.5 mL  0.5 mL IntraMUSCular PRIOR TO DISCHARGE  acetaminophen (TYLENOL) solution 325 mg  325 mg Per NG tube Q4H PRN  
 0.9% sodium chloride infusion 250 mL  250 mL IntraVENous PRN  
 heparin (porcine) pf 100 Units  100 Units InterCATHeter PRN  
 0.9% sodium chloride infusion 250 mL  250 mL IntraVENous PRN  
 insulin lispro (HUMALOG) injection   SubCUTAneous Q6H  
 heparin (porcine) 1,000 unit/mL injection 1,000 Units  1,000 Units InterCATHeter DIALYSIS PRN  
 senna-docusate (PERICOLACE) 8.6-50 mg per tablet 1 Tab  1 Tab Oral BID PRN  
 ondansetron (ZOFRAN) injection 4 mg  4 mg IntraVENous Q4H PRN  
 glucose chewable tablet 16 g  4 Tab Oral PRN  
 glucagon (GLUCAGEN) injection 1 mg  1 mg IntraMUSCular PRN  
 dextrose (D50) infusion 12.5-25 g  25-50 mL IntraVENous PRN  
 hydrALAZINE (APRESOLINE) 20 mg/mL injection 20 mg  20 mg IntraVENous Q6H PRN Objective:  
 
Visit Vitals /54 Pulse 95 Temp 99.4 °F (37.4 °C) Resp 24 Ht 5' 7\" (1.702 m) Wt 89.2 kg (196 lb 9.6 oz) SpO2 100% BMI 30.79 kg/m² Temp (24hrs), Av.6 °F (37.6 °C), Min:98.5 °F (36.9 °C), Max:100.2 °F (37.9 °C) GEN: well developed 57year-old AA male on vent/trach in ICU, unresponsive HENT: no thrush Neck: tracheostomy in place. NEW TDC RIJ site ok CHEST: coarse bs B no wheeze or rhonchi CVS: RRR, no murmur appreciated ABD: soft, + BS no localized tenderness, PEG in place EXT: 1+ pitting edema b/l LE  
SKIN:  RUE lesions crusted, LUE picc CNS:eyes open, unresponsive to commands or deep sternal rub Labs: Results:  
Chemistry Recent Labs 10/31/18 
0336 10/30/18 
0435 10/29/18 
1645 10/29/18 
0400 * 226*  --  120*  138  --  138  
K 4.2 5.0  --  4.7  102  --  103 CO2 28 22  --  24 BUN 32* 67*  --  56* CREA 2.57* 4.98*  --  4.08* CA 7.9* 7.7*  --  8.5 AGAP 9 14  --  11  
BUCR 12 13  --  14  
AP  --   --  375*  --   
TP  --   --  6.7  --   
ALB 1.5* 1.4* 1.5* 1.5*  
GLOB  --   --  5.2*  --   
AGRAT  --   --  0.3*  --   
  
CBC w/Diff Recent Labs 10/31/18 
0336 10/30/18 
0435 10/29/18 
0400 WBC 16.6* 13.1 18.7*  
RBC 2.97* 2.44* 2.58* HGB 8.3* 6.7* 7.4* HCT 26.0* 21.6* 23.4*  
* 490* 520*  
  
 
10/27 cxr IMPRESSION: 
  
Right lung perihilar infiltrate appears present 10/4 CTAP IMPRESSION: 
1. Dense subtotal or total consolidation right lower lobe compatible with 
pneumonia similar to prior study. Small bilateral pleural effusions similar in 
size. 2. Distended gallbladder with gallstones and gallbladder sludge without 
significant change in appearance and also described in detail on ultrasound of 
9/27/2018. 3. Indeterminate small lesion left hepatic lobe without change. Hepatomegaly 
again evident. 4. No intraperitoneal fluid. Possible small left upper pole renal cyst. 
Cardiomegaly. Nasogastric tube tip in body of stomach. Microbiology Results All Micro Results Procedure Component Value Units Date/Time Micah Zhang TIP [[id number]] Collected:  10/31/18 9934 Order Status:  Completed Specimen:  Catheter Tip Updated:  10/31/18 1131 CULTURE, BLOOD [[id number]] Collected:  10/25/18 1328 Order Status:  Completed Specimen:  Whole Blood Updated:  10/31/18 0910 Special Requests: NO SPECIAL REQUESTS Culture result: NO GROWTH 6 DAYS     
 CULTURE, BLOOD [[id number]] Collected:  10/25/18 1322 Order Status:  Completed Specimen:  Whole Blood Updated:  10/31/18 0910 Special Requests: NO SPECIAL REQUESTS Culture result: NO GROWTH 6 DAYS     
 CULTURE, Marlane  STAIN [[id number]]  (Abnormal)  (Susceptibility) Collected:  10/25/18 1359 Order Status:  Completed Specimen:  Wound from Tracheostomy site Updated:  10/28/18 6785 Special Requests: NO SPECIAL REQUESTS     
  GRAM STAIN FEW WBC'S     
   RARE GRAM NEGATIVE RODS Culture result: MANY PSEUDOMONAS AERUGINOSA MODERATE PSEUDOMONAS AERUGINOSA 2ND MORPHOTYPE  
     
      
  FEW ENTEROCOCCUS FAECALIS GROUP D  
     
      
  FEW STAPHYLOCOCCUS SPECIES, COAGULASE NEGATIVE (TWO MORPHOTYPES) CULTURE, RESPIRATORY/SPUTUM/BRONCH Annetta Beech STAIN [[id number]]  (Abnormal)  (Susceptibility) Collected:  10/25/18 1325 Order Status:  Completed Specimen:  Sputum from Tracheal Aspirate Updated:  10/28/18 4496 Special Requests: NO SPECIAL REQUESTS     
  GRAM STAIN MODERATE WBC'S     
   RARE GRAM NEGATIVE RODS Culture result: MANY PSEUDOMONAS AERUGINOSA  
     
      
  MANY PSEUDOMONAS AERUGINOSA 2ND MORPHOTYPE  
     
      
  RARE NORMAL RESPIRATORY SHANAE  
     
 CULTURE, URINE [[id number]]  (Abnormal)  (Susceptibility) Collected:  10/25/18 1359 Order Status:  Completed Specimen:  Cath Urine Updated:  10/27/18 0101 Special Requests: NO SPECIAL REQUESTS Culture result:    
  68456 COLONIES/mL PSEUDOMONAS AERUGINOSA CULTURE, CATHETER TIP [[id number]]  (Abnormal)  (Susceptibility) Collected:  10/19/18 1330 Order Status:  Completed Specimen:  Catheter Tip Updated:  10/22/18 4422 Special Requests: NO SPECIAL REQUESTS Culture result:    
  >15 CFU STAPHYLOCOCCUS EPIDERMIDIS  
     
 CULTURE, BLOOD [[id number]] Collected:  10/16/18 1055 Order Status:  Completed Specimen:  Blood Updated:  10/22/18 0809   Special Requests: PERIPHERAL     
 Culture result: NO GROWTH 6 DAYS     
 CULTURE, BLOOD [[id number]] Collected:  10/16/18 1047 Order Status:  Completed Specimen:  Blood Updated:  10/22/18 0809 Special Requests: PERIPHERAL Culture result: NO GROWTH 6 DAYS     
 HSV 1 AND 2 BY PCR [[id number]] Collected:  10/18/18 1815 Order Status:  Completed Specimen:  Other from Miscellaneous sample Updated:  10/21/18 1636 Source OTHER TEST     
  HSV-1 DNA by PCR NEGATIVE      
  HSV-2 DNA by PCR NEGATIVE Comment: (NOTE) This test was developed and its performance characteristics 
determined by [employer]. It has not been cleared or 
approved by the U.S. Food and Drug Administration. The FDA has 
determined that such clearance or approval is not necessary. This 
test is used for clinical purposes. It should not be regarded as 
investigational or research. Performed At: [address] [id number] Francy Platt MD [id number] CULTURE, RESPIRATORY/SPUTUM/BRONCH Halima Antonio [[id number]] Collected:  10/16/18 1915 Order Status:  Completed Specimen:  Sputum,ET Suction Updated:  10/18/18 1011 Special Requests: NO SPECIAL REQUESTS     
  GRAM STAIN 10-25 WBC/lpf     
   <10 EPI/lpf FEW GRAM POSITIVE COCCI IN PAIRS MODERATE GRAM POSITIVE COCCI IN GROUPS MUCUS PRESENT Culture result: MODERATE NORMAL RESPIRATORY SHANAE  
     
 CULTURE, BLOOD [[id number]] Collected:  10/08/18 1227 Order Status:  Completed Specimen:  Blood Updated:  10/14/18 0741 Special Requests: PERIPHERAL Culture result: NO GROWTH 6 DAYS     
 CULTURE, CATHETER TIP [[id number]] Collected:  10/08/18 1215 Order Status:  Completed Specimen:  Catheter Tip Updated:  10/11/18 1873 Special Requests: NO SPECIAL REQUESTS Culture result: NO GROWTH 3 DAYS     
 CULTURE, BLOOD [[id number]] Collected:  10/04/18 0900 Order Status:  Completed Specimen:  Blood Updated:  10/10/18 8158 Special Requests: PERIPHERAL Culture result: NO GROWTH 6 DAYS     
 CULTURE, BLOOD [[id number]] Collected:  10/04/18 1120 Order Status:  Completed Specimen:  Blood Updated:  10/10/18 9842 Special Requests: PERIPHERAL Culture result: NO GROWTH 6 DAYS     
 CULTURE, BLOOD [[id number]] Collected:  09/27/18 0005 Order Status:  Completed Specimen:  Blood Updated:  10/03/18 0845 Special Requests: NO SPECIAL REQUESTS Culture result: NO GROWTH 6 DAYS     
 CULTURE, BLOOD [[id number]]  (Abnormal)  (Susceptibility) Collected:  09/27/18 0136 Order Status:  Completed Specimen:  Blood Updated:  10/02/18 5352 Special Requests: NO SPECIAL REQUESTS     
  GRAM STAIN PEDIATRIC BOTTLE GRAM POSITIVE COCCI IN PAIRS IN CLUSTERS  
      
  SMEAR CALLED TO AND CORRECTLY REPEATED BY: Lorraine Manzano RN IN 2700 ON 9/29/18 AT 2033 WF Culture result:    
  AEROBIC BOTTLE STAPHYLOCOCCUS EPIDERMIDIS  
     
 CULTURE, RESPIRATORY/SPUTUM/BRONCH Ed Hosteller STAIN [[id number]]  (Abnormal) Collected:  09/29/18 1210 Order Status:  Completed Specimen:  Sputum from Endotracheal aspirate Updated:  10/02/18 6246 Special Requests: NO SPECIAL REQUESTS     
  GRAM STAIN >25 WBC/lpf     
   <10 EPI/lpf MUCUS PRESENT     
   MODERATE YEAST Culture result: MANY CANDIDA TROPICALIS C. DIFFICILE/EPI PCR [[id number]] Collected:  09/29/18 1400 Order Status:  Completed Specimen:  Stool Updated:  09/29/18 2248 Special Requests: NO SPECIAL REQUESTS Culture result: Toxigenic C. difficile NEGATIVE                         C. difficile target DNA sequences are not detected. CULTURE, URINE [[id number]] Collected:  09/27/18 0029 Order Status:  Completed Specimen:  Cath Urine Updated:  09/29/18 0944 Special Requests: NO SPECIAL REQUESTS Culture result: NO GROWTH 2 DAYS Sara Jacinto MD, FACP October 31, 2018 [facility] [phone]

## 2018-10-31 NOTE — PROGRESS NOTES
-0940-Pt. Was transferred to cath. Lab and returned without complications via portable vent. Pt. On ventilator Spontaneous mode. PS-15, PEEP-5, FIO2-30%. O2 sats-97% HR-96, RR-20. #8.0 Portex trach noted to be patent and secure. -[facility] 
 
--1612-Pt. Remains on ventilator Spontaneous mode. PS- was decreased by MD earlier in the shift to 12 cmH2o. MD order is to titrate PS to maintain VT> 400ml. It is noted at this time [id number]. Therefore, PS increased to [id number]. VT- noted at 425ml. -[address]

## 2018-11-01 NOTE — PROGRESS NOTES
Problem: Falls - Risk of 
Goal: *Absence of Falls Document Argenis Primes Fall Risk and appropriate interventions in the flowsheet. Outcome: Progressing Towards Goal 
Fall Risk Interventions: 
Mobility Interventions: Assess mobility with egress test, Bed/chair exit alarm, Communicate number of staff needed for ambulation/transfer, Strengthening exercises (ROM-active/passive) Mentation Interventions: Bed/chair exit alarm, Door open when patient unattended, Evaluate medications/consider consulting pharmacy, Familiar objects from home, Increase mobility, More frequent rounding, Reorient patient, Room close to nurse's station, Toileting rounds, Update white board Medication Interventions: Evaluate medications/consider consulting pharmacy Elimination Interventions: Bed/chair exit alarm, Toileting schedule/hourly rounds History of Falls Interventions: Evaluate medications/consider consulting pharmacy, Room close to nurse's station Problem: Pressure Injury - Risk of 
Goal: *Prevention of pressure injury Document Mitul Scale and appropriate interventions in the flowsheet. Outcome: Progressing Towards Goal 
Pressure Injury Interventions: 
Sensory Interventions: Assess changes in LOC, Assess need for specialty bed, Avoid rigorous massage over bony prominences, Float heels, Keep linens dry and wrinkle-free, Maintain/enhance activity level, Minimize linen layers, Monitor skin under medical devices, Pad between skin to skin, Pressure redistribution bed/mattress (bed type), Turn and reposition approx. every two hours (pillows and wedges if needed) Moisture Interventions: Absorbent underpads, Apply protective barrier, creams and emollients, Assess need for specialty bed, Check for incontinence Q2 hours and as needed, Contain wound drainage, Internal/External fecal devices, Maintain skin hydration (lotion/cream), Minimize layers, Moisture barrier, Offer toileting Q_hr 
 
 Activity Interventions: Pressure redistribution bed/mattress(bed type) Mobility Interventions: HOB 30 degrees or less, Pressure redistribution bed/mattress (bed type), Turn and reposition approx. every two hours(pillow and wedges) Nutrition Interventions: Document food/fluid/supplement intake, Discuss nutritional consult with provider, Offer support with meals,snacks and hydration Friction and Shear Interventions: Foam dressings/transparent film/skin sealants, HOB 30 degrees or less, Minimize layers, Lift team/patient mobility team, Lift sheet, Transferring/repositioning devices

## 2018-11-01 NOTE — PROGRESS NOTES
Left message for bryan at [facility]. Pt's dtr has not done wing lucy. Left message for dtr to call me. Told her pt is on contact isolation. Gave her number of [employer] worker who frank spoke to about how long pt has to have trach before they will [address] [place] [address] to call her as they have different info. Her name is Kina , # is [phone] ext [phone]

## 2018-11-01 NOTE — PROGRESS NOTES
0700: Received report. Assumed care of patient in bed in low locked position with call bell and phone within reach. 0900: Patient  Had [facility] placed and tolerated well site clean, dry intact with no bleeding. 1900: Bedside and Verbal shift change report given to [address] [place] [address] (oncoming nurse) by Dima Lee RN (offgoing nurse). Report included the following information SBAR, Recent Results, Med Rec Status and Cardiac Rhythm sinus .

## 2018-11-01 NOTE — PROGRESS NOTES
PCCM Progress Note I have reviewed the flowsheet and previous days notes. Events, vitals, medications and notes from last 24 hours reviewed. Care plan discussed with staff and on multidisciplinary rounds. Subjective: 
11/1/2018 No acute events. Getting HD today. Patient Active Problem List  
Diagnosis Code  Stroke (cerebrum) ([facility]) I63.9  Stroke ([facility] I63.9  Rhabdomyolysis M62.82  
 Dehydration E86.0  
 Essential hypertension I10  
 Diabetes mellitus type 2, controlled ([facility] [address] E11.9  Non compliance w medication regimen Z91.14  
 DM (diabetes mellitus) ([facility] [address] E11.9  History of stroke Z80.78  
 Acute-on-chronic kidney injury ([facility] [address] N17.9, N18.9  Acute cystitis N30.00  Elevated troponin R74.8  Elevated LFTs R94.5  Cardiac arrest with ventricular fibrillation ([facility]) I46.9, I49.01  
 Acute pancreatitis K85.90  
 Ischemic encephalopathy I67.89  
 Hypoalbuminemia E88.09  
 Shingles B02.9  Gangrene ([facility] [address] [id number]  Pseudomonas infection B96.5 Assessment: 
Acute Respiratory Failure with Hypoxia - Intubated x18 days, breathing spontaneously - s/p tracheostomy on 10/17 
- Trach exchange done 10/27 
- Tolerating PS 15/5 Anoxic Brain Injury - 2/2 cardiac arrest, MRI with consistent findings Acute Kidney Injury 
- oliguric,  on HD Pseudomonas UTI/Bronchitis - BCx with NGTD 
- UA and respiratory culture with pan-senstivity 
- ID following Hx of CVA 
- R hemiparesis GI Bleed - s/p endoscopy x2 with ulceration noted in esophagus - Resolved Anemia 
- likely due to ESRD and iatrogenic 
- transfuse as needed -  On EPO 
DM 
- on insulin VZV of R arm 
- completed Acyclovir Gangrene of L great toe - Podiatry states to place betadine every day Elevated Lipase - Remains over 1000 
- No evidence of pancreatitis on CT, + GBS 
- Cannot get MRCP at this time 
- Unable to pass J-tube 
  
Impression/Plan: 
- Continue TFs, monitor lipase - Continue PS, wean as tolerated - Cefepime per ID 
- GI and DVT ppx- heparin and protonix 
- add amlodipine for BP control Awaiting LTAC placement, medically cleared for transfer 
  
Very poor prognosis, daughter wishes to pursue aggressive care 
  
OTHER: 
Glycemic Control- glucose stabilizer per ICU protocol when on insulin drip. Maintain blood glucose 140-180. Replace electrolytes per ICU electrolyte replacement protocol Ventilator bundle & Sedation protocol followed. Daily morning sedation holiday, assessment for readiness for SBT & weaning from ventilator; and then re-titrate if required. Aim to keep peak plateau pressure 48-77JN H2O in ARDS patient. [place] tube to suction at 20-30 cm H2O, Maintain [place] tube with 5-10ml air every 4 hours. Chlorhexidine mouth washes and routine oral care every 4 hours. Stress ulcer and DVT prophylaxis. HOB >=30 degree elevation all the time. HOB >=30 degree elevation all the time. Aggressive pulmonary toileting. Incentive spirometry when appropriate. Aspiration precautions. CC TIME: >25 min Medication Reviewed: 
 
No Known Allergies Past Medical History:  
Diagnosis Date  CHF (congestive heart failure) ([facility]  Diabetes ([facility]  [facility] History reviewed. No pertinent surgical history. Social History Tobacco Use  Smoking status: Never Smoker  Smokeless tobacco: Never Used Substance Use Topics  Alcohol use: No  
  
History reviewed. No pertinent family history. Prior to Admission medications Medication Sig Start Date End Date Taking? Authorizing Provider  
aspirin (ASPIRIN) 325 mg tablet Take 1 Tab by mouth daily. 9/15/18   Tenzin Solorzano MD  
atorvastatin (LIPITOR) 80 mg tablet Take 1 Tab by mouth nightly.  9/14/18   Tam Dumont MD  
insulin glargine (LANTUS) 100 unit/mL injection 10 units qhs  Indications: type 2 diabetes mellitus 9/15/18   Tam Dumont MD  
 insulin lispro (HUMALOG) 100 unit/mL injection 4 units with meals 9/14/18   Anthony Solorzano MD  
losartan (COZAAR) 50 mg tablet Take 1 Tab by mouth daily. 9/14/18   Jody Flannery MD  
 
Current Facility-Administered Medications Medication Dose Route Frequency  amLODIPine (NORVASC) tablet 5 mg  5 mg Oral DAILY  insulin glargine (LANTUS) injection 27 Units  27 Units SubCUTAneous DAILY  cefepime (MAXIPIME) 0.5 g in 0.9% sodium chloride 100 mL IVPB  0.5 g IntraVENous Q24H  
 albuterol (PROVENTIL VENTOLIN) nebulizer solution 2.5 mg  2.5 mg Nebulization BID RT  
 multivitamin (MULTI-DELYN, WELLESSE) oral liquid 30 mL  30 mL Oral DAILY  sodium chloride (NS) flush 10 mL  10 mL InterCATHeter Q24H  
 sodium chloride (NS) flush 10-40 mL  10-40 mL InterCATHeter Q8H  
 sodium chloride (NS) flush 20 mL  20 mL InterCATHeter Q24H  
 heparin (porcine) injection 5,000 Units  5,000 Units SubCUTAneous Q8H  
 hydroxypropyl methylcellulose (ISOPTO TEARS) 0.5 % ophthalmic solution 2 Drop  2 Drop Both Eyes BID  epoetin manda (EPOGEN;PROCRIT) injection 40,000 Units  40,000 Units IntraVENous Q7D  
 pantoprazole (PROTONIX) 40 mg in sodium chloride 0.9% 10 mL injection  40 mg IntraVENous Q24H  
 influenza vaccine 2018-19 (6 mos+)(PF) (FLUARIX QUAD/FLULAVAL QUAD) injection 0.5 mL  0.5 mL IntraMUSCular PRIOR TO DISCHARGE  insulin lispro (HUMALOG) injection   SubCUTAneous Q6H Lines: All central lines examined by me. No signs of erythema, induration, discharge. Peripherally Inserted Central Catheter: PICC Double Lumen 10/18/18 Left;Brachial (Active) Central Line Being Utilized Yes 10/29/2018  4:00 AM  
Criteria for Appropriate Use Limited/no vessel suitable for conventional peripheral access 10/29/2018  4:00 AM  
Site Assessment Clean, dry, & intact 10/29/2018  4:00 AM  
Phlebitis Assessment 0 10/29/2018  4:00 AM  
Infiltration Assessment 0 10/29/2018  4:00 AM  
 Arm Circumference (cm) 38 cm 10/26/2018 12:00 AM  
Date of Last Dressing Change 10/25/18 10/29/2018  4:00 AM  
Dressing Status Clean, dry, & intact; Occlusive 10/29/2018  4:00 AM  
Action Taken Open ports on tubing capped 10/29/2018  4:00 AM  
External Catheter Length (cm) 0 centimeters 10/26/2018 12:00 AM  
Dressing Type Disk with Chlorhexadine gluconate (CHG); Stabilization/securement device;Transparent 10/29/2018  4:00 AM  
Hub Color/Line Status Red; Infusing 10/29/2018  4:00 AM  
Positive Blood Return (Site #1) Yes 10/29/2018  4:00 AM  
Hub Color/Line Status Purple;Flushed;Capped 10/29/2018  4:00 AM  
Positive Blood Return (Site #2) No 10/29/2018  4:00 AM  
Alcohol Cap Used Yes 10/29/2018  4:00 AM  
 
  
Hemodialysis Catheter: 
Hemodialysis Access 10/19/18 (Active) Central Line Being Utilized Yes 10/29/2018  4:00 AM  
Criteria for Appropriate Use Dialysis/apheresis 10/29/2018  4:00 AM  
Date Accessed  10/27/18 10/29/2018  4:00 AM  
Site Assessment Clean, dry, & intact 10/29/2018  4:00 AM  
Date of Last Dressing Change 10/24/18 10/29/2018  4:00 AM  
Dressing Status Clean, dry, & intact; Occlusive 10/29/2018  4:00 AM  
Dressing Type Disk with Chlorhexadine gluconate (CHG); Transparent 10/29/2018  4:00 AM  
Proximal Hub Color/Line Status Capped 10/29/2018  4:00 AM  
Distal Hub Color/Line Status Capped 10/29/2018  4:00 AM  
 
Drain(s): PEG/Gastrostomy Tube 10/17/18 (Active) Site Assessment Clean, dry, & intact 10/29/2018  4:00 AM  
Dressing Status Clean, dry, & intact 10/29/2018  4:00 AM  
G Port Status Clamped 10/29/2018  4:00 AM  
External Catheter Length (cm) 3 centimeters 10/26/2018  4:00 PM  
Action Taken Placement verified (comment) 10/28/2018 12:00 PM  
Drainage Description Other (Comment) 10/24/2018  8:00 AM  
Gastric Residual (mL) 0 ml 10/28/2018  8:00 PM  
Tube Feeding/Formula Options Osmolite 1.2 10/28/2018 12:00 PM  
Tube Feeding/Verify Rate (mL/hr) 0 10/28/2018  4:00 PM  
 Water Flush Volume (mL) 60 mL 10/27/2018  8:30 AM  
Intake (ml) 0 ml 10/28/2018  6:00 PM  
Medication Volume 30 ml 10/27/2018  8:30 AM  
Drainage Chamber Level (ml) 0 ml 10/23/2018  8:00 PM  
Output (ml) 0 ml 10/23/2018  8:00 PM  
   
Fecal Management (Active) Stool Consistency Liquid 10/29/2018  9:24 AM  
Position of Indicator Line Visible 10/29/2018  9:24 AM  
Signal Indicator Bubble Appropriate 10/29/2018  9:24 AM  
Skin Assessment of the Anal Area Denuded/excoriated 10/29/2018  9:24 AM  
Tube Irrigated No 10/29/2018  9:24 AM  
Irrigation Volume (mL) 0 10/29/2018  9:24 AM  
Drainage Bag Level (mL) 450 10/29/2018  9:24 AM  
Output (ml) 20 ml 10/29/2018  9:24 AM  
 
Airway: Airway - Tracheostomy Tube 10/17/18 Portex; Cuffed (Active) Cuff Pressure 30 cmH20 10/28/2018  8:43 PM  
Site Assessment Clean, dry, & intact 10/29/2018  9:20 AM  
Trach Dressing Changed Yes 10/29/2018  9:20 AM  
Trach Cleaned With Normal saline 10/29/2018  9:20 AM  
Trach Tie Changed No 10/29/2018  9:20 AM  
Trach Cannula Changed Yes 10/29/2018  9:20 AM  
Inner Cannula Cuffed, cuff inflated; Fenestrated; Other (comment) 10/29/2018  9:20 AM  
Line Ledesma Schilder Top of the lock 10/29/2018  9:20 AM  
Side Secured Centered 10/29/2018  9:20 AM  
Spare Trach at Bedside Yes 10/29/2018  9:20 AM  
Spare Noreene [place] Tube One Size Smaller at Bedside Yes 10/29/2018  9:20 AM  
Ambu Bag With Mask at Bedside Yes 10/29/2018  9:20 AM  
 
 
Objective: 
Vital Signs:   
Visit Vitals /72 Pulse 95 Temp 97.5 °F (36.4 °C) (Oral) Resp (!) 31 Ht 5' 7\" (1.702 m) Wt 89.2 kg (196 lb 9.6 oz) SpO2 100% BMI 30.79 kg/m² O2 Device: Tracheostomy O2 Flow Rate (L/min): 45 l/min Temp (24hrs), Av.5 °F (36.9 °C), Min:96.6 °F (35.9 °C), Max:100.6 °F (38.1 °C) Intake/Output:  
Last shift:      701 - 1900 In: 170 Out: 0 Last 3 shifts: 10/30 1901 - 700 In: 1190 [I.V.:200] Out: 1392 [Urine:410; Drains:265] Intake/Output Summary (Last 24 hours) at 11/1/2018 1413 Last data filed at 11/1/2018 1000 Gross per 24 hour Intake 870 ml Output 425 ml Net 445 ml Last 3 Recorded Weights in this Encounter 10/29/18 1016 10/30/18 0621 10/31/18 0930 Weight: 76.3 kg (168 lb 4.8 oz) 89.3 kg (196 lb 12.8 oz) 89.2 kg (196 lb 9.6 oz) Ventilator Settings: 
Mode Rate Tidal Volume Pressure FiO2 PEEP Spontaneous   500 ml  14 cm H2O 30 % 5 cm H20 Peak airway pressure: 20 cm H2O Plateau pressure:   
Tidal volume:  
Minute ventilation: 8.8 l/min SPO2  
 
ARDS network Guidelines: Lung protective strategy and Plateau pressure goals less than or equal to 30 Physical Exam:  
 
General/Neurology: Eyes open, nonresponsive, withdrawing to pain in LE. Head:   Normocephalic, without obvious abnormality Eye:   Small pupils but reactive,  
Oral:   Mucus membranes moist 
Neck:   Supple, trach in place, wound clean 
Lung:   B/l air movement. No wheezing or rales. Heart:   Systolic murmur present, RRR Abdomen/: Soft, non tender, + PEG tube Extremities:  Gangrenous L Great toe Skin:   Blisters of R arm improving. Data: 
   
Recent Results (from the past 24 hour(s)) HEPATIC FUNCTION PANEL Collection Time: 10/31/18  5:28 PM  
Result Value Ref Range Protein, total 6.5 6.4 - 8.2 g/dL Albumin 1.4 (L) 3.4 - 5.0 g/dL Globulin 5.1 (H) 2.0 - 4.0 g/dL A-G Ratio 0.3 (L) 0.8 - 1.7 Bilirubin, total 0.6 0.2 - 1.0 MG/DL Bilirubin, direct 0.4 (H) 0.0 - 0.2 MG/DL Alk. phosphatase 388 (H) 45 - 117 U/L  
 AST (SGOT) 76 (H) 15 - 37 U/L  
 ALT (SGPT) 89 (H) 16 - 61 U/L  
GLUCOSE, POC Collection Time: 10/31/18  5:43 PM  
Result Value Ref Range Glucose (POC) 134 (H) 70 - 110 mg/dL GLUCOSE, POC Collection Time: 10/31/18 11:42 PM  
Result Value Ref Range Glucose (POC) 162 (H) 70 - 110 mg/dL LIPASE Collection Time: 11/01/18  3:45 AM  
Result Value Ref Range  Lipase 1,157 (H) 73 - 393 U/L  
 RENAL FUNCTION PANEL Collection Time: 11/01/18  3:45 AM  
Result Value Ref Range Sodium 138 136 - 145 mmol/L Potassium 4.5 3.5 - 5.5 mmol/L Chloride 103 100 - 108 mmol/L  
 CO2 28 21 - 32 mmol/L Anion gap 7 3.0 - 18 mmol/L Glucose 105 (H) 74 - 99 mg/dL BUN 52 (H) 7.0 - 18 MG/DL Creatinine 3.89 (H) 0.6 - 1.3 MG/DL  
 BUN/Creatinine ratio 13 12 - 20 GFR est AA 19 (L) >60 ml/min/1.73m2 GFR est non-AA 16 (L) >60 ml/min/1.73m2 Calcium 7.6 (L) 8.5 - 10.1 MG/DL Phosphorus 5.4 (H) 2.5 - 4.9 MG/DL Albumin 1.4 (L) 3.4 - 5.0 g/dL CBC W/O DIFF Collection Time: 11/01/18  3:45 AM  
Result Value Ref Range WBC 14.2 (H) 4.6 - 13.2 K/uL  
 RBC 2.77 (L) 4.70 - 5.50 M/uL HGB 7.6 (L) 13.0 - 16.0 g/dL HCT 24.4 (L) 36.0 - 48.0 % MCV 88.1 74.0 - 97.0 FL  
 MCH 27.4 24.0 - 34.0 PG  
 MCHC 31.1 31.0 - 37.0 g/dL  
 RDW 16.4 (H) 11.6 - 14.5 % PLATELET 400 (H) 823 - 420 K/uL MPV 8.9 (L) 9.2 - 11.8 FL  
GLUCOSE, POC Collection Time: 11/01/18  5:48 AM  
Result Value Ref Range Glucose (POC) 136 (H) 70 - 110 mg/dL GLUCOSE, POC Collection Time: 11/01/18 12:09 PM  
Result Value Ref Range Glucose (POC) 132 (H) 70 - 110 mg/dL Chemistry Recent Labs 11/01/18 
0345 10/31/18 
1728 10/31/18 
8301 10/30/18 
0435 10/29/18 
1645 *  --  115* 226*  --   
  --  138 138  --   
K 4.5  --  4.2 5.0  --   
  --  101 102  --   
CO2 28  --  28 22  --   
BUN 52*  --  32* 67*  --   
CREA 3.89*  --  2.57* 4.98*  --   
CA 7.6*  --  7.9* 7.7*  --   
MG  --   --   --  2.3  --   
PHOS 5.4*  --  3.8 6.5*  --   
AGAP 7  --  9 14  --   
BUCR 13  --  12 13  --   
AP  --  388*  --   --  375* TP  --  6.5  --   --  6.7 ALB 1.4* 1.4* 1.5* 1.4* 1.5*  
GLOB  --  5.1*  --   --  5.2* AGRAT  --  0.3*  --   --  0.3* CBC w/Diff Recent Labs 11/01/18 
0345 10/31/18 
0347 10/30/18 
7724 WBC 14.2* 16.6* 13.1 RBC 2.77* 2.97* 2.44* HGB 7.6* 8.3* 6.7*  
 HCT 24.4* 26.0* 21.6*  
* 529* 490* ABG No results for input(s): PHI, PHI, POC2, PCO2I, PO2, PO2I, HCO3, HCO3I, FIO2, FIO2I in the last 72 hours. Micro Recent Labs 10/31/18 
0974 CULT <15 CFU GRAM NEGATIVE RODS* Recent Labs 10/31/18 
6338 CULT <15 CFU GRAM NEGATIVE RODS*  
 
 
CT (Most Recent) Results from [facility] Encounter encounter on 09/26/18 CT ABD PELV W CONT Narrative EXAM: CT of the abdomen and pelvis INDICATION: Pneumonia. Acute pyelonephritis. Sepsis. Abnormal liver function 
tests. Dialysis patient. COMPARISON: None. TECHNIQUE: Axial CT imaging of the abdomen and pelvis was performed with 
intravenous contrast. Multiplanar reformats were generated. One or more dose 
reduction techniques were used on this CT: automated exposure control, 
adjustment of the mAs and/or kVp according to patient size, and iterative 
reconstruction techniques. The specific techniques used on this CT exam have 
been documented in the patient's electronic medical record. 
 
_______________ FINDINGS: 
 
LOWER CHEST: Dense consolidation right lower lobe compatible with pneumonia. Small bilateral pleural effusions. Cardiomegaly. Fluid-filled esophagus with 
nasogastric tube in place. LIVER, BILIARY: Indeterminate low-attenuation 7 mm focus anterior left hepatic 
lobe. Hepatomegaly. No biliary dilation. Distended gallbladder with gallstones 
and indeterminate material in the gallbladder which may represent sludge. PANCREAS: Normal. 
 
SPLEEN: Normal. 
 
ADRENALS: Normal. 
 
KIDNEYS/URETERS: No hydronephrosis or renal calculus. Small low attenuation 
cortical focus medial upper pole left kidney not well delineated measuring about 1.1 cm. This may represent renal cyst. 
 
LYMPH NODES: No enlarged lymph nodes. GASTROINTESTINAL TRACT: No bowel dilation or wall thickening. Normal appendix. PELVIC ORGANS: No intraperitoneal fluid.  Incidental vas deferens calcifications. VASCULATURE: Mild atherosclerosis. BONES: No acute or aggressive osseous abnormalities identified. OTHER: None. 
 
_______________ Impression IMPRESSION: 
 
 
1. Dense subtotal or total consolidation right lower lobe compatible with 
pneumonia similar to prior study. Small bilateral pleural effusions similar in 
size. 2. Distended gallbladder with gallstones and gallbladder sludge without 
significant change in appearance and also described in detail on ultrasound of 
9/27/2018. 3. Indeterminate small lesion left hepatic lobe without change. Hepatomegaly 
again evident. 4. No intraperitoneal fluid. Possible small left upper pole renal cyst. 
Cardiomegaly. Nasogastric tube tip in body of stomach. XR (Most Recent). CXR reviewed by me and compared with previous CXR Results from [facility] Encounter encounter on 09/26/18 XR CHEST PORT Narrative EXAM: Portable Chest 
 
CLINICAL INDICATION:  trach change; atelectasis -Additional:  None COMPARISON:  10/24/18 TECHNIQUE: AP portable view at [id number] [facility] 
 
______________ FINDINGS: 
 
HEART AND MEDIASTINUM:  The heart size is stable LUNGS AND AIRWAYS:  Right chest perihilar infiltrate is present PLEURA:  No pneumothorax or pleural effusion BONES:  Vertebral spondylosis OTHER:  None 
 
______________ Impression IMPRESSION: 
 
Right lung perihilar infiltrate appears present High complexity decision making was performed during the evaluation of this patient at high risk for decompensation with multiple organ involvement Above mentioned total time spent on reviewing the case/medical record/data/notes/EMR/patient examination/documentation/coordinating care with nurse/consultants, exclusive of procedures with complex decision making performed and > 50% time spent in face to face evaluation. Darlin Wang MD 
Critical Care Medicine

## 2018-11-01 NOTE — PROGRESS NOTES
Spoke with asmita canchola, med [facility]. pts dtr has not shown up to do wing application. Called dtr and left message for her to call me and asmita. Told her placement  Would not be feasible with out wing application being done.

## 2018-11-01 NOTE — PROGRESS NOTES
Problem: Falls - Risk of 
Goal: *Absence of Falls Document [place] Officer Fall Risk and appropriate interventions in the flowsheet. Outcome: Progressing Towards Goal 
Fall Risk Interventions: 
Mobility Interventions: Assess mobility with egress test 
 
Mentation Interventions: Evaluate medications/consider consulting pharmacy Medication Interventions: Evaluate medications/consider consulting pharmacy Elimination Interventions: Bed/chair exit alarm History of Falls Interventions: Evaluate medications/consider consulting pharmacy Problem: Pressure Injury - Risk of 
Goal: *Prevention of pressure injury Document Mitul Scale and appropriate interventions in the flowsheet. Outcome: Progressing Towards Goal 
Pressure Injury Interventions: 
Sensory Interventions: Assess changes in LOC, Assess need for specialty bed Moisture Interventions: Apply protective barrier, creams and emollients Activity Interventions: Pressure redistribution bed/mattress(bed type) Mobility Interventions: Pressure redistribution bed/mattress (bed type) Nutrition Interventions: Document food/fluid/supplement intake Friction and Shear Interventions: Foam dressings/transparent film/skin sealants

## 2018-11-01 NOTE — PROGRESS NOTES
In Patient Progress note Admit Date: 9/26/2018 Impression: 1. Prolonged dialysis dependant JULIANNA (ischemic atn in a setting of acute pyelonephritis/acute cholecystitis with sepsis and cardiac arrest 9/29). No evidence of recovery HD today for vol and solute, seen on HD , continue TTS schedule 2. S/p cardiopulm arrest 9/29.   
3. Acute pyelonephritis/sepsis. Febrile on/off. On abx by ID. 4. Abnormal lft's/acute cholecystitis/pancreatitis. GI on the case. 5. UGI bleed, EGD results noted- healing esophageal ulceration. 6. Acute blood loss anemia/anemia of kidney failure. Continue analia. 7. HTN. Improved with  dialysis, noted amlodipine started 8. Asp pneumonia. 9. Resp failure. S/p peg/trach 10/17, s/p bronch. 10. Rt shoulder shingles. Finished acyclovir. 11. Anoxic brain injury superimposed on recent cva. Please call with questions, 
 
Eulalia Christina MD FASN Cell [phone] Pager: [phone] Subjective:  
 
Seen on HD Current Facility-Administered Medications:  
  amLODIPine (NORVASC) tablet 5 mg, 5 mg, Oral, DAILY, Lakesha Gonzales NP 
  0.9% sodium chloride infusion 250 mL, 250 mL, IntraVENous, PRN, Lakesha Gonzales NP 
  insulin glargine (LANTUS) injection 27 Units, 27 Units, SubCUTAneous, DAILY, Odalis Servin MD, 27 Units at 11/01/18 1746   cefepime (MAXIPIME) 0.5 g in 0.9% sodium chloride 100 mL IVPB, 0.5 g, IntraVENous, Q24H, Mariann Medina MD, 0.5 g at 11/01/18 0030 
  albuterol (PROVENTIL VENTOLIN) nebulizer solution 2.5 mg, 2.5 mg, Nebulization, BID RT, Giacomo Ridley MD, 2.5 mg at 11/01/18 8007   multivitamin (MULTI-DELYN, WELLESSE) oral liquid 30 mL, 30 mL, Oral, DAILY, Britton Anton MD, 30 mL at 11/01/18 0832 
  bacitracin 500 unit/gram packet 1 Packet, 1 Packet, Topical, PRN, Tyron, Mitali Square, MD 
   sodium chloride (NS) flush 10-30 mL, 10-30 mL, InterCATHeter, PRN, Jones Ackerman MD, 30 mL at 10/21/18 0430 
  sodium chloride (NS) flush 10 mL, 10 mL, InterCATHeter, Q24H, Jones Ackerman MD, 10 mL at 10/31/18 1600 
  sodium chloride (NS) flush 10 mL, 10 mL, InterCATHeter, PRN, Jada Shultz MD, 10 mL at 10/20/18 6046   sodium chloride (NS) flush 10-40 mL, 10-40 mL, InterCATHeter, Q8H, Jones Ackerman MD, 10 mL at 11/01/18 0531 
  sodium chloride (NS) flush 20 mL, 20 mL, InterCATHeter, Q24H, Jones Ackerman MD, 20 mL at 10/31/18 1600 
  heparin (porcine) injection 5,000 Units, 5,000 Units, SubCUTAneous, Q8H, West Ackerman MD, 5,000 Units at 11/01/18 0530   hydroxypropyl methylcellulose (ISOPTO TEARS) 0.5 % ophthalmic solution 2 Drop, 2 Drop, Both Eyes, BID, Lakesha Gonzales NP, 2 Drop at 11/01/18 1211   epoetin manda (EPOGEN;PROCRIT) injection 40,000 Units, 40,000 Units, IntraVENous, Q7D, Melyssa Louis MD, 40,000 Units at 10/31/18 1406   pantoprazole (PROTONIX) 40 mg in sodium chloride 0.9% 10 mL injection, 40 mg, IntraVENous, Q24H, Diana SHAFFER MD, 40 mg at 11/01/18 5859 
  0.9% sodium chloride infusion 250 mL, 250 mL, IntraVENous, PRN, Devon Ellis,  
  influenza vaccine 2018-19 (6 mos+)(PF) (FLUARIX QUAD/FLULAVAL QUAD) injection 0.5 mL, 0.5 mL, IntraMUSCular, PRIOR TO DISCHARGE, Corrinne Fryer, MD 
  acetaminophen (TYLENOL) solution 325 mg, 325 mg, Per NG tube, Q4H PRN, Corrinne Fryer, MD, 325 mg at 10/31/18 1540 
  0.9% sodium chloride infusion 250 mL, 250 mL, IntraVENous, PRN, Vonnie Chaudhary H, DO 
  heparin (porcine) pf 100 Units, 100 Units, InterCATHeter, PRN, Kb Estrada MD 
  0.9% sodium chloride infusion 250 mL, 250 mL, IntraVENous, PRN, Corrinne Fryer, MD 
  insulin lispro (HUMALOG) injection, , SubCUTAneous, Q6H, Lemon Knock, , Stopped at 11/01/18 0600   heparin (porcine) 1,000 unit/mL injection 1,000 Units, 1,000 Units, InterCATHeter, DIALYSIS PRN, Edilson Zhao MD, 1,000 Units at 09/28/18 1332   senna-docusate (PERICOLACE) 8.6-50 mg per tablet 1 Tab, 1 Tab, Oral, BID PRN, McQuain, Alrfed A, DO 
  ondansetron (ZOFRAN) injection 4 mg, 4 mg, IntraVENous, Q4H PRN, McQuain, Alfred A, DO, 4 mg at 09/28/18 0539 
  glucose chewable tablet 16 g, 4 Tab, Oral, PRN, McQuain, Alfred A, DO 
  glucagon (GLUCAGEN) injection 1 mg, 1 mg, IntraMUSCular, PRN, McQuain, Alfred A, DO 
  dextrose (D50) infusion 12.5-25 g, 25-50 mL, IntraVENous, PRN, McQuain, Alfred A, DO, 25 g at 10/09/18 1906   hydrALAZINE (APRESOLINE) 20 mg/mL injection 20 mg, 20 mg, IntraVENous, Q6H PRN, Maple Puna, NP, 20 mg at 10/31/18 2345 Objective:  
 
Visit Vitals /75 Pulse 94 Temp 97.5 °F (36.4 °C) (Oral) Resp (!) 32 Ht 5' 7\" (1.702 m) Wt 89.2 kg (196 lb 9.6 oz) SpO2 100% BMI 30.79 kg/m² Intake/Output Summary (Last 24 hours) at 11/1/2018 1214 Last data filed at 11/1/2018 1000 Gross per 24 hour Intake 960 ml Output 425 ml Net 535 ml Physical Exam:  
 
gen , NAD , trached on vent CVS s1 s2 wnl no JVD 
GI soft BS + Ext no edema RS AEBE clear Data Review: 
 
Recent Labs 11/01/18 
0345 WBC 14.2*  
RBC 2.77* HCT 24.4* MCV 88.1 MCH 27.4 MCHC 31.1 RDW 16.4* Recent Labs 11/01/18 
0345 10/31/18 
1728 10/31/18 
7444 10/30/18 
0435 10/29/18 
1645 BUN 52*  --  32* 67*  --   
CREA 3.89*  --  2.57* 4.98*  --   
CA 7.6*  --  7.9* 7.7*  --   
ALB 1.4* 1.4* 1.5* 1.4* 1.5*  
K 4.5  --  4.2 5.0  --   
  --  138 138  --   
  --  101 102  --   
CO2 28  --  28 22  --   
PHOS 5.4*  --  3.8 6.5*  --   
*  --  115* 226*  --   
 
 
Blanca David MD

## 2018-11-01 NOTE — PROGRESS NOTES
Physical Exam  
Skin:  
 
  
Dual assessment with Bridget EASLEY 
 
 
 
0800 Assessment completed. Pt remains unresponsive. Open yes spontaneously but not tracking. 1000 Dialysis ongoing at bedside. 1200 Reassessment done. No change in pt's condition. 1400 Tube feeding increased to 35 ml/hr as order. 1600 Reassessment done. No change in condition. 1630 Wound care done on rt buttocks. Cleansed with normal saline. Wound with some slough area. .Covered with wet to dry dressing Mepilex applied . 1709 Straight cath done and obtained braydon colored urine. 1800 VSS, ; no changes in pt's condition. 1917 Bedside and Verbal shift change report given to [address] [place] [address] (oncoming nurse) by Lidia Herrera RN 
 (offgoing nurse). Report included the following information SBAR, Kardex, Intake/Output, MAR and Cardiac Rhythm SR-ST.

## 2018-11-01 NOTE — PROGRESS NOTES
Hospitalist Progress Note Daily Progress Note: 11/1/2018 11:40 AM 
   
Interval history / Subjective:  
Manuela Burgos is a 57y.o. year old male who presented from [facility] with abnormal labs, elevated BUN/Cr. Found to have JULIANNA, UTI, and markedly elevated LFT on presentation. Patient's recent admission was 9/10/18-9/14/18 for acute CVA and rhabdo. Patient poor historian on admission,stated \"I had heart failure. She was started on vanc,zosyn. On 9/29,patient had cardiac arrest with ventricular fibrillation - s/p ROSC. Her hospital course has since then complicated with sepsis,pancreatitits,pneumonia,acute pancreatitis requiring involvement of gi,surgery,nephrology,cardiology,ID,cardiology. So far patient has remained without major improvement. Now in vegetative state. Patient had PEG/Trach placement on 10/17. On 10/17,patient started on acyclovir for veicular rash rt arm,suspected being Zoster. Lipase 2169. US abd 10/22 shows Abnormal appearance of the gallbladder with stones/sludge with wall thickening  
and possible pericholecystic fluid similar to the prior study of 9/27/2018 
10/22: Patient on treatment for shingles in contact isolation per ID team. He also had left big toe gangrene . We will consult Podiatry. His lipase was elevated , US of the abdomen shows sludge and possible cholecystitis. Patient afebrile. GI consulted  for possible MRCP. Patient has leukocytosis. ID started Vancomycin and ordered Urine , blood and sputum culture and trach site discharge. Patient off air bourne precaution but continue contact precaution per ID. Patient was started on Vancomycin and cefepime for SIRSper ID. Blood and urine cx pending. Patient today is going for jejunostomy. Patient BC NGTD Vancomycin d/c'd per ID. Patient respiratory  cx + GNR , STREPT, Wound cx + pseudomonas . PCCM added Ciprofloxacin today . IR attempt at Jejunostomy was unsuccessful yesterday. 10/28: Fever today. Resp cx, wound cx noted. On cefepime and Cipro for coverage per sensitivisty result . Blood cx NGTD . Lipase still trending up. IR unsuccessful attempt at jejunostomy 10/26. Will re consult GI today 10/29:no change in care. 10/30:no change in care 10/31:pt underwent dialysis today. Currently on cefepime only. Patient is still in the hospital,waiting for placement. 11/1:continue current treatment Current Facility-Administered Medications Medication Dose Route Frequency  amLODIPine (NORVASC) tablet 5 mg  5 mg Oral DAILY  0.9% sodium chloride infusion 250 mL  250 mL IntraVENous PRN  
 insulin glargine (LANTUS) injection 27 Units  27 Units SubCUTAneous DAILY  cefepime (MAXIPIME) 0.5 g in 0.9% sodium chloride 100 mL IVPB  0.5 g IntraVENous Q24H  
 albuterol (PROVENTIL VENTOLIN) nebulizer solution 2.5 mg  2.5 mg Nebulization BID RT  
 multivitamin (MULTI-DELYN, WELLESSE) oral liquid 30 mL  30 mL Oral DAILY  bacitracin 500 unit/gram packet 1 Packet  1 Packet Topical PRN  
 sodium chloride (NS) flush 10-30 mL  10-30 mL InterCATHeter PRN  
 sodium chloride (NS) flush 10 mL  10 mL InterCATHeter Q24H  
 sodium chloride (NS) flush 10 mL  10 mL InterCATHeter PRN  
 sodium chloride (NS) flush 10-40 mL  10-40 mL InterCATHeter Q8H  
 sodium chloride (NS) flush 20 mL  20 mL InterCATHeter Q24H  
 heparin (porcine) injection 5,000 Units  5,000 Units SubCUTAneous Q8H  
 hydroxypropyl methylcellulose (ISOPTO TEARS) 0.5 % ophthalmic solution 2 Drop  2 Drop Both Eyes BID  epoetin manda (EPOGEN;PROCRIT) injection 40,000 Units  40,000 Units IntraVENous Q7D  
 pantoprazole (PROTONIX) 40 mg in sodium chloride 0.9% 10 mL injection  40 mg IntraVENous Q24H  
 0.9% sodium chloride infusion 250 mL  250 mL IntraVENous PRN  
 influenza vaccine 2018-19 (6 mos+)(PF) (FLUARIX QUAD/FLULAVAL QUAD) injection 0.5 mL  0.5 mL IntraMUSCular PRIOR TO DISCHARGE  
  acetaminophen (TYLENOL) solution 325 mg  325 mg Per NG tube Q4H PRN  
 0.9% sodium chloride infusion 250 mL  250 mL IntraVENous PRN  
 heparin (porcine) pf 100 Units  100 Units InterCATHeter PRN  
 0.9% sodium chloride infusion 250 mL  250 mL IntraVENous PRN  
 insulin lispro (HUMALOG) injection   SubCUTAneous Q6H  
 heparin (porcine) 1,000 unit/mL injection 1,000 Units  1,000 Units InterCATHeter DIALYSIS PRN  
 senna-docusate (PERICOLACE) 8.6-50 mg per tablet 1 Tab  1 Tab Oral BID PRN  
 ondansetron (ZOFRAN) injection 4 mg  4 mg IntraVENous Q4H PRN  
 glucose chewable tablet 16 g  4 Tab Oral PRN  
 glucagon (GLUCAGEN) injection 1 mg  1 mg IntraMUSCular PRN  
 dextrose (D50) infusion 12.5-25 g  25-50 mL IntraVENous PRN  
 hydrALAZINE (APRESOLINE) 20 mg/mL injection 20 mg  20 mg IntraVENous Q6H PRN Review of Systems Unresponsive Objective:  
 
Visit Vitals /71 Pulse 93 Temp 97.5 °F (36.4 °C) (Oral) Resp 28 Ht 5' 7\" (1.702 m) Wt 89.2 kg (196 lb 9.6 oz) SpO2 100% BMI 30.79 kg/m² O2 Flow Rate (L/min): 45 l/min O2 Device: Tracheostomy Temp (24hrs), Av.5 °F (36.9 °C), Min:96.6 °F (35.9 °C), Max:100.6 °F (38.1 °C) 
 
 
701 - 1900 In: 170 Out: 0  
10/30 1901 -  07 In: 1190 [I.V.:200] Out: 7513 [Urine:410; Drains:265] P/E General Appearance:S/p PEG/Trach HENT: normocephalic/atraumatic, + ETT Neck: No JVD, hematoma R side where [facility] is improving Lungs: Coarse B/L w/ vent-assisted BS 
CV: RRR, no m/r/g Abdomen: soft, non-tender, normal bowel sounds Extremities: versicular rash rt arm and right side of chest,no cyanosis, no peripheral edema Neuro: Unresponsive to commands, no withdrawal to pain, + corneal reflex and pupillary reaction today Skin: Normal color, intact Data Review Recent Results (from the past 12 hour(s)) LIPASE Collection Time: 18  3:45 AM  
Result Value Ref Range  Lipase 1,157 (H) 73 - 393 U/L  
 RENAL FUNCTION PANEL Collection Time: 11/01/18  3:45 AM  
Result Value Ref Range Sodium 138 136 - 145 mmol/L Potassium 4.5 3.5 - 5.5 mmol/L Chloride 103 100 - 108 mmol/L  
 CO2 28 21 - 32 mmol/L Anion gap 7 3.0 - 18 mmol/L Glucose 105 (H) 74 - 99 mg/dL BUN 52 (H) 7.0 - 18 MG/DL Creatinine 3.89 (H) 0.6 - 1.3 MG/DL  
 BUN/Creatinine ratio 13 12 - 20 GFR est AA 19 (L) >60 ml/min/1.73m2 GFR est non-AA 16 (L) >60 ml/min/1.73m2 Calcium 7.6 (L) 8.5 - 10.1 MG/DL Phosphorus 5.4 (H) 2.5 - 4.9 MG/DL Albumin 1.4 (L) 3.4 - 5.0 g/dL CBC W/O DIFF Collection Time: 11/01/18  3:45 AM  
Result Value Ref Range WBC 14.2 (H) 4.6 - 13.2 K/uL  
 RBC 2.77 (L) 4.70 - 5.50 M/uL HGB 7.6 (L) 13.0 - 16.0 g/dL HCT 24.4 (L) 36.0 - 48.0 % MCV 88.1 74.0 - 97.0 FL  
 MCH 27.4 24.0 - 34.0 PG  
 MCHC 31.1 31.0 - 37.0 g/dL  
 RDW 16.4 (H) 11.6 - 14.5 % PLATELET 667 (H) 007 - 420 K/uL MPV 8.9 (L) 9.2 - 11.8 FL  
GLUCOSE, POC Collection Time: 11/01/18  5:48 AM  
Result Value Ref Range Glucose (POC) 136 (H) 70 - 110 mg/dL GLUCOSE, POC Collection Time: 11/01/18 12:09 PM  
Result Value Ref Range Glucose (POC) 132 (H) 70 - 110 mg/dL Assessment/Plan:  
 
Principal Problem: 
  Card[facility] (9/29/2018) Overview: ROSC before defibrillation could be attempted. Active Problems: 
  Essential hypertension (9/10/2018) Diabetes mellitus type 2, controlled ([address] (9/10/2018) History of stroke (9/27/2018) Overview: With residual R hemiparesis Acute-on-chronic kidney injury ([facility] (9/27/2018) Acute cystitis (9/27/2018) Elevated troponin (9/27/2018) Elevated LFTs (9/28/2018) Acute pancreatitis (9/29/2018) Overview: Lipase downtrends with interruption in tube feed and Mucomyst. Restarting  
    tube feed did result in modest elevation but not to the levels seen while on Mucomyst. Nonetheless, there is signficant decrease in lipase levels  
    with interruptions in tube feeding. Ischemic encephalopathy (9/30/2018) Hypoalbuminemia (10/21/2018) Shingles (10/21/2018) Gangrene ([address] (10/22/2018) Pseudomonas infection (10/27/2018) Zoster. Care Plan - Vent mgmt per ICU - S/p trach/PEG on 10/17 as patient did not show significant improvement. 
- EGD showed large clot in esophagus last week repeat EGD 10/9 shows healed esophageal ulcer - Cont protonix IV every day  
- Hgb stable - LFTs improving - GI signed off 10/9 
- MRI w/ evidence of severe anoxic brain injury - Minimal improvement on neuro exam 
- Appreciate neuro assistance - IV Meropenem and IV Fluconazole d/heron on 10/15 per ID. 
- Currently on vanc only which was started on 9/27 
- Nephro managing HD TTHS 
- Pt unlikely to have any meaningful neuro recovery, prognosis is very grim - Family wanted to cont to do everything, including trach/PEG -> was done10/17 
- On 10/17:Started on acyclovir for possible Zoster rt arm and rt upper chest area. - Hyperkalemia on 10/18 ->corrected - Lipase 2169 10/22. US abd Abnormal appearance of the gallbladder with stones/sludge with wall thickening  
and possible pericholecystic fluid similar to the prior study of 9/27/2018  
- On Acyclovir for shingles , contact precaution per ID commitee 
- 10/22: Consult GI today for possible MRCP 
- 10/22: Consult Podiatry for left big toe gangrene  
- 10/23 : Podiatry recommend RAQUEL for Left Big toe  
- 10/23: GI recommend MRI/MRCP but will need patient off vent for multiple times for procedure. Not sure if this is feasible now 
- 10/25 : Blood , urine , sputum , trach discharge cultures 
- 10/25 IV antibiotics by ID  
- Change GI tube to Jejunostomy tube tomorrow by IR  
- 10/26: Vancomycin and Cefepime per ID  
- 10/26 : Jejunostomy attempt by IR unsuccessful .  Will need to discuss with GI  
 - 10/27 : Ciprofloxacin added 10/27 for double coverage of pseudomonas with cefepime per PCCM  
- 10/28: Pancreatitis ? Biliary  . Will reconsult GI today - Lipase trending down. DVT prophylaxis:scds Full code. Disposition:tbd - need placement -  involved.

## 2018-11-01 NOTE — PROGRESS NOTES
. 
Acute HEmodialysis flow sheet Patient information Barbara Vogt MRN: [id number]      GYO:[id number]  [place]# HWGS#[id number] Hospital: [place] [address] DX: cardiac event []1st Time Acute  []Stat[x] Routine []Urgent []Chronic Unit  
       []Acute Room []Bedside  []ICU/CCU []ER Isolation Precautions: [x]Dialysis[] Airborne []Contact []Droplet []Reverse Special Considerations:    [] Blood Consent Verified  [x]N/A Allergies: 
[x] NKA  [] Reviewed No Known Allergies Code Status [x]Full Code [] DNR  []Other Diet: tube feed Diabetic: []Yes []No 
  
 Hemodialysis Orders Physician: Shirley Franklin Dialyzer Revaclear 300 Duration 4 BFR: 300 DFR:600 Dialysate: K 2 CA 2.5 Na 140 Bicarb 35 Dry Weight: UF Goal: 3500 ml Heparin:  []Bolus   Units    []Hourly  Units      [x]None BP Tx:  []NS  200ml/Bolus    []Other     []N/A  
    Labs: Pre:  Post: []N/A Additional Orders: []N/A [x]Signed Treatment Consent   [x] Verified   [] Obtained Primary Nurse Report: First initial/ Last Name/Title Pre Dialysis: GIAN Shah    Time: 900 Access CATHETER ACCESS:  [] N/A  [x] RIGHT  [] LEFT  [x] IJ  [] SUBCL [] FEM [x] First use X-ray  [x] Tunnel     [] Non-Tunneled [x] No S/S infection  [] Redness [] Drainage  [] Cultured [] Swelling 
                       [] Pain  
                       [x] Medical Aseptic [] Prep Dressing Changed  
                       [] Clotted [] Patent []      Flows: [x] Good [] Poor [] Reversed If Access Problem Dr. Lawrence Mater: [] Yes [] No    Date:    [x] N/A  
     GRAFT/FISTULA ACCESS:  [x] N/A  [] RIGHT  [] LEFT  [] UE  [] LE   
                       [] AVG  [] AVF [] BUTTONHOLE  [] +BRUIT/THRILL 
     [] MEDICAL ASEPTIC PREP [] No S/S infection  [] Redness [] Drainage  [] Cultured [] Swelling [] Pain Needle Gauge                              Length: If Access Problem Dr. Lisa Ballard: [] Yes [] No    Date:     [x] N/A General Assessment LUNGS:  SaO2% 100 [x] Clear [] Coarse [] Crackles [] Wheezing  
            [] Diminished Location: [] RLL [] LLL [] RUL [] JOSSIE   
     COUGH:  [] Productive [] Dry [x] N/A  RESPIRATIONS: [] Easy [] Labored THERAPY: [] RA   [] NC     L/min    Mask: [] NRB [] Venti  O2%    
             [] Ventilator [] Intubated [x] Trach [] BiPap [] CPap CARDIAC: [x] Regular [] Irregular [] Pericardial Rub [] JVD [x] Monitored Rhythm: EDEMA: [x] None [] Generalized [] Facial [] Pedal [] UE [] LE 
           [] Pitting [] 1 [] 2 [] 3 [] 4    [] Right [] Left [] Bilateral  
    SKIN:    [x] Warm [] Hot [] Cold  [] Dry [] Pale [] Diaphoretic  
           [] Flushed [] Jaundiced [] Cyanotic [] Rash [] Weeping LOC:    [] Alert  Oriented to: [] Person [] Place [] Time  
          [] Confused [] Lethargic [x] Medically sedated [] Non-responsive GI/ABDOMEN: [x] Flat [] Distended [x] Soft [] Firm [] Obese [] Diarrhea 
 [] Bowel Sounds     []Nausea [] Vomiting PAIN: [x] 0 [] 1 [] 2 [] 3 [] 4 [] 5 [] 6 [] 7 [] 8 [] 9 [] 10 Scale 1-10 Action/Follow Up MOBILITY: [] Amb [] Amb/Assist [x] Bed  [] Wheelchair CUrrent LABS HBsAg ONLY: Date Drawn 10/27/18 [x] Negative [] Positive  [] Unknown. HBsAb: Date Drawn 10/27*/18 [x] Susceptible <10 [] Immune ?10 [] Unknown Date of Current Labs:  
    
Lab Results Component Value Date/Time  Sodium 138 11/01/2018 03:45 AM  
 Potassium 4.5 11/01/2018 03:45 AM  
 Chloride 103 11/01/2018 03:45 AM  
 CO2 28 11/01/2018 03:45 AM  
 BUN 52 (H) 11/01/2018 03:45 AM  
 Creatinine 3.89 (H) 11/01/2018 03:45 AM  
 Calcium 7.6 (L) 11/01/2018 03:45 AM  
 Magnesium 2.3 10/30/2018 04:35 AM  
 Phosphorus 5.4 (H) 11/01/2018 03:45 AM  
 HCT 24.4 (L) 11/01/2018 03:45 AM  
 HGB 7.6 (L) 11/01/2018 03:45 AM  
 PLATELET 671 (H) 01/57/0178 03:45 AM  
 INR 1.1 10/31/2018 03:36 AM  
  
Education Person Educated: [x] Patient [] Family [] Other Knowledge base: [x] None [] Minimal [] Substantial   
     Barriers to learning patient non responsive  [] None Preferred method of learning: [] Written [x] Oral [] Visual [] Hands on Topic: [] Access Care [] S&S of infection [] Fluid Management [] K+ 
               [x] Procedural    [] Albumin [] Medications [] Tx Options [] Transplant 
                [] Diet [] Other Teaching Tools: [x] Explain [] Demonstration [] Handout [] Teachback Ro/hemodiaylsis machine safety checks- before each treatment Machine Serial #11 [] # 1 [id number] [] # 2 [id number] [] # 3 [id number] [] # 4 [id number] [] # 5 [id number] [] # [phone] [] [phone] Alarm Test: [x] Pass Time 871       RO Serial # 11 
[] [id number] (Large dual station RO) [] # 1  [id number]  (Portable # 1) [] # 2  [id number]  (Portable # 2) [] # 3  [id number]  (Portable # 3) [] # 4  [id number]  (Portable # 4) [] # 5  [id number]  (Portable # 5) Lot #s: Dialyzer [id number] exp. 17g04-10  Tubing 17g04-10 exp. Saline -jt exp. [x] RO/Machine Log Complete    [x] Extracorporeal circuit Tested for integrity Dialysate: pH 7.4 Temp. 36  Conductivity: Meter 14 HD Machine 14.1 chlorine testing- before each treatment and every 4 hours Total Chlorine: [x] Less than 0.1 ppm Time: 950 2nd Check Time: 1250 (If greater than 0.1 ppm from Primary then every 30 minutes from Secondary) treatment Iniation-with dialysis precautions [x] All Connections Secured   [x] Saline Line Double Clamped    
[x] Venous Parameters Set    [x] Arterial Parameters Set    [x] Prime Given 250 ml            [x] Air Foam Detector Engaged Zohaib Nurse Signature/Title_Saleem Zuñiga__ Pre-Treatment Time 945 B/P 145/61 HR 92 Temp. 97.5 Resp Rate 18 Pre Wt  
UF Calculations: Wt to lose:3500 ml(+) Oral:  ml(+) IV Meds/Fluids/Blood prods  ml(+) Prime/Rinse 500 ml 
(=)Total UF Goal 4000 mL Scale Type:[] Bed scale [] Sling Scale [] Wheel Chair Scale [x]  Not Ordered [] Unable to obtain pt on stretcher Tx Initiation Note: right Ij Catheter accessed and treatment started without complication  
[x] Time Out/Safety Check  Time: 950 [facility] Signature/Title_Saleem P Long INTRADIALYTIC MONITORING 
(SEE ATTACHED FLOWSHEET) POST TREATMENT Time 1430 B/P 111/59  Temp 99.1 Resp. Rate 18 Post wt Time Medication Dose Volume Route Initials [facility] Signatures Title Initials Time 52 [facility] RN [address] [facility] Signature/Title:_Saleem Zuñiga_ Dialyzer cleared: [] Good [x] Fair [] Poor    Blood Processed 64 Liters Net UF Removed 3000 mL Post Tx Access: AVF/AVG: Bleeding Stop                   Art. min Shree min []+bruit/thrill Catheter: Locking Solution heparin     Art. 2.2 ml Shree 2.3 ml 
 
Post Assessment:  Skin: [x]Warm []Dry []Diaphoretic []Flushed [] Pale []Cyanotic Lungs: [x]Clear []Coarse []Crackles []Wheezing Cardiac: [x]Regular []Irregular  []Monitored rhyth Edema: [x]None [x]General []Facial []Pedal  []UE []LE []RIGHT []LEFT    []Bilateral  
   Pain: [x]0 []1[]2 []3 []4 []5 []6 []7 []8 []9 []10  
POST Tx Note: vremoved 3.5 liters patient tolerated treatment well catheter locked with heparin Primary Nurse Report: First initial/Last name/Title Post Dialysis:_GIAN Crespo RN_         Time:_1410_ Abbreviations: AVG-arterial venous graft, AVF-arterial venous fistula, IJ-Internal Jugular, Subcl-Subclavian, Fem-Femoral, Tx-treatment, AP/HR-apical heart rate, DFR-dialysate flow rate, BFR-blood flow rate, AP-arterial pressure, -venous pressure, UF-ultrafiltrate, TMP-transmembrane pressure, Shree-Venous, Art-Arterial, RO-Reverse Osmosis

## 2018-11-01 NOTE — PROGRESS NOTES
Infectious Disease Follow-up Admit Date: 9/26/2018 Current abx Prior abx  
cefepime 10/25-7 Zosyn 9/27-7, Meropenem/flucon 10/4- 11, vanco 9/27-25, ACV 10/17 - 10,  Vanco 10/25- 1, Cipro 10/28-1 Assessment ->Rec:  
 
Recurrent SIRS T 100.4 10/25 wbc 18k 
 - more secretions, reported cloudy urine, cxr yest no pneumonia  
-line tip culture 10/19 >15 CFU CoNS now with L picc, femoral dialysis catheter out 10/31   
- blcx 10/25 NG x 2, spgs rare Pseud, wd Pseud, cxr 10/27 poss R perihilar infiltrate-? Pul source here vs from prior bleeding -appreciate removal femoral uldall 10/31 
-> continue Cefepime  
-> monitor tip culture- prelim <15 CFU GNR ? Also Pseudomonas   
-> repeat blcx x2 if spikes fever PAD  
- PVL's s/o significant arterial disease of bilateral lower extremities 10/24. -POD Dr. Bustamante [place] saw 10/24,  indicated not OR candidate foot for gangrene distal L gt toe - per others Shingles R arm, C 6 (?C5) dermatome 
- vesicular lesions onset ~10/15 progressing compared to onset per NP 
- confluent in upper arm, not crusted yet  
-VZV PCR pos 10/18 
- completed 10 days of Acyclovir on 10/26 -> wounds now appear crusted, d/w nursing no isolation for sensitive Pseudomonas so can stop contact precautions Prior Leukocytosis/SIRS poss sepsis (resolved 10/19) 
- MOF multiple ID and non-infectious concerns outlined below, complicated, wbc 95.7N today from high 27K+ on 10/5 On  vancomycin/zosyn 9/27 
- C diff neg 9/29, sput C albicans, repeat CT 10/4 no new findings --cxr reviewed - increased atx rt base. Left base improved 
- Higher fever 10/16 101.2, 10/18 101.4 afebrile since 
- Ddx: HZV vs deep tissue injury of toe/buttock vs Line infection 
- blcx 10/16 NG x 2 
- TDC Tip 10/19 >15 CFU MRSE (1 of 2 blcx on adm 9/27 MRSE but afebrile then prob contaminant) - Suspect colonized with Pseudomonas now and at risk for subsequent infections given vent, HD and lines Suspected Pyelo POA - CTAP 9/27:  B perineph stranding, UA tntc wbc, uctx ng 
- treated at this point Cholelithiasis choledocholithiais suspected acute cholecystitis  poss cystic stone POA- abnl lft bili 4 alk phos 400 seen by GI and GS Dr. Carrie Mullins, no plan for OR, for poss cholecystostomy if LFT worsen, bili, alk phos declining - AST fluctuating downward  -Dr. Anushka Marsh saw for GI 10/24 Elevated Lipase  
- lipase 2200 POA -> 3191 ->  5500 on 10/4 improved to 1207 10/19 but up again to 2169 10/22 interpretation complicated by JULIANNA -> fluctuating between 9114-2202 (off mucomyst, TF) 
- CTAP 10/4: Normal pancreas -> per ICU team  
B infiltrates - poss edema infiltrate - RLL consolid better 10/4 cxr and suspect endobronchial clot contributed   ->monitor MRSE bacteremia 9/27 1 of 2. Has LUE midline unclear when placed Treated JULIANNA POA dialysis dependent 
- baseline cr 0.9 132/10.1 in ER 
-RIJ uldall 9/28 out 10/19 - R fem uldall 10/20 
-NEW [facility] TDC 10/31 -> per renal  
Bleeding -melena earlier, EGD 10/2 clot in esophagus bronch 10/3 R endobronch clot NEW removed 10/5 repeat bronch  -> per others Suspected anoxic brain injury on MRI 10/3 SP VF arrest 9/29 -> overall poor prognosis but family wishes aggressive care CVA R hemiparesis 9/10   
resp failure sp intubation 9/29 Comorbid: HTN HLD CHF h/o steatohepatitis MICROBIOLOGY:  
9/27 bctx 1 of 2 MRSE 
 uctx ng 
9/29 sput C albicans C diff neg 10/4 bctx x 2 NTD 
 fungitell indeterminate due to contamination 10/8 Cath tip cx ntd 10/8     bl cx ng 
10/16 Blcx x2 ng 
 Spcx NF 
10/18 VZV PCr +ve, HSV PCR neg 
10/19 Cath tip >15 CFU MRSE 
10/25 Spcx pseudomonas x2- panS 
 blcx x2 nG 
 uctx Pseudomonas Wd cx Pseud x2 panS, E faecalis S amp, CoNS 
10/31  Cath tip <15 CFU GNR prelim LINES AND CATHETERS:  
 
Left PICC 10/18 [facility] 10/31 Active Hospital Problems Diagnosis Date Noted  Pseudomonas infection 10/27/2018  Gangrene ([facility] 10/22/2018  Hypoalbuminemia 10/21/2018  Shingles 10/21/2018  Ischemic encephalopathy 09/30/2018  Cardiac arrest with ventricular fibrillation ([facility] [address] 09/29/2018 ROSC before defibrillation could be attempted.  Acute pancreatitis 09/29/2018 Lipase downtrends with interruption in tube feed and Mucomyst. Restarting tube feed did result in modest elevation but not to the levels seen while on Mucomyst. Nonetheless, there is signficant decrease in lipase levels with interruptions in tube feeding.  Elevated LFTs 09/28/2018  History of stroke 09/27/2018 With residual R hemiparesis  Acute-on-chronic kidney injury ([facility] [address] 09/27/2018  Acute cystitis 09/27/2018  Elevated troponin 09/27/2018  Essential hypertension 09/10/2018  [facility] 09/10/2018 Subjective: Interval notes reviewed, Afebrile. WBC lower T lower, uldall tip prelim <15 CFU GNR, remains in ICU. On vent through trach and tolerating peg feeds. Receiving HD again today. No family at bedside, eyes open no response voice or exam no hx or ROS elicitable. Current Facility-Administered Medications Medication Dose Route Frequency  amLODIPine (NORVASC) tablet 5 mg  5 mg Oral DAILY  0.9% sodium chloride infusion 250 mL  250 mL IntraVENous PRN  
 insulin glargine (LANTUS) injection 27 Units  27 Units SubCUTAneous DAILY  cefepime (MAXIPIME) 0.5 g in 0.9% sodium chloride 100 mL IVPB  0.5 g IntraVENous Q24H  
 albuterol (PROVENTIL VENTOLIN) nebulizer solution 2.5 mg  2.5 mg Nebulization BID RT  
 multivitamin (MULTI-DELYN, WELLESSE) oral liquid 30 mL  30 mL Oral DAILY  bacitracin 500 unit/gram packet 1 Packet  1 Packet Topical PRN  
 sodium chloride (NS) flush 10-30 mL  10-30 mL InterCATHeter PRN  
 sodium chloride (NS) flush 10 mL  10 mL InterCATHeter Q24H  
 sodium chloride (NS) flush 10 mL  10 mL InterCATHeter PRN  
  sodium chloride (NS) flush 10-40 mL  10-40 mL InterCATHeter Q8H  
 sodium chloride (NS) flush 20 mL  20 mL InterCATHeter Q24H  
 heparin (porcine) injection 5,000 Units  5,000 Units SubCUTAneous Q8H  
 hydroxypropyl methylcellulose (ISOPTO TEARS) 0.5 % ophthalmic solution 2 Drop  2 Drop Both Eyes BID  epoetin manda (EPOGEN;PROCRIT) injection 40,000 Units  40,000 Units IntraVENous Q7D  
 pantoprazole (PROTONIX) 40 mg in sodium chloride 0.9% 10 mL injection  40 mg IntraVENous Q24H  
 0.9% sodium chloride infusion 250 mL  250 mL IntraVENous PRN  
 influenza vaccine - (6 mos+)(PF) (FLUARIX QUAD/FLULAVAL QUAD) injection 0.5 mL  0.5 mL IntraMUSCular PRIOR TO DISCHARGE  acetaminophen (TYLENOL) solution 325 mg  325 mg Per NG tube Q4H PRN  
 0.9% sodium chloride infusion 250 mL  250 mL IntraVENous PRN  
 heparin (porcine) pf 100 Units  100 Units InterCATHeter PRN  
 0.9% sodium chloride infusion 250 mL  250 mL IntraVENous PRN  
 insulin lispro (HUMALOG) injection   SubCUTAneous Q6H  
 heparin (porcine) 1,000 unit/mL injection 1,000 Units  1,000 Units InterCATHeter DIALYSIS PRN  
 senna-docusate (PERICOLACE) 8.6-50 mg per tablet 1 Tab  1 Tab Oral BID PRN  
 ondansetron (ZOFRAN) injection 4 mg  4 mg IntraVENous Q4H PRN  
 glucose chewable tablet 16 g  4 Tab Oral PRN  
 glucagon (GLUCAGEN) injection 1 mg  1 mg IntraMUSCular PRN  
 dextrose (D50) infusion 12.5-25 g  25-50 mL IntraVENous PRN  
 hydrALAZINE (APRESOLINE) 20 mg/mL injection 20 mg  20 mg IntraVENous Q6H PRN Objective:  
 
Visit Vitals /71 Pulse 92 Temp 97.5 °F (36.4 °C) (Oral) Resp 29 Ht 5' 7\" (1.702 m) Wt 89.2 kg (196 lb 9.6 oz) SpO2 100% BMI 30.79 kg/m² Temp (24hrs), Av.7 °F (37.1 °C), Min:96.6 °F (35.9 °C), Max:100.6 °F (38.1 °C) GEN: well developed 57year-old AA male on vent/trach in ICU, unresponsive HENT: no thrush eyes open Neck: tracheostomy in place.  TDC RIJ in use, 
 CHEST: coarse bs B no wheeze or rhonchi CVS: RRR, no murmur appreciated ABD: soft, + BS no localized tenderness, PEG in place EXT: 1+ pitting edema b/l LE  
SKIN:  RUE lesions crusted, LUE picc CNS:eyes open, unresponsive to commands or deep sternal rub Labs: Results:  
Chemistry Recent Labs 11/01/18 
0345 10/31/18 
1728 10/31/18 
0175 10/30/18 
0435 10/29/18 
1645 *  --  115* 226*  --   
  --  138 138  --   
K 4.5  --  4.2 5.0  --   
  --  101 102  --   
CO2 28  --  28 22  --   
BUN 52*  --  32* 67*  --   
CREA 3.89*  --  2.57* 4.98*  --   
CA 7.6*  --  7.9* 7.7*  --   
AGAP 7  --  9 14  --   
BUCR 13  --  12 13  --   
AP  --  388*  --   --  375* TP  --  6.5  --   --  6.7 ALB 1.4* 1.4* 1.5* 1.4* 1.5*  
GLOB  --  5.1*  --   --  5.2* AGRAT  --  0.3*  --   --  0.3* CBC w/Diff Recent Labs 11/01/18 
0345 10/31/18 
8021 10/30/18 
1208 WBC 14.2* 16.6* 13.1 RBC 2.77* 2.97* 2.44* HGB 7.6* 8.3* 6.7* HCT 24.4* 26.0* 21.6*  
* 529* 490*  
  
 
10/27 cxr IMPRESSION: 
  
Right lung perihilar infiltrate appears present 10/4 CTAP IMPRESSION: 
1. Dense subtotal or total consolidation right lower lobe compatible with 
pneumonia similar to prior study. Small bilateral pleural effusions similar in 
size. 2. Distended gallbladder with gallstones and gallbladder sludge without 
significant change in appearance and also described in detail on ultrasound of 
9/27/2018. 3. Indeterminate small lesion left hepatic lobe without change. Hepatomegaly 
again evident. 4. No intraperitoneal fluid. Possible small left upper pole renal cyst. 
Cardiomegaly. Nasogastric tube tip in body of stomach. Microbiology Results All Micro Results Procedure Component Value Units Date/Time CULTURE, CATHETER TIP [[id number]]  (Abnormal) Collected:  10/31/18 2830 Order Status:  Completed Specimen:  Catheter Tip Updated:  11/01/18 1025 Special Requests: NO SPECIAL REQUESTS Culture result:    
  <15 CFU GRAM NEGATIVE RODS  
     
 CULTURE, BLOOD [[id number]] Collected:  10/25/18 1328 Order Status:  Completed Specimen:  Whole Blood Updated:  10/31/18 0910 Special Requests: NO SPECIAL REQUESTS Culture result: NO GROWTH 6 DAYS     
 CULTURE, BLOOD [[id number]] Collected:  10/25/18 1328 Order Status:  Completed Specimen:  Whole Blood Updated:  10/31/18 0910 Special Requests: NO SPECIAL REQUESTS Culture result: NO GROWTH 6 DAYS     
 CULTURE, Torie De La Paz STAIN [[id number]]  (Abnormal)  (Susceptibility) Collected:  10/25/18 1359 Order Status:  Completed Specimen:  Wound from Tracheostomy site Updated:  10/28/18 1405 Special Requests: NO SPECIAL REQUESTS     
  GRAM STAIN FEW WBC'S     
   RARE GRAM NEGATIVE RODS Culture result: MANY PSEUDOMONAS AERUGINOSA MODERATE PSEUDOMONAS AERUGINOSA 2ND MORPHOTYPE  
     
      
  FEW ENTEROCOCCUS FAECALIS GROUP D  
     
      
  FEW STAPHYLOCOCCUS SPECIES, COAGULASE NEGATIVE (TWO MORPHOTYPES) CULTURE, RESPIRATORY/SPUTUM/BRONCH Samira Mura STAIN [[id number]]  (Abnormal)  (Susceptibility) Collected:  10/25/18 1325 Order Status:  Completed Specimen:  Sputum from Tracheal Aspirate Updated:  10/28/18 4915 Special Requests: NO SPECIAL REQUESTS     
  GRAM STAIN MODERATE WBC'S     
   RARE GRAM NEGATIVE RODS Culture result: MANY PSEUDOMONAS AERUGINOSA  
     
      
  MANY PSEUDOMONAS AERUGINOSA 2ND MORPHOTYPE  
     
      
  RARE NORMAL RESPIRATORY SHANAE  
     
 CULTURE, URINE [[id number]]  (Abnormal)  (Susceptibility) Collected:  10/25/18 1359 Order Status:  Completed Specimen:  Cath Urine Updated:  10/27/18 5123 Special Requests: NO SPECIAL REQUESTS Culture result:    
  36703 COLONIES/mL PSEUDOMONAS AERUGINOSA CULTURE, CATHETER TIP [[id number]]  (Abnormal)  (Susceptibility) Collected:  10/19/18 5477 Order Status:  Completed Specimen:  Catheter Tip Updated:  10/22/18 9136 Special Requests: NO SPECIAL REQUESTS Culture result:    
  >15 CFU STAPHYLOCOCCUS EPIDERMIDIS  
     
 CULTURE, BLOOD [[id number]] Collected:  10/16/18 1055 Order Status:  Completed Specimen:  Blood Updated:  10/22/18 0809 Special Requests: PERIPHERAL Culture result: NO GROWTH 6 DAYS     
 CULTURE, BLOOD [[id number]] Collected:  10/16/18 1047 Order Status:  Completed Specimen:  Blood Updated:  10/22/18 0809 Special Requests: PERIPHERAL Culture result: NO GROWTH 6 DAYS     
 HSV 1 AND 2 BY PCR [[id number]] Collected:  10/18/18 1815 Order Status:  Completed Specimen:  Other from Miscellaneous sample Updated:  10/21/18 1636 Source OTHER TEST     
  HSV-1 DNA by PCR NEGATIVE      
  HSV-2 DNA by PCR NEGATIVE Comment: (NOTE) This test was developed and its performance characteristics 
determined by [facility]. It has not been cleared or 
approved by the U.S. Food and Drug Administration. The FDA has 
determined that such clearance or approval is not necessary. This 
test is used for clinical purposes. It should not be regarded as 
investigational or research. Performed At: [address] [id number] Marcelino Butler MD LH:[id number] CULTURE, RESPIRATORY/SPUTUM/BRONCH Gianluca Canseco [[id number]] Collected:  10/16/18 1915 Order Status:  Completed Specimen:  Sputum,ET Suction Updated:  10/18/18 1011 Special Requests: NO SPECIAL REQUESTS     
  GRAM STAIN 10-25 WBC/lpf     
   <10 EPI/lpf FEW GRAM POSITIVE COCCI IN PAIRS MODERATE GRAM POSITIVE COCCI IN GROUPS MUCUS PRESENT Culture result: MODERATE NORMAL RESPIRATORY SHANAE  
     
 CULTURE, BLOOD [[id number]] Collected:  10/08/18 1227 Order Status:  Completed Specimen:  Blood Updated:  10/14/18 0773 Special Requests: PERIPHERAL Culture result: NO GROWTH 6 DAYS     
 CULTURE, CATHETER TIP [[id number]] Collected:  10/08/18 1215 Order Status:  Completed Specimen:  Catheter Tip Updated:  10/11/18 4363 Special Requests: NO SPECIAL REQUESTS Culture result: NO GROWTH 3 DAYS     
 CULTURE, BLOOD [[id number]] Collected:  10/04/18 7733 Order Status:  Completed Specimen:  Blood Updated:  10/10/18 7814 Special Requests: PERIPHERAL Culture result: NO GROWTH 6 DAYS     
 CULTURE, BLOOD [[id number]] Collected:  10/04/18 1120 Order Status:  Completed Specimen:  Blood Updated:  10/10/18 5791 Special Requests: PERIPHERAL Culture result: NO GROWTH 6 DAYS     
 CULTURE, BLOOD [[id number]] Collected:  09/27/18 0005 Order Status:  Completed Specimen:  Blood Updated:  10/03/18 0845 Special Requests: NO SPECIAL REQUESTS Culture result: NO GROWTH 6 DAYS     
 CULTURE, BLOOD [[id number]]  (Abnormal)  (Susceptibility) Collected:  09/27/18 0136 Order Status:  Completed Specimen:  Blood Updated:  10/02/18 2937 Special Requests: NO SPECIAL REQUESTS     
  GRAM STAIN PEDIATRIC BOTTLE GRAM POSITIVE COCCI IN PAIRS IN CLUSTERS  
      
  SMEAR CALLED TO AND CORRECTLY REPEATED BY: Cirilo Diez RN IN 2700 ON 9/29/18 AT 2033 WF Culture result:    
  AEROBIC BOTTLE STAPHYLOCOCCUS EPIDERMIDIS  
     
 CULTURE, RESPIRATORY/SPUTUM/BRONCH Brown Songster STAIN [[id number]]  (Abnormal) Collected:  09/29/18 1210 Order Status:  Completed Specimen:  Sputum from Endotracheal aspirate Updated:  10/02/18 3556 Special Requests: NO SPECIAL REQUESTS     
  GRAM STAIN >25 WBC/lpf     
   <10 EPI/lpf MUCUS PRESENT     
   MODERATE YEAST Culture result: MANY CANDIDA TROPICALIS C. DIFFICILE/EPI PCR [[id number]] Collected:  09/29/18 1400 Order Status:  Completed Specimen:  Stool Updated:  09/29/18 2248 Special Requests: NO SPECIAL REQUESTS Culture result: Toxigenic C. difficile NEGATIVE                         C. difficile target DNA sequences are not detected. CULTURE, URINE [[id number]] Collected:  09/27/18 0029 Order Status:  Completed Specimen:  Cath Urine Updated:  09/29/18 0944 Special Requests: NO SPECIAL REQUESTS Culture result: NO GROWTH 2 DAYS Ryan Kern MD, FACP November 1, 2018 [facility] [phone]

## 2018-11-01 NOTE — PROGRESS NOTES
Physical Exam  
Skin:  
 
  
 
 
Bedside shift change report was given to me, Edvin Beckford RN  (oncoming night shift nurse), by Vasiliy Polk (offgoing day shift nurse). Report included the following information:  SBAR, kardex, consultations, hemodynamic monitoring, intake/output, MAR, lab results, VS trends, cardiac rhythm noted NSR/ST. Skin, neuro, respiratory status, and order verification have been dually noted and verified at the bedside with: Candelaria Villaseñor RN                                                              
ICU Nurse's Note: 
2000: Pt's eyes are open with ongoing spontaneous eye movements, dysconjugate gaze with no observable s/s tracking or visual focusing, no ability to follow commands or communicate needs. Pt has no notable purposeful movements to his extremities. BUE remain flaccid and requiring support and frequent ROM. This pt requires max assist with all ADLs and repositioning. Pt will occasionally withdraw his BLE upon application of painful stim. He is especially hypersensitive to the left great toe, which remains significantly blackened and discolored, surrounding skin is cool and non-blanchable. Will continue to monitor. Neurological: as noted in assessment Cardiovascular:  NSR on the monitor. Pulmonary: breath sounds diminished and coarse bilaterally. Saturations maintained at 98 % via 8 mm Portex cuffed tracheostomy, managed via vent  AC w/ Vt 500, PEEP 5, FiO2 30%. Intermittent Inline suctioning of small amounts of white thick sputum. Positive cough and gag with suction noted. GI/: Abdomen is obese, soft, non-distended. PEG tube intact and infusing Perative at 30 ml/hr, with < 5 ml tan residual noted. Last BM indicated 11/01/2018, orange/brown and watery consistency in FMS collection bag. Pt is a hemodialysis client who is oliguric at baseline, with occasional episodes of urinary incontinence. Scheduled straight catheterizations q shift IVF/Critical gtts: abx 
 Skin: as noted above 
 
0000: Q 6hr accucheck 167, SSI administered per protocol. VS at baseline with low grade axillary temp 99. 5. PEG was flushed, tube feeding continues at 30 ml/hr without incident. Trache, FMS, and PEG stoma care completed, with a moderate amount of serosanguinous drainage cleaned around stoma site. Pt tolerated without incident. 0200: Pt was incontinent of a moderate amount of urine output at this time. He was repositioned and suctioned a small amount of thick white sputum via inline suction cath. 0345: AM labs drawn from PICC without incident. Pt was bathed, repositioned, and tolerated mouth care. He continues to exhibit a strong, productive cough with thick white sputum. He was straight catheterized at this time, yielding 110 ml braydon colored urine. 0600: Q 6hr accucheck 136, no SSI indicated. Pt tolerated scheduled AM meds and appears to be resting comfortably in bed at this time after repositioning, mouth care, and mery care. FMS is patent and draining. Interventions are ongoing, will continue to monitor.  
0710: Bedside change of shift report given to: Carlos Crenshaw RN

## 2018-11-01 NOTE — PROGRESS NOTES
Problem: Patient Education: Go to Patient Education Activity Goal: Patient/Family Education Outcome: Not Progressing Towards Goal 
No family member at bedside Problem: Falls - Risk of 
Goal: *Absence of Falls Document Mell Lindsay Fall Risk and appropriate interventions in the flowsheet. Outcome: Progressing Towards Goal 
Fall Risk Interventions: 
Mobility Interventions: Bed/chair exit alarm, Strengthening exercises (ROM-active/passive) Mentation Interventions: Bed/chair exit alarm Medication Interventions: Evaluate medications/consider consulting pharmacy Elimination Interventions: Bed/chair exit alarm, Toileting schedule/hourly rounds History of Falls Interventions: Bed/chair exit alarm, Evaluate medications/consider consulting pharmacy Problem: Patient Education: Go to Patient Education Activity Goal: Patient/Family Education Outcome: Not Progressing Towards Goal 
Variance: Patient/Family Availability Problem: Pressure Injury - Risk of 
Goal: *Prevention of pressure injury Document Mitul Scale and appropriate interventions in the flowsheet. Outcome: Progressing Towards Goal 
Pressure Injury Interventions: 
Sensory Interventions: Assess changes in LOC Moisture Interventions: Absorbent underpads Activity Interventions: Pressure redistribution bed/mattress(bed type) Mobility Interventions: HOB 30 degrees or less Nutrition Interventions: Document food/fluid/supplement intake Friction and Shear Interventions: Foam dressings/transparent film/skin sealants, HOB 30 degrees or less Problem: Discharge Planning Goal: *Discharge to safe environment Outcome: Not Progressing Towards Goal 
Variance: Patient/Family Availability Problem: Patient Education: Go to Patient Education Activity Goal: Patient/Family Education Outcome: Not Progressing Towards Goal 
Variance: Patient/Family Availability

## 2018-11-01 NOTE — PROGRESS NOTES
Spoke with bryan at [facility], they will accept pt if wing lucy gets done and auth received.

## 2018-11-02 NOTE — PROGRESS NOTES
Physical Exam  
Skin:  
 
  
Dual assessment with Bridget EASLEY 
 
0800 Assessment done. Remain unresponsive  And vent dependent. Rt arm Picc is clotted. 1407 PICC dc/d and tip sent for culture. 1600 Reassessment done. VSS. 1830 Incontinent care done( FMS is leaking). Wound care on Rt buttocks done. Cleansed with wound cleanser and covered with Mepilex. 1915 Bedside and Verbal shift change report given to 2825 Mike Rivas (oncoming nurse) by Varun Young RN 
 (offgoing nurse). Report included the following information SBAR, Kardex, Intake/Output, MAR and Cardiac Rhythm SR-ST.

## 2018-11-02 NOTE — PROGRESS NOTES
PCCM Progress Note I have reviewed the flowsheet and previous days notes. Events, vitals, medications and notes from last 24 hours reviewed. Care plan discussed with staff and on multidisciplinary rounds. Subjective: 
11/2/2018 PICC line is clotted. Continues to have low grade fevers, leukocytosis. Tolerating PS during the day. Vitals stable. Patient Active Problem List  
Diagnosis Code  Stroke (cerebrum) ([facility]) I63.9  Stroke ([facility] I63.9  Rhabdomyolysis M62.82  
 Dehydration E86.0  
 Essential hypertension I10  
 Diabetes mellitus type 2, controlled ([facility] E11.9  Non compliance w medication regimen Z91.14  
 DM (diabetes mellitus) ([facility] [facility] E11.9  History of stroke Z80.78  
 Acute-on-chronic kidney injury ([facility] [facility] N17.9, N18.9  Acute cystitis N30.00  Elevated troponin R74.8  Elevated LFTs R94.5  Cardiac arrest with ventricular fibrillation ([facility]) I46.9, I49.01  
 Acute pancreatitis K85.90  
 Ischemic encephalopathy I67.89  
 Hypoalbuminemia E88.09  
 Shingles B02.9  Gangrene ([facility] [address] [id number]  Pseudomonas infection B96.5 Assessment: 
Acute Respiratory Failure with Hypoxia - On MV since 9/30 since cardiac arrest 
- s/p tracheostomy on 10/17 
- Trach exchange done 10/27 
- Tolerating PS 15/5 Anoxic Brain Injury - 2/2 cardiac arrest, MRI with consistent findings Acute Kidney Injury 
- oliguric,  on HD Pseudomonas UTI/Bronchitis - BCx with NGTD 
- UA and respiratory culture with pan-senstivity 
- ID following Hx of CVA 
- R hemiparesis GI Bleed - s/p endoscopy x2 with ulceration noted in esophagus - Resolved Anemia 
- likely due to ESRD and iatrogenic 
- transfuse as needed -  On EPO 
DM 
- on insulin VZV of R arm 
- completed Acyclovir Gangrene of L great toe - Podiatry states to place betadine every day Elevated Lipase - Remains over 1000 
- No evidence of pancreatitis on CT, + GBS 
- Cannot get MRCP at this time - Unable to pass J-tube HTN 
- on amlopidine, PRN hydralazine 
  
Impression/Plan: 
- discussed with ID, will D/C picc line if PIV obtained 
- trial on PS 12 during the day, SIMV at night - Lipase downtrending, continue TFs 
- Cefepime per ID 
- GI and DVT ppx- heparin and protonix Awaiting LTAC placement, medically cleared for transfer 
  
Very poor prognosis, daughter wishes to pursue aggressive care 
  
OTHER: 
Glycemic Control- glucose stabilizer per ICU protocol when on insulin drip. Maintain blood glucose 140-180. Replace electrolytes per ICU electrolyte replacement protocol Ventilator bundle & Sedation protocol followed. Daily morning sedation holiday, assessment for readiness for SBT & weaning from ventilator; and then re-titrate if required. Aim to keep peak plateau pressure 23-32IA H2O in ARDS patient. Anastasiya tube to suction at 20-30 cm H2O, Maintain [place] tube with 5-10ml air every 4 hours. Chlorhexidine mouth washes and routine oral care every 4 hours. Stress ulcer and DVT prophylaxis. HOB >=30 degree elevation all the time. HOB >=30 degree elevation all the time. Aggressive pulmonary toileting. Incentive spirometry when appropriate. Aspiration precautions. CC TIME: >30 min Medication Reviewed: 
 
No Known Allergies Past Medical History:  
Diagnosis Date  CHF (congestive heart failure) ([facility]  Diabetes ([facility]  Stroke ([facility] [address] History reviewed. No pertinent surgical history. Social History Tobacco Use  Smoking status: Never Smoker  Smokeless tobacco: Never Used Substance Use Topics  Alcohol use: No  
  
History reviewed. No pertinent family history. Prior to Admission medications Medication Sig Start Date End Date Taking? Authorizing Provider  
aspirin (ASPIRIN) 325 mg tablet Take 1 Tab by mouth daily. 9/15/18   Nguyễn Solorzano MD  
atorvastatin (LIPITOR) 80 mg tablet Take 1 Tab by mouth nightly.  9/14/18   Nicho Blas MD  
 insulin glargine (LANTUS) 100 unit/mL injection 10 units qhs  Indications: type 2 diabetes mellitus 9/15/18   Marilyn Harrison MD  
insulin lispro (HUMALOG) 100 unit/mL injection 4 units with meals 9/14/18   Marilyn Harrison MD  
losartan (COZAAR) 50 mg tablet Take 1 Tab by mouth daily. 9/14/18   Marilyn Harrison MD  
 
Current Facility-Administered Medications Medication Dose Route Frequency  alteplase (CATHFLO) injection 1 mg  1 mg InterCATHeter ONCE  
 [START ON 11/3/2018] insulin glargine (LANTUS) injection 30 Units  30 Units SubCUTAneous DAILY  amLODIPine (NORVASC) tablet 5 mg  5 mg Oral DAILY  cefepime (MAXIPIME) 0.5 g in 0.9% sodium chloride 100 mL IVPB  0.5 g IntraVENous Q24H  
 albuterol (PROVENTIL VENTOLIN) nebulizer solution 2.5 mg  2.5 mg Nebulization BID RT  
 multivitamin (MULTI-DELYN, WELLESSE) oral liquid 30 mL  30 mL Oral DAILY  sodium chloride (NS) flush 10 mL  10 mL InterCATHeter Q24H  
 sodium chloride (NS) flush 10-40 mL  10-40 mL InterCATHeter Q8H  
 sodium chloride (NS) flush 20 mL  20 mL InterCATHeter Q24H  
 heparin (porcine) injection 5,000 Units  5,000 Units SubCUTAneous Q8H  
 hydroxypropyl methylcellulose (ISOPTO TEARS) 0.5 % ophthalmic solution 2 Drop  2 Drop Both Eyes BID  epoetin manda (EPOGEN;PROCRIT) injection 40,000 Units  40,000 Units IntraVENous Q7D  
 pantoprazole (PROTONIX) 40 mg in sodium chloride 0.9% 10 mL injection  40 mg IntraVENous Q24H  
 influenza vaccine 2018-19 (6 mos+)(PF) (FLUARIX QUAD/FLULAVAL QUAD) injection 0.5 mL  0.5 mL IntraMUSCular PRIOR TO DISCHARGE  insulin lispro (HUMALOG) injection   SubCUTAneous Q6H Lines: All central lines examined by me. No signs of erythema, induration, discharge. Peripherally Inserted Central Catheter: PICC Double Lumen 10/18/18 Left;Brachial (Active) Central Line Being Utilized Yes 10/29/2018  4:00 AM  
 Criteria for Appropriate Use Limited/no vessel suitable for conventional peripheral access 10/29/2018  4:00 AM  
Site Assessment Clean, dry, & intact 10/29/2018  4:00 AM  
Phlebitis Assessment 0 10/29/2018  4:00 AM  
Infiltration Assessment 0 10/29/2018  4:00 AM  
Arm Circumference (cm) 38 cm 10/26/2018 12:00 AM  
Date of Last Dressing Change 10/25/18 10/29/2018  4:00 AM  
Dressing Status Clean, dry, & intact; Occlusive 10/29/2018  4:00 AM  
Action Taken Open ports on tubing capped 10/29/2018  4:00 AM  
External Catheter Length (cm) 0 centimeters 10/26/2018 12:00 AM  
Dressing Type Disk with Chlorhexadine gluconate (CHG); Stabilization/securement device;Transparent 10/29/2018  4:00 AM  
Hub Color/Line Status Red; Infusing 10/29/2018  4:00 AM  
Positive Blood Return (Site #1) Yes 10/29/2018  4:00 AM  
Hub Color/Line Status Purple;Flushed;Capped 10/29/2018  4:00 AM  
Positive Blood Return (Site #2) No 10/29/2018  4:00 AM  
Alcohol Cap Used Yes 10/29/2018  4:00 AM  
 
  
Hemodialysis Catheter: 
Hemodialysis Access 10/19/18 (Active) Central Line Being Utilized Yes 10/29/2018  4:00 AM  
Criteria for Appropriate Use Dialysis/apheresis 10/29/2018  4:00 AM  
Date Accessed  10/27/18 10/29/2018  4:00 AM  
Site Assessment Clean, dry, & intact 10/29/2018  4:00 AM  
Date of Last Dressing Change 10/24/18 10/29/2018  4:00 AM  
Dressing Status Clean, dry, & intact; Occlusive 10/29/2018  4:00 AM  
Dressing Type Disk with Chlorhexadine gluconate (CHG); Transparent 10/29/2018  4:00 AM  
Proximal Hub Color/Line Status Capped 10/29/2018  4:00 AM  
Distal Hub Color/Line Status Capped 10/29/2018  4:00 AM  
 
Drain(s): PEG/Gastrostomy Tube 10/17/18 (Active) Site Assessment Clean, dry, & intact 10/29/2018  4:00 AM  
Dressing Status Clean, dry, & intact 10/29/2018  4:00 AM  
G Port Status Clamped 10/29/2018  4:00 AM  
External Catheter Length (cm) 3 centimeters 10/26/2018  4:00 PM  
 Action Taken Placement verified (comment) 10/28/2018 12:00 PM  
Drainage Description Other (Comment) 10/24/2018  8:00 AM  
Gastric Residual (mL) 0 ml 10/28/2018  8:00 PM  
Tube Feeding/Formula Options Osmolite 1.2 10/28/2018 12:00 PM  
Tube Feeding/Verify Rate (mL/hr) 0 10/28/2018  4:00 PM  
Water Flush Volume (mL) 60 mL 10/27/2018  8:30 AM  
Intake (ml) 0 ml 10/28/2018  6:00 PM  
Medication Volume 30 ml 10/27/2018  8:30 AM  
Drainage Chamber Level (ml) 0 ml 10/23/2018  8:00 PM  
Output (ml) 0 ml 10/23/2018  8:00 PM  
   
Fecal Management (Active) Stool Consistency Liquid 10/29/2018  9:24 AM  
Position of Indicator Line Visible 10/29/2018  9:24 AM  
Signal Indicator Bubble Appropriate 10/29/2018  9:24 AM  
Skin Assessment of the Anal Area Denuded/excoriated 10/29/2018  9:24 AM  
Tube Irrigated No 10/29/2018  9:24 AM  
Irrigation Volume (mL) 0 10/29/2018  9:24 AM  
Drainage Bag Level (mL) 450 10/29/2018  9:24 AM  
Output (ml) 20 ml 10/29/2018  9:24 AM  
 
Airway: Airway - Tracheostomy Tube 10/17/18 Portex; Cuffed (Active) Cuff Pressure 30 cmH20 10/28/2018  8:43 PM  
Site Assessment Clean, dry, & intact 10/29/2018  9:20 AM  
Trach Dressing Changed Yes 10/29/2018  9:20 AM  
Trach Cleaned With Normal saline 10/29/2018  9:20 AM  
Trach Tie Changed No 10/29/2018  9:20 AM  
Trach Cannula Changed Yes 10/29/2018  9:20 AM  
Inner Cannula Cuffed, cuff inflated; Fenestrated; Other (comment) 10/29/2018  9:20 AM  
Line Bernadene Jazmin Top of the lock 10/29/2018  9:20 AM  
Side Secured Centered 10/29/2018  9:20 AM  
Spare Trach at Bedside Yes 10/29/2018  9:20 AM  
Spare Umang Devoid Tube One Size Smaller at Bedside Yes 10/29/2018  9:20 AM  
Ambu Bag With Mask at Bedside Yes 10/29/2018  9:20 AM  
 
 
Objective: 
Vital Signs:   
Visit Vitals /51 (BP 1 Location: Left arm, BP Patient Position: At rest) Pulse (!) 103 Temp 100.2 °F (37.9 °C) Resp 25 Ht 5' 7\" (1.702 m) Wt 90 kg (198 lb 8 oz) SpO2 99% BMI 31.09 kg/m² O2 Device: Tracheostomy O2 Flow Rate (L/min): 45 l/min Temp (24hrs), Av.2 °F (37.3 °C), Min:97.6 °F (36.4 °C), Max:100.4 °F (38 °C) Intake/Output:  
Last shift:      701 - 1900 In: 200 Out: - Last 3 shifts: 10/31 1901 - 700 In: 8114 [I.V.:200] Out: 8596 [Urine:385; Drains:240] Intake/Output Summary (Last 24 hours) at 2018 1301 Last data filed at 2018 1000 Gross per 24 hour Intake 710 ml Output 3800 ml Net -3090 ml Last 3 Recorded Weights in this Encounter 10/30/18 4333 10/31/18 0930 18 Weight: 89.3 kg (196 lb 12.8 oz) 89.2 kg (196 lb 9.6 oz) 90 kg (198 lb 8 oz) Ventilator Settings: 
Mode Rate Tidal Volume Pressure FiO2 PEEP Pressure support   500 ml  12 cm H2O 30 % 5 cm H20 Peak airway pressure: 19 cm H2O Plateau pressure:   
Tidal volume:  
Minute ventilation: 10 l/min SPO2  
 
ARDS network Guidelines: Lung protective strategy and Plateau pressure goals less than or equal to 30 Physical Exam:  
 
General/Neurology: Eyes open, nonresponsive, withdrawing to pain in LE. Head:   Normocephalic, without obvious abnormality Eye:   Small pupils but reactive,  
Oral:   Mucus membranes moist 
Neck:   Supple, trach in place, wound clean 
Lung:   B/l air movement. No wheezing or rales. Heart:   Systolic murmur present, RRR Abdomen/: Soft, non tender, + PEG tube Extremities:  Gangrenous L Great toe Skin:   Blisters of R arm improving. Data: 
   
Recent Results (from the past 24 hour(s)) GLUCOSE, POC Collection Time: 18  5:28 PM  
Result Value Ref Range Glucose (POC) 179 (H) 70 - 110 mg/dL GLUCOSE, POC Collection Time: 18 11:56 PM  
Result Value Ref Range Glucose (POC) 205 (H) 70 - 110 mg/dL LIPASE Collection Time: 18  4:00 AM  
Result Value Ref Range Lipase 1,025 (H) 73 - 393 U/L  
RENAL FUNCTION PANEL Collection Time: 18  4:00 AM  
Result Value Ref Range Sodium 138 136 - 145 mmol/L Potassium 4.9 3.5 - 5.5 mmol/L Chloride 103 100 - 108 mmol/L  
 CO2 23 21 - 32 mmol/L Anion gap 12 3.0 - 18 mmol/L Glucose 136 (H) 74 - 99 mg/dL BUN 32 (H) 7.0 - 18 MG/DL Creatinine 2.53 (H) 0.6 - 1.3 MG/DL  
 BUN/Creatinine ratio 13 12 - 20 GFR est AA 31 (L) >60 ml/min/1.73m2 GFR est non-AA 26 (L) >60 ml/min/1.73m2 Calcium 7.0 (L) 8.5 - 10.1 MG/DL Phosphorus 3.7 2.5 - 4.9 MG/DL Albumin 1.5 (L) 3.4 - 5.0 g/dL CBC W/O DIFF Collection Time: 11/02/18  4:00 AM  
Result Value Ref Range WBC 15.6 (H) 4.6 - 13.2 K/uL  
 RBC 2.89 (L) 4.70 - 5.50 M/uL HGB 8.0 (L) 13.0 - 16.0 g/dL HCT 26.0 (L) 36.0 - 48.0 % MCV 90.0 74.0 - 97.0 FL  
 MCH 27.7 24.0 - 34.0 PG  
 MCHC 30.8 (L) 31.0 - 37.0 g/dL  
 RDW 16.4 (H) 11.6 - 14.5 % PLATELET 050 (H) 201 - 420 K/uL MPV 9.0 (L) 9.2 - 11.8 FL  
GLUCOSE, POC Collection Time: 11/02/18  5:50 AM  
Result Value Ref Range Glucose (POC) 185 (H) 70 - 110 mg/dL GLUCOSE, POC Collection Time: 11/02/18 12:45 PM  
Result Value Ref Range Glucose (POC) 220 (H) 70 - 110 mg/dL Chemistry Recent Labs 11/02/18 
0400 11/01/18 
0345 10/31/18 
1728 10/31/18 
8834 * 105*  --  115*  138  --  138  
K 4.9 4.5  --  4.2  103  --  101 CO2 23 28  --  28 BUN 32* 52*  --  32* CREA 2.53* 3.89*  --  2.57* CA 7.0* 7.6*  --  7.9*  
PHOS 3.7 5.4*  --  3.8 AGAP 12 7  --  9  
BUCR 13 13  --  12  
AP  --   --  388*  --   
TP  --   --  6.5  --   
ALB 1.5* 1.4* 1.4* 1.5*  
GLOB  --   --  5.1*  --   
AGRAT  --   --  0.3*  --   
 
 
CBC w/Diff Recent Labs 11/02/18 
0400 11/01/18 
0345 10/31/18 
8253 WBC 15.6* 14.2* 16.6*  
RBC 2.89* 2.77* 2.97* HGB 8.0* 7.6* 8.3* HCT 26.0* 24.4* 26.0*  
* 483* 529* ABG No results for input(s): PHI, PHI, POC2, PCO2I, PO2, PO2I, HCO3, HCO3I, FIO2, FIO2I in the last 72 hours. Micro Recent Labs 10/31/18 
4981 CULT <15 CFU PSEUDOMONAS AERUGINOSA* Recent Labs 10/31/18 
9901 CULT <15 CFU PSEUDOMONAS AERUGINOSA*  
 
 
CT (Most Recent) Results from [facility] Encounter encounter on 09/26/18 CT ABD PELV W CONT Narrative EXAM: CT of the abdomen and pelvis INDICATION: Pneumonia. Acute pyelonephritis. Sepsis. Abnormal liver function 
tests. Dialysis patient. COMPARISON: None. TECHNIQUE: Axial CT imaging of the abdomen and pelvis was performed with 
intravenous contrast. Multiplanar reformats were generated. One or more dose 
reduction techniques were used on this CT: automated exposure control, 
adjustment of the mAs and/or kVp according to patient size, and iterative 
reconstruction techniques. The specific techniques used on this CT exam have 
been documented in the patient's electronic medical record. 
 
_______________ FINDINGS: 
 
LOWER CHEST: Dense consolidation right lower lobe compatible with pneumonia. Small bilateral pleural effusions. Cardiomegaly. Fluid-filled esophagus with 
nasogastric tube in place. LIVER, BILIARY: Indeterminate low-attenuation 7 mm focus anterior left hepatic 
lobe. Hepatomegaly. No biliary dilation. Distended gallbladder with gallstones 
and indeterminate material in the gallbladder which may represent sludge. PANCREAS: Normal. 
 
SPLEEN: Normal. 
 
ADRENALS: Normal. 
 
KIDNEYS/URETERS: No hydronephrosis or renal calculus. Small low attenuation 
cortical focus medial upper pole left kidney not well delineated measuring about 1.1 cm. This may represent renal cyst. 
 
LYMPH NODES: No enlarged lymph nodes. GASTROINTESTINAL TRACT: No bowel dilation or wall thickening. Normal appendix. PELVIC ORGANS: No intraperitoneal fluid. Incidental vas deferens calcifications. VASCULATURE: Mild atherosclerosis. BONES: No acute or aggressive osseous abnormalities identified. OTHER: None. 
 
_______________  Impression IMPRESSION: 
 
 
 1. Dense subtotal or total consolidation right lower lobe compatible with 
pneumonia similar to prior study. Small bilateral pleural effusions similar in 
size. 2. Distended gallbladder with gallstones and gallbladder sludge without 
significant change in appearance and also described in detail on ultrasound of 
9/27/2018. 3. Indeterminate small lesion left hepatic lobe without change. Hepatomegaly 
again evident. 4. No intraperitoneal fluid. Possible small left upper pole renal cyst. 
Cardiomegaly. Nasogastric tube tip in body of stomach. XR (Most Recent). CXR reviewed by me and compared with previous CXR Results from [facility] Encounter encounter on 09/26/18 XR CHEST PORT Narrative EXAM: Portable Chest 
 
CLINICAL INDICATION:  trach change; atelectasis -Additional:  None COMPARISON:  10/24/18 TECHNIQUE: AP portable view at [address] 
 
______________ FINDINGS: 
 
HEART AND MEDIASTINUM:  The heart size is stable LUNGS AND AIRWAYS:  Right chest perihilar infiltrate is present PLEURA:  No pneumothorax or pleural effusion BONES:  Vertebral spondylosis OTHER:  None 
 
______________ Impression IMPRESSION: 
 
Right lung perihilar infiltrate appears present High complexity decision making was performed during the evaluation of this patient at high risk for decompensation with multiple organ involvement Above mentioned total time spent on reviewing the case/medical record/data/notes/EMR/patient examination/documentation/coordinating care with nurse/consultants, exclusive of procedures with complex decision making performed and > 50% time spent in face to face evaluation. Wilfrido Smith MD 
Critical Care Medicine

## 2018-11-02 NOTE — PROGRESS NOTES
Physical Exam  
Skin:  
 
  
 
 
Bedside shift change report was given to me, Olga Ca RN  (oncoming night shift nurse), by Marques Bose (offgoing day shift nurse). Report included the following information:  SBAR, kardex, consultations, hemodynamic monitoring, intake/output, MAR, lab results, VS trends, cardiac rhythm noted NSR/ST. Skin, neuro, respiratory status, and order verification have been dually noted and verified at the bedside with: Esdras Jaimes RN                                                              
ICU Nurse's Note: 
2000: Pt's eyes are open with ongoing spontaneous eye movements, dysconjugate gaze with no observable s/s tracking or visual focusing, no ability to follow commands or communicate needs. Pt has no notable purposeful movements to his extremities, with exceptional of occasional withdrawal to pain BLE. BUE remain flaccid and requiring support and frequent ROM. This pt requires max assist with all ADLs and repositioning. Pt will occasionally withdraw his BLE upon application of painful stim. He is especially hypersensitive to the left great toe, which remains blackened and discolored, surrounding skin is cool and non-blanchable. Will continue to monitor. Neurological: as noted in assessment Cardiovascular:  NSR/ST on the monitor. Pulmonary: breath sounds diminished and coarse bilaterally. Saturations maintained at 98 % via 8 mm Portex cuffed tracheostomy, managed via vent  AC w/ Vt 500, PEEP 5, FiO2 30%. Intermittent Inline suctioning of small amounts of white thick sputum. Positive cough and gag with suction noted. GI/: Abdomen is obese, soft, non-distended. PEG tube intact and infusing Perative at 35 ml/hr, with < 5 ml tan residual noted. Last BM indicated 11/01/2018, orange/brown and watery consistency in FMS collection bag.  Pt is a hemodialysis client who is oliguric at baseline, with occasional episodes of urinary incontinence. Scheduled straight catheterizations q shift IVF/Critical gtts: abx Skin: as noted above 
 
0000: Q 6hr accucheck 205, SSI administered per protocol. VS at baseline with low grade axillary temp 99. 5. PEG was flushed, tube feeding continues at 35 ml/hr without incident. Trache, FMS, and PEG stoma care completed, with a moderate amount of serosanguinous drainage cleaned around stoma site. Pt tolerated without incident. 0200: Pt was incontinent of a small amount of urine output at this time. He was repositioned and suctioned a small amount of thick white sputum via inline suction cath. 0435: AM labs drawn from PICC without incident. Pt was bathed, repositioned, and tolerated mouth care. He continues to exhibit a strong, productive cough with thick white sputum. He was straight catheterized at this time, yielding 110 ml braydon colored urine. 0600: Q 6hr accucheck 167, SSI administered per protocol. Pt tolerated scheduled AM meds and appears to be resting comfortably in bed at this time after repositioning, mouth care, and mery care. FMS is patent and draining. Interventions are ongoing, will continue to monitor.  
0710: Bedside change of shift report given to: Phillip Teran RN

## 2018-11-02 NOTE — PROGRESS NOTES
RENAL PROGRESS NOTE Noel Walekr Assessment/Plan: · Prolonged dialysis dependant JULIANNA (ischemic atn in a setting of acute pyelonephritis/acute cholecystitis with sepsis and cardiac arrest 9/29). No evidence of recovery of renal function at this time, although nonolliguric (continue straight cath every 8 hours). Next dialysis tomorrow  and continue tts. · S/p cardiopulm arrest 9/29. · Acute pyelonephritis/sepsis. Febrile on/off. On abx by ID. · Abnormal lft's/acute cholecystitis/pancreatitis. GI on the case. Lipase is improving. · UGI bleed, EGD results noted- healing esophageal ulceration. · Acute blood loss anemia/anemia of kidney failure. Continue analia. · HTN. Stable, continue amlodipine. · Asp pneumonia. · Resp failure. S/p peg/trach 10/17, s/p bronch. · Rt shoulder shingles. Finished acyclovir. · Anoxic brain injury superimposed on recent cva. Subjective: 
 
Unresponsive. Eyes are open. Tolerates tf. Family at the bedside. Patient Active Problem List  
Diagnosis Code  Stroke (cerebrum) ([facility]) I63.9  Stroke ([facility] [address] I63.9  Rhabdomyolysis M62.82  
 Dehydration E86.0  
 Essential hypertension I10  
 Diabetes mellitus type 2, controlled ([facility] [address] E11.9  Non compliance w medication regimen Z91.14  
 DM (diabetes mellitus) ([facility] [address] E11.9  History of stroke Z80.78  
 Acute-on-chronic kidney injury ([facility] [address] N17.9, N18.9  Acute cystitis N30.00  Elevated troponin R74.8  Elevated LFTs R94.5  Cardiac arrest with ventricular fibrillation ([facility]) I46.9, I49.01  
 Acute pancreatitis K85.90  
 Ischemic encephalopathy I67.89  
 Hypoalbuminemia E88.09  
 Shingles B02.9  Gangrene ([facility] [address] [id number]  Pseudomonas infection B96.5 Current Facility-Administered Medications Medication Dose Route Frequency Provider Last Rate Last Dose  alteplase (CATHFLO) injection 1 mg  1 mg InterCATHeter ONCE Lakesha Gonzales NP      
 [START ON 11/3/2018] insulin glargine (LANTUS) injection 30 Units  30 Units SubCUTAneous DAILY Maria Antonia Bolaños MD      
 amLODIPine (NORVASC) tablet 5 mg  5 mg Oral DAILY Lakesha Gonzales NP   5 mg at 11/02/18 0829  
 0.9% sodium chloride infusion 250 mL  250 mL IntraVENous PRN Stella LAL NP      
 cefepime (MAXIPIME) 0.5 g in 0.9% sodium chloride 100 mL IVPB  0.5 g IntraVENous Q24H Mariann Medina MD   0.5 g at 11/01/18 2300  
 albuterol (PROVENTIL VENTOLIN) nebulizer solution 2.5 mg  2.5 mg Nebulization BID RT Asharjun Littlejohn MD   2.5 mg at 11/02/18 0737  
 multivitamin (MULTI-DELYN, WELLESSE) oral liquid 30 mL  30 mL Oral DAILY Lynette Hernandez MD   30 mL at 11/02/18 3249  bacitracin 500 unit/gram packet 1 Packet  1 Packet Topical PRN Diego Hart MD      
 sodium chloride (NS) flush 10-30 mL  10-30 mL InterCATHeter PRN Diego Hart MD   30 mL at 10/21/18 0430  
 sodium chloride (NS) flush 10 mL  10 mL InterCATHeter Q24H Jones Ackerman MD   10 mL at 11/01/18 1720  
 sodium chloride (NS) flush 10 mL  10 mL InterCATHeter PRN Diego Hart MD   10 mL at 10/20/18 0446  sodium chloride (NS) flush 10-40 mL  10-40 mL InterCATHeter Q8H Sarahi Ackerman MD   10 mL at 11/02/18 0994  sodium chloride (NS) flush 20 mL  20 mL InterCATHeter Q24H Diego Hart MD   20 mL at 11/01/18 1720  heparin (porcine) injection 5,000 Units  5,000 Units SubCUTAneous Q8H Diego Hart MD   5,000 Units at 11/02/18 1251  hydroxypropyl methylcellulose (ISOPTO TEARS) 0.5 % ophthalmic solution 2 Drop  2 Drop Both Eyes BID Stella [place] A, NP   2 Drop at 11/02/18 0830  epoetin manda (EPOGEN;PROCRIT) injection 40,000 Units  40,000 Units IntraVENous Q7D Kitty Gayle MD   40,000 Units at 10/31/18 1406  pantoprazole (PROTONIX) 40 mg in sodium chloride 0.9% 10 mL injection 40 mg IntraVENous Q24H Ceasar Mcdaniel MD   40 mg at 11/02/18 3817  
 0.9% sodium chloride infusion 250 mL  250 mL IntraVENous PRN Lemon Knock, DO      
 influenza vaccine 2018-19 (6 mos+)(PF) (FLUARIX QUAD/FLULAVAL QUAD) injection 0.5 mL  0.5 mL IntraMUSCular PRIOR TO DISCHARGE Corrinne Fryer, MD      
 acetaminophen (TYLENOL) solution 325 mg  325 mg Per NG tube Q4H PRN Corrinne Fryer, MD   325 mg at 10/31/18 1540  
 0.9% sodium chloride infusion 250 mL  250 mL IntraVENous PRN Lemon Knock, DO      
 heparin (porcine) pf 100 Units  100 Units InterCATHeter PRN Melyssa Louis MD      
 0.9% sodium chloride infusion 250 mL  250 mL IntraVENous PRN Corrinne Fryer, MD      
 insulin lispro (HUMALOG) injection   SubCUTAneous Q6H Vonnie Chaudhary , DO   6 Units at 11/02/18 1251  heparin (porcine) 1,000 unit/mL injection 1,000 Units  1,000 Units InterCATHeter DIALYSIS PRN Melyssa Louis MD   1,000 Units at 09/28/18 1332  senna-docusate (PERICOLACE) 8.6-50 mg per tablet 1 Tab  1 Tab Oral BID PRN Neli Tomlins, DO      
 ondansetron (ZOFRAN) injection 4 mg  4 mg IntraVENous Q4H PRN Alfred Nettles DO   4 mg at 09/28/18 0539  
 glucose chewable tablet 16 g  4 Tab Oral PRN Alferd Dose A, DO      
 glucagon (GLUCAGEN) injection 1 mg  1 mg IntraMUSCular PRN Alfred Nettles DO      
 dextrose (D50) infusion 12.5-25 g  25-50 mL IntraVENous PRN Alfred Nettles DO   25 g at 10/09/18 1906  hydrALAZINE (APRESOLINE) 20 mg/mL injection 20 mg  20 mg IntraVENous Q6H PRN Georgina Santillan NP   20 mg at 10/31/18 2345 Objective Vitals:  
 11/02/18 1100 11/02/18 1105 11/02/18 1200 11/02/18 1306 BP: 163/52  163/51 Pulse: (!) 104 (!) 104 (!) 103 (!) 102 Resp: 17 27 25 (!) 31 Temp:   100.2 °F (37.9 °C) TempSrc:      
SpO2: 99% 100% 99% 97% Weight:      
Height:      
 
 
 
Intake/Output Summary (Last 24 hours) at 11/2/2018 1322 Last data filed at 11/2/2018 1000 Gross per 24 hour Intake 710 ml Output 3800 ml Net -3090 ml Admission weight: Weight: 96.6 kg (213 lb) (09/26/18 2244) Last Weight Metrics: 
Weight Loss Metrics 11/1/2018 9/26/2018 9/13/2018 Today's Wt 198 lb 8 oz - 227 lb 15.3 oz  
BMI - 31.09 kg/m2 35.7 kg/m2 Physical Assessment:  
 
General: intubated, unresponsive. Eyes are open. Neck: Trach in place. Rt ij tdc. Neck: No jvd. LUNGS: diminished air entry at the bases. No crackles. CVS EXM: S1, S2  RRR. Abdomen: soft, pos bs. PEG. Lower Extremities:  1+ edema. Lab CBC w/Diff Recent Labs 11/02/18 
0400 11/01/18 
0345 10/31/18 
3929 WBC 15.6* 14.2* 16.6*  
RBC 2.89* 2.77* 2.97* HGB 8.0* 7.6* 8.3* HCT 26.0* 24.4* 26.0*  
* 483* 529* Chemistry Recent Labs 11/02/18 
0400 11/01/18 
0345 10/31/18 
1728 10/31/18 
1695 * 105*  --  115*  138  --  138  
K 4.9 4.5  --  4.2  103  --  101 CO2 23 28  --  28 BUN 32* 52*  --  32* CREA 2.53* 3.89*  --  2.57* CA 7.0* 7.6*  --  7.9* AGAP 12 7  --  9  
BUCR 13 13  --  12  
AP  --   --  388*  --   
TP  --   --  6.5  --   
ALB 1.5* 1.4* 1.4* 1.5*  
GLOB  --   --  5.1*  --   
AGRAT  --   --  0.3*  --   
PHOS 3.7 5.4*  --  3.8 No results found for: IRON, FE, TIBC, IBCT, PSAT, FERR Lab Results Component Value Date/Time Calcium 7.0 (L) 11/02/2018 04:00 AM  
 Phosphorus 3.7 11/02/2018 04:00 NORRIS Sullivan M.D. [facility] Phone [phone] Pager [phone]

## 2018-11-02 NOTE — PROGRESS NOTES
Infectious Disease Follow-up We are available over weekend and plan to f/u Monday. Dr Millie Metzger is on call and can be reached at [phone] if any problem or question for ID prior. Admit Date: 9/26/2018 Current abx Prior abx  
cefepime 10/25-8 Zosyn 9/27-7, Meropenem/flucon 10/4- 11, vanco 9/27-25, ACV 10/17 - 10,  Vanco 10/25- 1, Cipro 10/28-1 Assessment ->Rec:  
 
Recurrent SIRS T 100.4 10/25 wbc 18k 
 - more secretions, reported cloudy urine, cxr yest no pneumonia  
-line tip culture 10/19 >15 CFU CoNS now with L picc, femoral dialysis catheter out 10/31   
- blcx 10/25 NG x 2, spgs rare Pseud, wd Pseud, cxr 10/27 poss R perihilar infiltrate-? Pul source here vs from prior bleeding -> appreciate removal femoral uldall 10/31- cath tip with <15k Pseudomonas 
-> continue Cefepime  
-> Will also remove PICC line and send tip for Cx.  
-> Will plan line holidays and if completes Abx while in hospital, may not need repeat PICC insertion for continuation of Abx 
-> D/W dr Daniels [facility] and RN 
-> repeat blcx x2 if spikes fever >100.8 
-> Plan ~10-14 days of Abx PAD  
- PVL's s/o significant arterial disease of bilateral lower extremities 10/24. -POD Dr. Shraddha Burrows saw 10/24,  indicated not OR candidate foot for gangrene distal L gt toe - per others Shingles R arm, C 6 (?C5) dermatome 
- vesicular lesions onset ~10/15 progressing compared to onset per NP 
- confluent in upper arm, not crusted yet  
-VZV PCR pos 10/18 
- completed 10 days of Acyclovir on 10/26 -> wounds now appear crusted, d/w nursing no isolation for sensitive Pseudomonas so can stop contact precautions Prior Leukocytosis/SIRS poss sepsis (resolved 10/19) 
- MOF multiple ID and non-infectious concerns outlined below, complicated, wbc 22.6M today from high 27K+ on 10/5 On  vancomycin/zosyn 9/27 
- C diff neg 9/29, sput C albicans, repeat CT 10/4 no new findings --cxr reviewed - increased atx rt base. Left base improved - Higher fever 10/16 101.2, 10/18 101.4 afebrile since 
- Ddx: HZV vs deep tissue injury of toe/buttock vs Line infection 
- blcx 10/16 NG x 2 
- TDC Tip 10/19 >15 CFU MRSE (1 of 2 blcx on adm 9/27 MRSE but afebrile then prob contaminant) - Suspect colonized with Pseudomonas now and at risk for subsequent infections given vent, HD and lines Suspected Pyelo POA  
- CTAP 9/27:  B perineph stranding, UA tntc wbc, uctx ng 
- treated at this point Cholelithiasis choledocholithiais suspected acute cholecystitis  poss cystic stone POA- abnl lft bili 4 alk phos 400 seen by GI and GS Dr. Eduard Hernandez, no plan for OR, for poss cholecystostomy if LFT worsen, bili, alk phos declining - AST fluctuating downward  -Dr. Beatrice Grace saw for GI 10/24 Elevated Lipase  
- lipase 2200 POA -> 3191 ->  5500 on 10/4 improved to 1207 10/19 but up again to 2169 10/22 interpretation complicated by JULIANNA -> fluctuating between 4119-3317 (off mucomyst, TF) 
- CTAP 10/4: Normal pancreas -> per ICU team  
B infiltrates - poss edema infiltrate - RLL consolid better 10/4 cxr and suspect endobronchial clot contributed   ->monitor MRSE bacteremia 9/27 1 of 2. Has LUE midline unclear when placed Treated JULIANNA POA dialysis dependent 
- baseline cr 0.9 132/10.1 in ER 
-RIJ uldall 9/28 out 10/19 - R fem uldall 10/20 
-NEW RIJ TDC 10/31 -> per renal  
Bleeding -melena earlier, EGD 10/2 clot in esophagus bronch 10/3 R endobronch clot NEW removed 10/5 repeat bronch  -> per others Suspected anoxic brain injury on MRI 10/3 SP VF arrest 9/29 -> overall poor prognosis but family wishes aggressive care CVA R hemiparesis 9/10   
resp failure sp intubation 9/29 Comorbid: HTN HLD CHF h/o steatohepatitis MICROBIOLOGY:  
9/27 bctx 1 of 2 MRSE 
 uctx ng 
9/29 sput C albicans C diff neg 10/4 bctx x 2 NTD 
 fungitell indeterminate due to contamination 10/8 Cath tip cx ntd 10/8     bl cx ng 
10/16 Blcx x2 ng 
 Spcx NF 
 10/18 VZV PCr +ve, HSV PCR neg 
10/19 Cath tip >15 CFU MRSE 
10/25 Spcx pseudomonas x2- panS 
 blcx x2 nG 
 uctx Pseudomonas Wd cx Pseud x2 panS, E faecalis S amp, CoNS 
10/31  Cath tip <15 CFU GNR, panS Pseudomonas LINES AND CATHETERS:  
 
Left PICC 10/18 [facility] 10/31 Active Hospital Problems Diagnosis Date Noted  Pseudomonas infection 10/27/2018  Gangrene ([facility] [address] 10/22/2018  Hypoalbuminemia 10/21/2018  Shingles 10/21/2018  Ischemic encephalopathy 09/30/2018  Cardiac arrest with ventricular fibrillation ([facility] [address] 09/29/2018 ROSC before defibrillation could be attempted.  Acute pancreatitis 09/29/2018 Lipase downtrends with interruption in tube feed and Mucomyst. Restarting tube feed did result in modest elevation but not to the levels seen while on Mucomyst. Nonetheless, there is signficant decrease in lipase levels with interruptions in tube feeding.  Elevated LFTs 09/28/2018  History of stroke 09/27/2018 With residual R hemiparesis  Acute-on-chronic kidney injury ([facility] [address] 09/27/2018  Acute cystitis 09/27/2018  Elevated troponin 09/27/2018  Essential hypertension 09/10/2018  Diabetes mellitus type 2, controlled ([facility] [address] 09/10/2018 Subjective: Interval notes reviewed, Afebrile. WBC slightly up, spiked fever 100.4 Tmax. On vent through trach and tolerating peg feeds. Eyes open no response voice or exam no hx or ROS elicitable. Unchanged from earlier. Current Facility-Administered Medications Medication Dose Route Frequency  alteplase (CATHFLO) injection 1 mg  1 mg InterCATHeter ONCE  
 [START ON 11/3/2018] insulin glargine (LANTUS) injection 30 Units  30 Units SubCUTAneous DAILY  amLODIPine (NORVASC) tablet 5 mg  5 mg Oral DAILY  0.9% sodium chloride infusion 250 mL  250 mL IntraVENous PRN  
 cefepime (MAXIPIME) 0.5 g in 0.9% sodium chloride 100 mL IVPB  0.5 g IntraVENous Q24H  albuterol (PROVENTIL VENTOLIN) nebulizer solution 2.5 mg  2.5 mg Nebulization BID RT  
 multivitamin (MULTI-DELYN, WELLESSE) oral liquid 30 mL  30 mL Oral DAILY  bacitracin 500 unit/gram packet 1 Packet  1 Packet Topical PRN  
 sodium chloride (NS) flush 10-30 mL  10-30 mL InterCATHeter PRN  
 sodium chloride (NS) flush 10 mL  10 mL InterCATHeter Q24H  
 sodium chloride (NS) flush 10 mL  10 mL InterCATHeter PRN  
 sodium chloride (NS) flush 10-40 mL  10-40 mL InterCATHeter Q8H  
 sodium chloride (NS) flush 20 mL  20 mL InterCATHeter Q24H  
 heparin (porcine) injection 5,000 Units  5,000 Units SubCUTAneous Q8H  
 hydroxypropyl methylcellulose (ISOPTO TEARS) 0.5 % ophthalmic solution 2 Drop  2 Drop Both Eyes BID  epoetin manda (EPOGEN;PROCRIT) injection 40,000 Units  40,000 Units IntraVENous Q7D  
 pantoprazole (PROTONIX) 40 mg in sodium chloride 0.9% 10 mL injection  40 mg IntraVENous Q24H  
 0.9% sodium chloride infusion 250 mL  250 mL IntraVENous PRN  
 influenza vaccine 2018-19 (6 mos+)(PF) (FLUARIX QUAD/FLULAVAL QUAD) injection 0.5 mL  0.5 mL IntraMUSCular PRIOR TO DISCHARGE  acetaminophen (TYLENOL) solution 325 mg  325 mg Per NG tube Q4H PRN  
 0.9% sodium chloride infusion 250 mL  250 mL IntraVENous PRN  
 heparin (porcine) pf 100 Units  100 Units InterCATHeter PRN  
 0.9% sodium chloride infusion 250 mL  250 mL IntraVENous PRN  
 insulin lispro (HUMALOG) injection   SubCUTAneous Q6H  
 heparin (porcine) 1,000 unit/mL injection 1,000 Units  1,000 Units InterCATHeter DIALYSIS PRN  
 senna-docusate (PERICOLACE) 8.6-50 mg per tablet 1 Tab  1 Tab Oral BID PRN  
 ondansetron (ZOFRAN) injection 4 mg  4 mg IntraVENous Q4H PRN  
 glucose chewable tablet 16 g  4 Tab Oral PRN  
 glucagon (GLUCAGEN) injection 1 mg  1 mg IntraMUSCular PRN  
 dextrose (D50) infusion 12.5-25 g  25-50 mL IntraVENous PRN  
 hydrALAZINE (APRESOLINE) 20 mg/mL injection 20 mg  20 mg IntraVENous Q6H PRN  
 
 Objective:  
 
Visit Vitals /54 Pulse (!) 104 Temp 100.4 °F (38 °C) Resp 27 Ht 5' 7\" (1.702 m) Wt 90 kg (198 lb 8 oz) SpO2 100% BMI 31.09 kg/m² Temp (24hrs), Av.8 °F (37.1 °C), Min:97.4 °F (36.3 °C), Max:100.4 °F (38 °C) GEN: well developed 57year-old AA male on vent/trach in ICU, unresponsive HENT: no thrush eyes open Neck: tracheostomy in place. TDC RIJ in use, CHEST: coarse bs B no wheeze or rhonchi CVS: RRR, no murmur appreciated ABD: soft, + BS no localized tenderness, PEG in place EXT: 1+ pitting edema b/l LE  
SKIN:  RUE lesions crusted, LUE picc CNS:eyes open, unresponsive to commands or deep sternal rub Labs: Results:  
Chemistry Recent Labs 11/02/18 
0400 11/01/18 
0345 10/31/18 
1728 10/31/18 
8811 * 105*  --  115*  138  --  138  
K 4.9 4.5  --  4.2  103  --  101 CO2 23 28  --  28 BUN 32* 52*  --  32* CREA 2.53* 3.89*  --  2.57* CA 7.0* 7.6*  --  7.9* AGAP 12 7  --  9  
BUCR 13 13  --  12  
AP  --   --  388*  --   
TP  --   --  6.5  --   
ALB 1.5* 1.4* 1.4* 1.5*  
GLOB  --   --  5.1*  --   
AGRAT  --   --  0.3*  --   
  
CBC w/Diff Recent Labs 18 
0400 11/01/18 
0345 10/31/18 
7717 WBC 15.6* 14.2* 16.6*  
RBC 2.89* 2.77* 2.97* HGB 8.0* 7.6* 8.3* HCT 26.0* 24.4* 26.0*  
* 483* 529*  
  
 
10/27 cxr IMPRESSION: 
  
Right lung perihilar infiltrate appears present 10/4 CTAP IMPRESSION: 
1. Dense subtotal or total consolidation right lower lobe compatible with 
pneumonia similar to prior study. Small bilateral pleural effusions similar in 
size. 2. Distended gallbladder with gallstones and gallbladder sludge without 
significant change in appearance and also described in detail on ultrasound of 
2018. 3. Indeterminate small lesion left hepatic lobe without change. Hepatomegaly 
again evident. 4. No intraperitoneal fluid.  Possible small left upper pole renal cyst. 
 Cardiomegaly. Nasogastric tube tip in body of stomach. Microbiology Results All Micro Results Procedure Component Value Units Date/Time CULTURE, CATHETER TIP [[id number]]  (Abnormal)  (Susceptibility) Collected:  10/31/18 1518 Order Status:  Completed Specimen:  Catheter Tip Updated:  11/02/18 1115 Special Requests: NO SPECIAL REQUESTS Culture result:    
  <15 CFU PSEUDOMONAS AERUGINOSA CULTURE, BLOOD [[id number]] Collected:  10/25/18 1328 Order Status:  Completed Specimen:  Whole Blood Updated:  10/31/18 0910 Special Requests: NO SPECIAL REQUESTS Culture result: NO GROWTH 6 DAYS     
 CULTURE, BLOOD [[id number]] Collected:  10/25/18 1328 Order Status:  Completed Specimen:  Whole Blood Updated:  10/31/18 0910 Special Requests: NO SPECIAL REQUESTS Culture result: NO GROWTH 6 DAYS     
 CULTURE, Marylee Roys STAIN [[id number]]  (Abnormal)  (Susceptibility) Collected:  10/25/18 1359 Order Status:  Completed Specimen:  Wound from Tracheostomy site Updated:  10/28/18 4308 Special Requests: NO SPECIAL REQUESTS     
  GRAM STAIN FEW WBC'S     
   RARE GRAM NEGATIVE RODS Culture result: MANY PSEUDOMONAS AERUGINOSA MODERATE PSEUDOMONAS AERUGINOSA 2ND MORPHOTYPE  
     
      
  FEW ENTEROCOCCUS FAECALIS GROUP D  
     
      
  FEW STAPHYLOCOCCUS SPECIES, COAGULASE NEGATIVE (TWO MORPHOTYPES) CULTURE, RESPIRATORY/SPUTUM/BRONCH Jaylene Page STAIN [[id number]]  (Abnormal)  (Susceptibility) Collected:  10/25/18 1325 Order Status:  Completed Specimen:  Sputum from Tracheal Aspirate Updated:  10/28/18 6050 Special Requests: NO SPECIAL REQUESTS     
  GRAM STAIN MODERATE WBC'S     
   RARE GRAM NEGATIVE RODS Culture result: MANY PSEUDOMONAS AERUGINOSA  
     
      
  MANY PSEUDOMONAS AERUGINOSA 2ND MORPHOTYPE  
     
      
  RARE NORMAL RESPIRATORY SHANAE CULTURE, URINE [[id number]]  (Abnormal)  (Susceptibility) Collected:  10/25/18 1359 Order Status:  Completed Specimen:  Cath Urine Updated:  10/27/18 1037 Special Requests: NO SPECIAL REQUESTS Culture result:    
  34373 COLONIES/mL PSEUDOMONAS AERUGINOSA CULTURE, CATHETER TIP [[id number]]  (Abnormal)  (Susceptibility) Collected:  10/19/18 1330 Order Status:  Completed Specimen:  Catheter Tip Updated:  10/22/18 4636 Special Requests: NO SPECIAL REQUESTS Culture result:    
  >15 CFU STAPHYLOCOCCUS EPIDERMIDIS  
     
 CULTURE, BLOOD [[id number]] Collected:  10/16/18 1055 Order Status:  Completed Specimen:  Blood Updated:  10/22/18 0809 Special Requests: PERIPHERAL Culture result: NO GROWTH 6 DAYS     
 CULTURE, BLOOD [[id number]] Collected:  10/16/18 1047 Order Status:  Completed Specimen:  Blood Updated:  10/22/18 0809 Special Requests: PERIPHERAL Culture result: NO GROWTH 6 DAYS     
 HSV 1 AND 2 BY PCR [[id number]] Collected:  10/18/18 1815 Order Status:  Completed Specimen:  Other from Miscellaneous sample Updated:  10/21/18 1636 Source OTHER TEST     
  HSV-1 DNA by PCR NEGATIVE      
  HSV-2 DNA by PCR NEGATIVE Comment: (NOTE) This test was developed and its performance characteristics 
determined by [employer]. It has not been cleared or 
approved by the U.S. Food and Drug Administration. The FDA has 
determined that such clearance or approval is not necessary. This 
test is used for clinical purposes. It should not be regarded as 
investigational or research. Performed At: [address] [id number] Tiffany Garduno MD GE:[id number] CULTURE, RESPIRATORY/SPUTUM/BRONCH [facility] [[id number]] Collected:  10/16/18 1915 Order Status:  Completed Specimen:  Sputum,ET Suction Updated:  10/18/18 1011 Special Requests: NO SPECIAL REQUESTS     
  GRAM STAIN 10-25 WBC/lpf <10 EPI/lpf FEW GRAM POSITIVE COCCI IN PAIRS MODERATE GRAM POSITIVE COCCI IN GROUPS MUCUS PRESENT Culture result: MODERATE NORMAL RESPIRATORY SHANAE  
     
 CULTURE, BLOOD [[id number]] Collected:  10/08/18 1227 Order Status:  Completed Specimen:  Blood Updated:  10/14/18 0741 Special Requests: PERIPHERAL Culture result: NO GROWTH 6 DAYS     
 CULTURE, CATHETER TIP [[id number]] Collected:  10/08/18 1215 Order Status:  Completed Specimen:  Catheter Tip Updated:  10/11/18 5002 Special Requests: NO SPECIAL REQUESTS Culture result: NO GROWTH 3 DAYS     
 CULTURE, BLOOD [[id number]] Collected:  10/04/18 6897 Order Status:  Completed Specimen:  Blood Updated:  10/10/18 0057 Special Requests: PERIPHERAL Culture result: NO GROWTH 6 DAYS     
 CULTURE, BLOOD [[id number]] Collected:  10/04/18 1120 Order Status:  Completed Specimen:  Blood Updated:  10/10/18 9180 Special Requests: PERIPHERAL Culture result: NO GROWTH 6 DAYS     
 CULTURE, BLOOD [[id number]] Collected:  09/27/18 0005 Order Status:  Completed Specimen:  Blood Updated:  10/03/18 0845 Special Requests: NO SPECIAL REQUESTS Culture result: NO GROWTH 6 DAYS     
 CULTURE, BLOOD [[id number]]  (Abnormal)  (Susceptibility) Collected:  09/27/18 0136 Order Status:  Completed Specimen:  Blood Updated:  10/02/18 6504 Special Requests: NO SPECIAL REQUESTS     
  GRAM STAIN PEDIATRIC BOTTLE GRAM POSITIVE COCCI IN PAIRS IN CLUSTERS  
      
  SMEAR CALLED TO AND CORRECTLY REPEATED BY: Dean Simpson RN IN 2700 ON 9/29/18 AT 2033 WF Culture result:    
  AEROBIC BOTTLE STAPHYLOCOCCUS EPIDERMIDIS  
     
 CULTURE, RESPIRATORY/SPUTUM/BRONCH Casey Hedy STAIN [[id number]]  (Abnormal) Collected:  09/29/18 1210 Order Status:  Completed Specimen:  Sputum from Endotracheal aspirate Updated:  10/02/18 7296 Special Requests: NO SPECIAL REQUESTS GRAM STAIN >25 WBC/lpf     
   <10 EPI/lpf MUCUS PRESENT     
   MODERATE YEAST Culture result: MANY CANDIDA TROPICALIS C. DIFFICILE/EPI PCR [[id number]] Collected:  09/29/18 1400 Order Status:  Completed Specimen:  Stool Updated:  09/29/18 2248 Special Requests: NO SPECIAL REQUESTS Culture result: Toxigenic C. difficile NEGATIVE                         C. difficile target DNA sequences are not detected. CULTURE, URINE [[id number]] Collected:  09/27/18 0029 Order Status:  Completed Specimen:  Cath Urine Updated:  09/29/18 0944 Special Requests: NO SPECIAL REQUESTS Culture result: NO GROWTH 2 DAYS Sasha Gan MD, FACP November 2, 2018 [facility] [phone]

## 2018-11-02 NOTE — PROGRESS NOTES
Problem: Falls - Risk of 
Goal: *Absence of Falls Document Renny Sanford Fall Risk and appropriate interventions in the flowsheet. Outcome: Progressing Towards Goal 
Fall Risk Interventions: 
Mobility Interventions: Bed/chair exit alarm, Strengthening exercises (ROM-active/passive) Mentation Interventions: Bed/chair exit alarm, More frequent rounding Medication Interventions: Evaluate medications/consider consulting pharmacy Elimination Interventions: Bed/chair exit alarm, Call light in reach, Patient to call for help with toileting needs, Toilet paper/wipes in reach, Toileting schedule/hourly rounds History of Falls Interventions: Bed/chair exit alarm, Evaluate medications/consider consulting pharmacy, Room close to nurse's station

## 2018-11-02 NOTE — PROGRESS NOTES
Problem: Falls - Risk of 
Goal: *Absence of Falls Document Hamp Chuyita Fall Risk and appropriate interventions in the flowsheet. Outcome: Progressing Towards Goal 
Fall Risk Interventions: 
Mobility Interventions: Bed/chair exit alarm, Strengthening exercises (ROM-active/passive) Mentation Interventions: Bed/chair exit alarm, Door open when patient unattended, More frequent rounding, Room close to nurse's station, Toileting rounds, Update white board Medication Interventions: Evaluate medications/consider consulting pharmacy, Bed/chair exit alarm Elimination Interventions: Bed/chair exit alarm, Call light in reach, Toileting schedule/hourly rounds History of Falls Interventions: Bed/chair exit alarm, Door open when patient unattended, Room close to nurse's station Problem: Pressure Injury - Risk of 
Goal: *Prevention of pressure injury Document Mitul Scale and appropriate interventions in the flowsheet. Outcome: Progressing Towards Goal 
Pressure Injury Interventions: 
Sensory Interventions: Assess changes in LOC, Assess need for specialty bed, Float heels, Keep linens dry and wrinkle-free, Maintain/enhance activity level, Minimize linen layers, Monitor skin under medical devices, Pad between skin to skin, Pressure redistribution bed/mattress (bed type), Turn and reposition approx. every two hours (pillows and wedges if needed) Moisture Interventions: Absorbent underpads, Apply protective barrier, creams and emollients, Assess need for specialty bed, Contain wound drainage, Internal/External fecal devices, Limit adult briefs, Maintain skin hydration (lotion/cream), Minimize layers, Moisture barrier, Offer toileting Q_hr Activity Interventions: Pressure redistribution bed/mattress(bed type) Mobility Interventions: HOB 30 degrees or less, Pressure redistribution bed/mattress (bed type), Turn and reposition approx. every two hours(pillow and wedges) Nutrition Interventions: Document food/fluid/supplement intake, Discuss nutritional consult with provider, Offer support with meals,snacks and hydration Friction and Shear Interventions: Apply protective barrier, creams and emollients, Lift team/patient mobility team, Transferring/repositioning devices

## 2018-11-02 NOTE — PROGRESS NOTES
Per asmita in med assist pts brother coming in to do wing lucy this afternoon. She will let me know, if that is not successful.

## 2018-11-03 NOTE — PROGRESS NOTES
RENAL PROGRESS NOTE Jersey Kaminski Assessment - JULIANNA S/P Cardiac arrest  
- Sepsis / Acute Pyelonephritis  
- HTN  
- Anoxic brain injury Plan - No evidence of recovery , HD today then continue TTS , volume status looks good will aim for 2-3 kg UF with HD  
- IV Abx  
- Will see back Monday , please call if needed · Subjective:Patient Open eyes but he doesn't follow command Patient Active Problem List  
Diagnosis Code  Stroke (cerebrum) ([facility]) I63.9  Stroke ([facility] I63.9  Rhabdomyolysis M62.82  
 Dehydration E86.0  
 Essential hypertension I10  
 Diabetes mellitus type 2, controlled ([facility] E11.9  Non compliance w medication regimen Z91.14  
 DM (diabetes mellitus) ([facility] E11.9  History of stroke Z80.78  
 Acute-on-chronic kidney injury ([facility] N17.9, N18.9  Acute cystitis N30.00  Elevated troponin R74.8  Elevated LFTs R94.5  Cardiac arrest with ventricular fibrillation ([facility]) I46.9, I49.01  
 Acute pancreatitis K85.90  
 Ischemic encephalopathy I67.89  
 Hypoalbuminemia E88.09  
 Shingles B02.9  Gangrene ([facility] [id number]  Pseudomonas infection B96.5 Current Facility-Administered Medications Medication Dose Route Frequency Provider Last Rate Last Dose  insulin glargine (LANTUS) injection 30 Units  30 Units SubCUTAneous DAILY Rasheed Lara MD      
 amLODIPine (NORVASC) tablet 5 mg  5 mg Oral DAILY Lakesha Gonzales NP   5 mg at 11/02/18 0829  
 0.9% sodium chloride infusion 250 mL  250 mL IntraVENous PRN Mike LAL NP      
 cefepime (MAXIPIME) 0.5 g in 0.9% sodium chloride 100 mL IVPB  0.5 g IntraVENous Q24H Rasheed Lara MD   0.5 g at 11/02/18 2342  albuterol (PROVENTIL VENTOLIN) nebulizer solution 2.5 mg  2.5 mg Nebulization BID RT Corrie Tatum MD   2.5 mg at 11/02/18 2110  multivitamin (MULTI-DELYN, WELLESSE) oral liquid 30 mL  30 mL Oral DAILY Victor M Medina MD   30 mL at 11/02/18 0120  bacitracin 500 unit/gram packet 1 Packet  1 Packet Topical PRXENIA Castle MD      
 sodium chloride (NS) flush 10-30 mL  10-30 mL InterCATHeter PRN Angel Castle MD   30 mL at 10/21/18 0430  
 sodium chloride (NS) flush 10 mL  10 mL InterCATHeter Q24H Angel Castle MD   10 mL at 11/02/18 1826  sodium chloride (NS) flush 10 mL  10 mL InterCATHeter PRN Angel Castle MD   10 mL at 10/20/18 8111  sodium chloride (NS) flush 10-40 mL  10-40 mL InterCATHeter Q8H Jacqueline Ackerman MD   10 mL at 11/03/18 9146  sodium chloride (NS) flush 20 mL  20 mL InterCATHeter Q24H Ivan LAL MD   20 mL at 11/02/18 1826  
 heparin (porcine) injection 5,000 Units  5,000 Units SubCUTAneous Q8H Angel Castle MD   5,000 Units at 11/03/18 3450  hydroxypropyl methylcellulose (ISOPTO TEARS) 0.5 % ophthalmic solution 2 Drop  2 Drop Both Eyes BID Maggi LAL NP   2 Drop at 11/02/18 2130  epoetin manda (EPOGEN;PROCRIT) injection 40,000 Units  40,000 Units IntraVENous Q7D Em Samuels MD   40,000 Units at 10/31/18 1406  pantoprazole (PROTONIX) 40 mg in sodium chloride 0.9% 10 mL injection  40 mg IntraVENous Q24H Karina Rapp MD   40 mg at 11/02/18 0828  
 0.9% sodium chloride infusion 250 mL  250 mL IntraVENous PRN Francy Yang DO      
 influenza vaccine 2018-19 (6 mos+)(PF) (FLUARIX QUAD/FLULAVAL QUAD) injection 0.5 mL  0.5 mL IntraMUSCular PRIOR TO DISCHARGE Elis Villarreal MD      
 acetaminophen (TYLENOL) solution 325 mg  325 mg Per NG tube Q4H PRN Elis Villarreal MD   325 mg at 10/31/18 1540  
 0.9% sodium chloride infusion 250 mL  250 mL IntraVENous PRN Darreld Bannister, DO      
 heparin (porcine) pf 100 Units  100 Units InterCATHeter PRN Em Samuels MD      
  0.9% sodium chloride infusion 250 mL  250 mL IntraVENous PRN Simon Cadet MD      
 insulin lispro (HUMALOG) injection   SubCUTAneous Q6H Michelle Perez DO   Stopped at 11/03/18 5257  heparin (porcine) 1,000 unit/mL injection 1,000 Units  1,000 Units InterCATHeter DIALYSIS PRN Lisa Santillan MD   1,000 Units at 09/28/18 1332  senna-docusate (PERICOLACE) 8.6-50 mg per tablet 1 Tab  1 Tab Oral BID PRN Jagruti Butt, DO      
 ondansetron (ZOFRAN) injection 4 mg  4 mg IntraVENous Q4H PRN Alfred Nettles A, DO   4 mg at 09/28/18 0539  
 glucose chewable tablet 16 g  4 Tab Oral PRN Abe Imus A, DO      
 glucagon (GLUCAGEN) injection 1 mg  1 mg IntraMUSCular PRN Vivek Nettlesry A, DO      
 dextrose (D50) infusion 12.5-25 g  25-50 mL IntraVENous PRN Alfred Nettles, DO   25 g at 10/09/18 1906  hydrALAZINE (APRESOLINE) 20 mg/mL injection 20 mg  20 mg IntraVENous Q6H PRN Reuben Gibbons NP   20 mg at 11/02/18 2119 Objective Vitals:  
 11/03/18 0421 11/03/18 0500 11/03/18 0600 11/03/18 0700 BP:  135/60 133/56 134/59 Pulse: 96 97 96 (!) 101 Resp: 18 18 18 18 Temp:      
TempSrc:      
SpO2: 100% 99% 99% 99% Weight:      
Height:      
 
 
 
Intake/Output Summary (Last 24 hours) at 11/3/2018 8178 Last data filed at 11/3/2018 0700 Gross per 24 hour Intake 1150 ml Output 475 ml Net 675 ml Admission weight: Weight: 96.6 kg (213 lb) (09/26/18 2244) Last Weight Metrics: 
Weight Loss Metrics 11/2/2018 9/26/2018 9/13/2018 Today's Wt 198 lb - 227 lb 15.3 oz  
BMI - 31.01 kg/m2 35.7 kg/m2 Physical Assessment:  
 
General: intubated, unresponsive. Eyes are open. Neck: Trach in place. Rt ij tdc. Neck: No jvd. LUNGS: diminished air entry at the bases. No crackles. CVS EXM: S1, S2  RRR. Abdomen: soft, pos bs. PEG. Lower Extremities:  1+ edema. Lab CBC w/Diff Recent Labs 11/03/18 
0500 11/02/18 
0400 11/01/18 
0345 WBC 16.2* 15.6* 14.2*  
RBC 3.21* 2.89* 2.77* HGB 8.7* 8.0* 7.6* HCT 28.2* 26.0* 24.4*  
* 517* 483* Chemistry Recent Labs 11/03/18 
0500 11/02/18 
0400 11/01/18 
0345  10/31/18 
1728 * 136* 105*  --   --   
 138 138  --   --   
K 4.3 4.9 4.5  --   --   
 103 103  --   --   
CO2 23 23 28  --   --   
BUN 56* 32* 52*  --   --   
CREA 4.16* 2.53* 3.89*  --   --   
CA 8.3* 7.0* 7.6*  --   --   
AGAP 13 12 7  --   --   
BUCR 13 13 13  --   --   
AP  --  398*  --   --  388* TP  --  7.0  --   --  6.5 ALB 1.4* 1.4*  1.5* 1.4*  --  1.4*  
GLOB  --  5.6*  --   --  5.1* AGRAT  --  0.3*  --   --  0.3* PHOS 4.4 3.7 5.4*   < >  --   
 < > = values in this interval not displayed. No results found for: IRON, FE, TIBC, IBCT, PSAT, FERR Lab Results Component Value Date/Time  Calcium 8.3 (L) 11/03/2018 05:00 AM  
 Phosphorus 4.4 11/03/2018 05:00 AM  
  
 
 
Nagi Penny MD 
11/3/2018 
8:02 AM

## 2018-11-03 NOTE — PROGRESS NOTES
Physical Exam  
Skin:  
 
  
Dual skin assessment with Mini EASLEY. 0800 Assessment done. Pt is unresponsive. On vent via trach Febrile. Tylenol per peg given. 0900 Multidisciplinary rounds with Mathew Diana and charge nurse. Will have wound consult. 1 Pt's mom, sister and brother are at the bedside visiting. 
 
1600 Reassessment done. No change in pt's condition. Straight cath done and obtained cloudy urine. 1800 Continue on Tube feeding. 1830 Incontinent care done. Pt with small bm. 
 
1920 Bedside and Verbal shift change report given to [facility] (oncoming nurse) by Caryle Anton, RN 
 (offgoing nurse). Report included the following information SBAR, Kardex, Intake/Output, Recent Results and Cardiac Rhythm SR-ST.

## 2018-11-03 NOTE — PROGRESS NOTES
Problem: Falls - Risk of 
Goal: *Absence of Falls Document Argenis Primes Fall Risk and appropriate interventions in the flowsheet. Outcome: Progressing Towards Goal 
Fall Risk Interventions: 
Mobility Interventions: Bed/chair exit alarm, Strengthening exercises (ROM-active/passive) Mentation Interventions: Adequate sleep, hydration, pain control, Bed/chair exit alarm, Door open when patient unattended, More frequent rounding Medication Interventions: Evaluate medications/consider consulting pharmacy Elimination Interventions: Bed/chair exit alarm, Toileting schedule/hourly rounds History of Falls Interventions: Bed/chair exit alarm, Door open when patient unattended Problem: Pressure Injury - Risk of 
Goal: *Prevention of pressure injury Document Mitul Scale and appropriate interventions in the flowsheet. Outcome: Progressing Towards Goal 
Pressure Injury Interventions: 
Sensory Interventions: Assess changes in LOC, Check visual cues for pain, Float heels, Keep linens dry and wrinkle-free, Minimize linen layers, Monitor skin under medical devices, Pad between skin to skin, Pressure redistribution bed/mattress (bed type), Turn and reposition approx. every two hours (pillows and wedges if needed) Moisture Interventions: Absorbent underpads, Assess need for specialty bed, Apply protective barrier, creams and emollients, Check for incontinence Q2 hours and as needed, Minimize layers, Moisture barrier Activity Interventions: Pressure redistribution bed/mattress(bed type) Mobility Interventions: Float heels, HOB 30 degrees or less, Pressure redistribution bed/mattress (bed type), Turn and reposition approx. every two hours(pillow and wedges) Nutrition Interventions: Document food/fluid/supplement intake, Discuss nutritional consult with provider Friction and Shear Interventions: Apply protective barrier, creams and emollients, Foam dressings/transparent film/skin sealants, HOB 30 degrees or less, Minimize layers

## 2018-11-03 NOTE — PROGRESS NOTES
Rec'd pt resting in bed, mostly unresponsive though will withdrawal to pain from left lower ext and eyes open spontaneously, though pt does not track with eyes. Pt changed over to SIMV mode via ventilator per order/ (failed vent wean in am.)

## 2018-11-03 NOTE — PROGRESS NOTES
PCCM Progress Note I have reviewed the flowsheet and previous days notes. Events, vitals, medications and notes from last 24 hours reviewed. Care plan discussed with staff and on multidisciplinary rounds. Subjective: 
11/3/2018 PICC line removed. Continued low grade fevers. No new issues. Patient Active Problem List  
Diagnosis Code  Stroke (cerebrum) ([facility]) I63.9  Stroke ([facility] I63.9  Rhabdomyolysis M62.82  
 Dehydration E86.0  
 Essential hypertension I10  
 Diabetes mellitus type 2, controlled ([facility] [facility] E11.9  Non compliance w medication regimen Z91.14  
 DM (diabetes mellitus) ([facility] [address] E11.9  History of stroke Z80.78  
 Acute-on-chronic kidney injury ([facility] [address] N17.9, N18.9  Acute cystitis N30.00  Elevated troponin R74.8  Elevated LFTs R94.5  Cardiac arrest with ventricular fibrillation ([facility]) I46.9, I49.01  
 Acute pancreatitis K85.90  
 Ischemic encephalopathy I67.89  
 Hypoalbuminemia E88.09  
 Shingles B02.9  Gangrene ([facility] [address] [id number]  Pseudomonas infection B96.5 Assessment: 
Acute Respiratory Failure with Hypoxia - On MV since 9/30 since cardiac arrest 
- s/p tracheostomy on 10/17 
- Trach exchange done 10/27 
- Tolerating PS 15/5 Anoxic Brain Injury - 2/2 cardiac arrest, MRI with consistent findings Acute Kidney Injury 
- oliguric,  on HD Pseudomonas UTI/Bronchitis - BCx with NGTD 
- UA and respiratory culture with pan-senstivity 
- ID following Hx of CVA 
- R hemiparesis GI Bleed - s/p endoscopy x2 with ulceration noted in esophagus - Resolved Anemia 
- likely due to ESRD and iatrogenic 
- transfuse as needed -  On EPO 
DM 
- on insulin VZV of R arm 
- completed Acyclovir Gangrene of L great toe - Podiatry states to place betadine every day Elevated Lipase - Remains over 1000 
- No evidence of pancreatitis on CT, + GBS 
- Cannot get MRCP at this time 
- Unable to pass J-tube HTN 
 - on amlopidine, PRN hydralazine 
  
Impression/Plan: 
- Continue general ICU care 
- trial on PS 12 during the day, SIMV at night - Tube feeds - Cefepime per ID 
- GI and DVT ppx- heparin and protonix Awaiting LTAC placement, medically cleared for transfer 
  
Very poor prognosis, daughter wishes to pursue aggressive care 
  
OTHER: 
Glycemic Control- glucose stabilizer per ICU protocol when on insulin drip. Maintain blood glucose 140-180. Replace electrolytes per ICU electrolyte replacement protocol Ventilator bundle & Sedation protocol followed. Daily morning sedation holiday, assessment for readiness for SBT & weaning from ventilator; and then re-titrate if required. Aim to keep peak plateau pressure 86-10RY H2O in ARDS patient. Anastasiya tube to suction at 20-30 cm H2O, Maintain Anastasiya tube with 5-10ml air every 4 hours. Chlorhexidine mouth washes and routine oral care every 4 hours. Stress ulcer and DVT prophylaxis. HOB >=30 degree elevation all the time. HOB >=30 degree elevation all the time. Aggressive pulmonary toileting. Incentive spirometry when appropriate. Aspiration precautions. CC TIME: >25 min Medication Reviewed: 
 
No Known Allergies Past Medical History:  
Diagnosis Date  CHF (congestive heart failure) ([facility] [address]  Diabetes ([facility] [address]  Stroke ([facility] [address] History reviewed. No pertinent surgical history. Social History Tobacco Use  Smoking status: Never Smoker  Smokeless tobacco: Never Used Substance Use Topics  Alcohol use: No  
  
History reviewed. No pertinent family history. Prior to Admission medications Medication Sig Start Date End Date Taking? Authorizing Provider  
aspirin (ASPIRIN) 325 mg tablet Take 1 Tab by mouth daily. 9/15/18   Samra Solorzano MD  
atorvastatin (LIPITOR) 80 mg tablet Take 1 Tab by mouth nightly.  9/14/18   Anthony Solorzano MD  
insulin glargine (LANTUS) 100 unit/mL injection 10 units qhs  Indications: type 2 diabetes mellitus 9/15/18   Jacquelin Coates MD  
insulin lispro (HUMALOG) 100 unit/mL injection 4 units with meals 9/14/18   Jacquelin Coates MD  
losartan (COZAAR) 50 mg tablet Take 1 Tab by mouth daily. 9/14/18   Jacquelin Coates MD  
 
Current Facility-Administered Medications Medication Dose Route Frequency  insulin glargine (LANTUS) injection 30 Units  30 Units SubCUTAneous DAILY  amLODIPine (NORVASC) tablet 5 mg  5 mg Oral DAILY  cefepime (MAXIPIME) 0.5 g in 0.9% sodium chloride 100 mL IVPB  0.5 g IntraVENous Q24H  
 albuterol (PROVENTIL VENTOLIN) nebulizer solution 2.5 mg  2.5 mg Nebulization BID RT  
 multivitamin (MULTI-DELYN, WELLESSE) oral liquid 30 mL  30 mL Oral DAILY  sodium chloride (NS) flush 10 mL  10 mL InterCATHeter Q24H  
 sodium chloride (NS) flush 10-40 mL  10-40 mL InterCATHeter Q8H  
 sodium chloride (NS) flush 20 mL  20 mL InterCATHeter Q24H  
 heparin (porcine) injection 5,000 Units  5,000 Units SubCUTAneous Q8H  
 hydroxypropyl methylcellulose (ISOPTO TEARS) 0.5 % ophthalmic solution 2 Drop  2 Drop Both Eyes BID  epoetin manda (EPOGEN;PROCRIT) injection 40,000 Units  40,000 Units IntraVENous Q7D  
 pantoprazole (PROTONIX) 40 mg in sodium chloride 0.9% 10 mL injection  40 mg IntraVENous Q24H  
 influenza vaccine 2018-19 (6 mos+)(PF) (FLUARIX QUAD/FLULAVAL QUAD) injection 0.5 mL  0.5 mL IntraMUSCular PRIOR TO DISCHARGE  insulin lispro (HUMALOG) injection   SubCUTAneous Q6H Lines: All central lines examined by me. No signs of erythema, induration, discharge. Peripherally Inserted Central Catheter: PICC Double Lumen 10/18/18 Left;Brachial (Active) Central Line Being Utilized Yes 10/29/2018  4:00 AM  
Criteria for Appropriate Use Limited/no vessel suitable for conventional peripheral access 10/29/2018  4:00 AM  
Site Assessment Clean, dry, & intact 10/29/2018  4:00 AM  
Phlebitis Assessment 0 10/29/2018  4:00 AM  
 Infiltration Assessment 0 10/29/2018  4:00 AM  
Arm Circumference (cm) 38 cm 10/26/2018 12:00 AM  
Date of Last Dressing Change 10/25/18 10/29/2018  4:00 AM  
Dressing Status Clean, dry, & intact; Occlusive 10/29/2018  4:00 AM  
Action Taken Open ports on tubing capped 10/29/2018  4:00 AM  
External Catheter Length (cm) 0 centimeters 10/26/2018 12:00 AM  
Dressing Type Disk with Chlorhexadine gluconate (CHG); Stabilization/securement device;Transparent 10/29/2018  4:00 AM  
Hub Color/Line Status Red; Infusing 10/29/2018  4:00 AM  
Positive Blood Return (Site #1) Yes 10/29/2018  4:00 AM  
Hub Color/Line Status Purple;Flushed;Capped 10/29/2018  4:00 AM  
Positive Blood Return (Site #2) No 10/29/2018  4:00 AM  
Alcohol Cap Used Yes 10/29/2018  4:00 AM  
 
  
Hemodialysis Catheter: 
Hemodialysis Access 10/19/18 (Active) Central Line Being Utilized Yes 10/29/2018  4:00 AM  
Criteria for Appropriate Use Dialysis/apheresis 10/29/2018  4:00 AM  
Date Accessed  10/27/18 10/29/2018  4:00 AM  
Site Assessment Clean, dry, & intact 10/29/2018  4:00 AM  
Date of Last Dressing Change 10/24/18 10/29/2018  4:00 AM  
Dressing Status Clean, dry, & intact; Occlusive 10/29/2018  4:00 AM  
Dressing Type Disk with Chlorhexadine gluconate (CHG); Transparent 10/29/2018  4:00 AM  
Proximal Hub Color/Line Status Capped 10/29/2018  4:00 AM  
Distal Hub Color/Line Status Capped 10/29/2018  4:00 AM  
 
Drain(s): PEG/Gastrostomy Tube 10/17/18 (Active) Site Assessment Clean, dry, & intact 10/29/2018  4:00 AM  
Dressing Status Clean, dry, & intact 10/29/2018  4:00 AM  
G Port Status Clamped 10/29/2018  4:00 AM  
External Catheter Length (cm) 3 centimeters 10/26/2018  4:00 PM  
Action Taken Placement verified (comment) 10/28/2018 12:00 PM  
Drainage Description Other (Comment) 10/24/2018  8:00 AM  
Gastric Residual (mL) 0 ml 10/28/2018  8:00 PM  
Tube Feeding/Formula Options Osmolite 1.2 10/28/2018 12:00 PM  
 Tube Feeding/Verify Rate (mL/hr) 0 10/28/2018  4:00 PM  
Water Flush Volume (mL) 60 mL 10/27/2018  8:30 AM  
Intake (ml) 0 ml 10/28/2018  6:00 PM  
Medication Volume 30 ml 10/27/2018  8:30 AM  
Drainage Chamber Level (ml) 0 ml 10/23/2018  8:00 PM  
Output (ml) 0 ml 10/23/2018  8:00 PM  
   
Fecal Management (Active) Stool Consistency Liquid 10/29/2018  9:24 AM  
Position of Indicator Line Visible 10/29/2018  9:24 AM  
Signal Indicator Bubble Appropriate 10/29/2018  9:24 AM  
Skin Assessment of the Anal Area Denuded/excoriated 10/29/2018  9:24 AM  
Tube Irrigated No 10/29/2018  9:24 AM  
Irrigation Volume (mL) 0 10/29/2018  9:24 AM  
Drainage Bag Level (mL) 450 10/29/2018  9:24 AM  
Output (ml) 20 ml 10/29/2018  9:24 AM  
 
Airway: Airway - Tracheostomy Tube 10/17/18 Portex; Cuffed (Active) Cuff Pressure 30 cmH20 10/28/2018  8:43 PM  
Site Assessment Clean, dry, & intact 10/29/2018  9:20 AM  
Trach Dressing Changed Yes 10/29/2018  9:20 AM  
Trach Cleaned With Normal saline 10/29/2018  9:20 AM  
Trach Tie Changed No 10/29/2018  9:20 AM  
Trach Cannula Changed Yes 10/29/2018  9:20 AM  
Inner Cannula Cuffed, cuff inflated; Fenestrated; Other (comment) 10/29/2018  9:20 AM  
Line Hannah Banegas Top of the lock 10/29/2018  9:20 AM  
Side Secured Centered 10/29/2018  9:20 AM  
Spare Trach at Bedside Yes 10/29/2018  9:20 AM  
Spare Caprice Mandril Tube One Size Smaller at Bedside Yes 10/29/2018  9:20 AM  
Ambu Bag With Mask at Bedside Yes 10/29/2018  9:20 AM  
 
 
Objective: 
Vital Signs:   
Visit Vitals /54 Pulse 95 Temp (!) 101.8 °F (38.8 °C) Resp 24 Ht 5' 7\" (1.702 m) Wt 89.8 kg (198 lb) SpO2 100% BMI 31.01 kg/m² O2 Device: Tracheostomy O2 Flow Rate (L/min): 45 l/min Temp (24hrs), Av.6 °F (37.6 °C), Min:97.5 °F (36.4 °C), Max:101.8 °F (38.8 °C) Intake/Output:  
Last shift:      701 - 1900 In: 130 Out: - Last 3 shifts: 1901 - 700 In: 1460 [I.V.:100] Out: 475 [Urine:275; Drains:200] Intake/Output Summary (Last 24 hours) at 11/3/2018 1106 Last data filed at 11/3/2018 0900 Gross per 24 hour Intake 1080 ml Output 475 ml Net 605 ml Last 3 Recorded Weights in this Encounter 10/31/18 0930 11/01/18 2000 11/02/18 2234 Weight: 89.2 kg (196 lb 9.6 oz) 90 kg (198 lb 8 oz) 89.8 kg (198 lb) Ventilator Settings: 
Mode Rate Tidal Volume Pressure FiO2 PEEP 
SIMV, Pressure support   500 ml  12 cm H2O 30 % 5 cm H20 Peak airway pressure: 18 cm H2O Plateau pressure:   
Tidal volume:  
Minute ventilation: 8.12 l/min SPO2  
 
ARDS network Guidelines: Lung protective strategy and Plateau pressure goals less than or equal to 30 Physical Exam:  
 
General/Neurology: Eyes open, nonresponsive, withdrawing to pain in LE. Head:   Normocephalic, without obvious abnormality Eye:   Small pupils but reactive,  
Oral:   Mucus membranes moist 
Neck:   Supple, trach in place, wound clean 
Lung:   B/l air movement. No wheezing or rales. Heart:   Systolic murmur present, RRR Abdomen/: Soft, non tender, + PEG tube Extremities:  Gangrenous L Great toe Skin:   Blisters of R arm improving. Data: 
   
Recent Results (from the past 24 hour(s)) GLUCOSE, POC Collection Time: 11/02/18 12:45 PM  
Result Value Ref Range Glucose (POC) 220 (H) 70 - 110 mg/dL GLUCOSE, POC Collection Time: 11/02/18  5:08 PM  
Result Value Ref Range Glucose (POC) 145 (H) 70 - 110 mg/dL GLUCOSE, POC Collection Time: 11/02/18 11:15 PM  
Result Value Ref Range Glucose (POC) 230 (H) 70 - 110 mg/dL LIPASE Collection Time: 11/03/18  5:00 AM  
Result Value Ref Range Lipase 1,020 (H) 73 - 393 U/L  
RENAL FUNCTION PANEL Collection Time: 11/03/18  5:00 AM  
Result Value Ref Range Sodium 137 136 - 145 mmol/L Potassium 4.3 3.5 - 5.5 mmol/L Chloride 101 100 - 108 mmol/L  
 CO2 23 21 - 32 mmol/L  Anion gap 13 3.0 - 18 mmol/L  
 Glucose 130 (H) 74 - 99 mg/dL BUN 56 (H) 7.0 - 18 MG/DL Creatinine 4.16 (H) 0.6 - 1.3 MG/DL  
 BUN/Creatinine ratio 13 12 - 20 GFR est AA 18 (L) >60 ml/min/1.73m2 GFR est non-AA 15 (L) >60 ml/min/1.73m2 Calcium 8.3 (L) 8.5 - 10.1 MG/DL Phosphorus 4.4 2.5 - 4.9 MG/DL Albumin 1.4 (L) 3.4 - 5.0 g/dL CBC W/O DIFF Collection Time: 11/03/18  5:00 AM  
Result Value Ref Range WBC 16.2 (H) 4.6 - 13.2 K/uL  
 RBC 3.21 (L) 4.70 - 5.50 M/uL HGB 8.7 (L) 13.0 - 16.0 g/dL HCT 28.2 (L) 36.0 - 48.0 % MCV 87.9 74.0 - 97.0 FL  
 MCH 27.1 24.0 - 34.0 PG  
 MCHC 30.9 (L) 31.0 - 37.0 g/dL  
 RDW 16.2 (H) 11.6 - 14.5 % PLATELET 774 (H) 269 - 420 K/uL MPV 9.0 (L) 9.2 - 11.8 FL  
GLUCOSE, POC Collection Time: 11/03/18  5:04 AM  
Result Value Ref Range Glucose (POC) 140 (H) 70 - 110 mg/dL Chemistry Recent Labs 11/03/18 
0500 11/02/18 
0400 11/01/18 
0345 10/31/18 
1728 * 136* 105*  --   
 138 138  --   
K 4.3 4.9 4.5  --   
 103 103  --   
CO2 23 23 28  --   
BUN 56* 32* 52*  --   
CREA 4.16* 2.53* 3.89*  --   
CA 8.3* 7.0* 7.6*  --   
PHOS 4.4 3.7 5.4*  --   
AGAP 13 12 7  --   
BUCR 13 13 13  --   
AP  --  398*  --  388* TP  --  7.0  --  6.5 ALB 1.4* 1.4*  1.5* 1.4* 1.4*  
GLOB  --  5.6*  --  5.1* AGRAT  --  0.3*  --  0.3* CBC w/Diff Recent Labs 11/03/18 
0500 11/02/18 
0400 11/01/18 
0345 WBC 16.2* 15.6* 14.2*  
RBC 3.21* 2.89* 2.77* HGB 8.7* 8.0* 7.6* HCT 28.2* 26.0* 24.4*  
* 517* 483* ABG No results for input(s): PHI, PHI, POC2, PCO2I, PO2, PO2I, HCO3, HCO3I, FIO2, FIO2I in the last 72 hours. Micro No results for input(s): SDES, CULT in the last 72 hours. No results for input(s): CULT in the last 72 hours. CT (Most Recent) Results from [facility] Encounter encounter on 09/26/18 CT ABD PELV W CONT Narrative EXAM: CT of the abdomen and pelvis INDICATION: Pneumonia. Acute pyelonephritis. Sepsis. Abnormal liver function 
tests. Dialysis patient. COMPARISON: None. TECHNIQUE: Axial CT imaging of the abdomen and pelvis was performed with 
intravenous contrast. Multiplanar reformats were generated. One or more dose 
reduction techniques were used on this CT: automated exposure control, 
adjustment of the mAs and/or kVp according to patient size, and iterative 
reconstruction techniques. The specific techniques used on this CT exam have 
been documented in the patient's electronic medical record. 
 
_______________ FINDINGS: 
 
LOWER CHEST: Dense consolidation right lower lobe compatible with pneumonia. Small bilateral pleural effusions. Cardiomegaly. Fluid-filled esophagus with 
nasogastric tube in place. LIVER, BILIARY: Indeterminate low-attenuation 7 mm focus anterior left hepatic 
lobe. Hepatomegaly. No biliary dilation. Distended gallbladder with gallstones 
and indeterminate material in the gallbladder which may represent sludge. PANCREAS: Normal. 
 
SPLEEN: Normal. 
 
ADRENALS: Normal. 
 
KIDNEYS/URETERS: No hydronephrosis or renal calculus. Small low attenuation 
cortical focus medial upper pole left kidney not well delineated measuring about 1.1 cm. This may represent renal cyst. 
 
LYMPH NODES: No enlarged lymph nodes. GASTROINTESTINAL TRACT: No bowel dilation or wall thickening. Normal appendix. PELVIC ORGANS: No intraperitoneal fluid. Incidental vas deferens calcifications. VASCULATURE: Mild atherosclerosis. BONES: No acute or aggressive osseous abnormalities identified. OTHER: None. 
 
_______________ Impression IMPRESSION: 
 
 
1. Dense subtotal or total consolidation right lower lobe compatible with 
pneumonia similar to prior study. Small bilateral pleural effusions similar in 
size. 2. Distended gallbladder with gallstones and gallbladder sludge without significant change in appearance and also described in detail on ultrasound of 
9/27/2018. 3. Indeterminate small lesion left hepatic lobe without change. Hepatomegaly 
again evident. 4. No intraperitoneal fluid. Possible small left upper pole renal cyst. 
Cardiomegaly. Nasogastric tube tip in body of stomach. XR (Most Recent). CXR reviewed by me and compared with previous CXR Results from [facility] Encounter encounter on 09/26/18 XR CHEST PORT Narrative EXAM: Portable Chest 
 
CLINICAL INDICATION:  trach change; atelectasis -Additional:  None COMPARISON:  10/24/18 TECHNIQUE: AP portable view at [address] 
 
______________ FINDINGS: 
 
HEART AND MEDIASTINUM:  The heart size is stable LUNGS AND AIRWAYS:  Right chest perihilar infiltrate is present PLEURA:  No pneumothorax or pleural effusion BONES:  Vertebral spondylosis OTHER:  None 
 
______________ Impression IMPRESSION: 
 
Right lung perihilar infiltrate appears present High complexity decision making was performed during the evaluation of this patient at high risk for decompensation with multiple organ involvement Above mentioned total time spent on reviewing the case/medical record/data/notes/EMR/patient examination/documentation/coordinating care with nurse/consultants, exclusive of procedures with complex decision making performed and > 50% time spent in face to face evaluation. Brittany Ray MD 
Critical Care Medicine

## 2018-11-03 NOTE — INTERDISCIPLINARY ROUNDS
Awaiting LTAC placement. Continue ICU Care. -Vent trial during the day; SIMV during the night. _ Tube Feeding via Peg 
- GI and DVT prophylaxis. Wound care consult.

## 2018-11-03 NOTE — DIABETES MGMT
NUTRITIONAL RE-ASSESSMENT GLYCEMIC CONTROL/ PLAN OF CARE Yina Terrazas           57 y.o.           9/26/2018 4DM INTERVENTIONS/PLAN:  
    Perative TF  Is  Infusing at 40 ml/hr which Perative to 40 ml/hr. This would  provide 1248 calories, 64 g protein/d with 758 ml free water from the TF. Pt is at this low TF rate related to lipase 3671 when pt previously  goal rate of Osmolite 1.2 teodoro TF   70ml/hr. Lipase now 1020. ASSESSMENT:  
Nutritional Status: 
Pt is overweight related to excess caloric intake as evidenced by 135% ideal weight and BMI= 31kg/m2. Pt meets criteria for obesity. Altered nutrition-related lab values RT DX. Diabetes Mellitus  related to lack of adherence to nutrition and medication recommendations as evidenced by A1c of 10% Altered nutrition-related lab values RT DX. Acute renal failure aeb requiring dialysis. Altered nutrition-related lab values AEB  lipase 1020. Pt w/hypoalbuminemia as evidenced by albumin=1.4 mg/dl and prealbumin 17.9 (decreased but had three  extended periods of TF on hold or <half goal rate due to intolerance) Pt with DT injury on heels, multiple blisters on arms and stage 2  area on sacrum noted. Diabetes Management: Lantus 30 units daily- 
Recent blood glucose:    
 
Lab Results Component Value Date/Time  (H) 11/03/2018 05:00 AM  
 GLUCPOC 226 (H) 11/03/2018 12:07 PM  
 GLUCPOC 228 (H) 11/03/2018 12:06 PM  
 GLUCPOC 140 (H) 11/03/2018 05:04 AM  
 
Within target range (non-ICU: <140; ICU<180): [] Yes   [x]  No 
 
Current Insulin regimen: 30 units Lantus/24 hrs  Plus  VIR corrective lispro Home medication/insulin regimen:  
Key Antihyperglycemic Medications   
    
  
 insulin glargine (LANTUS) 100 unit/mL injection 10 units qhs  Indications: type 2 diabetes mellitus  
 insulin lispro (HUMALOG) 100 unit/mL injection 4 units with meals HbA1c: 10% equivalent  to ave Blood Glucose of 240mg/dl for 2-3 months prior to admission Adequate glycemic control PTA:  [] Yes  [x] No 
  
SUBJECTIVE/OBJECTIVE: Information obtained from: ICU rounds/pt is on a vent Diet:Perative TF at 40 ml/hr Medications: [x]                Reviewed Labs:  
Lab Results Component Value Date/Time Hemoglobin A1c 10.0 (H) 09/30/2018 04:18 AM  
 
Lab Results Component Value Date/Time Sodium 137 11/03/2018 05:00 AM  
 Potassium 4.3 11/03/2018 05:00 AM  
 Chloride 101 11/03/2018 05:00 AM  
 CO2 23 11/03/2018 05:00 AM  
 Anion gap 13 11/03/2018 05:00 AM  
 Glucose 130 (H) 11/03/2018 05:00 AM  
 BUN 56 (H) 11/03/2018 05:00 AM  
 Creatinine 4.16 (H) 11/03/2018 05:00 AM  
 Calcium 8.3 (L) 11/03/2018 05:00 AM  
 Magnesium 2.3 10/30/2018 04:35 AM  
 Phosphorus 4.4 11/03/2018 05:00 AM  
 Albumin 1.4 (L) 11/03/2018 05:00 AM  
prealbumin 17.9 Lipase 1010 (11/3) Anthropometrics: IBW : 69.9 kg (154 lb), % IBW (Calculated): 134.85 %, BMI (calculated): 31 Wt Readings from Last 1 Encounters:  
11/02/18 89.8 kg (198 lb) Ht Readings from Last 1 Encounters:  
10/22/18 5' 7\" (1.702 m) Estimated Nutrition Needs:  1880 Kcals/day, Protein (g): 85 g Fluid (ml): 1800 ml Based on:   [x]          Actual BW    []          ABW   []            Adjusted BW   
Nutrition Diagnoses: as stated above Nutrition Interventions: slow advancement of Perative TF Goal:  
Blood glucose will be within target range of  mg/dL by 11/6 Intake will meet >75% of energy and protein requirements by 11/8. Gradual weight loss post discharge 1 lb per week by 11/16/18 is acceptable. Wound healing. Nutrition Monitoring and Evaluation []     Monitor po intake on meal rounds 
[x]     Continue inpatient monitoring and intervention 
[]     Other: 
 
 
Nutrition Risk:  [x]   High     []  Moderate    []  Minimal/Uncompromised Mejia Wu RD 
[phone] [phone]

## 2018-11-03 NOTE — PROGRESS NOTES
Hospitalist Progress Note Daily Progress Note: 11/3/2018  
   
Interval history / Subjective:  
Yina Terrazas is a 57y.o. year old male who presented from [facility] with abnormal labs, elevated BUN/Cr. Found to have JULIANNA, UTI, and markedly elevated LFT on presentation. Patient's recent admission was 9/10/18-9/14/18 for acute CVA and rhabdo. Patient poor historian on admission,stated \"I had heart failure. She was started on vanc,zosyn. On 9/29,patient had cardiac arrest with ventricular fibrillation - s/p ROSC. Her hospital course has since then complicated with sepsis,pancreatitits,pneumonia,acute pancreatitis requiring involvement of gi,surgery,nephrology,cardiology,ID,cardiology. So far patient has remained without major improvement. Now in vegetative state. Patient had PEG/Trach placement on 10/17. On 10/17,patient started on acyclovir for veicular rash rt arm,suspected being Zoster. Lipase 2169. US abd 10/22 shows Abnormal appearance of the gallbladder with stones/sludge with wall thickening  
and possible pericholecystic fluid similar to the prior study of 9/27/2018 
10/22: Patient on treatment for shingles in contact isolation per ID team. He also had left big toe gangrene . We will consult Podiatry. His lipase was elevated , US of the abdomen shows sludge and possible cholecystitis. Patient afebrile. GI consulted  for possible MRCP. Patient has leukocytosis. ID started Vancomycin and ordered Urine , blood and sputum culture and trach site discharge. Patient off air bourne precaution but continue contact precaution per ID. Patient was started on Vancomycin and cefepime for SIRSper ID. Blood and urine cx pending. Patient today is going for jejunostomy. Patient BC NGTD Vancomycin d/c'd per ID. Patient respiratory  cx + GNR , STREPT, Wound cx + pseudomonas . PCCM added Ciprofloxacin today . IR attempt at Jejunostomy was unsuccessful yesterday. 10/28: Fever today. Resp cx, wound cx noted. On cefepime and Cipro for coverage per sensitivisty result . Blood cx NGTD . Lipase still trending up. IR unsuccessful attempt at jejunostomy 10/26. Will re consult GI today 10/29:no change in care. 10/30:no change in care 10/31:pt underwent dialysis today. Currently on cefepime only. Patient is still in the hospital,waiting for placement. 11/1:continue current treatment 11/2:ID has recommended line culture(picc line) as patient had elevated temp today 11/3:still running fever 101.8 today. Tip of picc line cx results pending. Current Facility-Administered Medications Medication Dose Route Frequency  insulin glargine (LANTUS) injection 30 Units  30 Units SubCUTAneous DAILY  amLODIPine (NORVASC) tablet 5 mg  5 mg Oral DAILY  0.9% sodium chloride infusion 250 mL  250 mL IntraVENous PRN  
 cefepime (MAXIPIME) 0.5 g in 0.9% sodium chloride 100 mL IVPB  0.5 g IntraVENous Q24H  
 albuterol (PROVENTIL VENTOLIN) nebulizer solution 2.5 mg  2.5 mg Nebulization BID RT  
 multivitamin (MULTI-DELYN, WELLESSE) oral liquid 30 mL  30 mL Oral DAILY  bacitracin 500 unit/gram packet 1 Packet  1 Packet Topical PRN  
 sodium chloride (NS) flush 10-30 mL  10-30 mL InterCATHeter PRN  
 sodium chloride (NS) flush 10 mL  10 mL InterCATHeter Q24H  
 sodium chloride (NS) flush 10 mL  10 mL InterCATHeter PRN  
 sodium chloride (NS) flush 10-40 mL  10-40 mL InterCATHeter Q8H  
 sodium chloride (NS) flush 20 mL  20 mL InterCATHeter Q24H  
 heparin (porcine) injection 5,000 Units  5,000 Units SubCUTAneous Q8H  
 hydroxypropyl methylcellulose (ISOPTO TEARS) 0.5 % ophthalmic solution 2 Drop  2 Drop Both Eyes BID  epoetin manda (EPOGEN;PROCRIT) injection 40,000 Units  40,000 Units IntraVENous Q7D  
 pantoprazole (PROTONIX) 40 mg in sodium chloride 0.9% 10 mL injection  40 mg IntraVENous Q24H  
 0.9% sodium chloride infusion 250 mL  250 mL IntraVENous PRN  
  influenza vaccine - (6 mos+)(PF) (FLUARIX QUAD/FLULAVAL QUAD) injection 0.5 mL  0.5 mL IntraMUSCular PRIOR TO DISCHARGE  acetaminophen (TYLENOL) solution 325 mg  325 mg Per NG tube Q4H PRN  
 0.9% sodium chloride infusion 250 mL  250 mL IntraVENous PRN  
 heparin (porcine) pf 100 Units  100 Units InterCATHeter PRN  
 0.9% sodium chloride infusion 250 mL  250 mL IntraVENous PRN  
 insulin lispro (HUMALOG) injection   SubCUTAneous Q6H  
 heparin (porcine) 1,000 unit/mL injection 1,000 Units  1,000 Units InterCATHeter DIALYSIS PRN  
 senna-docusate (PERICOLACE) 8.6-50 mg per tablet 1 Tab  1 Tab Oral BID PRN  
 ondansetron (ZOFRAN) injection 4 mg  4 mg IntraVENous Q4H PRN  
 glucose chewable tablet 16 g  4 Tab Oral PRN  
 glucagon (GLUCAGEN) injection 1 mg  1 mg IntraMUSCular PRN  
 dextrose (D50) infusion 12.5-25 g  25-50 mL IntraVENous PRN  
 hydrALAZINE (APRESOLINE) 20 mg/mL injection 20 mg  20 mg IntraVENous Q6H PRN Review of Systems Unresponsive Objective:  
 
Visit Vitals /54 Pulse 95 Temp (!) 101.8 °F (38.8 °C) Resp 24 Ht 5' 7\" (1.702 m) Wt 89.8 kg (198 lb) SpO2 100% BMI 31.01 kg/m² O2 Flow Rate (L/min): 45 l/min O2 Device: Tracheostomy Temp (24hrs), Av.6 °F (37.6 °C), Min:97.5 °F (36.4 °C), Max:101.8 °F (38.8 °C) 
 
 
701 - 1900 In: 130 Out: -  
 190 -  0700 In: 1460 [I.V.:100] Out: 475 [Urine:275; Drains:200] P/E General Appearance:S/p PEG/Trach HENT: normocephalic/atraumatic, + ETT Neck: No JVD, hematoma R side where [facility] is improving Lungs: Coarse B/L w/ vent-assisted BS 
CV: RRR, no m/r/g Abdomen: soft, non-tender, normal bowel sounds Extremities: versicular rash rt arm and right side of chest,no cyanosis, no peripheral edema Neuro: Unresponsive to commands, no withdrawal to pain, + corneal reflex and pupillary reaction today Skin: Normal color, intact Data Review Recent Results (from the past 12 hour(s)) GLUCOSE, POC Collection Time: 11/02/18 11:15 PM  
Result Value Ref Range Glucose (POC) 230 (H) 70 - 110 mg/dL LIPASE Collection Time: 11/03/18  5:00 AM  
Result Value Ref Range Lipase 1,020 (H) 73 - 393 U/L  
RENAL FUNCTION PANEL Collection Time: 11/03/18  5:00 AM  
Result Value Ref Range Sodium 137 136 - 145 mmol/L Potassium 4.3 3.5 - 5.5 mmol/L Chloride 101 100 - 108 mmol/L  
 CO2 23 21 - 32 mmol/L Anion gap 13 3.0 - 18 mmol/L Glucose 130 (H) 74 - 99 mg/dL BUN 56 (H) 7.0 - 18 MG/DL Creatinine 4.16 (H) 0.6 - 1.3 MG/DL  
 BUN/Creatinine ratio 13 12 - 20 GFR est AA 18 (L) >60 ml/min/1.73m2 GFR est non-AA 15 (L) >60 ml/min/1.73m2 Calcium 8.3 (L) 8.5 - 10.1 MG/DL Phosphorus 4.4 2.5 - 4.9 MG/DL Albumin 1.4 (L) 3.4 - 5.0 g/dL CBC W/O DIFF Collection Time: 11/03/18  5:00 AM  
Result Value Ref Range WBC 16.2 (H) 4.6 - 13.2 K/uL  
 RBC 3.21 (L) 4.70 - 5.50 M/uL HGB 8.7 (L) 13.0 - 16.0 g/dL HCT 28.2 (L) 36.0 - 48.0 % MCV 87.9 74.0 - 97.0 FL  
 MCH 27.1 24.0 - 34.0 PG  
 MCHC 30.9 (L) 31.0 - 37.0 g/dL  
 RDW 16.2 (H) 11.6 - 14.5 % PLATELET 365 (H) 454 - 420 K/uL MPV 9.0 (L) 9.2 - 11.8 FL  
GLUCOSE, POC Collection Time: 11/03/18  5:04 AM  
Result Value Ref Range Glucose (POC) 140 (H) 70 - 110 mg/dL Assessment/Plan:  
 
Principal Problem: 
  Cardiac arrest with ventricular fibrillation ([facility] [address] (9/29/2018) Overview: ROSC before defibrillation could be attempted. Active Problems: 
  Essential hypertension (9/10/2018) Diabetes mellitus type 2, controlled ([facility] (9/10/2018) History of stroke (9/27/2018) Overview: With residual R hemiparesis Acute-on-chronic kidney injury ([facility] (9/27/2018) Acute cystitis (9/27/2018) Elevated troponin (9/27/2018) Elevated LFTs (9/28/2018) Acute pancreatitis (9/29/2018) Overview: Lipase downtrends with interruption in tube feed and Mucomyst. Restarting  
    tube feed did result in modest elevation but not to the levels seen while  
    on Mucomyst. Nonetheless, there is signficant decrease in lipase levels  
    with interruptions in tube feeding. Ischemic encephalopathy (9/30/2018) Hypoalbuminemia (10/21/2018) Shingles (10/21/2018) Gangrene ([address] (10/22/2018) Pseudomonas infection (10/27/2018) Zoster. Care Plan - Vent mgmt per ICU - S/p trach/PEG on 10/17 as patient did not show significant improvement. 
- EGD showed large clot in esophagus last week repeat EGD 10/9 shows healed esophageal ulcer - Cont protonix IV every day  
- Hgb stable - LFTs improving - GI signed off 10/9 
- MRI w/ evidence of severe anoxic brain injury - Minimal improvement on neuro exam 
- Appreciate neuro assistance - IV Meropenem and IV Fluconazole d/heron on 10/15 per ID. 
- Currently on vanc only which was started on 9/27 
- Nephro managing HD TTHS 
- Pt unlikely to have any meaningful neuro recovery, prognosis is very grim - Family wanted to cont to do everything, including trach/PEG -> was done10/17 
- On 10/17:Started on acyclovir for possible Zoster rt arm and rt upper chest area. - Hyperkalemia on 10/18 ->corrected - Lipase 2169 10/22. US abd Abnormal appearance of the gallbladder with stones/sludge with wall thickening  
and possible pericholecystic fluid similar to the prior study of 9/27/2018  
- On Acyclovir for shingles , contact precaution per ID commitee 
- 10/22: Consult GI today for possible MRCP 
- 10/22: Consult Podiatry for left big toe gangrene  
- 10/23 : Podiatry recommend RAQUEL for Left Big toe  
- 10/23: GI recommend MRI/MRCP but will need patient off vent for multiple times for procedure.  Not sure if this is feasible now 
- 10/25 : Blood , urine , sputum , trach discharge cultures 
- 10/25 IV antibiotics by ID  
 - Change GI tube to Jejunostomy tube tomorrow by IR  
- 10/26: Vancomycin and Cefepime per ID  
- 10/26 : Jejunostomy attempt by IR unsuccessful . Will need to discuss with GI  
- 10/27 : Ciprofloxacin added 10/27 for double coverage of pseudomonas with cefepime per PCCM  
- 10/28: Pancreatitis ? Biliary  . Will reconsult GI today - Lipase trending down. 
-11/2:culture of picc line since pt has developed fever. 1-11/3:still febrile,picc line tip cx results pending;will follow ID recommendations. DVT prophylaxis:scds Full code. Disposition:tbd - need placement -  involved.

## 2018-11-04 NOTE — DIALYSIS
Carmen Ta ACUTE HEMODIALYSIS FLOW SHEET-RN note [x] Zohaib Consent Verified Dr. Kalyan Bowers was contacted and did suggest that it was fine to dialyze this patient the morning of 11/4/2018. When ICU was called by dialysis nurse, this patient's primary nurse informed that the patient's family no longer wants the patient to receive dialysis. Zohaib Signatures Title Initials  Time Jaclyn Frazier RN [facility]

## 2018-11-04 NOTE — PROGRESS NOTES
Nursing assessment complete. Patient resting in bed with eyes open. No command following. Withdrawals to pain on legs not arms. Peg tube in place. Perative infusing @30 goal. Left picc line. Right femoral uldall.  
0001 tube feeding stopped. Patient npo 
0300 bathed. Gown and linen changed. New mepilex placed on buttock. Right femoral uldall catheter pulled and tip sent for culture. 0335 labs drawn Bedside shift change report given to sarah (oncoming nurse) by Jovanny Meyer RN 
 (offgoing nurse). Report included the following information SBAR, MAR and Recent Results. 0 = independent

## 2018-11-04 NOTE — PROGRESS NOTES
Patient in bed on back with head elevated - BBS equal and ess clear - trach tube midline and sedure - trach site cleaned, dressing [id number], inner cannula replaced - MVB at HIOB - vent alarms on and functional

## 2018-11-04 NOTE — PROGRESS NOTES
2000  Rec'd pt resting in bed, eyes will open spontaneously /no reaction to threat. Mostly withdrawls only to painful stimulus/all ext's. Appears comfortable. VSS. 
 
2100  Pts daughter called, ID verification confirmed. The daughter requests the pt to not have dialysis anymore from this point on. She verbalized \"spoke with Dr. Freddie Daniel already. \"  
 
[facility]  Dr. Freddie Daniel in unit, updated on pt status and daughters wishes to stop dialysis. Rec'd order to hold dialysis. Dialysis nurse updated. 0100  Pt incont of soft malgorzata colored stool. Bath completed, dressing change completed to right buttocks and sacrum area. Straight cath completed per order-approx 170 ml output. Secretions from trach thick medium amount tan/yellow in color. Is breathing above the vent, has + gag and cough reflex. Opens eyes to to noise and spontaneously/otherwise unresponsive x for withdrawls.

## 2018-11-04 NOTE — PROGRESS NOTES
46 - [facility], dual skin assessment performed and double RN verified, pt VSS on Trach/Vent, resting quietly in bed, no apparent S/S of distress, will continue to monitor 65 - Dr. Mickey Steinberg in to assess pt, pt status and goals for plan of care discussed, no new orders received, pt resting quietly in bed, VSS on Trach/Vent 1245 - Pt repositioned for comfort, VSS on Trach/Vent, no apparent signs of distress, will continue to monitor 1500 - Resting in bed quietly, VSS on Trach/Vent, no S/S of distress 1730 - Repositioned for comfort, visitor present at bedside 1910 - Bedside and Verbal shift change report given to [address] [place] [address] (oncoming nurse) by Yandy Flores (offgoing nurse). Report included the following information SBAR, Kardex, Intake/Output, MAR, Recent Results and Cardiac Rhythm NSR.

## 2018-11-04 NOTE — PROGRESS NOTES
Problem: Falls - Risk of 
Goal: *Absence of Falls Document Nazeli Matthews Fall Risk and appropriate interventions in the flowsheet. Outcome: Progressing Towards Goal 
Fall Risk Interventions: 
Mobility Interventions: Bed/chair exit alarm Mentation Interventions: Room close to nurse's station, Increase mobility Medication Interventions: Evaluate medications/consider consulting pharmacy Elimination Interventions: Bed/chair exit alarm History of Falls Interventions: Bed/chair exit alarm Problem: Pressure Injury - Risk of 
Goal: *Prevention of pressure injury Document Mitul Scale and appropriate interventions in the flowsheet. Outcome: Progressing Towards Goal 
Pressure Injury Interventions: 
Sensory Interventions: Assess changes in LOC Moisture Interventions: Absorbent underpads, Limit adult briefs, Check for incontinence Q2 hours and as needed Activity Interventions: Pressure redistribution bed/mattress(bed type) Mobility Interventions: Float heels Nutrition Interventions: Document food/fluid/supplement intake Friction and Shear Interventions: Apply protective barrier, creams and emollients, Lift sheet

## 2018-11-04 NOTE — PROGRESS NOTES
Hospitalist Progress Note Daily Progress Note: 11/4/2018  
   
Interval history / Subjective:  
Rony Ballard is a 57y.o. year old male who presented from [facility] with abnormal labs, elevated BUN/Cr. Found to have JULIANNA, UTI, and markedly elevated LFT on presentation. Patient's recent admission was 9/10/18-9/14/18 for acute CVA and rhabdo. Patient poor historian on admission,stated \"I had heart failure. She was started on vanc,zosyn. On 9/29,patient had cardiac arrest with ventricular fibrillation - s/p ROSC. Her hospital course has since then complicated with sepsis,pancreatitits,pneumonia,acute pancreatitis requiring involvement of gi,surgery,nephrology,cardiology,ID,cardiology. So far patient has remained without major improvement. Now in vegetative state. Patient had PEG/Trach placement on 10/17. On 10/17,patient started on acyclovir for veicular rash rt arm,suspected being Zoster. Lipase 2169. US abd 10/22 shows Abnormal appearance of the gallbladder with stones/sludge with wall thickening  
and possible pericholecystic fluid similar to the prior study of 9/27/2018 
10/22: Patient on treatment for shingles in contact isolation per ID team. He also had left big toe gangrene . We will consult Podiatry. His lipase was elevated , US of the abdomen shows sludge and possible cholecystitis. Patient afebrile. GI consulted  for possible MRCP. Patient has leukocytosis. ID started Vancomycin and ordered Urine , blood and sputum culture and trach site discharge. Patient off air bourne precaution but continue contact precaution per ID. Patient was started on Vancomycin and cefepime for SIRSper ID. Blood and urine cx pending. Patient today is going for jejunostomy. Patient BC NGTD Vancomycin d/c'd per ID. Patient respiratory  cx + GNR , STREPT, Wound cx + pseudomonas . PCCM added Ciprofloxacin today . IR attempt at Jejunostomy was unsuccessful yesterday. 10/28: Fever today. Resp cx, wound cx noted. On cefepime and Cipro for coverage per sensitivisty result . Blood cx NGTD . Lipase still trending up. IR unsuccessful attempt at jejunostomy 10/26. Will re consult GI today 10/29:no change in care. 10/30:no change in care 10/31:pt underwent dialysis today. Currently on cefepime only. Patient is still in the hospital,waiting for placement. 11/1:continue current treatment 11/2:ID has recommended line culture(picc line) as patient had elevated temp today 11/3:still running fever 101.8 today. Tip of picc line cx results pending. 11/4:Tip of catheter culture showing staph coag negative Current Facility-Administered Medications Medication Dose Route Frequency  insulin glargine (LANTUS) injection 30 Units  30 Units SubCUTAneous DAILY  amLODIPine (NORVASC) tablet 5 mg  5 mg Oral DAILY  0.9% sodium chloride infusion 250 mL  250 mL IntraVENous PRN  
 cefepime (MAXIPIME) 0.5 g in 0.9% sodium chloride 100 mL IVPB  0.5 g IntraVENous Q24H  
 albuterol (PROVENTIL VENTOLIN) nebulizer solution 2.5 mg  2.5 mg Nebulization BID RT  
 multivitamin (MULTI-DELYN, WELLESSE) oral liquid 30 mL  30 mL Oral DAILY  bacitracin 500 unit/gram packet 1 Packet  1 Packet Topical PRN  
 sodium chloride (NS) flush 10-30 mL  10-30 mL InterCATHeter PRN  
 sodium chloride (NS) flush 10 mL  10 mL InterCATHeter PRN  
 heparin (porcine) injection 5,000 Units  5,000 Units SubCUTAneous Q8H  
 hydroxypropyl methylcellulose (ISOPTO TEARS) 0.5 % ophthalmic solution 2 Drop  2 Drop Both Eyes BID  epoetin manda (EPOGEN;PROCRIT) injection 40,000 Units  40,000 Units IntraVENous Q7D  
 pantoprazole (PROTONIX) 40 mg in sodium chloride 0.9% 10 mL injection  40 mg IntraVENous Q24H  
 influenza vaccine 2018-19 (6 mos+)(PF) (FLUARIX QUAD/FLULAVAL QUAD) injection 0.5 mL  0.5 mL IntraMUSCular PRIOR TO DISCHARGE  acetaminophen (TYLENOL) solution 325 mg  325 mg Per NG tube Q4H PRN  
  heparin (porcine) pf 100 Units  100 Units InterCATHeter PRN  
 insulin lispro (HUMALOG) injection   SubCUTAneous Q6H  
 heparin (porcine) 1,000 unit/mL injection 1,000 Units  1,000 Units InterCATHeter DIALYSIS PRN  
 senna-docusate (PERICOLACE) 8.6-50 mg per tablet 1 Tab  1 Tab Oral BID PRN  
 ondansetron (ZOFRAN) injection 4 mg  4 mg IntraVENous Q4H PRN  
 glucose chewable tablet 16 g  4 Tab Oral PRN  
 glucagon (GLUCAGEN) injection 1 mg  1 mg IntraMUSCular PRN  
 dextrose (D50) infusion 12.5-25 g  25-50 mL IntraVENous PRN  
 hydrALAZINE (APRESOLINE) 20 mg/mL injection 20 mg  20 mg IntraVENous Q6H PRN Review of Systems Unresponsive Objective:  
 
Visit Vitals /57 Pulse 92 Temp 98.9 °F (37.2 °C) Resp 28 Ht 5' 7\" (1.702 m) Wt 87.8 kg (193 lb 9.6 oz) SpO2 100% BMI 30.32 kg/m² O2 Flow Rate (L/min): 45 l/min O2 Device: Tracheostomy Temp (24hrs), Av.7 °F (37.1 °C), Min:97 °F (36.1 °C), Max:99.5 °F (37.5 °C) 701 - 1900 In: 100 Out: 0  
1901 -  07 In: 9893 Out: 545 [Urine:445; Drains:100] P/E General Appearance:S/p PEG/Trach HENT: normocephalic/atraumatic, + ETT Neck: No JVD, hematoma R side where [facility] [place], operated by [facility] is improving Lungs: Coarse B/L w/ vent-assisted BS 
CV: RRR, no m/r/g Abdomen: soft, non-tender, normal bowel sounds Extremities: versicular rash rt arm and right side of chest,no cyanosis, no peripheral edema Neuro: Unresponsive to commands, no withdrawal to pain, + corneal reflex and pupillary reaction today Skin: Normal color, intact Data Review Recent Results (from the past 12 hour(s)) LIPASE Collection Time: 18  3:00 AM  
Result Value Ref Range Lipase 1,320 (H) 73 - 393 U/L  
RENAL FUNCTION PANEL Collection Time: 18  3:00 AM  
Result Value Ref Range Sodium 136 136 - 145 mmol/L Potassium 5.5 3.5 - 5.5 mmol/L  Chloride 101 100 - 108 mmol/L  
 CO2 22 21 - 32 mmol/L  
 Anion gap 13 3.0 - 18 mmol/L Glucose 143 (H) 74 - 99 mg/dL BUN 75 (H) 7.0 - 18 MG/DL Creatinine 5.01 (H) 0.6 - 1.3 MG/DL  
 BUN/Creatinine ratio 15 12 - 20 GFR est AA 14 (L) >60 ml/min/1.73m2 GFR est non-AA 12 (L) >60 ml/min/1.73m2 Calcium 8.2 (L) 8.5 - 10.1 MG/DL Phosphorus 5.7 (H) 2.5 - 4.9 MG/DL Albumin 1.7 (L) 3.4 - 5.0 g/dL CBC W/O DIFF Collection Time: 11/04/18  3:00 AM  
Result Value Ref Range WBC 14.7 (H) 4.6 - 13.2 K/uL  
 RBC 3.11 (L) 4.70 - 5.50 M/uL HGB 8.6 (L) 13.0 - 16.0 g/dL HCT 27.5 (L) 36.0 - 48.0 % MCV 88.4 74.0 - 97.0 FL  
 MCH 27.7 24.0 - 34.0 PG  
 MCHC 31.3 31.0 - 37.0 g/dL  
 RDW 16.4 (H) 11.6 - 14.5 % PLATELET 559 (H) 755 - 420 K/uL MPV 8.8 (L) 9.2 - 11.8 FL  
GLUCOSE, POC Collection Time: 11/04/18  5:28 AM  
Result Value Ref Range Glucose (POC) 138 (H) 70 - 110 mg/dL Assessment/Plan:  
 
Principal Problem: 
  Card[facility] (9/29/2018) Overview: ROSC before defibrillation could be attempted. Active Problems: 
  Essential hypertension (9/10/2018) Diabetes mellitus type 2, controlled ([facility] (9/10/2018) History of stroke (9/27/2018) Overview: With residual R hemiparesis Acute-on-chronic kidney injury ([facility] (9/27/2018) Acute cystitis (9/27/2018) Elevated troponin (9/27/2018) Elevated LFTs (9/28/2018) Acute pancreatitis (9/29/2018) Overview: Lipase downtrends with interruption in tube feed and Mucomyst. Restarting  
    tube feed did result in modest elevation but not to the levels seen while  
    on Mucomyst. Nonetheless, there is signficant decrease in lipase levels  
    with interruptions in tube feeding. Ischemic encephalopathy (9/30/2018) Hypoalbuminemia (10/21/2018) Shingles (10/21/2018) Gangrene ([facility] (10/22/2018) Pseudomonas infection (10/27/2018) Zoster. Care Plan - Vent mgmt per ICU - S/p trach/PEG on 10/17 as patient did not show significant improvement. 
- EGD showed large clot in esophagus last week repeat EGD 10/9 shows healed esophageal ulcer - Cont protonix IV every day  
- Hgb stable - LFTs improving - GI signed off 10/9 
- MRI w/ evidence of severe anoxic brain injury - Minimal improvement on neuro exam 
- Appreciate neuro assistance - IV Meropenem and IV Fluconazole d/heron on 10/15 per ID. 
- Currently on vanc only which was started on 9/27 
- Nephro managing HD TTHS 
- Pt unlikely to have any meaningful neuro recovery, prognosis is very grim - Family wanted to cont to do everything, including trach/PEG -> was done10/17 
- On 10/17:Started on acyclovir for possible Zoster rt arm and rt upper chest area. - Hyperkalemia on 10/18 ->corrected - Lipase 2169 10/22. US abd Abnormal appearance of the gallbladder with stones/sludge with wall thickening  
and possible pericholecystic fluid similar to the prior study of 9/27/2018  
- On Acyclovir for shingles , contact precaution per ID commitee 
- 10/22: Consult GI today for possible MRCP 
- 10/22: Consult Podiatry for left big toe gangrene  
- 10/23 : Podiatry recommend RAQUEL for Left Big toe  
- 10/23: GI recommend MRI/MRCP but will need patient off vent for multiple times for procedure. Not sure if this is feasible now 
- 10/25 : Blood , urine , sputum , trach discharge cultures 
- 10/25 IV antibiotics by ID  
- Change GI tube to Jejunostomy tube tomorrow by IR  
- 10/26: Vancomycin and Cefepime per ID  
- 10/26 : Jejunostomy attempt by IR unsuccessful . Will need to discuss with GI  
- 10/27 : Ciprofloxacin added 10/27 for double coverage of pseudomonas with cefepime per PCCM  
- 10/28: Pancreatitis ? Biliary  . Will reconsult GI today - Lipase trending down. 
-11/2:culture of picc line since pt has developed fever. -11/3:still febrile,picc line tip cx results pending;will follow ID recommendations. -11/4:Cx of tip of catheter showing staph coag negative. Pt still spiking fever - will follow ID recommendations. DVT prophylaxis:scds Full code. Disposition:tbd - need placement -  involved.

## 2018-11-04 NOTE — PROGRESS NOTES
PCCM Progress Note I have reviewed the flowsheet and previous days notes. Events, vitals, medications and notes from last 24 hours reviewed. Care plan discussed with staff and on multidisciplinary rounds. Subjective: 
11/4/2018 Fever of 101.8 last night. No other issues. I had a discussion with his daughter yesterday over the phone. She was inquiring about his life expectancy off dialysis and the ventilator. I informed her it would be days to weeks. She said she is considering it. Patient Active Problem List  
Diagnosis Code  Stroke (cerebrum) ([facility]) I63.9  Stroke ([facility] I63.9  Rhabdomyolysis M62.82  
 Dehydration E86.0  
 Essential hypertension I10  
 Diabetes mellitus type 2, controlled ([facility] E11.9  Non compliance w medication regimen Z91.14  
 DM (diabetes mellitus) ([facility] E11.9  History of stroke Z80.78  
 Acute-on-chronic kidney injury ([facility] N17.9, N18.9  Acute cystitis N30.00  Elevated troponin R74.8  Elevated LFTs R94.5  Cardiac arrest with ventricular fibrillation ([facility]) I46.9, I49.01  
 Acute pancreatitis K85.90  
 Ischemic encephalopathy I67.89  
 Hypoalbuminemia E88.09  
 Shingles B02.9  Gangrene ([facility] [id number]  Pseudomonas infection B96.5 Assessment: 
Acute Respiratory Failure with Hypoxia - On MV since 9/30 since cardiac arrest 
- s/p tracheostomy on 10/17 
- Trach exchange done 10/27 
- Tolerating PS 12/5 during the day Anoxic Brain Injury - 2/2 cardiac arrest, MRI with consistent findings Acute Kidney Injury 
- oliguric,  on HD Pseudomonas UTI/Bronchitis - BCx with NGTD 
- UA and respiratory culture with pan-senstivity 
- ID following Hx of CVA 
- R hemiparesis GI Bleed - s/p endoscopy x2 with ulceration noted in esophagus - Resolved Anemia 
- likely due to ESRD and iatrogenic 
- transfuse as needed -  On EPO 
DM 
- on insulin VZV of R arm 
- completed Acyclovir Gangrene of L great toe - Podiatry states to place betadine every day Elevated Lipase - Remains over 1000 
- No evidence of pancreatitis on CT, + GBS 
- Cannot get MRCP at this time 
- Unable to pass J-tube HTN 
- on amlopidine, PRN hydralazine 
  
Impression/Plan: 
- Continue general ICU care 
- trial on PS 12 during the day, SIMV at night - Tube feeds - Cefepime per ID 
- GI and DVT ppx- heparin and protonix Informed this AM that the patient's daughter no longer wants him to get dialysis. His code status was not changed though. I will call her today to discuss this.  
  
OTHER: 
Glycemic Control- glucose stabilizer per ICU protocol when on insulin drip. Maintain blood glucose 140-180. Replace electrolytes per ICU electrolyte replacement protocol Ventilator bundle & Sedation protocol followed. Daily morning sedation holiday, assessment for readiness for SBT & weaning from ventilator; and then re-titrate if required. Aim to keep peak plateau pressure 57-66GP H2O in ARDS patient. [place] tube to suction at 20-30 cm H2O, Maintain Anastasiya tube with 5-10ml air every 4 hours. Chlorhexidine mouth washes and routine oral care every 4 hours. Stress ulcer and DVT prophylaxis. HOB >=30 degree elevation all the time. HOB >=30 degree elevation all the time. Aggressive pulmonary toileting. Incentive spirometry when appropriate. Aspiration precautions. CC TIME: >25 min Medication Reviewed: 
 
No Known Allergies Past Medical History:  
Diagnosis Date  CHF (congestive heart failure) ([facility]  Diabetes ([facility]  Stroke ([facility] History reviewed. No pertinent surgical history. Social History Tobacco Use  Smoking status: Never Smoker  Smokeless tobacco: Never Used Substance Use Topics  Alcohol use: No  
  
History reviewed. No pertinent family history. Prior to Admission medications Medication Sig Start Date End Date Taking? Authorizing Provider aspirin (ASPIRIN) 325 mg tablet Take 1 Tab by mouth daily. 9/15/18   Lianne Solorzano MD  
atorvastatin (LIPITOR) 80 mg tablet Take 1 Tab by mouth nightly. 9/14/18   Erik Weaver MD  
insulin glargine (LANTUS) 100 unit/mL injection 10 units qhs  Indications: type 2 diabetes mellitus 9/15/18   Erik Weaver MD  
insulin lispro (HUMALOG) 100 unit/mL injection 4 units with meals 9/14/18   Erik Weaver MD  
losartan (COZAAR) 50 mg tablet Take 1 Tab by mouth daily. 9/14/18   Erik Weaver MD  
 
Current Facility-Administered Medications Medication Dose Route Frequency  insulin glargine (LANTUS) injection 30 Units  30 Units SubCUTAneous DAILY  amLODIPine (NORVASC) tablet 5 mg  5 mg Oral DAILY  cefepime (MAXIPIME) 0.5 g in 0.9% sodium chloride 100 mL IVPB  0.5 g IntraVENous Q24H  
 albuterol (PROVENTIL VENTOLIN) nebulizer solution 2.5 mg  2.5 mg Nebulization BID RT  
 multivitamin (MULTI-DELYN, WELLESSE) oral liquid 30 mL  30 mL Oral DAILY  heparin (porcine) injection 5,000 Units  5,000 Units SubCUTAneous Q8H  
 hydroxypropyl methylcellulose (ISOPTO TEARS) 0.5 % ophthalmic solution 2 Drop  2 Drop Both Eyes BID  epoetin manda (EPOGEN;PROCRIT) injection 40,000 Units  40,000 Units IntraVENous Q7D  
 pantoprazole (PROTONIX) 40 mg in sodium chloride 0.9% 10 mL injection  40 mg IntraVENous Q24H  
 influenza vaccine 2018-19 (6 mos+)(PF) (FLUARIX QUAD/FLULAVAL QUAD) injection 0.5 mL  0.5 mL IntraMUSCular PRIOR TO DISCHARGE  insulin lispro (HUMALOG) injection   SubCUTAneous Q6H Lines: All central lines examined by me. No signs of erythema, induration, discharge. Peripherally Inserted Central Catheter: PICC Double Lumen 10/18/18 Left;Brachial (Active) Central Line Being Utilized Yes 10/29/2018  4:00 AM  
Criteria for Appropriate Use Limited/no vessel suitable for conventional peripheral access 10/29/2018  4:00 AM  
 Site Assessment Clean, dry, & intact 10/29/2018  4:00 AM  
Phlebitis Assessment 0 10/29/2018  4:00 AM  
Infiltration Assessment 0 10/29/2018  4:00 AM  
Arm Circumference (cm) 38 cm 10/26/2018 12:00 AM  
Date of Last Dressing Change 10/25/18 10/29/2018  4:00 AM  
Dressing Status Clean, dry, & intact; Occlusive 10/29/2018  4:00 AM  
Action Taken Open ports on tubing capped 10/29/2018  4:00 AM  
External Catheter Length (cm) 0 centimeters 10/26/2018 12:00 AM  
Dressing Type Disk with Chlorhexadine gluconate (CHG); Stabilization/securement device;Transparent 10/29/2018  4:00 AM  
Hub Color/Line Status Red; Infusing 10/29/2018  4:00 AM  
Positive Blood Return (Site #1) Yes 10/29/2018  4:00 AM  
Hub Color/Line Status Purple;Flushed;Capped 10/29/2018  4:00 AM  
Positive Blood Return (Site #2) No 10/29/2018  4:00 AM  
Alcohol Cap Used Yes 10/29/2018  4:00 AM  
 
  
Hemodialysis Catheter: 
Hemodialysis Access 10/19/18 (Active) Central Line Being Utilized Yes 10/29/2018  4:00 AM  
Criteria for Appropriate Use Dialysis/apheresis 10/29/2018  4:00 AM  
Date Accessed  10/27/18 10/29/2018  4:00 AM  
Site Assessment Clean, dry, & intact 10/29/2018  4:00 AM  
Date of Last Dressing Change 10/24/18 10/29/2018  4:00 AM  
Dressing Status Clean, dry, & intact; Occlusive 10/29/2018  4:00 AM  
Dressing Type Disk with Chlorhexadine gluconate (CHG); Transparent 10/29/2018  4:00 AM  
Proximal Hub Color/Line Status Capped 10/29/2018  4:00 AM  
Distal Hub Color/Line Status Capped 10/29/2018  4:00 AM  
 
Drain(s): PEG/Gastrostomy Tube 10/17/18 (Active) Site Assessment Clean, dry, & intact 10/29/2018  4:00 AM  
Dressing Status Clean, dry, & intact 10/29/2018  4:00 AM  
G Port Status Clamped 10/29/2018  4:00 AM  
External Catheter Length (cm) 3 centimeters 10/26/2018  4:00 PM  
Action Taken Placement verified (comment) 10/28/2018 12:00 PM  
Drainage Description Other (Comment) 10/24/2018  8:00 AM  
Gastric Residual (mL) 0 ml 10/28/2018  8:00 PM  
 Tube Feeding/Formula Options Osmolite 1.2 10/28/2018 12:00 PM  
Tube Feeding/Verify Rate (mL/hr) 0 10/28/2018  4:00 PM  
Water Flush Volume (mL) 60 mL 10/27/2018  8:30 AM  
Intake (ml) 0 ml 10/28/2018  6:00 PM  
Medication Volume 30 ml 10/27/2018  8:30 AM  
Drainage Chamber Level (ml) 0 ml 10/23/2018  8:00 PM  
Output (ml) 0 ml 10/23/2018  8:00 PM  
   
Fecal Management (Active) Stool Consistency Liquid 10/29/2018  9:24 AM  
Position of Indicator Line Visible 10/29/2018  9:24 AM  
Signal Indicator Bubble Appropriate 10/29/2018  9:24 AM  
Skin Assessment of the Anal Area Denuded/excoriated 10/29/2018  9:24 AM  
Tube Irrigated No 10/29/2018  9:24 AM  
Irrigation Volume (mL) 0 10/29/2018  9:24 AM  
Drainage Bag Level (mL) 450 10/29/2018  9:24 AM  
Output (ml) 20 ml 10/29/2018  9:24 AM  
 
Airway: Airway - Tracheostomy Tube 10/17/18 Portex; Cuffed (Active) Cuff Pressure 30 cmH20 10/28/2018  8:43 PM  
Site Assessment Clean, dry, & intact 10/29/2018  9:20 AM  
Trach Dressing Changed Yes 10/29/2018  9:20 AM  
Trach Cleaned With Normal saline 10/29/2018  9:20 AM  
Trach Tie Changed No 10/29/2018  9:20 AM  
Trach Cannula Changed Yes 10/29/2018  9:20 AM  
Inner Cannula Cuffed, cuff inflated; Fenestrated; Other (comment) 10/29/2018  9:20 AM  
Line Ann Delay Top of the lock 10/29/2018  9:20 AM  
Side Secured Centered 10/29/2018  9:20 AM  
Spare Trach at Bedside Yes 10/29/2018  9:20 AM  
Spare Tarry Jessica Tube One Size Smaller at Bedside Yes 10/29/2018  9:20 AM  
Ambu Bag With Mask at Bedside Yes 10/29/2018  9:20 AM  
 
 
Objective: 
Vital Signs:   
Visit Vitals BP (!) 136/28 (BP 1 Location: Left arm, BP Patient Position: At rest) Pulse 90 Temp 98.9 °F (37.2 °C) Resp 23 Ht 5' 7\" (1.702 m) Wt 87.8 kg (193 lb 9.6 oz) SpO2 100% BMI 30.32 kg/m² O2 Device: Tracheostomy, Ventilator O2 Flow Rate (L/min): 45 l/min Temp (24hrs), Av.7 °F (37.1 °C), Min:97 °F (36.1 °C), Max:99.5 °F (37.5 °C) Intake/Output: Last shift:      11/04 0701 - 11/04 1900 In: 100 Out: 0 Last 3 shifts: 11/02 1901 - 11/04 0700 In: 1945 Out: 545 [Urine:445; Drains:100] Intake/Output Summary (Last 24 hours) at 11/4/2018 1041 Last data filed at 11/4/2018 0800 Gross per 24 hour Intake 980 ml Output 270 ml Net 710 ml Last 3 Recorded Weights in this Encounter 11/01/18 2000 11/02/18 2234 11/03/18 2323 Weight: 90 kg (198 lb 8 oz) 89.8 kg (198 lb) 87.8 kg (193 lb 9.6 oz) Ventilator Settings: 
Mode Rate Tidal Volume Pressure FiO2 PEEP Pressure support   500 ml  10 cm H2O 30 % 5 cm H20 Peak airway pressure: 16 cm H2O Plateau pressure:   
Tidal volume:  
Minute ventilation: 9 l/min SPO2  
 
ARDS network Guidelines: Lung protective strategy and Plateau pressure goals less than or equal to 30 Physical Exam:  
 
General/Neurology: Eyes open, nonresponsive, withdrawing to pain in LE. Head:   Normocephalic, without obvious abnormality Eye:   Small pupils but reactive,  
Oral:   Mucus membranes moist 
Neck:   Supple, trach in place, wound clean 
Lung:   B/l air movement. No wheezing or rales. Heart:   Systolic murmur present, RRR Abdomen/: Soft, non tender, + PEG tube Extremities:  Gangrenous L Great toe Skin:   Blisters of R arm improving. Data: 
   
Recent Results (from the past 24 hour(s)) GLUCOSE, POC Collection Time: 11/03/18 12:06 PM  
Result Value Ref Range Glucose (POC) 228 (H) 70 - 110 mg/dL GLUCOSE, POC Collection Time: 11/03/18 12:07 PM  
Result Value Ref Range Glucose (POC) 226 (H) 70 - 110 mg/dL GLUCOSE, POC Collection Time: 11/03/18  5:21 PM  
Result Value Ref Range Glucose (POC) 201 (H) 70 - 110 mg/dL GLUCOSE, POC Collection Time: 11/03/18 11:19 PM  
Result Value Ref Range Glucose (POC) 183 (H) 70 - 110 mg/dL LIPASE Collection Time: 11/04/18  3:00 AM  
Result Value Ref Range  Lipase 1,320 (H) 73 - 393 U/L  
RENAL FUNCTION PANEL  
 Collection Time: 11/04/18  3:00 AM  
Result Value Ref Range Sodium 136 136 - 145 mmol/L Potassium 5.5 3.5 - 5.5 mmol/L Chloride 101 100 - 108 mmol/L  
 CO2 22 21 - 32 mmol/L Anion gap 13 3.0 - 18 mmol/L Glucose 143 (H) 74 - 99 mg/dL BUN 75 (H) 7.0 - 18 MG/DL Creatinine 5.01 (H) 0.6 - 1.3 MG/DL  
 BUN/Creatinine ratio 15 12 - 20 GFR est AA 14 (L) >60 ml/min/1.73m2 GFR est non-AA 12 (L) >60 ml/min/1.73m2 Calcium 8.2 (L) 8.5 - 10.1 MG/DL Phosphorus 5.7 (H) 2.5 - 4.9 MG/DL Albumin 1.7 (L) 3.4 - 5.0 g/dL CBC W/O DIFF Collection Time: 11/04/18  3:00 AM  
Result Value Ref Range WBC 14.7 (H) 4.6 - 13.2 K/uL  
 RBC 3.11 (L) 4.70 - 5.50 M/uL HGB 8.6 (L) 13.0 - 16.0 g/dL HCT 27.5 (L) 36.0 - 48.0 % MCV 88.4 74.0 - 97.0 FL  
 MCH 27.7 24.0 - 34.0 PG  
 MCHC 31.3 31.0 - 37.0 g/dL  
 RDW 16.4 (H) 11.6 - 14.5 % PLATELET 200 (H) 025 - 420 K/uL MPV 8.8 (L) 9.2 - 11.8 FL  
GLUCOSE, POC Collection Time: 11/04/18  5:28 AM  
Result Value Ref Range Glucose (POC) 138 (H) 70 - 110 mg/dL Chemistry Recent Labs 11/04/18 
0300 11/03/18 
0500 11/02/18 
0400 * 130* 136*  137 138  
K 5.5 4.3 4.9  101 103 CO2 22 23 23 BUN 75* 56* 32* CREA 5.01* 4.16* 2.53* CA 8.2* 8.3* 7.0*  
PHOS 5.7* 4.4 3.7 AGAP 13 13 12 BUCR 15 13 13 AP  --   --  398* TP  --   --  7.0 ALB 1.7* 1.4* 1.4*  1.5*  
GLOB  --   --  5.6* AGRAT  --   --  0.3* CBC w/Diff Recent Labs 11/04/18 
0300 11/03/18 
0500 11/02/18 
0400 WBC 14.7* 16.2* 15.6*  
RBC 3.11* 3.21* 2.89* HGB 8.6* 8.7* 8.0*  
HCT 27.5* 28.2* 26.0*  
* 571* 517* ABG No results for input(s): PHI, PHI, POC2, PCO2I, PO2, PO2I, HCO3, HCO3I, FIO2, FIO2I in the last 72 hours. Micro Recent Labs 11/02/18 
1307 CULT >15 CFU STAPHYLOCOCCUS SPECIES, COAGULASE NEGATIVE* Recent Labs 11/02/18 
4309 CULT >15 CFU STAPHYLOCOCCUS SPECIES, COAGULASE NEGATIVE* CT (Most Recent) Results from [facility] Encounter encounter on 09/26/18 CT ABD PELV W CONT Narrative EXAM: CT of the abdomen and pelvis INDICATION: Pneumonia. Acute pyelonephritis. Sepsis. Abnormal liver function 
tests. Dialysis patient. COMPARISON: None. TECHNIQUE: Axial CT imaging of the abdomen and pelvis was performed with 
intravenous contrast. Multiplanar reformats were generated. One or more dose 
reduction techniques were used on this CT: automated exposure control, 
adjustment of the mAs and/or kVp according to patient size, and iterative 
reconstruction techniques. The specific techniques used on this CT exam have 
been documented in the patient's electronic medical record. 
 
_______________ FINDINGS: 
 
LOWER CHEST: Dense consolidation right lower lobe compatible with pneumonia. Small bilateral pleural effusions. Cardiomegaly. Fluid-filled esophagus with 
nasogastric tube in place. LIVER, BILIARY: Indeterminate low-attenuation 7 mm focus anterior left hepatic 
lobe. Hepatomegaly. No biliary dilation. Distended gallbladder with gallstones 
and indeterminate material in the gallbladder which may represent sludge. PANCREAS: Normal. 
 
SPLEEN: Normal. 
 
ADRENALS: Normal. 
 
KIDNEYS/URETERS: No hydronephrosis or renal calculus. Small low attenuation 
cortical focus medial upper pole left kidney not well delineated measuring about 1.1 cm. This may represent renal cyst. 
 
LYMPH NODES: No enlarged lymph nodes. GASTROINTESTINAL TRACT: No bowel dilation or wall thickening. Normal appendix. PELVIC ORGANS: No intraperitoneal fluid. Incidental vas deferens calcifications. VASCULATURE: Mild atherosclerosis. BONES: No acute or aggressive osseous abnormalities identified. OTHER: None. 
 
_______________ Impression IMPRESSION: 
 
 
1. Dense subtotal or total consolidation right lower lobe compatible with pneumonia similar to prior study. Small bilateral pleural effusions similar in 
size. 2. Distended gallbladder with gallstones and gallbladder sludge without 
significant change in appearance and also described in detail on ultrasound of 
9/27/2018. 3. Indeterminate small lesion left hepatic lobe without change. Hepatomegaly 
again evident. 4. No intraperitoneal fluid. Possible small left upper pole renal cyst. 
Cardiomegaly. Nasogastric tube tip in body of stomach. XR (Most Recent). CXR reviewed by me and compared with previous CXR Results from [facility] Encounter encounter on 09/26/18 XR CHEST PORT Narrative EXAM: Portable Chest 
 
CLINICAL INDICATION:  trach change; atelectasis -Additional:  None COMPARISON:  10/24/18 TECHNIQUE: AP portable view at [address] 
 
______________ FINDINGS: 
 
HEART AND MEDIASTINUM:  The heart size is stable LUNGS AND AIRWAYS:  Right chest perihilar infiltrate is present PLEURA:  No pneumothorax or pleural effusion BONES:  Vertebral spondylosis OTHER:  None 
 
______________ Impression IMPRESSION: 
 
Right lung perihilar infiltrate appears present High complexity decision making was performed during the evaluation of this patient at high risk for decompensation with multiple organ involvement Above mentioned total time spent on reviewing the case/medical record/data/notes/EMR/patient examination/documentation/coordinating care with nurse/consultants, exclusive of procedures with complex decision making performed and > 50% time spent in face to face evaluation. Darlin Wang MD 
Critical Care Medicine

## 2018-11-05 NOTE — PROGRESS NOTES
Problem: Falls - Risk of 
Goal: *Absence of Falls Document Shirley Gray Fall Risk and appropriate interventions in the flowsheet. Outcome: Progressing Towards Goal 
Fall Risk Interventions: 
Mobility Interventions: Bed/chair exit alarm, Strengthening exercises (ROM-active/passive) Mentation Interventions: Bed/chair exit alarm, More frequent rounding, Reorient patient, Room close to nurse's station, Toileting rounds, Update white board Medication Interventions: Bed/chair exit alarm, Evaluate medications/consider consulting pharmacy Elimination Interventions: Bed/chair exit alarm, Call light in reach, Toileting schedule/hourly rounds History of Falls Interventions: Bed/chair exit alarm, Evaluate medications/consider consulting pharmacy, Room close to nurse's station Problem: Pressure Injury - Risk of 
Goal: *Prevention of pressure injury Document Mitul Scale and appropriate interventions in the flowsheet. Outcome: Progressing Towards Goal 
Pressure Injury Interventions: 
Sensory Interventions: Assess changes in LOC, Keep linens dry and wrinkle-free, Maintain/enhance activity level, Minimize linen layers, Monitor skin under medical devices, Pad between skin to skin, Pressure redistribution bed/mattress (bed type), Turn and reposition approx. every two hours (pillows and wedges if needed) Moisture Interventions: Apply protective barrier, creams and emollients, Absorbent underpads, Internal/External urinary devices, Minimize layers, Moisture barrier, Maintain skin hydration (lotion/cream) Activity Interventions: Pressure redistribution bed/mattress(bed type) Mobility Interventions: Float heels, Pressure redistribution bed/mattress (bed type), Turn and reposition approx. every two hours(pillow and wedges) Nutrition Interventions: Document food/fluid/supplement intake, Discuss nutritional consult with provider, Offer support with meals,snacks and hydration Friction and Shear Interventions: Apply protective barrier, creams and emollients

## 2018-11-05 NOTE — PROGRESS NOTES
[facility] is declining authorization for admssion to LTAC due to lack of progress. Joshua Chang 33 in [facility] states he will need peer to peer in order to be accepted to [address] [place], [place] [address]y [id number], C/ Tommy Almaraz 31 [id number]. Patients daughter has made a decision that the patient will no longer receive dialysis. At issues the fact the patient remains a full code. Attempts by nursing staff to reach patients daughter to make adjustment to his current code status have thus far been unsuccessful.   Kenny Ch

## 2018-11-05 NOTE — PROGRESS NOTES
Physical Exam  
Skin:  
 
  
 
 
Bedside shift change report was given to me, Prateek Lewis RN  (oncoming night shift nurse), by Rodney Elkins (offgoing day shift nurse). Report included the following information:  SBAR, kardex, consultations, hemodynamic monitoring, intake/output, MAR, lab results, VS trends, cardiac rhythm noted NSR. Skin, neuro, respiratory status, and order verification have been dually noted and verified at the bedside with: Rolanda Andre RN                                                              
ICU Nurse's Note: 
  
2000: Pt's eyes are open with ongoing spontaneous eye movements, dysconjugate gaze with no observable s/s tracking or visual focusing, no ability to follow commands or communicate needs. Pt has no notable purposeful movements to his extremities, with exception of occasional withdrawal to pain and intermittent spontaneous movements to bilateral toes. BUE remain flaccid and weak and require support and frequent ROM. Pt requires max assist with all ADLs and repositioning. Bed is locked and in lowest position. Side rails up x3, exit alarm activated. Call bell is within reach. Will continue to monitor. Neurological: as noted in assessment Cardiovascular:  NSR on the monitor. Pulmonary: breath sounds diminished bilaterally. Saturations maintained at 98 % via 8 mm Portex cuffed tracheostomy, managed via ventilator with FiO2 30%. GI/: Abdomen is obese, soft, non-distended. PEG tube intact and infusing Perative at goal of 40 ml/hr, with scheduled 30 ml water flushes q 6 hrs. Last BM incontinence indicated 11/04/2018. Pt is a hemodialysis client who is oliguric at baseline, with occasional episodes of urinary incontinence. Scheduled straight catheterizations q shift IVF/Critical gtts: IV abx Skin: as noted above 
  
0000: Q 6hr accu-check 212, SSI administered per protocol. VSS, pt is afebrile.  PEG tube flushed, tube feeding continues at 40 ml/hr without incident. Pt was repositioned, trache and PEG tube stoma care completed, with a small amount of serous drainage cleaned around stoma site. Pt tolerated without incident. 0300: Pt was noted incontinent a large amount of thick, malgorzata colored stool. Straight catheterization at this time yielded 200 ml braydon colored urine. Pt tolerated a complete bed bath and linen change. He is resting quietly at this time, no s/s acute distress. 0400: AM labs drawn from PICC without incident. Pt was bathed, repositioned, and tolerated mouth care. He continues to exhibit a strong, productive cough with thin white sputum. 0600: Q 6hr accu-check 168, SSI administered per protocol. Pt tolerated scheduled AM meds and appears to be resting comfortably in bed at this time after repositioning, mouth care, and mery care. Interventions are ongoing, will continue to monitor.  
0710: Bedside change of shift report given to: Jaime Gan RN

## 2018-11-05 NOTE — PROGRESS NOTES
0 - Received pt at bedside from [facility], [facility], dual skin assessment performed and double RN verified, pt resting quietly in bed, no apparent S/S of distress, VSS on Trach/Vent, will continue to monitor 2288 - Interdisciplinary rounds conducted with Dr. Franklyn Navarro and treatment team, pt prognosis and status discussed as well as plan of care and goals for the day stated, new orders received from Dr. Franklyn Navarro [phone] - Dr. Dao Lomeli in to assess pt, new orders received, pt VSS on Trach/Vent, resting comfortably in bed, no signs of distress 1500 - Pt repositioned for comfort, no signs of distress 1730 - Resting quietly in bed, VSS on Trach/Vent, no S/S of distress 1915 - Bedside and Verbal shift change report given to [facility] (oncoming nurse) by Annalisa Barnes (offgoing nurse). Report included the following information SBAR, Kardex, Intake/Output, MAR, Recent Results and Cardiac Rhythm NSR.

## 2018-11-05 NOTE — PROGRESS NOTES
Hospitalist Progress Note Daily Progress Note: 11/5/2018  
   
Interval history / Subjective:  
Scott Dong is a 57y.o. year old male who presented from [facility] with abnormal labs, elevated BUN/Cr. Found to have JULIANNA, UTI, and markedly elevated LFT on presentation. Patient's recent admission was 9/10/18-9/14/18 for acute CVA and rhabdo. Patient poor historian on admission,stated \"I had heart failure. She was started on vanc,zosyn. On 9/29,patient had cardiac arrest with ventricular fibrillation - s/p ROSC. Her hospital course has since then complicated with sepsis,pancreatitits,pneumonia,acute pancreatitis requiring involvement of gi,surgery,nephrology,cardiology,ID,cardiology. So far patient has remained without major improvement. Now in vegetative state. Patient had PEG/Trach placement on 10/17. On 10/17,patient started on acyclovir for veicular rash rt arm,suspected being Zoster. Lipase 2169. US abd 10/22 shows Abnormal appearance of the gallbladder with stones/sludge with wall thickening  
and possible pericholecystic fluid similar to the prior study of 9/27/2018 
10/22: Patient on treatment for shingles in contact isolation per ID team. He also had left big toe gangrene . We will consult Podiatry. His lipase was elevated , US of the abdomen shows sludge and possible cholecystitis. Patient afebrile. GI consulted  for possible MRCP. Patient has leukocytosis. ID started Vancomycin and ordered Urine , blood and sputum culture and trach site discharge. Patient off air bourne precaution but continue contact precaution per ID. Patient was started on Vancomycin and cefepime for SIRSper ID. Blood and urine cx pending. Patient today is going for jejunostomy. Patient BC NGTD Vancomycin d/c'd per ID. Patient respiratory  cx + GNR , STREPT, Wound cx + pseudomonas . PCCM added Ciprofloxacin today . IR attempt at Jejunostomy was unsuccessful yesterday. 10/28: Fever today. Resp cx, wound cx noted. On cefepime and Cipro for coverage per sensitivisty result . Blood cx NGTD . Lipase still trending up. IR unsuccessful attempt at jejunostomy 10/26. Will re consult GI today 10/29:no change in care. 10/30:no change in care 10/31:pt underwent dialysis today. Currently on cefepime only. Patient is still in the hospital,waiting for placement. 11/1:continue current treatment 11/2:ID has recommended line culture(picc line) as patient had elevated temp today 11/3:still running fever 101.8 today. Tip of picc line cx results pending. 11/4:Tip of catheter culture showing staph coag negative 11/5: Daughter request to d/c dialysis. Nephrology signed off today Current Facility-Administered Medications Medication Dose Route Frequency  insulin glargine (LANTUS) injection 30 Units  30 Units SubCUTAneous DAILY  amLODIPine (NORVASC) tablet 5 mg  5 mg Oral DAILY  0.9% sodium chloride infusion 250 mL  250 mL IntraVENous PRN  
 cefepime (MAXIPIME) 0.5 g in 0.9% sodium chloride 100 mL IVPB  0.5 g IntraVENous Q24H  
 albuterol (PROVENTIL VENTOLIN) nebulizer solution 2.5 mg  2.5 mg Nebulization BID RT  
 multivitamin (MULTI-DELYN, WELLESSE) oral liquid 30 mL  30 mL Oral DAILY  bacitracin 500 unit/gram packet 1 Packet  1 Packet Topical PRN  
 sodium chloride (NS) flush 10-30 mL  10-30 mL InterCATHeter PRN  
 sodium chloride (NS) flush 10 mL  10 mL InterCATHeter PRN  
 heparin (porcine) injection 5,000 Units  5,000 Units SubCUTAneous Q8H  
 hydroxypropyl methylcellulose (ISOPTO TEARS) 0.5 % ophthalmic solution 2 Drop  2 Drop Both Eyes BID  epoetin manda (EPOGEN;PROCRIT) injection 40,000 Units  40,000 Units IntraVENous Q7D  
 pantoprazole (PROTONIX) 40 mg in sodium chloride 0.9% 10 mL injection  40 mg IntraVENous Q24H  
 influenza vaccine 2018-19 (6 mos+)(PF) (FLUARIX QUAD/FLULAVAL QUAD) injection 0.5 mL  0.5 mL IntraMUSCular PRIOR TO DISCHARGE  
  acetaminophen (TYLENOL) solution 325 mg  325 mg Per NG tube Q4H PRN  
 heparin (porcine) pf 100 Units  100 Units InterCATHeter PRN  
 insulin lispro (HUMALOG) injection   SubCUTAneous Q6H  
 heparin (porcine) 1,000 unit/mL injection 1,000 Units  1,000 Units InterCATHeter DIALYSIS PRN  
 senna-docusate (PERICOLACE) 8.6-50 mg per tablet 1 Tab  1 Tab Oral BID PRN  
 ondansetron (ZOFRAN) injection 4 mg  4 mg IntraVENous Q4H PRN  
 glucose chewable tablet 16 g  4 Tab Oral PRN  
 glucagon (GLUCAGEN) injection 1 mg  1 mg IntraMUSCular PRN  
 dextrose (D50) infusion 12.5-25 g  25-50 mL IntraVENous PRN  
 hydrALAZINE (APRESOLINE) 20 mg/mL injection 20 mg  20 mg IntraVENous Q6H PRN Review of Systems Unresponsive Objective:  
 
Visit Vitals /73 (BP 1 Location: Right arm) Pulse 92 Temp 97.8 °F (36.6 °C) Resp 19 Ht 5' 7\" (1.702 m) Wt 88.8 kg (195 lb 12.3 oz) SpO2 100% BMI 30.66 kg/m² O2 Flow Rate (L/min): 45 l/min O2 Device: Tracheostomy Temp (24hrs), Av.5 °F (36.9 °C), Min:97.6 °F (36.4 °C), Max:99.1 °F (37.3 °C) No intake/output data recorded.  1901 -  0700 In: 1660 [I.V.:100] Out: 480 [Urine:480] P/E General Appearance:S/p PEG/Trach HENT: normocephalic/atraumatic, + ETT Neck: No JVD, hematoma R side where [facility] is improving Lungs: Coarse B/L w/ vent-assisted BS 
CV: RRR, no m/r/g Abdomen: soft, non-tender, normal bowel sounds Extremities: versicular rash rt arm and right side of chest,no cyanosis, no peripheral edema Neuro: Unresponsive to commands, no withdrawal to pain, + corneal reflex and pupillary reaction today Skin: Normal color, intact Data Review Recent Results (from the past 12 hour(s)) GLUCOSE, POC Collection Time: 18 11:57 PM  
Result Value Ref Range Glucose (POC) 212 (H) 70 - 110 mg/dL LIPASE Collection Time: 18  4:00 AM  
Result Value Ref Range  Lipase 1,761 (H) 73 - 393 U/L  
RENAL FUNCTION PANEL  
 Collection Time: 11/05/18  4:00 AM  
Result Value Ref Range Sodium 137 136 - 145 mmol/L Potassium 5.5 3.5 - 5.5 mmol/L Chloride 101 100 - 108 mmol/L  
 CO2 24 21 - 32 mmol/L Anion gap 12 3.0 - 18 mmol/L Glucose 154 (H) 74 - 99 mg/dL BUN 96 (H) 7.0 - 18 MG/DL Creatinine 5.67 (H) 0.6 - 1.3 MG/DL  
 BUN/Creatinine ratio 17 12 - 20 GFR est AA 12 (L) >60 ml/min/1.73m2 GFR est non-AA 10 (L) >60 ml/min/1.73m2 Calcium 8.3 (L) 8.5 - 10.1 MG/DL Phosphorus 7.2 (H) 2.5 - 4.9 MG/DL Albumin 1.6 (L) 3.4 - 5.0 g/dL CBC W/O DIFF Collection Time: 11/05/18  4:00 AM  
Result Value Ref Range WBC 11.4 4.6 - 13.2 K/uL  
 RBC 3.05 (L) 4.70 - 5.50 M/uL HGB 8.5 (L) 13.0 - 16.0 g/dL HCT 26.8 (L) 36.0 - 48.0 % MCV 87.9 74.0 - 97.0 FL  
 MCH 27.9 24.0 - 34.0 PG  
 MCHC 31.7 31.0 - 37.0 g/dL  
 RDW 16.2 (H) 11.6 - 14.5 % PLATELET 509 (H) 041 - 420 K/uL MPV 8.7 (L) 9.2 - 11.8 FL  
GLUCOSE, POC Collection Time: 11/05/18  5:39 AM  
Result Value Ref Range Glucose (POC) 168 (H) 70 - 110 mg/dL Assessment/Plan:  
 
Principal Problem: 
  Cardiac arrest with ventricular fibrillation ([facility] (9/29/2018) Overview: ROSC before defibrillation could be attempted. Active Problems: 
  Essential hypertension (9/10/2018) Diabetes mellitus type 2, controlled ([facility] (9/10/2018) History of stroke (9/27/2018) Overview: With residual R hemiparesis Acute-on-chronic kidney injury ([address] (9/27/2018) Acute cystitis (9/27/2018) Elevated troponin (9/27/2018) Elevated LFTs (9/28/2018) Acute pancreatitis (9/29/2018) Overview: Lipase downtrends with interruption in tube feed and Mucomyst. Restarting  
    tube feed did result in modest elevation but not to the levels seen while  
    on Mucomyst. Nonetheless, there is signficant decrease in lipase levels  
    with interruptions in tube feeding. Ischemic encephalopathy (9/30/2018) Hypoalbuminemia (10/21/2018) Shingles (10/21/2018) Gangrene ([address] (10/22/2018) Pseudomonas infection (10/27/2018) Zoster. Care Plan - Vent mgmt per ICU - S/p trach/PEG on 10/17 as patient did not show significant improvement. 
- EGD showed large clot in esophagus last week repeat EGD 10/9 shows healed esophageal ulcer - Cont protonix IV every day  
- Hgb stable - LFTs improving - GI signed off 10/9 
- MRI w/ evidence of severe anoxic brain injury - Minimal improvement on neuro exam 
- Appreciate neuro assistance - IV Meropenem and IV Fluconazole d/heron on 10/15 per ID. 
- Currently on vanc only which was started on 9/27 
- Nephro managing HD TTHS 
- Pt unlikely to have any meaningful neuro recovery, prognosis is very grim - Family wanted to cont to do everything, including trach/PEG -> was done10/17 
- On 10/17:Started on acyclovir for possible Zoster rt arm and rt upper chest area. - Hyperkalemia on 10/18 ->corrected - Lipase 2169 10/22. US abd Abnormal appearance of the gallbladder with stones/sludge with wall thickening  
and possible pericholecystic fluid similar to the prior study of 9/27/2018  
- On Acyclovir for shingles , contact precaution per ID commitee 
- 10/22: Consult GI today for possible MRCP 
- 10/22: Consult Podiatry for left big toe gangrene  
- 10/23 : Podiatry recommend RAQUEL for Left Big toe  
- 10/23: GI recommend MRI/MRCP but will need patient off vent for multiple times for procedure. Not sure if this is feasible now 
- 10/25 : Blood , urine , sputum , trach discharge cultures 
- 10/25 IV antibiotics by ID  
- Change GI tube to Jejunostomy tube tomorrow by IR  
- 10/26: Vancomycin and Cefepime per ID  
- 10/26 : Jejunostomy attempt by IR unsuccessful . Will need to discuss with GI  
- 10/27 : Ciprofloxacin added 10/27 for double coverage of pseudomonas with cefepime per PCCM  
- 10/28: Pancreatitis ? Biliary  . Will reconsult GI today - Lipase trending down. 
-11/2:culture of picc line since pt has developed fever. -11/3:still febrile,picc line tip cx results pending;will follow ID recommendations. -11/4:Cx of tip of catheter showing staph coag negative. Pt still spiking fever - will follow ID recommendations. - 11/5: Nephrology signed off . At daughters request will D/C dialysis. She per Nephrology is well aware if implications of stopping dialysis - Per ID continue cefepime for total 14 days and Vancomycin  added for 7 days DVT prophylaxis:scds Full code. Disposition:tbd - need placement -  involved.

## 2018-11-05 NOTE — PROGRESS NOTES
Pulmonary progress note 
   
History 64-year-old male recently hospitalized for stroke with residual right hemiparesis.  He was readmitted on 9/26/18 with acute renal failure.  On 9/29  the patient rapidly became hypotensive and was resuscitated.  The patient suffered a catastrophic neurological injury. Tracheostomy 10/17 and exchanged 10/27. Subjective Cannot contribute to recent history due to clinical status Afebrile. 140/65. Neuro: Pupils reactive. Gaze is disconjugate. No clear oculocephalic reflex. Cough to suctioning. No withdrawal or purposeful movements of upper extremities. No response to sternal rub. Withdraws her feet to noxious stimuli. Bilateral upgoing Babinskis. Lungs: Coarse breath sounds Heart: Regular rate and rhythm Extremities: No cyanosis or clubbing Labs: WBC 11.4. BUN 96. Albumin 1.6. Lipase 1761 and increasing. Alkaline phosphatase with minimal worsening from 10/24 to 11/2/18. Alkaline phosphatase 11/2 398. AST and ALT minimally elevated at that time as well to 75 and 84, respectively. Radiographs: Last chest x-ray 10/27/18. Last abdominal ultrasound 10/22/18. Assessment Cardiopulmonary arrest with severe neurologic encephalopathy Chronic respiratory failure s/p tracheostomy Acute hemodialysis dependent renal failure Elevated lipase Dysphasia secondary to altered mental status with PEG tube on 10/17/18 Plan Power of  has declined further dialysis but has not changed CODE STATUS The patient is critically ill with organ failure. Total critical care time without physician overlap or procedure time included:  25 minutes. Please see Mercedes Stone NP note for further details.

## 2018-11-05 NOTE — PROGRESS NOTES
-0850-Received patient on ventilator VC+ SIMV rate-12, VT-500, PEEP-5, FIO2-30%. O2 Sats-100% HR-94, RR-21. #8 Portex trach noted to be patent and secure. Respiratory will continue to monitor. -[address] 
 
-[id number]. Placed on  Spontaneous breathing trial PS-12 to maintain VT > 400ml. Per orderPEEP-5, FIO2-30%. O2 Sats-100% HR-93, RR21, RSBI-56. Respiratory will continue to monitor-[facility] 
 
-1550-Pt. Remains on ventilator PS-12, PEEP-5, FIO2-30%. O2 Sats-100% HR-92, RR-24. Trach care performed. Inner cannula changed. Trach rermains patent and secure. -[address]

## 2018-11-05 NOTE — PROGRESS NOTES
Infectious Disease Follow-up Admit Date: 9/26/2018 Current abx Prior abx  
cefepime 10/25- 11, Vanco 11/5-0 Zosyn 9/27-7, Meropenem/flucon 10/4- 11, vanco 9/27-25, ACV 10/17 - 10,  Vanco 10/25- 1, Cipro 10/28-1 Assessment ->Rec:  
 
Recurrent SIRS T 100.4 10/25 wbc 18k 
 - more secretions, reported cloudy urine, cxr yest no pneumonia  
-line tip culture 10/19 >15 CFU CoNS now with L picc, femoral dialysis catheter out 10/31   
- blcx 10/25 NG x 2, spgs rare Pseud, wd Pseud, cxr 10/27 poss R perihilar infiltrate-? Pul source here vs from prior bleeding -> appreciate removal femoral uldall 10/31- cath tip with <15k Pseudomonas 
-> S/p removal of PICC line 11/2 and with NEW >15 CFU Staph epi 
-> continue Cefepime for total 14 days and will add Vanco (can be given with dialysis) for 7 days 
->pt with issues with Peripheral IVs and d/w renal- ok to use [facility] as family has decided not to go for dialysis anymore 
->  D/W dr Danica Salinas, Dr Gemma Peabody and RN 
-> plan to change code status per nurse 
-> repeat blcx x2 if spikes fever >100.8 PAD  
- PVL's s/o significant arterial disease of bilateral lower extremities 10/24. -POD Dr. Shraddha Burrows saw 10/24,  indicated not OR candidate foot for gangrene distal L gt toe - per others Shingles R arm, C 6 (?C5) dermatome 
- vesicular lesions onset ~10/15 progressing compared to onset per NP 
- confluent in upper arm, not crusted yet  
-VZV PCR pos 10/18 
- completed 10 days of Acyclovir on 10/26 -> wounds crusted, off isolation Prior Leukocytosis/SIRS poss sepsis (resolved 10/19) 
- MOF multiple ID and non-infectious concerns outlined below, complicated, wbc 30.5H today from high 27K+ on 10/5 On  vancomycin/zosyn 9/27 
- C diff neg 9/29, sput C albicans, repeat CT 10/4 no new findings --cxr reviewed - increased atx rt base. Left base improved 
- Higher fever 10/16 101.2, 10/18 101.4 afebrile since - Ddx: HZV vs deep tissue injury of toe/buttock vs Line infection 
- blcx 10/16 NG x 2 
- TDC Tip 10/19 >15 CFU MRSE (1 of 2 blcx on adm 9/27 MRSE but afebrile then prob contaminant) - Suspect colonized with Pseudomonas now and at risk for subsequent infections given vent, HD and lines Suspected Pyelo POA  
- CTAP 9/27:  B perineph stranding, UA tntc wbc, uctx ng 
- treated at this point Cholelithiasis choledocholithiais suspected acute cholecystitis  poss cystic stone POA- abnl lft bili 4 alk phos 400 seen by GI and GS Dr. Mary Jo Tavera, no plan for OR, for poss cholecystostomy if LFT worsen, bili, alk phos declining - AST fluctuating downward  -Dr. Vicki Kinney saw for GI 10/24 Elevated Lipase  
- lipase 2200 POA -> 3191 ->  5500 on 10/4 improved to 1207 10/19 but up again to 2169 10/22 interpretation complicated by JULIANNA -> fluctuating between 4271-3044 (off mucomyst, TF) 
- CTAP 10/4: Normal pancreas -> per ICU team  
B infiltrates - poss edema infiltrate - RLL consolid better 10/4 cxr and suspect endobronchial clot contributed   ->monitor MRSE bacteremia 9/27 1 of 2. Has LUE midline unclear when placed Treated JULIANNA POA dialysis dependent 
- baseline cr 0.9 132/10.1 in ER 
-RIJ uldall 9/28 out 10/19 - R fem uldall 10/20 
-NEW RIJ TDC 10/31 -> per renal  
Bleeding -melena earlier, EGD 10/2 clot in esophagus bronch 10/3 R endobronch clot NEW removed 10/5 repeat bronch  -> per others Suspected anoxic brain injury on MRI 10/3 SP VF arrest 9/29 -> overall poor prognosis but family wishes aggressive care CVA R hemiparesis 9/10   
resp failure sp intubation 9/29 Comorbid: HTN HLD CHF h/o steatohepatitis MICROBIOLOGY:  
9/27 bctx 1 of 2 MRSE 
 uctx ng 
9/29 sput C albicans C diff neg 10/4 bctx x 2 NTD 
 fungitell indeterminate due to contamination 10/8 Cath tip cx ntd 10/8     bl cx ng 
10/16 Blcx x2 ng 
 Spcx NF 
10/18 VZV PCr +ve, HSV PCR neg 
10/19 Cath tip >15 CFU MRSE 10/25 Spcx pseudomonas x2- panS 
 blcx x2 nG 
 uctx Pseudomonas Wd cx Pseud x2 panS, E faecalis S amp, CoNS 
10/31  Cath tip <15 CFU GNR, panS Pseudomonas 11/2 PICC tip >15CFU Staph epi LINES AND CATHETERS:  
 
 
JOHN NoelMaliha Chang 83 10[birth year]31 Active Hospital Problems Diagnosis Date Noted  Pseudomonas infection 10/27/2018  Gangrene ([facility] 10/22/2018  Hypoalbuminemia 10/21/2018  Shingles 10/21/2018  Ischemic encephalopathy 09/30/2018  Cardiac arrest with ventricular fibrillation ([facility] 09/29/2018 ROSC before defibrillation could be attempted.  Acute pancreatitis 09/29/2018 Lipase downtrends with interruption in tube feed and Mucomyst. Restarting tube feed did result in modest elevation but not to the levels seen while on Mucomyst. Nonetheless, there is signficant decrease in lipase levels with interruptions in tube feeding.  Elevated LFTs 09/28/2018  History of stroke 09/27/2018 With residual R hemiparesis  Acute-on-chronic kidney injury ([facility] 09/27/2018  Acute cystitis 09/27/2018  Elevated troponin 09/27/2018  Essential hypertension 09/10/2018  Diabetes mellitus type 2, controlled ([facility] 09/10/2018 Subjective: Interval notes reviewed, Afebrile. WBC better. Daughter decided to not have anymore dialysis for dad. PICC tip was positive and will start Vanco. On vent through trach and tolerating peg feeds. Eyes open no response voice or exam no hx or ROS elicitable. Unchanged from earlier. Current Facility-Administered Medications Medication Dose Route Frequency  insulin glargine (LANTUS) injection 30 Units  30 Units SubCUTAneous DAILY  amLODIPine (NORVASC) tablet 5 mg  5 mg Oral DAILY  0.9% sodium chloride infusion 250 mL  250 mL IntraVENous PRN  
 cefepime (MAXIPIME) 0.5 g in 0.9% sodium chloride 100 mL IVPB  0.5 g IntraVENous Q24H  
 albuterol (PROVENTIL VENTOLIN) nebulizer solution 2.5 mg  2.5 mg Nebulization BID RT  
  multivitamin (MULTI-DELYN, WELLESSE) oral liquid 30 mL  30 mL Oral DAILY  bacitracin 500 unit/gram packet 1 Packet  1 Packet Topical PRN  
 sodium chloride (NS) flush 10-30 mL  10-30 mL InterCATHeter PRN  
 sodium chloride (NS) flush 10 mL  10 mL InterCATHeter PRN  
 heparin (porcine) injection 5,000 Units  5,000 Units SubCUTAneous Q8H  
 hydroxypropyl methylcellulose (ISOPTO TEARS) 0.5 % ophthalmic solution 2 Drop  2 Drop Both Eyes BID  epoetin manda (EPOGEN;PROCRIT) injection 40,000 Units  40,000 Units IntraVENous Q7D  
 pantoprazole (PROTONIX) 40 mg in sodium chloride 0.9% 10 mL injection  40 mg IntraVENous Q24H  
 influenza vaccine - (6 mos+)(PF) (FLUARIX QUAD/FLULAVAL QUAD) injection 0.5 mL  0.5 mL IntraMUSCular PRIOR TO DISCHARGE  acetaminophen (TYLENOL) solution 325 mg  325 mg Per NG tube Q4H PRN  
 heparin (porcine) pf 100 Units  100 Units InterCATHeter PRN  
 insulin lispro (HUMALOG) injection   SubCUTAneous Q6H  
 heparin (porcine) 1,000 unit/mL injection 1,000 Units  1,000 Units InterCATHeter DIALYSIS PRN  
 senna-docusate (PERICOLACE) 8.6-50 mg per tablet 1 Tab  1 Tab Oral BID PRN  
 ondansetron (ZOFRAN) injection 4 mg  4 mg IntraVENous Q4H PRN  
 glucose chewable tablet 16 g  4 Tab Oral PRN  
 glucagon (GLUCAGEN) injection 1 mg  1 mg IntraMUSCular PRN  
 dextrose (D50) infusion 12.5-25 g  25-50 mL IntraVENous PRN  
 hydrALAZINE (APRESOLINE) 20 mg/mL injection 20 mg  20 mg IntraVENous Q6H PRN Objective:  
 
Visit Vitals /80 Pulse 93 Temp 98.8 °F (37.1 °C) Resp 19 Ht 5' 7\" (1.702 m) Wt 88.8 kg (195 lb 12.3 oz) SpO2 100% BMI 30.66 kg/m² Temp (24hrs), Av.5 °F (36.9 °C), Min:97.6 °F (36.4 °C), Max:99.1 °F (37.3 °C) GEN: well developed 57year-old AA male on vent/trach in ICU, unresponsive HENT: no thrush eyes open Neck: tracheostomy in place. TDC RIJ in use, CHEST: coarse bs B no wheeze or rhonchi CVS: RRR, no murmur appreciated ABD: soft, + BS no localized tenderness, PEG in place EXT: 1+ pitting edema b/l LE  
SKIN:  RUE lesions crusted CNS:eyes open, unresponsive to commands or deep sternal rub Labs: Results:  
Chemistry Recent Labs 11/05/18 
0400 11/04/18 
0300 11/03/18 
0500 * 143* 130*  136 137  
K 5.5 5.5 4.3  101 101 CO2 24 22 23 BUN 96* 75* 56* CREA 5.67* 5.01* 4.16* CA 8.3* 8.2* 8.3* AGAP 12 13 13 BUCR 17 15 13 ALB 1.6* 1.7* 1.4* CBC w/Diff Recent Labs 11/05/18 
0400 11/04/18 
0300 11/03/18 
0500 WBC 11.4 14.7* 16.2*  
RBC 3.05* 3.11* 3.21* HGB 8.5* 8.6* 8.7* HCT 26.8* 27.5* 28.2*  
* 614* 571*  
  
 
10/27 cxr IMPRESSION: 
  
Right lung perihilar infiltrate appears present 10/4 CTAP IMPRESSION: 
1. Dense subtotal or total consolidation right lower lobe compatible with 
pneumonia similar to prior study. Small bilateral pleural effusions similar in 
size. 2. Distended gallbladder with gallstones and gallbladder sludge without 
significant change in appearance and also described in detail on ultrasound of 
9/27/2018. 3. Indeterminate small lesion left hepatic lobe without change. Hepatomegaly 
again evident. 4. No intraperitoneal fluid. Possible small left upper pole renal cyst. 
Cardiomegaly. Nasogastric tube tip in body of stomach. Microbiology Results All Micro Results Procedure Component Value Units Date/Time CULTURE, CATHETER TIP [[id number]]  (Abnormal)  (Susceptibility) Collected:  11/02/18 1307 Order Status:  Completed Specimen:  PICC Updated:  11/05/18 7122 Special Requests: NO SPECIAL REQUESTS Culture result:    
  >15 CFU STAPHYLOCOCCUS EPIDERMIDIS  
     
 CULTURE, CATHETER TIP [[id number]]  (Abnormal)  (Susceptibility) Collected:  10/31/18 2123 Order Status:  Completed Specimen:  Catheter Tip Updated:  11/02/18 1115 Special Requests: NO SPECIAL REQUESTS Culture result: <15 CFU PSEUDOMONAS AERUGINOSA CULTURE, BLOOD [[id number]] Collected:  10/25/18 1328 Order Status:  Completed Specimen:  Whole Blood Updated:  10/31/18 0910 Special Requests: NO SPECIAL REQUESTS Culture result: NO GROWTH 6 DAYS     
 CULTURE, BLOOD [[id number]] Collected:  10/25/18 1328 Order Status:  Completed Specimen:  Whole Blood Updated:  10/31/18 0910 Special Requests: NO SPECIAL REQUESTS Culture result: NO GROWTH 6 DAYS     
 CULTURE, Cozetta Clink STAIN [[id number]]  (Abnormal)  (Susceptibility) Collected:  10/25/18 1359 Order Status:  Completed Specimen:  Wound from Tracheostomy site Updated:  10/28/18 9353 Special Requests: NO SPECIAL REQUESTS     
  GRAM STAIN FEW WBC'S     
   RARE GRAM NEGATIVE RODS Culture result: MANY PSEUDOMONAS AERUGINOSA MODERATE PSEUDOMONAS AERUGINOSA 2ND MORPHOTYPE  
     
      
  FEW ENTEROCOCCUS FAECALIS GROUP D  
     
      
  FEW STAPHYLOCOCCUS SPECIES, COAGULASE NEGATIVE (TWO MORPHOTYPES) CULTURE, RESPIRATORY/SPUTUM/BRONCH Murriel Marathon STAIN [[id number]]  (Abnormal)  (Susceptibility) Collected:  10/25/18 1325 Order Status:  Completed Specimen:  Sputum from Tracheal Aspirate Updated:  10/28/18 5636 Special Requests: NO SPECIAL REQUESTS     
  GRAM STAIN MODERATE WBC'S     
   RARE GRAM NEGATIVE RODS Culture result: MANY PSEUDOMONAS AERUGINOSA  
     
      
  MANY PSEUDOMONAS AERUGINOSA 2ND MORPHOTYPE  
     
      
  RARE NORMAL RESPIRATORY SHANAE  
     
 CULTURE, URINE [[id number]]  (Abnormal)  (Susceptibility) Collected:  10/25/18 1359 Order Status:  Completed Specimen:  Cath Urine Updated:  10/27/18 4627 Special Requests: NO SPECIAL REQUESTS Culture result:    
  22114 COLONIES/mL PSEUDOMONAS AERUGINOSA CULTURE, CATHETER TIP [[id number]]  (Abnormal)  (Susceptibility) Collected:  10/19/18 1339 Order Status:  Completed Specimen:  Catheter Tip Updated:  10/22/18 9357 Special Requests: NO SPECIAL REQUESTS Culture result:    
  >15 CFU STAPHYLOCOCCUS EPIDERMIDIS  
     
 CULTURE, BLOOD [[id number]] Collected:  10/16/18 1055 Order Status:  Completed Specimen:  Blood Updated:  10/22/18 0809 Special Requests: PERIPHERAL Culture result: NO GROWTH 6 DAYS     
 CULTURE, BLOOD [[id number]] Collected:  10/16/18 1047 Order Status:  Completed Specimen:  Blood Updated:  10/22/18 0809 Special Requests: PERIPHERAL Culture result: NO GROWTH 6 DAYS     
 HSV 1 AND 2 BY PCR [[id number]] Collected:  10/18/18 1815 Order Status:  Completed Specimen:  Other from Miscellaneous sample Updated:  10/21/18 1636 Source OTHER TEST     
  HSV-1 DNA by PCR NEGATIVE      
  HSV-2 DNA by PCR NEGATIVE Comment: (NOTE) This test was developed and its performance characteristics 
determined by [facility]. It has not been cleared or 
approved by the U.S. Food and Drug Administration. The FDA has 
determined that such clearance or approval is not necessary. This 
test is used for clinical purposes. It should not be regarded as 
investigational or research. Performed At: [address] [id number] Marnie Salas MD PW:[id number] CULTURE, RESPIRATORY/SPUTUM/BRONCH Venkat Dub [[id number]] Collected:  10/16/18 1915 Order Status:  Completed Specimen:  Sputum,ET Suction Updated:  10/18/18 1011 Special Requests: NO SPECIAL REQUESTS     
  GRAM STAIN 10-25 WBC/lpf     
   <10 EPI/lpf FEW GRAM POSITIVE COCCI IN PAIRS MODERATE GRAM POSITIVE COCCI IN GROUPS MUCUS PRESENT Culture result: MODERATE NORMAL RESPIRATORY SHANAE  
     
 CULTURE, BLOOD [[id number]] Collected:  10/08/18 1227 Order Status:  Completed Specimen:  Blood Updated:  10/14/18 0718 Special Requests: PERIPHERAL Culture result: NO GROWTH 6 DAYS     
 CULTURE, CATHETER TIP [[id number]] Collected:  10/08/18 1215 Order Status:  Completed Specimen:  Catheter Tip Updated:  10/11/18 8717 Special Requests: NO SPECIAL REQUESTS Culture result: NO GROWTH 3 DAYS     
 CULTURE, BLOOD [[id number]] Collected:  10/04/18 0556 Order Status:  Completed Specimen:  Blood Updated:  10/10/18 5776 Special Requests: PERIPHERAL Culture result: NO GROWTH 6 DAYS     
 CULTURE, BLOOD [[id number]] Collected:  10/04/18 1120 Order Status:  Completed Specimen:  Blood Updated:  10/10/18 0819 Special Requests: PERIPHERAL Culture result: NO GROWTH 6 DAYS     
 CULTURE, BLOOD [[id number]] Collected:  09/27/18 0005 Order Status:  Completed Specimen:  Blood Updated:  10/03/18 0845 Special Requests: NO SPECIAL REQUESTS Culture result: NO GROWTH 6 DAYS     
 CULTURE, BLOOD [[id number]]  (Abnormal)  (Susceptibility) Collected:  09/27/18 0136 Order Status:  Completed Specimen:  Blood Updated:  10/02/18 9958 Special Requests: NO SPECIAL REQUESTS     
  GRAM STAIN PEDIATRIC BOTTLE GRAM POSITIVE COCCI IN PAIRS IN CLUSTERS  
      
  SMEAR CALLED TO AND CORRECTLY REPEATED BY: Leia Morris RN IN 2700 ON 9/29/18 AT 2033 WF Culture result:    
  AEROBIC BOTTLE STAPHYLOCOCCUS EPIDERMIDIS  
     
 CULTURE, RESPIRATORY/SPUTUM/BRONCH Rowley Drum STAIN [[id number]]  (Abnormal) Collected:  09/29/18 1210 Order Status:  Completed Specimen:  Sputum from Endotracheal aspirate Updated:  10/02/18 1118 Special Requests: NO SPECIAL REQUESTS     
  GRAM STAIN >25 WBC/lpf     
   <10 EPI/lpf MUCUS PRESENT     
   MODERATE YEAST Culture result: MANY CANDIDA TROPICALIS C. DIFFICILE/EPI PCR [[id number]] Collected:  09/29/18 1400 Order Status:  Completed Specimen:  Stool Updated:  09/29/18 2248 Special Requests: NO SPECIAL REQUESTS Culture result: Toxigenic C. difficile NEGATIVE                         C. difficile target DNA sequences are not detected. CULTURE, URINE [[id number]] Collected:  09/27/18 0029 Order Status:  Completed Specimen:  Cath Urine Updated:  09/29/18 0944 Special Requests: NO SPECIAL REQUESTS Culture result: NO GROWTH 2 DAYS Malorie Castillo MD, FACP November 5, 2018 [facility] [phone]

## 2018-11-05 NOTE — PROGRESS NOTES
VENTILATOR Care plan 
 
Problem: Ventilator Management Goal: *Adequate oxygenation/ ventilation/ and extubation Patient: 
   
 
Indra Monge     57 y.o.   male     11/5/2018  10:06 AM 
Patient Active Problem List  
Diagnosis Code  Stroke (cerebrum) ([facility]) I63.9  Stroke ([facility] I63.9  Rhabdomyolysis M62.82  
 Dehydration E86.0  
 Essential hypertension I10  
 Diabetes mellitus type 2, controlled ([facility] E11.9  Non compliance w medication regimen Z91.14  
 DM [facility]diabetes mellitus) [facility] E11.9  History of stroke Z80.78  
 Acute-on-chronic kidney injury ([facility] N17.9, N18.9  Acute cystitis N30.00  Elevated troponin R74.8  Elevated LFTs R94.5  Cardiac arrest with ventricular fibrillation ([facility]) I46.9, I49.01  
 Acute pancreatitis K85.90  
 Ischemic encephalopathy I67.89  
 Hypoalbuminemia E88.09  
 Shingles B02.9  Gangrene ([facility] [id number]  Pseudomonas infection B96.5 Pneumonia of both lower lobes due to infectious organism ([facility] [J18.1] ESRD (end stage renal disease) ([facility] [N18.6] ESRD (end stage renal disease) ([facility] [N18.6] Reason patient intubated: Encephalopathy, Airway protection Ventilator day: 38 Ventilator settings: VC+SIMV rate-12, VT-500, PS-10, PEEP-5, FIO2-30% Trach Size:  # 8 Portex ABG: 
Date:11/5/2018 No results found for: PH, PHI, PCO2, PCO2I, PO2, PO2I, HCO3, HCO3I, FIO2, FIO2I Chest X-ray: 
Date:11/5/2018 Results from [facility] Encounter encounter on 09/26/18 XR CHEST PORT Narrative EXAM: Portable Chest 
 
CLINICAL INDICATION:  trach change; atelectasis -Additional:  None COMPARISON:  10/24/18 TECHNIQUE: AP portable view at [address],[place] 
 
______________ FINDINGS: 
 
HEART AND MEDIASTINUM:  The heart size is stable LUNGS AND AIRWAYS:  Right chest perihilar infiltrate is present PLEURA:  No pneumothorax or pleural effusion BONES:  Vertebral spondylosis OTHER:  None 
 
______________  Impression IMPRESSION: 
 
 Right lung perihilar infiltrate appears present Lab Test: 
Date:11/5/2018 WBC:  
Lab Results Component Value Date/Time WBC 11.4 11/05/2018 04:00 AM  
HGB:  
Lab Results Component Value Date/Time HGB 8.5 (L) 11/05/2018 04:00 AM  
 PLTS:  
Lab Results Component Value Date/Time PLATELET 890 (H) 12/30/4645 04:00 AM  
 
 
SaO2%/flow: @LASTSAO2(1)@ Vital Signs:    
Patient Vitals for the past 8 hrs: 
 Temp Pulse Resp BP SpO2  
11/05/18 1000  97 21 144/73 99 % 11/05/18 0900  93 19 149/80 100 % 11/05/18 0855  93 21  100 % 11/05/18 0800 98.8 °F (37.1 °C) 93 16 151/76 100 % 11/05/18 0700  92 19 152/73 100 % 11/05/18 0600  94 20 151/73 100 % 11/05/18 0500  91 22 136/66 100 % 11/05/18 0442  91 28  100 % 11/05/18 0400 97.8 °F (36.6 °C) 90 19 138/67 100 % 11/05/18 0300  90 24 130/66 100 % Wean Screen Pass (Yes or No):Yes Wean Screen Reason for Failure:n/a Duration of Weaning Trial: 
 
Additional Comments: PLAN OF CARE: Maintain ventilatory support GOAL: Wean, as tolerated OUTCOME:

## 2018-11-05 NOTE — PROGRESS NOTES
Notified this am that patient's daughter Ms Alberto Peace decided to stop dialysis. I called her myself and she confirmed decision to stop dialysis. She is aware that patient is unlikely to survive longer then few days without dialysis. Will s/o. May use tdc for palliative needs.

## 2018-11-05 NOTE — PROGRESS NOTES
Problem: Falls - Risk of 
Goal: *Absence of Falls Document Jasmin Sun Fall Risk and appropriate interventions in the flowsheet. Outcome: Progressing Towards Goal 
Fall Risk Interventions: 
Mobility Interventions: Bed/chair exit alarm, Strengthening exercises (ROM-active/passive) Mentation Interventions: Bed/chair exit alarm, More frequent rounding, Reorient patient, Room close to nurse's station, Toileting rounds, Update white board Medication Interventions: Bed/chair exit alarm, Evaluate medications/consider consulting pharmacy Elimination Interventions: Bed/chair exit alarm, Call light in reach, Toileting schedule/hourly rounds History of Falls Interventions: Bed/chair exit alarm, Evaluate medications/consider consulting pharmacy, Room close to nurse's station Problem: Pressure Injury - Risk of 
Goal: *Prevention of pressure injury Document Mitul Scale and appropriate interventions in the flowsheet. Outcome: Progressing Towards Goal 
Pressure Injury Interventions: 
Sensory Interventions: Assess changes in LOC, Keep linens dry and wrinkle-free, Maintain/enhance activity level, Minimize linen layers, Monitor skin under medical devices, Pad between skin to skin, Pressure redistribution bed/mattress (bed type), Turn and reposition approx. every two hours (pillows and wedges if needed) Moisture Interventions: Apply protective barrier, creams and emollients, Absorbent underpads, Internal/External urinary devices, Minimize layers, Moisture barrier, Maintain skin hydration (lotion/cream) Activity Interventions: Pressure redistribution bed/mattress(bed type) Mobility Interventions: Float heels, Pressure redistribution bed/mattress (bed type), Turn and reposition approx. every two hours(pillow and wedges) Nutrition Interventions: Document food/fluid/supplement intake, Discuss nutritional consult with provider, Offer support with meals,snacks and hydration Friction and Shear Interventions: Apply protective barrier, creams and emollients

## 2018-11-05 NOTE — PROGRESS NOTES
Patient remains on life support through a trache and is unable to communicate at this at present. Patient appears to be awake but no indication that he is acknowledging any presence.  offered prayer and left Spiritual Care brochure. Chaplains will continue to follow and will provide pastoral care on an as needed/requested basis. [place] [employer] Board Certified [employer] Spiritual Care  
[phone]

## 2018-11-05 NOTE — PROGRESS NOTES
[facility] ICU Progress Note Name: Yina Terrazas : 1955 MRN: [id number] Date: 2018 1:18 PM 
  
[x]I have reviewed the flowsheet and previous days notes. Events overnight reviewed and discussed with nursing staff. Vital signs and records reviewed. HPI 
57 y.o. Male admitted to ICU for further evaluation and management of acute hypoxia. He was hospitalized (9/10/2018 - 2018) for stroke with neurologic residua (R hemiparesis). Patient discharged from [facility] to [facility] on 2018, only to return on 2018 with acute renal failure/abnormal lab values. He experienced VT/VF arrest during my initial clinical assessment. The patient has undergone tracheostomy and percutaneous gastrostomy. Subjective: 
18 No new events overnight. Remains mechanically ventilated through tracheostomy Does not follow commands Daughter has decided to stop dialysis Tmax 99.1 [x]The patient is unable to give any meaningful history or review of systems because the patient is: 
[]Intubated []Sedated [x]Unresponsive tracheostomy [x]The patient is critically ill on     
[x]Mechanical ventilation []Pressors []BiPAP [] ROS:A comprehensive review of systems was negative except for that written in the HPI. Medication Review: · Pressors - none · Sedation - none · Antibiotics - cefepime/vanc · Pain - prn tylenol · GI/ DVT - Protonix/SQH Others (other gtts)- none Safety Bundles: VAP Bundle/Severe Sepsis Protocol/ Electrolyte Replacement Protocol Vital Signs:   
Visit Vitals /68 Pulse (P) 98 Temp 98.8 °F (37.1 °C) Resp (P) 30 Ht 5' 7\" (1.702 m) Wt 88.8 kg (195 lb 12.3 oz) SpO2 (P) 100% BMI 30.66 kg/m² O2 Device: Tracheostomy, Ventilator O2 Flow Rate (L/min): 45 l/min Temp (24hrs), Av.4 °F (36.9 °C), Min:97.6 °F (36.4 °C), Max:99.1 °F (37.3 °C) Intake/Output: Last shift:      11/05 0701 - 11/05 1900 In: 220 Out: 0 Last 3 shifts: 11/03 1901 - 11/05 0700 In: 1660 [I.V.:100] Out: 480 [Urine:480] Intake/Output Summary (Last 24 hours) at 11/5/2018 1318 Last data filed at 11/5/2018 1100 Gross per 24 hour Intake 1060 ml Output 310 ml Net 750 ml Ventilator Settings: 
Ventilator Mode: (P) SIMV, VC+ Respiratory Rate Resp Rate Observed: 40 Back-Up Rate: (P) 12 Insp Time (sec): 1 sec Insp Flow (l/min): 66 l/min I:E Ratio: 1;2 
Ventilator Volumes Vt Set (ml): 500 ml Vt Exhaled (Machine Breath) (ml): 507 ml Vt Spont (ml): (P) 434 ml Ve Observed (l/min): (P) 10.8 l/min Ventilator Pressures Pressure Support (cm H2O): (P) 12 cm H2O 
PIP Observed (cm H2O): (P) 18 cm H2O Plateau Pressure (cm H2O): 19 cm H2O 
MAP (cm H2O): (P) 9.4 PEEP/VENT (cm H2O): (P) 5 cm H20 Auto PEEP Observed (cm H2O): 0 cm H2O Physical Exam: 
 
General: Tracheostomy, not following commands HEENT:  Anicteric sclerae; pink palpebral conjunctivae; mucosa moist 
Resp:  Symmetrical chest expansion, no accessory muscle use; good airway entry; no rales/ wheezing/ rhonchi noted, (+) gag/cough CV:  S1, S2 present; NSR 
GI:  Abdomen soft, non-tender; (+) active bowel sounds; palpable liver Extremities:  +2 pulses on all extremities; +1 generalized edema; normal capillary refill Skin:  Warm; wound to left great toe Neurologic:  Does not follow commands, flexes to pain, pupils 3 round, reactive, (-) corneals Devices:  Peg, tracheostomy Telemetry: [x]Sinus []A-flutter []Paced []A-fib []Multiple PVCs IMPRESSION:  
· Acute respiratory failure with hypoxia- Requiring mechanical ventilation since 9/30. S/p tracheostomy 10/17 · Anoxic brain injury- d/t cardiac arrest- MRI with consistent findings · Acute kidney injury- oliguric, HD dependant- daughter has decided to stop dialysis- Nephrology signed off · Pseudomonas UTI/Bronchitis- ID following · Hx CVA · DM 
· Gangrene of L great toe · Elevated lipase- Remains over 1000 
· HTN- on amlopidine, prn hydralazine PLAN:  
· Resp - Continues to require mechanical ventilation through tracheostomy. HOB > 30. Aspiration precautions. Pulmonary hygiene. · ID - Afebrile and aleukocytosis. 11/2/18 PICC Cx staph epidermidis. Continue ABx. ID following. · CVS - HD stable. Continue to monitor HD status. · Heme/onc - Monitor H/H and platelets. Monitor for signs of bleeding. · Metabolic - Trend and replace electrolytes as needed. · Renal - Trend renal indices. · Endocrine - SSI, BS Q6. 
· Neuro/ Pain/ Sedation - Monitor neuro status. · GI - TF at goal. 
· Prophylaxis - DVT, GI- SQH, protonix · Discussed in interdisciplinary rounds · Code status- FULL The patient is: [] acutely ill Risk of deterioration: [x] moderate [x] critically ill  [] high  
 
[x]See my orders for details My assessment/plan was discussed with: 
[x]nursing []PT/OT [x]respiratory therapy [x]Dr. Geronimo Barrera  
[]family [] Bridger Shen NP

## 2018-11-05 NOTE — PROGRESS NOTES
Patient in bed on back with head elevated - BBS equal and ess clear - trach tube midline and secure - trach site cleaned, inner cannula replaced - patient suctioned - MVB at [facility] - spare trachs at bedside - vent alarms on and functional

## 2018-11-05 NOTE — PROGRESS NOTES
Pharmacy Dosing Services: Vancomycin Indication: Line infection Day of therapy: 1 Other Antimicrobials (Include dose, start day & day of therapy): 
Cefepime 500 mg IV every 24 hours (started 10/25) Loading dose (date given): 2000 mg  
Current Maintenance dose:  none at this time Goal Vancomycin Level: 15-20 
(Trough 15-20 for most infections, 20 for meningitis/osteomyelitis, pre-HD level ~25) Vancomycin Level (if drawn): none at this time Significant Cultures:  
 Catheter tip - Pseudomonas 10/25 Urine - 50K Pseudomonas 10/25 Wound - Pseudomonas, Enterococcus, Staph Renal function stable? (unstable defined as SCr increase of 0.5 mg/dL or > 50% increase from baseline, whichever is greater) (Y/N): N   
 
CAPD, Hemodialysis or Renal Replacement Therapy (Y/N): N Recent Labs 18 
0400 18 
0300 18 
0500 CREA 5.67* 5.01* 4.16* BUN 96* 75* 56* WBC 11.4 14.7* 16.2* Temp (24hrs), Av.5 °F (36.9 °C), Min:97.6 °F (36.4 °C), Max:99.1 °F (37.3 °C) Creatinine Clearance (Creatinine Clearance (ml/min)): 14 ml/min Regimen assessment: - Per rounds on  - daughter wants HD stopped - will dose per level Maintenance dose: Dosing per level Next scheduled level: Random on  at 1200 Pharmacy will follow daily and adjust medications as appropriate for renal function and/or serum levels.  
 
Thank you, 
Minerva Lindsay, PHARMD

## 2018-11-06 NOTE — PROGRESS NOTES
Problem: Falls - Risk of 
Goal: *Absence of Falls Document Jasmin Sun Fall Risk and appropriate interventions in the flowsheet. Outcome: Progressing Towards Goal 
Fall Risk Interventions: 
Mobility Interventions: Bed/chair exit alarm Mentation Interventions: Bed/chair exit alarm Medication Interventions: Bed/chair exit alarm Elimination Interventions: Bed/chair exit alarm History of Falls Interventions: Bed/chair exit alarm Problem: Pressure Injury - Risk of 
Goal: *Prevention of pressure injury Document Mitul Scale and appropriate interventions in the flowsheet. Outcome: Progressing Towards Goal 
Pressure Injury Interventions: 
Sensory Interventions: Assess changes in LOC, Check visual cues for pain, Minimize linen layers, Maintain/enhance activity level, Keep linens dry and wrinkle-free, Float heels, Monitor skin under medical devices, Pad between skin to skin, Pressure redistribution bed/mattress (bed type), Turn and reposition approx. every two hours (pillows and wedges if needed) Moisture Interventions: Apply protective barrier, creams and emollients, Assess need for specialty bed, Check for incontinence Q2 hours and as needed, Contain wound drainage, Internal/External fecal devices, Internal/External urinary devices, Minimize layers, Moisture barrier, Offer toileting Q_hr Activity Interventions: Pressure redistribution bed/mattress(bed type) Mobility Interventions: Pressure redistribution bed/mattress (bed type), HOB 30 degrees or less, Turn and reposition approx. every two hours(pillow and wedges) Nutrition Interventions: Document food/fluid/supplement intake, Discuss nutritional consult with provider, Offer support with meals,snacks and hydration Friction and Shear Interventions: Apply protective barrier, creams and emollients, Feet elevated on foot rest, Minimize layers, HOB 30 degrees or less

## 2018-11-06 NOTE — PROGRESS NOTES
0730  Assumed care of pt from [facility], [address] [facility]. Trach patent to ventilator, resting comfortably in bed. Monitor reveals NSR, B/P marginally hypertensive. [facility]  Interdisciplinary team rounds were held 11/6/2018 with the following team members:Care Management, Nursing, Nutrition, Pharmacy and Respiratory Therapy and the patient. Plan of care discussed. See clinical pathway and/or care plan for interventions and desired outcomes. 1300  Voided incontinently, estimated 350 ml yellow urine. Carla care done, linens changed. 1645  Straight cath done for 500 ml clear yellow urine 1930  Bedside and Verbal shift change report given to Andreea Gan (oncoming nurse) by Amee Ornelas RN (offgoing nurse). Report included the following information SBAR, Kardex, ED Summary, Intake/Output, MAR, Accordion, Med Rec Status, Cardiac Rhythm SR with 1'AVB and Quality Measures.

## 2018-11-06 NOTE — PROGRESS NOTES
I have spoken to Dr Layla Flynn about the patients code status and his daughters decision not to continue dialysis. If the patient does not have dialysis he will likely arrest.  I provided Dr Layla Flynn with the daughter phone number and he stated he will discuss code status with the patients  Daughter. Respiratory care have been able to wean the patient incrementally. [facility] [address] does have an accept in [place] and I have asked [facility] [address] to begin authorization for admission. I have been told that the patients daughter has planned to visit from [place] on Friday.   Demetrice Duncan RN

## 2018-11-06 NOTE — PROGRESS NOTES
Pulmonary progress note 
   
History 64-year-old male recently hospitalized for stroke with residual right hemiparesis.  He was readmitted on 9/26/18 with acute renal failure.  On 9/29  the patient rapidly became hypotensive and was resuscitated.  The patient suffered a catastrophic neurological injury. Tracheostomy 10/17 and exchanged 10/27. Subjective Cannot contribute to recent history due to clinical status Afebrile. 165/75 Neuro: Pupils non-reactive. Gaze is disconjugate. No clear oculocephalic reflex. Cough to suctioning. No withdrawal or purposeful movements of upper extremities. Withdraws right foot to noxious stimuli. Lungs: Coarse breath sounds Heart: Regular rate and rhythm Extremities: No cyanosis or clubbing Labs: K 5.6, , Cr 5.92 Assessment Cardiopulmonary arrest with severe neurologic encephalopathy Chronic respiratory failure s/p tracheostomy Acute hemodialysis dependent renal failure Elevated lipase Dysphasia secondary to altered mental status with PEG tube on 10/17/18 Plan Power of  has declined further dialysis and agrees to DNR The patient is critically ill with organ failure. Total critical care time without physician overlap or procedure time included:  25 minutes. Please see Marylene Burly, NP note for further details.

## 2018-11-06 NOTE — PROGRESS NOTES
Problem: Falls - Risk of 
Goal: *Absence of Falls Document Yann Macleod Fall Risk and appropriate interventions in the flowsheet. Outcome: Progressing Towards Goal 
Fall Risk Interventions: 
Mobility Interventions: Bed/chair exit alarm Mentation Interventions: Bed/chair exit alarm Medication Interventions: Bed/chair exit alarm Elimination Interventions: Bed/chair exit alarm History of Falls Interventions: Bed/chair exit alarm Problem: Pressure Injury - Risk of 
Goal: *Prevention of pressure injury Document Mitul Scale and appropriate interventions in the flowsheet. Outcome: Progressing Towards Goal 
Pressure Injury Interventions: 
Sensory Interventions: Assess changes in LOC, Check visual cues for pain, Minimize linen layers, Maintain/enhance activity level, Keep linens dry and wrinkle-free, Float heels, Monitor skin under medical devices, Pad between skin to skin, Pressure redistribution bed/mattress (bed type), Turn and reposition approx. every two hours (pillows and wedges if needed) Moisture Interventions: Apply protective barrier, creams and emollients, Assess need for specialty bed, Check for incontinence Q2 hours and as needed, Contain wound drainage, Internal/External fecal devices, Internal/External urinary devices, Minimize layers, Moisture barrier, Offer toileting Q_hr Activity Interventions: Pressure redistribution bed/mattress(bed type) Mobility Interventions: Pressure redistribution bed/mattress (bed type), HOB 30 degrees or less, Turn and reposition approx. every two hours(pillow and wedges) Nutrition Interventions: Document food/fluid/supplement intake, Discuss nutritional consult with provider, Offer support with meals,snacks and hydration Friction and Shear Interventions: Apply protective barrier, creams and emollients, Feet elevated on foot rest, Minimize layers, HOB 30 degrees or less

## 2018-11-06 NOTE — PROGRESS NOTES
Hospitalist Progress Note Daily Progress Note: 11/6/2018  
   
Interval history / Subjective:  
Tulio Seo is a 57y.o. year old male who presented from [facility] with abnormal labs, elevated BUN/Cr. Found to have JULIANNA, UTI, and markedly elevated LFT on presentation. Patient's recent admission was 9/10/18-9/14/18 for acute CVA and rhabdo. Patient poor historian on admission,stated \"I had heart failure. She was started on vanc,zosyn. On 9/29,patient had cardiac arrest with ventricular fibrillation - s/p ROSC. Her hospital course has since then complicated with sepsis,pancreatitits,pneumonia,acute pancreatitis requiring involvement of gi,surgery,nephrology,cardiology,ID,cardiology. So far patient has remained without major improvement. Now in vegetative state. Patient had PEG/Trach placement on 10/17. On 10/17,patient started on acyclovir for veicular rash rt arm,suspected being Zoster. Lipase 2169. US abd 10/22 shows Abnormal appearance of the gallbladder with stones/sludge with wall thickening  
and possible pericholecystic fluid similar to the prior study of 9/27/2018 
10/22: Patient on treatment for shingles in contact isolation per ID team. He also had left big toe gangrene . We will consult Podiatry. His lipase was elevated , US of the abdomen shows sludge and possible cholecystitis. Patient afebrile. GI consulted  for possible MRCP. Patient has leukocytosis. ID started Vancomycin and ordered Urine , blood and sputum culture and trach site discharge. Patient off air bourne precaution but continue contact precaution per ID. Patient was started on Vancomycin and cefepime for SIRSper ID. Blood and urine cx pending. Patient today is going for jejunostomy. Patient BC NGTD Vancomycin d/c'd per ID. Patient respiratory  cx + GNR , STREPT, Wound cx + pseudomonas . PCCM added Ciprofloxacin today . IR attempt at Jejunostomy was unsuccessful yesterday. 10/28: Fever today. Resp cx, wound cx noted. On cefepime and Cipro for coverage per sensitivisty result . Blood cx NGTD . Lipase still trending up. IR unsuccessful attempt at jejunostomy 10/26. Will re consult GI today 10/29:no change in care. 10/30:no change in care 10/31:pt underwent dialysis today. Currently on cefepime only. Patient is still in the hospital,waiting for placement. 11/1:continue current treatment 11/2:ID has recommended line culture(picc line) as patient had elevated temp today 11/3:still running fever 101.8 today. Tip of picc line cx results pending. 11/4:Tip of catheter culture showing staph coag negative 11/5: Daughter request to d/c dialysis. Nephrology signed off today 11/6: Daughter made patient a DNR and comfort care. Hospice has been consulted today Current Facility-Administered Medications Medication Dose Route Frequency  VANCOMYCIN INFORMATION NOTE   Other Rx Dosing/Monitoring  VANCOMYCIN INFORMATION NOTE   Other ONCE  
 insulin glargine (LANTUS) injection 30 Units  30 Units SubCUTAneous DAILY  amLODIPine (NORVASC) tablet 5 mg  5 mg Oral DAILY  0.9% sodium chloride infusion 250 mL  250 mL IntraVENous PRN  
 cefepime (MAXIPIME) 0.5 g in 0.9% sodium chloride 100 mL IVPB  0.5 g IntraVENous Q24H  
 albuterol (PROVENTIL VENTOLIN) nebulizer solution 2.5 mg  2.5 mg Nebulization BID RT  
 multivitamin (MULTI-DELYN, WELLESSE) oral liquid 30 mL  30 mL Oral DAILY  bacitracin 500 unit/gram packet 1 Packet  1 Packet Topical PRN  
 sodium chloride (NS) flush 10-30 mL  10-30 mL InterCATHeter PRN  
 sodium chloride (NS) flush 10 mL  10 mL InterCATHeter PRN  
 heparin (porcine) injection 5,000 Units  5,000 Units SubCUTAneous Q8H  
 hydroxypropyl methylcellulose (ISOPTO TEARS) 0.5 % ophthalmic solution 2 Drop  2 Drop Both Eyes BID  epoetin manda (EPOGEN;PROCRIT) injection 40,000 Units  40,000 Units IntraVENous Q7D  
  pantoprazole (PROTONIX) 40 mg in sodium chloride 0.9% 10 mL injection  40 mg IntraVENous Q24H  
 influenza vaccine 2018- (6 mos+)(PF) (FLUARIX QUAD/FLULAVAL QUAD) injection 0.5 mL  0.5 mL IntraMUSCular PRIOR TO DISCHARGE  acetaminophen (TYLENOL) solution 325 mg  325 mg Per NG tube Q4H PRN  
 heparin (porcine) pf 100 Units  100 Units InterCATHeter PRN  
 insulin lispro (HUMALOG) injection   SubCUTAneous Q6H  
 heparin (porcine) 1,000 unit/mL injection 1,000 Units  1,000 Units InterCATHeter DIALYSIS PRN  
 senna-docusate (PERICOLACE) 8.6-50 mg per tablet 1 Tab  1 Tab Oral BID PRN  
 ondansetron (ZOFRAN) injection 4 mg  4 mg IntraVENous Q4H PRN  
 glucose chewable tablet 16 g  4 Tab Oral PRN  
 glucagon (GLUCAGEN) injection 1 mg  1 mg IntraMUSCular PRN  
 dextrose (D50) infusion 12.5-25 g  25-50 mL IntraVENous PRN  
 hydrALAZINE (APRESOLINE) 20 mg/mL injection 20 mg  20 mg IntraVENous Q6H PRN Review of Systems Unresponsive Objective:  
 
Visit Vitals /68 Pulse 98 Temp 99.1 °F (37.3 °C) Resp 22 Ht 5' 7\" (1.702 m) Wt 90.4 kg (199 lb 4.7 oz) SpO2 96% BMI 31.21 kg/m² O2 Flow Rate (L/min): 45 l/min O2 Device: Ventilator, Tracheostomy Temp (24hrs), Av.3 °F (36.8 °C), Min:97.3 °F (36.3 °C), Max:99.1 °F (37.3 °C) 701 - 1900 In: 170 Out: -  
1901 -  07 In: 1850 [I.V.:200] Out: 1025 [Urine:825; Drains:200] P/E General Appearance:S/p PEG/Trach HENT: normocephalic/atraumatic, + ETT Neck: No JVD, hematoma R side where [facility] is improving Lungs: Coarse B/L w/ vent-assisted BS 
CV: RRR, no m/r/g Abdomen: soft, non-tender, normal bowel sounds Extremities: versicular rash rt arm and right side of chest,no cyanosis, no peripheral edema Neuro: Unresponsive to commands, no withdrawal to pain, + corneal reflex and pupillary reaction today Skin: Normal color, intact Data Review Recent Results (from the past 12 hour(s)) LIPASE Collection Time: 11/06/18  3:50 AM  
Result Value Ref Range Lipase 1,937 (H) 73 - 393 U/L  
RENAL FUNCTION PANEL Collection Time: 11/06/18  3:50 AM  
Result Value Ref Range Sodium 137 136 - 145 mmol/L Potassium 5.6 (H) 3.5 - 5.5 mmol/L Chloride 101 100 - 108 mmol/L  
 CO2 22 21 - 32 mmol/L Anion gap 14 3.0 - 18 mmol/L Glucose 168 (H) 74 - 99 mg/dL  (H) 7.0 - 18 MG/DL Creatinine 5.92 (H) 0.6 - 1.3 MG/DL  
 BUN/Creatinine ratio 18 12 - 20 GFR est AA 12 (L) >60 ml/min/1.73m2 GFR est non-AA 10 (L) >60 ml/min/1.73m2 Calcium 7.9 (L) 8.5 - 10.1 MG/DL Phosphorus 7.6 (H) 2.5 - 4.9 MG/DL Albumin 1.7 (L) 3.4 - 5.0 g/dL GLUCOSE, POC Collection Time: 11/06/18  5:49 AM  
Result Value Ref Range Glucose (POC) 192 (H) 70 - 110 mg/dL Assessment/Plan:  
 
Principal Problem: 
  Cardiac arrest with ventricular fibrillation ([facility] (9/29/2018) Overview: ROSC before defibrillation could be attempted. Active Problems: 
  Essential hypertension (9/10/2018) Diabetes mellitus type 2, controlled ([facility] (9/10/2018) History of stroke (9/27/2018) Overview: With residual R hemiparesis Acute-on-chronic kidney injury ([facility] (9/27/2018) Acute cystitis (9/27/2018) Elevated troponin (9/27/2018) Elevated LFTs (9/28/2018) Acute pancreatitis (9/29/2018) Overview: Lipase downtrends with interruption in tube feed and Mucomyst. Restarting  
    tube feed did result in modest elevation but not to the levels seen while  
    on Mucomyst. Nonetheless, there is signficant decrease in lipase levels  
    with interruptions in tube feeding. Ischemic encephalopathy (9/30/2018) Hypoalbuminemia (10/21/2018) Shingles (10/21/2018) Gangrene ([facility] (10/22/2018) Pseudomonas infection (10/27/2018) Zoster. Care Plan - Vent mgmt per ICU - S/p trach/PEG on 10/17 as patient did not show significant improvement. - EGD showed large clot in esophagus last week repeat EGD 10/9 shows healed esophageal ulcer - Cont protonix IV every day  
- Hgb stable - LFTs improving - GI signed off 10/9 
- MRI w/ evidence of severe anoxic brain injury - Minimal improvement on neuro exam 
- Appreciate neuro assistance - IV Meropenem and IV Fluconazole d/heron on 10/15 per ID. 
- Currently on vanc only which was started on 9/27 
- Nephro managing HD TTHS 
- Pt unlikely to have any meaningful neuro recovery, prognosis is very grim - Family wanted to cont to do everything, including trach/PEG -> was done10/17 
- On 10/17:Started on acyclovir for possible Zoster rt arm and rt upper chest area. - Hyperkalemia on 10/18 ->corrected - Lipase 2169 10/22. US abd Abnormal appearance of the gallbladder with stones/sludge with wall thickening  
and possible pericholecystic fluid similar to the prior study of 9/27/2018  
- On Acyclovir for shingles , contact precaution per ID commitee 
- 10/22: Consult GI today for possible MRCP 
- 10/22: Consult Podiatry for left big toe gangrene  
- 10/23 : Podiatry recommend RAQUEL for Left Big toe  
- 10/23: GI recommend MRI/MRCP but will need patient off vent for multiple times for procedure. Not sure if this is feasible now 
- 10/25 : Blood , urine , sputum , trach discharge cultures 
- 10/25 IV antibiotics by ID  
- Change GI tube to Jejunostomy tube tomorrow by IR  
- 10/26: Vancomycin and Cefepime per ID  
- 10/26 : Jejunostomy attempt by IR unsuccessful . Will need to discuss with GI  
- 10/27 : Ciprofloxacin added 10/27 for double coverage of pseudomonas with cefepime per PCCM  
- 10/28: Pancreatitis ? Biliary  . Will reconsult GI today - Lipase trending down. 
-11/2:culture of picc line since pt has developed fever. -11/3:still febrile,picc line tip cx results pending;will follow ID recommendations. -11/4:Cx of tip of catheter showing staph coag negative. Pt still spiking fever - will follow ID recommendations. - 11/5: Nephrology signed off . At daughters request will D/C dialysis. She per Nephrology is well aware if implications of stopping dialysis - Per ID continue cefepime for total 14 days and Vancomycin  added for 7 days 11/6: DNR and Hospice consulted after I personally spoke with her. DVT prophylaxis:scds DNR per daughter's request today 11/6 Disposition:tbd - need placement -  involved.

## 2018-11-06 NOTE — PROGRESS NOTES
Physical Exam  
Skin:  
 
  
 
 
Bedside shift change report was given to me, Valdez Mendiola RN  (oncoming night shift nurse), by Tu Bower (offgoing day shift nurse). Report included the following information:  SBAR, kardex, consultations, hemodynamic monitoring, intake/output, MAR, lab results, VS trends, cardiac rhythm noted NSR. Skin, neuro, respiratory status, and order verification have been dually noted and verified at the bedside with: Lv Lewis RN                                                              
ICU Nurse's Note: 
  
2000: Pt's eyes are open with ongoing spontaneous eye movements, dysconjugate gaze with no observable s/s tracking or visual focusing, no ability to follow commands or communicate needs. Pt has no notable purposeful movements to his extremities, with exception of occasional withdrawal to pain and intermittent spontaneous movements to bilateral toes. BUE remain flaccid and weak and require support and frequent ROM. Pt requires max assist with all ADLs and repositioning. Bed is locked and in lowest position. Side rails up x3, exit alarm activated. Call bell is within reach. Will continue to monitor. Neurological: as noted in assessment Cardiovascular:  NSR on the monitor. Pulmonary: breath sounds diminished bilaterally. Saturations maintained at 100 % via 8 mm Portex cuffed tracheostomy, managed via ventilator with FiO2 30%. GI/: Abdomen is obese, soft, non-distended. PEG tube intact and infusing Perative at goal of 40 ml/hr, with scheduled 30 ml water flushes q 6 hrs. Last BM incontinence indicated 11/06/2018, ;arge, loose liquid stool. FMS reinserted. Pt was a scheduled Benna Grant, Saturday hemodialysis client. He is oliguric at baseline, with occasional episodes of urinary incontinence. Scheduled straight catheterizations q shift IVF/Critical gtts: IV abx Skin: as noted above 
  
0000: Q 6hr accu-check 172, SSI administered per protocol.   VSS, pt is afebrile. PEG tube flushed, tube feeding continues at 40 ml/hr without incident. Pt was repositioned, trache and PEG tube stoma care completed, with a small amount of serous drainage cleaned around stoma site. Pt tolerated without incident. 0030: Pt was noted incontinent a large amount of thick, malgorzata colored loose liquid stool. FMS reinserted at this time. Pt was also incontinent of a large amount of urine. Pt tolerated a complete bed bath and complete bed linen change. He is resting quietly at this time, no s/s acute distress. Bed is locked and in lowest position, side rails up x3, exit alarm activated. 0400: AM labs drawn peripherally without incident. Pt was bathed, repositioned, and tolerated mouth care. He continues to exhibit a strong, productive cough with thick clear/ white sputum. IV in LFA dislodged during repositioning. New #20g established in the left hand, saline locked at this time. Pt was also straight catheterized per orders, resulting in 225 ml braydon colored urine. 0600: Q 6hr accu-check 192, SSI administered per protocol. Pt tolerated scheduled AM meds and appears to be resting comfortably in bed at this time after repositioning, mouth care, and mery care. Interventions are ongoing, will continue to monitor.  
0710: Bedside change of shift report given to: Alicia Clark RN

## 2018-11-06 NOTE — PROGRESS NOTES
Patient in bed on back with head elevated - BBS equal and ess clear - size 8 trach tube midline and secure - trach site cleaned, dressing changed, inner cannula changed - spare trachs at bedside - MVB at [facility] - vent alarms on and functional

## 2018-11-06 NOTE — PROGRESS NOTES
-0930-Received patient on ventilator VC+ SIMV rate-12, VT-500, PEEP-5, FIO2-30%. D7Weis-774% HR-93, RR-20. Respiratory will continue to monitor. -[facility] 
 
-0940-Pt. Was placed on SBT PS-12, PEEP-5, FIO2-30%. O2 Sats-100% HR-94, RR-23. Respiratory will continue to monitor. -[address] 
 
-1615-Pt. Returned to full support secondary to increased WOB. RR-41, VT-366, RSBI-119. Current settings, VC+ SIMV rate-12, VT-500, PEEP-5, FIO2-21%.  O2 sats-97% HR-100, RR-27.-[facility]

## 2018-11-06 NOTE — PROGRESS NOTES
Dr Rinku Lay has spoken to the patient daughter,  She understand that stopping the patients dialysis will likely result in the patients death. She voiced understand. Code status should be changed to DN and patient will need an order for inpatient hospice.  Sabi Osborne RN

## 2018-11-06 NOTE — DIABETES MGMT
NUTRITIONAL RE-ASSESSMENT GLYCEMIC CONTROL/ PLAN OF CARE Justen Logan           57 y.o.           9/26/2018 4DM INTERVENTIONS/PLAN:  
1. Perative TF at 40 ml/hr (providing 1248 calories, 64 g protein, 36 grams fat/d with 758 ml free water from the TF. Pt is at this low TF rate related to lipase 3671 when pt previously  goal rate of Osmolite 1.2 teodoro TF   70ml/hr. Lipase now 1937. 
2.  Blood glucose slightly elevated today. Should trend continue, may want to consider increasing Lantus to 34 units/day. ASSESSMENT:  
Nutritional Status: 
Pt is overweight related to excess caloric intake as evidenced by 135% ideal weight and BMI= 31kg/m2. Pt meets criteria for obesity. Altered nutrition-related lab values RT DX. Diabetes Mellitus  related to lack of adherence to nutrition and medication recommendations as evidenced by A1c of 10% Altered nutrition-related lab values RT DX. Acute renal failure aeb requiring dialysis (11/6/18:  Noted family has now declined further dialysis). Altered nutrition-related lab values AEB  lipase 1937. Pt w/hypoalbuminemia as evidenced by albumin=1.7 mg/dl and prealbumin 17.9 (decreased but had three  extended periods of TF on hold or <half goal rate due to intolerance) Pt with DT injury on heels, multiple blisters on arms and stage 2  area on sacrum noted. Diabetes Management: 
 
Recent blood glucose:    
11/6/18:  192, 195 
11/5/18:  168, 199, 146, 172 - pt received TDD insulin = 45 units (30 units Lantus and 15 units corrective lispro) Within target range (non-ICU: <140; ICU<180): [] Yes   [x]  No 
 
Current Insulin regimen: 30 units Lantus/24 hrs  Plus  VIR corrective lispro Home medication/insulin regimen:  
Key Antihyperglycemic Medications   
    
  
 insulin glargine (LANTUS) 100 unit/mL injection 10 units qhs  Indications: type 2 diabetes mellitus  
 insulin lispro (HUMALOG) 100 unit/mL injection 4 units with meals HbA1c: 10% equivalent  to ave Blood Glucose of 240mg/dl for 2-3 months prior to admission Adequate glycemic control PTA:  [] Yes  [x] No 
  
SUBJECTIVE/OBJECTIVE: Information obtained from: ICU rounds/pt is on a vent; Noted family has declined further dialysis. Diet:Perative TF via PEG at 40 ml/hr Water flushes:  30 ml q 6 hours Medications: [x]                Reviewed Labs:  
Lab Results Component Value Date/Time Hemoglobin A1c 10.0 (H) 09/30/2018 04:18 AM  
 
Lab Results Component Value Date/Time Sodium 137 11/06/2018 03:50 AM  
 Potassium 5.6 (H) 11/06/2018 03:50 AM  
 Chloride 101 11/06/2018 03:50 AM  
 CO2 22 11/06/2018 03:50 AM  
 Anion gap 14 11/06/2018 03:50 AM  
 Glucose 168 (H) 11/06/2018 03:50 AM  
  (H) 11/06/2018 03:50 AM  
 Creatinine 5.92 (H) 11/06/2018 03:50 AM  
 Calcium 7.9 (L) 11/06/2018 03:50 AM  
 Magnesium 2.3 10/30/2018 04:35 AM  
 Phosphorus 7.6 (H) 11/06/2018 03:50 AM  
 Albumin 1.7 (L) 11/06/2018 03:50 AM  
prealbumin 17.9 Anthropometrics: IBW : 69.9 kg (154 lb), % IBW (Calculated): 134.85 %, BMI (calculated): 31.2 Wt Readings from Last 1 Encounters:  
11/06/18 90.4 kg (199 lb 4.7 oz) Ht Readings from Last 1 Encounters:  
10/22/18 5' 7\" (1.702 m) Estimated Nutrition Needs:  1880 Kcals/day, Protein (g): 85 g Fluid (ml): 1800 ml Based on:   [x]          Actual BW    []          ABW   []            Adjusted BW   
Nutrition Diagnoses: as stated above Nutrition Interventions: slow advancement of Perative TF Goal:  
Blood glucose will be within target range of  mg/dL by 11/9 Intake will meet >75% of energy and protein requirements by 11/8. Gradual weight loss post discharge 1 lb per week by 11/16/18 is acceptable. Wound healing. Nutrition Monitoring and Evaluation []     Monitor po intake on meal rounds 
[x]     Continue inpatient monitoring and intervention 
[]     Other: Nutrition Risk:  [x]   High     []  Moderate    []  Minimal/Uncompromised Dominique Pappas RD, CDE Office:  [phone] Long Range Pager:  [phone]

## 2018-11-06 NOTE — PROGRESS NOTES
[address] [place] [address] [facility] Progress Note Name: Ravinder Neftali : 1955 MRN: [id number] Date: 2018 9:38 AM 
  
[x]I have reviewed the flowsheet and previous days notes. Events overnight reviewed and discussed with nursing staff. Vital signs and records reviewed. HPI 
 
57 y.o.  male is seen in consultation at the request of Dr. Christal Jessica for recommendations on further evaluation and management of acute hypoxia. He hospitalized (9/10/2018 - 2018) for stroke with neurologic residua (R hemiparesis). Patient discharged from [facility] to [facility] on 2018, only to return on 2018 with acute renal failure/abnormal lab values. He experienced VT/VF arrest during my initial clinical assessment. The patient has undergone tracheostomy and percutaneous gastrostomy. EEG and MRI findings consistent w/ poor prognosis. Daughter has decided to discontinue dialysis. Dr. Dianna Cook and Dr. Mello Hall have both confirmed that she understands that the pt cannot live w/o dialysis. Subjective: 
18 No new events overnight. Pt has been made a DNR Inpatient Hospice has been consulted [x]The patient is unable to give any meaningful history or review of systems because the patient is: 
[]Intubated []Sedated [x]Unresponsive tracheostomy [x]The patient is critically ill on     
[x]Mechanical ventilation []Pressors []BiPAP [] ROS:A comprehensive review of systems was negative except for that written in the HPI. Medication Review: · Pressors - none · Sedation - none · Antibiotics - Vanc · Pain - prn tylenol · GI/ DVT - Protonix/SQH 
· Others (other gtts)- none Vital Signs:   
Visit Vitals /83 (BP 1 Location: Right arm) Pulse 93 Temp 98.3 °F (36.8 °C) Resp 9 Ht 5' 7\" (1.702 m) Wt 90.4 kg (199 lb 4.7 oz) SpO2 100% BMI 31.21 kg/m² O2 Device: Tracheostomy O2 Flow Rate (L/min): 45 l/min Temp (24hrs), Av.1 °F (36.7 °C), Min:97.3 °F (36.3 °C), Max:98.8 °F (37.1 °C) Intake/Output:  
Last shift:      No intake/output data recorded. Last 3 shifts:  1901 -  0700 In: 1850 [I.V.:200] Out: 1025 [Urine:825; Drains:200] Intake/Output Summary (Last 24 hours) at 2018 5716 Last data filed at 2018 0700 Gross per 24 hour Intake 1100 ml Output 825 ml Net 275 ml Ventilator Settings: 
Ventilator Mode: SIMV, Pressure support Respiratory Rate Resp Rate Observed: 40 Back-Up Rate: 12 Insp Time (sec): 1 sec Insp Flow (l/min): 55 l/min I:E Ratio: 1;2 
Ventilator Volumes Vt Set (ml): 500 ml Vt Exhaled (Machine Breath) (ml): 586 ml Vt Spont (ml): 423 ml Ve Observed (l/min): 12.3 l/min Ventilator Pressures Pressure Support (cm H2O): 10 cm H2O 
PIP Observed (cm H2O): 18 cm H2O Plateau Pressure (cm H2O): 19 cm H2O 
MAP (cm H2O): 10 PEEP/VENT (cm H2O): 5 cm H20 Auto PEEP Observed (cm H2O): 0 cm H2O Physical Exam: 
 
General: Tracheostomy; not following commands HEENT:  Anicteric sclerae; pink palpebral conjunctivae; mucosa moist 
Resp:  Clear bilaterally to auscultation; Symmetrical chest expansion, no accessory muscle use; good airway entry; no rales/ wheezing/ rhonchi noted CV:  S1, S2 present; NSR 
GI:  Abdomen soft, non-tender; (+) active bowel sounds; Peg tube Extremities:  +2 pulses on all extremities; +2 edema; normal capillary refill Skin:  Warm; gangrene to left great toe Neurologic:  Does not follow commands, RLE flexes to painful stimuli; pupils 2 not reactive Devices: Peg, tracheostomy, HD catheter right chest 
 
Telemetry: [x]Sinus []A-flutter []Paced []A-fib []Multiple PVCs IMPRESSION:  
· Acute respiratory failure requiring mechanical ventilation; s/p tracheostomy 10/17 · Anoxic brain injury- d/t cardiac arrest; MRI and EEG with consistent findings · JULIANNA- oliguric; HD dependant- daughter d/c dialysis · Pseudomonas UTI/Bronchitis- ID following · Gangrene of L great toe · Elevated lipase · Hx CVA, DM, HTN  
  
PLAN:  
· Resp - Stable on mechanical ventilation via tracheostomy. HOB > 30. Aspiration precautions. Pulmonary hygiene. · ID - Afebrile and aleukocytosis. Continue to trend temp and WBC curve. Continue ABx. ID following. · CVS - HD stable. Continue to monitor HD status. · Heme/onc - Monitor H/H and platelets. Monitor for signs of bleeding. · Metabolic - Trend and replace electrolytes as needed. · Renal - Trend renal indices. · Endocrine - SSI, BS Q6. 
· Neuro/ Pain/ Sedation - Monitor neuro status. · GI - TF at goal. 
· Prophylaxis - DVT, GI- SQH, Protonix · Discussed in interdisciplinary rounds- Hospitalist confirmed with the POA that she wishes for the patient status to be DNR and that hospice may be involved in care. · Code status- DNR The patient is: [] acutely ill Risk of deterioration: [x] moderate [x] critically ill  [] high  
 
[x]See my orders for details My assessment/plan was discussed with: 
[x]nursing []PT/OT [x]respiratory therapy [x]Dr. Carli Kirby [] Bibiana Toro NP

## 2018-11-06 NOTE — PROGRESS NOTES
VENTILATOR Care plan 
 
Problem: Ventilator Management Goal: *Adequate oxygenation/ ventilation/ and extubation Patient: 
   
 
Sandee Lopez     57 y.o.   male     11/6/2018  4:31 PM 
Patient Active Problem List  
Diagnosis Code  Stroke (cerebrum) ([facility]) I63.9  Stroke ([facility] I63.9  Rhabdomyolysis M62.82  
 Dehydration E86.0  
 Essential hypertension I10  
 Diabetes mellitus type 2, controlled ([facility] E11.9  Non compliance w medication regimen Z91.14  
 DM (diabetes mellitus) ([facility] E11.9  History of stroke Z80.78  
 Acute-on-chronic kidney injury ([facility] N17.9, N18.9  Acute cystitis N30.00  Elevated troponin R74.8  Elevated LFTs R94.5  Cardiac arrest with ventricular fibrillation ([facility]) I46.9, I49.01  
 Acute pancreatitis K85.90  
 Ischemic encephalopathy I67.89  
 Hypoalbuminemia E88.09  
 Shingles B02.9  Gangrene ([facility] [id number]  Pseudomonas infection B96.5 Pneumonia of both lower lobes due to infectious organism ([facility] [J18.1] ESRD (end stage renal disease) ([facility] [N18.6] ESRD (end stage renal disease) ([facility] [N18.6] Reason patient intubated:  Encephalopathy, Airway protection Ventilator day: 39 Ventilator settings: VC+ SIMV rate-12, VT-500, PEEP-5, [id number] Trach Size: #8 Portex ABG: 
Date:11/6/2018 No results found for: PH, PHI, PCO2, PCO2I, PO2, PO2I, HCO3, HCO3I, FIO2, FIO2I Chest X-ray: 
Date:11/6/2018 Results from [facility] Encounter encounter on 09/26/18 XR CHEST PORT Narrative EXAM: Portable Chest 
 
CLINICAL INDICATION:  trach change; atelectasis -Additional:  None COMPARISON:  10/24/18 TECHNIQUE: AP portable view at [facility] 
 
______________ FINDINGS: 
 
HEART AND MEDIASTINUM:  The heart size is stable LUNGS AND AIRWAYS:  Right chest perihilar infiltrate is present PLEURA:  No pneumothorax or pleural effusion BONES:  Vertebral spondylosis OTHER:  None 
 
______________  Impression IMPRESSION: 
 
 Right lung perihilar infiltrate appears present Lab Test: 
Date:11/6/2018 WBC:  
Lab Results Component Value Date/Time WBC 11.4 11/05/2018 04:00 AM  
HGB:  
Lab Results Component Value Date/Time HGB 8.5 (L) 11/05/2018 04:00 AM  
 PLTS:  
Lab Results Component Value Date/Time PLATELET 739 (H) 80/36/5509 04:00 AM  
 
 
SaO2%/flow: @LASTSAO2(1)@ Vital Signs:    
Patient Vitals for the past 8 hrs: 
 Temp Pulse Resp BP SpO2  
11/06/18 1620  100 27  97 % 11/06/18 1500  97 (!) 34 132/69 98 % 11/06/18 1400  96 26 (!) 139/93 97 % 11/06/18 1300  99 22 122/74 95 % 11/06/18 1215  99 25  96 % 11/06/18 1200 100 °F (37.8 °C) 97 24 130/68 95 % 11/06/18 1100  98 22 133/68 96 % 11/06/18 1000  94 23 114/72 94 % 11/06/18 0930  95 20  100 % 11/06/18 0900  95 18 157/76 100 % Wean Screen Pass (Yes or No):Yes Wean Screen Reason for Failure: 
 
Duration of Weaning Trial: 
 
Additional Comments: PLAN OF CARE: Maintain ventilatory support GOAL: Wean as tolerated OUTCOME:

## 2018-11-06 NOTE — HOSPICE
Spoke with daughter Fabiola Mauricio. She requested a telephone meeting as she lives in [place] and will not be here until Friday. Discussed Hospice criteria, philosophy, services and IDT. Informed daughter that I would place the 24hour number on Pt white board and also gave it to her. Daughter informed writer that it would not be feasible for patient to return home as he lived with his mother who has dementia. No discharge plan as of yet. Will continue to monitor. Thank you for the referral to [facility] to care for Pt and family. Any questions or concerns please contact [phone].

## 2018-11-06 NOTE — PROGRESS NOTES
Infectious Disease Follow-up Admit Date: 9/26/2018 Current abx Prior abx  
cefepime 10/25- 12, Vanco 11/5-1 Zosyn 9/27-7, Meropenem/flucon 10/4- 11, vanco 9/27-25, ACV 10/17 - 10,  Vanco 10/25- 1, Cipro 10/28-1 Assessment ->Rec:  
 
Recurrent SIRS T 100.4 10/25 wbc 18k 
 - more secretions, reported cloudy urine, cxr yest no pneumonia  
-line tip culture 10/19 >15 CFU CoNS now with L picc, femoral dialysis catheter out 10/31   
- blcx 10/25 NG x 2, spgs rare Pseud, wd Pseud, cxr 10/27 poss R perihilar infiltrate-? Pul source here vs from prior bleeding 
-> femoral uldall 10/31- cath tip cx <15k Pseudomonas 
-> PICC line 11/2 - cath tip cx >15 CFU Staph epi -> continue Cefepime for total 14 days for Pseud infection 
-> plan IV Vanco for 7 days for Staph epi infection 
-> Agree with family decision of no dialysis and no aggressive measures and plan for inpatient hospice- will suggest to stop all Abx as will not make any difference and keeps him at high risk for further line  infections/complications  
->D/W dr Danica Salinas PAD  
- PVL's s/o significant arterial disease of bilateral lower extremities 10/24. -POD Dr. Shraddha Burrows saw 10/24,  indicated not OR candidate foot for gangrene distal L gt toe - per others Shingles R arm, C 6 (?C5) dermatome 
- vesicular lesions onset ~10/15 progressing compared to onset per NP 
- confluent in upper arm, not crusted yet  
-VZV PCR pos 10/18 
- completed 10 days of Acyclovir on 10/26 -> wounds crusted, off isolation Prior Leukocytosis/SIRS poss sepsis (resolved 10/19) 
- MOF multiple ID and non-infectious concerns outlined below, complicated, wbc 05.4Q today from high 27K+ on 10/5 On  vancomycin/zosyn 9/27 
- C diff neg 9/29, sput C albicans, repeat CT 10/4 no new findings --cxr reviewed - increased atx rt base. Left base improved 
- Higher fever 10/16 101.2, 10/18 101.4 afebrile since 
- Ddx: HZV vs deep tissue injury of toe/buttock vs Line infection - blcx 10/16 NG x 2 
- TDC Tip 10/19 >15 CFU MRSE (1 of 2 blcx on adm 9/27 MRSE but afebrile then prob contaminant) - Suspect colonized with Pseudomonas now and at risk for subsequent infections given vent, HD and lines Suspected Pyelo POA  
- CTAP 9/27:  B perineph stranding, UA tntc wbc, uctx ng 
- treated at this point Cholelithiasis choledocholithiais suspected acute cholecystitis  poss cystic stone POA- abnl lft bili 4 alk phos 400 seen by GI and GS Dr. Piyush Cortes, no plan for OR, for poss cholecystostomy if LFT worsen, bili, alk phos declining - AST fluctuating downward  -Dr. Jefferson Osler saw for GI 10/24 Elevated Lipase  
- lipase 2200 POA -> 3191 ->  5500 on 10/4 improved to 1207 10/19 but up again to 2169 10/22 interpretation complicated by JULIANNA -> fluctuating between 1364-1956 (off mucomyst, TF) 
- CTAP 10/4: Normal pancreas -> per ICU team  
B infiltrates - poss edema infiltrate - RLL consolid better 10/4 cxr and suspect endobronchial clot contributed   ->monitor MRSE bacteremia 9/27 1 of 2. Has LUE midline unclear when placed Treated JULIANNA POA dialysis dependent 
- baseline cr 0.9 132/10.1 in ER 
-RIJ uldall 9/28 out 10/19 - R fem uldall 10/20 
-NEW RIJ TDC 10/31 -> per renal  
Bleeding -melena earlier, EGD 10/2 clot in esophagus bronch 10/3 R endobronch clot NEW removed 10/5 repeat bronch  -> per others Suspected anoxic brain injury on MRI 10/3 SP VF arrest 9/29 -> overall poor prognosis but family wishes aggressive care CVA R hemiparesis 9/10   
resp failure sp intubation 9/29 Comorbid: HTN HLD CHF h/o steatohepatitis MICROBIOLOGY:  
9/27 bctx 1 of 2 MRSE 
 uctx ng 
9/29 sput C albicans C diff neg 10/4 bctx x 2 NTD 
 fungitell indeterminate due to contamination 10/8 Cath tip cx ntd 10/8     bl cx ng 
10/16 Blcx x2 ng 
 Spcx NF 
10/18 VZV PCr +ve, HSV PCR neg 
10/19 Cath tip >15 CFU MRSE 
10/25 Spcx pseudomonas x2- panS 
 blcx x2 nG 
 uctx Pseudomonas Wd cx Pseud x2 panS, E faecalis S amp, CoNS 
10/31  Cath tip <15 CFU GNR, panS Pseudomonas 11/2 PICC tip >15CFU Staph epi LINES AND CATHETERS:  
 
 
[facility] 10/31 Active Hospital Problems Diagnosis Date Noted  Pseudomonas infection 10/27/2018  [facility] 10/22/2018  Hypoalbuminemia 10/21/2018  Shingles 10/21/2018  Ischemic encephalopathy 09/30/2018  Cardiac arrest with ventricular fibrillation ([facility] 09/29/2018 ROSC before defibrillation could be attempted.  Acute pancreatitis 09/29/2018 Lipase downtrends with interruption in tube feed and Mucomyst. Restarting tube feed did result in modest elevation but not to the levels seen while on Mucomyst. Nonetheless, there is signficant decrease in lipase levels with interruptions in tube feeding.  Elevated LFTs 09/28/2018  History of stroke 09/27/2018 With residual R hemiparesis  Acute-on-chronic kidney injury ([facility] 09/27/2018  Acute cystitis 09/27/2018  Elevated troponin 09/27/2018  Essential hypertension 09/10/2018  Diabetes mellitus type 2, controlled ([facility] 09/10/2018 Subjective: Interval notes reviewed, Afebrile. WBC normal. No further dialysis planned. Code status has been changed to DNR. Plan for inpt hospice to evaluate pt. Unable to get ROS. No family at bedside. D/w RN. Current Facility-Administered Medications Medication Dose Route Frequency  VANCOMYCIN INFORMATION NOTE   Other Rx Dosing/Monitoring  VANCOMYCIN INFORMATION NOTE   Other ONCE  
 insulin glargine (LANTUS) injection 30 Units  30 Units SubCUTAneous DAILY  amLODIPine (NORVASC) tablet 5 mg  5 mg Oral DAILY  0.9% sodium chloride infusion 250 mL  250 mL IntraVENous PRN  
 cefepime (MAXIPIME) 0.5 g in 0.9% sodium chloride 100 mL IVPB  0.5 g IntraVENous Q24H  
 albuterol (PROVENTIL VENTOLIN) nebulizer solution 2.5 mg  2.5 mg Nebulization BID RT  
  multivitamin (MULTI-DELYN, WELLESSE) oral liquid 30 mL  30 mL Oral DAILY  bacitracin 500 unit/gram packet 1 Packet  1 Packet Topical PRN  
 sodium chloride (NS) flush 10-30 mL  10-30 mL InterCATHeter PRN  
 sodium chloride (NS) flush 10 mL  10 mL InterCATHeter PRN  
 heparin (porcine) injection 5,000 Units  5,000 Units SubCUTAneous Q8H  
 hydroxypropyl methylcellulose (ISOPTO TEARS) 0.5 % ophthalmic solution 2 Drop  2 Drop Both Eyes BID  epoetin manda (EPOGEN;PROCRIT) injection 40,000 Units  40,000 Units IntraVENous Q7D  
 pantoprazole (PROTONIX) 40 mg in sodium chloride 0.9% 10 mL injection  40 mg IntraVENous Q24H  
 influenza vaccine - (6 mos+)(PF) (FLUARIX QUAD/FLULAVAL QUAD) injection 0.5 mL  0.5 mL IntraMUSCular PRIOR TO DISCHARGE  acetaminophen (TYLENOL) solution 325 mg  325 mg Per NG tube Q4H PRN  
 heparin (porcine) pf 100 Units  100 Units InterCATHeter PRN  
 insulin lispro (HUMALOG) injection   SubCUTAneous Q6H  
 heparin (porcine) 1,000 unit/mL injection 1,000 Units  1,000 Units InterCATHeter DIALYSIS PRN  
 senna-docusate (PERICOLACE) 8.6-50 mg per tablet 1 Tab  1 Tab Oral BID PRN  
 ondansetron (ZOFRAN) injection 4 mg  4 mg IntraVENous Q4H PRN  
 glucose chewable tablet 16 g  4 Tab Oral PRN  
 glucagon (GLUCAGEN) injection 1 mg  1 mg IntraMUSCular PRN  
 dextrose (D50) infusion 12.5-25 g  25-50 mL IntraVENous PRN  
 hydrALAZINE (APRESOLINE) 20 mg/mL injection 20 mg  20 mg IntraVENous Q6H PRN Objective:  
 
Visit Vitals /83 (BP 1 Location: Right arm) Pulse 95 Temp 98.3 °F (36.8 °C) Resp 20 Ht 5' 7\" (1.702 m) Wt 90.4 kg (199 lb 4.7 oz) SpO2 100% BMI 31.21 kg/m² Temp (24hrs), Av.1 °F (36.7 °C), Min:97.3 °F (36.3 °C), Max:98.8 °F (37.1 °C) GEN: well developed 57year-old AA male on vent/trach in ICU, unresponsive HENT: no thrush eyes open Neck: tracheostomy in place. TDC RIJ 
CHEST: coarse bs B no wheeze or rhonchi CVS: RRR, no murmur appreciated ABD: soft, + BS no localized tenderness, PEG in place EXT: 1+ pitting edema b/l LE  
SKIN:  RUE lesions crusted CNS:eyes open, unresponsive to commands or deep sternal rub Labs: Results:  
Chemistry Recent Labs 11/06/18 
0350 11/05/18 
1628 11/05/18 
0400 11/04/18 
0300 *  --  154* 143*   --  137 136  
K 5.6*  --  5.5 5.5   --  101 101 CO2 22  --  24 22 *  --  96* 75* CREA 5.92*  --  5.67* 5.01* CA 7.9*  --  8.3* 8.2* AGAP 14  --  12 13 BUCR 18  --  17 15 AP  --  368*  --   --   
TP  --  7.0  --   --   
ALB 1.7* 1.6* 1.6* 1.7*  
GLOB  --  5.4*  --   --   
AGRAT  --  0.3*  --   --   
  
CBC w/Diff Recent Labs 11/05/18 
0400 11/04/18 
0300 WBC 11.4 14.7*  
RBC 3.05* 3.11* HGB 8.5* 8.6* HCT 26.8* 27.5*  
* 614*  
  
 
10/27 cxr IMPRESSION: 
  
Right lung perihilar infiltrate appears present 10/4 CTAP IMPRESSION: 
1. Dense subtotal or total consolidation right lower lobe compatible with 
pneumonia similar to prior study. Small bilateral pleural effusions similar in 
size. 2. Distended gallbladder with gallstones and gallbladder sludge without 
significant change in appearance and also described in detail on ultrasound of 
9/27/2018. 3. Indeterminate small lesion left hepatic lobe without change. Hepatomegaly 
again evident. 4. No intraperitoneal fluid. Possible small left upper pole renal cyst. 
Cardiomegaly. Nasogastric tube tip in body of stomach. Microbiology Results All Micro Results Procedure Component Value Units Date/Time CULTURE, CATHETER TIP [[id number]]  (Abnormal)  (Susceptibility) Collected:  11/02/18 1307 Order Status:  Completed Specimen:  PICC Updated:  11/05/18 1052 Special Requests: NO SPECIAL REQUESTS Culture result:    
  >15 CFU STAPHYLOCOCCUS EPIDERMIDIS  
     
 CULTURE, CATHETER TIP [[id number]]  (Abnormal)  (Susceptibility) Collected:  10/31/18 7416 Order Status:  Completed Specimen:  Catheter Tip Updated:  11/02/18 1115 Special Requests: NO SPECIAL REQUESTS Culture result:    
  <15 CFU PSEUDOMONAS AERUGINOSA CULTURE, BLOOD [[id number]] Collected:  10/25/18 1328 Order Status:  Completed Specimen:  Whole Blood Updated:  10/31/18 0910 Special Requests: NO SPECIAL REQUESTS Culture result: NO GROWTH 6 DAYS     
 CULTURE, BLOOD [[id number]] Collected:  10/25/18 1328 Order Status:  Completed Specimen:  Whole Blood Updated:  10/31/18 0910 Special Requests: NO SPECIAL REQUESTS Culture result: NO GROWTH 6 DAYS     
 CULTURE, Cristian Shira STAIN [[id number]]  (Abnormal)  (Susceptibility) Collected:  10/25/18 1359 Order Status:  Completed Specimen:  Wound from Tracheostomy site Updated:  10/28/18 9691 Special Requests: NO SPECIAL REQUESTS     
  GRAM STAIN FEW WBC'S     
   RARE GRAM NEGATIVE RODS Culture result: MANY PSEUDOMONAS AERUGINOSA MODERATE PSEUDOMONAS AERUGINOSA 2ND MORPHOTYPE  
     
      
  FEW ENTEROCOCCUS FAECALIS GROUP D  
     
      
  FEW STAPHYLOCOCCUS SPECIES, COAGULASE NEGATIVE (TWO MORPHOTYPES) CULTURE, RESPIRATORY/SPUTUM/BRONCH Charleen Ohm STAIN [[id number]]  (Abnormal)  (Susceptibility) Collected:  10/25/18 1325 Order Status:  Completed Specimen:  Sputum from Tracheal Aspirate Updated:  10/28/18 0575 Special Requests: NO SPECIAL REQUESTS     
  GRAM STAIN MODERATE WBC'S     
   RARE GRAM NEGATIVE RODS Culture result: MANY PSEUDOMONAS AERUGINOSA  
     
      
  MANY PSEUDOMONAS AERUGINOSA 2ND MORPHOTYPE  
     
      
  RARE NORMAL RESPIRATORY SHANAE  
     
 CULTURE, URINE [[id number]]  (Abnormal)  (Susceptibility) Collected:  10/25/18 1359 Order Status:  Completed Specimen:  Cath Urine Updated:  10/27/18 8003 Special Requests: NO SPECIAL REQUESTS Culture result:    
  73[address] COLONIES/mL PSEUDOMONAS AERUGINOSA CULTURE, CATHETER TIP [[id number]]  (Abnormal)  (Susceptibility) Collected:  10/19/18 1330 Order Status:  Completed Specimen:  Catheter Tip Updated:  10/22/18 3573 Special Requests: NO SPECIAL REQUESTS Culture result:    
  >15 CFU STAPHYLOCOCCUS EPIDERMIDIS  
     
 CULTURE, BLOOD [[id number]] Collected:  10/16/18 1055 Order Status:  Completed Specimen:  Blood Updated:  10/22/18 0809 Special Requests: PERIPHERAL Culture result: NO GROWTH 6 DAYS     
 CULTURE, BLOOD [[id number]] Collected:  10/16/18 1047 Order Status:  Completed Specimen:  Blood Updated:  10/22/18 0809 Special Requests: PERIPHERAL Culture result: NO GROWTH 6 DAYS     
 HSV 1 AND 2 BY PCR [[id number]] Collected:  10/18/18 1815 Order Status:  Completed Specimen:  Other from Miscellaneous sample Updated:  10/21/18 1636 Source OTHER TEST     
  HSV-1 DNA by PCR NEGATIVE      
  HSV-2 DNA by PCR NEGATIVE Comment: (NOTE) This test was developed and its performance characteristics 
determined by [facility]. It has not been cleared or 
approved by the U.S. Food and Drug Administration. The FDA has 
determined that such clearance or approval is not necessary. This 
test is used for clinical purposes. It should not be regarded as 
investigational or research. Performed At: [address] [id number] Dory Wallace MD MQ:[id number] CULTURE, RESPIRATORY/SPUTUM/BRONCH [place] Salas [[id number]] Collected:  10/16/18 1915 Order Status:  Completed Specimen:  Sputum,ET Suction Updated:  10/18/18 1011 Special Requests: NO SPECIAL REQUESTS     
  GRAM STAIN 10-25 WBC/lpf     
   <10 EPI/lpf FEW GRAM POSITIVE COCCI IN PAIRS MODERATE GRAM POSITIVE COCCI IN GROUPS MUCUS PRESENT Culture result: MODERATE NORMAL RESPIRATORY SHANAE  
     
 CULTURE, BLOOD [[id number]] Collected:  10/08/18 1227 Order Status:  Completed Specimen:  Blood Updated:  10/14/18 0741 Special Requests: PERIPHERAL Culture result: NO GROWTH 6 DAYS     
 CULTURE, CATHETER TIP [[id number]] Collected:  10/08/18 1215 Order Status:  Completed Specimen:  Catheter Tip Updated:  10/11/18 4617 Special Requests: NO SPECIAL REQUESTS Culture result: NO GROWTH 3 DAYS     
 CULTURE, BLOOD [[id number]] Collected:  10/04/18 8980 Order Status:  Completed Specimen:  Blood Updated:  10/10/18 2652 Special Requests: PERIPHERAL Culture result: NO GROWTH 6 DAYS     
 CULTURE, BLOOD [[id number]] Collected:  10/04/18 1120 Order Status:  Completed Specimen:  Blood Updated:  10/10/18 1830 Special Requests: PERIPHERAL Culture result: NO GROWTH 6 DAYS     
 CULTURE, BLOOD [[id number]] Collected:  09/27/18 0005 Order Status:  Completed Specimen:  Blood Updated:  10/03/18 0845 Special Requests: NO SPECIAL REQUESTS Culture result: NO GROWTH 6 DAYS     
 CULTURE, BLOOD [[id number]]  (Abnormal)  (Susceptibility) Collected:  09/27/18 0136 Order Status:  Completed Specimen:  Blood Updated:  10/02/18 9236 Special Requests: NO SPECIAL REQUESTS     
  GRAM STAIN PEDIATRIC BOTTLE GRAM POSITIVE COCCI IN PAIRS IN CLUSTERS  
      
  SMEAR CALLED TO AND CORRECTLY REPEATED BY: Joi Miller RN IN 2700 ON 9/29/18 AT 2033 WF Culture result:    
  AEROBIC BOTTLE STAPHYLOCOCCUS EPIDERMIDIS  
     
 CULTURE, RESPIRATORY/SPUTUM/BRONCH Jaylene Page STAIN [[id number]]  (Abnormal) Collected:  09/29/18 1210 Order Status:  Completed Specimen:  Sputum from Endotracheal aspirate Updated:  10/02/18 3774 Special Requests: NO SPECIAL REQUESTS     
  GRAM STAIN >25 WBC/lpf     
   <10 EPI/lpf MUCUS PRESENT     
   MODERATE YEAST Culture result: MANY CANDIDA TROPICALIS C. DIFFICILE/EPI PCR [[id number]] Collected:  09/29/18 1400 Order Status:  Completed Specimen:  Stool Updated:  09/29/18 2248 Special Requests: NO SPECIAL REQUESTS Culture result: Toxigenic C. difficile NEGATIVE                         C. difficile target DNA sequences are not detected. CULTURE, URINE [[id number]] Collected:  09/27/18 0029 Order Status:  Completed Specimen:  Cath Urine Updated:  09/29/18 0944 Special Requests: NO SPECIAL REQUESTS Culture result: NO GROWTH 2 DAYS Matilde Sotelo MD, FACP November 6, 2018 [facility] [phone]

## 2018-11-07 NOTE — PROGRESS NOTES
Problem: Pressure Injury - Risk of 
Goal: *Prevention of pressure injury Document Mitul Scale and appropriate interventions in the flowsheet. Outcome: Progressing Towards Goal 
Pressure Injury Interventions: 
Sensory Interventions: Assess changes in LOC, Assess need for specialty bed, Avoid rigorous massage over bony prominences, Check visual cues for pain, Float heels, Keep linens dry and wrinkle-free, Minimize linen layers, Monitor skin under medical devices, Pad between skin to skin Moisture Interventions: Absorbent underpads, Apply protective barrier, creams and emollients, Assess need for specialty bed, Check for incontinence Q2 hours and as needed, Contain wound drainage, Maintain skin hydration (lotion/cream), Minimize layers, Moisture barrier Activity Interventions: Pressure redistribution bed/mattress(bed type) Mobility Interventions: Pressure redistribution bed/mattress (bed type) Nutrition Interventions: Document food/fluid/supplement intake Friction and Shear Interventions: Lift sheet

## 2018-11-07 NOTE — PROGRESS NOTES
Pulmonary progress note 
   
History 52-year-old male recently hospitalized for stroke with residual right hemiparesis.  He was admitted on 9/26/18 with acute renal failure.  On 9/29  the patient rapidly became hypotensive and was resuscitated.  The patient suffered a catastrophic neurological injury. Tracheostomy 10/17 and exchanged 10/27. Subjective Cannot contribute to recent history due to clinical status Afebrile 145/65 Neuro: Pupils minimally reactive. Gaze is disconjugate. No clear oculocephalic reflex. Lungs: Coarse breath sounds, increased to yesterday Heart: Regular rate and rhythm Extremities: No cyanosis or clubbing. Gangrenous left great toe Labs: K 5.9, , Cr 6.32 Assessment Cardiopulmonary arrest with severe neurologic encephalopathy Chronic respiratory failure s/p tracheostomy Acute hemodialysis dependent renal failure Elevated lipase Dysphasia secondary to altered mental status with PEG tube on 10/17/18 Plan Power of  has declined further dialysis and agrees to DNR Recommend withdrawal of ventilatory support. Awaiting Hospice discussion with POA The patient is critically ill with organ failure. Total critical care time without physician overlap or procedure time included:  35 minutes. Please see Lance Boothe NP note for further details.

## 2018-11-07 NOTE — PROGRESS NOTES
[facility] ICU Progress Note Name: Eamon Dave : 1955 MRN: [id number] Date: 2018 9:38 AM 
  
[x]I have reviewed the flowsheet and previous days notes. Events overnight reviewed and discussed with nursing staff. Vital signs and records reviewed. HPI 
 
57 y.o.  male is seen in consultation at the request of Dr. Torin Smith for recommendations on further evaluation and management of acute hypoxia. He hospitalized (9/10/2018 - 2018) for stroke with neurologic residua (R hemiparesis). Patient discharged from [facility] to [facility] ([facility]) on 2018, only to return on 2018 with acute renal failure/abnormal lab values. He experienced VT/VF arrest during my initial clinical assessment. The patient has undergone tracheostomy and percutaneous gastrostomy. EEG and MRI findings consistent w/ poor prognosis. Daughter has decided to discontinue dialysis. Dr. Marquis Escudero and Dr. Yesenia Fiore have both confirmed that she understands that the pt cannot live w/o dialysis. Per Dr. Layla Flynn- pt status has been changed to comfort care Subjective: 
18 No new events overnight. Tmax 100.1 [x]The patient is unable to give any meaningful history or review of systems because the patient is: 
[]Intubated []Sedated [x]Unresponsive tracheostomy [x]The patient is critically ill on     
[x]Mechanical ventilation []Pressors []BiPAP [] ROS:A comprehensive review of systems was negative except for that written in the HPI. Medication Review: · Pressors - none · Sedation - none · Antibiotics - Vanc · Pain - prn tylenol · GI/ DVT - Protonix/SQH 
· Others (other gtts)- none Vital Signs:   
Visit Vitals /75 Pulse 88 Temp 98.7 °F (37.1 °C) Resp 20 Ht 5' 7\" (1.702 m) Wt 83 kg (182 lb 15.7 oz) SpO2 97% BMI 28.66 kg/m² O2 Device: Tracheostomy, Ventilator O2 Flow Rate (L/min): 45 l/min Temp (24hrs), Av.8 °F (37.7 °C), Min:98.7 °F (37.1 °C), Max:100.1 °F (37.8 °C) Intake/Output:  
Last shift:      No intake/output data recorded. Last 3 shifts:  1901 -  0700 In: 1970 [I.V.:200] Out: 1815 [IEJET:6688; Drains:240] Intake/Output Summary (Last 24 hours) at 2018 3842 Last data filed at 2018 0700 Gross per 24 hour Intake 1160 ml Output 1390 ml Net -230 ml Ventilator Settings: 
Ventilator Mode: SIMV, VC+ Respiratory Rate Resp Rate Observed: 41 Back-Up Rate: 12 Insp Time (sec): 1 sec Insp Flow (l/min): 55 l/min I:E Ratio: 1;2 
Ventilator Volumes Vt Set (ml): 500 ml Vt Exhaled (Machine Breath) (ml): 531 ml Vt Spont (ml): 366 ml Ve Observed (l/min): 9 l/min Ventilator Pressures Pressure Support (cm H2O): 10 cm H2O 
PIP Observed (cm H2O): 22 cm H2O Plateau Pressure (cm H2O): 10 cm H2O 
MAP (cm H2O): 10 PEEP/VENT (cm H2O): 5 cm H20 Auto PEEP Observed (cm H2O): 0 cm H2O Physical Exam: 
 
General: Tracheostomy; not following commands HEENT:  Anicteric sclerae; pink palpebral conjunctivae; mucosa moist 
Resp:  Coarse bilaterally to auscultation; Symmetrical chest expansion, no accessory muscle use; good airway entry; no rales/ wheezing/ rhonchi noted CV:  S1, S2 present; NSR 
GI:  Abdomen soft, non-tender; (+) active bowel sounds; Peg tube Extremities:  +2 pulses on all extremities; +1 edema; normal capillary refill Skin:  Warm; gangrene to left great toe Neurologic:  Does not follow commands, BUE and RLE flexes to painful stimuli; pupils 2 not reactive Devices: Peg, tracheostomy, HD catheter right chest 
Telemetry: [x]Sinus []A-flutter []Paced []A-fib []Multiple PVCs IMPRESSION:  
· Acute respiratory failure requiring mechanical ventilation; s/p tracheostomy 10/17 · Anoxic brain injury- d/t cardiac arrest; MRI and EEG with consistent findings · JULIANNA- oliguric; HD dependant- daughter d/c dialysis · Pseudomonas UTI/Bronchitis- ID following · Gangrene of L great toe · Elevated lipase · Hx CVA, DM, HTN  
  
PLAN:  
· Resp - Stable on mechanical ventilation via tracheostomy. HOB > 30. Aspiration precautions. Scopolamine patch for secretions. · ID - Febrile and aleukocytosis. ID d/c'd ABx. · CVS - HD stable. · Heme/onc - H/H and platelets stable. · Metabolic - BMP every other day. · Renal - Dialysis d/c'd. · Endocrine - SSI, BS Q6. 
· Neuro/ Pain/ Sedation - PRN Morphine, for pain. PRN Lorazepam for anxiety. · GI - TF at goal. 
· Prophylaxis - DVT, GI- SQH, Protonix · Discussed in interdisciplinary rounds- Per Dr. Ilan Alvarado- pt status has been confirmed by POA and changed to comfort care. No discussion as of yet on whether to compassionately extubate. Hospice following. · Code status- DNR The patient is: [] acutely ill Risk of deterioration: [x] moderate [x] critically ill  [] high  
 
[x]See my orders for details My assessment/plan was discussed with: 
[x]nursing []PT/OT [x]respiratory therapy [x]Dr. Neisha Bender [] Lois Solorzano NP

## 2018-11-07 NOTE — PROGRESS NOTES
2000 SBAR report taken from [address] [place] [address]. Patient in bed, only responsive to noxious stimuli. Tolerating tube feeds and vent. Has a FMS in which I irrigated. Eyes deviate to the left and left pupil is dilated with no reaction to light. Unable to educate this patient 
 
0400 Bathed patient and changed the dressing to his right central line. Left eye is no longer fixed, it is reacting. Responds to painful stimuli on right side. [facility] Bedside SBAR report given to incoming RN Inocencia Larose.

## 2018-11-07 NOTE — PROGRESS NOTES
Infectious Disease Follow-up Admit Date: 9/26/2018 Current abx Prior abx  
cefepime 10/25- 13, Vanco 11/5-2 Zosyn 9/27-7, Meropenem/flucon 10/4- 11, vanco 9/27-25, ACV 10/17 - 10,  Vanco 10/25- 1, Cipro 10/28-1 Assessment ->Rec:  
 
Recurrent SIRS T 100.4 10/25 wbc 18k 
 - more secretions, reported cloudy urine, cxr yest no pneumonia  
-line tip culture 10/19 >15 CFU CoNS now with L picc, femoral dialysis catheter out 10/31   
- blcx 10/25 NG x 2, spgs rare Pseud, wd Pseud, cxr 10/27 poss R perihilar infiltrate-? Pul source here vs from prior bleeding 
- femoral uldall 10/31- cath tip cx <15k Pseudomonas - PICC line 11/2 - cath tip cx >15 CFU Staph epi 
- recurrent low grade fever -> has been transitioned to comfort care per Dr. Kathryn Royal 
-> will dc antibiotics and be available PAD  
- PVL's s/o significant arterial disease of bilateral lower extremities 10/24. -POD Dr. Jacqueline Blackburn saw 10/24,  indicated not OR candidate foot for gangrene distal L gt toe - per others Shingles R arm, C 6 (?C5) dermatome 
- vesicular lesions onset ~10/15 progressing compared to onset per NP 
- confluent in upper arm, not crusted yet  
-VZV PCR pos 10/18 
- completed 10 days of Acyclovir on 10/26 -> wounds crusted, off isolation Prior Leukocytosis/SIRS poss sepsis (resolved 10/19) 
- MOF multiple ID and non-infectious concerns outlined below, complicated, wbc 10.9Z today from high 27K+ on 10/5 On  vancomycin/zosyn 9/27 
- C diff neg 9/29, sput C albicans, repeat CT 10/4 no new findings --cxr reviewed - increased atx rt base. Left base improved 
- Higher fever 10/16 101.2, 10/18 101.4 afebrile since 
- Ddx: HZV vs deep tissue injury of toe/buttock vs Line infection 
- blcx 10/16 NG x 2 
- TDC Tip 10/19 >15 CFU MRSE (1 of 2 blcx on adm 9/27 MRSE but afebrile then prob contaminant) - Suspected Pyelo POA  
- CTAP 9/27:  B perineph stranding, UA tntc wbc, uctx ng 
- treated at this point Cholelithiasis choledocholithiais suspected acute cholecystitis  poss cystic stone POA- abnl lft bili 4 alk phos 400 seen by GI and GS Dr. Kareem Russo, no plan for OR, for poss cholecystostomy if LFT worsen, bili, alk phos declining - AST fluctuating downward  -Dr. Paul Pagan saw for GI 10/24 Elevated Lipase  
- lipase 2200 POA -> 3191 ->  5500 on 10/4 improved to 1207 10/19 but up again to 2169 10/22 interpretation complicated by JULIANNA -> fluctuating between 6546-8637 (off mucomyst, TF) 
- CTAP 10/4: Normal pancreas -> per ICU team  
B infiltrates - poss edema infiltrate - RLL consolid better 10/4 cxr and suspect endobronchial clot contributed   ->monitor MRSE bacteremia 9/27 1 of 2. Has LUE midline unclear when placed Treated JULIANNA POA dialysis dependent 
- baseline cr 0.9 132/10.1 in ER 
-[facility] uldall 9/28 out 10/19 - R fem uldall 10/20 
-NEW [facility] TDC 10/31 -> per renal  
Bleeding -melena earlier, EGD 10/2 clot in esophagus bronch 10/3 R endobronch clot NEW removed 10/5 repeat bronch  -> per others Suspected anoxic brain injury on MRI 10/3 SP VF arrest 9/29 -> overall poor prognosis 
-> now comfort care CVA R hemiparesis 9/10   
resp failure sp intubation 9/29 Comorbid: HTN HLD CHF h/o steatohepatitis MICROBIOLOGY:  
9/27 bctx 1 of 2 MRSE 
 uctx ng 
9/29 sput C albicans C diff neg 10/4 bctx x 2 NTD 
 fungitell indeterminate due to contamination 10/8 Cath tip cx ntd 10/8     bl cx ng 
10/16 Blcx x2 ng 
 Spcx NF 
10/18 VZV PCr +ve, HSV PCR neg 
10/19 Cath tip >15 CFU MRSE 
10/25 Spcx pseudomonas x2- panS 
 blcx x2 nG 
 uctx Pseudomonas Wd cx Pseud x2 panS, E faecalis S amp, CoNS 
10/31  Cath tip <15 CFU GNR, panS Pseudomonas 11/2 PICC tip >15CFU Staph epi LINES AND CATHETERS:  
 
 
[facility] 10/31 Active Hospital Problems Diagnosis Date Noted  Pseudomonas infection 10/27/2018  Gangrene ([address] 10/22/2018  Hypoalbuminemia 10/21/2018  Shingles 10/21/2018  Ischemic encephalopathy 09/30/2018  Cardiac arrest with ventricular fibrillation ([facility] 09/29/2018 ROSC before defibrillation could be attempted.  Acute pancreatitis 09/29/2018 Lipase downtrends with interruption in tube feed and Mucomyst. Restarting tube feed did result in modest elevation but not to the levels seen while on Mucomyst. Nonetheless, there is signficant decrease in lipase levels with interruptions in tube feeding.  Elevated LFTs 09/28/2018  History of stroke 09/27/2018 With residual R hemiparesis  Acute-on-chronic kidney injury ([facility] 09/27/2018  Acute cystitis 09/27/2018  Elevated troponin 09/27/2018  Essential hypertension 09/10/2018  Diabetes mellitus type 2, controlled ([facility] 09/10/2018 Subjective: Interval notes reviewed, Lingering low grade fever. Awake and unresponsive in ICU. Not in respiratory distress Current Facility-Administered Medications Medication Dose Route Frequency  VANCOMYCIN INFORMATION NOTE   Other ONCE  
 VANCOMYCIN INFORMATION NOTE   Other Rx Dosing/Monitoring  insulin glargine (LANTUS) injection 30 Units  30 Units SubCUTAneous DAILY  amLODIPine (NORVASC) tablet 5 mg  5 mg Oral DAILY  0.9% sodium chloride infusion 250 mL  250 mL IntraVENous PRN  
 cefepime (MAXIPIME) 0.5 g in 0.9% sodium chloride 100 mL IVPB  0.5 g IntraVENous Q24H  
 albuterol (PROVENTIL VENTOLIN) nebulizer solution 2.5 mg  2.5 mg Nebulization BID RT  
 multivitamin (MULTI-DELYN, WELLESSE) oral liquid 30 mL  30 mL Oral DAILY  bacitracin 500 unit/gram packet 1 Packet  1 Packet Topical PRN  
 sodium chloride (NS) flush 10-30 mL  10-30 mL InterCATHeter PRN  
 sodium chloride (NS) flush 10 mL  10 mL InterCATHeter PRN  
 heparin (porcine) injection 5,000 Units  5,000 Units SubCUTAneous Q8H  
 hydroxypropyl methylcellulose (ISOPTO TEARS) 0.5 % ophthalmic solution 2 Drop  2 Drop Both Eyes BID  
  epoetin manda (EPOGEN;PROCRIT) injection 40,000 Units  40,000 Units IntraVENous Q7D  
 pantoprazole (PROTONIX) 40 mg in sodium chloride 0.9% 10 mL injection  40 mg IntraVENous Q24H  
 influenza vaccine 2018- (6 mos+)(PF) (FLUARIX QUAD/FLULAVAL QUAD) injection 0.5 mL  0.5 mL IntraMUSCular PRIOR TO DISCHARGE  acetaminophen (TYLENOL) solution 325 mg  325 mg Per NG tube Q4H PRN  
 heparin (porcine) pf 100 Units  100 Units InterCATHeter PRN  
 insulin lispro (HUMALOG) injection   SubCUTAneous Q6H  
 heparin (porcine) 1,000 unit/mL injection 1,000 Units  1,000 Units InterCATHeter DIALYSIS PRN  
 senna-docusate (PERICOLACE) 8.6-50 mg per tablet 1 Tab  1 Tab Oral BID PRN  
 ondansetron (ZOFRAN) injection 4 mg  4 mg IntraVENous Q4H PRN  
 glucose chewable tablet 16 g  4 Tab Oral PRN  
 glucagon (GLUCAGEN) injection 1 mg  1 mg IntraMUSCular PRN  
 dextrose (D50) infusion 12.5-25 g  25-50 mL IntraVENous PRN  
 hydrALAZINE (APRESOLINE) 20 mg/mL injection 20 mg  20 mg IntraVENous Q6H PRN Objective:  
 
Visit Vitals /75 Pulse 91 Temp 98.7 °F (37.1 °C) Resp 24 Ht 5' 7\" (1.702 m) Wt 83 kg (182 lb 15.7 oz) SpO2 98% BMI 28.66 kg/m² Temp (24hrs), Av.8 °F (37.7 °C), Min:98.7 °F (37.1 °C), Max:100.1 °F (37.8 °C) GEN: well developed 57year-old AA male on vent/trach in ICU, unresponsive HENT: no thrush eyes open Neck: tracheostomy in place. TDC RIJ 
CHEST: coarse bs B no wheeze or rhonchi CVS: RRR, no murmur appreciated ABD: soft, + BS no localized tenderness, PEG in place EXT: 1+ pitting edema b/l LE. Gangrenous L great toe SKIN:  RUE lesions crusted CNS:eyes open, unresponsive to commands or deep sternal rub Labs: Results:  
Chemistry Recent Labs 18 
0410 18 
0350 18 
1628 18 
0400 * 168*  --  154*  137  --  137  
K 5.9* 5.6*  --  5.5  101  --  101 CO2 20* 22  --  24 * 105*  --  96*  
 CREA 6.32* 5.92*  --  5.67* CA 7.7* 7.9*  --  8.3* AGAP 15 14  --  12  
BUCR 20 18  --  17  
AP  --   --  368*  --   
TP  --   --  7.0  --   
ALB 1.6* 1.7* 1.6* 1.6*  
GLOB  --   --  5.4*  --   
AGRAT  --   --  0.3*  --   
  
CBC w/Diff Recent Labs 11/05/18 
0400 WBC 11.4 RBC 3.05* HGB 8.5* HCT 26.8*  
*  
  
 
10/27 cxr IMPRESSION: 
  
Right lung perihilar infiltrate appears present 10/4 CTAP IMPRESSION: 
1. Dense subtotal or total consolidation right lower lobe compatible with 
pneumonia similar to prior study. Small bilateral pleural effusions similar in 
size. 2. Distended gallbladder with gallstones and gallbladder sludge without 
significant change in appearance and also described in detail on ultrasound of 
9/27/2018. 3. Indeterminate small lesion left hepatic lobe without change. Hepatomegaly 
again evident. 4. No intraperitoneal fluid. Possible small left upper pole renal cyst. 
Cardiomegaly. Nasogastric tube tip in body of stomach. Microbiology Results All Micro Results Procedure Component Value Units Date/Time CULTURE, CATHETER TIP [[id number]]  (Abnormal)  (Susceptibility) Collected:  11/02/18 1307 Order Status:  Completed Specimen:  PICC Updated:  11/05/18 1057 Special Requests: NO SPECIAL REQUESTS Culture result:    
  >15 CFU STAPHYLOCOCCUS EPIDERMIDIS  
     
 CULTURE, CATHETER TIP [[id number]]  (Abnormal)  (Susceptibility) Collected:  10/31/18 0723 Order Status:  Completed Specimen:  Catheter Tip Updated:  11/02/18 1115 Special Requests: NO SPECIAL REQUESTS Culture result:    
  <15 CFU PSEUDOMONAS AERUGINOSA CULTURE, BLOOD [[id number]] Collected:  10/25/18 1328 Order Status:  Completed Specimen:  Whole Blood Updated:  10/31/18 0910 Special Requests: NO SPECIAL REQUESTS Culture result: NO GROWTH 6 DAYS     
 CULTURE, BLOOD [[id number]] Collected:  10/25/18 1328 Order Status:  Completed Specimen:  Whole Blood Updated:  10/31/18 0910 Special Requests: NO SPECIAL REQUESTS Culture result: NO GROWTH 6 DAYS     
 CULTURE, [facility] STAIN [[id number]]  (Abnormal)  (Susceptibility) Collected:  10/25/18 1359 Order Status:  Completed Specimen:  Wound from Tracheostomy site Updated:  10/28/18 1935 Special Requests: NO SPECIAL REQUESTS     
  GRAM STAIN FEW WBC'S     
   RARE GRAM NEGATIVE RODS Culture result: MANY PSEUDOMONAS AERUGINOSA MODERATE PSEUDOMONAS AERUGINOSA 2ND MORPHOTYPE  
     
      
  FEW ENTEROCOCCUS FAECALIS GROUP D  
     
      
  FEW STAPHYLOCOCCUS SPECIES, COAGULASE NEGATIVE (TWO MORPHOTYPES) CULTURE, RESPIRATORY/SPUTUM/BRONCH Devoria Greulich STAIN [[id number]]  (Abnormal)  (Susceptibility) Collected:  10/25/18 1325 Order Status:  Completed Specimen:  Sputum from Tracheal Aspirate Updated:  10/28/18 8391 Special Requests: NO SPECIAL REQUESTS     
  GRAM STAIN MODERATE WBC'S     
   RARE GRAM NEGATIVE RODS Culture result: MANY PSEUDOMONAS AERUGINOSA  
     
      
  MANY PSEUDOMONAS AERUGINOSA 2ND MORPHOTYPE  
     
      
  RARE NORMAL RESPIRATORY SHANAE  
     
 CULTURE, URINE [[id number]]  (Abnormal)  (Susceptibility) Collected:  10/25/18 1359 Order Status:  Completed Specimen:  Cath Urine Updated:  10/27/18 5983 Special Requests: NO SPECIAL REQUESTS Culture result:    
  18[address] COLONIES/mL PSEUDOMONAS AERUGINOSA CULTURE, CATHETER TIP [[id number]]  (Abnormal)  (Susceptibility) Collected:  10/19/18 1330 Order Status:  Completed Specimen:  Catheter Tip Updated:  10/22/18 5494 Special Requests: NO SPECIAL REQUESTS Culture result:    
  >15 CFU STAPHYLOCOCCUS EPIDERMIDIS  
     
 CULTURE, BLOOD [[id number]] Collected:  10/16/18 1055 Order Status:  Completed Specimen:  Blood Updated:  10/22/18 0809   Special Requests: PERIPHERAL     
 Culture result: NO GROWTH 6 DAYS     
 CULTURE, BLOOD [[id number]] Collected:  10/16/18 1047 Order Status:  Completed Specimen:  Blood Updated:  10/22/18 0809 Special Requests: PERIPHERAL Culture result: NO GROWTH 6 DAYS     
 HSV 1 AND 2 BY PCR [[id number]] Collected:  10/18/18 1815 Order Status:  Completed Specimen:  Other from Miscellaneous sample Updated:  10/21/18 1636 Source OTHER TEST     
  HSV-1 DNA by PCR NEGATIVE      
  HSV-2 DNA by PCR NEGATIVE Comment: (NOTE) This test was developed and its performance characteristics 
determined by [employer]. It has not been cleared or 
approved by the U.S. Food and Drug Administration. The FDA has 
determined that such clearance or approval is not necessary. This 
test is used for clinical purposes. It should not be regarded as 
investigational or research. Performed At: [address] [id number] Deysi Haas MD KA:[id number] CULTURE, RESPIRATORY/SPUTUM/BRONCH Cher Fus [[id number]] Collected:  10/16/18 1915 Order Status:  Completed Specimen:  Sputum,ET Suction Updated:  10/18/18 1011 Special Requests: NO SPECIAL REQUESTS     
  GRAM STAIN 10-25 WBC/lpf     
   <10 EPI/lpf FEW GRAM POSITIVE COCCI IN PAIRS MODERATE GRAM POSITIVE COCCI IN GROUPS MUCUS PRESENT Culture result: MODERATE NORMAL RESPIRATORY SHANAE  
     
 CULTURE, BLOOD [[id number]] Collected:  10/08/18 1227 Order Status:  Completed Specimen:  Blood Updated:  10/14/18 0741 Special Requests: PERIPHERAL Culture result: NO GROWTH 6 DAYS     
 CULTURE, CATHETER TIP [[id number]] Collected:  10/08/18 1215 Order Status:  Completed Specimen:  Catheter Tip Updated:  10/11/18 7081 Special Requests: NO SPECIAL REQUESTS Culture result: NO GROWTH 3 DAYS     
 CULTURE, BLOOD [[id number]] Collected:  10/04/18 7663 Order Status:  Completed Specimen:  Blood Updated:  10/10/18 0609 Special Requests: PERIPHERAL Culture result: NO GROWTH 6 DAYS     
 CULTURE, BLOOD [[id number]] Collected:  10/04/18 1120 Order Status:  Completed Specimen:  Blood Updated:  10/10/18 9483 Special Requests: PERIPHERAL Culture result: NO GROWTH 6 DAYS     
 CULTURE, BLOOD [[id number]] Collected:  09/27/18 0005 Order Status:  Completed Specimen:  Blood Updated:  10/03/18 0845 Special Requests: NO SPECIAL REQUESTS Culture result: NO GROWTH 6 DAYS     
 CULTURE, BLOOD [[id number]]  (Abnormal)  (Susceptibility) Collected:  09/27/18 0136 Order Status:  Completed Specimen:  Blood Updated:  10/02/18 9718 Special Requests: NO SPECIAL REQUESTS     
  GRAM STAIN PEDIATRIC BOTTLE GRAM POSITIVE COCCI IN PAIRS IN CLUSTERS  
      
  SMEAR CALLED TO AND CORRECTLY REPEATED BY: Linus Sousa RN IN 2700 ON 9/29/18 AT 2033 WF Culture result:    
  AEROBIC BOTTLE STAPHYLOCOCCUS EPIDERMIDIS  
     
 CULTURE, RESPIRATORY/SPUTUM/BRONCH Lonza Barrack STAIN [[id number]]  (Abnormal) Collected:  09/29/18 1210 Order Status:  Completed Specimen:  Sputum from Endotracheal aspirate Updated:  10/02/18 9085 Special Requests: NO SPECIAL REQUESTS     
  GRAM STAIN >25 WBC/lpf     
   <10 EPI/lpf MUCUS PRESENT     
   MODERATE YEAST Culture result: MANY CANDIDA TROPICALIS C. DIFFICILE/EPI PCR [[id number]] Collected:  09/29/18 1400 Order Status:  Completed Specimen:  Stool Updated:  09/29/18 2248 Special Requests: NO SPECIAL REQUESTS Culture result: Toxigenic C. difficile NEGATIVE                         C. difficile target DNA sequences are not detected. CULTURE, URINE [[id number]] Collected:  09/27/18 0029 Order Status:  Completed Specimen:  Cath Urine Updated:  09/29/18 0944 Special Requests: NO SPECIAL REQUESTS Culture result: NO GROWTH 2 DAYS Dennys Flood MD, [facility] November 7, 2018 [facility] [phone]

## 2018-11-07 NOTE — PROGRESS NOTES
-0845-Received patient on ventilator VC+ SIMV rate-12, VT-500, PEEP-5, FIO2-21%. Spero Dhaliwal noted to be patent and secure. Respiratory will continue to monitor. -[facility] 
 
-1530-Pt. Remains on ventilator with above settings. O2 Sats-100% HR-88, RR-30. Spero Dhaliwal remains patent and secure. Inner cannula and drainage sponge changed. -[address]

## 2018-11-07 NOTE — PROGRESS NOTES
Patient is comfort care, hospice has been consulted. Family is reported to be traveling from [place] to [place] this Friday. Spoke with director of Case management understand that the plan is to discuss vent withdrawal once family is present. I have asked for authorization for admission from [facility], [facility]. as directed.     [address]ley [address] RN

## 2018-11-07 NOTE — PROGRESS NOTES
Hospitalist Progress Note Daily Progress Note: 11/7/2018  
   
Interval history / Subjective:  
Willie Christensen is a 57y.o. year old male who presented from [facility] with abnormal labs, elevated BUN/Cr. Found to have JULIANNA, UTI, and markedly elevated LFT on presentation. Patient's recent admission was 9/10/18-9/14/18 for acute CVA and rhabdo. Patient poor historian on admission,stated \"I had heart failure. She was started on vanc,zosyn. On 9/29,patient had cardiac arrest with ventricular fibrillation - s/p ROSC. Her hospital course has since then complicated with sepsis,pancreatitits,pneumonia,acute pancreatitis requiring involvement of gi,surgery,nephrology,cardiology,ID,cardiology. So far patient has remained without major improvement. Now in vegetative state. Patient had PEG/Trach placement on 10/17. On 10/17,patient started on acyclovir for veicular rash rt arm,suspected being Zoster. Lipase 2169. US abd 10/22 shows Abnormal appearance of the gallbladder with stones/sludge with wall thickening  
and possible pericholecystic fluid similar to the prior study of 9/27/2018 
10/22: Patient on treatment for shingles in contact isolation per ID team. He also had left big toe gangrene . We will consult Podiatry. His lipase was elevated , US of the abdomen shows sludge and possible cholecystitis. Patient afebrile. GI consulted  for possible MRCP. Patient has leukocytosis. ID started Vancomycin and ordered Urine , blood and sputum culture and trach site discharge. Patient off air bourne precaution but continue contact precaution per ID. Patient was started on Vancomycin and cefepime for SIRSper ID. Blood and urine cx pending. Patient today is going for jejunostomy. Patient BC NGTD Vancomycin d/c'd per ID. Patient respiratory  cx + GNR , STREPT, Wound cx + pseudomonas . PCCM added Ciprofloxacin today . IR attempt at Jejunostomy was unsuccessful yesterday. 10/28: Fever today. Resp cx, wound cx noted. On cefepime and Cipro for coverage per sensitivisty result . Blood cx NGTD . Lipase still trending up. IR unsuccessful attempt at jejunostomy 10/26. Will re consult GI today 10/29:no change in care. 10/30:no change in care 10/31:pt underwent dialysis today. Currently on cefepime only. Patient is still in the hospital,waiting for placement. 11/1:continue current treatment 11/2:ID has recommended line culture(picc line) as patient had elevated temp today 11/3:still running fever 101.8 today. Tip of picc line cx results pending. 11/4:Tip of catheter culture showing staph coag negative 11/5: Daughter request to d/c dialysis. Nephrology signed off today 11/6: Daughter made patient a DNR . She endorsed Hospice yesterday. Hospice following patient now. PCCM recommend  withdrawalal of Vent support pending discussion of Hospice with POA Current Facility-Administered Medications Medication Dose Route Frequency  LORazepam (INTENSOL) 2 mg/mL oral concentrate 1 mg  1 mg Oral Q1H PRN  
 morphine 10 mg/ml injection 10 mg  10 mg Inhalation Q1H PRN  
 morphine injection 2 mg  2 mg IntraVENous Q15MIN PRN  
 scopolamine (TRANSDERM-SCOP) 1 mg over 3 days 1 Patch  1 Patch TransDERmal Q72H PRN  
 insulin glargine (LANTUS) injection 30 Units  30 Units SubCUTAneous DAILY  amLODIPine (NORVASC) tablet 5 mg  5 mg Oral DAILY  0.9% sodium chloride infusion 250 mL  250 mL IntraVENous PRN  
 albuterol (PROVENTIL VENTOLIN) nebulizer solution 2.5 mg  2.5 mg Nebulization BID RT  
 multivitamin (MULTI-DELYN, WELLESSE) oral liquid 30 mL  30 mL Oral DAILY  bacitracin 500 unit/gram packet 1 Packet  1 Packet Topical PRN  
 sodium chloride (NS) flush 10-30 mL  10-30 mL InterCATHeter PRN  
 sodium chloride (NS) flush 10 mL  10 mL InterCATHeter PRN  
 heparin (porcine) injection 5,000 Units  5,000 Units SubCUTAneous Q8H  
  hydroxypropyl methylcellulose (ISOPTO TEARS) 0.5 % ophthalmic solution 2 Drop  2 Drop Both Eyes BID  pantoprazole (PROTONIX) 40 mg in sodium chloride 0.9% 10 mL injection  40 mg IntraVENous Q24H  
 influenza vaccine 2018- (6 mos+)(PF) (FLUARIX QUAD/FLULAVAL QUAD) injection 0.5 mL  0.5 mL IntraMUSCular PRIOR TO DISCHARGE  acetaminophen (TYLENOL) solution 325 mg  325 mg Per NG tube Q4H PRN  
 heparin (porcine) pf 100 Units  100 Units InterCATHeter PRN  
 insulin lispro (HUMALOG) injection   SubCUTAneous Q6H  
 heparin (porcine) 1,000 unit/mL injection 1,000 Units  1,000 Units InterCATHeter DIALYSIS PRN  
 senna-docusate (PERICOLACE) 8.6-50 mg per tablet 1 Tab  1 Tab Oral BID PRN  
 ondansetron (ZOFRAN) injection 4 mg  4 mg IntraVENous Q4H PRN  
 glucose chewable tablet 16 g  4 Tab Oral PRN  
 glucagon (GLUCAGEN) injection 1 mg  1 mg IntraMUSCular PRN  
 dextrose (D50) infusion 12.5-25 g  25-50 mL IntraVENous PRN  
 hydrALAZINE (APRESOLINE) 20 mg/mL injection 20 mg  20 mg IntraVENous Q6H PRN Review of Systems Unresponsive Objective:  
 
Visit Vitals /64 Pulse 85 Temp 98.8 °F (37.1 °C) Resp 18 Ht 5' 7\" (1.702 m) Wt 83 kg (182 lb 15.7 oz) SpO2 99% BMI 28.66 kg/m² O2 Flow Rate (L/min): 45 l/min O2 Device: Tracheostomy Temp (24hrs), Av.4 °F (37.4 °C), Min:98.6 °F (37 °C), Max:100.1 °F (37.8 °C) 701 - 1900 In: 5 Out: 500 [Urine:200; WQPVZR:456] 1901 - 700 In: 1970 [I.V.:200] Out: 181 [HVHCV:0016; Drains:240] P/E General Appearance:S/p PEG/Trach HENT: normocephalic/atraumatic, + ETT Neck: No JVD, hematoma R side where [facility] [place] is improving Lungs: Coarse B/L w/ vent-assisted BS 
CV: RRR, no m/r/g Abdomen: soft, non-tender, normal bowel sounds Extremities: versicular rash rt arm and right side of chest,no cyanosis, no peripheral edema Neuro: Unresponsive to commands, no withdrawal to pain, + corneal reflex and pupillary reaction today Skin: Normal color, intact Data Review Recent Results (from the past 12 hour(s)) GLUCOSE, POC Collection Time: 11/07/18  6:48 AM  
Result Value Ref Range Glucose (POC) 150 (H) 70 - 110 mg/dL GLUCOSE, POC Collection Time: 11/07/18 12:12 PM  
Result Value Ref Range Glucose (POC) 201 (H) 70 - 110 mg/dL Assessment/Plan:  
 
Principal Problem: 
  Cardiac arrest with ventricular fibrillation ([facility] (9/29/2018) Overview: ROSC before defibrillation could be attempted. Active Problems: 
  Essential hypertension (9/10/2018) Diabetes mellitus type 2, controlled ([facility] (9/10/2018) History of stroke (9/27/2018) Overview: With residual R hemiparesis Acute-on-chronic kidney injury ([facility] (9/27/2018) Acute cystitis (9/27/2018) Elevated troponin (9/27/2018) Elevated LFTs (9/28/2018) Acute pancreatitis (9/29/2018) Overview: Lipase downtrends with interruption in tube feed and Mucomyst. Restarting  
    tube feed did result in modest elevation but not to the levels seen while  
    on Mucomyst. Nonetheless, there is signficant decrease in lipase levels  
    with interruptions in tube feeding. Ischemic encephalopathy (9/30/2018) Hypoalbuminemia (10/21/2018) Shingles (10/21/2018) Gangrene ([facility] (10/22/2018) Pseudomonas infection (10/27/2018) Zoster. Care Plan - Vent mgmt per ICU - S/p trach/PEG on 10/17 as patient did not show significant improvement. 
- EGD showed large clot in esophagus last week repeat EGD 10/9 shows healed esophageal ulcer - Cont protonix IV every day  
- Hgb stable - LFTs improving - GI signed off 10/9 
- MRI w/ evidence of severe anoxic brain injury - Minimal improvement on neuro exam 
- Appreciate neuro assistance - IV Meropenem and IV Fluconazole d/heron on 10/15 per ID. 
- Currently on vanc only which was started on 9/27 
- Nephro managing HD TTHS - Pt unlikely to have any meaningful neuro recovery, prognosis is very grim - Family wanted to cont to do everything, including trach/PEG -> was done10/17 
- On 10/17:Started on acyclovir for possible Zoster rt arm and rt upper chest area. - Hyperkalemia on 10/18 ->corrected - Lipase 2169 10/22. US abd Abnormal appearance of the gallbladder with stones/sludge with wall thickening  
and possible pericholecystic fluid similar to the prior study of 9/27/2018  
- On Acyclovir for shingles , contact precaution per ID commitee 
- 10/22: Consult GI today for possible MRCP 
- 10/22: Consult Podiatry for left big toe gangrene  
- 10/23 : Podiatry recommend RAQUEL for Left Big toe  
- 10/23: GI recommend MRI/MRCP but will need patient off vent for multiple times for procedure. Not sure if this is feasible now 
- 10/25 : Blood , urine , sputum , trach discharge cultures 
- 10/25 IV antibiotics by ID  
- Change GI tube to Jejunostomy tube tomorrow by IR  
- 10/26: Vancomycin and Cefepime per ID  
- 10/26 : Jejunostomy attempt by IR unsuccessful . Will need to discuss with GI  
- 10/27 : Ciprofloxacin added 10/27 for double coverage of pseudomonas with cefepime per PCCM  
- 10/28: Pancreatitis ? Biliary  . Will reconsult GI today - Lipase trending down. 
-11/2:culture of picc line since pt has developed fever. -11/3:still febrile,picc line tip cx results pending;will follow ID recommendations. -11/4:Cx of tip of catheter showing staph coag negative. Pt still spiking fever - will follow ID recommendations. - 11/5: Nephrology signed off . At daughters request will D/C dialysis. She per Nephrology is well aware if implications of stopping dialysis - Per ID continue cefepime for total 14 days and Vancomycin  added for 7 days 11/6: DNR and Hospice consulted after I personally spoke with her. 11/7: Nephrology and ID signed off. Hospice following patient now DVT prophylaxis:scds DNR per daughter's request today 11/6 
 Disposition: Hospice -  will work on placement

## 2018-11-08 NOTE — PROGRESS NOTES
1900 
Bedside SBAR report taken from offgoing RN Edvin Dennison. I have taken care of this patient several times, and have seen no changes in neuro. He opens his eyes on his own and he tears to keep them moist.  Has a trach and is on a vent. He has a PEG tube with tube feedings that he is tolerating well. Withdraws to noxious stimuli. Gets straight cathed x1 each shift and sometimes he urinates on his own. He has an FMS in for diarrhea. I have been here on several night shifts and have never seen visitors 0000 No changes except that his withdrawal to painful stimuli waxes and wanes, meaning that one time he will withdraw his left leg then when he is reassessed he does not withdrawal. 
 
0400 Bathed patient and changed all linens. His FMS popped out thus had to replace it, prior to replacing it a rectal temp was obtained. Still has pressure ulcer on his buttocks and there is a little fissure at the border of his rectum. Wound care was performed o both ulcers. Mepilex can only be applied to the larger one on his right buttocks. Just before straight cathing patient he was incontinent of urine. When I cathed him 200 mls of clear urine was obtained. 8688 Labs drawn via left peripheral iv without problems.

## 2018-11-08 NOTE — PROGRESS NOTES
Pt currently on comfort care measures only. Pt not a candidate for hospice while on mechanical ventilation. Recommended to POA yesterday to remove pt from ventilator which would coincide with comfort measures. POA informed this provider that she plans on removing pt from ventilator either Friday evening or Saturday morning when she arrives. This provider reinforced to POA that there is a chance that the pt will not survive until Friday/Saturday. POA stated that she is well aware. [facility] is signing off.  
 
Axel Jang NP

## 2018-11-08 NOTE — PROGRESS NOTES
Problem: Pressure Injury - Risk of 
Goal: *Prevention of pressure injury Document Mitul Scale and appropriate interventions in the flowsheet. Outcome: Progressing Towards Goal 
Pressure Injury Interventions: 
Sensory Interventions: Assess changes in LOC, Assess need for specialty bed, Float heels, Keep linens dry and wrinkle-free, Pad between skin to skin, Turn and reposition approx. every two hours (pillows and wedges if needed) Moisture Interventions: Absorbent underpads, Apply protective barrier, creams and emollients, Assess need for specialty bed, Check for incontinence Q2 hours and as needed, Contain wound drainage, Internal/External fecal devices, Limit adult briefs, Maintain skin hydration (lotion/cream), Minimize layers, Moisture barrier Activity Interventions: Assess need for specialty bed, Pressure redistribution bed/mattress(bed type) Mobility Interventions: Assess need for specialty bed, Float heels, HOB 30 degrees or less, Pressure redistribution bed/mattress (bed type), PT/OT evaluation, Suspension boots, Turn and reposition approx. every two hours(pillow and wedges) Nutrition Interventions: Document food/fluid/supplement intake, Discuss nutritional consult with provider Friction and Shear Interventions: Feet elevated on foot rest, Foam dressings/transparent film/skin sealants, HOB 30 degrees or less

## 2018-11-08 NOTE — PROGRESS NOTES
Received patient on full vent support; SIMV VC+, Rate 12, , PEEP 5, FiO2 0.21. Current Vitals: HR 90, SpO2 98, RR 22, /70. Size 8 Portex trach is patent and secure; Ambu bag and mask at bedside.

## 2018-11-08 NOTE — PROGRESS NOTES
Patient remains on life support at this time and therefore in unable to communicate at this time.  offered prayer and left Spiritual Care brochure. Chaplains will continue to follow and will provide pastoral care on an as needed/requested basis. Dyaton Baker Board Certified [employer] Spiritual Care  
[phone]

## 2018-11-08 NOTE — PROGRESS NOTES
0730  Assumed care of pt from [facility], [address] [place] [address]. Pt with eyes open, no response to commands. Trach to ventilator, VSS, TF infusing per PEG tube. Pt appears comfortable. 1200  Family greeted at bedside. No questions asked or needs expressed. 1900  Bedside and Verbal shift change report given to Darlene Cordero (oncoming nurse) by Mark Salazar RN (offgoing nurse). Report included the following information SBAR, Kardex, Intake/Output, Cardiac Rhythm SR and Alarm Parameters .

## 2018-11-08 NOTE — ROUTINE PROCESS
Bedside and Verbal shift change report given to [facility] (oncoming nurse) by Neil Morrison RN 
 (offgoing nurse). Report included the following information SBAR, Kardex, Intake/Output, MAR, Accordion and Recent Results.

## 2018-11-08 NOTE — PROGRESS NOTES
Hospitalist Progress Note Daily Progress Note: 11/8/2018  
   
Interval history / Subjective:  
Aguilar Talamantes is a 57y.o. year old male who presented from [facility] with abnormal labs, elevated BUN/Cr. Found to have JULIANNA, UTI, and markedly elevated LFT on presentation. Patient's recent admission was 9/10/18-9/14/18 for acute CVA and rhabdo. Patient poor historian on admission,stated \"I had heart failure. She was started on vanc,zosyn. On 9/29,patient had cardiac arrest with ventricular fibrillation - s/p ROSC. Her hospital course has since then complicated with sepsis,pancreatitits,pneumonia,acute pancreatitis requiring involvement of gi,surgery,nephrology,cardiology,ID,cardiology. So far patient has remained without major improvement. Now in vegetative state. Patient had PEG/Trach placement on 10/17. On 10/17,patient started on acyclovir for veicular rash rt arm,suspected being Zoster. Lipase 2169. US abd 10/22 shows Abnormal appearance of the gallbladder with stones/sludge with wall thickening  
and possible pericholecystic fluid similar to the prior study of 9/27/2018 
10/22: Patient on treatment for shingles in contact isolation per ID team. He also had left big toe gangrene . We will consult Podiatry. His lipase was elevated , US of the abdomen shows sludge and possible cholecystitis. Patient afebrile. GI consulted  for possible MRCP. Patient has leukocytosis. ID started Vancomycin and ordered Urine , blood and sputum culture and trach site discharge. Patient off air bourne precaution but continue contact precaution per ID. Patient was started on Vancomycin and cefepime for SIRSper ID. Blood and urine cx pending. Patient today is going for jejunostomy. Patient BC NGTD Vancomycin d/c'd per ID. Patient respiratory  cx + GNR , STREPT, Wound cx + pseudomonas . PCCM added Ciprofloxacin today . IR attempt at Jejunostomy was unsuccessful yesterday. 10/28: Fever today. Resp cx, wound cx noted. On cefepime and Cipro for coverage per sensitivisty result . Blood cx NGTD . Lipase still trending up. IR unsuccessful attempt at jejunostomy 10/26. Will re consult GI today 10/29:no change in care. 10/30:no change in care 10/31:pt underwent dialysis today. Currently on cefepime only. Patient is still in the hospital,waiting for placement. 11/1:continue current treatment 11/2:ID has recommended line culture(picc line) as patient had elevated temp today 11/3:still running fever 101.8 today. Tip of picc line cx results pending. 11/4:Tip of catheter culture showing staph coag negative 11/5: Daughter request to d/c dialysis. Nephrology signed off today 11/6: Daughter made patient a DNR . She endorsed Hospice yesterday. Hospice following patient now. PCCM recommend  withdrawalal of Vent support pending discussion of Hospice with POA Current Facility-Administered Medications Medication Dose Route Frequency  LORazepam (ATIVAN) injection 1 mg  1 mg IntraVENous Q15MIN PRN  
 LORazepam (INTENSOL) 2 mg/mL oral concentrate 1 mg  1 mg Oral Q1H PRN  
 morphine 10 mg/ml injection 10 mg  10 mg Inhalation Q1H PRN  
 morphine injection 2 mg  2 mg IntraVENous Q15MIN PRN  
 scopolamine (TRANSDERM-SCOP) 1 mg over 3 days 1 Patch  1 Patch TransDERmal Q72H PRN  
 insulin glargine (LANTUS) injection 30 Units  30 Units SubCUTAneous DAILY  amLODIPine (NORVASC) tablet 5 mg  5 mg Oral DAILY  0.9% sodium chloride infusion 250 mL  250 mL IntraVENous PRN  
 albuterol (PROVENTIL VENTOLIN) nebulizer solution 2.5 mg  2.5 mg Nebulization BID RT  
 multivitamin (MULTI-DELYN, WELLESSE) oral liquid 30 mL  30 mL Oral DAILY  bacitracin 500 unit/gram packet 1 Packet  1 Packet Topical PRN  
 sodium chloride (NS) flush 10-30 mL  10-30 mL InterCATHeter PRN  
 sodium chloride (NS) flush 10 mL  10 mL InterCATHeter PRN  
  heparin (porcine) injection 5,000 Units  5,000 Units SubCUTAneous Q8H  
 hydroxypropyl methylcellulose (ISOPTO TEARS) 0.5 % ophthalmic solution 2 Drop  2 Drop Both Eyes BID  pantoprazole (PROTONIX) 40 mg in sodium chloride 0.9% 10 mL injection  40 mg IntraVENous Q24H  
 influenza vaccine - (6 mos+)(PF) (FLUARIX QUAD/FLULAVAL QUAD) injection 0.5 mL  0.5 mL IntraMUSCular PRIOR TO DISCHARGE  acetaminophen (TYLENOL) solution 325 mg  325 mg Per NG tube Q4H PRN  
 heparin (porcine) pf 100 Units  100 Units InterCATHeter PRN  
 insulin lispro (HUMALOG) injection   SubCUTAneous Q6H  
 heparin (porcine) 1,000 unit/mL injection 1,000 Units  1,000 Units InterCATHeter DIALYSIS PRN  
 senna-docusate (PERICOLACE) 8.6-50 mg per tablet 1 Tab  1 Tab Oral BID PRN  
 ondansetron (ZOFRAN) injection 4 mg  4 mg IntraVENous Q4H PRN  
 glucose chewable tablet 16 g  4 Tab Oral PRN  
 glucagon (GLUCAGEN) injection 1 mg  1 mg IntraMUSCular PRN  
 dextrose (D50) infusion 12.5-25 g  25-50 mL IntraVENous PRN  
 hydrALAZINE (APRESOLINE) 20 mg/mL injection 20 mg  20 mg IntraVENous Q6H PRN Review of Systems Unresponsive Objective:  
 
Visit Vitals /66 Pulse 96 Temp 98.3 °F (36.8 °C) Resp (!) 36 Ht 5' 7\" (1.702 m) Wt 73.3 kg (161 lb 9.6 oz) SpO2 98% BMI 25.31 kg/m² O2 Flow Rate (L/min): 45 l/min O2 Device: Tracheostomy, Ventilator Temp (24hrs), Av.5 °F (36.9 °C), Min:98 °F (36.7 °C), Max:98.8 °F (37.1 °C) 701 - 1900 In: 79 Out: 400 [Urine:400] 1901 - 700 In: 9687 [I.V.:120] Out: 1330 [Urine:900; Drains:430] P/E General Appearance:S/p PEG/Trach HENT: normocephalic/atraumatic, + ETT Neck: No JVD, hematoma R side where [facility] is improving Lungs: Coarse B/L w/ vent-assisted BS 
CV: RRR, no m/r/g Abdomen: soft, non-tender, normal bowel sounds Extremities: versicular rash rt arm and right side of chest,no cyanosis, no peripheral edema Neuro: Unresponsive to commands, no withdrawal to pain, + corneal reflex and pupillary reaction today Skin: Normal color, intact Data Review Recent Results (from the past 12 hour(s)) METABOLIC PANEL, BASIC Collection Time: 11/08/18  4:10 AM  
Result Value Ref Range Sodium 138 136 - 145 mmol/L Potassium 5.5 3.5 - 5.5 mmol/L Chloride 102 100 - 108 mmol/L  
 CO2 21 21 - 32 mmol/L Anion gap 15 3.0 - 18 mmol/L Glucose 170 (H) 74 - 99 mg/dL  (H) 7.0 - 18 MG/DL Creatinine 6.63 (H) 0.6 - 1.3 MG/DL  
 BUN/Creatinine ratio 21 (H) 12 - 20 GFR est AA 10 (L) >60 ml/min/1.73m2 GFR est non-AA 8 (L) >60 ml/min/1.73m2 Calcium 7.6 (L) 8.5 - 10.1 MG/DL  
GLUCOSE, POC Collection Time: 11/08/18  5:29 AM  
Result Value Ref Range Glucose (POC) 176 (H) 70 - 110 mg/dL GLUCOSE, POC Collection Time: 11/08/18  9:06 AM  
Result Value Ref Range Glucose (POC) 212 (H) 70 - 110 mg/dL Assessment/Plan:  
 
Principal Problem: 
  Cardiac arrest with ventricular fibrillation ([facility] (9/29/2018) Overview: ROSC before defibrillation could be attempted. Active Problems: 
  Essential hypertension (9/10/2018) Diabetes mellitus type 2, controlled ([facility] (9/10/2018) History of stroke (9/27/2018) Overview: With residual R hemiparesis Acute-on-chronic kidney injury ([facility] (9/27/2018) Acute cystitis (9/27/2018) Elevated troponin (9/27/2018) Elevated LFTs (9/28/2018) Acute pancreatitis (9/29/2018) Overview: Lipase downtrends with interruption in tube feed and Mucomyst. Restarting  
    tube feed did result in modest elevation but not to the levels seen while  
    on Mucomyst. Nonetheless, there is signficant decrease in lipase levels  
    with interruptions in tube feeding. Ischemic encephalopathy (9/30/2018) Hypoalbuminemia (10/21/2018) Shingles (10/21/2018) Gangrene ([facility] (10/22/2018) Pseudomonas infection (10/27/2018) Zoster. Care Plan - Vent mgmt per ICU - S/p trach/PEG on 10/17 as patient did not show significant improvement. 
- EGD showed large clot in esophagus last week repeat EGD 10/9 shows healed esophageal ulcer - Cont protonix IV every day  
- Hgb stable - LFTs improving - GI signed off 10/9 
- MRI w/ evidence of severe anoxic brain injury - Minimal improvement on neuro exam 
- Appreciate neuro assistance - IV Meropenem and IV Fluconazole d/heron on 10/15 per ID. 
- Currently on vanc only which was started on 9/27 
- Nephro managing HD TTHS 
- Pt unlikely to have any meaningful neuro recovery, prognosis is very grim - Family wanted to cont to do everything, including trach/PEG -> was done10/17 
- On 10/17:Started on acyclovir for possible Zoster rt arm and rt upper chest area. - Hyperkalemia on 10/18 ->corrected - Lipase 2169 10/22. US abd Abnormal appearance of the gallbladder with stones/sludge with wall thickening  
and possible pericholecystic fluid similar to the prior study of 9/27/2018  
- On Acyclovir for shingles , contact precaution per ID commitee 
- 10/22: Consult GI today for possible MRCP 
- 10/22: Consult Podiatry for left big toe gangrene  
- 10/23 : Podiatry recommend RAQUEL for Left Big toe  
- 10/23: GI recommend MRI/MRCP but will need patient off vent for multiple times for procedure. Not sure if this is feasible now 
- 10/25 : Blood , urine , sputum , trach discharge cultures 
- 10/25 IV antibiotics by ID  
- Change GI tube to Jejunostomy tube tomorrow by IR  
- 10/26: Vancomycin and Cefepime per ID  
- 10/26 : Jejunostomy attempt by IR unsuccessful . Will need to discuss with GI  
- 10/27 : Ciprofloxacin added 10/27 for double coverage of pseudomonas with cefepime per PCCM  
- 10/28: Pancreatitis ? Biliary  . Will reconsult GI today - Lipase trending down. 
-11/2:culture of picc line since pt has developed fever. -11/3:still febrile,picc line tip cx results pending;will follow ID recommendations. -11/4:Cx of tip of catheter showing staph coag negative. Pt still spiking fever - will follow ID recommendations. - 11/5: Nephrology signed off . At daughters request will D/C dialysis. She per Nephrology is well aware if implications of stopping dialysis - Per ID continue cefepime for total 14 days and Vancomycin  added for 7 days 11/6: DNR and Hospice consulted after I personally spoke with her. 11/7: Nephrology and ID signed off. Hospice following patient now 11/8: Patient on comfort measures only - Morphine , scopolamine and ativan ordered per protocol . Per report Mech vent removal pending arrival of POA Friday or Saturday DVT prophylaxis:scds DNR per daughter's request today 11/6 Disposition: Hospice -  will work on placement

## 2018-11-08 NOTE — PROGRESS NOTES
Pulmonary progress note 
   
History 20-year-old male recently hospitalized for stroke with residual right hemiparesis.  He was admitted on 9/26/18 with acute renal failure.  On 9/29  the patient rapidly became hypotensive and was resuscitated.  The patient suffered a catastrophic neurological injury. Tracheostomy 10/17 and exchanged 10/27. Subjective Cannot contribute to recent history due to clinical status Afebrile 140/70. Low 90s Neuro:    Pupils non-reactive. Labs: K 5.5, , Cr 6.63 Assessment Cardiopulmonary arrest with severe neurologic injury and encephalopathy Chronic respiratory failure s/p tracheostomy Acute hemodialysis dependent renal failure Elevated lipase Dysphasia secondary to altered mental status with PEG tube on 10/17/18 Plan Power of  has declined further dialysis and agrees to DNR. She declines extubation until she presents in the next few days. Continue with current care No change in mechanical ventilation. Minimize lab draws and similar concerns since we have effectively decided to slowly w/d care by stopping dialysis. Please see Humberto Shaver NP note for further details.

## 2018-11-08 NOTE — INTERDISCIPLINARY ROUNDS
IDR/SLIDR Summary Patient: Cari Morgan MRN: [id number]    Age: 57 y.o. YOB: 1955 Room/Bed: [address] [place] [address] Admit Diagnosis: Pneumonia of both lower lobes due to infectious organism ([facility] [J18.1] ESRD (end stage renal disease) ([facility] [N18.6] ESRD (end stage renal disease) ([facility] [N18.6]  Principal Diagnosis: Cardiac arrest with ventricular fibrillation ([facility] Goals: Comfort care, awaiting family to arrive Readmission: NO  Quality Measure: Not applicable VTE Prophylaxis: Chemical 
Influenza Vaccine screening completed? YES Signed:  
 
Samantha Strickland 
11/8/2018 12:31 PM

## 2018-11-08 NOTE — PROGRESS NOTES
VENTILATOR Care plan 
 
Problem: Ventilator Management Goal: *Adequate oxygenation/ ventilation/ and extubation Patient: 
   
 
Jason Simpson     57 y.o.   male     11/8/2018  4:57 PM 
Patient Active Problem List  
Diagnosis Code  Stroke (cerebrum) ([facility]) I63.9  Stroke ([facility] I63.9  Rhabdomyolysis M62.82  
 Dehydration E86.0  
 Essential hypertension I10  
 Diabetes mellitus type 2, controlled ([facility] E11.9  Non compliance w medication regimen Z91.14  
 DM (diabetes mellitus) ([facility] E11.9  History of stroke Z80.78  
 Acute-on-chronic kidney injury ([facility] N17.9, N18.9  Acute cystitis N30.00  Elevated troponin R74.8  Elevated LFTs R94.5  Cardiac arrest with ventricular fibrillation ([facility]) I46.9, I49.01  
 Acute pancreatitis K85.90  
 Ischemic encephalopathy I67.89  
 Hypoalbuminemia E88.09  
 Shingles B02.9  Gangrene ([facility] [id number]  Pseudomonas infection B96.5 Pneumonia of both lower lobes due to infectious organism ([facility] [J18.1] ESRD (end stage renal disease) ([facility] [N18.6] ESRD (end stage renal disease) ([facility] [N18.6] Reason patient intubated: Encephalopathy, airway protection Ventilator day: 41 Ventilator settings: SIMV/VC+, rate 12, , PS 10, PEEP 5, FiO2 0.21 
 
ETT Size/Placement: #8 Portex trach ABG: 
Date:11/8/2018 No results found for: PH, PHI, PCO2, PCO2I, PO2, PO2I, HCO3, HCO3I, FIO2, FIO2I Chest X-ray: 
Date:11/8/2018 Results from [facility] Encounter encounter on 09/26/18 XR CHEST PORT Narrative EXAM: Portable Chest 
 
CLINICAL INDICATION:  trach change; atelectasis -Additional:  None COMPARISON:  10/24/18 TECHNIQUE: AP portable view at [address],[place] 
 
______________ FINDINGS: 
 
HEART AND MEDIASTINUM:  The heart size is stable LUNGS AND AIRWAYS:  Right chest perihilar infiltrate is present PLEURA:  No pneumothorax or pleural effusion BONES:  Vertebral spondylosis OTHER:  None 
 
______________ Impression IMPRESSION: 
 
Right lung perihilar infiltrate appears present Lab Test: 
Date:11/8/2018 WBC:  
Lab Results Component Value Date/Time WBC 11.4 11/05/2018 04:00 AM  
HGB:  
Lab Results Component Value Date/Time HGB 8.5 (L) 11/05/2018 04:00 AM  
 PLTS:  
Lab Results Component Value Date/Time PLATELET 578 (H) 12/18/8721 04:00 AM  
 
 
SaO2%/flow: @LASTSAO2(1)@ Vital Signs:    
Patient Vitals for the past 8 hrs: 
 Pulse Resp BP SpO2  
11/08/18 1600 (!) 101 22 (!) 87/58 96 % 11/08/18 1521 94 (!) 34  96 % 11/08/18 1500 99 (!) 35 140/74 98 % 11/08/18 1400 98 (!) 32 141/70 98 % 11/08/18 1300 (!) 111 (!) 34 103/50 93 % 11/08/18 1200 96 (!) 36 143/66 98 % 11/08/18 1103 92 28  100 % 11/08/18 1100 92 25 127/58 96 % 11/08/18 1000 94 24 132/65 98 % 11/08/18 0900 95 27 139/64 98 % Wean Screen Pass (Yes or No): Yes Wean Screen Reason for Failure: N/A Duration of Weaning Trial: 7 hrs 9 mins Additional Comments: Patient is comfort care. Dialysis has been discontinued. POA will arrive Friday or Saturday. Plan is to discontinue vent. PLAN OF CARE: Continue plan of care, per MD. 
  
GOAL: [address] OUTCOME: Patient remains trached on mechanical ventilation.

## 2018-11-09 NOTE — PROGRESS NOTES
Physical Exam  
Skin:  
 
  
 Primary Nurse Francy Kirkland, RN and frank johnson rn rn, RN performed a dual skin assessment on this patient .

## 2018-11-09 NOTE — PROGRESS NOTES
Physical Exam  
Skin:  
 
  
 Primary Nurse Ry Fu, RN and gregorio suazo rn rn, RN performed a dual skin assessment on this patient .

## 2018-11-09 NOTE — PROGRESS NOTES
VENTILATOR Care plan 
 
Problem: Ventilator Management Goal: *Adequate oxygenation/ ventilation/ and extubation Patient: 
   
 
Sandee Lopez     57 y.o.   male     11/9/2018  11:54 AM 
Patient Active Problem List  
Diagnosis Code  Stroke (cerebrum) ([facility]) I63.9  Stroke ([facility] I63.9  Rhabdomyolysis M62.82  
 Dehydration E86.0  
 Essential hypertension I10  
 Diabetes mellitus type 2, controlled ([facility] E11.9  Non compliance w medication regimen Z91.14  
 DM (diabetes mellitus) ([facility] E11.9  History of stroke Z80.78  
 Acute-on-chronic kidney injury ([facility] N17.9, N18.9  Acute cystitis N30.00  Elevated troponin R74.8  Elevated LFTs R94.5  Cardiac arrest with ventricular fibrillation ([facility]) I46.9, I49.01  
 Acute pancreatitis K85.90  
 Ischemic encephalopathy I67.89  
 Hypoalbuminemia E88.09  
 Shingles B02.9  Gangrene ([facility] [id number]  Pseudomonas infection B96.5 Pneumonia of both lower lobes due to infectious organism ([facility] [J18.1] ESRD (end stage renal disease) ([facility] [N18.6] ESRD (end stage renal disease) ([facility] [N18.6] Reason patient intubated: code blue , Ventilator day: 
 
Ventilator settings: simv ETT Size/Placement: tracheostomy size 8 10/17/2018 ABG: 
Date:11/9/2018 No results found for: PH, PHI, PCO2, PCO2I, PO2, PO2I, HCO3, HCO3I, FIO2, FIO2I Chest X-ray: 
Date:11/9/2018 Results from [facility] Encounter encounter on 09/26/18 XR CHEST PORT Narrative EXAM: Portable Chest 
 
CLINICAL INDICATION:  trach change; atelectasis -Additional:  None COMPARISON:  10/24/18 TECHNIQUE: AP portable view at [address],[place] 
 
______________ FINDINGS: 
 
HEART AND MEDIASTINUM:  The heart size is stable LUNGS AND AIRWAYS:  Right chest perihilar infiltrate is present PLEURA:  No pneumothorax or pleural effusion BONES:  Vertebral spondylosis OTHER:  None 
 
______________  Impression IMPRESSION: 
 
 Right lung perihilar infiltrate appears present Lab Test: 
Date:11/9/2018 WBC:  
Lab Results Component Value Date/Time WBC 11.4 11/05/2018 04:00 AM  
HGB:  
Lab Results Component Value Date/Time HGB 8.5 (L) 11/05/2018 04:00 AM  
 PLTS:  
Lab Results Component Value Date/Time PLATELET 667 (H) 12/56/8219 04:00 AM  
 
 
SaO2%/flow: @LASTSAO2(1)@ Vital Signs:    
Patient Vitals for the past 8 hrs: 
 Temp Pulse Resp BP SpO2  
11/09/18 1146  91 25  100 % 11/09/18 1100  88 19 154/73 99 % 11/09/18 1000  90 22 111/47 99 % 11/09/18 0900  91 25 107/53 (!) 89 % 11/09/18 0800  88 23 106/52 94 % 11/09/18 0756  90 25  100 % 11/09/18 0700  88 18 134/73 97 % 11/09/18 0600  89 19 139/69 96 % 11/09/18 0500  88 18 128/69 97 % 11/09/18 0423  89 19  100 % 11/09/18 0400 98.4 °F (36.9 °C) 90 22 141/68 98 % Wean Screen Pass (Yes or No): no 
Wean Screen Reason for Failure: per MD 
Duration of Weaning Trial: 
Additional Comments: PLAN OF CARE: family  Meeting for possible comfort care GOAL: comfort care OUTCOME:

## 2018-11-09 NOTE — ROUTINE PROCESS
1930 Bedside shift change report given to miky rn (oncoming nurse) by Velna Cabot rn (offgoing nurse). Report included the following information SBAR, Kardex and Recent Results. Dual skin and piv site check off completed. 2000  Assessment completed. Oral and mery-care completed. 2200 repositioned for comfort. Bryan Matos notified of pssible plans to remove from vent on Friday evening or Saturday once daughter arrives and talks with doctors. MS [facility] states to call [employer] back prior to removing on thae day of. 
0000 reassessment completed. Oral, incontinent care, bed bath, trach, and  mery-care completed. incintinet of urine  Cleansed. 0200 resting in bed. 
0400 reassessment completed. Oral and mery-care completed. Trach care completed. 0600 resting in bed, monitor patient status. 4570 Bedside shift change report given to marcell rn (oncoming nurse) by miky rn(offgoing nurse). Report included the following information SBAR, Kardex and Recent Results. Hob elevated 30 degrees. Side rails up x 3, call light within reach.

## 2018-11-09 NOTE — INTERDISCIPLINARY ROUNDS
Awaiting family with plan to withdraw ventilator support. Lantus 30 units given will wait 24 hours to stop tube feeding.

## 2018-11-09 NOTE — PROGRESS NOTES
Problem: Falls - Risk of 
Goal: *Absence of Falls Document [place] Officer Fall Risk and appropriate interventions in the flowsheet. Outcome: Progressing Towards Goal 
Fall Risk Interventions: 
Mobility Interventions: Bed/chair exit alarm, Communicate number of staff needed for ambulation/transfer Mentation Interventions: Bed/chair exit alarm, Adequate sleep, hydration, pain control, Door open when patient unattended, More frequent rounding, Reorient patient, Update white board, Room close to nurse's station Medication Interventions: Bed/chair exit alarm, Evaluate medications/consider consulting pharmacy Elimination Interventions: Bed/chair exit alarm, Call light in reach, Toileting schedule/hourly rounds History of Falls Interventions: Door open when patient unattended, Bed/chair exit alarm, Investigate reason for fall, Room close to nurse's station Problem: General Medical Care Plan Goal: *Optimal pain control at patient's stated goal 
Outcome: Progressing Towards Goal 
Pt unable to verbalize  Pain level, visual no signs of pain 
 
Problem: Pressure Injury - Risk of 
Goal: *Prevention of pressure injury Document Mitul Scale and appropriate interventions in the flowsheet. Outcome: Progressing Towards Goal 
Pressure Injury Interventions: 
Sensory Interventions: Assess changes in LOC, Assess need for specialty bed, Check visual cues for pain, Float heels, Keep linens dry and wrinkle-free, Minimize linen layers, Pressure redistribution bed/mattress (bed type), Turn and reposition approx. every two hours (pillows and wedges if needed), Use 30-degree side-lying position Moisture Interventions: Absorbent underpads, Apply protective barrier, creams and emollients, Check for incontinence Q2 hours and as needed, Minimize layers, Maintain skin hydration (lotion/cream) Activity Interventions: Pressure redistribution bed/mattress(bed type) Mobility Interventions: Float heels, Pressure redistribution bed/mattress (bed type), HOB 30 degrees or less Nutrition Interventions: Document food/fluid/supplement intake Friction and Shear Interventions: HOB 30 degrees or less, Apply protective barrier, creams and emollients, Minimize layers

## 2018-11-10 NOTE — PROGRESS NOTES
Problem: Falls - Risk of 
Goal: *Absence of Falls Document Shanell Montelongo Fall Risk and appropriate interventions in the flowsheet. Outcome: Progressing Towards Goal 
Fall Risk Interventions: 
Mobility Interventions: Bed/chair exit alarm, Patient to call before getting OOB Mentation Interventions: Adequate sleep, hydration, pain control, Bed/chair exit alarm, Door open when patient unattended, Evaluate medications/consider consulting pharmacy, More frequent rounding, Reorient patient, Room close to nurse's station, Toileting rounds, Update white board Medication Interventions: Bed/chair exit alarm, Evaluate medications/consider consulting pharmacy, Patient to call before getting OOB Elimination Interventions: Bed/chair exit alarm, Call light in reach, Elevated toilet seat, Toileting schedule/hourly rounds History of Falls Interventions: Bed/chair exit alarm, Consult care management for discharge planning, Door open when patient unattended, Room close to nurse's station Problem: Patient Education: Go to Patient Education Activity Goal: Patient/Family Education Outcome: Not Progressing Towards Goal 
Patient unresponsive. Will explain care in hopes he understands. Problem: General Medical Care Plan Goal: *Optimal pain control at patient's stated goal 
Outcome: Progressing Towards Goal 
Patient unresponisve will monitor for s/s pain. Goal: *Optimize nutritional status Outcome: Progressing Towards Goal 
On tube feedings. Problem: Pressure Injury - Risk of 
Goal: *Prevention of pressure injury Document Mitul Scale and appropriate interventions in the flowsheet. Outcome: Progressing Towards Goal 
Pressure Injury Interventions: 
Sensory Interventions: Assess changes in LOC, Assess need for specialty bed, Use 30-degree side-lying position, Turn and reposition approx.  every two hours (pillows and wedges if needed), Pressure redistribution bed/mattress (bed type), Minimize linen layers, Maintain/enhance activity level, Keep linens dry and wrinkle-free, Float heels Moisture Interventions: Absorbent underpads, Apply protective barrier, creams and emollients, Check for incontinence Q2 hours and as needed, Contain wound drainage, Moisture barrier, Minimize layers Activity Interventions: Pressure redistribution bed/mattress(bed type) Mobility Interventions: HOB 30 degrees or less, Pressure redistribution bed/mattress (bed type), Turn and reposition approx. every two hours(pillow and wedges) Nutrition Interventions: Document food/fluid/supplement intake Friction and Shear Interventions: Apply protective barrier, creams and emollients, Foam dressings/transparent film/skin sealants, HOB 30 degrees or less, Minimize layers

## 2018-11-10 NOTE — PROGRESS NOTES
Hospitalist Progress Note Daily Progress Note: 11/10/2018  
   
Interval history / Subjective:  
Tulio Seo is a 57y.o. year old male who presented from [facility] with abnormal labs, elevated BUN/Cr. Found to have JULIANNA, UTI, and markedly elevated LFT on presentation. Patient's recent admission was 9/10/18-9/14/18 for acute CVA and rhabdo. Patient poor historian on admission,stated \"I had heart failure. She was started on vanc,zosyn. On 9/29,patient had cardiac arrest with ventricular fibrillation - s/p ROSC. Her hospital course has since then complicated with sepsis,pancreatitits,pneumonia,acute pancreatitis requiring involvement of gi,surgery,nephrology,cardiology,ID,cardiology. So far patient has remained without major improvement. Now in vegetative state. Patient had PEG/Trach placement on 10/17. On 10/17,patient started on acyclovir for veicular rash rt arm,suspected being Zoster. Lipase 2169. US abd 10/22 shows Abnormal appearance of the gallbladder with stones/sludge with wall thickening  
and possible pericholecystic fluid similar to the prior study of 9/27/2018 
10/22: Patient on treatment for shingles in contact isolation per ID team. He also had left big toe gangrene . We will consult Podiatry. His lipase was elevated , US of the abdomen shows sludge and possible cholecystitis. Patient afebrile. GI consulted  for possible MRCP. Patient has leukocytosis. ID started Vancomycin and ordered Urine , blood and sputum culture and trach site discharge. Patient off air bourne precaution but continue contact precaution per ID. Patient was started on Vancomycin and cefepime for SIRSper ID. Blood and urine cx pending. Patient today is going for jejunostomy. Patient BC NGTD Vancomycin d/c'd per ID. Patient respiratory  cx + GNR , STREPT, Wound cx + pseudomonas . PCCM added Ciprofloxacin today . IR attempt at Jejunostomy was unsuccessful yesterday. 10/28: Fever today. Resp cx, wound cx noted. On cefepime and Cipro for coverage per sensitivisty result . Blood cx NGTD . Lipase still trending up. IR unsuccessful attempt at jejunostomy 10/26. Will re consult GI today 10/29:no change in care. 10/30:no change in care 10/31:pt underwent dialysis today. Currently on cefepime only. Patient is still in the hospital,waiting for placement. 11/1:continue current treatment 11/2:ID has recommended line culture(picc line) as patient had elevated temp today 11/3:still running fever 101.8 today. Tip of picc line cx results pending. 11/4:Tip of catheter culture showing staph coag negative 11/5: Daughter request to d/c dialysis. Nephrology signed off today 11/6: Daughter made patient a DNR . She endorsed Hospice yesterday. Hospice following patient now. PCCM recommend  withdrawalal of Vent support pending discussion of Hospice with POA Current Facility-Administered Medications Medication Dose Route Frequency  LORazepam (ATIVAN) injection 1 mg  1 mg IntraVENous Q15MIN PRN  
 LORazepam (INTENSOL) 2 mg/mL oral concentrate 1 mg  1 mg Oral Q1H PRN  
 morphine 10 mg/ml injection 10 mg  10 mg Inhalation Q1H PRN  
 morphine injection 2 mg  2 mg IntraVENous Q15MIN PRN  
 scopolamine (TRANSDERM-SCOP) 1 mg over 3 days 1 Patch  1 Patch TransDERmal Q72H PRN  
 amLODIPine (NORVASC) tablet 5 mg  5 mg Oral DAILY  0.9% sodium chloride infusion 250 mL  250 mL IntraVENous PRN  
 albuterol (PROVENTIL VENTOLIN) nebulizer solution 2.5 mg  2.5 mg Nebulization BID RT  
 multivitamin (MULTI-DELYN, WELLESSE) oral liquid 30 mL  30 mL Oral DAILY  bacitracin 500 unit/gram packet 1 Packet  1 Packet Topical PRN  
 sodium chloride (NS) flush 10-30 mL  10-30 mL InterCATHeter PRN  
 sodium chloride (NS) flush 10 mL  10 mL InterCATHeter PRN  
 heparin (porcine) injection 5,000 Units  5,000 Units SubCUTAneous Q8H  
  hydroxypropyl methylcellulose (ISOPTO TEARS) 0.5 % ophthalmic solution 2 Drop  2 Drop Both Eyes BID  pantoprazole (PROTONIX) 40 mg in sodium chloride 0.9% 10 mL injection  40 mg IntraVENous Q24H  
 acetaminophen (TYLENOL) solution 325 mg  325 mg Per NG tube Q4H PRN  
 heparin (porcine) pf 100 Units  100 Units InterCATHeter PRN  
 heparin (porcine) 1,000 unit/mL injection 1,000 Units  1,000 Units InterCATHeter DIALYSIS PRN  
 senna-docusate (PERICOLACE) 8.6-50 mg per tablet 1 Tab  1 Tab Oral BID PRN  
 ondansetron (ZOFRAN) injection 4 mg  4 mg IntraVENous Q4H PRN  
 glucose chewable tablet 16 g  4 Tab Oral PRN  
 glucagon (GLUCAGEN) injection 1 mg  1 mg IntraMUSCular PRN  
 dextrose (D50) infusion 12.5-25 g  25-50 mL IntraVENous PRN  
 hydrALAZINE (APRESOLINE) 20 mg/mL injection 20 mg  20 mg IntraVENous Q6H PRN Review of Systems Unresponsive Objective:  
 
Visit Vitals /62 Pulse 90 Temp 98.9 °F (37.2 °C) Resp 17 Ht 5' 7\" (1.702 m) Wt 84.1 kg (185 lb 6.4 oz) SpO2 100% BMI 29.04 kg/m² O2 Flow Rate (L/min): 45 l/min O2 Device: Tracheostomy, Ventilator Temp (24hrs), Av.4 °F (36.9 °C), Min:98 °F (36.7 °C), Max:98.9 °F (37.2 °C) No intake/output data recorded.  1901 - 11/10 0700 In: 1210 [I.V.:10] Out: 12 [Urine:900; Drains:205] P/E General Appearance:S/p PEG/Trach HENT: normocephalic/atraumatic, + ETT Neck: No JVD, hematoma R side where [facility] is improving Lungs: Coarse B/L w/ vent-assisted BS 
CV: RRR, no m/r/g Abdomen: soft, non-tender, normal bowel sounds Extremities: versicular rash rt arm and right side of chest,no cyanosis, no peripheral edema Neuro: Unresponsive to commands, no withdrawal to pain, + corneal reflex and pupillary reaction today Skin: Normal color, intact Data Review Recent Results (from the past 12 hour(s)) GLUCOSE, POC Collection Time: 11/10/18  6:08 AM  
Result Value Ref Range Glucose (POC) 177 (H) 70 - 110 mg/dL Assessment/Plan:  
 
Principal Problem: 
  Cardiac arrest with ventricular fibrillation ([facility] [address] (9/29/2018) Overview: ROSC before defibrillation could be attempted. Active Problems: 
  Essential hypertension (9/10/2018) Diabetes mellitus type 2, controlled ([facility] [address] (9/10/2018) History of stroke (9/27/2018) Overview: With residual R hemiparesis Acute-on-chronic kidney injury ([facility] [address] (9/27/2018) Acute cystitis (9/27/2018) Elevated troponin (9/27/2018) Elevated LFTs (9/28/2018) Acute pancreatitis (9/29/2018) Overview: Lipase downtrends with interruption in tube feed and Mucomyst. Restarting  
    tube feed did result in modest elevation but not to the levels seen while  
    on Mucomyst. Nonetheless, there is signficant decrease in lipase levels  
    with interruptions in tube feeding. Ischemic encephalopathy (9/30/2018) Hypoalbuminemia (10/21/2018) Shingles (10/21/2018) Gangrene ([facility] [address] (10/22/2018) Pseudomonas infection (10/27/2018) Zoster. Care Plan - Vent mgmt per ICU - S/p trach/PEG on 10/17 as patient did not show significant improvement. 
- EGD showed large clot in esophagus last week repeat EGD 10/9 shows healed esophageal ulcer - Cont protonix IV every day  
- Hgb stable - LFTs improving - GI signed off 10/9 
- MRI w/ evidence of severe anoxic brain injury - Minimal improvement on neuro exam 
- Appreciate neuro assistance - IV Meropenem and IV Fluconazole d/heron on 10/15 per ID. 
- Currently on vanc only which was started on 9/27 
- Nephro managing HD TTHS 
- Pt unlikely to have any meaningful neuro recovery, prognosis is very grim - Family wanted to cont to do everything, including trach/PEG -> was done10/17 
- On 10/17:Started on acyclovir for possible Zoster rt arm and rt upper chest area. - Hyperkalemia on 10/18 ->corrected - Lipase 2169 10/22. US abd Abnormal appearance of the gallbladder with stones/sludge with wall thickening  
and possible pericholecystic fluid similar to the prior study of 9/27/2018  
- On Acyclovir for shingles , contact precaution per ID commitee 
- 10/22: Consult GI today for possible MRCP 
- 10/22: Consult Podiatry for left big toe gangrene  
- 10/23 : Podiatry recommend RAQUEL for Left Big toe  
- 10/23: GI recommend MRI/MRCP but will need patient off vent for multiple times for procedure. Not sure if this is feasible now 
- 10/25 : Blood , urine , sputum , trach discharge cultures 
- 10/25 IV antibiotics by ID  
- Change GI tube to Jejunostomy tube tomorrow by IR  
- 10/26: Vancomycin and Cefepime per ID  
- 10/26 : Jejunostomy attempt by IR unsuccessful . Will need to discuss with GI  
- 10/27 : Ciprofloxacin added 10/27 for double coverage of pseudomonas with cefepime per PCCM  
- 10/28: Pancreatitis ? Biliary  . Will reconsult GI today - Lipase trending down. 
-11/2:culture of picc line since pt has developed fever. -11/3:still febrile,picc line tip cx results pending;will follow ID recommendations. -11/4:Cx of tip of catheter showing staph coag negative. Pt still spiking fever - will follow ID recommendations. - 11/5: Nephrology signed off . At daughters request will D/C dialysis. She per Nephrology is well aware if implications of stopping dialysis - Per ID continue cefepime for total 14 days and Vancomycin  added for 7 days 11/6: DNR and Hospice consulted after I personally spoke with her. 11/7: Nephrology and ID signed off. Hospice following patient now 11/8: Patient on comfort measures only - Morphine , scopolamine and ativan ordered per protocol . Per report Mech vent removal pending arrival of POA Friday or Saturday DVT prophylaxis:scds DNR per daughter's request today 11/6 Disposition: Hospice -  will work on placement

## 2018-11-10 NOTE — PROGRESS NOTES
Impression IMPRESSION: 
  
Right lung perihilar infiltrate appears present  
  
  
Lab Test: 
Date:11/9/2018 WBC:  
     
Lab Results Component Value Date/Time  
  WBC 11.4 11/05/2018 04:00 AM  
HGB:  
     
Lab Results Component Value Date/Time  
  HGB 8.5 (L) 11/05/2018 04:00 AM  
 PLTS:  
Lab Results Component Value Date/Time  
  PLATELET 927 (H) 96/64/2381 04:00 AM  
  
  
SaO2%/flow: @LASTSAO2(1)@ 
  
Vital Signs:    
Patient Vitals for the past 8 hrs: 
  Temp Pulse Resp BP SpO2  
11/09/18 1146  91 25  100 % 11/09/18 1100  88 19 154/73 99 % 11/09/18 1000  90 22 111/47 99 % 11/09/18 0900  91 25 107/53 (!) 89 % 11/09/18 0800  88 23 106/52 94 % 11/09/18 0756  90 25  100 % 11/09/18 0700  88 18 134/73 97 % 11/09/18 0600  89 19 139/69 96 % 11/09/18 0500  88 18 128/69 97 % 11/09/18 0423  89 19  100 % 11/09/18 0400 98.4 °F (36.9 °C) 90 22 141/68 98 %  
  
  
Wean Screen Pass (Yes or No): no 
Wean Screen Reason for Failure: per MD 
Duration of Weaning Trial: 
Additional Comments: 
   
  
PLAN OF CARE: family  Meeting for possible comfort care 
  
  
GOAL: comfort care 
  
  
OUTCOME: waiting for family for comfort care

## 2018-11-10 NOTE — PROGRESS NOTES
Hospitalist Progress Note Daily Progress Note: 11/10/2018  
   
Interval history / Subjective:  
Hanny Enriquez is a 57y.o. year old male who presented from [facility] with abnormal labs, elevated BUN/Cr. Found to have JULIANNA, UTI, and markedly elevated LFT on presentation. Patient's recent admission was 9/10/18-9/14/18 for acute CVA and rhabdo. Patient poor historian on admission,stated \"I had heart failure. She was started on vanc,zosyn. On 9/29,patient had cardiac arrest with ventricular fibrillation - s/p ROSC. Her hospital course has since then complicated with sepsis,pancreatitits,pneumonia,acute pancreatitis requiring involvement of gi,surgery,nephrology,cardiology,ID,cardiology. So far patient has remained without major improvement. Now in vegetative state. Patient had PEG/Trach placement on 10/17. On 10/17,patient started on acyclovir for veicular rash rt arm,suspected being Zoster. Lipase 2169. US abd 10/22 shows Abnormal appearance of the gallbladder with stones/sludge with wall thickening  
and possible pericholecystic fluid similar to the prior study of 9/27/2018 
10/22: Patient on treatment for shingles in contact isolation per ID team. He also had left big toe gangrene . We will consult Podiatry. His lipase was elevated , US of the abdomen shows sludge and possible cholecystitis. Patient afebrile. GI consulted  for possible MRCP. Patient has leukocytosis. ID started Vancomycin and ordered Urine , blood and sputum culture and trach site discharge. Patient off air bourne precaution but continue contact precaution per ID. Patient was started on Vancomycin and cefepime for SIRSper ID. Blood and urine cx pending. Patient today is going for jejunostomy. Patient BC NGTD Vancomycin d/c'd per ID. Patient respiratory  cx + GNR , STREPT, Wound cx + pseudomonas . PCCM added Ciprofloxacin today . IR attempt at Jejunostomy was unsuccessful yesterday. 10/28: Fever today. Resp cx, wound cx noted. On cefepime and Cipro for coverage per sensitivisty result . Blood cx NGTD . Lipase still trending up. IR unsuccessful attempt at jejunostomy 10/26. Will re consult GI today 10/29:no change in care. 10/30:no change in care 10/31:pt underwent dialysis today. Currently on cefepime only. Patient is still in the hospital,waiting for placement. 11/1:continue current treatment 11/2:ID has recommended line culture(picc line) as patient had elevated temp today 11/3:still running fever 101.8 today. Tip of picc line cx results pending. 11/4:Tip of catheter culture showing staph coag negative 11/5: Daughter request to d/c dialysis. Nephrology signed off today 11/6: Daughter made patient a DNR . She endorsed Hospice yesterday. Hospice following patient now. PCCM recommend  withdrawalal of Vent support pending discussion of Hospice with POA 
11/10: Per report daughter [facility]) still been expected. Current Facility-Administered Medications Medication Dose Route Frequency  LORazepam (ATIVAN) injection 1 mg  1 mg IntraVENous Q15MIN PRN  
 LORazepam (INTENSOL) 2 mg/mL oral concentrate 1 mg  1 mg Oral Q1H PRN  
 morphine 10 mg/ml injection 10 mg  10 mg Inhalation Q1H PRN  
 morphine injection 2 mg  2 mg IntraVENous Q15MIN PRN  
 scopolamine (TRANSDERM-SCOP) 1 mg over 3 days 1 Patch  1 Patch TransDERmal Q72H PRN  
 amLODIPine (NORVASC) tablet 5 mg  5 mg Oral DAILY  0.9% sodium chloride infusion 250 mL  250 mL IntraVENous PRN  
 albuterol (PROVENTIL VENTOLIN) nebulizer solution 2.5 mg  2.5 mg Nebulization BID RT  
 multivitamin (MULTI-DELYN, WELLESSE) oral liquid 30 mL  30 mL Oral DAILY  bacitracin 500 unit/gram packet 1 Packet  1 Packet Topical PRN  
 sodium chloride (NS) flush 10-30 mL  10-30 mL InterCATHeter PRN  
 sodium chloride (NS) flush 10 mL  10 mL InterCATHeter PRN  
 heparin (porcine) injection 5,000 Units  5,000 Units SubCUTAneous Q8H  
  hydroxypropyl methylcellulose (ISOPTO TEARS) 0.5 % ophthalmic solution 2 Drop  2 Drop Both Eyes BID  pantoprazole (PROTONIX) 40 mg in sodium chloride 0.9% 10 mL injection  40 mg IntraVENous Q24H  
 acetaminophen (TYLENOL) solution 325 mg  325 mg Per NG tube Q4H PRN  
 heparin (porcine) pf 100 Units  100 Units InterCATHeter PRN  
 heparin (porcine) 1,000 unit/mL injection 1,000 Units  1,000 Units InterCATHeter DIALYSIS PRN  
 senna-docusate (PERICOLACE) 8.6-50 mg per tablet 1 Tab  1 Tab Oral BID PRN  
 ondansetron (ZOFRAN) injection 4 mg  4 mg IntraVENous Q4H PRN  
 glucose chewable tablet 16 g  4 Tab Oral PRN  
 glucagon (GLUCAGEN) injection 1 mg  1 mg IntraMUSCular PRN  
 dextrose (D50) infusion 12.5-25 g  25-50 mL IntraVENous PRN  
 hydrALAZINE (APRESOLINE) 20 mg/mL injection 20 mg  20 mg IntraVENous Q6H PRN Review of Systems Unresponsive Objective:  
 
Visit Vitals /62 Pulse 90 Temp 98.9 °F (37.2 °C) Resp 17 Ht 5' 7\" (1.702 m) Wt 84.1 kg (185 lb 6.4 oz) SpO2 100% BMI 29.04 kg/m² O2 Flow Rate (L/min): 45 l/min O2 Device: Tracheostomy, Ventilator Temp (24hrs), Av.4 °F (36.9 °C), Min:98 °F (36.7 °C), Max:98.9 °F (37.2 °C) No intake/output data recorded.  1901 - 11/10 0700 In: 1210 [I.V.:10] Out: 12 [Urine:900; Drains:205] P/E General Appearance:S/p PEG/Trach HENT: normocephalic/atraumatic, + ETT Neck: No JVD, hematoma R side where [facility] is improving Lungs: Coarse B/L w/ vent-assisted BS 
CV: RRR, no m/r/g Abdomen: soft, non-tender, normal bowel sounds Extremities: versicular rash rt arm and right side of chest,no cyanosis, no peripheral edema Neuro: Unresponsive to commands, no withdrawal to pain, + corneal reflex and pupillary reaction today Skin: Normal color, intact Data Review Recent Results (from the past 12 hour(s)) GLUCOSE, POC Collection Time: 11/10/18  6:08 AM  
Result Value Ref Range Glucose (POC) 177 (H) 70 - 110 mg/dL Assessment/Plan:  
 
Principal Problem: 
  Cardiac arrest with ventricular fibrillation ([facility] [address] (9/29/2018) Overview: ROSC before defibrillation could be attempted. Active Problems: 
  Essential hypertension (9/10/2018) Diabetes mellitus type 2, controlled ([facility] [address] (9/10/2018) History of stroke (9/27/2018) Overview: With residual R hemiparesis Acute-on-chronic kidney injury ([facility] [address] (9/27/2018) Acute cystitis (9/27/2018) Elevated troponin (9/27/2018) Elevated LFTs (9/28/2018) Acute pancreatitis (9/29/2018) Overview: Lipase downtrends with interruption in tube feed and Mucomyst. Restarting  
    tube feed did result in modest elevation but not to the levels seen while  
    on Mucomyst. Nonetheless, there is signficant decrease in lipase levels  
    with interruptions in tube feeding. Ischemic encephalopathy (9/30/2018) Hypoalbuminemia (10/21/2018) Shingles (10/21/2018) Gangrene ([facility] [address] (10/22/2018) Pseudomonas infection (10/27/2018) Zoster. Care Plan - Vent mgmt per ICU - S/p trach/PEG on 10/17 as patient did not show significant improvement. 
- EGD showed large clot in esophagus last week repeat EGD 10/9 shows healed esophageal ulcer - Cont protonix IV every day  
- Hgb stable - LFTs improving - GI signed off 10/9 
- MRI w/ evidence of severe anoxic brain injury - Minimal improvement on neuro exam 
- Appreciate neuro assistance - IV Meropenem and IV Fluconazole d/heron on 10/15 per ID. 
- Currently on vanc only which was started on 9/27 
- Nephro managing HD TTHS 
- Pt unlikely to have any meaningful neuro recovery, prognosis is very grim - Family wanted to cont to do everything, including trach/PEG -> was done10/17 
- On 10/17:Started on acyclovir for possible Zoster rt arm and rt upper chest area. - Hyperkalemia on 10/18 ->corrected - Lipase 2169 10/22. US abd Abnormal appearance of the gallbladder with stones/sludge with wall thickening  
and possible pericholecystic fluid similar to the prior study of 9/27/2018  
- On Acyclovir for shingles , contact precaution per ID commitee 
- 10/22: Consult GI today for possible MRCP 
- 10/22: Consult Podiatry for left big toe gangrene  
- 10/23 : Podiatry recommend RAQUEL for Left Big toe  
- 10/23: GI recommend MRI/MRCP but will need patient off vent for multiple times for procedure. Not sure if this is feasible now 
- 10/25 : Blood , urine , sputum , trach discharge cultures 
- 10/25 IV antibiotics by ID  
- Change GI tube to Jejunostomy tube tomorrow by IR  
- 10/26: Vancomycin and Cefepime per ID  
- 10/26 : Jejunostomy attempt by IR unsuccessful . Will need to discuss with GI  
- 10/27 : Ciprofloxacin added 10/27 for double coverage of pseudomonas with cefepime per PCCM  
- 10/28: Pancreatitis ? Biliary  . Will reconsult GI today - Lipase trending down. 
-11/2:culture of picc line since pt has developed fever. -11/3:still febrile,picc line tip cx results pending;will follow ID recommendations. -11/4:Cx of tip of catheter showing staph coag negative. Pt still spiking fever - will follow ID recommendations. - 11/5: Nephrology signed off . At daughters request will D/C dialysis. She per Nephrology is well aware if implications of stopping dialysis - Per ID continue cefepime for total 14 days and Vancomycin  added for 7 days 11/6: DNR and Hospice consulted after I personally spoke with her. 11/7: Nephrology and ID signed off. Hospice following patient now 11/8: Patient on comfort measures only - Morphine , scopolamine and ativan ordered per protocol . Per report Mech vent removal pending arrival of POA Friday or Saturday DVT prophylaxis:scds DNR per daughter's request today 11/6 Disposition: Hospice -  will work on placement

## 2018-11-10 NOTE — ROUTINE PROCESS
1930 Bedside shift change report given to miky rn (oncoming nurse) by Ana Cristina Prado rn (offgoing nurse). Report included the following information SBAR, Kardex and Recent Results. Side rails up x 3. Hob elevated 30 degrees. Call light within reach. 2000 assessment completed. Oral,  and mery-care completed. 2130 incontinent urine in bed, bed bath, incontinent care provided. 2300 resting in bed, will continue to monitor. 0000 reassessment completed. Oral and mery care completed. left great toe painted with betadine. 0200 monitor patient no changes. 0400 reassessment completed. Oral and mery-care completed. 0600 monitoring patient , no changes. 0730 Bedside shift change report given to roosevelt rn (oncoming nurse) by miky rn (offgoing nurse). Report included the following information SBAR, Kardex and Recent Results. Side rails up  X 3, hob elevated 30 degrees. Dual skin check off completed.

## 2018-11-11 NOTE — PROGRESS NOTES
Problem: Falls - Risk of 
Goal: *Absence of Falls Document Kingsley Masters Fall Risk and appropriate interventions in the flowsheet. Outcome: Progressing Towards Goal 
Fall Risk Interventions: 
Mobility Interventions: Bed/chair exit alarm, Communicate number of staff needed for ambulation/transfer, Strengthening exercises (ROM-active/passive) Mentation Interventions: Bed/chair exit alarm, Door open when patient unattended, Evaluate medications/consider consulting pharmacy, Increase mobility, More frequent rounding, Room close to nurse's station, Toileting rounds, Update white board Medication Interventions: Bed/chair exit alarm, Evaluate medications/consider consulting pharmacy Elimination Interventions: Toileting schedule/hourly rounds, Bed/chair exit alarm History of Falls Interventions: Bed/chair exit alarm, Room close to nurse's station, Evaluate medications/consider consulting pharmacy, Door open when patient unattended Problem: Patient Education: Go to Patient Education Activity Goal: Patient/Family Education Outcome: Not Met Pt is non-interactive Problem: General Medical Care Plan Goal: *Labs within defined limits Outcome: Not Met 
Comfort measures only at this time Goal: *Absence of infection signs and symptoms Outcome: Not Met 
Comfort measures Goal: *Fluid volume balance Outcome: Not Met Pt is NPO and comfort measures Goal: *Optimize nutritional status Outcome: Not Met Pt is NPO and comfort care Problem: Pressure Injury - Risk of 
Goal: *Prevention of pressure injury Document Mitul Scale and appropriate interventions in the flowsheet.   
Outcome: Progressing Towards Goal 
Pressure Injury Interventions: 
Sensory Interventions: Assess changes in LOC, Assess need for specialty bed, Avoid rigorous massage over bony prominences, Float heels, Keep linens dry and wrinkle-free, Maintain/enhance activity level, Minimize linen layers, Monitor skin under medical devices, Pad between skin to skin, Pressure redistribution bed/mattress (bed type), Turn and reposition approx. every two hours (pillows and wedges if needed) Moisture Interventions: Apply protective barrier, creams and emollients, Absorbent underpads, Assess need for specialty bed, Internal/External fecal devices, Internal/External urinary devices, Maintain skin hydration (lotion/cream), Minimize layers, Moisture barrier, Offer toileting Q_hr Activity Interventions: Pressure redistribution bed/mattress(bed type) Mobility Interventions: HOB 30 degrees or less, Turn and reposition approx. every two hours(pillow and wedges) Nutrition Interventions: Document food/fluid/supplement intake, Discuss nutritional consult with provider, Offer support with meals,snacks and hydration Friction and Shear Interventions: Apply protective barrier, creams and emollients, HOB 30 degrees or less, Lift sheet, Lift team/patient mobility team, Minimize layers, Transferring/repositioning devices

## 2018-11-11 NOTE — PROGRESS NOTES
Patients mother & brother at bedside. I asked them about patients daughters arrival.  Mother states, \" We haven't heard from her in several days\". \" She was supposed to have been here Friday or yesterday, but we haven't seen her. \"  Mother upset that she wasn't the one being called & spoken to. I explained to her it was [place] law that the daughter is next of kin & the decisions that are to be made fall on the daughter. Still awaiting arrival of patient's daughter. No new changes in patient's condition.

## 2018-11-11 NOTE — PROGRESS NOTES
Hospitalist Progress Note Daily Progress Note: 11/11/2018  
   
Interval history / Subjective:  
Indra Monge is a 57y.o. year old male who presented from [facility] with abnormal labs, elevated BUN/Cr. Found to have JULIANNA, UTI, and markedly elevated LFT on presentation. Patient's recent admission was 9/10/18-9/14/18 for acute CVA and rhabdo. Patient poor historian on admission,stated \"I had heart failure. She was started on vanc,zosyn. On 9/29,patient had cardiac arrest with ventricular fibrillation - s/p ROSC. Her hospital course has since then complicated with sepsis,pancreatitits,pneumonia,acute pancreatitis requiring involvement of gi,surgery,nephrology,cardiology,ID,cardiology. So far patient has remained without major improvement. Now in vegetative state. Patient had PEG/Trach placement on 10/17. On 10/17,patient started on acyclovir for veicular rash rt arm,suspected being Zoster. Lipase 2169. US abd 10/22 shows Abnormal appearance of the gallbladder with stones/sludge with wall thickening  
and possible pericholecystic fluid similar to the prior study of 9/27/2018 
10/22: Patient on treatment for shingles in contact isolation per ID team. He also had left big toe gangrene . We will consult Podiatry. His lipase was elevated , US of the abdomen shows sludge and possible cholecystitis. Patient afebrile. GI consulted  for possible MRCP. Patient has leukocytosis. ID started Vancomycin and ordered Urine , blood and sputum culture and trach site discharge. Patient off air bourne precaution but continue contact precaution per ID. Patient was started on Vancomycin and cefepime for SIRSper ID. Blood and urine cx pending. Patient today is going for jejunostomy. Patient BC NGTD Vancomycin d/c'd per ID. Patient respiratory  cx + GNR , STREPT, Wound cx + pseudomonas . PCCM added Ciprofloxacin today . IR attempt at Jejunostomy was unsuccessful yesterday. 10/28: Fever today. Resp cx, wound cx noted. On cefepime and Cipro for coverage per sensitivisty result . Blood cx NGTD . Lipase still trending up. IR unsuccessful attempt at jejunostomy 10/26. Will re consult GI today 10/29:no change in care. 10/30:no change in care 10/31:pt underwent dialysis today. Currently on cefepime only. Patient is still in the hospital,waiting for placement. 11/1:continue current treatment 11/2:ID has recommended line culture(picc line) as patient had elevated temp today 11/3:still running fever 101.8 today. Tip of picc line cx results pending. 11/4:Tip of catheter culture showing staph coag negative 11/5: Daughter request to d/c dialysis. Nephrology signed off today 11/6: Daughter made patient a DNR . She endorsed Hospice yesterday. Hospice following patient now. PCCM recommend  withdrawalal of Vent support pending discussion of Hospice with POA 
11/10: Per report daughter [facility]) still been expected. 11/11: Called daughter [facility] ) again today and left a voicemail. She did not return prior call from 2 days ago. We will continue to attempt to contact POA. Patient remains on comfort measures with Vent per daughter;s ( POA)  request  
 
 
Current Facility-Administered Medications Medication Dose Route Frequency  LORazepam (ATIVAN) injection 1 mg  1 mg IntraVENous Q15MIN PRN  
 LORazepam (INTENSOL) 2 mg/mL oral concentrate 1 mg  1 mg Oral Q1H PRN  
 morphine 10 mg/ml injection 10 mg  10 mg Inhalation Q1H PRN  
 morphine injection 2 mg  2 mg IntraVENous Q15MIN PRN  
 scopolamine (TRANSDERM-SCOP) 1 mg over 3 days 1 Patch  1 Patch TransDERmal Q72H PRN  
 albuterol (PROVENTIL VENTOLIN) nebulizer solution 2.5 mg  2.5 mg Nebulization BID RT  
 acetaminophen (TYLENOL) solution 325 mg  325 mg Per NG tube Q4H PRN  
 ondansetron (ZOFRAN) injection 4 mg  4 mg IntraVENous Q4H PRN Review of Systems Unresponsive Objective:  
 
Visit Vitals /76 (BP 1 Location: Right arm) Pulse 83 Temp 99.3 °F (37.4 °C) Resp 20 Ht 5' 7\" (1.702 m) Wt 87.3 kg (192 lb 7.4 oz) SpO2 100% BMI 30.14 kg/m² O2 Flow Rate (L/min): 45 l/min O2 Device: Tracheostomy Temp (24hrs), Av.7 °F (37.1 °C), Min:97.6 °F (36.4 °C), Max:99.3 °F (37.4 °C) No intake/output data recorded.  1901 -  0700 In: 690 Out: 1095 [Urine:500; Drains:595] P/E General Appearance:S/p PEG/Trach HENT: normocephalic/atraumatic, + ETT Neck: No JVD, hematoma R side where [facility] is improving Lungs: Coarse B/L w/ vent-assisted BS 
CV: RRR, no m/r/g Abdomen: soft, non-tender, normal bowel sounds Extremities: versicular rash rt arm and right side of chest,no cyanosis, no peripheral edema Neuro: Unresponsive to commands, no withdrawal to pain, + corneal reflex and pupillary reaction today Skin: Normal color, intact Data Review No results found for this or any previous visit (from the past 12 hour(s)). Assessment/Plan:  
 
Principal Problem: 
  Card[facility] (2018) Overview: ROSC before defibrillation could be attempted. Active Problems: 
  Essential hypertension (9/10/2018) Diabetes mellitus type 2, controlled ([facility] (9/10/2018) History of stroke (2018) Overview: With residual R hemiparesis Acute-on-chronic kidney injury ([facility] (2018) Acute cystitis (2018) Elevated troponin (2018) Elevated LFTs (2018) Acute pancreatitis (2018) Overview: Lipase downtrends with interruption in tube feed and Mucomyst. Restarting  
    tube feed did result in modest elevation but not to the levels seen while  
    on Mucomyst. Nonetheless, there is signficant decrease in lipase levels  
    with interruptions in tube feeding. Ischemic encephalopathy (2018) Hypoalbuminemia (10/21/2018) Shingles (10/21/2018) Gangrene ([facility] (10/22/2018) Pseudomonas infection (10/27/2018) Zoster. Care Plan - Vent mgmt per ICU - S/p trach/PEG on 10/17 as patient did not show significant improvement. 
- EGD showed large clot in esophagus last week repeat EGD 10/9 shows healed esophageal ulcer - Cont protonix IV every day  
- Hgb stable - LFTs improving - GI signed off 10/9 
- MRI w/ evidence of severe anoxic brain injury - Minimal improvement on neuro exam 
- Appreciate neuro assistance - IV Meropenem and IV Fluconazole d/heron on 10/15 per ID. 
- Currently on vanc only which was started on 9/27 
- Nephro managing HD TTHS 
- Pt unlikely to have any meaningful neuro recovery, prognosis is very grim - Family wanted to cont to do everything, including trach/PEG -> was done10/17 
- On 10/17:Started on acyclovir for possible Zoster rt arm and rt upper chest area. - Hyperkalemia on 10/18 ->corrected - Lipase 2169 10/22. US abd Abnormal appearance of the gallbladder with stones/sludge with wall thickening  
and possible pericholecystic fluid similar to the prior study of 9/27/2018  
- On Acyclovir for shingles , contact precaution per ID commitee 
- 10/22: Consult GI today for possible MRCP 
- 10/22: Consult Podiatry for left big toe gangrene  
- 10/23 : Podiatry recommend RAQUEL for Left Big toe  
- 10/23: GI recommend MRI/MRCP but will need patient off vent for multiple times for procedure. Not sure if this is feasible now 
- 10/25 : Blood , urine , sputum , trach discharge cultures 
- 10/25 IV antibiotics by ID  
- Change GI tube to Jejunostomy tube tomorrow by IR  
- 10/26: Vancomycin and Cefepime per ID  
- 10/26 : Jejunostomy attempt by IR unsuccessful . Will need to discuss with GI  
- 10/27 : Ciprofloxacin added 10/27 for double coverage of pseudomonas with cefepime per PCCM  
- 10/28: Pancreatitis ? Biliary  . Will reconsult GI today - Lipase trending down. 
-11/2:culture of picc line since pt has developed fever. -11/3:still febrile,picc line tip cx results pending;will follow ID recommendations. -11/4:Cx of tip of catheter showing staph coag negative. Pt still spiking fever - will follow ID recommendations. - 11/5: Nephrology signed off . At daughters request will D/C dialysis. She per Nephrology is well aware if implications of stopping dialysis - Per ID continue cefepime for total 14 days and Vancomycin  added for 7 days 11/6: DNR and Hospice consulted after I personally spoke with her. 11/7: Nephrology and ID signed off. Hospice following patient now 11/8: Patient on comfort measures only - Morphine , scopolamine and ativan ordered per protocol . Per report Mech vent removal pending arrival of POA Friday or Saturday DVT prophylaxis:scds DNR per daughter's request today 11/6 Disposition: Hospice -  will work on placement

## 2018-11-11 NOTE — PROGRESS NOTES
Patient in bed on back with head elevated - BBS equal, sl diminished and ess clear - trach tube midline and secure - trach site cleaned, dressing and inner cannula changed, fresh gauze applied - spare trachs at bedside - patient suctioned - MVB at [facility] - vent alarms on and functional

## 2018-11-11 NOTE — PROGRESS NOTES
Physical Exam  
Skin:  
 
  
 
 
Bedside shift change report was given to me, Davian Bertrand RN  (oncoming night shift nurse), by Dejah Jenkins (offgoing day shift nurse). Report included the following information:  SBAR, kardex, consultations, hemodynamic monitoring, intake/output, MAR, lab results, VS trends, cardiac rhythm noted NSR. Skin, neuro, respiratory status, and order verification have been dually noted and verified at the bedside with: Mann Villareal, RN                                                              
ICU Nurse's Note: 
  
2000: Pt's eyes are open with ongoing spontaneous eye movements, dysconjugate gaze with no observable s/s tracking or visual focusing, no ability to follow commands or communicate needs. Pt has no notable purposeful movements to his extremities, with exception of occasional withdrawal to pain and intermittent spontaneous movements to bilateral toes. BUE remain flaccid and weak and require support and frequent ROM. Pt requires max assist with all ADLs and repositioning. Pt is comfort measures only at this time. Bed is locked and in lowest position. Side rails up x3, exit alarm activated. Call bell is within reach. Will continue to monitor. Neurological: as noted in assessment Cardiovascular:  NSR on the monitor. Pulmonary: breath sounds diminished bilaterally. Saturations maintained at 100 % via 8 mm Portex cuffed tracheostomy, managed via ventilator with FiO2 30%. GI/: Abdomen is obese, soft, non-distended. PEG tube intact and clamped, flushed for patency, no observable gastric residual noted. Last BM incontinence indicated 11/11/2018, loose liquid stool via FMS. Pt is ESRD with oliguria at baseline, with frequent, intermittent episodes of large amounts of urinary incontinence. IVF/Critical gtts: saline locked Skin: as noted above 
  
2100:  Pt was repositioned, trache and PEG tube stoma care completed, with a small amount of serous drainage cleaned around stoma site. Pt was noted incontinent a large amount urine. Pt tolerated a complete bed bath and complete bed linen change. He is resting quietly at this time, no s/s acute distress. Bed is locked and in lowest position, side rails up x3, exit alarm activated. 0000: Pt appears to be resting comfortably in bed at this time after repositioning, mouth care, and mery care. Interventions are ongoing, will continue to monitor. 0400: Pt was bathed, repositioned, and tolerated mouth, trache, PEG, and FMS/incontinence care. He continues to exhibit a strong, productive cough with thin clear/ white sputum. 0600: Pt appears to be resting comfortably in bed at this time after repositioning, mouth care, and mery care. Interventions are ongoing, will continue to monitor.  
0710: Bedside change of shift report given to: Deny Cancino RN

## 2018-11-12 NOTE — PROGRESS NOTES
Problem: Falls - Risk of 
Goal: *Absence of Falls Document Argenis Primes Fall Risk and appropriate interventions in the flowsheet. Outcome: Progressing Towards Goal 
Fall Risk Interventions: 
Mobility Interventions: Bed/chair exit alarm, Strengthening exercises (ROM-active/passive) Mentation Interventions: Bed/chair exit alarm, Door open when patient unattended, Evaluate medications/consider consulting pharmacy, Increase mobility, More frequent rounding, Reorient patient, Room close to nurse's station, Toileting rounds, Update white board Medication Interventions: Bed/chair exit alarm Elimination Interventions: Bed/chair exit alarm, Toilet paper/wipes in reach, Toileting schedule/hourly rounds History of Falls Interventions: Bed/chair exit alarm, Room close to nurse's station Problem: Patient Education: Go to Patient Education Activity Goal: Patient/Family Education Outcome: Not Met Pt is unresponsive and has no functional cognitive ability to appropriately participate in plan of care decisions Problem: General Medical Care Plan Goal: *Labs within defined limits Outcome: Not Met 
Comfort measures only Goal: *Absence of infection signs and symptoms Outcome: Not Met 
Comfort measures only Goal: *Optimal pain control at patient's stated goal 
Outcome: Progressing Towards Goal 
Appears to be without pain Goal: *Fluid volume balance Outcome: Not Met 
Comfort measures only Goal: *Optimize nutritional status Outcome: Not Met 
Comfort measures only Goal: *Progressive mobility and function (eg: ADL's) Outcome: Not Met 
Comfort measures only Problem: Pressure Injury - Risk of 
Goal: *Prevention of pressure injury Document Mitul Scale and appropriate interventions in the flowsheet.   
Outcome: Progressing Towards Goal 
Pressure Injury Interventions: 
Sensory Interventions: Assess changes in LOC, Assess need for specialty bed, Keep linens dry and wrinkle-free, Maintain/enhance activity level, Minimize linen layers, Monitor skin under medical devices, Pad between skin to skin, Pressure redistribution bed/mattress (bed type), Turn and reposition approx. every two hours (pillows and wedges if needed) Moisture Interventions: Absorbent underpads, Apply protective barrier, creams and emollients, Assess need for specialty bed, Check for incontinence Q2 hours and as needed, Internal/External fecal devices, Maintain skin hydration (lotion/cream), Minimize layers, Moisture barrier, Offer toileting Q_hr Activity Interventions: Pressure redistribution bed/mattress(bed type) Mobility Interventions: HOB 30 degrees or less, Pressure redistribution bed/mattress (bed type), Turn and reposition approx. every two hours(pillow and wedges) Nutrition Interventions: Document food/fluid/supplement intake, Discuss nutritional consult with provider, Offer support with meals,snacks and hydration Friction and Shear Interventions: Apply protective barrier, creams and emollients, Foam dressings/transparent film/skin sealants, Lift sheet, Lift team/patient mobility team, Minimize layers, Transferring/repositioning devices Problem: Ventilator Management Goal: *Adequate oxygenation and ventilation Outcome: Progressing Towards Goal 
ventilated Goal: *Absence of infection signs and symptoms Outcome: Not Met 
Comfort measures only Problem: Pneumonia: Discharge Outcomes Goal: *Tolerating diet Outcome: Not Met 
Comfort measures only, pt is NPO

## 2018-11-12 NOTE — PROGRESS NOTES
Hospitalist Progress Note Daily Progress Note: 11/12/2018  
   
Interval history / Subjective:  
Reynaldo Aguilar is a 57y.o. year old male who presented from [facility] with abnormal labs, elevated BUN/Cr. Found to have JULIANNA, UTI, and markedly elevated LFT on presentation. Patient's recent admission was 9/10/18-9/14/18 for acute CVA and rhabdo. Patient poor historian on admission,stated \"I had heart failure. She was started on vanc,zosyn. On 9/29,patient had cardiac arrest with ventricular fibrillation - s/p ROSC. Her hospital course has since then complicated with sepsis,pancreatitits,pneumonia,acute pancreatitis requiring involvement of gi,surgery,nephrology,cardiology,ID,cardiology. So far patient has remained without major improvement. Now in vegetative state. Patient had PEG/Trach placement on 10/17. On 10/17,patient started on acyclovir for veicular rash rt arm,suspected being Zoster. Lipase 2169. US abd 10/22 shows Abnormal appearance of the gallbladder with stones/sludge with wall thickening  
and possible pericholecystic fluid similar to the prior study of 9/27/2018 
10/22: Patient on treatment for shingles in contact isolation per ID team. He also had left big toe gangrene . We will consult Podiatry. His lipase was elevated , US of the abdomen shows sludge and possible cholecystitis. Patient afebrile. GI consulted  for possible MRCP. Patient has leukocytosis. ID started Vancomycin and ordered Urine , blood and sputum culture and trach site discharge. Patient off air bourne precaution but continue contact precaution per ID. Patient was started on Vancomycin and cefepime for SIRSper ID. Blood and urine cx pending. Patient today is going for jejunostomy. Patient BC NGTD Vancomycin d/c'd per ID. Patient respiratory  cx + GNR , STREPT, Wound cx + pseudomonas . PCCM added Ciprofloxacin today . IR attempt at Jejunostomy was unsuccessful yesterday. 10/28: Fever today. Resp cx, wound cx noted. On cefepime and Cipro for coverage per sensitivisty result . Blood cx NGTD . Lipase still trending up. IR unsuccessful attempt at jejunostomy 10/26. Will re consult GI today 10/29:no change in care. 10/30:no change in care 10/31:pt underwent dialysis today. Currently on cefepime only. Patient is still in the hospital,waiting for placement. 11/1:continue current treatment 11/2:ID has recommended line culture(picc line) as patient had elevated temp today 11/3:still running fever 101.8 today. Tip of picc line cx results pending. 11/4:Tip of catheter culture showing staph coag negative 11/5: Daughter request to d/c dialysis. Nephrology signed off today 11/6: Daughter made patient a DNR . She endorsed Hospice yesterday. Hospice following patient now. PCCM recommend  withdrawalal of Vent support pending discussion of Hospice with POA 
11/10: Per report daughter [facility]) still been expected. 11/11: Called daughter [facility] ) again today and left a voicemail. She did not return prior call from 2 days ago. We will continue to attempt to contact POA. Patient remains on comfort measures with Vent per daughter;s ( POA)  request  
11/12:Waiting for POA for d/c of vent. Current Facility-Administered Medications Medication Dose Route Frequency  LORazepam (ATIVAN) injection 1 mg  1 mg IntraVENous Q15MIN PRN  
 LORazepam (INTENSOL) 2 mg/mL oral concentrate 1 mg  1 mg Oral Q1H PRN  
 morphine 10 mg/ml injection 10 mg  10 mg Inhalation Q1H PRN  
 morphine injection 2 mg  2 mg IntraVENous Q15MIN PRN  
 scopolamine (TRANSDERM-SCOP) 1 mg over 3 days 1 Patch  1 Patch TransDERmal Q72H PRN  
 albuterol (PROVENTIL VENTOLIN) nebulizer solution 2.5 mg  2.5 mg Nebulization BID RT  
 acetaminophen (TYLENOL) solution 325 mg  325 mg Per NG tube Q4H PRN  
 ondansetron (ZOFRAN) injection 4 mg  4 mg IntraVENous Q4H PRN Review of Systems Unresponsive Objective: Visit Vitals /81 (BP 1 Location: Right arm, BP Patient Position: At rest) Pulse 95 Temp 98.8 °F (37.1 °C) Resp 21 Ht 5' 7\" (1.702 m) Wt 88.8 kg (195 lb 12.3 oz) SpO2 100% BMI 30.66 kg/m² O2 Flow Rate (L/min): 45 l/min O2 Device: Tracheostomy Temp (24hrs), Av.3 °F (36.8 °C), Min:97.3 °F (36.3 °C), Max:99 °F (37.2 °C) 
 
 
701 - 1900 In: 120 Out: -  
11/10 1901 -  07 In: 61 Out: 820 [Drains:820] P/E General Appearance:S/p PEG/Trach HENT: normocephalic/atraumatic, + ETT Neck: No JVD, hematoma R side where [facility] is improving Lungs: Coarse B/L w/ vent-assisted BS 
CV: RRR, no m/r/g Abdomen: soft, non-tender, normal bowel sounds Extremities: versicular rash rt arm and right side of chest,no cyanosis, no peripheral edema Neuro: Unresponsive to commands, no withdrawal to pain, + corneal reflex and pupillary reaction today Skin: Normal color, intact Data Review No results found for this or any previous visit (from the past 12 hour(s)). Assessment/Plan:  
 
Principal Problem: 
  [facility] (2018) Overview: ROSC before defibrillation could be attempted. Active Problems: 
  Essential hypertension (9/10/2018) Diabetes mellitus type 2, controlled ([facility] (9/10/2018) History of stroke (2018) Overview: With residual R hemiparesis Acute-on-chronic kidney injury ([facility] (2018) Acute cystitis (2018) Elevated troponin (2018) Elevated LFTs (2018) Acute pancreatitis (2018) Overview: Lipase downtrends with interruption in tube feed and Mucomyst. Restarting  
    tube feed did result in modest elevation but not to the levels seen while  
    on Mucomyst. Nonetheless, there is signficant decrease in lipase levels  
    with interruptions in tube feeding. Ischemic encephalopathy (2018) Hypoalbuminemia (10/21/2018) Shingles (10/21/2018) Gangrene ([address] (10/22/2018) Pseudomonas infection (10/27/2018) Zoster. Care Plan - Vent mgmt per ICU - S/p trach/PEG on 10/17 as patient did not show significant improvement. 
- EGD showed large clot in esophagus last week repeat EGD 10/9 shows healed esophageal ulcer - Cont protonix IV every day  
- Hgb stable - LFTs improving - GI signed off 10/9 
- MRI w/ evidence of severe anoxic brain injury - Minimal improvement on neuro exam 
- Appreciate neuro assistance - IV Meropenem and IV Fluconazole d/heron on 10/15 per ID. 
- Currently on vanc only which was started on 9/27 
- Nephro managing HD TTHS 
- Pt unlikely to have any meaningful neuro recovery, prognosis is very grim - Family wanted to cont to do everything, including trach/PEG -> was done10/17 
- On 10/17:Started on acyclovir for possible Zoster rt arm and rt upper chest area. - Hyperkalemia on 10/18 ->corrected - Lipase 2169 10/22. US abd Abnormal appearance of the gallbladder with stones/sludge with wall thickening  
and possible pericholecystic fluid similar to the prior study of 9/27/2018  
- On Acyclovir for shingles , contact precaution per ID commitee 
- 10/22: Consult GI today for possible MRCP 
- 10/22: Consult Podiatry for left big toe gangrene  
- 10/23 : Podiatry recommend RAQUEL for Left Big toe  
- 10/23: GI recommend MRI/MRCP but will need patient off vent for multiple times for procedure. Not sure if this is feasible now 
- 10/25 : Blood , urine , sputum , trach discharge cultures 
- 10/25 IV antibiotics by ID  
- Change GI tube to Jejunostomy tube tomorrow by IR  
- 10/26: Vancomycin and Cefepime per ID  
- 10/26 : Jejunostomy attempt by IR unsuccessful . Will need to discuss with GI  
- 10/27 : Ciprofloxacin added 10/27 for double coverage of pseudomonas with cefepime per PCCM  
- 10/28: Pancreatitis ? Biliary  . Will reconsult GI today - Lipase trending down. -11/2:culture of picc line since pt has developed fever. -11/3:still febrile,picc line tip cx results pending;will follow ID recommendations. -11/4:Cx of tip of catheter showing staph coag negative. Pt still spiking fever - will follow ID recommendations. - 11/5: Nephrology signed off . At daughters request will D/C dialysis. She per Nephrology is well aware if implications of stopping dialysis - Per ID continue cefepime for total 14 days and Vancomycin  added for 7 days 11/6: DNR and Hospice consulted after I personally spoke with her. 11/7: Nephrology and ID signed off. Hospice following patient now 11/8: Patient on comfort measures only - Morphine , scopolamine and ativan ordered per protocol . Per report Mech vent removal pending arrival of POA  
 
DVT prophylaxis:scds DNR per daughter's request today 11/6 Disposition: Hospice -  will work on placement

## 2018-11-12 NOTE — PROGRESS NOTES
Spoke to Johanna with [facility], she has submitted for authorization for admission.  Sammi Rhodes RN

## 2018-11-12 NOTE — PROGRESS NOTES
Physical Exam  
Skin:  
 
  
 
 
Bedside shift change report was given to me, Chucky Gonsalez RN  (oncoming night shift nurse), by Maria Simms (offgoing day shift nurse). Report included the following information:  SBAR, kardex, consultations, hemodynamic monitoring, intake/output, MAR, lab results, VS trends, cardiac rhythm noted NSR. Skin, neuro, respiratory status, and order verification have been dually noted and verified at the bedside with: Celina Palomino, RN                                                              
ICU Nurse's Note: 
  
2000: Pt's eyes are open with ongoing spontaneous eye movements, dysconjugate gaze, and no observable s/s tracking or visual focusing, no cognitive ability to actively follow commands or communicate needs. Pt has no notable purposeful movements to his extremities, with exception of occasional withdrawal to pain and intermittent spontaneous movements to BLE. BUE remain flaccid/floppy and weak and require support and frequent ROM. Pt requires max assist with all ADLs and repositioning. Pt is comfort measures only at this time. Bed is locked and in lowest position. Side rails up x3, exit alarm activated. Call bell is within reach. Will continue to monitor. Neurological: as noted in assessment Cardiovascular:  NSR on the monitor. Pulmonary: breath sounds are diminished and coarse  bilaterally. Saturations maintained at 100 % via 8 mm Portex cuffed tracheostomy, managed via ventilator with FiO2 30%. GI/: Abdomen is obese, soft, non-distended. PEG tube intact and clamped, flushed for patency, no observable gastric residual noted. Last BM incontinence indicated 11/11/2018, loose liquid stool via FMS. Pt is ESRD with oliguria at baseline, with frequent, intermittent episodes of large amounts of urinary incontinence. IVF/Critical gtts: saline locked Skin: as noted above 
  
2100:  Pt was repositioned, trache and PEG tube stoma care completed, no noted drainage or irritation observed. Pt was noted incontinent a large amount urine. Pt tolerated a complete bed bath and complete bed linen change. He is resting quietly at this time, no s/s acute distress. Bed is locked and in lowest position, side rails up x3, exit alarm activated. 0000: Pt appears to be resting comfortably in bed at this time after repositioning, mouth care, and mery care. Interventions are ongoing, will continue to monitor. 0400: Pt was bathed, repositioned, and tolerated mouth, trache, PEG, and FMS/incontinence care. He continues to exhibit a strong, productive cough with suctioning, small amounts of thin white sputum 
0600: Pt appears to be resting comfortably in bed at this time after repositioning, mouth care, and mery care. Interventions are ongoing, will continue to monitor.  
0710: Bedside change of shift report given to: Maria Varner RN

## 2018-11-12 NOTE — HOSPICE
Spoke with Chantal Parrish, [phone]. Daughter failed to arrive as stated. Pt still on ventilation.  has sent Pt to facilities throughout [place] for placement. Will continue to monitor. Thank you for the referral to [facility] to care for Pt and family. Any questions or concerns please contact [phone].

## 2018-11-12 NOTE — PROGRESS NOTES
Patient in bed on back with head elevated - BBS equal and sl coarse - trach tube midline and secure - trach site cleaned, dressing changed, inner cannula replaced - patient suctioned - spare trachs at bedside - [facility] at [facility] - vent alarms on and functional

## 2018-11-12 NOTE — INTERDISCIPLINARY ROUNDS
Mr. Gabrielle Belcher unresponsive except to pain. Patient turned Q2 hours. Checked for cleanliness & dryness with Q2 hour turns. FMS draining properly. No word from daughter. Message left on cell phone.

## 2018-11-12 NOTE — PROGRESS NOTES
attempted to complete a  follow up visit with patient in room 2705 and a Spiritual assessment of patient was done. Patient however was resting peacefully with the trache in place. No family seen at time of this visit. Chaplains will continue to follow and will provide pastoral care on an as needed/requested basis [place] [employer] Certified [employer] Spiritual Care  
[phone]

## 2018-11-12 NOTE — PROGRESS NOTES
Patients daughter failed to arrive as scheduled, patient remains on the ventilator. Daughter has agreed to withdrawal dialysis treatments and has expressed she would like comfort care, however, she has stopped short of allowing for withdrawal of the ventilator. I had asked [facility]. to pursue authorization for admission last Tuesday. Previously the patients insurance carrier, [employer], has refused to authorize his admission. Awaiting to hear from [facility]. regarding his authorization request. He has not been accepted to any SNF bed with provides for ventilatory support, he has been matched to the entire [facility]. 2 facilities, which do have ventilatory support,  do not accept the patient insurance.     Juan Subramanian RN

## 2018-11-13 PROBLEM — B02.9 SHINGLES: Status: RESOLVED | Noted: 2018-01-01 | Resolved: 2018-01-01

## 2018-11-13 PROBLEM — A49.8 PSEUDOMONAS INFECTION: Status: RESOLVED | Noted: 2018-01-01 | Resolved: 2018-01-01

## 2018-11-13 PROBLEM — Z51.5 COMFORT MEASURES ONLY STATUS: Status: ACTIVE | Noted: 2018-01-01

## 2018-11-13 NOTE — INTERDISCIPLINARY ROUNDS
Continued comfort care while awaiting insurance agreement for placement. Call placed to daughter, message left on voicemail, asking for a call back from her to update her on patient's condition.

## 2018-11-13 NOTE — PROGRESS NOTES
Hospitalist Progress Note Daily Progress Note: 11/13/2018  
   
Interval history / Subjective:  
Tulio Seo is a 57y.o. year old male who presented from [facility] with abnormal labs, elevated BUN/Cr. Found to have JULIANNA, UTI, and markedly elevated LFT on presentation. Patient's recent admission was 9/10/18-9/14/18 for acute CVA and rhabdo. Patient poor historian on admission,stated \"I had heart failure. She was started on vanc,zosyn. On 9/29,patient had cardiac arrest with ventricular fibrillation - s/p ROSC. Her hospital course has since then complicated with sepsis,pancreatitits,pneumonia,acute pancreatitis requiring involvement of gi,surgery,nephrology,cardiology,ID,cardiology. So far patient has remained without major improvement. Now in vegetative state. Patient had PEG/Trach placement on 10/17. On 10/17,patient started on acyclovir for veicular rash rt arm,suspected being Zoster. Lipase 2169. US abd 10/22 shows Abnormal appearance of the gallbladder with stones/sludge with wall thickening  
and possible pericholecystic fluid similar to the prior study of 9/27/2018 
10/22: Patient on treatment for shingles in contact isolation per ID team. He also had left big toe gangrene . We will consult Podiatry. His lipase was elevated , US of the abdomen shows sludge and possible cholecystitis. Patient afebrile. GI consulted  for possible MRCP. Patient has leukocytosis. ID started Vancomycin and ordered Urine , blood and sputum culture and trach site discharge. Patient off air bourne precaution but continue contact precaution per ID. Patient was started on Vancomycin and cefepime for SIRSper ID. Blood and urine cx pending. Patient today is going for jejunostomy. Patient BC NGTD Vancomycin d/c'd per ID. Patient respiratory  cx + GNR , STREPT, Wound cx + pseudomonas . PCCM added Ciprofloxacin today . IR attempt at Jejunostomy was unsuccessful yesterday. 10/28: Fever today. Resp cx, wound cx noted. On cefepime and Cipro for coverage per sensitivisty result . Blood cx NGTD . Lipase still trending up. IR unsuccessful attempt at jejunostomy 10/26. Will re consult GI today 10/29:no change in care. 10/30:no change in care 10/31:pt underwent dialysis today. Currently on cefepime only. Patient is still in the hospital,waiting for placement. 11/1:continue current treatment 11/2:ID has recommended line culture(picc line) as patient had elevated temp today 11/3:still running fever 101.8 today. Tip of picc line cx results pending. 11/4:Tip of catheter culture showing staph coag negative 11/5: Daughter request to d/c dialysis. Nephrology signed off today 11/6: Daughter made patient a DNR . She endorsed Hospice yesterday. Hospice following patient now. PCCM recommend  withdrawalal of Vent support pending discussion of Hospice with POA 
11/10: Per report daughter [facility]) still been expected. 11/11: Called daughter [facility] ) again today and left a voicemail. She did not return prior call from 2 days ago. We will continue to attempt to contact POA. Patient remains on comfort measures with Vent per daughter;s ( POA)  request  
11/12:Waiting for POA for d/c of vent. 11/13:I spoke with patient's daughter Carlos Crow regarding patient's care decision. Manuelito Mitchell has stated that she will be here tomorrow at 1:00 pm,accompanied by her uncle. Current Facility-Administered Medications Medication Dose Route Frequency  LORazepam (ATIVAN) injection 1 mg  1 mg IntraVENous Q15MIN PRN  
 LORazepam (INTENSOL) 2 mg/mL oral concentrate 1 mg  1 mg Oral Q1H PRN  
 morphine 10 mg/ml injection 10 mg  10 mg Inhalation Q1H PRN  
 morphine injection 2 mg  2 mg IntraVENous Q15MIN PRN  
 scopolamine (TRANSDERM-SCOP) 1 mg over 3 days 1 Patch  1 Patch TransDERmal Q72H PRN  
 albuterol (PROVENTIL VENTOLIN) nebulizer solution 2.5 mg  2.5 mg Nebulization BID RT  
  acetaminophen (TYLENOL) solution 325 mg  325 mg Per NG tube Q4H PRN  
 ondansetron (ZOFRAN) injection 4 mg  4 mg IntraVENous Q4H PRN Review of Systems Unresponsive Objective:  
 
Visit Vitals BP 99/50 Pulse 97 Temp 99.5 °F (37.5 °C) Resp 20 Ht 5' 7\" (1.702 m) Wt 86.8 kg (191 lb 5.8 oz) SpO2 100% BMI 29.97 kg/m² O2 Flow Rate (L/min): 45 l/min O2 Device: Tracheostomy Temp (24hrs), Av.7 °F (37.1 °C), Min:97.4 °F (36.3 °C), Max:99.8 °F (37.7 °C) 
 
 
701 - 1900 In: 120 Out: -  
1901 -  0700 In: 240 Out: 550 [Drains:500] P/E General Appearance:S/p PEG/Trach HENT: normocephalic/atraumatic, + ETT Neck: No JVD, hematoma R side where [facility] is improving Lungs: Coarse B/L w/ vent-assisted BS 
CV: RRR, no m/r/g Abdomen: soft, non-tender, normal bowel sounds Extremities: versicular rash rt arm and right side of chest,no cyanosis, no peripheral edema Neuro: Unresponsive to commands, no withdrawal to pain, + corneal reflex and pupillary reaction today Skin: Normal color, intact Data Review No results found for this or any previous visit (from the past 12 hour(s)). Assessment/Plan:  
 
Principal Problem: 
  [facility]) Overview: ROSC before defibrillation could be attempted. Active Problems: 
  Essential hypertension (9/10/2018) Diabetes mellitus type 2, controlled ([facility] (9/10/2018) History of stroke (2018) Overview: With residual R hemiparesis Acute-on-chronic kidney injury ([facility] (2018) Acute cystitis (2018) Elevated troponin (2018) Elevated LFTs (2018) Acute pancreatitis (2018)     Overview: Lipase downtrends with interruption in tube feed and Mucomyst. Restarting  
    tube feed did result in modest elevation but not to the levels seen while  
    on Mucomyst. Nonetheless, there is signficant decrease in lipase levels  
    with interruptions in tube feeding. Ischemic encephalopathy (9/30/2018) Hypoalbuminemia (10/21/2018) Shingles (10/21/2018) Gangrene ([address] (10/22/2018) Pseudomonas infection (10/27/2018) Zoster. Care Plan - Vent mgmt per ICU - S/p trach/PEG on 10/17 as patient did not show significant improvement. 
- EGD showed large clot in esophagus last week repeat EGD 10/9 shows healed esophageal ulcer - Cont protonix IV every day  
- Hgb stable - LFTs improving - GI signed off 10/9 
- MRI w/ evidence of severe anoxic brain injury - Minimal improvement on neuro exam 
- Appreciate neuro assistance - IV Meropenem and IV Fluconazole d/heron on 10/15 per ID. 
- Currently on vanc only which was started on 9/27 
- Nephro managing HD TTHS 
- Pt unlikely to have any meaningful neuro recovery, prognosis is very grim - Family wanted to cont to do everything, including trach/PEG -> was done10/17 
- On 10/17:Started on acyclovir for possible Zoster rt arm and rt upper chest area. - Hyperkalemia on 10/18 ->corrected - Lipase 2169 10/22. US abd Abnormal appearance of the gallbladder with stones/sludge with wall thickening  
and possible pericholecystic fluid similar to the prior study of 9/27/2018  
- On Acyclovir for shingles , contact precaution per ID commitee 
- 10/22: Consult GI today for possible MRCP 
- 10/22: Consult Podiatry for left big toe gangrene  
- 10/23 : Podiatry recommend RAQUEL for Left Big toe  
- 10/23: GI recommend MRI/MRCP but will need patient off vent for multiple times for procedure. Not sure if this is feasible now 
- 10/25 : Blood , urine , sputum , trach discharge cultures 
- 10/25 IV antibiotics by ID  
- Change GI tube to Jejunostomy tube tomorrow by IR  
- 10/26: Vancomycin and Cefepime per ID  
- 10/26 : Jejunostomy attempt by IR unsuccessful .  Will need to discuss with GI  
- 10/27 : Ciprofloxacin added 10/27 for double coverage of pseudomonas with cefepime per PCCM  
- 10/28: Pancreatitis ? Biliary  . Will reconsult GI today - Lipase trending down. 
-11/2:culture of picc line since pt has developed fever. -11/3:still febrile,picc line tip cx results pending;will follow ID recommendations. -11/4:Cx of tip of catheter showing staph coag negative. Pt still spiking fever - will follow ID recommendations. - 11/5: Nephrology signed off . At daughters request will D/C dialysis. She per Nephrology is well aware if implications of stopping dialysis - Per ID continue cefepime for total 14 days and Vancomycin  added for 7 days 11/6: DNR and Hospice consulted after I personally spoke with her. 11/7: Nephrology and ID signed off. Hospice following patient now 11/8: Patient on comfort measures only - Morphine , scopolamine and ativan ordered per protocol . Per report Mech vent removal pending arrival of POA  
 
DVT prophylaxis:scds DNR per daughter's request today 11/6 Disposition: Hospice -  will work on placement

## 2018-11-13 NOTE — PROGRESS NOTES
[facility] Pulmonary, Critical Care, and Sleep Medicine Critical Care Progress Note Name: Ignacio Rosen : 1955 MRN: [id number] Date: 2018 [x] I have reviewed the flowsheet and previous days notes. Events, vitals, medications and notes from last 24 hours reviewed. Care plan discussed on multidisciplinary rounds. My assessment, plan of care, findings, medications, side effects etc were discussed with: 
[x] Nurse [] PT/OT   
[] Respiratory therapy [] Dr. Jamison Urias  
[] Family (daughter, niece) [] Patient: answered all questions to satisfaction  
[] Pharmacist [] ASSESSMENT/PLAN:  
Principal Problem: 
  Cardiac arrest with ventricular fibrillation ([facility] (2018) Active Problems: 
  Comfort measures only status (2018) Ischemic encephalopathy (2018) Gangrene ([facility] (10/22/2018) Acute pancreatitis (2018) Hypoalbuminemia (10/21/2018) Essential hypertension (9/10/2018) Acute-on-chronic kidney injury ([facility] (2018) Elevated LFTs (2018) Diabetes mellitus type 2, controlled ([address] (9/10/2018) History of stroke (2018) Overview: With residual R hemiparesis Acute cystitis (2018) Elevated troponin (2018) · Awaiting for daughter to arrive from [place]. She called the nursing station today to report that she plans to visit tomorrow around 1 PM. I am available to meet with the daughter, if requested. NUTRITION: Hold TF. Check prealbumin q week. Consult Clinical Dietician. ICU electrolyte replacement protocol. PROPHYLAXIS: 
DVT prophylaxis: heparin GI prophylaxis: Protonix HOB 30-degree elevation Chlorhexidine mouth washes CODE STATUS: FULL CODE Further recommendations will be based on the patient's response to recommended treatment and results of the investigation ordered.   
 
DISPOSITION: 
 
 The patient is: [x] acutely ill Risk of deterioration: [] moderate [x] critically ill  [x] high  
 
[x]See my orders for details [x] The patient is unable to give any meaningful history or review of systems because the patient is: 
[x] Intubated [] Sedated  
[x] Unresponsive [] [x]The patient is critically ill on     
[x] Mechanical ventilation [x] Vasoactive agents  
[] BiPAP [] Inotropes SUBJECTIVE 
- This 57 y.o.  male is seen in consultation at the request of Dr. Gregory Elise for recommendations on further evaluation and management of acute hypoxia. He hospitalized (9/10/2018 - 9/14/2018) for stroke with neurologic residua (R hemiparesis). Patient discharged from [facility] to [facility] on 9/14/2018, only to return on 9/26/2018 with acute renal failure/abnormal lab values. He experienced VT/VF arrest during my initial clinical assessment. The patient has undergone tracheostomy and percutaneous gastrostomy. He is currently on comfort measures only. - Interim events: Remains on mechanical ventilatory support. Not following commands. Pulmonary/Critical Care service signed off this patient's care with decision to pursue Hospice consultation and transition to comfort measures. [x] Nutrition: NPO 
[] BM today. ROS: Review of systems not obtained due to patient factors. Past Medical History:  
Diagnosis Date  CHF (congestive heart failure) ([facility]  Diabetes ([facility]  Stroke ([facility] No Known Allergies Current Facility-Administered Medications:  
  LORazepam (ATIVAN) injection 1 mg, 1 mg, IntraVENous, Q15MIN PRN, Anthony Solorzano MD 
  LORazepam (INTENSOL) 2 mg/mL oral concentrate 1 mg, 1 mg, Oral, Q1H PRN, Anthony Solorzano MD 
  morphine 10 mg/ml injection 10 mg, 10 mg, Inhalation, Q1H PRN, Anthony Solorzano MD 
  morphine injection 2 mg, 2 mg, IntraVENous, Q15MIN PRN, Jeanine Marielena Walls MD, 2 mg at 11/13/18 1250   scopolamine (TRANSDERM-SCOP) 1 mg over 3 days 1 Patch, 1 Patch, TransDERmal, Q72H PRN, Anthony Solorzano MD 
  albuterol (PROVENTIL VENTOLIN) nebulizer solution 2.5 mg, 2.5 mg, Nebulization, BID RT, Zach Landrum MD, 2.5 mg at 11/13/18 4757   acetaminophen (TYLENOL) solution 325 mg, 325 mg, Per NG tube, Q4H PRN, Zach Landrum MD, 325 mg at 11/03/18 0819 
  ondansetron (ZOFRAN) injection 4 mg, 4 mg, IntraVENous, Q4H PRN, Alfred Nettles DO, 4 mg at 09/28/18 9518 OBJECTIVE Vital Signs:   
Patient Vitals for the past 24 hrs: 
 Temp Pulse Resp BP SpO2  
11/13/18 1600  99 18 117/57 100 % 11/13/18 1515  97 21  100 % 11/13/18 1500  99 19 106/51 100 % 11/13/18 1400  97 20 99/50 100 % 11/13/18 1300  94 17 (!) 79/36 100 % 11/13/18 1200 99.5 °F (37.5 °C) 98 19 152/85 100 % 11/13/18 1145  99 16  100 % 11/13/18 1100  96 9 132/83 98 % 11/13/18 1000  95 19 105/57 100 % 11/13/18 0900  99 17 (!) 85/46 100 % 11/13/18 0800 99.1 °F (37.3 °C) 97 21 114/61 100 % 11/13/18 0750  98 21  100 % 11/13/18 0700  99 19 129/65 100 % 11/13/18 0600  (!) 101 17 (!) 143/94 100 % 11/13/18 0500  99 20 131/66 100 % 11/13/18 0429  97 17  100 % 11/13/18 0400 97.4 °F (36.3 °C) 97 17 141/70 100 % 11/13/18 0300  96 16 132/71 100 % 11/13/18 0200  95 17 133/70 100 % 11/13/18 0100  93 19 127/71 100 % 11/13/18 0000 97.8 °F (36.6 °C) 93 17  94 % 11/12/18 2331  95 19  100 % 11/12/18 2300  91 15 124/72 100 % 11/12/18 2200  93 19 126/67 100 % 11/12/18 2100  94 19 129/61 100 % 11/12/18 2019  92 18  100 % 11/12/18 2000 98.8 °F (37.1 °C) 89 18 115/64 100 % 11/12/18 1900  91 20 108/61 99 % 11/12/18 1800  96 19 140/70 100 % 11/12/18 1700  94 19 122/63 100 % 11/12/18 1624  95 17  99 % O2 Device: Tracheostomy O2 Flow Rate (L/min): 45 l/min Temp (24hrs), Av.5 °F (36.9 °C), Min:97.4 °F (36.3 °C), Max:99.5 °F (37.5 °C) PHYSICAL EXAM:  
General: Not on sedation. Does not follow commands. Head: atraumatic, normocephalic Eye: pupils are midline today. Spontaneous blinking. Nose: Nares are patent. No exudate. Throat: No oral thrush. No exudate. Mucous membranes are moist. 
Neck: tracheostomy in situ. no thyromegaly, no JVD, no lymphadenopathy. Trachea midline. Lung: Symmetric in development and expansion. Good air entry. Heart: Regular S1, S2 without murmur, rub or gallop. Abdomen: PEG in situ. soft, nontender, no ndistended. Normoactive bowel sounds. No rebound. No guarding. Extremities: no pedal edema, no clubbing, 2+ peripheral pulses in DP. L great toe ischemic changes/dry gangrene. Lymphatic: no cervical/axillary/inguinal lymphadenopathy Neurologic: R facial droop is less obvious today. Minimal reaction to noxious stimuli @ B LE. Doll's eyes intact. Corneal intact. Cough/gag intact. Spontaneous respirations intact. L LE triple flexion response to plantar dorsiflexion. DTR 1+ @ B biceps and B patellar tendons. Skin: shingles lesions have completely healed. Buttocks DTI 
DATA:  
 
Intake/Output:  
Last shift:      701 - 1900 In: 240 Out: - Last 3 shifts: 1901 - 700 In: 240 Out: 550 [Drains:500] Intake/Output Summary (Last 24 hours) at 2018 1613 Last data filed at 2018 1600 Gross per 24 hour Intake 300 ml Output 250 ml Net 50 ml Last 3 Recorded Weights in this Encounter 18 0600 18 0600 18 0600 Weight: 87.3 kg (192 lb 7.4 oz) 88.8 kg (195 lb 12.3 oz) 86.8 kg (191 lb 5.8 oz) Lines: All central lines examined by me. No signs of erythema, induration, discharge. Peripheral Intravenous Line: 
Peripheral IV 18 Left;Posterior Hand (Active) Site Assessment Clean, dry, & intact 2018  4:00 PM  
 Phlebitis Assessment 0 11/13/2018  4:00 PM  
Infiltration Assessment 0 11/13/2018  4:00 PM  
Dressing Status Clean, dry, & intact 11/13/2018  4:00 PM  
Dressing Type Transparent 11/13/2018  4:00 PM  
Hub Color/Line Status Pink 11/13/2018  4:00 PM  
Action Taken Open ports on tubing capped 11/13/2018  4:00 PM  
Alcohol Cap Used Yes 11/13/2018  4:00 PM  
  
Hemodialysis Catheter: 
Hemodialysis Access 10/31/18 (Active) Central Line Being Utilized No 11/13/2018  4:00 PM  
Criteria for Appropriate Use Dialysis/apheresis 11/13/2018  4:00 PM  
Date Accessed  11/07/18 11/13/2018  4:00 PM  
Access Time  1100 11/6/2018  8:00 PM  
Site Assessment Clean, dry, & intact 11/13/2018  4:00 PM  
Date of Last Dressing Change 11/07/18 11/13/2018  4:00 PM  
Dressing Status Clean, dry, & intact 11/13/2018  4:00 PM  
Dressing Type Transparent 11/13/2018  4:00 PM  
Proximal Hub Color/Line Status Capped 11/13/2018  4:00 PM  
Distal Hub Color/Line Status Capped 11/13/2018  4:00 PM  
 
Drain(s): PEG/Gastrostomy Tube 10/17/18 (Active) Site Assessment Clean, dry, & intact 11/13/2018  4:00 PM  
Dressing Status Clean, dry, & intact 11/13/2018  4:00 PM  
G Port Status Clamped 11/13/2018  4:00 PM  
External Catheter Length (cm) 3 centimeters 11/13/2018  4:00 PM  
Action Taken Placement verified (comment) 11/13/2018  4:00 PM  
Drainage Description Tan 11/13/2018  4:00 PM  
Gastric Residual (mL) 0 ml 11/12/2018 12:00 PM  
Tube Feeding/Formula Options Perative 11/10/2018  8:00 AM  
Tube Feeding/Verify Rate (mL/hr) 40 11/10/2018  8:00 AM  
Water Flush Volume (mL) 60 mL 11/13/2018  4:00 PM  
Intake (ml) 60 ml 11/13/2018  4:00 PM  
Medication Volume 0 ml 11/12/2018 12:00 PM  
Drainage Chamber Level (ml) 0 ml 11/5/2018 12:00 PM  
Output (ml) 0 ml 11/5/2018 12:00 PM  
   
Fecal Management (Active) Stool Consistency Liquid 11/13/2018  7:00 AM  
Position of Indicator Line Not visible 11/13/2018  4:00 AM  
 Signal Indicator Bubble Appropriate 11/13/2018  4:00 AM  
Skin Assessment of the Anal Area Treatment with Zinc Oxide cream 11/13/2018  4:00 AM  
Tube Irrigated No 11/13/2018  4:00 AM  
Irrigation Volume (mL) 30 11/12/2018  8:00 PM  
Drainage Bag Level (mL) 0 11/13/2018  7:00 AM  
Output (ml) 0 ml 11/13/2018  7:00 AM  
 
Airway: Airway - Tracheostomy Tube 10/17/18 Portex; Cuffed (Active) Cuff Pressure 28 cmH20 11/13/2018 11:45 AM  
Site Assessment Clean, dry, & intact 11/13/2018  4:00 PM  
Trach Dressing Changed Yes 11/13/2018  4:00 PM  
Trach Cleaned With Normal saline 11/13/2018  4:00 PM  
Trach Tie Changed No 11/13/2018  4:00 PM  
Trach Cannula Changed Yes 11/13/2018  4:00 PM  
Inner Cannula Cuffed, cuff inflated 11/13/2018  4:00 PM  
Line [employer] of the lock 11/13/2018  4:00 PM  
Side Secured Centered 11/13/2018  4:00 PM  
Spare Trach at Bedside Yes 11/13/2018  4:00 PM  
Spare Trach Tube One Size Smaller at Bedside Yes 11/13/2018  4:00 PM  
Ambu Bag With Mask at Bedside Yes 11/13/2018  4:00 PM  
 
 
Vent Date (intubated 9/29/2018, tracheostomy 10/17/2018) Ventilator Settings: 
Ventilator Mode: SIMV, VC+ Respiratory Rate Resp Rate Observed: 32 Back-Up Rate: 12 Insp Time (sec): 1 sec Insp Flow (l/min): 55 l/min I:E Ratio: 1;2 
Ventilator Volumes Vt Set (ml): 500 ml Vt Exhaled (Machine Breath) (ml): 554 ml Vt Spont (ml): 352 ml Ve Observed (l/min): 9.82 l/min Ventilator Pressures Pressure Support (cm H2O): 10 cm H2O 
PIP Observed (cm H2O): 22 cm H2O Plateau Pressure (cm H2O): 18 cm H2O 
MAP (cm H2O): 10 PEEP/VENT (cm H2O): 5 cm H20 Auto PEEP Observed (cm H2O): 0 cm H2O Mode Rate Tidal Volume Pressure FiO2 PEEP  
SIMV, VC+   500 ml  10 cm H2O 30 % 5 cm H20 Peak airway pressure: 22 cm H2O Plateau pressure:    
Tidal volume:   
Minute ventilation: 9.82 l/min SPO2   
 
ARDSNet Guidelines: Lung protective ventilation (VT 6 cc/kg IBW) and Pplat <= 30 
 
 
 Telemetry: [x] Sinus [] A-flutter [] Paced [] A-fib [] Multiple PVCs Imaging: 
[x] I have personally reviewed the patients radiographs 
[] Radiographs reviewed with radiologist 
[x] No CXR study available for review today 
[] No change from prior, tubes and lines are in adequate position 
[] Improved   [] Worsening No results found. Labs: 
No results found for this or any previous visit (from the past 24 hour(s)). Cultures: All Micro Results Procedure Component Value Units Date/Time CULTURE, CATHETER TIP [[id number]]  (Abnormal)  (Susceptibility) Collected:  11/02/18 1307 Order Status:  Completed Specimen:  PICC Updated:  11/05/18 1057 Special Requests: NO SPECIAL REQUESTS Culture result:    
  >15 CFU STAPHYLOCOCCUS EPIDERMIDIS  
     
 CULTURE, CATHETER TIP [[id number]]  (Abnormal)  (Susceptibility) Collected:  10/31/18 4216 Order Status:  Completed Specimen:  Catheter Tip Updated:  11/02/18 1115 Special Requests: NO SPECIAL REQUESTS Culture result:    
  <15 CFU PSEUDOMONAS AERUGINOSA CULTURE, BLOOD [[id number]] Collected:  10/25/18 1328 Order Status:  Completed Specimen:  Whole Blood Updated:  10/31/18 0910 Special Requests: NO SPECIAL REQUESTS Culture result: NO GROWTH 6 DAYS     
 CULTURE, BLOOD [[id number]] Collected:  10/25/18 1328 Order Status:  Completed Specimen:  Whole Blood Updated:  10/31/18 0910 Special Requests: NO SPECIAL REQUESTS Culture result: NO GROWTH 6 DAYS     
 CULTURE, Karmen Gala STAIN [[id number]]  (Abnormal)  (Susceptibility) Collected:  10/25/18 1359 Order Status:  Completed Specimen:  Wound from Tracheostomy site Updated:  10/28/18 9804 Special Requests: NO SPECIAL REQUESTS     
  GRAM STAIN FEW WBC'S     
   RARE GRAM NEGATIVE RODS Culture result: MANY PSEUDOMONAS AERUGINOSA   MODERATE PSEUDOMONAS AERUGINOSA 2ND MORPHOTYPE  
     
      
 FEW ENTEROCOCCUS FAECALIS GROUP D  
     
      
  FEW STAPHYLOCOCCUS SPECIES, COAGULASE NEGATIVE (TWO MORPHOTYPES) CULTURE, RESPIRATORY/SPUTUM/BRONCH Casey Amador STAIN [[id number]]  (Abnormal)  (Susceptibility) Collected:  10/25/18 1325 Order Status:  Completed Specimen:  Sputum from Tracheal Aspirate Updated:  10/28/18 2332 Special Requests: NO SPECIAL REQUESTS     
  GRAM STAIN MODERATE WBC'S     
   RARE GRAM NEGATIVE RODS Culture result: MANY PSEUDOMONAS AERUGINOSA  
     
      
  MANY PSEUDOMONAS AERUGINOSA 2ND MORPHOTYPE  
     
      
  RARE NORMAL RESPIRATORY SHANAE  
     
 CULTURE, URINE [[id number]]  (Abnormal)  (Susceptibility) Collected:  10/25/18 1359 Order Status:  Completed Specimen:  Cath Urine Updated:  10/27/18 2454 Special Requests: NO SPECIAL REQUESTS Culture result:    
  05703 COLONIES/mL PSEUDOMONAS AERUGINOSA CULTURE, CATHETER TIP [[id number]]  (Abnormal)  (Susceptibility) Collected:  10/19/18 1330 Order Status:  Completed Specimen:  Catheter Tip Updated:  10/22/18 4050 Special Requests: NO SPECIAL REQUESTS Culture result:    
  >15 CFU STAPHYLOCOCCUS EPIDERMIDIS  
     
 CULTURE, BLOOD [[id number]] Collected:  10/16/18 1055 Order Status:  Completed Specimen:  Blood Updated:  10/22/18 0809 Special Requests: PERIPHERAL Culture result: NO GROWTH 6 DAYS     
 CULTURE, BLOOD [[id number]] Collected:  10/16/18 1047 Order Status:  Completed Specimen:  Blood Updated:  10/22/18 0809 Special Requests: PERIPHERAL Culture result: NO GROWTH 6 DAYS     
 HSV 1 AND 2 BY PCR [[id number]] Collected:  10/18/18 1815 Order Status:  Completed Specimen:  Other from Miscellaneous sample Updated:  10/21/18 1636 Source OTHER TEST     
  HSV-1 DNA by PCR NEGATIVE      
  HSV-2 DNA by PCR NEGATIVE Comment: (NOTE) This test was developed and its performance characteristics determined by [facility]. It has not been cleared or 
approved by the U.S. Food and Drug Administration. The FDA has 
determined that such clearance or approval is not necessary. This 
test is used for clinical purposes. It should not be regarded as 
investigational or research. Performed At: [address] [id number] Roula Montelongo MD OS:[id number] CULTURE, RESPIRATORY/SPUTUM/BRONCH [facility] [[id number]] Collected:  10/16/18 1915 Order Status:  Completed Specimen:  Sputum,ET Suction Updated:  10/18/18 1011 Special Requests: NO SPECIAL REQUESTS     
  GRAM STAIN 10-25 WBC/lpf     
   <10 EPI/lpf FEW GRAM POSITIVE COCCI IN PAIRS MODERATE GRAM POSITIVE COCCI IN GROUPS MUCUS PRESENT Culture result: MODERATE NORMAL RESPIRATORY SHANAE  
     
 CULTURE, BLOOD [[id number]] Collected:  10/08/18 1227 Order Status:  Completed Specimen:  Blood Updated:  10/14/18 0741 Special Requests: PERIPHERAL Culture result: NO GROWTH 6 DAYS     
 CULTURE, CATHETER TIP [[id number]] Collected:  10/08/18 1215 Order Status:  Completed Specimen:  Catheter Tip Updated:  10/11/18 8230 Special Requests: NO SPECIAL REQUESTS Culture result: NO GROWTH 3 DAYS     
 CULTURE, BLOOD [[id number]] Collected:  10/04/18 5393 Order Status:  Completed Specimen:  Blood Updated:  10/10/18 2951 Special Requests: PERIPHERAL Culture result: NO GROWTH 6 DAYS     
 CULTURE, BLOOD [[id number]] Collected:  10/04/18 1120 Order Status:  Completed Specimen:  Blood Updated:  10/10/18 7603 Special Requests: PERIPHERAL Culture result: NO GROWTH 6 DAYS     
 CULTURE, BLOOD [[id number]] Collected:  09/27/18 0005 Order Status:  Completed Specimen:  Blood Updated:  10/03/18 0845 Special Requests: NO SPECIAL REQUESTS Culture result: NO GROWTH 6 DAYS CULTURE, BLOOD [[id number]]  (Abnormal)  (Susceptibility) Collected:  09/27/18 0136 Order Status:  Completed Specimen:  Blood Updated:  10/02/18 7929 Special Requests: NO SPECIAL REQUESTS     
  GRAM STAIN PEDIATRIC BOTTLE GRAM POSITIVE COCCI IN PAIRS IN CLUSTERS  
      
  SMEAR CALLED TO AND CORRECTLY REPEATED BY: Jaqueline Ryder RN IN 2700 ON 9/29/18 AT 2033 WF Culture result:    
  AEROBIC BOTTLE STAPHYLOCOCCUS EPIDERMIDIS  
     
 CULTURE, RESPIRATORY/SPUTUM/BRONCH [place] STAIN [[id number]]  (Abnormal) Collected:  09/29/18 1210 Order Status:  Completed Specimen:  Sputum from Endotracheal aspirate Updated:  10/02/18 1754 Special Requests: NO SPECIAL REQUESTS     
  GRAM STAIN >25 WBC/lpf     
   <10 EPI/lpf MUCUS PRESENT     
   MODERATE YEAST Culture result: MANY CANDIDA TROPICALIS C. DIFFICILE/EPI PCR [[id number]] Collected:  09/29/18 1400 Order Status:  Completed Specimen:  Stool Updated:  09/29/18 2248 Special Requests: NO SPECIAL REQUESTS Culture result: Toxigenic C. difficile NEGATIVE                         C. difficile target DNA sequences are not detected. CULTURE, URINE [[id number]] Collected:  09/27/18 0029 Order Status:  Completed Specimen:  Cath Urine Updated:  09/29/18 0944 Special Requests: NO SPECIAL REQUESTS Culture result: NO GROWTH 2 DAYS During this entire length of time I was immediately available to the patient. The reason for providing this level of medical care for this critically ill patient was due a critical illness that impaired one or more vital organ systems such that there was a high probability of imminent or life threatening deterioration in the patients condition.  This care involved high complexity decision making to assess, manipulate, and support vital system functions, to treat this degreee vital organ system failure and to prevent further life threatening deterioration of the patients condition 
 
[] Total critical care time spent on reviewing the case/medical record/data/notes/EMR/patient examination/documentation/coordinating care with nurse/consultants, exclusive of procedures - minutes with complex decision making performed and > 50% time spent in face to face evaluation. Laly Tejada MD   
Board Certified by the ABI, Kristin Nur 11/13/2018

## 2018-11-13 NOTE — PROGRESS NOTES
Patient in bed on back with head elevated - BBS equal and sl diminished - trach midline and secure - trach site cleaned, dressing replaced, inner cannukla changed - opatient suctioned - spare trachs at bedside - [facility] at [facility] - vent alarms on and functional

## 2018-11-13 NOTE — PROGRESS NOTES
I have received the following message from [facility], [facility]. : , [facility] denied [facility] for [facility]. Please let me know if you are interested in a contract. Thanks, Danni\". Marian Doherty emailed. Patient has also been matched to SNF facilities with ventilator capabilities and has  been declined throughout the state. I called [employer] at [phone] and was referred to the Clinical Intake and the clinical intake prompty hung up on me, not before stating,\" I cannot hear you please call later. \" I called back, and was transferred again to Clinical Intake. I was then placed on a loop of recording and never spoke to an individual.  I had asked to speak to a  with [employer]. I have filed a formal complaint with [address] [place] [address] of [facility] and the [address]

## 2018-11-13 NOTE — PROGRESS NOTES
VENTILATOR Care plan 
 
Problem: Ventilator Management Goal: *Adequate oxygenation/ ventilation/ and extubation Patient: 
   
 
Justen Logan     57 y.o.   male     11/13/2018  8:46 AM 
Patient Active Problem List  
Diagnosis Code  Stroke (cerebrum) ([facility]) I63.9  Stroke ([facility] I63.9  Rhabdomyolysis M62.82  
 Dehydration E86.0  
 Essential hypertension I10  
 Diabetes mellitus type 2, controlled ([facility] E11.9  Non compliance w medication regimen Z91.14  
 DM (diabetes mellitus) ([facility] E11.9  History of stroke Z80.78  
 Acute-on-chronic kidney injury ([facility] N17.9, N18.9  Acute cystitis N30.00  Elevated troponin R74.8  Elevated LFTs R94.5  Cardiac arrest with ventricular fibrillation ([facility]) I46.9, I49.01  
 Acute pancreatitis K85.90  
 Ischemic encephalopathy I67.89  
 Hypoalbuminemia E88.09  
 Shingles B02.9  Gangrene ([facility] [id number]  Pseudomonas infection B96.5 Pneumonia of both lower lobes due to infectious organism ([facility] [J18.1] ESRD (end stage renal disease) ([facility] [N18.6] ESRD (end stage renal disease) ([facility] [N18.6] Reason patient intubated:Chronic Respiratory Failure, Airway protection Ventilator day:46 Ventilator settings: VC+ SIMV rate-12m, VT-500, PEEP-5, PS-10, FIO2-30% Trach size: 8.0 Portex ABG: 
Date:11/13/2018 No results found for: PH, PHI, PCO2, PCO2I, PO2, PO2I, HCO3, HCO3I, FIO2, FIO2I Chest X-ray: 
Date:11/13/2018 Results from [facility] Encounter encounter on 09/26/18 XR CHEST PORT Narrative EXAM: Portable Chest 
 
CLINICAL INDICATION:  trach change; atelectasis -Additional:  None COMPARISON:  10/24/18 TECHNIQUE: AP portable view at [address],[place] A 
 
______________ FINDINGS: 
 
HEART AND MEDIASTINUM:  The heart size is stable LUNGS AND AIRWAYS:  Right chest perihilar infiltrate is present PLEURA:  No pneumothorax or pleural effusion BONES:  Vertebral spondylosis OTHER:  None 
 
______________ Impression IMPRESSION: 
 
Right lung perihilar infiltrate appears present Lab Test: 
Date:11/13/2018 WBC:  
Lab Results Component Value Date/Time WBC 11.4 11/05/2018 04:00 AM  
HGB:  
Lab Results Component Value Date/Time HGB 8.5 (L) 11/05/2018 04:00 AM  
 PLTS:  
Lab Results Component Value Date/Time PLATELET 967 (H) 15/74/0605 04:00 AM  
 
 
SaO2%/flow: @LASTSAO2(1)@ Vital Signs:    
Patient Vitals for the past 8 hrs: 
 Temp Pulse Resp BP SpO2  
11/13/18 0800  97 21 114/61 100 % 11/13/18 0750  98 21  100 % 11/13/18 0700  99 19 129/65 100 % 11/13/18 0600  (!) 101 17 (!) 143/94 100 % 11/13/18 0500  99 20 131/66 100 % 11/13/18 0429  97 17  100 % 11/13/18 0400 97.4 °F (36.3 °C) 97 17 141/70 100 % 11/13/18 0300  96 16 132/71 100 % 11/13/18 0200  95 17 133/70 100 % 11/13/18 0100  93 19 127/71 100 % Wean Screen Pass (Yes or No): N?A Wean Screen Reason for Failure:N/A Duration of Weaning Trial: 
 
Additional Comments: PLAN OF CARE: Maintain ventilatory support GOAL: Adequate oxygenation and ventilation OUTCOME:

## 2018-11-13 NOTE — PROGRESS NOTES
-0750-Received patient on Ventilator VC+ SIMV rate-12, VT-500, PS-10, PEEP-5, FIO2-30%. O2 sats-100% HR-97, RR-12. Gabriele Moose noted to be patent and secure. Respiratory will continue to monitor. -[address] 
 
-1515-Pt. Remains on above settings. O2 Sats-100% HR-98, Rr-21. Trach care performed,Inner cannula changed. Gabriele Moose remains patent and secure. -[address]

## 2018-11-13 NOTE — PROGRESS NOTES
Physical Exam  
Skin:  
 
  
 
 
Bedside shift change report was given to me, Faith Lopez RN  (oncoming night shift nurse), by Jose Conner (offgoing day shift nurse). Report included the following information:  SBAR, kardex, consultations, hemodynamic monitoring, intake/output, MAR, lab results, VS trends, cardiac rhythm noted NSR. Skin, neuro, respiratory status, and order verification have been dually noted and verified at the bedside with: Brando Valentine RN                                                              
ICU Nurse's Note: 
  
2000: Received care of pt resting quietly in bed, vent dependent via tracheostomy. Pt's eyes are open spontaneously, with intermittent and non-purposeful, spontaneous eye movements, dysconjugate gaze, and no observable s/s tracking or visual focusing, no cognitive ability to actively follow commands or communicate needs. Pt has no notable purposeful movements to his extremities, with exception of occasional withdrawal to pain and intermittent spontaneous movements to BLE. BUE remain flaccid/floppy and weak and require support and frequent ROM. Pt requires max assist with all ADLs and repositioning. Pt is comfort measures only at this time. Bed is locked and in lowest position. Side rails up x3, exit alarm activated. Call bell is within reach. Will continue to monitor. Neurological: as noted in assessment Cardiovascular:  NSR on the monitor. Pulmonary: breath sounds are diminished and coarse bilaterally. Saturations maintained at 100 % via 8 mm Portex cuffed tracheostomy, managed via ventilator with FiO2 30%. GI/: Abdomen is obese, soft, non-distended. PEG tube intact and clamped, flushed for patency, no observable gastric residual noted. Last BM incontinence indicated 11/13/2018, loose liquid stool via FMS, which is draining appropriately. Pt is ESRD with oliguria at baseline, with frequent, intermittent episodes of large amounts of urinary incontinence. IVF/Critical gtts: saline locked Skin: as noted above 
  
2100:  Pt was repositioned, trache and PEG tube stoma care completed, no noted drainage or irritation observed. Pt was noted incontinent a large amount urine. Pt tolerated a complete bed bath and complete bed linen change. He is resting quietly at this time, no s/s acute distress. Bed is locked and in lowest position, side rails up x3, exit alarm activated. 0000: Pt appears to be resting comfortably in bed at this time after repositioning, mouth care, and mery care. Interventions are ongoing, will continue to monitor. 0400: Pt was bathed, repositioned, and tolerated mouth, trache, PEG, and FMS/incontinence care. 0600: Pt appears to be resting comfortably in bed at this time after repositioning, mouth care, and mery care. Interventions are ongoing, will continue to monitor.  
0710: Bedside change of shift report given to: Rufino Freedman RN

## 2018-11-14 NOTE — PROGRESS NOTES
VENTILATOR Care plan 
 
Problem: Ventilator Management Goal: *Adequate oxygenation/ ventilation/ and extubation Patient: 
   
 
Fina Shaver     57 y.o.   male     11/14/2018  10:00 AM 
Patient Active Problem List  
Diagnosis Code  Stroke (cerebrum) ([facility]) I63.9  Stroke ([facility] I63.9  Rhabdomyolysis M62.82  
 Dehydration E86.0  
 Essential hypertension I10  
 Diabetes mellitus type 2, controlled ([facility] E11.9  Non compliance w medication regimen Z91.14  
 DM (diabetes mellitus) ([facility] E11.9  History of stroke Z80.78  
 Acute-on-chronic kidney injury ([facility] N17.9, N18.9  Acute cystitis N30.00  Elevated troponin R74.8  Elevated LFTs R94.5  Cardiac arrest with ventricular fibrillation ([facility]) I46.9, I49.01  
 Acute pancreatitis K85.90  
 Ischemic encephalopathy I67.89  
 Hypoalbuminemia E88.09  
 Gangrene ([facility]) I96  
 Comfort measures only status Z51.5 Pneumonia of both lower lobes due to infectious organism ([facility] [J18.1] ESRD (end stage renal disease) ([facility] [N18.6] ESRD (end stage renal disease) ([facility] [N18.6] Reason patient intubated: Chronic respiratory failure, Airway protection Ventilator day: 47 Ventilator settings: VC+ SIMV rate- 12, VT-500, PS-10 PEEP-5, FIO2-30% Trach size: # 8 Portex ABG: 
Date:11/14/2018 No results found for: PH, PHI, PCO2, PCO2I, PO2, PO2I, HCO3, HCO3I, FIO2, FIO2I Chest X-ray: 
Date:11/14/2018 Results from [facility] Encounter encounter on 09/26/18 XR CHEST PORT Narrative EXAM: Portable Chest 
 
CLINICAL INDICATION:  trach change; atelectasis -Additional:  None COMPARISON:  10/24/18 TECHNIQUE: AP portable view at [facility] 
 
______________ FINDINGS: 
 
HEART AND MEDIASTINUM:  The heart size is stable LUNGS AND AIRWAYS:  Right chest perihilar infiltrate is present PLEURA:  No pneumothorax or pleural effusion BONES:  Vertebral spondylosis OTHER:  None 
 
______________  Impression IMPRESSION: 
 
 Right lung perihilar infiltrate appears present Lab Test: 
Date:11/14/2018 WBC:  
Lab Results Component Value Date/Time WBC 11.4 11/05/2018 04:00 AM  
HGB:  
Lab Results Component Value Date/Time HGB 8.5 (L) 11/05/2018 04:00 AM  
 PLTS:  
Lab Results Component Value Date/Time PLATELET 814 (H) 42/82/3606 04:00 AM  
 
 
SaO2%/flow: @LASTSAO2(1)@ Vital Signs:    
Patient Vitals for the past 8 hrs: 
 Temp Pulse Resp BP SpO2  
11/14/18 0820  86 18  100 % 11/14/18 0800  85 18 123/59 100 % 11/14/18 0700  87 17 150/78 100 % 11/14/18 0600  88 17 120/72 100 % 11/14/18 0411  93 17  100 % 11/14/18 0400 98 °F (36.7 °C) 91 14 116/71 100 % 11/14/18 0300  95 17 133/72 100 % Wean Screen Pass (Yes or No): n/a Wean Screen Reason for Failure:n/a Duration of Weaning Trial:n/a Additional Comments: Pt. Is ventilator dependent, comfort care PLAN OF CARE: Maintain ventilatory support GOAL: Adequate oxygenation and ventilation OUTCOME:

## 2018-11-14 NOTE — PROGRESS NOTES
Hospitalist Progress Note Daily Progress Note: 11/14/2018  
   
Interval history / Subjective:  
Chey Mckeon is a 57y.o. year old male who presented from [facility] with abnormal labs, elevated BUN/Cr. Found to have JULIANNA, UTI, and markedly elevated LFT on presentation. Patient's recent admission was 9/10/18-9/14/18 for acute CVA and rhabdo. Patient poor historian on admission,stated \"I had heart failure. She was started on vanc,zosyn. On 9/29,patient had cardiac arrest with ventricular fibrillation - s/p ROSC. Her hospital course has since then complicated with sepsis,pancreatitits,pneumonia,acute pancreatitis requiring involvement of gi,surgery,nephrology,cardiology,ID,cardiology. So far patient has remained without major improvement. Now in vegetative state. Patient had PEG/Trach placement on 10/17. On 10/17,patient started on acyclovir for veicular rash rt arm,suspected being Zoster. Lipase 2169. US abd 10/22 shows Abnormal appearance of the gallbladder with stones/sludge with wall thickening  
and possible pericholecystic fluid similar to the prior study of 9/27/2018 
10/22: Patient on treatment for shingles in contact isolation per ID team. He also had left big toe gangrene . We will consult Podiatry. His lipase was elevated , US of the abdomen shows sludge and possible cholecystitis. Patient afebrile. GI consulted  for possible MRCP. Patient has leukocytosis. ID started Vancomycin and ordered Urine , blood and sputum culture and trach site discharge. Patient off air bourne precaution but continue contact precaution per ID. Patient was started on Vancomycin and cefepime for SIRSper ID. Blood and urine cx pending. Patient today is going for jejunostomy. Patient BC NGTD Vancomycin d/c'd per ID. Patient respiratory  cx + GNR , STREPT, Wound cx + pseudomonas . PCCM added Ciprofloxacin today . IR attempt at Jejunostomy was unsuccessful yesterday. 10/28: Fever today. Resp cx, wound cx noted. On cefepime and Cipro for coverage per sensitivisty result . Blood cx NGTD . Lipase still trending up. IR unsuccessful attempt at jejunostomy 10/26. Will re consult GI today 10/29:no change in care. 10/30:no change in care 10/31:pt underwent dialysis today. Currently on cefepime only. Patient is still in the hospital,waiting for placement. 11/1:continue current treatment 11/2:ID has recommended line culture(picc line) as patient had elevated temp today 11/3:still running fever 101.8 today. Tip of picc line cx results pending. 11/4:Tip of catheter culture showing staph coag negative 11/5: Daughter request to d/c dialysis. Nephrology signed off today 11/6: Daughter made patient a DNR . She endorsed Hospice yesterday. Hospice following patient now. PCCM recommend  withdrawalal of Vent support pending discussion of Hospice with POA 
11/10: Per report daughter [facility]) still been expected. 11/11: Called daughter [facility] ) again today and left a voicemail. She did not return prior call from 2 days ago. We will continue to attempt to contact POA. Patient remains on comfort measures with Vent per daughter;s ( POA)  request  
11/12:Waiting for POA for d/c of vent. 11/13:I spoke with patient's daughter Lexii Ayala regarding patient's care decision. Kavya Wallace has stated that she will be here tomorrow at 1:00 pm,accompanied by her uncle. 11/14:Daughter did not show up as she promised yesterday Current Facility-Administered Medications Medication Dose Route Frequency  hydroxypropyl methylcellulose (ISOPTO TEARS) 0.5 % ophthalmic solution 1 Drop  1 Drop Both Eyes PRN  
 LORazepam (ATIVAN) injection 1 mg  1 mg IntraVENous Q15MIN PRN  
 LORazepam (INTENSOL) 2 mg/mL oral concentrate 1 mg  1 mg Oral Q1H PRN  
 morphine 10 mg/ml injection 10 mg  10 mg Inhalation Q1H PRN  
 morphine injection 2 mg  2 mg IntraVENous Q15MIN PRN  
  scopolamine (TRANSDERM-SCOP) 1 mg over 3 days 1 Patch  1 Patch TransDERmal Q72H PRN  
 albuterol (PROVENTIL VENTOLIN) nebulizer solution 2.5 mg  2.5 mg Nebulization BID RT  
 acetaminophen (TYLENOL) solution 325 mg  325 mg Per NG tube Q4H PRN  
 ondansetron (ZOFRAN) injection 4 mg  4 mg IntraVENous Q4H PRN Review of Systems Unresponsive Objective:  
 
Visit Vitals /63 Pulse 87 Temp 98.5 °F (36.9 °C) Resp 21 Ht 5' 7\" (1.702 m) Wt 83.8 kg (184 lb 11.9 oz) SpO2 100% BMI 28.94 kg/m² O2 Flow Rate (L/min): 45 l/min O2 Device: Tracheostomy, Ventilator Temp (24hrs), Av.1 °F (36.7 °C), Min:97.5 °F (36.4 °C), Max:98.5 °F (36.9 °C) 
 
 
701 - 1900 In: 0 Out: 50 [Drains:50] 
1901 -  07 In: 360 Out: 325 [Drains:325] P/E General Appearance:S/p PEG/Trach HENT: normocephalic/atraumatic, + ETT Neck: No JVD, hematoma R side where [facility] is improving Lungs: Coarse B/L w/ vent-assisted BS 
CV: RRR, no m/r/g Abdomen: soft, non-tender, normal bowel sounds Extremities: versicular rash rt arm and right side of chest,no cyanosis, no peripheral edema Neuro: Unresponsive to commands, no withdrawal to pain, + corneal reflex and pupillary reaction today Skin: Normal color, intact Data Review No results found for this or any previous visit (from the past 12 hour(s)). Assessment/Plan:  
 
Principal Problem: 
  Cardiac arrest with ventricular fibrillation ([facility] (2018) Active Problems: 
  Essential hypertension (9/10/2018) Diabetes mellitus type 2, controlled ([address] (9/10/2018) History of stroke (2018) Overview: With residual R hemiparesis Acute-on-chronic kidney injury ([address] (2018) Acute cystitis (2018) Elevated troponin (2018) Elevated LFTs (2018) Acute pancreatitis (2018) Ischemic encephalopathy (2018) Hypoalbuminemia (10/21/2018) Gangrene ([facility] (10/22/2018) Comfort measures only status (11/13/2018) Zoster. Care Plan - Vent mgmt per ICU - S/p trach/PEG on 10/17 as patient did not show significant improvement. 
- EGD showed large clot in esophagus last week repeat EGD 10/9 shows healed esophageal ulcer - Cont protonix IV every day  
- Hgb stable - LFTs improving - GI signed off 10/9 
- MRI w/ evidence of severe anoxic brain injury - Minimal improvement on neuro exam 
- Appreciate neuro assistance - IV Meropenem and IV Fluconazole d/heron on 10/15 per ID. 
- Currently on vanc only which was started on 9/27 
- Nephro managing HD TTHS 
- Pt unlikely to have any meaningful neuro recovery, prognosis is very grim - Family wanted to cont to do everything, including trach/PEG -> was done10/17 
- On 10/17:Started on acyclovir for possible Zoster rt arm and rt upper chest area. - Hyperkalemia on 10/18 ->corrected - Lipase 2169 10/22. US abd Abnormal appearance of the gallbladder with stones/sludge with wall thickening  
and possible pericholecystic fluid similar to the prior study of 9/27/2018  
- On Acyclovir for shingles , contact precaution per ID commitee 
- 10/22: Consult GI today for possible MRCP 
- 10/22: Consult Podiatry for left big toe gangrene  
- 10/23 : Podiatry recommend RAQUEL for Left Big toe  
- 10/23: GI recommend MRI/MRCP but will need patient off vent for multiple times for procedure. Not sure if this is feasible now 
- 10/25 : Blood , urine , sputum , trach discharge cultures 
- 10/25 IV antibiotics by ID  
- Change GI tube to Jejunostomy tube tomorrow by IR  
- 10/26: Vancomycin and Cefepime per ID  
- 10/26 : Jejunostomy attempt by IR unsuccessful . Will need to discuss with GI  
- 10/27 : Ciprofloxacin added 10/27 for double coverage of pseudomonas with cefepime per PCCM  
- 10/28: Pancreatitis ? Biliary  . Will reconsult GI today - Lipase trending down. -11/2:culture of picc line since pt has developed fever. -11/3:still febrile,picc line tip cx results pending;will follow ID recommendations. -11/4:Cx of tip of catheter showing staph coag negative. Pt still spiking fever - will follow ID recommendations. - 11/5: Nephrology signed off . At daughters request will D/C dialysis. She per Nephrology is well aware if implications of stopping dialysis - Per ID continue cefepime for total 14 days and Vancomycin  added for 7 days 11/6: DNR and Hospice consulted after I personally spoke with her. 11/7: Nephrology and ID signed off. Hospice following patient now 11/8: Patient on comfort measures only - Morphine , scopolamine and ativan ordered per protocol . Per report Mech vent removal pending arrival of POA  
 
DVT prophylaxis:scds DNR per daughter's request today 11/6 Disposition: Hospice -  will work on placement

## 2018-11-14 NOTE — PROGRESS NOTES
[facility] Pulmonary, Critical Care, and Sleep Medicine Critical Care Progress Note Name: Jolanta Gonzalez : 1955 MRN: [id number] Date: 2018 [x] I have reviewed the flowsheet and previous days notes. Events, vitals, medications and notes from last 24 hours reviewed. Care plan discussed on multidisciplinary rounds. My assessment, plan of care, findings, medications, side effects etc were discussed with: 
[x] Nurse [] PT/OT   
[] Respiratory therapy [] Dr. Gibran Vivas  
[] Family (daughter, niece) [] Patient: answered all questions to satisfaction  
[] Pharmacist [] ASSESSMENT/PLAN:  
Principal Problem: 
  Cardiac arrest with ventricular fibrillation ([facility] [address] (2018) Active Problems: 
  Comfort measures only status (2018) Ischemic encephalopathy (2018) Gangrene ([facility] [address] (10/22/2018) Acute pancreatitis (2018) Hypoalbuminemia (10/21/2018) Essential hypertension (9/10/2018) Acute-on-chronic kidney injury ([facility] [address] (2018) Elevated LFTs (2018) Diabetes mellitus type 2, controlled ([facility] [address] (9/10/2018) History of stroke (2018) Overview: With residual R hemiparesis Acute cystitis (2018) Elevated troponin (2018) · PSV trial. Anticipate transitioning to trach collar trials in am. 
· As the patient's daughter failed to present today, I will restart tube feeding. · Awaiting for daughter to arrive from [place]. I am available to meet with the daughter, if requested. NUTRITION: Restart Nepro @ 10 mL/hr. Advance to 40 mL/hr as tolerated. Check prealbumin q week. Consult Clinical Dietician. ICU electrolyte replacement protocol. PROPHYLAXIS: 
DVT prophylaxis: heparin GI prophylaxis: Protonix HOB 30-degree elevation Chlorhexidine mouth washes CODE STATUS: FULL CODE 
 
 Further recommendations will be based on the patient's response to recommended treatment and results of the investigation ordered. DISPOSITION: 
 
The patient is: [x] acutely ill Risk of deterioration: [] moderate [x] critically ill  [x] high  
 
[x]See my orders for details [x] The patient is unable to give any meaningful history or review of systems because the patient is: 
[x] Intubated [] Sedated  
[x] Unresponsive [] [x]The patient is critically ill on     
[x] Mechanical ventilation [x] Vasoactive agents  
[] BiPAP [] Inotropes SUBJECTIVE 
- This 57 y.o.  male is seen in consultation at the request of Dr. Rossana Cr for recommendations on further evaluation and management of acute hypoxia. He hospitalized (9/10/2018 - 9/14/2018) for stroke with neurologic residua (R hemiparesis). Patient discharged from [facility] to [facility] on 9/14/2018, only to return on 9/26/2018 with acute renal failure/abnormal lab values. He experienced VT/VF arrest during my initial clinical assessment. The patient has undergone tracheostomy and percutaneous gastrostomy. He is currently on comfort measures only. - Interim events: Remains on mechanical ventilatory support. Not following commands. Pulmonary/Critical Care service signed off this patient's care with decision to pursue Hospice consultation and transition to comfort measures. Patient's daughter called yesterday with plans to present today at 1 PM. She did not show up. [x] Nutrition: NPO 
[] BM today. ROS: Review of systems not obtained due to patient factors. Past Medical History:  
Diagnosis Date  CHF (congestive heart failure) ([facility]  Diabetes ([facility] [address]  Stroke ([facility] [address] No Known Allergies Current Facility-Administered Medications:  
  hydroxypropyl methylcellulose (ISOPTO TEARS) 0.5 % ophthalmic solution 1 Drop, 1 Drop, Both Eyes, PRN, Sp Hassan MD 
   LORazepam (ATIVAN) injection 1 mg, 1 mg, IntraVENous, Q15MIN PRN, Anthony Solorzano MD 
  LORazepam (INTENSOL) 2 mg/mL oral concentrate 1 mg, 1 mg, Oral, Q1H PRN, Anthony Solorzano MD 
  morphine 10 mg/ml injection 10 mg, 10 mg, Inhalation, Q1H PRN, Anthony Solorzano MD 
  morphine injection 2 mg, 2 mg, IntraVENous, Q15MIN PRN, Anthony Solorzano MD, 2 mg at 11/13/18 1250   scopolamine (TRANSDERM-SCOP) 1 mg over 3 days 1 Patch, 1 Patch, TransDERmal, Q72H PRN, Anthony Solorzano MD 
  albuterol (PROVENTIL VENTOLIN) nebulizer solution 2.5 mg, 2.5 mg, Nebulization, BID RT, Mary Jo Nix MD, 2.5 mg at 11/14/18 7831   acetaminophen (TYLENOL) solution 325 mg, 325 mg, Per NG tube, Q4H PRN, Mary Jo Nix MD, 325 mg at 11/03/18 0819 
  ondansetron (ZOFRAN) injection 4 mg, 4 mg, IntraVENous, Q4H PRN, Alfred Nettles DO, 4 mg at 09/28/18 2642 OBJECTIVE Vital Signs:   
Patient Vitals for the past 24 hrs: 
 Temp Pulse Resp BP SpO2  
11/14/18 1550  87 21  100 % 11/14/18 1400  87 18 130/63 100 % 11/14/18 1300  89 20 111/61 100 % 11/14/18 1200 98.5 °F (36.9 °C) 88 25 116/56 100 % 11/14/18 1130  92 15  100 % 11/14/18 1100  90 16 122/55 100 % 11/14/18 1000  90 18 121/58 100 % 11/14/18 0900  89 20 122/58 100 % 11/14/18 0820  86 18  100 % 11/14/18 0800  85 18 123/59 100 % 11/14/18 0700  87 17 150/78 100 % 11/14/18 0600  88 17 120/72 100 % 11/14/18 0411  93 17  100 % 11/14/18 0400 98 °F (36.7 °C) 91 14 116/71 100 % 11/14/18 0300  95 17 133/72 100 % 11/14/18 0200  92 19 118/66 100 % 11/14/18 0100  97 17 129/67 100 % 11/14/18 0019  96 20  100 % 11/14/18 0000 98.5 °F (36.9 °C) 97 17 126/68 100 % 11/13/18 2300  99 16 120/62 100 % 11/13/18 2200  98 17 105/57 100 % 11/13/18 2100  (!) 104 18 127/66 100 % 11/13/18 2009  99 18  100 % 11/13/18 2000 97.5 °F (36.4 °C) 97 18 127/67 100 % 18 1900  95 18 116/64 100 % 18 1800  97 21 104/58 100 % 18 1700  99 20 103/53 100 % O2 Device: Tracheostomy, Ventilator O2 Flow Rate (L/min): 45 l/min Temp (24hrs), Av.1 °F (36.7 °C), Min:97.5 °F (36.4 °C), Max:98.5 °F (36.9 °C) PHYSICAL EXAM:  
General: Not on sedation. Does not follow commands. Head: atraumatic, normocephalic Eye: Spontaneous blinking. EOM intact. Nose: Nares are patent. No exudate. Throat: No oral thrush. No exudate. Mucous membranes are dry. Neck: tracheostomy in situ. no thyromegaly, no JVD, no lymphadenopathy. Trachea midline. Lung: Symmetric in development and expansion. Good air entry. Heart: Regular S1, S2 without murmur, rub or gallop. Abdomen: PEG in situ. soft, nontender, no ndistended. Normoactive bowel sounds. No rebound. No guarding. Extremities: no pedal edema, no clubbing, 2+ peripheral pulses in DP. L great toe ischemic changes/dry gangrene. Lymphatic: no cervical/axillary/inguinal lymphadenopathy Neurologic: R facial droop is less obvious today. Minimal reaction to noxious stimuli @ B LE. Doll's eyes intact. Corneal intact. Cough/gag intact. Spontaneous respirations intact. L LE triple flexion response to plantar dorsiflexion. DTR 1+ @ B biceps and B patellar tendons. Skin: shingles lesions have completely healed. Buttocks DTI 
DATA:  
 
Intake/Output:  
Last shift:      701 - 1900 In: 0 Out: 50 [Drains:50] Last 3 shifts: 1901 - 700 In: 360 Out: 325 [Drains:325] Intake/Output Summary (Last 24 hours) at 2018 1638 Last data filed at 2018 1545 Gross per 24 hour Intake 60 ml Output 175 ml Net -115 ml Last 3 Recorded Weights in this Encounter 18 0600 18 0600 18 0450 Weight: 88.8 kg (195 lb 12.3 oz) 86.8 kg (191 lb 5.8 oz) 83.8 kg (184 lb 11.9 oz) Lines: All central lines examined by me. No signs of erythema, induration, discharge. Peripheral Intravenous Line: 
Peripheral IV 11/06/18 Left;Posterior Hand (Active) Site Assessment Clean, dry, & intact 11/14/2018  3:45 PM  
Phlebitis Assessment 0 11/14/2018  3:45 PM  
Infiltration Assessment 0 11/14/2018  3:45 PM  
Dressing Status Clean, dry, & intact 11/14/2018  3:45 PM  
Dressing Type Transparent 11/14/2018  8:00 AM  
Hub Color/Line Status Pink 11/14/2018  8:00 AM  
Action Taken Open ports on tubing capped 11/14/2018  8:00 AM  
Alcohol Cap Used Yes 11/14/2018  8:00 AM  
  
Hemodialysis Catheter: 
Hemodialysis Access 10/31/18 (Active) Central Line Being Utilized No 11/14/2018  3:45 PM  
Criteria for Appropriate Use Dialysis/apheresis 11/14/2018  3:45 PM  
Date Accessed  11/07/18 11/14/2018  4:00 AM  
Access Time  1100 11/6/2018  8:00 PM  
Site Assessment Clean, dry, & intact 11/14/2018  3:45 PM  
Date of Last Dressing Change 11/14/18 11/14/2018  3:45 PM  
Dressing Status Clean, dry, & intact 11/14/2018  8:00 AM  
Dressing Type Disk with Chlorhexadine gluconate (CHG) 11/14/2018  8:00 AM  
Proximal Hub Color/Line Status Capped 11/14/2018  8:00 AM  
Distal Hub Color/Line Status Capped 11/14/2018  8:00 AM  
 
Drain(s): PEG/Gastrostomy Tube 10/17/18 (Active) Site Assessment Clean, dry, & intact 11/14/2018  3:45 PM  
Dressing Status Clean, dry, & intact 11/14/2018  3:45 PM  
G Port Status Clamped 11/14/2018  3:45 PM  
External Catheter Length (cm) 3 centimeters 11/14/2018  4:00 AM  
Action Taken Other (comment) 11/13/2018  8:00 PM  
Drainage Description Other (Comment) 11/13/2018  8:00 PM  
Gastric Residual (mL) 0 ml 11/12/2018 12:00 PM  
Tube Feeding/Formula Options Perative 11/10/2018  8:00 AM  
Tube Feeding/Verify Rate (mL/hr) 40 11/10/2018  8:00 AM  
Water Flush Volume (mL) 60 mL 11/13/2018  7:37 PM  
Intake (ml) 0 ml 11/14/2018  3:45 PM  
Medication Volume 0 ml 11/14/2018  3:45 PM  
Drainage Chamber Level (ml) 0 ml 11/14/2018  7:15 AM  
Output (ml) 0 ml 11/14/2018  7:15 AM  
   
 Fecal Management (Active) Stool Consistency Liquid 11/14/2018  8:00 AM  
Position of Indicator Line Visible 11/14/2018  8:00 AM  
Signal Indicator Bubble Appropriate 11/14/2018  8:00 AM  
Skin Assessment of the Anal Area Treatment with Zinc Oxide cream 11/14/2018  8:00 AM  
Tube Irrigated No 11/14/2018  8:00 AM  
Irrigation Volume (mL) 30 11/13/2018  7:42 PM  
Drainage Bag Level (mL) 100 11/14/2018  8:00 AM  
Output (ml) 50 ml 11/14/2018  8:00 AM  
 
Airway: Airway - Tracheostomy Tube 10/17/18 Portex; Cuffed (Active) Cuff Pressure 28 cmH20 11/13/2018 11:45 AM  
Site Assessment Clean, dry, & intact 11/14/2018  3:50 PM  
Trach Dressing Changed No 11/14/2018  3:50 PM  
Trach Cleaned With Normal saline 11/14/2018  4:11 AM  
Trach Tie Changed No 11/14/2018  3:50 PM  
Trach Cannula Changed No 11/14/2018  3:50 PM  
Inner Cannula Cuffed, cuff inflated 11/14/2018 11:30 AM  
Line Jl Top of the lock 11/14/2018  3:50 PM  
Side Secured Centered 11/14/2018  3:50 PM  
Spare Trach at Bedside Yes 11/14/2018  3:50 PM  
Spare Jerl Matthew Tube One Size Smaller at Bedside Yes 11/14/2018  3:50 PM  
Ambu Bag With Mask at Bedside Yes 11/14/2018  3:50 PM  
 
 
Vent Date (intubated 9/29/2018, tracheostomy 10/17/2018) Ventilator Settings: 
Ventilator Mode: SIMV, VC+ Respiratory Rate Resp Rate Observed: 32 Back-Up Rate: 12 Insp Time (sec): 1 sec Insp Flow (l/min): 55 l/min I:E Ratio: 1;2 
Ventilator Volumes Vt Set (ml): 500 ml Vt Exhaled (Machine Breath) (ml): 551 ml Vt Spont (ml): 418 ml Ve Observed (l/min): 8.07 l/min Ventilator Pressures PC Set: 961 Pressure Support (cm H2O): 10 cm H2O 
PIP Observed (cm H2O): 19 cm H2O Plateau Pressure (cm H2O): 17 cm H2O 
MAP (cm H2O): 8.7 PEEP/VENT (cm H2O): 5 cm H20 Auto PEEP Observed (cm H2O): 0 cm H2O Mode Rate Tidal Volume Pressure FiO2 PEEP  
SIMV, VC+   500 ml  10 cm H2O 30 % 5 cm H20 Peak airway pressure: 19 cm H2O Plateau pressure:    
Tidal volume: Minute ventilation: 8.07 l/min SPO2   
 
ARDSNet Guidelines: Lung protective ventilation (VT 6 cc/kg IBW) and Pplat <= 30 Telemetry: [x] Sinus [] A-flutter [] Paced [] A-fib [] Multiple PVCs Imaging: 
[x] I have personally reviewed the patients radiographs 
[] Radiographs reviewed with radiologist 
[x] No CXR study available for review today 
[] No change from prior, tubes and lines are in adequate position 
[] Improved   [] Worsening No results found. Labs: 
No results found for this or any previous visit (from the past 24 hour(s)). Cultures: All Micro Results Procedure Component Value Units Date/Time CULTURE, CATHETER TIP [[id number]]  (Abnormal)  (Susceptibility) Collected:  11/02/18 1307 Order Status:  Completed Specimen:  PICC Updated:  11/05/18 1057 Special Requests: NO SPECIAL REQUESTS Culture result:    
  >15 CFU STAPHYLOCOCCUS EPIDERMIDIS  
     
 CULTURE, CATHETER TIP [[id number]]  (Abnormal)  (Susceptibility) Collected:  10/31/18 8945 Order Status:  Completed Specimen:  Catheter Tip Updated:  11/02/18 1115 Special Requests: NO SPECIAL REQUESTS Culture result:    
  <15 CFU PSEUDOMONAS AERUGINOSA CULTURE, BLOOD [[id number]] Collected:  10/25/18 1328 Order Status:  Completed Specimen:  Whole Blood Updated:  10/31/18 0910 Special Requests: NO SPECIAL REQUESTS Culture result: NO GROWTH 6 DAYS     
 CULTURE, BLOOD [[id number]] Collected:  10/25/18 1328 Order Status:  Completed Specimen:  Whole Blood Updated:  10/31/18 0910 Special Requests: NO SPECIAL REQUESTS Culture result: NO GROWTH 6 DAYS     
 CULTURE, Najma Oz STAIN [[id number]]  (Abnormal)  (Susceptibility) Collected:  10/25/18 1359 Order Status:  Completed Specimen:  Wound from Tracheostomy site Updated:  10/28/18 8427   Special Requests: NO SPECIAL REQUESTS     
  GRAM STAIN FEW WBC'S     
   RARE GRAM NEGATIVE RODS     
 Culture result: MANY PSEUDOMONAS AERUGINOSA MODERATE PSEUDOMONAS AERUGINOSA 2ND MORPHOTYPE  
     
      
  FEW ENTEROCOCCUS FAECALIS GROUP D  
     
      
  FEW STAPHYLOCOCCUS SPECIES, COAGULASE NEGATIVE (TWO MORPHOTYPES) CULTURE, RESPIRATORY/SPUTUM/BRONCH Eladio Knoll STAIN [[id number]]  (Abnormal)  (Susceptibility) Collected:  10/25/18 1325 Order Status:  Completed Specimen:  Sputum from Tracheal Aspirate Updated:  10/28/18 6071 Special Requests: NO SPECIAL REQUESTS     
  GRAM STAIN MODERATE WBC'S     
   RARE GRAM NEGATIVE RODS Culture result: MANY PSEUDOMONAS AERUGINOSA  
     
      
  MANY PSEUDOMONAS AERUGINOSA 2ND MORPHOTYPE  
     
      
  RARE NORMAL RESPIRATORY SHANAE  
     
 CULTURE, URINE [[id number]]  (Abnormal)  (Susceptibility) Collected:  10/25/18 1359 Order Status:  Completed Specimen:  Cath Urine Updated:  10/27/18 7413 Special Requests: NO SPECIAL REQUESTS Culture result:    
  82792 COLONIES/mL PSEUDOMONAS AERUGINOSA CULTURE, CATHETER TIP [[id number]]  (Abnormal)  (Susceptibility) Collected:  10/19/18 1330 Order Status:  Completed Specimen:  Catheter Tip Updated:  10/22/18 0755 Special Requests: NO SPECIAL REQUESTS Culture result:    
  >15 CFU STAPHYLOCOCCUS EPIDERMIDIS  
     
 CULTURE, BLOOD [[id number]] Collected:  10/16/18 1055 Order Status:  Completed Specimen:  Blood Updated:  10/22/18 0809 Special Requests: PERIPHERAL Culture result: NO GROWTH 6 DAYS     
 CULTURE, BLOOD [[id number]] Collected:  10/16/18 1047 Order Status:  Completed Specimen:  Blood Updated:  10/22/18 0809 Special Requests: PERIPHERAL Culture result: NO GROWTH 6 DAYS     
 HSV 1 AND 2 BY PCR [[id number]] Collected:  10/18/18 1815 Order Status:  Completed Specimen:  Other from Miscellaneous sample Updated:  10/21/18 1636   Source OTHER TEST     
  HSV-1 DNA by PCR NEGATIVE      
 HSV-2 DNA by PCR NEGATIVE Comment: (NOTE) This test was developed and its performance characteristics 
determined by [employer]. It has not been cleared or 
approved by the U.S. Food and Drug Administration. The FDA has 
determined that such clearance or approval is not necessary. This 
test is used for clinical purposes. It should not be regarded as 
investigational or research. Performed At: [address] [id number] Gonzalez Archuleta MD GY:[id number] CULTURE, RESPIRATORY/SPUTUM/BRONCH Sixto Friendly [[id number]] Collected:  10/16/18 1915 Order Status:  Completed Specimen:  Sputum,ET Suction Updated:  10/18/18 1011 Special Requests: NO SPECIAL REQUESTS     
  GRAM STAIN 10-25 WBC/lpf     
   <10 EPI/lpf FEW GRAM POSITIVE COCCI IN PAIRS MODERATE GRAM POSITIVE COCCI IN GROUPS MUCUS PRESENT Culture result: MODERATE NORMAL RESPIRATORY SHANAE  
     
 CULTURE, BLOOD [[id number]] Collected:  10/08/18 1227 Order Status:  Completed Specimen:  Blood Updated:  10/14/18 0741 Special Requests: PERIPHERAL Culture result: NO GROWTH 6 DAYS     
 CULTURE, CATHETER TIP [[id number]] Collected:  10/08/18 1215 Order Status:  Completed Specimen:  Catheter Tip Updated:  10/11/18 2815 Special Requests: NO SPECIAL REQUESTS Culture result: NO GROWTH 3 DAYS     
 CULTURE, BLOOD [[id number]] Collected:  10/04/18 7306 Order Status:  Completed Specimen:  Blood Updated:  10/10/18 6708 Special Requests: PERIPHERAL Culture result: NO GROWTH 6 DAYS     
 CULTURE, BLOOD [[id number]] Collected:  10/04/18 1120 Order Status:  Completed Specimen:  Blood Updated:  10/10/18 6880 Special Requests: PERIPHERAL Culture result: NO GROWTH 6 DAYS     
 CULTURE, BLOOD [[id number]] Collected:  09/27/18 0005 Order Status:  Completed Specimen:  Blood Updated:  10/03/18 0845 Special Requests: NO SPECIAL REQUESTS Culture result: NO GROWTH 6 DAYS     
 CULTURE, BLOOD [[id number]]  (Abnormal)  (Susceptibility) Collected:  09/27/18 0136 Order Status:  Completed Specimen:  Blood Updated:  10/02/18 6804 Special Requests: NO SPECIAL REQUESTS     
  GRAM STAIN PEDIATRIC BOTTLE GRAM POSITIVE COCCI IN PAIRS IN CLUSTERS  
      
  SMEAR CALLED TO AND CORRECTLY REPEATED BY: Selwyn Hendrickson RN IN 2700 ON 9/29/18 AT 2033 WF Culture result:    
  AEROBIC BOTTLE STAPHYLOCOCCUS EPIDERMIDIS  
     
 CULTURE, RESPIRATORY/SPUTUM/BRONCH Hemet Diony STAIN [[id number]]  (Abnormal) Collected:  09/29/18 1210 Order Status:  Completed Specimen:  Sputum from Endotracheal aspirate Updated:  10/02/18 0063 Special Requests: NO SPECIAL REQUESTS     
  GRAM STAIN >25 WBC/lpf     
   <10 EPI/lpf MUCUS PRESENT     
   MODERATE YEAST Culture result: MANY CANDIDA TROPICALIS C. DIFFICILE/EPI PCR [[id number]] Collected:  09/29/18 1400 Order Status:  Completed Specimen:  Stool Updated:  09/29/18 2248 Special Requests: NO SPECIAL REQUESTS Culture result: Toxigenic C. difficile NEGATIVE                         C. difficile target DNA sequences are not detected. CULTURE, URINE [[id number]] Collected:  09/27/18 0029 Order Status:  Completed Specimen:  Cath Urine Updated:  09/29/18 0944 Special Requests: NO SPECIAL REQUESTS Culture result: NO GROWTH 2 DAYS During this entire length of time I was immediately available to the patient. The reason for providing this level of medical care for this critically ill patient was due a critical illness that impaired one or more vital organ systems such that there was a high probability of imminent or life threatening deterioration in the patients condition.  This care involved high complexity decision making to assess, manipulate, and support vital system functions, to treat this degreee vital organ system failure and to prevent further life threatening deterioration of the patients condition 
 
[] Total critical care time spent on reviewing the case/medical record/data/notes/EMR/patient examination/documentation/coordinating care with nurse/consultants, exclusive of procedures - minutes with complex decision making performed and > 50% time spent in face to face evaluation. Karime Rai MD   
Board Certified by the [facility], [facility] S Liudmila 11/14/2018 ADDENDUM (1930): CTS re: daughter at bedside with multiple family members. Clinical update and lack of significant improvement was disclosed. The daughter was updated that the patient was on PSV trial and demonstrating minimal need for mechanical ventilatory support. On the other hand, the entire family was in agreement that the patient would not want to be kept alive in this state. The daughter has decided that the patient should be liberated from mechanical ventilatory support tonight. She understands that his clinical status may be such that the patient may persist in an essentially unchanged state. She agrees that he will not be placed back on ventilatory support in the future. We will continue to provide comfort measures only. Hospice consult. Cc: 60 min

## 2018-11-14 NOTE — PROGRESS NOTES
-0815-Received patient on ventilator VC+ SIMV rate-12, VT-500, PEEP-5, FIO2-30%. O2 Sats-100% HR-87, RR-18. Anastasiya Kemps noted to be patent and secure. Respiratory will continue to monitor--[facility] 
 
-1005-MD reduced patient RR- to 4bpm-[facility] 
 
-1550-Pt remains on ventilator VC+ SIMV rate-4, VT-500, PEEP-5, FIO2-30% PS-10. O2 Sats-100% HR-87, RR-87. Trach care performed, Inner cannula changed. Wakita Kemps remains patent and secure. -[address]

## 2018-11-14 NOTE — PROGRESS NOTES
Spoke with Dr Hadley Wayne, he stated the patients daughter is to arrive in [place] today around 100 pm with her uncle. Patient remains ventilated on comfort care. Dialysis treatments have been discontinue. Discussed with director of Case management during LOS yesterday.  Ever Tay RN RN

## 2018-11-14 NOTE — PROGRESS NOTES
Bedside and Verbal shift change report given to [address] (oncoming nurse) by Jessica Villalta RN 
 (offgoing nurse). Report included the following information Kardex, Intake/Output, MAR and Recent Results.

## 2018-11-14 NOTE — PROGRESS NOTES
1930: Received report from off going nurse Samuel Marie. Patient on comfort care, will monitor patient. 0400: Patient bathed gown and,linens changed, inner cannula changed and patient turned hourly. 0700: No adverse events throughout the night. Report will be given to oncoming nurse.

## 2018-11-14 NOTE — DIABETES MGMT
Nutrition/Glycemic Control: PEG tube is clamped and pt is not receiving tube feeding. Pt's status noted.  Igor COOK

## 2018-11-14 NOTE — PROGRESS NOTES
Problem: Falls - Risk of 
Goal: *Absence of Falls Document Reino Horn Fall Risk and appropriate interventions in the flowsheet. Outcome: Progressing Towards Goal 
Fall Risk Interventions: 
Mobility Interventions: Bed/chair exit alarm Mentation Interventions: Bed/chair exit alarm Medication Interventions: Bed/chair exit alarm Elimination Interventions: Bed/chair exit alarm, Toileting schedule/hourly rounds History of Falls Interventions: Bed/chair exit alarm Problem: Pressure Injury - Risk of 
Goal: *Prevention of pressure injury Document Mitul Scale and appropriate interventions in the flowsheet. Outcome: Progressing Towards Goal 
Pressure Injury Interventions: 
Sensory Interventions: Float heels, Keep linens dry and wrinkle-free, Minimize linen layers, Monitor skin under medical devices, Pad between skin to skin, Turn and reposition approx. every two hours (pillows and wedges if needed) Moisture Interventions: Absorbent underpads Activity Interventions: Pressure redistribution bed/mattress(bed type) Mobility Interventions: Float heels Nutrition Interventions: Document food/fluid/supplement intake Friction and Shear Interventions: Foam dressings/transparent film/skin sealants

## 2018-11-14 NOTE — PROGRESS NOTES
Physical Exam  
Skin:  
 
  
 Dual skin Assessment with Keron Foley RN. 5757 Pt remains unresponsive. Now on comfort care; but still on a vent awaiting for her daughter's arrival today. Appears not in pain. Pt is not on tube feeding. [facility] changed on Dialysis catheter. 1800 Pt's family arrived at bedside. They will meet with Dr. Jose Henry. Tube feeding not started yet until hear further instruction from MD. 
 
1910 Bedside and Verbal shift change report given to Greer Vogt (oncoming nurse) by Regi Everett RN 
 (offgoing nurse).  Report included the following information SBAR, Kardex, Intake/Output, MAR and Cardiac Rhythm SR.

## 2018-11-15 NOTE — PROGRESS NOTES
Problem: Falls - Risk of 
Goal: *Absence of Falls Document Rubi Tang Fall Risk and appropriate interventions in the flowsheet. Outcome: Progressing Towards Goal 
Fall Risk Interventions: 
Mobility Interventions: Strengthening exercises (ROM-active/passive) Mentation Interventions: Adequate sleep, hydration, pain control Medication Interventions: Evaluate medications/consider consulting pharmacy Elimination Interventions: Toileting schedule/hourly rounds, Toilet paper/wipes in reach History of Falls Interventions: Door open when patient unattended Problem: Pressure Injury - Risk of 
Goal: *Prevention of pressure injury Document Mitul Scale and appropriate interventions in the flowsheet. Outcome: Progressing Towards Goal 
Pressure Injury Interventions: 
Sensory Interventions: Assess need for specialty bed, Float heels, Check visual cues for pain Moisture Interventions: Absorbent underpads, Apply protective barrier, creams and emollients Activity Interventions: Assess need for specialty bed Mobility Interventions: Pressure redistribution bed/mattress (bed type), Turn and reposition approx. every two hours(pillow and wedges) Nutrition Interventions: Document food/fluid/supplement intake Friction and Shear Interventions: Apply protective barrier, creams and emollients

## 2018-11-15 NOTE — PROGRESS NOTES
11/15/2018 UAI had been completed and submitted on 10/26/2018. It has been successfully processed . Copy to [facility] in our office to file. NELLY Carbajal emailed to notify her.

## 2018-11-15 NOTE — PROGRESS NOTES
Hospitalist Progress Note Daily Progress Note: 11/15/2018  
   
Interval history / Subjective:  
Willie Christensen is a 57y.o. year old male who presented from [facility] with abnormal labs, elevated BUN/Cr. Found to have JULIANNA, UTI, and markedly elevated LFT on presentation. Patient's recent admission was 9/10/18-9/14/18 for acute CVA and rhabdo. Patient poor historian on admission,stated \"I had heart failure. She was started on vanc,zosyn. On 9/29,patient had cardiac arrest with ventricular fibrillation - s/p ROSC. Since then his hospital course has been complicated with sepsis,pneumonia,acute pancreatitis requiring involvement of gi,surgery,nephrology,cardiology,ID,cardiology. So far patient has remained in vegetative state without any improvement. Patient had PEG/Trach placement on 10/17. On 10/17,patient started on acyclovir for veicular rash rt arm and was treated for Zoster. Patient has had several runs of antibiotics as his condition predisposed him to infections. Lipase 2169. US abd 10/22 showed abnormal appearance of the gallbladder with stones/sludge with wall thickening and possible pericholecystic fluid similar to the prior study of 9/27/2018. He has also been undergoing dialysis. On 11/5/18,patient's plan of care was discussed with his daughter Terry Donahue,his mPOA. Miss Beauchamp decided that patient be DNR with comfort care only and advised to discontinue the dialysis. On 11/14/18,the POA asked for the withdrawal of Vent support which was done. Patient was then transferred from icu to regular medical floor for hospice care. Current Facility-Administered Medications Medication Dose Route Frequency  hydroxypropyl methylcellulose (ISOPTO TEARS) 0.5 % ophthalmic solution 1 Drop  1 Drop Both Eyes PRN  
 LORazepam (ATIVAN) injection 1 mg  1 mg IntraVENous Q15MIN PRN  
 LORazepam (INTENSOL) 2 mg/mL oral concentrate 1 mg  1 mg Oral Q1H PRN  
 morphine injection 10 mg  10 mg Inhalation Q1H PRN  
  morphine injection 2 mg  2 mg IntraVENous Q15MIN PRN  
 scopolamine (TRANSDERM-SCOP) 1 mg over 3 days 1 Patch  1 Patch TransDERmal Q72H PRN  
 acetaminophen (TYLENOL) solution 325 mg  325 mg Per NG tube Q4H PRN  
 ondansetron (ZOFRAN) injection 4 mg  4 mg IntraVENous Q4H PRN Review of Systems Unresponsive Objective:  
 
Visit Vitals /80 (BP 1 Location: Right arm, BP Patient Position: At rest) Pulse (!) 101 Temp 98.5 °F (36.9 °C) Resp 22 Ht 5' 7\" (1.702 m) Wt 81.6 kg (180 lb) SpO2 90% BMI 28.19 kg/m² O2 Flow Rate (L/min): 45 l/min O2 Device: Room air Temp (24hrs), Av.7 °F (37.1 °C), Min:98 °F (36.7 °C), Max:99.8 °F (37.7 °C) No intake/output data recorded.  1901 - 11/15 0700 In: 61 Out: 175 [Drains:175] P/E General Appearance:S/p PEG/Trach HENT: normocephalic/atraumatic, + ETT Neck: No JVD, hematoma R side where [facility] is improving Lungs: Coarse B/L w/ vent-assisted BS 
CV: RRR, no m/r/g Abdomen: soft, non-tender, normal bowel sounds Extremities: versicular rash rt arm and right side of chest,no cyanosis, no peripheral edema Neuro: Unresponsive to commands, no withdrawal to pain, + corneal reflex and pupillary reaction today Skin: Normal color, intact Data Review No results found for this or any previous visit (from the past 12 hour(s)). Assessment/Plan:  
 
Principal Problem: 
  Cardiac arrest with ventricular fibrillation ([facility] [address] (2018) Active Problems: 
  Essential hypertension (9/10/2018) Diabetes mellitus type 2, controlled ([facility] [address] (9/10/2018) History of stroke (2018) Overview: With residual R hemiparesis Acute-on-chronic kidney injury ([facility] [address] (2018) Acute cystitis (2018) Elevated troponin (2018) Elevated LFTs (2018) Acute pancreatitis (2018) Ischemic encephalopathy (2018) Hypoalbuminemia (10/21/2018) Gangrene ([facility] [address] (10/22/2018) Comfort measures only status (11/13/2018) Zoster. Care Plan 
-S/p discontinuation of Vent support on 11/14 as per mPOA request - now hospice care. - Vent mgmt per ICU - S/p trach/PEG on 10/17 as patient did not show significant improvement. 
- EGD showed large clot in esophagus last week repeat EGD 10/9 shows healed esophageal ulcer - Cont protonix IV every day  
- Hgb stable - LFTs improving - GI signed off 10/9 
- MRI w/ evidence of severe anoxic brain injury - Minimal improvement on neuro exam 
- Appreciate neuro assistance - IV Meropenem and IV Fluconazole d/heron on 10/15 per ID. 
- Currently on vanc only which was started on 9/27 
- Nephro managing HD TTHS 
- Pt unlikely to have any meaningful neuro recovery, prognosis is very grim - Family wanted to cont to do everything, including trach/PEG -> was done10/17 
- On 10/17:Started on acyclovir for possible Zoster rt arm and rt upper chest area. - Hyperkalemia on 10/18 ->corrected - Lipase 2169 10/22. US abd Abnormal appearance of the gallbladder with stones/sludge with wall thickening  
and possible pericholecystic fluid similar to the prior study of 9/27/2018  
- On Acyclovir for shingles , contact precaution per ID commitee 
- 10/22: Consult GI today for possible MRCP 
- 10/22: Consult Podiatry for left big toe gangrene  
- 10/23 : Podiatry recommend RAQUEL for Left Big toe  
- 10/23: GI recommend MRI/MRCP but will need patient off vent for multiple times for procedure. Not sure if this is feasible now 
- 10/25 : Blood , urine , sputum , trach discharge cultures 
- 10/25 IV antibiotics by ID  
- Change GI tube to Jejunostomy tube tomorrow by IR  
- 10/26: Vancomycin and Cefepime per ID  
- 10/26 : Jejunostomy attempt by IR unsuccessful . Will need to discuss with GI  
- 10/27 : Ciprofloxacin added 10/27 for double coverage of pseudomonas with cefepime per PCCM  
- 10/28: Pancreatitis ? Biliary  . Will reconsult GI today - Lipase trending down. 
-11/2:culture of picc line since pt has developed fever. -11/3:still febrile,picc line tip cx results pending;will follow ID recommendations. -11/4:Cx of tip of catheter showing staph coag negative. Pt still spiking fever - will follow ID recommendations. - 11/5: Nephrology signed off . At daughters request will D/C dialysis. She per Nephrology is well aware if implications of stopping dialysis - Per ID continue cefepime for total 14 days and Vancomycin  added for 7 days 11/6: DNR and Hospice consulted after I personally spoke with her. 11/7: Nephrology and ID signed off. Hospice following patient now 11/8: Patient on comfort measures only - Morphine , scopolamine and ativan ordered per protocol . Per report Mech vent removal pending arrival of POA  
 
DVT prophylaxis:scds DNR per daughter's request today 11/6 Disposition: Hospice -  will work on placement

## 2018-11-15 NOTE — PROGRESS NOTES
Patient remains unable to communicate at this time.  offered prayer . Chaplains will continue to follow and will provide pastoral care on an as needed/requested basis. [place] [employer] Board Certified [employer]  
[phone]

## 2018-11-15 NOTE — PROGRESS NOTES
Patients MEWS score of 4. RN aware, informed charge nurse. Patients baseline is unresponsive. No distress or discomfort observed. Will continue to monitor. Patient is on comfort care.

## 2018-11-15 NOTE — PROGRESS NOTES
1915:Recieved report from off going nurse , assumed care for patient. Patient remains on vent at this time while family members are discussing with physician whether patient will remain on vent and start tube feedings. 1930: Family of patient decides to remain on vent until remainder of family members arrive. Tube feeding started. Will continue to monitor patient. 1945: Family members of patient decided to remove patient from vent. Patient removed from ventilator @1950. Patient is breathing independently, no signs of distress. VSS. R20 SP02 97% 2130: Patient will be transferred to ,  Report called to [address] REPORT: 
 
Verbal report given to Nayely Burton RN(name) on [facility]  being transferred to (unit) for routine progression of care Report consisted of patients Situation, Background, Assessment and  
Recommendations(SBAR). Information from the following report(s) Kardex, Intake/Output, MAR and Recent Results was reviewed with the receiving nurse. Lines:  
Peripheral IV 11/06/18 Left;Posterior Hand (Active) Site Assessment Clean, dry, & intact 11/14/2018  3:45 PM  
Phlebitis Assessment 0 11/14/2018  3:45 PM  
Infiltration Assessment 0 11/14/2018  3:45 PM  
Dressing Status Clean, dry, & intact 11/14/2018  3:45 PM  
Dressing Type Transparent 11/14/2018  8:00 AM  
Hub Color/Line Status Pink 11/14/2018  8:00 AM  
Action Taken Open ports on tubing capped 11/14/2018  8:00 AM  
Alcohol Cap Used Yes 11/14/2018  8:00 AM  
  
 
Opportunity for questions and clarification was provided. Patient transported with: Nephro, trach supplies, specialty bed Registered Nurse

## 2018-11-15 NOTE — PROGRESS NOTES
Bedside shift change report given to KAUSHAL Joyce (oncoming nurse) by Edilia Thomas RN (offgoing nurse). Report included the following information SBAR, Kardex, Intake/Output, MAR and Recent Results. 9292 -- Tube feeding changed to goal of 40 ml. 
 
6008 -- Spoke with [facility] nurse), vital signs Q 4, for monitoring. 
   
1100 -- Reassessment completed, no change in patient condition, will continue to monitor. 
   
1544 -- Reassessment completed, no change in patient condition, will continue to monitor. 1606 -- 2 mg Morphine given, for respiratory comfort. 
   
Bedside shift change report given to Jess Antonio RN (oncoming nurse) by Jayden George RN (offgoing nurse). Report included the following information SBAR, Kardex, Intake/Output, MAR and Recent Results.

## 2018-11-16 NOTE — PROGRESS NOTES
[address] [place] [address] Rd pt care from [facility], RN. Pt in bed, unresponsive. Not in any form of distress. Frequent use items and call bell within reach. Bed locked in lowest position. Bed alarm on. Not in any form of distress. 0745 - Bedside and Verbal shift change report given to Orly Lee (oncoming nurse) by Nata Shields RN (offgoing nurse). Report included the following information SBAR, Kardex, Intake/Output, MAR and Recent Results.

## 2018-11-16 NOTE — PROGRESS NOTES
Inner cannula was changed. Air aerosol was set-up for patient. Suction, and supplies are at the patient's bedside. No suctioning is needed at this time.

## 2018-11-16 NOTE — PROGRESS NOTES
Assumed care of patient from [facility], [facility] (offgoing nurse). Patient resting in bed, no signs of pain or distress noted. Trach collar with 10L O2. Bed locked in lowest position. 1400- FMS found to be dislodged. Patient cleaned, linens changed, new FMS inserted. Trino 13 with Mari with Hospice, told 24 hour inpatient hospice nurse to be called over the weekend with any major change in patient condition. 1913-Bedside and Verbal shift change report given to Andra Brown  (oncoming nurse) by Shirley Leach (offgoing nurse). Report included the following information SBAR, Kardex, Intake/Output, MAR and Recent Results.

## 2018-11-16 NOTE — PROGRESS NOTES
Hospitalist Progress Note Daily Progress Note: 11/16/2018  
   
Interval history / Subjective:  
Iliana Ford is a 57y.o. year old male who presented from [facility] with abnormal labs, elevated BUN/Cr. Found to have JULIANNA, UTI, and markedly elevated LFT on presentation. Patient's recent admission was 9/10/18-9/14/18 for acute CVA and rhabdo. Patient poor historian on admission,stated \"I had heart failure. She was started on vanc,zosyn. On 9/29,patient had cardiac arrest with ventricular fibrillation - s/p ROSC. Since then his hospital course has been complicated with sepsis,pneumonia,acute pancreatitis requiring involvement of gi,surgery,nephrology,cardiology,ID,cardiology. So far patient has remained in vegetative state without any improvement. Patient had PEG/Trach placement on 10/17. On 10/17,patient started on acyclovir for veicular rash rt arm and was treated for Zoster. Patient has had several runs of antibiotics as his condition predisposed him to infections. Lipase 2169. US abd 10/22 showed abnormal appearance of the gallbladder with stones/sludge with wall thickening and possible pericholecystic fluid similar to the prior study of 9/27/2018. He has also been undergoing dialysis. On 11/5/18,patient's plan of care was discussed with his daughter Terry Donahue,his mPOA. Miss Payam Garcia decided that patient be DNR with comfort care only and advised to discontinue the dialysis. On 11/14/18,the POA asked for the withdrawal of Vent support which was done. Patient was then transferred from icu to regular medical floor for hospice care. Current Facility-Administered Medications Medication Dose Route Frequency  hydroxypropyl methylcellulose (ISOPTO TEARS) 0.5 % ophthalmic solution 1 Drop  1 Drop Both Eyes PRN  
 LORazepam (ATIVAN) injection 1 mg  1 mg IntraVENous Q15MIN PRN  
 LORazepam (INTENSOL) 2 mg/mL oral concentrate 1 mg  1 mg Oral Q1H PRN  
 morphine injection 10 mg  10 mg Inhalation Q1H PRN  
  morphine injection 2 mg  2 mg IntraVENous Q15MIN PRN  
 scopolamine (TRANSDERM-SCOP) 1 mg over 3 days 1 Patch  1 Patch TransDERmal Q72H PRN  
 acetaminophen (TYLENOL) solution 325 mg  325 mg Per NG tube Q4H PRN  
 ondansetron (ZOFRAN) injection 4 mg  4 mg IntraVENous Q4H PRN Review of Systems Unresponsive Objective:  
 
Visit Vitals /90 Pulse (!) 107 Temp 98.6 °F (37 °C) Resp 24 Ht 5' 7\" (1.702 m) Wt 81.6 kg (180 lb) SpO2 96% BMI 28.19 kg/m² O2 Flow Rate (L/min): 45 l/min O2 Device: Transtracheal catheter (Scoop) Temp (24hrs), Av °F (37.2 °C), Min:98.6 °F (37 °C), Max:99.5 °F (37.5 °C) No intake/output data recorded. No intake/output data recorded. P/E General Appearance:S/p PEG/Trach HENT: normocephalic/atraumatic, + ETT Neck: No JVD, hematoma R side where [facility] is improving Lungs: Coarse B/L w/ vent-assisted BS 
CV: RRR, no m/r/g Abdomen: soft, non-tender, normal bowel sounds Extremities: versicular rash rt arm and right side of chest,no cyanosis, no peripheral edema Neuro: Unresponsive to commands, no withdrawal to pain, + corneal reflex and pupillary reaction today Skin: Normal color, intact Data Review No results found for this or any previous visit (from the past 12 hour(s)). Assessment/Plan:  
 
Principal Problem: 
  Cardiac arrest with ventricular fibrillation ([facility] [address] (2018) Active Problems: 
  Essential hypertension (9/10/2018) Diabetes mellitus type 2, controlled ([address] (9/10/2018) History of stroke (2018) Overview: With residual R hemiparesis Acute-on-chronic kidney injury ([address] (2018) Acute cystitis (2018) Elevated troponin (2018) Elevated LFTs (2018) Acute pancreatitis (2018) Ischemic encephalopathy (2018) Hypoalbuminemia (10/21/2018) Gangrene ([facility] [address] (10/22/2018) Comfort measures only status (2018) Zoster. Care Plan -S/p discontinuation of Vent support on 11/14 as per mPOA request - now hospice care. - Vent mgmt per ICU - S/p trach/PEG on 10/17 as patient did not show significant improvement. 
- EGD showed large clot in esophagus last week repeat EGD 10/9 shows healed esophageal ulcer - Cont protonix IV every day  
- Hgb stable - LFTs improving - GI signed off 10/9 
- MRI w/ evidence of severe anoxic brain injury - Minimal improvement on neuro exam 
- Appreciate neuro assistance - IV Meropenem and IV Fluconazole d/heron on 10/15 per ID. 
- Currently on vanc only which was started on 9/27 
- Nephro managing HD TTHS 
- Pt unlikely to have any meaningful neuro recovery, prognosis is very grim - Family wanted to cont to do everything, including trach/PEG -> was done10/17 
- On 10/17:Started on acyclovir for possible Zoster rt arm and rt upper chest area. - Hyperkalemia on 10/18 ->corrected - Lipase 2169 10/22. US abd Abnormal appearance of the gallbladder with stones/sludge with wall thickening  
and possible pericholecystic fluid similar to the prior study of 9/27/2018  
- On Acyclovir for shingles , contact precaution per ID commitee 
- 10/22: Consult GI today for possible MRCP 
- 10/22: Consult Podiatry for left big toe gangrene  
- 10/23 : Podiatry recommend RAQUEL for Left Big toe  
- 10/23: GI recommend MRI/MRCP but will need patient off vent for multiple times for procedure. Not sure if this is feasible now 
- 10/25 : Blood , urine , sputum , trach discharge cultures 
- 10/25 IV antibiotics by ID  
- Change GI tube to Jejunostomy tube tomorrow by IR  
- 10/26: Vancomycin and Cefepime per ID  
- 10/26 : Jejunostomy attempt by IR unsuccessful . Will need to discuss with GI  
- 10/27 : Ciprofloxacin added 10/27 for double coverage of pseudomonas with cefepime per PCCM  
- 10/28: Pancreatitis ? Biliary  . Will reconsult GI today - Lipase trending down. 
-11/2:culture of picc line since pt has developed fever. -11/3:still febrile,picc line tip cx results pending;will follow ID recommendations. -11/4:Cx of tip of catheter showing staph coag negative. Pt still spiking fever - will follow ID recommendations. - 11/5: Nephrology signed off . At daughters request will D/C dialysis. She per Nephrology is well aware if implications of stopping dialysis - Per ID continue cefepime for total 14 days and Vancomycin  added for 7 days 11/6: DNR and Hospice consulted after I personally spoke with her. 11/7: Nephrology and ID signed off. Hospice following patient now 11/8: Patient on comfort measures only - Morphine , scopolamine and ativan ordered per protocol . Per report Mech vent removal pending arrival of POA  
 
DVT prophylaxis:scds DNR per daughter's request today 11/6 Disposition: Hospice -  will work on placement

## 2018-11-16 NOTE — PROGRESS NOTES
Problem: Falls - Risk of 
Goal: *Absence of Falls Document Cornelious Booty Fall Risk and appropriate interventions in the flowsheet. Outcome: Progressing Towards Goal 
Fall Risk Interventions: 
Mobility Interventions: Strengthening exercises (ROM-active/passive) Mentation Interventions: Adequate sleep, hydration, pain control Medication Interventions: Evaluate medications/consider consulting pharmacy Elimination Interventions: Toilet paper/wipes in reach History of Falls Interventions: Door open when patient unattended Problem: Pressure Injury - Risk of 
Goal: *Prevention of pressure injury Document Mitul Scale and appropriate interventions in the flowsheet. Outcome: Progressing Towards Goal 
Pressure Injury Interventions: 
Sensory Interventions: Assess need for specialty bed Moisture Interventions: Absorbent underpads Activity Interventions: Assess need for specialty bed Mobility Interventions: Pressure redistribution bed/mattress (bed type) Nutrition Interventions: Document food/fluid/supplement intake Friction and Shear Interventions: Apply protective barrier, creams and emollients

## 2018-11-16 NOTE — PROGRESS NOTES
Problem: Falls - Risk of 
Goal: *Absence of Falls Document Talya Hussein Fall Risk and appropriate interventions in the flowsheet. Outcome: Progressing Towards Goal 
Fall Risk Interventions: 
Mobility Interventions: Communicate number of staff needed for ambulation/transfer Mentation Interventions: Adequate sleep, hydration, pain control, Bed/chair exit alarm Medication Interventions: Evaluate medications/consider consulting pharmacy Elimination Interventions: Bed/chair exit alarm, Toileting schedule/hourly rounds History of Falls Interventions: Door open when patient unattended Problem: Pressure Injury - Risk of 
Goal: *Prevention of pressure injury Document Mitul Scale and appropriate interventions in the flowsheet. Outcome: Not Progressing Towards Goal 
Pressure Injury Interventions: 
Sensory Interventions: Assess changes in LOC, Pressure redistribution bed/mattress (bed type), Keep linens dry and wrinkle-free Moisture Interventions: Absorbent underpads Activity Interventions: Pressure redistribution bed/mattress(bed type) Mobility Interventions: Pressure redistribution bed/mattress (bed type) Nutrition Interventions: Document food/fluid/supplement intake Friction and Shear Interventions: Foam dressings/transparent film/skin sealants, Apply protective barrier, creams and emollients

## 2018-11-17 NOTE — PROGRESS NOTES
Hospitalist Progress Note Daily Progress Note: 11/17/2018  
   
Interval history / Subjective:  
Orion Figueroa is a 57y.o. year old male who presented from [facility] with abnormal labs, elevated BUN/Cr. Found to have JULIANNA, UTI, and markedly elevated LFT on presentation. Patient's recent admission was 9/10/18-9/14/18 for acute CVA and rhabdo. Patient poor historian on admission,stated \"I had heart failure. She was started on vanc,zosyn. On 9/29,patient had cardiac arrest with ventricular fibrillation - s/p ROSC. Since then his hospital course has been complicated with sepsis,pneumonia,acute pancreatitis requiring involvement of gi,surgery,nephrology,cardiology,ID,cardiology. So far patient has remained in vegetative state without any improvement. Patient had PEG/Trach placement on 10/17. On 10/17,patient started on acyclovir for veicular rash rt arm and was treated for Zoster. Patient has had several runs of antibiotics as his condition predisposed him to infections. Lipase 2169. US abd 10/22 showed abnormal appearance of the gallbladder with stones/sludge with wall thickening and possible pericholecystic fluid similar to the prior study of 9/27/2018. He has also been undergoing dialysis. On 11/5/18,patient's plan of care was discussed with his daughter Terry Donahue,his mPOA. Miss Olea decided that patient be DNR with comfort care only and advised to discontinue the dialysis. On 11/14/18,the POA asked for the withdrawal of Vent support which was done. Patient was then transferred from icu to regular medical floor for comfort care only. Current Facility-Administered Medications Medication Dose Route Frequency  hydroxypropyl methylcellulose (ISOPTO TEARS) 0.5 % ophthalmic solution 1 Drop  1 Drop Both Eyes PRN  
 LORazepam (ATIVAN) injection 1 mg  1 mg IntraVENous Q15MIN PRN  
 LORazepam (INTENSOL) 2 mg/mL oral concentrate 1 mg  1 mg Oral Q1H PRN  
  morphine injection 10 mg  10 mg Inhalation Q1H PRN  
 morphine injection 2 mg  2 mg IntraVENous Q15MIN PRN  
 scopolamine (TRANSDERM-SCOP) 1 mg over 3 days 1 Patch  1 Patch TransDERmal Q72H PRN  
 acetaminophen (TYLENOL) solution 325 mg  325 mg Per NG tube Q4H PRN  
 ondansetron (ZOFRAN) injection 4 mg  4 mg IntraVENous Q4H PRN Review of Systems Unresponsive Objective:  
 
Visit Vitals BP (!) 150/91 Pulse (!) 105 Temp 98.7 °F (37.1 °C) Resp 24 Ht 5' 7\" (1.702 m) Wt 83.1 kg (183 lb 4.8 oz) SpO2 92% BMI 28.71 kg/m² O2 Flow Rate (L/min): (P) 6 l/min O2 Device: Tracheostomy, Tracheal collar, Humidifier Temp (24hrs), Av.7 °F (37.1 °C), Min:98.7 °F (37.1 °C), Max:98.7 °F (37.1 °C) No intake/output data recorded. No intake/output data recorded. P/E General Appearance:S/p PEG/Trach HENT: normocephalic/atraumatic, + ETT Neck: No JVD, hematoma R side where [facility] is improving Lungs: Coarse B/L w/ vent-assisted BS 
CV: RRR, no m/r/g Abdomen: soft, non-tender, normal bowel sounds Extremities: versicular rash rt arm and right side of chest,no cyanosis, no peripheral edema Neuro: Unresponsive to commands, no withdrawal to pain, + corneal reflex and pupillary reaction today Skin: Normal color, intact Data Review No results found for this or any previous visit (from the past 12 hour(s)). Assessment/Plan:  
 
Principal Problem: 
  Cardiac arrest with ventricular fibrillation ([facility] (2018) Active Problems: 
  Essential hypertension (9/10/2018) Diabetes mellitus type 2, controlled ([facility] (9/10/2018) History of stroke (2018) Overview: With residual R hemiparesis Acute-on-chronic kidney injury ([facility] (2018) Acute cystitis (2018) Elevated troponin (2018) Elevated LFTs (2018) Acute pancreatitis (2018) Ischemic encephalopathy (2018) Hypoalbuminemia (10/21/2018) Gangrene ([facility] (10/22/2018) Comfort measures only status (11/13/2018) Zoster. Care Plan 
-S/p discontinuation of Vent support on 11/14 as per mPOA request - now comfort care only. - Vent mgmt per ICU - S/p trach/PEG on 10/17 as patient did not show significant improvement. 
- EGD showed large clot in esophagus last week repeat EGD 10/9 shows healed esophageal ulcer - Cont protonix IV every day  
- Hgb stable - LFTs improving - GI signed off 10/9 
- MRI w/ evidence of severe anoxic brain injury - Minimal improvement on neuro exam 
- Appreciate neuro assistance - IV Meropenem and IV Fluconazole d/heron on 10/15 per ID. 
- Currently on vanc only which was started on 9/27 
- Nephro managing HD TTHS 
- Pt unlikely to have any meaningful neuro recovery, prognosis is very grim - Family wanted to cont to do everything, including trach/PEG -> was done10/17 
- On 10/17:Started on acyclovir for possible Zoster rt arm and rt upper chest area. - Hyperkalemia on 10/18 ->corrected - Lipase 2169 10/22. US abd Abnormal appearance of the gallbladder with stones/sludge with wall thickening  
and possible pericholecystic fluid similar to the prior study of 9/27/2018  
- On Acyclovir for shingles , contact precaution per ID commitee 
- 10/22: Consult GI today for possible MRCP 
- 10/22: Consult Podiatry for left big toe gangrene  
- 10/23 : Podiatry recommend RAQUEL for Left Big toe  
- 10/23: GI recommend MRI/MRCP but will need patient off vent for multiple times for procedure. Not sure if this is feasible now 
- 10/25 : Blood , urine , sputum , trach discharge cultures 
- 10/25 IV antibiotics by ID  
- Change GI tube to Jejunostomy tube tomorrow by IR  
- 10/26: Vancomycin and Cefepime per ID  
- 10/26 : Jejunostomy attempt by IR unsuccessful .  Will need to discuss with GI  
- 10/27 : Ciprofloxacin added 10/27 for double coverage of pseudomonas with cefepime per PCCM  
 - 10/28: Pancreatitis ? Biliary  . Will reconsult GI today - Lipase trending down. 
-11/2:culture of picc line since pt has developed fever. -11/3:still febrile,picc line tip cx results pending;will follow ID recommendations. -11/4:Cx of tip of catheter showing staph coag negative. Pt still spiking fever - will follow ID recommendations. - 11/5: Nephrology signed off . At daughters request will D/C dialysis. She per Nephrology is well aware if implications of stopping dialysis - Per ID continue cefepime for total 14 days and Vancomycin  added for 7 days 11/6: DNR and Hospice consulted after I personally spoke with her. 11/7: Nephrology and ID signed off. Hospice following patient now 11/8: Patient on comfort measures only - Morphine , scopolamine and ativan ordered per protocol . Per report Mech vent removal pending arrival of POA  
 
DVT prophylaxis:scds DNR per daughter's request today 11/6 Disposition: Hospice -  will work on placement

## 2018-11-17 NOTE — PROGRESS NOTES
Problem: Falls - Risk of 
Goal: *Absence of Falls Document Yann Macleod Fall Risk and appropriate interventions in the flowsheet. Outcome: Progressing Towards Goal 
Fall Risk Interventions: 
Mobility Interventions: Communicate number of staff needed for ambulation/transfer Mentation Interventions: Adequate sleep, hydration, pain control Medication Interventions: Evaluate medications/consider consulting pharmacy Elimination Interventions: Bed/chair exit alarm History of Falls Interventions: Door open when patient unattended Problem: Pressure Injury - Risk of 
Goal: *Prevention of pressure injury Document Mitul Scale and appropriate interventions in the flowsheet. Outcome: Progressing Towards Goal 
Pressure Injury Interventions: 
Sensory Interventions: Assess changes in LOC Moisture Interventions: Absorbent underpads Activity Interventions: Pressure redistribution bed/mattress(bed type) Mobility Interventions: Pressure redistribution bed/mattress (bed type) Nutrition Interventions: Document food/fluid/supplement intake Friction and Shear Interventions: Foam dressings/transparent film/skin sealants

## 2018-11-17 NOTE — PROGRESS NOTES
Problem: Falls - Risk of 
Goal: *Absence of Falls Document Ebb Tajik Fall Risk and appropriate interventions in the flowsheet. Outcome: Progressing Towards Goal 
Fall Risk Interventions: 
Mobility Interventions: Communicate number of staff needed for ambulation/transfer Mentation Interventions: Bed/chair exit alarm Medication Interventions: Patient to call before getting OOB Elimination Interventions: Bed/chair exit alarm History of Falls Interventions: Door open when patient unattended Problem: Pressure Injury - Risk of 
Goal: *Prevention of pressure injury Document Mitul Scale and appropriate interventions in the flowsheet. Pressure Injury Interventions: 
Sensory Interventions: Assess changes in LOC Moisture Interventions: Absorbent underpads, Internal/External fecal devices Activity Interventions: Pressure redistribution bed/mattress(bed type) Mobility Interventions: HOB 30 degrees or less Nutrition Interventions: Document food/fluid/supplement intake Friction and Shear Interventions: HOB 30 degrees or less, Foam dressings/transparent film/skin sealants

## 2018-11-17 NOTE — PROGRESS NOTES
Bedside and Verbal shift change report given to Azeem Temple, RN (oncoming nurse) by Sal Simmons RN (offgoing nurse). Report included the following information SBAR, Kardex, Intake/Output and Recent Results. [address] with Dr. Penny Fields, [facility] to restart tube feeding at 10 ml/hr and advance as tolerated.

## 2018-11-17 NOTE — PROGRESS NOTES
[address] pt care from [facility]. Pt in bed, unresponsive. Not in any form of distress. Frequent use items and call bell within reach. Bed locked in lowest position. Bed alarm on.  
 
0015 - Pt found with small amount of light brown emesis on his gown. Stopped continuous tube feeding. Pt suctioned orally. No residual noted on the PEG tube. Flushed with H2O. Administered Zofran 4 mg IV. Hygiene care completed. Mouth care completed. 0130 - Pt found vomiting with yellow green emesis. Paged Dr. Lc West. 80 - Dr. Lc West present on the floor. Notified MD of pt's episode of vomiting 2 x. Made aware that Nephro 40 mL/hr via tube feeding was stopped. Dr Lc West approved. 0230 - Pt coughing blood tinged green mucus via trach. Trach suctioned. Large amount of said mucus noted. Tolerated well. 8513 - Bedside and Verbal shift change report given to KAUSHAL Quigley (oncoming nurse) by Gerry Hawkins RN (offgoing nurse). Report included the following information SBAR, Kardex, Intake/Output, MAR and Recent Results.

## 2018-11-17 NOTE — PROGRESS NOTES
Sxn moderate aount brown Secretions. Clean around trach site. Frutoso Cricket Changed inner cannula. Inner cannula , extra trachs  Are at bedside. Will continue to monitor.

## 2018-11-18 NOTE — PROGRESS NOTES
Problem: Pressure Injury - Risk of 
Goal: *Prevention of pressure injury Document Mitul Scale and appropriate interventions in the flowsheet. Outcome: Progressing Towards Goal 
Pressure Injury Interventions: 
Sensory Interventions: Float heels Moisture Interventions: Absorbent underpads Activity Interventions: Pressure redistribution bed/mattress(bed type) Mobility Interventions: HOB 30 degrees or less Nutrition Interventions: Document food/fluid/supplement intake Friction and Shear Interventions: HOB 30 degrees or less

## 2018-11-18 NOTE — PROGRESS NOTES
Problem: Falls - Risk of 
Goal: *Absence of Falls Document Hawa Ahumada Fall Risk and appropriate interventions in the flowsheet. Outcome: Progressing Towards Goal 
Fall Risk Interventions: 
Mobility Interventions: Communicate number of staff needed for ambulation/transfer Mentation Interventions: Door open when patient unattended Medication Interventions: Bed/chair exit alarm Elimination Interventions: Bed/chair exit alarm History of Falls Interventions: Door open when patient unattended

## 2018-11-18 NOTE — PROGRESS NOTES
Problem: Falls - Risk of 
Goal: *Absence of Falls Document Valentina Cordova Fall Risk and appropriate interventions in the flowsheet. Outcome: Not Progressing Towards Goal 
Fall Risk Interventions: 
Mobility Interventions: Communicate number of staff needed for ambulation/transfer Mentation Interventions: Door open when patient unattended Medication Interventions: Bed/chair exit alarm Elimination Interventions: Bed/chair exit alarm History of Falls Interventions: Door open when patient unattended Problem: Pressure Injury - Risk of 
Goal: *Prevention of pressure injury Document Mitul Scale and appropriate interventions in the flowsheet. Outcome: Not Progressing Towards Goal 
Pressure Injury Interventions: 
Sensory Interventions: Float heels Moisture Interventions: Absorbent underpads Activity Interventions: Pressure redistribution bed/mattress(bed type) Mobility Interventions: HOB 30 degrees or less Nutrition Interventions: Document food/fluid/supplement intake Friction and Shear Interventions: HOB 30 degrees or less

## 2018-11-18 NOTE — PROGRESS NOTES
Hospitalist Progress Note Daily Progress Note: 11/18/2018  
   
Interval history / Subjective:  
Willie Christensen is a 57y.o. year old male who presented from [facility] with abnormal labs, elevated BUN/Cr. Found to have JULIANNA, UTI, and markedly elevated LFT on presentation. Patient's recent admission was 9/10/18-9/14/18 for acute CVA and rhabdo. Patient poor historian on admission,stated \"I had heart failure. She was started on vanc,zosyn. On 9/29,patient had cardiac arrest with ventricular fibrillation - s/p ROSC. Since then his hospital course has been complicated with sepsis,pneumonia,acute pancreatitis requiring involvement of gi,surgery,nephrology,cardiology,ID,cardiology. So far patient has remained in vegetative state without any improvement. Patient had PEG/Trach placement on 10/17. On 10/17,patient started on acyclovir for veicular rash rt arm and was treated for Zoster. Patient has had several runs of antibiotics as his condition predisposed him to infections. Lipase 2169. US abd 10/22 showed abnormal appearance of the gallbladder with stones/sludge with wall thickening and possible pericholecystic fluid similar to the prior study of 9/27/2018. He has also been undergoing dialysis. On 11/5/18,patient's plan of care was discussed with his daughter Terry Donahue,his mPOA. Miss Marci Stallings decided that patient be DNR with comfort care only and advised to discontinue the dialysis. On 11/14/18,the POA asked for the withdrawal of Vent support which was done. Patient was then transferred from icu to regular medical floor for comfort care only. Current Facility-Administered Medications Medication Dose Route Frequency  hydroxypropyl methylcellulose (ISOPTO TEARS) 0.5 % ophthalmic solution 1 Drop  1 Drop Both Eyes PRN  
 LORazepam (ATIVAN) injection 1 mg  1 mg IntraVENous Q15MIN PRN  
 LORazepam (INTENSOL) 2 mg/mL oral concentrate 1 mg  1 mg Oral Q1H PRN  
  morphine injection 10 mg  10 mg Inhalation Q1H PRN  
 morphine injection 2 mg  2 mg IntraVENous Q15MIN PRN  
 scopolamine (TRANSDERM-SCOP) 1 mg over 3 days 1 Patch  1 Patch TransDERmal Q72H PRN  
 acetaminophen (TYLENOL) solution 325 mg  325 mg Per NG tube Q4H PRN  
 ondansetron (ZOFRAN) injection 4 mg  4 mg IntraVENous Q4H PRN Review of Systems Unresponsive Objective:  
 
Visit Vitals /89 (BP 1 Location: Left arm, BP Patient Position: At rest) Pulse (!) 111 Temp 99.5 °F (37.5 °C) Resp 20 Ht 5' 7\" (1.702 m) Wt 83.1 kg (183 lb 4.8 oz) SpO2 (!) 80% BMI 28.71 kg/m² O2 Flow Rate (L/min): 6 l/min O2 Device: Tracheal collar Temp (24hrs), Av.8 °F (37.7 °C), Min:99.5 °F (37.5 °C), Max:100.5 °F (38.1 °C) 
 
 
 07 - 1900 In: 61 Out: 0  
1901 -  07 In: 300 Out: 0   
P/E General Appearance:S/p PEG/Trach HENT: normocephalic/atraumatic, + ETT Neck: No JVD, hematoma R side where [facility] is improving Lungs: Coarse B/L w/ vent-assisted BS 
CV: RRR, no m/r/g Abdomen: soft, non-tender, normal bowel sounds Extremities: versicular rash rt arm and right side of chest,no cyanosis, no peripheral edema Neuro: Unresponsive to commands, no withdrawal to pain, + corneal reflex and pupillary reaction today Skin: Normal color, intact Data Review No results found for this or any previous visit (from the past 12 hour(s)). Assessment/Plan:  
 
Principal Problem: 
  Card[facility] fibrillation ([facility] (2018) Active Problems: 
  Essential hypertension (9/10/2018) Diabetes mellitus type 2, controlled ([facility] [address] (9/10/2018) History of stroke (2018) Overview: With residual R hemiparesis Acute-on-chronic kidney injury ([facility] [address] (2018) Acute cystitis (2018) Elevated troponin (2018) Elevated LFTs (2018) Acute pancreatitis (2018) Ischemic encephalopathy (2018) Hypoalbuminemia (10/21/2018) Gangrene ([address] (10/22/2018) Comfort measures only status (11/13/2018) Zoster. Care Plan 
-S/p discontinuation of Vent support on 11/14 as per mPOA request - now comfort care only. - Vent mgmt per ICU - S/p trach/PEG on 10/17 as patient did not show significant improvement. 
- EGD showed large clot in esophagus last week repeat EGD 10/9 shows healed esophageal ulcer - Cont protonix IV every day  
- Hgb stable - LFTs improving - GI signed off 10/9 
- MRI w/ evidence of severe anoxic brain injury - Minimal improvement on neuro exam 
- Appreciate neuro assistance - IV Meropenem and IV Fluconazole d/heron on 10/15 per ID. 
- Currently on vanc only which was started on 9/27 
- Nephro managing HD TTHS 
- Pt unlikely to have any meaningful neuro recovery, prognosis is very grim - Family wanted to cont to do everything, including trach/PEG -> was done10/17 
- On 10/17:Started on acyclovir for possible Zoster rt arm and rt upper chest area. - Hyperkalemia on 10/18 ->corrected - Lipase 2169 10/22. US abd Abnormal appearance of the gallbladder with stones/sludge with wall thickening  
and possible pericholecystic fluid similar to the prior study of 9/27/2018  
- On Acyclovir for shingles , contact precaution per ID commitee 
- 10/22: Consult GI today for possible MRCP 
- 10/22: Consult Podiatry for left big toe gangrene  
- 10/23 : Podiatry recommend RAQUEL for Left Big toe  
- 10/23: GI recommend MRI/MRCP but will need patient off vent for multiple times for procedure. Not sure if this is feasible now 
- 10/25 : Blood , urine , sputum , trach discharge cultures 
- 10/25 IV antibiotics by ID  
- Change GI tube to Jejunostomy tube tomorrow by IR  
- 10/26: Vancomycin and Cefepime per ID  
- 10/26 : Jejunostomy attempt by IR unsuccessful .  Will need to discuss with GI  
- 10/27 : Ciprofloxacin added 10/27 for double coverage of pseudomonas with cefepime per PCCM  
 - 10/28: Pancreatitis ? Biliary  . Will reconsult GI today - Lipase trending down. 
-11/2:culture of picc line since pt has developed fever. -11/3:still febrile,picc line tip cx results pending;will follow ID recommendations. -11/4:Cx of tip of catheter showing staph coag negative. Pt still spiking fever - will follow ID recommendations. - 11/5: Nephrology signed off . At daughters request will D/C dialysis. She per Nephrology is well aware if implications of stopping dialysis - Per ID continue cefepime for total 14 days and Vancomycin  added for 7 days 11/6: DNR and Hospice consulted after I personally spoke with her. 11/7: Nephrology and ID signed off. Hospice following patient now 11/8: Patient on comfort measures only - Morphine , scopolamine and ativan ordered per protocol . Per report Mech vent removal pending arrival of POA  
 
DVT prophylaxis:scds DNR per daughter's request today 11/6 Disposition: Hospice -  will work on placement

## 2018-11-18 NOTE — ROUTINE PROCESS
Bedside and Verbal shift change report given to KAUSHAL Keys (oncoming nurse) by Yazmin Hahn RN (offgoing nurse). Report included the following information SBAR, Kardex, Intake/Output, MAR and Recent Results.

## 2018-11-18 NOTE — PROGRESS NOTES
Received awake,non interactive. Hob elevated. Tube feeding in progress. No s/s distress. Comfort measures. 11/18/18 0630 Resting,no s/s distress.

## 2018-11-18 NOTE — PROGRESS NOTES
Assumed care from KAUSHAL Keys. Pt resting in bed with no signs of pain or distress. Bed locked and in lowest position with call bell in reach. FMS in place. Trach in place. Pt was bathed and tube feeding was increased. Pt tolerated. 1950 Bedside and Verbal shift change report given to KAUSHAL Nash (oncoming nurse) by Randall Jeffrey RN (offgoing nurse). Report included the following information SBAR, Kardex, Intake/Output, MAR and Recent Results.

## 2018-11-18 NOTE — ROUTINE PROCESS
Bedside and Verbal shift change report given to [address] [place] [address] (oncoming nurse) by anam reynolds rn (offgoing nurse). Report given with SBAR, Kardex, Intake/Output and Recent Results.

## 2018-11-19 NOTE — PROGRESS NOTES
responded to a death of patient in room 2115 this morning. No family present or coming. Unknown  home to handle. Prayer offered with staff and body. Reiseñor 3 Board Certified [employer]  
[phone]

## 2018-11-19 NOTE — PROGRESS NOTES
DEATH PRONOUNCEMENT Called to patient's room to evaluate non-responsive state. Patient does not respond to tactile or verbal stimulus. He has no respiratory effort or breath sounds. He has no heart tones or pulse.  
He is pronounced dead as of 525 AM

## 2018-11-19 NOTE — PROGRESS NOTES
Problem: Falls - Risk of 
Goal: *Absence of Falls Document Imelda End Fall Risk and appropriate interventions in the flowsheet. Outcome: Progressing Towards Goal 
Fall Risk Interventions: 
Mobility Interventions: Communicate number of staff needed for ambulation/transfer Mentation Interventions: Door open when patient unattended Medication Interventions: Bed/chair exit alarm Elimination Interventions: Bed/chair exit alarm History of Falls Interventions: Door open when patient unattended Problem: Pressure Injury - Risk of 
Goal: *Prevention of pressure injury Document Mitul Scale and appropriate interventions in the flowsheet. Outcome: Progressing Towards Goal 
Pressure Injury Interventions: 
Sensory Interventions: Float heels Moisture Interventions: Absorbent underpads Activity Interventions: Pressure redistribution bed/mattress(bed type) Mobility Interventions: HOB 30 degrees or less Nutrition Interventions: Document food/fluid/supplement intake Friction and Shear Interventions: HOB 30 degrees or less

## 2018-11-19 NOTE — DISCHARGE SUMMARY
Discharge Summary Patient: Yue Gamez               Sex: male          DOA: 2018 YOB: 1955      Age:  57 y.o.        LOS:  LOS: 53 days Admit Date: 2018 Discharge Date: 2018   525 am 
 
Labs: 
Labs: Results:  
   
Chemistry No results for input(s): GLU, NA, K, CL, CO2, BUN, CREA, CA, AGAP, BUCR, TBIL, GPT, AP, TP, ALB, GLOB, AGRAT in the last 72 hours. CBC w/Diff No results for input(s): WBC, RBC, HGB, HCT, PLT, GRANS, LYMPH, EOS, HGBEXT, HCTEXT, PLTEXT in the last 72 hours. Cardiac Enzymes No results for input(s): CPK, CKND1, REJI in the last 72 hours. No lab exists for component: Allayne Grater Coagulation No results for input(s): PTP, INR, APTT in the last 72 hours. No lab exists for component: INREXT Lipid Panel Lab Results Component Value Date/Time Cholesterol, total 349 (H) 09/10/2018 12:31 PM  
 HDL Cholesterol 59 09/10/2018 12:31 PM  
 LDL, calculated 252.4 (H) 09/10/2018 12:31 PM  
 VLDL, calculated 37.6 09/10/2018 12:31 PM  
 Triglyceride 184 (H) 10/24/2018 04:16 AM  
 CHOL/HDL Ratio 5.9 (H) 09/10/2018 12:31 PM  
  
BNP No results for input(s): BNPP in the last 72 hours. Liver Enzymes No results for input(s): TP, ALB, TBIL, AP, SGOT, GPT in the last 72 hours. No lab exists for component: DBIL Thyroid Studies Lab Results Component Value Date/Time TSH 1.10 09/10/2018 12:31 PM  
    
 
  
Consults: Cardiology, Nephrology and Pulmonary/Critical Care Treatment Team: Treatment Team: Attending Provider: Bartolome Lombardo MD; Consulting Provider: Rosamaria Link DO; Care Manager: Lanny Luna; Utilization Review: Luna Saravia RN; Utilization Review: Fransisco Amaya RN; Utilization Review: Jose Oh RN; Consulting Provider: Iesha Ruffin DPM; Consulting Provider: Hiawatha Dance, DPM; Care Coordinator: Abe Ortiz RN 
 
 Significant Diagnostic Studies: labs: No results found for this or any previous visit (from the past 24 hour(s)). Discharge diagnoses:   
Problem List as of 11/19/2018 Date Reviewed: 11/13/2018 Codes Class Noted - Resolved Comfort measures only status ICD-10-CM: Z51.5 ICD-9-CM: V66.7  11/13/2018 - Present Gangrene ([facility] ICD-10-CM: Q40 
ICD-9-CM: 785.4  10/22/2018 - Present Hypoalbuminemia ICD-10-CM: E88.09 
ICD-9-CM: 273.8  10/21/2018 - Present Ischemic encephalopathy ICD-10-CM: I67.89 ICD-9-CM: 437.1  9/30/2018 - Present * (Principal) Cardiac arrest with ventricular fibrillation ([facility] ICD-10-CM: I46.9, I49.01 
ICD-9-CM: 427.5, 427.41  9/29/2018 - Present Acute pancreatitis ICD-10-CM: K85.90 ICD-9-CM: 226.0  9/29/2018 - Present Elevated LFTs ICD-10-CM: R94.5 ICD-9-CM: 790.6  9/28/2018 - Present History of stroke ICD-10-CM: Z86.73 
ICD-9-CM: V12.54  9/27/2018 - Present Overview Signed 9/29/2018 11:14 AM by Briana Cortez MD  
  With residual R hemiparesis Acute-on-chronic kidney injury ([facility] ICD-10-CM: N17.9, N18.9 ICD-9-CM: 584.9, 585.9  9/27/2018 - Present Acute cystitis ICD-10-CM: N30.00 ICD-9-CM: 595.0  9/27/2018 - Present Elevated troponin ICD-10-CM: R74.8 ICD-9-CM: 790.6  9/27/2018 - Present DM (diabetes mellitus) ([facility] ICD-10-CM: E11.9 ICD-9-CM: 250.00  9/14/2018 - Present Stroke (cerebrum) ([facility] ICD-10-CM: I63.9 ICD-9-CM: 434.91  9/10/2018 - Present Stroke [facility]) ICD-10-CM: I63.9 ICD-9-CM: 434.91  9/10/2018 - Present Rhabdomyolysis ICD-10-CM: L33.82 ICD-9-CM: 728.88  9/10/2018 - Present Dehydration ICD-10-CM: E86.0 ICD-9-CM: 276.51  9/10/2018 - Present Essential hypertension (Chronic) ICD-10-CM: I10 
ICD-9-CM: 401.9  9/10/2018 - Present Diabetes mellitus type 2, controlled ([facility] (Chronic) ICD-10-CM: E11.9 ICD-9-CM: 250.00  9/10/2018 - Present Non compliance w medication regimen ICD-10-CM: Z91.14 
ICD-9-CM: V15.81  9/10/2018 - Present RESOLVED: Pseudomonas infection ICD-10-CM: B96.5 ICD-9-CM: 041.7  10/27/2018 - 11/13/2018 RESOLVED: Shingles ICD-10-CM: B02.9 ICD-9-CM: 053.9  10/21/2018 - 11/13/2018 RESOLVED: Pancreatitis ICD-10-CM: K85.90 ICD-9-CM: 616.3  10/8/2018 - 10/21/2018 RESOLVED: Septic shock (HCC) ICD-10-CM: A41.9, R65.21 ICD-9-CM: 038.9, 785.52, 995.92  9/30/2018 - 10/21/2018 Overview Signed 9/30/2018  5:41 PM by Mitali Gutierrez MD  
  Probably secondary to pneumonia (suspicious for aspiration). Less likely, secondary to acute pancreatitis. RESOLVED: Abnormal blood coagulation profile ICD-10-CM: R79.1 ICD-9-CM: 790.92  9/29/2018 - 10/21/2018 RESOLVED: Acute kidney injury ([facility] ICD-10-CM: N17.9 ICD-9-CM: 584.9  9/27/2018 - 10/21/2018 RESOLVED: Pneumonia of both lower lobes ICD-10-CM: J18.9 ICD-9-CM: 851  9/11/2018 - 10/21/2018 Hospital Course:  
Major issues addressed during hospitalization outlined  below. Osei Sheppard a 57 y. o. year old male who presented from [facility] with abnormal labs, elevated BUN/Cr.  Found to have JULIANNA, UTI, and markedly elevated LFT on presentation. Patient's recent admission was 9/10/18-9/14/18 for acute CVA and rhabdo. Patient poor historian on admission,stated \"I had heart failure. She was started on vanc,zosyn. On 9/29,patient had cardiac arrest with ventricular fibrillation - s/p ROSC. Since then his hospital course has been complicated with sepsis,pneumonia,acute pancreatitis requiring involvement of gi,surgery,nephrology,cardiology,ID,cardiology. So far patient has remained in vegetative state without any improvement. Patient had PEG/Trach placement on 10/17. On 10/17,patient started on acyclovir for veicular rash rt arm and was treated for Zoster. Patient has had several runs of antibiotics as his condition predisposed him to infections. Lipase 2169. US abd 10/22 showed abnormal appearance of the gallbladder with stones/sludge with wall thickening and possible pericholecystic fluid similar to the prior study of 2018. He has also been undergoing dialysis. On 18,patient's plan of care was discussed with his daughter Terry Donahue,his mPOA. Miss Elisabeth Pena decided that patient be DNR with comfort care only and advised to discontinue the dialysis. On 18,the POA asked for the withdrawal of Vent support which was done. Patient was then transferred from icu to regular medical floor for comfort care only. Unfortunately at 525 on  patient  peacefully in his room. Jim Valverde MD 
2018 Total time spent 40 minutes

## 2018-11-19 NOTE — PROGRESS NOTES
Patient suctioned for large amounts of thin, tan secretions. Patient coughed with suction then had agonal respirations. RN to bedside. Patient then became apneic, asystolic. Dr.Wilson arce.

## 2018-11-19 NOTE — PROGRESS NOTES
2000  -- Bedside, Verbal and Written shift change report given to 2309 Rina Vogt (oncoming nurse) by ISRAEL (offgoing nurse). Report included the following information SBAR, Kardex, Intake/Output, MAR and Recent Results. Pt is comfort care only. 
   
0300 -- Tube feeding set changed. 0577 -- At bedside for hourly rounding, RT at bedside as well. Pt is having agonal respirations. Paged MD to assess.   
 
1 -- MD at bedside, pronounced pt. 
 
Marla Sheridan -- Contacted  W Evelyn DONATO [phone] [address] [place] [address] to contact hospital. 
Phong Garcia

## 2019-06-15 NOTE — PROGRESS NOTES
LM for pt to please call back Problem: Mobility Impaired (Adult and Pediatric) Goal: *Acute Goals and Plan of Care (Insert Text) Physical Therapy Goals: GOALS UPDATED Initiated 9/11/2018 and to be accomplished within 7 days. 1.  Patient will complete all bed mobility with moderate assistance in order to prepare for EOB/OOB activity. 2.  Patient will tolerate sitting EOB x 10 minutes with modified independence in order to prepare for OOB activity. 3.  Patient will perform lateral transfer using sliding board to w/c to prepare for wheel chair propulsion with minimal assistance/contact guard assist. 
4.  Patient will propel wheelchair 25 ft using B UEs and with modified independence in order to negotiate their environment. 5.  Patient will performs turns and w/c negotation with modified independence in order to safely navigate their environment. / 
Outcome: Progressing Towards Goal 
PHYSICAL THERAPY: Re-evaluationINPATIENT: Medicare: Hospital Day: 5 Patient: Jose Martin Stanley (5 y.o. male)    Date: 9/14/2018 Primary Diagnosis: Stroke (cerebrum) ([facility] Stroke [facility])  
 ,  ,  
Precautions: Fall, Skin PLOF:Indepedent ASSESSMENT: 
Patient supine in bed, agreeable to participation with PT. MAX x 1 for supine <> sit. Demo's fair seated balance with L UE. Attempt to laterally scoot to w/c with MAX x 2. Patient unable to clear buttocks. MAX x 2 for transfer to w/c to change linens. MAX x 2 to return patient to bed. Left supine in bed with all needs within reach. No c/o pain. Adjusted goals to begin working on w/c mobility with patient as he is seen minimal recovery in R extremities. EDUCATION:  
Education:  Patient was educated on the following topics: Strategy for lateral transfer, importance of mobility, safety with mobility. Verbalizes understanding. Barriers to Learning/Limitations: None Compensate with: visual, verbal, tactile, kinesthetic cues/model Patient's progression toward goals since last assessment: Patient has seen minimal improvement in motor control of R UE or R LE. Continues to require MAX A for mobility. Unable to stand or clear buttocks using L LE. Patient will likely require w/c for mobility. Progressing towards performing w/c transfers and mobility. PLAN: 
Goals have been updated based on progression since last assessment. Patient continues to benefit from skilled intervention to address the above impairments. Continue to follow the patient 1-2 times per day/4-7 days per week to address goals. Planned Interventions: 
[x]     Bed Mobility Training          [x]     Neuromuscular Re-Education 
[x]     Transfer Training                []    Orthotic/Prosthetic Training 
[x]     Gait Training                       []     Modalities [x]     Therapeutic Exercises       []     Edema Management/Control 
[x][]     Therapeutic Activities         [x]     Patient and Family Training/Education 
[x]     Other (comment): W/c training Discharge Recommendations: Inpatient Rehab Further Equipment Recommendations for Discharge: patient will likely requries wheelchair for mobility. Will defer recommendation to next stage of care. SUBJECTIVE:  
Patient stated I guess.  OBJECTIVE DATA SUMMARY:  
 
G CODES:  Mobility [id number] Current  CK= 40-59%  [id number] Goal  CK= 40-59%. The severity rating is based on the Other [facility] Sitting Balance Scale 2+/5 [facility] Sitting Balance Scale 2+/5 
0: Pt performs 25% or less of sitting activity (Max assist) CN, 100% impaired. 1: Pt supports self with upper extremities but requires therapist assistance. Pt performs 25-50% of effort (Mod assist) CM, 80% to <100% impaired. 1+: Pt supports self with upper extremities but requires therapist assistance. Pt performs >50% effort. (Min assist). CL, 60% to <80% impaired. 2: Pt supports self independently with both upper extremities.  CL, 60% to <80% impaired. 2+: Pt support self independently with 1 upper extremity. CK, 40% to <60% impaired. 3: Pt sits without upper extremity support for up to 30 seconds. CK, 40% to <60% impaired. 3+: Pt sits without upper extremity support for 30 seconds or greater. CJ, 20% to <40% impaired. 4: Pt moves and returns trunkal midpoint 1-2 inches in one plane. CJ, 20% to <40% impaired. 4+: Pt moves and returns trunkal midpoint 1-2 inches in multiple planes. CI, 1% to <20% impaired. 5: Pt moves and returns trunkal midpoint in all planes greater than 2 inches. CH, 0% impaired. Critical Behavior: 
Neurologic State: Alert Orientation Level: Oriented X4 Cognition: Follows commands Safety/Judgement: Fall prevention Tone : R UE and LE flaccid Sensation: NT 
Range Of Motion: [facility] Functional Mobility: 
 
 
Functional Status Indep (I) Mod I Super-vision Min A Mod A Max A Total A Assist x2 Verbal cues Additional time Not tested Comments Rolling []  []  [] []    []    []  []  [] [] [] [x] Supine to sit []  []  [] []  []  [x]  []  [x] [] [] [] Sit to supine []  []  [] []  []  [x]  []  [x] [] [] [] Sit to stand []  []  [] []  []  []  []  [] [] [] [x] Stand to sit []  []  [] []  []  []  []  [] [] [] [x] Bed to chair transfers []  []  [] []  []  []  []  [] [] [] [x] Balance Good Doyce Balling Poor Unable Not tested Comments Sitting static []  [x]  []  []  [] Sitting dynamic []  [x]  []  []  []   
Standing static []  []  []  [x]  [] Standing dynamic []  []  []  [x]  [] Pain: None Vital Signs Temp: 98.1 °F (36.7 °C) Pulse (Heart Rate): 75 BP: 168/80 Resp Rate: 16    
O2 Sat (%): 93 % Activity Tolerance:  
Fair Please refer to the flowsheet for vital signs taken during this treatment. After treatment:  
[]  Patient left in no apparent distress sitting up in chair 
[x]  Patient left in no apparent distress in bed 
[x]  Call bell left within reach []  Nursing notified 
[]  Caregiver present 
[]  Bed alarm activated Cecillia Seals Time Calculation: 36 mins

## 2024-03-24 NOTE — PROGRESS NOTES
Patient received in bed awake. Patient alert and oriented 4, denies having any pain or discomfort. Patient resting quietly. Frequent use items within reach. Bed locked in low position. Call bell within reach and patient verbalized understanding of use for assistance and needs. Dual NIHSS and skin assessment conducted with Nu Man RN. Skin breakdown present, see IP Wound assessment. Wants STD testing. His girlfriend was here earlier and saw Radha

## 2025-02-17 NOTE — PROGRESS NOTES
Current patient location: [address] [place] [id number]    Is the patient currently in the state of [place]? YES    Visit mode: VIDEO    If the visit is dropped, the patient can be reconnected by:VIDEO VISIT: Text to cell phone:   Telephone Information:   Mobile [phone]       Will anyone else be joining the visit? NO  (If patient encounters technical issues they should call [phone])    Are changes needed to the allergy or medication list? No    Are refills needed on medications prescribed by this physician? NO    Rooming Documentation:  Questionnaire(s) not done per department protocol    Reason for visit: RECHECK Shelby Kocher VVF       SBT started; RR 22, VE 9.2, , RSBI 62. No respiratory distress or complications noted, he is currently tolerating SBT.

## (undated) DEVICE — SYR ASSEMB INFL BLLN 60ML --

## (undated) DEVICE — SYR 10ML CTRL LR LCK NSAF LF --

## (undated) DEVICE — SUT SLK 2-0SH 30IN BLK --

## (undated) DEVICE — TRAP MUCUS SPECIMEN 40ML -- MEDICHOICE

## (undated) DEVICE — SYR 50ML SLIP TIP NSAF LF STRL --

## (undated) DEVICE — BASIN EMESIS 500CC ROSE 250/CS 60/PLT: Brand: MEDEGEN MEDICAL PRODUCTS, LLC

## (undated) DEVICE — SET ADMIN 16ML TBNG L100IN 2 Y INJ SITE IV PIGGY BK DISP

## (undated) DEVICE — AIRLIFE™ ADULT CUSHION NASAL CANNULA 14 FOOT (4.3) CRUSH-RESISTANT OXYGEN TUBING, AND U/CONNECT-IT ADAPTER: Brand: AIRLIFE™

## (undated) DEVICE — SOLUTION IV 1000ML 0.9% SOD CHL

## (undated) DEVICE — KENDALL 500 SERIES DIAPHORETIC FOAM MONITORING ELECTRODE - TEAR DROP SHAPE ( 30/PK): Brand: KENDALL

## (undated) DEVICE — ARNDT ENDOBRONCHIAL BLOCKER SET WITH SPHERICAL BALLOON: Brand: ARNDT

## (undated) DEVICE — Device

## (undated) DEVICE — BRUSH CYTO L150CM CHN L2MM BRIST DIA1.9MM PTFE S STL BULL

## (undated) DEVICE — (D)STRIP SKN CLSR 0.5X4IN WHT --

## (undated) DEVICE — THYROID SHEET: Brand: CONVERTORS

## (undated) DEVICE — SUTURE ETHLN SZ 2-0 L18IN NONABSORBABLE BLK L26MM PS 3/8 585H

## (undated) DEVICE — HOLDER TRACH TB W1IN FIT UPTO 19.5IN NK 2 PC ADJ BLU

## (undated) DEVICE — DEPAUL MAJOR PROCEDURE PACK: Brand: MEDLINE INDUSTRIES, INC.

## (undated) DEVICE — SYRINGE MED 10ML POLYPR LUERSLIP TIP FLAT TOP W/O SFTY DISP

## (undated) DEVICE — COVER US PRB W15XL120CM W/ GEL RUBBERBAND TAPE STRP FLD GEN

## (undated) DEVICE — SINGLE USE BIOPSY VALVE MAJ-210: Brand: SINGLE USE BIOPSY VALVE (STERILE)

## (undated) DEVICE — SINGLE USE SUCTION VALVE MAJ-209: Brand: SINGLE USE SUCTION VALVE (STERILE)

## (undated) DEVICE — SOLUTION SCRB 4OZ 4% CHG CLN BASE FOR PT SKIN ANTISEPSIS

## (undated) DEVICE — KENDALL 500 SERIES DIAPHORETIC FOAM MONITORING ELECTRODE - TEAR DROP SHAPE ( 5/PK): Brand: KENDALL

## (undated) DEVICE — BITE BLK ENDOSCP AD 54FR GRN POLYETH ENDOSCP W STRP SLD

## (undated) DEVICE — SUT SLK 0 30IN SH BLK --

## (undated) DEVICE — NET RETRV FB ENDOSCP 2.5MMX230 -- ROTH NET

## (undated) DEVICE — DRAPE,THYROID,SOFT,STERILE: Brand: MEDLINE

## (undated) DEVICE — CANNULA ORIG TL CLR W FOAM CUSHIONS AND 14FT SUPL TB 3 CHN

## (undated) DEVICE — 3M™ STERI-STRIP™ COMPOUND BENZOIN TINCTURE 40 BAGS/CARTON 4 CARTONS/CASE C1544: Brand: 3M™ STERI-STRIP™

## (undated) DEVICE — MEDI-VAC NON-CONDUCTIVE SUCTION TUBING: Brand: CARDINAL HEALTH

## (undated) DEVICE — GAUZE SPONGES,12 PLY: Brand: CURITY

## (undated) DEVICE — FLEX ADVANTAGE 1500CC: Brand: FLEX ADVANTAGE

## (undated) DEVICE — AIRLIFE™ MISTY MAX 10™ NEBULIZER WITH 7 FOOT (2.1 M) FEMALE U/CONNECT-IT CRUSH RESISTANT OXYGEN TUBING, BAFFLED TEE ADAPTER (22 MM I.D./ 22 MM O.D.), MOUTHPIECE AND 6 INCH (15 CM) FLEXTUBE: Brand: AIRLIFE™

## (undated) DEVICE — (D)STOPCOCK IV 4W STD BOR -- DISC BY MFR NO SUB

## (undated) DEVICE — KIT COLON W/ 1.1OZ LUB AND 2 END

## (undated) DEVICE — SUTURE VCRL SZ 3-0 L18IN ABSRB UD POLYGLACTIN 910 BRAID TIE J910T

## (undated) DEVICE — CATH KT HD DL STR 15.5FRX28CM -- DURAFLOW

## (undated) DEVICE — STERILE POLYISOPRENE POWDER-FREE SURGICAL GLOVES: Brand: PROTEXIS

## (undated) DEVICE — SUTURE VCRL SZ 3-0 L27IN ABSRB UD L26MM SH 1/2 CIR J416H

## (undated) DEVICE — 6X9 1.75 MIL 3-W LABGUARD TZ,DISPENS.BOX: Brand: MINIGRIP COMMERCIAL

## (undated) DEVICE — SKIN MARKER,REGULAR TIP WITH RULER AND LABELS: Brand: DEVON

## (undated) DEVICE — GLOVE SURG SZ 7.5 L11.73IN FNGR THK7.5MIL STRW LTX POLYMER

## (undated) DEVICE — BAG DRAIN URIN 2000ML LF STRL -- CONVERT TO ITEM 363123

## (undated) DEVICE — SUTURE MCRYL SZ 4-0 L18IN ABSRB UD L19MM PS-2 3/8 CIR PRIM Y496G

## (undated) DEVICE — KENDALL SCD EXPRESS SLEEVES, KNEE LENGTH, MEDIUM: Brand: KENDALL SCD

## (undated) DEVICE — SOL IRRIGATION INJ NACL 0.9% 500ML BTL

## (undated) DEVICE — LIGHT HANDLE: Brand: DEVON

## (undated) DEVICE — DRSG PATCH ANTIMIC 1INX7.0MM -- CONVERT TO ITEM 356054

## (undated) DEVICE — DRAIN SPONGES,6 PLY: Brand: EXCILON

## (undated) DEVICE — SUTURE ETHLN SZ 2-0 L18IN NONABSORBABLE BLK L19MM PS-2 PRIM 593H